# Patient Record
Sex: FEMALE | Race: WHITE | NOT HISPANIC OR LATINO | Employment: OTHER | URBAN - METROPOLITAN AREA
[De-identification: names, ages, dates, MRNs, and addresses within clinical notes are randomized per-mention and may not be internally consistent; named-entity substitution may affect disease eponyms.]

---

## 2019-01-14 ENCOUNTER — HOSPITAL ENCOUNTER (EMERGENCY)
Facility: HOSPITAL | Age: 64
Discharge: HOME | End: 2019-01-14
Attending: EMERGENCY MEDICINE

## 2019-01-14 VITALS
DIASTOLIC BLOOD PRESSURE: 72 MMHG | RESPIRATION RATE: 20 BRPM | SYSTOLIC BLOOD PRESSURE: 103 MMHG | BODY MASS INDEX: 25.4 KG/M2 | HEART RATE: 98 BPM | TEMPERATURE: 98 F | WEIGHT: 138 LBS | HEIGHT: 62 IN

## 2019-01-14 DIAGNOSIS — S52.532A COLLES' FRACTURE OF LEFT RADIUS, INITIAL ENCOUNTER FOR CLOSED FRACTURE: Primary | ICD-10-CM

## 2019-01-14 DIAGNOSIS — M25.532 LEFT WRIST PAIN: ICD-10-CM
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
## 2019-01-14 PROCEDURE — 25605 CLTX DST RDL FX/EPHYS SEP W/: CPT | Mod: LT

## 2019-01-14 PROCEDURE — 73100 X-RAY EXAM OF WRIST: CPT | Mod: TC,FY,LT

## 2019-01-14 PROCEDURE — 73110 X-RAY EXAM OF WRIST: CPT | Mod: 26,LT,, | Performed by: RADIOLOGY

## 2019-01-14 PROCEDURE — 99283 EMERGENCY DEPT VISIT LOW MDM: CPT

## 2019-01-14 PROCEDURE — 73110 X-RAY EXAM OF WRIST: CPT | Mod: TC,FY,LT

## 2019-01-14 PROCEDURE — 73100 X-RAY EXAM OF WRIST: CPT | Mod: 26,59,LT, | Performed by: RADIOLOGY

## 2019-01-14 PROCEDURE — 73100 XR WRIST 2 VIEW LEFT: ICD-10-PCS | Mod: 26,59,LT, | Performed by: RADIOLOGY

## 2019-01-14 PROCEDURE — 25000003 PHARM REV CODE 250: Performed by: NURSE PRACTITIONER

## 2019-01-14 PROCEDURE — 99285 EMERGENCY DEPT VISIT HI MDM: CPT | Mod: 25

## 2019-01-14 PROCEDURE — 25000003 PHARM REV CODE 250: Performed by: EMERGENCY MEDICINE

## 2019-01-14 PROCEDURE — 73110 XR WRIST COMPLETE 3 VIEWS LEFT: ICD-10-PCS | Mod: 26,LT,, | Performed by: RADIOLOGY

## 2019-01-14 RX ORDER — CLONAZEPAM 0.5 MG/1
0.5 TABLET ORAL 2 TIMES DAILY PRN
COMMUNITY
End: 2024-01-19 | Stop reason: SDUPTHER

## 2019-01-14 RX ORDER — HYDROCODONE BITARTRATE AND ACETAMINOPHEN 5; 325 MG/1; MG/1
1-2 TABLET ORAL EVERY 6 HOURS PRN
Qty: 40 TABLET | Refills: 0 | Status: ON HOLD | OUTPATIENT
Start: 2019-01-14 | End: 2020-09-15

## 2019-01-14 RX ORDER — FLUOXETINE HYDROCHLORIDE 40 MG/1
40 CAPSULE ORAL DAILY
COMMUNITY

## 2019-01-14 RX ORDER — LIDOCAINE HYDROCHLORIDE 10 MG/ML
1 INJECTION INFILTRATION; PERINEURAL
Status: COMPLETED | OUTPATIENT
Start: 2019-01-14 | End: 2019-01-14

## 2019-01-14 RX ORDER — HYDROCODONE BITARTRATE AND ACETAMINOPHEN 5; 325 MG/1; MG/1
1 TABLET ORAL
Status: COMPLETED | OUTPATIENT
Start: 2019-01-14 | End: 2019-01-14

## 2019-01-14 RX ORDER — MONTELUKAST SODIUM 10 MG/1
10 TABLET ORAL EVERY MORNING
COMMUNITY

## 2019-01-14 RX ADMIN — LIDOCAINE HYDROCHLORIDE 1 ML: 10 INJECTION, SOLUTION INFILTRATION; PERINEURAL at 07:01

## 2019-01-14 RX ADMIN — HYDROCODONE BITARTRATE AND ACETAMINOPHEN 1 TABLET: 5; 325 TABLET ORAL at 07:01

## 2019-01-15 DIAGNOSIS — M25.532 LEFT WRIST PAIN: Primary | ICD-10-CM

## 2019-01-15 NOTE — ED NOTES
Pt verbalized understanding of discharge and medication instructions. To discharge with steady gait. Breathing even and unlabored NAD. Escorted patient and daughter to discharge window.

## 2019-01-15 NOTE — DISCHARGE INSTRUCTIONS
Norco 5 mg  Take 1-2 every 6 hr needed for acute pain  Elevation  Sling  Contact Orthopedics in the morning for close follow-up for casting and re-evaluation
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
## 2019-01-16 ENCOUNTER — OFFICE VISIT (OUTPATIENT)
Dept: ORTHOPEDICS | Facility: CLINIC | Age: 64
End: 2019-01-16

## 2019-01-16 ENCOUNTER — HOSPITAL ENCOUNTER (OUTPATIENT)
Dept: RADIOLOGY | Facility: HOSPITAL | Age: 64
Discharge: HOME | End: 2019-01-16
Attending: ORTHOPAEDIC SURGERY

## 2019-01-16 VITALS — WEIGHT: 138 LBS | HEIGHT: 62 IN | BODY MASS INDEX: 25.4 KG/M2

## 2019-01-16 DIAGNOSIS — S52.502A TRAUMATIC CLOSED DISPLACED FRACTURE OF DISTAL END OF RADIUS, LEFT, INITIAL ENCOUNTER: ICD-10-CM

## 2019-01-16 DIAGNOSIS — M25.532 LEFT WRIST PAIN: ICD-10-CM

## 2019-01-16 DIAGNOSIS — S52.502A TRAUMATIC CLOSED DISPLACED FRACTURE OF DISTAL END OF RADIUS, LEFT, INITIAL ENCOUNTER: Primary | ICD-10-CM

## 2019-01-16 DIAGNOSIS — Z01.818 PRE-OP EXAM: Primary | ICD-10-CM

## 2019-01-16 DIAGNOSIS — Z01.818 PRE-OP EXAM: ICD-10-CM

## 2019-01-16 PROCEDURE — 73110 XR WRIST COMPLETE 3 VIEWS LEFT: ICD-10-PCS | Mod: 26,LT,, | Performed by: RADIOLOGY

## 2019-01-16 PROCEDURE — 73110 X-RAY EXAM OF WRIST: CPT | Mod: TC,PN,LT

## 2019-01-16 PROCEDURE — 99204 OFFICE O/P NEW MOD 45 MIN: CPT | Mod: 57,S$PBB,, | Performed by: ORTHOPAEDIC SURGERY

## 2019-01-16 PROCEDURE — 73110 X-RAY EXAM OF WRIST: CPT | Mod: 26,LT,, | Performed by: RADIOLOGY

## 2019-01-16 PROCEDURE — 99999 PR PBB SHADOW E&M-EST. PATIENT-LVL II: ICD-10-PCS | Mod: PBBFAC,,, | Performed by: ORTHOPAEDIC SURGERY

## 2019-01-16 PROCEDURE — 99999 PR PBB SHADOW E&M-EST. PATIENT-LVL II: CPT | Mod: PBBFAC,,, | Performed by: ORTHOPAEDIC SURGERY

## 2019-01-16 PROCEDURE — 99212 OFFICE O/P EST SF 10 MIN: CPT | Mod: PBBFAC,25,PN | Performed by: ORTHOPAEDIC SURGERY

## 2019-01-16 PROCEDURE — 99204 PR OFFICE/OUTPT VISIT, NEW, LEVL IV, 45-59 MIN: ICD-10-PCS | Mod: 57,S$PBB,, | Performed by: ORTHOPAEDIC SURGERY

## 2019-01-16 RX ORDER — SODIUM CHLORIDE 9 MG/ML
INJECTION, SOLUTION INTRAVENOUS CONTINUOUS
Status: CANCELLED | OUTPATIENT
Start: 2019-01-16

## 2019-01-16 RX ORDER — MUPIROCIN 20 MG/G
OINTMENT TOPICAL
Status: CANCELLED | OUTPATIENT
Start: 2019-01-16
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
## 2019-01-23 ENCOUNTER — TELEPHONE (OUTPATIENT)
Dept: ORTHOPEDICS | Facility: CLINIC | Age: 64
End: 2019-01-23

## 2019-01-23 NOTE — TELEPHONE ENCOUNTER
----- Message from Karthik Hinojosa sent at 1/23/2019  3:00 PM CST -----  Contact: self [phone]  Would like to have prescription for pain medication sent to pharmacy.  Please call back at [phone].  Md Angeles          Pt uses.  [facility]  [phone]

## 2019-01-23 NOTE — TELEPHONE ENCOUNTER
Pt called, LM to call office as Dr. Jeff does not prescibe pain medication until the day of surgery.

## 2019-01-25 RX ORDER — CYCLOBENZAPRINE HCL 10 MG
10 TABLET ORAL 3 TIMES DAILY PRN
Status: ON HOLD | COMMUNITY
End: 2023-06-05 | Stop reason: HOSPADM

## 2019-01-25 RX ORDER — AMLODIPINE AND BENAZEPRIL HYDROCHLORIDE 5; 10 MG/1; MG/1
1 CAPSULE ORAL EVERY MORNING
Status: ON HOLD | COMMUNITY
End: 2023-06-28 | Stop reason: HOSPADM

## 2019-01-26 ENCOUNTER — LAB VISIT (OUTPATIENT)
Dept: LAB | Facility: HOSPITAL | Age: 64
End: 2019-01-26
Attending: ORTHOPAEDIC SURGERY

## 2019-01-26 DIAGNOSIS — Z01.818 PRE-OP EXAM: ICD-10-CM

## 2019-01-26 DIAGNOSIS — S52.502A TRAUMATIC CLOSED DISPLACED FRACTURE OF DISTAL END OF RADIUS, LEFT, INITIAL ENCOUNTER: ICD-10-CM

## 2019-01-26 LAB
ANION GAP SERPL CALC-SCNC: 13 MMOL/L
BASOPHILS # BLD AUTO: 0.02 K/UL
BASOPHILS NFR BLD: 0.3 %
BUN SERPL-MCNC: 6 MG/DL
CALCIUM SERPL-MCNC: 8.9 MG/DL
CHLORIDE SERPL-SCNC: 92 MMOL/L
CO2 SERPL-SCNC: 28 MMOL/L
CREAT SERPL-MCNC: 0.5 MG/DL
DIFFERENTIAL METHOD: ABNORMAL
EOSINOPHIL # BLD AUTO: 0.1 K/UL
EOSINOPHIL NFR BLD: 1.7 %
ERYTHROCYTE [DISTWIDTH] IN BLOOD BY AUTOMATED COUNT: 13.4 %
EST. GFR  (AFRICAN AMERICAN): >60 ML/MIN/1.73 M^2
EST. GFR  (NON AFRICAN AMERICAN): >60 ML/MIN/1.73 M^2
GLUCOSE SERPL-MCNC: 107 MG/DL
HCT VFR BLD AUTO: 38.3 %
HGB BLD-MCNC: 13 G/DL
IMM GRANULOCYTES # BLD AUTO: 0.02 K/UL
IMM GRANULOCYTES NFR BLD AUTO: 0.3 %
INR PPP: 1
LYMPHOCYTES # BLD AUTO: 1.4 K/UL
LYMPHOCYTES NFR BLD: 17.8 %
MCH RBC QN AUTO: 32.2 PG
MCHC RBC AUTO-ENTMCNC: 33.9 G/DL
MCV RBC AUTO: 95 FL
MONOCYTES # BLD AUTO: 0.6 K/UL
MONOCYTES NFR BLD: 7.6 %
NEUTROPHILS # BLD AUTO: 5.6 K/UL
NEUTROPHILS NFR BLD: 72.3 %
NRBC BLD-RTO: 0 /100 WBC
PLATELET # BLD AUTO: 335 K/UL
PMV BLD AUTO: 9.5 FL
POTASSIUM SERPL-SCNC: 3.6 MMOL/L
PROTHROMBIN TIME: 11.9 SEC
RBC # BLD AUTO: 4.04 M/UL
SODIUM SERPL-SCNC: 133 MMOL/L
WBC # BLD AUTO: 7.66 K/UL

## 2019-01-26 PROCEDURE — 36415 COLL VENOUS BLD VENIPUNCTURE: CPT

## 2019-01-26 PROCEDURE — 85025 COMPLETE CBC W/AUTO DIFF WBC: CPT

## 2019-01-26 PROCEDURE — 85610 PROTHROMBIN TIME: CPT

## 2019-01-26 PROCEDURE — 80048 BASIC METABOLIC PNL TOTAL CA: CPT

## 2019-01-29 ENCOUNTER — ANESTHESIA (OUTPATIENT)
Dept: SURGERY | Facility: HOSPITAL | Age: 64
End: 2019-01-29

## 2019-01-29 ENCOUNTER — HOSPITAL ENCOUNTER (OUTPATIENT)
Facility: HOSPITAL | Age: 64
Discharge: HOME | End: 2019-01-29
Attending: ORTHOPAEDIC SURGERY | Admitting: ORTHOPAEDIC SURGERY

## 2019-01-29 ENCOUNTER — ANESTHESIA EVENT (OUTPATIENT)
Dept: SURGERY | Facility: HOSPITAL | Age: 64
End: 2019-01-29

## 2019-01-29 ENCOUNTER — TELEPHONE (OUTPATIENT)
Dept: ORTHOPEDICS | Facility: CLINIC | Age: 64
End: 2019-01-29

## 2019-01-29 VITALS
TEMPERATURE: 98 F | OXYGEN SATURATION: 91 % | HEIGHT: 62 IN | RESPIRATION RATE: 10 BRPM | WEIGHT: 130 LBS | DIASTOLIC BLOOD PRESSURE: 77 MMHG | BODY MASS INDEX: 23.92 KG/M2 | HEART RATE: 93 BPM | SYSTOLIC BLOOD PRESSURE: 129 MMHG

## 2019-01-29 DIAGNOSIS — S52.502A TRAUMATIC CLOSED DISPLACED FRACTURE OF DISTAL END OF RADIUS, LEFT, INITIAL ENCOUNTER: Primary | ICD-10-CM

## 2019-01-29 DIAGNOSIS — Z41.9 SURGERY, ELECTIVE: ICD-10-CM

## 2019-01-29 DIAGNOSIS — Z01.818 PRE-OP EXAM: ICD-10-CM

## 2019-01-29 PROCEDURE — 63600175 PHARM REV CODE 636 W HCPCS: Performed by: NURSE ANESTHETIST, CERTIFIED REGISTERED

## 2019-01-29 PROCEDURE — 25999: ICD-10-PCS | Mod: ,,, | Performed by: ORTHOPAEDIC SURGERY

## 2019-01-29 PROCEDURE — C1713 ANCHOR/SCREW BN/BN,TIS/BN: HCPCS | Performed by: ORTHOPAEDIC SURGERY

## 2019-01-29 PROCEDURE — 27800903 OPTIME MED/SURG SUP & DEVICES OTHER IMPLANTS: Performed by: ORTHOPAEDIC SURGERY

## 2019-01-29 PROCEDURE — S0028 INJECTION, FAMOTIDINE, 20 MG: HCPCS | Performed by: ANESTHESIOLOGY

## 2019-01-29 PROCEDURE — 93010 ELECTROCARDIOGRAM REPORT: CPT | Mod: ,,, | Performed by: INTERNAL MEDICINE

## 2019-01-29 PROCEDURE — D9220A PRA ANESTHESIA: Mod: ,,, | Performed by: ANESTHESIOLOGY

## 2019-01-29 PROCEDURE — 36000709 HC OR TIME LEV III EA ADD 15 MIN: Performed by: ORTHOPAEDIC SURGERY

## 2019-01-29 PROCEDURE — 25000003 PHARM REV CODE 250: Performed by: ORTHOPAEDIC SURGERY

## 2019-01-29 PROCEDURE — 63600175 PHARM REV CODE 636 W HCPCS: Performed by: ORTHOPAEDIC SURGERY

## 2019-01-29 PROCEDURE — 25000003 PHARM REV CODE 250: Performed by: ANESTHESIOLOGY

## 2019-01-29 PROCEDURE — 63600175 PHARM REV CODE 636 W HCPCS: Performed by: ANESTHESIOLOGY

## 2019-01-29 PROCEDURE — 37000009 HC ANESTHESIA EA ADD 15 MINS: Performed by: ORTHOPAEDIC SURGERY

## 2019-01-29 PROCEDURE — 25999 UNLISTED PX FOREARM/WRIST: CPT | Mod: ,,, | Performed by: ORTHOPAEDIC SURGERY

## 2019-01-29 PROCEDURE — 71000016 HC POSTOP RECOV ADDL HR: Performed by: ORTHOPAEDIC SURGERY

## 2019-01-29 PROCEDURE — 93005 ELECTROCARDIOGRAM TRACING: CPT

## 2019-01-29 PROCEDURE — 63600175 PHARM REV CODE 636 W HCPCS

## 2019-01-29 PROCEDURE — 94640 AIRWAY INHALATION TREATMENT: CPT

## 2019-01-29 PROCEDURE — 27201423 OPTIME MED/SURG SUP & DEVICES STERILE SUPPLY: Performed by: ORTHOPAEDIC SURGERY

## 2019-01-29 PROCEDURE — 37000008 HC ANESTHESIA 1ST 15 MINUTES: Performed by: ORTHOPAEDIC SURGERY

## 2019-01-29 PROCEDURE — 36000708 HC OR TIME LEV III 1ST 15 MIN: Performed by: ORTHOPAEDIC SURGERY

## 2019-01-29 PROCEDURE — D9220A PRA ANESTHESIA: ICD-10-PCS | Mod: ,,, | Performed by: ANESTHESIOLOGY

## 2019-01-29 PROCEDURE — 71000033 HC RECOVERY, INTIAL HOUR: Performed by: ORTHOPAEDIC SURGERY

## 2019-01-29 PROCEDURE — 25000003 PHARM REV CODE 250: Performed by: NURSE ANESTHETIST, CERTIFIED REGISTERED

## 2019-01-29 PROCEDURE — 71000015 HC POSTOP RECOV 1ST HR: Performed by: ORTHOPAEDIC SURGERY

## 2019-01-29 PROCEDURE — C1769 GUIDE WIRE: HCPCS | Performed by: ORTHOPAEDIC SURGERY

## 2019-01-29 PROCEDURE — 25000242 PHARM REV CODE 250 ALT 637 W/ HCPCS: Performed by: ANESTHESIOLOGY

## 2019-01-29 PROCEDURE — 93010 EKG 12-LEAD: ICD-10-PCS | Mod: ,,, | Performed by: INTERNAL MEDICINE

## 2019-01-29 RX ORDER — IPRATROPIUM BROMIDE AND ALBUTEROL SULFATE 2.5; .5 MG/3ML; MG/3ML
3 SOLUTION RESPIRATORY (INHALATION) ONCE
Status: DISCONTINUED | OUTPATIENT
Start: 2019-01-29 | End: 2019-01-29 | Stop reason: HOSPADM

## 2019-01-29 RX ORDER — CEFAZOLIN SODIUM 2 G/50ML
2 SOLUTION INTRAVENOUS
Status: COMPLETED | OUTPATIENT
Start: 2019-01-29 | End: 2019-01-29

## 2019-01-29 RX ORDER — ONDANSETRON 2 MG/ML
INJECTION INTRAMUSCULAR; INTRAVENOUS
Status: DISCONTINUED
Start: 2019-01-29 | End: 2019-01-29 | Stop reason: HOSPADM

## 2019-01-29 RX ORDER — MIDAZOLAM HYDROCHLORIDE 1 MG/ML
INJECTION, SOLUTION INTRAMUSCULAR; INTRAVENOUS
Status: DISCONTINUED | OUTPATIENT
Start: 2019-01-29 | End: 2019-01-29

## 2019-01-29 RX ORDER — IPRATROPIUM BROMIDE AND ALBUTEROL SULFATE 2.5; .5 MG/3ML; MG/3ML
SOLUTION RESPIRATORY (INHALATION)
Status: DISCONTINUED
Start: 2019-01-29 | End: 2019-01-29 | Stop reason: HOSPADM

## 2019-01-29 RX ORDER — SODIUM CHLORIDE, SODIUM LACTATE, POTASSIUM CHLORIDE, CALCIUM CHLORIDE 600; 310; 30; 20 MG/100ML; MG/100ML; MG/100ML; MG/100ML
INJECTION, SOLUTION INTRAVENOUS CONTINUOUS
Status: DISCONTINUED | OUTPATIENT
Start: 2019-01-29 | End: 2019-01-29 | Stop reason: HOSPADM

## 2019-01-29 RX ORDER — SODIUM CHLORIDE, SODIUM LACTATE, POTASSIUM CHLORIDE, CALCIUM CHLORIDE 600; 310; 30; 20 MG/100ML; MG/100ML; MG/100ML; MG/100ML
125 INJECTION, SOLUTION INTRAVENOUS CONTINUOUS
Status: DISCONTINUED | OUTPATIENT
Start: 2019-01-29 | End: 2019-01-29 | Stop reason: HOSPADM

## 2019-01-29 RX ORDER — MUPIROCIN 20 MG/G
OINTMENT TOPICAL
Status: DISCONTINUED | OUTPATIENT
Start: 2019-01-29 | End: 2019-01-29 | Stop reason: HOSPADM

## 2019-01-29 RX ORDER — NEOSTIGMINE METHYLSULFATE 1 MG/ML
INJECTION, SOLUTION INTRAVENOUS
Status: DISCONTINUED | OUTPATIENT
Start: 2019-01-29 | End: 2019-01-29

## 2019-01-29 RX ORDER — FAMOTIDINE 10 MG/ML
INJECTION INTRAVENOUS
Status: DISCONTINUED
Start: 2019-01-29 | End: 2019-01-29 | Stop reason: HOSPADM

## 2019-01-29 RX ORDER — PROPOFOL 10 MG/ML
VIAL (ML) INTRAVENOUS
Status: DISCONTINUED | OUTPATIENT
Start: 2019-01-29 | End: 2019-01-29

## 2019-01-29 RX ORDER — MORPHINE SULFATE 2 MG/ML
INJECTION, SOLUTION INTRAMUSCULAR; INTRAVENOUS
Status: COMPLETED
Start: 2019-01-29 | End: 2019-01-29

## 2019-01-29 RX ORDER — MORPHINE SULFATE 2 MG/ML
2 INJECTION, SOLUTION INTRAMUSCULAR; INTRAVENOUS EVERY 5 MIN PRN
Status: DISCONTINUED | OUTPATIENT
Start: 2019-01-29 | End: 2019-01-29 | Stop reason: HOSPADM

## 2019-01-29 RX ORDER — LIDOCAINE HYDROCHLORIDE 10 MG/ML
1 INJECTION, SOLUTION EPIDURAL; INFILTRATION; INTRACAUDAL; PERINEURAL ONCE
Status: DISCONTINUED | OUTPATIENT
Start: 2019-01-29 | End: 2019-01-29 | Stop reason: HOSPADM

## 2019-01-29 RX ORDER — SUCCINYLCHOLINE CHLORIDE 20 MG/ML
INJECTION INTRAMUSCULAR; INTRAVENOUS
Status: DISCONTINUED | OUTPATIENT
Start: 2019-01-29 | End: 2019-01-29

## 2019-01-29 RX ORDER — ONDANSETRON 2 MG/ML
4 INJECTION INTRAMUSCULAR; INTRAVENOUS DAILY PRN
Status: DISCONTINUED | OUTPATIENT
Start: 2019-01-29 | End: 2019-01-29 | Stop reason: HOSPADM

## 2019-01-29 RX ORDER — CISATRACURIUM BESYLATE 2 MG/ML
INJECTION, SOLUTION INTRAVENOUS
Status: DISCONTINUED | OUTPATIENT
Start: 2019-01-29 | End: 2019-01-29

## 2019-01-29 RX ORDER — IPRATROPIUM BROMIDE AND ALBUTEROL SULFATE 2.5; .5 MG/3ML; MG/3ML
3 SOLUTION RESPIRATORY (INHALATION) ONCE
Status: COMPLETED | OUTPATIENT
Start: 2019-01-29 | End: 2019-01-29

## 2019-01-29 RX ORDER — SODIUM CHLORIDE 9 MG/ML
INJECTION, SOLUTION INTRAVENOUS CONTINUOUS
Status: DISCONTINUED | OUTPATIENT
Start: 2019-01-29 | End: 2019-01-29 | Stop reason: HOSPADM

## 2019-01-29 RX ORDER — FAMOTIDINE 10 MG/ML
20 INJECTION INTRAVENOUS ONCE
Status: COMPLETED | OUTPATIENT
Start: 2019-01-29 | End: 2019-01-29

## 2019-01-29 RX ORDER — MEPERIDINE HYDROCHLORIDE 50 MG/ML
INJECTION INTRAMUSCULAR; INTRAVENOUS; SUBCUTANEOUS
Status: DISCONTINUED | OUTPATIENT
Start: 2019-01-29 | End: 2019-01-29

## 2019-01-29 RX ORDER — GLYCOPYRROLATE 0.2 MG/ML
INJECTION INTRAMUSCULAR; INTRAVENOUS
Status: DISCONTINUED | OUTPATIENT
Start: 2019-01-29 | End: 2019-01-29

## 2019-01-29 RX ADMIN — MORPHINE SULFATE 2 MG: 2 INJECTION, SOLUTION INTRAMUSCULAR; INTRAVENOUS at 10:01

## 2019-01-29 RX ADMIN — MEPERIDINE HYDROCHLORIDE 50 MG: 50 INJECTION INTRAMUSCULAR; INTRAVENOUS; SUBCUTANEOUS at 07:01

## 2019-01-29 RX ADMIN — IPRATROPIUM BROMIDE AND ALBUTEROL SULFATE 3 ML: .5; 3 SOLUTION RESPIRATORY (INHALATION) at 07:01

## 2019-01-29 RX ADMIN — MORPHINE SULFATE 2 MG: 2 INJECTION, SOLUTION INTRAMUSCULAR; INTRAVENOUS at 09:01

## 2019-01-29 RX ADMIN — MIDAZOLAM HYDROCHLORIDE 2 MG: 1 INJECTION, SOLUTION INTRAMUSCULAR; INTRAVENOUS at 07:01

## 2019-01-29 RX ADMIN — SODIUM CHLORIDE: 0.9 INJECTION, SOLUTION INTRAVENOUS at 08:01

## 2019-01-29 RX ADMIN — CEFAZOLIN SODIUM 2 G: 2 SOLUTION INTRAVENOUS at 07:01

## 2019-01-29 RX ADMIN — SUCCINYLCHOLINE CHLORIDE 100 MG: 20 INJECTION, SOLUTION INTRAMUSCULAR; INTRAVENOUS at 07:01

## 2019-01-29 RX ADMIN — PROPOFOL 200 MG: 10 INJECTION, EMULSION INTRAVENOUS at 07:01

## 2019-01-29 RX ADMIN — CISATRACURIUM BESYLATE 4 MG: 2 INJECTION INTRAVENOUS at 08:01

## 2019-01-29 RX ADMIN — NEOSTIGMINE METHYLSULFATE 4 MG: 1 INJECTION INTRAVENOUS at 09:01

## 2019-01-29 RX ADMIN — SODIUM CHLORIDE, POTASSIUM CHLORIDE, SODIUM LACTATE AND CALCIUM CHLORIDE: 600; 310; 30; 20 INJECTION, SOLUTION INTRAVENOUS at 07:01

## 2019-01-29 RX ADMIN — ONDANSETRON 4 MG: 2 INJECTION INTRAMUSCULAR; INTRAVENOUS at 09:01

## 2019-01-29 RX ADMIN — GLYCOPYRROLATE 0.8 MG: 0.2 INJECTION INTRAMUSCULAR; INTRAVENOUS at 09:01

## 2019-01-29 RX ADMIN — FAMOTIDINE 20 MG: 10 INJECTION INTRAVENOUS at 07:01

## 2019-01-29 RX ADMIN — CISATRACURIUM BESYLATE 6 MG: 2 INJECTION INTRAVENOUS at 07:01

## 2019-01-29 NOTE — ANESTHESIA POSTPROCEDURE EVALUATION
"Anesthesia Post Evaluation    Patient: Bhavana Lambert    Procedure(s) Performed: Procedure(s) (LRB):  ORIF, WRIST (Left)    Final Anesthesia Type: general  Patient location during evaluation: PACU  Patient participation: Yes- Able to Participate  Level of consciousness: awake and alert  Post-procedure vital signs: reviewed and stable  Pain management: adequate  Airway patency: patent  PONV status at discharge: No PONV  Anesthetic complications: no      Cardiovascular status: blood pressure returned to baseline  Respiratory status: unassisted  Hydration status: euvolemic  Follow-up not needed.        Visit Vitals  /77   Pulse 93   Temp 36.4 °C (97.6 °F) (Oral)   Resp 10   Ht 5' 2" (1.575 m)   Wt 59 kg (130 lb)   SpO2 (!) 91%   Breastfeeding? No   BMI 23.78 kg/m²       Pain/Joana Score: Pain Rating Prior to Med Admin: 7 (1/29/2019 10:49 AM)  Pain Rating Post Med Admin: 7 (1/29/2019 10:49 AM)  Joana Score: 9 (1/29/2019 10:49 AM)        "

## 2019-01-29 NOTE — PLAN OF CARE
Respiratory tx given, pt continued sat improved to 90 and above. Alert and holding conversation with daughter.

## 2019-01-29 NOTE — PLAN OF CARE
Taking sips of soda, Continued on 1L nc at this time. Encouraged pt to cough and deep breath. Position ice pack inside sling to wrist.
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
## 2019-01-29 NOTE — PLAN OF CARE
Elian GU notified that patient is requiring 2L O2 to maintain 92%sat. Elian GU stated to keep patient until she is wide awake and to educate patient and patient's daughter to have the patient wear home O2 today.

## 2019-01-29 NOTE — TELEPHONE ENCOUNTER
Called patient to schedule post op appointment with Dr. Jeff and to ask patient how she is feeling after surgery today. Spoke with patient's daughter. She states that patient is doing well. Post op appointment made.

## 2019-01-29 NOTE — PLAN OF CARE
Continued care of quietly resting patient s/p ORIF of left wrist.   She is currently on 3l NC has had 6MG of morphine iv for c/o pain. Wears O2 at home while sleeping, has been encouraged in post op teaching to continued use of home O2 throughout the day while at home and while taking pain medications.     At this time pt is assist to high sitting   position.

## 2019-01-29 NOTE — DISCHARGE SUMMARY
The patient was admitted through same-day surgery and was brought to the operating room where an open reduction and internal fixation of a left distal radius fracture was completed.  For a full account of surgery please see the operative report.  Postoperatively the patient was transferred to the recovery room and when alert awake and oriented was discharged home in stable condition with instructions to follow up in approximately 10-12 days.

## 2019-01-29 NOTE — PLAN OF CARE
Now on room air, encouraging pt to listen to tone on monitor and respond with deep breathing when tone drops. Order for due nebulizer respiratory tx per  Dr Plummer.

## 2019-01-29 NOTE — ANESTHESIA PREPROCEDURE EVALUATION
01/29/2019  Bhavana Lambert is a 63 y.o., female.    Anesthesia Evaluation    I have reviewed the Patient Summary Reports.    I have reviewed the Nursing Notes.   I have reviewed the Medications.     Review of Systems  Anesthesia Hx:  No problems with previous Anesthesia    Social:  Smoker    Cardiovascular:  Cardiovascular Normal     Pulmonary:   COPD    Renal/:  Renal/ Normal     Hepatic/GI:  Hepatic/GI Normal    Musculoskeletal:  Musculoskeletal Normal    Neurological:  Neurology Normal    Endocrine:  Endocrine Normal    Dermatological:  Skin Normal        Physical Exam  General:  Well nourished    Airway/Jaw/Neck:  Airway Findings: Mouth Opening: Normal Tongue: Normal  General Airway Assessment: Adult  Mallampati: III  TM Distance: 4 - 6 cm  Jaw/Neck Findings:  Neck ROM: Normal ROM       Chest/Lungs:  Chest/Lungs Findings: Clear to auscultation     Heart/Vascular:  Heart Findings: Rate: Normal  Rhythm: Regular Rhythm        Mental Status:  Mental Status Findings:  Cooperative, Alert and Oriented         Anesthesia Plan  Type of Anesthesia, risks & benefits discussed:  Anesthesia Type:  general  Patient's Preference:   Intra-op Monitoring Plan: standard ASA monitors  Intra-op Monitoring Plan Comments:   Post Op Pain Control Plan: IV/PO Opioids PRN  Post Op Pain Control Plan Comments:   Induction:   IV  Beta Blocker:  Patient is not currently on a Beta-Blocker (No further documentation required).       Informed Consent: Patient understands risks and agrees with Anesthesia plan.  Questions answered. Anesthesia consent signed with patient.  ASA Score: 3     Day of Surgery Review of History & Physical: I have interviewed and examined the patient. I have reviewed the patient's H&P dated:            Ready For Surgery From Anesthesia Perspective.

## 2019-01-29 NOTE — TRANSFER OF CARE
"Anesthesia Transfer of Care Note    Patient: Bhavana Lambert    Procedure(s) Performed: Procedure(s) (LRB):  ORIF, WRIST (Left)    Patient location: PACU    Anesthesia Type: general    Transport from OR: Transported from OR on room air with adequate spontaneous ventilation    Post pain: adequate analgesia    Post assessment: no apparent anesthetic complications and tolerated procedure well    Post vital signs: stable    Level of consciousness: awake, alert and oriented    Nausea/Vomiting: no nausea/vomiting    Complications: none    Transfer of care protocol was followed      Last vitals:   Visit Vitals  /87 (BP Location: Right arm, Patient Position: Lying)   Pulse 96   Temp 36.9 °C (98.4 °F) (Oral)   Resp 14   Ht 5' 2" (1.575 m)   Wt 59 kg (130 lb)   SpO2 (!) 94%   Breastfeeding? No   BMI 23.78 kg/m²     "

## 2019-01-29 NOTE — INTERVAL H&P NOTE
The patient has been examined and the H&P has been reviewed:    I concur with the findings and no changes have occurred since H&P was written.    Anesthesia/Surgery risks, benefits and alternative options discussed and understood by patient/family.          Active Hospital Problems    Diagnosis  POA    Traumatic closed displaced fracture of distal end of radius, left, initial encounter [S53.488A]  Yes      Resolved Hospital Problems   No resolved problems to display.

## 2019-01-30 ENCOUNTER — TELEPHONE (OUTPATIENT)
Dept: ORTHOPEDICS | Facility: CLINIC | Age: 64
End: 2019-01-30

## 2019-01-30 NOTE — TELEPHONE ENCOUNTER
----- Message from Nancy Almanzar sent at 1/30/2019  8:24 AM CST -----  Type: Needs Medical Advice    Who Called:  DaughterKim Paulino  Symptoms (please be specific): hand is swelling  How long has patient had these symptoms:  Since last night  Pharmacy name and phone #:  [facility]  Best Call Back Number: [phone]  Additional Information: Just had surgery yesterday, stating her hand swelling has gotten bigger since yesterday. Kept getting up throughout the night regarding, please advise

## 2019-01-30 NOTE — TELEPHONE ENCOUNTER
Returned call. Instructed patient's daughter to keep site elevated above the heart and iced for no longer than 15 minutes at a time. Instructed patient's daughter to looses ace wrap on hand if needed but do not remove it. Patient's daughter voiced understanding. Encouraged her to call office if she has any questions or concerns.

## 2019-02-08 ENCOUNTER — OFFICE VISIT (OUTPATIENT)
Dept: ORTHOPEDICS | Facility: CLINIC | Age: 64
End: 2019-02-08

## 2019-02-08 VITALS — RESPIRATION RATE: 18 BRPM | HEIGHT: 62 IN | BODY MASS INDEX: 23.93 KG/M2 | WEIGHT: 130.06 LBS

## 2019-02-08 DIAGNOSIS — Z51.89 AFTER CARE: Primary | ICD-10-CM

## 2019-02-08 PROCEDURE — 99212 OFFICE O/P EST SF 10 MIN: CPT | Mod: PBBFAC,PN | Performed by: ORTHOPAEDIC SURGERY

## 2019-02-08 PROCEDURE — 99999 PR PBB SHADOW E&M-EST. PATIENT-LVL II: CPT | Mod: PBBFAC,,, | Performed by: ORTHOPAEDIC SURGERY

## 2019-02-08 PROCEDURE — 99024 POSTOP FOLLOW-UP VISIT: CPT | Mod: ,,, | Performed by: ORTHOPAEDIC SURGERY

## 2019-02-08 PROCEDURE — 99999 PR PBB SHADOW E&M-EST. PATIENT-LVL II: ICD-10-PCS | Mod: PBBFAC,,, | Performed by: ORTHOPAEDIC SURGERY

## 2019-02-08 PROCEDURE — 99024 PR POST-OP FOLLOW-UP VISIT: ICD-10-PCS | Mod: ,,, | Performed by: ORTHOPAEDIC SURGERY

## 2019-02-08 NOTE — PROGRESS NOTES
Subjective:      Patient ID: Bhavana Lambert is a 63 y.o. female.    Chief Complaint: Post-op Evaluation, Pain, and Swelling of the Left Wrist    HPI: Ms. Lambert returns today for 1st postop visit on open reduction internal fixation of her left wrist on 01/29/2019.  She has been having some pain in her wrist. She has been in a splint since surgery.    ROS:  New diagnosis/surgery/prescriptions since last office visit on 01/16/2019:  Open reduction internal fixation left distal radius.      Objective:      Physical Exam:   General: AAOx3.  No acute distress  Vascular:  Pulses intact and equal bilaterally.  Capillary refill less than 3 seconds and equal bilaterally  Neurologic:  Pinprick and soft touch intact and equal bilaterally  Integment:  Incision with mild jaison-incisional erythema 1 small area of dehiscence at the proximal and approximately 0.3 cm long.  Extremity:  Wrist:  Pronation/supination allowable 45/45 degrees. Allowable dorsiflexion/volar flexion 45/45 degrees.  Tender with motion wrist. Tender with palpation wrist. No purulence noted  Radiography:  No new x-rays done today      Assessment:       Impression:  Open reduction internal fixation left distal radius.      Plan:       1.  Discussed physical examination with the patient. Since she has some erythema will treat her with prophylactic antibiotics and watch her closely.  Also will take her out of the splint and put her in a removable splint.  2.  Cleanse incision and re-dress with sterile dressing.  3.  Removal off-the-shelf commercially available cock-up splint left wrist, 1 was fitted to the patient.  4.  Norco 7.5/325, 1 p.o. q.4-6 hours p.r.n. pain, dispense 30, refill 0.  5.  Cleocin 300 mg, 1 p.o. t.i.d., dispense 30, refill 0.  6.  One-handed activities with the right hand only.  No use of the left hand for any activities.  7.  Home exercises to include finger motion exercises were discussed.  8.  [facility] portal use was discussed with the  patient and information was given.  The patient was encouraged to use the [facility] portal for all future encounters.  9.  Follow up in 1 week for wound check.

## 2019-02-15 ENCOUNTER — OFFICE VISIT (OUTPATIENT)
Dept: ORTHOPEDICS | Facility: CLINIC | Age: 64
End: 2019-02-15

## 2019-02-15 VITALS — WEIGHT: 130.06 LBS | BODY MASS INDEX: 23.93 KG/M2 | RESPIRATION RATE: 16 BRPM | HEIGHT: 62 IN

## 2019-02-15 DIAGNOSIS — Z51.89 AFTER CARE: Primary | ICD-10-CM

## 2019-02-15 PROCEDURE — 99999 PR PBB SHADOW E&M-EST. PATIENT-LVL II: ICD-10-PCS | Mod: PBBFAC,,, | Performed by: ORTHOPAEDIC SURGERY

## 2019-02-15 PROCEDURE — 99999 PR PBB SHADOW E&M-EST. PATIENT-LVL II: CPT | Mod: PBBFAC,,, | Performed by: ORTHOPAEDIC SURGERY

## 2019-02-15 PROCEDURE — 99024 PR POST-OP FOLLOW-UP VISIT: ICD-10-PCS | Mod: ,,, | Performed by: ORTHOPAEDIC SURGERY

## 2019-02-15 PROCEDURE — 99212 OFFICE O/P EST SF 10 MIN: CPT | Mod: PBBFAC,PN | Performed by: ORTHOPAEDIC SURGERY

## 2019-02-15 PROCEDURE — 99024 POSTOP FOLLOW-UP VISIT: CPT | Mod: ,,, | Performed by: ORTHOPAEDIC SURGERY

## 2019-02-15 NOTE — PROGRESS NOTES
Subjective:      Patient ID: Bhavana Lambert is a 63 y.o. female.    Chief Complaint: Post-op Evaluation of the Left Wrist    HPI: Ms. Lambert returned today for wound check of her left wrist. At her last visit  on 02/08/2019 she had some erythema and one small area of dehiscence of the volar incision of her left wrist. She was given antibiotics.  She stated that the pain and the swelling has resolved.  Her pain is well controlled today. She has been using her splint.    ROS:  No new diagnosis/surgery/prescriptions since last office visit on 02/08/2019.      Objective:      Physical Exam:   General: AAOx3.  No acute distress  Vascular:  Pulses intact and equal bilaterally.  Capillary refill less than 3 seconds and equal bilaterally  Neurologic:  Pinprick and soft touch intact and equal bilaterally  Integment:  Incision well approximated and healing well. No erythema.  Extremity:  Wrist:  Allowable pronation/supination 45/45 degrees left wrist.  Relatively nontender with motion. Allowable dorsiflexion/volar flexion 45/45 degrees. Nontender with palpation.  Relatively nontender with motion. No swelling.  Radiography:  No x-ray done today.      Assessment:       Impression:  ORIF left distal radius.      Plan:       1.  Discussed physical examination with the patient, and she understands she has resolved the erythema she had around her incision. She also understands her wrist looks good and she is doing well.  But she must wear her brace at all times.  2.  Continue with wrist splint at all times treat it like a cast.  3.  Any pain can be controlled with over-the-counter medications dosed per box instructions.  4.  Continue to elevate.  5.  One-handed activities with the right hand only.  6.  [facility] portal was discussed with the patient and information was given.  The patient was encouraged to use the [facility] portal for all future encounters.  7.  Follow up in approximately 1 month with an x-ray of the left wrist.

## 2019-04-09 DIAGNOSIS — M25.532 LEFT WRIST PAIN: Primary | ICD-10-CM

## 2020-09-13 ENCOUNTER — HOSPITAL ENCOUNTER (INPATIENT)
Facility: HOSPITAL | Age: 65
LOS: 1 days | Discharge: ANOTHER HOSPITAL | DRG: 641 | End: 2020-09-14
Attending: INTERNAL MEDICINE | Admitting: INTERNAL MEDICINE

## 2020-09-13 DIAGNOSIS — E87.1 HYPONATREMIA: ICD-10-CM

## 2020-09-13 PROBLEM — E87.6 HYPOKALEMIA: Status: ACTIVE | Noted: 2020-09-13

## 2020-09-13 PROBLEM — R19.7 DIARRHEA: Status: ACTIVE | Noted: 2020-09-13

## 2020-09-13 PROBLEM — E87.20 METABOLIC ACIDOSIS: Status: ACTIVE | Noted: 2020-09-13

## 2020-09-13 LAB
ALBUMIN SERPL BCP-MCNC: 4.1 G/DL (ref 3.5–5.2)
ALP SERPL-CCNC: 82 U/L (ref 55–135)
ALT SERPL W/O P-5'-P-CCNC: 32 U/L (ref 10–44)
AMYLASE SERPL-CCNC: 97 U/L (ref 20–110)
ANION GAP SERPL CALC-SCNC: 23 MMOL/L (ref 8–16)
AST SERPL-CCNC: 37 U/L (ref 10–40)
BASOPHILS # BLD AUTO: 0.01 K/UL (ref 0–0.2)
BASOPHILS NFR BLD: 0.1 % (ref 0–1.9)
BILIRUB SERPL-MCNC: 1.5 MG/DL (ref 0.1–1)
BILIRUB UR QL STRIP: ABNORMAL
BNP SERPL-MCNC: 91 PG/ML (ref 0–99)
BUN SERPL-MCNC: 7 MG/DL (ref 8–23)
CALCIUM SERPL-MCNC: 8.1 MG/DL (ref 8.7–10.5)
CHLORIDE SERPL-SCNC: 75 MMOL/L (ref 95–110)
CLARITY UR: CLEAR
CO2 SERPL-SCNC: 16 MMOL/L (ref 23–29)
COLOR UR: YELLOW
CREAT SERPL-MCNC: 0.4 MG/DL (ref 0.5–1.4)
CRP SERPL-MCNC: 3.86 MG/DL (ref 0–0.75)
DIFFERENTIAL METHOD: ABNORMAL
EOSINOPHIL # BLD AUTO: 0 K/UL (ref 0–0.5)
EOSINOPHIL NFR BLD: 0.3 % (ref 0–8)
ERYTHROCYTE [DISTWIDTH] IN BLOOD BY AUTOMATED COUNT: 11.4 % (ref 11.5–14.5)
EST. GFR  (AFRICAN AMERICAN): >60 ML/MIN/1.73 M^2
EST. GFR  (NON AFRICAN AMERICAN): >60 ML/MIN/1.73 M^2
GLUCOSE SERPL-MCNC: 96 MG/DL (ref 70–110)
GLUCOSE UR QL STRIP: NEGATIVE
HCT VFR BLD AUTO: 36.2 % (ref 37–48.5)
HGB BLD-MCNC: 13.6 G/DL (ref 12–16)
HGB UR QL STRIP: NEGATIVE
IMM GRANULOCYTES # BLD AUTO: 0.05 K/UL (ref 0–0.04)
IMM GRANULOCYTES NFR BLD AUTO: 0.4 % (ref 0–0.5)
KETONES UR QL STRIP: ABNORMAL
LEUKOCYTE ESTERASE UR QL STRIP: NEGATIVE
LIPASE SERPL-CCNC: 71 U/L (ref 4–60)
LYMPHOCYTES # BLD AUTO: 0.6 K/UL (ref 1–4.8)
LYMPHOCYTES NFR BLD: 5.2 % (ref 18–48)
MAGNESIUM SERPL-MCNC: 1.6 MG/DL (ref 1.6–2.6)
MCH RBC QN AUTO: 34.4 PG (ref 27–31)
MCHC RBC AUTO-ENTMCNC: 37.6 G/DL (ref 32–36)
MCV RBC AUTO: 92 FL (ref 82–98)
MONOCYTES # BLD AUTO: 0.8 K/UL (ref 0.3–1)
MONOCYTES NFR BLD: 6.8 % (ref 4–15)
NEUTROPHILS # BLD AUTO: 10 K/UL (ref 1.8–7.7)
NEUTROPHILS NFR BLD: 87.2 % (ref 38–73)
NITRITE UR QL STRIP: NEGATIVE
NRBC BLD-RTO: 0 /100 WBC
PH UR STRIP: 7 [PH] (ref 5–8)
PLATELET # BLD AUTO: 294 K/UL (ref 150–350)
PMV BLD AUTO: 10.6 FL (ref 9.2–12.9)
POTASSIUM SERPL-SCNC: 2.5 MMOL/L (ref 3.5–5.1)
PROT SERPL-MCNC: 7 G/DL (ref 6–8.4)
PROT UR QL STRIP: NEGATIVE
RBC # BLD AUTO: 3.95 M/UL (ref 4–5.4)
SARS-COV-2 RDRP RESP QL NAA+PROBE: NEGATIVE
SODIUM SERPL-SCNC: 114 MMOL/L (ref 136–145)
SP GR UR STRIP: 1.02 (ref 1–1.03)
TSH SERPL DL<=0.005 MIU/L-ACNC: 1.07 UIU/ML (ref 0.34–5.6)
URATE SERPL-MCNC: 7.2 MG/DL (ref 2.4–5.7)
URN SPEC COLLECT METH UR: ABNORMAL
UROBILINOGEN UR STRIP-ACNC: NEGATIVE EU/DL
WBC # BLD AUTO: 11.48 K/UL (ref 3.9–12.7)

## 2020-09-13 PROCEDURE — 71045 XR CHEST AP PORTABLE: ICD-10-PCS | Mod: 26,,, | Performed by: RADIOLOGY

## 2020-09-13 PROCEDURE — 80053 COMPREHEN METABOLIC PANEL: CPT

## 2020-09-13 PROCEDURE — 83880 ASSAY OF NATRIURETIC PEPTIDE: CPT

## 2020-09-13 PROCEDURE — 36415 COLL VENOUS BLD VENIPUNCTURE: CPT

## 2020-09-13 PROCEDURE — 82150 ASSAY OF AMYLASE: CPT

## 2020-09-13 PROCEDURE — 71045 X-RAY EXAM CHEST 1 VIEW: CPT | Mod: 26,,, | Performed by: RADIOLOGY

## 2020-09-13 PROCEDURE — 21400001 HC TELEMETRY ROOM

## 2020-09-13 PROCEDURE — 99285 EMERGENCY DEPT VISIT HI MDM: CPT | Mod: 25

## 2020-09-13 PROCEDURE — 70450 CT HEAD/BRAIN W/O DYE: CPT | Mod: TC

## 2020-09-13 PROCEDURE — 25000003 PHARM REV CODE 250: Performed by: INTERNAL MEDICINE

## 2020-09-13 PROCEDURE — 84550 ASSAY OF BLOOD/URIC ACID: CPT

## 2020-09-13 PROCEDURE — 96374 THER/PROPH/DIAG INJ IV PUSH: CPT

## 2020-09-13 PROCEDURE — 81003 URINALYSIS AUTO W/O SCOPE: CPT

## 2020-09-13 PROCEDURE — 85025 COMPLETE CBC W/AUTO DIFF WBC: CPT

## 2020-09-13 PROCEDURE — 70450 CT HEAD WITHOUT CONTRAST: ICD-10-PCS | Mod: 26,,, | Performed by: RADIOLOGY

## 2020-09-13 PROCEDURE — 71045 X-RAY EXAM CHEST 1 VIEW: CPT | Mod: TC,FY

## 2020-09-13 PROCEDURE — 86140 C-REACTIVE PROTEIN: CPT

## 2020-09-13 PROCEDURE — 83735 ASSAY OF MAGNESIUM: CPT

## 2020-09-13 PROCEDURE — 63600175 PHARM REV CODE 636 W HCPCS: Performed by: INTERNAL MEDICINE

## 2020-09-13 PROCEDURE — 84443 ASSAY THYROID STIM HORMONE: CPT

## 2020-09-13 PROCEDURE — U0002 COVID-19 LAB TEST NON-CDC: HCPCS

## 2020-09-13 PROCEDURE — 83690 ASSAY OF LIPASE: CPT

## 2020-09-13 PROCEDURE — 87040 BLOOD CULTURE FOR BACTERIA: CPT | Mod: 59

## 2020-09-13 PROCEDURE — 70450 CT HEAD/BRAIN W/O DYE: CPT | Mod: 26,,, | Performed by: RADIOLOGY

## 2020-09-13 RX ORDER — FAMOTIDINE 20 MG/1
20 TABLET, FILM COATED ORAL 2 TIMES DAILY
Status: DISCONTINUED | OUTPATIENT
Start: 2020-09-13 | End: 2020-09-14 | Stop reason: HOSPADM

## 2020-09-13 RX ORDER — ENOXAPARIN SODIUM 100 MG/ML
40 INJECTION SUBCUTANEOUS
Status: DISCONTINUED | OUTPATIENT
Start: 2020-09-13 | End: 2020-09-14 | Stop reason: HOSPADM

## 2020-09-13 RX ORDER — HYDROCODONE BITARTRATE AND ACETAMINOPHEN 5; 325 MG/1; MG/1
1 TABLET ORAL EVERY 6 HOURS PRN
Status: DISCONTINUED | OUTPATIENT
Start: 2020-09-13 | End: 2020-09-14 | Stop reason: HOSPADM

## 2020-09-13 RX ORDER — SODIUM,POTASSIUM PHOSPHATES 280-250MG
2 POWDER IN PACKET (EA) ORAL
Status: DISCONTINUED | OUTPATIENT
Start: 2020-09-13 | End: 2020-09-14 | Stop reason: HOSPADM

## 2020-09-13 RX ORDER — POTASSIUM CHLORIDE 7.45 MG/ML
20 INJECTION INTRAVENOUS
Status: DISPENSED | OUTPATIENT
Start: 2020-09-13 | End: 2020-09-13

## 2020-09-13 RX ORDER — POTASSIUM CHLORIDE 7.45 MG/ML
40 INJECTION INTRAVENOUS ONCE
Status: COMPLETED | OUTPATIENT
Start: 2020-09-13 | End: 2020-09-13

## 2020-09-13 RX ORDER — POTASSIUM CHLORIDE 20 MEQ/1
40 TABLET, EXTENDED RELEASE ORAL DAILY
Status: DISCONTINUED | OUTPATIENT
Start: 2020-09-13 | End: 2020-09-14 | Stop reason: HOSPADM

## 2020-09-13 RX ORDER — POTASSIUM CHLORIDE 1.5 G/1.58G
40 POWDER, FOR SOLUTION ORAL
Status: DISCONTINUED | OUTPATIENT
Start: 2020-09-13 | End: 2020-09-14 | Stop reason: CLARIF

## 2020-09-13 RX ORDER — ACETAMINOPHEN 325 MG/1
650 TABLET ORAL EVERY 6 HOURS PRN
Status: DISCONTINUED | OUTPATIENT
Start: 2020-09-13 | End: 2020-09-14 | Stop reason: HOSPADM

## 2020-09-13 RX ORDER — LANOLIN ALCOHOL/MO/W.PET/CERES
800 CREAM (GRAM) TOPICAL
Status: DISCONTINUED | OUTPATIENT
Start: 2020-09-13 | End: 2020-09-14 | Stop reason: HOSPADM

## 2020-09-13 RX ORDER — ONDANSETRON 2 MG/ML
4 INJECTION INTRAMUSCULAR; INTRAVENOUS EVERY 6 HOURS PRN
Status: DISCONTINUED | OUTPATIENT
Start: 2020-09-13 | End: 2020-09-14 | Stop reason: HOSPADM

## 2020-09-13 RX ORDER — POTASSIUM CHLORIDE 1.5 G/1.58G
40 POWDER, FOR SOLUTION ORAL
Status: DISCONTINUED | OUTPATIENT
Start: 2020-09-13 | End: 2020-09-14 | Stop reason: HOSPADM

## 2020-09-13 RX ORDER — POTASSIUM CHLORIDE 20 MEQ/1
20 TABLET, EXTENDED RELEASE ORAL
Status: COMPLETED | OUTPATIENT
Start: 2020-09-13 | End: 2020-09-13

## 2020-09-13 RX ORDER — MONTELUKAST SODIUM 10 MG/1
10 TABLET ORAL EVERY MORNING
Status: DISCONTINUED | OUTPATIENT
Start: 2020-09-14 | End: 2020-09-14 | Stop reason: HOSPADM

## 2020-09-13 RX ORDER — SODIUM CHLORIDE 0.9 % (FLUSH) 0.9 %
10 SYRINGE (ML) INJECTION
Status: DISCONTINUED | OUTPATIENT
Start: 2020-09-13 | End: 2020-09-14 | Stop reason: HOSPADM

## 2020-09-13 RX ORDER — AMLODIPINE BESYLATE 2.5 MG/1
5 TABLET ORAL DAILY
Status: DISCONTINUED | OUTPATIENT
Start: 2020-09-14 | End: 2020-09-14 | Stop reason: HOSPADM

## 2020-09-13 RX ORDER — BENAZEPRIL HYDROCHLORIDE 5 MG/1
10 TABLET ORAL DAILY
Status: DISCONTINUED | OUTPATIENT
Start: 2020-09-14 | End: 2020-09-14 | Stop reason: HOSPADM

## 2020-09-13 RX ORDER — SODIUM CHLORIDE 9 MG/ML
INJECTION, SOLUTION INTRAVENOUS CONTINUOUS
Status: DISCONTINUED | OUTPATIENT
Start: 2020-09-13 | End: 2020-09-14 | Stop reason: HOSPADM

## 2020-09-13 RX ADMIN — FAMOTIDINE 20 MG: 20 TABLET ORAL at 08:09

## 2020-09-13 RX ADMIN — Medication 800 MG: at 11:09

## 2020-09-13 RX ADMIN — ENOXAPARIN SODIUM 40 MG: 40 INJECTION SUBCUTANEOUS at 06:09

## 2020-09-13 RX ADMIN — SODIUM CHLORIDE: 0.9 INJECTION, SOLUTION INTRAVENOUS at 11:09

## 2020-09-13 RX ADMIN — SODIUM CHLORIDE: 0.9 INJECTION, SOLUTION INTRAVENOUS at 06:09

## 2020-09-13 RX ADMIN — POTASSIUM CHLORIDE 40 MEQ: 1500 TABLET, EXTENDED RELEASE ORAL at 06:09

## 2020-09-13 RX ADMIN — SODIUM CHLORIDE 1000 ML: 9 INJECTION, SOLUTION INTRAVENOUS at 02:09

## 2020-09-13 RX ADMIN — POTASSIUM CHLORIDE 20 MEQ: 1500 TABLET, EXTENDED RELEASE ORAL at 03:09

## 2020-09-13 RX ADMIN — POTASSIUM CHLORIDE 20 MEQ: 7.46 INJECTION, SOLUTION INTRAVENOUS at 03:09

## 2020-09-13 RX ADMIN — Medication 800 MG: at 06:09

## 2020-09-13 RX ADMIN — POTASSIUM CHLORIDE 40 MEQ: 7.46 INJECTION, SOLUTION INTRAVENOUS at 06:09

## 2020-09-13 NOTE — ASSESSMENT & PLAN NOTE
Likely viral in nature.  Check stool for culture, ova and parasite and WBCs.  Continue supportive care, IV fluid hydration and use

## 2020-09-13 NOTE — ASSESSMENT & PLAN NOTE
Likely hypovolemic hypo natremia.  Check BMP every 6 hr.  Continue IV fluid hydration with normal saline.  Hold SSRI and anxiolytics 6 for now.  Check TSH, uric acid, urine osmolality.  Encourage patient to improve p.o. intake.  Consult physical therapy evaluation and treatment in the morning.  Observe fall and seizure precautions.

## 2020-09-13 NOTE — ASSESSMENT & PLAN NOTE
Holding Klonopin for the time being, until patient's symptoms improve and serum sodium normalizes.

## 2020-09-13 NOTE — ASSESSMENT & PLAN NOTE
Hold serotonin receptor uptake inhibitors for now as they may be contributing towards hyponatremia.

## 2020-09-13 NOTE — ASSESSMENT & PLAN NOTE
Check lactic acid.  Continue IV fluid hydration with normal saline and monitor serum bicarbonate and anion gap closely.

## 2020-09-13 NOTE — ED PROVIDER NOTES
Encounter Date: 9/13/2020       History     Chief Complaint   Patient presents with    Fatigue     Patient is brought to the ED for weakness and fatigue.  She states that she has been in a house without air conditioning for approximately 5 days.  She states that she has not been moving around her house very much.  She is very slow to answer.  She lives with her dogs.  She answers appropriately but slowly.  She is not short of breath.  No fever chills sweats myalgias.  She is visited by her kids and relatives.        Review of patient's allergies indicates:  No Known Allergies  Past Medical History:   Diagnosis Date    Anxiety     COPD (chronic obstructive pulmonary disease)     Depression     Hypertension      Past Surgical History:   Procedure Laterality Date    COLON SURGERY      COLOSTOMY      COLOSTOMY CLOSURE      HYSTERECTOMY      LEG SURGERY Right     OPEN REDUCTION AND INTERNAL FIXATION (ORIF) OF INJURY OF WRIST Left 1/29/2019    Procedure: ORIF, WRIST;  Surgeon: Homero Jeff DO;  [facility]: [facility] OR;  Service: Orthopedics;  Laterality: Left;  Equipment: [employer] Geminus Wrist Plate Set  Vendor/Rep: [employer]  C-Arm: Entire  DME: None    REQUIRES ASSISTANT    WRIST SURGERY Right      Family History   Problem Relation Age of Onset    Cancer Mother     COPD Father     Cirrhosis Father     Arthritis Sister     No Known Problems Brother     Anxiety disorder Daughter     Depression Daughter     Anxiety disorder Daughter     Congenital heart disease Daughter     Valvular heart disease Daughter      Social History     Tobacco Use    Smoking status: Current Every Day Smoker     Packs/day: 1.00     Years: 20.00     Pack years: 20.00     Types: Cigarettes    Smokeless tobacco: Never Used   Substance Use Topics    Alcohol use: Yes     Frequency: Never     Comment: couple times a week-wine or bloody cari    Drug use: No     Review of Systems   Constitutional: Positive for  activity change, appetite change, fatigue and unexpected weight change. Negative for fever.   HENT: Negative for sore throat.    Respiratory: Negative for shortness of breath.    Cardiovascular: Negative for chest pain.   Gastrointestinal: Negative for nausea.   Genitourinary: Negative for dysuria.   Musculoskeletal: Negative for back pain.   Skin: Negative for rash.   Neurological: Positive for weakness and light-headedness.   Hematological: Does not bruise/bleed easily.   All other systems reviewed and are negative.      Physical Exam     Initial Vitals [09/13/20 1434]   BP Pulse Resp Temp SpO2   (!) 125/104 90 18 98.4 °F (36.9 °C) 97 %      MAP       --         Physical Exam    Nursing note and vitals reviewed.  Constitutional: Vital signs are normal. She appears well-developed and well-nourished. She is active and cooperative.   HENT:   Head: Normocephalic and atraumatic.   Right Ear: External ear normal.   Left Ear: External ear normal.   Nose: Nose normal.   Mouth/Throat: Oropharynx is clear and moist.   Eyes: Conjunctivae, EOM and lids are normal. Pupils are equal, round, and reactive to light. Lids are everted and swept, no foreign bodies found.   Neck: Trachea normal, normal range of motion and full passive range of motion without pain. Neck supple.   Cardiovascular: Normal rate, regular rhythm, S1 normal, S2 normal, normal heart sounds, intact distal pulses and normal pulses.  No extrasystoles are present.    Pulmonary/Chest: Breath sounds normal.   Abdominal: Soft. Normal appearance and bowel sounds are normal.   Musculoskeletal: Normal range of motion.   Neurological: She is alert and oriented to person, place, and time. She has normal reflexes. GCS score is 15. GCS eye subscore is 4. GCS verbal subscore is 5. GCS motor subscore is 6.   Skin: Skin is warm, dry and intact. Capillary refill takes less than 2 seconds.   Psychiatric: She has a normal mood and affect. Her speech is normal and behavior is  normal. Judgment and thought content normal. Cognition and memory are normal.   Answers are appropriate but slow         ED Course   Procedures  Labs Reviewed   CBC W/ AUTO DIFFERENTIAL - Abnormal; Notable for the following components:       Result Value    RBC 3.95 (*)     Hematocrit 36.2 (*)     Mean Corpuscular Hemoglobin 34.4 (*)     Mean Corpuscular Hemoglobin Conc 37.6 (*)     RDW 11.4 (*)     Gran # (ANC) 10.0 (*)     Immature Grans (Abs) 0.05 (*)     Lymph # 0.6 (*)     Gran% 87.2 (*)     Lymph% 5.2 (*)     All other components within normal limits   COMPREHENSIVE METABOLIC PANEL - Abnormal; Notable for the following components:    Sodium 114 (*)     Potassium 2.5 (*)     Chloride 75 (*)     CO2 16 (*)     BUN, Bld 7 (*)     Creatinine 0.4 (*)     Calcium 8.1 (*)     Total Bilirubin 1.5 (*)     Anion Gap 23 (*)     All other components within normal limits    Narrative:        critical result(s) called and verbal readback obtained from   Ross Reyes @ [facility] 09/13/2020 15:34   SARS-COV-2 RNA AMPLIFICATION, QUAL   URINALYSIS, REFLEX TO URINE CULTURE   B-TYPE NATRIURETIC PEPTIDE   AMYLASE   LIPASE   C-REACTIVE PROTEIN          Imaging Results          CT Head Without Contrast (In process)  Result time 09/13/20 15:29:31               X-Ray Chest AP Portable (In process)               X-Rays:   Independently Interpreted Readings:   Chest X-Ray: No infiltrates.  Normal heart size.  No acute abnormalities. Normal vessels.   Head CT: No hemorrhage.  No skull fracture.  No acute stroke. Calcifications are present. Marked cerebral atrophy especially in the temporal lobes, my interpretation not radiologist     Medical Decision Making:   Clinical Tests:   Lab Tests: Ordered and Reviewed  The following lab test(s) were unremarkable: CBC and CMP       <> Summary of Lab: Laboratory studies show severe hyponatremia with a sodium 114, CO2 use 16, potassium 2.5, BUN creatinine are normal.  Radiological Study:  Ordered and Reviewed  ED Management:  Chest x-ray is negative.  CT scan interpreted by me is negative except for severe atrophy especially in the temporal lobes.    Patient has been having significant diarrhea she states over the last 1-2 weeks.  She is not prone to diarrhea.  Her mentation is slow.  Sodium is 114 which would account for the mental slowing.  Potassium is very low also possibly accounted for by the diarrhea.  Her abdominal exam is negative.    Patient will be need ED admission to correct her metabolic and mineral abnormalities, and find etiology.  Cause of diarrhea also needs further evaluation.  Patient has not had any stool since arrival in the ED.                             Clinical Impression:       ICD-10-CM ICD-9-CM   1. Hyponatremia  E87.1 276.1

## 2020-09-13 NOTE — PROGRESS NOTES
Responded to Deterioration alert:  Patient just admitted from the ER. VSS, patient is improving. No new complaints. Receiving IV Normal saline infusion. Contineu tele-monitoring and check BMP q 6 hrs.

## 2020-09-13 NOTE — H&P
[facility] - ED Hospital Medicine  History & Physical    Patient Name: Bhavana Lambert  MRN: [id number]  Admission Date: 9/13/2020  Attending Physician: Michael Yang MD   Primary Care Provider: Pepito Jhaveri Iv, MD         Patient information was obtained from patient, patient's daughter and ER records.     Subjective:     Principal Problem:Hyponatremia    Chief Complaint:   Chief Complaint   Patient presents with    Fatigue        HPI: Patient is a 65-year-old  female with past medical history significant for anxiety, depression, chronic obstructive pulmonary disease and hypertension is being admitted to Hospital Medicine from [facility] Emergency Room under inpatient status with complaint of increasing lethargy and weakness for 4-5 days.  Part of the history obtained from patient's daughter at bedside.  Reportedly patient's home air conditioner became nonfunctional 5 days ago and patient was exposed to significant heat and humidity for couple of days causing increasing weakness and lethargy.  Patient reported low appetite and had only been eating crackers and drinking diet Coke.  Patient denied any sick contact or recent travel history.  For the past 2-3 days patient reports having diarrhea 1-2 bowel movements per day which are not loose or watery.  Patient denies any abdominal pain or urinary complaints.  Patient denies any headache, vision changes or any focal neuro deficits.  Patient has been noted to have serum sodium 114 and potassium 2.5 in the emergency room.  With infusion of normal saline and potassium repletion patient is feeling better.        Past Medical History:   Diagnosis Date    Anxiety     COPD (chronic obstructive pulmonary disease)     Depression     Hypertension        Past Surgical History:   Procedure Laterality Date    COLON SURGERY      COLOSTOMY      COLOSTOMY CLOSURE      HYSTERECTOMY      LEG SURGERY Right     OPEN  REDUCTION AND INTERNAL FIXATION (ORIF) OF INJURY OF WRIST Left 1/29/2019    Procedure: ORIF, WRIST;  Surgeon: Homero Jeff DO;  Location: [facility] OR;  Service: Orthopedics;  Laterality: Left;  Equipment: [employer] Geminus Wrist Plate Set  Vendor/Rep: [employer]  C-Arm: Entire  DME: None    REQUIRES ASSISTANT    WRIST SURGERY Right        Review of patient's allergies indicates:  No Known Allergies    No current facility-administered medications on file prior to encounter.      Current Outpatient Medications on File Prior to Encounter   Medication Sig    amlodipine-benazepril 5-10 mg (LOTREL) 5-10 mg per capsule Take 1 capsule by mouth every morning.    clonazePAM (KLONOPIN) 2 MG Tab Take 2 mg by mouth 2 (two) times daily.    cyclobenzaprine (FLEXERIL) 10 MG tablet Take 10 mg by mouth 3 (three) times daily as needed for Muscle spasms.    fluoxetine HCl (PROZAC ORAL) Take 40 mg by mouth once daily.     HYDROcodone-acetaminophen (NORCO) 5-325 mg per tablet Take 1-2 tablets by mouth every 6 (six) hours as needed for Pain.    montelukast sodium (SINGULAIR ORAL) Take 10 mg by mouth every morning.      Family History     Problem Relation (Age of Onset)    Anxiety disorder Daughter, Daughter    Arthritis Sister    COPD Father    Cancer Mother    Cirrhosis Father    Congenital heart disease Daughter    Depression Daughter    No Known Problems Brother    Valvular heart disease Daughter        Tobacco Use    Smoking status: Current Every Day Smoker     Packs/day: 1.00     Years: 20.00     Pack years: 20.00     Types: Cigarettes    Smokeless tobacco: Never Used   Substance and Sexual Activity    Alcohol use: Yes     Frequency: Never     Comment: couple times a week-wine or bloody cari    Drug use: No    Sexual activity: Not Currently     Review of Systems   Constitutional: Positive for appetite change and fatigue.   Neurological: Positive for weakness and light-headedness.   All other systems  reviewed and are negative.    Objective:     Vital Signs (Most Recent):  Temp: 98.4 °F (36.9 °C) (09/13/20 1434)  Pulse: 90 (09/13/20 1434)  Resp: 18 (09/13/20 1434)  BP: (!) 125/104 (09/13/20 1434)  SpO2: 97 % (09/13/20 1434) Vital Signs (24h Range):  Temp:  [98.4 °F (36.9 °C)] 98.4 °F (36.9 °C)  Pulse:  [90] 90  Resp:  [18] 18  SpO2:  [97 %] 97 %  BP: (125)/(104) 125/104     Weight: 68 kg (149 lb 14.3 oz)  Body mass index is 27.42 kg/m².    Physical Exam  Constitutional:       Appearance: She is well-developed.      Comments: Ill appearing elderly female   HENT:      Head: Normocephalic and atraumatic.      Comments: Dry mucous membranes  Eyes:      Conjunctiva/sclera: Conjunctivae normal.      Pupils: Pupils are equal, round, and reactive to light.   Neck:      Musculoskeletal: Neck supple.      Thyroid: No thyromegaly.      Vascular: No JVD.   Cardiovascular:      Rate and Rhythm: Normal rate and regular rhythm.      Heart sounds: No murmur. No friction rub. No gallop.    Pulmonary:      Effort: Pulmonary effort is normal.      Breath sounds: Normal breath sounds.   Abdominal:      General: Bowel sounds are normal. There is no distension.      Palpations: Abdomen is soft. There is no mass.      Tenderness: There is no abdominal tenderness.   Musculoskeletal: Normal range of motion.   Skin:     General: Skin is warm and dry.   Neurological:      Mental Status: She is oriented to person, place, and time.      Cranial Nerves: No cranial nerve deficit.   Psychiatric:         Behavior: Behavior normal.           CRANIAL NERVES     CN III, IV, VI   Pupils are equal, round, and reactive to light.       Significant Labs:   CBC:   Recent Labs   Lab 09/13/20  1454   WBC 11.48   HGB 13.6   HCT 36.2*        CMP:   Recent Labs   Lab 09/13/20  1454   *   K 2.5*   CL 75*   CO2 16*   GLU 96   BUN 7*   CREATININE 0.4*   CALCIUM 8.1*   PROT 7.0   ALBUMIN 4.1   BILITOT 1.5*   ALKPHOS 82   AST 37   ALT 32   ANIONGAP  23*   EGFRNONAA >60.0     Magnesium: No results for input(s): MG in the last 48 hours.  TSH: No results for input(s): TSH in the last 4320 hours.  Urine Culture: No results for input(s): LABURIN in the last 48 hours.  Urine Studies: No results for input(s): COLORU, APPEARANCEUA, PHUR, SPECGRAV, PROTEINUA, GLUCUA, KETONESU, BILIRUBINUA, OCCULTUA, NITRITE, UROBILINOGEN, LEUKOCYTESUR, RBCUA, WBCUA, BACTERIA, SQUAMEPITHEL, HYALINECASTS in the last 48 hours.    Invalid input(s): WRIGHTSUR    Significant Imaging:  CXR:  No acute cardiopulmonary abnormality appreciated radiographically.  No interval detrimental change in the radiographic appearance of the chest when compared to the prior study.    CT head without contrast:  No acute intracranial abnormality appreciated.    Assessment/Plan:     * Hyponatremia  Likely hypovolemic hypo natremia.  Check BMP every 6 hr.  Continue IV fluid hydration with normal saline.  Hold SSRI and anxiolytics 6 for now.  Check TSH, uric acid, urine osmolality.  Encourage patient to improve p.o. intake.  Consult physical therapy evaluation and treatment in the morning.      Diarrhea  Likely viral in nature.  Check stool for culture, ova and parasite and WBCs.  Continue supportive care, IV fluid hydration and use      Hypokalemia  Replete potassium chloride for in a follow BMP.  Continue tele monitoring.      Metabolic acidosis  Check lactic acid.  Continue IV fluid hydration with normal saline and monitor serum bicarbonate and anion gap closely.      Hypertension  Chronic problem. Will continue chronic medications and monitor for any changes, adjusting as needed.          Depression  Hold serotonin receptor uptake inhibitors for now as they may be contributing towards hyponatremia.      COPD (chronic obstructive pulmonary disease)  Patient's COPD is controlled currently. Continue scheduled inhalers.              Anxiety  Holding Klonopin for the time being, until patient's symptoms improve and  serum sodium normalizes.        DVT prophylaxis:  Use sequential compression stockings and Oc hose.  Jennifer Flowers MD  Department of [facility]   [facility] - ED

## 2020-09-13 NOTE — HPI
Patient is a 65-year-old  female with past medical history significant for anxiety, depression, chronic obstructive pulmonary disease and hypertension is being admitted to Hospital Medicine from [facility] Emergency Room under inpatient status with complaint of increasing lethargy and weakness for 4-5 days.  Part of the history obtained from patient's daughter at bedside.  Reportedly patient's home air conditioner became nonfunctional 5 days ago and patient was exposed to significant heat and humidity for couple of days causing increasing weakness and lethargy.  Patient reported low appetite and had only been eating crackers and drinking diet Coke.  Patient denied any sick contact or recent travel history.  For the past 2-3 days patient reports having diarrhea 1-2 bowel movements per day which are not loose or watery.  Patient denies any abdominal pain or urinary complaints.  Patient denies any headache, vision changes or any focal neuro deficits.  Patient has been noted to have serum sodium 114 and potassium 2.5 in the emergency room.  With infusion of normal saline and potassium repletion patient is feeling better.

## 2020-09-13 NOTE — ED TRIAGE NOTES
Patient presents to ER via EMS from home. She relayed to EMS that she has been without air conditioning x several days. She just got her air fixed,but still feeling weak.

## 2020-09-14 ENCOUNTER — HOSPITAL ENCOUNTER (INPATIENT)
Facility: HOSPITAL | Age: 65
LOS: 3 days | Discharge: HOME | DRG: 641 | End: 2020-09-17
Attending: HOSPITALIST | Admitting: INTERNAL MEDICINE

## 2020-09-14 VITALS
SYSTOLIC BLOOD PRESSURE: 142 MMHG | RESPIRATION RATE: 18 BRPM | WEIGHT: 137 LBS | BODY MASS INDEX: 25.21 KG/M2 | OXYGEN SATURATION: 98 % | HEART RATE: 79 BPM | HEIGHT: 62 IN | DIASTOLIC BLOOD PRESSURE: 73 MMHG | TEMPERATURE: 98 F

## 2020-09-14 DIAGNOSIS — R94.31 QT PROLONGATION: ICD-10-CM

## 2020-09-14 DIAGNOSIS — F41.9 ANXIETY: Primary | ICD-10-CM

## 2020-09-14 DIAGNOSIS — E87.1 HYPONATREMIA: ICD-10-CM

## 2020-09-14 DIAGNOSIS — E87.6 HYPOKALEMIA: ICD-10-CM

## 2020-09-14 DIAGNOSIS — R07.9 CHEST PAIN: ICD-10-CM

## 2020-09-14 DIAGNOSIS — Z75.8 DISCHARGE PLANNING ISSUES: ICD-10-CM

## 2020-09-14 DIAGNOSIS — F32.A DEPRESSION, UNSPECIFIED DEPRESSION TYPE: ICD-10-CM

## 2020-09-14 LAB
ANION GAP SERPL CALC-SCNC: 14 MMOL/L (ref 8–16)
ANION GAP SERPL CALC-SCNC: 16 MMOL/L (ref 8–16)
ANION GAP SERPL CALC-SCNC: 19 MMOL/L (ref 8–16)
BASOPHILS NFR BLD: 0 % (ref 0–1.9)
BUN SERPL-MCNC: <5 MG/DL (ref 8–23)
CALCIUM SERPL-MCNC: 8.1 MG/DL (ref 8.7–10.5)
CALCIUM SERPL-MCNC: 8.1 MG/DL (ref 8.7–10.5)
CALCIUM SERPL-MCNC: 8.3 MG/DL (ref 8.7–10.5)
CHLORIDE SERPL-SCNC: 82 MMOL/L (ref 95–110)
CHLORIDE SERPL-SCNC: 92 MMOL/L (ref 95–110)
CHLORIDE SERPL-SCNC: 93 MMOL/L (ref 95–110)
CO2 SERPL-SCNC: 17 MMOL/L (ref 23–29)
CREAT SERPL-MCNC: 0.4 MG/DL (ref 0.5–1.4)
CREAT SERPL-MCNC: 0.5 MG/DL (ref 0.5–1.4)
CREAT SERPL-MCNC: 0.5 MG/DL (ref 0.5–1.4)
DIFFERENTIAL METHOD: ABNORMAL
EOSINOPHIL NFR BLD: 0 % (ref 0–8)
ERYTHROCYTE [DISTWIDTH] IN BLOOD BY AUTOMATED COUNT: 11.4 % (ref 11.5–14.5)
EST. GFR  (AFRICAN AMERICAN): >60 ML/MIN/1.73 M^2
EST. GFR  (NON AFRICAN AMERICAN): >60 ML/MIN/1.73 M^2
GLUCOSE SERPL-MCNC: 78 MG/DL (ref 70–110)
GLUCOSE SERPL-MCNC: 83 MG/DL (ref 70–110)
GLUCOSE SERPL-MCNC: 94 MG/DL (ref 70–110)
HCT VFR BLD AUTO: 35.4 % (ref 37–48.5)
HGB BLD-MCNC: 13.4 G/DL (ref 12–16)
IMM GRANULOCYTES # BLD AUTO: ABNORMAL K/UL (ref 0–0.04)
IMM GRANULOCYTES NFR BLD AUTO: ABNORMAL % (ref 0–0.5)
LYMPHOCYTES NFR BLD: 12 % (ref 18–48)
MCH RBC QN AUTO: 34.7 PG (ref 27–31)
MCHC RBC AUTO-ENTMCNC: 37.9 G/DL (ref 32–36)
MCV RBC AUTO: 92 FL (ref 82–98)
MONOCYTES NFR BLD: 2 % (ref 4–15)
NEUTROPHILS NFR BLD: 81 % (ref 38–73)
NEUTS BAND NFR BLD MANUAL: 5 %
NRBC BLD-RTO: 0 /100 WBC
PLATELET # BLD AUTO: 250 K/UL (ref 150–350)
PLATELET BLD QL SMEAR: ABNORMAL
PMV BLD AUTO: 11.2 FL (ref 9.2–12.9)
POTASSIUM SERPL-SCNC: 3.3 MMOL/L (ref 3.5–5.1)
POTASSIUM SERPL-SCNC: 3.3 MMOL/L (ref 3.5–5.1)
POTASSIUM SERPL-SCNC: 4 MMOL/L (ref 3.5–5.1)
RBC # BLD AUTO: 3.86 M/UL (ref 4–5.4)
SODIUM SERPL-SCNC: 118 MMOL/L (ref 136–145)
SODIUM SERPL-SCNC: 124 MMOL/L (ref 136–145)
SODIUM SERPL-SCNC: 125 MMOL/L (ref 136–145)
WBC # BLD AUTO: 8.26 K/UL (ref 3.9–12.7)

## 2020-09-14 PROCEDURE — 99239 PR HOSPITAL DISCHARGE DAY,>30 MIN: ICD-10-PCS | Mod: ,,, | Performed by: FAMILY MEDICINE

## 2020-09-14 PROCEDURE — 25000003 PHARM REV CODE 250: Performed by: INTERNAL MEDICINE

## 2020-09-14 PROCEDURE — 99239 HOSP IP/OBS DSCHRG MGMT >30: CPT | Mod: ,,, | Performed by: FAMILY MEDICINE

## 2020-09-14 PROCEDURE — 80048 BASIC METABOLIC PNL TOTAL CA: CPT | Mod: 91

## 2020-09-14 PROCEDURE — 80048 BASIC METABOLIC PNL TOTAL CA: CPT

## 2020-09-14 PROCEDURE — 25000003 PHARM REV CODE 250: Performed by: HOSPITALIST

## 2020-09-14 PROCEDURE — 25000003 PHARM REV CODE 250: Performed by: FAMILY MEDICINE

## 2020-09-14 PROCEDURE — 36415 COLL VENOUS BLD VENIPUNCTURE: CPT

## 2020-09-14 PROCEDURE — 97161 PT EVAL LOW COMPLEX 20 MIN: CPT

## 2020-09-14 PROCEDURE — 85007 BL SMEAR W/DIFF WBC COUNT: CPT

## 2020-09-14 PROCEDURE — 11000001 HC ACUTE MED/SURG PRIVATE ROOM

## 2020-09-14 PROCEDURE — 85027 COMPLETE CBC AUTOMATED: CPT

## 2020-09-14 PROCEDURE — 25000003 PHARM REV CODE 250: Performed by: STUDENT IN AN ORGANIZED HEALTH CARE EDUCATION/TRAINING PROGRAM

## 2020-09-14 RX ORDER — POTASSIUM CHLORIDE 20 MEQ/1
20 TABLET, EXTENDED RELEASE ORAL
Status: COMPLETED | OUTPATIENT
Start: 2020-09-14 | End: 2020-09-14

## 2020-09-14 RX ORDER — POTASSIUM CHLORIDE 20 MEQ/15ML
40 SOLUTION ORAL
Status: DISCONTINUED | OUTPATIENT
Start: 2020-09-14 | End: 2020-09-14 | Stop reason: HOSPADM

## 2020-09-14 RX ORDER — IBUPROFEN 200 MG
24 TABLET ORAL
Status: DISCONTINUED | OUTPATIENT
Start: 2020-09-14 | End: 2020-09-17 | Stop reason: HOSPADM

## 2020-09-14 RX ORDER — POTASSIUM CHLORIDE 20 MEQ/1
40 TABLET, EXTENDED RELEASE ORAL DAILY
Status: DISCONTINUED | OUTPATIENT
Start: 2020-09-15 | End: 2020-09-16

## 2020-09-14 RX ORDER — MONTELUKAST SODIUM 10 MG/1
10 TABLET ORAL EVERY MORNING
Status: DISCONTINUED | OUTPATIENT
Start: 2020-09-15 | End: 2020-09-17 | Stop reason: HOSPADM

## 2020-09-14 RX ORDER — SODIUM CHLORIDE 9 MG/ML
INJECTION, SOLUTION INTRAVENOUS CONTINUOUS
Status: ACTIVE | OUTPATIENT
Start: 2020-09-14 | End: 2020-09-15

## 2020-09-14 RX ORDER — ENOXAPARIN SODIUM 100 MG/ML
40 INJECTION SUBCUTANEOUS EVERY 24 HOURS
Status: DISCONTINUED | OUTPATIENT
Start: 2020-09-15 | End: 2020-09-17 | Stop reason: HOSPADM

## 2020-09-14 RX ORDER — GLUCAGON 1 MG
1 KIT INJECTION
Status: DISCONTINUED | OUTPATIENT
Start: 2020-09-14 | End: 2020-09-17 | Stop reason: HOSPADM

## 2020-09-14 RX ORDER — IBUPROFEN 200 MG
16 TABLET ORAL
Status: DISCONTINUED | OUTPATIENT
Start: 2020-09-14 | End: 2020-09-17 | Stop reason: HOSPADM

## 2020-09-14 RX ORDER — CLONAZEPAM 0.5 MG/1
2 TABLET ORAL 2 TIMES DAILY PRN
Status: DISCONTINUED | OUTPATIENT
Start: 2020-09-14 | End: 2020-09-14 | Stop reason: HOSPADM

## 2020-09-14 RX ORDER — SODIUM CHLORIDE 0.9 % (FLUSH) 0.9 %
10 SYRINGE (ML) INJECTION
Status: DISCONTINUED | OUTPATIENT
Start: 2020-09-14 | End: 2020-09-17 | Stop reason: HOSPADM

## 2020-09-14 RX ORDER — BENAZEPRIL HYDROCHLORIDE 10 MG/1
10 TABLET ORAL DAILY
Status: DISCONTINUED | OUTPATIENT
Start: 2020-09-15 | End: 2020-09-17 | Stop reason: HOSPADM

## 2020-09-14 RX ORDER — FAMOTIDINE 20 MG/1
20 TABLET, FILM COATED ORAL 2 TIMES DAILY
Status: DISCONTINUED | OUTPATIENT
Start: 2020-09-14 | End: 2020-09-17 | Stop reason: HOSPADM

## 2020-09-14 RX ORDER — AMLODIPINE BESYLATE 5 MG/1
5 TABLET ORAL DAILY
Status: DISCONTINUED | OUTPATIENT
Start: 2020-09-15 | End: 2020-09-17 | Stop reason: HOSPADM

## 2020-09-14 RX ADMIN — FAMOTIDINE 20 MG: 20 TABLET ORAL at 08:09

## 2020-09-14 RX ADMIN — HYDROCODONE BITARTRATE AND ACETAMINOPHEN 1 TABLET: 5; 325 TABLET ORAL at 08:09

## 2020-09-14 RX ADMIN — SODIUM CHLORIDE: 0.9 INJECTION, SOLUTION INTRAVENOUS at 08:09

## 2020-09-14 RX ADMIN — POTASSIUM CHLORIDE 40 MEQ: 1500 TABLET, EXTENDED RELEASE ORAL at 08:09

## 2020-09-14 RX ADMIN — MONTELUKAST 10 MG: 10 TABLET, FILM COATED ORAL at 08:09

## 2020-09-14 RX ADMIN — BENAZEPRIL HYDROCHLORIDE 10 MG: 5 TABLET ORAL at 08:09

## 2020-09-14 RX ADMIN — HYDROCODONE BITARTRATE AND ACETAMINOPHEN 1 TABLET: 5; 325 TABLET ORAL at 03:09

## 2020-09-14 RX ADMIN — POTASSIUM CHLORIDE 20 MEQ: 1500 TABLET, EXTENDED RELEASE ORAL at 01:09

## 2020-09-14 RX ADMIN — AMLODIPINE BESYLATE 5 MG: 2.5 TABLET ORAL at 08:09

## 2020-09-14 RX ADMIN — CLONAZEPAM 2 MG: 0.5 TABLET ORAL at 03:09

## 2020-09-14 RX ADMIN — FAMOTIDINE 20 MG: 20 TABLET, FILM COATED ORAL at 08:09

## 2020-09-14 NOTE — PLAN OF CARE
"  Problem: Fall Injury Risk  Goal: Absence of Fall and Fall-Related Injury  Outcome: Ongoing, Progressing  Intervention: Identify and Manage Contributors to Fall Injury Risk  Flowsheets (Taken 9/14/2020 0058)  Self-Care Promotion: BADL personal objects within reach  Medication Review/Management: infusion initiated  Intervention: Promote Injury-Free Environment  Flowsheets (Taken 9/14/2020 0058)  Safety Promotion/Fall Prevention:   side rails raised x 2   bed alarm set   nonskid shoes/socks when out of bed   bed alarm refused  Environmental Safety Modification: assistive device/personal items within reach     Problem: Adult Inpatient Plan of Care  Goal: Plan of Care Review  Outcome: Ongoing, Progressing  Flowsheets (Taken 9/14/2020 0058)  Plan of Care Reviewed With: patient  Goal: Patient-Specific Goal (Individualization)  Outcome: Ongoing, Progressing  Flowsheets (Taken 9/14/2020 0058)  Individualized Care Needs: NONE  Anxieties, Fears or Concerns: " I CANT GET ANY ANXEITY MEDS"   Patient-Specific Goals (Include Timeframe): NONE  Goal: Absence of Hospital-Acquired Illness or Injury  Outcome: Ongoing, Progressing  Intervention: Identify and Manage Fall Risk  Flowsheets (Taken 9/14/2020 0058)  Safety Promotion/Fall Prevention:   side rails raised x 2   bed alarm set   nonskid shoes/socks when out of bed   bed alarm refused  Intervention: Prevent VTE (venous thromboembolism)  Flowsheets (Taken 9/14/2020 0058)  VTE Prevention/Management:   remove, assess skin and reapply sequential compression device   remove, assess skin and reapply compression stockings  Goal: Optimal Comfort and Wellbeing  Outcome: Ongoing, Progressing  Intervention: Provide Person-Centered Care  Flowsheets (Taken 9/14/2020 0058)  Trust Relationship/Rapport:   care explained   emotional support provided   choices provided   empathic listening provided   questions answered   questions encouraged  Goal: Readiness for Transition of Care  Outcome: " Ongoing, Progressing  Goal: Rounds/Family Conference  Outcome: Ongoing, Progressing  Flowsheets (Taken 9/14/2020 0058)  Participants: patient     Problem: Skin Injury Risk Increased  Goal: Skin Health and Integrity  Outcome: Ongoing, Progressing  Intervention: Optimize Skin Protection  Flowsheets (Taken 9/14/2020 0058)  Pressure Reduction Techniques:   frequent weight shift encouraged   heels elevated off bed  Head of Bed (HOB): HOB at 45 degrees  Intervention: Promote and Optimize Oral Intake  Flowsheets (Taken 9/14/2020 0058)  Oral Nutrition Promotion: social interaction promoted   PT RESTING QUIETLY NORMAL UNLABORED BREATHING NO C/O SOB NO CYANOSIS NOTED. NO C/O PAIN NO GRIMACE TO FACE NO MOANING NOTED. IV INTACT NO REDNESS OR TENDERNESS NOTED AT SITE.
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
## 2020-09-14 NOTE — NURSING
PT AMBULATED TO [facility] WITH ASSISTANCE. LARGE STOOL WATERY BROWN FOUL ODOR STOOL NOTED. URINE AND STOOL MIXED UNABLE TO OBTAIN STOOL AND URINE SPECIMEN.

## 2020-09-14 NOTE — NURSING
Report called to [facility] [phone] to room 48654. NADN. [facility] paperwork completed. Awaiting transport .
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
## 2020-09-14 NOTE — PLAN OF CARE
Deterioration alert noted. Patient with hyponatremia improving with normal saline. Improvement has not exceeded goal of 10 meq per 24 hours. Potassium still low and will replace PO. Cont frequent BMP.
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
## 2020-09-14 NOTE — PLAN OF CARE
09/14/20 1250   Discharge Assessment   Assessment Type Discharge Planning Assessment   Confirmed/corrected address and phone number on facesheet? Yes   Assessment information obtained from? Patient   Expected Length of Stay (days) 2   Prior to hospitilization cognitive status: Alert/Oriented   Prior to hospitalization functional status: Independent   Current cognitive status: Alert/Oriented   Current Functional Status: Independent   Lives With alone   Able to Return to Prior Arrangements yes   Is patient able to care for self after discharge? Yes   Who are your caregiver(s) and their phone number(s)? Layton Miranda  [phone]   Patient's perception of discharge disposition home or selfcare  (Patient will be discharged to home with her daughter Margarito)   Patient currently being followed by outpatient case management? No   Patient currently receives any other outside agency services? No   Equipment Currently Used at Home none   Do you have any problems affording any of your prescribed medications? No   Is the patient taking medications as prescribed? yes   Does the patient have transportation home? Yes   Transportation Anticipated family or friend will provide   Does the patient receive services at the Coumadin Clinic? No   Discharge Plan A Home with family   DME Needed Upon Discharge  none   Patient/Family in Agreement with Plan yes       Patient resting in bed with daughter at bedside.  Denies having any DME. Denies having the use of home health services.  Patient plans to be discharged home with daughter Margarito [phone].  No needs voiced at this time.

## 2020-09-15 LAB
ALBUMIN SERPL BCP-MCNC: 3.4 G/DL (ref 3.5–5.2)
ALP SERPL-CCNC: 90 U/L (ref 55–135)
ALT SERPL W/O P-5'-P-CCNC: 25 U/L (ref 10–44)
ANION GAP SERPL CALC-SCNC: 10 MMOL/L (ref 8–16)
ANION GAP SERPL CALC-SCNC: 10 MMOL/L (ref 8–16)
ANION GAP SERPL CALC-SCNC: 9 MMOL/L (ref 8–16)
ANION GAP SERPL CALC-SCNC: 9 MMOL/L (ref 8–16)
AST SERPL-CCNC: 30 U/L (ref 10–40)
BASOPHILS # BLD AUTO: 0.03 K/UL (ref 0–0.2)
BASOPHILS NFR BLD: 0.4 % (ref 0–1.9)
BILIRUB SERPL-MCNC: 0.4 MG/DL (ref 0.1–1)
BUN SERPL-MCNC: 2 MG/DL (ref 8–23)
BUN SERPL-MCNC: 3 MG/DL (ref 8–23)
CALCIUM SERPL-MCNC: 8.1 MG/DL (ref 8.7–10.5)
CALCIUM SERPL-MCNC: 8.3 MG/DL (ref 8.7–10.5)
CALCIUM SERPL-MCNC: 8.4 MG/DL (ref 8.7–10.5)
CALCIUM SERPL-MCNC: 8.5 MG/DL (ref 8.7–10.5)
CHLORIDE SERPL-SCNC: 93 MMOL/L (ref 95–110)
CHLORIDE SERPL-SCNC: 98 MMOL/L (ref 95–110)
CO2 SERPL-SCNC: 23 MMOL/L (ref 23–29)
CO2 SERPL-SCNC: 24 MMOL/L (ref 23–29)
CO2 SERPL-SCNC: 24 MMOL/L (ref 23–29)
CO2 SERPL-SCNC: 26 MMOL/L (ref 23–29)
CREAT SERPL-MCNC: 0.5 MG/DL (ref 0.5–1.4)
CREAT SERPL-MCNC: 0.6 MG/DL (ref 0.5–1.4)
DIFFERENTIAL METHOD: ABNORMAL
EOSINOPHIL # BLD AUTO: 0.1 K/UL (ref 0–0.5)
EOSINOPHIL NFR BLD: 0.7 % (ref 0–8)
ERYTHROCYTE [DISTWIDTH] IN BLOOD BY AUTOMATED COUNT: 12 % (ref 11.5–14.5)
EST. GFR  (AFRICAN AMERICAN): >60 ML/MIN/1.73 M^2
EST. GFR  (NON AFRICAN AMERICAN): >60 ML/MIN/1.73 M^2
GLUCOSE SERPL-MCNC: 107 MG/DL (ref 70–110)
GLUCOSE SERPL-MCNC: 111 MG/DL (ref 70–110)
GLUCOSE SERPL-MCNC: 88 MG/DL (ref 70–110)
GLUCOSE SERPL-MCNC: 90 MG/DL (ref 70–110)
HCT VFR BLD AUTO: 35.7 % (ref 37–48.5)
HGB BLD-MCNC: 12.6 G/DL (ref 12–16)
IMM GRANULOCYTES # BLD AUTO: 0.03 K/UL (ref 0–0.04)
IMM GRANULOCYTES NFR BLD AUTO: 0.4 % (ref 0–0.5)
LYMPHOCYTES # BLD AUTO: 1 K/UL (ref 1–4.8)
LYMPHOCYTES NFR BLD: 11.5 % (ref 18–48)
MAGNESIUM SERPL-MCNC: 1.6 MG/DL (ref 1.6–2.6)
MCH RBC QN AUTO: 33.6 PG (ref 27–31)
MCHC RBC AUTO-ENTMCNC: 35.3 G/DL (ref 32–36)
MCV RBC AUTO: 95 FL (ref 82–98)
MONOCYTES # BLD AUTO: 0.6 K/UL (ref 0.3–1)
MONOCYTES NFR BLD: 7 % (ref 4–15)
NEUTROPHILS # BLD AUTO: 6.6 K/UL (ref 1.8–7.7)
NEUTROPHILS NFR BLD: 80 % (ref 38–73)
NRBC BLD-RTO: 0 /100 WBC
OSMOLALITY SERPL: 267 MOSM/KG (ref 275–295)
OSMOLALITY UR: 421 MOSM/KG (ref 50–1200)
PHOSPHATE SERPL-MCNC: <1 MG/DL (ref 2.7–4.5)
PLATELET # BLD AUTO: 280 K/UL (ref 150–350)
PMV BLD AUTO: 10.8 FL (ref 9.2–12.9)
POTASSIUM SERPL-SCNC: 3 MMOL/L (ref 3.5–5.1)
POTASSIUM SERPL-SCNC: 3.2 MMOL/L (ref 3.5–5.1)
POTASSIUM SERPL-SCNC: 3.4 MMOL/L (ref 3.5–5.1)
POTASSIUM SERPL-SCNC: 3.4 MMOL/L (ref 3.5–5.1)
PROT SERPL-MCNC: 6.2 G/DL (ref 6–8.4)
RBC # BLD AUTO: 3.75 M/UL (ref 4–5.4)
SODIUM SERPL-SCNC: 127 MMOL/L (ref 136–145)
SODIUM SERPL-SCNC: 127 MMOL/L (ref 136–145)
SODIUM SERPL-SCNC: 128 MMOL/L (ref 136–145)
SODIUM SERPL-SCNC: 130 MMOL/L (ref 136–145)
SODIUM UR-SCNC: 150 MMOL/L (ref 20–250)
WBC # BLD AUTO: 8.24 K/UL (ref 3.9–12.7)

## 2020-09-15 PROCEDURE — 83935 ASSAY OF URINE OSMOLALITY: CPT

## 2020-09-15 PROCEDURE — 80048 BASIC METABOLIC PNL TOTAL CA: CPT | Mod: 91

## 2020-09-15 PROCEDURE — 97116 GAIT TRAINING THERAPY: CPT

## 2020-09-15 PROCEDURE — 25000003 PHARM REV CODE 250: Performed by: STUDENT IN AN ORGANIZED HEALTH CARE EDUCATION/TRAINING PROGRAM

## 2020-09-15 PROCEDURE — 97161 PT EVAL LOW COMPLEX 20 MIN: CPT

## 2020-09-15 PROCEDURE — 84100 ASSAY OF PHOSPHORUS: CPT

## 2020-09-15 PROCEDURE — 84300 ASSAY OF URINE SODIUM: CPT

## 2020-09-15 PROCEDURE — 63600175 PHARM REV CODE 636 W HCPCS: Performed by: STUDENT IN AN ORGANIZED HEALTH CARE EDUCATION/TRAINING PROGRAM

## 2020-09-15 PROCEDURE — 83930 ASSAY OF BLOOD OSMOLALITY: CPT

## 2020-09-15 PROCEDURE — 11000001 HC ACUTE MED/SURG PRIVATE ROOM

## 2020-09-15 PROCEDURE — 97535 SELF CARE MNGMENT TRAINING: CPT

## 2020-09-15 PROCEDURE — 83735 ASSAY OF MAGNESIUM: CPT

## 2020-09-15 PROCEDURE — 85025 COMPLETE CBC W/AUTO DIFF WBC: CPT

## 2020-09-15 PROCEDURE — 97165 OT EVAL LOW COMPLEX 30 MIN: CPT

## 2020-09-15 PROCEDURE — 36415 COLL VENOUS BLD VENIPUNCTURE: CPT

## 2020-09-15 PROCEDURE — 80048 BASIC METABOLIC PNL TOTAL CA: CPT

## 2020-09-15 PROCEDURE — 99223 1ST HOSP IP/OBS HIGH 75: CPT | Mod: ,,, | Performed by: INTERNAL MEDICINE

## 2020-09-15 PROCEDURE — 80053 COMPREHEN METABOLIC PANEL: CPT

## 2020-09-15 PROCEDURE — 99223 PR INITIAL HOSPITAL CARE,LEVL III: ICD-10-PCS | Mod: ,,, | Performed by: INTERNAL MEDICINE

## 2020-09-15 RX ORDER — CYANOCOBALAMIN 1000 UG/ML
1000 INJECTION, SOLUTION INTRAMUSCULAR; SUBCUTANEOUS
COMMUNITY
Start: 2020-07-06

## 2020-09-15 RX ORDER — BUTALBITAL, ACETAMINOPHEN AND CAFFEINE 50; 325; 40 MG/1; MG/1; MG/1
1 TABLET ORAL EVERY 6 HOURS PRN
Status: DISCONTINUED | OUTPATIENT
Start: 2020-09-15 | End: 2020-09-17 | Stop reason: HOSPADM

## 2020-09-15 RX ORDER — SODIUM,POTASSIUM PHOSPHATES 280-250MG
2 POWDER IN PACKET (EA) ORAL EVERY 4 HOURS
Status: DISCONTINUED | OUTPATIENT
Start: 2020-09-15 | End: 2020-09-15

## 2020-09-15 RX ORDER — SODIUM,POTASSIUM PHOSPHATES 280-250MG
1 POWDER IN PACKET (EA) ORAL
Status: DISCONTINUED | OUTPATIENT
Start: 2020-09-15 | End: 2020-09-15

## 2020-09-15 RX ORDER — NAPROXEN SODIUM 220 MG
220 TABLET ORAL DAILY PRN
Status: ON HOLD | COMMUNITY
End: 2023-11-02 | Stop reason: HOSPADM

## 2020-09-15 RX ORDER — BUSPIRONE HYDROCHLORIDE 15 MG/1
15 TABLET ORAL 2 TIMES DAILY
COMMUNITY
Start: 2019-10-28 | End: 2024-03-25

## 2020-09-15 RX ORDER — BUTALBITAL, ACETAMINOPHEN AND CAFFEINE 50; 325; 40 MG/1; MG/1; MG/1
1 TABLET ORAL EVERY 6 HOURS PRN
Status: ON HOLD | COMMUNITY
Start: 2020-07-07 | End: 2023-06-05 | Stop reason: HOSPADM

## 2020-09-15 RX ORDER — CLONAZEPAM 0.5 MG/1
0.5 TABLET ORAL 2 TIMES DAILY PRN
Status: DISCONTINUED | OUTPATIENT
Start: 2020-09-15 | End: 2020-09-17 | Stop reason: HOSPADM

## 2020-09-15 RX ORDER — MAGNESIUM SULFATE HEPTAHYDRATE 40 MG/ML
2 INJECTION, SOLUTION INTRAVENOUS ONCE
Status: COMPLETED | OUTPATIENT
Start: 2020-09-15 | End: 2020-09-15

## 2020-09-15 RX ADMIN — AMLODIPINE BESYLATE 5 MG: 5 TABLET ORAL at 08:09

## 2020-09-15 RX ADMIN — ENOXAPARIN SODIUM 40 MG: 40 INJECTION SUBCUTANEOUS at 05:09

## 2020-09-15 RX ADMIN — SODIUM PHOSPHATE, MONOBASIC, MONOHYDRATE AND SODIUM PHOSPHATE, DIBASIC, ANHYDROUS 30 MMOL: 276; 142 INJECTION, SOLUTION INTRAVENOUS at 12:09

## 2020-09-15 RX ADMIN — FAMOTIDINE 20 MG: 20 TABLET, FILM COATED ORAL at 08:09

## 2020-09-15 RX ADMIN — BENAZEPRIL HYDROCHLORIDE 10 MG: 10 TABLET ORAL at 08:09

## 2020-09-15 RX ADMIN — BUTALBITAL, ACETAMINOPHEN, AND CAFFEINE 1 TABLET: 50; 325; 40 TABLET ORAL at 08:09

## 2020-09-15 RX ADMIN — MAGNESIUM SULFATE IN WATER 2 G: 40 INJECTION, SOLUTION INTRAVENOUS at 01:09

## 2020-09-15 RX ADMIN — MONTELUKAST 10 MG: 10 TABLET, FILM COATED ORAL at 06:09

## 2020-09-15 RX ADMIN — POTASSIUM CHLORIDE 40 MEQ: 1500 TABLET, EXTENDED RELEASE ORAL at 08:09

## 2020-09-15 RX ADMIN — SODIUM CHLORIDE: 0.9 INJECTION, SOLUTION INTRAVENOUS at 05:09

## 2020-09-15 RX ADMIN — CLONAZEPAM 0.5 MG: 0.5 TABLET ORAL at 03:09
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
## 2020-09-15 NOTE — PHARMACY MED REC
"Admission Medication Reconciliation - Pharmacy Consult Note    The home medication history was taken by Theresa Padilla, Pharmacy Technician.  Based on information gathered and subsequent review by the clinical pharmacist, the items below may need attention.    You may go to "Admission" then "Reconcile Home Medications" tabs to review and/or act upon these items.    No issues noted with the medication reconciliation.    Please address this information as you see fit.  Feel free to contact us if you have any questions or require assistance.    Marcela Cash, PharmD, BCPS  [id number]     [facility] Medications   Medication Sig    amlodipine-benazepril 5-10 mg (LOTREL) 5-10 mg per capsule Take 1 capsule by mouth every morning.    busPIRone (BUSPAR) 15 MG tablet Take 15 mg by mouth 2 (two) times a day.    butalbital-acetaminophen-caffeine -40 mg (FIORICET, ESGIC) -40 mg per tablet Take 1 tablet by mouth every 6 (six) hours as needed for Headaches.    clonazePAM (KLONOPIN) 0.5 MG tablet Take 0.5 mg by mouth 2 (two) times daily as needed (anxiety).     cyanocobalamin 1,000 mcg/mL injection Inject 1,000 mcg into the muscle every 30 days.     cyclobenzaprine (FLEXERIL) 10 MG tablet Take 10 mg by mouth 3 (three) times daily as needed for Muscle spasms.    fluoxetine HCl (PROZAC ORAL) Take 40 mg by mouth once daily.     montelukast sodium (SINGULAIR ORAL) Take 10 mg by mouth every morning.     naproxen sodium (ANAPROX) 220 MG tablet Take 220 mg by mouth daily as needed (pain).     .    .          "
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
## 2020-09-15 NOTE — ASSESSMENT & PLAN NOTE
Reports being on Buspar at home but not on med list  On Klonopin 2mg po 1-2 times a day at home  Drinks 1-2 shots vodka per day / every other day; last being 2 days ago  PLAN  - If anxious, may need to add Klonopin back as PRN at least

## 2020-09-15 NOTE — ASSESSMENT & PLAN NOTE
Weakness but no seizures, numbness, tremors, or tingling.  Fell 2-3 days ago, tripped over coffee tablet, no LOC. CT H at [place] negative. No tenderness on head exam.  Diet has been mostly crackers for 3-4 days prior to presentation. Has not had appetite. Has had diarrhea 2-3 days. Denies N/V.  UA SpGrav 1.20, Na low, diarrhea, thirsty, dry MM --> appear hypovolemic  DDx: tea & toast, dehydration / overheating, medication related (prozac). Likely tea and toast (hypovolemic, hyponatremia)  PLAN  - Ca, UOsm, SOsm to help determine etiology --> f./u  - Restarted NS at 125/h x 24h  - BMP Q6h, CMP daily  - Hold Prozac and Buspar

## 2020-09-15 NOTE — PT/OT/SLP EVAL
"Occupational Therapy   Evaluation    Name: Bhavana Lambert  MRN: [id number]  Admitting Diagnosis:  <principal problem not specified>      Recommendations:     Discharge Recommendations: home health OT  Discharge Equipment Recommendations:  other (see comments)(tbd)  Barriers to discharge:       Assessment:     Bhavana Lambert is a 65 y.o. female with a medical diagnosis of <principal problem not specified>.  She presents with impaired ADL and mobility performance deficits. Pt pleasant during OT/PT eval with delayed processing noted however oriented. Pt reports she lives in a raised home with 10 steps to enter with alternate elevator access into home if needed. At baseline, pt was (I) with mobility and ambulation. She reports she primarily resides in the home and completed household mobility only. During eval, pt mobilized with SBA-CGA with HHA to [facility] for toileting care. Pt voiced appreciation and explained she was receptive to continued therapy while hospitalized.  Performance deficits affecting function: weakness, impaired functional mobilty, impaired endurance, impaired self care skills, gait instability, impaired balance, decreased safety awareness, impaired cognition, decreased coordination.  Pt would benefit from continued OT skilled services 4x/wk to improve daily living skills to optimize QOL. Pt is recommended to discharge to OT at this time.      Rehab Prognosis: Good; patient would benefit from acute skilled OT services to address these deficits and reach maximum level of function.       Plan:     Patient to be seen 4 x/week to address the above listed problems via therapeutic activities, therapeutic exercises, self-care/home management  · Plan of Care Expires: 10/15/20  · Plan of Care Reviewed with:      Subjective     Chief Complaint: None stated at time of eval  Patient/Family Comments/goals: " I mainly stay in the house and don't really move up and down the stairs because of my COPD"    Occupational " Profile:  Living Environment: Pt lives alone in a raised home (elevator entry at side of house if needed) with ~10 steps to enter home. Pt with shower stool for bathing.  Previous level of function: I with ADLs and mobility using no AD. Pt reports she 'moves slow' 2/2 COPD at baseline and primarily stays in the home.  Roles and Routines: Pt's daughter assists with community needs as pt does not drive. Pt enjoys her two dogs.   Equipment Used at Home:  walker, standard, other (see comments)(shower stool)  Assistance upon Discharge: Family    Pain/Comfort:  · Pain Rating 1: 0/10  · Pain Rating Post-Intervention 1: 0/10    Patients cultural, spiritual, [employer] conflicts given the current situation: no    Objective:     Communicated with: RN prior to session.  Patient found HOB elevated with blood pressure cuff, pulse ox (continuous), telemetry, peripheral IV upon OT entry to room.    General Precautions: Standard, fall   Orthopedic Precautions:N/A   Braces: N/A     Occupational Performance:    Bed Mobility:    · Patient completed Rolling/Turning to Right with stand by assistance  · Patient completed Scooting/Bridging with stand by assistance  · Patient completed Supine to Sit with stand by assistance    Functional Mobility/Transfers:  · Patient completed Sit <> Stand Transfer with stand by assistance  with  no assistive device   · Patient completed Toilet Transfer Step Transfer technique with contact guard assistance with  no AD  · Functional Mobility: Pt completed mobility from bed to bedside commode (Several steps) using SBA-CGA with no AD.     Activities of Daily Living:  · Grooming: setup (A) washing hands while UIC following toileting   · Lower Body Dressing: total assistance doffing disosable underwear 2/2 soiled   · Toileting: stand by assistance using BSC     Cognitive/Visual Perceptual:  Cognitive/Psychosocial Skills:     -       Oriented to: Person, Place, Time and Situation   -       Follows  Commands/attention:Follows multistep  commands  -       Communication: clear/fluent  -       Memory: No Deficits noted  -       Safety awareness/insight to disability: intact   -       Mood/Affect/Coping skills/emotional control: Pleasant    Physical Exam:  Balance:    -       Demo good sitting and standing balance   Upper Extremity Range of Motion:     -       Right Upper Extremity: WNL  -       Left Upper Extremity: WNL  Upper Extremity Strength:    -       Right Upper Extremity: WNL  -       Left Upper Extremity: WNL   Strength:    -       Right Upper Extremity: WNL  -       Left Upper Extremity: WNL    AMPAC 6 Click ADL:  AMPAC Total Score: 19    Treatment & Education:  Pt educated on role of occupational therapy, POC, and safety during ADLs and functional mobility. Pt and OT discussed importance of safe, continued mobility to optimize daily living skills. Pt verbalized understanding. White board updated during session. Pt given instruction to call for medical staff/nurse for assistance.     Education:    Patient left up in chair with all lines intact, call button in reach and RN Miguel notified    GOALS:   Multidisciplinary Problems     Occupational Therapy Goals        Problem: Occupational Therapy Goal    Goal Priority Disciplines Outcome Interventions   Occupational Therapy Goal     OT, PT/OT Ongoing, Progressing    Description: Goals to be met by: 9/25/2020     Patient will increase functional independence with ADLs by performing:    UE Dressing with Modified [place].  LE Dressing with Modified [place].  Grooming while standing with Supervision.  Toileting from toilet with Supervision for hygiene and clothing management.   Rolling to Bilateral with [place].   Supine to sit with [place].  Step transfer with Supervision  Toilet transfer to toilet with Supervision.  Increased functional strength to WNL for improvements in safe ADL/IADL completion for home management training.                      History:     Past Medical History:   Diagnosis Date    Anxiety     COPD (chronic obstructive pulmonary disease)     Depression     Hypertension     Requires continuous at home supplemental oxygen     PRN FOR COPD       Past Surgical History:   Procedure Laterality Date    COLON SURGERY      COLOSTOMY      COLOSTOMY CLOSURE      HYSTERECTOMY      LEG SURGERY Right     OPEN REDUCTION AND INTERNAL FIXATION (ORIF) OF INJURY OF WRIST Left 1/29/2019    Procedure: ORIF, WRIST;  Surgeon: Homero Jeff DO;  Location: [facility];  Service: Orthopedics;  Laterality: Left;  Equipment: [employer] Geminus Wrist Plate Set  Vendor/Rep: [employer]  C-Arm: Entire  DME: None    REQUIRES ASSISTANT    WRIST SURGERY Right        Time Tracking:     OT Date of Treatment: 09/15/20  OT Start Time: 0957  OT Stop Time: 1027  OT Total Time (min): 30 min    Billable Minutes:Evaluation 15 min  Self Care/Home Management 15 min    Mckayla Montes De Oca OT  9/15/2020

## 2020-09-15 NOTE — ASSESSMENT & PLAN NOTE
Ongoing  No fever, no abd tenderness, no blood per patient  Luther sounds present and normal.   DDx: viral, med related   PLAN  - Stool O+P and WBC ordered --> f/u  - Continue to monitor

## 2020-09-15 NOTE — ASSESSMENT & PLAN NOTE
Patient states she smoked 1ppd for 20-30 years  Not on inhaler at home, no home O2  No wheezing, crackles on exam  CXR without acute pathology, appears hyperexpanded  PLAN  - Singulair to continue inpatient  - If O2 needed, goal would be 88-92

## 2020-09-15 NOTE — H&P
[facility] - ICU [facility] Medicine  History & Physical    Patient Name: Bhavana Lambert  MRN: [id number]  Admission Date: 9/14/2020  Attending Physician: Tommy Benson MD   Primary Care Provider: Pepito Jhaveri Iv, MD         Patient information was obtained from patient, past medical records and ER records.     Subjective:     Principal Problem: hyponatremia    Chief Complaint: weakness       HPI: Patient is a 65-year-old  female with past medical history significant for anxiety, depression, chronic obstructive pulmonary disease and hypertension is being admitted to Hospital Medicine from [facility] ER with c/o increasing lethargy and weakness for 4-5 days. She is a poor historian (may have gotten ATivan IV on transfer for agitation). Reportedly patient's home air conditioner became nonfunctional 5 days ago and patient was exposed to significant heat and humidity for couple of days causing increasing weakness and lethargy. Patient reported low appetite and had only been eating a few crackers a day and drinking diet Coke. She also drink 1-2 vodka drinks per day. Patient denied any sick contact or recent travel history.  For the past 2-3 days patient reports having diarrhea 1-2 bowel movements per day which are not loose or watery, last being today prior to transfer.  Patient denies any abdominal pain or urinary complaints. Patient denies any headache, vision changes or any focal neuro deficits.  Patient has been noted to have serum sodium 114 and potassium 2.5 in the emergency room which are improving with normal saline and potassium repletion. Upon arrival to [facility], patient reports feeling better but is thirsty.    Past Medical History:   Diagnosis Date    Anxiety     COPD (chronic obstructive pulmonary disease)     Depression     Hypertension     Requires continuous at home supplemental oxygen     PRN FOR COPD       Past Surgical History:   Procedure  Laterality Date    COLON SURGERY      COLOSTOMY      COLOSTOMY CLOSURE      HYSTERECTOMY      LEG SURGERY Right     OPEN REDUCTION AND INTERNAL FIXATION (ORIF) OF INJURY OF WRIST Left 1/29/2019    Procedure: ORIF, WRIST;  Surgeon: Homero Jeff DO;  Location: [facility] OR;  Service: Orthopedics;  Laterality: Left;  Equipment: [employer] Geminus Wrist Plate Set  Vendor/Rep: [employer]  C-Arm: Entire  DME: None    REQUIRES ASSISTANT    WRIST SURGERY Right        Review of patient's allergies indicates:  No Known Allergies    Current Facility-Administered Medications on File Prior to Encounter   Medication    [COMPLETED] potassium chloride SA CR tablet 20 mEq    [DISCONTINUED] 0.9%  NaCl infusion    [DISCONTINUED] acetaminophen tablet 650 mg    [DISCONTINUED] amLODIPine tablet 5 mg    [DISCONTINUED] benazepriL tablet 10 mg    [DISCONTINUED] clonazePAM tablet 2 mg    [DISCONTINUED] enoxaparin injection 40 mg    [DISCONTINUED] famotidine tablet 20 mg    [DISCONTINUED] HYDROcodone-acetaminophen 5-325 mg per tablet 1 tablet    [DISCONTINUED] magnesium oxide tablet 800 mg    [DISCONTINUED] magnesium oxide tablet 800 mg    [DISCONTINUED] montelukast tablet 10 mg    [DISCONTINUED] ondansetron injection 4 mg    [DISCONTINUED] potassium chloride 10% oral solution 40 mEq    [DISCONTINUED] potassium chloride packet 40 mEq    [DISCONTINUED] potassium chloride packet 40 mEq    [DISCONTINUED] potassium chloride SA CR tablet 40 mEq    [DISCONTINUED] potassium, sodium phosphates 280-160-250 mg packet 2 packet    [DISCONTINUED] potassium, sodium phosphates 280-160-250 mg packet 2 packet    [DISCONTINUED] sodium chloride 0.9% flush 10 mL     Current Outpatient Medications on File Prior to Encounter   Medication Sig    amlodipine-benazepril 5-10 mg (LOTREL) 5-10 mg per capsule Take 1 capsule by mouth every morning.    clonazePAM (KLONOPIN) 2 MG Tab Take 2 mg by mouth 2 (two) times daily.     cyclobenzaprine (FLEXERIL) 10 MG tablet Take 10 mg by mouth 3 (three) times daily as needed for Muscle spasms.    fluoxetine HCl (PROZAC ORAL) Take 40 mg by mouth once daily.     HYDROcodone-acetaminophen (NORCO) 5-325 mg per tablet Take 1-2 tablets by mouth every 6 (six) hours as needed for Pain.    montelukast sodium (SINGULAIR ORAL) Take 10 mg by mouth every morning.      Family History     Problem Relation (Age of Onset)    Anxiety disorder Daughter, Daughter    Arthritis Sister    COPD Father    Cancer Mother    Cirrhosis Father    Congenital heart disease Daughter    Depression Daughter    No Known Problems Brother    Valvular heart disease Daughter        Tobacco Use    Smoking status: Current Every Day Smoker     Packs/day: 1.00     Years: 20.00     Pack years: 20.00     Types: Cigarettes    Smokeless tobacco: Never Used   Substance and Sexual Activity    Alcohol use: Yes     Frequency: Never     Comment: couple times a week-wine or bloody cari    Drug use: No    Sexual activity: Not Currently     Review of Systems   Constitutional: Negative for appetite change, chills, fever and unexpected weight change.   HENT: Negative for nosebleeds, sore throat and trouble swallowing.    Eyes: Negative for visual disturbance.   Respiratory: Negative for cough and shortness of breath.    Cardiovascular: Negative for chest pain, palpitations and leg swelling.   Gastrointestinal: Positive for diarrhea. Negative for abdominal distention, abdominal pain, anal bleeding, blood in stool, nausea and vomiting.   Genitourinary: Negative for dysuria and hematuria.   Musculoskeletal: Negative for arthralgias.   Skin: Negative for pallor, rash and wound.   Neurological: Positive for weakness and headaches. Negative for dizziness, tremors, seizures and numbness.   Psychiatric/Behavioral: Positive for agitation.     Objective:     Vital Signs (Most Recent):  Temp: 98 °F (36.7 °C) (09/14/20 1827)  Pulse: 83 (09/14/20  1827)  Resp: 20 (09/14/20 1827)  BP: 134/60 (09/14/20 1827) Vital Signs (24h Range):  Temp:  [96.5 °F (35.8 °C)-98.2 °F (36.8 °C)] 98 °F (36.7 °C)  Pulse:  [79-85] 83  Resp:  [16-20] 20  SpO2:  [95 %-98 %] 98 %  BP: (108-142)/(60-80) 134/60     Weight: 59.8 kg (131 lb 13.4 oz)(NEW BED)  Body mass index is 24.11 kg/m².    Physical Exam  Constitutional:       General: She is not in acute distress.     Appearance: Normal appearance. She is not ill-appearing or diaphoretic.   HENT:      Head: Normocephalic and atraumatic.      Nose: No congestion or rhinorrhea.      Mouth/Throat:      Mouth: Mucous membranes are dry.      Pharynx: No oropharyngeal exudate.   Eyes:      General: No scleral icterus.        Right eye: No discharge.         Left eye: No discharge.      Pupils: Pupils are equal, round, and reactive to light.   Neck:      Musculoskeletal: Neck supple. No muscular tenderness.   Cardiovascular:      Rate and Rhythm: Normal rate and regular rhythm.      Pulses: Normal pulses.      Heart sounds: Normal heart sounds. No murmur.   Pulmonary:      Effort: Pulmonary effort is normal. No respiratory distress.      Breath sounds: Normal breath sounds. No wheezing, rhonchi or rales.   Abdominal:      General: Abdomen is flat. There is no distension.      Palpations: Abdomen is soft.      Tenderness: There is no abdominal tenderness. There is no guarding.      Comments: Midline surgical scar   RLQ surgical scar   Musculoskeletal:         General: No swelling.      Comments: Surgical scars to anterior knees   Lymphadenopathy:      Cervical: No cervical adenopathy.   Skin:     Capillary Refill: Capillary refill takes less than 2 seconds.   Neurological:      General: No focal deficit present.      Mental Status: She is oriented to person, place, and time.      Cranial Nerves: No cranial nerve deficit.      Sensory: No sensory deficit.   Psychiatric:         Mood and Affect: Mood normal.      Comments: solmnolent from meds  given on transfers       Significant Labs:   CBC:   Recent Labs   Lab 09/13/20  1454 09/14/20  0644   WBC 11.48 8.26   HGB 13.6 13.4   HCT 36.2* 35.4*    250     CMP:   Recent Labs   Lab 09/13/20  1454 09/14/20  0007 09/14/20  0644 09/14/20  1150   * 118* 125* 124*   K 2.5* 3.3* 3.3* 4.0   CL 75* 82* 92* 93*   CO2 16* 17* 17* 17*   GLU 96 83 78 94   BUN 7* <5* <5* <5*   CREATININE 0.4* 0.5 0.4* 0.5   CALCIUM 8.1* 8.3* 8.1* 8.1*   PROT 7.0  --   --   --    ALBUMIN 4.1  --   --   --    BILITOT 1.5*  --   --   --    ALKPHOS 82  --   --   --    AST 37  --   --   --    ALT 32  --   --   --    ANIONGAP 23* 19* 16 14   EGFRNONAA >60.0 >60.0 >60.0 >60.0       Significant Imaging: I have reviewed all pertinent imaging results/findings within the past 24 hours.    Assessment/Plan:     Hyponatremia  Weakness but no seizures, numbness, tremors, or tingling.  Fell 2-3 days ago, tripped over coffee tablet, no LOC. CT H at [place] negative. No tenderness on head exam.  Diet has been mostly crackers for 3-4 days prior to presentation. Has not had appetite. Has had diarrhea 2-3 days. Denies N/V.  UA SpGrav 1.20, Na low, diarrhea, thirsty, dry MM --> appear hypovolemic  DDx: tea & toast, dehydration / overheating, medication related (prozac). Likely tea and toast (hypovolemic, hyponatremia)  PLAN  - Ca, UOsm, SOsm to help determine etiology --> f./u  - Restarted NS at 125/h x 24h  - BMP Q6h, CMP daily  - Hold Prozac and Buspar    Diarrhea  Ongoing  H/o bowel resection per pateint; abd scar present. She is unsure what she had done and why.   No fever, no abd tenderness, no blood per patient  Luther sounds present and normal.   DDx: viral, med related   PLAN  - Stool O+P and WBC ordered --> f/u  - Continue to monitor     Hypokalemia  Have been replacing orally (KCl 40meq)  K = 4    Hypertension  On amlodipine at home, continue    Depression  On Prozac at home  Hyponatremic on admit  PLAN  - Hold Prozac as it could worsen  hyponatremia    Anxiety  Reports being on Buspar at home but not on med list  On Klonopin 2mg po 1-2 times a day at home  Drinks 1-2 shots vodka per day / every other day; last being 2 days ago  PLAN  - If anxious, may need to add Klonopin back as PRN at least    COPD (chronic obstructive pulmonary disease)  Patient states she smoked 1ppd for 20-30 years  Not on inhaler at home, no home O2  No wheezing, crackles on exam  CXR without acute pathology, appears hyperexpanded  PLAN  - Singulair to continue inpatient  - If O2 needed, goal would be 88-92    Discharge planning issues  PT at [place] recommended home with daughter  Needs rolling walker        VTE Risk Mitigation (From admission, onward)         Ordered     enoxaparin injection 40 mg  Every 24 hours      09/14/20 1911     Place sequential compression device  Until discontinued      09/14/20 1907     IP VTE LOW RISK PATIENT  Once      09/14/20 1907                Claudine Ceron MD  Department of Hospital Medicine   [facility] - ICU 14 [facility]

## 2020-09-15 NOTE — PT/OT/SLP EVAL
Physical Therapy Evaluation    Patient Name:  Bhavana Lambert   MRN:  [id number]    Recommendations:     Discharge Recommendations:  home health PT   Discharge Equipment Recommendations: (TBD)   Barriers to discharge: None    Assessment:     Bhavana Lambert is a 65 y.o. female admitted with a medical diagnosis of <principal problem not specified>.  She presents with the following impairments/functional limitations:  weakness, impaired endurance, impaired functional mobilty, impaired self care skills, gait instability, impaired balance . Patient tolerated session well. She is not far from her functional baseline.  Patient will benefit from PT services to address the mentioned deficits in order to promote an improve functional mobility status. Upon d/c, she is an appropriate candidate for HHPT.    Rehab Prognosis: Good; patient would benefit from acute skilled PT services to address these deficits and reach maximum level of function.    Recent Surgery: * No surgery found *      Plan:     During this hospitalization, patient to be seen 4 x/week to address the identified rehab impairments via gait training, therapeutic activities, therapeutic exercises, neuromuscular re-education and progress toward the following goals:    · Plan of Care Expires:  10/15/20    History:     Past Medical History:   Diagnosis Date    Anxiety     COPD (chronic obstructive pulmonary disease)     Depression     Hypertension     Requires continuous at home supplemental oxygen     PRN FOR COPD       Past Surgical History:   Procedure Laterality Date    COLON SURGERY      COLOSTOMY      COLOSTOMY CLOSURE      HYSTERECTOMY      LEG SURGERY Right     OPEN REDUCTION AND INTERNAL FIXATION (ORIF) OF INJURY OF WRIST Left 1/29/2019    Procedure: ORIF, WRIST;  Surgeon: Homero Jeff DO;  Location: [facility] OR;  Service: Orthopedics;  Laterality: Left;  Equipment: [employer] Geminus Wrist Plate Set  Vendor/Rep: [employer]  C-Arm:  Entire  DME: None    REQUIRES ASSISTANT    WRIST SURGERY Right        Subjective     Chief Complaint: none   Patient/Family Comments/goals: to go home   Pain/Comfort:  · Pain Rating 1: (did not rate)  · Location 1: (L flank)  · Pain Addressed 1: Reposition, Distraction    Patients cultural, spiritual, Scientologist conflicts given the current situation: no    Living Environment:  Patient's living environment is as follows:  Home type Home    1 or 2 stories  [facility]    Number of MARY LOU/ rails 10STE (R HR) - also has elevator access    AD used?/Owned?  Std walker, shower stool, O2 CPAP   Bathroom set-up  Tub/shower   Working? no   Driving? no   Individuals living with patient:  Alone    Hobbies/Roles: Spending time with her dogs    Prior to admission, patients level of function was (I) for ADLs and mobility.  Equipment used at home: walker, standard, oxygen, CPAP(shower stool).  DME owned (not currently used): none.  Upon discharge, patient will have assistance from children.    Objective:     Communicated with RN prior to session.  Patient found HOB elevated with peripheral IV, telemetry, pulse ox (continuous)  upon PT entry to room. See below for detailed functional assessment. Patient agreeable to participate in initial evaluation.    General Precautions: Standard, fall   Orthopedic Precautions:N/A   Braces: N/A     Exams:  · Cognitive Exam:  Patient is oriented to Person, Place, Time and Situation  · Postural Exam:  Patient presented with the following abnormalities:    · -       Rounded shoulders  · -       Forward head  · Sensation:    LEFT LE: -       Intact  light/touch LE RIGHT LE:-       Intact  light/touch LE      ROM and Strength   Right Lower Extremity  Left Lower Extremity    Hip Flexion WFL WFL   Knee Extension  WFL WFL   Knee Flexion  WFL WFL   Ankle DF WFL WFL   Ankle PF  WFL WFL     Functional Mobility:  · Bed Mobility:     · Rolling Right: stand by assistance  · Scooting: stand by assistance  · Supine to  Sit: stand by assistance  · Transfers:     · Sit to Stand:  stand by assistance with no AD  · Toilet Transfer: stand by assistance with  no AD  using  Step Transfer  · Gait: 3ft to BSC + 5ft to bedside chair with SBA and no AD  ·  narrow ELIEL, mildly unsteady, no LOB  ·  decreased BLE foort clearance   · Balance: sitting EOB - (I); static standing - SBA; dynamic standing - SBA     Therapeutic Activities and Exercises:  -Patient educated on the role and goal of PT services during acute care LOS. Question and concerns acknowledged and answered as appropriate.   -Will continue to educated as needed.    -White board updated in patients room to reflect level of assistance needed with nursing. - transfer and ambulate RNx1    AM-PAC 6 CLICK MOBILITY  Total Score:19     Patient left up in chair with all lines intact, call button in reach and RN notified.  White board updated in patient room to reflect level of function with nursing.     GOALS:   Multidisciplinary Problems     Physical Therapy Goals        Problem: Physical Therapy Goal    Goal Priority Disciplines Outcome Goal Variances Interventions   Physical Therapy Goal     PT, PT/OT Ongoing, Progressing     Description: Goals to be met by: 20     Patient will increase functional independence with mobility by performin. Supine to sit with Modified [place]  2. Sit to supine with Modified [place]  3. Sit to stand transfer with Modified [place]   4. Gait  x 100 feet with Modified [place] using LRAD, if needed.   5. Ascend/descend 1 flight of stairs with right Handrails Modified [place].                      Time Tracking:     PT Received On: 09/15/20  PT Start Time: 957     PT Stop Time: 1027  PT Total Time (min): 30 min     Billable Minutes: Evaluation 15 and Gait Training 15      Bhavana Parker, MICHELLE  09/15/2020

## 2020-09-15 NOTE — PLAN OF CARE
Problem: Occupational Therapy Goal  Goal: Occupational Therapy Goal  Description: Goals to be met by: 9/25/2020     Patient will increase functional independence with ADLs by performing:    UE Dressing with Modified [place].  LE Dressing with Modified [place].  Grooming while standing with Supervision.  Toileting from toilet with Supervision for hygiene and clothing management.   Rolling to Bilateral with [place].   Supine to sit with [place].  Step transfer with Supervision  Toilet transfer to toilet with Supervision.  Increased functional strength to WNL for improvements in safe ADL/IADL completion for home management training.    Evaluated pt and established OT POC. Continue OT as tolerated.  Mckayla Montes De Oca OT  9/15/2020    Outcome: Ongoing, Progressing

## 2020-09-15 NOTE — PLAN OF CARE
Problem: Physical Therapy Goal  Goal: Physical Therapy Goal  Description: Goals to be met by: 20     Patient will increase functional independence with mobility by performin. Supine to sit with Modified [place]  2. Sit to supine with Modified [place]  3. Sit to stand transfer with Modified [place]   4. Gait  x 100 feet with Modified [place] using LRAD, if needed.   5. Ascend/descend 1 flight of stairs with right Handrails Modified [place].     Outcome: Ongoing, Progressing   Initial evaluation completed. Patient tolerated assessment well. Established POC and goals. Patient would continue to benefit from skilled PT services in order to improve functional mobility independence.     Bhavana Parker, PT, DPT  9/15/2020

## 2020-09-15 NOTE — SUBJECTIVE & OBJECTIVE
Past Medical History:   Diagnosis Date    Anxiety     COPD (chronic obstructive pulmonary disease)     Depression     Hypertension     Requires continuous at home supplemental oxygen     PRN FOR COPD       Past Surgical History:   Procedure Laterality Date    COLON SURGERY      COLOSTOMY      COLOSTOMY CLOSURE      HYSTERECTOMY      LEG SURGERY Right     OPEN REDUCTION AND INTERNAL FIXATION (ORIF) OF INJURY OF WRIST Left 1/29/2019    Procedure: ORIF, WRIST;  Surgeon: Homero Jeff DO;  Location: [facility] OR;  Service: Orthopedics;  Laterality: Left;  Equipment: [employer] Geminus Wrist Plate Set  Vendor/Rep: [employer]  C-Arm: Entire  DME: None    REQUIRES ASSISTANT    WRIST SURGERY Right        Review of patient's allergies indicates:  No Known Allergies    Current Facility-Administered Medications on File Prior to Encounter   Medication    [COMPLETED] potassium chloride SA CR tablet 20 mEq    [DISCONTINUED] 0.9%  NaCl infusion    [DISCONTINUED] acetaminophen tablet 650 mg    [DISCONTINUED] amLODIPine tablet 5 mg    [DISCONTINUED] benazepriL tablet 10 mg    [DISCONTINUED] clonazePAM tablet 2 mg    [DISCONTINUED] enoxaparin injection 40 mg    [DISCONTINUED] famotidine tablet 20 mg    [DISCONTINUED] HYDROcodone-acetaminophen 5-325 mg per tablet 1 tablet    [DISCONTINUED] magnesium oxide tablet 800 mg    [DISCONTINUED] magnesium oxide tablet 800 mg    [DISCONTINUED] montelukast tablet 10 mg    [DISCONTINUED] ondansetron injection 4 mg    [DISCONTINUED] potassium chloride 10% oral solution 40 mEq    [DISCONTINUED] potassium chloride packet 40 mEq    [DISCONTINUED] potassium chloride packet 40 mEq    [DISCONTINUED] potassium chloride SA CR tablet 40 mEq    [DISCONTINUED] potassium, sodium phosphates 280-160-250 mg packet 2 packet    [DISCONTINUED] potassium, sodium phosphates 280-160-250 mg packet 2 packet    [DISCONTINUED] sodium chloride 0.9% flush 10 mL     Current  Outpatient Medications on File Prior to Encounter   Medication Sig    amlodipine-benazepril 5-10 mg (LOTREL) 5-10 mg per capsule Take 1 capsule by mouth every morning.    clonazePAM (KLONOPIN) 2 MG Tab Take 2 mg by mouth 2 (two) times daily.    cyclobenzaprine (FLEXERIL) 10 MG tablet Take 10 mg by mouth 3 (three) times daily as needed for Muscle spasms.    fluoxetine HCl (PROZAC ORAL) Take 40 mg by mouth once daily.     HYDROcodone-acetaminophen (NORCO) 5-325 mg per tablet Take 1-2 tablets by mouth every 6 (six) hours as needed for Pain.    montelukast sodium (SINGULAIR ORAL) Take 10 mg by mouth every morning.      Family History     Problem Relation (Age of Onset)    Anxiety disorder Daughter, Daughter    Arthritis Sister    COPD Father    Cancer Mother    Cirrhosis Father    Congenital heart disease Daughter    Depression Daughter    No Known Problems Brother    Valvular heart disease Daughter        Tobacco Use    Smoking status: Current Every Day Smoker     Packs/day: 1.00     Years: 20.00     Pack years: 20.00     Types: Cigarettes    Smokeless tobacco: Never Used   Substance and Sexual Activity    Alcohol use: Yes     Frequency: Never     Comment: couple times a week-wine or bloody cari    Drug use: No    Sexual activity: Not Currently     Review of Systems   Constitutional: Negative for appetite change, chills, fever and unexpected weight change.   HENT: Negative for nosebleeds, sore throat and trouble swallowing.    Eyes: Negative for visual disturbance.   Respiratory: Negative for cough and shortness of breath.    Cardiovascular: Negative for chest pain, palpitations and leg swelling.   Gastrointestinal: Positive for diarrhea. Negative for abdominal distention, abdominal pain, anal bleeding, blood in stool, nausea and vomiting.   Genitourinary: Negative for dysuria and hematuria.   Musculoskeletal: Negative for arthralgias.   Skin: Negative for pallor, rash and wound.   Neurological: Positive  for weakness and headaches. Negative for dizziness, tremors, seizures and numbness.   Psychiatric/Behavioral: Positive for agitation.     Objective:     Vital Signs (Most Recent):  Temp: 98 °F (36.7 °C) (09/14/20 1827)  Pulse: 83 (09/14/20 1827)  Resp: 20 (09/14/20 1827)  BP: 134/60 (09/14/20 1827) Vital Signs (24h Range):  Temp:  [96.5 °F (35.8 °C)-98.2 °F (36.8 °C)] 98 °F (36.7 °C)  Pulse:  [79-85] 83  Resp:  [16-20] 20  SpO2:  [95 %-98 %] 98 %  BP: (108-142)/(60-80) 134/60     Weight: 59.8 kg (131 lb 13.4 oz)(NEW BED)  Body mass index is 24.11 kg/m².    Physical Exam  Constitutional:       General: She is not in acute distress.     Appearance: Normal appearance. She is not ill-appearing or diaphoretic.   HENT:      Head: Normocephalic and atraumatic.      Nose: No congestion or rhinorrhea.      Mouth/Throat:      Mouth: Mucous membranes are dry.      Pharynx: No oropharyngeal exudate.   Eyes:      General: No scleral icterus.        Right eye: No discharge.         Left eye: No discharge.      Pupils: Pupils are equal, round, and reactive to light.   Neck:      Musculoskeletal: Neck supple. No muscular tenderness.   Cardiovascular:      Rate and Rhythm: Normal rate and regular rhythm.      Pulses: Normal pulses.      Heart sounds: Normal heart sounds. No murmur.   Pulmonary:      Effort: Pulmonary effort is normal. No respiratory distress.      Breath sounds: Normal breath sounds. No wheezing, rhonchi or rales.   Abdominal:      General: Abdomen is flat. There is no distension.      Palpations: Abdomen is soft.      Tenderness: There is no abdominal tenderness. There is no guarding.      Comments: Midline surgical scar   RLQ surgical scar   Musculoskeletal:         General: No swelling.      Comments: Surgical scars to anterior knees   Lymphadenopathy:      Cervical: No cervical adenopathy.   Skin:     Capillary Refill: Capillary refill takes less than 2 seconds.   Neurological:      General: No focal deficit  present.      Mental Status: She is oriented to person, place, and time.      Cranial Nerves: No cranial nerve deficit.      Sensory: No sensory deficit.   Psychiatric:         Mood and Affect: Mood normal.      Comments: solmnolent from meds given on transfers       Significant Labs:   CBC:   Recent Labs   Lab 09/13/20  1454 09/14/20  0644   WBC 11.48 8.26   HGB 13.6 13.4   HCT 36.2* 35.4*    250     CMP:   Recent Labs   Lab 09/13/20  1454 09/14/20  0007 09/14/20  0644 09/14/20  1150   * 118* 125* 124*   K 2.5* 3.3* 3.3* 4.0   CL 75* 82* 92* 93*   CO2 16* 17* 17* 17*   GLU 96 83 78 94   BUN 7* <5* <5* <5*   CREATININE 0.4* 0.5 0.4* 0.5   CALCIUM 8.1* 8.3* 8.1* 8.1*   PROT 7.0  --   --   --    ALBUMIN 4.1  --   --   --    BILITOT 1.5*  --   --   --    ALKPHOS 82  --   --   --    AST 37  --   --   --    ALT 32  --   --   --    ANIONGAP 23* 19* 16 14   EGFRNONAA >60.0 >60.0 >60.0 >60.0       Significant Imaging: I have reviewed all pertinent imaging results/findings within the past 24 hours.

## 2020-09-15 NOTE — MEDICAL/APP STUDENT
"[facility]  Progress Note      Bhavana Lambert  YOB: 1955  Medical Record Number:  [id number]  Attending Physician:  Tommy Benson MD   Date of Admission: 9/14/2020       Hospital Day: 2  Current Principal Problem: Hypovolemic hyponatremia and hypokalemia      History     Chief Complaint: "fatigue"    HPI  The patient is a 65 year old woman presented with affective disorder, tobacco use, and COPD  abuse 4-5 days lethargy and weakness.    Additional active medical problems include ...    Chronology 5 days prior to admission the patient's air conditioning broke down.  At that time, she also notes she ...  with a past medical history of anxiety, depression, COPD, and HTN that presented with lethargy and weakness for 4-5 days and diarrhea for 2-3 days prior to presenting to the ED at OSH. The patient notes that she had been eating only crackers and drinking diet Coke. The patient also states that her air conditioner has been nonfunctioning for 5 days prior to presentation. The patient also endorses drinking 1-2 vodka drinks per day.    At the OSH ED, the patient was noted to have a sodium of 114 and potassium of 2.5. The patient also had a CXR demonstrating no acute processes and a head CT without contrast demonstrating no acute intracranial abnormalities.    Therapy? Transfer?    During her hospital course, the patient has been receiving normal saline IVF and oral potassium supplements. The patient was also started on montelukast for her COPD.    Overnight, the patient slept throughout the night. No acute events noted.    Today, the patient reports improved nausea/vomiting, stating she was able to tolerate eating this morning. The patient also reports she has been getting out of bed to use the bedside commode with normal bowel movements. The patient endorses intermittent headaches and complains of left flank pain. The patient denies dizziness, weakness, and abdominal " pain.    Medications  Scheduled Meds:   amLODIPine  5 mg Oral Daily    benazepriL  10 mg Oral Daily    enoxaparin  40 mg Subcutaneous Q24H    famotidine  20 mg Oral BID    magnesium sulfate IVPB  2 g Intravenous Once    montelukast  10 mg Oral QAM    potassium chloride  40 mEq Oral Daily    sodium phosphate IVPB  30 mmol Intravenous Once     Continuous Infusions:   sodium chloride 0.9% 125 mL/hr at 09/15/20 1036     PRN Meds:.dextrose 50%, dextrose 50%, glucagon (human recombinant), glucose, glucose, sodium chloride 0.9%      PSFH:  Please see a  Past Surgical History:   Procedure Laterality Date    COLON SURGERY      COLOSTOMY      COLOSTOMY CLOSURE      HYSTERECTOMY      LEG SURGERY Right     OPEN REDUCTION AND INTERNAL FIXATION (ORIF) OF INJURY OF WRIST Left 1/29/2019    Procedure: ORIF, WRIST;  Surgeon: Homero Jeff DO;  Location: [facility] OR;  Service: Orthopedics;  Laterality: Left;  Equipment: [employer] Geminus Wrist Plate Set  Vendor/Rep: [employer]  C-Arm: Entire  DME: None    REQUIRES ASSISTANT    WRIST SURGERY Right      Family History   Problem Relation Age of Onset    Cancer Mother     COPD Father     Cirrhosis Father     Arthritis Sister     No Known Problems Brother     Anxiety disorder Daughter     Depression Daughter     Anxiety disorder Daughter     Congenital heart disease Daughter     Valvular heart disease Daughter      Please see admission note and assessment and plan below.      ROS   Admits Denies   Constitutional  Chills, diaphoresis, malaise   Eyes  Visual changes   ENMT  Dysphagia, Epistaxis, nasal congestion, hearing loss   Cardiovascular  Chest pain, palpitations, edema   Respiratory  Cough, dyspnea, wheezing   Gastrointestinal Left flank pain Nausea, vomiting, constipation, diarrhea, anorexia.     Genitourinary  Dysuria, incontinence   Musculoskeletal  Myalgias, joint pain, joint swelling   Integumentary  Rash, inflammation, burning    Neuro-Psychiatric Headache Anxiety, insomnia.  Changes in speech, strength, sensation.     Endocrine     Hematologic  Abnormal bruising, bleeding   Immunologic  Inflammation, pain at IV sites.  Pruritis.         Physical Examination     General:  No acute distress. Lethargic, delayed speech.    Vital Signs  Vitals  Temp: 97.9 °F (36.6 °C)  Temp src: Oral  Pulse: 93  Heart Rate Source: Monitor  Resp: 20  SpO2: 95 %  Pulse Oximetry Type: Continuous  O2 Device (Oxygen Therapy): room air  BP: (!) 127/91  MAP (mmHg): 104  BP Location: Left arm  BP Method: Automatic  Patient Position: Lying          24 Hour VS Range    Temp:  [97.3 °F (36.3 °C)-98 °F (36.7 °C)]   Pulse:  [82-93]   Resp:  [18-20]   BP: ()/(55-91)   SpO2:  [92 %-95 %]     Intake/Output Summary (Last 24 hours) at 9/15/2020 1504  Last data filed at 9/15/2020 1153  Gross per 24 hour   Intake 1753.33 ml   Output 600 ml   Net 1153.33 ml       Head: NCAT  Eyes: conjunctivae and lids normal, no scleral icterus, EOMI.  ENMT:  no gingival bleeding, dry/cracked oral mucosa without pallor or cyanosis.   Neck:  JVP normal.  Trachea non-displaced.     Chest:  Normal respiratory effort.  Chest clear to auscultation.  Diffuse mild wheezes. No rales or rhonchi.  Heart:  Normal PMI, S1 S2 normal quality, splitting.  No murmurs rubs or gallops.  Peripheral pulses 2+.  Abdomen:  Non-distended, normal bowel sounds, non-tender.  No hepato-jugular reflux. Left flank tenderness. Midline surgical scar. RLQ surgical scar.  Extremities:  No edema. Normal capillary refill.    Skin:  Warm and dry.  No cyanosis or pallor.  No ulcers, stasis.  IV sites without tenderness or inflammation.    Neurological / Psychiatric:  Oriented to person, time, and place.  No facial asymmetry, drift.  Fluent without dysarthria.  Mood euthymic, affect normal.       Data       Recent Labs   Lab 09/13/20  1454 09/14/20  0644 09/15/20  0744   WBC 11.48 8.26 8.24   HGB 13.6 13.4 12.6   HCT 36.2*  35.4* 35.7*    250 280        No results for input(s): PROTIME, INR in the last 168 hours.     Recent Labs   Lab 09/14/20  0644 09/14/20  1150 09/15/20  0110 09/15/20  0744 09/15/20  1154   * 124* 130* 128* 127*   K 3.3* 4.0 3.4* 3.0* 3.4*   CL 92* 93* 98 93* 93*   CO2 17* 17* 23 26 24   BUN <5* <5* 3* 2* 3*   CREATININE 0.4* 0.5 0.6 0.5 0.6   ANIONGAP 16 14 9 9 10   CALCIUM 8.1* 8.1* 8.3* 8.1* 8.4*       No components found for: SODIUM   Sodium (mmol/L)   Date Value   09/16/2020 129 (L)   09/16/2020 130 (L)   09/15/2020 127 (L)   09/15/2020 127 (L)   09/15/2020 128 (L)         \  Recent Labs   Lab 09/13/20  1454 09/15/20  0744   PROT 7.0 6.2   ALBUMIN 4.1 3.4*   BILITOT 1.5* 0.4   ALKPHOS 82 90   AST 37 30   ALT 32 25        No results for input(s): TROPONINI in the last 168 hours.     BNP (pg/mL)   Date Value   09/13/2020 91       Recent Labs   Lab 09/13/20 2010 09/13/20  2215   LABBLOO No Growth to date  No Growth to date No Growth to date  No Growth to date        Assessment & Plan     Hypovolemic hyponatremia  Newly onset, improving  No weakness, seizures, numbness, tremors, or tingling  Fell 2-3 days ago, tripped over coffee tablet, no LOC. CT Head at [place] ED negative. No tenderness on head exam.  Tolerating normal diet. Diarrhea has since resolved. Denies N/V.  UA SpGrav 1.20, Urine Na 150, Urine Osm 421, Serum Osm 267, dry mucous membranes    Likely hypovolemic based on history (heat, diarrhea) and exam (dry mucous membranes ...).  UA SpGrav 1.20, Urine Na 150, Urine Osm 421, Serum Osm 267 consistent with renal losses, possible adrenal insufficieny.    Correcting at X / hour        DDx: tea & toast, dehydration / overheating, medication related (prozac).  Dx plan  - Measure AM cortisol per hypovolemia hyponatremia protocol  - BMP Q6h, CMP daily  Rx plan  - NS at 100/h x 24h  - Hold Prozac and Buspar     Diarrhea  Newly onset, improving  H/o bowel resection per pateint; abd scar present.  She is unsure what she had done and why.   No fever, no abd tenderness, no blood per patient  Bowel sounds present and normal.   DDx: viral, med related  Dx plan  - Continue to monitor      Hypokalemia  Newly onset, stable  Dx plan  - BMP Q6h, CMP daily  Rx plan   - Have been replacing orally (KCl 40meq)     Hypophosphatemia  Newly onset  Dx plan  - Monitor with daily phosphorus levels  Rx plan  - IV sodium phosphate 30 mmol in 5% dextrose over 4 hours    Hypertension  Ongoing, stable  Rx plan  - On amlodipine at home, continue     Depression  Ongoing, stable  On Prozac at home  Hyponatremic on admit  Rx Plan  - Hold Prozac as it could worsen hyponatremia     Anxiety  Ongoing, stable  Reports being on Buspar at home but not on med list  On Klonopin 2mg po 1-2 times a day at home  Drinks 1-2 shots vodka per day / every other day; last being 3 days ago  Rx plan  - Klonopin PRN for anxiety     COPD (chronic obstructive pulmonary disease)  Ongoing, stable  Patient states she smoked 1ppd for 20-30 years  Not on inhaler at home, no home O2  Diffuse wheezes on exam  CXR without acute pathology, appears hyper-expanded  Rx plan  - Singulair to continue inpatient  - If O2 needed, goal would be 88-92     Discharge planning issues  PT at [facility] recommended patient go home with daughter  Needs parth Guillermo MS3  [employer]

## 2020-09-15 NOTE — PLAN OF CARE
09/15/20 1629   Post-Acute Status   Post-Acute Authorization Other   Other Status No Post-Acute Service Needs   Discharge Delays None known at this time   Discharge Plan   Discharge Plan A Home;Home with family   Discharge Plan B Home;Home Health      to follow for discharge planning needs.  NOE StevensN RN  Case Management  [phone]

## 2020-09-16 PROBLEM — S22.49XA MULTIPLE RIB FRACTURES: Status: ACTIVE | Noted: 2020-09-16

## 2020-09-16 LAB
ALBUMIN SERPL BCP-MCNC: 3.3 G/DL (ref 3.5–5.2)
ALP SERPL-CCNC: 92 U/L (ref 55–135)
ALT SERPL W/O P-5'-P-CCNC: 23 U/L (ref 10–44)
ANION GAP SERPL CALC-SCNC: 11 MMOL/L (ref 8–16)
ANION GAP SERPL CALC-SCNC: 12 MMOL/L (ref 8–16)
ANION GAP SERPL CALC-SCNC: 9 MMOL/L (ref 8–16)
AST SERPL-CCNC: 27 U/L (ref 10–40)
BASOPHILS # BLD AUTO: 0.04 K/UL (ref 0–0.2)
BASOPHILS NFR BLD: 0.5 % (ref 0–1.9)
BILIRUB SERPL-MCNC: 0.3 MG/DL (ref 0.1–1)
BUN SERPL-MCNC: 2 MG/DL (ref 8–23)
CALCIUM SERPL-MCNC: 7.7 MG/DL (ref 8.7–10.5)
CALCIUM SERPL-MCNC: 8.2 MG/DL (ref 8.7–10.5)
CALCIUM SERPL-MCNC: 8.7 MG/DL (ref 8.7–10.5)
CHLORIDE SERPL-SCNC: 93 MMOL/L (ref 95–110)
CHLORIDE SERPL-SCNC: 95 MMOL/L (ref 95–110)
CHLORIDE SERPL-SCNC: 95 MMOL/L (ref 95–110)
CO2 SERPL-SCNC: 24 MMOL/L (ref 23–29)
CO2 SERPL-SCNC: 25 MMOL/L (ref 23–29)
CO2 SERPL-SCNC: 26 MMOL/L (ref 23–29)
CORTIS SERPL-MCNC: 22.3 UG/DL (ref 4.3–22.4)
CREAT SERPL-MCNC: 0.5 MG/DL (ref 0.5–1.4)
CREAT SERPL-MCNC: 0.5 MG/DL (ref 0.5–1.4)
CREAT SERPL-MCNC: 0.6 MG/DL (ref 0.5–1.4)
DIFFERENTIAL METHOD: ABNORMAL
EOSINOPHIL # BLD AUTO: 0.1 K/UL (ref 0–0.5)
EOSINOPHIL NFR BLD: 0.9 % (ref 0–8)
ERYTHROCYTE [DISTWIDTH] IN BLOOD BY AUTOMATED COUNT: 12.2 % (ref 11.5–14.5)
EST. GFR  (AFRICAN AMERICAN): >60 ML/MIN/1.73 M^2
EST. GFR  (NON AFRICAN AMERICAN): >60 ML/MIN/1.73 M^2
GLUCOSE SERPL-MCNC: 101 MG/DL (ref 70–110)
GLUCOSE SERPL-MCNC: 104 MG/DL (ref 70–110)
GLUCOSE SERPL-MCNC: 90 MG/DL (ref 70–110)
HCT VFR BLD AUTO: 35.6 % (ref 37–48.5)
HGB BLD-MCNC: 12.7 G/DL (ref 12–16)
IMM GRANULOCYTES # BLD AUTO: 0.04 K/UL (ref 0–0.04)
IMM GRANULOCYTES NFR BLD AUTO: 0.5 % (ref 0–0.5)
LYMPHOCYTES # BLD AUTO: 1 K/UL (ref 1–4.8)
LYMPHOCYTES NFR BLD: 13.5 % (ref 18–48)
MAGNESIUM SERPL-MCNC: 1.7 MG/DL (ref 1.6–2.6)
MCH RBC QN AUTO: 33.6 PG (ref 27–31)
MCHC RBC AUTO-ENTMCNC: 35.7 G/DL (ref 32–36)
MCV RBC AUTO: 94 FL (ref 82–98)
MONOCYTES # BLD AUTO: 0.7 K/UL (ref 0.3–1)
MONOCYTES NFR BLD: 8.9 % (ref 4–15)
NEUTROPHILS # BLD AUTO: 5.6 K/UL (ref 1.8–7.7)
NEUTROPHILS NFR BLD: 75.7 % (ref 38–73)
NRBC BLD-RTO: 0 /100 WBC
PHOSPHATE SERPL-MCNC: 2.2 MG/DL (ref 2.7–4.5)
PLATELET # BLD AUTO: 276 K/UL (ref 150–350)
PMV BLD AUTO: 11 FL (ref 9.2–12.9)
POTASSIUM SERPL-SCNC: 2.6 MMOL/L (ref 3.5–5.1)
POTASSIUM SERPL-SCNC: 3 MMOL/L (ref 3.5–5.1)
POTASSIUM SERPL-SCNC: 3.9 MMOL/L (ref 3.5–5.1)
PROT SERPL-MCNC: 6.2 G/DL (ref 6–8.4)
RBC # BLD AUTO: 3.78 M/UL (ref 4–5.4)
SODIUM SERPL-SCNC: 129 MMOL/L (ref 136–145)
SODIUM SERPL-SCNC: 130 MMOL/L (ref 136–145)
SODIUM SERPL-SCNC: 131 MMOL/L (ref 136–145)
WBC # BLD AUTO: 7.42 K/UL (ref 3.9–12.7)

## 2020-09-16 PROCEDURE — 97535 SELF CARE MNGMENT TRAINING: CPT

## 2020-09-16 PROCEDURE — 36415 COLL VENOUS BLD VENIPUNCTURE: CPT

## 2020-09-16 PROCEDURE — 94761 N-INVAS EAR/PLS OXIMETRY MLT: CPT

## 2020-09-16 PROCEDURE — 25000003 PHARM REV CODE 250: Performed by: STUDENT IN AN ORGANIZED HEALTH CARE EDUCATION/TRAINING PROGRAM

## 2020-09-16 PROCEDURE — 82533 TOTAL CORTISOL: CPT

## 2020-09-16 PROCEDURE — 93010 EKG 12-LEAD: ICD-10-PCS | Mod: ,,, | Performed by: INTERNAL MEDICINE

## 2020-09-16 PROCEDURE — 93010 ELECTROCARDIOGRAM REPORT: CPT | Mod: ,,, | Performed by: INTERNAL MEDICINE

## 2020-09-16 PROCEDURE — 80048 BASIC METABOLIC PNL TOTAL CA: CPT

## 2020-09-16 PROCEDURE — 99232 PR SUBSEQUENT HOSPITAL CARE,LEVL II: ICD-10-PCS | Mod: ,,, | Performed by: INTERNAL MEDICINE

## 2020-09-16 PROCEDURE — 63600175 PHARM REV CODE 636 W HCPCS: Performed by: STUDENT IN AN ORGANIZED HEALTH CARE EDUCATION/TRAINING PROGRAM

## 2020-09-16 PROCEDURE — 84100 ASSAY OF PHOSPHORUS: CPT

## 2020-09-16 PROCEDURE — 94799 UNLISTED PULMONARY SVC/PX: CPT

## 2020-09-16 PROCEDURE — 80048 BASIC METABOLIC PNL TOTAL CA: CPT | Mod: 91

## 2020-09-16 PROCEDURE — 11000001 HC ACUTE MED/SURG PRIVATE ROOM

## 2020-09-16 PROCEDURE — 85025 COMPLETE CBC W/AUTO DIFF WBC: CPT

## 2020-09-16 PROCEDURE — 80053 COMPREHEN METABOLIC PANEL: CPT

## 2020-09-16 PROCEDURE — 93005 ELECTROCARDIOGRAM TRACING: CPT

## 2020-09-16 PROCEDURE — 83735 ASSAY OF MAGNESIUM: CPT

## 2020-09-16 PROCEDURE — 99232 SBSQ HOSP IP/OBS MODERATE 35: CPT | Mod: ,,, | Performed by: INTERNAL MEDICINE

## 2020-09-16 RX ORDER — POTASSIUM CHLORIDE 20 MEQ/1
40 TABLET, EXTENDED RELEASE ORAL ONCE
Status: COMPLETED | OUTPATIENT
Start: 2020-09-16 | End: 2020-09-16

## 2020-09-16 RX ORDER — SODIUM CHLORIDE 9 MG/ML
INJECTION, SOLUTION INTRAVENOUS CONTINUOUS
Status: ACTIVE | OUTPATIENT
Start: 2020-09-16 | End: 2020-09-17

## 2020-09-16 RX ORDER — POTASSIUM CHLORIDE 20 MEQ/1
40 TABLET, EXTENDED RELEASE ORAL 3 TIMES DAILY
Status: DISCONTINUED | OUTPATIENT
Start: 2020-09-16 | End: 2020-09-16

## 2020-09-16 RX ORDER — POTASSIUM CHLORIDE 20 MEQ/1
40 TABLET, EXTENDED RELEASE ORAL DAILY
Status: CANCELLED | OUTPATIENT
Start: 2020-09-17 | End: 2020-09-18

## 2020-09-16 RX ORDER — POTASSIUM CHLORIDE 20 MEQ/1
40 TABLET, EXTENDED RELEASE ORAL DAILY
Status: CANCELLED | OUTPATIENT
Start: 2020-09-16 | End: 2020-09-17

## 2020-09-16 RX ORDER — MAGNESIUM SULFATE HEPTAHYDRATE 40 MG/ML
2 INJECTION, SOLUTION INTRAVENOUS ONCE
Status: COMPLETED | OUTPATIENT
Start: 2020-09-16 | End: 2020-09-16

## 2020-09-16 RX ORDER — POTASSIUM CHLORIDE 20 MEQ/1
40 TABLET, EXTENDED RELEASE ORAL 3 TIMES DAILY
Status: COMPLETED | OUTPATIENT
Start: 2020-09-16 | End: 2020-09-16

## 2020-09-16 RX ADMIN — SODIUM PHOSPHATE, MONOBASIC, MONOHYDRATE 30 MMOL: 276; 142 INJECTION, SOLUTION INTRAVENOUS at 10:09

## 2020-09-16 RX ADMIN — POTASSIUM CHLORIDE 40 MEQ: 1500 TABLET, EXTENDED RELEASE ORAL at 03:09

## 2020-09-16 RX ADMIN — POTASSIUM CHLORIDE 40 MEQ: 1500 TABLET, EXTENDED RELEASE ORAL at 07:09

## 2020-09-16 RX ADMIN — MONTELUKAST 10 MG: 10 TABLET, FILM COATED ORAL at 06:09

## 2020-09-16 RX ADMIN — BUTALBITAL, ACETAMINOPHEN, AND CAFFEINE 1 TABLET: 50; 325; 40 TABLET ORAL at 07:09

## 2020-09-16 RX ADMIN — POTASSIUM CHLORIDE 40 MEQ: 1500 TABLET, EXTENDED RELEASE ORAL at 06:09

## 2020-09-16 RX ADMIN — BENAZEPRIL HYDROCHLORIDE 10 MG: 10 TABLET ORAL at 09:09

## 2020-09-16 RX ADMIN — SODIUM CHLORIDE: 0.9 INJECTION, SOLUTION INTRAVENOUS at 10:09

## 2020-09-16 RX ADMIN — SODIUM CHLORIDE: 0.9 INJECTION, SOLUTION INTRAVENOUS at 07:09

## 2020-09-16 RX ADMIN — FAMOTIDINE 20 MG: 20 TABLET, FILM COATED ORAL at 07:09

## 2020-09-16 RX ADMIN — MAGNESIUM SULFATE IN WATER 2 G: 40 INJECTION, SOLUTION INTRAVENOUS at 09:09

## 2020-09-16 RX ADMIN — ENOXAPARIN SODIUM 40 MG: 40 INJECTION SUBCUTANEOUS at 05:09

## 2020-09-16 RX ADMIN — POTASSIUM CHLORIDE 40 MEQ: 1500 TABLET, EXTENDED RELEASE ORAL at 04:09

## 2020-09-16 RX ADMIN — FAMOTIDINE 20 MG: 20 TABLET, FILM COATED ORAL at 09:09

## 2020-09-16 RX ADMIN — CLONAZEPAM 0.5 MG: 0.5 TABLET ORAL at 03:09

## 2020-09-16 RX ADMIN — AMLODIPINE BESYLATE 5 MG: 5 TABLET ORAL at 09:09

## 2020-09-16 RX ADMIN — POTASSIUM CHLORIDE 40 MEQ: 1500 TABLET, EXTENDED RELEASE ORAL at 01:09

## 2020-09-16 RX ADMIN — POTASSIUM CHLORIDE 40 MEQ: 1500 TABLET, EXTENDED RELEASE ORAL at 09:09

## 2020-09-16 NOTE — PROGRESS NOTES
[facility] Medicine  Progress Note    Patient Name: Bhavana Lambert  MRN: [id number]  Patient Class: IP- Inpatient   Admission Date: 9/14/2020  Length of Stay: 2 days  Attending Physician: Tommy Benson MD  Primary Care Provider: Pepito Jhaveri Iv, MD        Subjective:     Principal Problem:Hyponatremia        HPI:  Patient is a 65-year-old  female with past medical history significant for anxiety, depression, chronic obstructive pulmonary disease and hypertension is being admitted to Hospital Medicine from [facility] ER with c/o increasing lethargy and weakness for 4-5 days. She is a poor historian (may have gotten ATivan IV on transfer for agitation). Reportedly patient's home air conditioner became nonfunctional 5 days ago and patient was exposed to significant heat and humidity for couple of days causing increasing weakness and lethargy. Patient reported low appetite and had only been eating a few crackers a day and drinking diet Coke. She also drink 1-2 vodka drinks per day. Patient denied any sick contact or recent travel history.  For the past 2-3 days patient reports having diarrhea 1-2 bowel movements per day which are not loose or watery, last being today prior to transfer.  Patient denies any abdominal pain or urinary complaints. Patient denies any headache, vision changes or any focal neuro deficits.  Patient has been noted to have serum sodium 114 and potassium 2.5 in the emergency room which are improving with normal saline and potassium repletion. Upon arrival to [facility] [place], patient reports feeling better but is thirsty.    Overview/Hospital Course:  Admitted to hospital medicine for close follow up and repletion of her serum Na.    Interval History: No acute events overnight. Her Fioricet helped with her headache yesterday and she slept well.    Review of Systems   Constitutional: Negative for appetite change, chills, fever and  unexpected weight change.   HENT: Negative for hearing loss, mouth sores, nosebleeds, sore throat and trouble swallowing.    Eyes: Negative for visual disturbance.   Respiratory: Negative for cough, chest tightness and shortness of breath.    Cardiovascular: Negative for chest pain, palpitations and leg swelling.   Gastrointestinal: Positive for diarrhea. Negative for abdominal distention, abdominal pain, anal bleeding, blood in stool, nausea and vomiting.   Endocrine: Negative for polydipsia, polyphagia and polyuria.   Genitourinary: Positive for urgency. Negative for dysuria and hematuria.   Musculoskeletal: Positive for gait problem. Negative for arthralgias and myalgias.   Skin: Negative for pallor, rash and wound.   Allergic/Immunologic: Negative for immunocompromised state.   Neurological: Positive for weakness. Negative for dizziness, tremors, seizures, light-headedness, numbness and headaches.   Psychiatric/Behavioral: Positive for agitation and confusion. Negative for hallucinations and sleep disturbance. The patient is not nervous/anxious and is not hyperactive.      Objective:     Vital Signs (Most Recent):  Temp: 98.5 °F (36.9 °C) (09/16/20 1530)  Pulse: 100 (09/16/20 1608)  Resp: (!) 21 (09/16/20 1530)  BP: 133/71 (09/16/20 1530)  SpO2: (!) 94 % (09/16/20 1608) Vital Signs (24h Range):  Temp:  [97.8 °F (36.6 °C)-98.5 °F (36.9 °C)] 98.5 °F (36.9 °C)  Pulse:  [] 100  Resp:  [17-25] 21  SpO2:  [90 %-94 %] 94 %  BP: (133-161)/(71-87) 133/71     Weight: 59.8 kg (131 lb 13.4 oz)(NEW BED)  Body mass index is 24.11 kg/m².    Intake/Output Summary (Last 24 hours) at 9/16/2020 1710  Last data filed at 9/15/2020 2100  Gross per 24 hour   Intake --   Output 900 ml   Net -900 ml      Physical Exam  Vitals signs and nursing note reviewed.   Constitutional:       General: She is not in acute distress.     Appearance: Normal appearance. She is not ill-appearing or diaphoretic.   HENT:      Head: Normocephalic and  atraumatic.      Nose: No congestion or rhinorrhea.      Mouth/Throat:      Mouth: Mucous membranes are dry.      Pharynx: No oropharyngeal exudate.   Eyes:      General: No scleral icterus.        Right eye: No discharge.         Left eye: No discharge.      Extraocular Movements: Extraocular movements intact.      Pupils: Pupils are equal, round, and reactive to light.   Neck:      Musculoskeletal: Neck supple. No muscular tenderness.   Cardiovascular:      Rate and Rhythm: Normal rate and regular rhythm.      Pulses: Normal pulses.      Heart sounds: Normal heart sounds. No murmur. No friction rub. No gallop.    Pulmonary:      Effort: Pulmonary effort is normal. No respiratory distress.      Breath sounds: Normal breath sounds. No wheezing, rhonchi or rales.   Abdominal:      General: Abdomen is flat. Bowel sounds are normal. There is no distension.      Palpations: Abdomen is soft. There is no mass.      Tenderness: There is no abdominal tenderness. There is no guarding.      Comments: Midline surgical scar   RLQ surgical scar   Musculoskeletal: Normal range of motion.         General: No swelling.      Comments: Surgical scars to anterior knees   Lymphadenopathy:      Cervical: No cervical adenopathy.   Skin:     General: Skin is warm.      Capillary Refill: Capillary refill takes less than 2 seconds.      Coloration: Skin is pale. Skin is not jaundiced.      Findings: No erythema or rash.   Neurological:      General: No focal deficit present.      Mental Status: She is oriented to person, place, and time.      Cranial Nerves: No cranial nerve deficit.      Sensory: No sensory deficit.      Comments: Slower to respond to questions than normal. Oriented, but seemed more confused than prior days.   Psychiatric:         Mood and Affect: Mood normal.         Thought Content: Thought content normal.         Significant Labs:   Blood Culture: No results for input(s): LABBLOO in the last 48 hours.  BMP:   Recent  Labs   Lab 09/16/20  0525      *   K 3.0*   CL 93*   CO2 25   BUN 2*   CREATININE 0.5   CALCIUM 8.2*   MG 1.7     CBC:   Recent Labs   Lab 09/15/20  0744 09/16/20  0525   WBC 8.24 7.42   HGB 12.6 12.7   HCT 35.7* 35.6*    276     CMP:   Recent Labs   Lab 09/15/20  0744  09/15/20  1442 09/16/20  0009 09/16/20  0525   *   < > 127* 130* 129*   K 3.0*   < > 3.2* 2.6* 3.0*   CL 93*   < > 93* 95 93*   CO2 26   < > 24 26 25   GLU 88   < > 111* 90 101   BUN 2*   < > 3* 2* 2*   CREATININE 0.5   < > 0.6 0.5 0.5   CALCIUM 8.1*   < > 8.5* 7.7* 8.2*   PROT 6.2  --   --   --  6.2   ALBUMIN 3.4*  --   --   --  3.3*   BILITOT 0.4  --   --   --  0.3   ALKPHOS 90  --   --   --  92   AST 30  --   --   --  27   ALT 25  --   --   --  23   ANIONGAP 9   < > 10 9 11   EGFRNONAA >60.0   < > >60.0 >60.0 >60.0    < > = values in this interval not displayed.     Urine Culture: No results for input(s): LABURIN in the last 48 hours.  Urine Studies: No results for input(s): COLORU, APPEARANCEUA, PHUR, SPECGRAV, PROTEINUA, GLUCUA, KETONESU, BILIRUBINUA, OCCULTUA, NITRITE, UROBILINOGEN, LEUKOCYTESUR, RBCUA, WBCUA, BACTERIA, SQUAMEPITHEL, HYALINECASTS in the last 48 hours.    Invalid input(s): SATINDERSUR  All pertinent labs within the past 24 hours have been reviewed.    Significant Imaging: I have reviewed all pertinent imaging results/findings within the past 24 hours.     · CXR 3 views: Acute or subacute right 6 rib fracture.  Fractures of ribs 7 through 10 on the right appear remote. On the left, fractures of ribs 4, 5, 6, and 7 appear remote.      Assessment/Plan:      * Hyponatremia  Weakness but no seizures, numbness, tremors, or tingling.  Fell 2-3 days ago, tripped over coffee tablet, no LOC. CT H at [place] negative. No tenderness on head exam.  Diet has been mostly crackers for 3-4 days prior to presentation. Has not had appetite. Has had diarrhea 2-3 days. Denies N/V.  UA SpGrav 1.20, Na low, diarrhea, thirsty,  dry MM --> appear hypovolemic  DDx: tea & toast, dehydration / overheating, medication related (prozac). Likely tea and toast (hypovolemic, hyponatremia)    PLAN  - Ca, UOsm, SOsm to help determine etiology --> f./u  - NS at 125/h x 24h  - BMP daily at 4pm  - CMP daily  - Hold Prozac and Buspar  - will consult Nephrology tomorrow given normal-high AM cortisol, high Urine Na, high Urine Osmolality            Electrocardiogram showing T wave abnormalities  T wave alternans with QTc prolongation at 521 ms likely 2/2 pt's hypokalemia    Plan:  -- close monitoring of symptoms  -- daily CMP  -- daily 4pm BMP  -- correct electrolytes with a goal of K>4, Phos >3, Mg >2.  -- telemetry    Multiple rib fractures  CXR 3 views on 9/15/20 revealed Acute or subacute right 6 rib fracture.  Fractures of ribs 7 through 10 on the right appear remote. On the left, fractures of ribs 4, 5, 6, and 7 appear remote. Pt reported fall ~1 week ago.    Plan:  -- incentive spirometry  -- pain control  -- PT/OT consulted    Discharge planning issues  PT at [place] recommended home with daughter  Needs rolling walker      Diarrhea  Ongoing  No fever, no abd tenderness, no blood per patient  [place] sounds present and normal.   DDx: viral, med related   PLAN  - Stool O+P and WBC ordered --> f/u  - Continue to monitor       Hypokalemia  K = 4 on admission.    Plan:  --continue to replete K  -- follow up afternoon BMP and daily CMP      Hypertension  On amlodipine at home, continue      Depression  On Prozac at home  Hyponatremic on admit  PLAN  - Hold Prozac as it could worsen hyponatremia    COPD (chronic obstructive pulmonary disease)  Patient states she smoked 1ppd for 20-30 years  Not on inhaler at home, no home O2  No wheezing, crackles on exam  CXR without acute pathology, appears hyperexpanded  PLAN  - Singulair to continue inpatient  - If O2 needed, goal would be 88-92        Anxiety  Reports being on Buspar at home but not on med list  On  Klonopin 2mg po 1-2 times a day at home  Drinks 1-2 shots vodka per day / every other day; last being 2 days ago    PLAN  - Klonopin PRN ordered    VTE Risk Mitigation (From admission, onward)         Ordered     enoxaparin injection 40 mg  Every 24 hours      09/14/20 1911     Place sequential compression device  Until discontinued      09/14/20 1907     IP VTE LOW RISK PATIENT  Once      09/14/20 1907                Discharge Planning   CHLOE: 9/17/2020     Code Status: Full Code   Is the patient medically ready for discharge?: No    Reason for patient still in hospital (select all that apply): Patient new problem, Laboratory test, Treatment, Imaging, Consult recommendations and PT / OT recommendations  Discharge Plan A: Home, Home with family   Discharge Delays: None known at this time              María Mcdowell MD  Department of Hospital Medicine   [facility]

## 2020-09-16 NOTE — ASSESSMENT & PLAN NOTE
CXR 3 views on 9/15/20 revealed Acute or subacute right 6 rib fracture.  Fractures of ribs 7 through 10 on the right appear remote. On the left, fractures of ribs 4, 5, 6, and 7 appear remote. Pt reported fall ~1 week ago.    Plan:  -- incentive spirometry  -- pain control  -- PT/OT consulted

## 2020-09-16 NOTE — SUBJECTIVE & OBJECTIVE
Interval History: No acute events overnight. Her Fioricet helped with her headache yesterday and she slept well.    Review of Systems   Constitutional: Negative for appetite change, chills, fever and unexpected weight change.   HENT: Negative for hearing loss, mouth sores, nosebleeds, sore throat and trouble swallowing.    Eyes: Negative for visual disturbance.   Respiratory: Negative for cough, chest tightness and shortness of breath.    Cardiovascular: Negative for chest pain, palpitations and leg swelling.   Gastrointestinal: Positive for diarrhea. Negative for abdominal distention, abdominal pain, anal bleeding, blood in stool, nausea and vomiting.   Endocrine: Negative for polydipsia, polyphagia and polyuria.   Genitourinary: Positive for urgency. Negative for dysuria and hematuria.   Musculoskeletal: Positive for gait problem. Negative for arthralgias and myalgias.   Skin: Negative for pallor, rash and wound.   Allergic/Immunologic: Negative for immunocompromised state.   Neurological: Positive for weakness. Negative for dizziness, tremors, seizures, light-headedness, numbness and headaches.   Psychiatric/Behavioral: Positive for agitation and confusion. Negative for hallucinations and sleep disturbance. The patient is not nervous/anxious and is not hyperactive.      Objective:     Vital Signs (Most Recent):  Temp: 98.5 °F (36.9 °C) (09/16/20 1530)  Pulse: 100 (09/16/20 1608)  Resp: (!) 21 (09/16/20 1530)  BP: 133/71 (09/16/20 1530)  SpO2: (!) 94 % (09/16/20 1608) Vital Signs (24h Range):  Temp:  [97.8 °F (36.6 °C)-98.5 °F (36.9 °C)] 98.5 °F (36.9 °C)  Pulse:  [] 100  Resp:  [17-25] 21  SpO2:  [90 %-94 %] 94 %  BP: (133-161)/(71-87) 133/71     Weight: 59.8 kg (131 lb 13.4 oz)(NEW BED)  Body mass index is 24.11 kg/m².    Intake/Output Summary (Last 24 hours) at 9/16/2020 1710  Last data filed at 9/15/2020 2100  Gross per 24 hour   Intake --   Output 900 ml   Net -900 ml      Physical Exam  Vitals signs  and nursing note reviewed.   Constitutional:       General: She is not in acute distress.     Appearance: Normal appearance. She is not ill-appearing or diaphoretic.   HENT:      Head: Normocephalic and atraumatic.      Nose: No congestion or rhinorrhea.      Mouth/Throat:      Mouth: Mucous membranes are dry.      Pharynx: No oropharyngeal exudate.   Eyes:      General: No scleral icterus.        Right eye: No discharge.         Left eye: No discharge.      Extraocular Movements: Extraocular movements intact.      Pupils: Pupils are equal, round, and reactive to light.   Neck:      Musculoskeletal: Neck supple. No muscular tenderness.   Cardiovascular:      Rate and Rhythm: Normal rate and regular rhythm.      Pulses: Normal pulses.      Heart sounds: Normal heart sounds. No murmur. No friction rub. No gallop.    Pulmonary:      Effort: Pulmonary effort is normal. No respiratory distress.      Breath sounds: Normal breath sounds. No wheezing, rhonchi or rales.   Abdominal:      General: Abdomen is flat. Bowel sounds are normal. There is no distension.      Palpations: Abdomen is soft. There is no mass.      Tenderness: There is no abdominal tenderness. There is no guarding.      Comments: Midline surgical scar   RLQ surgical scar   Musculoskeletal: Normal range of motion.         General: No swelling.      Comments: Surgical scars to anterior knees   Lymphadenopathy:      Cervical: No cervical adenopathy.   Skin:     General: Skin is warm.      Capillary Refill: Capillary refill takes less than 2 seconds.      Coloration: Skin is pale. Skin is not jaundiced.      Findings: No erythema or rash.   Neurological:      General: No focal deficit present.      Mental Status: She is oriented to person, place, and time.      Cranial Nerves: No cranial nerve deficit.      Sensory: No sensory deficit.      Comments: Slower to respond to questions than normal. Oriented, but seemed more confused than prior days.   Psychiatric:          Mood and Affect: Mood normal.         Thought Content: Thought content normal.         Significant Labs:   Blood Culture: No results for input(s): LABBLOO in the last 48 hours.  BMP:   Recent Labs   Lab 09/16/20  0525      *   K 3.0*   CL 93*   CO2 25   BUN 2*   CREATININE 0.5   CALCIUM 8.2*   MG 1.7     CBC:   Recent Labs   Lab 09/15/20  0744 09/16/20  0525   WBC 8.24 7.42   HGB 12.6 12.7   HCT 35.7* 35.6*    276     CMP:   Recent Labs   Lab 09/15/20  0744  09/15/20  1442 09/16/20  0009 09/16/20  0525   *   < > 127* 130* 129*   K 3.0*   < > 3.2* 2.6* 3.0*   CL 93*   < > 93* 95 93*   CO2 26   < > 24 26 25   GLU 88   < > 111* 90 101   BUN 2*   < > 3* 2* 2*   CREATININE 0.5   < > 0.6 0.5 0.5   CALCIUM 8.1*   < > 8.5* 7.7* 8.2*   PROT 6.2  --   --   --  6.2   ALBUMIN 3.4*  --   --   --  3.3*   BILITOT 0.4  --   --   --  0.3   ALKPHOS 90  --   --   --  92   AST 30  --   --   --  27   ALT 25  --   --   --  23   ANIONGAP 9   < > 10 9 11   EGFRNONAA >60.0   < > >60.0 >60.0 >60.0    < > = values in this interval not displayed.     Urine Culture: No results for input(s): LABURIN in the last 48 hours.  Urine Studies: No results for input(s): COLORU, APPEARANCEUA, PHUR, SPECGRAV, PROTEINUA, GLUCUA, KETONESU, BILIRUBINUA, OCCULTUA, NITRITE, UROBILINOGEN, LEUKOCYTESUR, RBCUA, WBCUA, BACTERIA, SQUAMEPITHEL, HYALINECASTS in the last 48 hours.    Invalid input(s): WRIGHTSUR  All pertinent labs within the past 24 hours have been reviewed.    Significant Imaging: I have reviewed all pertinent imaging results/findings within the past 24 hours.     · CXR 3 views: Acute or subacute right 6 rib fracture.  Fractures of ribs 7 through 10 on the right appear remote. On the left, fractures of ribs 4, 5, 6, and 7 appear remote.
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
## 2020-09-16 NOTE — PLAN OF CARE
Problem: Occupational Therapy Goal  Goal: Occupational Therapy Goal  Description: Goals to be met by: 9/25/2020     Patient will increase functional independence with ADLs by performing:    UE Dressing with Modified [place].  LE Dressing with Modified [place].  Grooming while standing with Supervision.  Toileting from toilet with Supervision for hygiene and clothing management.   Rolling to Bilateral with [place].   Supine to sit with [place].  Step transfer with Supervision  Toilet transfer to toilet with Supervision.  Increased functional strength to WNL for improvements in safe ADL/IADL completion for home management training.    Goals remain appropriate. Continue OT as tolerated.  Mckayla Montes De Oca OT  9/16/2020    Outcome: Ongoing, Progressing

## 2020-09-16 NOTE — ASSESSMENT & PLAN NOTE
Weakness but no seizures, numbness, tremors, or tingling.  Fell 2-3 days ago, tripped over coffee tablet, no LOC. CT H at [place] negative. No tenderness on head exam.  Diet has been mostly crackers for 3-4 days prior to presentation. Has not had appetite. Has had diarrhea 2-3 days. Denies N/V.  UA SpGrav 1.20, Na low, diarrhea, thirsty, dry MM --> appear hypovolemic  DDx: tea & toast, dehydration / overheating, medication related (prozac). Likely tea and toast (hypovolemic, hyponatremia)    PLAN  - Ca, UOsm, SOsm to help determine etiology --> f./u  - NS at 125/h x 24h  - BMP daily at 4pm  - CMP daily  - Hold Prozac and Buspar  - will consult Nephrology tomorrow given normal-high AM cortisol, high Urine Na, high Urine Osmolality

## 2020-09-16 NOTE — HOSPITAL COURSE
Admitted to hospital medicine for close follow up and repletion of her serum Na. Throughout the course of her stay, pt received IV NS infusion and her serum Na increased back to and was stable at patient's baseline ~131 and decision was made to discharge.    Review of Systems   Constitutional: Negative for appetite change, chills, fever and unexpected weight change.   HENT: Negative for hearing loss, mouth sores, nosebleeds, sore throat and trouble swallowing.    Eyes: Negative for visual disturbance.   Respiratory: Negative for cough, chest tightness and shortness of breath.    Cardiovascular: Negative for chest pain, palpitations and leg swelling.   Gastrointestinal: Positive for diarrhea. Negative for abdominal distention, abdominal pain, anal bleeding, blood in stool, nausea and vomiting.   Endocrine: Negative for polydipsia, polyphagia and polyuria.   Genitourinary: Positive for urgency. Negative for dysuria and hematuria.   Musculoskeletal: Positive for gait problem. Negative for arthralgias and myalgias.   Skin: Negative for pallor, rash and wound.   Allergic/Immunologic: Negative for immunocompromised state.   Neurological: Positive for weakness. Negative for dizziness, tremors, seizures, light-headedness, numbness and headaches.   Psychiatric/Behavioral: Positive for confusion (baseline). Negative for agitation, hallucinations and sleep disturbance. The patient is not nervous/anxious and is not hyperactive.      Objective:     Vital Signs (Most Recent):  Temp: 97.8 °F (36.6 °C) (09/17/20 1615)  Pulse: 92 (09/17/20 1615)  Resp: 18 (09/17/20 1615)  BP: (!) 180/86 (09/17/20 1615)  SpO2: 96 % (09/17/20 1615) Vital Signs (24h Range):  Temp:  [97.7 °F (36.5 °C)-98.7 °F (37.1 °C)] 97.8 °F (36.6 °C)  Pulse:  [] 92  Resp:  [15-22] 18  SpO2:  [92 %-97 %] 96 %  BP: (145-180)/() 180/86     Weight: 59.8 kg (131 lb 13.4 oz)(NEW BED)  Body mass index is 24.11 kg/m².    Intake/Output Summary (Last 24 hours) at  9/17/2020 2002  Last data filed at 9/17/2020 0800  Gross per 24 hour   Intake 1773.75 ml   Output 950 ml   Net 823.75 ml      Physical Exam  Vitals signs and nursing note reviewed.   Constitutional:       General: She is not in acute distress.     Appearance: Normal appearance. She is not ill-appearing or diaphoretic.   HENT:      Head: Normocephalic and atraumatic.      Nose: No congestion or rhinorrhea.      Mouth/Throat:      Mouth: Mucous membranes are dry.      Pharynx: No oropharyngeal exudate.   Eyes:      General: No scleral icterus.        Right eye: No discharge.         Left eye: No discharge.      Extraocular Movements: Extraocular movements intact.      Pupils: Pupils are equal, round, and reactive to light.   Neck:      Musculoskeletal: Neck supple. No muscular tenderness.   Cardiovascular:      Rate and Rhythm: Normal rate and regular rhythm.      Pulses: Normal pulses.      Heart sounds: Normal heart sounds. No murmur. No friction rub. No gallop.    Pulmonary:      Effort: Pulmonary effort is normal. No respiratory distress.      Breath sounds: Normal breath sounds. No wheezing, rhonchi or rales.   Abdominal:      General: Abdomen is flat. Bowel sounds are normal. There is no distension.      Palpations: Abdomen is soft. There is no mass.      Tenderness: There is no abdominal tenderness. There is no guarding.      Comments: Midline surgical scar   RLQ surgical scar   Musculoskeletal: Normal range of motion.         General: No swelling.      Comments: Surgical scars to anterior knees   Lymphadenopathy:      Cervical: No cervical adenopathy.   Skin:     General: Skin is warm.      Capillary Refill: Capillary refill takes less than 2 seconds.      Coloration: Skin is not jaundiced or pale.      Findings: No erythema or rash.   Neurological:      General: No focal deficit present.      Mental Status: She is alert and oriented to person, place, and time. Mental status is at baseline.      Cranial  Nerves: No cranial nerve deficit.      Sensory: No sensory deficit.      Motor: No weakness, tremor or atrophy.      Coordination: Coordination normal.      Deep Tendon Reflexes: Reflexes normal.      Comments: At baseline, per daughter.   Psychiatric:         Mood and Affect: Mood normal.         Thought Content: Thought content normal.

## 2020-09-16 NOTE — ASSESSMENT & PLAN NOTE
Reports being on Buspar at home but not on med list  On Klonopin 2mg po 1-2 times a day at home  Drinks 1-2 shots vodka per day / every other day; last being 2 days ago    PLAN  - Klonopin PRN ordered

## 2020-09-16 NOTE — PT/OT/SLP PROGRESS
Occupational Therapy   Treatment    Name: Bhavana Lambert  MRN: [id number]  Admitting Diagnosis:  Hyponatremia       Recommendations:     Discharge Recommendations: home health OT  Discharge Equipment Recommendations:  other (see comments)(tbd)  Barriers to discharge:       Assessment:     Bhavana Lambert is a 65 y.o. female with a medical diagnosis of Hyponatremia.  She presents with impaired ADL and mobility performance deficits. Pt session focused on bed mobility, sit<stand tf to BSC, toileting, and setup for grooming. OT left room to gather grooming items with pt found mobilizing to BSC with min-HHA from (A) of PCT and with writing OT. Pt required extensive time on commode further limiting additional mobility this date. RN Ximena and PCT Rajwinder notified of pt's loose stool and extensive BM with both verbalizing understanding to assist pt as needed (PCT stating she would visit room). Pt also completed grooming with setup (A) while on BSC. Pt voiced appreciation of therapy session today. Pt also was educated on importance of calling for (A) when wishing to return to bed and educated to not strain to which pt voiced understanding.   Pt would benefit from continued OT skilled services 4x/wk to improve daily living skills to optimize QOL. Pt is recommended to discharge to OT at this time.  Performance deficits affecting function are weakness, impaired self care skills, impaired balance, decreased coordination, impaired endurance, impaired functional mobilty, gait instability, decreased safety awareness, decreased lower extremity function, decreased upper extremity function, impaired cardiopulmonary response to activity.     Rehab Prognosis:  Good; patient would benefit from acute skilled OT services to address these deficits and reach maximum level of function.       Plan:     Patient to be seen 4 x/week to address the above listed problems via self-care/home management, therapeutic activities, therapeutic  exercises  · Plan of Care Expires: 10/15/20  · Plan of Care Reviewed with: patient    Subjective     Pain/Comfort:  · Pain Rating 1: 0/10  · Pain Rating Post-Intervention 1: 0/10    Objective:     Communicated with: RN  prior to session.  Patient found HOB elevated with telemetry, peripheral IV, blood pressure cuff, pulse ox (continuous) upon OT entry to room.    General Precautions: Standard, fall, airborne, droplet, contact   Orthopedic Precautions:N/A   Braces: N/A     Occupational Performance:     Bed Mobility:    · Patient completed Rolling/Turning to Left with  minimum assistance  · Patient completed Scooting/Bridging with minimum assistance  · Patient completed Supine to Sit with minimum assistance     Functional Mobility/Transfers:  · Patient completed Sit <> Stand Transfer with minimum assistance  with  hand-held assist   · Patient completed Bed <> Chair Transfer using Step Transfer technique with minimum assistance with hand-held assist  · Functional Mobility: Pt completed half of t/f with PCT and half with OT upon arrival with min (A)-HHA    Activities of Daily Living:  · Grooming: setup (A) of warm face towel while on BSC. Pt also given tooth brush items, lotion, comb, and lip balm setup for once finishing toileting   · Upper Body Dressing: minimum assistance donning gown around back while on BSC   · Toileting: stand by assistance for BM   · Feeding: setup (A) with diet coke and fresh ice.      [facility] 6 Click ADL: 19    Treatment & Education:  Pt educated on role of occupational therapy, POC, and safety during ADLs and functional mobility. Pt and OT discussed importance of safe, continued mobility to optimize daily living skills. Pt verbalized understanding.   Pt completed the following during session: bed mobility, sit<stand t/f, toileting using BSC, setup for grooming tasks.  White board updated during session. Pt given instruction to call for medical staff/nurse for assistance.       Patient left on  BSC with all lines intact, call button in reach and RN Ximena and PCT Rajwinder notifiedEducation:      GOALS:   Multidisciplinary Problems     Occupational Therapy Goals        Problem: Occupational Therapy Goal    Goal Priority Disciplines Outcome Interventions   Occupational Therapy Goal     OT, PT/OT Ongoing, Progressing    Description: Goals to be met by: 9/25/2020     Patient will increase functional independence with ADLs by performing:    UE Dressing with Modified [place].  LE Dressing with Modified [place].  Grooming while standing with Supervision.  Toileting from toilet with Supervision for hygiene and clothing management.   Rolling to Bilateral with [place].   Supine to sit with [place].  Step transfer with Supervision  Toilet transfer to toilet with Supervision.  Increased functional strength to WNL for improvements in safe ADL/IADL completion for home management training.                     Time Tracking:     OT Date of Treatment: 09/16/20  OT Start Time: 1408  OT Stop Time: 1432  OT Total Time (min): 24 min    Billable Minutes:Self Care/Home Management 24 min    Mckayla Montes De Oca OT  9/16/2020

## 2020-09-17 VITALS
DIASTOLIC BLOOD PRESSURE: 86 MMHG | HEART RATE: 92 BPM | WEIGHT: 131.81 LBS | SYSTOLIC BLOOD PRESSURE: 180 MMHG | RESPIRATION RATE: 18 BRPM | TEMPERATURE: 98 F | BODY MASS INDEX: 24.11 KG/M2 | OXYGEN SATURATION: 96 %

## 2020-09-17 PROBLEM — R94.31 ELECTROCARDIOGRAM SHOWING T WAVE ABNORMALITIES: Status: ACTIVE | Noted: 2020-09-17

## 2020-09-17 LAB
ALBUMIN SERPL BCP-MCNC: 3.3 G/DL (ref 3.5–5.2)
ALP SERPL-CCNC: 93 U/L (ref 55–135)
ALT SERPL W/O P-5'-P-CCNC: 24 U/L (ref 10–44)
ANION GAP SERPL CALC-SCNC: 11 MMOL/L (ref 8–16)
AST SERPL-CCNC: 23 U/L (ref 10–40)
BASOPHILS # BLD AUTO: 0.03 K/UL (ref 0–0.2)
BASOPHILS NFR BLD: 0.4 % (ref 0–1.9)
BILIRUB SERPL-MCNC: 0.3 MG/DL (ref 0.1–1)
BUN SERPL-MCNC: 3 MG/DL (ref 8–23)
CALCIUM SERPL-MCNC: 8.4 MG/DL (ref 8.7–10.5)
CHLORIDE SERPL-SCNC: 97 MMOL/L (ref 95–110)
CO2 SERPL-SCNC: 24 MMOL/L (ref 23–29)
CREAT SERPL-MCNC: 0.5 MG/DL (ref 0.5–1.4)
DIFFERENTIAL METHOD: ABNORMAL
EOSINOPHIL # BLD AUTO: 0.1 K/UL (ref 0–0.5)
EOSINOPHIL NFR BLD: 0.9 % (ref 0–8)
ERYTHROCYTE [DISTWIDTH] IN BLOOD BY AUTOMATED COUNT: 12.7 % (ref 11.5–14.5)
EST. GFR  (AFRICAN AMERICAN): >60 ML/MIN/1.73 M^2
EST. GFR  (NON AFRICAN AMERICAN): >60 ML/MIN/1.73 M^2
GLUCOSE SERPL-MCNC: 90 MG/DL (ref 70–110)
HCT VFR BLD AUTO: 35.7 % (ref 37–48.5)
HGB BLD-MCNC: 12.5 G/DL (ref 12–16)
IMM GRANULOCYTES # BLD AUTO: 0.04 K/UL (ref 0–0.04)
IMM GRANULOCYTES NFR BLD AUTO: 0.5 % (ref 0–0.5)
LYMPHOCYTES # BLD AUTO: 1.3 K/UL (ref 1–4.8)
LYMPHOCYTES NFR BLD: 15.9 % (ref 18–48)
MAGNESIUM SERPL-MCNC: 1.8 MG/DL (ref 1.6–2.6)
MCH RBC QN AUTO: 34.2 PG (ref 27–31)
MCHC RBC AUTO-ENTMCNC: 35 G/DL (ref 32–36)
MCV RBC AUTO: 98 FL (ref 82–98)
MONOCYTES # BLD AUTO: 0.8 K/UL (ref 0.3–1)
MONOCYTES NFR BLD: 10.3 % (ref 4–15)
NEUTROPHILS # BLD AUTO: 5.7 K/UL (ref 1.8–7.7)
NEUTROPHILS NFR BLD: 72 % (ref 38–73)
NRBC BLD-RTO: 0 /100 WBC
OSMOLALITY SERPL: 278 MOSM/KG (ref 275–295)
OSMOLALITY UR: 325 MOSM/KG (ref 50–1200)
PHOSPHATE SERPL-MCNC: 2.7 MG/DL (ref 2.7–4.5)
PLATELET # BLD AUTO: 289 K/UL (ref 150–350)
PMV BLD AUTO: 10.6 FL (ref 9.2–12.9)
POTASSIUM SERPL-SCNC: 3.8 MMOL/L (ref 3.5–5.1)
PROT SERPL-MCNC: 6.3 G/DL (ref 6–8.4)
RBC # BLD AUTO: 3.66 M/UL (ref 4–5.4)
SODIUM SERPL-SCNC: 132 MMOL/L (ref 136–145)
SODIUM UR-SCNC: 172 MMOL/L (ref 20–250)
WBC # BLD AUTO: 7.88 K/UL (ref 3.9–12.7)

## 2020-09-17 PROCEDURE — 25000003 PHARM REV CODE 250: Performed by: STUDENT IN AN ORGANIZED HEALTH CARE EDUCATION/TRAINING PROGRAM

## 2020-09-17 PROCEDURE — 83930 ASSAY OF BLOOD OSMOLALITY: CPT

## 2020-09-17 PROCEDURE — 99900035 HC TECH TIME PER 15 MIN (STAT)

## 2020-09-17 PROCEDURE — 84300 ASSAY OF URINE SODIUM: CPT

## 2020-09-17 PROCEDURE — 36415 COLL VENOUS BLD VENIPUNCTURE: CPT

## 2020-09-17 PROCEDURE — 99238 PR HOSPITAL DISCHARGE DAY,<30 MIN: ICD-10-PCS | Mod: ,,, | Performed by: HOSPITALIST

## 2020-09-17 PROCEDURE — 84100 ASSAY OF PHOSPHORUS: CPT

## 2020-09-17 PROCEDURE — 85025 COMPLETE CBC W/AUTO DIFF WBC: CPT

## 2020-09-17 PROCEDURE — 83735 ASSAY OF MAGNESIUM: CPT

## 2020-09-17 PROCEDURE — 83935 ASSAY OF URINE OSMOLALITY: CPT

## 2020-09-17 PROCEDURE — 80053 COMPREHEN METABOLIC PANEL: CPT

## 2020-09-17 PROCEDURE — 94761 N-INVAS EAR/PLS OXIMETRY MLT: CPT

## 2020-09-17 PROCEDURE — 99238 HOSP IP/OBS DSCHRG MGMT 30/<: CPT | Mod: ,,, | Performed by: HOSPITALIST

## 2020-09-17 RX ADMIN — FAMOTIDINE 20 MG: 20 TABLET, FILM COATED ORAL at 08:09

## 2020-09-17 RX ADMIN — AMLODIPINE BESYLATE 5 MG: 5 TABLET ORAL at 08:09

## 2020-09-17 RX ADMIN — CLONAZEPAM 0.5 MG: 0.5 TABLET ORAL at 04:09

## 2020-09-17 RX ADMIN — BENAZEPRIL HYDROCHLORIDE 10 MG: 10 TABLET ORAL at 08:09

## 2020-09-17 RX ADMIN — MONTELUKAST 10 MG: 10 TABLET, FILM COATED ORAL at 06:09

## 2020-09-17 RX ADMIN — SODIUM CHLORIDE: 0.9 INJECTION, SOLUTION INTRAVENOUS at 04:09

## 2020-09-17 NOTE — ASSESSMENT & PLAN NOTE
T wave alternans with QTc prolongation at 521 ms likely 2/2 pt's hypokalemia    Plan:  -- close monitoring of symptoms  -- daily CMP  -- daily 4pm BMP  -- correct electrolytes with a goal of K>4, Phos >3, Mg >2.  -- telemetry  -- pt to follow up with outpatient psych for med rec given high QTc. Holding psychotropic medications on discharge.

## 2020-09-17 NOTE — PLAN OF CARE
"CM spoke with pt's daughter Margarito Miranda [phone] regarding discharge.  She is en route to pick the pt up.  She has her rolling walker with her.  The daughter asked about Medicaid/Medicare application.    Patient will be staying with her daughter Margarito Miranda  [address] [place] [address]    CM contacted [facility] regarding Medicaid application.  Per [facility] rep:  "Ms. Lambert is a [place] resident. She doesnt meet criteria for [place] Medicaid. She should call the social security office to see if she meets criteria for Medicare, shes 65 yrs old".      CM to follow for discharge planning needs.  YELENA Stevens RN  Case Management  [phone]      "

## 2020-09-17 NOTE — PLAN OF CARE
09/17/20 1624   Post-Acute Status   Post-Acute Authorization Placement   Post-Acute Placement Status Set-up Complete   Pt does not have any insurance. Pt provided list of Home care in area to select. Pt informed that she will have to pay out of pocket. SW sent request for outpt therapy to tx team.     SW provide information to pt and daughter Margarito at .     Pt to contact  to see about Medicare enrollment.     No further needs.    Rimma Granado LMSW  Case Management Social Worker   [facility] [address]

## 2020-09-17 NOTE — MEDICAL/APP STUDENT
"[facility] Student Progress Note      Bhavana Lambert  YOB: 1955  Medical Record Number:  [id number]  Attending Physician:  Ru Grijalva MD   Date of Admission: 9/14/2020       Hospital Day: 4   Current Principal Problem: Hypovolemic hyponatremia, hypokalemia, and hypophosphatemia      History     Chief Complaint: "feels okay"    HPI  The patient is a 65 year old woman with a past medical history of affective disorder, tobacco use, and COPD presented with 4-5 days lethargy and weakness.    Additional active medical problems include daily alcohol use (1-2 vodka drinks per day) and HTN.    5 days prior to admission the patient's air conditioning broke down.  At that time, she notes that she felt over-heated and fatigues, stating that she then started eating only crackers and drinking diet Coke. The patient states that prior to this, she often ate soups or food that her son prepared for her (she was unable to give specific information). Additionally, the patient states she later began having 1-2 episodes of loose stools 2-3 days prior to admission that worsened to >5 episodes per day requiring the patient wear an adult diaper upon admission to [facility].     At the [facility] ED, the patient was noted to have a sodium of 114 and potassium of 2.5. The patient was given IVF, was started on oral potassium supplements, and worked with physical therapy. The patient also had a CXR demonstrating no acute processes nor signs of malignancy and a head CT without contrast demonstrating no acute intracranial abnormalities. The patient [place] [facility] [place]    During her [facility] hospital course, the patient has been receiving normal saline IVF with appropriate rate adjustments based on serum sodium levels and oral potassium supplements. On day 2 the patient complained of left-sided rib with bilateral rib XR demonstrating remote fractures of the right ribs " 6-10 and left ribs 4-7. On day 3 her potassium was noted to be 2.6 and her potassium supplement was increased to TID. Additionally on day 3, ECG demonstrated T wave alternans and QTc prolongation which will be monitored with telemetry. On day 4, potassium has improved to 3.8, sodium has improved to 132, and phosphate has improved to 2.7.    Overnight, the patient slept throughout the night with no acute events noted. Fioricet given for headache before bed with moderate relief of symptoms.    Today, the patient reports she feels better, stating she has been tolerating eating/drinking. The patient also reports she has been getting out of bed to use the bedside commode with one episode of loose stool yesterday. The patient complains of mild generalized headache, and she states that her left-sided rib pain has improved. The patient denies dizziness, weakness, seizures, numbness, tremors, tingling, and abdominal pain.    Medications  Scheduled Meds:   amLODIPine  5 mg Oral Daily    benazepriL  10 mg Oral Daily    enoxaparin  40 mg Subcutaneous Q24H    famotidine  20 mg Oral BID    montelukast  10 mg Oral QAM     Continuous Infusions:    PRN Meds:.butalbital-acetaminophen-caffeine -40 mg, clonazePAM, dextrose 50%, dextrose 50%, glucagon (human recombinant), glucose, glucose, sodium chloride 0.9%      PSFH:    Please see admission note and assessment and plan below.      ROS   Admits Denies   Constitutional  Chills, diaphoresis, malaise   Eyes  Visual changes   ENMT  Dysphagia, Epistaxis, nasal congestion, hearing loss   Cardiovascular  Chest pain, palpitations, edema   Respiratory  Cough, dyspnea, wheezing   Gastrointestinal  Nausea, vomiting, constipation, diarrhea, anorexia.     Genitourinary  Dysuria, incontinence   Musculoskeletal Left-sided rib pain Myalgias, joint pain, joint swelling   Integumentary  Rash, inflammation, burning   Neuro-Psychiatric Generalized headache Anxiety, insomnia.  Changes in  speech, strength, sensation.     Endocrine     Hematologic  Abnormal bruising, bleeding   Immunologic  Inflammation, pain at IV sites.  Pruritis.         Physical Examination     General:  No acute distress. Delayed speech.    Vital Signs  Vitals  Temp: 97.7 °F (36.5 °C)  Temp src: Oral  Pulse: 94  Heart Rate Source: Monitor  Resp: (!) 22  SpO2: 96 %  Pulse Oximetry Type: Continuous  O2 Device (Oxygen Therapy): room air  BP: (!) 169/109  MAP (mmHg): 127  BP Location: Right arm  BP Method: Automatic  Patient Position: Sitting          24 Hour VS Range    Temp:  [97.7 °F (36.5 °C)-98.7 °F (37.1 °C)]   Pulse:  []   Resp:  [15-22]   BP: (133-169)/()   SpO2:  [92 %-97 %]     Intake/Output Summary (Last 24 hours) at 9/17/2020 1217  Last data filed at 9/17/2020 0800  Gross per 24 hour   Intake 3149.17 ml   Output 1151 ml   Net 1998.17 ml       Head: NCAT  Eyes: conjunctivae and lids normal, no scleral icterus, EOMI.  ENMT:  no gingival bleeding, dry/cracked oral mucosa without pallor or cyanosis.   Neck:  JVP normal.  Trachea non-displaced.     Chest:  Normal respiratory effort.  Chest clear to auscultation; diminished breath sounds. No wheezes, rales, or rhonchi.  Heart:  Normal PMI, S1 S2 normal quality, splitting.  No murmurs rubs or gallops.  Peripheral pulses 2+.  Abdomen:  Non-distended, normal bowel sounds, non-tender.  No hepato-jugular reflux. Left flank tenderness. Midline surgical scar. RLQ surgical scar.  Extremities:  No edema. Normal capillary refill.    Skin:  Warm and dry.  No cyanosis or pallor.  No ulcers, stasis.  IV sites without tenderness or inflammation.    Neurological / Psychiatric:  Oriented to person, time, and place.  No facial asymmetry, drift.  Fluent without dysarthria.  Mood euthymic, affect normal.       Data       Recent Labs   Lab 09/13/20  1454 09/14/20  0644 09/15/20  0744 09/16/20  0525 09/17/20  0600   WBC 11.48 8.26 8.24 7.42 7.88   HGB 13.6 13.4 12.6 12.7 12.5   HCT  36.2* 35.4* 35.7* 35.6* 35.7*    250 280 276 289        No results for input(s): PROTIME, INR in the last 168 hours.     Recent Labs   Lab 09/15/20  1442 09/16/20  0009 09/16/20  0525 09/16/20  1604 09/17/20  0600   * 130* 129* 131* 132*   K 3.2* 2.6* 3.0* 3.9 3.8   CL 93* 95 93* 95 97   CO2 24 26 25 24 24   BUN 3* 2* 2* 2* 3*   CREATININE 0.6 0.5 0.5 0.6 0.5   ANIONGAP 10 9 11 12 11   CALCIUM 8.5* 7.7* 8.2* 8.7 8.4*       Sodium (mmol/L)   Date Value   09/17/2020 132 (L)   09/16/2020 131 (L)   09/16/2020 129 (L)   09/16/2020 130 (L)   09/15/2020 127 (L)     Potassium   Date Value Ref Range Status   09/17/2020 3.8 3.5 - 5.1 mmol/L Final   09/16/2020 3.9 3.5 - 5.1 mmol/L Final   09/16/2020 3.0 (L) 3.5 - 5.1 mmol/L Final   09/16/2020 2.6 (LL) 3.5 - 5.1 mmol/L Final     Comment:     *Critical value -  Results called to and read back by:ZOHRA ABURTO RN   09/15/2020 3.2 (L) 3.5 - 5.1 mmol/L Final     Phosphorus (mg/dL)   Date Value   09/17/2020 2.7   09/16/2020 2.2 (L)   09/15/2020 <1.0 (LL)       Recent Labs   Lab 09/13/20  1454 09/15/20  0744 09/16/20  0525 09/17/20  0600   PROT 7.0 6.2 6.2 6.3   ALBUMIN 4.1 3.4* 3.3* 3.3*   BILITOT 1.5* 0.4 0.3 0.3   ALKPHOS 82 90 92 93   AST 37 30 27 23   ALT 32 25 23 24        No results for input(s): TROPONINI in the last 168 hours.     BNP (pg/mL)   Date Value   09/13/2020 91       Recent Labs   Lab 09/13/20 2010 09/13/20  2215   LABBLOO No Growth to date  No Growth to date  No Growth to date No Growth to date  No Growth to date  No Growth to date      Imaging  CXR 3 views on 9/15/20 revealed acute or subacute right 6 rib fracture. Fractures of ribs 7 through 10 on the right appear remote; on the left, fractures of ribs 4, 5, 6, and 7 appear remote    Assessment & Plan     Hypovolemic hyponatremia  Acute onset, improving  9/15 UA SpGrav 1.20, Urine Na 150, Urine Osm 421, Serum Osm 267  9/16 8AM cortisol 22.30  9/17 Urine Na 172, Urine Osm 325, Serum Osm 278  DDx:  tea & toast, dehydration / overheating, medication related (prozac).  - Likely hypovolemic based on history (heat, multiple episodes of diarrhea) and exam (dry mucous membranes); consistent with renal losses  Dx plan  - BMP Q6h, CMP daily  - Plan to set up nephrology out-patient follow up  Rx plan  - NS at 100/h x 24h  - Correcting at 4-6 mEq/L over 24 hours  - Hold Prozac and Buspar  - Consult psych to evaluate psych medications at risk for causing SIADH     Hypokalemia  Acute onset, improving  Dx plan  - BMP Q6h, CMP daily  Rx plan   - Replacing orally (KCl 40meq), increased to TID  - Correcting at patient's weight (59.8 kg) X 3 mEq/24 hours    Hypophosphatemia  Acute onset, improving  Dx plan  - Monitor with daily phosphorus levels  Rx plan  - IV sodium phosphate 30 mmol in 5% dextrose over 4 hours  - Correcting at a rate of 1-3 mmol/hour    T wave alternans  QTc prolongation at 521 ms  DDx: hypokalemia  Rx plan  - Correct hypokalemia  - Consult psych to evaluate psych medications at risk for causing QT prolongation    Multiple rib fractures  Fell a few days ago  Rx plan:  - Encourage incentive spirometry  - Control pain  - PT/OT currently following in-patient  - PT/OT home health arranged    Diarrhea  Acute onset, improving  DDx: viral, med related  Dx plan  - Continue to monitor     Hypertension  Ongoing, stable  Rx plan  - On amlodipine-benazepril at home, continue     Affective disorder  Ongoing, stable  On Prozac at home  Reports being on Buspar at home but not on med list  On Klonopin 2mg po 1-2 times a day at home  Hyponatremic on admit  Dx Plan  - Psych review of psych medications to reduce risk of repeat hyponatremia  Rx Plan  - Hold Prozac as it could worsen hyponatremia  - Klonopin PRN for anxiety     COPD (chronic obstructive pulmonary disease)  Ongoing, stable  Patient states she smoked 1ppd for 20-30 years  Not on inhaler at home, no home O2  CXR without acute pathology, appears hyper-expanded  Rx  plan  - Singulair  - If O2 needed, goal would be 88-92     Discharge planning issues  PT at [facility] recommended patient go home with daughter  - Team spoke with patient's daughter and will  daughter this afternoon  PT/OT at [facility] recommend PT/OT home health services upon discharge   Needs parth Guillermo MS3  [employer]

## 2020-09-17 NOTE — PLAN OF CARE
Rested on and off throughout night. VSS. Medicated x1 for c/o headache with pain relief noted-see emar. Bed alarm on and pt aware of need to call to get up to [facility]; pt complying. Assistance x1 needed. Reports some shortness of breath on exertion. NS continues at 125ml/hr to R FA PIV site; no complications; dsg C/D/I. Tolerating po well. Safety maintained. Will continue to monitor.

## 2020-09-17 NOTE — ASSESSMENT & PLAN NOTE
Continuing home medications.    Pt to follow up outpatient for potential alcohol abuse contributing to HTN.

## 2020-09-17 NOTE — PLAN OF CARE
[facility] - ICU [facility] WT    HOME HEALTH ORDERS  FACE TO FACE ENCOUNTER    Patient Name: Bhavana Lambert  YOB: 1955    PCP: Pepito Jhaveri Iv, MD   PCP Address: [address]   [place] [address]  PCP Phone Number: [phone]  PCP Fax: [phone]    Encounter Date: 09/17/2020    Admit to Home Health    Diagnoses:  Active Hospital Problems    Diagnosis  POA    *Hyponatremia [E87.1]  Yes    Electrocardiogram showing T wave abnormalities [R94.31]  Unknown    Multiple rib fractures [S22.49XA]  Unknown    Discharge planning issues [Z02.9]  Not Applicable    Diarrhea [R19.7]  Yes    Hypokalemia [E87.6]  Yes    Anxiety [F41.9]  Yes    COPD (chronic obstructive pulmonary disease) [J44.9]  Yes    Depression [F32.9]  Yes    Hypertension [I10]  Yes      Resolved Hospital Problems   No resolved problems to display.       No future appointments.  Follow-up Information     Raul Coronel - Psych [facility] 4th Fl. Schedule an appointment as soon as possible for a visit in 2 days.    Specialty: Psychiatry  Contact information:  [address]  [facility] [address] [phone]  Additional information:  [address], [address]   Please park in [facility] and use [facility] elevator                   I have seen and examined this patient face to face today. My clinical findings that support the need for the home health skilled services and home bound status are the following:  Weakness/numbness causing balance and gait disturbance due to Weakness/Debility making it taxing to leave home.    Allergies:Review of patient's allergies indicates:  No Known Allergies    Diet: regular diet    Activities: activity as tolerated    Nursing:   SN to complete comprehensive assessment including routine vital signs. Instruct on disease process and s/s of complications to report to MD. Review/verify medication list sent home with the patient at time of discharge   and instruct patient/caregiver as needed. Frequency may be adjusted depending on start of care date.    Notify MD if SBP > 160 or < 90; DBP > 90 or < 50; HR > 120 or < 50; Temp > 101; Other:   Signs of alcohol withdrawal      CONSULTS:    Physical Therapy to evaluate and treat. Evaluate for home safety and equipment needs; Establish/upgrade home exercise program. Perform / instruct on therapeutic exercises, gait training, transfer training, and Range of Motion.  Occupational Therapy to evaluate and treat. Evaluate home environment for safety and equipment needs. Perform/Instruct on transfers, ADL training, ROM, and therapeutic exercises.    MISCELLANEOUS CARE:  N/A    WOUND CARE ORDERS  n/a      Medications: Review discharge medications with patient and family and provide education.      Current Discharge Medication List      CONTINUE these medications which have NOT CHANGED    Details   amlodipine-benazepril 5-10 mg (LOTREL) 5-10 mg per capsule Take 1 capsule by mouth every morning.      busPIRone (BUSPAR) 15 MG tablet Take 15 mg by mouth 2 (two) times a day.      butalbital-acetaminophen-caffeine -40 mg (FIORICET, ESGIC) -40 mg per tablet Take 1 tablet by mouth every 6 (six) hours as needed for Headaches.      clonazePAM (KLONOPIN) 0.5 MG tablet Take 0.5 mg by mouth 2 (two) times daily as needed (anxiety).       cyanocobalamin 1,000 mcg/mL injection Inject 1,000 mcg into the muscle every 30 days.       cyclobenzaprine (FLEXERIL) 10 MG tablet Take 10 mg by mouth 3 (three) times daily as needed for Muscle spasms.      fluoxetine HCl (PROZAC ORAL) Take 40 mg by mouth once daily.       montelukast sodium (SINGULAIR ORAL) Take 10 mg by mouth every morning.       naproxen sodium (ANAPROX) 220 MG tablet Take 220 mg by mouth daily as needed (pain).             I certify that this patient is confined to her home and needs physical therapy and occupational therapy.

## 2020-09-18 ENCOUNTER — PATIENT OUTREACH (OUTPATIENT)
Dept: ADMINISTRATIVE | Facility: CLINIC | Age: 65
End: 2020-09-18

## 2020-09-18 NOTE — ASSESSMENT & PLAN NOTE
On Prozac at home  Hyponatremic on admit    PLAN  - Hold Prozac as it could worsen hyponatremia  - pt to follow up outpatient psych

## 2020-09-18 NOTE — PLAN OF CARE
Patient discharged home with her daughter.  Daughter brought the rolling walker with her.  Patient provided a list of outpatient therapy per .  CM scheduled post hospital follow up appointments with [facility] Friday Oct 30, 2020 to discuss anxiety and depression medications.  The earliest appointment available is for Friday October 30 @10am.    Tejoracio Pardo - [facility] [address]     [facility] [address]  [phone]  Information provided to patient's nurse for discharge instructions.    Future Appointments   Date Time Provider Department Center   10/30/2020 10:00 AM Michelle Hardwick, DO [facility] CM to follow for discharge planning needs.  YELENA Stevens RN  Case Management  [id number]      09/18/20 0829   Final Note   Assessment Type Final Discharge Note   Anticipated Discharge Disposition Home   Hospital Follow Up  Appt(s) scheduled? Yes   Discharge plans and expectations educations in teach back method with documentation complete? Yes   Post-Acute Status   Post-Acute Authorization Other   Other Status No Post-Acute Service Needs   Discharge Delays None known at this time

## 2020-09-18 NOTE — DISCHARGE SUMMARY
[facility]   Hospital Medicine  Discharge Summary      Patient Name: Bhavana Lambert  MRN: [id number]  Admission Date: 9/14/2020  Hospital Length of Stay: 3 days  Discharge Date and Time:  09/17/2020 8:01 PM  Attending Physician: No att. providers found   Discharging Provider: María Mcdowell MD  Primary Care Provider: Pepito Jhaveri Iv, MD      HPI:   Patient is a 65-year-old  female with past medical history significant for anxiety, depression, chronic obstructive pulmonary disease and hypertension is being admitted to Hospital Medicine from [facility] ER with c/o increasing lethargy and weakness for 4-5 days. She is a poor historian (may have gotten ATivan IV on transfer for agitation). Reportedly patient's home air conditioner became nonfunctional 5 days ago and patient was exposed to significant heat and humidity for couple of days causing increasing weakness and lethargy. Patient reported low appetite and had only been eating a few crackers a day and drinking diet Coke. She also drink 1-2 vodka drinks per day. Patient denied any sick contact or recent travel history.  For the past 2-3 days patient reports having diarrhea 1-2 bowel movements per day which are not loose or watery, last being today prior to transfer.  Patient denies any abdominal pain or urinary complaints. Patient denies any headache, vision changes or any focal neuro deficits.  Patient has been noted to have serum sodium 114 and potassium 2.5 in the emergency room which are improving with normal saline and potassium repletion. Upon arrival to [facility] [place], patient reports feeling better but is thirsty.    * No surgery found *      Hospital Course:   Admitted to hospital medicine for close follow up and repletion of her serum Na. Throughout the course of her stay, pt received IV NS infusion and her serum Na increased back to and was stable at patient's baseline ~131 and decision was made to  discharge.    Review of Systems   Constitutional: Negative for appetite change, chills, fever and unexpected weight change.   HENT: Negative for hearing loss, mouth sores, nosebleeds, sore throat and trouble swallowing.    Eyes: Negative for visual disturbance.   Respiratory: Negative for cough, chest tightness and shortness of breath.    Cardiovascular: Negative for chest pain, palpitations and leg swelling.   Gastrointestinal: Positive for diarrhea. Negative for abdominal distention, abdominal pain, anal bleeding, blood in stool, nausea and vomiting.   Endocrine: Negative for polydipsia, polyphagia and polyuria.   Genitourinary: Positive for urgency. Negative for dysuria and hematuria.   Musculoskeletal: Positive for gait problem. Negative for arthralgias and myalgias.   Skin: Negative for pallor, rash and wound.   Allergic/Immunologic: Negative for immunocompromised state.   Neurological: Positive for weakness. Negative for dizziness, tremors, seizures, light-headedness, numbness and headaches.   Psychiatric/Behavioral: Positive for confusion (baseline). Negative for agitation, hallucinations and sleep disturbance. The patient is not nervous/anxious and is not hyperactive.      Objective:     Vital Signs (Most Recent):  Temp: 97.8 °F (36.6 °C) (09/17/20 1615)  Pulse: 92 (09/17/20 1615)  Resp: 18 (09/17/20 1615)  BP: (!) 180/86 (09/17/20 1615)  SpO2: 96 % (09/17/20 1615) Vital Signs (24h Range):  Temp:  [97.7 °F (36.5 °C)-98.7 °F (37.1 °C)] 97.8 °F (36.6 °C)  Pulse:  [] 92  Resp:  [15-22] 18  SpO2:  [92 %-97 %] 96 %  BP: (145-180)/() 180/86     Weight: 59.8 kg (131 lb 13.4 oz)(NEW BED)  Body mass index is 24.11 kg/m².    Intake/Output Summary (Last 24 hours) at 9/17/2020 2002  Last data filed at 9/17/2020 0800  Gross per 24 hour   Intake 1773.75 ml   Output 950 ml   Net 823.75 ml      Physical Exam  Vitals signs and nursing note reviewed.   Constitutional:       General: She is not in acute distress.      Appearance: Normal appearance. She is not ill-appearing or diaphoretic.   HENT:      Head: Normocephalic and atraumatic.      Nose: No congestion or rhinorrhea.      Mouth/Throat:      Mouth: Mucous membranes are dry.      Pharynx: No oropharyngeal exudate.   Eyes:      General: No scleral icterus.        Right eye: No discharge.         Left eye: No discharge.      Extraocular Movements: Extraocular movements intact.      Pupils: Pupils are equal, round, and reactive to light.   Neck:      Musculoskeletal: Neck supple. No muscular tenderness.   Cardiovascular:      Rate and Rhythm: Normal rate and regular rhythm.      Pulses: Normal pulses.      Heart sounds: Normal heart sounds. No murmur. No friction rub. No gallop.    Pulmonary:      Effort: Pulmonary effort is normal. No respiratory distress.      Breath sounds: Normal breath sounds. No wheezing, rhonchi or rales.   Abdominal:      General: Abdomen is flat. Bowel sounds are normal. There is no distension.      Palpations: Abdomen is soft. There is no mass.      Tenderness: There is no abdominal tenderness. There is no guarding.      Comments: Midline surgical scar   RLQ surgical scar   Musculoskeletal: Normal range of motion.         General: No swelling.      Comments: Surgical scars to anterior knees   Lymphadenopathy:      Cervical: No cervical adenopathy.   Skin:     General: Skin is warm.      Capillary Refill: Capillary refill takes less than 2 seconds.      Coloration: Skin is not jaundiced or pale.      Findings: No erythema or rash.   Neurological:      General: No focal deficit present.      Mental Status: She is alert and oriented to person, place, and time. Mental status is at baseline.      Cranial Nerves: No cranial nerve deficit.      Sensory: No sensory deficit.      Motor: No weakness, tremor or atrophy.      Coordination: Coordination normal.      Deep Tendon Reflexes: Reflexes normal.      Comments: At baseline, per daughter.   Psychiatric:          Mood and Affect: Mood normal.         Thought Content: Thought content normal.            Consults:     * Hyponatremia  Weakness but no seizures, numbness, tremors, or tingling.  Fell 2-3 days ago, tripped over coffee tablet, no LOC. CT H at [employer] negative. No tenderness on head exam.  Diet has been mostly crackers for 3-4 days prior to presentation. Has not had appetite. Has had diarrhea 2-3 days. Denies N/V.  UA SpGrav 1.20, Na low, diarrhea, thirsty, dry MM --> appear hypovolemic  DDx: tea & toast, dehydration / overheating, medication related (prozac). Likely tea and toast (hypovolemic, hyponatremia)    PLAN  - Increased U Na and U osmolality with decreased serum osmolality seen. Serum osmolality improved with IVFs.  - NS at 125/h x 24h  - BMP daily at 4pm  - CMP daily  - Hold Prozac and Buspar, continuing to hold on discharge. Pt to follow up with outpatient psych for med reconciliation and treatment of her depression, anxiety, and substance abuse.  - high-normal AM cortisol, high Urine Na, high Urine Osmolality, and correction of serum Na with IVFS indicates potential renal etiology.            Electrocardiogram showing T wave abnormalities  T wave alternans with QTc prolongation at 521 ms likely 2/2 pt's hypokalemia    Plan:  -- close monitoring of symptoms  -- daily CMP  -- daily 4pm BMP  -- correct electrolytes with a goal of K>4, Phos >3, Mg >2.  -- telemetry  -- pt to follow up with outpatient psych for med rec given high QTc. Holding psychotropic medications on discharge.    Multiple rib fractures  CXR 3 views on 9/15/20 revealed Acute or subacute right 6 rib fracture.  Fractures of ribs 7 through 10 on the right appear remote. On the left, fractures of ribs 4, 5, 6, and 7 appear remote. Pt reported fall ~1 week ago.    Plan:  -- incentive spirometry  -- pain control  -- PT/OT consulted    Discharge planning issues  Home with daughter.  Pt had a rolling walker, which her daughter  brought.      Hypokalemia  K = 4 on admission.    Plan:  -- resolved on discharge      Hypertension  Continuing home medications.    Pt to follow up outpatient for potential alcohol abuse contributing to HTN.      Depression  On Prozac at home  Hyponatremic on admit    PLAN  - Hold Prozac as it could worsen hyponatremia  - pt to follow up outpatient psych    COPD (chronic obstructive pulmonary disease)  Patient states she smoked 1ppd for 20-30 years  Not on inhaler at home, no home O2  No wheezing, crackles on exam  CXR without acute pathology, appears hyperexpanded    PLAN  - Singulair to continue inpatient  - If O2 needed, goal would be 88-92        Anxiety  Reports being on Buspar at home but not on med list  On Klonopin 2mg po 1-2 times a day at home  Drinks 1-2 shots vodka per day / every other day; last being 2 days ago    PLAN  - Klonopin PRN ordered    Final Active Diagnoses:    Diagnosis Date Noted POA    PRINCIPAL PROBLEM:  Hyponatremia [E87.1] 09/13/2020 Yes    Electrocardiogram showing T wave abnormalities [R94.31] 09/17/2020 Unknown    Multiple rib fractures [S22.49XA] 09/16/2020 Unknown    Discharge planning issues [Z02.9] 09/14/2020 Not Applicable    Diarrhea [R19.7] 09/13/2020 Yes    Hypokalemia [E87.6] 09/13/2020 Yes    Anxiety [F41.9]  Yes    COPD (chronic obstructive pulmonary disease) [J44.9]  Yes    Depression [F32.9]  Yes    Hypertension [I10]  Yes      Problems Resolved During this Admission:       Discharged Condition: fair    Disposition: Home or Self Care    Follow Up:  Follow-up Information     PROV [facility] [place] [facility] On 10/30/2020.    Specialty: Psychiatry  Why: to discuss anxiety and depression medications.  The earliest appointment available is for Friday October 30 @10am.  In [facility] [address] [phone]  Contact information:  [address]  [facility] [address] [phone]               Patient Instructions:      Ambulatory  referral/consult to Psychiatry   Standing Status: Future   Referral Priority: Routine Referral Type: Psychiatric   Referral Reason: Specialty Services Required   Requested Specialty: Psychiatry   Number of Visits Requested: 1     Ambulatory referral/consult to Physical/Occupational Therapy   Standing Status: Future   Referral Priority: Routine Referral Type: Physical Medicine   Referral Reason: Specialty Services Required   Number of Visits Requested: 1     Diet Adult Regular     Notify your health care provider if you experience any of the following:  persistent nausea and vomiting or diarrhea     Notify your health care provider if you experience any of the following:  severe persistent headache     Notify your health care provider if you experience any of the following:  increased confusion or weakness       Significant Diagnostic Studies: Labs:   BMP:   Recent Labs   Lab 09/16/20 0525 09/16/20  1604 09/17/20  0600    104 90   * 131* 132*   K 3.0* 3.9 3.8   CL 93* 95 97   CO2 25 24 24   BUN 2* 2* 3*   CREATININE 0.5 0.6 0.5   CALCIUM 8.2* 8.7 8.4*   MG 1.7  --  1.8   , CMP   Recent Labs   Lab 09/16/20 0525 09/16/20  1604 09/17/20  0600   * 131* 132*   K 3.0* 3.9 3.8   CL 93* 95 97   CO2 25 24 24    104 90   BUN 2* 2* 3*   CREATININE 0.5 0.6 0.5   CALCIUM 8.2* 8.7 8.4*   PROT 6.2  --  6.3   ALBUMIN 3.3*  --  3.3*   BILITOT 0.3  --  0.3   ALKPHOS 92  --  93   AST 27  --  23   ALT 23  --  24   ANIONGAP 11 12 11   ESTGFRAFRICA >60.0 >60.0 >60.0   EGFRNONAA >60.0 >60.0 >60.0   , CBC   Recent Labs   Lab 09/16/20  0525 09/17/20  0600   WBC 7.42 7.88   HGB 12.7 12.5   HCT 35.6* 35.7*    289    and All labs within the past 24 hours have been reviewed    Pending Diagnostic Studies:     Procedure Component Value Units Date/Time    EKG 12-lead [[id number]]     Order Status: Sent Lab Status: No result     Urinalysis [[id number]] Collected: 09/17/20 0740    Order Status: Sent Lab Status: In  process Updated: 09/17/20 4980    Specimen: Urine          Significant Imaging:  ? CXR 3 views: Acute or subacute right 6 rib fracture.  Fractures of ribs 7 through 10 on the right appear remote. On the left, fractures of ribs 4, 5, 6, and 7 appear remote.    Medications:  Reconciled Home Medications:      Medication List      CONTINUE taking these medications    amlodipine-benazepril 5-10 mg 5-10 mg per capsule  Commonly known as: LOTREL  Take 1 capsule by mouth every morning.     butalbital-acetaminophen-caffeine -40 mg -40 mg per tablet  Commonly known as: FIORICET, ESGIC  Take 1 tablet by mouth every 6 (six) hours as needed for Headaches.     clonazePAM 0.5 MG tablet  Commonly known as: KLONOPIN  Take 0.5 mg by mouth 2 (two) times daily as needed (anxiety).     cyanocobalamin 1,000 mcg/mL injection  Inject 1,000 mcg into the muscle every 30 days.     naproxen sodium 220 MG tablet  Commonly known as: ANAPROX  Take 220 mg by mouth daily as needed (pain).     SINGULAIR ORAL  Take 10 mg by mouth every morning.        ASK your doctor about these medications    busPIRone 15 MG tablet  Commonly known as: BUSPAR  Take 15 mg by mouth 2 (two) times a day.     cyclobenzaprine 10 MG tablet  Commonly known as: FLEXERIL  Take 10 mg by mouth 3 (three) times daily as needed for Muscle spasms.     PROZAC ORAL  Take 40 mg by mouth once daily.            Indwelling Lines/Drains at time of discharge:   Lines/Drains/Airways     None                 Time spent on the discharge of patient: 35 minutes  Patient was seen and examined on the date of discharge and determined to be suitable for discharge.         María Mcdowell MD  Department of Hospital Medicine  [facility] WT

## 2020-09-18 NOTE — ASSESSMENT & PLAN NOTE
Weakness but no seizures, numbness, tremors, or tingling.  Fell 2-3 days ago, tripped over coffee tablet, no LOC. CT H at [place] negative. No tenderness on head exam.  Diet has been mostly crackers for 3-4 days prior to presentation. Has not had appetite. Has had diarrhea 2-3 days. Denies N/V.  UA SpGrav 1.20, Na low, diarrhea, thirsty, dry MM --> appear hypovolemic  DDx: tea & toast, dehydration / overheating, medication related (prozac). Likely tea and toast (hypovolemic, hyponatremia)    PLAN  - Increased U Na and U osmolality with decreased serum osmolality seen. Serum osmolality improved with IVFs.  - NS at 125/h x 24h  - BMP daily at 4pm  - CMP daily  - Hold Prozac and Buspar, continuing to hold on discharge. Pt to follow up with outpatient psych for med reconciliation and treatment of her depression, anxiety, and substance abuse.  - high-normal AM cortisol, high Urine Na, high Urine Osmolality, and correction of serum Na with IVFS indicates potential renal etiology.

## 2020-09-18 NOTE — SUBJECTIVE & OBJECTIVE
Review of Systems   Constitutional: Negative for appetite change, chills, fever and unexpected weight change.   HENT: Negative for hearing loss, mouth sores, nosebleeds, sore throat and trouble swallowing.    Eyes: Negative for visual disturbance.   Respiratory: Negative for cough, chest tightness and shortness of breath.    Cardiovascular: Negative for chest pain, palpitations and leg swelling.   Gastrointestinal: Positive for diarrhea. Negative for abdominal distention, abdominal pain, anal bleeding, blood in stool, nausea and vomiting.   Endocrine: Negative for polydipsia, polyphagia and polyuria.   Genitourinary: Positive for urgency. Negative for dysuria and hematuria.   Musculoskeletal: Positive for gait problem. Negative for arthralgias and myalgias.   Skin: Negative for pallor, rash and wound.   Allergic/Immunologic: Negative for immunocompromised state.   Neurological: Positive for weakness. Negative for dizziness, tremors, seizures, light-headedness, numbness and headaches.   Psychiatric/Behavioral: Positive for confusion (baseline). Negative for agitation, hallucinations and sleep disturbance. The patient is not nervous/anxious and is not hyperactive.      Objective:     Vital Signs (Most Recent):  Temp: 97.8 °F (36.6 °C) (09/17/20 1615)  Pulse: 92 (09/17/20 1615)  Resp: 18 (09/17/20 1615)  BP: (!) 180/86 (09/17/20 1615)  SpO2: 96 % (09/17/20 1615) Vital Signs (24h Range):  Temp:  [97.7 °F (36.5 °C)-98.7 °F (37.1 °C)] 97.8 °F (36.6 °C)  Pulse:  [] 92  Resp:  [15-22] 18  SpO2:  [92 %-97 %] 96 %  BP: (145-180)/() 180/86     Weight: 59.8 kg (131 lb 13.4 oz)(NEW BED)  Body mass index is 24.11 kg/m².    Intake/Output Summary (Last 24 hours) at 9/17/2020 2002  Last data filed at 9/17/2020 0800  Gross per 24 hour   Intake 1773.75 ml   Output 950 ml   Net 823.75 ml      Physical Exam  Vitals signs and nursing note reviewed.   Constitutional:       General: She is not in acute distress.      Appearance: Normal appearance. She is not ill-appearing or diaphoretic.   HENT:      Head: Normocephalic and atraumatic.      Nose: No congestion or rhinorrhea.      Mouth/Throat:      Mouth: Mucous membranes are dry.      Pharynx: No oropharyngeal exudate.   Eyes:      General: No scleral icterus.        Right eye: No discharge.         Left eye: No discharge.      Extraocular Movements: Extraocular movements intact.      Pupils: Pupils are equal, round, and reactive to light.   Neck:      Musculoskeletal: Neck supple. No muscular tenderness.   Cardiovascular:      Rate and Rhythm: Normal rate and regular rhythm.      Pulses: Normal pulses.      Heart sounds: Normal heart sounds. No murmur. No friction rub. No gallop.    Pulmonary:      Effort: Pulmonary effort is normal. No respiratory distress.      Breath sounds: Normal breath sounds. No wheezing, rhonchi or rales.   Abdominal:      General: Abdomen is flat. Bowel sounds are normal. There is no distension.      Palpations: Abdomen is soft. There is no mass.      Tenderness: There is no abdominal tenderness. There is no guarding.      Comments: Midline surgical scar   RLQ surgical scar   Musculoskeletal: Normal range of motion.         General: No swelling.      Comments: Surgical scars to anterior knees   Lymphadenopathy:      Cervical: No cervical adenopathy.   Skin:     General: Skin is warm.      Capillary Refill: Capillary refill takes less than 2 seconds.      Coloration: Skin is not jaundiced or pale.      Findings: No erythema or rash.   Neurological:      General: No focal deficit present.      Mental Status: She is alert and oriented to person, place, and time. Mental status is at baseline.      Cranial Nerves: No cranial nerve deficit.      Sensory: No sensory deficit.      Motor: No weakness, tremor or atrophy.      Coordination: Coordination normal.      Deep Tendon Reflexes: Reflexes normal.      Comments: At baseline, per daughter.   Psychiatric:          Mood and Affect: Mood normal.         Thought Content: Thought content normal.         Significant Labs: {Results:70426}    Significant Imaging: {Imaging Review:83878}

## 2020-09-18 NOTE — PROGRESS NOTES
C3 nurse attempted to contact patient. No answer.  C3 nurse attempted to contact Bhavana CALLAHAN Fausto  for a TCC post hospital discharge follow up call. No answer at phone number listed and no voicemail available. The patient does not have a scheduled HOSFU appointment within 7-14 days post hospital discharge date 09/17/2020 Message sent to Physician staff to assist with HOSFU appointment scheduling.           The patient is a 35y Female complaining of dizziness.

## 2020-09-19 LAB
BACTERIA BLD CULT: NORMAL
BACTERIA BLD CULT: NORMAL

## 2022-07-23 ENCOUNTER — HOSPITAL ENCOUNTER (EMERGENCY)
Facility: HOSPITAL | Age: 67
Discharge: HOME | End: 2022-07-23
Attending: EMERGENCY MEDICINE
Payer: MEDICARE

## 2022-07-23 VITALS
HEART RATE: 86 BPM | BODY MASS INDEX: 25.76 KG/M2 | HEIGHT: 62 IN | DIASTOLIC BLOOD PRESSURE: 80 MMHG | SYSTOLIC BLOOD PRESSURE: 114 MMHG | OXYGEN SATURATION: 100 % | WEIGHT: 140 LBS | TEMPERATURE: 97 F | RESPIRATION RATE: 19 BRPM
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
## 2022-07-23 DIAGNOSIS — S22.42XA CLOSED FRACTURE OF MULTIPLE RIBS OF LEFT SIDE, INITIAL ENCOUNTER: ICD-10-CM

## 2022-07-23 DIAGNOSIS — S00.33XA CONTUSION OF NOSE, INITIAL ENCOUNTER: ICD-10-CM

## 2022-07-23 PROCEDURE — 63600175 PHARM REV CODE 636 W HCPCS: Performed by: EMERGENCY MEDICINE

## 2022-07-23 PROCEDURE — 25000242 PHARM REV CODE 250 ALT 637 W/ HCPCS: Performed by: EMERGENCY MEDICINE

## 2022-07-23 PROCEDURE — 71250 CT THORAX DX C-: CPT | Mod: TC

## 2022-07-23 PROCEDURE — 27000221 HC OXYGEN, UP TO 24 HOURS

## 2022-07-23 PROCEDURE — 99285 EMERGENCY DEPT VISIT HI MDM: CPT | Mod: 25

## 2022-07-23 PROCEDURE — 25000003 PHARM REV CODE 250: Performed by: EMERGENCY MEDICINE

## 2022-07-23 PROCEDURE — 94640 AIRWAY INHALATION TREATMENT: CPT

## 2022-07-23 PROCEDURE — 71250 CT CHEST WITHOUT CONTRAST: ICD-10-PCS | Mod: 26,,, | Performed by: RADIOLOGY

## 2022-07-23 PROCEDURE — 71250 CT THORAX DX C-: CPT | Mod: 26,,, | Performed by: RADIOLOGY

## 2022-07-23 PROCEDURE — 96372 THER/PROPH/DIAG INJ SC/IM: CPT | Performed by: EMERGENCY MEDICINE

## 2022-07-23 RX ORDER — HYDROCODONE BITARTRATE AND ACETAMINOPHEN 10; 325 MG/1; MG/1
1 TABLET ORAL EVERY 4 HOURS PRN
Qty: 20 TABLET | Refills: 0 | Status: SHIPPED | OUTPATIENT
Start: 2022-07-23

## 2022-07-23 RX ORDER — METHYLPREDNISOLONE SOD SUCC 125 MG
125 VIAL (EA) INJECTION
Status: COMPLETED | OUTPATIENT
Start: 2022-07-23 | End: 2022-07-23

## 2022-07-23 RX ORDER — IPRATROPIUM BROMIDE AND ALBUTEROL SULFATE 2.5; .5 MG/3ML; MG/3ML
3 SOLUTION RESPIRATORY (INHALATION)
Status: COMPLETED | OUTPATIENT
Start: 2022-07-23 | End: 2022-07-23

## 2022-07-23 RX ORDER — OXYMETAZOLINE HCL 0.05 %
3 SPRAY, NON-AEROSOL (ML) NASAL
Status: COMPLETED | OUTPATIENT
Start: 2022-07-23 | End: 2022-07-23

## 2022-07-23 RX ORDER — LIDOCAINE 560 MG/1
1 PATCH PERCUTANEOUS; TOPICAL; TRANSDERMAL 2 TIMES DAILY PRN
Qty: 10 PATCH | Refills: 0 | Status: SHIPPED | OUTPATIENT
Start: 2022-07-23

## 2022-07-23 RX ORDER — HYDROCODONE BITARTRATE AND ACETAMINOPHEN 10; 325 MG/1; MG/1
1 TABLET ORAL
Status: COMPLETED | OUTPATIENT
Start: 2022-07-23 | End: 2022-07-23

## 2022-07-23 RX ADMIN — HYDROCODONE BITARTRATE AND ACETAMINOPHEN 1 TABLET: 10; 325 TABLET ORAL at 11:07

## 2022-07-23 RX ADMIN — METHYLPREDNISOLONE SODIUM SUCCINATE 125 MG: 125 INJECTION, POWDER, FOR SOLUTION INTRAMUSCULAR; INTRAVENOUS at 12:07

## 2022-07-23 RX ADMIN — OXYMETAZOLINE HCL 3 SPRAY: 0.05 SPRAY NASAL at 12:07

## 2022-07-23 RX ADMIN — IPRATROPIUM BROMIDE AND ALBUTEROL SULFATE 3 ML: .5; 2.5 SOLUTION RESPIRATORY (INHALATION) at 12:07

## 2022-07-23 NOTE — ED PROVIDER NOTES
Encounter Date: 7/23/2022       History     Chief Complaint   Patient presents with    Fall     Patient reports she has had two falls in the last 2 days. Ground level fall two days ago. Fall from couch today and hit her face. Patient c/o of left sided rib pain. Patient c/o pain the face and nose. Family reports patient's nose did bleed pta. Bleeding is stopped now.     Rib Injury    Facial Pain     Pt with hx of COPD on home O2 here with left chest wall pain since falling last night and nosebleed/swelling/pain after rolling off the cough while sleeping this am. Bleeding has stopped. Chest wall pain is better but not gone. Worse with breathing and movement. No HA or LOC. Minimal pain in her nose. Pt did not want to come but her family made her.    The history is provided by the patient and a relative.     Review of patient's allergies indicates:  No Known Allergies  Past Medical History:   Diagnosis Date    COPD (chronic obstructive pulmonary disease)     Hypertension     TIA (transient ischemic attack)      Past Surgical History:   Procedure Laterality Date    ABDOMINAL SURGERY       History reviewed. No pertinent family history.  Social History     Tobacco Use    Smoking status: Current Every Day Smoker     Packs/day: 1.00     Types: Cigarettes    Smokeless tobacco: Never Used   Substance Use Topics    Alcohol use: Yes     Alcohol/week: 14.0 standard drinks     Types: 14 Shots of liquor per week    Drug use: Never     Review of Systems   Constitutional: Negative for chills and fever.   HENT: Positive for nosebleeds.    Respiratory: Positive for shortness of breath.    Cardiovascular: Positive for chest pain. Negative for palpitations and leg swelling.   Gastrointestinal: Negative for abdominal pain.   Musculoskeletal: Negative for arthralgias, back pain, joint swelling and neck pain.   Neurological: Negative for dizziness, syncope, weakness, numbness and headaches.   All other systems reviewed and are  negative.      Physical Exam     Initial Vitals [07/23/22 1103]   BP Pulse Resp Temp SpO2   114/80 96 20 97.1 °F (36.2 °C) (!) 88 %      MAP       --         Physical Exam    Nursing note and vitals reviewed.  Constitutional: She appears well-developed and well-nourished. No distress.   Speaks in full sentences, nontoxic   HENT:   Mouth/Throat: Oropharynx is clear and moist.   Mild swelling and bruising to nasal bridge without deformity. No bleeding or septal hematoma.    Eyes: Conjunctivae and EOM are normal.   Neck: Neck supple. No JVD present.   Normal range of motion.  Cardiovascular: Normal rate, regular rhythm, normal heart sounds and intact distal pulses.   Pulmonary/Chest: Breath sounds normal. She exhibits tenderness.   ttp left latera chest wall without bruising, crepitus or SCE   Abdominal: Abdomen is soft. Bowel sounds are normal. She exhibits no distension. There is no abdominal tenderness.   Musculoskeletal:         General: No tenderness or edema. Normal range of motion.      Cervical back: Normal range of motion and neck supple.      Comments: ROM hips wihtout pain     Neurological: She is alert and oriented to person, place, and time. She has normal strength. GCS score is 15. GCS eye subscore is 4. GCS verbal subscore is 5. GCS motor subscore is 6.   Skin: Skin is warm and dry. Capillary refill takes less than 2 seconds. No rash noted. No erythema. No pallor.         ED Course   Procedures  Labs Reviewed - No data to display       Imaging Results          CT Chest Without Contrast (Final result)  Result time 07/23/22 12:06:08    Final result by Yang Hartman MD (07/23/22 12:06:08)                 Impression:      1. Healing, nondisplaced subacute fractures involving the lateral left 3rd and 4th ribs with surrounding soft tissue swelling/hematoma.  No pneumothorax.  2. Additional chronic healed fracture deformities involving bilateral posterolateral ribs.  3. Centrilobular emphysema with scattered  bandlike densities in the lower lung suggestive of subsegmental atelectasis versus scarring.  4. Bilateral subcentimeter solid pulmonary nodules, measuring up to 0.7 cm in the right lower lobe.  5. Age indeterminate, mild superior endplate compression deformities of the T1, T3, and T11 vertebral bodies.  6. Mild ectasia of the ascending thoracic aorta, 4.0 cm.  Yellow Pulmonary Nodule 2017 Alert:    Yellow Actionable Finding (Radiology: Volume 284: Number 1-July 2017)    Fleischner Guidelines do not apply in patients younger than 35 years, immunocompromised patients or patients with cancer. Risk assignment based on ACCP with low risk being less than 5% and high risk combining intermediate and high risk categories.    UNEXPECTED FINDINGS:  Incidental Pulmonary Nodule Multiple Solid most suspicious 6-8 mm (4)    RECOMMENDATIONS:  If Low Risk CT Chest without contrast at 3-6 months, then consider CT at 18-24 months, If High Risk CT at 3-6 months, then at 18-24 months      Electronically signed by: Yang Hartman  Date:    07/23/2022  Time:    12:06             Narrative:    EXAMINATION:  CT CHEST WITHOUT CONTRAST    CLINICAL HISTORY:  Chest trauma, blunt;    TECHNIQUE:  Low dose axial images, sagittal and coronal reformations were obtained from the thoracic inlet to the lung bases. Contrast was not administered.    COMPARISON:  Chest radiograph 06/01/2017    FINDINGS:  Base of Neck: No significant abnormality.    Thoracic soft tissues: Soft tissue swelling adjacent to healing left 3rd and 4th rib fractures.    Aorta: Sided aortic arch with mild calcific atherosclerosis.  Mild ectasia of the ascending thoracic aorta, 4.0 cm.    Heart: Normal size. No effusion.  Moderate coronary artery calcification.    Pulmonary vasculature: Pulmonary arteries distribute normally.  There are four pulmonary veins.    María/Mediastinum: No pathologic garo enlargement.    Airways: Patent.    Lungs/Pleura: Centrilobular emphysematous change.   Scattered bandlike densities in the medial right upper lobe, inferior lingula and left lung base suggestive of subsegmental atelectasis versus scarring.  Subcentimeter calcified granuloma at the right lung apex.  Scattered subcentimeter noncalcified solid nodules.  The largest in the right lung in the right lower lobe measures 0.7 cm (series 3, image 65).  The largest in the left lung located in the left lower lobe measures 0.4 cm (series 3, image 50).    Esophagus: Normal.    Upper Abdomen: No abnormality of the partially imaged upper abdomen.    Bones: Healing, nondisplaced subacute fractures involving the lateral left 3rd and 4th ribs.  Chronic healed fracture deformities of the left posterolateral 5th, 6, 7th, 8th, 9th, and 11th ribs, and right posterolateral 8th, 9th, and 10th ribs.  Age-indeterminate mild superior endplate compression deformities of the T1, T3 and T11 vertebral bodies.  No retropulsion.  Multilevel degenerative change of the thoracic spine.                                 Medications   albuterol-ipratropium 2.5 mg-0.5 mg/3 mL nebulizer solution 3 mL (3 mLs Nebulization Given 7/23/22 1216)   HYDROcodone-acetaminophen  mg per tablet 1 tablet (1 tablet Oral Given 7/23/22 1129)   methylPREDNISolone sodium succinate injection 125 mg (125 mg Intramuscular Given 7/23/22 1211)   oxymetazoline 0.05 % nasal spray 3 spray (3 sprays Each Nostril Given 7/23/22 1238)     Medical Decision Making:   Clinical Tests:   Radiological Study: Ordered and Reviewed  ED Management:  Pt presented with chest wall pain and nosebleed after fall this am. She has COPD and is on home O2. Initial triage sat was off her O2. She remained 100% on her 2L. She has not external signs of trauma on her chest but she is ttp to left lateral upper rib area. CT showed fxs here. No PTX or lung contusion. She possibly had nasal fx but no imaging done as she had no deformity and no septal hematoma. She was given afrin for intermittent  scant bleeding. Norco for pain. Pt will f/u with her PCP this coming week. Rx norco and lidocaine patches for her rib fxs.              ED Course as of 07/23/22 1242   Sat Jul 23, 2022   1232 Discussed CT findings with pt. She will f/u with her PCP.  [DC]      ED Course User Index  [DC] Jacek Coburn Jr., MD             Clinical Impression:   Final diagnoses:  [W19.XXXA] Fall, initial encounter (Primary)  [S22.42XA] Closed fracture of multiple ribs of left side, initial encounter  [S00.33XA] Contusion of nose, initial encounter          ED Disposition Condition    Discharge Stable        ED Prescriptions     Medication Sig Dispense Start Date End Date Auth. Provider    HYDROcodone-acetaminophen (NORCO)  mg per tablet Take 1 tablet by mouth every 4 (four) hours as needed for Pain. 20 tablet 7/23/2022  Jacek Coburn Jr., MD    LIDOcaine 4 % PtMd Apply 1 patch topically 2 (two) times daily as needed (pain). 10 patch 7/23/2022  Jacek Coburn Jr., MD        Follow-up Information     Follow up With Specialties Details Why Contact Info    Pepito Jhaveri IV, MD Family Medicine Schedule an appointment as soon as possible for a visit in 2 days  [address]   [place]  [place] [address]  [phone]             Jacek Coburn Jr., MD  07/23/22 1240

## 2022-10-24 ENCOUNTER — HOSPITAL ENCOUNTER (EMERGENCY)
Facility: HOSPITAL | Age: 67
Discharge: HOME | End: 2022-10-25
Attending: FAMILY MEDICINE
Payer: MEDICARE

## 2022-10-24 DIAGNOSIS — J44.9 CHRONIC OBSTRUCTIVE PULMONARY DISEASE, UNSPECIFIED COPD TYPE: ICD-10-CM

## 2022-10-24 DIAGNOSIS — R52 PAIN: ICD-10-CM

## 2022-10-24 DIAGNOSIS — R07.81 RIB PAIN ON LEFT SIDE: Primary | ICD-10-CM

## 2022-10-24 PROCEDURE — 71100 XR RIBS 2 VIEW LEFT: ICD-10-PCS | Mod: 26,LT,, | Performed by: RADIOLOGY

## 2022-10-24 PROCEDURE — 99283 EMERGENCY DEPT VISIT LOW MDM: CPT | Mod: 25

## 2022-10-24 PROCEDURE — 71100 X-RAY EXAM RIBS UNI 2 VIEWS: CPT | Mod: 26,LT,, | Performed by: RADIOLOGY

## 2022-10-24 PROCEDURE — 71100 X-RAY EXAM RIBS UNI 2 VIEWS: CPT | Mod: TC,LT

## 2022-10-24 PROCEDURE — 25000242 PHARM REV CODE 250 ALT 637 W/ HCPCS: Performed by: FAMILY MEDICINE

## 2022-10-24 PROCEDURE — 94640 AIRWAY INHALATION TREATMENT: CPT

## 2022-10-24 PROCEDURE — 94761 N-INVAS EAR/PLS OXIMETRY MLT: CPT

## 2022-10-24 RX ORDER — IPRATROPIUM BROMIDE AND ALBUTEROL SULFATE 2.5; .5 MG/3ML; MG/3ML
3 SOLUTION RESPIRATORY (INHALATION)
Status: COMPLETED | OUTPATIENT
Start: 2022-10-24 | End: 2022-10-24

## 2022-10-24 RX ADMIN — IPRATROPIUM BROMIDE AND ALBUTEROL SULFATE 3 ML: 2.5; .5 SOLUTION RESPIRATORY (INHALATION) at 11:10

## 2022-10-25 VITALS
HEIGHT: 62 IN | RESPIRATION RATE: 20 BRPM | DIASTOLIC BLOOD PRESSURE: 89 MMHG | TEMPERATURE: 98 F | OXYGEN SATURATION: 93 % | SYSTOLIC BLOOD PRESSURE: 106 MMHG | HEART RATE: 92 BPM | WEIGHT: 139 LBS | BODY MASS INDEX: 25.58 KG/M2

## 2022-10-25 PROCEDURE — 25000003 PHARM REV CODE 250: Performed by: FAMILY MEDICINE

## 2022-10-25 RX ORDER — TRAMADOL HYDROCHLORIDE 50 MG/1
50 TABLET ORAL EVERY 8 HOURS PRN
Qty: 12 TABLET | Refills: 0 | Status: ON HOLD | OUTPATIENT
Start: 2022-10-25 | End: 2023-06-05 | Stop reason: HOSPADM

## 2022-10-25 RX ORDER — HYDROCODONE BITARTRATE AND ACETAMINOPHEN 5; 325 MG/1; MG/1
1 TABLET ORAL
Status: COMPLETED | OUTPATIENT
Start: 2022-10-25 | End: 2022-10-25

## 2022-10-25 RX ADMIN — HYDROCODONE BITARTRATE AND ACETAMINOPHEN 1 TABLET: 5; 325 TABLET ORAL at 12:10

## 2022-10-25 NOTE — ED NOTES
Pt reports she lost balance and fell backward and hit her l side on the refrigerator around 1600. Denies loc or new onset of sob or cp.   Pt c/o l side rib pain.   Aaox4. Resp e/u. nad

## 2022-10-25 NOTE — ED PROVIDER NOTES
Encounter Date: 10/24/2022       History     Chief Complaint   Patient presents with    Shortness of Breath     Pt c/o fall while squatting down to get something out cabinet. Pt states she fell back and hit L side on fridge. Pt c/o pain to L side, reports pain w/ inspiration.        67-year-old female presents to the ED complaining of pain to her left chest she states she fell over 6 hours ago she has had previous multiple left rib fractures from a distant prior fall in the past she has COPD has home oxygen and at at least 2 liters/minute continuously she denies any hemoptysis fever chills or vomiting    Review of patient's allergies indicates:  No Known Allergies  Past Medical History:   Diagnosis Date    Anxiety     COPD (chronic obstructive pulmonary disease)     Depression     Hypertension     Requires continuous at home supplemental oxygen     PRN FOR COPD     Past Surgical History:   Procedure Laterality Date    COLON SURGERY      COLOSTOMY      COLOSTOMY CLOSURE      HYSTERECTOMY      LEG SURGERY Right     OPEN REDUCTION AND INTERNAL FIXATION (ORIF) OF INJURY OF WRIST Left 1/29/2019    Procedure: ORIF, WRIST;  Surgeon: Homero Jeff DO;  Location: [facility] OR;  Service: Orthopedics;  Laterality: Left;  Equipment: [employer] Geminus Wrist Plate Set  Vendor/Rep: [employer]  C-Arm: Entire  DME: None    REQUIRES ASSISTANT    WRIST SURGERY Right      Family History   Problem Relation Age of Onset    Cancer Mother     COPD Father     Cirrhosis Father     Arthritis Sister     No Known Problems Brother     Anxiety disorder Daughter     Depression Daughter     Anxiety disorder Daughter     Congenital heart disease Daughter     Valvular heart disease Daughter      Social History     Tobacco Use    Smoking status: Every Day     Packs/day: 1.00     Years: 20.00     Pack years: 20.00     Types: Cigarettes    Smokeless tobacco: Never   Substance Use Topics    Alcohol use: Yes     Comment: couple times a  week-wine or bloody cari    Drug use: No     Review of Systems   Constitutional:  Negative for fever.   HENT:  Negative for sore throat.    Respiratory:  Negative for shortness of breath.    Cardiovascular:  Negative for chest pain.   Gastrointestinal:  Negative for nausea.   Genitourinary:  Negative for dysuria and flank pain.   Musculoskeletal:  Negative for back pain.   Skin:  Negative for rash.   Neurological:  Negative for weakness.   Hematological:  Does not bruise/bleed easily.   Psychiatric/Behavioral:  Negative for agitation, behavioral problems and confusion.      Physical Exam     Initial Vitals   BP Pulse Resp Temp SpO2   10/24/22 2320 10/24/22 2320 10/24/22 2320 10/25/22 0106 10/24/22 2320   (!) 157/117 98 (!) 22 98.1 °F (36.7 °C) (!) 91 %      MAP       --                Physical Exam    Nursing note and vitals reviewed.  Constitutional: She appears well-developed and well-nourished. She is not diaphoretic. No distress.   HENT:   Head: Normocephalic and atraumatic.   Right Ear: External ear normal.   Left Ear: External ear normal.   Eyes: Pupils are equal, round, and reactive to light. Right eye exhibits no discharge. Left eye exhibits no discharge.   Neck: No tracheal deviation present. No JVD present.   Cardiovascular:      Exam reveals no friction rub.       No murmur heard.  Pulmonary/Chest: No stridor. No respiratory distress. She has no wheezes. She has no rales. She exhibits tenderness.   Some tenderness to the left chest wall no crepitance   Abdominal: Bowel sounds are normal. She exhibits no distension.   Musculoskeletal:         General: Normal range of motion.     Neurological: She is alert.   Skin: Skin is warm.   Psychiatric: She has a normal mood and affect.       ED Course   Procedures  Labs Reviewed - No data to display       Imaging Results              X-Ray Ribs 2 View Left (In process)                   X-Rays:   Independently Interpreted Readings:   Other Readings:  X-rays show  multiple old healed rib fractures no apparent acute fractures of the ribs  Medications   albuterol-ipratropium 2.5 mg-0.5 mg/3 mL nebulizer solution 3 mL (3 mLs Nebulization Given 10/24/22 0654)   HYDROcodone-acetaminophen 5-325 mg per tablet 1 tablet (1 tablet Oral Given 10/25/22 5598)                              Clinical Impression:   Final diagnoses:  [R52] Pain  [R07.81] Rib pain on left side (Primary)  [J44.9] Chronic obstructive pulmonary disease, unspecified COPD type        ED Disposition Condition    Discharge Stable          ED Prescriptions       Medication Sig Dispense Start Date End Date Auth. Provider    traMADoL (ULTRAM) 50 mg tablet Take 1 tablet (50 mg total) by mouth every 8 (eight) hours as needed for Pain. 12 tablet 10/25/2022 -- Ru Bhatti MD          Follow-up Information    None          Ru Bhatti MD  10/25/22 5382

## 2023-06-04 ENCOUNTER — HOSPITAL ENCOUNTER (OUTPATIENT)
Facility: HOSPITAL | Age: 68
Discharge: HOME | End: 2023-06-05
Attending: EMERGENCY MEDICINE | Admitting: INTERNAL MEDICINE
Payer: MEDICARE

## 2023-06-04 DIAGNOSIS — R07.9 CHEST PAIN: ICD-10-CM

## 2023-06-04 DIAGNOSIS — B37.2 CANDIDAL SKIN INFECTION: ICD-10-CM
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
## 2023-06-04 DIAGNOSIS — R53.1 GENERALIZED WEAKNESS: ICD-10-CM

## 2023-06-04 DIAGNOSIS — E87.1 HYPONATREMIA: Primary | ICD-10-CM

## 2023-06-04 DIAGNOSIS — R29.6 RECURRENT FALLS: ICD-10-CM

## 2023-06-04 PROBLEM — E86.0 DEHYDRATION WITH HYPONATREMIA: Status: ACTIVE | Noted: 2020-09-13

## 2023-06-04 PROBLEM — D53.9 MACROCYTIC ANEMIA: Status: ACTIVE | Noted: 2023-06-04

## 2023-06-04 PROBLEM — J96.11 CHRONIC RESPIRATORY FAILURE WITH HYPOXIA: Status: ACTIVE | Noted: 2023-06-04

## 2023-06-04 PROBLEM — J96.21 ACUTE ON CHRONIC RESPIRATORY FAILURE WITH HYPOXIA: Status: ACTIVE | Noted: 2023-06-04

## 2023-06-04 LAB
ALBUMIN SERPL BCP-MCNC: 3 G/DL (ref 3.5–5.2)
ALLENS TEST: ABNORMAL
ALP SERPL-CCNC: 208 U/L (ref 55–135)
ALT SERPL W/O P-5'-P-CCNC: 15 U/L (ref 10–44)
AMMONIA PLAS-SCNC: 16 UMOL/L (ref 10–50)
AMPHET+METHAMPHET UR QL: NEGATIVE
ANION GAP SERPL CALC-SCNC: 19 MMOL/L (ref 8–16)
APAP SERPL-MCNC: <3 UG/ML (ref 10–20)
AST SERPL-CCNC: 36 U/L (ref 10–40)
BACTERIA #/AREA URNS HPF: ABNORMAL /HPF
BARBITURATES UR QL SCN>200 NG/ML: ABNORMAL
BASOPHILS # BLD AUTO: 0.02 K/UL (ref 0–0.2)
BASOPHILS NFR BLD: 0.2 % (ref 0–1.9)
BENZODIAZ UR QL SCN>200 NG/ML: NEGATIVE
BILIRUB SERPL-MCNC: 0.3 MG/DL (ref 0.1–1)
BILIRUB UR QL STRIP: ABNORMAL
BNP SERPL-MCNC: 20 PG/ML (ref 0–99)
BUN SERPL-MCNC: 19 MG/DL (ref 8–23)
BZE UR QL SCN: NEGATIVE
CALCIUM SERPL-MCNC: 9.3 MG/DL (ref 8.7–10.5)
CANNABINOIDS UR QL SCN: NEGATIVE
CHLORIDE SERPL-SCNC: 85 MMOL/L (ref 95–110)
CLARITY UR: CLEAR
CO2 SERPL-SCNC: 23 MMOL/L (ref 23–29)
COLOR UR: YELLOW
CREAT SERPL-MCNC: 1.3 MG/DL (ref 0.5–1.4)
CREAT UR-MCNC: 110.7 MG/DL (ref 15–325)
DELSYS: ABNORMAL
DIFFERENTIAL METHOD: ABNORMAL
EOSINOPHIL # BLD AUTO: 0.3 K/UL (ref 0–0.5)
EOSINOPHIL NFR BLD: 2.7 % (ref 0–8)
ERYTHROCYTE [DISTWIDTH] IN BLOOD BY AUTOMATED COUNT: 12.5 % (ref 11.5–14.5)
EST. GFR  (NO RACE VARIABLE): 45.1 ML/MIN/1.73 M^2
ETHANOL SERPL-MCNC: <10 MG/DL (ref 0–10)
FIO2: 32
FLOW: 3
GLUCOSE SERPL-MCNC: 87 MG/DL (ref 70–110)
GLUCOSE UR QL STRIP: NEGATIVE
HCO3 UR-SCNC: 25.3 MMOL/L (ref 24–28)
HCT VFR BLD AUTO: 31.8 % (ref 37–48.5)
HGB BLD-MCNC: 11.1 G/DL (ref 12–16)
HGB UR QL STRIP: NEGATIVE
HYALINE CASTS #/AREA URNS LPF: 15 /LPF
IMM GRANULOCYTES # BLD AUTO: 0.03 K/UL (ref 0–0.04)
IMM GRANULOCYTES NFR BLD AUTO: 0.3 % (ref 0–0.5)
INR PPP: 1 (ref 0.8–1.2)
IRON SERPL-MCNC: 64 UG/DL (ref 30–160)
KETONES UR QL STRIP: ABNORMAL
LACTATE SERPL-SCNC: 0.7 MMOL/L (ref 0.5–2.2)
LEUKOCYTE ESTERASE UR QL STRIP: NEGATIVE
LYMPHOCYTES # BLD AUTO: 0.7 K/UL (ref 1–4.8)
LYMPHOCYTES NFR BLD: 6.9 % (ref 18–48)
MAGNESIUM SERPL-MCNC: 2.1 MG/DL (ref 1.6–2.6)
MCH RBC QN AUTO: 34.4 PG (ref 27–31)
MCHC RBC AUTO-ENTMCNC: 34.9 G/DL (ref 32–36)
MCV RBC AUTO: 99 FL (ref 82–98)
METHADONE UR QL SCN>300 NG/ML: NEGATIVE
MICROSCOPIC COMMENT: ABNORMAL
MODE: ABNORMAL
MONOCYTES # BLD AUTO: 0.4 K/UL (ref 0.3–1)
MONOCYTES NFR BLD: 4.4 % (ref 4–15)
NEUTROPHILS # BLD AUTO: 8.1 K/UL (ref 1.8–7.7)
NEUTROPHILS NFR BLD: 85.5 % (ref 38–73)
NITRITE UR QL STRIP: NEGATIVE
NRBC BLD-RTO: 0 /100 WBC
OPIATES UR QL SCN: NEGATIVE
PCO2 BLDA: 45.6 MMHG (ref 35–45)
PCP UR QL SCN>25 NG/ML: NEGATIVE
PH SMN: 7.35 [PH] (ref 7.35–7.45)
PH UR STRIP: 6 [PH] (ref 5–8)
PLATELET # BLD AUTO: 181 K/UL (ref 150–450)
PMV BLD AUTO: 10.1 FL (ref 9.2–12.9)
PO2 BLDA: 67 MMHG (ref 80–100)
POC BE: 0 MMOL/L
POC SATURATED O2: 92 % (ref 95–100)
POTASSIUM SERPL-SCNC: 4.4 MMOL/L (ref 3.5–5.1)
PROT SERPL-MCNC: 7.1 G/DL (ref 6–8.4)
PROT UR QL STRIP: ABNORMAL
PROTHROMBIN TIME: 10.9 SEC (ref 9–12.5)
RBC # BLD AUTO: 3.23 M/UL (ref 4–5.4)
RBC #/AREA URNS HPF: 0 /HPF (ref 0–4)
SALICYLATES SERPL-MCNC: <5 MG/DL (ref 15–30)
SAMPLE: ABNORMAL
SATURATED IRON: 30 % (ref 20–50)
SITE: ABNORMAL
SODIUM SERPL-SCNC: 127 MMOL/L (ref 136–145)
SP GR UR STRIP: 1.01 (ref 1–1.03)
SP02: 95
TOTAL IRON BINDING CAPACITY: 213 UG/DL (ref 250–450)
TOXICOLOGY INFORMATION: ABNORMAL
TRANSFERRIN SERPL-MCNC: 144 MG/DL (ref 200–375)
TSH SERPL DL<=0.005 MIU/L-ACNC: 1.71 UIU/ML (ref 0.4–4)
URN SPEC COLLECT METH UR: ABNORMAL
UROBILINOGEN UR STRIP-ACNC: NEGATIVE EU/DL
WBC # BLD AUTO: 9.46 K/UL (ref 3.9–12.7)
WBC #/AREA URNS HPF: 5 /HPF (ref 0–5)

## 2023-06-04 PROCEDURE — 80179 DRUG ASSAY SALICYLATE: CPT | Performed by: EMERGENCY MEDICINE

## 2023-06-04 PROCEDURE — 70450 CT HEAD/BRAIN W/O DYE: CPT | Mod: 26,,, | Performed by: RADIOLOGY

## 2023-06-04 PROCEDURE — 73562 XR KNEE 3 VIEW RIGHT: ICD-10-PCS | Mod: 26,RT,, | Performed by: RADIOLOGY

## 2023-06-04 PROCEDURE — 81000 URINALYSIS NONAUTO W/SCOPE: CPT | Mod: 59 | Performed by: EMERGENCY MEDICINE

## 2023-06-04 PROCEDURE — 71045 XR CHEST AP PORTABLE: ICD-10-PCS | Mod: 26,,, | Performed by: RADIOLOGY

## 2023-06-04 PROCEDURE — 80143 DRUG ASSAY ACETAMINOPHEN: CPT | Performed by: EMERGENCY MEDICINE

## 2023-06-04 PROCEDURE — 80053 COMPREHEN METABOLIC PANEL: CPT | Performed by: EMERGENCY MEDICINE

## 2023-06-04 PROCEDURE — 99285 EMERGENCY DEPT VISIT HI MDM: CPT | Mod: 25

## 2023-06-04 PROCEDURE — 80307 DRUG TEST PRSMV CHEM ANLYZR: CPT | Performed by: EMERGENCY MEDICINE

## 2023-06-04 PROCEDURE — 71045 X-RAY EXAM CHEST 1 VIEW: CPT | Mod: 26,,, | Performed by: RADIOLOGY

## 2023-06-04 PROCEDURE — 36600 WITHDRAWAL OF ARTERIAL BLOOD: CPT

## 2023-06-04 PROCEDURE — 70450 CT HEAD WITHOUT CONTRAST: ICD-10-PCS | Mod: 26,,, | Performed by: RADIOLOGY

## 2023-06-04 PROCEDURE — 83921 ORGANIC ACID SINGLE QUANT: CPT | Performed by: INTERNAL MEDICINE

## 2023-06-04 PROCEDURE — 85610 PROTHROMBIN TIME: CPT | Performed by: EMERGENCY MEDICINE

## 2023-06-04 PROCEDURE — 25000003 PHARM REV CODE 250: Performed by: INTERNAL MEDICINE

## 2023-06-04 PROCEDURE — 71045 X-RAY EXAM CHEST 1 VIEW: CPT | Mod: TC

## 2023-06-04 PROCEDURE — 70450 CT HEAD/BRAIN W/O DYE: CPT | Mod: TC

## 2023-06-04 PROCEDURE — 83605 ASSAY OF LACTIC ACID: CPT | Performed by: EMERGENCY MEDICINE

## 2023-06-04 PROCEDURE — 83880 ASSAY OF NATRIURETIC PEPTIDE: CPT | Performed by: EMERGENCY MEDICINE

## 2023-06-04 PROCEDURE — 82746 ASSAY OF FOLIC ACID SERUM: CPT | Performed by: INTERNAL MEDICINE

## 2023-06-04 PROCEDURE — 99223 1ST HOSP IP/OBS HIGH 75: CPT | Mod: AI,95,, | Performed by: INTERNAL MEDICINE

## 2023-06-04 PROCEDURE — 84466 ASSAY OF TRANSFERRIN: CPT | Performed by: INTERNAL MEDICINE

## 2023-06-04 PROCEDURE — G0378 HOSPITAL OBSERVATION PER HR: HCPCS

## 2023-06-04 PROCEDURE — 82803 BLOOD GASES ANY COMBINATION: CPT

## 2023-06-04 PROCEDURE — 82607 VITAMIN B-12: CPT | Performed by: INTERNAL MEDICINE

## 2023-06-04 PROCEDURE — 83735 ASSAY OF MAGNESIUM: CPT | Performed by: EMERGENCY MEDICINE

## 2023-06-04 PROCEDURE — 82140 ASSAY OF AMMONIA: CPT | Performed by: EMERGENCY MEDICINE

## 2023-06-04 PROCEDURE — 27000221 HC OXYGEN, UP TO 24 HOURS

## 2023-06-04 PROCEDURE — 25000003 PHARM REV CODE 250: Performed by: EMERGENCY MEDICINE

## 2023-06-04 PROCEDURE — 85025 COMPLETE CBC W/AUTO DIFF WBC: CPT | Performed by: EMERGENCY MEDICINE

## 2023-06-04 PROCEDURE — 82077 ASSAY SPEC XCP UR&BREATH IA: CPT | Performed by: EMERGENCY MEDICINE

## 2023-06-04 PROCEDURE — 84443 ASSAY THYROID STIM HORMONE: CPT | Performed by: INTERNAL MEDICINE

## 2023-06-04 PROCEDURE — 99900035 HC TECH TIME PER 15 MIN (STAT)

## 2023-06-04 PROCEDURE — 73562 X-RAY EXAM OF KNEE 3: CPT | Mod: TC,RT

## 2023-06-04 PROCEDURE — 73562 X-RAY EXAM OF KNEE 3: CPT | Mod: 26,RT,, | Performed by: RADIOLOGY

## 2023-06-04 PROCEDURE — 99223 PR INITIAL HOSPITAL CARE,LEVL III: ICD-10-PCS | Mod: AI,95,, | Performed by: INTERNAL MEDICINE

## 2023-06-04 PROCEDURE — 82728 ASSAY OF FERRITIN: CPT | Performed by: INTERNAL MEDICINE

## 2023-06-04 RX ORDER — FLUTICASONE FUROATE AND VILANTEROL 100; 25 UG/1; UG/1
1 POWDER RESPIRATORY (INHALATION) DAILY
Status: DISCONTINUED | OUTPATIENT
Start: 2023-06-05 | End: 2023-06-05 | Stop reason: HOSPADM

## 2023-06-04 RX ORDER — HYDROCODONE BITARTRATE AND ACETAMINOPHEN 5; 325 MG/1; MG/1
1 TABLET ORAL EVERY 6 HOURS PRN
Status: DISCONTINUED | OUTPATIENT
Start: 2023-06-04 | End: 2023-06-05 | Stop reason: HOSPADM

## 2023-06-04 RX ORDER — MONTELUKAST SODIUM 10 MG/1
10 TABLET ORAL EVERY MORNING
Status: DISCONTINUED | OUTPATIENT
Start: 2023-06-05 | End: 2023-06-05 | Stop reason: HOSPADM

## 2023-06-04 RX ORDER — ONDANSETRON 2 MG/ML
4 INJECTION INTRAMUSCULAR; INTRAVENOUS EVERY 8 HOURS PRN
Status: DISCONTINUED | OUTPATIENT
Start: 2023-06-04 | End: 2023-06-05 | Stop reason: HOSPADM

## 2023-06-04 RX ORDER — HYDROMORPHONE HYDROCHLORIDE 1 MG/ML
1 INJECTION, SOLUTION INTRAMUSCULAR; INTRAVENOUS; SUBCUTANEOUS
Status: DISCONTINUED | OUTPATIENT
Start: 2023-06-04 | End: 2023-06-04

## 2023-06-04 RX ORDER — ALBUTEROL SULFATE 0.83 MG/ML
2.5 SOLUTION RESPIRATORY (INHALATION) EVERY 4 HOURS PRN
Status: DISCONTINUED | OUTPATIENT
Start: 2023-06-04 | End: 2023-06-05 | Stop reason: HOSPADM

## 2023-06-04 RX ORDER — CLONAZEPAM 0.5 MG/1
0.5 TABLET ORAL 2 TIMES DAILY PRN
Status: DISCONTINUED | OUTPATIENT
Start: 2023-06-04 | End: 2023-06-05 | Stop reason: HOSPADM

## 2023-06-04 RX ORDER — ACETAMINOPHEN 325 MG/1
650 TABLET ORAL EVERY 6 HOURS PRN
Status: DISCONTINUED | OUTPATIENT
Start: 2023-06-04 | End: 2023-06-05 | Stop reason: HOSPADM

## 2023-06-04 RX ORDER — SIMETHICONE 80 MG
1 TABLET,CHEWABLE ORAL 4 TIMES DAILY PRN
Status: DISCONTINUED | OUTPATIENT
Start: 2023-06-04 | End: 2023-06-05 | Stop reason: HOSPADM

## 2023-06-04 RX ORDER — IPRATROPIUM BROMIDE AND ALBUTEROL SULFATE 2.5; .5 MG/3ML; MG/3ML
3 SOLUTION RESPIRATORY (INHALATION) EVERY 4 HOURS PRN
Status: DISCONTINUED | OUTPATIENT
Start: 2023-06-04 | End: 2023-06-04

## 2023-06-04 RX ORDER — MAG HYDROX/ALUMINUM HYD/SIMETH 200-200-20
30 SUSPENSION, ORAL (FINAL DOSE FORM) ORAL 4 TIMES DAILY PRN
Status: DISCONTINUED | OUTPATIENT
Start: 2023-06-04 | End: 2023-06-05 | Stop reason: HOSPADM

## 2023-06-04 RX ORDER — TALC
6 POWDER (GRAM) TOPICAL NIGHTLY PRN
Status: DISCONTINUED | OUTPATIENT
Start: 2023-06-04 | End: 2023-06-05 | Stop reason: HOSPADM

## 2023-06-04 RX ORDER — DEXTROSE 40 %
15 GEL (GRAM) ORAL
Status: DISCONTINUED | OUTPATIENT
Start: 2023-06-04 | End: 2023-06-05 | Stop reason: HOSPADM

## 2023-06-04 RX ORDER — IPRATROPIUM BROMIDE 0.5 MG/2.5ML
0.5 SOLUTION RESPIRATORY (INHALATION) EVERY 4 HOURS PRN
Status: DISCONTINUED | OUTPATIENT
Start: 2023-06-04 | End: 2023-06-05 | Stop reason: HOSPADM

## 2023-06-04 RX ORDER — SODIUM CHLORIDE 9 MG/ML
1000 INJECTION, SOLUTION INTRAVENOUS
Status: COMPLETED | OUTPATIENT
Start: 2023-06-04 | End: 2023-06-04

## 2023-06-04 RX ORDER — DEXTROSE 40 %
30 GEL (GRAM) ORAL
Status: DISCONTINUED | OUTPATIENT
Start: 2023-06-04 | End: 2023-06-05 | Stop reason: HOSPADM

## 2023-06-04 RX ORDER — HEPARIN SODIUM 5000 [USP'U]/ML
5000 INJECTION, SOLUTION INTRAVENOUS; SUBCUTANEOUS EVERY 8 HOURS
Status: DISCONTINUED | OUTPATIENT
Start: 2023-06-05 | End: 2023-06-05 | Stop reason: HOSPADM

## 2023-06-04 RX ORDER — NALOXONE HCL 0.4 MG/ML
0.02 VIAL (ML) INJECTION
Status: DISCONTINUED | OUTPATIENT
Start: 2023-06-04 | End: 2023-06-05 | Stop reason: HOSPADM

## 2023-06-04 RX ORDER — POLYETHYLENE GLYCOL 3350 17 G/17G
17 POWDER, FOR SOLUTION ORAL DAILY
Status: DISCONTINUED | OUTPATIENT
Start: 2023-06-05 | End: 2023-06-05 | Stop reason: HOSPADM

## 2023-06-04 RX ORDER — AMOXICILLIN 250 MG
1 CAPSULE ORAL 2 TIMES DAILY PRN
Status: DISCONTINUED | OUTPATIENT
Start: 2023-06-04 | End: 2023-06-05 | Stop reason: HOSPADM

## 2023-06-04 RX ORDER — SODIUM CHLORIDE 9 MG/ML
INJECTION, SOLUTION INTRAVENOUS CONTINUOUS
Status: DISCONTINUED | OUTPATIENT
Start: 2023-06-04 | End: 2023-06-05 | Stop reason: HOSPADM

## 2023-06-04 RX ORDER — BUSPIRONE HYDROCHLORIDE 5 MG/1
15 TABLET ORAL 2 TIMES DAILY
Status: DISCONTINUED | OUTPATIENT
Start: 2023-06-04 | End: 2023-06-05 | Stop reason: HOSPADM

## 2023-06-04 RX ORDER — GLUCAGON 1 MG
1 KIT INJECTION
Status: DISCONTINUED | OUTPATIENT
Start: 2023-06-04 | End: 2023-06-05 | Stop reason: HOSPADM

## 2023-06-04 RX ORDER — PROCHLORPERAZINE EDISYLATE 5 MG/ML
5 INJECTION INTRAMUSCULAR; INTRAVENOUS EVERY 6 HOURS PRN
Status: DISCONTINUED | OUTPATIENT
Start: 2023-06-04 | End: 2023-06-05 | Stop reason: HOSPADM

## 2023-06-04 RX ORDER — ONDANSETRON 2 MG/ML
4 INJECTION INTRAMUSCULAR; INTRAVENOUS
Status: DISCONTINUED | OUTPATIENT
Start: 2023-06-04 | End: 2023-06-04

## 2023-06-04 RX ORDER — SODIUM CHLORIDE 0.9 % (FLUSH) 0.9 %
10 SYRINGE (ML) INJECTION EVERY 12 HOURS PRN
Status: DISCONTINUED | OUTPATIENT
Start: 2023-06-04 | End: 2023-06-05 | Stop reason: HOSPADM

## 2023-06-04 RX ADMIN — SODIUM CHLORIDE 1000 ML: 9 INJECTION, SOLUTION INTRAVENOUS at 06:06

## 2023-06-04 RX ADMIN — SODIUM CHLORIDE: 9 INJECTION, SOLUTION INTRAVENOUS at 10:06

## 2023-06-04 NOTE — PLAN OF CARE
Latest Reference Range & Units 06/04/23 18:14   Sample  ARTERIAL   POC PH 7.35 - 7.45  7.353   POC PCO2 35 - 45 mmHg 45.6 (H)   POC PO2 80 - 100 mmHg 67 (L)   POC HCO3 24 - 28 mmol/L 25.3   POC SATURATED O2 95 - 100 % 92 (L)   Allens Test  N/A   POC BE -2 to 2 mmol/L 0   FiO2  32   Flow  3   DelSys  Nasal Can   Site  RB   Mode  SPONT   (H): Data is abnormally high  (L): Data is abnormally low  Results reported to Dr. Blake.

## 2023-06-04 NOTE — ED PROVIDER NOTES
Encounter Date: 6/4/2023       History     Chief Complaint   Patient presents with    generalized weakness     67-year-old female with multiple medical problems including COPD on home O2 here with medics who were called to the patient's house by her family reporting weakness and pain secondary to a fall at least a week ago.  Patient reports she fell off a couch more than once and complains of bilateral anterior chest pain.  Per the medics the patient was lying on a couch soaked with urine.    The history is provided by the patient, medical records and the EMS personnel.   Review of patient's allergies indicates:  No Known Allergies  Past Medical History:   Diagnosis Date    Anxiety     COPD (chronic obstructive pulmonary disease)     Depression     Hypertension     Requires continuous at home supplemental oxygen     PRN FOR COPD     Past Surgical History:   Procedure Laterality Date    COLON SURGERY      COLOSTOMY      COLOSTOMY CLOSURE      HYSTERECTOMY      LEG SURGERY Right     OPEN REDUCTION AND INTERNAL FIXATION (ORIF) OF INJURY OF WRIST Left 1/29/2019    Procedure: ORIF, WRIST;  Surgeon: Homero Jeff DO;  Location: [facility] OR;  Service: Orthopedics;  Laterality: Left;  Equipment: [employer] Geminus Wrist Plate Set  Vendor/Rep: [employer]  C-Arm: Entire  DME: None    REQUIRES ASSISTANT    WRIST SURGERY Right      Family History   Problem Relation Age of Onset    Cancer Mother     COPD Father     Cirrhosis Father     Arthritis Sister     No Known Problems Brother     Anxiety disorder Daughter     Depression Daughter     Anxiety disorder Daughter     Congenital heart disease Daughter     Valvular heart disease Daughter      Social History     Tobacco Use    Smoking status: Every Day     Packs/day: 1.00     Years: 20.00     Pack years: 20.00     Types: Cigarettes    Smokeless tobacco: Never   Substance Use Topics    Alcohol use: Yes     Comment: couple times a week-wine or bloody cari    Drug use:  No     Review of Systems   Constitutional:  Positive for activity change, appetite change and fatigue.   HENT: Negative.     Eyes: Negative.    Respiratory: Negative.     Cardiovascular:  Positive for chest pain (Rib pain).        Bilateral anterior chest pain worse with movement or cough.   Gastrointestinal: Negative.    Genitourinary: Negative.    Musculoskeletal:  Positive for arthralgias (Right knee pain).   Skin:  Positive for rash (Under both breasts).        Rash beneath both breasts   Neurological:  Positive for weakness. Negative for dizziness, seizures, syncope and numbness.   Hematological: Negative.    Psychiatric/Behavioral: Negative.  Negative for confusion, hallucinations and suicidal ideas.    All other systems reviewed and are negative.    Physical Exam     Initial Vitals [06/04/23 1742]   BP Pulse Resp Temp SpO2   101/69 96 20 98.4 °F (36.9 °C) (!) 92 %      MAP       --         Physical Exam    Nursing note and vitals reviewed.  Constitutional: She appears well-developed.   Disheveled, chronically ill-appearing female.   HENT:   Head: Normocephalic and atraumatic.   Eyes: EOM are normal.   Neck: Neck supple.   Cardiovascular:  Normal rate.           Pulmonary/Chest: Breath sounds normal. No respiratory distress.   Abdominal: Abdomen is soft.   Musculoskeletal:         General: No edema.      Cervical back: Neck supple.     Neurological: She is alert and oriented to person, place, and time.   Skin: Skin is warm and dry. Capillary refill takes less than 2 seconds. Rash noted.   Intertriginous spaces beneath both breasts with wet macerated skin and exudate.   Psychiatric: She has a normal mood and affect.       ED Course   Procedures  Labs Reviewed   CBC W/ AUTO DIFFERENTIAL - Abnormal; Notable for the following components:       Result Value    RBC 3.23 (*)     Hemoglobin 11.1 (*)     Hematocrit 31.8 (*)     MCV 99 (*)     MCH 34.4 (*)     Gran # (ANC) 8.1 (*)     Lymph # 0.7 (*)     Gran % 85.5 (*)      Lymph % 6.9 (*)     All other components within normal limits   COMPREHENSIVE METABOLIC PANEL - Abnormal; Notable for the following components:    Sodium 127 (*)     Chloride 85 (*)     Albumin 3.0 (*)     Alkaline Phosphatase 208 (*)     Anion Gap 19 (*)     eGFR 45.1 (*)     All other components within normal limits   URINALYSIS, REFLEX TO URINE CULTURE - Abnormal; Notable for the following components:    Protein, UA 1+ (*)     Ketones, UA 3+ (*)     Bilirubin (UA) 3+ (*)     All other components within normal limits    Narrative:     Preferred Collection Type->Urine, Clean Catch  Specimen Source->Urine   DRUG SCREEN PANEL, URINE EMERGENCY - Abnormal; Notable for the following components:    Barbiturate Screen, Ur Presumptive Positive (*)     All other components within normal limits    Narrative:     Preferred Collection Type->Urine, Clean Catch  Specimen Source->Urine   SALICYLATE LEVEL - Abnormal; Notable for the following components:    Salicylate Lvl <5.0 (*)     All other components within normal limits   ACETAMINOPHEN LEVEL - Abnormal; Notable for the following components:    Acetaminophen (Tylenol), Serum <3.0 (*)     All other components within normal limits   URINALYSIS MICROSCOPIC - Abnormal; Notable for the following components:    Hyaline Casts, UA 15 (*)     All other components within normal limits    Narrative:     Preferred Collection Type->Urine, Clean Catch  Specimen Source->Urine   IRON AND TIBC - Abnormal; Notable for the following components:    Transferrin 144 (*)     TIBC 213 (*)     All other components within normal limits   ISTAT PROCEDURE - Abnormal; Notable for the following components:    POC PCO2 45.6 (*)     POC PO2 67 (*)     POC SATURATED O2 92 (*)     All other components within normal limits   B-TYPE NATRIURETIC PEPTIDE   ALCOHOL,MEDICAL (ETHANOL)   LACTIC ACID, PLASMA   MAGNESIUM   AMMONIA   PROTIME-INR   TSH   VITAMIN B12   FOLATE   FERRITIN   METHYLMALONIC ACID, SERUM           Imaging Results              X-Ray Knee 3 View Right (In process)                      CT Head Without Contrast (In process)                      X-Ray Chest AP Portable (In process)                   X-Rays:   Independently Interpreted Readings:   Other Readings:  All x-rays and CTs personally reviewed by me.  CT brain negative for any acute findings.  No intracranial hemorrhage, no mass, no mass effect, no skull fracture.    X-ray chest shows numerous old rib fractures, no obvious acute fracture, no pneumothorax, normal cardiac silhouette.  Clear lungs.    X-ray right knee shows intramedullary meir in the tibia, no obvious fracture.  Right appears to be slightly proud, but this may be unchanged from previous and normal for her.  Medications   0.9%  NaCl infusion ( Intravenous New Bag 6/4/23 2225)   busPIRone tablet 15 mg (15 mg Oral Not Given 6/4/23 2215)   clonazePAM tablet 0.5 mg (has no administration in time range)   montelukast tablet 10 mg (has no administration in time range)   sodium chloride 0.9% flush 10 mL (has no administration in time range)   naloxone 0.4 mg/mL injection 0.02 mg (has no administration in time range)   glucagon (human recombinant) injection 1 mg (has no administration in time range)   dextrose 10% bolus 125 mL 125 mL (has no administration in time range)   dextrose 10% bolus 250 mL 250 mL (has no administration in time range)   dextrose 40 % gel 15,000 mg (has no administration in time range)   dextrose 40 % gel 30,000 mg (has no administration in time range)   heparin (porcine) injection 5,000 Units (5,000 Units Subcutaneous Not Given 6/5/23 0600)   acetaminophen tablet 650 mg (has no administration in time range)   HYDROcodone-acetaminophen 5-325 mg per tablet 1 tablet (1 tablet Oral Given 6/5/23 0049)   polyethylene glycol packet 17 g (has no administration in time range)   senna-docusate 8.6-50 mg per tablet 1 tablet (has no administration in time range)   ondansetron  injection 4 mg (has no administration in time range)   prochlorperazine injection Soln 5 mg (has no administration in time range)   melatonin tablet 6 mg (has no administration in time range)   aluminum-magnesium hydroxide-simethicone 200-200-20 mg/5 mL suspension 30 mL (has no administration in time range)   simethicone chewable tablet 80 mg (has no administration in time range)   albuterol nebulizer solution 2.5 mg (has no administration in time range)     And   ipratropium 0.02 % nebulizer solution 0.5 mg (has no administration in time range)   fluticasone furoate-vilanteroL 100-25 mcg/dose diskus inhaler 1 puff (has no administration in time range)   0.9%  NaCl infusion (1,000 mLs Intravenous New Bag 6/4/23 9400)     Medical Decision Making:   Differential Diagnosis:   UTI, pneumonia, COPD exacerbation, traumatic head injury, rib fractures, sequelae of drugs and/or alcohol abuse  ED Management:  67-year-old female here with weakness and either some type of metabolic derangement or sequelae of drug and/or alcohol abuse.  We will CT head, send labs and admit.    Dr. Blake:  Patient care assumed from Dr. Sanchez at shift change.  CT of the brain normal, x-ray ribs and x-ray right knee show no obvious new fractures, official radiology read pending.  Labs show hyponatremia at 1:27 a.m. which may be the cause of her problems.  She has been hyponatremic in the past.  Remainder of her electrolytes normal.  BUN and creatinine normal.  White count normal, H&H normal.  BNP normal.  Alkaline phosphatase elevated at 208.  Patient denies any right upper quadrant abdominal pain           ED Course as of 06/05/23 0605   Sun Jun 04, 2023 1804 Discussed with Dr. Blake at sign-out.  Patient will need admission for altered mental status. [RJ]      ED Course User Index  [RJ] Lele Sanchez MD                 Clinical Impression:   Final diagnoses:  [W19.XXXA] Fall  [E87.1] Hyponatremia (Primary)  [R53.1] Generalized  weakness  [B37.2] Candidal skin infection        ED Disposition Condition    Observation Stable                Lele Sanchez MD  06/05/23 0605

## 2023-06-05 VITALS
DIASTOLIC BLOOD PRESSURE: 62 MMHG | OXYGEN SATURATION: 91 % | HEART RATE: 95 BPM | BODY MASS INDEX: 26.78 KG/M2 | HEIGHT: 62 IN | TEMPERATURE: 98 F | WEIGHT: 145.5 LBS | SYSTOLIC BLOOD PRESSURE: 94 MMHG | RESPIRATION RATE: 17 BRPM

## 2023-06-05 LAB
ALBUMIN SERPL BCP-MCNC: 2.7 G/DL (ref 3.5–5.2)
ALP SERPL-CCNC: 180 U/L (ref 55–135)
ALT SERPL W/O P-5'-P-CCNC: 12 U/L (ref 10–44)
ANION GAP SERPL CALC-SCNC: 16 MMOL/L (ref 8–16)
AST SERPL-CCNC: 30 U/L (ref 10–40)
BASOPHILS # BLD AUTO: 0.01 K/UL (ref 0–0.2)
BASOPHILS NFR BLD: 0.2 % (ref 0–1.9)
BILIRUB SERPL-MCNC: 0.2 MG/DL (ref 0.1–1)
BUN SERPL-MCNC: 12 MG/DL (ref 8–23)
CALCIUM SERPL-MCNC: 8.7 MG/DL (ref 8.7–10.5)
CHLORIDE SERPL-SCNC: 92 MMOL/L (ref 95–110)
CO2 SERPL-SCNC: 24 MMOL/L (ref 23–29)
CREAT SERPL-MCNC: 1 MG/DL (ref 0.5–1.4)
DIFFERENTIAL METHOD: ABNORMAL
EOSINOPHIL # BLD AUTO: 0.2 K/UL (ref 0–0.5)
EOSINOPHIL NFR BLD: 3.2 % (ref 0–8)
ERYTHROCYTE [DISTWIDTH] IN BLOOD BY AUTOMATED COUNT: 12.5 % (ref 11.5–14.5)
EST. GFR  (NO RACE VARIABLE): >60 ML/MIN/1.73 M^2
FERRITIN SERPL-MCNC: 711 NG/ML (ref 20–300)
FOLATE SERPL-MCNC: <2.2 NG/ML (ref 4–24)
GLUCOSE SERPL-MCNC: 80 MG/DL (ref 70–110)
HCT VFR BLD AUTO: 28.8 % (ref 37–48.5)
HGB BLD-MCNC: 9.8 G/DL (ref 12–16)
IMM GRANULOCYTES # BLD AUTO: 0.03 K/UL (ref 0–0.04)
IMM GRANULOCYTES NFR BLD AUTO: 0.5 % (ref 0–0.5)
LYMPHOCYTES # BLD AUTO: 0.7 K/UL (ref 1–4.8)
LYMPHOCYTES NFR BLD: 11.2 % (ref 18–48)
MAGNESIUM SERPL-MCNC: 1.9 MG/DL (ref 1.6–2.6)
MCH RBC QN AUTO: 33.7 PG (ref 27–31)
MCHC RBC AUTO-ENTMCNC: 34 G/DL (ref 32–36)
MCV RBC AUTO: 99 FL (ref 82–98)
MONOCYTES # BLD AUTO: 0.4 K/UL (ref 0.3–1)
MONOCYTES NFR BLD: 6.2 % (ref 4–15)
NEUTROPHILS # BLD AUTO: 5.1 K/UL (ref 1.8–7.7)
NEUTROPHILS NFR BLD: 78.7 % (ref 38–73)
NRBC BLD-RTO: 0 /100 WBC
PHOSPHATE SERPL-MCNC: 2.2 MG/DL (ref 2.7–4.5)
PLATELET # BLD AUTO: 168 K/UL (ref 150–450)
PMV BLD AUTO: 10.3 FL (ref 9.2–12.9)
POTASSIUM SERPL-SCNC: 3.9 MMOL/L (ref 3.5–5.1)
PROT SERPL-MCNC: 6.3 G/DL (ref 6–8.4)
RBC # BLD AUTO: 2.91 M/UL (ref 4–5.4)
SODIUM SERPL-SCNC: 132 MMOL/L (ref 136–145)
VIT B12 SERPL-MCNC: 591 PG/ML (ref 210–950)
WBC # BLD AUTO: 6.5 K/UL (ref 3.9–12.7)

## 2023-06-05 PROCEDURE — 25000003 PHARM REV CODE 250: Performed by: FAMILY MEDICINE

## 2023-06-05 PROCEDURE — 27000221 HC OXYGEN, UP TO 24 HOURS

## 2023-06-05 PROCEDURE — 94640 AIRWAY INHALATION TREATMENT: CPT

## 2023-06-05 PROCEDURE — 97530 THERAPEUTIC ACTIVITIES: CPT

## 2023-06-05 PROCEDURE — 96372 THER/PROPH/DIAG INJ SC/IM: CPT | Performed by: INTERNAL MEDICINE

## 2023-06-05 PROCEDURE — 63600175 PHARM REV CODE 636 W HCPCS: Performed by: INTERNAL MEDICINE

## 2023-06-05 PROCEDURE — 97116 GAIT TRAINING THERAPY: CPT

## 2023-06-05 PROCEDURE — G0378 HOSPITAL OBSERVATION PER HR: HCPCS

## 2023-06-05 PROCEDURE — 85025 COMPLETE CBC W/AUTO DIFF WBC: CPT | Performed by: INTERNAL MEDICINE

## 2023-06-05 PROCEDURE — 99900035 HC TECH TIME PER 15 MIN (STAT)

## 2023-06-05 PROCEDURE — 94640 AIRWAY INHALATION TREATMENT: CPT | Mod: XB

## 2023-06-05 PROCEDURE — 97162 PT EVAL MOD COMPLEX 30 MIN: CPT

## 2023-06-05 PROCEDURE — 84100 ASSAY OF PHOSPHORUS: CPT | Performed by: INTERNAL MEDICINE

## 2023-06-05 PROCEDURE — 83735 ASSAY OF MAGNESIUM: CPT | Performed by: INTERNAL MEDICINE

## 2023-06-05 PROCEDURE — 25000242 PHARM REV CODE 250 ALT 637 W/ HCPCS: Performed by: FAMILY MEDICINE

## 2023-06-05 PROCEDURE — 25000003 PHARM REV CODE 250: Performed by: INTERNAL MEDICINE

## 2023-06-05 PROCEDURE — 94761 N-INVAS EAR/PLS OXIMETRY MLT: CPT

## 2023-06-05 PROCEDURE — 80053 COMPREHEN METABOLIC PANEL: CPT | Performed by: INTERNAL MEDICINE

## 2023-06-05 PROCEDURE — 99239 PR HOSPITAL DISCHARGE DAY,>30 MIN: ICD-10-PCS | Mod: ,,, | Performed by: FAMILY MEDICINE

## 2023-06-05 PROCEDURE — 99239 HOSP IP/OBS DSCHRG MGMT >30: CPT | Mod: ,,, | Performed by: FAMILY MEDICINE

## 2023-06-05 PROCEDURE — 97535 SELF CARE MNGMENT TRAINING: CPT

## 2023-06-05 PROCEDURE — 97166 OT EVAL MOD COMPLEX 45 MIN: CPT

## 2023-06-05 PROCEDURE — 25000242 PHARM REV CODE 250 ALT 637 W/ HCPCS: Performed by: INTERNAL MEDICINE

## 2023-06-05 RX ORDER — MICONAZOLE NITRATE 2 %
POWDER (GRAM) TOPICAL 2 TIMES DAILY
Status: DISCONTINUED | OUTPATIENT
Start: 2023-06-05 | End: 2023-06-05 | Stop reason: HOSPADM

## 2023-06-05 RX ORDER — MICONAZOLE NITRATE 2 %
POWDER (GRAM) TOPICAL 2 TIMES DAILY
Qty: 85 G | Refills: 0 | Status: ON HOLD | OUTPATIENT
Start: 2023-06-05 | End: 2024-01-23 | Stop reason: ALTCHOICE

## 2023-06-05 RX ADMIN — FLUTICASONE FUROATE AND VILANTEROL TRIFENATATE 1 PUFF: 100; 25 POWDER RESPIRATORY (INHALATION) at 07:06

## 2023-06-05 RX ADMIN — MONTELUKAST 10 MG: 10 TABLET, FILM COATED ORAL at 07:06

## 2023-06-05 RX ADMIN — HYDROCODONE BITARTRATE AND ACETAMINOPHEN 1 TABLET: 5; 325 TABLET ORAL at 07:06

## 2023-06-05 RX ADMIN — MICONAZOLE NITRATE: 20 POWDER TOPICAL at 12:06

## 2023-06-05 RX ADMIN — BUSPIRONE HYDROCHLORIDE 15 MG: 5 TABLET ORAL at 09:06

## 2023-06-05 RX ADMIN — IPRATROPIUM BROMIDE 0.5 MG: 0.5 SOLUTION RESPIRATORY (INHALATION) at 10:06

## 2023-06-05 RX ADMIN — HYDROCODONE BITARTRATE AND ACETAMINOPHEN 1 TABLET: 5; 325 TABLET ORAL at 12:06

## 2023-06-05 RX ADMIN — ALBUTEROL SULFATE 2.5 MG: 2.5 SOLUTION RESPIRATORY (INHALATION) at 10:06

## 2023-06-05 RX ADMIN — HYDROCODONE BITARTRATE AND ACETAMINOPHEN 1 TABLET: 5; 325 TABLET ORAL at 01:06

## 2023-06-05 RX ADMIN — POLYETHYLENE GLYCOL 3350 17 G: 17 POWDER, FOR SOLUTION ORAL at 09:06

## 2023-06-05 RX ADMIN — HEPARIN SODIUM 5000 UNITS: 5000 INJECTION, SOLUTION INTRAVENOUS; SUBCUTANEOUS at 01:06

## 2023-06-05 NOTE — PLAN OF CARE
[facility] - Intensive Care  Discharge Final Note    Primary Care Provider: Pepito Jhaveri Iv, MD    Expected Discharge Date:     Verbal follow up appointment with Dot Russo NP provided to patient. [facility] able to take patient for home health. Daughter at bedside. She will run home &  her portable oxygen as she is on oxygen around the clock at home. No other needs at this time.      Final Discharge Note (most recent)       Final Note - 06/05/23 1546          Final Note    Assessment Type Final Discharge Note     Anticipated Dis[place] Disposition [place] [facility]     What phone number can be called within the next 1-3 days to see how you are doing after discharge? [phone]     Hospital Resources/Appts/Education Provided Appointments scheduled and added to AVS        Post-Acute Status    Post-Acute Authorization Home Health     Home Health Status Set-up Complete/Auth obtained     Discharge Delays None known at this time                     Important Message from Medicare             Contact Info       Pepito Jhaveri Iv, MD   Specialty: Family Medicine   Relationship: PCP - General    [address]   [place] [address]   Phone: [phone]       Next Steps: Go on 6/13/2023    Instructions: follow up with Dot Russo NP on Tuesday, June 13 at 9:00 am    [facility]    [address] [place] [address] [place] [address]   Phone: [phone]       Next Steps: Follow up    Instructions: will provide home health services

## 2023-06-05 NOTE — H&P
"[facility] Medicine  History & Physical    Patient Name: Bhavana Lambert  MRN: [id number]  Admission Date: 6/4/2023  Attending Physician: Nany Sherman MD   Primary Care Provider: Pepito Jhaveri Iv, MD         Patient information was obtained from patient, relative(s), past medical records and ER records.       Subjective:     Principal Problem:Dehydration with hyponatremia    Chief Complaint:   Chief Complaint   Patient presents with    generalized weakness        HPI:   Ms. Lambert is a 68yo lady with a past medical history of anxiety, COPD with chronic hypoxia on home O2 2.5-3L coninuous, depression and HTN.    She now comes to the ED with her daughter at the bedside.  She states she has been falling more often on and off over the past week.  The most recent time she, "just rolled off the sofa."  This has progressed to being essentially bed bound from generalized weakness and lethargy.  She has not been eating and drinking hardly at all for the past several days.  She has chronic MENA, SOB and headaches, all of which are unchanged from baseline.  She lives at home with a friend who assists in her care.  Her daughter finally became concerned due to her just lying around with poor po intake.  She denies fever, chills and dysuria, though notes a rash under her breasts.    In the ED her VS were /79   Pulse 94   Temp 98.4 °F (36.9 °C) (Oral)   Resp 15   Ht 5' 2" (1.575 m)   Wt 61.2 kg (135 lb)   SpO2 92 - 95% 3L NC   BMI 24.69 kg/m².  Labs showed Hg 11.1, MCV 99, Na 127, AG 19, Cr 1.3 (Cr 0.5 on 9/17/20, , Alb 3, UDS +Barbiturates, UA WBC 5, RBC 0, nitrite neg.  ABG 3L NC: pH 7.35, PCO2 45.6, PAO2 67, sats 92%.    CXR showed NML heart size, flat diaphragms from COPD, no infiltrates.  CT head showed moderate white matter disease and volume loss are identified. There is no acute infarct or edema. No hemorrhage. Cerebral ventricles: No ventriculomegaly. There are no acute " concerning abnormalities.  XRay right knee (per ED staff) showed no acute fractures.  No EKG done in the ED.    In the ED she was treated with:  Medications   0.9%  NaCl infusion (1,000 mLs Intravenous New Bag 6/4/23 3515)               Past Medical History:   Diagnosis Date    Anxiety     COPD (chronic obstructive pulmonary disease)     Depression     Hypertension     Requires continuous at home supplemental oxygen     PRN FOR COPD       Past Surgical History:   Procedure Laterality Date    COLON SURGERY      COLOSTOMY      COLOSTOMY CLOSURE      HYSTERECTOMY      LEG SURGERY Right     OPEN REDUCTION AND INTERNAL FIXATION (ORIF) OF INJURY OF WRIST Left 1/29/2019    Procedure: ORIF, WRIST;  Surgeon: Homero Jeff DO;  Location: [facility] OR;  Service: Orthopedics;  Laterality: Left;  Equipment: [employer] Geminus Wrist Plate Set  Vendor/Rep: [employer]  C-Arm: Entire  DME: None    REQUIRES ASSISTANT    WRIST SURGERY Right        Review of patient's allergies indicates:  No Known Allergies    No current facility-administered medications on file prior to encounter.     Current Outpatient Medications on File Prior to Encounter   Medication Sig    amlodipine-benazepril 5-10 mg (LOTREL) 5-10 mg per capsule Take 1 capsule by mouth every morning.    busPIRone (BUSPAR) 15 MG tablet Take 15 mg by mouth 2 (two) times a day.    butalbital-acetaminophen-caffeine -40 mg (FIORICET, ESGIC) -40 mg per tablet Take 1 tablet by mouth every 6 (six) hours as needed for Headaches.    clonazePAM (KLONOPIN) 0.5 MG tablet Take 0.5 mg by mouth 2 (two) times daily as needed (anxiety).     cyanocobalamin 1,000 mcg/mL injection Inject 1,000 mcg into the muscle every 30 days.     cyclobenzaprine (FLEXERIL) 10 MG tablet Take 10 mg by mouth 3 (three) times daily as needed for Muscle spasms.    fluoxetine HCl (PROZAC ORAL) Take 40 mg by mouth once daily.     montelukast sodium (SINGULAIR ORAL) Take 10 mg  by mouth every morning.     naproxen sodium (ANAPROX) 220 MG tablet Take 220 mg by mouth daily as needed (pain).    traMADoL (ULTRAM) 50 mg tablet Take 1 tablet (50 mg total) by mouth every 8 (eight) hours as needed for Pain.     Family History       Problem Relation (Age of Onset)    Anxiety disorder Daughter, Daughter    Arthritis Sister    COPD Father    Cancer Mother    Cirrhosis Father    Congenital heart disease Daughter    Depression Daughter    No Known Problems Brother    Valvular heart disease Daughter          Tobacco Use    Smoking status: Every Day     Packs/day: 1.00     Years: 20.00     Pack years: 20.00     Types: Cigarettes    Smokeless tobacco: Never   Substance and Sexual Activity    Alcohol use: Yes     Comment: couple times a week-wine or bloody cari    Drug use: No    Sexual activity: Not Currently     Review of Systems   Constitutional:  Positive for activity change, appetite change and fatigue. Negative for diaphoresis and fever.   HENT:  Negative for congestion.    Respiratory:  Positive for cough, shortness of breath and wheezing.    Cardiovascular:  Negative for chest pain and leg swelling.   Gastrointestinal:  Negative for nausea and vomiting.   Endocrine: Positive for cold intolerance.   Genitourinary:  Negative for dysuria.   Musculoskeletal:  Positive for gait problem.   Skin:  Negative for rash.   Neurological:  Positive for headaches.   Hematological:  Bruises/bleeds easily.   Psychiatric/Behavioral:  Positive for decreased concentration and sleep disturbance.    Objective:     Vital Signs (Most Recent):  Temp: 98.4 °F (36.9 °C) (06/04/23 1742)  Pulse: 96 (06/04/23 2300)  Resp: 11 (06/04/23 2300)  BP: 94/81 (06/04/23 2300)  SpO2: 96 % (06/04/23 2300) Vital Signs (24h Range):  Temp:  [98.4 °F (36.9 °C)] 98.4 °F (36.9 °C)  Pulse:  [92-99] 96  Resp:  [11-20] 11  SpO2:  [92 %-96 %] 96 %  BP: ()/(60-81) 94/81     Weight: 61.2 kg (135 lb)  Body mass index is 24.69 kg/m².      Physical Exam  Constitutional:       General: She is not in acute distress.     Appearance: She is overweight. She is ill-appearing. She is not toxic-appearing or diaphoretic.   HENT:      Head: Normocephalic and atraumatic.   Pulmonary:      Effort: No tachypnea or bradypnea.   Abdominal:      General: Abdomen is protuberant.   Genitourinary:     Comments: No gomez in place  Neurological:      Mental Status: She is oriented to person, place, and time. She is lethargic.      GCS: GCS eye subscore is 4. GCS verbal subscore is 5. GCS motor subscore is 6.   Psychiatric:         Attention and Perception: Attention and perception normal.         Mood and Affect: Mood is depressed.         Behavior: Behavior is cooperative.         Cognition and Memory: Cognition and memory normal.              Significant Labs: All pertinent labs within the past 24 hours have been reviewed.  Recent Results (from the past 24 hour(s))   ISTAT PROCEDURE    Collection Time: 06/04/23  6:14 PM   Result Value Ref Range    POC PH 7.353 7.35 - 7.45    POC PCO2 45.6 (H) 35 - 45 mmHg    POC PO2 67 (L) 80 - 100 mmHg    POC HCO3 25.3 24 - 28 mmol/L    POC BE 0 -2 to 2 mmol/L    POC SATURATED O2 92 (L) 95 - 100 %    Sample ARTERIAL     Site RB     Allens Test N/A     DelSys Nasal Can     Mode SPONT     Flow 3     FiO2 32     Sp02 95    CBC Auto Differential    Collection Time: 06/04/23  6:20 PM   Result Value Ref Range    WBC 9.46 3.90 - 12.70 K/uL    RBC 3.23 (L) 4.00 - 5.40 M/uL    Hemoglobin 11.1 (L) 12.0 - 16.0 g/dL    Hematocrit 31.8 (L) 37.0 - 48.5 %    MCV 99 (H) 82 - 98 fL    MCH 34.4 (H) 27.0 - 31.0 pg    MCHC 34.9 32.0 - 36.0 g/dL    RDW 12.5 11.5 - 14.5 %    Platelets 181 150 - 450 K/uL    MPV 10.1 9.2 - 12.9 fL    Immature Granulocytes 0.3 0.0 - 0.5 %    Gran # (ANC) 8.1 (H) 1.8 - 7.7 K/uL    Immature Grans (Abs) 0.03 0.00 - 0.04 K/uL    Lymph # 0.7 (L) 1.0 - 4.8 K/uL    Mono # 0.4 0.3 - 1.0 K/uL    Eos # 0.3 0.0 - 0.5 K/uL    Baso #  0.02 0.00 - 0.20 K/uL    nRBC 0 0 /100 WBC    Gran % 85.5 (H) 38.0 - 73.0 %    Lymph % 6.9 (L) 18.0 - 48.0 %    Mono % 4.4 4.0 - 15.0 %    Eosinophil % 2.7 0.0 - 8.0 %    Basophil % 0.2 0.0 - 1.9 %    Differential Method Automated    Comprehensive Metabolic Panel    Collection Time: 06/04/23  6:20 PM   Result Value Ref Range    Sodium 127 (L) 136 - 145 mmol/L    Potassium 4.4 3.5 - 5.1 mmol/L    Chloride 85 (L) 95 - 110 mmol/L    CO2 23 23 - 29 mmol/L    Glucose 87 70 - 110 mg/dL    BUN 19 8 - 23 mg/dL    Creatinine 1.3 0.5 - 1.4 mg/dL    Calcium 9.3 8.7 - 10.5 mg/dL    Total Protein 7.1 6.0 - 8.4 g/dL    Albumin 3.0 (L) 3.5 - 5.2 g/dL    Total Bilirubin 0.3 0.1 - 1.0 mg/dL    Alkaline Phosphatase 208 (H) 55 - 135 U/L    AST 36 10 - 40 U/L    ALT 15 10 - 44 U/L    Anion Gap 19 (H) 8 - 16 mmol/L    eGFR 45.1 (A) >60 mL/min/1.73 m^2   BNP    Collection Time: 06/04/23  6:20 PM   Result Value Ref Range    BNP 20 0 - 99 pg/mL   Ethanol    Collection Time: 06/04/23  6:20 PM   Result Value Ref Range    Alcohol, Serum <10 0 - 10 mg/dL   Lactic Acid, Plasma    Collection Time: 06/04/23  6:20 PM   Result Value Ref Range    Lactate (Lactic Acid) 0.7 0.5 - 2.2 mmol/L   Magnesium    Collection Time: 06/04/23  6:20 PM   Result Value Ref Range    Magnesium 2.1 1.6 - 2.6 mg/dL   Urinalysis, Reflex to Urine Culture Urine, Clean Catch    Collection Time: 06/04/23  6:20 PM    Specimen: Urine, Clean Catch   Result Value Ref Range    Specimen UA Urine, Unspecified     Color, UA Yellow Yellow, Straw, Kristina    Appearance, UA Clear Clear    pH, UA 6.0 5.0 - 8.0    Specific Gravity, UA 1.015 1.005 - 1.030    Protein, UA 1+ (A) Negative    Glucose, UA Negative Negative    Ketones, UA 3+ (A) Negative    Bilirubin (UA) 3+ (A) Negative    Occult Blood UA Negative Negative    Nitrite, UA Negative Negative    Urobilinogen, UA Negative Negative EU/dL    Leukocytes, UA Negative Negative   Drug screen panel, in-house    Collection Time: 06/04/23   6:20 PM   Result Value Ref Range    Benzodiazepines Negative Negative    Methadone metabolites Negative Negative    Cocaine (Metab.) Negative Negative    Opiate Scrn, Ur Negative Negative    Barbiturate Screen, Ur Presumptive Positive (A) Negative    Amphetamine Screen, Ur Negative Negative    THC Negative Negative    Phencyclidine Negative Negative    Creatinine, Urine 110.7 15.0 - 325.0 mg/dL    Toxicology Information SEE COMMENT    Ammonia    Collection Time: 06/04/23  6:20 PM   Result Value Ref Range    Ammonia 16 10 - 50 umol/L   Protime-INR    Collection Time: 06/04/23  6:20 PM   Result Value Ref Range    Prothrombin Time 10.9 9.0 - 12.5 sec    INR 1.0 0.8 - 1.2   Salicylate Level    Collection Time: 06/04/23  6:20 PM   Result Value Ref Range    Salicylate Lvl <5.0 (L) 15.0 - 30.0 mg/dL   Acetaminophen Level    Collection Time: 06/04/23  6:20 PM   Result Value Ref Range    Acetaminophen (Tylenol), Serum <3.0 (L) 10.0 - 20.0 ug/mL   Urinalysis Microscopic    Collection Time: 06/04/23  6:20 PM   Result Value Ref Range    RBC, UA 0 0 - 4 /hpf    WBC, UA 5 0 - 5 /hpf    Bacteria Occasional None-Occ /hpf    Hyaline Casts, UA 15 (A) 0-1/lpf /lpf    Microscopic Comment SEE COMMENT    TSH    Collection Time: 06/04/23 10:25 PM   Result Value Ref Range    TSH 1.706 0.400 - 4.000 uIU/mL   Iron and TIBC    Collection Time: 06/04/23 10:25 PM   Result Value Ref Range    Iron 64 30 - 160 ug/dL    Transferrin 144 (L) 200 - 375 mg/dL    TIBC 213 (L) 250 - 450 ug/dL    Saturated Iron 30 20 - 50 %         Significant Imaging: I have reviewed all pertinent imaging results/findings within the past 24 hours.    Assessment/Plan:     * Dehydration with hyponatremia  NS 1L IV in the ED  Poor po intake x 1.5 weeks with progressive falls   cc/hr for now  Check TSH    Holding the following potential culprits:    fluoxetine HCl (PROZAC ORAL), Take 40 mg by mouth once daily     traMADoL (ULTRAM) 50 mg tablet, Take 1 tablet (50 mg  total) by mouth every 8 (eight) hours as needed for Pain         Recurrent falls  This is likely multifactorial from general deconditioning and medications as below  Consult PT and OT  Seems non-specific, so don't suspect need for brain MRI for CVA      She has multiple medications that can also contribute to gait imbalance:     butalbital-acetaminophen-caffeine -40 mg (FIORICET, ESGIC) -40 mg per tablet, Take 1 tablet by mouth every 6 (six) hours as needed for Headaches     clonazePAM (KLONOPIN) 0.5 MG tablet, Take 0.5 mg by mouth 2 (two) times daily as needed (anxiety)     cyclobenzaprine (FLEXERIL) 10 MG tablet, Take 10 mg by mouth 3 (three) times daily as needed for Muscle spasms     traMADoL (ULTRAM) 50 mg tablet, Take 1 tablet (50 mg total) by mouth every 8 (eight) hours as needed for Pain         COPD (chronic obstructive pulmonary disease)    albuterol nebulizer solution 2.5 mg, 2.5 mg, Nebulization, Q4H PRN **AND** ipratropium 0.02 % nebulizer solution 0.5 mg, 0.5 mg, Nebulization, Q4H PRN    [START ON 6/5/2023] fluticasone furoate-vilanteroL 100-25 mcg/dose diskus inhaler 1 puff, 1 puff, Inhalation, Daily    [START ON 6/5/2023] montelukast tablet 10 mg, 10 mg, Oral, QAM    Chronic respiratory failure with hypoxia  She normally uses 2.5L O2 NC to 3L  She is requiring 3L NC currently, so near baseline      Primary hypertension  BP 94/81   Pulse 96      Currently running on the low side, so holding the following home BP meds:    amlodipine-benazepril 5-10 mg (LOTREL) 5-10 mg per capsule, Take 1 capsule by mouth every morning        Macrocytic anemia  Ordered B12, folate, Fe studies and MA        VTE Risk Mitigation (From admission, onward)         Ordered     heparin (porcine) injection 5,000 Units  Every 8 hours         06/04/23 2212     Place ALEA hose  Until discontinued         06/04/23 2212     IP VTE HIGH RISK PATIENT  Once         06/04/23 2212     Place sequential compression  device  Until discontinued         06/04/23 2212                     The attending portion of this evaluation, treatment, and documentation was performed per MATT Chu MD via Telemedicine AudioVisual using the secure [employer] software platform with 2 way audio/video. The provider was located off-site and the patient is located in the hospital. The aforementioned video software was utilized to document the relevant history and physical exam    On 06/04/2023, patient should be placed in hospital observation services under my care.        MATT Chu MD  Department of Hospital Medicine   [place] - Emergency Dept

## 2023-06-05 NOTE — PLAN OF CARE
[facility] Intensive Care  Initial Discharge Assessment       Primary Care Provider: Pepito Jhaveri Iv, MD    Admission Diagnosis: Hyponatremia [E87.1]  Fall [W19.XXXA]  Generalized weakness [R53.1]  Chest pain [R07.9]  Candidal skin infection [B37.2]    Admission Date: 6/4/2023  Expected Discharge Date:   Assessment completed with patient who was alert and oriented. Patient lives with a friend of her son, Kenton Rosales. She uses a rollator , shower chair and oxygen at home. Her oxygen company is [employer]. Her PCP is Dr. Pepito Jhaveri. She does not have any specialty docors, does not attend outside therapies and does not have home health or OPCM services. Patient's daughterAshley [phone] or her son in law will assist at discharge. Patient denies any additional needs at this time. Case management will continue to follow.  Transition of Care Barriers: None    Payor: [employer] MEDICARE / Plan: [employer] HMO PPO SPECIAL NEEDS / Product Type: Medicare Advantage /     Extended Emergency Contact Information  Primary Emergency Contact: ASHLEY HOANG  Mobile Phone: [phone]  Relation: Daughter  Preferred language: English   needed? No    Discharge Plan A: Home with family  Discharge Plan B: Home with family      [facility] [place], [address]  [place] [address]  Phone: [phone] Fax: [phone]      Initial Assessment (most recent)       Adult Discharge Assessment - 06/05/23 1237          Discharge Assessment    Assessment Type Discharge Planning Assessment     Confirmed/corrected address, phone number and insurance Yes     Confirmed Demographics Correct on Facesheet     Source of Information patient     Does patient/caregiver understand observation status Yes     Communicated CHLOE with patient/caregiver Date not available/Unable to determine     Reason For Admission hypomatremia     People in Home friend(s)   friend of adolph- Kenton Rosales    Do  you expect to return to your current living situation? Yes     Do you have help at home or someone to help you manage your care at home? Yes     Who are your caregiver(s) and their phone number(s)? Daughter, Margarito Miranda [phone]     Prior to hospitilization cognitive status: Unable to Assess     Current cognitive status: Alert/Oriented     Equipment Currently Used at Home rollator;oxygen   oxygen with [employer]    Readmission within 30 days? No     Patient currently being followed by outpatient case management? No     Do you currently have service(s) that help you manage your care at home? No     How do you get to doctors appointments? family or friend will provide     Are you on dialysis? No     Do you take coumadin? No     Discharge Plan A Home with family     Discharge Plan B Home with family     DME Needed Upon Discharge  none     Discharge Plan discussed with: Patient     Transition of Care Barriers None        Physical Activity    On average, how many days per week do you engage in moderate to strenuous exercise (like a brisk walk)? 0 days     On average, how many minutes do you engage in exercise at this level? 0 min        Financial Resource Strain    How hard is it for you to pay for the very basics like food, housing, medical care, and heating? Not hard at all        Housing Stability    In the last 12 months, was there a time when you were not able to pay the mortgage or rent on time? No     In the last 12 months, was there a time when you did not have a steady place to sleep or slept in a shelter (including now)? No        Transportation Needs    In the past 12 months, has lack of transportation kept you from medical appointments or from getting medications? No     In the past 12 months, has lack of transportation kept you from meetings, work, or from getting things needed for daily living? No        Food Insecurity    Within the past 12 months, you worried that your food would run out before you got  the money to buy more. Never true     Within the past 12 months, the food you bought just didn't last and you didn't have money to get more. Never true        Stress    Do you feel stress - tense, restless, nervous, or anxious, or unable to sleep at night because your mind is troubled all the time - these days? Rather much        Social Connections    In a typical week, how many times do you talk on the phone with family, friends, or neighbors? More than three times a week     How often do you get together with friends or relatives? Once a week     How often do you attend Jehovah's witness or Mormon services? Never     Do you belong to any clubs or organizations such as Jehovah's witness groups, unions, fraternal or athletic groups, or school groups? No     How often do you attend meetings of the clubs or organizations you belong to? Never     Are you , , , , never , or living with a partner?         Alcohol Use    Q1: How often do you have a drink containing alcohol? 4 or more times a week     Q2: How many drinks containing alcohol do you have on a typical day when you are drinking? 1 or 2     Q3: How often do you have six or more drinks on one occasion? Never        OTHER    Name(s) of People in Home [facility]

## 2023-06-05 NOTE — HPI
"Ms. Lambert is a 66yo lady with a past medical history of anxiety, COPD with chronic hypoxia on home O2 2.5-3L coninuous, depression and HTN.    She now comes to the ED with her daughter at the bedside.  She states she has been falling more often on and off over the past week.  The most recent time she, "just rolled off the sofa."  This has progressed to being essentially bed bound from generalized weakness and lethargy.  She has not been eating and drinking hardly at all for the past several days.  She has chronic MENA, SOB and headaches, all of which are unchanged from baseline.  She lives at home with a friend who assists in her care.  Her daughter finally became concerned due to her just lying around with poor po intake.  She denies fever, chills and dysuria, though notes a rash under her breasts.    In the ED her VS were /79   Pulse 94   Temp 98.4 °F (36.9 °C) (Oral)   Resp 15   Ht 5' 2" (1.575 m)   Wt 61.2 kg (135 lb)   SpO2 92 - 95% 3L NC   BMI 24.69 kg/m².  Labs showed Hg 11.1, MCV 99, Na 127, AG 19, Cr 1.3 (Cr 0.5 on 9/17/20, , Alb 3, UDS +Barbiturates, UA WBC 5, RBC 0, nitrite neg.  ABG 3L NC: pH 7.35, PCO2 45.6, PAO2 67, sats 92%.    CXR showed NML heart size, flat diaphragms from COPD, no infiltrates.  CT head showed moderate white matter disease and volume loss are identified. There is no acute infarct or edema. No hemorrhage. Cerebral ventricles: No ventriculomegaly. There are no acute concerning abnormalities.  XRay right knee (per ED staff) showed no acute fractures.  No EKG done in the ED.    In the ED she was treated with:  Medications   0.9%  NaCl infusion (1,000 mLs Intravenous New Bag 6/4/23 5439)           "

## 2023-06-05 NOTE — ASSESSMENT & PLAN NOTE
BP 94/81   Pulse 96      Currently running on the low side, so holding the following home BP meds:    amlodipine-benazepril 5-10 mg (LOTREL) 5-10 mg per capsule, Take 1 capsule by mouth every morning

## 2023-06-05 NOTE — ASSESSMENT & PLAN NOTE
This is likely multifactorial from general deconditioning and medications as below  Consult PT and OT  Seems non-specific, so don't suspect need for brain MRI for CVA      She has multiple medications that can also contribute to gait imbalance:     butalbital-acetaminophen-caffeine -40 mg (FIORICET, ESGIC) -40 mg per tablet, Take 1 tablet by mouth every 6 (six) hours as needed for Headaches     clonazePAM (KLONOPIN) 0.5 MG tablet, Take 0.5 mg by mouth 2 (two) times daily as needed (anxiety)     cyclobenzaprine (FLEXERIL) 10 MG tablet, Take 10 mg by mouth 3 (three) times daily as needed for Muscle spasms     traMADoL (ULTRAM) 50 mg tablet, Take 1 tablet (50 mg total) by mouth every 8 (eight) hours as needed for Pain

## 2023-06-05 NOTE — PLAN OF CARE
[facility] - Intensive Care  Discharge Reassessment    Primary Care Provider: Pepito Jhaveri Iv, MD    Expected Discharge Date:     Discharge options discussed with patient. She doesn't want to go to rehab but is open to having home health. Referral sent to [facility]. They are unable to take her & they sent the referral to [facility]. Preference form signed. No other needs at this time.     Reassessment (most recent)       Discharge Reassessment - 06/05/23 1150          Discharge Reassessment    Assessment Type Discharge Planning Reassessment     Did the patient's condition or plan change since previous assessment? No     Discharge Plan discussed with: Patient     Communicated CHLOE with patient/caregiver Yes     Discharge Plan A Home Health     DME Needed Upon Discharge  none     Transition of Care Barriers None     Why the patient remains in the hospital Requires continued medical care        Post-Acute Status    Post-Acute Authorization Home Health     Home Health Status Referrals Sent     Discharge Delays None known at this time

## 2023-06-05 NOTE — PLAN OF CARE
Problem: Adult Inpatient Plan of Care  Goal: Plan of Care Review  Outcome: Ongoing, Progressing     Problem: Adult Inpatient Plan of Care  Goal: Patient-Specific Goal (Individualized)  Outcome: Ongoing, Progressing     Problem: Adult Inpatient Plan of Care  Goal: Absence of Hospital-Acquired Illness or Injury  Outcome: Ongoing, Progressing     Problem: Adult Inpatient Plan of Care  Goal: Optimal Comfort and Wellbeing  Outcome: Ongoing, Progressing     Problem: Adult Inpatient Plan of Care  Goal: Readiness for Transition of Care  Outcome: Ongoing, Progressing     Problem: Fall Injury Risk  Goal: Absence of Fall and Fall-Related Injury  Outcome: Ongoing, Progressing     Problem: Gas Exchange Impaired  Goal: Optimal Gas Exchange  Outcome: Ongoing, Progressing     Problem: Electrolyte Imbalance  Goal: Electrolyte Balance  Outcome: Ongoing, Progressing     Problem: Pain Acute  Goal: Acceptable Pain Control and Functional Ability  Intervention: Develop Pain Management Plan  Flowsheets (Taken 6/5/2023 0026)  Pain Management Interventions:   care clustered   medication offered   diversional activity provided

## 2023-06-05 NOTE — NURSING
At approx 1800 Patient is discharged home via WC with family member. Patient assisted into vehicle via RN. IV site CDI with no redness, swelling, or drainage. Tele removed prior. AVS reviewed. Medication education given. No questions or concerns at this time. All patient needs met and anticipated.

## 2023-06-05 NOTE — HOSPITAL COURSE
Patient needs follow up with CXR and CT of Chest possibly if question of lung mass persists. It would be beneficial to discuss possibly polypharmacy with her PCP as well. She has tramadol, fioricet, xanax, and other medications listed that could increase risk of falls, especially in combination with her alcohol use. Miconazole and InterDry sheets given for fungal rash. Home health ordered.    Knee immobilzer - ER - follow up

## 2023-06-05 NOTE — PT/OT/SLP EVAL
"Occupational Therapy   Evaluation    Name: Bhavana Lambert  MRN: [id number]  Admitting Diagnosis: Dehydration with hyponatremia  Recent Surgery: * No surgery found *      Recommendations:     Discharge Recommendations:  Home with home health  Discharge Equipment Recommendations:   None  Barriers to discharge:   None    Assessment:   Ms. Lambert is a 68yo lady with a past medical history of anxiety, COPD with chronic hypoxia on home O2 2.5-3L coninuous, depression and HTN.     She now comes to the ED with her daughter at the bedside.  She states she has been falling more often on and off over the past week.  The most recent time she, "just rolled off the sofa."  This has progressed to being essentially bed bound from generalized weakness and lethargy.  She has not been eating and drinking hardly at all for the past several days.  She has chronic MENA, SOB and headaches, all of which are unchanged from baseline.  She lives at home with a friend who assists in her care.  Her daughter finally became concerned due to her just lying around with poor po intake.  She denies fever, chills and dysuria, though notes a rash under her breasts.       Rehab Prognosis: Good        Plan:     Patient to be seen   to address the above listed problems via    Plan of Care Expires:  When patient is discharged.  Plan of Care Reviewed with:  Patient     Subjective       Occupational Profile:  Living Environment: Patient lives with her friend in an apartment on the second floor.  Previous level of function: Independent   Equipment Used at Home:  Patient owns a rolling walker.  Assistance upon Discharge: Friend    Pain/Comfort:   No pain      Objective:     Communicated with: OT spoke with patient's nurse prior to entering patient's room for evaluation.    General Precautions: Standard,    Orthopedic Precautions:    Braces:    Respiratory Status: 3L  O2    Occupational Performance:    Bed Mobility:    Patient requires CGA with bed " mobility.    Functional Mobility/Transfers:  Patient requires CGA with performing functional transfer and when ambulating 15 feet with RW.    Activities of Daily Living:  Patient requires Thomas with ADLs.    Cognitive/Visual Perceptual:  Patient is alert and oriented x4.    Physical Exam:  Patient does not exhibit any functional deficits with BUE.      Treatment & Education:  Patient tolerated evaluation well on this date.  Patient requires CGA with bed mobility and functional transfers.  Patient does not exhibit any functional deficits with BUE.      GOALS:   Patient will require SBA with UE dressing.  Patient will require CGA with LE dressing.  Patient will require SBA with grooming.      History:     Past Medical History:   Diagnosis Date    Anxiety     COPD (chronic obstructive pulmonary disease)     Depression     Hypertension     Requires continuous at home supplemental oxygen     PRN FOR COPD         Past Surgical History:   Procedure Laterality Date    COLON SURGERY      COLOSTOMY      COLOSTOMY CLOSURE      HYSTERECTOMY      LEG SURGERY Right     OPEN REDUCTION AND INTERNAL FIXATION (ORIF) OF INJURY OF WRIST Left 1/29/2019    Procedure: ORIF, WRIST;  Surgeon: Homero Jeff DO;  Location: [facility] OR;  Service: Orthopedics;  Laterality: Left;  Equipment: [employer] Geminus Wrist Plate Set  Vendor/Rep: [employer]  C-Arm: Entire  DME: None    REQUIRES ASSISTANT    WRIST SURGERY Right        Time Tracking:     OT Date of Treatment:  6/5/2023  OT Start Time:  10:29  OT Stop Time:  10:52  OT Total Time (min):  23 minutes     Mir Nix OTR/L    6/5/2023

## 2023-06-05 NOTE — PT/OT/SLP EVAL
"Physical Therapy Evaluation and Treatment    Patient Name: Bhavana Lambert   MRN: [id number]  Recent Surgery: * No surgery found *      Recommendations:     Discharge Recommendations:  (home with home health vs skilled placement)   Discharge Equipment Recommendations: walker, rolling   Barriers to discharge: Decreased caregiver support    Assessment:   HPI:   Ms. Lambert is a 68yo lady with a past medical history of anxiety, COPD with chronic hypoxia on home O2 2.5-3L coninuous, depression and HTN.     She now comes to the ED with her daughter at the bedside.  She states she has been falling more often on and off over the past week.  The most recent time she, "just rolled off the sofa."  This has progressed to being essentially bed bound from generalized weakness and lethargy.  She has not been eating and drinking hardly at all for the past several days.  She has chronic MENA, SOB and headaches, all of which are unchanged from baseline.  She lives at home with a friend who assists in her care.  Her daughter finally became concerned due to her just lying around with poor po intake.  She denies fever, chills and dysuria, though notes a rash under her breasts.    Bhavana Lambert is a 67 y.o. female admitted with a medical diagnosis of Dehydration with hyponatremia. She presents with the following impairments/functional limitations: weakness, impaired endurance, impaired functional mobility, impaired self care skills, impaired balance, pain.     Rehab Prognosis: Good; patient would benefit from acute PT services to address these deficits and reach maximum level of function.    Plan:     During this hospitalization, patient to be seen  (3-5 x/wk) to address the above listed problems via gait training, therapeutic activities, therapeutic exercises    Plan of Care Expires:  (upon discharge from facility)    Subjective     Chief Complaint: dehydration with hyponatremia   Patient Comments/Goals: patient stating she had several " falls PTA including rolling off of couch where she sleeps 3 times and feels very weak but is agreeable to participate in PT evaluation  Pain/Comfort:   6/10 ant sternal region    Social History:  Living Environment: Patient lives with their friend  in a second floor apartment  Prior Level of Function: Prior to admission, patient was modified independent  Equipment Used at Home: walker, standard, shower chair  DME owned (not currently used): none  Assistance Upon Discharge: friend(s)    Objective:     Communicated with RN prior to session. Patient found HOB elevated with oxygen, bed alarm, PureWick, peripheral IV, telemetry upon PT entry to room.    General Precautions: Standard, fall   Orthopedic Precautions: N/A   Braces: N/A    Respiratory Status: Nasal cannula, flow 3.0 L/min    Exams:  Cognition: Patient is oriented to Person, Place, Time, Situation  RLE ROM: WFL  RLE Strength:  3+  LLE ROM: WFL  LLE Strength:  3+    Functional Mobility:  Gait belt applied - No secondary to presence of rash under breasts and skin folds   Bed Mobility  Supine to Sit: contact guard assistance for trunk management  Sit to Supine: minimum assistance for LE management  Transfers  Sit to Stand: contact guard assistance with rolling walker and with cues for hand placement  Gait  Patient took 4 side steps up toward HOB with rolling walker and contact guard assistance. Patient demonstrates decreased weight shift, decreased foot clearance, and decreased marizol.    Balance  Sitting: stand by assistance  Standing: contact guard assistance    Therapeutic Activities and Exercises:   Patient educated on role of acute care PT and PT POC, safety while in hospital including calling nurse for mobility, and call light usage  Importance of increasing OOB activity to facilitate recovery and decrease chance of complications resulting from immobility    AM-PAC 6 CLICK MOBILITY  Total Score:     Patient left HOB elevated with all lines intact, call  button in reach, and RN notified.    GOALS:   Pt will perform sup<>sit transfers w/ independence  Pt will have sufficient dynamic balance to sit EOB while performing ADLs/therex w/ independence  PT will be able to stand up from EOB w/ independence using LRAD  Pt will ambulate 100 feet w/ modified independence using LRAD  Pt will be independent w/ HEP therex on BLE w/ good form and ROM   Pt will require no cueing for dynamic balance or safety awareness when performing transfers and ambulation w/ PT        History:     Past Medical History:   Diagnosis Date    Anxiety     COPD (chronic obstructive pulmonary disease)     Depression     Hypertension     Requires continuous at home supplemental oxygen     PRN FOR COPD       Past Surgical History:   Procedure Laterality Date    COLON SURGERY      COLOSTOMY      COLOSTOMY CLOSURE      HYSTERECTOMY      LEG SURGERY Right     OPEN REDUCTION AND INTERNAL FIXATION (ORIF) OF INJURY OF WRIST Left 1/29/2019    Procedure: ORIF, WRIST;  Surgeon: Homero Jeff DO;  Location: [facility];  Service: Orthopedics;  Laterality: Left;  Equipment: [employer] Geminus Wrist Plate Set  Vendor/Rep: [employer]  C-Arm: Entire  DME: None    REQUIRES ASSISTANT    WRIST SURGERY Right        Time Tracking:     PT Received On: 06/05/23  PT Start Time: 0930  PT Stop Time: 0955  PT Total Time (min): 25 min     Billable Minutes: Evaluation 15 min and Gait Training 10 min    6/5/2023

## 2023-06-05 NOTE — PLAN OF CARE
Problem: Adult Inpatient Plan of Care  Goal: Plan of Care Review  Outcome: Ongoing, Progressing  Goal: Patient-Specific Goal (Individualized)  Outcome: Ongoing, Progressing  Goal: Absence of Hospital-Acquired Illness or Injury  Outcome: Ongoing, Progressing  Goal: Optimal Comfort and Wellbeing  Outcome: Ongoing, Progressing  Goal: Readiness for Transition of Care  Outcome: Ongoing, Progressing     Problem: Fall Injury Risk  Goal: Absence of Fall and Fall-Related Injury  Outcome: Ongoing, Progressing     Problem: Gas Exchange Impaired  Goal: Optimal Gas Exchange  Outcome: Ongoing, Progressing     Problem: Electrolyte Imbalance  Goal: Electrolyte Balance  Outcome: Ongoing, Progressing     Problem: Pain Acute  Goal: Acceptable Pain Control and Functional Ability  Outcome: Ongoing, Progressing     Problem: Skin Injury Risk Increased  Goal: Skin Health and Integrity  Outcome: Ongoing, Progressing

## 2023-06-05 NOTE — PLAN OF CARE
Problem: Adult Inpatient Plan of Care  Goal: Plan of Care Review  6/5/2023 1819 by Yudelka Villasenor RN  Outcome: Met  6/5/2023 1539 by Yudelka Villasenor RN  Outcome: Ongoing, Progressing  Goal: Patient-Specific Goal (Individualized)  6/5/2023 1819 by Yudelka Villasenor RN  Outcome: Met  6/5/2023 1539 by Yudelka Villasenor RN  Outcome: Ongoing, Progressing  Goal: Absence of Hospital-Acquired Illness or Injury  6/5/2023 1819 by Yudelka Villasenor RN  Outcome: Met  6/5/2023 1539 by Yudelka Villasenor RN  Outcome: Ongoing, Progressing  Goal: Optimal Comfort and Wellbeing  6/5/2023 1819 by Yudelka Villasenor RN  Outcome: Met  6/5/2023 1539 by Yudelka Villasenor RN  Outcome: Ongoing, Progressing  Goal: Readiness for Transition of Care  6/5/2023 1819 by Yudelka Villasenor RN  Outcome: Met  6/5/2023 1539 by Yudelka Villasenor RN  Outcome: Ongoing, Progressing     Problem: Fall Injury Risk  Goal: Absence of Fall and Fall-Related Injury  6/5/2023 1819 by Yudelka Villasenor RN  Outcome: Met  6/5/2023 1539 by Yudelka Villasenor RN  Outcome: Ongoing, Progressing     Problem: Gas Exchange Impaired  Goal: Optimal Gas Exchange  6/5/2023 1819 by Yudelka Villasenor RN  Outcome: Met  6/5/2023 1539 by Yudelka Villasenor RN  Outcome: Ongoing, Progressing     Problem: Electrolyte Imbalance  Goal: Electrolyte Balance  6/5/2023 1819 by Yudelka Villasenor RN  Outcome: Met  6/5/2023 1539 by Yudelka Villasenor RN  Outcome: Ongoing, Progressing     Problem: Pain Acute  Goal: Acceptable Pain Control and Functional Ability  6/5/2023 1819 by Yudelka Villasenor RN  Outcome: Met  6/5/2023 1539 by Yudelka Villasenor RN  Outcome: Ongoing, Progressing     Problem: Skin Injury Risk Increased  Goal: Skin Health and Integrity  6/5/2023 1819 by Yudelka Villasenor RN  Outcome: Met  6/5/2023 1539 by Yudelka Villasenor RN  Outcome: Ongoing, Progressing

## 2023-06-05 NOTE — PLAN OF CARE
Problem: Gas Exchange Impaired  Goal: Optimal Gas Exchange  Outcome: Ongoing, Progressing  Intervention: Optimize Oxygenation and Ventilation  Flowsheets (Taken 6/5/2023 8848)  Airway/Ventilation Management: airway patency maintained  Head of Bed (HOB) Positioning: HOB at 30 degrees   Patient in no apparent distress. Sat's  90 % on 3 lpm. Patient states that she wears 2.5 to 3 lpm at home. PRN treatments not needed. Breo daily given . 1016, PRN treatment requested for shortness of breath. Will continue to monitor.

## 2023-06-05 NOTE — DISCHARGE INSTRUCTIONS
Keep knee immobilizer on  until instructed otherwise, Dr. Jeff's office will be reaching out to schedule an appointment.

## 2023-06-05 NOTE — ASSESSMENT & PLAN NOTE
  albuterol nebulizer solution 2.5 mg, 2.5 mg, Nebulization, Q4H PRN **AND** ipratropium 0.02 % nebulizer solution 0.5 mg, 0.5 mg, Nebulization, Q4H PRN    [START ON 6/5/2023] fluticasone furoate-vilanteroL 100-25 mcg/dose diskus inhaler 1 puff, 1 puff, Inhalation, Daily    [START ON 6/5/2023] montelukast tablet 10 mg, 10 mg, Oral, QAM

## 2023-06-05 NOTE — SUBJECTIVE & OBJECTIVE
Past Medical History:   Diagnosis Date    Anxiety     COPD (chronic obstructive pulmonary disease)     Depression     Hypertension     Requires continuous at home supplemental oxygen     PRN FOR COPD       Past Surgical History:   Procedure Laterality Date    COLON SURGERY      COLOSTOMY      COLOSTOMY CLOSURE      HYSTERECTOMY      LEG SURGERY Right     OPEN REDUCTION AND INTERNAL FIXATION (ORIF) OF INJURY OF WRIST Left 1/29/2019    Procedure: ORIF, WRIST;  Surgeon: Homero Jeff DO;  Location: [facility] OR;  Service: Orthopedics;  Laterality: Left;  Equipment: [employer] Geminus Wrist Plate Set  Vendor/Rep: [employer]  C-Arm: Entire  DME: None    REQUIRES ASSISTANT    WRIST SURGERY Right        Review of patient's allergies indicates:  No Known Allergies    No current facility-administered medications on file prior to encounter.     Current Outpatient Medications on File Prior to Encounter   Medication Sig    amlodipine-benazepril 5-10 mg (LOTREL) 5-10 mg per capsule Take 1 capsule by mouth every morning.    busPIRone (BUSPAR) 15 MG tablet Take 15 mg by mouth 2 (two) times a day.    butalbital-acetaminophen-caffeine -40 mg (FIORICET, ESGIC) -40 mg per tablet Take 1 tablet by mouth every 6 (six) hours as needed for Headaches.    clonazePAM (KLONOPIN) 0.5 MG tablet Take 0.5 mg by mouth 2 (two) times daily as needed (anxiety).     cyanocobalamin 1,000 mcg/mL injection Inject 1,000 mcg into the muscle every 30 days.     cyclobenzaprine (FLEXERIL) 10 MG tablet Take 10 mg by mouth 3 (three) times daily as needed for Muscle spasms.    fluoxetine HCl (PROZAC ORAL) Take 40 mg by mouth once daily.     montelukast sodium (SINGULAIR ORAL) Take 10 mg by mouth every morning.     naproxen sodium (ANAPROX) 220 MG tablet Take 220 mg by mouth daily as needed (pain).    traMADoL (ULTRAM) 50 mg tablet Take 1 tablet (50 mg total) by mouth every 8 (eight) hours as needed for Pain.     Family History        Problem Relation (Age of Onset)    Anxiety disorder Daughter, Daughter    Arthritis Sister    COPD Father    Cancer Mother    Cirrhosis Father    Congenital heart disease Daughter    Depression Daughter    No Known Problems Brother    Valvular heart disease Daughter          Tobacco Use    Smoking status: Every Day     Packs/day: 1.00     Years: 20.00     Pack years: 20.00     Types: Cigarettes    Smokeless tobacco: Never   Substance and Sexual Activity    Alcohol use: Yes     Comment: couple times a week-wine or bloody cari    Drug use: No    Sexual activity: Not Currently     Review of Systems   Constitutional:  Positive for activity change, appetite change and fatigue. Negative for diaphoresis and fever.   HENT:  Negative for congestion.    Respiratory:  Positive for cough, shortness of breath and wheezing.    Cardiovascular:  Negative for chest pain and leg swelling.   Gastrointestinal:  Negative for nausea and vomiting.   Endocrine: Positive for cold intolerance.   Genitourinary:  Negative for dysuria.   Musculoskeletal:  Positive for gait problem.   Skin:  Negative for rash.   Neurological:  Positive for headaches.   Hematological:  Bruises/bleeds easily.   Psychiatric/Behavioral:  Positive for decreased concentration and sleep disturbance.    Objective:     Vital Signs (Most Recent):  Temp: 98.4 °F (36.9 °C) (06/04/23 1742)  Pulse: 96 (06/04/23 2300)  Resp: 11 (06/04/23 2300)  BP: 94/81 (06/04/23 2300)  SpO2: 96 % (06/04/23 2300) Vital Signs (24h Range):  Temp:  [98.4 °F (36.9 °C)] 98.4 °F (36.9 °C)  Pulse:  [92-99] 96  Resp:  [11-20] 11  SpO2:  [92 %-96 %] 96 %  BP: ()/(60-81) 94/81     Weight: 61.2 kg (135 lb)  Body mass index is 24.69 kg/m².     Physical Exam  Constitutional:       General: She is not in acute distress.     Appearance: She is overweight. She is ill-appearing. She is not toxic-appearing or diaphoretic.   HENT:      Head: Normocephalic and atraumatic.   Pulmonary:      Effort: No  tachypnea or bradypnea.   Abdominal:      General: Abdomen is protuberant.   Genitourinary:     Comments: No gomez in place  Neurological:      Mental Status: She is oriented to person, place, and time. She is lethargic.      GCS: GCS eye subscore is 4. GCS verbal subscore is 5. GCS motor subscore is 6.   Psychiatric:         Attention and Perception: Attention and perception normal.         Mood and Affect: Mood is depressed.         Behavior: Behavior is cooperative.         Cognition and Memory: Cognition and memory normal.              Significant Labs: All pertinent labs within the past 24 hours have been reviewed.  Recent Results (from the past 24 hour(s))   ISTAT PROCEDURE    Collection Time: 06/04/23  6:14 PM   Result Value Ref Range    POC PH 7.353 7.35 - 7.45    POC PCO2 45.6 (H) 35 - 45 mmHg    POC PO2 67 (L) 80 - 100 mmHg    POC HCO3 25.3 24 - 28 mmol/L    POC BE 0 -2 to 2 mmol/L    POC SATURATED O2 92 (L) 95 - 100 %    Sample ARTERIAL     Site RB     Allens Test N/A     DelSys Nasal Can     Mode SPONT     Flow 3     FiO2 32     Sp02 95    CBC Auto Differential    Collection Time: 06/04/23  6:20 PM   Result Value Ref Range    WBC 9.46 3.90 - 12.70 K/uL    RBC 3.23 (L) 4.00 - 5.40 M/uL    Hemoglobin 11.1 (L) 12.0 - 16.0 g/dL    Hematocrit 31.8 (L) 37.0 - 48.5 %    MCV 99 (H) 82 - 98 fL    MCH 34.4 (H) 27.0 - 31.0 pg    MCHC 34.9 32.0 - 36.0 g/dL    RDW 12.5 11.5 - 14.5 %    Platelets 181 150 - 450 K/uL    MPV 10.1 9.2 - 12.9 fL    Immature Granulocytes 0.3 0.0 - 0.5 %    Gran # (ANC) 8.1 (H) 1.8 - 7.7 K/uL    Immature Grans (Abs) 0.03 0.00 - 0.04 K/uL    Lymph # 0.7 (L) 1.0 - 4.8 K/uL    Mono # 0.4 0.3 - 1.0 K/uL    Eos # 0.3 0.0 - 0.5 K/uL    Baso # 0.02 0.00 - 0.20 K/uL    nRBC 0 0 /100 WBC    Gran % 85.5 (H) 38.0 - 73.0 %    Lymph % 6.9 (L) 18.0 - 48.0 %    Mono % 4.4 4.0 - 15.0 %    Eosinophil % 2.7 0.0 - 8.0 %    Basophil % 0.2 0.0 - 1.9 %    Differential Method Automated    Comprehensive Metabolic  Panel    Collection Time: 06/04/23  6:20 PM   Result Value Ref Range    Sodium 127 (L) 136 - 145 mmol/L    Potassium 4.4 3.5 - 5.1 mmol/L    Chloride 85 (L) 95 - 110 mmol/L    CO2 23 23 - 29 mmol/L    Glucose 87 70 - 110 mg/dL    BUN 19 8 - 23 mg/dL    Creatinine 1.3 0.5 - 1.4 mg/dL    Calcium 9.3 8.7 - 10.5 mg/dL    Total Protein 7.1 6.0 - 8.4 g/dL    Albumin 3.0 (L) 3.5 - 5.2 g/dL    Total Bilirubin 0.3 0.1 - 1.0 mg/dL    Alkaline Phosphatase 208 (H) 55 - 135 U/L    AST 36 10 - 40 U/L    ALT 15 10 - 44 U/L    Anion Gap 19 (H) 8 - 16 mmol/L    eGFR 45.1 (A) >60 mL/min/1.73 m^2   BNP    Collection Time: 06/04/23  6:20 PM   Result Value Ref Range    BNP 20 0 - 99 pg/mL   Ethanol    Collection Time: 06/04/23  6:20 PM   Result Value Ref Range    Alcohol, Serum <10 0 - 10 mg/dL   Lactic Acid, Plasma    Collection Time: 06/04/23  6:20 PM   Result Value Ref Range    Lactate (Lactic Acid) 0.7 0.5 - 2.2 mmol/L   Magnesium    Collection Time: 06/04/23  6:20 PM   Result Value Ref Range    Magnesium 2.1 1.6 - 2.6 mg/dL   Urinalysis, Reflex to Urine Culture Urine, Clean Catch    Collection Time: 06/04/23  6:20 PM    Specimen: Urine, Clean Catch   Result Value Ref Range    Specimen UA Urine, Unspecified     Color, UA Yellow Yellow, Straw, Kristina    Appearance, UA Clear Clear    pH, UA 6.0 5.0 - 8.0    Specific Gravity, UA 1.015 1.005 - 1.030    Protein, UA 1+ (A) Negative    Glucose, UA Negative Negative    Ketones, UA 3+ (A) Negative    Bilirubin (UA) 3+ (A) Negative    Occult Blood UA Negative Negative    Nitrite, UA Negative Negative    Urobilinogen, UA Negative Negative EU/dL    Leukocytes, UA Negative Negative   Drug screen panel, in-house    Collection Time: 06/04/23  6:20 PM   Result Value Ref Range    Benzodiazepines Negative Negative    Methadone metabolites Negative Negative    Cocaine (Metab.) Negative Negative    Opiate Scrn, Ur Negative Negative    Barbiturate Screen, Ur Presumptive Positive (A) Negative     Amphetamine Screen, Ur Negative Negative    THC Negative Negative    Phencyclidine Negative Negative    Creatinine, Urine 110.7 15.0 - 325.0 mg/dL    Toxicology Information SEE COMMENT    Ammonia    Collection Time: 06/04/23  6:20 PM   Result Value Ref Range    Ammonia 16 10 - 50 umol/L   Protime-INR    Collection Time: 06/04/23  6:20 PM   Result Value Ref Range    Prothrombin Time 10.9 9.0 - 12.5 sec    INR 1.0 0.8 - 1.2   Salicylate Level    Collection Time: 06/04/23  6:20 PM   Result Value Ref Range    Salicylate Lvl <5.0 (L) 15.0 - 30.0 mg/dL   Acetaminophen Level    Collection Time: 06/04/23  6:20 PM   Result Value Ref Range    Acetaminophen (Tylenol), Serum <3.0 (L) 10.0 - 20.0 ug/mL   Urinalysis Microscopic    Collection Time: 06/04/23  6:20 PM   Result Value Ref Range    RBC, UA 0 0 - 4 /hpf    WBC, UA 5 0 - 5 /hpf    Bacteria Occasional None-Occ /hpf    Hyaline Casts, UA 15 (A) 0-1/lpf /lpf    Microscopic Comment SEE COMMENT    TSH    Collection Time: 06/04/23 10:25 PM   Result Value Ref Range    TSH 1.706 0.400 - 4.000 uIU/mL   Iron and TIBC    Collection Time: 06/04/23 10:25 PM   Result Value Ref Range    Iron 64 30 - 160 ug/dL    Transferrin 144 (L) 200 - 375 mg/dL    TIBC 213 (L) 250 - 450 ug/dL    Saturated Iron 30 20 - 50 %         Significant Imaging: I have reviewed all pertinent imaging results/findings within the past 24 hours.

## 2023-06-05 NOTE — ASSESSMENT & PLAN NOTE
NS 1L IV in the ED  Poor po intake x 1.5 weeks with progressive falls   cc/hr for now  Check TSH    Holding the following potential culprits:    fluoxetine HCl (PROZAC ORAL), Take 40 mg by mouth once daily     traMADoL (ULTRAM) 50 mg tablet, Take 1 tablet (50 mg total) by mouth every 8 (eight) hours as needed for Pain

## 2023-06-05 NOTE — PLAN OF CARE
06/05/23 1246   SLATER Message   Medicare Outpatient and Observation Notification regarding financial responsibility Explained to patient/caregiver;Signed/date by patient/caregiver   Date SLATER was signed 06/05/23   Time SLATER was signed [id number]

## 2023-06-06 ENCOUNTER — TELEPHONE (OUTPATIENT)
Dept: ORTHOPEDICS | Facility: CLINIC | Age: 68
End: 2023-06-06
Payer: MEDICARE

## 2023-06-06 NOTE — TELEPHONE ENCOUNTER
I have attempted to contact the patient twice in regards to her hospital stay and a message from Dr. Sherman. The patient did not answer the phone. Her voice mail box was full and a message could not be left for her to call our office back.

## 2023-06-09 LAB — METHYLMALONATE SERPL-SCNC: <0.1 UMOL/L

## 2023-06-23 ENCOUNTER — OFFICE VISIT (OUTPATIENT)
Dept: ORTHOPEDICS | Facility: CLINIC | Age: 68
End: 2023-06-23
Payer: MEDICARE

## 2023-06-23 VITALS — HEIGHT: 62 IN | WEIGHT: 145.5 LBS | BODY MASS INDEX: 26.78 KG/M2

## 2023-06-23 DIAGNOSIS — S82.034A CLOSED NONDISPLACED TRANSVERSE FRACTURE OF RIGHT PATELLA, INITIAL ENCOUNTER: Primary | ICD-10-CM

## 2023-06-23 DIAGNOSIS — Z96.9 PRESENCE OF RETAINED HARDWARE: ICD-10-CM

## 2023-06-23 DIAGNOSIS — M25.561 ACUTE PAIN OF RIGHT KNEE: ICD-10-CM

## 2023-06-23 PROCEDURE — 3008F PR BODY MASS INDEX (BMI) DOCUMENTED: ICD-10-PCS | Mod: CPTII,S$GLB,, | Performed by: ORTHOPAEDIC SURGERY

## 2023-06-23 PROCEDURE — 3288F PR FALLS RISK ASSESSMENT DOCUMENTED: ICD-10-PCS | Mod: CPTII,S$GLB,, | Performed by: ORTHOPAEDIC SURGERY

## 2023-06-23 PROCEDURE — 99999 PR PBB SHADOW E&M-EST. PATIENT-LVL III: CPT | Mod: PBBFAC,,, | Performed by: ORTHOPAEDIC SURGERY

## 2023-06-23 PROCEDURE — 27520 PR CLOSED RX PATELLA FX: ICD-10-PCS | Mod: RT,S$GLB,, | Performed by: ORTHOPAEDIC SURGERY

## 2023-06-23 PROCEDURE — 3008F BODY MASS INDEX DOCD: CPT | Mod: CPTII,S$GLB,, | Performed by: ORTHOPAEDIC SURGERY

## 2023-06-23 PROCEDURE — 27520 TREAT KNEECAP FRACTURE: CPT | Mod: RT,S$GLB,, | Performed by: ORTHOPAEDIC SURGERY

## 2023-06-23 PROCEDURE — 3288F FALL RISK ASSESSMENT DOCD: CPT | Mod: CPTII,S$GLB,, | Performed by: ORTHOPAEDIC SURGERY

## 2023-06-23 PROCEDURE — 4010F PR ACE/ARB THEARPY RXD/TAKEN: ICD-10-PCS | Mod: CPTII,S$GLB,, | Performed by: ORTHOPAEDIC SURGERY

## 2023-06-23 PROCEDURE — 1125F PR PAIN SEVERITY QUANTIFIED, PAIN PRESENT: ICD-10-PCS | Mod: CPTII,S$GLB,, | Performed by: ORTHOPAEDIC SURGERY

## 2023-06-23 PROCEDURE — 1100F PTFALLS ASSESS-DOCD GE2>/YR: CPT | Mod: CPTII,S$GLB,, | Performed by: ORTHOPAEDIC SURGERY

## 2023-06-23 PROCEDURE — 1159F MED LIST DOCD IN RCRD: CPT | Mod: CPTII,S$GLB,, | Performed by: ORTHOPAEDIC SURGERY

## 2023-06-23 PROCEDURE — 99203 OFFICE O/P NEW LOW 30 MIN: CPT | Mod: 57,S$GLB,, | Performed by: ORTHOPAEDIC SURGERY

## 2023-06-23 PROCEDURE — 4010F ACE/ARB THERAPY RXD/TAKEN: CPT | Mod: CPTII,S$GLB,, | Performed by: ORTHOPAEDIC SURGERY

## 2023-06-23 PROCEDURE — 1125F AMNT PAIN NOTED PAIN PRSNT: CPT | Mod: CPTII,S$GLB,, | Performed by: ORTHOPAEDIC SURGERY

## 2023-06-23 PROCEDURE — 99999 PR PBB SHADOW E&M-EST. PATIENT-LVL III: ICD-10-PCS | Mod: PBBFAC,,, | Performed by: ORTHOPAEDIC SURGERY

## 2023-06-23 PROCEDURE — 99203 PR OFFICE/OUTPT VISIT, NEW, LEVL III, 30-44 MIN: ICD-10-PCS | Mod: 57,S$GLB,, | Performed by: ORTHOPAEDIC SURGERY

## 2023-06-23 PROCEDURE — 1100F PR PT FALLS ASSESS DOC 2+ FALLS/FALL W/INJURY/YR: ICD-10-PCS | Mod: CPTII,S$GLB,, | Performed by: ORTHOPAEDIC SURGERY

## 2023-06-23 PROCEDURE — 1159F PR MEDICATION LIST DOCUMENTED IN MEDICAL RECORD: ICD-10-PCS | Mod: CPTII,S$GLB,, | Performed by: ORTHOPAEDIC SURGERY

## 2023-06-23 NOTE — PROGRESS NOTES
Patient ID: Bhavana Lambert is a 67 y.o. female.     Chief Complaint: Pain and Injury of the right knee     Referring Provider: Pasquale Self  No address on file     HPI:  Ms. Lambert is a 67-year-old lady who presented today for evaluation 1 month of right knee pain which began on 05/24/2023 after she fell off her couch landing on her knee.  She waited a week before going to the emergency room and after being seen in the emergency room she was placed in a knee immobilizer after her x-rays showed a nondisplaced superior pole patella fracture.  She stated she could not wear the knee immobilizer as it kept falling off her leg she arrived today with a hinged knee brace on her right knee.  Standing and bending her knee increases her symptoms while nothing improves them.  She initially had swelling and giving way but denied locking.  She has taken NSAIDs with help.  She is doing home health physical therapy which has not helping.  When she wears a brace she gets some relief of her pain she has not had injections.  Currently she can walk a proximally 5-10 yd and climb 5-10 stairs.  She does not use an ambulatory assistive device.          Past Medical History:   Diagnosis Date    Anxiety      Depression       *  *  * Hypertension  COPD oxygen-dependent  GERD  Headaches              Past Surgical History:   Procedure Laterality Date    COLON SURGERY        COLOSTOMY        COLOSTOMY CLOSURE        HYSTERECTOMY        IM RAYNE RIGHT TIBIA Right       *  *  * ORIF RIGHT WRIST  COLONOSCOPY  TUBAL LIGATION  ORIF LEFT DISTAL RADIUS Right           Review of patient's allergies indicates:  No Known Allergies     Social History            Occupational History    Unemployed       Tobacco Use    Smoking status: Current Every Day Smoker   Substance and Sexual Activity    Alcohol use: No       Frequency: Never    Drug use: No    Sexual activity: Not Currently            Family History   Problem Relation Age of Onset    Cancer  Mother      COPD Father      Cirrhosis Father      Arthritis Sister      No Known Problems Brother      Anxiety disorder Daughter      Depression Daughter      Anxiety disorder Daughter      Congenital heart disease Daughter      Valvular heart disease Daughter           Previous Hospitalizations:  Childbirth.     ROS:   Constitution: Negative for chills.   HENT: Negative for congestion.    Eyes: Negative for blurred vision.   Cardiovascular: Negative for chest pain.   Respiratory: Negative for cough.    Endocrine: Negative for polydipsia.   Hematologic/Lymphatic: Does not bruise/bleed easily.   Skin: Negative for poor wound healing.   Musculoskeletal: Negative for gout.   Gastrointestinal: Negative for constipation and diarrhea.   Genitourinary: Negative for nocturia.   Neurological: Positive for headaches. Negative for numbness.   Psychiatric/Behavioral: Positive for depression.   Allergic/Immunologic: Negative for environmental allergies.          Objective:      Physical Exam:   General: AAOx3.  No acute distress  HEENT: Normocephalic, PEARLA EOMI, Fair Dentition  Neck: Supple, No JVD  Chest: Symetric, equal excursion on inspiration  Abdomen: Soft NTND  Vascular:  Pulses intact and equal bilaterally.  Capillary refill less than 3 seconds and equal bilaterally  Neurologic:  Pinprick and soft touch intact and equal bilaterally  Integment:  No ecchymosis  Extremity:  Knee:  Extension/flexion equal bilaterally 0/118 degrees.  No effusion either knee.  Positive patellar load/compression right knee.  Positive patella apprehension/relocation right knee.  Able to hold right leg in extension against gravity.  Good strength with resisted extension right knee.  Able to hold right knee in full extension against a load.  Varus/valgus stressing with good endpoint equal bilaterally.  Lachman's/drawer with good endpoint equal bilaterally.  No joint line tenderness either knee.  Eusebia negative both knees.  Angelito mildly  positive right knee.  Nontender at the anserine insertion both knees.  No swelling at the anserine insertion both knees.  Radiography:  Personally reviewed x-rays of the right knee completed on 06/04/2023 showed a nondisplaced transverse fracture of the superior patella pole..      Assessment:       Impression:       1.   2.   3. Nondisplaced superior pole patella fracture, right knee   Presence intramedullary meir, right tibia   Right knee pain          Plan:       1.  Discussed physical examination and radiographic findings with the patient. Bhavana understands that she has a nondisplaced fracture of the superior patella pole of her right knee.  Treatment alternatives and outcomes were discussed with the patient she understands she could continue to be treated conservatively with observation, activity modification, NSAIDs, bracing, physical therapy, injections, or she could consider surgical intervention such as ORIF.  Since she has good strength in her knee and is maintaining alignment of her fracture will continue to treat her conservatively with bracing.  2. ROM brace right knee, locked in extension, she understands she should wear it at all times and treat it like a cast and only remove it for hygiene.  When the braces off she should not flex her knee past 10-15 degrees.    3. Any pain can be treated with over-the-counter medications dosed per box instructions.  The patient inquired about narcotic pain medication explained to the patient that she is now over a month out from her injury and at this point she should not be requiring narcotics.    4. May walk with weight-bearing at tolerance to the right lower extremity.  5. Start doing home exercises such as straight leg raises.    6. Follow up in 1 month with an x-ray of the right knee.

## 2023-06-27 ENCOUNTER — HOSPITAL ENCOUNTER (OUTPATIENT)
Facility: HOSPITAL | Age: 68
Discharge: HOME HEALTH | End: 2023-06-28
Attending: EMERGENCY MEDICINE | Admitting: INTERNAL MEDICINE
Payer: MEDICARE

## 2023-06-27 DIAGNOSIS — E87.6 HYPOKALEMIA: ICD-10-CM

## 2023-06-27 DIAGNOSIS — E86.0 DEHYDRATION: ICD-10-CM

## 2023-06-27 DIAGNOSIS — E87.1 HYPONATREMIA: ICD-10-CM

## 2023-06-27 DIAGNOSIS — R07.9 CHEST PAIN: ICD-10-CM

## 2023-06-27 DIAGNOSIS — E83.51 HYPOCALCEMIA: ICD-10-CM

## 2023-06-27 DIAGNOSIS — I95.9 HYPOTENSION, UNSPECIFIED HYPOTENSION TYPE: Primary | ICD-10-CM

## 2023-06-27 DIAGNOSIS — R05.9 COUGH: ICD-10-CM

## 2023-06-27 DIAGNOSIS — R53.1 WEAKNESS: ICD-10-CM

## 2023-06-27 DIAGNOSIS — I95.9 HYPOTENSION: ICD-10-CM

## 2023-06-27 PROBLEM — E53.8 FOLATE DEFICIENCY: Status: ACTIVE | Noted: 2023-06-04

## 2023-06-27 PROBLEM — E86.1 HYPOTENSION DUE TO HYPOVOLEMIA: Status: ACTIVE | Noted: 2023-06-27

## 2023-06-27 PROBLEM — S60.00XA CONTUSION OF HAND INCLUDING FINGERS, LEFT, INITIAL ENCOUNTER: Status: ACTIVE | Noted: 2023-06-27

## 2023-06-27 PROBLEM — D63.8 ANEMIA OF CHRONIC DISEASE: Status: ACTIVE | Noted: 2023-06-27

## 2023-06-27 PROBLEM — S60.222A CONTUSION OF HAND INCLUDING FINGERS, LEFT, INITIAL ENCOUNTER: Status: ACTIVE | Noted: 2023-06-27

## 2023-06-27 PROBLEM — N30.00 ACUTE CYSTITIS WITHOUT HEMATURIA: Status: ACTIVE | Noted: 2023-06-27

## 2023-06-27 LAB
ALBUMIN SERPL BCP-MCNC: 2.8 G/DL (ref 3.5–5.2)
ALP SERPL-CCNC: 129 U/L (ref 55–135)
ALT SERPL W/O P-5'-P-CCNC: 9 U/L (ref 10–44)
AMPHET+METHAMPHET UR QL: NEGATIVE
ANION GAP SERPL CALC-SCNC: 17 MMOL/L (ref 8–16)
AST SERPL-CCNC: 27 U/L (ref 10–40)
BACTERIA #/AREA URNS HPF: ABNORMAL /HPF
BARBITURATES UR QL SCN>200 NG/ML: NEGATIVE
BASOPHILS # BLD AUTO: 0.02 K/UL (ref 0–0.2)
BASOPHILS NFR BLD: 0.4 % (ref 0–1.9)
BENZODIAZ UR QL SCN>200 NG/ML: NEGATIVE
BILIRUB SERPL-MCNC: <0.1 MG/DL (ref 0.1–1)
BILIRUB UR QL STRIP: ABNORMAL
BNP SERPL-MCNC: 53 PG/ML (ref 0–99)
BUN SERPL-MCNC: 9 MG/DL (ref 8–23)
BZE UR QL SCN: NEGATIVE
CALCIUM SERPL-MCNC: 8.4 MG/DL (ref 8.7–10.5)
CANNABINOIDS UR QL SCN: NEGATIVE
CHLORIDE SERPL-SCNC: 89 MMOL/L (ref 95–110)
CLARITY UR: CLEAR
CO2 SERPL-SCNC: 27 MMOL/L (ref 23–29)
COLOR UR: YELLOW
CREAT SERPL-MCNC: 1.4 MG/DL (ref 0.5–1.4)
CREAT UR-MCNC: 45.9 MG/DL (ref 15–325)
DIFFERENTIAL METHOD: ABNORMAL
EOSINOPHIL # BLD AUTO: 0.1 K/UL (ref 0–0.5)
EOSINOPHIL NFR BLD: 2.3 % (ref 0–8)
ERYTHROCYTE [DISTWIDTH] IN BLOOD BY AUTOMATED COUNT: 13.6 % (ref 11.5–14.5)
EST. GFR  (NO RACE VARIABLE): 41.2 ML/MIN/1.73 M^2
ETHANOL SERPL-MCNC: <10 MG/DL (ref 0–10)
GLUCOSE SERPL-MCNC: 74 MG/DL (ref 70–110)
GLUCOSE UR QL STRIP: NEGATIVE
HCT VFR BLD AUTO: 26.5 % (ref 37–48.5)
HGB BLD-MCNC: 9 G/DL (ref 12–16)
HGB UR QL STRIP: ABNORMAL
IMM GRANULOCYTES # BLD AUTO: 0.01 K/UL (ref 0–0.04)
IMM GRANULOCYTES NFR BLD AUTO: 0.2 % (ref 0–0.5)
KETONES UR QL STRIP: ABNORMAL
LACTATE SERPL-SCNC: 1 MMOL/L (ref 0.5–2.2)
LEUKOCYTE ESTERASE UR QL STRIP: ABNORMAL
LYMPHOCYTES # BLD AUTO: 0.8 K/UL (ref 1–4.8)
LYMPHOCYTES NFR BLD: 14.9 % (ref 18–48)
MAGNESIUM SERPL-MCNC: 1.7 MG/DL (ref 1.6–2.6)
MCH RBC QN AUTO: 33.3 PG (ref 27–31)
MCHC RBC AUTO-ENTMCNC: 34 G/DL (ref 32–36)
MCV RBC AUTO: 98 FL (ref 82–98)
METHADONE UR QL SCN>300 NG/ML: NEGATIVE
MICROSCOPIC COMMENT: ABNORMAL
MONOCYTES # BLD AUTO: 0.6 K/UL (ref 0.3–1)
MONOCYTES NFR BLD: 10.8 % (ref 4–15)
NEUTROPHILS # BLD AUTO: 4 K/UL (ref 1.8–7.7)
NEUTROPHILS NFR BLD: 71.4 % (ref 38–73)
NITRITE UR QL STRIP: NEGATIVE
NRBC BLD-RTO: 0 /100 WBC
OPIATES UR QL SCN: NEGATIVE
PCP UR QL SCN>25 NG/ML: NEGATIVE
PH UR STRIP: 7 [PH] (ref 5–8)
PLATELET # BLD AUTO: 210 K/UL (ref 150–450)
PMV BLD AUTO: 10.1 FL (ref 9.2–12.9)
POTASSIUM SERPL-SCNC: 3.1 MMOL/L (ref 3.5–5.1)
PROT SERPL-MCNC: 6.2 G/DL (ref 6–8.4)
PROT UR QL STRIP: NEGATIVE
RBC # BLD AUTO: 2.7 M/UL (ref 4–5.4)
RBC #/AREA URNS HPF: 3 /HPF (ref 0–4)
SODIUM SERPL-SCNC: 133 MMOL/L (ref 136–145)
SP GR UR STRIP: 1.01 (ref 1–1.03)
TOXICOLOGY INFORMATION: NORMAL
TROPONIN I SERPL DL<=0.01 NG/ML-MCNC: 0.03 NG/ML (ref 0–0.03)
TSH SERPL DL<=0.005 MIU/L-ACNC: 2.67 UIU/ML (ref 0.4–4)
URN SPEC COLLECT METH UR: ABNORMAL
UROBILINOGEN UR STRIP-ACNC: NEGATIVE EU/DL
WBC # BLD AUTO: 5.58 K/UL (ref 3.9–12.7)
WBC #/AREA URNS HPF: 10 /HPF (ref 0–5)

## 2023-06-27 PROCEDURE — 93010 EKG 12-LEAD: ICD-10-PCS | Mod: ,,, | Performed by: INTERNAL MEDICINE

## 2023-06-27 PROCEDURE — 99285 EMERGENCY DEPT VISIT HI MDM: CPT | Mod: 25

## 2023-06-27 PROCEDURE — G0378 HOSPITAL OBSERVATION PER HR: HCPCS

## 2023-06-27 PROCEDURE — 96361 HYDRATE IV INFUSION ADD-ON: CPT

## 2023-06-27 PROCEDURE — 27000221 HC OXYGEN, UP TO 24 HOURS

## 2023-06-27 PROCEDURE — 82077 ASSAY SPEC XCP UR&BREATH IA: CPT | Performed by: EMERGENCY MEDICINE

## 2023-06-27 PROCEDURE — 83880 ASSAY OF NATRIURETIC PEPTIDE: CPT | Performed by: EMERGENCY MEDICINE

## 2023-06-27 PROCEDURE — 96368 THER/DIAG CONCURRENT INF: CPT

## 2023-06-27 PROCEDURE — 86803 HEPATITIS C AB TEST: CPT | Performed by: EMERGENCY MEDICINE

## 2023-06-27 PROCEDURE — 25000003 PHARM REV CODE 250: Performed by: EMERGENCY MEDICINE

## 2023-06-27 PROCEDURE — 73130 X-RAY EXAM OF HAND: CPT | Mod: 26,LT,, | Performed by: RADIOLOGY

## 2023-06-27 PROCEDURE — 71045 X-RAY EXAM CHEST 1 VIEW: CPT | Mod: 26,,, | Performed by: RADIOLOGY

## 2023-06-27 PROCEDURE — 94640 AIRWAY INHALATION TREATMENT: CPT

## 2023-06-27 PROCEDURE — 87389 HIV-1 AG W/HIV-1&-2 AB AG IA: CPT | Performed by: EMERGENCY MEDICINE

## 2023-06-27 PROCEDURE — 83735 ASSAY OF MAGNESIUM: CPT | Performed by: EMERGENCY MEDICINE

## 2023-06-27 PROCEDURE — 73130 X-RAY EXAM OF HAND: CPT | Mod: TC,LT

## 2023-06-27 PROCEDURE — 71045 XR CHEST AP PORTABLE: ICD-10-PCS | Mod: 26,,, | Performed by: RADIOLOGY

## 2023-06-27 PROCEDURE — 85025 COMPLETE CBC W/AUTO DIFF WBC: CPT | Performed by: EMERGENCY MEDICINE

## 2023-06-27 PROCEDURE — 84443 ASSAY THYROID STIM HORMONE: CPT | Performed by: INTERNAL MEDICINE

## 2023-06-27 PROCEDURE — 80307 DRUG TEST PRSMV CHEM ANLYZR: CPT | Performed by: EMERGENCY MEDICINE

## 2023-06-27 PROCEDURE — 71045 X-RAY EXAM CHEST 1 VIEW: CPT | Mod: TC

## 2023-06-27 PROCEDURE — 81000 URINALYSIS NONAUTO W/SCOPE: CPT | Mod: 59 | Performed by: EMERGENCY MEDICINE

## 2023-06-27 PROCEDURE — 80053 COMPREHEN METABOLIC PANEL: CPT | Performed by: EMERGENCY MEDICINE

## 2023-06-27 PROCEDURE — 93010 ELECTROCARDIOGRAM REPORT: CPT | Mod: ,,, | Performed by: INTERNAL MEDICINE

## 2023-06-27 PROCEDURE — 96372 THER/PROPH/DIAG INJ SC/IM: CPT | Mod: 59 | Performed by: INTERNAL MEDICINE

## 2023-06-27 PROCEDURE — 93005 ELECTROCARDIOGRAM TRACING: CPT

## 2023-06-27 PROCEDURE — 84484 ASSAY OF TROPONIN QUANT: CPT | Performed by: INTERNAL MEDICINE

## 2023-06-27 PROCEDURE — 96365 THER/PROPH/DIAG IV INF INIT: CPT

## 2023-06-27 PROCEDURE — 63600175 PHARM REV CODE 636 W HCPCS: Performed by: INTERNAL MEDICINE

## 2023-06-27 PROCEDURE — 25000003 PHARM REV CODE 250: Performed by: INTERNAL MEDICINE

## 2023-06-27 PROCEDURE — 83605 ASSAY OF LACTIC ACID: CPT | Performed by: INTERNAL MEDICINE

## 2023-06-27 PROCEDURE — 73130 XR HAND COMPLETE 3 VIEW LEFT: ICD-10-PCS | Mod: 26,LT,, | Performed by: RADIOLOGY

## 2023-06-27 PROCEDURE — 99223 1ST HOSP IP/OBS HIGH 75: CPT | Mod: AI,95,, | Performed by: INTERNAL MEDICINE

## 2023-06-27 PROCEDURE — 25000242 PHARM REV CODE 250 ALT 637 W/ HCPCS: Performed by: EMERGENCY MEDICINE

## 2023-06-27 PROCEDURE — 99223 PR INITIAL HOSPITAL CARE,LEVL III: ICD-10-PCS | Mod: AI,95,, | Performed by: INTERNAL MEDICINE

## 2023-06-27 RX ORDER — PROCHLORPERAZINE EDISYLATE 5 MG/ML
5 INJECTION INTRAMUSCULAR; INTRAVENOUS EVERY 6 HOURS PRN
Status: DISCONTINUED | OUTPATIENT
Start: 2023-06-27 | End: 2023-06-28 | Stop reason: HOSPADM

## 2023-06-27 RX ORDER — HEPARIN SODIUM 5000 [USP'U]/ML
5000 INJECTION, SOLUTION INTRAVENOUS; SUBCUTANEOUS EVERY 8 HOURS
Status: DISCONTINUED | OUTPATIENT
Start: 2023-06-27 | End: 2023-06-28 | Stop reason: HOSPADM

## 2023-06-27 RX ORDER — IPRATROPIUM BROMIDE AND ALBUTEROL SULFATE 2.5; .5 MG/3ML; MG/3ML
3 SOLUTION RESPIRATORY (INHALATION)
Status: COMPLETED | OUTPATIENT
Start: 2023-06-27 | End: 2023-06-27

## 2023-06-27 RX ORDER — IBUPROFEN 200 MG
16 TABLET ORAL
Status: DISCONTINUED | OUTPATIENT
Start: 2023-06-27 | End: 2023-06-28 | Stop reason: HOSPADM

## 2023-06-27 RX ORDER — IPRATROPIUM BROMIDE AND ALBUTEROL SULFATE 2.5; .5 MG/3ML; MG/3ML
3 SOLUTION RESPIRATORY (INHALATION) EVERY 4 HOURS PRN
Status: DISCONTINUED | OUTPATIENT
Start: 2023-06-27 | End: 2023-06-28 | Stop reason: HOSPADM

## 2023-06-27 RX ORDER — BUSPIRONE HYDROCHLORIDE 5 MG/1
15 TABLET ORAL 2 TIMES DAILY
Status: DISCONTINUED | OUTPATIENT
Start: 2023-06-27 | End: 2023-06-28 | Stop reason: HOSPADM

## 2023-06-27 RX ORDER — ACETAMINOPHEN 325 MG/1
650 TABLET ORAL EVERY 4 HOURS PRN
Status: DISCONTINUED | OUTPATIENT
Start: 2023-06-27 | End: 2023-06-28 | Stop reason: HOSPADM

## 2023-06-27 RX ORDER — ONDANSETRON 2 MG/ML
4 INJECTION INTRAMUSCULAR; INTRAVENOUS EVERY 6 HOURS PRN
Status: DISCONTINUED | OUTPATIENT
Start: 2023-06-27 | End: 2023-06-28 | Stop reason: HOSPADM

## 2023-06-27 RX ORDER — IPRATROPIUM BROMIDE AND ALBUTEROL SULFATE 2.5; .5 MG/3ML; MG/3ML
3 SOLUTION RESPIRATORY (INHALATION) EVERY 8 HOURS
Status: DISCONTINUED | OUTPATIENT
Start: 2023-06-28 | End: 2023-06-28 | Stop reason: HOSPADM

## 2023-06-27 RX ORDER — MAG HYDROX/ALUMINUM HYD/SIMETH 200-200-20
30 SUSPENSION, ORAL (FINAL DOSE FORM) ORAL 4 TIMES DAILY PRN
Status: DISCONTINUED | OUTPATIENT
Start: 2023-06-27 | End: 2023-06-28 | Stop reason: HOSPADM

## 2023-06-27 RX ORDER — FOLIC ACID 1 MG/1
1 TABLET ORAL DAILY
Status: DISCONTINUED | OUTPATIENT
Start: 2023-06-28 | End: 2023-06-28 | Stop reason: HOSPADM

## 2023-06-27 RX ORDER — SIMETHICONE 80 MG
1 TABLET,CHEWABLE ORAL 4 TIMES DAILY PRN
Status: DISCONTINUED | OUTPATIENT
Start: 2023-06-27 | End: 2023-06-28 | Stop reason: HOSPADM

## 2023-06-27 RX ORDER — FLUOXETINE 10 MG/1
40 CAPSULE ORAL DAILY
Status: DISCONTINUED | OUTPATIENT
Start: 2023-06-28 | End: 2023-06-28 | Stop reason: HOSPADM

## 2023-06-27 RX ORDER — MONTELUKAST SODIUM 10 MG/1
10 TABLET ORAL EVERY MORNING
Status: DISCONTINUED | OUTPATIENT
Start: 2023-06-28 | End: 2023-06-28 | Stop reason: HOSPADM

## 2023-06-27 RX ORDER — HYDROCODONE BITARTRATE AND ACETAMINOPHEN 10; 325 MG/1; MG/1
1 TABLET ORAL
Status: COMPLETED | OUTPATIENT
Start: 2023-06-27 | End: 2023-06-27

## 2023-06-27 RX ORDER — GLUCAGON 1 MG
1 KIT INJECTION
Status: DISCONTINUED | OUTPATIENT
Start: 2023-06-27 | End: 2023-06-28 | Stop reason: HOSPADM

## 2023-06-27 RX ORDER — HYDROCODONE BITARTRATE AND ACETAMINOPHEN 5; 325 MG/1; MG/1
1 TABLET ORAL EVERY 6 HOURS PRN
Status: DISCONTINUED | OUTPATIENT
Start: 2023-06-27 | End: 2023-06-28 | Stop reason: HOSPADM

## 2023-06-27 RX ORDER — TALC
6 POWDER (GRAM) TOPICAL NIGHTLY PRN
Status: DISCONTINUED | OUTPATIENT
Start: 2023-06-27 | End: 2023-06-28 | Stop reason: HOSPADM

## 2023-06-27 RX ORDER — IBUPROFEN 200 MG
24 TABLET ORAL
Status: DISCONTINUED | OUTPATIENT
Start: 2023-06-27 | End: 2023-06-28 | Stop reason: HOSPADM

## 2023-06-27 RX ORDER — SODIUM CHLORIDE AND POTASSIUM CHLORIDE 150; 900 MG/100ML; MG/100ML
INJECTION, SOLUTION INTRAVENOUS CONTINUOUS
Status: DISCONTINUED | OUTPATIENT
Start: 2023-06-27 | End: 2023-06-28

## 2023-06-27 RX ORDER — MAGNESIUM SULFATE 1 G/100ML
1 INJECTION INTRAVENOUS ONCE
Status: COMPLETED | OUTPATIENT
Start: 2023-06-27 | End: 2023-06-27

## 2023-06-27 RX ORDER — NALOXONE HCL 0.4 MG/ML
0.02 VIAL (ML) INJECTION
Status: DISCONTINUED | OUTPATIENT
Start: 2023-06-27 | End: 2023-06-28 | Stop reason: HOSPADM

## 2023-06-27 RX ORDER — SODIUM CHLORIDE 0.9 % (FLUSH) 0.9 %
10 SYRINGE (ML) INJECTION EVERY 12 HOURS PRN
Status: DISCONTINUED | OUTPATIENT
Start: 2023-06-27 | End: 2023-06-28 | Stop reason: HOSPADM

## 2023-06-27 RX ORDER — POLYETHYLENE GLYCOL 3350 17 G/17G
17 POWDER, FOR SOLUTION ORAL DAILY
Status: DISCONTINUED | OUTPATIENT
Start: 2023-06-28 | End: 2023-06-28 | Stop reason: HOSPADM

## 2023-06-27 RX ORDER — AMOXICILLIN 250 MG
1 CAPSULE ORAL 2 TIMES DAILY PRN
Status: DISCONTINUED | OUTPATIENT
Start: 2023-06-27 | End: 2023-06-28 | Stop reason: HOSPADM

## 2023-06-27 RX ORDER — POTASSIUM CHLORIDE 20 MEQ/1
40 TABLET, EXTENDED RELEASE ORAL
Status: COMPLETED | OUTPATIENT
Start: 2023-06-27 | End: 2023-06-27

## 2023-06-27 RX ADMIN — MAGNESIUM SULFATE IN DEXTROSE 1 G: 10 INJECTION, SOLUTION INTRAVENOUS at 10:06

## 2023-06-27 RX ADMIN — POTASSIUM CHLORIDE 40 MEQ: 1500 TABLET, EXTENDED RELEASE ORAL at 05:06

## 2023-06-27 RX ADMIN — SODIUM CHLORIDE 1000 ML: 9 INJECTION, SOLUTION INTRAVENOUS at 02:06

## 2023-06-27 RX ADMIN — SODIUM CHLORIDE AND POTASSIUM CHLORIDE: .9; .15 SOLUTION INTRAVENOUS at 11:06

## 2023-06-27 RX ADMIN — IPRATROPIUM BROMIDE AND ALBUTEROL SULFATE 3 ML: 2.5; .5 SOLUTION RESPIRATORY (INHALATION) at 07:06

## 2023-06-27 RX ADMIN — HYDROCODONE BITARTRATE AND ACETAMINOPHEN 1 TABLET: 10; 325 TABLET ORAL at 07:06

## 2023-06-27 RX ADMIN — SODIUM CHLORIDE 1000 ML: 9 INJECTION, SOLUTION INTRAVENOUS at 05:06

## 2023-06-27 RX ADMIN — CEFTRIAXONE SODIUM 1 G: 1 INJECTION, POWDER, FOR SOLUTION INTRAMUSCULAR; INTRAVENOUS at 11:06

## 2023-06-27 RX ADMIN — HEPARIN SODIUM 5000 UNITS: 5000 INJECTION, SOLUTION INTRAVENOUS; SUBCUTANEOUS at 11:06

## 2023-06-27 RX ADMIN — BUSPIRONE HYDROCHLORIDE 15 MG: 5 TABLET ORAL at 11:06

## 2023-06-27 NOTE — ED PROVIDER NOTES
Encounter Date: 6/27/2023       History     Chief Complaint   Patient presents with    Hypotension     Pt reports low BP onset 3 days. Asymptomatic on arrival      67-year-old female, here from home via private vehicle for evaluation and treatment of low blood pressure.  Home health was at the patient's house today and noticed that her blood pressure was low.  Patient reports nonproductive cough and decreased appetite over the last week.  Also fatigued.  Denies fever or chills.  No chest pain or palpitations, no abdominal pain, no nausea or vomiting, no dysuria, no loose stools or constipation.  Patient does complain of some pain and swelling in the left hand after a fall at home 2 days ago.  Otherwise she voices no complaints.     Patient's daughter, who is here with her, states that patient fell a few weeks ago, and fractured her patella.  She has been basically bed-bound since then.  Although the daughters have been pushing her to drink liquids and take food, she has not been eating or drinking as much as she should be.  Also, the patient's air conditioner quit working a few days ago, and the patient's daughter thinks that she may be dehydrated.  She states that this happened to her last summer as well, and she had to be hospitalized at that time.  Patient denies any history of GI bleeding.  Denies abdominal pain.  No dark or bloody stool.  Daughter, who has been emptying her bedside commode, confirms that she is had no dark or bloody stools.      Review of patient's allergies indicates:  No Known Allergies  Past Medical History:   Diagnosis Date    Acquired hypothyroidism     Anxiety     Anxiety and depression     Cervical radicular pain     Closed left ankle fracture     COPD (chronic obstructive pulmonary disease)     Hypertension     Hypokalemia     Hyponatremia     Multiple rib fractures     Requires continuous at home supplemental oxygen     PRN FOR COPD    Tension type headache     Traumatic closed  displaced fracture of distal end of radius, left, sequela      Past Surgical History:   Procedure Laterality Date    COLON SURGERY      COLOSTOMY      COLOSTOMY CLOSURE      HYSTERECTOMY      LEG SURGERY Right     OPEN REDUCTION AND INTERNAL FIXATION (ORIF) OF INJURY OF WRIST Left 1/29/2019    Procedure: ORIF, WRIST;  Surgeon: Homero Jeff DO;  Location: [facility] OR;  Service: Orthopedics;  Laterality: Left;  Equipment: [employer] Geminus Wrist Plate Set  Vendor/Rep: [employer]  C-Arm: Entire  DME: None    REQUIRES ASSISTANT    WRIST SURGERY Right      Family History   Problem Relation Age of Onset    Cancer Mother     COPD Father     Cirrhosis Father     Arthritis Sister     No Known Problems Brother     Anxiety disorder Daughter     Depression Daughter     Anxiety disorder Daughter     Congenital heart disease Daughter     Valvular heart disease Daughter      Social History     Tobacco Use    Smoking status: Every Day     Packs/day: 1.00     Years: 20.00     Pack years: 20.00     Types: Cigarettes    Smokeless tobacco: Never   Substance Use Topics    Alcohol use: Yes     Comment: couple times a week-wine or bloody cari    Drug use: No     Review of Systems   Constitutional:  Positive for appetite change and fatigue.   HENT: Negative.     Eyes: Negative.    Respiratory:  Positive for cough. Negative for shortness of breath.    Cardiovascular: Negative.    Gastrointestinal: Negative.    Endocrine: Negative.    Genitourinary: Negative.    Musculoskeletal:  Positive for arthralgias (Left hand pain and swelling.  Also has a knee brace on the right lower extremity secondary to recent patella fracture.  Normal range of motion of the ankle and toes, normal cap refill, no calf swelling or tenderness.  Normal sensory function) and gait problem. Negative for neck pain and neck stiffness.   Skin: Negative.  Negative for pallor and rash.   Allergic/Immunologic: Negative.    Hematological: Negative.     Psychiatric/Behavioral: Negative.       Physical Exam     Initial Vitals   BP Pulse Resp Temp SpO2   06/27/23 1335 06/27/23 1335 06/27/23 1335 06/27/23 1900 06/27/23 1335   (!) 84/53 90 (!) 22 98.4 °F (36.9 °C) 95 %      MAP       --                Physical Exam    Nursing note and vitals reviewed.  Constitutional: She appears well-developed and well-nourished. No distress.   HENT:   Head: Normocephalic and atraumatic.   Nose: Nose normal.   Mouth/Throat: Oropharynx is clear and moist. No oropharyngeal exudate.   Eyes: Conjunctivae and EOM are normal. Pupils are equal, round, and reactive to light. No scleral icterus.   Neck: Neck supple. No JVD present.   Normal range of motion.  Cardiovascular:  Normal rate, regular rhythm, normal heart sounds and intact distal pulses.           No murmur heard.  Pulmonary/Chest: Breath sounds normal. No stridor. No respiratory distress. She has no wheezes. She has no rhonchi. She has no rales. She exhibits no tenderness.   Abdominal: Abdomen is soft. Bowel sounds are normal. She exhibits no distension. There is no abdominal tenderness.   Musculoskeletal:         General: Tenderness and edema present.      Cervical back: Normal range of motion and neck supple.      Comments: There is a brace on the right knee.  Recent patella fracture.  No calf swelling, no discoloration, normal sensory and motor function of both lower extremities other than the right knee.  No significant edema around the knee.  Left hand is somewhat swollen and mildly tender normal sensory and motor in the fingers,     Neurological: She is alert and oriented to person, place, and time. She has normal strength. No cranial nerve deficit or sensory deficit. GCS score is 15. GCS eye subscore is 4. GCS verbal subscore is 5. GCS motor subscore is 6.   Skin: Skin is warm and dry. Capillary refill takes less than 2 seconds. No rash noted. No erythema.   Psychiatric: She has a normal mood and affect. Her behavior is  normal.       ED Course   Procedures  Labs Reviewed   CBC W/ AUTO DIFFERENTIAL - Abnormal; Notable for the following components:       Result Value    RBC 2.70 (*)     Hemoglobin 9.0 (*)     Hematocrit 26.5 (*)     MCH 33.3 (*)     Lymph # 0.8 (*)     Lymph % 14.9 (*)     All other components within normal limits   COMPREHENSIVE METABOLIC PANEL - Abnormal; Notable for the following components:    Sodium 133 (*)     Potassium 3.1 (*)     Chloride 89 (*)     Calcium 8.4 (*)     Albumin 2.8 (*)     Total Bilirubin <0.1 (*)     ALT 9 (*)     eGFR 41.2 (*)     Anion Gap 17 (*)     All other components within normal limits   URINALYSIS, REFLEX TO URINE CULTURE - Abnormal; Notable for the following components:    Ketones, UA 1+ (*)     Bilirubin (UA) 1+ (*)     Occult Blood UA Trace (*)     Leukocytes, UA 2+ (*)     All other components within normal limits    Narrative:     Preferred Collection Type->Urine, Clean Catch  Specimen Source->Urine   URINALYSIS MICROSCOPIC - Abnormal; Notable for the following components:    WBC, UA 10 (*)     Bacteria Many (*)     All other components within normal limits    Narrative:     Preferred Collection Type->Urine, Clean Catch  Specimen Source->Urine   MAGNESIUM   DRUG SCREEN PANEL, URINE EMERGENCY    Narrative:     Preferred Collection Type->Urine, Clean Catch  Specimen Source->Urine   ALCOHOL,MEDICAL (ETHANOL)   B-TYPE NATRIURETIC PEPTIDE   LACTIC ACID, PLASMA   TROPONIN I   TSH     EKG Readings: (Independently Interpreted)   EKG personally reviewed by me shows normal sinus rhythm, diffuse ST and T-wave changes, no obvious STEMI, no arrhythmia.  91 beats per minute, AL interval 142, .    ECG Results              EKG 12-lead (Final result)  Result time 06/27/23 14:15:50      Final result by Interface, Lab In [facility] (06/27/23 14:15:50)                   Narrative:    Test Reason : R53.1,    Vent. Rate : 091 BPM     Atrial Rate : 091 BPM     P-R Int : 142 ms          QRS Dur :  080 ms      QT Int : 436 ms       P-R-T Axes : 058 -09 074 degrees     QTc Int : 536 ms    Normal sinus rhythm  ST and T wave abnormality, consider anterior ischemia  Abnormal ECG  When compared with ECG of 16-SEP-2020 01:46,  Borderline criteria for Anteroseptal infarct are no longer Present  Non-specific change in ST segment in Anterior leads  Inverted T waves have replaced nonspecific T wave abnormality in Anterior  leads  Confirmed by Bear Welch MD ([id number]) on 6/27/2023 2:15:39 PM    Referred By:  SELF           Confirmed By:Bear Welch MD                                  Imaging Results              X-Ray Hand 3 view Left (Final result)  Result time 06/27/23 14:49:49      Final result by Lele Mike MD (06/27/23 14:49:49)                   Impression:      1. Remote ORIF of the distal radius.  2. Ulnar positive variant with suspected posttraumatic deformity of the distal ulna.  3. Bone demineralization.      Electronically signed by: Lele Mike  Date:    06/27/2023  Time:    14:49               Narrative:    EXAMINATION:  XR HAND COMPLETE 3 VIEW LEFT    CLINICAL HISTORY:  Left hand injury;.    TECHNIQUE:  PA, lateral, and oblique views of the left hand were performed.    COMPARISON:  None    FINDINGS:  Prior ORIF of the distal radius.  There is a ulnar positive variant.  Suspected posttraumatic deformity of the distal ulna.  There is posttraumatic deformity of the 4th metacarpal neck.    Carpal bones are intact with mild degenerative osteoarthrosis.  Distal joint spaces are preserved.  There is bone demineralization.                                       X-Ray Chest AP Portable (Final result)  Result time 06/27/23 14:48:08      Final result by Lele Mike MD (06/27/23 14:48:08)                   Impression:      1. Mild chronic changes of interstitial fibrosis without focal consolidation  2. Cardiomegaly.  3. Multiple healed left rib fractures.  As deemed clinically necessary, further evaluation may  be obtained with dedicated PA and lateral views of the chest.      Electronically signed by: Lele Mike  Date:    06/27/2023  Time:    14:48               Narrative:    EXAMINATION:  XR CHEST AP PORTABLE    CLINICAL HISTORY:  Cough, unspecified    TECHNIQUE:  Portable view of the chest was performed.    COMPARISON:  06/04/2023.    FINDINGS:  Persistent mild chronic changes of interstitial fibrosis.  No focal consolidation.  No significant pleural effusion.  Heart size is enlarged.  Trachea midline.    Bony thorax intact.  Multiple healed left rib fractures.                                    X-Rays:   Independently Interpreted Readings:   Other Readings:  X-ray left hand, personally reviewed by me, shows no evidence of acute fracture.  Hardware from previous distal radius repair appears to be in place and intact.    Chest x-ray personally reviewed by me shows chronic changes from COPD and pulmonary fibrosis, old healed rib fractures, no acute findings.  Cardiac silhouette normal, no acute fractures noted.  No pneumothorax, no obvious edema.  Medications   sodium chloride 0.9% bolus 1,000 mL 1,000 mL (0 mLs Intravenous Stopped 6/27/23 1701)   potassium chloride SA CR tablet 40 mEq (40 mEq Oral Given 6/27/23 1716)   sodium chloride 0.9% bolus 1,000 mL 1,000 mL (0 mLs Intravenous Stopped 6/27/23 1815)   HYDROcodone-acetaminophen  mg per tablet 1 tablet (1 tablet Oral Given 6/27/23 1925)   albuterol-ipratropium 2.5 mg-0.5 mg/3 mL nebulizer solution 3 mL (3 mLs Nebulization Given 6/27/23 1933)   magnesium sulfate in dextrose IVPB (premix) 1 g (0 g Intravenous Stopped 6/27/23 2357)   sodium chloride 0.9% bolus 500 mL 500 mL (0 mLs Intravenous Stopped 6/28/23 0241)     Medical Decision Making:   Differential Diagnosis:   Dehydration, sepsis, heart failure, myocardial ischemia or infarction, urinary tract infection,  etc.  ED Management:  Labs show mild anemia with an H&H of 9 and 26 respectively.  This is  slightly lower than earlier in the month.  Questioned patient again about signs of GI bleeding but she has no abdominal pain, no dark or bloody stools.  Daughter confirms this.  She is mildly hyponatremic with a sodium of 133, and hypokalemic with a potassium of 3.1.  BUN and creatinine look good at 9 and 1.4.  Calcium slightly low at 8.4.  BNP normal at 53.  Still awaiting urinalysis results.  Patient's vital signs are stable except for her blood pressure, which is slowly improving with the IV fluids.  I believe patient needs admission for further evaluation and treatment.  Still awaiting results of urinalysis.  At shift change, the patient's care be transferred to Dr. Coburn who will call the hospitalist at 7:00 p.m. for admission.  Patient agrees with this plan of care.                        Clinical Impression:   Final diagnoses:  [R53.1] Weakness  [R05.9] Cough  [I95.9] Hypotension, unspecified hypotension type (Primary)  [E86.0] Dehydration  [E87.6] Hypokalemia  [E83.51] Hypocalcemia  [E87.1] Hyponatremia        ED Disposition Condition    Observation Stable                Shady Blake MD  07/06/23 2264

## 2023-06-27 NOTE — ED NOTES
Pt more awake and alert after IVF's at this time. Speech clear and appropriate. Fam @ bedside. NAD.

## 2023-06-27 NOTE — ED NOTES
Patient repositioned in bed for comfort. Patient still unable to provide a urine sample at this time. Nurse notified.

## 2023-06-28 VITALS
HEART RATE: 86 BPM | OXYGEN SATURATION: 95 % | SYSTOLIC BLOOD PRESSURE: 100 MMHG | HEIGHT: 62 IN | RESPIRATION RATE: 16 BRPM | TEMPERATURE: 96 F | BODY MASS INDEX: 22.45 KG/M2 | WEIGHT: 122 LBS | DIASTOLIC BLOOD PRESSURE: 58 MMHG

## 2023-06-28 LAB
ALBUMIN SERPL BCP-MCNC: 2.7 G/DL (ref 3.5–5.2)
ALP SERPL-CCNC: 124 U/L (ref 55–135)
ALT SERPL W/O P-5'-P-CCNC: 10 U/L (ref 10–44)
ANION GAP SERPL CALC-SCNC: 16 MMOL/L (ref 8–16)
AORTIC ROOT ANNULUS: 3.53 CM
AORTIC VALVE CUSP SEPERATION: 1.37 CM
ASCENDING AORTA: 2.63 CM
AST SERPL-CCNC: 24 U/L (ref 10–40)
AV INDEX (PROSTH): 0.8
AV MEAN GRADIENT: 4 MMHG
AV PEAK GRADIENT: 8 MMHG
AV REGURGITATION PRESSURE HALF TIME: 458.39 MS
AV VALVE AREA: 2.84 CM2
AV VELOCITY RATIO: 0.83
BASOPHILS # BLD AUTO: 0.02 K/UL (ref 0–0.2)
BASOPHILS NFR BLD: 0.6 % (ref 0–1.9)
BILIRUB SERPL-MCNC: 0.2 MG/DL (ref 0.1–1)
BSA FOR ECHO PROCEDURE: 1.56 M2
BUN SERPL-MCNC: 7 MG/DL (ref 8–23)
CALCIUM SERPL-MCNC: 7.8 MG/DL (ref 8.7–10.5)
CHLORIDE SERPL-SCNC: 98 MMOL/L (ref 95–110)
CO2 SERPL-SCNC: 21 MMOL/L (ref 23–29)
CREAT SERPL-MCNC: 1.1 MG/DL (ref 0.5–1.4)
CV ECHO LV RWT: 0.49 CM
DIFFERENTIAL METHOD: ABNORMAL
DOP CALC AO PEAK VEL: 1.38 M/S
DOP CALC AO VTI: 28.52 CM
DOP CALC LVOT AREA: 3.6 CM2
DOP CALC LVOT DIAMETER: 2.13 CM
DOP CALC LVOT PEAK VEL: 1.14 M/S
DOP CALC LVOT STROKE VOLUME: 81.06 CM3
DOP CALC MV VTI: 27.92 CM
DOP CALCLVOT PEAK VEL VTI: 22.76 CM
E WAVE DECELERATION TIME: 202.81 MSEC
E/A RATIO: 0.76
E/E' RATIO: 6.76 M/S
ECHO LV POSTERIOR WALL: 1.03 CM (ref 0.6–1.1)
EJECTION FRACTION: 68 %
EOSINOPHIL # BLD AUTO: 0.1 K/UL (ref 0–0.5)
EOSINOPHIL NFR BLD: 4 % (ref 0–8)
ERYTHROCYTE [DISTWIDTH] IN BLOOD BY AUTOMATED COUNT: 14 % (ref 11.5–14.5)
EST. GFR  (NO RACE VARIABLE): 55.1 ML/MIN/1.73 M^2
FRACTIONAL SHORTENING: 37 % (ref 28–44)
GLUCOSE SERPL-MCNC: 85 MG/DL (ref 70–110)
HCT VFR BLD AUTO: 23.4 % (ref 37–48.5)
HCV AB SERPL QL IA: NORMAL
HGB BLD-MCNC: 7.7 G/DL (ref 12–16)
HIV 1+2 AB+HIV1 P24 AG SERPL QL IA: NORMAL
IMM GRANULOCYTES # BLD AUTO: 0.01 K/UL (ref 0–0.04)
IMM GRANULOCYTES NFR BLD AUTO: 0.3 % (ref 0–0.5)
INTERVENTRICULAR SEPTUM: 0.98 CM (ref 0.6–1.1)
IVC DIAMETER: 1.69 CM
IVRT: 15.22 MSEC
LA MAJOR: 3.44 CM
LA MINOR: 2.73 CM
LEFT ATRIUM SIZE: 3.93 CM
LEFT ATRIUM VOLUME INDEX MOD: 5.4 ML/M2
LEFT ATRIUM VOLUME MOD: 8.4 CM3
LEFT INTERNAL DIMENSION IN SYSTOLE: 2.64 CM (ref 2.1–4)
LEFT VENTRICLE DIASTOLIC VOLUME INDEX: 51.34 ML/M2
LEFT VENTRICLE DIASTOLIC VOLUME: 79.57 ML
LEFT VENTRICLE MASS INDEX: 90 G/M2
LEFT VENTRICLE SYSTOLIC VOLUME INDEX: 16.6 ML/M2
LEFT VENTRICLE SYSTOLIC VOLUME: 25.66 ML
LEFT VENTRICULAR INTERNAL DIMENSION IN DIASTOLE: 4.22 CM (ref 3.5–6)
LEFT VENTRICULAR MASS: 139.26 G
LV LATERAL E/E' RATIO: 7.1 M/S
LV SEPTAL E/E' RATIO: 6.45 M/S
LVOT MG: 2.51 MMHG
LVOT MV: 0.73 CM/S
LYMPHOCYTES # BLD AUTO: 0.7 K/UL (ref 1–4.8)
LYMPHOCYTES NFR BLD: 20.9 % (ref 18–48)
MAGNESIUM SERPL-MCNC: 1.8 MG/DL (ref 1.6–2.6)
MCH RBC QN AUTO: 33.6 PG (ref 27–31)
MCHC RBC AUTO-ENTMCNC: 32.9 G/DL (ref 32–36)
MCV RBC AUTO: 102 FL (ref 82–98)
MONOCYTES # BLD AUTO: 0.3 K/UL (ref 0.3–1)
MONOCYTES NFR BLD: 9 % (ref 4–15)
MV A" WAVE DURATION": 7.99 MSEC
MV MEAN GRADIENT: 2 MMHG
MV PEAK A VEL: 0.93 M/S
MV PEAK E VEL: 0.71 M/S
MV PEAK GRADIENT: 4 MMHG
MV STENOSIS PRESSURE HALF TIME: 52.57 MS
MV VALVE AREA BY CONTINUITY EQUATION: 2.9 CM2
MV VALVE AREA P 1/2 METHOD: 4.18 CM2
NEUTROPHILS # BLD AUTO: 2.1 K/UL (ref 1.8–7.7)
NEUTROPHILS NFR BLD: 65.2 % (ref 38–73)
NRBC BLD-RTO: 0 /100 WBC
PHOSPHATE SERPL-MCNC: 3 MG/DL (ref 2.7–4.5)
PISA AR MAX VEL: 2.61 M/S
PISA MRMAX VEL: 0.02 M/S
PISA TR MAX VEL: 2.46 M/S
PLATELET # BLD AUTO: 161 K/UL (ref 150–450)
PMV BLD AUTO: 10.1 FL (ref 9.2–12.9)
POTASSIUM SERPL-SCNC: 3.3 MMOL/L (ref 3.5–5.1)
PROT SERPL-MCNC: 5.9 G/DL (ref 6–8.4)
PULM VEIN S/D RATIO: 1.5
PV MEAN GRADIENT: 2 MMHG
PV MV: 0.73 M/S
PV PEAK D VEL: 0.54 M/S
PV PEAK S VEL: 0.81 M/S
PV PEAK VELOCITY: 1.05 CM/S
RA MAJOR: 3.69 CM
RA PRESSURE: 3 MMHG
RA WIDTH: 2.42 CM
RBC # BLD AUTO: 2.29 M/UL (ref 4–5.4)
RIGHT VENTRICULAR END-DIASTOLIC DIMENSION: 3.05 CM
RIGHT VENTRICULAR LENGTH IN DIASTOLE (APICAL 4-CHAMBER VIEW): 47.4 CM
RV MID DIAMA: 1.41 CM
SODIUM SERPL-SCNC: 135 MMOL/L (ref 136–145)
STJ: 3 CM
TDI LATERAL: 0.1 M/S
TDI SEPTAL: 0.11 M/S
TDI: 0.11 M/S
TR MAX PG: 24 MMHG
TRICUSPID ANNULAR PLANE SYSTOLIC EXCURSION: 1.53 CM
TRICUSPID VALVE PEAK A WAVE VELOCITY: 0.75 M/S
TV PEAK E VEL: 0.6 M/S
TV REST PULMONARY ARTERY PRESSURE: 27 MMHG
WBC # BLD AUTO: 3.21 K/UL (ref 3.9–12.7)

## 2023-06-28 PROCEDURE — 36415 COLL VENOUS BLD VENIPUNCTURE: CPT | Performed by: EMERGENCY MEDICINE

## 2023-06-28 PROCEDURE — 63600175 PHARM REV CODE 636 W HCPCS: Performed by: INTERNAL MEDICINE

## 2023-06-28 PROCEDURE — 96366 THER/PROPH/DIAG IV INF ADDON: CPT

## 2023-06-28 PROCEDURE — 25000242 PHARM REV CODE 250 ALT 637 W/ HCPCS: Performed by: INTERNAL MEDICINE

## 2023-06-28 PROCEDURE — 84100 ASSAY OF PHOSPHORUS: CPT | Performed by: INTERNAL MEDICINE

## 2023-06-28 PROCEDURE — G0378 HOSPITAL OBSERVATION PER HR: HCPCS

## 2023-06-28 PROCEDURE — 85025 COMPLETE CBC W/AUTO DIFF WBC: CPT | Performed by: INTERNAL MEDICINE

## 2023-06-28 PROCEDURE — 25000003 PHARM REV CODE 250: Performed by: INTERNAL MEDICINE

## 2023-06-28 PROCEDURE — 99239 HOSP IP/OBS DSCHRG MGMT >30: CPT | Mod: ,,, | Performed by: STUDENT IN AN ORGANIZED HEALTH CARE EDUCATION/TRAINING PROGRAM

## 2023-06-28 PROCEDURE — 97162 PT EVAL MOD COMPLEX 30 MIN: CPT

## 2023-06-28 PROCEDURE — 97530 THERAPEUTIC ACTIVITIES: CPT

## 2023-06-28 PROCEDURE — 99239 PR HOSPITAL DISCHARGE DAY,>30 MIN: ICD-10-PCS | Mod: ,,, | Performed by: STUDENT IN AN ORGANIZED HEALTH CARE EDUCATION/TRAINING PROGRAM

## 2023-06-28 PROCEDURE — 27000221 HC OXYGEN, UP TO 24 HOURS

## 2023-06-28 PROCEDURE — 83735 ASSAY OF MAGNESIUM: CPT | Performed by: INTERNAL MEDICINE

## 2023-06-28 PROCEDURE — 80053 COMPREHEN METABOLIC PANEL: CPT | Performed by: INTERNAL MEDICINE

## 2023-06-28 PROCEDURE — 94761 N-INVAS EAR/PLS OXIMETRY MLT: CPT

## 2023-06-28 PROCEDURE — 94640 AIRWAY INHALATION TREATMENT: CPT

## 2023-06-28 PROCEDURE — 96372 THER/PROPH/DIAG INJ SC/IM: CPT | Performed by: INTERNAL MEDICINE

## 2023-06-28 RX ORDER — POTASSIUM CHLORIDE 20 MEQ/1
40 TABLET, EXTENDED RELEASE ORAL ONCE
Status: DISCONTINUED | OUTPATIENT
Start: 2023-06-28 | End: 2023-06-28 | Stop reason: HOSPADM

## 2023-06-28 RX ORDER — LEVOFLOXACIN 250 MG/1
500 TABLET ORAL DAILY
Qty: 6 TABLET | Refills: 0 | Status: SHIPPED | OUTPATIENT
Start: 2023-06-28 | End: 2023-07-01

## 2023-06-28 RX ADMIN — FLUOXETINE 40 MG: 10 CAPSULE ORAL at 09:06

## 2023-06-28 RX ADMIN — IPRATROPIUM BROMIDE AND ALBUTEROL SULFATE 3 ML: .5; 3 SOLUTION RESPIRATORY (INHALATION) at 07:06

## 2023-06-28 RX ADMIN — HYDROCODONE BITARTRATE AND ACETAMINOPHEN 1 TABLET: 5; 325 TABLET ORAL at 12:06

## 2023-06-28 RX ADMIN — SODIUM CHLORIDE AND POTASSIUM CHLORIDE: .9; .15 SOLUTION INTRAVENOUS at 09:06

## 2023-06-28 RX ADMIN — FOLIC ACID 1 MG: 1 TABLET ORAL at 09:06

## 2023-06-28 RX ADMIN — SODIUM CHLORIDE 500 ML: 9 INJECTION, SOLUTION INTRAVENOUS at 01:06

## 2023-06-28 RX ADMIN — HEPARIN SODIUM 5000 UNITS: 5000 INJECTION, SOLUTION INTRAVENOUS; SUBCUTANEOUS at 06:06

## 2023-06-28 RX ADMIN — BUSPIRONE HYDROCHLORIDE 15 MG: 5 TABLET ORAL at 09:06

## 2023-06-28 RX ADMIN — MONTELUKAST 10 MG: 10 TABLET, FILM COATED ORAL at 06:06

## 2023-06-28 RX ADMIN — HYDROCODONE BITARTRATE AND ACETAMINOPHEN 1 TABLET: 5; 325 TABLET ORAL at 05:06

## 2023-06-28 NOTE — PLAN OF CARE
06/28/23 1112   SLATER Message   Medicare Outpatient and Observation Notification regarding financial responsibility Explained to patient/caregiver;Signed/date by patient/caregiver   Date SLATER was signed 06/28/23   Time SLATER was signed [id number]

## 2023-06-28 NOTE — PLAN OF CARE
[facility] - Intensive Care  Discharge Final Note    Primary Care Provider: Pepito Jhaveri Iv, MD    Expected Discharge Date: 6/28/2023    Verbal follow up appointment with Dr Jhaveri provided to patient. [facility] will continue to see her for home health services. Orders sent to them. Demonstrated understanding by verbal feedback.  Her daughter will come pick her up. She states her other daughter & spouse may come live with her. No other needs at this time.     Final Discharge Note (most recent)       Final Note - 06/28/23 1358          Final Note    Assessment Type Final Discharge Note     Anticipated Discharge Disposition Home-Health Care [facility]     What phone number can be called within the next 1-3 days to see how you are doing after discharge? [phone]     Hospital Resources/Appts/Education Provided Appointments scheduled and added to AVS        Post-Acute Status    Post-Acute Authorization Home Health     Home Health Status Set-up Complete/Auth obtained     Discharge Delays None known at this time                     Important Message from Medicare             Contact Info       Pepito Jhaveri Iv, MD   Specialty: Family Medicine   Relationship: PCP - General    [address]   [place] [address]   Phone: [phone]       Next Steps: Go on 7/5/2023    Instructions: appointment Wednesday July 5th at 10:40am for hospital follow up    [facility] [address] [place] [address]   Phone: [phone]       Next Steps: Follow up    Instructions: will continue to provide home health services

## 2023-06-28 NOTE — ASSESSMENT & PLAN NOTE
Unsure what to make of her hand Xray  Not sure if the findings are old or new  Her hand is definitely swollen  Consult Ortho    Xray of the left hand showed 1. Remote ORIF of the distal radius.  2. Ulnar positive variant with suspected posttraumatic deformity of the distal ulna.  3. Bone demineralization.

## 2023-06-28 NOTE — DISCHARGE SUMMARY
"[facility] Medicine  Discharge Summary      Patient Name: Bhavana Lambert  MRN: [id number]  Patient Class: OP- Observation  Admission Date: 6/4/2023  Hospital Length of Stay: 0 days  Discharge Date and Time: 6/5/2023  6:17 PM  Attending Physician: Nany Sherman MD  Discharging Provider: Nany Sherman MD  Primary Care Provider: Pepito Jhaveri Iv, MD      HPI:   Ms. Lambert is a 68yo lady with a past medical history of anxiety, COPD with chronic hypoxia on home O2 2.5-3L coninuous, depression and HTN.    She now comes to the ED with her daughter at the bedside.  She states she has been falling more often on and off over the past week.  The most recent time she, "just rolled off the sofa."  This has progressed to being essentially bed bound from generalized weakness and lethargy.  She has not been eating and drinking hardly at all for the past several days.  She has chronic MENA, SOB and headaches, all of which are unchanged from baseline.  She lives at home with a friend who assists in her care.  Her daughter finally became concerned due to her just lying around with poor po intake.  She denies fever, chills and dysuria, though notes a rash under her breasts.    In the ED her VS were /79   Pulse 94   Temp 98.4 °F (36.9 °C) (Oral)   Resp 15   Ht 5' 2" (1.575 m)   Wt 61.2 kg (135 lb)   SpO2 92 - 95% 3L NC   BMI 24.69 kg/m².  Labs showed Hg 11.1, MCV 99, Na 127, AG 19, Cr 1.3 (Cr 0.5 on 9/17/20, , Alb 3, UDS +Barbiturates, UA WBC 5, RBC 0, nitrite neg.  ABG 3L NC: pH 7.35, PCO2 45.6, PAO2 67, sats 92%.    CXR showed NML heart size, flat diaphragms from COPD, no infiltrates.  CT head showed moderate white matter disease and volume loss are identified. There is no acute infarct or edema. No hemorrhage. Cerebral ventricles: No ventriculomegaly. There are no acute concerning abnormalities.  XRay right knee (per ED staff) showed no acute fractures.  No EKG " done in the ED.    In the ED she was treated with:  Medications   0.9%  NaCl infusion (1,000 mLs Intravenous New Bag 6/4/23 1216)               * No surgery found *        Goals of Care Treatment Preferences:  Code Status: Full Code      Consults:     Hospital Course:   Patient needs follow up with CXR and CT of Chest possibly if question of lung mass persists. It would be beneficial to discuss possibly polypharmacy with her PCP as well. She has tramadol, fioricet, xanax, and other medications listed that could increase risk of falls, especially in combination with her alcohol use. Miconazole and InterDry sheets given for fungal rash. Home health ordered.    Knee immobilzer - ER - follow up     * Dehydration with hyponatremia  NS 1L IV in the ED  Poor po intake x 1.5 weeks with progressive falls   cc/hr for now  Check TSH    Holding the following potential culprits:    fluoxetine HCl (PROZAC ORAL), Take 40 mg by mouth once daily     traMADoL (ULTRAM) 50 mg tablet, Take 1 tablet (50 mg total) by mouth every 8 (eight) hours as needed for Pain         Folate deficiency  Ordered B12, folate, Fe studies and SMMA      Recurrent falls  This is likely multifactorial from general deconditioning and medications as below  Consult PT and OT  Seems non-specific, so don't suspect need for brain MRI for CVA      She has multiple medications that can also contribute to gait imbalance:     butalbital-acetaminophen-caffeine -40 mg (FIORICET, ESGIC) -40 mg per tablet, Take 1 tablet by mouth every 6 (six) hours as needed for Headaches     clonazePAM (KLONOPIN) 0.5 MG tablet, Take 0.5 mg by mouth 2 (two) times daily as needed (anxiety)     cyclobenzaprine (FLEXERIL) 10 MG tablet, Take 10 mg by mouth 3 (three) times daily as needed for Muscle spasms     traMADoL (ULTRAM) 50 mg tablet, Take 1 tablet (50 mg total) by mouth every 8 (eight) hours as needed for Pain         Chronic respiratory failure with hypoxia  She  normally uses 2.5L O2 NC to 3L  She is requiring 3L NC currently, so near baseline      Primary hypertension  BP 94/81   Pulse 96      Currently running on the low side, so holding the following home BP meds:    amlodipine-benazepril 5-10 mg (LOTREL) 5-10 mg per capsule, Take 1 capsule by mouth every morning        COPD (chronic obstructive pulmonary disease)    albuterol nebulizer solution 2.5 mg, 2.5 mg, Nebulization, Q4H PRN **AND** ipratropium 0.02 % nebulizer solution 0.5 mg, 0.5 mg, Nebulization, Q4H PRN    [START ON 6/5/2023] fluticasone furoate-vilanteroL 100-25 mcg/dose diskus inhaler 1 puff, 1 puff, Inhalation, Daily    [START ON 6/5/2023] montelukast tablet 10 mg, 10 mg, Oral, QAM          Final Active Diagnoses:    Diagnosis Date Noted POA    PRINCIPAL PROBLEM:  Dehydration with hyponatremia [E86.0, E87.1] 09/13/2020 Yes    Chronic respiratory failure with hypoxia [J96.11] 06/04/2023 Yes    Recurrent falls [R29.6] 06/04/2023 Not Applicable    Folate deficiency [E53.8] 06/04/2023 Yes    Primary hypertension [I10]  Yes    COPD (chronic obstructive pulmonary disease) [J44.9]  Yes      Problems Resolved During this Admission:       Discharged Condition: good    Disposition: Home or Self Care    Follow Up:   Follow-up Information       Pepito Jhaveri Iv, MD. Go on 6/13/2023.    Specialty: Family Medicine  Why: follow up with Dot Russo NP on Tuesday, June 13 at 9:00 am  Contact information:  [address]   [place]  [place] [address]  [phone]               [facility] Follow up.    Why: will provide home health services  Contact information:  [address] [place] [id number] [phone]                         Patient Instructions:      X-Ray Chest PA And Lateral   Standing Status: Future Standing Exp. Date: 06/05/24   Order Comments: Please fax results to patient's PCP - Pepito Jhaveri fax [phone] phone [phone]     Order Specific Question Answer  Comments   Reason for Exam: f/u of abnormal CXR during hospitalization    May the Radiologist modify the order per protocol to meet the clinical needs of the patient? Yes    Release to patient Immediate      Ambulatory referral/consult to Home Health   Standing Status: Future   Referral Priority: Routine Referral Type: Home Health   Referral Reason: Specialty Services Required   Requested Specialty: Home Health Services   Number of Visits Requested: 1     Diet Adult Regular     Notify your health care provider if you experience any of the following:  persistent dizziness, light-headedness, or visual disturbances     Notify your health care provider if you experience any of the following:  increased confusion or weakness     Activity as tolerated       Significant Diagnostic Studies:   Results for orders placed or performed during the hospital encounter of 06/04/23   CBC Auto Differential   Result Value Ref Range    WBC 9.46 3.90 - 12.70 K/uL    RBC 3.23 (L) 4.00 - 5.40 M/uL    Hemoglobin 11.1 (L) 12.0 - 16.0 g/dL    Hematocrit 31.8 (L) 37.0 - 48.5 %    MCV 99 (H) 82 - 98 fL    MCH 34.4 (H) 27.0 - 31.0 pg    MCHC 34.9 32.0 - 36.0 g/dL    RDW 12.5 11.5 - 14.5 %    Platelets 181 150 - 450 K/uL    MPV 10.1 9.2 - 12.9 fL    Immature Granulocytes 0.3 0.0 - 0.5 %    Gran # (ANC) 8.1 (H) 1.8 - 7.7 K/uL    Immature Grans (Abs) 0.03 0.00 - 0.04 K/uL    Lymph # 0.7 (L) 1.0 - 4.8 K/uL    Mono # 0.4 0.3 - 1.0 K/uL    Eos # 0.3 0.0 - 0.5 K/uL    Baso # 0.02 0.00 - 0.20 K/uL    nRBC 0 0 /100 WBC    Gran % 85.5 (H) 38.0 - 73.0 %    Lymph % 6.9 (L) 18.0 - 48.0 %    Mono % 4.4 4.0 - 15.0 %    Eosinophil % 2.7 0.0 - 8.0 %    Basophil % 0.2 0.0 - 1.9 %    Differential Method Automated    Comprehensive Metabolic Panel   Result Value Ref Range    Sodium 127 (L) 136 - 145 mmol/L    Potassium 4.4 3.5 - 5.1 mmol/L    Chloride 85 (L) 95 - 110 mmol/L    CO2 23 23 - 29 mmol/L    Glucose 87 70 - 110 mg/dL    BUN 19 8 - 23 mg/dL    Creatinine  1.3 0.5 - 1.4 mg/dL    Calcium 9.3 8.7 - 10.5 mg/dL    Total Protein 7.1 6.0 - 8.4 g/dL    Albumin 3.0 (L) 3.5 - 5.2 g/dL    Total Bilirubin 0.3 0.1 - 1.0 mg/dL    Alkaline Phosphatase 208 (H) 55 - 135 U/L    AST 36 10 - 40 U/L    ALT 15 10 - 44 U/L    Anion Gap 19 (H) 8 - 16 mmol/L    eGFR 45.1 (A) >60 mL/min/1.73 m^2   BNP   Result Value Ref Range    BNP 20 0 - 99 pg/mL   Ethanol   Result Value Ref Range    Alcohol, Serum <10 0 - 10 mg/dL   Lactic Acid, Plasma   Result Value Ref Range    Lactate (Lactic Acid) 0.7 0.5 - 2.2 mmol/L   Magnesium   Result Value Ref Range    Magnesium 2.1 1.6 - 2.6 mg/dL   Urinalysis, Reflex to Urine Culture Urine, Clean Catch    Specimen: Urine, Clean Catch   Result Value Ref Range    Specimen UA Urine, Unspecified     Color, UA Yellow Yellow, Straw, Kristina    Appearance, UA Clear Clear    pH, UA 6.0 5.0 - 8.0    Specific Gravity, UA 1.015 1.005 - 1.030    Protein, UA 1+ (A) Negative    Glucose, UA Negative Negative    Ketones, UA 3+ (A) Negative    Bilirubin (UA) 3+ (A) Negative    Occult Blood UA Negative Negative    Nitrite, UA Negative Negative    Urobilinogen, UA Negative Negative EU/dL    Leukocytes, UA Negative Negative   Drug screen panel, in-house   Result Value Ref Range    Benzodiazepines Negative Negative    Methadone metabolites Negative Negative    Cocaine (Metab.) Negative Negative    Opiate Scrn, Ur Negative Negative    Barbiturate Screen, Ur Presumptive Positive (A) Negative    Amphetamine Screen, Ur Negative Negative    THC Negative Negative    Phencyclidine Negative Negative    Creatinine, Urine 110.7 15.0 - 325.0 mg/dL    Toxicology Information SEE COMMENT    Ammonia   Result Value Ref Range    Ammonia 16 10 - 50 umol/L   Protime-INR   Result Value Ref Range    Prothrombin Time 10.9 9.0 - 12.5 sec    INR 1.0 0.8 - 1.2   Salicylate Level   Result Value Ref Range    Salicylate Lvl <5.0 (L) 15.0 - 30.0 mg/dL   Acetaminophen Level   Result Value Ref Range     Acetaminophen (Tylenol), Serum <3.0 (L) 10.0 - 20.0 ug/mL   Urinalysis Microscopic   Result Value Ref Range    RBC, UA 0 0 - 4 /hpf    WBC, UA 5 0 - 5 /hpf    Bacteria Occasional None-Occ /hpf    Hyaline Casts, UA 15 (A) 0-1/lpf /lpf    Microscopic Comment SEE COMMENT    TSH   Result Value Ref Range    TSH 1.706 0.400 - 4.000 uIU/mL   Vitamin B12   Result Value Ref Range    Vitamin B-12 591 210 - 950 pg/mL   Folate   Result Value Ref Range    Folate <2.2 (L) 4.0 - 24.0 ng/mL   Iron and TIBC   Result Value Ref Range    Iron 64 30 - 160 ug/dL    Transferrin 144 (L) 200 - 375 mg/dL    TIBC 213 (L) 250 - 450 ug/dL    Saturated Iron 30 20 - 50 %   Ferritin   Result Value Ref Range    Ferritin 711 (H) 20.0 - 300.0 ng/mL   Methylmalonic acid, serum   Result Value Ref Range    Methlymalonic Acid <0.10 <0.40 umol/L   Comprehensive Metabolic Panel (CMP)   Result Value Ref Range    Sodium 132 (L) 136 - 145 mmol/L    Potassium 3.9 3.5 - 5.1 mmol/L    Chloride 92 (L) 95 - 110 mmol/L    CO2 24 23 - 29 mmol/L    Glucose 80 70 - 110 mg/dL    BUN 12 8 - 23 mg/dL    Creatinine 1.0 0.5 - 1.4 mg/dL    Calcium 8.7 8.7 - 10.5 mg/dL    Total Protein 6.3 6.0 - 8.4 g/dL    Albumin 2.7 (L) 3.5 - 5.2 g/dL    Total Bilirubin 0.2 0.1 - 1.0 mg/dL    Alkaline Phosphatase 180 (H) 55 - 135 U/L    AST 30 10 - 40 U/L    ALT 12 10 - 44 U/L    Anion Gap 16 8 - 16 mmol/L    eGFR >60.0 >60 mL/min/1.73 m^2   Magnesium   Result Value Ref Range    Magnesium 1.9 1.6 - 2.6 mg/dL   Phosphorus   Result Value Ref Range    Phosphorus 2.2 (L) 2.7 - 4.5 mg/dL   CBC with Automated Differential   Result Value Ref Range    WBC 6.50 3.90 - 12.70 K/uL    RBC 2.91 (L) 4.00 - 5.40 M/uL    Hemoglobin 9.8 (L) 12.0 - 16.0 g/dL    Hematocrit 28.8 (L) 37.0 - 48.5 %    MCV 99 (H) 82 - 98 fL    MCH 33.7 (H) 27.0 - 31.0 pg    MCHC 34.0 32.0 - 36.0 g/dL    RDW 12.5 11.5 - 14.5 %    Platelets 168 150 - 450 K/uL    MPV 10.3 9.2 - 12.9 fL    Immature Granulocytes 0.5 0.0 - 0.5 %     Gran # (ANC) 5.1 1.8 - 7.7 K/uL    Immature Grans (Abs) 0.03 0.00 - 0.04 K/uL    Lymph # 0.7 (L) 1.0 - 4.8 K/uL    Mono # 0.4 0.3 - 1.0 K/uL    Eos # 0.2 0.0 - 0.5 K/uL    Baso # 0.01 0.00 - 0.20 K/uL    nRBC 0 0 /100 WBC    Gran % 78.7 (H) 38.0 - 73.0 %    Lymph % 11.2 (L) 18.0 - 48.0 %    Mono % 6.2 4.0 - 15.0 %    Eosinophil % 3.2 0.0 - 8.0 %    Basophil % 0.2 0.0 - 1.9 %    Differential Method Automated    ISTAT PROCEDURE   Result Value Ref Range    POC PH 7.353 7.35 - 7.45    POC PCO2 45.6 (H) 35 - 45 mmHg    POC PO2 67 (L) 80 - 100 mmHg    POC HCO3 25.3 24 - 28 mmol/L    POC BE 0 -2 to 2 mmol/L    POC SATURATED O2 92 (L) 95 - 100 %    Sample ARTERIAL     Site RB     Allens Test N/A     DelSys Nasal Can     Mode SPONT     Flow 3     FiO2 32     Sp02 95      X-Ray Knee 3 View Right   Final Result   Abnormal      1. Nondisplaced transverse fracture involving the superior pole of the patella.   2. Moderate suprapatellar effusion.   This report was flagged in Epic as abnormal.         Electronically signed by: Lele Mike   Date:    06/05/2023   Time:    07:31      CT Head Without Contrast   Final Result      Cortical atrophy with periventricular deep white matter change consistent with chronic small vessel ischemic disease.      The preliminary and final reports are concordant.         Electronically signed by: Lele Mike   Date:    06/05/2023   Time:    07:07      X-Ray Chest AP Portable   Final Result   Abnormal      1. Subtle increased markings within the periphery of the right lung.  This is of uncertain etiology.  This may represent a developing pulmonary infiltrate.  Underlying lung mass not excludable.  Further evaluation with CT chest as deemed clinically necessary.   2. Multiple healed rib fractures.   This report was flagged in Epic as abnormal.         Electronically signed by: Lele Mike   Date:    06/05/2023   Time:    07:29      X-Ray Chest PA And Lateral    (Results Pending)          Pending Diagnostic Studies:       None           Medications:  Reconciled Home Medications:      Medication List        START taking these medications      miconazole NITRATE 2 % 2 % top powder  Commonly known as: MICOTIN  Apply topically 2 (two) times daily.            CONTINUE taking these medications      cyanocobalamin 1,000 mcg/mL injection  Inject 1,000 mcg into the muscle every 30 days.     naproxen sodium 220 MG tablet  Commonly known as: ANAPROX  Take 220 mg by mouth daily as needed (pain).     PROZAC ORAL  Take 40 mg by mouth once daily.            STOP taking these medications      butalbital-acetaminophen-caffeine -40 mg -40 mg per tablet  Commonly known as: FIORICET, ESGIC     cyclobenzaprine 10 MG tablet  Commonly known as: FLEXERIL     traMADoL 50 mg tablet  Commonly known as: ULTRAM            ASK your doctor about these medications      busPIRone 15 MG tablet  Commonly known as: BUSPAR  Take 15 mg by mouth 2 (two) times a day.     clonazePAM 0.5 MG tablet  Commonly known as: KlonoPIN  Take 0.5 mg by mouth 2 (two) times daily as needed (anxiety).     SINGULAIR ORAL  Take 10 mg by mouth every morning.              Indwelling Lines/Drains at time of discharge:   Lines/Drains/Airways       None                   Time spent on the discharge of patient: 50 minutes         Nany Sherman MD  Department of Hospital Medicine  [facility]

## 2023-06-28 NOTE — ASSESSMENT & PLAN NOTE
Started Folic acid 1mg po qday     Latest Reference Range & Units 06/04/23 22:25   Folate 4.0 - 24.0 ng/mL <2.2 (L)   Vitamin B-12 210 - 950 pg/mL 591   Methlymalonic Acid <0.40 umol/L <0.10

## 2023-06-28 NOTE — ASSESSMENT & PLAN NOTE
  [START ON 6/28/2023] albuterol-ipratropium 2.5 mg-0.5 mg/3 mL nebulizer solution 3 mL, 3 mL, Nebulization, Q8H     albuterol-ipratropium 2.5 mg-0.5 mg/3 mL nebulizer solution 3 mL, 3 mL, Nebulization, Q4H PRN     [START ON 6/28/2023] montelukast tablet 10 mg, 10 mg, Oral, QAM     She has no complaints or signs of acute exacerbation at this time

## 2023-06-28 NOTE — ASSESSMENT & PLAN NOTE
Latest Reference Range & Units 06/04/23 22:25   Iron 30 - 160 ug/dL 64   TIBC 250 - 450 ug/dL 213 (L)   Saturated Iron 20 - 50 % 30   Transferrin 200 - 375 mg/dL 144 (L)   Ferritin 20.0 - 300.0 ng/mL 711 (H)

## 2023-06-28 NOTE — SUBJECTIVE & OBJECTIVE
Past Medical History:   Diagnosis Date    Acquired hypothyroidism     Anxiety     Anxiety and depression     Cervical radicular pain     Closed left ankle fracture     COPD (chronic obstructive pulmonary disease)     Hypertension     Hypokalemia     Hyponatremia     Multiple rib fractures     Requires continuous at home supplemental oxygen     PRN FOR COPD    Tension type headache     Traumatic closed displaced fracture of distal end of radius, left, sequela        Past Surgical History:   Procedure Laterality Date    COLON SURGERY      COLOSTOMY      COLOSTOMY CLOSURE      HYSTERECTOMY      LEG SURGERY Right     OPEN REDUCTION AND INTERNAL FIXATION (ORIF) OF INJURY OF WRIST Left 1/29/2019    Procedure: ORIF, WRIST;  Surgeon: Homero Jeff DO;  Location: [facility] OR;  Service: Orthopedics;  Laterality: Left;  Equipment: [employer] Geminus Wrist Plate Set  Vendor/Rep: [employer]  C-Arm: Entire  DME: None    REQUIRES ASSISTANT    WRIST SURGERY Right        Review of patient's allergies indicates:  No Known Allergies    No current facility-administered medications on file prior to encounter.     Current Outpatient Medications on File Prior to Encounter   Medication Sig    amlodipine-benazepril 5-10 mg (LOTREL) 5-10 mg per capsule Take 1 capsule by mouth every morning.    busPIRone (BUSPAR) 15 MG tablet Take 15 mg by mouth 2 (two) times a day.    clonazePAM (KLONOPIN) 0.5 MG tablet Take 0.5 mg by mouth 2 (two) times daily as needed (anxiety).     cyanocobalamin 1,000 mcg/mL injection Inject 1,000 mcg into the muscle every 30 days.     fluoxetine HCl (PROZAC ORAL) Take 40 mg by mouth once daily.     miconazole NITRATE 2 % (MICOTIN) 2 % top powder Apply topically 2 (two) times daily.    montelukast sodium (SINGULAIR ORAL) Take 10 mg by mouth every morning.     naproxen sodium (ANAPROX) 220 MG tablet Take 220 mg by mouth daily as needed (pain).     Family History       Problem Relation (Age of Onset)     Anxiety disorder Daughter, Daughter    Arthritis Sister    COPD Father    Cancer Mother    Cirrhosis Father    Congenital heart disease Daughter    Depression Daughter    No Known Problems Brother    Valvular heart disease Daughter          Tobacco Use    Smoking status: Every Day     Packs/day: 1.00     Years: 20.00     Pack years: 20.00     Types: Cigarettes    Smokeless tobacco: Never   Substance and Sexual Activity    Alcohol use: Yes     Comment: couple times a week-wine or bloody cari    Drug use: No    Sexual activity: Not Currently     Review of Systems   Constitutional:  Positive for activity change, appetite change and fatigue. Negative for fever.   HENT:  Negative for congestion.    Eyes:  Negative for visual disturbance.   Respiratory:  Positive for cough and shortness of breath.    Cardiovascular:  Positive for chest pain. Negative for leg swelling.   Gastrointestinal:  Positive for nausea. Negative for diarrhea and vomiting.   Endocrine: Positive for cold intolerance.   Genitourinary:  Negative for dysuria.   Musculoskeletal:  Positive for arthralgias and joint swelling.   Skin:  Positive for color change.   Allergic/Immunologic: Negative for immunocompromised state.   Neurological:  Negative for dizziness, syncope and light-headedness.   Hematological:  Bruises/bleeds easily.   Psychiatric/Behavioral:  Positive for dysphoric mood and sleep disturbance.    Objective:     Vital Signs (Most Recent):  Temp: 98.4 °F (36.9 °C) (06/27/23 1900)  Pulse: 98 (06/27/23 1958)  Resp: 15 (06/27/23 1958)  BP: 92/66 (06/27/23 1958)  SpO2: (!) 92 % (06/27/23 1958) Vital Signs (24h Range):  Temp:  [98.4 °F (36.9 °C)] 98.4 °F (36.9 °C)  Pulse:  [80-98] 98  Resp:  [10-22] 15  SpO2:  [92 %-97 %] 92 %  BP: ()/() 92/66     Weight: 64.4 kg (142 lb)  Body mass index is 25.97 kg/m².     Physical Exam  Constitutional:       General: She is not in acute distress.     Appearance: She is overweight. She is  ill-appearing. She is not toxic-appearing or diaphoretic.   HENT:      Head: Normocephalic and atraumatic.      Comments: NC in place  Pulmonary:      Effort: No tachypnea or bradypnea.   Genitourinary:     Comments: No gomez in place  Musculoskeletal:      Comments: Right ankle with immobilizer in place.  Left hand is swollen and with some mild erythema.   Lymphadenopathy:      Comments: No pitting edema noted to legs.   Skin:     Comments: Joshua's purpura to arms   Neurological:      Mental Status: She is oriented to person, place, and time. She is lethargic.      GCS: GCS eye subscore is 4. GCS verbal subscore is 5. GCS motor subscore is 6.   Psychiatric:         Mood and Affect: Mood is depressed.         Behavior: Behavior is slowed and withdrawn.         Cognition and Memory: Cognition and memory normal.              Significant Labs: All pertinent labs within the past 24 hours have been reviewed.  Recent Results (from the past 24 hour(s))   CBC auto differential    Collection Time: 06/27/23  2:51 PM   Result Value Ref Range    WBC 5.58 3.90 - 12.70 K/uL    RBC 2.70 (L) 4.00 - 5.40 M/uL    Hemoglobin 9.0 (L) 12.0 - 16.0 g/dL    Hematocrit 26.5 (L) 37.0 - 48.5 %    MCV 98 82 - 98 fL    MCH 33.3 (H) 27.0 - 31.0 pg    MCHC 34.0 32.0 - 36.0 g/dL    RDW 13.6 11.5 - 14.5 %    Platelets 210 150 - 450 K/uL    MPV 10.1 9.2 - 12.9 fL    Immature Granulocytes 0.2 0.0 - 0.5 %    Gran # (ANC) 4.0 1.8 - 7.7 K/uL    Immature Grans (Abs) 0.01 0.00 - 0.04 K/uL    Lymph # 0.8 (L) 1.0 - 4.8 K/uL    Mono # 0.6 0.3 - 1.0 K/uL    Eos # 0.1 0.0 - 0.5 K/uL    Baso # 0.02 0.00 - 0.20 K/uL    nRBC 0 0 /100 WBC    Gran % 71.4 38.0 - 73.0 %    Lymph % 14.9 (L) 18.0 - 48.0 %    Mono % 10.8 4.0 - 15.0 %    Eosinophil % 2.3 0.0 - 8.0 %    Basophil % 0.4 0.0 - 1.9 %    Differential Method Automated    Comprehensive metabolic panel    Collection Time: 06/27/23  2:51 PM   Result Value Ref Range    Sodium 133 (L) 136 - 145 mmol/L    Potassium  3.1 (L) 3.5 - 5.1 mmol/L    Chloride 89 (L) 95 - 110 mmol/L    CO2 27 23 - 29 mmol/L    Glucose 74 70 - 110 mg/dL    BUN 9 8 - 23 mg/dL    Creatinine 1.4 0.5 - 1.4 mg/dL    Calcium 8.4 (L) 8.7 - 10.5 mg/dL    Total Protein 6.2 6.0 - 8.4 g/dL    Albumin 2.8 (L) 3.5 - 5.2 g/dL    Total Bilirubin <0.1 (A) 0.1 - 1.0 mg/dL    Alkaline Phosphatase 129 55 - 135 U/L    AST 27 10 - 40 U/L    ALT 9 (L) 10 - 44 U/L    eGFR 41.2 (A) >60 mL/min/1.73 m^2    Anion Gap 17 (H) 8 - 16 mmol/L   Magnesium    Collection Time: 06/27/23  2:51 PM   Result Value Ref Range    Magnesium 1.7 1.6 - 2.6 mg/dL   Ethanol    Collection Time: 06/27/23  2:51 PM   Result Value Ref Range    Alcohol, Serum <10 0 - 10 mg/dL   Brain natriuretic peptide    Collection Time: 06/27/23  2:51 PM   Result Value Ref Range    BNP 53 0 - 99 pg/mL   Urinalysis, Reflex to Urine Culture Urine, Clean Catch    Collection Time: 06/27/23  8:00 PM    Specimen: Urine   Result Value Ref Range    Specimen UA Urine, Clean Catch     Color, UA Yellow Yellow, Straw, Kristina    Appearance, UA Clear Clear    pH, UA 7.0 5.0 - 8.0    Specific Gravity, UA 1.010 1.005 - 1.030    Protein, UA Negative Negative    Glucose, UA Negative Negative    Ketones, UA 1+ (A) Negative    Bilirubin (UA) 1+ (A) Negative    Occult Blood UA Trace (A) Negative    Nitrite, UA Negative Negative    Urobilinogen, UA Negative Negative EU/dL    Leukocytes, UA 2+ (A) Negative   Drug screen panel, emergency    Collection Time: 06/27/23  8:00 PM   Result Value Ref Range    Benzodiazepines Negative Negative    Methadone metabolites Negative Negative    Cocaine (Metab.) Negative Negative    Opiate Scrn, Ur Negative Negative    Barbiturate Screen, Ur Negative Negative    Amphetamine Screen, Ur Negative Negative    THC Negative Negative    Phencyclidine Negative Negative    Creatinine, Urine 45.9 15.0 - 325.0 mg/dL    Toxicology Information SEE COMMENT    Urinalysis Microscopic    Collection Time: 06/27/23  8:00 PM    Result Value Ref Range    RBC, UA 3 0 - 4 /hpf    WBC, UA 10 (H) 0 - 5 /hpf    Bacteria Many (A) None-Occ /hpf    Microscopic Comment SEE COMMENT          Significant Imaging: I have reviewed all pertinent imaging results/findings within the past 24 hours.

## 2023-06-28 NOTE — ASSESSMENT & PLAN NOTE
Her BP is steadily improving  She has no symptoms related to this  Holding all home anti-HTN meds  Check stat lactic acid  BNP is low, but check formal TTE in am    BP 92/66   Pulse 98

## 2023-06-28 NOTE — NURSING
Attempted to use shampoo/conditioner cap on pt's hair.  Hair is matted severely.  Unable to make any changes in hair but did apply cap and extra conditioner. Combed out a very small section.

## 2023-06-28 NOTE — PT/OT/SLP EVAL
"Physical Therapy Evaluation and Treatment    Patient Name: Bhavana Lambert   MRN: [id number]  Recent Surgery: * No surgery found *      Recommendations:     Discharge Recommendations: nursing facility, skilled   Discharge Equipment Recommendations:  (to be determined at next level of care)   Barriers to discharge: Decreased caregiver support    Assessment:   HPI:   Ms. Lambert is a 68yo lady with a past medical history of COPD, depression, HTN, anxiety and chronic home O2 use PRN.  She has no TTE in [facility] or Care Every[facility].  She did have a remote LC, which at D/C summary they noted, "1. Chest pain, normal cors and normal LVEF."  She is well known to me from admitting her recently on 6/4/23 due to poor po intake, with falls and weakness due to hyponatremia to 127.  She had a right patellar fracture from a fall just prior to that admission as well.  Am Cortisol on that stay was high normal at 22, speaking against adrenal insufficiency.  She refused acute rehab at discharge, opting just for home health.     She now comes to the ED via EMS after home health noted low BP measurements while performing home visit.  She continues to have frequent falls at home.  Her daughter states OT came first and got a SBP of 48, then home health came and got a SBP of 36.  They activated 911 at this point.  The patient and her daughter at bedside state that she was completely at baseline throughout all of this.  She denies any dizziness or faint and states, "I wouldn't have even known my BP was low if they hadn't told me."     She denies fever, chills, worsening SOB, or syncope.  Her mobility has been terrible.  She fell the night before last and struck her left hand which began to swell up yesterday.  She has continued to take all of her BP medications, including this morning.  She notes continued nausea and very poor overall PO intake since discharge.  She has had no vomiting or diarrhea though.  She has some chronic costochondral chest " "pain after, "being bear hugged to get me in the car to go home" last admission.     67-year-old female, here from home via private vehicle for evaluation and treatment of low blood pressure.  Home health was at the patient's house today and noticed that her blood pressure was low.  Patient reports nonproductive cough and decreased appetite over the last week.  Also fatigued.  Denies fever or chills.  No chest pain or palpitations, no abdominal pain, no nausea or vomiting, no dysuria, no loose stools or constipation.  Patient does complain of some pain and swelling in the left hand after a fall at home 2 days ago.  Otherwise she voices no complaints.      Patient's daughter, who is here with her, states that patient fell a few weeks ago, and fractured her patella.  She has been basically bed-bound since then.  Although the daughters have been pushing her to drink liquids and take food, she has not been eating or drinking as much as she should be.  Also, the patient's air conditioner quit working a few days ago, and the patient's daughter thinks that she may be dehydrated.  She states that this happened to her last summer as well, and she had to be hospitalized at that time.  Patient denies any history of GI bleeding.  Denies abdominal pain.  No dark or bloody stool.  Daughter, who has been emptying her bedside commode, confirms that she is had no dark or bloody stools.    Bhavana Lambert is a 67 y.o. female admitted with a medical diagnosis of Hypotension due to hypovolemia. She presents with the following impairments/functional limitations: weakness, impaired endurance, impaired self care skills, impaired functional mobility, gait instability, impaired balance, decreased lower extremity function, decreased safety awareness, pain, orthopedic precautions.     Rehab Prognosis: Good; patient would benefit from acute PT services to address these deficits and reach maximum level of function.    Plan:     During this " "hospitalization, patient to be seen  (3-5 x/wk) to address the above listed problems via gait training, therapeutic activities, therapeutic exercises    Plan of Care Expires:  (upon discharge from facility)    Subjective     Chief Complaint: hypotension due to hypovolemia  Patient Comments/Goals: patient stating she does not want to go to rehab facility because she "has fur-babies at home" that need her   Pain/Comfort:  Pain Rating 1:  (10/10 right knee)  Pain Addressed 1: Reposition, Distraction, Cessation of Activity    Social History:  Living Environment: Patient  lives in her own home with her son's friend who is currently helps take care of her  in  raised home  with number of outside stair(s): 17 and elevator that does not work  Prior Level of Function: Prior to admission, patient  was receiving home health services that included PT/OT and was functioning at wheelchair level secondary to weakness/debility and multiple falls  Equipment Used at Home: oxygen, hospital bed, walker, rolling, bedside commode  DME owned (not currently used): none  Assistance Upon Discharge: a friend of her sons    Objective:     Communicated with RN prior to session. Patient found HOB elevated with blood pressure cuff, oxygen, PureWick, peripheral IV, telemetry upon PT entry to room.    General Precautions: Standard, fall   Orthopedic Precautions: RLE weight bearing as tolerated   Braces: Hinged knee brace (locked into extension)    Respiratory Status: Nasal cannula, flow 3.0 L/min    Exams:  Cognition: Patient is oriented to Person, Place, Time, Situation  RLE ROM:  limited secondary to hinged knee brace locked into extension  RLE Strength:  3/5 to 3+/5  LLE ROM: WFL  LLE Strength:  3+/5 to 4-/5    Functional Mobility:  Gait belt applied - Yes  Bed Mobility  Supine to Sit: minimum assistance for LE management and trunk management  Sit to Supine: activity did not occur as patient left sitting up in chair at completion of session "   Transfers  Sit to Stand: minimum assistance with hand-held assist and with cues for hand placement and safe technique  Bed to Chair: minimum assistance with hand-held assist and with cues for safe technique and upright posture using Stand Pivot  Gait  Patient unable to ambulate at present time secondary to weakness   Balance  Sitting: contact guard assistance  Standing: minimum assistance    Therapeutic Activities and Exercises:   Patient educated on role of acute care PT and PT POC, safety while in hospital including calling nurse for mobility, and call light usage  Patient educated about importance of OOB mobility and remaining up in chair most of the day.    AM-PAC 6 CLICK MOBILITY  Total Score:     Patient left up in chair with all lines intact, call button in reach, RN notified, and purewick in place and LLE elevated .    GOALS:   Pt will perform sup<>sit transfers w/ CGA  Pt will have sufficient dynamic balance to sit EOB while performing ADLs/therex w/ SBA  PT will be able to stand up from EOB w/ CGA using LRAD  Pt will ambulate 50 feet w/ CGA using LRAD  Pt will be independent w/ HEP therex on BLE w/ good form and ROM   Pt will require no cueing for dynamic balance or safety awareness when performing transfers and ambulation w/ PT      History:     Past Medical History:   Diagnosis Date    Acquired hypothyroidism     Anxiety     Anxiety and depression     Cervical radicular pain     Closed left ankle fracture     COPD (chronic obstructive pulmonary disease)     Hypertension     Hypokalemia     Hyponatremia     Multiple rib fractures     Requires continuous at home supplemental oxygen     PRN FOR COPD    Tension type headache     Traumatic closed displaced fracture of distal end of radius, left, sequela        Past Surgical History:   Procedure Laterality Date    COLON SURGERY      COLOSTOMY      COLOSTOMY CLOSURE      HYSTERECTOMY      LEG SURGERY Right     OPEN REDUCTION AND INTERNAL FIXATION (ORIF) OF  INJURY OF WRIST Left 1/29/2019    Procedure: ORIF, WRIST;  Surgeon: Homero Jeff DO;  Location: [facility] OR;  Service: Orthopedics;  Laterality: Left;  Equipment: [employer] Geminus Wrist Plate Set  Vendor/Rep: [employer]  C-Arm: Entire  DME: None    REQUIRES ASSISTANT    WRIST SURGERY Right        Time Tracking:     PT Received On: 06/28/23  PT Start Time: 0938  PT Stop Time: 1005  PT Total Time (min): 27 min     Billable Minutes: Evaluation 12 min and Therapeutic Activity 15 min    6/28/2023
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
## 2023-06-28 NOTE — NURSING
Received to room via W/C , assisted to bed per ER RN.  Brace noted to RLE.  L hand edema and bruising noted.  Wearing brief.  O2 at 3L per N/C.  Oriented to room and use of call light, bed controls.  Daughter at bedside.  Purewick applied per pt request.  Given ice water, diet coke and ice.

## 2023-06-28 NOTE — CONSULTS
Past Medical History:   Diagnosis Date    Acquired hypothyroidism     Anxiety     Anxiety and depression     Cervical radicular pain     Closed left ankle fracture     COPD (chronic obstructive pulmonary disease)     Hypertension     Hypokalemia     Hyponatremia     Multiple rib fractures     Requires continuous at home supplemental oxygen     PRN FOR COPD    Tension type headache     Traumatic closed displaced fracture of distal end of radius, left, sequela        Past Surgical History:   Procedure Laterality Date    COLON SURGERY      COLOSTOMY      COLOSTOMY CLOSURE      HYSTERECTOMY      LEG SURGERY Right     OPEN REDUCTION AND INTERNAL FIXATION (ORIF) OF INJURY OF WRIST Left 1/29/2019    Procedure: ORIF, WRIST;  Surgeon: Homero Jeff DO;  Location: [facility] OR;  Service: Orthopedics;  Laterality: Left;  Equipment: [employer] Geminus Wrist Plate Set  Vendor/Rep: [employer]  C-Arm: Entire  DME: None    REQUIRES ASSISTANT    WRIST SURGERY Right          Current Facility-Administered Medications:     0.9 % NaCl with KCl 20 mEq infusion, , Intravenous, Continuous, SUDHEER Chu MD, Last Rate: 100 mL/hr at 06/28/23 0545, Rate Verify at 06/28/23 0545    acetaminophen tablet 650 mg, 650 mg, Oral, Q4H PRN, SUDHEER Chu MD    albuterol-ipratropium 2.5 mg-0.5 mg/3 mL nebulizer solution 3 mL, 3 mL, Nebulization, Q8H, SUDHEER Chu MD, 3 mL at 06/28/23 0730    albuterol-ipratropium 2.5 mg-0.5 mg/3 mL nebulizer solution 3 mL, 3 mL, Nebulization, Q4H PRN, SUDHEER Chu MD    aluminum-magnesium hydroxide-simethicone 200-200-20 mg/5 mL suspension 30 mL, 30 mL, Oral, QID PRN, SUDHEER Chu MD    busPIRone tablet 15 mg, 15 mg, Oral, BID, SUDHEER Chu MD, 15 mg at 06/27/23 2315    cefTRIAXone (ROCEPHIN) 1 g in dextrose 5 % in water (D5W) 5 % 100 mL IVPB (MB+), 1 g, Intravenous, Q24H, SUDHEER Chu MD, Stopped at 06/27/23 2338    dextrose 10% bolus 125 mL 125 mL, 12.5 g, Intravenous, PRN, WAyden  Vamshi Chu MD    dextrose 10% bolus 250 mL 250 mL, 25 g, Intravenous, PRN, SUDHEER Chu MD    FLUoxetine capsule 40 mg, 40 mg, Oral, Daily, SUDHEER Chu MD    folic acid tablet 1 mg, 1 mg, Oral, Daily, SUDHEER Chu MD    glucagon (human recombinant) injection 1 mg, 1 mg, Intramuscular, PRN, SUDHEER Chu MD    glucose chewable tablet 16 g, 16 g, Oral, PRN, SUDHEER Chu MD    glucose chewable tablet 24 g, 24 g, Oral, PRN, SUDHEER Chu MD    heparin (porcine) injection 5,000 Units, 5,000 Units, Subcutaneous, Q8H, SUDHEER Chu MD, 5,000 Units at 06/28/23 0600    HYDROcodone-acetaminophen 5-325 mg per tablet 1 tablet, 1 tablet, Oral, Q6H PRN, SUDHEER Chu MD, 1 tablet at 06/28/23 0514    melatonin tablet 6 mg, 6 mg, Oral, Nightly PRN, SUDHEER Chu MD    montelukast tablet 10 mg, 10 mg, Oral, QAM, SUDHEER Chu MD, 10 mg at 06/28/23 0614    naloxone 0.4 mg/mL injection 0.02 mg, 0.02 mg, Intravenous, PRN, SUDHEER Chu MD    ondansetron injection 4 mg, 4 mg, Intravenous, Q6H PRN, SUDHEER Chu MD    polyethylene glycol packet 17 g, 17 g, Oral, Daily, SUDHEER Chu MD    prochlorperazine injection Soln 5 mg, 5 mg, Intravenous, Q6H PRN, SUDHEER Chu MD    senna-docusate 8.6-50 mg per tablet 1 tablet, 1 tablet, Oral, BID PRN, SUDHEER Chu MD    simethicone chewable tablet 80 mg, 1 tablet, Oral, QID PRN, SUDHEER Chu MD    sodium chloride 0.9% flush 10 mL, 10 mL, Intravenous, Q12H PRN, SUDHEER Chu MD    Allergies as of 06/27/2023    (No Known Allergies)       Family History   Problem Relation Age of Onset    Cancer Mother     COPD Father     Cirrhosis Father     Arthritis Sister     No Known Problems Brother     Anxiety disorder Daughter     Depression Daughter     Anxiety disorder Daughter     Congenital heart disease Daughter     Valvular heart disease Daughter        Social History     Socioeconomic History    Marital status:    Tobacco Use     Smoking status: Every Day     Packs/day: 1.00     Years: 20.00     Pack years: 20.00     Types: Cigarettes    Smokeless tobacco: Never   Substance and Sexual Activity    Alcohol use: Yes     Comment: couple times a week-wine or bloody cari    Drug use: No    Sexual activity: Not Currently     Social Determinants of Health     Financial Resource Strain: Low Risk     Difficulty of Paying Living Expenses: Not hard at all   Food Insecurity: No Food Insecurity    Worried About Running Out of Food in the Last Year: Never true    Ran Out of Food in the Last Year: Never true   Transportation Needs: No Transportation Needs    Lack of Transportation (Medical): No    Lack of Transportation (Non-Medical): No   Physical Activity: Inactive    Days of Exercise per Week: 0 days    Minutes of Exercise per Session: 0 min   Stress: Stress Concern Present    Feeling of Stress : Rather much   Social Connections: Socially Isolated    Frequency of Communication with Friends and Family: More than three times a week    Frequency of Social Gatherings with Friends and Family: Once a week    Attends Alevism Services: Never    Active Member of Clubs or Organizations: No    Attends Club or Organization Meetings: Never    Marital Status:    Housing Stability: Unknown    Unable to Pay for Housing in the Last Year: No    Unstable Housing in the Last Year: No         CC:  Left wrist and hand pain    HPI:  Patient is a 63-year-old female with a several year history of intermittent pain in her left hand and wrist.  She sustained a wrist fracture treated with open reduction internal fixation several years ago.  Due to some swelling and bruising and complaints of ongoing pain I was consulted by hospitalist services.  She was admitted recently with hypotension/hypovolemia.  Complaining of mild ongoing hand and wrist pain.      Review of Systems   Constitution: Negative for chills and fever.   HENT: Negative for headaches and blurry vision.    Cardiovascular: Negative for chest pain, irregular heartbeat, leg swelling and palpitations.   Respiratory: Negative for cough and shortness of breath.   Endocrine: Negative for polyuria.   Hematologic/Lymphatic: Negative for bleeding problem. Does not bruise/bleed easily.   Skin: Negative for poor wound healing and rash.   Musculoskeletal: Negative for joint pain. Negative for arthritis, joint swelling, muscle weakness and myalgias.   Gastrointestinal: Negative for abdominal pain, heartburn, melena, nausea and vomiting.   Genitourinary: Negative for bladder incontinence and dysuria.   Neurological: Negative for numbness.   Psychiatric/Behavioral: Negative for depression. The patient is not nervous/anxious.     PE:  Temp:  [96.1 °F (35.6 °C)-98.4 °F (36.9 °C)] 96.4 °F (35.8 °C)  Pulse:  [62-98] 95  Resp:  [10-22] 20  SpO2:  [92 %-97 %] 94 %  BP: ()/() 115/71    Constitutional:   Patient is alert  and oriented in no acute distress  HEENT:  normocephalic atraumatic; PERRL EOMI  Neck:  Supple without adenopathy  Cardiovascular:  Normal rate and rhythm  Pulmonary:  Normal respiratory effort normal chest wall expansion  Abdominal:  Nonprotuberant nondistended  Musculoskeletal:  Patient has mild swelling diffusely over the left hand subtle deformity of the left wrist otherwise has intact skin sensation and brisk capillary refill.  Skin, sensation, and brisk capillary refill of her digits.  Neurological:  No focal defect; cranial nerves 2-12 grossly intact  Psychiatric/behavioral:  Mood and behavior normal    Xrays:  Radiographs of the left wrist show retained hardware significantly ulnar positive with likely impaction syndrome no acute abnormalities noted.      Labs:    Recent Results (from the past 24 hour(s))   HIV 1/2 Ag/Ab (4th Gen)    Collection Time: 06/27/23  2:51 PM   Result Value Ref Range    HIV 1/2 Ag/Ab Non-reactive Non-reactive   Hepatitis C Antibody    Collection Time: 06/27/23  2:51 PM    Result Value Ref Range    Hepatitis C Ab Non-reactive Non-reactive   CBC auto differential    Collection Time: 06/27/23  2:51 PM   Result Value Ref Range    WBC 5.58 3.90 - 12.70 K/uL    RBC 2.70 (L) 4.00 - 5.40 M/uL    Hemoglobin 9.0 (L) 12.0 - 16.0 g/dL    Hematocrit 26.5 (L) 37.0 - 48.5 %    MCV 98 82 - 98 fL    MCH 33.3 (H) 27.0 - 31.0 pg    MCHC 34.0 32.0 - 36.0 g/dL    RDW 13.6 11.5 - 14.5 %    Platelets 210 150 - 450 K/uL    MPV 10.1 9.2 - 12.9 fL    Immature Granulocytes 0.2 0.0 - 0.5 %    Gran # (ANC) 4.0 1.8 - 7.7 K/uL    Immature Grans (Abs) 0.01 0.00 - 0.04 K/uL    Lymph # 0.8 (L) 1.0 - 4.8 K/uL    Mono # 0.6 0.3 - 1.0 K/uL    Eos # 0.1 0.0 - 0.5 K/uL    Baso # 0.02 0.00 - 0.20 K/uL    nRBC 0 0 /100 WBC    Gran % 71.4 38.0 - 73.0 %    Lymph % 14.9 (L) 18.0 - 48.0 %    Mono % 10.8 4.0 - 15.0 %    Eosinophil % 2.3 0.0 - 8.0 %    Basophil % 0.4 0.0 - 1.9 %    Differential Method Automated    Comprehensive metabolic panel    Collection Time: 06/27/23  2:51 PM   Result Value Ref Range    Sodium 133 (L) 136 - 145 mmol/L    Potassium 3.1 (L) 3.5 - 5.1 mmol/L    Chloride 89 (L) 95 - 110 mmol/L    CO2 27 23 - 29 mmol/L    Glucose 74 70 - 110 mg/dL    BUN 9 8 - 23 mg/dL    Creatinine 1.4 0.5 - 1.4 mg/dL    Calcium 8.4 (L) 8.7 - 10.5 mg/dL    Total Protein 6.2 6.0 - 8.4 g/dL    Albumin 2.8 (L) 3.5 - 5.2 g/dL    Total Bilirubin <0.1 (A) 0.1 - 1.0 mg/dL    Alkaline Phosphatase 129 55 - 135 U/L    AST 27 10 - 40 U/L    ALT 9 (L) 10 - 44 U/L    eGFR 41.2 (A) >60 mL/min/1.73 m^2    Anion Gap 17 (H) 8 - 16 mmol/L   Magnesium    Collection Time: 06/27/23  2:51 PM   Result Value Ref Range    Magnesium 1.7 1.6 - 2.6 mg/dL   Ethanol    Collection Time: 06/27/23  2:51 PM   Result Value Ref Range    Alcohol, Serum <10 0 - 10 mg/dL   Brain natriuretic peptide    Collection Time: 06/27/23  2:51 PM   Result Value Ref Range    BNP 53 0 - 99 pg/mL   Urinalysis, Reflex to Urine Culture Urine, Clean Catch    Collection Time:  06/27/23  8:00 PM    Specimen: Urine   Result Value Ref Range    Specimen UA Urine, Clean Catch     Color, UA Yellow Yellow, Straw, Kristina    Appearance, UA Clear Clear    pH, UA 7.0 5.0 - 8.0    Specific Gravity, UA 1.010 1.005 - 1.030    Protein, UA Negative Negative    Glucose, UA Negative Negative    Ketones, UA 1+ (A) Negative    Bilirubin (UA) 1+ (A) Negative    Occult Blood UA Trace (A) Negative    Nitrite, UA Negative Negative    Urobilinogen, UA Negative Negative EU/dL    Leukocytes, UA 2+ (A) Negative   Drug screen panel, emergency    Collection Time: 06/27/23  8:00 PM   Result Value Ref Range    Benzodiazepines Negative Negative    Methadone metabolites Negative Negative    Cocaine (Metab.) Negative Negative    Opiate Scrn, Ur Negative Negative    Barbiturate Screen, Ur Negative Negative    Amphetamine Screen, Ur Negative Negative    THC Negative Negative    Phencyclidine Negative Negative    Creatinine, Urine 45.9 15.0 - 325.0 mg/dL    Toxicology Information SEE COMMENT    Urinalysis Microscopic    Collection Time: 06/27/23  8:00 PM   Result Value Ref Range    RBC, UA 3 0 - 4 /hpf    WBC, UA 10 (H) 0 - 5 /hpf    Bacteria Many (A) None-Occ /hpf    Microscopic Comment SEE COMMENT    Lactic Acid, Plasma    Collection Time: 06/27/23  8:50 PM   Result Value Ref Range    Lactate (Lactic Acid) 1.0 0.5 - 2.2 mmol/L   Troponin I    Collection Time: 06/27/23  8:50 PM   Result Value Ref Range    Troponin I 0.025 0.000 - 0.026 ng/mL   TSH    Collection Time: 06/27/23  8:50 PM   Result Value Ref Range    TSH 2.668 0.400 - 4.000 uIU/mL   CBC with Automated Differential    Collection Time: 06/28/23  5:10 AM   Result Value Ref Range    WBC 3.21 (L) 3.90 - 12.70 K/uL    RBC 2.29 (L) 4.00 - 5.40 M/uL    Hemoglobin 7.7 (L) 12.0 - 16.0 g/dL    Hematocrit 23.4 (L) 37.0 - 48.5 %     (H) 82 - 98 fL    MCH 33.6 (H) 27.0 - 31.0 pg    MCHC 32.9 32.0 - 36.0 g/dL    RDW 14.0 11.5 - 14.5 %    Platelets 161 150 - 450 K/uL     MPV 10.1 9.2 - 12.9 fL    Immature Granulocytes 0.3 0.0 - 0.5 %    Gran # (ANC) 2.1 1.8 - 7.7 K/uL    Immature Grans (Abs) 0.01 0.00 - 0.04 K/uL    Lymph # 0.7 (L) 1.0 - 4.8 K/uL    Mono # 0.3 0.3 - 1.0 K/uL    Eos # 0.1 0.0 - 0.5 K/uL    Baso # 0.02 0.00 - 0.20 K/uL    nRBC 0 0 /100 WBC    Gran % 65.2 38.0 - 73.0 %    Lymph % 20.9 18.0 - 48.0 %    Mono % 9.0 4.0 - 15.0 %    Eosinophil % 4.0 0.0 - 8.0 %    Basophil % 0.6 0.0 - 1.9 %    Differential Method Automated    Comprehensive Metabolic Panel (CMP)    Collection Time: 06/28/23  6:57 AM   Result Value Ref Range    Sodium 135 (L) 136 - 145 mmol/L    Potassium 3.3 (L) 3.5 - 5.1 mmol/L    Chloride 98 95 - 110 mmol/L    CO2 21 (L) 23 - 29 mmol/L    Glucose 85 70 - 110 mg/dL    BUN 7 (L) 8 - 23 mg/dL    Creatinine 1.1 0.5 - 1.4 mg/dL    Calcium 7.8 (L) 8.7 - 10.5 mg/dL    Total Protein 5.9 (L) 6.0 - 8.4 g/dL    Albumin 2.7 (L) 3.5 - 5.2 g/dL    Total Bilirubin 0.2 0.1 - 1.0 mg/dL    Alkaline Phosphatase 124 55 - 135 U/L    AST 24 10 - 40 U/L    ALT 10 10 - 44 U/L    eGFR 55.1 (A) >60 mL/min/1.73 m^2    Anion Gap 16 8 - 16 mmol/L   Magnesium    Collection Time: 06/28/23  6:57 AM   Result Value Ref Range    Magnesium 1.8 1.6 - 2.6 mg/dL   Phosphorus    Collection Time: 06/28/23  6:57 AM   Result Value Ref Range    Phosphorus 3.0 2.7 - 4.5 mg/dL       A:  Pain left wrist status post fracture/ORIF      P:  I have discussed medical condition treatment options with her at length.  I have told her I see no cause for concern for any acute significant abnormalities.  We have discussed symptomatic treatment relative rest splinting if she desires if she continues to have persistent chronic wrist pain then I would suggest follow up with a hand specialist.  Otherwise follow up can be as needed.  She may advance activities as tolerated.

## 2023-06-28 NOTE — PLAN OF CARE
[place] - Intensive Care  Initial Discharge Assessment       Primary Care Provider: Pepito Jhaveri Iv, MD    Admission Diagnosis: Hypocalcemia [E83.51]  Cough [R05.9]  Dehydration [E86.0]  Hypokalemia [E87.6]  Hyponatremia [E87.1]  Weakness [R53.1]  Chest pain [R07.9]  Hypotension [I95.9]  Hypotension, unspecified hypotension type [I95.9]    Admission Date: 6/27/2023  Expected Discharge Date: 6/28/2023  Assessment completed with patient who was alert and oriented. Patient lives with her son's friend who helps take care of her. Patient has home health services with [facility] and is not willing to go to any inpatient or SNF. Patient stated that if her son's friend moves out she will work with her daughters, Ashley Miranda and Yanely Miranda as well as her son Дмитрий Knowles to find someone to stay with her and assist. Patient denies any additional needs at this time. Case management will continue to follow.  Transition of Care Barriers: None    Payor: [employer] MANAGED MEDICARE / Plan: [employer] HMO PPO SPECIAL NEEDS / Product Type: Medicare Advantage /     Extended Emergency Contact Information  Primary Emergency Contact: ASHLEY MIRANDA  Mobile Phone: [phone]  Relation: Daughter  Preferred language: English   needed? No    Discharge Plan A: Home Health  Discharge Plan B: Home Health      [facility] [address]  Phone: [phone] Fax: [phone]      Initial Assessment (most recent)       Adult Discharge Assessment - 06/28/23 1108          Discharge Assessment    Assessment Type Discharge Planning Assessment     Confirmed/corrected address, phone number and insurance Yes     Confirmed Demographics Correct on Facesheet     Source of Information patient     Does patient/caregiver understand observation status Yes     Communicated CHLOE with patient/caregiver Yes     Reason For Admission hypocalcemia     People in Home  friend(s)     Do you expect to return to your current living situation? Yes     Do you have help at home or someone to help you manage your care at home? Yes     Who are your caregiver(s) and their phone number(s)? son's friend     Prior to hospitilization cognitive status: Unable to Assess     Current cognitive status: Alert/Oriented     Equipment Currently Used at Home wheelchair;rollator     Readmission within 30 days? Yes     Patient currently being followed by outpatient case management? No     Do you currently have service(s) that help you manage your care at home? Yes     Name and Contact number of agency [facility]     Is the pt/caregiver preference to resume services with current agency Yes     Do you take prescription medications? Yes     Do you have prescription coverage? Yes     Do you have any problems affording any of your prescribed medications? No     How do you get to doctors appointments? family or friend will provide     Are you on dialysis? No     Do you take coumadin? No     Discharge Plan A Home Health     Discharge Plan B Home Health     DME Needed Upon Discharge  none     Discharge Plan discussed with: Patient     Transition of Care Barriers None        Physical Activity    On average, how many days per week do you engage in moderate to strenuous exercise (like a brisk walk)? Patient refused     On average, how many minutes do you engage in exercise at this level? Patient refused        Financial Resource Strain    How hard is it for you to pay for the very basics like food, housing, medical care, and heating? Patient refused        Housing Stability    In the last 12 months, was there a time when you were not able to pay the mortgage or rent on time? Patient refused     In the last 12 months, was there a time when you did not have a steady place to sleep or slept in a shelter (including now)? Patient refused        Transportation Needs    In the past 12 months, has lack of  transportation kept you from medical appointments or from getting medications? Patient refused     In the past 12 months, has lack of transportation kept you from meetings, work, or from getting things needed for daily living? Patient refused        Food Insecurity    Within the past 12 months, you worried that your food would run out before you got the money to buy more. Patient refused     Within the past 12 months, the food you bought just didn't last and you didn't have money to get more. Patient refused        Stress    Do you feel stress - tense, restless, nervous, or anxious, or unable to sleep at night because your mind is troubled all the time - these days? Patient refused        Social Connections    In a typical week, how many times do you talk on the phone with family, friends, or neighbors? Patient refused     How often do you get together with friends or relatives? Patient refused     How often do you attend Caodaism or Roman Catholic services? Patient refused     How often do you attend meetings of the clubs or organizations you belong to? Patient refused     Are you , , , , never , or living with a partner? Patient refused        Alcohol Use    Q1: How often do you have a drink containing alcohol? Patient refused     Q2: How many drinks containing alcohol do you have on a typical day when you are drinking? Patient refused     Q3: How often do you have six or more drinks on one occasion? Patient refused
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
## 2023-06-28 NOTE — ASSESSMENT & PLAN NOTE
Continuing with NS with 20 meq KCl at 100 cc/hr  She got KCl 40 meq po x 1 in the ED  Mg 1.7, so giving MgSO4 1g iv as well
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
## 2023-07-06 ENCOUNTER — TELEPHONE (OUTPATIENT)
Dept: PULMONOLOGY | Facility: CLINIC | Age: 68
End: 2023-07-06
Payer: MEDICARE

## 2023-08-30 ENCOUNTER — OFFICE VISIT (OUTPATIENT)
Dept: PULMONOLOGY | Facility: CLINIC | Age: 68
End: 2023-08-30
Payer: MEDICARE

## 2023-08-30 VITALS
SYSTOLIC BLOOD PRESSURE: 112 MMHG | OXYGEN SATURATION: 96 % | BODY MASS INDEX: 23.19 KG/M2 | DIASTOLIC BLOOD PRESSURE: 56 MMHG | WEIGHT: 126.75 LBS | HEART RATE: 112 BPM

## 2023-08-30 DIAGNOSIS — J44.9 CHRONIC OBSTRUCTIVE PULMONARY DISEASE, UNSPECIFIED COPD TYPE: ICD-10-CM

## 2023-08-30 DIAGNOSIS — Z87.891 PERSONAL HISTORY OF NICOTINE DEPENDENCE: ICD-10-CM

## 2023-08-30 DIAGNOSIS — J96.11 CHRONIC RESPIRATORY FAILURE WITH HYPOXIA: Primary | ICD-10-CM

## 2023-08-30 DIAGNOSIS — F17.200 TOBACCO DEPENDENCE: ICD-10-CM

## 2023-08-30 PROCEDURE — 99204 OFFICE O/P NEW MOD 45 MIN: CPT | Mod: S$GLB,,, | Performed by: INTERNAL MEDICINE

## 2023-08-30 PROCEDURE — 99999 PR PBB SHADOW E&M-EST. PATIENT-LVL III: ICD-10-PCS | Mod: PBBFAC,,, | Performed by: INTERNAL MEDICINE

## 2023-08-30 PROCEDURE — 1101F PR PT FALLS ASSESS DOC 0-1 FALLS W/OUT INJ PAST YR: ICD-10-PCS | Mod: CPTII,S$GLB,, | Performed by: INTERNAL MEDICINE

## 2023-08-30 PROCEDURE — 4010F PR ACE/ARB THEARPY RXD/TAKEN: ICD-10-PCS | Mod: CPTII,S$GLB,, | Performed by: INTERNAL MEDICINE

## 2023-08-30 PROCEDURE — 3074F SYST BP LT 130 MM HG: CPT | Mod: CPTII,S$GLB,, | Performed by: INTERNAL MEDICINE

## 2023-08-30 PROCEDURE — 3008F PR BODY MASS INDEX (BMI) DOCUMENTED: ICD-10-PCS | Mod: CPTII,S$GLB,, | Performed by: INTERNAL MEDICINE

## 2023-08-30 PROCEDURE — 3074F PR MOST RECENT SYSTOLIC BLOOD PRESSURE < 130 MM HG: ICD-10-PCS | Mod: CPTII,S$GLB,, | Performed by: INTERNAL MEDICINE

## 2023-08-30 PROCEDURE — 1125F AMNT PAIN NOTED PAIN PRSNT: CPT | Mod: CPTII,S$GLB,, | Performed by: INTERNAL MEDICINE

## 2023-08-30 PROCEDURE — 1125F PR PAIN SEVERITY QUANTIFIED, PAIN PRESENT: ICD-10-PCS | Mod: CPTII,S$GLB,, | Performed by: INTERNAL MEDICINE

## 2023-08-30 PROCEDURE — 1159F PR MEDICATION LIST DOCUMENTED IN MEDICAL RECORD: ICD-10-PCS | Mod: CPTII,S$GLB,, | Performed by: INTERNAL MEDICINE

## 2023-08-30 PROCEDURE — 3288F PR FALLS RISK ASSESSMENT DOCUMENTED: ICD-10-PCS | Mod: CPTII,S$GLB,, | Performed by: INTERNAL MEDICINE

## 2023-08-30 PROCEDURE — 99204 PR OFFICE/OUTPT VISIT, NEW, LEVL IV, 45-59 MIN: ICD-10-PCS | Mod: S$GLB,,, | Performed by: INTERNAL MEDICINE

## 2023-08-30 PROCEDURE — 99999 PR PBB SHADOW E&M-EST. PATIENT-LVL III: CPT | Mod: PBBFAC,,, | Performed by: INTERNAL MEDICINE

## 2023-08-30 PROCEDURE — 4010F ACE/ARB THERAPY RXD/TAKEN: CPT | Mod: CPTII,S$GLB,, | Performed by: INTERNAL MEDICINE

## 2023-08-30 PROCEDURE — 3008F BODY MASS INDEX DOCD: CPT | Mod: CPTII,S$GLB,, | Performed by: INTERNAL MEDICINE

## 2023-08-30 PROCEDURE — 3078F DIAST BP <80 MM HG: CPT | Mod: CPTII,S$GLB,, | Performed by: INTERNAL MEDICINE

## 2023-08-30 PROCEDURE — 1159F MED LIST DOCD IN RCRD: CPT | Mod: CPTII,S$GLB,, | Performed by: INTERNAL MEDICINE

## 2023-08-30 PROCEDURE — 1101F PT FALLS ASSESS-DOCD LE1/YR: CPT | Mod: CPTII,S$GLB,, | Performed by: INTERNAL MEDICINE

## 2023-08-30 PROCEDURE — 3078F PR MOST RECENT DIASTOLIC BLOOD PRESSURE < 80 MM HG: ICD-10-PCS | Mod: CPTII,S$GLB,, | Performed by: INTERNAL MEDICINE

## 2023-08-30 PROCEDURE — 3288F FALL RISK ASSESSMENT DOCD: CPT | Mod: CPTII,S$GLB,, | Performed by: INTERNAL MEDICINE

## 2023-08-30 RX ORDER — TIOTROPIUM BROMIDE AND OLODATEROL 3.124; 2.736 UG/1; UG/1
1 SPRAY, METERED RESPIRATORY (INHALATION) DAILY
Qty: 1 G | Refills: 5 | Status: ON HOLD | OUTPATIENT
Start: 2023-08-30 | End: 2024-01-29

## 2023-08-30 NOTE — PROGRESS NOTES
8/30/2023    Bhavana SINDY Fausto  New Patient Consult    Chief Complaint   Patient presents with    Shortness of Breath     All day    COPD       HPI:08/30/2023- Pt is a 69 yo female with hypothyroidism, COPD, O2 dependence, anxiety/depression, migraines, osteoporosis who presents for new evaluation to establish care for lungs.  She is here today w/ daughter Yanely who assists w/ history.   PCP Dr. Camejo has been taking care of her so far   For lungs she takes albuterol, spiriva  Symptoms: SOB walking to kitchen, gradually worsening past few mos. Denies cough/phlegm  Has sinus drainage sometimes. Back hurts w/ breathing sometimes  She has home O2 3L for past few years. She has portable tank today. Her POC overheated and stopped working.   She denies frequent exacerbations. It has been since last yr fall that she had bronchitis. She gets exacerbation maybe 1-2x/yr.  She tripped over the dog and broke 8 ribs in the past.   She is a current smoker, <1 ppd since age 21. Was able to quit in past for 5 yrs during a period of time she was hospitalized a lot for bowel blockages and bowel surgeries.  She restarted smoking 4/2016 when her spouse passed away.  FH grandfather had lung cancer        The chief complaint problem is New to me    PFSH:  Past Medical History:   Diagnosis Date    Acquired hypothyroidism     Anxiety     Anxiety and depression     Cervical radicular pain     Closed left ankle fracture     COPD (chronic obstructive pulmonary disease)     Hypertension     Hypokalemia     Hyponatremia     Multiple rib fractures     Requires continuous at home supplemental oxygen     PRN FOR COPD    Tension type headache     Traumatic closed displaced fracture of distal end of radius, left, sequela          Past Surgical History:   Procedure Laterality Date    COLON SURGERY      COLOSTOMY      COLOSTOMY CLOSURE      HYSTERECTOMY      LEG SURGERY Right     OPEN REDUCTION AND INTERNAL FIXATION (ORIF) OF INJURY OF WRIST Left  1/29/2019    Procedure: ORIF, WRIST;  Surgeon: Homero Jeff DO;  Location: [facility] OR;  Service: Orthopedics;  Laterality: Left;  Equipment: [employer] Geminus Wrist Plate Set  Vendor/Rep: [employer]  C-Arm: Entire  DME: None    REQUIRES ASSISTANT    WRIST SURGERY Right      Social History     Tobacco Use    Smoking status: Every Day     Current packs/day: 1.00     Average packs/day: 1 pack/day for 20.0 years (20.0 ttl pk-yrs)     Types: Cigarettes    Smokeless tobacco: Never   Substance Use Topics    Alcohol use: Yes     Comment: couple times a week-wine or bloody cari    Drug use: No     Family History   Problem Relation Age of Onset    Cancer Mother     COPD Father     Cirrhosis Father     Arthritis Sister     No Known Problems Brother     Anxiety disorder Daughter     Depression Daughter     Anxiety disorder Daughter     Congenital heart disease Daughter     Valvular heart disease Daughter      Review of patient's allergies indicates:  No Known Allergies    Performance Status:The patient's activity level is housebound activities.    In wheelchair today    Review of Systems:  a review of eleven systems covering constitutional, Eye, HEENT, Psych, Respiratory, Cardiac, GI, , Musculoskeletal, Endocrine, Dermatologic was negative except for pertinent findings as listed ABOVE and below:  Headaches  Nausea, diarrhea, emesis the other day     Exam:Comprehensive exam done. BP (!) 112/56   Pulse (!) 112   Wt 57.5 kg (126 lb 12.2 oz)   SpO2 96% Comment: ON 3L  BMI 23.19 kg/m²   Exam included Vitals as listed, and patient's appearance and affect and alertness and mood, oral exam for yeast and hygiene and pharynx lesions and Mallapatti (M) score, neck with inspection for jvd and masses and thyroid abnormalities and lymph nodes (supraclavicular and infraclavicular nodes and axillary also examined and noted if abn), chest exam included symmetry and effort and fremitus and percussion and auscultation,  cardiac exam included rhythm and gallops and murmur and rubs and jvd and edema, abdominal exam for mass and hepatosplenomegaly and tenderness and hernias and bowel sounds, Musculoskeletal exam with muscle tone and posture and mobility/gait and  strength, and skin for rashes and cyanosis and pallor and turgor, extremity for clubbing.  Findings were normal except for pertinent findings listed below:  M3, oropharynx clear  HR regular  Breath sounds diminished bilaterally  No edema/clubbing    Radiographs (ct chest and cxr) reviewed: view by direct vision and interpretation as below   CXR 6/27/23- interstitial changes, flattened diaphragm    Labs reviewed     Lab Results   Component Value Date    WBC 3.21 (L) 06/28/2023    HGB 7.7 (L) 06/28/2023    HCT 23.4 (L) 06/28/2023     (H) 06/28/2023     06/28/2023       CMP  Sodium   Date Value Ref Range Status   06/28/2023 135 (L) 136 - 145 mmol/L Final     Potassium   Date Value Ref Range Status   07/05/2023 3.0 (L) 3.5 - 5.1 mmol/L Final   06/28/2023 3.3 (L) 3.5 - 5.1 mmol/L Final     Chloride   Date Value Ref Range Status   06/28/2023 98 95 - 110 mmol/L Final     CO2   Date Value Ref Range Status   06/28/2023 21 (L) 23 - 29 mmol/L Final     Glucose   Date Value Ref Range Status   06/28/2023 85 70 - 110 mg/dL Final     BUN   Date Value Ref Range Status   06/28/2023 7 (L) 8 - 23 mg/dL Final     Creatinine   Date Value Ref Range Status   06/28/2023 1.1 0.5 - 1.4 mg/dL Final     Calcium   Date Value Ref Range Status   06/28/2023 7.8 (L) 8.7 - 10.5 mg/dL Final     Total Protein   Date Value Ref Range Status   06/28/2023 5.9 (L) 6.0 - 8.4 g/dL Final     Albumin   Date Value Ref Range Status   06/28/2023 2.7 (L) 3.5 - 5.2 g/dL Final     Total Bilirubin   Date Value Ref Range Status   06/28/2023 0.2 0.1 - 1.0 mg/dL Final     Comment:     For infants and newborns, interpretation of results should be based  on gestational age, weight and in agreement with  clinical  observations.    Premature Infant recommended reference ranges:  Up to 24 hours.............<8.0 mg/dL  Up to 48 hours............<12.0 mg/dL  3-5 days..................<15.0 mg/dL  6-29 days.................<15.0 mg/dL       Alkaline Phosphatase   Date Value Ref Range Status   06/28/2023 124 55 - 135 U/L Final     AST   Date Value Ref Range Status   06/28/2023 24 10 - 40 U/L Final     ALT   Date Value Ref Range Status   06/28/2023 10 10 - 44 U/L Final     Anion Gap   Date Value Ref Range Status   06/28/2023 16 8 - 16 mmol/L Final     eGFR   Date Value Ref Range Status   06/28/2023 55.1 (A) >60 mL/min/1.73 m^2 Final         PFT will be done and results to be reviewed      Plan:  Clinical impression is apparently straight forward and impression with management as below.    Problem List Items Addressed This Visit          Pulmonary    COPD (chronic obstructive pulmonary disease)    Relevant Medications    tiotropium-olodateroL (STIOLTO RESPIMAT) 2.5-2.5 mcg/actuation Mist    Other Relevant Orders    Complete PFT with bronchodilator    Six Minute Walk Test to qualify for Home Oxygen    Chronic respiratory failure with hypoxia - Primary     Other Visit Diagnoses       Tobacco dependence        Relevant Orders    CT Chest Lung Screening Low Dose    Personal history of nicotine dependence        Relevant Orders    CT Chest Lung Screening Low Dose            Follow up in about 6 months (around 2/29/2024).    Discussed with patient above for education the following:      Patient Instructions   Stop spiriva and start stiolto inhaler- 2 puffs once a day  Continue albuterol as needed  Quit smoking recommended  Continue oxygen - walk test to see if you can have a portable concentrator reordered  Lung cancer screening CT ordered

## 2023-08-30 NOTE — PATIENT INSTRUCTIONS
Stop spiriva and start stiolto inhaler- 2 puffs once a day  Continue albuterol as needed  Quit smoking recommended  Continue oxygen - walk test to see if you can have a portable concentrator reordered  Lung cancer screening CT ordered

## 2023-10-02 PROBLEM — J96.11 CHRONIC RESPIRATORY FAILURE WITH HYPOXIA: Status: RESOLVED | Noted: 2023-06-04 | Resolved: 2023-10-02

## 2023-10-25 ENCOUNTER — HOSPITAL ENCOUNTER (EMERGENCY)
Facility: HOSPITAL | Age: 68
Discharge: ANOTHER HOSPITAL | End: 2023-10-26
Attending: EMERGENCY MEDICINE
Payer: MEDICARE

## 2023-10-25 DIAGNOSIS — E87.6 HYPOKALEMIA: ICD-10-CM

## 2023-10-25 DIAGNOSIS — R41.82 AMS (ALTERED MENTAL STATUS): ICD-10-CM

## 2023-10-25 DIAGNOSIS — R91.8 RIGHT UPPER LOBE PULMONARY INFILTRATE: ICD-10-CM

## 2023-10-25 DIAGNOSIS — E83.42 HYPOMAGNESEMIA: ICD-10-CM

## 2023-10-25 DIAGNOSIS — I21.4 NSTEMI (NON-ST ELEVATED MYOCARDIAL INFARCTION): ICD-10-CM

## 2023-10-25 DIAGNOSIS — N17.9 ACUTE KIDNEY INJURY: Primary | ICD-10-CM

## 2023-10-25 DIAGNOSIS — J44.9 CHRONIC OBSTRUCTIVE PULMONARY DISEASE, UNSPECIFIED COPD TYPE: ICD-10-CM

## 2023-10-25 DIAGNOSIS — N39.0 URINARY TRACT INFECTION WITH HEMATURIA, SITE UNSPECIFIED: ICD-10-CM

## 2023-10-25 DIAGNOSIS — R50.9 FEVER: ICD-10-CM

## 2023-10-25 DIAGNOSIS — E87.1 HYPONATREMIA: ICD-10-CM

## 2023-10-25 DIAGNOSIS — R31.9 URINARY TRACT INFECTION WITH HEMATURIA, SITE UNSPECIFIED: ICD-10-CM

## 2023-10-25 LAB
ALBUMIN SERPL BCP-MCNC: 2.4 G/DL (ref 3.5–5.2)
ALP SERPL-CCNC: 196 U/L (ref 55–135)
ALT SERPL W/O P-5'-P-CCNC: 17 U/L (ref 10–44)
ANION GAP SERPL CALC-SCNC: 21 MMOL/L (ref 8–16)
ANION GAP SERPL CALC-SCNC: 24 MMOL/L (ref 8–16)
APTT PPP: 32.4 SEC (ref 21–32)
AST SERPL-CCNC: 41 U/L (ref 10–40)
BACTERIA #/AREA URNS HPF: ABNORMAL /HPF
BASOPHILS NFR BLD: 0 % (ref 0–1.9)
BILIRUB SERPL-MCNC: 0.5 MG/DL (ref 0.1–1)
BILIRUB UR QL STRIP: NEGATIVE
BNP SERPL-MCNC: 2260 PG/ML (ref 0–99)
BUN SERPL-MCNC: 69 MG/DL (ref 8–23)
BUN SERPL-MCNC: 71 MG/DL (ref 8–23)
CALCIUM SERPL-MCNC: 8 MG/DL (ref 8.7–10.5)
CALCIUM SERPL-MCNC: 8.4 MG/DL (ref 8.7–10.5)
CHLORIDE SERPL-SCNC: 84 MMOL/L (ref 95–110)
CHLORIDE SERPL-SCNC: 85 MMOL/L (ref 95–110)
CLARITY UR: ABNORMAL
CO2 SERPL-SCNC: 20 MMOL/L (ref 23–29)
CO2 SERPL-SCNC: 24 MMOL/L (ref 23–29)
COLOR UR: YELLOW
CREAT SERPL-MCNC: 5.1 MG/DL (ref 0.5–1.4)
CREAT SERPL-MCNC: 5.2 MG/DL (ref 0.5–1.4)
DIFFERENTIAL METHOD: ABNORMAL
EOSINOPHIL NFR BLD: 2 % (ref 0–8)
ERYTHROCYTE [DISTWIDTH] IN BLOOD BY AUTOMATED COUNT: 13 % (ref 11.5–14.5)
EST. GFR  (NO RACE VARIABLE): 8.5 ML/MIN/1.73 M^2
EST. GFR  (NO RACE VARIABLE): 8.7 ML/MIN/1.73 M^2
GLUCOSE SERPL-MCNC: 57 MG/DL (ref 70–110)
GLUCOSE SERPL-MCNC: 75 MG/DL (ref 70–110)
GLUCOSE UR QL STRIP: NEGATIVE
GROUP A STREP, MOLECULAR: NEGATIVE
HCT VFR BLD AUTO: 34.1 % (ref 37–48.5)
HGB BLD-MCNC: 12 G/DL (ref 12–16)
HGB UR QL STRIP: ABNORMAL
HYALINE CASTS #/AREA URNS LPF: 0 /LPF
IMM GRANULOCYTES # BLD AUTO: ABNORMAL K/UL
IMM GRANULOCYTES NFR BLD AUTO: ABNORMAL %
INFLUENZA A, MOLECULAR: NEGATIVE
INFLUENZA B, MOLECULAR: NEGATIVE
INR PPP: 1.1 (ref 0.8–1.2)
KETONES UR QL STRIP: NEGATIVE
LEUKOCYTE ESTERASE UR QL STRIP: ABNORMAL
LYMPHOCYTES NFR BLD: 11 % (ref 18–48)
MAGNESIUM SERPL-MCNC: 1.5 MG/DL (ref 1.6–2.6)
MCH RBC QN AUTO: 33.4 PG (ref 27–31)
MCHC RBC AUTO-ENTMCNC: 35.2 G/DL (ref 32–36)
MCV RBC AUTO: 95 FL (ref 82–98)
MICROSCOPIC COMMENT: ABNORMAL
MONOCYTES NFR BLD: 9 % (ref 4–15)
NEUTROPHILS NFR BLD: 78 % (ref 38–73)
NITRITE UR QL STRIP: NEGATIVE
NRBC BLD-RTO: 0 /100 WBC
PH UR STRIP: 6 [PH] (ref 5–8)
PLATELET # BLD AUTO: 168 K/UL (ref 150–450)
PMV BLD AUTO: 10.9 FL (ref 9.2–12.9)
POTASSIUM SERPL-SCNC: 2.8 MMOL/L (ref 3.5–5.1)
POTASSIUM SERPL-SCNC: 3 MMOL/L (ref 3.5–5.1)
PROT SERPL-MCNC: 6.6 G/DL (ref 6–8.4)
PROT UR QL STRIP: ABNORMAL
PROTHROMBIN TIME: 11.2 SEC (ref 9–12.5)
RBC # BLD AUTO: 3.59 M/UL (ref 4–5.4)
RBC #/AREA URNS HPF: 25 /HPF (ref 0–4)
SARS-COV-2 RDRP RESP QL NAA+PROBE: NEGATIVE
SODIUM SERPL-SCNC: 129 MMOL/L (ref 136–145)
SODIUM SERPL-SCNC: 129 MMOL/L (ref 136–145)
SP GR UR STRIP: 1.01 (ref 1–1.03)
SPECIMEN SOURCE: NORMAL
SQUAMOUS #/AREA URNS HPF: ABNORMAL /HPF
TROPONIN I SERPL DL<=0.01 NG/ML-MCNC: 1.52 NG/ML (ref 0–0.03)
TROPONIN I SERPL DL<=0.01 NG/ML-MCNC: 1.69 NG/ML (ref 0–0.03)
URN SPEC COLLECT METH UR: ABNORMAL
UROBILINOGEN UR STRIP-ACNC: NEGATIVE EU/DL
WBC # BLD AUTO: 11.52 K/UL (ref 3.9–12.7)
WBC #/AREA URNS HPF: 75 /HPF (ref 0–5)

## 2023-10-25 PROCEDURE — 25000003 PHARM REV CODE 250: Performed by: EMERGENCY MEDICINE

## 2023-10-25 PROCEDURE — 81000 URINALYSIS NONAUTO W/SCOPE: CPT | Performed by: EMERGENCY MEDICINE

## 2023-10-25 PROCEDURE — 36415 COLL VENOUS BLD VENIPUNCTURE: CPT | Performed by: EMERGENCY MEDICINE

## 2023-10-25 PROCEDURE — 85007 BL SMEAR W/DIFF WBC COUNT: CPT | Performed by: EMERGENCY MEDICINE

## 2023-10-25 PROCEDURE — U0002 COVID-19 LAB TEST NON-CDC: HCPCS | Performed by: PHYSICIAN ASSISTANT

## 2023-10-25 PROCEDURE — 87651 STREP A DNA AMP PROBE: CPT | Performed by: PHYSICIAN ASSISTANT

## 2023-10-25 PROCEDURE — 74176 CT ABD & PELVIS W/O CONTRAST: CPT | Mod: 26,,, | Performed by: RADIOLOGY

## 2023-10-25 PROCEDURE — 63600175 PHARM REV CODE 636 W HCPCS: Performed by: EMERGENCY MEDICINE

## 2023-10-25 PROCEDURE — 84484 ASSAY OF TROPONIN QUANT: CPT | Performed by: EMERGENCY MEDICINE

## 2023-10-25 PROCEDURE — 84484 ASSAY OF TROPONIN QUANT: CPT | Mod: 91 | Performed by: EMERGENCY MEDICINE

## 2023-10-25 PROCEDURE — 74176 CT ABD & PELVIS W/O CONTRAST: CPT | Mod: TC

## 2023-10-25 PROCEDURE — 85027 COMPLETE CBC AUTOMATED: CPT | Performed by: EMERGENCY MEDICINE

## 2023-10-25 PROCEDURE — 80053 COMPREHEN METABOLIC PANEL: CPT | Performed by: EMERGENCY MEDICINE

## 2023-10-25 PROCEDURE — 87088 URINE BACTERIA CULTURE: CPT | Performed by: EMERGENCY MEDICINE

## 2023-10-25 PROCEDURE — 83880 ASSAY OF NATRIURETIC PEPTIDE: CPT | Performed by: EMERGENCY MEDICINE

## 2023-10-25 PROCEDURE — 96367 TX/PROPH/DG ADDL SEQ IV INF: CPT

## 2023-10-25 PROCEDURE — 27000221 HC OXYGEN, UP TO 24 HOURS

## 2023-10-25 PROCEDURE — 71045 X-RAY EXAM CHEST 1 VIEW: CPT | Mod: TC

## 2023-10-25 PROCEDURE — 87086 URINE CULTURE/COLONY COUNT: CPT | Performed by: EMERGENCY MEDICINE

## 2023-10-25 PROCEDURE — 93005 ELECTROCARDIOGRAM TRACING: CPT

## 2023-10-25 PROCEDURE — 93010 ELECTROCARDIOGRAM REPORT: CPT | Mod: ,,, | Performed by: INTERNAL MEDICINE

## 2023-10-25 PROCEDURE — 94640 AIRWAY INHALATION TREATMENT: CPT

## 2023-10-25 PROCEDURE — 87502 INFLUENZA DNA AMP PROBE: CPT | Performed by: PHYSICIAN ASSISTANT

## 2023-10-25 PROCEDURE — 85610 PROTHROMBIN TIME: CPT | Performed by: EMERGENCY MEDICINE

## 2023-10-25 PROCEDURE — 71045 XR CHEST AP PORTABLE: ICD-10-PCS | Mod: 26,,, | Performed by: RADIOLOGY

## 2023-10-25 PROCEDURE — 96375 TX/PRO/DX INJ NEW DRUG ADDON: CPT

## 2023-10-25 PROCEDURE — 83735 ASSAY OF MAGNESIUM: CPT | Performed by: EMERGENCY MEDICINE

## 2023-10-25 PROCEDURE — 74176 CT ABDOMEN PELVIS WITHOUT CONTRAST: ICD-10-PCS | Mod: 26,,, | Performed by: RADIOLOGY

## 2023-10-25 PROCEDURE — 85730 THROMBOPLASTIN TIME PARTIAL: CPT | Performed by: EMERGENCY MEDICINE

## 2023-10-25 PROCEDURE — 87077 CULTURE AEROBIC IDENTIFY: CPT | Performed by: EMERGENCY MEDICINE

## 2023-10-25 PROCEDURE — 96366 THER/PROPH/DIAG IV INF ADDON: CPT

## 2023-10-25 PROCEDURE — 25000242 PHARM REV CODE 250 ALT 637 W/ HCPCS: Performed by: EMERGENCY MEDICINE

## 2023-10-25 PROCEDURE — 96368 THER/DIAG CONCURRENT INF: CPT

## 2023-10-25 PROCEDURE — 87186 SC STD MICRODIL/AGAR DIL: CPT | Performed by: EMERGENCY MEDICINE

## 2023-10-25 PROCEDURE — 99285 EMERGENCY DEPT VISIT HI MDM: CPT | Mod: 25

## 2023-10-25 PROCEDURE — 93010 EKG 12-LEAD: ICD-10-PCS | Mod: ,,, | Performed by: INTERNAL MEDICINE

## 2023-10-25 PROCEDURE — 96365 THER/PROPH/DIAG IV INF INIT: CPT

## 2023-10-25 PROCEDURE — 80048 BASIC METABOLIC PNL TOTAL CA: CPT | Mod: XB | Performed by: EMERGENCY MEDICINE

## 2023-10-25 PROCEDURE — 71045 X-RAY EXAM CHEST 1 VIEW: CPT | Mod: 26,,, | Performed by: RADIOLOGY

## 2023-10-25 RX ORDER — HEPARIN SODIUM 10000 [USP'U]/100ML
12 INJECTION, SOLUTION INTRAVENOUS
Status: COMPLETED | OUTPATIENT
Start: 2023-10-25 | End: 2023-10-25

## 2023-10-25 RX ORDER — MAGNESIUM SULFATE HEPTAHYDRATE 40 MG/ML
2 INJECTION, SOLUTION INTRAVENOUS
Status: COMPLETED | OUTPATIENT
Start: 2023-10-25 | End: 2023-10-25

## 2023-10-25 RX ORDER — POTASSIUM CHLORIDE 20 MEQ/1
40 TABLET, EXTENDED RELEASE ORAL
Status: COMPLETED | OUTPATIENT
Start: 2023-10-25 | End: 2023-10-25

## 2023-10-25 RX ORDER — POTASSIUM CHLORIDE 7.45 MG/ML
10 INJECTION INTRAVENOUS
Status: COMPLETED | OUTPATIENT
Start: 2023-10-25 | End: 2023-10-25

## 2023-10-25 RX ORDER — ACETAMINOPHEN 325 MG/1
650 TABLET ORAL
Status: COMPLETED | OUTPATIENT
Start: 2023-10-25 | End: 2023-10-25

## 2023-10-25 RX ORDER — METHYLPREDNISOLONE SOD SUCC 125 MG
60 VIAL (EA) INJECTION
Status: COMPLETED | OUTPATIENT
Start: 2023-10-25 | End: 2023-10-25

## 2023-10-25 RX ORDER — IPRATROPIUM BROMIDE AND ALBUTEROL SULFATE 2.5; .5 MG/3ML; MG/3ML
3 SOLUTION RESPIRATORY (INHALATION)
Status: COMPLETED | OUTPATIENT
Start: 2023-10-25 | End: 2023-10-25

## 2023-10-25 RX ORDER — ASPIRIN 325 MG
325 TABLET ORAL
Status: COMPLETED | OUTPATIENT
Start: 2023-10-25 | End: 2023-10-25

## 2023-10-25 RX ADMIN — IPRATROPIUM BROMIDE AND ALBUTEROL SULFATE 3 ML: .5; 3 SOLUTION RESPIRATORY (INHALATION) at 04:10

## 2023-10-25 RX ADMIN — POTASSIUM CHLORIDE 10 MEQ: 7.46 INJECTION, SOLUTION INTRAVENOUS at 07:10

## 2023-10-25 RX ADMIN — ACETAMINOPHEN 650 MG: 325 TABLET ORAL at 04:10

## 2023-10-25 RX ADMIN — ASPIRIN 325 MG ORAL TABLET 325 MG: 325 PILL ORAL at 08:10

## 2023-10-25 RX ADMIN — HEPARIN SODIUM 12 UNITS/KG/HR: 10000 INJECTION, SOLUTION INTRAVENOUS at 08:10

## 2023-10-25 RX ADMIN — CEFTRIAXONE 1 G: 1 INJECTION, POWDER, FOR SOLUTION INTRAMUSCULAR; INTRAVENOUS at 07:10

## 2023-10-25 RX ADMIN — POTASSIUM CHLORIDE 40 MEQ: 1500 TABLET, EXTENDED RELEASE ORAL at 07:10

## 2023-10-25 RX ADMIN — MAGNESIUM SULFATE HEPTAHYDRATE 2 G: 40 INJECTION, SOLUTION INTRAVENOUS at 08:10

## 2023-10-25 RX ADMIN — METHYLPREDNISOLONE SODIUM SUCCINATE 60 MG: 125 INJECTION, POWDER, FOR SOLUTION INTRAMUSCULAR; INTRAVENOUS at 04:10

## 2023-10-25 RX ADMIN — SODIUM CHLORIDE 1000 ML: 9 INJECTION, SOLUTION INTRAVENOUS at 07:10

## 2023-10-25 NOTE — ED PROVIDER NOTES
Encounter Date: 10/25/2023       History     Chief Complaint   Patient presents with    Fever    Shortness of Breath    Sore Throat     X 3 to 4 days    Weakness     X 3 to 4 days     68-year-old female, here from home via EMS for evaluation and treatment of subjective fever, worsening shortness of breath, sore throat, generalized weakness, and left-sided rib pain.  Patient has a history of COPD.  She also has a history of a fall about 4 months ago during which she broke some ribs on the left side.  She states that the pain she feels now is similar to the pain she is been feeling for the past few months, only slightly worse.  She states she is out of Tylenol and Motrin at home.  She uses O2 constantly at home.  No sick contacts.  Denies any nausea vomiting.  Denies any dysuria or hematuria.  No change in urinary habits, no loose stools or constipation.  Temperature here 99°.        Review of patient's allergies indicates:  No Known Allergies  Past Medical History:   Diagnosis Date    Acquired hypothyroidism     Anxiety     Anxiety and depression     Cervical radicular pain     Closed left ankle fracture     COPD (chronic obstructive pulmonary disease)     Hypertension     Hypokalemia     Hyponatremia     Multiple rib fractures     Requires continuous at home supplemental oxygen     PRN FOR COPD    Tension type headache     Traumatic closed displaced fracture of distal end of radius, left, sequela      Past Surgical History:   Procedure Laterality Date    COLON SURGERY      COLOSTOMY      COLOSTOMY CLOSURE      HYSTERECTOMY      LEG SURGERY Right     OPEN REDUCTION AND INTERNAL FIXATION (ORIF) OF INJURY OF WRIST Left 1/29/2019    Procedure: ORIF, WRIST;  Surgeon: Homero Jeff DO;  Location: [facility];  Service: Orthopedics;  Laterality: Left;  Equipment: [employer] Geminus Wrist Plate Set  Vendor/Rep: [employer]  C-Arm: Entire  DME: None    REQUIRES ASSISTANT    WRIST SURGERY Right      Family  History   Problem Relation Age of Onset    Cancer Mother     COPD Father     Cirrhosis Father     Arthritis Sister     No Known Problems Brother     Anxiety disorder Daughter     Depression Daughter     Anxiety disorder Daughter     Congenital heart disease Daughter     Valvular heart disease Daughter      Social History     Tobacco Use    Smoking status: Every Day     Current packs/day: 1.00     Average packs/day: 1 pack/day for 20.0 years (20.0 ttl pk-yrs)     Types: Cigarettes    Smokeless tobacco: Never   Substance Use Topics    Alcohol use: Yes     Comment: couple times a week-wine or bloody cari    Drug use: No     Review of Systems   Constitutional: Negative.  Negative for chills, fatigue and fever.   HENT:  Positive for sore throat. Negative for congestion and rhinorrhea.    Eyes: Negative.    Respiratory:  Positive for cough and shortness of breath.    Cardiovascular:  Negative for chest pain and palpitations.   Gastrointestinal:  Negative for abdominal distention, constipation, diarrhea, nausea and vomiting.   Genitourinary:  Negative for difficulty urinating, dysuria, enuresis, flank pain, frequency and hematuria.   Musculoskeletal:  Positive for arthralgias (Left lateral rib pain, chronic since rib fracture several months ago). Negative for back pain, myalgias, neck pain and neck stiffness.   Skin: Negative.    Neurological: Negative.    Hematological: Negative.    Psychiatric/Behavioral: Negative.         Physical Exam     Initial Vitals [10/25/23 1513]   BP Pulse Resp Temp SpO2   98/69 96 20 99 °F (37.2 °C) (!) 94 %      MAP       --         Physical Exam    Nursing note and vitals reviewed.  Constitutional: She appears well-developed and well-nourished. She is not diaphoretic. No distress.   HENT:   Head: Normocephalic and atraumatic.   Nose: Nose normal.   Mouth/Throat: Oropharynx is clear and moist. No oropharyngeal exudate.   Eyes: Conjunctivae and EOM are normal. Pupils are equal, round, and  reactive to light. No scleral icterus.   Neck: Neck supple. No JVD present.   Normal range of motion.  Cardiovascular:  Normal rate, regular rhythm, normal heart sounds and intact distal pulses.           No murmur heard.  Pulmonary/Chest: No stridor. No respiratory distress. She has wheezes.   Abdominal: Abdomen is soft. Bowel sounds are normal. She exhibits no distension. There is no abdominal tenderness.   Musculoskeletal:         General: No tenderness or edema. Normal range of motion.      Cervical back: Normal range of motion and neck supple.     Neurological: She is alert and oriented to person, place, and time. She has normal strength. No cranial nerve deficit or sensory deficit. GCS score is 15. GCS eye subscore is 4. GCS verbal subscore is 5. GCS motor subscore is 6.   Skin: Skin is warm and dry. Capillary refill takes less than 2 seconds. No rash noted. No erythema.   Psychiatric: She has a normal mood and affect. Her behavior is normal.         ED Course   Procedures  Labs Reviewed   CBC W/ AUTO DIFFERENTIAL - Abnormal; Notable for the following components:       Result Value    RBC 3.59 (*)     Hematocrit 34.1 (*)     MCH 33.4 (*)     Gran % 78.0 (*)     Lymph % 11.0 (*)     All other components within normal limits   COMPREHENSIVE METABOLIC PANEL - Abnormal; Notable for the following components:    Sodium 129 (*)     Potassium 2.8 (*)     Chloride 84 (*)     Glucose 57 (*)     BUN 69 (*)     Creatinine 5.1 (*)     Calcium 8.0 (*)     Albumin 2.4 (*)     Alkaline Phosphatase 196 (*)     AST 41 (*)     eGFR 8.7 (*)     Anion Gap 21 (*)     All other components within normal limits    Narrative:     Recoll. [id number] by [facility] [place] at 10/25/2023 17:13, reason: Specimen   hemolyzed   URINALYSIS, REFLEX TO URINE CULTURE - Abnormal; Notable for the following components:    Appearance, UA Hazy (*)     Protein, UA 3+ (*)     Occult Blood UA 3+ (*)     Leukocytes, UA 3+ (*)     All other components within normal  limits    Narrative:     Preferred Collection Type->Urine, Clean Catch  Specimen Source->Urine   B-TYPE NATRIURETIC PEPTIDE - Abnormal; Notable for the following components:    BNP 2,260 (*)     All other components within normal limits   TROPONIN I - Abnormal; Notable for the following components:    Troponin I 1.691 (*)     All other components within normal limits    Narrative:     Recoll. [id number] by [facility] [place] at 10/25/2023 17:13, reason: Specimen   hemolyzed   MAGNESIUM - Abnormal; Notable for the following components:    Magnesium 1.5 (*)     All other components within normal limits    Narrative:     Recoll. [id number] by [facility] [place] at 10/25/2023 17:13, reason: Specimen   hemolyzed   URINALYSIS MICROSCOPIC - Abnormal; Notable for the following components:    RBC, UA 25 (*)     WBC, UA 75 (*)     Bacteria Many (*)     All other components within normal limits    Narrative:     Preferred Collection Type->Urine, Clean Catch  Specimen Source->Urine   BASIC METABOLIC PANEL - Abnormal; Notable for the following components:    Sodium 129 (*)     Potassium 3.0 (*)     Chloride 85 (*)     CO2 20 (*)     BUN 71 (*)     Creatinine 5.2 (*)     Calcium 8.4 (*)     Anion Gap 24 (*)     eGFR 8.5 (*)     All other components within normal limits   TROPONIN I - Abnormal; Notable for the following components:    Troponin I 1.516 (*)     All other components within normal limits   APTT - Abnormal; Notable for the following components:    aPTT 32.4 (*)     All other components within normal limits   APTT - Abnormal; Notable for the following components:    aPTT 38.1 (*)     All other components within normal limits    Narrative:     Draw baseline aPTT prior to starting the heparin bolus or  infusion  (if patient is on warfarin prior to heparin therapy)  Recoll. [id number] by [facility] at 10/26/2023 02:19, reason: Specimen   hemolyzed   CBC W/ AUTO DIFFERENTIAL - Abnormal; Notable for the following components:    RBC 3.27 (*)     Hemoglobin  10.9 (*)     Hematocrit 30.4 (*)     MCH 33.3 (*)     Gran % 94.0 (*)     Lymph % 3.0 (*)     Mono % 0.0 (*)     All other components within normal limits    Narrative:     Draw baseline aPTT prior to starting the heparin bolus or  infusion  (if patient is on warfarin prior to heparin therapy)   CBC W/ AUTO DIFFERENTIAL - Abnormal; Notable for the following components:    RBC 3.21 (*)     Hemoglobin 10.6 (*)     Hematocrit 30.1 (*)     MCH 33.0 (*)     All other components within normal limits    Narrative:     PTT daily if no changes in infusion rate   TROPONIN I - Abnormal; Notable for the following components:    Troponin I 1.080 (*)     All other components within normal limits    Narrative:     Draw baseline aPTT prior to starting the heparin bolus or  infusion  (if patient is on warfarin prior to heparin therapy)   PHOSPHORUS - Abnormal; Notable for the following components:    Phosphorus 7.0 (*)     All other components within normal limits    Narrative:     Draw baseline aPTT prior to starting the heparin bolus or  infusion  (if patient is on warfarin prior to heparin therapy)  Recoll. [id number] by [facility] at 10/26/2023 02:35, reason: Specimen   hemolyzed   CBC W/ AUTO DIFFERENTIAL - Abnormal; Notable for the following components:    RBC 3.21 (*)     Hemoglobin 10.6 (*)     Hematocrit 30.1 (*)     MCH 33.0 (*)     All other components within normal limits    Narrative:     PTT daily if no changes in infusion rate   APTT - Abnormal; Notable for the following components:    aPTT 38.1 (*)     All other components within normal limits    Narrative:     Draw baseline aPTT prior to starting the heparin bolus or  infusion  (if patient is on warfarin prior to heparin therapy)  Recoll. [id number] by [facility] at 10/26/2023 02:19, reason: Specimen   hemolyzed   INFLUENZA A & B BY MOLECULAR   GROUP A STREP, MOLECULAR   CULTURE, URINE   SARS-COV-2 RNA AMPLIFICATION, QUAL    Narrative:     Is the patient symptomatic?->Yes    MAGNESIUM   B-TYPE NATRIURETIC PEPTIDE   TROPONIN I   APTT   PROTIME-INR   PROTIME-INR   PROTIME-INR    Narrative:     Draw baseline aPTT prior to starting the heparin bolus or  infusion  (if patient is on warfarin prior to heparin therapy)  Recoll. [id number] by 3 at 10/26/2023 02:19, reason: Specimen   hemolyzed   MAGNESIUM    Narrative:     Draw baseline aPTT prior to starting the heparin bolus or  infusion  (if patient is on warfarin prior to heparin therapy)  Recoll. [id number] by 3 at 10/26/2023 02:35, reason: Specimen   hemolyzed   CREATININE, URINE, RANDOM    Narrative:     Specimen Source->Urine   APTT   TROPONIN I   COMPREHENSIVE METABOLIC PANEL   SODIUM, URINE, RANDOM   OSMOLALITY, URINE RANDOM   POTASSIUM, URINE, RANDOM   UREA NITROGEN, URINE, RANDOM     EKG Readings: (Independently Interpreted)   Rhythm: Normal Sinus Rhythm. Heart Rate: 92. Ectopy: No Ectopy. Conduction: iRBBB. T Waves: Normal. Axis: Normal. Clinical Impression: Normal Sinus Rhythm Other Impression: with anterolateral ST dep and iRBBB       Imaging Results              CT Abdomen Pelvis  Without Contrast (Final result)  Result time 10/25/23 20:35:55      Final result by Jhon Crockett MD (10/25/23 20:35:55)                   Impression:      Moderate left sided perinephric fat stranding may be infectious or inflammatory. No hydronephrosis. No detectable renal or ureteral stones.    Multiple left-sided rib fracture deformities appear subacute to chronic. Mild to moderate height loss involving the T11 and L2 vertebral bodies compatible with age indeterminate compression fractures.      Electronically signed by: Jhon Crockett  Date:    10/25/2023  Time:    20:35               Narrative:    EXAMINATION:  CT ABDOMEN PELVIS WITHOUT CONTRAST    CLINICAL HISTORY:  UTI, recurrent/complicated (Female);    TECHNIQUE:  Low dose axial images, sagittal and coronal reformations were obtained from the lung bases to the pubic  symphysis, Oral contrast was not administered.    COMPARISON:  None    FINDINGS:  Heart: Normal in size. No pericardial effusion.    Lung Bases: Well aerated, without consolidation or pleural fluid.    Liver: Normal in size and attenuation, with no focal hepatic lesions.    Gallbladder: Suspect gallbladder sludge and/or stones.    Bile Ducts: No evidence of dilated ducts.    Pancreas: No mass or peripancreatic fat stranding.    Spleen: Unremarkable.    Adrenals: Unremarkable.    Kidneys/ Ureters: Moderate left sided perinephric fat stranding may be infectious or inflammatory.  No hydronephrosis.  No detectable renal or ureteral stones.  Punctate calcific density in the upper aspect of the right kidney may be vascular.    Bladder: No evidence of wall thickening.    Reproductive organs: Hysterectomy    GI Tract/Mesentery: No evidence of bowel obstruction or inflammation.    Peritoneal Space: No ascites. No free air.    Retroperitoneum: No significant adenopathy.    Abdominal wall: Unremarkable.    Vasculature: No significant atherosclerosis or aneurysm.    Bones: Multiple left-sided rib fracture deformities appear subacute to chronic.  Mild to moderate height loss involving the T11 and L2 vertebral bodies compatible with age indeterminate compression fractures.                                        X-Ray Chest AP Portable (Final result)  Result time 10/25/23 16:53:10      Final result by Lele Mike MD (10/25/23 16:53:10)                   Impression:      1. Subtle increased markings within the right upper lobe may represent a developing pulmonary infiltrate.  Correlate clinically with possible fever and/or elevated white count.  2. Interval development of a poorly defined pulmonary nodule of the right lower lobe.  Further evaluation with CT chest as deemed clinically necessary.  3. Cardiomegaly.  This report was flagged in Epic as abnormal.      Electronically signed by: Lele  Orange  Date:    10/25/2023  Time:    16:53               Narrative:    EXAMINATION:  XR CHEST AP PORTABLE    CLINICAL HISTORY:  Fever, unspecified    TECHNIQUE:  Portable view of the chest was performed.    COMPARISON:  06/27/2023.    FINDINGS:  There are chronic changes of interstitial fibrosis.  Subtle increased markings within the lateral right upper lobe suspicious for developing pulmonary infiltrate.  Linear discoid atelectasis of the left lower lobe.  Interval development of a poorly defined 13 mm nodule right lower lobe.  Heart size is enlarged.  Trachea midline.    Bony thorax intact with demineralization.  Multiple healed rib fractures.                                    X-Rays:   Independently Interpreted Readings:   Other Readings:  X-ray personally reviewed by me shows what may be an early right upper lobe infiltrate.  Otherwise chronic changes.  Normal cardiac silhouette, no acute rib fractures.    Medications   heparin 25,000 units in dextrose 5% 250 mL (100 units/mL) infusion LOW INTENSITY nomogram - [facility] (has no administration in time range)   heparin 25,000 units in dextrose 5% (100 units/ml) IV bolus from bag - ADDITIONAL PRN BOLUS - 60 units/kg (max bolus 4000 units) (has no administration in time range)   heparin 25,000 units in dextrose 5% (100 units/ml) IV bolus from bag - ADDITIONAL PRN BOLUS - 30 units/kg (max bolus 4000 units) (has no administration in time range)   aspirin chewable tablet 81 mg (has no administration in time range)   cefTRIAXone (ROCEPHIN) 1 g in dextrose 5 % in water (D5W) 100 mL IVPB (MB+) (has no administration in time range)   acetaminophen tablet 650 mg (has no administration in time range)   ondansetron injection 4 mg (has no administration in time range)   prochlorperazine injection Soln 5 mg (has no administration in time range)   predniSONE tablet 40 mg (has no administration in time range)   0.9%  NaCl infusion (has no administration in time range)    albuterol-ipratropium 2.5 mg-0.5 mg/3 mL nebulizer solution 3 mL (has no administration in time range)   busPIRone split tablet 5 mg (has no administration in time range)   clonazePAM tablet 0.5 mg (has no administration in time range)   montelukast tablet 10 mg (has no administration in time range)   doxycycline tablet 100 mg (100 mg Oral Given 10/26/23 0331)   acetaminophen tablet 650 mg (650 mg Oral Given 10/25/23 1656)   albuterol-ipratropium 2.5 mg-0.5 mg/3 mL nebulizer solution 3 mL (3 mLs Nebulization Given 10/25/23 1659)   methylPREDNISolone sodium succinate injection 60 mg (60 mg Intravenous Given 10/25/23 1658)   sodium chloride 0.9% bolus 1,000 mL 1,000 mL (0 mLs Intravenous Stopped 10/25/23 2038)   potassium chloride SA CR tablet 40 mEq (40 mEq Oral Given 10/25/23 1909)   potassium chloride 10 mEq in 100 mL IVPB (0 mEq Intravenous Stopped 10/25/23 2130)   cefTRIAXone (ROCEPHIN) 1 g in dextrose 5 % in water (D5W) 100 mL IVPB (MB+) (0 g Intravenous Stopped 10/25/23 1940)   magnesium sulfate 2g in water 50mL IVPB (premix) (0 g Intravenous Stopped 10/25/23 2222)   heparin 25,000 units in dextrose 5% 250 mL (100 units/mL) infusion (heparin infusion - NO NOMOGRAM) (14 Units/kg/hr × 54.5 kg (Adjusted) Intravenous Rate/Dose Change 10/26/23 0323)   aspirin tablet 325 mg (325 mg Oral Given 10/25/23 2023)     Medical Decision Making  Patient given Solu-Medrol and albuterol with Atrovent breathing treatments, reports this did help her breathing somewhat.  Lab work shows hypokalemia.  Patient states this is not new but can not give me any information about how long she has been low, or what her numbers usually are.  Potassium today is 2.8.  Her sodium is also low at 129.  BUN is 69, creatinine is 5.1.  Patient tells me she has never had any problems with her kidneys before.  Calcium somewhat low at 8.0, alkaline phosphatase slightly elevated 96.  Patient has been started on potassium supplements, and saline  boluses.  She will need nephrology services which are not available at this facility.  This necessitates transfer.  Which has been requested.  Patient understands that she will need to be transferred to another hospital and she agrees with this plan.    Seen initially by Dr Blake and signed out to me for transfer for LILLIE. She had some left chest wall pain so troponin and EKG added. EKG with mild septolateral depressions. Initial troponin 1.6. heparin gtt started. Accepted at [facility] for cardiology and nephrology consults initially but bed not available. [facility] [place] with [facility] so Dr Chu to transfer there. He took over care for pt while awaiting transport.     Amount and/or Complexity of Data Reviewed  Labs: ordered.  Radiology: ordered.    Risk  OTC drugs.  Prescription drug management.                               Clinical Impression:   Final diagnoses:  [R50.9] Fever  [N17.9] Acute kidney injury (Primary)  [R91.8] Right upper lobe pulmonary infiltrate  [J44.9] Chronic obstructive pulmonary disease, unspecified COPD type  [R41.82] AMS (altered mental status)  [I21.4] NSTEMI (non-ST elevated myocardial infarction)  [N39.0, R31.9] Urinary tract infection with hematuria, site unspecified  [E83.42] Hypomagnesemia  [E87.1] Hyponatremia  [E87.6] Hypokalemia        ED Disposition Condition    Transfer to Another Facility Stable                Jacek Coburn Jr., MD  10/25/23 1932       Jacek Coburn Jr., MD  10/25/23 1936       Jacek Coburn Jr., MD  10/25/23 1939       Jacek Coburn Jr., MD  10/26/23 0985

## 2023-10-25 NOTE — CARE UPDATE
10/25/23 1650   Patient Assessment/Suction   Level of Consciousness (AVPU) alert   Respiratory Effort Short of breath;Unlabored   Expansion/Accessory Muscles/Retractions expansion symmetric;no retractions;no use of accessory muscles   All Lung Fields Breath Sounds diminished   Rhythm/Pattern, Respiratory shortness of breath;depth regular;pattern regular;unlabored   PRE-TX-O2   Device (Oxygen Therapy) nasal cannula   $ Is the patient on Low Flow Oxygen? Yes   Flow (L/min) 3   Oxygen Concentration (%) 32   SpO2 (!) 94 %   Pulse 95   Resp 20   Aerosol Therapy   $ Aerosol Therapy Charges Aerosol Treatment  (3 ml Duoneb given)   Respiratory Treatment Status (SVN) given   Treatment Route (SVN) mouthpiece;oxygen   Patient Position (SVN) HOB elevated   Post Treatment Assessment (SVN) breath sounds improved;increased aeration   Signs of Intolerance (SVN) none   Breath Sounds Post-Respiratory Treatment   Throughout All Fields Post-Treatment All Fields   Throughout All Fields Post-Treatment aeration increased   Post-treatment Heart Rate (beats/min) 94   Post-treatment Resp Rate (breaths/min) 20   Ready to Wean/Extubation Screen   FIO2<=50 (chart decimal) 0.32

## 2023-10-26 ENCOUNTER — HOSPITAL ENCOUNTER (INPATIENT)
Facility: HOSPITAL | Age: 68
LOS: 7 days | Discharge: HOME HEALTH | DRG: 682 | End: 2023-11-02
Attending: INTERNAL MEDICINE | Admitting: INTERNAL MEDICINE
Payer: MEDICARE

## 2023-10-26 VITALS
WEIGHT: 135 LBS | HEIGHT: 62 IN | HEART RATE: 89 BPM | TEMPERATURE: 98 F | OXYGEN SATURATION: 94 % | DIASTOLIC BLOOD PRESSURE: 87 MMHG | RESPIRATION RATE: 13 BRPM | BODY MASS INDEX: 24.84 KG/M2 | SYSTOLIC BLOOD PRESSURE: 150 MMHG

## 2023-10-26 DIAGNOSIS — N17.9 AKI (ACUTE KIDNEY INJURY): ICD-10-CM

## 2023-10-26 DIAGNOSIS — I21.4 NSTEMI (NON-ST ELEVATED MYOCARDIAL INFARCTION): ICD-10-CM

## 2023-10-26 DIAGNOSIS — R07.9 CHEST PAIN: ICD-10-CM

## 2023-10-26 PROBLEM — J43.9 EMPHYSEMA/COPD: Status: ACTIVE | Noted: 2023-10-26

## 2023-10-26 PROBLEM — N12 PYELONEPHRITIS: Status: ACTIVE | Noted: 2023-10-26

## 2023-10-26 PROBLEM — Z78.9 ALCOHOL USE: Status: ACTIVE | Noted: 2023-10-26

## 2023-10-26 PROBLEM — E03.9 HYPOTHYROID: Status: ACTIVE | Noted: 2023-10-26

## 2023-10-26 PROBLEM — E87.29 INCREASED ANION GAP METABOLIC ACIDOSIS: Status: ACTIVE | Noted: 2020-09-13

## 2023-10-26 PROBLEM — F10.90 ALCOHOL USE: Status: ACTIVE | Noted: 2023-10-26

## 2023-10-26 LAB
ALBUMIN SERPL BCP-MCNC: 2.2 G/DL (ref 3.5–5.2)
ALP SERPL-CCNC: 187 U/L (ref 55–135)
ALT SERPL W/O P-5'-P-CCNC: 16 U/L (ref 10–44)
ANION GAP SERPL CALC-SCNC: 22 MMOL/L (ref 8–16)
APTT PPP: 27.3 SEC (ref 21–32)
APTT PPP: 29.9 SEC (ref 21–32)
APTT PPP: 35.4 SEC (ref 21–32)
APTT PPP: 38.1 SEC (ref 21–32)
APTT PPP: 38.1 SEC (ref 21–32)
AST SERPL-CCNC: 37 U/L (ref 10–40)
BACTERIA #/AREA URNS AUTO: ABNORMAL /HPF
BASOPHILS # BLD AUTO: 0.01 K/UL (ref 0–0.2)
BASOPHILS # BLD AUTO: ABNORMAL K/UL (ref 0–0.2)
BASOPHILS NFR BLD: 0 % (ref 0–1.9)
BASOPHILS NFR BLD: 0.1 % (ref 0–1.9)
BILIRUB SERPL-MCNC: 0.4 MG/DL (ref 0.1–1)
BILIRUB UR QL STRIP: NEGATIVE
BUN SERPL-MCNC: 72 MG/DL (ref 8–23)
CALCIUM SERPL-MCNC: 7.7 MG/DL (ref 8.7–10.5)
CHLORIDE SERPL-SCNC: 87 MMOL/L (ref 95–110)
CHLORIDE UR-SCNC: 21 MMOL/L (ref 25–200)
CLARITY UR REFRACT.AUTO: ABNORMAL
CO2 SERPL-SCNC: 20 MMOL/L (ref 23–29)
COLOR UR AUTO: ABNORMAL
CREAT SERPL-MCNC: 5.2 MG/DL (ref 0.5–1.4)
CREAT UR-MCNC: 34 MG/DL (ref 15–325)
CREAT UR-MCNC: 63.7 MG/DL (ref 15–325)
DIFFERENTIAL METHOD: ABNORMAL
EOSINOPHIL # BLD AUTO: 0 K/UL (ref 0–0.5)
EOSINOPHIL # BLD AUTO: ABNORMAL K/UL (ref 0–0.5)
EOSINOPHIL NFR BLD: 0 % (ref 0–8)
ERYTHROCYTE [DISTWIDTH] IN BLOOD BY AUTOMATED COUNT: 13.2 % (ref 11.5–14.5)
ERYTHROCYTE [DISTWIDTH] IN BLOOD BY AUTOMATED COUNT: 13.2 % (ref 11.5–14.5)
ERYTHROCYTE [DISTWIDTH] IN BLOOD BY AUTOMATED COUNT: 13.3 % (ref 11.5–14.5)
ERYTHROCYTE [DISTWIDTH] IN BLOOD BY AUTOMATED COUNT: 13.4 % (ref 11.5–14.5)
EST. GFR  (NO RACE VARIABLE): 8.5 ML/MIN/1.73 M^2
GLUCOSE SERPL-MCNC: 159 MG/DL (ref 70–110)
GLUCOSE UR QL STRIP: NEGATIVE
HCT VFR BLD AUTO: 30.1 % (ref 37–48.5)
HCT VFR BLD AUTO: 30.1 % (ref 37–48.5)
HCT VFR BLD AUTO: 30.4 % (ref 37–48.5)
HCT VFR BLD AUTO: 32.5 % (ref 37–48.5)
HGB BLD-MCNC: 10.6 G/DL (ref 12–16)
HGB BLD-MCNC: 10.6 G/DL (ref 12–16)
HGB BLD-MCNC: 10.9 G/DL (ref 12–16)
HGB BLD-MCNC: 11.4 G/DL (ref 12–16)
HGB UR QL STRIP: ABNORMAL
HYALINE CASTS UR QL AUTO: 0 /LPF
IMM GRANULOCYTES # BLD AUTO: 0.03 K/UL (ref 0–0.04)
IMM GRANULOCYTES # BLD AUTO: ABNORMAL K/UL (ref 0–0.04)
IMM GRANULOCYTES NFR BLD AUTO: 0.4 % (ref 0–0.5)
IMM GRANULOCYTES NFR BLD AUTO: ABNORMAL % (ref 0–0.5)
INR PPP: 1 (ref 0.8–1.2)
INR PPP: 1 (ref 0.8–1.2)
KETONES UR QL STRIP: NEGATIVE
LACTATE SERPL-SCNC: 0.7 MMOL/L (ref 0.5–2.2)
LEUKOCYTE ESTERASE UR QL STRIP: ABNORMAL
LYMPHOCYTES # BLD AUTO: 0.3 K/UL (ref 1–4.8)
LYMPHOCYTES # BLD AUTO: ABNORMAL K/UL (ref 1–4.8)
LYMPHOCYTES NFR BLD: 3 % (ref 18–48)
LYMPHOCYTES NFR BLD: 4.7 % (ref 18–48)
LYMPHOCYTES NFR BLD: 6 % (ref 18–48)
LYMPHOCYTES NFR BLD: 6 % (ref 18–48)
MAGNESIUM SERPL-MCNC: 2.4 MG/DL (ref 1.6–2.6)
MCH RBC QN AUTO: 33 PG (ref 27–31)
MCH RBC QN AUTO: 33 PG (ref 27–31)
MCH RBC QN AUTO: 33.3 PG (ref 27–31)
MCH RBC QN AUTO: 33.4 PG (ref 27–31)
MCHC RBC AUTO-ENTMCNC: 35.1 G/DL (ref 32–36)
MCHC RBC AUTO-ENTMCNC: 35.2 G/DL (ref 32–36)
MCHC RBC AUTO-ENTMCNC: 35.2 G/DL (ref 32–36)
MCHC RBC AUTO-ENTMCNC: 35.9 G/DL (ref 32–36)
MCV RBC AUTO: 93 FL (ref 82–98)
MCV RBC AUTO: 94 FL (ref 82–98)
MCV RBC AUTO: 94 FL (ref 82–98)
MCV RBC AUTO: 95 FL (ref 82–98)
MICROSCOPIC COMMENT: ABNORMAL
MONOCYTES # BLD AUTO: 0.2 K/UL (ref 0.3–1)
MONOCYTES # BLD AUTO: ABNORMAL K/UL (ref 0.3–1)
MONOCYTES NFR BLD: 0 % (ref 4–15)
MONOCYTES NFR BLD: 2.2 % (ref 4–15)
NEUTROPHILS # BLD AUTO: 6.6 K/UL (ref 1.8–7.7)
NEUTROPHILS NFR BLD: 91 % (ref 38–73)
NEUTROPHILS NFR BLD: 91 % (ref 38–73)
NEUTROPHILS NFR BLD: 92.6 % (ref 38–73)
NEUTROPHILS NFR BLD: 94 % (ref 38–73)
NEUTS BAND NFR BLD MANUAL: 3 %
NITRITE UR QL STRIP: NEGATIVE
NRBC BLD-RTO: 0 /100 WBC
OSMOLALITY UR: 279 MOSM/KG (ref 50–1200)
PH UR STRIP: 6 [PH] (ref 5–8)
PHOSPHATE SERPL-MCNC: 7 MG/DL (ref 2.7–4.5)
PLATELET # BLD AUTO: 164 K/UL (ref 150–450)
PLATELET # BLD AUTO: 168 K/UL (ref 150–450)
PLATELET # BLD AUTO: 168 K/UL (ref 150–450)
PLATELET # BLD AUTO: 187 K/UL (ref 150–450)
PMV BLD AUTO: 10.9 FL (ref 9.2–12.9)
PMV BLD AUTO: 10.9 FL (ref 9.2–12.9)
PMV BLD AUTO: 11.6 FL (ref 9.2–12.9)
PMV BLD AUTO: 12.2 FL (ref 9.2–12.9)
POTASSIUM SERPL-SCNC: 3.3 MMOL/L (ref 3.5–5.1)
POTASSIUM UR-SCNC: 25 MMOL/L (ref 15–95)
PROT SERPL-MCNC: 6.3 G/DL (ref 6–8.4)
PROT UR QL STRIP: ABNORMAL
PROT UR-MCNC: 91 MG/DL (ref 0–15)
PROT/CREAT UR: 2.68 MG/G{CREAT} (ref 0–0.2)
PROTHROMBIN TIME: 10.7 SEC (ref 9–12.5)
PROTHROMBIN TIME: 10.7 SEC (ref 9–12.5)
RBC # BLD AUTO: 3.21 M/UL (ref 4–5.4)
RBC # BLD AUTO: 3.21 M/UL (ref 4–5.4)
RBC # BLD AUTO: 3.27 M/UL (ref 4–5.4)
RBC # BLD AUTO: 3.41 M/UL (ref 4–5.4)
RBC #/AREA URNS AUTO: 43 /HPF (ref 0–4)
SODIUM SERPL-SCNC: 129 MMOL/L (ref 136–145)
SODIUM UR-SCNC: 32 MMOL/L (ref 20–250)
SP GR UR STRIP: 1.01 (ref 1–1.03)
TROPONIN I SERPL DL<=0.01 NG/ML-MCNC: 0.88 NG/ML (ref 0–0.03)
TROPONIN I SERPL DL<=0.01 NG/ML-MCNC: 1.08 NG/ML (ref 0–0.03)
URN SPEC COLLECT METH UR: ABNORMAL
UUN UR-MCNC: 344 MG/DL (ref 140–1050)
WBC # BLD AUTO: 6.4 K/UL (ref 3.9–12.7)
WBC # BLD AUTO: 6.4 K/UL (ref 3.9–12.7)
WBC # BLD AUTO: 6.58 K/UL (ref 3.9–12.7)
WBC # BLD AUTO: 7.17 K/UL (ref 3.9–12.7)
WBC #/AREA URNS AUTO: >100 /HPF (ref 0–5)
WBC CLUMPS UR QL AUTO: ABNORMAL

## 2023-10-26 PROCEDURE — 99223 1ST HOSP IP/OBS HIGH 75: CPT | Mod: ,,, | Performed by: INTERNAL MEDICINE

## 2023-10-26 PROCEDURE — 87040 BLOOD CULTURE FOR BACTERIA: CPT

## 2023-10-26 PROCEDURE — 84133 ASSAY OF URINE POTASSIUM: CPT | Performed by: INTERNAL MEDICINE

## 2023-10-26 PROCEDURE — 80053 COMPREHEN METABOLIC PANEL: CPT | Performed by: INTERNAL MEDICINE

## 2023-10-26 PROCEDURE — 83605 ASSAY OF LACTIC ACID: CPT

## 2023-10-26 PROCEDURE — 81001 URINALYSIS AUTO W/SCOPE: CPT

## 2023-10-26 PROCEDURE — 85027 COMPLETE CBC AUTOMATED: CPT | Mod: 91 | Performed by: INTERNAL MEDICINE

## 2023-10-26 PROCEDURE — 36415 COLL VENOUS BLD VENIPUNCTURE: CPT

## 2023-10-26 PROCEDURE — 99223 PR INITIAL HOSPITAL CARE,LEVL III: ICD-10-PCS | Mod: ,,, | Performed by: INTERNAL MEDICINE

## 2023-10-26 PROCEDURE — 84100 ASSAY OF PHOSPHORUS: CPT | Performed by: INTERNAL MEDICINE

## 2023-10-26 PROCEDURE — 25000003 PHARM REV CODE 250

## 2023-10-26 PROCEDURE — 25000242 PHARM REV CODE 250 ALT 637 W/ HCPCS

## 2023-10-26 PROCEDURE — 85610 PROTHROMBIN TIME: CPT | Performed by: INTERNAL MEDICINE

## 2023-10-26 PROCEDURE — 82436 ASSAY OF URINE CHLORIDE: CPT

## 2023-10-26 PROCEDURE — 85730 THROMBOPLASTIN TIME PARTIAL: CPT | Mod: 91 | Performed by: INTERNAL MEDICINE

## 2023-10-26 PROCEDURE — 84540 ASSAY OF URINE/UREA-N: CPT | Performed by: INTERNAL MEDICINE

## 2023-10-26 PROCEDURE — 87086 URINE CULTURE/COLONY COUNT: CPT

## 2023-10-26 PROCEDURE — 82570 ASSAY OF URINE CREATININE: CPT | Performed by: INTERNAL MEDICINE

## 2023-10-26 PROCEDURE — 85610 PROTHROMBIN TIME: CPT | Mod: 91

## 2023-10-26 PROCEDURE — 85730 THROMBOPLASTIN TIME PARTIAL: CPT | Mod: 91

## 2023-10-26 PROCEDURE — 11000001 HC ACUTE MED/SURG PRIVATE ROOM

## 2023-10-26 PROCEDURE — 82570 ASSAY OF URINE CREATININE: CPT | Mod: 91

## 2023-10-26 PROCEDURE — 83735 ASSAY OF MAGNESIUM: CPT | Performed by: INTERNAL MEDICINE

## 2023-10-26 PROCEDURE — 25000003 PHARM REV CODE 250: Performed by: INTERNAL MEDICINE

## 2023-10-26 PROCEDURE — 84484 ASSAY OF TROPONIN QUANT: CPT | Performed by: INTERNAL MEDICINE

## 2023-10-26 PROCEDURE — 63600175 PHARM REV CODE 636 W HCPCS

## 2023-10-26 PROCEDURE — 85025 COMPLETE CBC W/AUTO DIFF WBC: CPT

## 2023-10-26 PROCEDURE — 85730 THROMBOPLASTIN TIME PARTIAL: CPT | Performed by: INTERNAL MEDICINE

## 2023-10-26 PROCEDURE — 84300 ASSAY OF URINE SODIUM: CPT | Performed by: INTERNAL MEDICINE

## 2023-10-26 PROCEDURE — S4991 NICOTINE PATCH NONLEGEND: HCPCS

## 2023-10-26 PROCEDURE — 85007 BL SMEAR W/DIFF WBC COUNT: CPT | Mod: 91 | Performed by: INTERNAL MEDICINE

## 2023-10-26 PROCEDURE — 51702 INSERT TEMP BLADDER CATH: CPT

## 2023-10-26 PROCEDURE — 84484 ASSAY OF TROPONIN QUANT: CPT | Mod: 91

## 2023-10-26 PROCEDURE — 36415 COLL VENOUS BLD VENIPUNCTURE: CPT | Performed by: INTERNAL MEDICINE

## 2023-10-26 PROCEDURE — 83935 ASSAY OF URINE OSMOLALITY: CPT | Performed by: INTERNAL MEDICINE

## 2023-10-26 RX ORDER — IBUPROFEN 200 MG
24 TABLET ORAL
Status: DISCONTINUED | OUTPATIENT
Start: 2023-10-26 | End: 2023-11-02 | Stop reason: HOSPADM

## 2023-10-26 RX ORDER — ALPRAZOLAM 0.25 MG/1
0.25 TABLET ORAL 2 TIMES DAILY PRN
Status: DISCONTINUED | OUTPATIENT
Start: 2023-10-26 | End: 2023-10-26

## 2023-10-26 RX ORDER — SODIUM CHLORIDE 9 MG/ML
INJECTION, SOLUTION INTRAVENOUS CONTINUOUS
Status: ACTIVE | OUTPATIENT
Start: 2023-10-26 | End: 2023-10-27

## 2023-10-26 RX ORDER — NICOTINE 7MG/24HR
1 PATCH, TRANSDERMAL 24 HOURS TRANSDERMAL DAILY
Status: DISCONTINUED | OUTPATIENT
Start: 2023-10-26 | End: 2023-11-02 | Stop reason: HOSPADM

## 2023-10-26 RX ORDER — CLOPIDOGREL 300 MG/1
600 TABLET, FILM COATED ORAL ONCE
Status: COMPLETED | OUTPATIENT
Start: 2023-10-26 | End: 2023-10-26

## 2023-10-26 RX ORDER — MONTELUKAST SODIUM 10 MG/1
10 TABLET ORAL EVERY MORNING
Status: DISCONTINUED | OUTPATIENT
Start: 2023-10-26 | End: 2023-11-02 | Stop reason: HOSPADM

## 2023-10-26 RX ORDER — SODIUM CHLORIDE 0.9 % (FLUSH) 0.9 %
10 SYRINGE (ML) INJECTION EVERY 12 HOURS PRN
Status: DISCONTINUED | OUTPATIENT
Start: 2023-10-26 | End: 2023-11-02 | Stop reason: HOSPADM

## 2023-10-26 RX ORDER — LORAZEPAM 0.5 MG/1
0.5 TABLET ORAL EVERY 12 HOURS PRN
Status: DISCONTINUED | OUTPATIENT
Start: 2023-10-26 | End: 2023-11-02 | Stop reason: HOSPADM

## 2023-10-26 RX ORDER — CLONAZEPAM 0.5 MG/1
0.5 TABLET ORAL 2 TIMES DAILY PRN
Status: DISCONTINUED | OUTPATIENT
Start: 2023-10-26 | End: 2023-10-26

## 2023-10-26 RX ORDER — SODIUM CHLORIDE 9 MG/ML
INJECTION, SOLUTION INTRAVENOUS CONTINUOUS
Status: DISCONTINUED | OUTPATIENT
Start: 2023-10-26 | End: 2023-10-26 | Stop reason: HOSPADM

## 2023-10-26 RX ORDER — THIAMINE HCL 100 MG
100 TABLET ORAL DAILY
Status: DISCONTINUED | OUTPATIENT
Start: 2023-10-26 | End: 2023-11-02 | Stop reason: HOSPADM

## 2023-10-26 RX ORDER — ACETAMINOPHEN 325 MG/1
650 TABLET ORAL EVERY 6 HOURS PRN
Status: DISCONTINUED | OUTPATIENT
Start: 2023-10-26 | End: 2023-10-26 | Stop reason: HOSPADM

## 2023-10-26 RX ORDER — ATORVASTATIN CALCIUM 40 MG/1
40 TABLET, FILM COATED ORAL DAILY
Status: DISCONTINUED | OUTPATIENT
Start: 2023-10-27 | End: 2023-11-02 | Stop reason: HOSPADM

## 2023-10-26 RX ORDER — NALOXONE HCL 0.4 MG/ML
0.02 VIAL (ML) INJECTION
Status: DISCONTINUED | OUTPATIENT
Start: 2023-10-26 | End: 2023-11-02 | Stop reason: HOSPADM

## 2023-10-26 RX ORDER — HEPARIN SODIUM,PORCINE/D5W 25000/250
0-40 INTRAVENOUS SOLUTION INTRAVENOUS CONTINUOUS
Status: DISCONTINUED | OUTPATIENT
Start: 2023-10-26 | End: 2023-10-27

## 2023-10-26 RX ORDER — NAPROXEN SODIUM 220 MG/1
81 TABLET, FILM COATED ORAL ONCE
Status: COMPLETED | OUTPATIENT
Start: 2023-10-26 | End: 2023-10-26

## 2023-10-26 RX ORDER — DOXYCYCLINE HYCLATE 100 MG
100 TABLET ORAL EVERY 12 HOURS
Status: DISCONTINUED | OUTPATIENT
Start: 2023-10-26 | End: 2023-10-26 | Stop reason: HOSPADM

## 2023-10-26 RX ORDER — FLUOXETINE HYDROCHLORIDE 20 MG/1
40 CAPSULE ORAL DAILY
Status: DISCONTINUED | OUTPATIENT
Start: 2023-10-26 | End: 2023-10-26

## 2023-10-26 RX ORDER — LORAZEPAM 1 MG/1
2 TABLET ORAL EVERY 4 HOURS PRN
Status: DISCONTINUED | OUTPATIENT
Start: 2023-10-26 | End: 2023-11-02 | Stop reason: HOSPADM

## 2023-10-26 RX ORDER — GLUCAGON 1 MG
1 KIT INJECTION
Status: DISCONTINUED | OUTPATIENT
Start: 2023-10-26 | End: 2023-11-02 | Stop reason: HOSPADM

## 2023-10-26 RX ORDER — PREDNISONE 10 MG/1
40 TABLET ORAL DAILY
Status: DISCONTINUED | OUTPATIENT
Start: 2023-10-26 | End: 2023-10-26 | Stop reason: HOSPADM

## 2023-10-26 RX ORDER — BUTALBITAL, ACETAMINOPHEN AND CAFFEINE 50; 325; 40 MG/1; MG/1; MG/1
1 TABLET ORAL EVERY 12 HOURS PRN
Status: DISCONTINUED | OUTPATIENT
Start: 2023-10-26 | End: 2023-11-02 | Stop reason: HOSPADM

## 2023-10-26 RX ORDER — SODIUM CHLORIDE AND POTASSIUM CHLORIDE 150; 900 MG/100ML; MG/100ML
INJECTION, SOLUTION INTRAVENOUS CONTINUOUS
Status: DISCONTINUED | OUTPATIENT
Start: 2023-10-26 | End: 2023-10-26

## 2023-10-26 RX ORDER — NAPROXEN SODIUM 220 MG/1
81 TABLET, FILM COATED ORAL DAILY
Status: DISCONTINUED | OUTPATIENT
Start: 2023-10-26 | End: 2023-10-26 | Stop reason: HOSPADM

## 2023-10-26 RX ORDER — BUTALBITAL, ACETAMINOPHEN AND CAFFEINE 50; 325; 40 MG/1; MG/1; MG/1
1 TABLET ORAL EVERY 6 HOURS PRN
Status: DISCONTINUED | OUTPATIENT
Start: 2023-10-26 | End: 2023-10-26

## 2023-10-26 RX ORDER — ASPIRIN 325 MG
325 TABLET ORAL ONCE
Status: DISCONTINUED | OUTPATIENT
Start: 2023-10-26 | End: 2023-10-26

## 2023-10-26 RX ORDER — ONDANSETRON 2 MG/ML
4 INJECTION INTRAMUSCULAR; INTRAVENOUS EVERY 6 HOURS PRN
Status: DISCONTINUED | OUTPATIENT
Start: 2023-10-26 | End: 2023-10-26 | Stop reason: HOSPADM

## 2023-10-26 RX ORDER — CLONAZEPAM 0.5 MG/1
0.5 TABLET ORAL 2 TIMES DAILY PRN
Status: DISCONTINUED | OUTPATIENT
Start: 2023-10-26 | End: 2023-10-26 | Stop reason: HOSPADM

## 2023-10-26 RX ORDER — IBUPROFEN 200 MG
16 TABLET ORAL
Status: DISCONTINUED | OUTPATIENT
Start: 2023-10-26 | End: 2023-11-02 | Stop reason: HOSPADM

## 2023-10-26 RX ORDER — MONTELUKAST SODIUM 10 MG/1
10 TABLET ORAL EVERY MORNING
Status: DISCONTINUED | OUTPATIENT
Start: 2023-10-26 | End: 2023-10-26 | Stop reason: HOSPADM

## 2023-10-26 RX ORDER — PROCHLORPERAZINE EDISYLATE 5 MG/ML
5 INJECTION INTRAMUSCULAR; INTRAVENOUS EVERY 6 HOURS PRN
Status: DISCONTINUED | OUTPATIENT
Start: 2023-10-26 | End: 2023-10-26 | Stop reason: HOSPADM

## 2023-10-26 RX ORDER — HEPARIN SODIUM,PORCINE/D5W 25000/250
0-40 INTRAVENOUS SOLUTION INTRAVENOUS CONTINUOUS
Status: DISCONTINUED | OUTPATIENT
Start: 2023-10-26 | End: 2023-10-26 | Stop reason: HOSPADM

## 2023-10-26 RX ORDER — IPRATROPIUM BROMIDE AND ALBUTEROL SULFATE 2.5; .5 MG/3ML; MG/3ML
3 SOLUTION RESPIRATORY (INHALATION) EVERY 4 HOURS PRN
Status: DISCONTINUED | OUTPATIENT
Start: 2023-10-26 | End: 2023-10-26 | Stop reason: HOSPADM

## 2023-10-26 RX ADMIN — THIAMINE HCL TAB 100 MG 100 MG: 100 TAB at 01:10

## 2023-10-26 RX ADMIN — BUTALBITAL, ACETAMINOPHEN, AND CAFFEINE 1 TABLET: 50; 325; 40 TABLET ORAL at 01:10

## 2023-10-26 RX ADMIN — CLOPIDOGREL BISULFATE 600 MG: 300 TABLET, FILM COATED ORAL at 03:10

## 2023-10-26 RX ADMIN — CEFEPIME 1 G: 1 INJECTION, POWDER, FOR SOLUTION INTRAMUSCULAR; INTRAVENOUS at 06:10

## 2023-10-26 RX ADMIN — CEFEPIME 500 MG: 1 INJECTION, POWDER, FOR SOLUTION INTRAMUSCULAR; INTRAVENOUS at 02:10

## 2023-10-26 RX ADMIN — HEPARIN SODIUM 12 UNITS/KG/HR: 10000 INJECTION, SOLUTION INTRAVENOUS at 03:10

## 2023-10-26 RX ADMIN — LORAZEPAM 0.5 MG: 0.5 TABLET ORAL at 07:10

## 2023-10-26 RX ADMIN — TIOTROPIUM BROMIDE AND OLODATEROL 1 PUFF: 3.124; 2.736 SPRAY, METERED RESPIRATORY (INHALATION) at 06:10

## 2023-10-26 RX ADMIN — NICOTINE 1 PATCH: 7 PATCH, EXTENDED RELEASE TRANSDERMAL at 01:10

## 2023-10-26 RX ADMIN — MONTELUKAST 10 MG: 10 TABLET, FILM COATED ORAL at 01:10

## 2023-10-26 RX ADMIN — FLUOXETINE 40 MG: 20 CAPSULE ORAL at 01:10

## 2023-10-26 RX ADMIN — SODIUM CHLORIDE: 9 INJECTION, SOLUTION INTRAVENOUS at 03:10

## 2023-10-26 RX ADMIN — POTASSIUM BICARBONATE 20 MEQ: 391 TABLET, EFFERVESCENT ORAL at 01:10

## 2023-10-26 RX ADMIN — ASPIRIN 81 MG CHEWABLE TABLET 81 MG: 81 TABLET CHEWABLE at 04:10

## 2023-10-26 RX ADMIN — DOXYCYCLINE HYCLATE 100 MG: 100 TABLET, COATED ORAL at 03:10

## 2023-10-26 RX ADMIN — THERA TABS 1 TABLET: TAB at 01:10

## 2023-10-26 NOTE — ASSESSMENT & PLAN NOTE
Findings upon admission suggestive of UTI (pyelonephritis) which may have exacerbated LILLIE.     - continue cefepime   - pending urine cx

## 2023-10-26 NOTE — CONSULTS
[facility]  Cardiology  Consult Note    Patient Name: Bhavana Lambert  MRN: [id number]  Admission Date: 10/26/2023  Hospital Length of Stay: 0 days  Code Status: Full Code   Attending Provider: Vargas Guevara MD   Consulting Provider: Hugo Hanna MD  Primary Care Physician: Pepito Jhaveri IV, MD  Principal Problem:LILLIE (acute kidney injury)    Inpatient consult to Cardiology  Consult performed by: Hugo Hanna MD  Consult ordered by: Ricky Sherman MD      Subjective:     HPI: Bhavana Lambert is a 68 y.o. female with history of hypothyroidism, COPD, HTN, hypokalemia, hyponatremia, rib fractures who presented to an OSH with fever, SOB, and left sided rib pain. LILLIE was discovered and pt was transferred to [facility] for a nephro consult. Chest wall pain was also noted. Troponins were 1.5. BNP was 2,260. EKG showed septolateral depressions. CXR showed an enlarged heart suggestive for congestive heart failure. Echo pending.      Current Facility-Administered Medications on File Prior to Encounter   Medication Dose Route Frequency Provider Last Rate Last Admin    [COMPLETED] acetaminophen tablet 650 mg  650 mg Oral ED 1 Time Shady Blake MD   650 mg at 10/25/23 1656    [COMPLETED] albuterol-ipratropium 2.5 mg-0.5 mg/3 mL nebulizer solution 3 mL  3 mL Nebulization ED 1 Time Shady Blake MD   3 mL at 10/25/23 1659    [COMPLETED] aspirin tablet 325 mg  325 mg Oral ED 1 Time Jacek Coburn Jr., MD   325 mg at 10/25/23 2023    [COMPLETED] cefTRIAXone (ROCEPHIN) 1 g in dextrose 5 % in water (D5W) 100 mL IVPB (MB+)  1 g Intravenous ED 1 Time Shady Blake MD   Stopped at 10/25/23 1940    [COMPLETED] heparin 25,000 units in dextrose 5% 250 mL (100 units/mL) infusion (heparin infusion - NO NOMOGRAM)  12 Units/kg/hr (Adjusted) Intravenous ED 1 Time Jacek Coburn Jr., MD 7.6 mL/hr at 10/26/23 0323 14 Units/kg/hr at 10/26/23 0323    [COMPLETED] magnesium sulfate 2g in water 50mL IVPB (premix)   2 g Intravenous ED 1 Time Jacek Coburn Jr., MD   Stopped at 10/25/23 2222    [COMPLETED] methylPREDNISolone sodium succinate injection 60 mg  60 mg Intravenous ED 1 Time Shady Blake MD   60 mg at 10/25/23 1658    [COMPLETED] potassium chloride 10 mEq in 100 mL IVPB  10 mEq Intravenous ED 1 Time Shady Blake MD   Stopped at 10/25/23 2130    [COMPLETED] potassium chloride SA CR tablet 40 mEq  40 mEq Oral ED 1 Time Shady Blake MD   40 mEq at 10/25/23 1909    [COMPLETED] sodium chloride 0.9% bolus 1,000 mL 1,000 mL  1,000 mL Intravenous ED 1 Time Shady Blake MD   Stopped at 10/25/23 2038    [DISCONTINUED] 0.9 % NaCl with KCl 20 mEq infusion   Intravenous Continuous SUDHEER Chu MD        [DISCONTINUED] 0.9%  NaCl infusion   Intravenous Continuous SUDHEER Chu MD        [DISCONTINUED] acetaminophen tablet 650 mg  650 mg Oral Q6H PRN SUDHEER Chu MD        [DISCONTINUED] albuterol-ipratropium 2.5 mg-0.5 mg/3 mL nebulizer solution 3 mL  3 mL Nebulization Q4H PRN SUDHEER Chu MD        [DISCONTINUED] aspirin chewable tablet 81 mg  81 mg Oral Daily SUDHEER Chu MD        [DISCONTINUED] azithromycin (ZITHROMAX) 500 mg in dextrose 5 % (D5W) 250 mL IVPB (Vial-Mate)  500 mg Intravenous Q24H SUDHEER Chu MD        [DISCONTINUED] busPIRone split tablet 5 mg  5 mg Oral BID SUDHEER Chu MD        [DISCONTINUED] cefTRIAXone (ROCEPHIN) 1 g in dextrose 5 % in water (D5W) 100 mL IVPB (MB+)  1 g Intravenous Q24H SUDHEER Chu MD        [DISCONTINUED] clonazePAM tablet 0.5 mg  0.5 mg Oral BID PRN SUDHEER Chu MD        [DISCONTINUED] doxycycline tablet 100 mg  100 mg Oral Q12H SUDHEER Chu MD   100 mg at 10/26/23 0331    [DISCONTINUED] heparin 25,000 units in dextrose 5% (100 units/ml) IV bolus from bag - ADDITIONAL PRN BOLUS - 30 units/kg (max bolus 4000 units)  30 Units/kg (Adjusted) Intravenous PRN SUDHEER Chu MD        [DISCONTINUED] heparin 25,000 units in  dextrose 5% (100 units/ml) IV bolus from bag - ADDITIONAL PRN BOLUS - 60 units/kg (max bolus 4000 units)  60 Units/kg (Adjusted) Intravenous PRN SUDHEER Chu MD        [DISCONTINUED] heparin 25,000 units in dextrose 5% (100 units/ml) IV bolus from bag INITIAL BOLUS (max bolus 4000 units)  60 Units/kg (Adjusted) Intravenous Once SUDHEER Chu MD        [DISCONTINUED] heparin 25,000 units in dextrose 5% 250 mL (100 units/mL) infusion LOW INTENSITY nomogram - [facility]  0-40 Units/kg/hr (Adjusted) Intravenous Continuous SUDHEER Chu MD        [DISCONTINUED] montelukast tablet 10 mg  10 mg Oral QAM SUDHEER Chu MD        [DISCONTINUED] ondansetron injection 4 mg  4 mg Intravenous Q6H PRN SUDHEER Chu MD        [DISCONTINUED] predniSONE tablet 40 mg  40 mg Oral Daily SUDHEER Chu MD        [DISCONTINUED] prochlorperazine injection Soln 5 mg  5 mg Intravenous Q6H PRN SUDHEER Chu MD         Current Outpatient Medications on File Prior to Encounter   Medication Sig Dispense Refill    busPIRone (BUSPAR) 15 MG tablet Take 15 mg by mouth 2 (two) times a day.      clonazePAM (KLONOPIN) 0.5 MG tablet Take 0.5 mg by mouth 2 (two) times daily as needed (anxiety).       cyanocobalamin 1,000 mcg/mL injection Inject 1,000 mcg into the muscle every 30 days.       fluoxetine HCl (PROZAC ORAL) Take 40 mg by mouth once daily.       miconazole NITRATE 2 % (MICOTIN) 2 % top powder Apply topically 2 (two) times daily. 85 g 0    montelukast sodium (SINGULAIR ORAL) Take 10 mg by mouth every morning.       naproxen sodium (ANAPROX) 220 MG tablet Take 220 mg by mouth daily as needed (pain).      tiotropium-olodateroL (STIOLTO RESPIMAT) 2.5-2.5 mcg/actuation Mist Inhale 1 puff into the lungs once daily. Controller 1 g 5       Review of Systems   Constitutional:  Negative for chills and fever.   Respiratory:  Positive for shortness of breath.         On oxygen   Cardiovascular:  Positive for chest pain. Negative for  palpitations and leg swelling.   Gastrointestinal:  Negative for abdominal pain, heartburn, nausea and vomiting.   Genitourinary:  Negative for dysuria, frequency and urgency.   Neurological:  Negative for dizziness and headaches.        Objective:     Vitals:    10/26/23 1133   BP: (!) 151/75   Pulse: 85   Resp: 18   Temp: 97.2 °F (36.2 °C)         Constitutional:  not in acute distress and well developed  HENT: Head: Normal, normocephalic, atraumatic.  Eyes: conjunctiva clear and sclera nonicteric  Cardiovascular: regular rate and rhythm and no murmur  Respiratory: normal chest expansion & respiratory effort   and no accessory muscle use  GI: soft, non-tender, without masses or organomegaly  Musculoskeletal:Chest wall tender to palpation along sternum  Skin: normal color  Neurological: alert, oriented x3  Psychiatric: mood and affect are within normal limits, pt is a good historian; no memory problems were noted      Significant Labs:  Recent Labs   Lab 10/26/23  0158 10/26/23  0417 10/26/23  1319   HGB 10.9* 10.6*  10.6* 11.4*       Lab Results   Component Value Date    WBC 7.17 10/26/2023    HGB 11.4 (L) 10/26/2023    HCT 32.5 (L) 10/26/2023    MCV 95 10/26/2023     10/26/2023       Lab Results   Component Value Date     (L) 10/26/2023    K 3.3 (L) 10/26/2023    CL 87 (L) 10/26/2023    CO2 20 (L) 10/26/2023    BUN 72 (H) 10/26/2023    CREATININE 5.2 (H) 10/26/2023    CALCIUM 7.7 (L) 10/26/2023    ANIONGAP 22 (H) 10/26/2023    ESTGFRAFRICA >60.0 09/17/2020    EGFRNONAA >60.0 09/17/2020           Assessment/Plan:     Bhavana Lambert is a 68 y.o. female with history of hypothyroidism, COPD, HTN, hypokalemia, hyponatremia, rib fractures transferred from an OSH with an LILLIE and chest pain.  Patient describes chest pain as nonradiating sternal pain that is tender to palpation.  Trops intinially elevated at 1.69 but have since decreased to .876. BNP was 2260. CXR shows an enlarged heart. EKG shows ST and  T-wave abnormalities.  Echo is currently pending.    Problem List:  Acute coronary syndrome    Suspicious for small MI is low but can not be ruled out due to increases in troponin and BNP and chest pain.  Catheterization is not possible at this time given the patient has a severe LILLIE.  We will start patient on ACS pathway for precautions    Recommendations:    -initiate patient on ACS protocol    Thank you for involving us in the care of Bhavana Lambert. Please call with any additional questions, concerns or changes in the patient's clinical status. We will continue to follow.         Hugo Hanna MD PGY-1  Cardiology  [facility]

## 2023-10-26 NOTE — ASSESSMENT & PLAN NOTE
Chronic condition, 2/2 heavy tobacco use. Pt currently smoking ~1ppd, but used to smoke ~2ppd. On 3L NC at home 24h/day, has required up to 5L NC intermittently during the past few days. Uses tiotropium-oladaterol and takes montelukast daily. Albuterol inhaler prn    Plan  - continue home medications

## 2023-10-26 NOTE — HPI
Ms. Bhavana Lambert is a 68 y.o. patient with past medical history of COPD on 24/7 O2 requirements and inhalers at home here. Transferred from [facility] for Nephrology evaluation. Patient had several days of fever, generalized weakness, and associated left flank pain and n/v. Refers dysuria and diarrhea. About 4 months ago, she had a fall where she broke some ribs on the left side. Patient states pain and general feeling is worse now compared to some months ago. Patient has been taking increased pain medications (naproxen). She states that the pain she feels now is similar to the pain she is been feeling for the past few months, only slightly worse. Upon admission, UA 75 WBC, Ucr 63.7. Baseline sCr 1.0.     Nephrology consulted for pyelonephritis and LILLIE.

## 2023-10-26 NOTE — NURSING
New Pt arrived to unit with Heparin drip at 12 units. Last APTT was greater than 6 hours ago so Heparin was stopped and STAT APTT ordered.

## 2023-10-26 NOTE — PROVIDER TRANSFER
"   Outside Transfer Acceptance Note / Regional Referral Center    Referring facility: [facility]  Referring provider: DIANELYS QUINTEROS  Accepting facility: [facility]  Accepting provider: ZAKIA GERONIMO  Admitting provider: ZAKIA GERONIMO  Reason for transfer: Higher Level of Care   Transfer diagnosis: Acute renal failure/acute kidney injury/ right upper lobe infiltrate/hypokalemia/hyponatremia  Transfer specialty requested: [facility] Medicine  Transfer specialty notified: No  Transfer level: NUMBER 1-5: 2  Bed type requested: TELE  Isolation status: No active isolations   Admission class or status: IP- Inpatient      Narrative     Ms. Lambert is a 69yo lady with a past medical history of hypothyroidism, anxiety, depression, COPD, HTN, hypokalemia, hyponatremia, rib fractures, PRN O2 at home, and headaches.    Chief Complaint   Patient presents with    Fever    Shortness of Breath    Sore Throat       X 3 to 4 days    Weakness       X 3 to 4 days      68-year-old female, here from home via EMS for evaluation and treatment of subjective fever, worsening shortness of breath, sore throat, generalized weakness, and left-sided rib pain.  Patient has a history of COPD.  She also has a history of a fall about 4 months ago during which she broke some ribs on the left side.  She states that the pain she feels now is similar to the pain she is been feeling for the past few months, only slightly worse.  She states she is out of Tylenol and Motrin at home.  She uses O2 constantly at home.  No sick contacts.  Denies any nausea vomiting.  Denies any dysuria or hematuria.  No change in urinary habits, no loose stools or constipation.  Temperature here 99°.    In the ED her current VS are /65   Pulse 83   Temp 98.6 °F (37 °C)   Resp 13   Ht 5' 2" (1.575 m)   Wt 61.2 kg (135 lb)   SpO2 97%   BMI 24.69 kg/m².    VS since arrival were:  Temp:  [98.6 °F (37 °C)-99 °F (37.2 °C)] 98.6 °F (37 " °C)  Pulse:  [83-96] 83  Resp:  [0-20] 13  SpO2:  [81 %-98 %] 97 %  BP: ()/(62-83) 117/65     Labs showed:  WBC 7, HG 11, [id number]  INR 1, PTT 38  , K 3, BUN 71, CR 5.2, HCO3 20, AG 24  PHOS 7, MG 2.4  TROP 1.516 -> 1.080, BNP 2260  COVID/FLU NEGATIVE  UA LE 3+, RBC 25, WBC 75, BACT MANY   Latest Reference Range & Units 06/27/23 14:51 06/28/23 06:57 10/25/23 17:42 10/25/23 20:15   Creatinine 0.5 - 1.4 mg/dL 1.4 1.1 5.1 (H) 5.2 (H)      CXR SHOWED:  1. Subtle increased markings within the right upper lobe may represent a developing pulmonary infiltrate.   2. Interval development of a poorly defined pulmonary nodule of the right lower lobe.     3. Cardiomegaly.    CT ABD/PELVIS WITHOUT CONTRAST:  1) Moderate left sided perinephric fat stranding may be infectious or inflammatory. No hydronephrosis.   2) No detectable renal or ureteral stones.  3) Multiple left-sided rib fracture deformities appear subacute to chronic.   4) Mild to moderate height loss involving the T11 and L2 vertebral bodies compatible with age indeterminate compression fractures.    IN THE ED SHE WAS TREATED WITH:  Medications   heparin 25,000 units in dextrose 5% 250 mL (100 units/mL) infusion LOW INTENSITY nomogram - [facility] (has no administration in time range)   heparin 25,000 units in dextrose 5% (100 units/ml) IV bolus from bag - ADDITIONAL PRN BOLUS - 60 units/kg (max bolus 4000 units) (has no administration in time range)   heparin 25,000 units in dextrose 5% (100 units/ml) IV bolus from bag - ADDITIONAL PRN BOLUS - 30 units/kg (max bolus 4000 units) (has no administration in time range)   aspirin chewable tablet 81 mg (has no administration in time range)   cefTRIAXone (ROCEPHIN) 1 g in dextrose 5 % in water (D5W) 100 mL IVPB (MB+) (has no administration in time range)   acetaminophen tablet 650 mg (has no administration in time range)   ondansetron injection 4 mg (has no administration in time range)   prochlorperazine injection Soln  5 mg (has no administration in time range)   predniSONE tablet 40 mg (has no administration in time range)   0.9%  NaCl infusion (has no administration in time range)   albuterol-ipratropium 2.5 mg-0.5 mg/3 mL nebulizer solution 3 mL (has no administration in time range)   busPIRone split tablet 5 mg (has no administration in time range)   clonazePAM tablet 0.5 mg (has no administration in time range)   montelukast tablet 10 mg (has no administration in time range)   doxycycline tablet 100 mg (has no administration in time range)   acetaminophen tablet 650 mg (650 mg Oral Given 10/25/23 1656)   albuterol-ipratropium 2.5 mg-0.5 mg/3 mL nebulizer solution 3 mL (3 mLs Nebulization Given 10/25/23 1659)   methylPREDNISolone sodium succinate injection 60 mg (60 mg Intravenous Given 10/25/23 1658)   sodium chloride 0.9% bolus 1,000 mL 1,000 mL (0 mLs Intravenous Stopped 10/25/23 2038)   potassium chloride SA CR tablet 40 mEq (40 mEq Oral Given 10/25/23 1909)   potassium chloride 10 mEq in 100 mL IVPB (0 mEq Intravenous Stopped 10/25/23 2130)   cefTRIAXone (ROCEPHIN) 1 g in dextrose 5 % in water (D5W) 100 mL IVPB (MB+) (0 g Intravenous Stopped 10/25/23 1940)   magnesium sulfate 2g in water 50mL IVPB (premix) (0 g Intravenous Stopped 10/25/23 2222)   heparin 25,000 units in dextrose 5% 250 mL (100 units/mL) infusion (heparin infusion - NO NOMOGRAM) (14 Units/kg/hr × 54.5 kg (Adjusted) Intravenous Rate/Dose Change 10/26/23 0323)   aspirin tablet 325 mg (325 mg Oral Given 10/25/23 2023)     Ms. Lambert has possible Pyelonephritis, LILLIE, NSTEMI (may all be type II demand), possible RUL SWETHA  [facility] has no Nephrology      Objective        Recent Labs: All pertinent labs within the past 24 hours have been reviewed.  Recent Results (from the past 24 hour(s))   COVID-19 Rapid Screening    Collection Time: 10/25/23  3:18 PM   Result Value Ref Range    SARS-CoV-2 RNA, Amplification, Qual Negative Negative   Influenza A & B by Molecular     Collection Time: 10/25/23  3:18 PM    Specimen: Nasopharyngeal Swab   Result Value Ref Range    Influenza A, Molecular Negative Negative    Influenza B, Molecular Negative Negative    Flu A & B Source Nasal Swab    Group A Strep, Molecular    Collection Time: 10/25/23  3:18 PM    Specimen: Throat   Result Value Ref Range    Group A Strep, Molecular Negative Negative   CBC auto differential    Collection Time: 10/25/23  4:36 PM   Result Value Ref Range    WBC 11.52 3.90 - 12.70 K/uL    RBC 3.59 (L) 4.00 - 5.40 M/uL    Hemoglobin 12.0 12.0 - 16.0 g/dL    Hematocrit 34.1 (L) 37.0 - 48.5 %    MCV 95 82 - 98 fL    MCH 33.4 (H) 27.0 - 31.0 pg    MCHC 35.2 32.0 - 36.0 g/dL    RDW 13.0 11.5 - 14.5 %    Platelets 168 150 - 450 K/uL    MPV 10.9 9.2 - 12.9 fL    Immature Granulocytes Test Not Performed %    Immature Grans (Abs) Test Not Performed K/uL    nRBC 0 0 /100 WBC    Gran % 78.0 (H) 38.0 - 73.0 %    Lymph % 11.0 (L) 18.0 - 48.0 %    Mono % 9.0 4.0 - 15.0 %    Eosinophil % 2.0 0.0 - 8.0 %    Basophil % 0.0 0.0 - 1.9 %    Differential Method Manual    Comprehensive metabolic panel    Collection Time: 10/25/23  5:42 PM   Result Value Ref Range    Sodium 129 (L) 136 - 145 mmol/L    Potassium 2.8 (L) 3.5 - 5.1 mmol/L    Chloride 84 (L) 95 - 110 mmol/L    CO2 24 23 - 29 mmol/L    Glucose 57 (L) 70 - 110 mg/dL    BUN 69 (H) 8 - 23 mg/dL    Creatinine 5.1 (H) 0.5 - 1.4 mg/dL    Calcium 8.0 (L) 8.7 - 10.5 mg/dL    Total Protein 6.6 6.0 - 8.4 g/dL    Albumin 2.4 (L) 3.5 - 5.2 g/dL    Total Bilirubin 0.5 0.1 - 1.0 mg/dL    Alkaline Phosphatase 196 (H) 55 - 135 U/L    AST 41 (H) 10 - 40 U/L    ALT 17 10 - 44 U/L    eGFR 8.7 (A) >60 mL/min/1.73 m^2    Anion Gap 21 (H) 8 - 16 mmol/L   Troponin I    Collection Time: 10/25/23  5:42 PM   Result Value Ref Range    Troponin I 1.691 (H) 0.000 - 0.026 ng/mL   Magnesium    Collection Time: 10/25/23  5:42 PM   Result Value Ref Range    Magnesium 1.5 (L) 1.6 - 2.6 mg/dL   Urinalysis,  Reflex to Urine Culture Urine, Clean Catch    Collection Time: 10/25/23  6:43 PM    Specimen: Urine   Result Value Ref Range    Specimen UA Urine, Unspecified     Color, UA Yellow Yellow, Straw, Kristina    Appearance, UA Hazy (A) Clear    pH, UA 6.0 5.0 - 8.0    Specific Gravity, UA 1.010 1.005 - 1.030    Protein, UA 3+ (A) Negative    Glucose, UA Negative Negative    Ketones, UA Negative Negative    Bilirubin (UA) Negative Negative    Occult Blood UA 3+ (A) Negative    Nitrite, UA Negative Negative    Urobilinogen, UA Negative Negative EU/dL    Leukocytes, UA 3+ (A) Negative   Urinalysis Microscopic    Collection Time: 10/25/23  6:43 PM   Result Value Ref Range    RBC, UA 25 (H) 0 - 4 /hpf    WBC, UA 75 (H) 0 - 5 /hpf    Bacteria Many (A) None-Occ /hpf    Squam Epithel, UA occasional /hpf    Hyaline Casts, UA 0 0-1/lpf /lpf    Microscopic Comment SEE COMMENT    BNP    Collection Time: 10/25/23  7:00 PM   Result Value Ref Range    BNP 2,260 (H) 0 - 99 pg/mL   Basic Metabolic Panel (BMP)    Collection Time: 10/25/23  8:15 PM   Result Value Ref Range    Sodium 129 (L) 136 - 145 mmol/L    Potassium 3.0 (L) 3.5 - 5.1 mmol/L    Chloride 85 (L) 95 - 110 mmol/L    CO2 20 (L) 23 - 29 mmol/L    Glucose 75 70 - 110 mg/dL    BUN 71 (H) 8 - 23 mg/dL    Creatinine 5.2 (H) 0.5 - 1.4 mg/dL    Calcium 8.4 (L) 8.7 - 10.5 mg/dL    Anion Gap 24 (H) 8 - 16 mmol/L    eGFR 8.5 (A) >60 mL/min/1.73 m^2   APTT    Collection Time: 10/25/23  8:25 PM   Result Value Ref Range    aPTT 32.4 (H) 21.0 - 32.0 sec   Protime-INR    Collection Time: 10/25/23  8:25 PM   Result Value Ref Range    Prothrombin Time 11.2 9.0 - 12.5 sec    INR 1.1 0.8 - 1.2   Troponin I    Collection Time: 10/25/23  8:33 PM   Result Value Ref Range    Troponin I 1.516 (H) 0.000 - 0.026 ng/mL   CBC auto differential    Collection Time: 10/26/23  1:58 AM   Result Value Ref Range    WBC 6.58 3.90 - 12.70 K/uL    RBC 3.27 (L) 4.00 - 5.40 M/uL    Hemoglobin 10.9 (L) 12.0 - 16.0  g/dL    Hematocrit 30.4 (L) 37.0 - 48.5 %    MCV 93 82 - 98 fL    MCH 33.3 (H) 27.0 - 31.0 pg    MCHC 35.9 32.0 - 36.0 g/dL    RDW 13.4 11.5 - 14.5 %    Platelets 164 150 - 450 K/uL    MPV 12.2 9.2 - 12.9 fL    Immature Granulocytes CANCELED 0.0 - 0.5 %    Immature Grans (Abs) CANCELED 0.00 - 0.04 K/uL    Lymph # CANCELED 1.0 - 4.8 K/uL    Mono # CANCELED 0.3 - 1.0 K/uL    Eos # CANCELED 0.0 - 0.5 K/uL    Baso # CANCELED 0.00 - 0.20 K/uL    nRBC 0 0 /100 WBC    Gran % 94.0 (H) 38.0 - 73.0 %    Lymph % 3.0 (L) 18.0 - 48.0 %    Mono % 0.0 (L) 4.0 - 15.0 %    Eosinophil % 0.0 0.0 - 8.0 %    Basophil % 0.0 0.0 - 1.9 %    Bands 3.0 %    Differential Method Manual    Troponin I    Collection Time: 10/26/23  1:58 AM   Result Value Ref Range    Troponin I 1.080 (H) 0.000 - 0.026 ng/mL   Creatinine, urine, random    Collection Time: 10/26/23  1:59 AM   Result Value Ref Range    Creatinine, Urine 63.7 15.0 - 325.0 mg/dL   APTT    Collection Time: 10/26/23  2:24 AM   Result Value Ref Range    aPTT 38.1 (H) 21.0 - 32.0 sec   Protime-INR    Collection Time: 10/26/23  2:24 AM   Result Value Ref Range    Prothrombin Time 10.7 9.0 - 12.5 sec    INR 1.0 0.8 - 1.2   APTT    Collection Time: 10/26/23  2:24 AM   Result Value Ref Range    aPTT 38.1 (H) 21.0 - 32.0 sec   Magnesium    Collection Time: 10/26/23  2:48 AM   Result Value Ref Range    Magnesium 2.4 1.6 - 2.6 mg/dL   Phosphorus    Collection Time: 10/26/23  2:48 AM   Result Value Ref Range    Phosphorus 7.0 (H) 2.7 - 4.5 mg/dL            IV access:        Peripheral IV - Single Lumen 10/25/23 1633 22 G Left;Posterior Hand (Active)   Site Assessment Clean;Dry;Intact 10/25/23 1634   Line Status Blood return noted;Flushed;Saline locked 10/25/23 1634            Peripheral IV - Single Lumen 10/25/23 2015 20 G Anterior;Right Forearm (Active)   Site Assessment Clean;Dry;Intact;No redness;No swelling;No warmth;No drainage 10/25/23 2016   Extremity Assessment Distal to IV No abnormal  discoloration;No redness;No swelling;No warmth 10/25/23 2016   Line Status Blood return noted;Flushed;Saline locked 10/25/23 2016   Dressing Status Clean;Dry;Intact 10/25/23 2016   Dressing Intervention Integrity maintained 10/25/23 2016            Peripheral IV - Single Lumen 10/25/23 2027 20 G Right Antecubital (Active)   Site Assessment Clean;Dry;Intact;No redness;No swelling;No warmth;No drainage 10/25/23 2027   Extremity Assessment Distal to IV No abnormal discoloration;No redness;No swelling;No warmth 10/25/23 2027   Line Status Blood return noted;Flushed;Saline locked 10/25/23 2027   Dressing Status Clean;Dry;Intact 10/25/23 2027   Dressing Intervention Integrity maintained 10/25/23 2027     Infusions: IV HEPARIN  Allergies: Review of patient's allergies indicates:  No Known Allergies   NPO: No    Anticoagulation:   Anticoagulants       None             Instructions      [facility]-  Admit to Hospital Medicine  Upon patient arrival to floor, please send SecureChat to [facility] or call extension [phone] (if no answer, do NOT leave a callback number after the beep, rather please send a SecureChat to [facility]), for Hospital Medicine admit team assignment and for additional admit orders for the patient.  Do not page the attending physician associated with the patient on arrival (this physician may not be on duty at the time of arrival).  Rather, always send a SecureChat to [facility] or call [phone] to reach the triage physician for orders and team assignment.

## 2023-10-26 NOTE — ASSESSMENT & PLAN NOTE
"CT showing "moderate left sided perinephric fat stranding may be infectious or inflammatory." UA with 75 WBC, many bacteria, 3+ leukocytes. No leukocytosis, afebrile on admission.      Plan:   - Cefepime  - UCx pending  - BCx pending      "

## 2023-10-26 NOTE — SUBJECTIVE & OBJECTIVE
Past Medical History:   Diagnosis Date    Acquired hypothyroidism     Anxiety     Anxiety and depression     Cervical radicular pain     Closed left ankle fracture     COPD (chronic obstructive pulmonary disease)     Hypertension     Hypokalemia     Hyponatremia     Multiple rib fractures     Requires continuous at home supplemental oxygen     PRN FOR COPD    Tension type headache     Traumatic closed displaced fracture of distal end of radius, left, sequela        Past Surgical History:   Procedure Laterality Date    COLON SURGERY      COLOSTOMY      COLOSTOMY CLOSURE      HYSTERECTOMY      LEG SURGERY Right     OPEN REDUCTION AND INTERNAL FIXATION (ORIF) OF INJURY OF WRIST Left 1/29/2019    Procedure: ORIF, WRIST;  Surgeon: Homero Jeff DO;  Location: [facility] OR;  Service: Orthopedics;  Laterality: Left;  Equipment: [employer] Geminus Wrist Plate Set  Vendor/Rep: [employer]  C-Arm: Entire  DME: None    REQUIRES ASSISTANT    WRIST SURGERY Right        Review of patient's allergies indicates:  No Known Allergies  Current Facility-Administered Medications   Medication Frequency    0.9%  NaCl infusion Continuous    aspirin chewable tablet 81 mg Once    butalbital-acetaminophen-caffeine -40 mg per tablet 1 tablet Q12H PRN    ceFEPIme (MAXIPIME) 500 mg in dextrose 5 % (D5W) 100 mL IVPB Q24H    dextrose 10% bolus 125 mL 125 mL PRN    dextrose 10% bolus 250 mL 250 mL PRN    glucagon (human recombinant) injection 1 mg PRN    glucose chewable tablet 16 g PRN    glucose chewable tablet 24 g PRN    heparin 25,000 units in dextrose 5% (100 units/ml) IV bolus from bag - ADDITIONAL PRN BOLUS - 30 units/kg (max bolus 4000 units) PRN    heparin 25,000 units in dextrose 5% (100 units/ml) IV bolus from bag - ADDITIONAL PRN BOLUS - 60 units/kg (max bolus 4000 units) PRN    heparin 25,000 units in dextrose 5% 250 mL (100 units/mL) infusion LOW INTENSITY nomogram - [facility] Continuous    LORazepam tablet 0.5 mg Q12H  PRN    LORazepam tablet 2 mg Q4H PRN    montelukast tablet 10 mg QAM    multivitamin tablet Daily    naloxone 0.4 mg/mL injection 0.02 mg PRN    nicotine 7 mg/24 hr 1 patch Daily    sodium chloride 0.9% flush 10 mL Q12H PRN    thiamine tablet 100 mg Daily    tiotropium-olodateroL 2.5-2.5 mcg/actuation Mist 1 puff Daily     Family History       Problem Relation (Age of Onset)    Anxiety disorder Daughter, Daughter    Arthritis Sister    COPD Father    Cancer Mother    Cirrhosis Father    Congenital heart disease Daughter    Depression Daughter    No Known Problems Brother    Valvular heart disease Daughter          Tobacco Use    Smoking status: Every Day     Current packs/day: 1.00     Average packs/day: 1 pack/day for 20.0 years (20.0 ttl pk-yrs)     Types: Cigarettes    Smokeless tobacco: Never   Substance and Sexual Activity    Alcohol use: Yes     Comment: couple times a week-wine or bloody cari    Drug use: No    Sexual activity: Not Currently     Review of Systems   Constitutional:  Positive for activity change, chills, fatigue and fever. Negative for appetite change.   HENT:  Positive for congestion and sore throat.    Eyes:  Negative for pain and visual disturbance.   Respiratory:  Positive for cough, chest tightness and shortness of breath.    Cardiovascular:  Positive for chest pain. Negative for palpitations and leg swelling.   Gastrointestinal:  Negative for abdominal distention, abdominal pain, constipation, diarrhea, nausea and vomiting.   Genitourinary:  Positive for decreased urine volume and difficulty urinating. Negative for dysuria and hematuria.   Musculoskeletal:  Negative for arthralgias and back pain.   Skin:  Negative for pallor and rash.   Neurological:  Positive for weakness. Negative for dizziness, syncope, light-headedness and headaches.   Psychiatric/Behavioral:  Negative for agitation and confusion.      Objective:     Vital Signs (Most Recent):  Temp: 97.2 °F (36.2 °C) (10/26/23  1133)  Pulse: 85 (10/26/23 1133)  Resp: 18 (10/26/23 1133)  BP: (!) 151/75 (10/26/23 1133)  SpO2: (!) 93 % (10/26/23 1133) Vital Signs (24h Range):  Temp:  [97.2 °F (36.2 °C)-98.6 °F (37 °C)] 97.2 °F (36.2 °C)  Pulse:  [80-95] 85  Resp:  [0-20] 18  SpO2:  [81 %-98 %] 93 %  BP: ()/(62-91) 151/75     Weight: 61.2 kg (135 lb) (10/26/23 1120)  Body mass index is 24.69 kg/m².  Body surface area is 1.64 meters squared.    No intake/output data recorded.     Physical Exam  Vitals reviewed.   Constitutional:       General: She is not in acute distress.     Appearance: Normal appearance.   HENT:      Head: Normocephalic and atraumatic.      Mouth/Throat:      Mouth: Mucous membranes are dry.      Pharynx: Oropharynx is clear.   Eyes:      Extraocular Movements: Extraocular movements intact.   Cardiovascular:      Rate and Rhythm: Normal rate and regular rhythm.   Pulmonary:      Effort: Pulmonary effort is normal. No respiratory distress.      Breath sounds: Normal breath sounds.      Comments: Decreased breath sounds all lung fields. Mild crackles bilateral bases.  Abdominal:      General: There is no distension.      Palpations: Abdomen is soft.      Tenderness: There is no abdominal tenderness.   Musculoskeletal:         General: No swelling or tenderness. Normal range of motion.      Cervical back: Normal range of motion and neck supple.   Skin:     General: Skin is warm and dry.   Neurological:      General: No focal deficit present.      Mental Status: She is alert and oriented to person, place, and time. Mental status is at baseline.   Psychiatric:         Mood and Affect: Mood normal.         Behavior: Behavior normal.          Significant Labs:  All labs within the past 24 hours have been reviewed.    Significant Imaging:  Labs: Reviewed

## 2023-10-26 NOTE — HPI
"68F with PMHx fo COPD (on 3L NC at home) presented to OSH c/o fever, SOB, sore throat, generalized weakness, and semi-chronic left-sided rib pain (hx of fall 4 months ago, broke L sided ribs, continued pain). Pt describes having intermittent centralized substernal CP that worsens with exertion. SOB also worse with exertion. Pt reports that for the last few days she has been increasingly weak and has had decreased PO intake. Describes minimal UOP over the last two days. Denies N/V/D/C, lightheadedness, BLE swelling, abd pain, dysuria, hematuria. Denies any recent sick contacts. Pt is chronic user of Aleve, but has been taking more since fall/rib fractures.     Workup at OSH ED, vitals stable, satting well on home O2. Labs significant WBC 7, hgb 11, plt 164, Na 129, K 3, BUN/Cr 71, 5.2, bicarb 20 and anion gap of 24, troponin 1.5 -> 1.08, BNP 2260. EKG with inverted T waves V1-V4, similar to prior, Qtc 625. UA grossly infectious. CTAP with L sided hydronephrosis and perinephric stranding.     On admission to [facility] pt c/o mild CP and SOB improved from yesterday. Also feeling very anxious and c/o HA. She takes daily klonopin, buspirone, and Fioricet for these problems. Additionally, pt endorses drinking between a half-pint and pint of tequila daily, denies feeling tremulous or agitated currently, not tachycardic or hypertensive, states her last drink was "a few days ago". Cardiology consulted will start on ASA, plavix, heparin gtt. Nephrology consulted, will f/u recs.  "

## 2023-10-26 NOTE — ED NOTES
External urine collection changed. Pt  believed she had urinated.  She had not.  Sheets changes   pt repositioned.  Family at bedside.    Family and pt updated on transfer status.  Rails up  call bell in reach.  No other needs expressed at this time

## 2023-10-26 NOTE — PROGRESS NOTES
Pharmacist Renal Dose Adjustment Note    Bhavana Lambert is a 68 y.o. female being treated with the medication cefepime    Patient Data:    Vital Signs (Most Recent):    Vital Signs (72h Range):  Temp:  [98.4 °F (36.9 °C)-99 °F (37.2 °C)]   Pulse:  [80-96]   Resp:  [0-20]   BP: ()/(62-91)   SpO2:  [81 %-98 %]      Recent Labs   Lab 10/25/23  1742 10/25/23  2015 10/26/23  0417   CREATININE 5.1* 5.2* 5.2*     Serum creatinine: 5.2 mg/dL (H) 10/26/23 0417  Estimated creatinine clearance: 8.9 mL/min (A)    Cefepime adjusted from 2g IV every 12 hours to 2g IV every 24 hours for treatment of patient's suspected urinary tract infection/pyelonephritis     Pharmacist's Name: Lorne Atkins  Pharmacist's Extension: [phone]

## 2023-10-26 NOTE — ASSESSMENT & PLAN NOTE
Chronic and stable. States anxiety has been worse since in the hospital. Will hold home Klonopin, Buspirone iso prolonged QTc and LILLIE    Plan:  - Xanax prn for anxiety

## 2023-10-26 NOTE — ASSESSMENT & PLAN NOTE
Creatinine 5.2 on admit, baseline around 1.1.  - Minimal UOP over the last few days. Decreased PO intake. Increased NSAID use recently 2/2 fall and rib injury. Hypotensive on arrival to OSH ED with sBP of 89. Known dx of pyelonephritis, without hydronephrosis. POCUS with IVC showing near complete collapse on sniff test, intravascularly depleted. Based on this it is likely mixed etiology of pre-renal and intrinsic.      Plan:     - Check urine lytes    - urine sodium 32   - UPCR pending  - No evidence of hydronephrosis on CTAP  - POCUS with near complete collapse of IVC on sniff test.  - Strict I&Os and daily weights   - Gentle IVF   - currently running at 100ml/hr   - will continue to assess volume status  - Avoid nephrotoxic agents such as NSAIDs, gadolinium and IV radiocontrast  - Renally dose meds to current GFR  - Maintain MAP > 65

## 2023-10-26 NOTE — ASSESSMENT & PLAN NOTE
Patient transferred from [place] for persistent shortness of breath (on 24/7 home oxygen for COPD) and weakness. Poor oral fluid and solute intake for several days and increased PO intake of medications including naproxen (aleve). Associated episodes on n/v. Baseline sCr ~1.0.     DDx: LILLIE 2/2 poor oral intake vs. Exacerbated LILLIE in the setting of UTI     Plan/Recommendations  - Continue IVF LR at 100cc/h  - Urine studies:    - urine Na, Urine Cl   - urine protein/creatinin ratio  - renally dose medications  - avoid nephrotoxic agents (NSAIDs, gadolinium contrast)   - MAP > 65   - strict I/Os  - Nephrology will continue to follow as inpatient

## 2023-10-26 NOTE — ASSESSMENT & PLAN NOTE
3 day hx of substernal CP exacerbated by exertion and accompanied by worsening MENA. EKG with inverted T waves, seen in previous. Troponin 1.691 ->1.516 ->1.080 -> 0.876. BNP elevated to 2260. FREDA score: 5. D/w cardiology, most likely demand ischemia iso sepsis/hotn, compounded by current inability to renally excrete these compounds, however given her story and inability to undergo LHC at the moment 2/2 LILLIE, ACS protocol initiated. Plavix and ASA loaded, restarted on heparin gtt.        Plan:  - ACS protocol with aspirin, plavix, and heparin gtt  - TTE pending  - Trend trop to peak, now down trending on transfer to C  - Consult cardiology    - ACS protocol   - no LHC right now 2/2 LILLIE  - Atorvastatin 40

## 2023-10-26 NOTE — H&P
"[facility] Medicine  History & Physical    Patient Name: Bhavana Lambert  MRN: [id number]  Patient Class: IP- Inpatient  Admission Date: 10/26/2023  Attending Physician: Vargas Guevara MD   Primary Care Provider: Pepito Jhaveri IV, MD         Patient information was obtained from patient, relative(s) and past medical records.     Subjective:     Principal Problem:LILLIE (acute kidney injury)    Chief Complaint: No chief complaint on file.       HPI: 68F with PMHx fo COPD (on 3L NC at home) presented to OSH c/o fever, SOB, sore throat, generalized weakness, and semi-chronic left-sided rib pain (hx of fall 4 months ago, broke L sided ribs, continued pain). Pt describes having intermittent centralized substernal CP that worsens with exertion. SOB also worse with exertion. Pt reports that for the last few days she has been increasingly weak and has had decreased PO intake. Describes minimal UOP over the last two days. Denies N/V/D/C, lightheadedness, BLE swelling, abd pain, dysuria, hematuria. Denies any recent sick contacts. Pt is chronic user of Aleve, but has been taking more since fall/rib fractures.     Workup at OSH ED, vitals stable, satting well on home O2. Labs significant WBC 7, hgb 11, plt 164, Na 129, K 3, BUN/Cr 71, 5.2, bicarb 20 and anion gap of 24, troponin 1.5 -> 1.08, BNP 2260. EKG with inverted T waves V1-V4, similar to prior, Qtc 625. UA grossly infectious. CTAP with L sided hydronephrosis and perinephric stranding.     On admission to C pt c/o mild CP and SOB improved from yesterday. Also feeling very anxious and c/o HA. She takes daily klonopin, buspirone, and Fioricet for these problems. Additionally, pt endorses drinking between a half-pint and pint of tequila daily, denies feeling tremulous or agitated currently, not tachycardic or hypertensive, states her last drink was "a few days ago". Cardiology consulted will start on ASA, plavix, heparin gtt. Nephrology consulted, " will f/u recs.      Past Medical History:   Diagnosis Date    Acquired hypothyroidism     Anxiety     Anxiety and depression     Cervical radicular pain     Closed left ankle fracture     COPD (chronic obstructive pulmonary disease)     Hypertension     Hypokalemia     Hyponatremia     Multiple rib fractures     Requires continuous at home supplemental oxygen     PRN FOR COPD    Tension type headache     Traumatic closed displaced fracture of distal end of radius, left, sequela        Past Surgical History:   Procedure Laterality Date    COLON SURGERY      COLOSTOMY      COLOSTOMY CLOSURE      HYSTERECTOMY      LEG SURGERY Right     OPEN REDUCTION AND INTERNAL FIXATION (ORIF) OF INJURY OF WRIST Left 1/29/2019    Procedure: ORIF, WRIST;  Surgeon: Homero Jeff DO;  Location: [facility] OR;  Service: Orthopedics;  Laterality: Left;  Equipment: [employer] Geminus Wrist Plate Set  Vendor/Rep: [employer]  C-Arm: Entire  DME: None    REQUIRES ASSISTANT    WRIST SURGERY Right        Review of patient's allergies indicates:  No Known Allergies    Current Facility-Administered Medications on File Prior to Encounter   Medication    [COMPLETED] acetaminophen tablet 650 mg    [COMPLETED] albuterol-ipratropium 2.5 mg-0.5 mg/3 mL nebulizer solution 3 mL    [COMPLETED] aspirin tablet 325 mg    [COMPLETED] cefTRIAXone (ROCEPHIN) 1 g in dextrose 5 % in water (D5W) 100 mL IVPB (MB+)    [COMPLETED] heparin 25,000 units in dextrose 5% 250 mL (100 units/mL) infusion (heparin infusion - NO NOMOGRAM)    [COMPLETED] magnesium sulfate 2g in water 50mL IVPB (premix)    [COMPLETED] methylPREDNISolone sodium succinate injection 60 mg    [COMPLETED] potassium chloride 10 mEq in 100 mL IVPB    [COMPLETED] potassium chloride SA CR tablet 40 mEq    [COMPLETED] sodium chloride 0.9% bolus 1,000 mL 1,000 mL    [DISCONTINUED] 0.9 % NaCl with KCl 20 mEq infusion    [DISCONTINUED] 0.9%  NaCl infusion     [DISCONTINUED] acetaminophen tablet 650 mg    [DISCONTINUED] albuterol-ipratropium 2.5 mg-0.5 mg/3 mL nebulizer solution 3 mL    [DISCONTINUED] aspirin chewable tablet 81 mg    [DISCONTINUED] azithromycin (ZITHROMAX) 500 mg in dextrose 5 % (D5W) 250 mL IVPB (Vial-Mate)    [DISCONTINUED] busPIRone split tablet 5 mg    [DISCONTINUED] cefTRIAXone (ROCEPHIN) 1 g in dextrose 5 % in water (D5W) 100 mL IVPB (MB+)    [DISCONTINUED] clonazePAM tablet 0.5 mg    [DISCONTINUED] doxycycline tablet 100 mg    [DISCONTINUED] heparin 25,000 units in dextrose 5% (100 units/ml) IV bolus from bag - ADDITIONAL PRN BOLUS - 30 units/kg (max bolus 4000 units)    [DISCONTINUED] heparin 25,000 units in dextrose 5% (100 units/ml) IV bolus from bag - ADDITIONAL PRN BOLUS - 60 units/kg (max bolus 4000 units)    [DISCONTINUED] heparin 25,000 units in dextrose 5% (100 units/ml) IV bolus from bag INITIAL BOLUS (max bolus 4000 units)    [DISCONTINUED] heparin 25,000 units in dextrose 5% 250 mL (100 units/mL) infusion LOW INTENSITY nomogram - [facility]    [DISCONTINUED] montelukast tablet 10 mg    [DISCONTINUED] ondansetron injection 4 mg    [DISCONTINUED] predniSONE tablet 40 mg    [DISCONTINUED] prochlorperazine injection Soln 5 mg     Current Outpatient Medications on File Prior to Encounter   Medication Sig    busPIRone (BUSPAR) 15 MG tablet Take 15 mg by mouth 2 (two) times a day.    clonazePAM (KLONOPIN) 0.5 MG tablet Take 0.5 mg by mouth 2 (two) times daily as needed (anxiety).     cyanocobalamin 1,000 mcg/mL injection Inject 1,000 mcg into the muscle every 30 days.     fluoxetine HCl (PROZAC ORAL) Take 40 mg by mouth once daily.     miconazole NITRATE 2 % (MICOTIN) 2 % top powder Apply topically 2 (two) times daily.    montelukast sodium (SINGULAIR ORAL) Take 10 mg by mouth every morning.     naproxen sodium (ANAPROX) 220 MG tablet Take 220 mg by mouth daily as needed (pain).    tiotropium-olodateroL (STIOLTO RESPIMAT)  2.5-2.5 mcg/actuation Mist Inhale 1 puff into the lungs once daily. Controller     Family History       Problem Relation (Age of Onset)    Anxiety disorder Daughter, Daughter    Arthritis Sister    COPD Father    Cancer Mother    Cirrhosis Father    Congenital heart disease Daughter    Depression Daughter    No Known Problems Brother    Valvular heart disease Daughter          Tobacco Use    Smoking status: Every Day     Current packs/day: 1.00     Average packs/day: 1 pack/day for 20.0 years (20.0 ttl pk-yrs)     Types: Cigarettes    Smokeless tobacco: Never   Substance and Sexual Activity    Alcohol use: Yes     Comment: couple times a week-wine or bloody cari    Drug use: No    Sexual activity: Not Currently     Review of Systems   Constitutional:  Positive for activity change, chills, fatigue and fever. Negative for appetite change.   HENT:  Positive for congestion and sore throat.    Eyes:  Negative for pain and visual disturbance.   Respiratory:  Positive for cough, chest tightness and shortness of breath.    Cardiovascular:  Positive for chest pain. Negative for palpitations and leg swelling.   Gastrointestinal:  Negative for abdominal distention, abdominal pain, constipation, diarrhea, nausea and vomiting.   Genitourinary:  Positive for decreased urine volume and difficulty urinating. Negative for dysuria and hematuria.   Musculoskeletal:  Negative for arthralgias and back pain.   Skin:  Negative for pallor and rash.   Neurological:  Positive for weakness and headaches. Negative for dizziness, syncope and light-headedness.   Psychiatric/Behavioral:  Negative for agitation and confusion.      Objective:     Vital Signs (Most Recent):  Temp: 97.2 °F (36.2 °C) (10/26/23 1133)  Pulse: 85 (10/26/23 1133)  Resp: 18 (10/26/23 1133)  BP: (!) 151/75 (10/26/23 1133)  SpO2: (!) 93 % (10/26/23 1133) Vital Signs (24h Range):  Temp:  [97.2 °F (36.2 °C)-98.6 °F (37 °C)] 97.2 °F (36.2 °C)  Pulse:  [80-95] 85  Resp:   "[0-20] 18  SpO2:  [81 %-98 %] 93 %  BP: ()/(62-91) 151/75     Weight: 61.2 kg (135 lb)  Body mass index is 24.69 kg/m².     Physical Exam  Vitals reviewed.   Constitutional:       General: She is not in acute distress.     Appearance: Normal appearance.   HENT:      Head: Normocephalic and atraumatic.      Mouth/Throat:      Mouth: Mucous membranes are dry.      Pharynx: Oropharynx is clear.   Eyes:      Extraocular Movements: Extraocular movements intact.   Cardiovascular:      Rate and Rhythm: Normal rate and regular rhythm.   Pulmonary:      Effort: Pulmonary effort is normal. No respiratory distress.      Breath sounds: Normal breath sounds.      Comments: Decreased breath sounds all lung fields. Mild crackles bilateral bases.  Abdominal:      General: There is no distension.      Palpations: Abdomen is soft.      Tenderness: There is no abdominal tenderness.   Musculoskeletal:         General: No swelling or tenderness. Normal range of motion.      Cervical back: Normal range of motion and neck supple.   Skin:     General: Skin is warm and dry.   Neurological:      General: No focal deficit present.      Mental Status: She is alert and oriented to person, place, and time. Mental status is at baseline.      Motor: Weakness present.   Psychiatric:         Mood and Affect: Mood normal.         Behavior: Behavior normal.                Significant Labs: All pertinent labs within the past 24 hours have been reviewed.  Blood Culture: No results for input(s): "LABBLOO" in the last 48 hours.  CBC:   Recent Labs   Lab 10/26/23  0158 10/26/23  0417 10/26/23  1319   WBC 6.58 6.40  6.40 7.17   HGB 10.9* 10.6*  10.6* 11.4*   HCT 30.4* 30.1*  30.1* 32.5*    168  168 187     CMP:   Recent Labs   Lab 10/25/23  1742 10/25/23  2015 10/26/23  0417   * 129* 129*   K 2.8* 3.0* 3.3*   CL 84* 85* 87*   CO2 24 20* 20*   GLU 57* 75 159*   BUN 69* 71* 72*   CREATININE 5.1* 5.2* 5.2*   CALCIUM 8.0* 8.4* 7.7*   PROT " "6.6  --  6.3   ALBUMIN 2.4*  --  2.2*   BILITOT 0.5  --  0.4   ALKPHOS 196*  --  187*   AST 41*  --  37   ALT 17  --  16   ANIONGAP 21* 24* 22*     Cardiac Markers:   Recent Labs   Lab 10/25/23  1900   BNP 2,260*     Lactic Acid:   Recent Labs   Lab 10/26/23  1139   LACTATE 0.7     Urine Culture: No results for input(s): "LABURIN" in the last 48 hours.  Urine Studies:   Recent Labs   Lab 10/25/23  1843   COLORU Yellow   APPEARANCEUA Hazy*   PHUR 6.0   SPECGRAV 1.010   PROTEINUA 3+*   GLUCUA Negative   KETONESU Negative   BILIRUBINUA Negative   OCCULTUA 3+*   NITRITE Negative   UROBILINOGEN Negative   LEUKOCYTESUR 3+*   RBCUA 25*   WBCUA 75*   BACTERIA Many*   SQUAMEPITHEL occasional   HYALINECASTS 0       Significant Imaging: I have reviewed all pertinent imaging results/findings within the past 24 hours.    Assessment/Plan:     * LILLIE (acute kidney injury)  Creatinine 5.2 on admit, baseline around 1.1.  - Minimal UOP over the last few days. Decreased PO intake. Increased NSAID use recently 2/2 fall and rib injury. Hypotensive on arrival to OSH ED with sBP of 89. Known dx of pyelonephritis, without hydronephrosis. POCUS with IVC showing near complete collapse on sniff test, intravascularly depleted. Based on this it is likely mixed etiology of pre-renal and intrinsic.      Plan:     - Check urine lytes    - urine sodium 32   - UPCR pending  - No evidence of hydronephrosis on CTAP  - POCUS with near complete collapse of IVC on sniff test.  - Strict I&Os and daily weights   - Gentle IVF   - currently running at 100ml/hr   - will continue to assess volume status  - Avoid nephrotoxic agents such as NSAIDs, gadolinium and IV radiocontrast  - Renally dose meds to current GFR  - Maintain MAP > 65          Emphysema/COPD  Chronic condition, 2/2 heavy tobacco use. Pt currently smoking ~1ppd, but used to smoke ~2ppd. On 3L NC at home 24h/day, has required up to 5L NC intermittently during the past few days. Uses " "tiotropium-oladaterol and takes montelukast daily. Albuterol inhaler prn    Plan  - continue home medications       Alcohol use  Pt endorses drinking a half-pint to a pint of tequila every day. Last drink was a few days ago. No evidence of acute withdrawal on admission. Will CTM and adjust medications as needed    Plan  - Ativan for CIWA >8      Hypothyroid  Most recent TSH 2.6. Will not repeat during acute illness    Plan:  - continue home synthroid    Pyelonephritis  CT showing "moderate left sided perinephric fat stranding may be infectious or inflammatory." UA with 75 WBC, many bacteria, 3+ leukocytes. No leukocytosis, afebrile on admission.      Plan:   - Cefepime  - UCx pending  - BCx pending        NSTEMI (non-ST elevated myocardial infarction)  3 day hx of substernal CP exacerbated by exertion and accompanied by worsening MENA. EKG with inverted T waves, seen in previous. Troponin 1.691 ->1.516 ->1.080 -> 0.876. BNP elevated to 2260. FREDA score: 5. D/w cardiology, most likely demand ischemia iso sepsis/hotn, compounded by current inability to renally excrete these compounds, however given her story and inability to undergo LHC at the moment 2/2 LILLIE, ACS protocol initiated. Plavix and ASA loaded, restarted on heparin gtt.        Plan:  - ACS protocol with aspirin, plavix, and heparin gtt  - TTE pending  - Trend trop to peak, now down trending on transfer to [facility] [place]  - Consult cardiology    - ACS protocol   - no LHC right now 2/2 LILLIE  - Atorvastatin 40          Increased anion gap metabolic acidosis  Bicarb 20, AG 22 on admission. Etiology unclear. Most likely cause is acute increase in uremia, 72 on admission. Lactic acid wnl on admission to [facility] [place], negative for urine ketones..     Plan  - Consult nephrology  - IV fluid resuscitation.   - CTM    Anxiety  Chronic and stable. States anxiety has been worse since in the hospital. Will hold home Klonopin, Buspirone iso prolonged QTc and LILLIE    Plan:  - Xanax prn for " anxiety          VTE Risk Mitigation (From admission, onward)         Ordered     heparin 25,000 units in dextrose 5% (100 units/ml) IV bolus from bag - ADDITIONAL PRN BOLUS - 60 units/kg (max bolus 4000 units)  As needed (PRN)        Question:  Heparin Infusion Adjustment (DO NOT MODIFY ANSWER)  Answer:  \\[email or web address]\epic\Images\Pharmacy\HeparinInfusions\heparin LOW INTENSITY nomogram for [facility] [id number].pdf    10/26/23 1314     heparin 25,000 units in dextrose 5% (100 units/ml) IV bolus from bag - ADDITIONAL PRN BOLUS - 30 units/kg (max bolus 4000 units)  As needed (PRN)        Question:  Heparin Infusion Adjustment (DO NOT MODIFY ANSWER)  Answer:  \\"[facility] [email or web address]\epic\Images\Pharmacy\HeparinInfusions\heparin LOW INTENSITY nomogram for [facility] [id number].pdf    10/26/23 1314     heparin 25,000 units in dextrose 5% 250 mL (100 units/mL) infusion LOW INTENSITY nomogram - [facility]  Continuous        Question:  Begin at (units/kg/hr)  Answer:  12    10/26/23 1314     IP VTE LOW RISK PATIENT  Once         10/26/23 1123     Place sequential compression device  Until discontinued         10/26/23 1123                               Ricky Sherman MD  Department of Hospital Medicine  [facility] S[facility]

## 2023-10-26 NOTE — PLAN OF CARE
Inpatient consult to Nephrology  Consult performed by: Jose Davis MD  Consult ordered by: Ricky Sherman MD  Reason for consult: pyelonephritis , LILLIE

## 2023-10-26 NOTE — NURSING
Nurses Note -- 4 Eyes      10/26/2023   10:24 AM      Skin assessed during: Admit      [x] No Altered Skin Integrity Present    []Prevention Measures Documented      [] Yes- Altered Skin Integrity Present or Discovered   [] LDA Added if Not in Epic (Describe Wound)   [] New Altered Skin Integrity was Present on Admit and Documented in LDA   [] Wound Image Taken    Wound Care Consulted? No    Attending Nurse:  Mann Stoll RN/Staff Member:  Yelena Dennis RN

## 2023-10-26 NOTE — SUBJECTIVE & OBJECTIVE
Past Medical History:   Diagnosis Date    Acquired hypothyroidism     Anxiety     Anxiety and depression     Cervical radicular pain     Closed left ankle fracture     COPD (chronic obstructive pulmonary disease)     Hypertension     Hypokalemia     Hyponatremia     Multiple rib fractures     Requires continuous at home supplemental oxygen     PRN FOR COPD    Tension type headache     Traumatic closed displaced fracture of distal end of radius, left, sequela        Past Surgical History:   Procedure Laterality Date    COLON SURGERY      COLOSTOMY      COLOSTOMY CLOSURE      HYSTERECTOMY      LEG SURGERY Right     OPEN REDUCTION AND INTERNAL FIXATION (ORIF) OF INJURY OF WRIST Left 1/29/2019    Procedure: ORIF, WRIST;  Surgeon: Homero Jeff DO;  Location: [facility] OR;  Service: Orthopedics;  Laterality: Left;  Equipment: [employer] Geminus Wrist Plate Set  Vendor/Rep: [employer]  C-Arm: Entire  DME: None    REQUIRES ASSISTANT    WRIST SURGERY Right        Review of patient's allergies indicates:  No Known Allergies    Current Facility-Administered Medications on File Prior to Encounter   Medication    [COMPLETED] acetaminophen tablet 650 mg    [COMPLETED] albuterol-ipratropium 2.5 mg-0.5 mg/3 mL nebulizer solution 3 mL    [COMPLETED] aspirin tablet 325 mg    [COMPLETED] cefTRIAXone (ROCEPHIN) 1 g in dextrose 5 % in water (D5W) 100 mL IVPB (MB+)    [COMPLETED] heparin 25,000 units in dextrose 5% 250 mL (100 units/mL) infusion (heparin infusion - NO NOMOGRAM)    [COMPLETED] magnesium sulfate 2g in water 50mL IVPB (premix)    [COMPLETED] methylPREDNISolone sodium succinate injection 60 mg    [COMPLETED] potassium chloride 10 mEq in 100 mL IVPB    [COMPLETED] potassium chloride SA CR tablet 40 mEq    [COMPLETED] sodium chloride 0.9% bolus 1,000 mL 1,000 mL    [DISCONTINUED] 0.9 % NaCl with KCl 20 mEq infusion    [DISCONTINUED] 0.9%  NaCl infusion    [DISCONTINUED] acetaminophen tablet 650 mg     [DISCONTINUED] albuterol-ipratropium 2.5 mg-0.5 mg/3 mL nebulizer solution 3 mL    [DISCONTINUED] aspirin chewable tablet 81 mg    [DISCONTINUED] azithromycin (ZITHROMAX) 500 mg in dextrose 5 % (D5W) 250 mL IVPB (Vial-Mate)    [DISCONTINUED] busPIRone split tablet 5 mg    [DISCONTINUED] cefTRIAXone (ROCEPHIN) 1 g in dextrose 5 % in water (D5W) 100 mL IVPB (MB+)    [DISCONTINUED] clonazePAM tablet 0.5 mg    [DISCONTINUED] doxycycline tablet 100 mg    [DISCONTINUED] heparin 25,000 units in dextrose 5% (100 units/ml) IV bolus from bag - ADDITIONAL PRN BOLUS - 30 units/kg (max bolus 4000 units)    [DISCONTINUED] heparin 25,000 units in dextrose 5% (100 units/ml) IV bolus from bag - ADDITIONAL PRN BOLUS - 60 units/kg (max bolus 4000 units)    [DISCONTINUED] heparin 25,000 units in dextrose 5% (100 units/ml) IV bolus from bag INITIAL BOLUS (max bolus 4000 units)    [DISCONTINUED] heparin 25,000 units in dextrose 5% 250 mL (100 units/mL) infusion LOW INTENSITY nomogram - [facility]    [DISCONTINUED] montelukast tablet 10 mg    [DISCONTINUED] ondansetron injection 4 mg    [DISCONTINUED] predniSONE tablet 40 mg    [DISCONTINUED] prochlorperazine injection Soln 5 mg     Current Outpatient Medications on File Prior to Encounter   Medication Sig    busPIRone (BUSPAR) 15 MG tablet Take 15 mg by mouth 2 (two) times a day.    clonazePAM (KLONOPIN) 0.5 MG tablet Take 0.5 mg by mouth 2 (two) times daily as needed (anxiety).     cyanocobalamin 1,000 mcg/mL injection Inject 1,000 mcg into the muscle every 30 days.     fluoxetine HCl (PROZAC ORAL) Take 40 mg by mouth once daily.     miconazole NITRATE 2 % (MICOTIN) 2 % top powder Apply topically 2 (two) times daily.    montelukast sodium (SINGULAIR ORAL) Take 10 mg by mouth every morning.     naproxen sodium (ANAPROX) 220 MG tablet Take 220 mg by mouth daily as needed (pain).    tiotropium-olodateroL (STIOLTO RESPIMAT) 2.5-2.5 mcg/actuation Mist Inhale 1 puff into the lungs once daily.  Controller     Family History       Problem Relation (Age of Onset)    Anxiety disorder Daughter, Daughter    Arthritis Sister    COPD Father    Cancer Mother    Cirrhosis Father    Congenital heart disease Daughter    Depression Daughter    No Known Problems Brother    Valvular heart disease Daughter          Tobacco Use    Smoking status: Every Day     Current packs/day: 1.00     Average packs/day: 1 pack/day for 20.0 years (20.0 ttl pk-yrs)     Types: Cigarettes    Smokeless tobacco: Never   Substance and Sexual Activity    Alcohol use: Yes     Comment: couple times a week-wine or bloody cari    Drug use: No    Sexual activity: Not Currently     Review of Systems   Constitutional:  Positive for activity change, chills, fatigue and fever. Negative for appetite change.   HENT:  Positive for congestion and sore throat.    Eyes:  Negative for pain and visual disturbance.   Respiratory:  Positive for cough, chest tightness and shortness of breath.    Cardiovascular:  Positive for chest pain. Negative for palpitations and leg swelling.   Gastrointestinal:  Negative for abdominal distention, abdominal pain, constipation, diarrhea, nausea and vomiting.   Genitourinary:  Positive for decreased urine volume and difficulty urinating. Negative for dysuria and hematuria.   Musculoskeletal:  Negative for arthralgias and back pain.   Skin:  Negative for pallor and rash.   Neurological:  Positive for weakness and headaches. Negative for dizziness, syncope and light-headedness.   Psychiatric/Behavioral:  Negative for agitation and confusion.      Objective:     Vital Signs (Most Recent):  Temp: 97.2 °F (36.2 °C) (10/26/23 1133)  Pulse: 85 (10/26/23 1133)  Resp: 18 (10/26/23 1133)  BP: (!) 151/75 (10/26/23 1133)  SpO2: (!) 93 % (10/26/23 1133) Vital Signs (24h Range):  Temp:  [97.2 °F (36.2 °C)-98.6 °F (37 °C)] 97.2 °F (36.2 °C)  Pulse:  [80-95] 85  Resp:  [0-20] 18  SpO2:  [81 %-98 %] 93 %  BP: ()/(62-91) 151/75  "    Weight: 61.2 kg (135 lb)  Body mass index is 24.69 kg/m².     Physical Exam  Vitals reviewed.   Constitutional:       General: She is not in acute distress.     Appearance: Normal appearance.   HENT:      Head: Normocephalic and atraumatic.      Mouth/Throat:      Mouth: Mucous membranes are dry.      Pharynx: Oropharynx is clear.   Eyes:      Extraocular Movements: Extraocular movements intact.   Cardiovascular:      Rate and Rhythm: Normal rate and regular rhythm.   Pulmonary:      Effort: Pulmonary effort is normal. No respiratory distress.      Breath sounds: Normal breath sounds.      Comments: Decreased breath sounds all lung fields. Mild crackles bilateral bases.  Abdominal:      General: There is no distension.      Palpations: Abdomen is soft.      Tenderness: There is no abdominal tenderness.   Musculoskeletal:         General: No swelling or tenderness. Normal range of motion.      Cervical back: Normal range of motion and neck supple.   Skin:     General: Skin is warm and dry.   Neurological:      General: No focal deficit present.      Mental Status: She is alert and oriented to person, place, and time. Mental status is at baseline.      Motor: Weakness present.   Psychiatric:         Mood and Affect: Mood normal.         Behavior: Behavior normal.                Significant Labs: All pertinent labs within the past 24 hours have been reviewed.  Blood Culture: No results for input(s): "LABBLOO" in the last 48 hours.  CBC:   Recent Labs   Lab 10/26/23  0158 10/26/23  0417 10/26/23  1319   WBC 6.58 6.40  6.40 7.17   HGB 10.9* 10.6*  10.6* 11.4*   HCT 30.4* 30.1*  30.1* 32.5*    168  168 187     CMP:   Recent Labs   Lab 10/25/23  1742 10/25/23  2015 10/26/23  0417   * 129* 129*   K 2.8* 3.0* 3.3*   CL 84* 85* 87*   CO2 24 20* 20*   GLU 57* 75 159*   BUN 69* 71* 72*   CREATININE 5.1* 5.2* 5.2*   CALCIUM 8.0* 8.4* 7.7*   PROT 6.6  --  6.3   ALBUMIN 2.4*  --  2.2*   BILITOT 0.5  --  0.4 " "  ALKPHOS 196*  --  187*   AST 41*  --  37   ALT 17  --  16   ANIONGAP 21* 24* 22*     Cardiac Markers:   Recent Labs   Lab 10/25/23  1900   BNP 2,260*     Lactic Acid:   Recent Labs   Lab 10/26/23  1139   LACTATE 0.7     Urine Culture: No results for input(s): "LABURIN" in the last 48 hours.  Urine Studies:   Recent Labs   Lab 10/25/23  1843   COLORU Yellow   APPEARANCEUA Hazy*   PHUR 6.0   SPECGRAV 1.010   PROTEINUA 3+*   GLUCUA Negative   KETONESU Negative   BILIRUBINUA Negative   OCCULTUA 3+*   NITRITE Negative   UROBILINOGEN Negative   LEUKOCYTESUR 3+*   RBCUA 25*   WBCUA 75*   BACTERIA Many*   SQUAMEPITHEL occasional   HYALINECASTS 0       Significant Imaging: I have reviewed all pertinent imaging results/findings within the past 24 hours.  "

## 2023-10-26 NOTE — ASSESSMENT & PLAN NOTE
Bicarb 20, AG 22 on admission. Etiology unclear. Most likely cause is acute increase in uremia, 72 on admission. Lactic acid wnl on admission to [facility], negative for urine ketones..     Plan  - Consult nephrology  - IV fluid resuscitation.   - CTM

## 2023-10-26 NOTE — ED NOTES
"Called [facility] to get ETA on priority one transfer. Spoke with Kathy, states "The truck is in route now".   "

## 2023-10-26 NOTE — ASSESSMENT & PLAN NOTE
Pt endorses drinking a half-pint to a pint of tequila every day. Last drink was a few days ago. No evidence of acute withdrawal on admission. Will CTM and adjust medications as needed    Plan  - Ativan for CIWA >8

## 2023-10-26 NOTE — CONSULTS
[facility] Surg  Nephrology  Consult Note    Patient Name: Bhavana Lambert  MRN: [id number]  Admission Date: 10/26/2023  Hospital Length of Stay: 0 days  Attending Provider: Vargas Guevara MD   Primary Care Physician: Pepito Jhaveri IV, MD  Principal Problem:LILLIE (acute kidney injury)    Consults  Subjective:     HPI: Ms. Bhavana Lambert is a 68 y.o. patient with past medical history of COPD on 24/7 O2 requirements and inhalers at home here. Transferred from [facility] for Nephrology evaluation. Patient had several days of fever, generalized weakness, and associated left flank pain and n/v. Refers dysuria and diarrhea. About 4 months ago, she had a fall where she broke some ribs on the left side. Patient states pain and general feeling is worse now compared to some months ago. Patient has been taking increased pain medications (naproxen). She states that the pain she feels now is similar to the pain she is been feeling for the past few months, only slightly worse. Upon admission, UA 75 WBC, Ucr 63.7. Baseline sCr 1.0.     Nephrology consulted for pyelonephritis and LILLIE.       Past Medical History:   Diagnosis Date    Acquired hypothyroidism     Anxiety     Anxiety and depression     Cervical radicular pain     Closed left ankle fracture     COPD (chronic obstructive pulmonary disease)     Hypertension     Hypokalemia     Hyponatremia     Multiple rib fractures     Requires continuous at home supplemental oxygen     PRN FOR COPD    Tension type headache     Traumatic closed displaced fracture of distal end of radius, left, sequela        Past Surgical History:   Procedure Laterality Date    COLON SURGERY      COLOSTOMY      COLOSTOMY CLOSURE      HYSTERECTOMY      LEG SURGERY Right     OPEN REDUCTION AND INTERNAL FIXATION (ORIF) OF INJURY OF WRIST Left 1/29/2019    Procedure: ORIF, WRIST;  Surgeon: Homero Jeff DO;  Location: [facility] OR;  Service: Orthopedics;  Laterality: Left;   Equipment: [employer] Geminus Wrist Plate Set  Vendor/Rep: [employer]  C-Arm: Entire  DME: None    REQUIRES ASSISTANT    WRIST SURGERY Right        Review of patient's allergies indicates:  No Known Allergies  Current Facility-Administered Medications   Medication Frequency    0.9%  NaCl infusion Continuous    aspirin chewable tablet 81 mg Once    butalbital-acetaminophen-caffeine -40 mg per tablet 1 tablet Q12H PRN    ceFEPIme (MAXIPIME) 500 mg in dextrose 5 % (D5W) 100 mL IVPB Q24H    dextrose 10% bolus 125 mL 125 mL PRN    dextrose 10% bolus 250 mL 250 mL PRN    glucagon (human recombinant) injection 1 mg PRN    glucose chewable tablet 16 g PRN    glucose chewable tablet 24 g PRN    heparin 25,000 units in dextrose 5% (100 units/ml) IV bolus from bag - ADDITIONAL PRN BOLUS - 30 units/kg (max bolus 4000 units) PRN    heparin 25,000 units in dextrose 5% (100 units/ml) IV bolus from bag - ADDITIONAL PRN BOLUS - 60 units/kg (max bolus 4000 units) PRN    heparin 25,000 units in dextrose 5% 250 mL (100 units/mL) infusion LOW INTENSITY nomogram - [facility] Continuous    LORazepam tablet 0.5 mg Q12H PRN    LORazepam tablet 2 mg Q4H PRN    montelukast tablet 10 mg QAM    multivitamin tablet Daily    naloxone 0.4 mg/mL injection 0.02 mg PRN    nicotine 7 mg/24 hr 1 patch Daily    sodium chloride 0.9% flush 10 mL Q12H PRN    thiamine tablet 100 mg Daily    tiotropium-olodateroL 2.5-2.5 mcg/actuation Mist 1 puff Daily     Family History       Problem Relation (Age of Onset)    Anxiety disorder Daughter, Daughter    Arthritis Sister    COPD Father    Cancer Mother    Cirrhosis Father    Congenital heart disease Daughter    Depression Daughter    No Known Problems Brother    Valvular heart disease Daughter          Tobacco Use    Smoking status: Every Day     Current packs/day: 1.00     Average packs/day: 1 pack/day for 20.0 years (20.0 ttl pk-yrs)     Types: Cigarettes    Smokeless  tobacco: Never   Substance and Sexual Activity    Alcohol use: Yes     Comment: couple times a week-wine or bloody cari    Drug use: No    Sexual activity: Not Currently     Review of Systems   Constitutional:  Positive for activity change, chills, fatigue and fever. Negative for appetite change.   HENT:  Positive for congestion and sore throat.    Eyes:  Negative for pain and visual disturbance.   Respiratory:  Positive for cough, chest tightness and shortness of breath.    Cardiovascular:  Positive for chest pain. Negative for palpitations and leg swelling.   Gastrointestinal:  Negative for abdominal distention, abdominal pain, constipation, diarrhea, nausea and vomiting.   Genitourinary:  Positive for decreased urine volume and difficulty urinating. Negative for dysuria and hematuria.   Musculoskeletal:  Negative for arthralgias and back pain.   Skin:  Negative for pallor and rash.   Neurological:  Positive for weakness. Negative for dizziness, syncope, light-headedness and headaches.   Psychiatric/Behavioral:  Negative for agitation and confusion.      Objective:     Vital Signs (Most Recent):  Temp: 97.2 °F (36.2 °C) (10/26/23 1133)  Pulse: 85 (10/26/23 1133)  Resp: 18 (10/26/23 1133)  BP: (!) 151/75 (10/26/23 1133)  SpO2: (!) 93 % (10/26/23 1133) Vital Signs (24h Range):  Temp:  [97.2 °F (36.2 °C)-98.6 °F (37 °C)] 97.2 °F (36.2 °C)  Pulse:  [80-95] 85  Resp:  [0-20] 18  SpO2:  [81 %-98 %] 93 %  BP: ()/(62-91) 151/75     Weight: 61.2 kg (135 lb) (10/26/23 1120)  Body mass index is 24.69 kg/m².  Body surface area is 1.64 meters squared.    No intake/output data recorded.     Physical Exam  Vitals reviewed.   Constitutional:       General: She is not in acute distress.     Appearance: Normal appearance.   HENT:      Head: Normocephalic and atraumatic.      Mouth/Throat:      Mouth: Mucous membranes are dry.      Pharynx: Oropharynx is clear.   Eyes:      Extraocular Movements: Extraocular movements  intact.   Cardiovascular:      Rate and Rhythm: Normal rate and regular rhythm.   Pulmonary:      Effort: Pulmonary effort is normal. No respiratory distress.      Breath sounds: Normal breath sounds.      Comments: Decreased breath sounds all lung fields. Mild crackles bilateral bases.  Abdominal:      General: There is no distension.      Palpations: Abdomen is soft.      Tenderness: There is no abdominal tenderness.   Musculoskeletal:         General: No swelling or tenderness. Normal range of motion.      Cervical back: Normal range of motion and neck supple.   Skin:     General: Skin is warm and dry.   Neurological:      General: No focal deficit present.      Mental Status: She is alert and oriented to person, place, and time. Mental status is at baseline.   Psychiatric:         Mood and Affect: Mood normal.         Behavior: Behavior normal.          Significant Labs:  All labs within the past 24 hours have been reviewed.    Significant Imaging:  Labs: Reviewed       Assessment/Plan:     Renal/  * LILLIE (acute kidney injury)  Patient transferred from [place] for persistent shortness of breath (on 24/7 home oxygen for COPD) and weakness. Poor oral fluid and solute intake for several days and increased PO intake of medications including naproxen (aleve). Associated episodes on n/v. Baseline sCr ~1.0.     DDx: LILLIE 2/2 poor oral intake vs. Exacerbated LILLIE in the setting of UTI     Plan/Recommendations  - Continue IVF LR at 100cc/h  - Urine studies:    - urine Na, Urine Cl   - urine protein/creatinin ratio  - renally dose medications  - avoid nephrotoxic agents (NSAIDs, gadolinium contrast)   - MAP > 65   - strict I/Os  - Nephrology will continue to follow as inpatient      ID  Pyelonephritis  Findings upon admission suggestive of UTI (pyelonephritis) which may have exacerbated LILLIE.     - continue cefepime   - pending urine cx         Thank you for your consult. I will follow-up with patient. Please contact us if  you have any additional questions.    Jose Rust MD  Nephrology  [facility] S[facility]

## 2023-10-27 PROBLEM — E87.1 HYPONATREMIA: Status: ACTIVE | Noted: 2023-10-27

## 2023-10-27 LAB
ALBUMIN SERPL BCP-MCNC: 2.2 G/DL (ref 3.5–5.2)
ALP SERPL-CCNC: 169 U/L (ref 55–135)
ALT SERPL W/O P-5'-P-CCNC: 14 U/L (ref 10–44)
ANION GAP SERPL CALC-SCNC: 17 MMOL/L (ref 8–16)
ANION GAP SERPL CALC-SCNC: 17 MMOL/L (ref 8–16)
ANION GAP SERPL CALC-SCNC: 19 MMOL/L (ref 8–16)
APTT PPP: 34.3 SEC (ref 21–32)
APTT PPP: 38.2 SEC (ref 21–32)
ASCENDING AORTA: 3.34 CM
AST SERPL-CCNC: 26 U/L (ref 10–40)
AV INDEX (PROSTH): 0.85
AV MEAN GRADIENT: 3 MMHG
AV PEAK GRADIENT: 5 MMHG
AV VALVE AREA BY VELOCITY RATIO: 3.43 CM²
AV VALVE AREA: 3.52 CM²
AV VELOCITY RATIO: 0.83
BACTERIA UR CULT: NORMAL
BASOPHILS # BLD AUTO: 0.01 K/UL (ref 0–0.2)
BASOPHILS # BLD AUTO: 0.01 K/UL (ref 0–0.2)
BASOPHILS NFR BLD: 0.1 % (ref 0–1.9)
BASOPHILS NFR BLD: 0.1 % (ref 0–1.9)
BILIRUB SERPL-MCNC: 0.3 MG/DL (ref 0.1–1)
BSA FOR ECHO PROCEDURE: 1.64 M2
BUN SERPL-MCNC: 85 MG/DL (ref 8–23)
BUN SERPL-MCNC: 90 MG/DL (ref 8–23)
BUN SERPL-MCNC: 90 MG/DL (ref 8–23)
CALCIUM SERPL-MCNC: 7.6 MG/DL (ref 8.7–10.5)
CALCIUM SERPL-MCNC: 7.8 MG/DL (ref 8.7–10.5)
CALCIUM SERPL-MCNC: 7.9 MG/DL (ref 8.7–10.5)
CHLORIDE SERPL-SCNC: 86 MMOL/L (ref 95–110)
CHLORIDE SERPL-SCNC: 87 MMOL/L (ref 95–110)
CHLORIDE SERPL-SCNC: 89 MMOL/L (ref 95–110)
CK SERPL-CCNC: 104 U/L (ref 20–180)
CO2 SERPL-SCNC: 19 MMOL/L (ref 23–29)
CO2 SERPL-SCNC: 21 MMOL/L (ref 23–29)
CO2 SERPL-SCNC: 23 MMOL/L (ref 23–29)
CREAT SERPL-MCNC: 4.6 MG/DL (ref 0.5–1.4)
CREAT SERPL-MCNC: 4.6 MG/DL (ref 0.5–1.4)
CREAT SERPL-MCNC: 5 MG/DL (ref 0.5–1.4)
CV ECHO LV RWT: 0.36 CM
DIFFERENTIAL METHOD: ABNORMAL
DIFFERENTIAL METHOD: ABNORMAL
DOP CALC AO PEAK VEL: 1.15 M/S
DOP CALC AO VTI: 22.63 CM
DOP CALC LVOT AREA: 4.2 CM2
DOP CALC LVOT DIAMETER: 2.3 CM
DOP CALC LVOT PEAK VEL: 0.95 M/S
DOP CALC LVOT STROKE VOLUME: 79.56 CM3
DOP CALCLVOT PEAK VEL VTI: 19.16 CM
E WAVE DECELERATION TIME: 131.92 MSEC
E/A RATIO: 0.92
E/E' RATIO: 9.88 M/S
ECHO LV POSTERIOR WALL: 0.91 CM (ref 0.6–1.1)
EOSINOPHIL # BLD AUTO: 0 K/UL (ref 0–0.5)
EOSINOPHIL # BLD AUTO: 0 K/UL (ref 0–0.5)
EOSINOPHIL NFR BLD: 0 % (ref 0–8)
EOSINOPHIL NFR BLD: 0 % (ref 0–8)
ERYTHROCYTE [DISTWIDTH] IN BLOOD BY AUTOMATED COUNT: 13.2 % (ref 11.5–14.5)
ERYTHROCYTE [DISTWIDTH] IN BLOOD BY AUTOMATED COUNT: 13.2 % (ref 11.5–14.5)
EST. GFR  (NO RACE VARIABLE): 8.9 ML/MIN/1.73 M^2
EST. GFR  (NO RACE VARIABLE): 9.8 ML/MIN/1.73 M^2
EST. GFR  (NO RACE VARIABLE): 9.8 ML/MIN/1.73 M^2
ETHANOL SERPL-MCNC: <10 MG/DL
FRACTIONAL SHORTENING: 20 % (ref 28–44)
GLUCOSE SERPL-MCNC: 101 MG/DL (ref 70–110)
GLUCOSE SERPL-MCNC: 70 MG/DL (ref 70–110)
GLUCOSE SERPL-MCNC: 79 MG/DL (ref 70–110)
HCT VFR BLD AUTO: 30 % (ref 37–48.5)
HCT VFR BLD AUTO: 30 % (ref 37–48.5)
HGB BLD-MCNC: 10.6 G/DL (ref 12–16)
HGB BLD-MCNC: 10.6 G/DL (ref 12–16)
IMM GRANULOCYTES # BLD AUTO: 0.06 K/UL (ref 0–0.04)
IMM GRANULOCYTES # BLD AUTO: 0.06 K/UL (ref 0–0.04)
IMM GRANULOCYTES NFR BLD AUTO: 0.7 % (ref 0–0.5)
IMM GRANULOCYTES NFR BLD AUTO: 0.7 % (ref 0–0.5)
INTERVENTRICULAR SEPTUM: 1.02 CM (ref 0.6–1.1)
LA MAJOR: 5.27 CM
LA MINOR: 5.67 CM
LA WIDTH: 3.21 CM
LEFT ATRIUM SIZE: 4.19 CM
LEFT ATRIUM VOLUME INDEX MOD: 25.8 ML/M2
LEFT ATRIUM VOLUME INDEX: 38.6 ML/M2
LEFT ATRIUM VOLUME MOD: 41.86 CM3
LEFT ATRIUM VOLUME: 62.45 CM3
LEFT INTERNAL DIMENSION IN SYSTOLE: 4.07 CM (ref 2.1–4)
LEFT VENTRICLE DIASTOLIC VOLUME INDEX: 75.57 ML/M2
LEFT VENTRICLE DIASTOLIC VOLUME: 122.42 ML
LEFT VENTRICLE MASS INDEX: 110 G/M2
LEFT VENTRICLE SYSTOLIC VOLUME INDEX: 45.1 ML/M2
LEFT VENTRICLE SYSTOLIC VOLUME: 73 ML
LEFT VENTRICULAR INTERNAL DIMENSION IN DIASTOLE: 5.08 CM (ref 3.5–6)
LEFT VENTRICULAR MASS: 178.13 G
LV LATERAL E/E' RATIO: 8.78 M/S
LV SEPTAL E/E' RATIO: 11.29 M/S
LYMPHOCYTES # BLD AUTO: 0.6 K/UL (ref 1–4.8)
LYMPHOCYTES # BLD AUTO: 0.6 K/UL (ref 1–4.8)
LYMPHOCYTES NFR BLD: 7 % (ref 18–48)
LYMPHOCYTES NFR BLD: 7 % (ref 18–48)
MAGNESIUM SERPL-MCNC: 2.2 MG/DL (ref 1.6–2.6)
MCH RBC QN AUTO: 32.8 PG (ref 27–31)
MCH RBC QN AUTO: 32.8 PG (ref 27–31)
MCHC RBC AUTO-ENTMCNC: 35.3 G/DL (ref 32–36)
MCHC RBC AUTO-ENTMCNC: 35.3 G/DL (ref 32–36)
MCV RBC AUTO: 93 FL (ref 82–98)
MCV RBC AUTO: 93 FL (ref 82–98)
MONOCYTES # BLD AUTO: 0.4 K/UL (ref 0.3–1)
MONOCYTES # BLD AUTO: 0.4 K/UL (ref 0.3–1)
MONOCYTES NFR BLD: 5 % (ref 4–15)
MONOCYTES NFR BLD: 5 % (ref 4–15)
MV PEAK A VEL: 0.86 M/S
MV PEAK E VEL: 0.79 M/S
MV STENOSIS PRESSURE HALF TIME: 38.26 MS
MV VALVE AREA P 1/2 METHOD: 5.75 CM2
NEUTROPHILS # BLD AUTO: 7.4 K/UL (ref 1.8–7.7)
NEUTROPHILS # BLD AUTO: 7.4 K/UL (ref 1.8–7.7)
NEUTROPHILS NFR BLD: 87.2 % (ref 38–73)
NEUTROPHILS NFR BLD: 87.2 % (ref 38–73)
NRBC BLD-RTO: 0 /100 WBC
NRBC BLD-RTO: 0 /100 WBC
OSMOLALITY SERPL: 296 MOSM/KG (ref 275–295)
PHOSPHATE SERPL-MCNC: 6.2 MG/DL (ref 2.7–4.5)
PISA TR MAX VEL: 2.87 M/S
PLATELET # BLD AUTO: 204 K/UL (ref 150–450)
PLATELET # BLD AUTO: 204 K/UL (ref 150–450)
PMV BLD AUTO: 11.4 FL (ref 9.2–12.9)
PMV BLD AUTO: 11.4 FL (ref 9.2–12.9)
POTASSIUM SERPL-SCNC: 3.1 MMOL/L (ref 3.5–5.1)
POTASSIUM SERPL-SCNC: 3.3 MMOL/L (ref 3.5–5.1)
POTASSIUM SERPL-SCNC: 3.7 MMOL/L (ref 3.5–5.1)
PROT SERPL-MCNC: 6 G/DL (ref 6–8.4)
RA MAJOR: 4.78 CM
RA PRESSURE ESTIMATED: 15 MMHG
RA WIDTH: 4.09 CM
RBC # BLD AUTO: 3.23 M/UL (ref 4–5.4)
RBC # BLD AUTO: 3.23 M/UL (ref 4–5.4)
RIGHT VENTRICULAR END-DIASTOLIC DIMENSION: 4.72 CM
RV TB RVSP: 18 MMHG
SINUS: 3.41 CM
SODIUM SERPL-SCNC: 124 MMOL/L (ref 136–145)
SODIUM SERPL-SCNC: 127 MMOL/L (ref 136–145)
SODIUM SERPL-SCNC: 127 MMOL/L (ref 136–145)
STJ: 2.74 CM
TDI LATERAL: 0.09 M/S
TDI SEPTAL: 0.07 M/S
TDI: 0.08 M/S
TR MAX PG: 33 MMHG
TRICUSPID ANNULAR PLANE SYSTOLIC EXCURSION: 1.6 CM
TV REST PULMONARY ARTERY PRESSURE: 48 MMHG
WBC # BLD AUTO: 8.52 K/UL (ref 3.9–12.7)
WBC # BLD AUTO: 8.52 K/UL (ref 3.9–12.7)
Z-SCORE OF LEFT VENTRICULAR DIMENSION IN END DIASTOLE: 1.03
Z-SCORE OF LEFT VENTRICULAR DIMENSION IN END SYSTOLE: 2.84

## 2023-10-27 PROCEDURE — 36415 COLL VENOUS BLD VENIPUNCTURE: CPT | Performed by: INTERNAL MEDICINE

## 2023-10-27 PROCEDURE — 99233 SBSQ HOSP IP/OBS HIGH 50: CPT | Mod: ,,, | Performed by: INTERNAL MEDICINE

## 2023-10-27 PROCEDURE — 87086 URINE CULTURE/COLONY COUNT: CPT

## 2023-10-27 PROCEDURE — 63600175 PHARM REV CODE 636 W HCPCS: Performed by: INTERNAL MEDICINE

## 2023-10-27 PROCEDURE — 99232 PR SUBSEQUENT HOSPITAL CARE,LEVL II: ICD-10-PCS | Mod: GC,,, | Performed by: INTERNAL MEDICINE

## 2023-10-27 PROCEDURE — 25000003 PHARM REV CODE 250: Performed by: INTERNAL MEDICINE

## 2023-10-27 PROCEDURE — 80048 BASIC METABOLIC PNL TOTAL CA: CPT

## 2023-10-27 PROCEDURE — 36415 COLL VENOUS BLD VENIPUNCTURE: CPT

## 2023-10-27 PROCEDURE — 85025 COMPLETE CBC W/AUTO DIFF WBC: CPT

## 2023-10-27 PROCEDURE — 93005 ELECTROCARDIOGRAM TRACING: CPT

## 2023-10-27 PROCEDURE — 82077 ASSAY SPEC XCP UR&BREATH IA: CPT

## 2023-10-27 PROCEDURE — 84100 ASSAY OF PHOSPHORUS: CPT

## 2023-10-27 PROCEDURE — 94640 AIRWAY INHALATION TREATMENT: CPT

## 2023-10-27 PROCEDURE — 80053 COMPREHEN METABOLIC PANEL: CPT

## 2023-10-27 PROCEDURE — 80320 DRUG SCREEN QUANTALCOHOLS: CPT

## 2023-10-27 PROCEDURE — 25000003 PHARM REV CODE 250

## 2023-10-27 PROCEDURE — 80320 DRUG SCREEN QUANTALCOHOLS: CPT | Mod: 91

## 2023-10-27 PROCEDURE — 63600175 PHARM REV CODE 636 W HCPCS

## 2023-10-27 PROCEDURE — 82550 ASSAY OF CK (CPK): CPT

## 2023-10-27 PROCEDURE — 99232 SBSQ HOSP IP/OBS MODERATE 35: CPT | Mod: GC,,, | Performed by: INTERNAL MEDICINE

## 2023-10-27 PROCEDURE — 93010 ELECTROCARDIOGRAM REPORT: CPT | Mod: ,,, | Performed by: INTERNAL MEDICINE

## 2023-10-27 PROCEDURE — 93010 EKG 12-LEAD: ICD-10-PCS | Mod: ,,, | Performed by: INTERNAL MEDICINE

## 2023-10-27 PROCEDURE — S4991 NICOTINE PATCH NONLEGEND: HCPCS

## 2023-10-27 PROCEDURE — 83735 ASSAY OF MAGNESIUM: CPT

## 2023-10-27 PROCEDURE — 85730 THROMBOPLASTIN TIME PARTIAL: CPT | Performed by: INTERNAL MEDICINE

## 2023-10-27 PROCEDURE — 99233 PR SUBSEQUENT HOSPITAL CARE,LEVL III: ICD-10-PCS | Mod: ,,, | Performed by: INTERNAL MEDICINE

## 2023-10-27 PROCEDURE — 11000001 HC ACUTE MED/SURG PRIVATE ROOM

## 2023-10-27 PROCEDURE — 83930 ASSAY OF BLOOD OSMOLALITY: CPT

## 2023-10-27 RX ORDER — SODIUM BICARBONATE 325 MG/1
650 TABLET ORAL 2 TIMES DAILY
Status: DISCONTINUED | OUTPATIENT
Start: 2023-10-27 | End: 2023-10-29

## 2023-10-27 RX ORDER — HEPARIN SODIUM 5000 [USP'U]/ML
5000 INJECTION, SOLUTION INTRAVENOUS; SUBCUTANEOUS EVERY 8 HOURS
Status: DISCONTINUED | OUTPATIENT
Start: 2023-10-27 | End: 2023-11-02 | Stop reason: HOSPADM

## 2023-10-27 RX ORDER — NAPROXEN SODIUM 220 MG/1
81 TABLET, FILM COATED ORAL DAILY
Status: DISCONTINUED | OUTPATIENT
Start: 2023-10-27 | End: 2023-11-02 | Stop reason: HOSPADM

## 2023-10-27 RX ORDER — SODIUM CHLORIDE 9 MG/ML
INJECTION, SOLUTION INTRAVENOUS CONTINUOUS
Status: DISCONTINUED | OUTPATIENT
Start: 2023-10-27 | End: 2023-10-28

## 2023-10-27 RX ORDER — POTASSIUM CHLORIDE 7.45 MG/ML
10 INJECTION INTRAVENOUS
Status: DISCONTINUED | OUTPATIENT
Start: 2023-10-27 | End: 2023-10-27

## 2023-10-27 RX ORDER — SODIUM CHLORIDE 9 MG/ML
INJECTION, SOLUTION INTRAVENOUS CONTINUOUS
Status: DISCONTINUED | OUTPATIENT
Start: 2023-10-27 | End: 2023-10-27

## 2023-10-27 RX ORDER — CLOPIDOGREL BISULFATE 75 MG/1
75 TABLET ORAL DAILY
Status: DISCONTINUED | OUTPATIENT
Start: 2023-10-27 | End: 2023-11-02 | Stop reason: HOSPADM

## 2023-10-27 RX ORDER — SODIUM CHLORIDE, SODIUM LACTATE, POTASSIUM CHLORIDE, CALCIUM CHLORIDE 600; 310; 30; 20 MG/100ML; MG/100ML; MG/100ML; MG/100ML
INJECTION, SOLUTION INTRAVENOUS CONTINUOUS
Status: DISCONTINUED | OUTPATIENT
Start: 2023-10-27 | End: 2023-10-27

## 2023-10-27 RX ORDER — SODIUM CHLORIDE 1 G/1
1000 TABLET ORAL 2 TIMES DAILY
Status: CANCELLED | OUTPATIENT
Start: 2023-10-27

## 2023-10-27 RX ORDER — POTASSIUM CHLORIDE 20 MEQ/1
40 TABLET, EXTENDED RELEASE ORAL ONCE
Status: COMPLETED | OUTPATIENT
Start: 2023-10-27 | End: 2023-10-27

## 2023-10-27 RX ADMIN — HEPARIN SODIUM 5000 UNITS: 5000 INJECTION INTRAVENOUS; SUBCUTANEOUS at 02:10

## 2023-10-27 RX ADMIN — SODIUM CHLORIDE: 9 INJECTION, SOLUTION INTRAVENOUS at 09:10

## 2023-10-27 RX ADMIN — SODIUM BICARBONATE 650 MG: 325 TABLET ORAL at 09:10

## 2023-10-27 RX ADMIN — POTASSIUM CHLORIDE 40 MEQ: 1500 TABLET, EXTENDED RELEASE ORAL at 05:10

## 2023-10-27 RX ADMIN — HEPARIN SODIUM 14 UNITS/KG/HR: 10000 INJECTION, SOLUTION INTRAVENOUS at 12:10

## 2023-10-27 RX ADMIN — CEFTRIAXONE 2 G: 2 INJECTION, POWDER, FOR SOLUTION INTRAMUSCULAR; INTRAVENOUS at 02:10

## 2023-10-27 RX ADMIN — LORAZEPAM 0.5 MG: 0.5 TABLET ORAL at 02:10

## 2023-10-27 RX ADMIN — HEPARIN SODIUM 5000 UNITS: 5000 INJECTION INTRAVENOUS; SUBCUTANEOUS at 09:10

## 2023-10-27 RX ADMIN — MONTELUKAST 10 MG: 10 TABLET, FILM COATED ORAL at 06:10

## 2023-10-27 RX ADMIN — HEPARIN SODIUM 16 UNITS/KG/HR: 10000 INJECTION, SOLUTION INTRAVENOUS at 06:10

## 2023-10-27 RX ADMIN — CLOPIDOGREL BISULFATE 75 MG: 75 TABLET ORAL at 10:10

## 2023-10-27 RX ADMIN — ASPIRIN 81 MG CHEWABLE TABLET 81 MG: 81 TABLET CHEWABLE at 10:10

## 2023-10-27 RX ADMIN — THERA TABS 1 TABLET: TAB at 09:10

## 2023-10-27 RX ADMIN — NICOTINE 1 PATCH: 7 PATCH, EXTENDED RELEASE TRANSDERMAL at 09:10

## 2023-10-27 RX ADMIN — THIAMINE HCL TAB 100 MG 100 MG: 100 TAB at 09:10

## 2023-10-27 RX ADMIN — BUTALBITAL, ACETAMINOPHEN, AND CAFFEINE 1 TABLET: 50; 325; 40 TABLET ORAL at 02:10

## 2023-10-27 RX ADMIN — POTASSIUM BICARBONATE 40 MEQ: 391 TABLET, EFFERVESCENT ORAL at 09:10

## 2023-10-27 RX ADMIN — CEFEPIME 2 G: 2 INJECTION, POWDER, FOR SOLUTION INTRAVENOUS at 09:10

## 2023-10-27 RX ADMIN — SODIUM CHLORIDE, POTASSIUM CHLORIDE, SODIUM LACTATE AND CALCIUM CHLORIDE: 600; 310; 30; 20 INJECTION, SOLUTION INTRAVENOUS at 11:10

## 2023-10-27 RX ADMIN — ATORVASTATIN CALCIUM 40 MG: 40 TABLET, FILM COATED ORAL at 09:10

## 2023-10-27 RX ADMIN — SODIUM CHLORIDE: 9 INJECTION, SOLUTION INTRAVENOUS at 02:10

## 2023-10-27 NOTE — ASSESSMENT & PLAN NOTE
Findings upon admission suggestive of UTI (pyelonephritis) which may have exacerbated LILLIE.     - Recommend mixing cefepime into NS instead of D5W due to persistent hyponatremia  - pending urine cx

## 2023-10-27 NOTE — ASSESSMENT & PLAN NOTE
Patient has hyponatremia which is uncontrolled,We will aim to correct the sodium by 4-6mEq in 24 hours. We will monitor sodium Every 12 hours. The hyponatremia is most likely due to Dehydration/hypovolemia, as it has slowly improved with IV fluids today. We will obtain the following studies: Urine sodium, urine osmolality, serum osmolality. We will treat the hyponatremia with IV fluids as follows: NS. The patient's sodium results have been reviewed and are listed below.  Recent Labs   Lab 10/27/23  1401   *       Urine Osm elevated, Serum Osm elevated. Serum Osm gap slightly elevated also, some initial concern for ingestion of atypical alcohol. Pt denies consumption of unknown substance. Labs pending. Most likely is increased gap in severe oliguric LILLIE that resulted in poor clearance/buildup of osmotic substances in serum.

## 2023-10-27 NOTE — ASSESSMENT & PLAN NOTE
Patient transferred from [place] for persistent shortness of breath (on 24/7 home oxygen for COPD) and weakness. Poor oral fluid and solute intake for several days and increased PO intake of medications including naproxen (aleve). Associated episodes on n/v. Baseline sCr ~1.0.     LILLIE 2/2 poor oral intake vs. Exacerbated LILLIE in the setting of UTI. Persistent hyponatremia. Increased urine prot/creat ratio.     Plan/Recommendations  - Continue IVF LR at 100cc/h  - Recommend mixing cefepime into NS instead of D5W due to persistent hyponatremia  - Daily urine studies:    - urine Na, Urine Cl   - urine protein/creatinin ratio  - Urine Osm, Serum osm   - renally dose medications  - avoid nephrotoxic agents (NSAIDs, gadolinium contrast)   - MAP > 65   - strict I/Os  - Nephrology will continue to follow as inpatient

## 2023-10-27 NOTE — SUBJECTIVE & OBJECTIVE
Interval History: MARK PATTON.     Received ~500ml of IV fluids overnight, with improvement in UOP. Had 400ml UOP overnight via gomez. Minimal change in Cr, and increase in BUN to 90. No evidence of encephalopathy.     SOB stable, satting well on home O2.    No further CP.     D/w nephrology, who recommended continuing fluid resuscitation with NS. Additionally, now hyponatremic to 124. Will continue fluids with NS and eliminate any D5W. May be hypovolemic hyponatremia, will CTM as volume status improves.     Pt also found with serum osm gap, denied drinking any homemade alcohol or ingesting any unknown substances, will f/u labs.     D/w cardiology who recommended ACS protocol, pt loaded on ASA, plavix and put on heparin gtt.     TTE with eccentric hypertrophy and global hypokinesis, nl systolic function and wall motion abnormality noted. Will transition to DAPT.     Ucx positive for E coli, transitioned from cefepime to ceftriaxone.    Review of Systems   Constitutional:  Positive for activity change, chills, fatigue and fever. Negative for appetite change.   HENT:  Positive for congestion and sore throat.    Eyes:  Negative for pain and visual disturbance.   Respiratory:  Positive for cough, chest tightness and shortness of breath.    Cardiovascular:  Positive for chest pain. Negative for palpitations and leg swelling.   Gastrointestinal:  Negative for abdominal distention, abdominal pain, constipation, diarrhea, nausea and vomiting.   Genitourinary:  Positive for decreased urine volume and difficulty urinating. Negative for dysuria and hematuria.   Musculoskeletal:  Negative for arthralgias and back pain.   Skin:  Negative for pallor and rash.   Neurological:  Positive for weakness and headaches. Negative for dizziness, syncope and light-headedness.   Psychiatric/Behavioral:  Negative for agitation and confusion.      Objective:     Vital Signs (Most Recent):  Temp: 96.9 °F (36.1 °C) (10/27/23 1157)  Pulse: 76  (10/27/23 1157)  Resp: 18 (10/27/23 1157)  BP: (!) 152/79 (10/27/23 1157)  SpO2: 98 % (10/27/23 1157) Vital Signs (24h Range):  Temp:  [96.9 °F (36.1 °C)-97.6 °F (36.4 °C)] 96.9 °F (36.1 °C)  Pulse:  [76-87] 76  Resp:  [18-20] 18  SpO2:  [90 %-98 %] 98 %  BP: (101-152)/(56-79) 152/79     Weight: 61.2 kg (135 lb)  Body mass index is 24.69 kg/m².    Intake/Output Summary (Last 24 hours) at 10/27/2023 1526  Last data filed at 10/27/2023 0631  Gross per 24 hour   Intake 370 ml   Output 400 ml   Net -30 ml         Physical Exam  Vitals reviewed.   Constitutional:       General: She is not in acute distress.     Appearance: Normal appearance.   HENT:      Head: Normocephalic and atraumatic.      Mouth/Throat:      Mouth: Mucous membranes are dry.      Pharynx: Oropharynx is clear.   Eyes:      Extraocular Movements: Extraocular movements intact.   Cardiovascular:      Rate and Rhythm: Normal rate and regular rhythm.   Pulmonary:      Effort: Pulmonary effort is normal. No respiratory distress.      Breath sounds: Normal breath sounds.      Comments: Decreased breath sounds all lung fields. Mild crackles bilateral bases.  Abdominal:      General: There is no distension.      Palpations: Abdomen is soft.      Tenderness: There is no abdominal tenderness.   Musculoskeletal:         General: No swelling or tenderness. Normal range of motion.      Cervical back: Normal range of motion and neck supple.   Skin:     General: Skin is warm and dry.   Neurological:      General: No focal deficit present.      Mental Status: She is alert and oriented to person, place, and time. Mental status is at baseline.      Motor: Weakness present.   Psychiatric:         Mood and Affect: Mood normal.         Behavior: Behavior normal.             Significant Labs: All pertinent labs within the past 24 hours have been reviewed.  CBC:   Recent Labs   Lab 10/26/23  0417 10/26/23  1319 10/27/23  0528   WBC 6.40  6.40 7.17 8.52  8.52   HGB 10.6*   10.6* 11.4* 10.6*  10.6*   HCT 30.1*  30.1* 32.5* 30.0*  30.0*     168 187 204  204     CMP:   Recent Labs   Lab 10/25/23  1742 10/25/23  2015 10/26/23  0417 10/27/23  0528 10/27/23  1401   *   < > 129* 124* 127*   K 2.8*   < > 3.3* 3.3* 3.1*   CL 84*   < > 87* 86* 87*   CO2 24   < > 20* 19* 23   GLU 57*   < > 159* 101 79   BUN 69*   < > 72* 90* 90*   CREATININE 5.1*   < > 5.2* 5.0* 4.6*   CALCIUM 8.0*   < > 7.7* 7.8* 7.9*   PROT 6.6  --  6.3 6.0  --    ALBUMIN 2.4*  --  2.2* 2.2*  --    BILITOT 0.5  --  0.4 0.3  --    ALKPHOS 196*  --  187* 169*  --    AST 41*  --  37 26  --    ALT 17  --  16 14  --    ANIONGAP 21*   < > 22* 19* 17*    < > = values in this interval not displayed.       Significant Imaging: I have reviewed all pertinent imaging results/findings within the past 24 hours.

## 2023-10-27 NOTE — PROGRESS NOTES
[facility]  Cardiology  Consult Note    Patient Name: Bhavana Lambert  MRN: [id number]  Admission Date: 10/26/2023  Hospital Length of Stay: 1 days  Code Status: Full Code   Attending Provider: Vargas Guevara MD   Consulting Provider: Hugo Hanna MD  Primary Care Physician: Pepito Jhaveri IV, MD  Principal Problem:LILLIE (acute kidney injury)    Consults  Subjective:     HPI: Bhavana Lambert is a 68 y.o. female with history of hypothyroidism, COPD, HTN, hypokalemia, hyponatremia, rib fractures who presented to an OSH with fever, SOB, and left sided rib pain. LILLIE was discovered and [place] [facility] [place]. Chest wall pain was also noted. Troponins were 1.5. BNP was 2,260. EKG showed anterolateral depressions. CXR showed an enlarged heart suggestive for congestive heart failure. Echo pending.            No current facility-administered medications on file prior to encounter.     Current Outpatient Medications on File Prior to Encounter   Medication Sig Dispense Refill    busPIRone (BUSPAR) 15 MG tablet Take 15 mg by mouth 2 (two) times a day.      clonazePAM (KLONOPIN) 0.5 MG tablet Take 0.5 mg by mouth 2 (two) times daily as needed (anxiety).       cyanocobalamin 1,000 mcg/mL injection Inject 1,000 mcg into the muscle every 30 days.       fluoxetine HCl (PROZAC ORAL) Take 40 mg by mouth once daily.       miconazole NITRATE 2 % (MICOTIN) 2 % top powder Apply topically 2 (two) times daily. 85 g 0    montelukast sodium (SINGULAIR ORAL) Take 10 mg by mouth every morning.       naproxen sodium (ANAPROX) 220 MG tablet Take 220 mg by mouth daily as needed (pain).      tiotropium-olodateroL (STIOLTO RESPIMAT) 2.5-2.5 mcg/actuation Mist Inhale 1 puff into the lungs once daily. Controller 1 g 5       Review of patient's allergies indicates:  No Known Allergies    Review of Systems   Constitutional:  Negative for chills and fever.   Respiratory:  Positive for shortness of  breath.    Cardiovascular:  Negative for chest pain and palpitations.   Gastrointestinal:  Negative for abdominal pain, heartburn, nausea and vomiting.   Genitourinary:  Negative for dysuria and urgency.   Musculoskeletal:  Negative for myalgias.   Neurological:  Negative for dizziness and headaches.        Objective:     Vitals:    10/27/23 1157   BP: (!) 152/79   Pulse: 76   Resp: 18   Temp: 96.9 °F (36.1 °C)         Constitutional:  not in acute distress and ill appearing, but non-toxic  HENT: Head: Normal, normocephalic, atraumatic.  Eyes: conjunctiva clear and sclera nonicteric  Cardiovascular: regular rate and rhythm and no murmur  Respiratory: normal chest expansion & respiratory effort   and no accessory muscle use  GI: soft, non-tender, without masses or organomegaly  Musculoskeletal: no muscular tenderness noted  Skin: normal color  Neurological: alert, oriented x3  Psychiatric: mood and affect are within normal limits, pt is a good historian; no memory problems were noted      Significant Labs:  Recent Labs   Lab 10/26/23  0417 10/26/23  1319 10/27/23  0528   HGB 10.6*  10.6* 11.4* 10.6*  10.6*       Lab Results   Component Value Date    WBC 8.52 10/27/2023    WBC 8.52 10/27/2023    HGB 10.6 (L) 10/27/2023    HGB 10.6 (L) 10/27/2023    HCT 30.0 (L) 10/27/2023    HCT 30.0 (L) 10/27/2023    MCV 93 10/27/2023    MCV 93 10/27/2023     10/27/2023     10/27/2023       Lab Results   Component Value Date     (L) 10/27/2023    K 3.3 (L) 10/27/2023    CL 86 (L) 10/27/2023    CO2 19 (L) 10/27/2023    BUN 90 (H) 10/27/2023    CREATININE 5.0 (H) 10/27/2023    CALCIUM 7.8 (L) 10/27/2023    ANIONGAP 19 (H) 10/27/2023    ESTGFRAFRICA >60.0 09/17/2020    EGFRNONAA >60.0 09/17/2020       Lab Results   Component Value Date    ALT 14 10/27/2023    AST 26 10/27/2023    ALKPHOS 169 (H) 10/27/2023    BILITOT 0.3 10/27/2023       Assessment/Plan:     Bhavana Lambert is a 68 y.o. female with history of  hypothyroidism, COPD, HTN, hypokalemia, hyponatremia, rib fractures transferred from an OSH with an LILLIE and chest pain.  Patient describes chest pain as nonradiating sternal pain that is tender to palpation.  Trops intinially elevated at 1.69 but have since decreased to .876. BNP was 2260. CXR shows an enlarged heart. EKG shows ST and T-wave abnormalities.  Echo from 10/26/2023 shows normal diastolic and systolic function.    Problem List:  Atypical Chest pain    Pt's chest pain is not typical for ischemia.  EKG shows no signs of ischemia.  Echo see below - I don't know what global hypokinesis means with normal systolic function, but there is clearly no wall motion abnormality Patient will remain on ACS protocol out of precaution.  D/W daughter    Echo 10/26:     Summary         Left Ventricle: The left ventricle is normal in size. Normal wall thickness. There is eccentric hypertrophy. Global hypokinesis present. There is normal systolic function. There is normal diastolic function.    Left Atrium: Left atrium is mildly dilated.    Right Ventricle: Moderate right ventricular enlargement. Wall thickness is normal. Right ventricle wall motion  is normal. Systolic function is moderately reduced.    Mitral Valve: There is mild regurgitation.    Tricuspid Valve: There is moderate regurgitation.    Pulmonary Artery: The estimated pulmonary artery systolic pressure is 48 mmHg.    IVC/SVC: Elevated venous pressure at 15 mmHg.  Recommendations:    -continue heparin for 48 hours  -dual anti-platelet therapy with aspirin and clopidogrel.  -Continue statin  -Recommend starting metoprolol succinate 50mg.  -if patient's kidney function improves please reconsult for possible catheterization.  -Will Sign Off    ** of note, patient's electrolytes are out of balance with a sodium of 124 and potassium of 3.3    Thank you for involving us in the care of Bhavana Lambert. Please call with any additional questions, concerns or changes in  the patient's clinical status. We will sign off.      Hugo Hanna MD PGY-1  Cardiology  [facility]     I have edited this note and agree with plans.    Jacek Crawley MD  Staff

## 2023-10-27 NOTE — PROGRESS NOTES
"[facility] Medicine  Progress Note    Patient Name: Bhavana Lambert  MRN: [id number]  Patient Class: IP- Inpatient   Admission Date: 10/26/2023  Length of Stay: 1 days  Attending Physician: Vargas Guevara MD  Primary Care Provider: Pepito Jhaveri IV, MD        Subjective:     Principal Problem:LILLIE (acute kidney injury)        HPI:  68F with PMHx fo COPD (on 3L NC at home) presented to OSH c/o fever, SOB, sore throat, generalized weakness, and semi-chronic left-sided rib pain (hx of fall 4 months ago, broke L sided ribs, continued pain). Pt describes having intermittent centralized substernal CP that worsens with exertion. SOB also worse with exertion. Pt reports that for the last few days she has been increasingly weak and has had decreased PO intake. Describes minimal UOP over the last two days. Denies N/V/D/C, lightheadedness, BLE swelling, abd pain, dysuria, hematuria. Denies any recent sick contacts. Pt is chronic user of Aleve, but has been taking more since fall/rib fractures.     Workup at OSH ED, vitals stable, satting well on home O2. Labs significant WBC 7, hgb 11, plt 164, Na 129, K 3, BUN/Cr 71, 5.2, bicarb 20 and anion gap of 24, troponin 1.5 -> 1.08, BNP 2260. EKG with inverted T waves V1-V4, similar to prior, Qtc 625. UA grossly infectious. CTAP with L sided hydronephrosis and perinephric stranding.     On admission to [facility] [place] pt c/o mild CP and SOB improved from yesterday. Also feeling very anxious and c/o HA. She takes daily klonopin, buspirone, and Fioricet for these problems. Additionally, pt endorses drinking between a half-pint and pint of tequila daily, denies feeling tremulous or agitated currently, not tachycardic or hypertensive, states her last drink was "a few days ago". Cardiology consulted will start on ASA, plavix, heparin gtt. Nephrology consulted, will f/u recs.      Overview/Hospital Course:  68F with PMHx fo COPD (on 3L NC at home) presented to OSH c/o fever, " SOB, sore throat, generalized weakness, and semi-chronic left-sided rib pain (hx of fall 4 months ago, broke L sided ribs, continued pain). Admitted to OSH and transferred to C for severe oliguric LILLIE, UTI, and NSTEMI. Pt Cr 5.2 on admission, BNP 2200, and Trop 1.5 initially and down-trended to 0.9. D/w nephrology, who recommended fluid resuscitation with NS, which was given on day 1 of admission with small decrease in Cr to 5.0, however BUN climbed to 90. Additionally, now hyponatremic to 124. Will continue fluids with NS and eliminate any D5W. Pt also found with serum osm gap, denied drinking any homemade alcohol or ingesting any unknown substances, will f/u labs. D/w cardiology who recommended ACS protocol, pt loaded on ASA, plavix and put on heparin gtt. TTE with eccentric hypertrophy and global hypokinesis, nl systolic function and wall motion abnormality noted. Will transition to DAPT. Ucx positive for E coli, transitioned from cefepime to ceftriaxone.      Interval History: [facility].     Received ~500ml of IV fluids overnight, with improvement in UOP. Had 400ml UOP overnight via gomez. Minimal change in Cr, and increase in BUN to 90. No evidence of encephalopathy.     SOB stable, satting well on home O2.    No further CP.     D/w nephrology, who recommended continuing fluid resuscitation with NS. Additionally, now hyponatremic to 124. Will continue fluids with NS and eliminate any D5W. May be hypovolemic hyponatremia, will CTM as volume status improves.     Pt also found with serum osm gap, denied drinking any homemade alcohol or ingesting any unknown substances, will f/u labs.     D/w cardiology who recommended ACS protocol, pt loaded on ASA, plavix and put on heparin gtt.     TTE with eccentric hypertrophy and global hypokinesis, nl systolic function and wall motion abnormality noted. Will transition to DAPT.     Ucx positive for E coli, transitioned from cefepime to ceftriaxone.    Review of Systems    Constitutional:  Positive for activity change, chills, fatigue and fever. Negative for appetite change.   HENT:  Positive for congestion and sore throat.    Eyes:  Negative for pain and visual disturbance.   Respiratory:  Positive for cough, chest tightness and shortness of breath.    Cardiovascular:  Positive for chest pain. Negative for palpitations and leg swelling.   Gastrointestinal:  Negative for abdominal distention, abdominal pain, constipation, diarrhea, nausea and vomiting.   Genitourinary:  Positive for decreased urine volume and difficulty urinating. Negative for dysuria and hematuria.   Musculoskeletal:  Negative for arthralgias and back pain.   Skin:  Negative for pallor and rash.   Neurological:  Positive for weakness and headaches. Negative for dizziness, syncope and light-headedness.   Psychiatric/Behavioral:  Negative for agitation and confusion.      Objective:     Vital Signs (Most Recent):  Temp: 96.9 °F (36.1 °C) (10/27/23 1157)  Pulse: 76 (10/27/23 1157)  Resp: 18 (10/27/23 1157)  BP: (!) 152/79 (10/27/23 1157)  SpO2: 98 % (10/27/23 1157) Vital Signs (24h Range):  Temp:  [96.9 °F (36.1 °C)-97.6 °F (36.4 °C)] 96.9 °F (36.1 °C)  Pulse:  [76-87] 76  Resp:  [18-20] 18  SpO2:  [90 %-98 %] 98 %  BP: (101-152)/(56-79) 152/79     Weight: 61.2 kg (135 lb)  Body mass index is 24.69 kg/m².    Intake/Output Summary (Last 24 hours) at 10/27/2023 1526  Last data filed at 10/27/2023 0631  Gross per 24 hour   Intake 370 ml   Output 400 ml   Net -30 ml         Physical Exam  Vitals reviewed.   Constitutional:       General: She is not in acute distress.     Appearance: Normal appearance.   HENT:      Head: Normocephalic and atraumatic.      Mouth/Throat:      Mouth: Mucous membranes are dry.      Pharynx: Oropharynx is clear.   Eyes:      Extraocular Movements: Extraocular movements intact.   Cardiovascular:      Rate and Rhythm: Normal rate and regular rhythm.   Pulmonary:      Effort: Pulmonary effort is  normal. No respiratory distress.      Breath sounds: Normal breath sounds.      Comments: Decreased breath sounds all lung fields. Mild crackles bilateral bases.  Abdominal:      General: There is no distension.      Palpations: Abdomen is soft.      Tenderness: There is no abdominal tenderness.   Musculoskeletal:         General: No swelling or tenderness. Normal range of motion.      Cervical back: Normal range of motion and neck supple.   Skin:     General: Skin is warm and dry.   Neurological:      General: No focal deficit present.      Mental Status: She is alert and oriented to person, place, and time. Mental status is at baseline.      Motor: Weakness present.   Psychiatric:         Mood and Affect: Mood normal.         Behavior: Behavior normal.             Significant Labs: All pertinent labs within the past 24 hours have been reviewed.  CBC:   Recent Labs   Lab 10/26/23  0417 10/26/23  1319 10/27/23  0528   WBC 6.40  6.40 7.17 8.52  8.52   HGB 10.6*  10.6* 11.4* 10.6*  10.6*   HCT 30.1*  30.1* 32.5* 30.0*  30.0*     168 187 204  204     CMP:   Recent Labs   Lab 10/25/23  1742 10/25/23  2015 10/26/23  0417 10/27/23  0528 10/27/23  1401   *   < > 129* 124* 127*   K 2.8*   < > 3.3* 3.3* 3.1*   CL 84*   < > 87* 86* 87*   CO2 24   < > 20* 19* 23   GLU 57*   < > 159* 101 79   BUN 69*   < > 72* 90* 90*   CREATININE 5.1*   < > 5.2* 5.0* 4.6*   CALCIUM 8.0*   < > 7.7* 7.8* 7.9*   PROT 6.6  --  6.3 6.0  --    ALBUMIN 2.4*  --  2.2* 2.2*  --    BILITOT 0.5  --  0.4 0.3  --    ALKPHOS 196*  --  187* 169*  --    AST 41*  --  37 26  --    ALT 17  --  16 14  --    ANIONGAP 21*   < > 22* 19* 17*    < > = values in this interval not displayed.       Significant Imaging: I have reviewed all pertinent imaging results/findings within the past 24 hours.      Assessment/Plan:      * LILLIE (acute kidney injury)  Creatinine 5.2 on admit, baseline around 1.1.  - Minimal UOP over the last few days. Decreased PO  intake. Increased NSAID use recently 2/2 fall and rib injury. Hypotensive on arrival to OSH ED with sBP of 89. Known dx of pyelonephritis, without hydronephrosis. POCUS with IVC showing near complete collapse on sniff test, intravascularly depleted. Based on this it is likely mixed etiology of pre-renal and intrinsic.    10/27: Receiving continuous fluids at 100cc/hr, Cr improving, now at 4.6. UOP improving. Electrolytes/hyponatremia improving. Will CTM      Plan:     - Check urine lytes    - urine sodium 32   - UPCR pending  - No evidence of hydronephrosis on CTAP  - POCUS with near complete collapse of IVC on sniff test.  - Strict I&Os and daily weights   - Gentle IVF   - currently running at 100ml/hr   - will continue to assess volume status  - Avoid nephrotoxic agents such as NSAIDs, gadolinium and IV radiocontrast  - Renally dose meds to current GFR  - Maintain MAP > 65          Hyponatremia  Patient has hyponatremia which is uncontrolled,We will aim to correct the sodium by 4-6mEq in 24 hours. We will monitor sodium Every 12 hours. The hyponatremia is most likely due to Dehydration/hypovolemia, as it has slowly improved with IV fluids today. We will obtain the following studies: Urine sodium, urine osmolality, serum osmolality. We will treat the hyponatremia with IV fluids as follows: NS. The patient's sodium results have been reviewed and are listed below.  Recent Labs   Lab 10/27/23  1401   *       Urine Osm elevated, Serum Osm elevated. Serum Osm gap slightly elevated also, some initial concern for ingestion of atypical alcohol. Pt denies consumption of unknown substance. Labs pending. Most likely is increased gap in severe oliguric LILLIE that resulted in poor clearance/buildup of osmotic substances in serum.    Emphysema/COPD  Chronic condition, 2/2 heavy tobacco use. Pt currently smoking ~1ppd, but used to smoke ~2ppd. On 3L NC at home 24h/day, has required up to 5L NC intermittently during the past  "few days. Uses tiotropium-oladaterol and takes montelukast daily. Albuterol inhaler prn    Plan  - continue home medications       Alcohol use  Pt endorses drinking a half-pint to a pint of tequila every day. Last drink was a few days ago. No evidence of acute withdrawal on admission. Will CTM and adjust medications as needed    Plan  - Ativan for CIWA >8      Hypothyroid  Most recent TSH 2.6. Will not repeat during acute illness    Plan:  - continue home synthroid    Pyelonephritis  CT showing "moderate left sided perinephric fat stranding may be infectious or inflammatory." UA with 75 WBC, many bacteria, 3+ leukocytes. No leukocytosis, afebrile on admission.      Plan:   - Cefepime  - UCx pending  - BCx pending        NSTEMI (non-ST elevated myocardial infarction)  3 day hx of substernal CP exacerbated by exertion and accompanied by worsening MENA. EKG with inverted T waves, seen in previous. Troponin 1.691 ->1.516 ->1.080 -> 0.876. BNP elevated to 2260. FREDA score: 5. D/w cardiology, most likely demand ischemia iso sepsis/hotn, compounded by current inability to renally excrete these compounds, however given her story and inability to undergo LHC at the moment 2/2 LILLIE, ACS protocol initiated. Plavix and ASA loaded, restarted on heparin gtt.    10/27: d/c heparin gtt. continuing on DAPT    Plan:  - ACS protocol with aspirin, plavix, and heparin gtt  - TTE pending  - Trend trop to peak, now down trending on [place] to [facility] [place]  - Consult cardiology    - ACS protocol   - no LHC right now 2/2 LILLIE  - Atorvastatin 40          Increased anion gap metabolic acidosis  Bicarb 20, AG 22 on admission. Etiology unclear. Most likely cause is acute increase in uremia, 72 on admission. Lactic acid wnl on admission to [facility] [place], negative for urine ketones..     Gap improving with fluid resuscitation.     Plan  - Consult nephrology  - IV fluid resuscitation.   - CTM    Anxiety  Chronic and stable. States anxiety has been worse since in the " hospital. Will hold home Klonopin, Buspirone iso prolonged QTc and LILLIE    Plan:  - Xanax prn for anxiety          VTE Risk Mitigation (From admission, onward)         Ordered     heparin (porcine) injection 5,000 Units  Every 8 hours         10/27/23 1351     IP VTE LOW RISK PATIENT  Once         10/26/23 1123     Place sequential compression device  Until discontinued         10/26/23 1123                Discharge Planning   CHLOE: 10/30/2023     Code Status: Full Code   Is the patient medically ready for discharge?: No    Reason for patient still in hospital (select all that apply): Patient trending condition, Laboratory test, Treatment and Consult recommendations  Discharge Plan A: Home Health                  Ricky Sherman MD  Department of Hospital Medicine   [facility]

## 2023-10-27 NOTE — ASSESSMENT & PLAN NOTE
Bicarb 20, AG 22 on admission. Etiology unclear. Most likely cause is acute increase in uremia, 72 on admission. Lactic acid wnl on admission to [facility] [place], negative for urine ketones..     Gap improving with fluid resuscitation.     Plan  - Consult nephrology  - IV fluid resuscitation.   - CTM

## 2023-10-27 NOTE — ASSESSMENT & PLAN NOTE
Creatinine 5.2 on admit, baseline around 1.1.  - Minimal UOP over the last few days. Decreased PO intake. Increased NSAID use recently 2/2 fall and rib injury. Hypotensive on arrival to OSH ED with sBP of 89. Known dx of pyelonephritis, without hydronephrosis. POCUS with IVC showing near complete collapse on sniff test, intravascularly depleted. Based on this it is likely mixed etiology of pre-renal and intrinsic.    10/27: Receiving continuous fluids at 100cc/hr, Cr improving, now at 4.6. UOP improving. Electrolytes/hyponatremia improving. Will CTM      Plan:     - Check urine lytes    - urine sodium 32   - UPCR pending  - No evidence of hydronephrosis on CTAP  - POCUS with near complete collapse of IVC on sniff test.  - Strict I&Os and daily weights   - Gentle IVF   - currently running at 100ml/hr   - will continue to assess volume status  - Avoid nephrotoxic agents such as NSAIDs, gadolinium and IV radiocontrast  - Renally dose meds to current GFR  - Maintain MAP > 65

## 2023-10-27 NOTE — PROGRESS NOTES
[facility] Surg  Nephrology  Progress Note    Patient Name: Bhavana Lambert  MRN: [id number]  Admission Date: 10/26/2023  Hospital Length of Stay: 1 days  Attending Provider: Vargas Guevara MD   Primary Care Physician: Pepito Jhaveri IV, MD  Principal Problem:LILLIE (acute kidney injury)    Subjective:     HPI: Ms. Bhavana Lambert is a 68 y.o. patient with past medical history of COPD on 24/7 O2 requirements and inhalers at home here. Transferred from [facility] for Nephrology evaluation. Patient had several days of fever, generalized weakness, and associated left flank pain and n/v. Refers dysuria and diarrhea. About 4 months ago, she had a fall where she broke some ribs on the left side. Patient states pain and general feeling is worse now compared to some months ago. Patient has been taking increased pain medications (naproxen). She states that the pain she feels now is similar to the pain she is been feeling for the past few months, only slightly worse. Upon admission, UA 75 WBC, Ucr 63.7. Baseline sCr 1.0.     Nephrology consulted for pyelonephritis and LILLIE.       Interval History: stable hospital course. No overnight events. Downtrending sCr. Increase urine prot/creat ratio despite urine osm within normal limits. Will trend daily urine labs and serum osm. Recommend adding cefepime on NS, instead of adding it to D5W due to persistent hyponatremia.     Review of patient's allergies indicates:  No Known Allergies  Current Facility-Administered Medications   Medication Frequency    aspirin chewable tablet 81 mg Daily    atorvastatin tablet 40 mg Daily    butalbital-acetaminophen-caffeine -40 mg per tablet 1 tablet Q12H PRN    [START ON 10/28/2023] ceFEPIme (MAXIPIME) 2 g in sodium chloride 0.9 % 100 mL IVPB (MB+) Q24H    clopidogreL tablet 75 mg Daily    dextrose 10% bolus 125 mL 125 mL PRN    dextrose 10% bolus 250 mL 250 mL PRN    glucagon (human recombinant) injection 1 mg PRN     glucose chewable tablet 16 g PRN    glucose chewable tablet 24 g PRN    heparin 25,000 units in dextrose 5% (100 units/ml) IV bolus from bag - ADDITIONAL PRN BOLUS - 30 units/kg (max bolus 4000 units) PRN    heparin 25,000 units in dextrose 5% (100 units/ml) IV bolus from bag - ADDITIONAL PRN BOLUS - 60 units/kg (max bolus 4000 units) PRN    heparin 25,000 units in dextrose 5% 250 mL (100 units/mL) infusion LOW INTENSITY nomogram - [facility] Continuous    lactated ringers infusion Continuous    LORazepam tablet 0.5 mg Q12H PRN    LORazepam tablet 2 mg Q4H PRN    montelukast tablet 10 mg QAM    multivitamin tablet Daily    naloxone 0.4 mg/mL injection 0.02 mg PRN    nicotine 7 mg/24 hr 1 patch Daily    sodium bicarbonate tablet 650 mg BID    sodium chloride 0.9% flush 10 mL Q12H PRN    thiamine tablet 100 mg Daily    tiotropium-olodateroL 2.5-2.5 mcg/actuation Mist 1 puff Daily       Objective:     Vital Signs (Most Recent):  Temp: 96.9 °F (36.1 °C) (10/27/23 1157)  Pulse: 76 (10/27/23 1157)  Resp: 18 (10/27/23 1157)  BP: (!) 152/79 (10/27/23 1157)  SpO2: 98 % (10/27/23 1157) Vital Signs (24h Range):  Temp:  [96.9 °F (36.1 °C)-97.6 °F (36.4 °C)] 96.9 °F (36.1 °C)  Pulse:  [76-87] 76  Resp:  [18-20] 18  SpO2:  [90 %-98 %] 98 %  BP: (101-152)/(56-79) 152/79     Weight: 61.2 kg (135 lb) (10/27/23 0749)  Body mass index is 24.69 kg/m².  Body surface area is 1.64 meters squared.    I/O last 3 completed shifts:  In: 370 [P.O.:300; I.V.:70]  Out: 400 [Urine:400]     Physical Exam  Vitals reviewed.   Constitutional:       General: She is not in acute distress.     Appearance: Normal appearance.   HENT:      Head: Normocephalic and atraumatic.      Mouth/Throat:      Mouth: Mucous membranes are dry.      Pharynx: Oropharynx is clear.   Eyes:      Extraocular Movements: Extraocular movements intact.   Cardiovascular:      Rate and Rhythm: Normal rate and regular rhythm.   Pulmonary:      Effort: Pulmonary effort is  normal. No respiratory distress.      Breath sounds: Normal breath sounds.      Comments: Decreased breath sounds all lung fields. Mild crackles bilateral bases.  Abdominal:      General: There is no distension.      Palpations: Abdomen is soft.      Tenderness: There is no abdominal tenderness.   Musculoskeletal:         General: No swelling or tenderness. Normal range of motion.      Cervical back: Normal range of motion and neck supple.   Skin:     General: Skin is warm and dry.   Neurological:      General: No focal deficit present.      Mental Status: She is alert and oriented to person, place, and time. Mental status is at baseline.   Psychiatric:         Mood and Affect: Mood normal.         Behavior: Behavior normal.          Significant Labs:  All labs within the past 24 hours have been reviewed.     Significant Imaging:  Labs: Reviewed      Assessment/Plan:     Renal/  * LILLIE (acute kidney injury)  Patient transferred from [place] for persistent shortness of breath (on 24/7 home oxygen for COPD) and weakness. Poor oral fluid and solute intake for several days and increased PO intake of medications including naproxen (aleve). Associated episodes on n/v. Baseline sCr ~1.0.     LILLIE 2/2 poor oral intake vs. Exacerbated LILLIE in the setting of UTI. Persistent hyponatremia. Increased urine prot/creat ratio.     Plan/Recommendations  - Continue IVF LR at 100cc/h  - Recommend mixing cefepime into NS instead of D5W due to persistent hyponatremia  - Daily urine studies:    - urine Na, Urine Cl   - urine protein/creatinin ratio  - Urine Osm, Serum osm   - renally dose medications  - avoid nephrotoxic agents (NSAIDs, gadolinium contrast)   - MAP > 65   - strict I/Os  - Nephrology will continue to follow as inpatient      ID  Pyelonephritis  Findings upon admission suggestive of UTI (pyelonephritis) which may have exacerbated LILLIE.     - Recommend mixing cefepime into NS instead of D5W due to persistent hyponatremia  -  pending urine cx         Thank you for your consult. I will follow-up with patient. Please contact us if you have any additional questions.    Jose Rust MD  Nephrology  [facility]

## 2023-10-27 NOTE — HOSPITAL COURSE
68F with PMHx fo COPD (on 3L NC at home) presented to OSH c/o fever, SOB, sore throat, generalized weakness, and semi-chronic left-sided rib pain (hx of fall 4 months ago, broke L sided ribs, continued pain). Admitted to OSH and transferred to [facility] for severe oliguric LILLIE, UTI, and NSTEMI. Pt Cr 5.2 on admission, BNP 2200, and Trop 1.5 initially and down-trended to 0.9. D/w nephrology, who recommended fluid resuscitation with NS, which was given on day 1 of admission with small decrease in Cr to 5.0, however BUN climbed to 90. Additionally, now hyponatremic to 124. Will continue fluids with NS and eliminate any D5W. Pt also found with serum osm gap, denied drinking any homemade alcohol or ingesting any unknown substances, will f/u labs. D/w cardiology who recommended ACS protocol, pt loaded on ASA, plavix and put on heparin gtt. TTE with eccentric hypertrophy and global hypokinesis, nl systolic function and wall motion abnormality noted. Will transition to DAPT, will need LHC when kidneys recover. Ucx positive for E coli, transitioned from cefepime to ceftriaxone and completed 7 day course. Kidneys continue to improve with gentle rehydration. Patient medically stable for discharge with close follow up with nephrology and cardiology for outpatient LHC.  service to obtain repeat BMP on Mondays to follow up with electrolytes.

## 2023-10-27 NOTE — SUBJECTIVE & OBJECTIVE
Interval History: stable hospital course. No overnight events. Downtrending sCr. Increase urine prot/creat ratio despite urine osm within normal limits. Will trend daily urine labs and serum osm. Recommend adding cefepime on NS, instead of adding it to D5W due to persistent hyponatremia.     Review of patient's allergies indicates:  No Known Allergies  Current Facility-Administered Medications   Medication Frequency    aspirin chewable tablet 81 mg Daily    atorvastatin tablet 40 mg Daily    butalbital-acetaminophen-caffeine -40 mg per tablet 1 tablet Q12H PRN    [START ON 10/28/2023] ceFEPIme (MAXIPIME) 2 g in sodium chloride 0.9 % 100 mL IVPB (MB+) Q24H    clopidogreL tablet 75 mg Daily    dextrose 10% bolus 125 mL 125 mL PRN    dextrose 10% bolus 250 mL 250 mL PRN    glucagon (human recombinant) injection 1 mg PRN    glucose chewable tablet 16 g PRN    glucose chewable tablet 24 g PRN    heparin 25,000 units in dextrose 5% (100 units/ml) IV bolus from bag - ADDITIONAL PRN BOLUS - 30 units/kg (max bolus 4000 units) PRN    heparin 25,000 units in dextrose 5% (100 units/ml) IV bolus from bag - ADDITIONAL PRN BOLUS - 60 units/kg (max bolus 4000 units) PRN    heparin 25,000 units in dextrose 5% 250 mL (100 units/mL) infusion LOW INTENSITY nomogram - [facility] Continuous    lactated ringers infusion Continuous    LORazepam tablet 0.5 mg Q12H PRN    LORazepam tablet 2 mg Q4H PRN    montelukast tablet 10 mg QAM    multivitamin tablet Daily    naloxone 0.4 mg/mL injection 0.02 mg PRN    nicotine 7 mg/24 hr 1 patch Daily    sodium bicarbonate tablet 650 mg BID    sodium chloride 0.9% flush 10 mL Q12H PRN    thiamine tablet 100 mg Daily    tiotropium-olodateroL 2.5-2.5 mcg/actuation Mist 1 puff Daily       Objective:     Vital Signs (Most Recent):  Temp: 96.9 °F (36.1 °C) (10/27/23 1157)  Pulse: 76 (10/27/23 1157)  Resp: 18 (10/27/23 1157)  BP: (!) 152/79 (10/27/23 1157)  SpO2: 98 % (10/27/23 1157) Vital Signs (24h  Range):  Temp:  [96.9 °F (36.1 °C)-97.6 °F (36.4 °C)] 96.9 °F (36.1 °C)  Pulse:  [76-87] 76  Resp:  [18-20] 18  SpO2:  [90 %-98 %] 98 %  BP: (101-152)/(56-79) 152/79     Weight: 61.2 kg (135 lb) (10/27/23 0749)  Body mass index is 24.69 kg/m².  Body surface area is 1.64 meters squared.    I/O last 3 completed shifts:  In: 370 [P.O.:300; I.V.:70]  Out: 400 [Urine:400]     Physical Exam  Vitals reviewed.   Constitutional:       General: She is not in acute distress.     Appearance: Normal appearance.   HENT:      Head: Normocephalic and atraumatic.      Mouth/Throat:      Mouth: Mucous membranes are dry.      Pharynx: Oropharynx is clear.   Eyes:      Extraocular Movements: Extraocular movements intact.   Cardiovascular:      Rate and Rhythm: Normal rate and regular rhythm.   Pulmonary:      Effort: Pulmonary effort is normal. No respiratory distress.      Breath sounds: Normal breath sounds.      Comments: Decreased breath sounds all lung fields. Mild crackles bilateral bases.  Abdominal:      General: There is no distension.      Palpations: Abdomen is soft.      Tenderness: There is no abdominal tenderness.   Musculoskeletal:         General: No swelling or tenderness. Normal range of motion.      Cervical back: Normal range of motion and neck supple.   Skin:     General: Skin is warm and dry.   Neurological:      General: No focal deficit present.      Mental Status: She is alert and oriented to person, place, and time. Mental status is at baseline.   Psychiatric:         Mood and Affect: Mood normal.         Behavior: Behavior normal.          Significant Labs:  All labs within the past 24 hours have been reviewed.     Significant Imaging:  Labs: Reviewed

## 2023-10-27 NOTE — PLAN OF CARE
[facility] Med Surg  Initial Discharge Assessment       Primary Care Provider: Pepito Jhaveri IV, MD    Admission Diagnosis: LILLIE (acute kidney injury) [N17.9]    Admission Date: 10/26/2023  Expected Discharge Date: 10/29/2023    Transition of Care Barriers: (P) None    Payor: HU[employer] MANAGED MEDICARE / Plan: HU[employer] SNP HMO PPO SPECIAL NEEDS / Product Type: Medicare Advantage /     Extended Emergency Contact Information  Primary Emergency Contact: ASHLEY HOANG  Mobile Phone: [phone]  Relation: Daughter  Preferred language: English   needed? No    Discharge Plan A: (P) Home Health  Discharge Plan B: (P) Home with family      [facility] [place] [facility] [address] [facility] [place] [address] [address] [facility] [place] [address]  [place] [address]  Phone: [phone] Fax: [phone]      CM met with patient and Ashley Horne (daughter) to discuss discharge planning. Patient lives with adult daughter and grandchildren in a single story home and no stairs to enter. Prior to hospital admission, patient required assistance for all ADLs and required 24/7 supervision. Patient is on home O2 and uses a wheelchair to get around. Will continue to follow for post acute needs. Discharge Plan A and Plan B have been determined by review of patient's clinical status, future medical and therapeutic needs, and coverage/benefits for post-acute care in coordination with multidisciplinary team members.    Initial Assessment (most recent)       Adult Discharge Assessment - 10/27/23 1501          Discharge Assessment    Assessment Type Discharge Planning Assessment (P)      Confirmed/corrected address, phone number and insurance Yes (P)      Confirmed Demographics Correct on Facesheet (P)      Source of Information patient;family (P)      Communicated CHLOE with patient/caregiver Date not available/Unable to determine (P)      Reason For Admission LILLIE (P)      People in Home child(jie), adult;grandchild(jie) (P)      Do you expect to return to  your current living situation? Yes (P)      Do you have help at home or someone to help you manage your care at home? Yes (P)      Who are your caregiver(s) and their phone number(s)? Margarito Horne (daughter) [phone] (P)      Prior to hospitilization cognitive status: Alert/Oriented (P)      Current cognitive status: Alert/Oriented (P)      Walking or Climbing Stairs ambulation difficulty, assistance 1 person (P)      Dressing/Bathing bathing difficulty, assistance 1 person (P)      Home Layout Able to live on 1st floor (P)      Equipment Currently Used at Home wheelchair (P)      Readmission within 30 days? No (P)      Patient currently being followed by outpatient case management? No (P)      Do you currently have service(s) that help you manage your care at home? No (P)      Do you take prescription medications? Yes (P)      Do you have prescription coverage? Yes (P)      Coverage [employer] (P)      Do you have any problems affording any of your prescribed medications? No (P)      Is the patient taking medications as prescribed? yes (P)      Who is going to help you get home at discharge? Margarito Horne (daughter) [phone] (P)      How do you get to doctors appointments? family or friend will provide (P)      Are you on dialysis? No (P)      Do you take coumadin? No (P)      DME Needed Upon Discharge  -- (P)    TBD    Discharge Plan discussed with: Patient;Adult children (P)      Transition of Care Barriers None (P)      Discharge Plan A Home Health (P)      Discharge Plan B Home with family (P)         Physical Activity    On average, how many days per week do you engage in moderate to strenuous exercise (like a brisk walk)? 0 days (P)      On average, how many minutes do you engage in exercise at this level? 0 min (P)         Financial Resource Strain    How hard is it for you to pay for the very basics like food, housing, medical care, and heating? Not hard at all (P)         Housing Stability    In the  last 12 months, was there a time when you were not able to pay the mortgage or rent on time? No (P)      In the last 12 months, how many places have you lived? 1 (P)      In the last 12 months, was there a time when you did not have a steady place to sleep or slept in a shelter (including now)? No (P)         Transportation Needs    In the past 12 months, has lack of transportation kept you from medical appointments or from getting medications? No (P)      In the past 12 months, has lack of transportation kept you from meetings, work, or from getting things needed for daily living? No (P)         Food Insecurity    Within the past 12 months, you worried that your food would run out before you got the money to buy more. Never true (P)      Within the past 12 months, the food you bought just didn't last and you didn't have money to get more. Never true (P)         Stress    Do you feel stress - tense, restless, nervous, or anxious, or unable to sleep at night because your mind is troubled all the time - these days? Not at all (P)         Social Connections    In a typical week, how many times do you talk on the phone with family, friends, or neighbors? More than three times a week (P)      How often do you get together with friends or relatives? More than three times a week (P)      How often do you attend Gnosticism or Baptist services? Never (P)      Do you belong to any clubs or organizations such as Gnosticism groups, unions, fraternal or athletic groups, or school groups? No (P)      How often do you attend meetings of the clubs or organizations you belong to? Never (P)      Are you , , , , never , or living with a partner?  (P)         Alcohol Use    Q1: How often do you have a drink containing alcohol? 4 or more times a week (P)      Q2: How many drinks containing alcohol do you have on a typical day when you are drinking? 1 or 2 (P)      Q3: How often do you have six or  more drinks on one occasion? Never (P)         OTHER    Name(s) of People in Home Margarito Horne (daughter) [phone] (P)                         PARESH Saenz  Case Management  [phone]

## 2023-10-27 NOTE — PLAN OF CARE
Problem: Adult Inpatient Plan of Care  Goal: Plan of Care Review  Outcome: Ongoing, Progressing  Goal: Patient-Specific Goal (Individualized)  Outcome: Ongoing, Progressing  Goal: Absence of Hospital-Acquired Illness or Injury  Outcome: Ongoing, Progressing  Goal: Optimal Comfort and Wellbeing  Outcome: Ongoing, Progressing  Goal: Readiness for Transition of Care  Outcome: Ongoing, Progressing     Problem: Renal Function Impairment (Acute Kidney Injury/Impairment)  Goal: Effective Renal Function  Outcome: Ongoing, Progressing     Problem: Skin Injury Risk Increased  Goal: Skin Health and Integrity  Outcome: Ongoing, Progressing   Alert orient times 4. Azul was place without any difficulty. With 100 Ml. Hughes white concentrated urine noted. Was anxious at beginning of shift. Ativan was given. Patient slept most of this shift. She would reposition herself through out the night, No falls, Bed in low position. Did not want to put non skid socks on but stated she will put on when she get out of bed. Daughter present .

## 2023-10-28 LAB
ALBUMIN SERPL BCP-MCNC: 2 G/DL (ref 3.5–5.2)
ALP SERPL-CCNC: 160 U/L (ref 55–135)
ALT SERPL W/O P-5'-P-CCNC: 15 U/L (ref 10–44)
ANION GAP SERPL CALC-SCNC: 16 MMOL/L (ref 8–16)
APTT PPP: 29.3 SEC (ref 21–32)
AST SERPL-CCNC: 28 U/L (ref 10–40)
BACTERIA UR CULT: ABNORMAL
BACTERIA UR CULT: NO GROWTH
BASOPHILS # BLD AUTO: 0.01 K/UL (ref 0–0.2)
BASOPHILS NFR BLD: 0.1 % (ref 0–1.9)
BILIRUB SERPL-MCNC: 0.2 MG/DL (ref 0.1–1)
BUN SERPL-MCNC: 82 MG/DL (ref 8–23)
CALCIUM SERPL-MCNC: 7.6 MG/DL (ref 8.7–10.5)
CHLORIDE SERPL-SCNC: 95 MMOL/L (ref 95–110)
CHLORIDE UR-SCNC: 46 MMOL/L (ref 25–200)
CO2 SERPL-SCNC: 21 MMOL/L (ref 23–29)
CREAT SERPL-MCNC: 4.2 MG/DL (ref 0.5–1.4)
CREAT UR-MCNC: 17 MG/DL (ref 15–325)
DIFFERENTIAL METHOD: ABNORMAL
EOSINOPHIL # BLD AUTO: 0 K/UL (ref 0–0.5)
EOSINOPHIL NFR BLD: 0.4 % (ref 0–8)
ERYTHROCYTE [DISTWIDTH] IN BLOOD BY AUTOMATED COUNT: 13.6 % (ref 11.5–14.5)
EST. GFR  (NO RACE VARIABLE): 11 ML/MIN/1.73 M^2
GLUCOSE SERPL-MCNC: 71 MG/DL (ref 70–110)
HCT VFR BLD AUTO: 31.1 % (ref 37–48.5)
HGB BLD-MCNC: 10.8 G/DL (ref 12–16)
IMM GRANULOCYTES # BLD AUTO: 0.06 K/UL (ref 0–0.04)
IMM GRANULOCYTES NFR BLD AUTO: 0.7 % (ref 0–0.5)
LYMPHOCYTES # BLD AUTO: 0.8 K/UL (ref 1–4.8)
LYMPHOCYTES NFR BLD: 9.7 % (ref 18–48)
MAGNESIUM SERPL-MCNC: 2 MG/DL (ref 1.6–2.6)
MCH RBC QN AUTO: 32.8 PG (ref 27–31)
MCHC RBC AUTO-ENTMCNC: 34.7 G/DL (ref 32–36)
MCV RBC AUTO: 95 FL (ref 82–98)
MONOCYTES # BLD AUTO: 0.4 K/UL (ref 0.3–1)
MONOCYTES NFR BLD: 5 % (ref 4–15)
NEUTROPHILS # BLD AUTO: 6.9 K/UL (ref 1.8–7.7)
NEUTROPHILS NFR BLD: 84.1 % (ref 38–73)
NRBC BLD-RTO: 0 /100 WBC
OSMOLALITY SERPL: 310 MOSM/KG (ref 275–295)
OSMOLALITY UR: 196 MOSM/KG (ref 50–1200)
PHOSPHATE SERPL-MCNC: 5.4 MG/DL (ref 2.7–4.5)
PLATELET # BLD AUTO: 237 K/UL (ref 150–450)
PMV BLD AUTO: 11.2 FL (ref 9.2–12.9)
POTASSIUM SERPL-SCNC: 3.3 MMOL/L (ref 3.5–5.1)
PROT SERPL-MCNC: 5.6 G/DL (ref 6–8.4)
PROT UR-MCNC: 36 MG/DL (ref 0–15)
PROT/CREAT UR: 2.12 MG/G{CREAT} (ref 0–0.2)
RBC # BLD AUTO: 3.29 M/UL (ref 4–5.4)
SODIUM SERPL-SCNC: 132 MMOL/L (ref 136–145)
SODIUM UR-SCNC: 50 MMOL/L (ref 20–250)
WBC # BLD AUTO: 8.24 K/UL (ref 3.9–12.7)

## 2023-10-28 PROCEDURE — 11000001 HC ACUTE MED/SURG PRIVATE ROOM

## 2023-10-28 PROCEDURE — 84300 ASSAY OF URINE SODIUM: CPT

## 2023-10-28 PROCEDURE — 83735 ASSAY OF MAGNESIUM: CPT

## 2023-10-28 PROCEDURE — 63600175 PHARM REV CODE 636 W HCPCS: Performed by: INTERNAL MEDICINE

## 2023-10-28 PROCEDURE — 25000003 PHARM REV CODE 250

## 2023-10-28 PROCEDURE — 85025 COMPLETE CBC W/AUTO DIFF WBC: CPT

## 2023-10-28 PROCEDURE — 25000003 PHARM REV CODE 250: Performed by: INTERNAL MEDICINE

## 2023-10-28 PROCEDURE — 99900035 HC TECH TIME PER 15 MIN (STAT)

## 2023-10-28 PROCEDURE — 99232 SBSQ HOSP IP/OBS MODERATE 35: CPT | Mod: ,,, | Performed by: INTERNAL MEDICINE

## 2023-10-28 PROCEDURE — S4991 NICOTINE PATCH NONLEGEND: HCPCS

## 2023-10-28 PROCEDURE — 83935 ASSAY OF URINE OSMOLALITY: CPT

## 2023-10-28 PROCEDURE — 83930 ASSAY OF BLOOD OSMOLALITY: CPT

## 2023-10-28 PROCEDURE — 93010 EKG 12-LEAD: ICD-10-PCS | Mod: ,,, | Performed by: INTERNAL MEDICINE

## 2023-10-28 PROCEDURE — 85730 THROMBOPLASTIN TIME PARTIAL: CPT | Performed by: INTERNAL MEDICINE

## 2023-10-28 PROCEDURE — 94761 N-INVAS EAR/PLS OXIMETRY MLT: CPT

## 2023-10-28 PROCEDURE — 84156 ASSAY OF PROTEIN URINE: CPT

## 2023-10-28 PROCEDURE — 84100 ASSAY OF PHOSPHORUS: CPT

## 2023-10-28 PROCEDURE — 82436 ASSAY OF URINE CHLORIDE: CPT

## 2023-10-28 PROCEDURE — 27000221 HC OXYGEN, UP TO 24 HOURS

## 2023-10-28 PROCEDURE — 63600175 PHARM REV CODE 636 W HCPCS

## 2023-10-28 PROCEDURE — 80053 COMPREHEN METABOLIC PANEL: CPT

## 2023-10-28 PROCEDURE — 99232 PR SUBSEQUENT HOSPITAL CARE,LEVL II: ICD-10-PCS | Mod: ,,, | Performed by: INTERNAL MEDICINE

## 2023-10-28 PROCEDURE — 36415 COLL VENOUS BLD VENIPUNCTURE: CPT | Performed by: INTERNAL MEDICINE

## 2023-10-28 PROCEDURE — 93005 ELECTROCARDIOGRAM TRACING: CPT

## 2023-10-28 PROCEDURE — 93010 ELECTROCARDIOGRAM REPORT: CPT | Mod: ,,, | Performed by: INTERNAL MEDICINE

## 2023-10-28 RX ORDER — POTASSIUM CHLORIDE 20 MEQ/1
40 TABLET, EXTENDED RELEASE ORAL
Status: DISCONTINUED | OUTPATIENT
Start: 2023-10-28 | End: 2023-10-28

## 2023-10-28 RX ADMIN — SODIUM BICARBONATE 650 MG: 325 TABLET ORAL at 08:10

## 2023-10-28 RX ADMIN — HEPARIN SODIUM 5000 UNITS: 5000 INJECTION INTRAVENOUS; SUBCUTANEOUS at 10:10

## 2023-10-28 RX ADMIN — HEPARIN SODIUM 5000 UNITS: 5000 INJECTION INTRAVENOUS; SUBCUTANEOUS at 03:10

## 2023-10-28 RX ADMIN — ATORVASTATIN CALCIUM 40 MG: 40 TABLET, FILM COATED ORAL at 08:10

## 2023-10-28 RX ADMIN — NICOTINE 1 PATCH: 7 PATCH, EXTENDED RELEASE TRANSDERMAL at 08:10

## 2023-10-28 RX ADMIN — LORAZEPAM 0.5 MG: 0.5 TABLET ORAL at 09:10

## 2023-10-28 RX ADMIN — CEFTRIAXONE 2 G: 2 INJECTION, POWDER, FOR SOLUTION INTRAMUSCULAR; INTRAVENOUS at 08:10

## 2023-10-28 RX ADMIN — MONTELUKAST 10 MG: 10 TABLET, FILM COATED ORAL at 06:10

## 2023-10-28 RX ADMIN — ASPIRIN 81 MG CHEWABLE TABLET 81 MG: 81 TABLET CHEWABLE at 09:10

## 2023-10-28 RX ADMIN — POTASSIUM CHLORIDE 40 MEQ: 1500 TABLET, EXTENDED RELEASE ORAL at 06:10

## 2023-10-28 RX ADMIN — SODIUM CHLORIDE: 9 INJECTION, SOLUTION INTRAVENOUS at 09:10

## 2023-10-28 RX ADMIN — BUTALBITAL, ACETAMINOPHEN, AND CAFFEINE 1 TABLET: 50; 325; 40 TABLET ORAL at 08:10

## 2023-10-28 RX ADMIN — CLOPIDOGREL BISULFATE 75 MG: 75 TABLET ORAL at 08:10

## 2023-10-28 RX ADMIN — BUTALBITAL, ACETAMINOPHEN, AND CAFFEINE 1 TABLET: 50; 325; 40 TABLET ORAL at 09:10

## 2023-10-28 RX ADMIN — THIAMINE HCL TAB 100 MG 100 MG: 100 TAB at 08:10

## 2023-10-28 RX ADMIN — THERA TABS 1 TABLET: TAB at 08:10

## 2023-10-28 RX ADMIN — LORAZEPAM 0.5 MG: 0.5 TABLET ORAL at 08:10

## 2023-10-28 RX ADMIN — HEPARIN SODIUM 5000 UNITS: 5000 INJECTION INTRAVENOUS; SUBCUTANEOUS at 06:10

## 2023-10-28 RX ADMIN — POTASSIUM BICARBONATE 20 MEQ: 391 TABLET, EFFERVESCENT ORAL at 08:10

## 2023-10-28 NOTE — PROGRESS NOTES
[facility]  Nephrology  Progress Note    Patient Name: Bhavana Lambert  MRN: [id number]  Admission Date: 10/26/2023  Hospital Length of Stay: 2 days  Attending Provider: Vargas Guevara MD   Primary Care Physician: Pepito Jhaveri IV, MD  Principal Problem:LILLIE (acute kidney injury)    Subjective:     HPI: Ms. Bhavana Lambert is a 68 y.o. patient with past medical history of COPD on 24/7 O2 requirements and inhalers at home here. Transferred from [facility] for Nephrology evaluation. Patient had several days of fever, generalized weakness, and associated left flank pain and n/v. Refers dysuria and diarrhea. About 4 months ago, she had a fall where she broke some ribs on the left side. Patient states pain and general feeling is worse now compared to some months ago. Patient has been taking increased pain medications (naproxen). She states that the pain she feels now is similar to the pain she is been feeling for the past few months, only slightly worse. Upon admission, UA 75 WBC, Ucr 63.7. Baseline sCr 1.0.     Nephrology consulted for pyelonephritis and LILLIE.       Interval History: weaning off O2 to 2L NC, satting 98%. UOP improving on 1cc/kg/h. Hyponatremia resolving (132s) compared to admission. LILLIE stable. Increased osmolar gap.     Review of patient's allergies indicates:  No Known Allergies  Current Facility-Administered Medications   Medication Frequency    0.9%  NaCl infusion Continuous    aspirin chewable tablet 81 mg Daily    atorvastatin tablet 40 mg Daily    butalbital-acetaminophen-caffeine -40 mg per tablet 1 tablet Q12H PRN    cefTRIAXone (ROCEPHIN) 2 g in sodium chloride 0.9 % 100 mL IVPB (MB+) Daily    clopidogreL tablet 75 mg Daily    dextrose 10% bolus 125 mL 125 mL PRN    dextrose 10% bolus 250 mL 250 mL PRN    glucagon (human recombinant) injection 1 mg PRN    glucose chewable tablet 16 g PRN    glucose chewable tablet 24 g PRN    heparin (porcine) injection 5,000 Units Q8H     LORazepam tablet 0.5 mg Q12H PRN    LORazepam tablet 2 mg Q4H PRN    montelukast tablet 10 mg QAM    multivitamin tablet Daily    naloxone 0.4 mg/mL injection 0.02 mg PRN    nicotine 7 mg/24 hr 1 patch Daily    potassium bicarbonate disintegrating tablet 20 mEq Once    sodium bicarbonate tablet 650 mg BID    sodium chloride 0.9% flush 10 mL Q12H PRN    thiamine tablet 100 mg Daily    tiotropium-olodateroL 2.5-2.5 mcg/actuation Mist 1 puff Daily       Objective:     Vital Signs (Most Recent):  Temp: 98.2 °F (36.8 °C) (10/28/23 0823)  Pulse: 93 (10/28/23 0823)  Resp: 18 (10/28/23 0823)  BP: 97/60 (10/28/23 0823)  SpO2: (!) 93 % (10/28/23 0823) Vital Signs (24h Range):  Temp:  [96.9 °F (36.1 °C)-98.3 °F (36.8 °C)] 98.2 °F (36.8 °C)  Pulse:  [76-93] 93  Resp:  [16-18] 18  SpO2:  [93 %-98 %] 93 %  BP: ()/(57-79) 97/60     Weight: 61.2 kg (135 lb) (10/27/23 0749)  Body mass index is 24.69 kg/m².  Body surface area is 1.64 meters squared.    I/O last 3 completed shifts:  In: 1980 [P.O.:660; I.V.:1320]  Out: 1800 [Urine:1800]     Physical Exam  Vitals reviewed.   Constitutional:       General: She is not in acute distress.     Appearance: Normal appearance.   HENT:      Head: Normocephalic and atraumatic.      Mouth/Throat:      Mouth: Mucous membranes are dry.      Pharynx: Oropharynx is clear.   Eyes:      Extraocular Movements: Extraocular movements intact.   Cardiovascular:      Rate and Rhythm: Normal rate and regular rhythm.   Pulmonary:      Effort: Pulmonary effort is normal. No respiratory distress.      Breath sounds: Normal breath sounds.   Abdominal:      General: There is no distension.      Palpations: Abdomen is soft.      Tenderness: There is no abdominal tenderness.   Musculoskeletal:         General: No swelling or tenderness. Normal range of motion.      Cervical back: Normal range of motion and neck supple.   Skin:     General: Skin is warm and dry.   Neurological:      General: No focal deficit  present.      Mental Status: She is alert and oriented to person, place, and time. Mental status is at baseline.   Psychiatric:         Mood and Affect: Mood normal.         Behavior: Behavior normal.          Significant Labs:  All labs within the past 24 hours have been reviewed.     Significant Imaging:  Labs: Reviewed       Assessment/Plan:     Renal/  * LILLIE (acute kidney injury)  Patient transferred from [place] for persistent shortness of breath (on 24/7 home oxygen for COPD) and weakness. Poor oral fluid and solute intake for several days and increased PO intake of medications including naproxen (aleve). Associated episodes on n/v. Baseline sCr ~1.0.     LILLIE 2/2 poor oral intake vs. Exacerbated LILLIE in the setting of UTI. Improving UOP on 1cc/kg/h, normalizing electrolytes. Na improving compared to admission. Increased osmolar gap.     Plan/Recommendations  - Continue IVF NS at 100cc/h  - Recommend 20meq PO potassium  - Continue ceftriaxone on NS instead of D5W due to hyponatremia   - Recommend to switch fluids to 1/2 NS if Na continues to increase   - Continue on NS if Na < 135  - Daily urine studies:    - urine Na, Urine Cl   - urine protein/creatinin ratio  - Urine Osm, Serum osm   - Recommend RFP Q12H  - renally dose medications  - avoid nephrotoxic agents (NSAIDs, gadolinium contrast)   - MAP > 65   - strict I/Os  - Monitor K+  - Nephrology will continue to follow as inpatient    ID  Pyelonephritis  Findings upon admission suggestive of UTI (pyelonephritis) which may have exacerbated LILLIE. Presumptive E coli on Ucx.     - Continue ceftriaxone on NS to stabilize hyponatremia        Thank you for your consult. I will follow-up with patient. Please contact us if you have any additional questions.    Jose Rust MD  Nephrology  [facility] - Med Surg

## 2023-10-28 NOTE — SUBJECTIVE & OBJECTIVE
Interval History: weaning off O2 to 2L NC, satting 98%. UOP improving on 1cc/kg/h. Hyponatremia resolving (132s) compared to admission. LILLIE stable. Increased osmolar gap.     Review of patient's allergies indicates:  No Known Allergies  Current Facility-Administered Medications   Medication Frequency    0.9%  NaCl infusion Continuous    aspirin chewable tablet 81 mg Daily    atorvastatin tablet 40 mg Daily    butalbital-acetaminophen-caffeine -40 mg per tablet 1 tablet Q12H PRN    cefTRIAXone (ROCEPHIN) 2 g in sodium chloride 0.9 % 100 mL IVPB (MB+) Daily    clopidogreL tablet 75 mg Daily    dextrose 10% bolus 125 mL 125 mL PRN    dextrose 10% bolus 250 mL 250 mL PRN    glucagon (human recombinant) injection 1 mg PRN    glucose chewable tablet 16 g PRN    glucose chewable tablet 24 g PRN    heparin (porcine) injection 5,000 Units Q8H    LORazepam tablet 0.5 mg Q12H PRN    LORazepam tablet 2 mg Q4H PRN    montelukast tablet 10 mg QAM    multivitamin tablet Daily    naloxone 0.4 mg/mL injection 0.02 mg PRN    nicotine 7 mg/24 hr 1 patch Daily    potassium bicarbonate disintegrating tablet 20 mEq Once    sodium bicarbonate tablet 650 mg BID    sodium chloride 0.9% flush 10 mL Q12H PRN    thiamine tablet 100 mg Daily    tiotropium-olodateroL 2.5-2.5 mcg/actuation Mist 1 puff Daily       Objective:     Vital Signs (Most Recent):  Temp: 98.2 °F (36.8 °C) (10/28/23 0823)  Pulse: 93 (10/28/23 0823)  Resp: 18 (10/28/23 0823)  BP: 97/60 (10/28/23 0823)  SpO2: (!) 93 % (10/28/23 0823) Vital Signs (24h Range):  Temp:  [96.9 °F (36.1 °C)-98.3 °F (36.8 °C)] 98.2 °F (36.8 °C)  Pulse:  [76-93] 93  Resp:  [16-18] 18  SpO2:  [93 %-98 %] 93 %  BP: ()/(57-79) 97/60     Weight: 61.2 kg (135 lb) (10/27/23 0749)  Body mass index is 24.69 kg/m².  Body surface area is 1.64 meters squared.    I/O last 3 completed shifts:  In: 1980 [P.O.:660; I.V.:1320]  Out: 1800 [Urine:1800]     Physical Exam  Vitals reviewed.   Constitutional:        General: She is not in acute distress.     Appearance: Normal appearance.   HENT:      Head: Normocephalic and atraumatic.      Mouth/Throat:      Mouth: Mucous membranes are dry.      Pharynx: Oropharynx is clear.   Eyes:      Extraocular Movements: Extraocular movements intact.   Cardiovascular:      Rate and Rhythm: Normal rate and regular rhythm.   Pulmonary:      Effort: Pulmonary effort is normal. No respiratory distress.      Breath sounds: Normal breath sounds.   Abdominal:      General: There is no distension.      Palpations: Abdomen is soft.      Tenderness: There is no abdominal tenderness.   Musculoskeletal:         General: No swelling or tenderness. Normal range of motion.      Cervical back: Normal range of motion and neck supple.   Skin:     General: Skin is warm and dry.   Neurological:      General: No focal deficit present.      Mental Status: She is alert and oriented to person, place, and time. Mental status is at baseline.   Psychiatric:         Mood and Affect: Mood normal.         Behavior: Behavior normal.          Significant Labs:  All labs within the past 24 hours have been reviewed.     Significant Imaging:  Labs: Reviewed

## 2023-10-28 NOTE — ASSESSMENT & PLAN NOTE
Creatinine 5.2 on admit, baseline around 1.1.  - Minimal UOP over the last few days. Decreased PO intake. Increased NSAID use recently 2/2 fall and rib injury. Hypotensive on arrival to OSH ED with sBP of 89. Known dx of pyelonephritis, without hydronephrosis. POCUS with IVC showing near complete collapse on sniff test, intravascularly depleted. Based on this it is likely mixed etiology of pre-renal and intrinsic.    10/27: Receiving continuous fluids at 100cc/hr, Cr improving, now at 4.6. UOP improving. Electrolytes/hyponatremia improving. Will CTM      Plan:   - No evidence of hydronephrosis on CTAP  - POCUS with near complete collapse of IVC on sniff test.  - Strict I&Os and daily weights   - Gentle IVF   - currently running at 100ml/hr   - will continue to assess volume status   - Continue to monitor for hyperchloremic metabolic acidosis and transition IVF to LR  - Avoid nephrotoxic agents such as NSAIDs, gadolinium and IV radiocontrast  - Renally dose meds to current GFR  - Maintain MAP > 65

## 2023-10-28 NOTE — ASSESSMENT & PLAN NOTE
3 day hx of substernal CP exacerbated by exertion and accompanied by worsening MENA. EKG with inverted T waves, seen in previous. Troponin 1.691 ->1.516 ->1.080 -> 0.876. BNP elevated to 2260. FREDA score: 5. D/w cardiology, most likely demand ischemia iso sepsis/hotn, compounded by current inability to renally excrete these compounds, however given her story and inability to undergo LHC at the moment 2/2 LILLIE, ACS protocol initiated. Plavix and ASA loaded, restarted on heparin gtt.    10/27: d/c heparin gtt. continuing on DAPT  Trop peak 1.691    Plan:  - Consult cardiology    - Completed 48 hour ACS protocol   - no LHC right now 2/2 LILLIE   - Continue ASA 81mg/Plavix 75mg  - Atorvastatin 40mg

## 2023-10-28 NOTE — ASSESSMENT & PLAN NOTE
Patient transferred from [place] for persistent shortness of breath (on 24/7 home oxygen for COPD) and weakness. Poor oral fluid and solute intake for several days and increased PO intake of medications including naproxen (aleve). Associated episodes on n/v. Baseline sCr ~1.0.     LILLIE 2/2 poor oral intake vs. Exacerbated LILLIE in the setting of UTI. Improving UOP on 1cc/kg/h, normalizing electrolytes. Na improving compared to admission. Increased osmolar gap.     Plan/Recommendations  - Continue IVF LR at 100cc/h  - Recommend 20meq PO potassium  - Continue ceftriaxone on NS instead of D5W due to hyponatremia   - Recommend to switch fluids to 1/2 NS if Na continues to increase   - Continue on NS if Na < 135  - Daily urine studies:    - urine Na, Urine Cl   - urine protein/creatinin ratio  - Urine Osm, Serum osm   - Recommend RFP Q12H  - renally dose medications  - avoid nephrotoxic agents (NSAIDs, gadolinium contrast)   - MAP > 65   - strict I/Os  - Monitor K+  - Nephrology will continue to follow as inpatient

## 2023-10-28 NOTE — ASSESSMENT & PLAN NOTE
Bicarb 20, AG 22 on admission. Etiology unclear. Most likely cause is acute increase in uremia, 72 on admission. Lactic acid wnl [place] [facility] for urine ketones..     Gap improving with fluid resuscitation.     Plan  - Consult nephrology  - IV fluid resuscitation.   - CTM

## 2023-10-28 NOTE — SUBJECTIVE & OBJECTIVE
Interval History: ABDI, AFVSS.     Kidney function continues to improve with gentle rehydration, AGMA resolved and hyponatremia improved. Will monitor CMP closely for hyperchloremic metabolic acidosis and transition to LR at that time. UO 1.4L.     Sating well on home 3L. Continue to monitor for signs of volume overload.    Review of Systems   Constitutional:  Negative for activity change, appetite change, chills, fatigue and fever.   HENT:  Negative for congestion and sore throat.    Eyes:  Negative for pain and visual disturbance.   Respiratory:  Negative for cough, chest tightness and shortness of breath.    Cardiovascular:  Negative for chest pain, palpitations and leg swelling.   Gastrointestinal:  Negative for abdominal distention, abdominal pain, constipation, diarrhea, nausea and vomiting.   Genitourinary:  Negative for decreased urine volume, difficulty urinating, dysuria and hematuria.   Musculoskeletal:  Negative for arthralgias and back pain.   Skin:  Negative for pallor and rash.   Neurological:  Positive for weakness. Negative for dizziness, syncope, light-headedness and headaches.   Psychiatric/Behavioral:  Negative for agitation and confusion.      Objective:     Vital Signs (Most Recent):  Temp: 98.2 °F (36.8 °C) (10/28/23 0823)  Pulse: 93 (10/28/23 0823)  Resp: 18 (10/28/23 0823)  BP: 97/60 (10/28/23 0823)  SpO2: (!) 93 % (10/28/23 0823) Vital Signs (24h Range):  Temp:  [96.9 °F (36.1 °C)-98.3 °F (36.8 °C)] 98.2 °F (36.8 °C)  Pulse:  [76-93] 93  Resp:  [16-18] 18  SpO2:  [93 %-98 %] 93 %  BP: ()/(57-79) 97/60     Weight: 61.2 kg (135 lb)  Body mass index is 24.69 kg/m².    Intake/Output Summary (Last 24 hours) at 10/28/2023 0985  Last data filed at 10/28/2023 0641  Gross per 24 hour   Intake 1610 ml   Output 1400 ml   Net 210 ml           Physical Exam  Vitals reviewed.   Constitutional:       General: She is not in acute distress.     Appearance: Normal appearance.   HENT:      Head:  Normocephalic and atraumatic.      Mouth/Throat:      Mouth: Mucous membranes are moist.      Pharynx: Oropharynx is clear.   Eyes:      Pupils: Pupils are equal, round, and reactive to light.   Cardiovascular:      Rate and Rhythm: Normal rate and regular rhythm.   Pulmonary:      Effort: Pulmonary effort is normal. No respiratory distress.      Breath sounds: Normal breath sounds.      Comments: Decreased breath sounds all lung fields  Abdominal:      General: There is no distension.      Palpations: Abdomen is soft.      Tenderness: There is no abdominal tenderness.   Musculoskeletal:         General: No swelling or tenderness. Normal range of motion.   Skin:     General: Skin is warm and dry.   Neurological:      General: No focal deficit present.      Mental Status: She is alert and oriented to person, place, and time. Mental status is at baseline.      Motor: Weakness present.       Significant Labs: All pertinent labs within the past 24 hours have been reviewed.    Significant Imaging: I have reviewed all pertinent imaging results/findings within the past 24 hours.

## 2023-10-28 NOTE — PROGRESS NOTES
"[facility]  Progress Note    Patient Name: Bhavana Lambert  MRN: [id number]  Patient Class: IP- Inpatient   Admission Date: 10/26/2023  Length of Stay: 2 days  Attending Physician: Vargas Guevara MD  Primary Care Provider: Pepito Jhaveri IV, MD        Subjective:     Principal Problem:LILLIE (acute kidney injury)        HPI:  68F with PMHx fo COPD (on 3L NC at home) presented to OSH c/o fever, SOB, sore throat, generalized weakness, and semi-chronic left-sided rib pain (hx of fall 4 months ago, broke L sided ribs, continued pain). Pt describes having intermittent centralized substernal CP that worsens with exertion. SOB also worse with exertion. Pt reports that for the last few days she has been increasingly weak and has had decreased PO intake. Describes minimal UOP over the last two days. Denies N/V/D/C, lightheadedness, BLE swelling, abd pain, dysuria, hematuria. Denies any recent sick contacts. Pt is chronic user of Aleve, but has been taking more since fall/rib fractures.     Workup at OSH ED, vitals stable, satting well on home O2. Labs significant WBC 7, hgb 11, plt 164, Na 129, K 3, BUN/Cr 71, 5.2, bicarb 20 and anion gap of 24, troponin 1.5 -> 1.08, BNP 2260. EKG with inverted T waves V1-V4, similar to prior, Qtc 625. UA grossly infectious. CTAP with L sided hydronephrosis and perinephric stranding.     On admission to [facility] pt c/o mild CP and SOB improved from yesterday. Also feeling very anxious and c/o HA. She takes daily klonopin, buspirone, and Fioricet for these problems. Additionally, pt endorses drinking between a half-pint and pint of tequila daily, denies feeling tremulous or agitated currently, not tachycardic or hypertensive, states her last drink was "a few days ago". Cardiology consulted will start on ASA, plavix, heparin gtt. Nephrology consulted, will f/u recs.      Overview/Hospital Course:  68F with PMHx fo COPD (on 3L NC at home) presented to OSH c/o fever, " SOB, sore throat, generalized weakness, and semi-chronic left-sided rib pain (hx of fall 4 months ago, broke L sided ribs, continued pain). Admitted to OSH and transferred to [facility] for severe oliguric LILLIE, UTI, and NSTEMI. Pt Cr 5.2 on admission, BNP 2200, and Trop 1.5 initially and down-trended to 0.9. D/w nephrology, who recommended fluid resuscitation with NS, which was given on day 1 of admission with small decrease in Cr to 5.0, however BUN climbed to 90. Additionally, now hyponatremic to 124. Will continue fluids with NS and eliminate any D5W. Pt also found with serum osm gap, denied drinking any homemade alcohol or ingesting any unknown substances, will f/u labs. D/w cardiology who recommended ACS protocol, pt loaded on ASA, plavix and put on heparin gtt. TTE with eccentric hypertrophy and global hypokinesis, nl systolic function and wall motion abnormality noted. Will transition to DAPT, will need LHC when kidneys recover. Ucx positive for E coli, transitioned from cefepime to ceftriaxone. Kidneys continue to improve with gentle rehydration.       Interval History: MARK PATTON.     Kidney function continues to improve with gentle rehydration, AGMA resolved and hyponatremia improved. Will monitor CMP closely for hyperchloremic metabolic acidosis and transition to LR at that time. UO 1.4L.     Sating well on home 3L. Continue to monitor for signs of volume overload.    Review of Systems   Constitutional:  Negative for activity change, appetite change, chills, fatigue and fever.   HENT:  Negative for congestion and sore throat.    Eyes:  Negative for pain and visual disturbance.   Respiratory:  Negative for cough, chest tightness and shortness of breath.    Cardiovascular:  Negative for chest pain, palpitations and leg swelling.   Gastrointestinal:  Negative for abdominal distention, abdominal pain, constipation, diarrhea, nausea and vomiting.   Genitourinary:  Negative for decreased urine volume, difficulty  urinating, dysuria and hematuria.   Musculoskeletal:  Negative for arthralgias and back pain.   Skin:  Negative for pallor and rash.   Neurological:  Positive for weakness. Negative for dizziness, syncope, light-headedness and headaches.   Psychiatric/Behavioral:  Negative for agitation and confusion.      Objective:     Vital Signs (Most Recent):  Temp: 98.2 °F (36.8 °C) (10/28/23 0823)  Pulse: 93 (10/28/23 0823)  Resp: 18 (10/28/23 0823)  BP: 97/60 (10/28/23 0823)  SpO2: (!) 93 % (10/28/23 0823) Vital Signs (24h Range):  Temp:  [96.9 °F (36.1 °C)-98.3 °F (36.8 °C)] 98.2 °F (36.8 °C)  Pulse:  [76-93] 93  Resp:  [16-18] 18  SpO2:  [93 %-98 %] 93 %  BP: ()/(57-79) 97/60     Weight: 61.2 kg (135 lb)  Body mass index is 24.69 kg/m².    Intake/Output Summary (Last 24 hours) at 10/28/2023 0928  Last data filed at 10/28/2023 0641  Gross per 24 hour   Intake 1610 ml   Output 1400 ml   Net 210 ml           Physical Exam  Vitals reviewed.   Constitutional:       General: She is not in acute distress.     Appearance: Normal appearance.   HENT:      Head: Normocephalic and atraumatic.      Mouth/Throat:      Mouth: Mucous membranes are moist.      Pharynx: Oropharynx is clear.   Eyes:      Pupils: Pupils are equal, round, and reactive to light.   Cardiovascular:      Rate and Rhythm: Normal rate and regular rhythm.   Pulmonary:      Effort: Pulmonary effort is normal. No respiratory distress.      Breath sounds: Normal breath sounds.      Comments: Decreased breath sounds all lung fields  Abdominal:      General: There is no distension.      Palpations: Abdomen is soft.      Tenderness: There is no abdominal tenderness.   Musculoskeletal:         General: No swelling or tenderness. Normal range of motion.   Skin:     General: Skin is warm and dry.   Neurological:      General: No focal deficit present.      Mental Status: She is alert and oriented to person, place, and time. Mental status is at baseline.      Motor:  Weakness present.       Significant Labs: All pertinent labs within the past 24 hours have been reviewed.    Significant Imaging: I have reviewed all pertinent imaging results/findings within the past 24 hours.      Assessment/Plan:      * LILLIE (acute kidney injury)  Creatinine 5.2 on admit, baseline around 1.1.  - Minimal UOP over the last few days. Decreased PO intake. Increased NSAID use recently 2/2 fall and rib injury. Hypotensive on arrival to OSH ED with sBP of 89. Known dx of pyelonephritis, without hydronephrosis. POCUS with IVC showing near complete collapse on sniff test, intravascularly depleted. Based on this it is likely mixed etiology of pre-renal and intrinsic.    10/27: Receiving continuous fluids at 100cc/hr, Cr improving, now at 4.6. UOP improving. Electrolytes/hyponatremia improving. Will CTM      Plan:   - No evidence of hydronephrosis on CTAP  - POCUS with near complete collapse of IVC on sniff test.  - Strict I&Os and daily weights   - Gentle IVF   - currently running at 100ml/hr   - will continue to assess volume status   - Continue to monitor for hyperchloremic metabolic acidosis and transition IVF to LR  - Avoid nephrotoxic agents such as NSAIDs, gadolinium and IV radiocontrast  - Renally dose meds to current GFR  - Maintain MAP > 65    Hyponatremia  Patient has hyponatremia which is uncontrolled,We will aim to correct the sodium by 4-6mEq in 24 hours. We will monitor sodium Every 12 hours. The hyponatremia is most likely due to Dehydration/hypovolemia, as it has slowly improved with IV fluids today. We will obtain the following studies: Urine sodium, urine osmolality, serum osmolality. We will treat the hyponatremia with IV fluids as follows: NS. The patient's sodium results have been reviewed and are listed below.  Recent Labs   Lab 10/28/23  0306   *       Urine Osm elevated, Serum Osm elevated. Serum Osm gap slightly elevated also, some initial concern for ingestion of atypical  "alcohol. Pt denies consumption of unknown substance. Labs pending. Most likely is increased gap in severe oliguric LILLIE that resulted in poor clearance/buildup of osmotic substances in serum.    Emphysema/COPD  Chronic condition, 2/2 heavy tobacco use. Pt currently smoking ~1ppd, but used to smoke ~2ppd. On 3L NC at home 24h/day, has required up to 5L NC intermittently during the past few days. Uses tiotropium-oladaterol and takes montelukast daily. Albuterol inhaler prn    Plan  - continue home medications       Alcohol use  Pt endorses drinking a half-pint to a pint of tequila every day. Last drink was a few days ago. No evidence of acute withdrawal on admission. Will CTM and adjust medications as needed    Plan  - Ativan for CIWA >8      Hypothyroid  Most recent TSH 2.6. Will not repeat during acute illness    Plan:  - continue home synthroid    Pyelonephritis  CT showing "moderate left sided perinephric fat stranding may be infectious or inflammatory." UA with 75 WBC, many bacteria, 3+ leukocytes. No leukocytosis, afebrile on admission.      Plan:   - Ucx with E.Coli, on Ceftriaxone     NSTEMI (non-ST elevated myocardial infarction)  3 day hx of substernal CP exacerbated by exertion and accompanied by worsening MENA. EKG with inverted T waves, seen in previous. Troponin 1.691 ->1.516 ->1.080 -> 0.876. BNP elevated to 2260. FREDA score: 5. D/w cardiology, most likely demand ischemia iso sepsis/hotn, compounded by current inability to renally excrete these compounds, however given her story and inability to undergo LHC at the moment 2/2 LILLIE, ACS protocol initiated. Plavix and ASA loaded, restarted on heparin gtt.    10/27: d/c heparin gtt. continuing on DAPT  Trop peak 1.691    Plan:  - Consult cardiology    - Completed 48 hour ACS protocol   - no LHC right now 2/2 LILLIE   - Continue ASA 81mg/Plavix 75mg  - Atorvastatin 40mg    Increased anion gap metabolic acidosis  Bicarb 20, AG 22 on admission. Etiology unclear. " Most likely cause is acute increase in uremia, 72 on admission. Lactic acid wnl on admission to [facility], negative for urine ketones..     Gap improving with fluid resuscitation.     Plan  - Consult nephrology  - IV fluid resuscitation.   - CTM    Anxiety  Chronic and stable. States anxiety has been worse since in the hospital. Will hold home Klonopin, Buspirone iso prolonged QTc and LILLIE    Plan:  - Xanax prn for anxiety          VTE Risk Mitigation (From admission, onward)         Ordered     heparin (porcine) injection 5,000 Units  Every 8 hours         10/27/23 1351     IP VTE LOW RISK PATIENT  Once         10/26/23 1123     Place sequential compression device  Until discontinued         10/26/23 1123                Discharge Planning   CHLOE: 10/30/2023     Code Status: Full Code   Is the patient medically ready for discharge?: No    Reason for patient still in hospital (select all that apply): Treatment  Discharge Plan A: Home Health                  Chris Montero MD  Department of Hospital Medicine   [facility]

## 2023-10-28 NOTE — ASSESSMENT & PLAN NOTE
Patient has hyponatremia which is uncontrolled,We will aim to correct the sodium by 4-6mEq in 24 hours. We will monitor sodium Every 12 hours. The hyponatremia is most likely due to Dehydration/hypovolemia, as it has slowly improved with IV fluids today. We will obtain the following studies: Urine sodium, urine osmolality, serum osmolality. We will treat the hyponatremia with IV fluids as follows: NS. The patient's sodium results have been reviewed and are listed below.  Recent Labs   Lab 10/28/23  0306   *       Urine Osm elevated, Serum Osm elevated. Serum Osm gap slightly elevated also, some initial concern for ingestion of atypical alcohol. Pt denies consumption of unknown substance. Labs pending. Most likely is increased gap in severe oliguric LILLIE that resulted in poor clearance/buildup of osmotic substances in serum.

## 2023-10-28 NOTE — ASSESSMENT & PLAN NOTE
"CT showing "moderate left sided perinephric fat stranding may be infectious or inflammatory." UA with 75 WBC, many bacteria, 3+ leukocytes. No leukocytosis, afebrile on admission.      Plan:   - Ucx with E.Coli, on Ceftriaxone   "

## 2023-10-28 NOTE — PLAN OF CARE
Problem: Adult Inpatient Plan of Care  Goal: Plan of Care Review  Outcome: Ongoing, Progressing  Goal: Patient-Specific Goal (Individualized)  Outcome: Ongoing, Progressing  Goal: Absence of Hospital-Acquired Illness or Injury  Outcome: Ongoing, Progressing     Problem: Fluid and Electrolyte Imbalance (Acute Kidney Injury/Impairment)  Goal: Fluid and Electrolyte Balance  Outcome: Ongoing, Progressing     Problem: Oral Intake Inadequate (Acute Kidney Injury/Impairment)  Goal: Optimal Nutrition Intake  Outcome: Ongoing, Progressing     Problem: Skin Injury Risk Increased  Goal: Skin Health and Integrity  Outcome: Ongoing, Progressing   Alert and orient times 5. O2 Sat was wean from 4 L to 2. O2 SATS remain mid t0 high 90's after change.Slept most of this shift. Encourage to reposition when awake. Pt. Frequently reposition self through out the night. Azul patent and put out adequate output tamara cloudy ib color.No fall this shift. Side rails up times 2. Bed in low position.

## 2023-10-28 NOTE — ASSESSMENT & PLAN NOTE
Findings upon admission suggestive of UTI (pyelonephritis) which may have exacerbated LILLIE. Presumptive E coli on Ucx.     - Continue ceftriaxone on NS to stabilize hyponatremia

## 2023-10-29 LAB
ALBUMIN SERPL BCP-MCNC: 2 G/DL (ref 3.5–5.2)
ALBUMIN SERPL BCP-MCNC: 2 G/DL (ref 3.5–5.2)
ALP SERPL-CCNC: 171 U/L (ref 55–135)
ALT SERPL W/O P-5'-P-CCNC: 18 U/L (ref 10–44)
ANION GAP SERPL CALC-SCNC: 16 MMOL/L (ref 8–16)
ANION GAP SERPL CALC-SCNC: 17 MMOL/L (ref 8–16)
APTT PPP: 31.9 SEC (ref 21–32)
AST SERPL-CCNC: 32 U/L (ref 10–40)
BASOPHILS # BLD AUTO: 0.01 K/UL (ref 0–0.2)
BASOPHILS NFR BLD: 0.1 % (ref 0–1.9)
BILIRUB SERPL-MCNC: 0.3 MG/DL (ref 0.1–1)
BUN SERPL-MCNC: 79 MG/DL (ref 8–23)
BUN SERPL-MCNC: 82 MG/DL (ref 8–23)
CALCIUM SERPL-MCNC: 8.1 MG/DL (ref 8.7–10.5)
CALCIUM SERPL-MCNC: 8.4 MG/DL (ref 8.7–10.5)
CHLORIDE SERPL-SCNC: 97 MMOL/L (ref 95–110)
CHLORIDE SERPL-SCNC: 97 MMOL/L (ref 95–110)
CO2 SERPL-SCNC: 20 MMOL/L (ref 23–29)
CO2 SERPL-SCNC: 23 MMOL/L (ref 23–29)
CREAT SERPL-MCNC: 3.7 MG/DL (ref 0.5–1.4)
CREAT SERPL-MCNC: 4 MG/DL (ref 0.5–1.4)
DIFFERENTIAL METHOD: ABNORMAL
EOSINOPHIL # BLD AUTO: 0.1 K/UL (ref 0–0.5)
EOSINOPHIL NFR BLD: 0.6 % (ref 0–8)
ERYTHROCYTE [DISTWIDTH] IN BLOOD BY AUTOMATED COUNT: 13.8 % (ref 11.5–14.5)
EST. GFR  (NO RACE VARIABLE): 11.6 ML/MIN/1.73 M^2
EST. GFR  (NO RACE VARIABLE): 12.8 ML/MIN/1.73 M^2
ETHYLENE GLYCOL SERPLBLD-MCNC: NOT DETECTED MG/DL
GLUCOSE SERPL-MCNC: 49 MG/DL (ref 70–110)
GLUCOSE SERPL-MCNC: 89 MG/DL (ref 70–110)
HCT VFR BLD AUTO: 32.5 % (ref 37–48.5)
HGB BLD-MCNC: 10.7 G/DL (ref 12–16)
IMM GRANULOCYTES # BLD AUTO: 0.08 K/UL (ref 0–0.04)
IMM GRANULOCYTES NFR BLD AUTO: 0.9 % (ref 0–0.5)
LYMPHOCYTES # BLD AUTO: 0.7 K/UL (ref 1–4.8)
LYMPHOCYTES NFR BLD: 7.5 % (ref 18–48)
MAGNESIUM SERPL-MCNC: 1.8 MG/DL (ref 1.6–2.6)
MCH RBC QN AUTO: 32.4 PG (ref 27–31)
MCHC RBC AUTO-ENTMCNC: 32.9 G/DL (ref 32–36)
MCV RBC AUTO: 99 FL (ref 82–98)
METHANOL SERPL-MCNC: <5 MG/DL
MONOCYTES # BLD AUTO: 0.4 K/UL (ref 0.3–1)
MONOCYTES NFR BLD: 4.5 % (ref 4–15)
NEUTROPHILS # BLD AUTO: 8.1 K/UL (ref 1.8–7.7)
NEUTROPHILS NFR BLD: 86.4 % (ref 38–73)
NRBC BLD-RTO: 0 /100 WBC
PHOSPHATE SERPL-MCNC: 5.6 MG/DL (ref 2.7–4.5)
PHOSPHATE SERPL-MCNC: 5.6 MG/DL (ref 2.7–4.5)
PLATELET # BLD AUTO: 236 K/UL (ref 150–450)
PMV BLD AUTO: 11.4 FL (ref 9.2–12.9)
POCT GLUCOSE: 168 MG/DL (ref 70–110)
POTASSIUM SERPL-SCNC: 3.5 MMOL/L (ref 3.5–5.1)
POTASSIUM SERPL-SCNC: 4.2 MMOL/L (ref 3.5–5.1)
PROT SERPL-MCNC: 5.9 G/DL (ref 6–8.4)
RBC # BLD AUTO: 3.3 M/UL (ref 4–5.4)
SODIUM SERPL-SCNC: 133 MMOL/L (ref 136–145)
SODIUM SERPL-SCNC: 137 MMOL/L (ref 136–145)
WBC # BLD AUTO: 9.41 K/UL (ref 3.9–12.7)

## 2023-10-29 PROCEDURE — 84100 ASSAY OF PHOSPHORUS: CPT

## 2023-10-29 PROCEDURE — 27000221 HC OXYGEN, UP TO 24 HOURS

## 2023-10-29 PROCEDURE — 99232 PR SUBSEQUENT HOSPITAL CARE,LEVL II: ICD-10-PCS | Mod: ,,, | Performed by: INTERNAL MEDICINE

## 2023-10-29 PROCEDURE — 93010 EKG 12-LEAD: ICD-10-PCS | Mod: ,,, | Performed by: INTERNAL MEDICINE

## 2023-10-29 PROCEDURE — 63600175 PHARM REV CODE 636 W HCPCS

## 2023-10-29 PROCEDURE — 85730 THROMBOPLASTIN TIME PARTIAL: CPT | Performed by: INTERNAL MEDICINE

## 2023-10-29 PROCEDURE — 93010 ELECTROCARDIOGRAM REPORT: CPT | Mod: ,,, | Performed by: INTERNAL MEDICINE

## 2023-10-29 PROCEDURE — 85025 COMPLETE CBC W/AUTO DIFF WBC: CPT

## 2023-10-29 PROCEDURE — 93005 ELECTROCARDIOGRAM TRACING: CPT

## 2023-10-29 PROCEDURE — 25000003 PHARM REV CODE 250

## 2023-10-29 PROCEDURE — 63600175 PHARM REV CODE 636 W HCPCS: Performed by: INTERNAL MEDICINE

## 2023-10-29 PROCEDURE — 94761 N-INVAS EAR/PLS OXIMETRY MLT: CPT

## 2023-10-29 PROCEDURE — 80053 COMPREHEN METABOLIC PANEL: CPT

## 2023-10-29 PROCEDURE — 36415 COLL VENOUS BLD VENIPUNCTURE: CPT

## 2023-10-29 PROCEDURE — 36415 COLL VENOUS BLD VENIPUNCTURE: CPT | Performed by: INTERNAL MEDICINE

## 2023-10-29 PROCEDURE — 83735 ASSAY OF MAGNESIUM: CPT

## 2023-10-29 PROCEDURE — 25000003 PHARM REV CODE 250: Performed by: INTERNAL MEDICINE

## 2023-10-29 PROCEDURE — S4991 NICOTINE PATCH NONLEGEND: HCPCS

## 2023-10-29 PROCEDURE — 80069 RENAL FUNCTION PANEL: CPT | Mod: 91

## 2023-10-29 PROCEDURE — 99232 SBSQ HOSP IP/OBS MODERATE 35: CPT | Mod: ,,, | Performed by: INTERNAL MEDICINE

## 2023-10-29 PROCEDURE — 25000242 PHARM REV CODE 250 ALT 637 W/ HCPCS

## 2023-10-29 PROCEDURE — 11000001 HC ACUTE MED/SURG PRIVATE ROOM

## 2023-10-29 RX ORDER — SODIUM CHLORIDE 450 MG/100ML
INJECTION, SOLUTION INTRAVENOUS CONTINUOUS
Status: DISCONTINUED | OUTPATIENT
Start: 2023-10-29 | End: 2023-10-30

## 2023-10-29 RX ORDER — SODIUM CHLORIDE 9 MG/ML
INJECTION, SOLUTION INTRAVENOUS CONTINUOUS
Status: DISCONTINUED | OUTPATIENT
Start: 2023-10-29 | End: 2023-10-29

## 2023-10-29 RX ORDER — SODIUM BICARBONATE 325 MG/1
650 TABLET ORAL 3 TIMES DAILY
Status: DISCONTINUED | OUTPATIENT
Start: 2023-10-29 | End: 2023-10-29

## 2023-10-29 RX ORDER — METOPROLOL TARTRATE 25 MG/1
12.5 TABLET ORAL 2 TIMES DAILY
Status: DISCONTINUED | OUTPATIENT
Start: 2023-10-29 | End: 2023-11-01

## 2023-10-29 RX ADMIN — HEPARIN SODIUM 5000 UNITS: 5000 INJECTION INTRAVENOUS; SUBCUTANEOUS at 03:10

## 2023-10-29 RX ADMIN — HEPARIN SODIUM 5000 UNITS: 5000 INJECTION INTRAVENOUS; SUBCUTANEOUS at 06:10

## 2023-10-29 RX ADMIN — NICOTINE 1 PATCH: 7 PATCH, EXTENDED RELEASE TRANSDERMAL at 09:10

## 2023-10-29 RX ADMIN — METOPROLOL TARTRATE 12.5 MG: 25 TABLET, FILM COATED ORAL at 08:10

## 2023-10-29 RX ADMIN — BUTALBITAL, ACETAMINOPHEN, AND CAFFEINE 1 TABLET: 50; 325; 40 TABLET ORAL at 08:10

## 2023-10-29 RX ADMIN — TIOTROPIUM BROMIDE INHALATION SPRAY 2 PUFF: 3.12 SPRAY, METERED RESPIRATORY (INHALATION) at 03:10

## 2023-10-29 RX ADMIN — CEFTRIAXONE 2 G: 2 INJECTION, POWDER, FOR SOLUTION INTRAMUSCULAR; INTRAVENOUS at 09:10

## 2023-10-29 RX ADMIN — POTASSIUM BICARBONATE 30 MEQ: 391 TABLET, EFFERVESCENT ORAL at 08:10

## 2023-10-29 RX ADMIN — LORAZEPAM 0.5 MG: 0.5 TABLET ORAL at 09:10

## 2023-10-29 RX ADMIN — SODIUM CHLORIDE: 4.5 INJECTION, SOLUTION INTRAVENOUS at 07:10

## 2023-10-29 RX ADMIN — HEPARIN SODIUM 5000 UNITS: 5000 INJECTION INTRAVENOUS; SUBCUTANEOUS at 09:10

## 2023-10-29 RX ADMIN — ASPIRIN 81 MG CHEWABLE TABLET 81 MG: 81 TABLET CHEWABLE at 09:10

## 2023-10-29 RX ADMIN — MONTELUKAST 10 MG: 10 TABLET, FILM COATED ORAL at 06:10

## 2023-10-29 RX ADMIN — ATORVASTATIN CALCIUM 40 MG: 40 TABLET, FILM COATED ORAL at 09:10

## 2023-10-29 RX ADMIN — SODIUM CHLORIDE: 9 INJECTION, SOLUTION INTRAVENOUS at 11:10

## 2023-10-29 RX ADMIN — THIAMINE HCL TAB 100 MG 100 MG: 100 TAB at 09:10

## 2023-10-29 RX ADMIN — DEXTROSE MONOHYDRATE 250 ML: 100 INJECTION, SOLUTION INTRAVENOUS at 07:10

## 2023-10-29 RX ADMIN — LORAZEPAM 0.5 MG: 0.5 TABLET ORAL at 08:10

## 2023-10-29 RX ADMIN — CLOPIDOGREL BISULFATE 75 MG: 75 TABLET ORAL at 09:10

## 2023-10-29 RX ADMIN — THERA TABS 1 TABLET: TAB at 09:10

## 2023-10-29 RX ADMIN — BUTALBITAL, ACETAMINOPHEN, AND CAFFEINE 1 TABLET: 50; 325; 40 TABLET ORAL at 09:10

## 2023-10-29 RX ADMIN — SODIUM BICARBONATE 650 MG: 325 TABLET ORAL at 03:10

## 2023-10-29 RX ADMIN — SODIUM BICARBONATE 650 MG: 325 TABLET ORAL at 09:10

## 2023-10-29 NOTE — ASSESSMENT & PLAN NOTE
3 day hx of substernal CP exacerbated by exertion and accompanied by worsening MENA. EKG with inverted T waves, seen in previous. Troponin 1.691 ->1.516 ->1.080 -> 0.876. BNP elevated to 2260. FREDA score: 5. D/w cardiology, most likely demand ischemia iso sepsis/hotn, compounded by current inability to renally excrete these compounds, however given her story and inability to undergo LHC at the moment 2/2 LILLIE, ACS protocol initiated. Plavix and ASA loaded, restarted on heparin gtt.    10/27: d/c heparin gtt. continuing on DAPT  Trop peak 1.691    Plan:  - Consult cardiology    - Completed 48 hour ACS protocol   - no LHC right now 2/2 LILLIE   - Continue ASA 81mg/Plavix 75mg  - Atorvastatin 40mg  - start lopressor 12.5mg bid

## 2023-10-29 NOTE — PLAN OF CARE
Problem: Adult Inpatient Plan of Care  Goal: Plan of Care Review  Outcome: Ongoing, Progressing  Goal: Patient-Specific Goal (Individualized)  Outcome: Ongoing, Progressing  Goal: Absence of Hospital-Acquired Illness or Injury  Outcome: Ongoing, Progressing  Goal: Optimal Comfort and Wellbeing  Outcome: Ongoing, Progressing  Goal: Readiness for Transition of Care  Outcome: Ongoing, Progressing     Problem: Fluid and Electrolyte Imbalance (Acute Kidney Injury/Impairment)  Goal: Fluid and Electrolyte Balance  Outcome: Ongoing, Progressing     Problem: Oral Intake Inadequate (Acute Kidney Injury/Impairment)  Goal: Optimal Nutrition Intake  Outcome: Ongoing, Progressing     Problem: Renal Function Impairment (Acute Kidney Injury/Impairment)  Goal: Effective Renal Function  Outcome: Ongoing, Progressing     Problem: Infection  Goal: Absence of Infection Signs and Symptoms  Outcome: Ongoing, Progressing     Problem: Skin Injury Risk Increased  Goal: Skin Health and Integrity  Outcome: Ongoing, Progressing     Problem: Fall Injury Risk  Goal: Absence of Fall and Fall-Related Injury  Outcome: Ongoing, Progressing     Problem: Breathing Pattern Ineffective  Goal: Effective Breathing Pattern  Outcome: Ongoing, Progressing     No significant changes on our shift. Patient just complained of burning sensation when urinating as per her. Titrated oxygen inhalation from 3 lpm to 2 LPM.

## 2023-10-29 NOTE — ASSESSMENT & PLAN NOTE
Bicarb 20, AG 22 on admission. Etiology unclear. Most likely cause is acute increase in uremia, 72 on admission. Lactic acid wnl on [place] [facility], [place] for urine ketones..     Gap improving with fluid resuscitation.     Plan  - Consult nephrology  - IV fluid resuscitation.   - CTM

## 2023-10-29 NOTE — ASSESSMENT & PLAN NOTE
Improving    Patient has hyponatremia which is uncontrolled,We will aim to correct the sodium by 4-6mEq in 24 hours. We will monitor sodium Every 12 hours. The hyponatremia is most likely due to Dehydration/hypovolemia, as it has slowly improved with IV fluids today. We will obtain the following studies: Urine sodium, urine osmolality, serum osmolality. We will treat the hyponatremia with IV fluids as follows: NS. The patient's sodium results have been reviewed and are listed below.  Recent Labs   Lab 10/29/23  0515   *       Urine Osm elevated, Serum Osm elevated. Serum Osm gap slightly elevated also, some initial concern for ingestion of atypical alcohol. Pt denies consumption of unknown substance. Labs pending. Most likely is increased gap in severe oliguric LILLIE that resulted in poor clearance/buildup of osmotic substances in serum.

## 2023-10-29 NOTE — PLAN OF CARE
Problem: Adult Inpatient Plan of Care  Goal: Plan of Care Review  Outcome: Ongoing, Progressing  Goal: Patient-Specific Goal (Individualized)  Outcome: Ongoing, Progressing  Goal: Absence of Hospital-Acquired Illness or Injury  Outcome: Ongoing, Progressing  Goal: Optimal Comfort and Wellbeing  Outcome: Ongoing, Progressing  Goal: Readiness for Transition of Care  Outcome: Ongoing, Progressing     Problem: Fluid and Electrolyte Imbalance (Acute Kidney Injury/Impairment)  Goal: Fluid and Electrolyte Balance  Outcome: Ongoing, Progressing     Problem: Oral Intake Inadequate (Acute Kidney Injury/Impairment)  Goal: Optimal Nutrition Intake  Outcome: Ongoing, Progressing     Problem: Renal Function Impairment (Acute Kidney Injury/Impairment)  Goal: Effective Renal Function  Outcome: Ongoing, Progressing     Problem: Infection  Goal: Absence of Infection Signs and Symptoms  Outcome: Ongoing, Progressing     Problem: Skin Injury Risk Increased  Goal: Skin Health and Integrity  Outcome: Ongoing, Progressing     Problem: Fall Injury Risk  Goal: Absence of Fall and Fall-Related Injury  Outcome: Ongoing, Progressing     Problem: Breathing Pattern Ineffective  Goal: Effective Breathing Pattern  Outcome: Ongoing, Progressing

## 2023-10-29 NOTE — ASSESSMENT & PLAN NOTE
Creatinine 5.2 on admit, baseline around 1.1.  - Minimal UOP over the last few days. Decreased PO intake. Increased NSAID use recently 2/2 fall and rib injury. Hypotensive on arrival to OSH ED with sBP of 89. Known dx of pyelonephritis, without hydronephrosis. POCUS with IVC showing near complete collapse on sniff test, intravascularly depleted. Based on this it is likely mixed etiology of pre-renal and intrinsic.    10/27: Receiving continuous fluids at 100cc/hr, Cr improving, now at 4.6. UOP improving. Electrolytes/hyponatremia improving. Will CTM    10/29: holding fluids per nephrology recommendations this morning as Cr, Na, UOP are improving. Will recheck RFP this evening and adjust fluids based on Cr and Na. Restart 1/2NS at 1cc/kg/hr if Cr is stable or increasing and Na is dropping.      Plan:   - No evidence of hydronephrosis on CTAP  - POCUS with near complete collapse of IVC on sniff test.  - Strict I&Os and daily weights   - Gentle IVF   - currently running at 100ml/hr   - will continue to assess volume status   - Continue to monitor for hyperchloremic metabolic acidosis and transition IVF to LR  - Avoid nephrotoxic agents such as NSAIDs, gadolinium and IV radiocontrast  - Renally dose meds to current GFR  - Maintain MAP > 65

## 2023-10-29 NOTE — PROGRESS NOTES
[facility]  Nephrology  Progress Note    Patient Name: Bhavana Lambert  MRN: [id number]  Admission Date: 10/26/2023  Hospital Length of Stay: 3 days  Attending Provider: Vargas Guevara MD   Primary Care Physician: Pepito Jhaveri IV, MD  Principal Problem:LILLIE (acute kidney injury)    Subjective:     HPI: Ms. Bhavana Lambert is a 68 y.o. patient with past medical history of COPD on 24/7 O2 requirements and inhalers at home here. Transferred from [facility] for Nephrology evaluation. Patient had several days of fever, generalized weakness, and associated left flank pain and n/v. Refers dysuria and diarrhea. About 4 months ago, she had a fall where she broke some ribs on the left side. Patient states pain and general feeling is worse now compared to some months ago. Patient has been taking increased pain medications (naproxen). She states that the pain she feels now is similar to the pain she is been feeling for the past few months, only slightly worse. Upon admission, UA 75 WBC, Ucr 63.7. Baseline sCr 1.0.     Nephrology consulted for pyelonephritis and LILLIE.       Interval History: patient with 2.7 L of urine in last 24 hours. Denies SOB, no chest pain cr sown to 4    Review of patient's allergies indicates:  No Known Allergies  Current Facility-Administered Medications   Medication Frequency    0.9%  NaCl infusion Continuous    aspirin chewable tablet 81 mg Daily    atorvastatin tablet 40 mg Daily    butalbital-acetaminophen-caffeine -40 mg per tablet 1 tablet Q12H PRN    cefTRIAXone (ROCEPHIN) 2 g in sodium chloride 0.9 % 100 mL IVPB (MB+) Daily    clopidogreL tablet 75 mg Daily    dextrose 10% bolus 125 mL 125 mL PRN    dextrose 10% bolus 250 mL 250 mL PRN    glucagon (human recombinant) injection 1 mg PRN    glucose chewable tablet 16 g PRN    glucose chewable tablet 24 g PRN    heparin (porcine) injection 5,000 Units Q8H    LORazepam tablet 0.5 mg Q12H PRN    LORazepam tablet 2 mg Q4H PRN     montelukast tablet 10 mg QAM    multivitamin tablet Daily    naloxone 0.4 mg/mL injection 0.02 mg PRN    nicotine 7 mg/24 hr 1 patch Daily    sodium bicarbonate tablet 650 mg TID    sodium chloride 0.9% flush 10 mL Q12H PRN    thiamine tablet 100 mg Daily    tiotropium bromide 2.5 mcg/actuation inhaler 2 puff Daily       Objective:     Vital Signs (Most Recent):  Temp: 96.9 °F (36.1 °C) (10/29/23 1200)  Pulse: 85 (10/29/23 1200)  Resp: 18 (10/29/23 1200)  BP: 115/63 (10/29/23 1200)  SpO2: 97 % (10/29/23 1200) Vital Signs (24h Range):  Temp:  [96.9 °F (36.1 °C)-97.9 °F (36.6 °C)] 96.9 °F (36.1 °C)  Pulse:  [82-92] 85  Resp:  [18] 18  SpO2:  [92 %-97 %] 97 %  BP: (114-134)/(62-68) 115/63     Weight: 61.2 kg (135 lb) (10/27/23 0749)  Body mass index is 24.69 kg/m².  Body surface area is 1.64 meters squared.    I/O last 3 completed shifts:  In: 2150 [P.O.:900; I.V.:1250]  Out: 4150 [Urine:4150]     Physical Exam  Vitals reviewed.   HENT:      Head: Normocephalic and atraumatic.      Mouth/Throat:      Mouth: Mucous membranes are moist.   Eyes:      Conjunctiva/sclera: Conjunctivae normal.      Pupils: Pupils are equal, round, and reactive to light.   Cardiovascular:      Rate and Rhythm: Normal rate.      Heart sounds: Normal heart sounds.   Pulmonary:      Effort: Pulmonary effort is normal.      Comments: Decreased air entry at bases   On chronic oxygen   Abdominal:      General: Bowel sounds are normal.      Palpations: Abdomen is soft.      Tenderness: There is no abdominal tenderness. There is no right CVA tenderness, left CVA tenderness or guarding.   Musculoskeletal:         General: No swelling.      Right lower leg: No edema.      Left lower leg: No edema.   Skin:     Coloration: Skin is not jaundiced.      Findings: No rash.   Neurological:      General: No focal deficit present.      Mental Status: She is alert.          Significant Labs:  CBC:   Recent Labs   Lab 10/29/23  0515   WBC 9.41   RBC 3.30*    HGB 10.7*   HCT 32.5*      MCV 99*   MCH 32.4*   MCHC 32.9     CMP:   Recent Labs   Lab 10/29/23  0515   GLU 49*   CALCIUM 8.1*   ALBUMIN 2.0*   PROT 5.9*   *   K 4.2   CO2 20*   CL 97   BUN 82*   CREATININE 4.0*   ALKPHOS 171*   ALT 18   AST 32   BILITOT 0.3        Significant Imaging:  Reviewed     Assessment/Plan:     Renal/  * LILLIE (acute kidney injury)  Patient transferred from [place] for persistent shortness of breath (on 24/7 home oxygen for COPD) and weakness. Poor oral fluid and solute intake for several days and increased PO intake of medications including naproxen (aleve). Associated episodes on n/v. Baseline sCr ~1.0.     Impression and Recs   LILLIE 2/2 poor oral intake and NSAID use  Likely all leading to ischemic ATN and now in postobstructive ATN Phase   recs:  Echo showed CVP of 15 s/p IVF concern for volume overload   Scr improved to 4 today and had 2.7 L of urine OPT  in last 24 hours   Would hold off on IVF for now recheck RFP this afternoon and if Na increases to above 135 or if cr is same or worse then would start half NS at 60 cc/hr if Na remains below 135 by tomorrow morning and cr continues to improve then can hold off on IVF.   Avoid nephrotoxins   Patient advised not to take NSAIDS when discharged from hospital               Rodriguez Matthews MD  Nephrology  [facility]

## 2023-10-29 NOTE — SUBJECTIVE & OBJECTIVE
Interval History: patient with 2.7 L of urine in last 24 hours. Denies SOB, no chest pain cr sown to 4    Review of patient's allergies indicates:  No Known Allergies  Current Facility-Administered Medications   Medication Frequency    0.9%  NaCl infusion Continuous    aspirin chewable tablet 81 mg Daily    atorvastatin tablet 40 mg Daily    butalbital-acetaminophen-caffeine -40 mg per tablet 1 tablet Q12H PRN    cefTRIAXone (ROCEPHIN) 2 g in sodium chloride 0.9 % 100 mL IVPB (MB+) Daily    clopidogreL tablet 75 mg Daily    dextrose 10% bolus 125 mL 125 mL PRN    dextrose 10% bolus 250 mL 250 mL PRN    glucagon (human recombinant) injection 1 mg PRN    glucose chewable tablet 16 g PRN    glucose chewable tablet 24 g PRN    heparin (porcine) injection 5,000 Units Q8H    LORazepam tablet 0.5 mg Q12H PRN    LORazepam tablet 2 mg Q4H PRN    montelukast tablet 10 mg QAM    multivitamin tablet Daily    naloxone 0.4 mg/mL injection 0.02 mg PRN    nicotine 7 mg/24 hr 1 patch Daily    sodium bicarbonate tablet 650 mg TID    sodium chloride 0.9% flush 10 mL Q12H PRN    thiamine tablet 100 mg Daily    tiotropium bromide 2.5 mcg/actuation inhaler 2 puff Daily       Objective:     Vital Signs (Most Recent):  Temp: 96.9 °F (36.1 °C) (10/29/23 1200)  Pulse: 85 (10/29/23 1200)  Resp: 18 (10/29/23 1200)  BP: 115/63 (10/29/23 1200)  SpO2: 97 % (10/29/23 1200) Vital Signs (24h Range):  Temp:  [96.9 °F (36.1 °C)-97.9 °F (36.6 °C)] 96.9 °F (36.1 °C)  Pulse:  [82-92] 85  Resp:  [18] 18  SpO2:  [92 %-97 %] 97 %  BP: (114-134)/(62-68) 115/63     Weight: 61.2 kg (135 lb) (10/27/23 0749)  Body mass index is 24.69 kg/m².  Body surface area is 1.64 meters squared.    I/O last 3 completed shifts:  In: 2150 [P.O.:900; I.V.:1250]  Out: 4150 [Urine:4150]     Physical Exam  Vitals reviewed.   HENT:      Head: Normocephalic and atraumatic.      Mouth/Throat:      Mouth: Mucous membranes are moist.   Eyes:      Conjunctiva/sclera: Conjunctivae  normal.      Pupils: Pupils are equal, round, and reactive to light.   Cardiovascular:      Rate and Rhythm: Normal rate.      Heart sounds: Normal heart sounds.   Pulmonary:      Effort: Pulmonary effort is normal.      Comments: Decreased air entry at bases   On chronic oxygen   Abdominal:      General: Bowel sounds are normal.      Palpations: Abdomen is soft.      Tenderness: There is no abdominal tenderness. There is no right CVA tenderness, left CVA tenderness or guarding.   Musculoskeletal:         General: No swelling.      Right lower leg: No edema.      Left lower leg: No edema.   Skin:     Coloration: Skin is not jaundiced.      Findings: No rash.   Neurological:      General: No focal deficit present.      Mental Status: She is alert.          Significant Labs:  CBC:   Recent Labs   Lab 10/29/23  0515   WBC 9.41   RBC 3.30*   HGB 10.7*   HCT 32.5*      MCV 99*   MCH 32.4*   MCHC 32.9     CMP:   Recent Labs   Lab 10/29/23  0515   GLU 49*   CALCIUM 8.1*   ALBUMIN 2.0*   PROT 5.9*   *   K 4.2   CO2 20*   CL 97   BUN 82*   CREATININE 4.0*   ALKPHOS 171*   ALT 18   AST 32   BILITOT 0.3        Significant Imaging:  Reviewed

## 2023-10-29 NOTE — ASSESSMENT & PLAN NOTE
Patient transferred from [place] for persistent shortness of breath (on 24/7 home oxygen for COPD) and weakness. Poor oral fluid and solute intake for several days and increased PO intake of medications including naproxen (aleve). Associated episodes on n/v. Baseline sCr ~1.0.     Impression and Recs   LILLIE 2/2 poor oral intake and NSAID use  Likely all leading to ischemic ATN and now in postobstructive ATN Phase   recs:  Echo showed CVP of 15 s/p IVF concern for volume overload   Scr improved to 4 today and had 2.7 L of urine OPT  in last 24 hours   Would hold off on IVF for now recheck RFP this afternoon and if Na increases to above 135 or if cr is same or worse then would start half NS at 60 cc/hr if Na remains below 135 by tomorrow morning and cr continues to improve then can hold off on IVF.   Avoid nephrotoxins   Patient advised not to take NSAIDS when discharged from hospital   If no urgency for IV contrast studies then would recommend avoiding contrast for now

## 2023-10-29 NOTE — PROGRESS NOTES
"[facility] Medicine  Progress Note    Patient Name: Bhavana Lambert  MRN: [id number]  Patient Class: IP- Inpatient   Admission Date: 10/26/2023  Length of Stay: 3 days  Attending Physician: Vargas Guevara MD  Primary Care Provider: Pepito Jhaveri IV, MD        Subjective:     Principal Problem:LILLIE (acute kidney injury)        HPI:  68F with PMHx fo COPD (on 3L NC at home) presented to OSH c/o fever, SOB, sore throat, generalized weakness, and semi-chronic left-sided rib pain (hx of fall 4 months ago, broke L sided ribs, continued pain). Pt describes having intermittent centralized substernal CP that worsens with exertion. SOB also worse with exertion. Pt reports that for the last few days she has been increasingly weak and has had decreased PO intake. Describes minimal UOP over the last two days. Denies N/V/D/C, lightheadedness, BLE swelling, abd pain, dysuria, hematuria. Denies any recent sick contacts. Pt is chronic user of Aleve, but has been taking more since fall/rib fractures.     Workup at OSH ED, vitals stable, satting well on home O2. Labs significant WBC 7, hgb 11, plt 164, Na 129, K 3, BUN/Cr 71, 5.2, bicarb 20 and anion gap of 24, troponin 1.5 -> 1.08, BNP 2260. EKG with inverted T waves V1-V4, similar to prior, Qtc 625. UA grossly infectious. CTAP with L sided hydronephrosis and perinephric stranding.     On admission to [facility] pt c/o mild CP and SOB improved from yesterday. Also feeling very anxious and c/o HA. She takes daily klonopin, buspirone, and Fioricet for these problems. Additionally, pt endorses drinking between a half-pint and pint of tequila daily, denies feeling tremulous or agitated currently, not tachycardic or hypertensive, states her last drink was "a few days ago". Cardiology consulted will start on ASA, plavix, heparin gtt. Nephrology consulted, will f/u recs.      Overview/Hospital Course:  68F with PMHx fo COPD (on 3L NC at home) presented to OSH c/o fever, " SOB, sore throat, generalized weakness, and semi-chronic left-sided rib pain (hx of fall 4 months ago, broke L sided ribs, continued pain). Admitted to OSH and transferred to [facility] for severe oliguric LILLIE, UTI, and NSTEMI. Pt Cr 5.2 on admission, BNP 2200, and Trop 1.5 initially and down-trended to 0.9. D/w nephrology, who recommended fluid resuscitation with NS, which was given on day 1 of admission with small decrease in Cr to 5.0, however BUN climbed to 90. Additionally, now hyponatremic to 124. Will continue fluids with NS and eliminate any D5W. Pt also found with serum osm gap, denied drinking any homemade alcohol or ingesting any unknown substances, will f/u labs. D/w cardiology who recommended ACS protocol, pt loaded on ASA, plavix and put on heparin gtt. TTE with eccentric hypertrophy and global hypokinesis, nl systolic function and wall motion abnormality noted. Will transition to DAPT, will need LHC when kidneys recover. Ucx positive for E coli, transitioned from cefepime to ceftriaxone. Kidneys continue to improve with gentle rehydration.       Interval History: NAEON, AFVSS    Cr slowly improving, UOP 1.8L in past 24h, likely polyuric phase of LILLIE. Will ctm and adjust fluids based on Cr and Na.    Hyponatremia improving with rehydration.    SOB yesterday more likely 2/2 COPD than volume status, as pt has not been using her daily maintenance inhaler (opted to use home medication as it was not on formulary), because the medication was not in the device. Will change to Spiriva, as it is available in the hospital.     Will continue ceftriaxone for pyelonephritis.    Pt had episode of hypoglycemia this morning noted on CMP. Asymptomatic. Corrected with D10 bolus (may cause transient worsening of hyponatremia). Likely 2/2 poor appetite, chronically low PO intake of substantive foods and daily longstanding etoh use resulting in decreased glycogen stores.     Review of Systems   Constitutional:  Negative for  activity change, appetite change, chills, fatigue and fever.   HENT:  Negative for congestion and sore throat.    Eyes:  Negative for pain and visual disturbance.   Respiratory:  Negative for cough, chest tightness and shortness of breath.    Cardiovascular:  Negative for chest pain, palpitations and leg swelling.   Gastrointestinal:  Negative for abdominal distention, abdominal pain, constipation, diarrhea, nausea and vomiting.   Genitourinary:  Negative for decreased urine volume, difficulty urinating, dysuria and hematuria.   Musculoskeletal:  Negative for arthralgias and back pain.   Skin:  Negative for pallor and rash.   Neurological:  Positive for weakness. Negative for dizziness, syncope, light-headedness and headaches.   Psychiatric/Behavioral:  Negative for agitation and confusion.      Objective:     Vital Signs (Most Recent):  Temp: 96.9 °F (36.1 °C) (10/29/23 1200)  Pulse: 85 (10/29/23 1200)  Resp: 18 (10/29/23 1200)  BP: 115/63 (10/29/23 1200)  SpO2: 97 % (10/29/23 1200) Vital Signs (24h Range):  Temp:  [96.9 °F (36.1 °C)-97.9 °F (36.6 °C)] 96.9 °F (36.1 °C)  Pulse:  [82-92] 85  Resp:  [18] 18  SpO2:  [92 %-97 %] 97 %  BP: (114-134)/(62-68) 115/63     Weight: 61.2 kg (135 lb)  Body mass index is 24.69 kg/m².    Intake/Output Summary (Last 24 hours) at 10/29/2023 1340  Last data filed at 10/29/2023 0513  Gross per 24 hour   Intake 180 ml   Output 2750 ml   Net -2570 ml         Physical Exam  Vitals reviewed.   Constitutional:       General: She is not in acute distress.     Appearance: Normal appearance.      Comments: 2L NC   HENT:      Head: Normocephalic and atraumatic.      Mouth/Throat:      Mouth: Mucous membranes are moist.      Pharynx: Oropharynx is clear.   Eyes:      Pupils: Pupils are equal, round, and reactive to light.   Cardiovascular:      Rate and Rhythm: Normal rate and regular rhythm.   Pulmonary:      Effort: Pulmonary effort is normal. No respiratory distress.      Breath sounds:  Normal breath sounds.      Comments: Decreased breath sounds all lung fields  Abdominal:      General: There is no distension.      Palpations: Abdomen is soft.      Tenderness: There is no abdominal tenderness.   Musculoskeletal:         General: No swelling or tenderness. Normal range of motion.   Skin:     General: Skin is warm and dry.   Neurological:      General: No focal deficit present.      Mental Status: She is alert and oriented to person, place, and time. Mental status is at baseline.      Motor: Weakness present.             Significant Labs: All pertinent labs within the past 24 hours have been reviewed.  CBC:   Recent Labs   Lab 10/28/23  0306 10/29/23  0515   WBC 8.24 9.41   HGB 10.8* 10.7*   HCT 31.1* 32.5*    236     CMP:   Recent Labs   Lab 10/27/23  1629 10/28/23  0306 10/29/23  0515   * 132* 133*   K 3.7 3.3* 4.2   CL 89* 95 97   CO2 21* 21* 20*   GLU 70 71 49*   BUN 85* 82* 82*   CREATININE 4.6* 4.2* 4.0*   CALCIUM 7.6* 7.6* 8.1*   PROT  --  5.6* 5.9*   ALBUMIN  --  2.0* 2.0*   BILITOT  --  0.2 0.3   ALKPHOS  --  160* 171*   AST  --  28 32   ALT  --  15 18   ANIONGAP 17* 16 16       Significant Imaging: I have reviewed all pertinent imaging results/findings within the past 24 hours.      Assessment/Plan:      * LILLIE (acute kidney injury)  Creatinine 5.2 on admit, baseline around 1.1.  - Minimal UOP over the last few days. Decreased PO intake. Increased NSAID use recently 2/2 fall and rib injury. Hypotensive on arrival to OSH ED with sBP of 89. Known dx of pyelonephritis, without hydronephrosis. POCUS with IVC showing near complete collapse on sniff test, intravascularly depleted. Based on this it is likely mixed etiology of pre-renal and intrinsic.    10/27: Receiving continuous fluids at 100cc/hr, Cr improving, now at 4.6. UOP improving. Electrolytes/hyponatremia improving. Will CTM    10/29: holding fluids per nephrology recommendations this morning as Cr, Na, UOP are improving.  Will recheck RFP this evening and adjust fluids based on Cr and Na. Restart 1/2NS at 1cc/kg/hr if Cr is stable or increasing and Na is dropping.      Plan:   - No evidence of hydronephrosis on CTAP  - POCUS with near complete collapse of IVC on sniff test.  - Strict I&Os and daily weights   - Gentle IVF   - currently running at 100ml/hr   - will continue to assess volume status   - Continue to monitor for hyperchloremic metabolic acidosis and transition IVF to LR  - Avoid nephrotoxic agents such as NSAIDs, gadolinium and IV radiocontrast  - Renally dose meds to current GFR  - Maintain MAP > 65    Hyponatremia  Improving    Patient has hyponatremia which is uncontrolled,We will aim to correct the sodium by 4-6mEq in 24 hours. We will monitor sodium Every 12 hours. The hyponatremia is most likely due to Dehydration/hypovolemia, as it has slowly improved with IV fluids today. We will obtain the following studies: Urine sodium, urine osmolality, serum osmolality. We will treat the hyponatremia with IV fluids as follows: NS. The patient's sodium results have been reviewed and are listed below.  Recent Labs   Lab 10/29/23  0515   *       Urine Osm elevated, Serum Osm elevated. Serum Osm gap slightly elevated also, some initial concern for ingestion of atypical alcohol. Pt denies consumption of unknown substance. Labs pending. Most likely is increased gap in severe oliguric LILLIE that resulted in poor clearance/buildup of osmotic substances in serum.    Emphysema/COPD  Chronic condition, 2/2 heavy tobacco use. Pt currently smoking ~1ppd, but used to smoke ~2ppd. On 3L NC at home 24h/day, has required up to 5L NC intermittently during the past few days. Uses tiotropium-oladaterol and takes montelukast daily. Albuterol inhaler prn    SOB yesterday 10/28 more likely 2/2 COPD than volume status, as pt has not been using her daily maintenance inhaler (opted to use home medication as it was not on formulary), because the  "medication was not in the device. Will change to Spiriva, as it is available in the hospital.     Plan  - Spiriva qd  - albuterol prn      Alcohol use  Pt endorses drinking a half-pint to a pint of tequila every day. Last drink was a few days ago. No evidence of acute withdrawal on admission. Will CTM and adjust medications as needed    Plan  - Ativan for CIWA >8      Hypothyroid  Most recent TSH 2.6. Will not repeat during acute illness    Plan:  - continue home synthroid    Pyelonephritis  CT showing "moderate left sided perinephric fat stranding may be infectious or inflammatory." UA with 75 WBC, many bacteria, 3+ leukocytes. No leukocytosis, afebrile on admission.      Plan:   - Ucx with E.Coli, on Ceftriaxone     NSTEMI (non-ST elevated myocardial infarction)  3 day hx of substernal CP exacerbated by exertion and accompanied by worsening MENA. EKG with inverted T waves, seen in previous. Troponin 1.691 ->1.516 ->1.080 -> 0.876. BNP elevated to 2260. FREDA score: 5. D/w cardiology, most likely demand ischemia iso sepsis/hotn, compounded by current inability to renally excrete these compounds, however given her story and inability to undergo LHC at the moment 2/2 LILLIE, ACS protocol initiated. Plavix and ASA loaded, restarted on heparin gtt.    10/27: d/c heparin gtt. continuing on DAPT  Trop peak 1.691    Plan:  - Consult cardiology    - Completed 48 hour ACS protocol   - no LHC right now 2/2 LILLIE   - Continue ASA 81mg/Plavix 75mg  - Atorvastatin 40mg  - start lopressor 12.5mg bid    Increased anion gap metabolic acidosis  Bicarb 20, AG 22 on admission. Etiology unclear. Most likely cause is acute increase in uremia, 72 on admission. Lactic acid wnl on [place] [facility] for urine ketones..     Gap improving with fluid resuscitation.     Plan  - Consult nephrology  - IV fluid resuscitation.   - CTM    Anxiety  Chronic and stable. States anxiety has been worse since in the hospital. Will hold home Klonopin, " Buspirone iso prolonged QTc and LILLIE    Plan:  - Xanax prn for anxiety          VTE Risk Mitigation (From admission, onward)         Ordered     heparin (porcine) injection 5,000 Units  Every 8 hours         10/27/23 1351     IP VTE LOW RISK PATIENT  Once         10/26/23 1123     Place sequential compression device  Until discontinued         10/26/23 1123                Discharge Planning   CHLOE: 10/30/2023     Code Status: Full Code   Is the patient medically ready for discharge?: No    Reason for patient still in hospital (select all that apply): Patient trending condition, Treatment and Consult recommendations  Discharge Plan A: Home Health                  Ricky Sherman MD  Department of Hospital Medicine   [facility]

## 2023-10-29 NOTE — ASSESSMENT & PLAN NOTE
Chronic condition, 2/2 heavy tobacco use. Pt currently smoking ~1ppd, but used to smoke ~2ppd. On 3L NC at home 24h/day, has required up to 5L NC intermittently during the past few days. Uses tiotropium-oladaterol and takes montelukast daily. Albuterol inhaler prn    SOB yesterday 10/28 more likely 2/2 COPD than volume status, as pt has not been using her daily maintenance inhaler (opted to use home medication as it was not on formulary), because the medication was not in the device. Will change to Spiriva, as it is available in the hospital.     Plan  - Spiriva qd  - albuterol prn

## 2023-10-29 NOTE — SUBJECTIVE & OBJECTIVE
Interval History: NAEON, AFVSS    Cr slowly improving, UOP 1.8L in past 24h, likely polyuric phase of LILLIE. Will ctm and adjust fluids based on Cr and Na.    Hyponatremia improving with rehydration.    SOB yesterday more likely 2/2 COPD than volume status, as pt has not been using her daily maintenance inhaler (opted to use home medication as it was not on formulary), because the medication was not in the device. Will change to Spiriva, as it is available in the hospital.     Will continue ceftriaxone for pyelonephritis.    Pt had episode of hypoglycemia this morning noted on CMP. Asymptomatic. Corrected with D10 bolus (may cause transient worsening of hyponatremia). Likely 2/2 poor appetite, chronically low PO intake of substantive foods and daily longstanding etoh use resulting in decreased glycogen stores.     Review of Systems   Constitutional:  Negative for activity change, appetite change, chills, fatigue and fever.   HENT:  Negative for congestion and sore throat.    Eyes:  Negative for pain and visual disturbance.   Respiratory:  Negative for cough, chest tightness and shortness of breath.    Cardiovascular:  Negative for chest pain, palpitations and leg swelling.   Gastrointestinal:  Negative for abdominal distention, abdominal pain, constipation, diarrhea, nausea and vomiting.   Genitourinary:  Negative for decreased urine volume, difficulty urinating, dysuria and hematuria.   Musculoskeletal:  Negative for arthralgias and back pain.   Skin:  Negative for pallor and rash.   Neurological:  Positive for weakness. Negative for dizziness, syncope, light-headedness and headaches.   Psychiatric/Behavioral:  Negative for agitation and confusion.      Objective:     Vital Signs (Most Recent):  Temp: 96.9 °F (36.1 °C) (10/29/23 1200)  Pulse: 85 (10/29/23 1200)  Resp: 18 (10/29/23 1200)  BP: 115/63 (10/29/23 1200)  SpO2: 97 % (10/29/23 1200) Vital Signs (24h Range):  Temp:  [96.9 °F (36.1 °C)-97.9 °F (36.6 °C)] 96.9  °F (36.1 °C)  Pulse:  [82-92] 85  Resp:  [18] 18  SpO2:  [92 %-97 %] 97 %  BP: (114-134)/(62-68) 115/63     Weight: 61.2 kg (135 lb)  Body mass index is 24.69 kg/m².    Intake/Output Summary (Last 24 hours) at 10/29/2023 1340  Last data filed at 10/29/2023 0513  Gross per 24 hour   Intake 180 ml   Output 2750 ml   Net -2570 ml         Physical Exam  Vitals reviewed.   Constitutional:       General: She is not in acute distress.     Appearance: Normal appearance.      Comments: 2L NC   HENT:      Head: Normocephalic and atraumatic.      Mouth/Throat:      Mouth: Mucous membranes are moist.      Pharynx: Oropharynx is clear.   Eyes:      Pupils: Pupils are equal, round, and reactive to light.   Cardiovascular:      Rate and Rhythm: Normal rate and regular rhythm.   Pulmonary:      Effort: Pulmonary effort is normal. No respiratory distress.      Breath sounds: Normal breath sounds.      Comments: Decreased breath sounds all lung fields  Abdominal:      General: There is no distension.      Palpations: Abdomen is soft.      Tenderness: There is no abdominal tenderness.   Musculoskeletal:         General: No swelling or tenderness. Normal range of motion.   Skin:     General: Skin is warm and dry.   Neurological:      General: No focal deficit present.      Mental Status: She is alert and oriented to person, place, and time. Mental status is at baseline.      Motor: Weakness present.             Significant Labs: All pertinent labs within the past 24 hours have been reviewed.  CBC:   Recent Labs   Lab 10/28/23  0306 10/29/23  0515   WBC 8.24 9.41   HGB 10.8* 10.7*   HCT 31.1* 32.5*    236     CMP:   Recent Labs   Lab 10/27/23  1629 10/28/23  0306 10/29/23  0515   * 132* 133*   K 3.7 3.3* 4.2   CL 89* 95 97   CO2 21* 21* 20*   GLU 70 71 49*   BUN 85* 82* 82*   CREATININE 4.6* 4.2* 4.0*   CALCIUM 7.6* 7.6* 8.1*   PROT  --  5.6* 5.9*   ALBUMIN  --  2.0* 2.0*   BILITOT  --  0.2 0.3   ALKPHOS  --  160* 171*    AST  --  28 32   ALT  --  15 18   ANIONGAP 17* 16 16       Significant Imaging: I have reviewed all pertinent imaging results/findings within the past 24 hours.

## 2023-10-30 PROBLEM — N17.0 ATN (ACUTE TUBULAR NECROSIS): Status: ACTIVE | Noted: 2023-10-30

## 2023-10-30 PROBLEM — R80.9 PROTEINURIA: Status: ACTIVE | Noted: 2023-10-30

## 2023-10-30 LAB
ALBUMIN SERPL BCP-MCNC: 2 G/DL (ref 3.5–5.2)
ALBUMIN SERPL BCP-MCNC: 2 G/DL (ref 3.5–5.2)
ALBUMIN SERPL BCP-MCNC: 2.2 G/DL (ref 3.5–5.2)
ALP SERPL-CCNC: 171 U/L (ref 55–135)
ALT SERPL W/O P-5'-P-CCNC: 17 U/L (ref 10–44)
ANION GAP SERPL CALC-SCNC: 14 MMOL/L (ref 8–16)
ANION GAP SERPL CALC-SCNC: 15 MMOL/L (ref 8–16)
ANION GAP SERPL CALC-SCNC: 17 MMOL/L (ref 8–16)
APTT PPP: 32.5 SEC (ref 21–32)
AST SERPL-CCNC: 25 U/L (ref 10–40)
BASOPHILS # BLD AUTO: 0.02 K/UL (ref 0–0.2)
BASOPHILS NFR BLD: 0.2 % (ref 0–1.9)
BILIRUB SERPL-MCNC: 0.3 MG/DL (ref 0.1–1)
BUN SERPL-MCNC: 62 MG/DL (ref 8–23)
BUN SERPL-MCNC: 69 MG/DL (ref 8–23)
BUN SERPL-MCNC: 69 MG/DL (ref 8–23)
C3 SERPL-MCNC: 159 MG/DL (ref 50–180)
C4 SERPL-MCNC: 35 MG/DL (ref 11–44)
CALCIUM SERPL-MCNC: 8.4 MG/DL (ref 8.7–10.5)
CALCIUM SERPL-MCNC: 8.5 MG/DL (ref 8.7–10.5)
CALCIUM SERPL-MCNC: 8.9 MG/DL (ref 8.7–10.5)
CHLORIDE SERPL-SCNC: 95 MMOL/L (ref 95–110)
CHLORIDE SERPL-SCNC: 97 MMOL/L (ref 95–110)
CHLORIDE SERPL-SCNC: 97 MMOL/L (ref 95–110)
CO2 SERPL-SCNC: 22 MMOL/L (ref 23–29)
CO2 SERPL-SCNC: 22 MMOL/L (ref 23–29)
CO2 SERPL-SCNC: 23 MMOL/L (ref 23–29)
CREAT SERPL-MCNC: 2.9 MG/DL (ref 0.5–1.4)
CREAT SERPL-MCNC: 3.3 MG/DL (ref 0.5–1.4)
CREAT SERPL-MCNC: 3.5 MG/DL (ref 0.5–1.4)
DIFFERENTIAL METHOD: ABNORMAL
EOSINOPHIL # BLD AUTO: 0.1 K/UL (ref 0–0.5)
EOSINOPHIL NFR BLD: 1 % (ref 0–8)
ERYTHROCYTE [DISTWIDTH] IN BLOOD BY AUTOMATED COUNT: 14 % (ref 11.5–14.5)
ERYTHROCYTE [SEDIMENTATION RATE] IN BLOOD BY PHOTOMETRIC METHOD: 102 MM/HR (ref 0–36)
EST. GFR  (NO RACE VARIABLE): 13.6 ML/MIN/1.73 M^2
EST. GFR  (NO RACE VARIABLE): 14.6 ML/MIN/1.73 M^2
EST. GFR  (NO RACE VARIABLE): 17.1 ML/MIN/1.73 M^2
GLUCOSE SERPL-MCNC: 75 MG/DL (ref 70–110)
GLUCOSE SERPL-MCNC: 92 MG/DL (ref 70–110)
GLUCOSE SERPL-MCNC: 98 MG/DL (ref 70–110)
HCT VFR BLD AUTO: 33.5 % (ref 37–48.5)
HGB BLD-MCNC: 11.2 G/DL (ref 12–16)
IMM GRANULOCYTES # BLD AUTO: 0.12 K/UL (ref 0–0.04)
IMM GRANULOCYTES NFR BLD AUTO: 1.1 % (ref 0–0.5)
LYMPHOCYTES # BLD AUTO: 0.9 K/UL (ref 1–4.8)
LYMPHOCYTES NFR BLD: 7.7 % (ref 18–48)
MAGNESIUM SERPL-MCNC: 1.5 MG/DL (ref 1.6–2.6)
MCH RBC QN AUTO: 32.7 PG (ref 27–31)
MCHC RBC AUTO-ENTMCNC: 33.4 G/DL (ref 32–36)
MCV RBC AUTO: 98 FL (ref 82–98)
MONOCYTES # BLD AUTO: 0.6 K/UL (ref 0.3–1)
MONOCYTES NFR BLD: 5.2 % (ref 4–15)
NEUTROPHILS # BLD AUTO: 9.5 K/UL (ref 1.8–7.7)
NEUTROPHILS NFR BLD: 84.8 % (ref 38–73)
NRBC BLD-RTO: 0 /100 WBC
PHOSPHATE SERPL-MCNC: 4.8 MG/DL (ref 2.7–4.5)
PHOSPHATE SERPL-MCNC: 5 MG/DL (ref 2.7–4.5)
PHOSPHATE SERPL-MCNC: 5.3 MG/DL (ref 2.7–4.5)
PLATELET # BLD AUTO: 257 K/UL (ref 150–450)
PMV BLD AUTO: 11.2 FL (ref 9.2–12.9)
POTASSIUM SERPL-SCNC: 3.3 MMOL/L (ref 3.5–5.1)
POTASSIUM SERPL-SCNC: 3.6 MMOL/L (ref 3.5–5.1)
POTASSIUM SERPL-SCNC: 3.6 MMOL/L (ref 3.5–5.1)
PROT SERPL-MCNC: 5.9 G/DL (ref 6–8.4)
RBC # BLD AUTO: 3.43 M/UL (ref 4–5.4)
SODIUM SERPL-SCNC: 133 MMOL/L (ref 136–145)
SODIUM SERPL-SCNC: 134 MMOL/L (ref 136–145)
SODIUM SERPL-SCNC: 135 MMOL/L (ref 136–145)
WBC # BLD AUTO: 11.24 K/UL (ref 3.9–12.7)

## 2023-10-30 PROCEDURE — 27000221 HC OXYGEN, UP TO 24 HOURS

## 2023-10-30 PROCEDURE — 94761 N-INVAS EAR/PLS OXIMETRY MLT: CPT

## 2023-10-30 PROCEDURE — 99233 PR SUBSEQUENT HOSPITAL CARE,LEVL III: ICD-10-PCS | Mod: ,,, | Performed by: INTERNAL MEDICINE

## 2023-10-30 PROCEDURE — 93010 ELECTROCARDIOGRAM REPORT: CPT | Mod: ,,, | Performed by: INTERNAL MEDICINE

## 2023-10-30 PROCEDURE — 80069 RENAL FUNCTION PANEL: CPT

## 2023-10-30 PROCEDURE — 83735 ASSAY OF MAGNESIUM: CPT

## 2023-10-30 PROCEDURE — 85025 COMPLETE CBC W/AUTO DIFF WBC: CPT

## 2023-10-30 PROCEDURE — 86160 COMPLEMENT ANTIGEN: CPT | Mod: 59

## 2023-10-30 PROCEDURE — 83521 IG LIGHT CHAINS FREE EACH: CPT | Mod: 59

## 2023-10-30 PROCEDURE — 63600175 PHARM REV CODE 636 W HCPCS: Performed by: INTERNAL MEDICINE

## 2023-10-30 PROCEDURE — 25000003 PHARM REV CODE 250

## 2023-10-30 PROCEDURE — 84100 ASSAY OF PHOSPHORUS: CPT

## 2023-10-30 PROCEDURE — 97116 GAIT TRAINING THERAPY: CPT

## 2023-10-30 PROCEDURE — 99233 SBSQ HOSP IP/OBS HIGH 50: CPT | Mod: ,,, | Performed by: INTERNAL MEDICINE

## 2023-10-30 PROCEDURE — 94640 AIRWAY INHALATION TREATMENT: CPT

## 2023-10-30 PROCEDURE — 11000001 HC ACUTE MED/SURG PRIVATE ROOM

## 2023-10-30 PROCEDURE — 93005 ELECTROCARDIOGRAM TRACING: CPT

## 2023-10-30 PROCEDURE — 86036 ANCA SCREEN EACH ANTIBODY: CPT | Mod: 59

## 2023-10-30 PROCEDURE — 86038 ANTINUCLEAR ANTIBODIES: CPT

## 2023-10-30 PROCEDURE — 93010 EKG 12-LEAD: ICD-10-PCS | Mod: ,,, | Performed by: INTERNAL MEDICINE

## 2023-10-30 PROCEDURE — 25000003 PHARM REV CODE 250: Performed by: INTERNAL MEDICINE

## 2023-10-30 PROCEDURE — 85730 THROMBOPLASTIN TIME PARTIAL: CPT | Performed by: INTERNAL MEDICINE

## 2023-10-30 PROCEDURE — 36415 COLL VENOUS BLD VENIPUNCTURE: CPT | Performed by: INTERNAL MEDICINE

## 2023-10-30 PROCEDURE — 97161 PT EVAL LOW COMPLEX 20 MIN: CPT

## 2023-10-30 PROCEDURE — 97530 THERAPEUTIC ACTIVITIES: CPT

## 2023-10-30 PROCEDURE — 86160 COMPLEMENT ANTIGEN: CPT

## 2023-10-30 PROCEDURE — 63600175 PHARM REV CODE 636 W HCPCS

## 2023-10-30 PROCEDURE — 99900035 HC TECH TIME PER 15 MIN (STAT)

## 2023-10-30 PROCEDURE — 25000242 PHARM REV CODE 250 ALT 637 W/ HCPCS

## 2023-10-30 PROCEDURE — 36415 COLL VENOUS BLD VENIPUNCTURE: CPT

## 2023-10-30 PROCEDURE — 80053 COMPREHEN METABOLIC PANEL: CPT

## 2023-10-30 PROCEDURE — 85652 RBC SED RATE AUTOMATED: CPT

## 2023-10-30 PROCEDURE — S4991 NICOTINE PATCH NONLEGEND: HCPCS

## 2023-10-30 RX ORDER — SODIUM CHLORIDE, SODIUM LACTATE, POTASSIUM CHLORIDE, CALCIUM CHLORIDE 600; 310; 30; 20 MG/100ML; MG/100ML; MG/100ML; MG/100ML
INJECTION, SOLUTION INTRAVENOUS CONTINUOUS
Status: ACTIVE | OUTPATIENT
Start: 2023-10-30 | End: 2023-10-31

## 2023-10-30 RX ORDER — FLUTICASONE FUROATE AND VILANTEROL 100; 25 UG/1; UG/1
1 POWDER RESPIRATORY (INHALATION) DAILY
Status: DISCONTINUED | OUTPATIENT
Start: 2023-10-30 | End: 2023-11-02 | Stop reason: HOSPADM

## 2023-10-30 RX ORDER — POLYETHYLENE GLYCOL 3350 17 G/17G
17 POWDER, FOR SOLUTION ORAL DAILY PRN
Status: DISCONTINUED | OUTPATIENT
Start: 2023-10-30 | End: 2023-11-02 | Stop reason: HOSPADM

## 2023-10-30 RX ORDER — MAGNESIUM SULFATE HEPTAHYDRATE 40 MG/ML
2 INJECTION, SOLUTION INTRAVENOUS ONCE
Status: COMPLETED | OUTPATIENT
Start: 2023-10-30 | End: 2023-10-30

## 2023-10-30 RX ADMIN — THERA TABS 1 TABLET: TAB at 09:10

## 2023-10-30 RX ADMIN — METOPROLOL TARTRATE 12.5 MG: 25 TABLET, FILM COATED ORAL at 09:10

## 2023-10-30 RX ADMIN — FLUTICASONE FUROATE AND VILANTEROL TRIFENATATE 1 PUFF: 100; 25 POWDER RESPIRATORY (INHALATION) at 12:10

## 2023-10-30 RX ADMIN — CEFTRIAXONE 2 G: 2 INJECTION, POWDER, FOR SOLUTION INTRAMUSCULAR; INTRAVENOUS at 09:10

## 2023-10-30 RX ADMIN — HEPARIN SODIUM 5000 UNITS: 5000 INJECTION INTRAVENOUS; SUBCUTANEOUS at 09:10

## 2023-10-30 RX ADMIN — POTASSIUM BICARBONATE 40 MEQ: 391 TABLET, EFFERVESCENT ORAL at 12:10

## 2023-10-30 RX ADMIN — LORAZEPAM 0.5 MG: 0.5 TABLET ORAL at 08:10

## 2023-10-30 RX ADMIN — TIOTROPIUM BROMIDE INHALATION SPRAY 2 PUFF: 3.12 SPRAY, METERED RESPIRATORY (INHALATION) at 08:10

## 2023-10-30 RX ADMIN — HEPARIN SODIUM 5000 UNITS: 5000 INJECTION INTRAVENOUS; SUBCUTANEOUS at 05:10

## 2023-10-30 RX ADMIN — BUTALBITAL, ACETAMINOPHEN, AND CAFFEINE 1 TABLET: 50; 325; 40 TABLET ORAL at 08:10

## 2023-10-30 RX ADMIN — NICOTINE 1 PATCH: 7 PATCH, EXTENDED RELEASE TRANSDERMAL at 09:10

## 2023-10-30 RX ADMIN — ATORVASTATIN CALCIUM 40 MG: 40 TABLET, FILM COATED ORAL at 09:10

## 2023-10-30 RX ADMIN — SODIUM CHLORIDE, POTASSIUM CHLORIDE, SODIUM LACTATE AND CALCIUM CHLORIDE: 600; 310; 30; 20 INJECTION, SOLUTION INTRAVENOUS at 02:10

## 2023-10-30 RX ADMIN — CLOPIDOGREL BISULFATE 75 MG: 75 TABLET ORAL at 09:10

## 2023-10-30 RX ADMIN — THIAMINE HCL TAB 100 MG 100 MG: 100 TAB at 09:10

## 2023-10-30 RX ADMIN — POTASSIUM BICARBONATE 40 MEQ: 391 TABLET, EFFERVESCENT ORAL at 09:10

## 2023-10-30 RX ADMIN — HEPARIN SODIUM 5000 UNITS: 5000 INJECTION INTRAVENOUS; SUBCUTANEOUS at 02:10

## 2023-10-30 RX ADMIN — POTASSIUM BICARBONATE 40 MEQ: 391 TABLET, EFFERVESCENT ORAL at 05:10

## 2023-10-30 RX ADMIN — MAGNESIUM SULFATE HEPTAHYDRATE 2 G: 40 INJECTION, SOLUTION INTRAVENOUS at 12:10

## 2023-10-30 RX ADMIN — LORAZEPAM 0.5 MG: 0.5 TABLET ORAL at 09:10

## 2023-10-30 RX ADMIN — METOPROLOL TARTRATE 12.5 MG: 25 TABLET, FILM COATED ORAL at 08:10

## 2023-10-30 RX ADMIN — MONTELUKAST 10 MG: 10 TABLET, FILM COATED ORAL at 06:10

## 2023-10-30 RX ADMIN — ASPIRIN 81 MG CHEWABLE TABLET 81 MG: 81 TABLET CHEWABLE at 09:10

## 2023-10-30 RX ADMIN — BUTALBITAL, ACETAMINOPHEN, AND CAFFEINE 1 TABLET: 50; 325; 40 TABLET ORAL at 09:10

## 2023-10-30 NOTE — SUBJECTIVE & OBJECTIVE
Interval History: ABDI    Review of patient's allergies indicates:  No Known Allergies  Current Facility-Administered Medications   Medication Frequency    0.45% NaCl infusion Continuous    aspirin chewable tablet 81 mg Daily    atorvastatin tablet 40 mg Daily    butalbital-acetaminophen-caffeine -40 mg per tablet 1 tablet Q12H PRN    cefTRIAXone (ROCEPHIN) 2 g in sodium chloride 0.9 % 100 mL IVPB (MB+) Daily    clopidogreL tablet 75 mg Daily    dextrose 10% bolus 125 mL 125 mL PRN    dextrose 10% bolus 250 mL 250 mL PRN    fluticasone furoate-vilanteroL 100-25 mcg/dose diskus inhaler 1 puff Daily    glucagon (human recombinant) injection 1 mg PRN    glucose chewable tablet 16 g PRN    glucose chewable tablet 24 g PRN    heparin (porcine) injection 5,000 Units Q8H    LORazepam tablet 0.5 mg Q12H PRN    LORazepam tablet 2 mg Q4H PRN    magnesium sulfate 2g in water 50mL IVPB (premix) Once    metoprolol tartrate (LOPRESSOR) split tablet 12.5 mg BID    montelukast tablet 10 mg QAM    multivitamin tablet Daily    naloxone 0.4 mg/mL injection 0.02 mg PRN    nicotine 7 mg/24 hr 1 patch Daily    polyethylene glycol packet 17 g Daily PRN    potassium bicarbonate disintegrating tablet 40 mEq Q2H    sodium chloride 0.9% flush 10 mL Q12H PRN    thiamine tablet 100 mg Daily    tiotropium bromide 2.5 mcg/actuation inhaler 2 puff Daily       Objective:     Vital Signs (Most Recent):  Temp: 98.2 °F (36.8 °C) (10/30/23 1051)  Pulse: 79 (10/30/23 1051)  Resp: 18 (10/30/23 1051)  BP: 108/71 (10/30/23 1051)  SpO2: (!) 93 % (10/30/23 1051) Vital Signs (24h Range):  Temp:  [96.9 °F (36.1 °C)-98.7 °F (37.1 °C)] 98.2 °F (36.8 °C)  Pulse:  [76-95] 79  Resp:  [16-22] 18  SpO2:  [90 %-97 %] 93 %  BP: (108-139)/(63-75) 108/71     Weight: 61.2 kg (135 lb) (10/27/23 0749)  Body mass index is 24.69 kg/m².  Body surface area is 1.64 meters squared.    I/O last 3 completed shifts:  In: 540 [P.O.:540]  Out: 5750 [Urine:5750]     Physical  Exam  Vitals and nursing note reviewed.   Constitutional:       General: She is not in acute distress.     Appearance: Normal appearance. She is not ill-appearing.   HENT:      Head: Normocephalic and atraumatic.      Right Ear: External ear normal.      Left Ear: External ear normal.      Nose: Nose normal.      Mouth/Throat:      Mouth: Mucous membranes are moist.      Pharynx: Oropharynx is clear.   Eyes:      General:         Right eye: No discharge.         Left eye: No discharge.      Conjunctiva/sclera: Conjunctivae normal.      Pupils: Pupils are equal, round, and reactive to light.   Cardiovascular:      Rate and Rhythm: Normal rate and regular rhythm.      Pulses: Normal pulses.      Heart sounds: Normal heart sounds. No murmur heard.  Pulmonary:      Effort: Pulmonary effort is normal. No respiratory distress.      Breath sounds: Normal breath sounds.      Comments: Decreased air entry at bases   On chronic oxygen   Abdominal:      General: Bowel sounds are normal. There is no distension.      Palpations: Abdomen is soft. There is no mass.      Tenderness: There is no abdominal tenderness. There is no right CVA tenderness, left CVA tenderness, guarding or rebound.      Hernia: No hernia is present.   Musculoskeletal:         General: No swelling. Normal range of motion.      Cervical back: Normal range of motion.      Right lower leg: No edema.      Left lower leg: No edema.   Skin:     General: Skin is warm and dry.      Capillary Refill: Capillary refill takes less than 2 seconds.      Coloration: Skin is not jaundiced.      Findings: No rash.   Neurological:      General: No focal deficit present.      Mental Status: She is alert and oriented to person, place, and time.   Psychiatric:         Mood and Affect: Mood normal.         Behavior: Behavior normal.          Significant Labs:  All labs within the past 24 hours have been reviewed.     Significant Imaging:  Labs: Reviewed

## 2023-10-30 NOTE — PROGRESS NOTES
[facility]  Nephrology  Progress Note    Patient Name: Bhavana Lambert  MRN: [id number]  Admission Date: 10/26/2023  Hospital Length of Stay: 4 days  Attending Provider: Vargas Guevara MD   Primary Care Physician: Pepito Jhaveri IV, MD  Principal Problem:LILLIE (acute kidney injury)    Subjective:     HPI: Ms. Bhavana Lambert is a 68 y.o. patient with past medical history of COPD on 24/7 O2 requirements and inhalers at home here. Transferred from [facility] for Nephrology evaluation. Patient had several days of fever, generalized weakness, and associated left flank pain and n/v. Refers dysuria and diarrhea. About 4 months ago, she had a fall where she broke some ribs on the left side. Patient states pain and general feeling is worse now compared to some months ago. Patient has been taking increased pain medications (naproxen). She states that the pain she feels now is similar to the pain she is been feeling for the past few months, only slightly worse. Upon admission, UA 75 WBC, Ucr 63.7. Baseline sCr 1.0.     Nephrology consulted for pyelonephritis and LILLIE.       Interval History: NAEON    Review of patient's allergies indicates:  No Known Allergies  Current Facility-Administered Medications   Medication Frequency    0.45% NaCl infusion Continuous    aspirin chewable tablet 81 mg Daily    atorvastatin tablet 40 mg Daily    butalbital-acetaminophen-caffeine -40 mg per tablet 1 tablet Q12H PRN    cefTRIAXone (ROCEPHIN) 2 g in sodium chloride 0.9 % 100 mL IVPB (MB+) Daily    clopidogreL tablet 75 mg Daily    dextrose 10% bolus 125 mL 125 mL PRN    dextrose 10% bolus 250 mL 250 mL PRN    fluticasone furoate-vilanteroL 100-25 mcg/dose diskus inhaler 1 puff Daily    glucagon (human recombinant) injection 1 mg PRN    glucose chewable tablet 16 g PRN    glucose chewable tablet 24 g PRN    heparin (porcine) injection 5,000 Units Q8H    LORazepam tablet 0.5 mg Q12H PRN    LORazepam tablet  2 mg Q4H PRN    magnesium sulfate 2g in water 50mL IVPB (premix) Once    metoprolol tartrate (LOPRESSOR) split tablet 12.5 mg BID    montelukast tablet 10 mg QAM    multivitamin tablet Daily    naloxone 0.4 mg/mL injection 0.02 mg PRN    nicotine 7 mg/24 hr 1 patch Daily    polyethylene glycol packet 17 g Daily PRN    potassium bicarbonate disintegrating tablet 40 mEq Q2H    sodium chloride 0.9% flush 10 mL Q12H PRN    thiamine tablet 100 mg Daily    tiotropium bromide 2.5 mcg/actuation inhaler 2 puff Daily       Objective:     Vital Signs (Most Recent):  Temp: 98.2 °F (36.8 °C) (10/30/23 1051)  Pulse: 79 (10/30/23 1051)  Resp: 18 (10/30/23 1051)  BP: 108/71 (10/30/23 1051)  SpO2: (!) 93 % (10/30/23 1051) Vital Signs (24h Range):  Temp:  [96.9 °F (36.1 °C)-98.7 °F (37.1 °C)] 98.2 °F (36.8 °C)  Pulse:  [76-95] 79  Resp:  [16-22] 18  SpO2:  [90 %-97 %] 93 %  BP: (108-139)/(63-75) 108/71     Weight: 61.2 kg (135 lb) (10/27/23 0749)  Body mass index is 24.69 kg/m².  Body surface area is 1.64 meters squared.    I/O last 3 completed shifts:  In: 540 [P.O.:540]  Out: 5750 [Urine:5750]     Physical Exam  Vitals and nursing note reviewed.   Constitutional:       General: She is not in acute distress.     Appearance: Normal appearance. She is not ill-appearing.   HENT:      Head: Normocephalic and atraumatic.      Right Ear: External ear normal.      Left Ear: External ear normal.      Nose: Nose normal.      Mouth/Throat:      Mouth: Mucous membranes are moist.      Pharynx: Oropharynx is clear.   Eyes:      General:         Right eye: No discharge.         Left eye: No discharge.      Conjunctiva/sclera: Conjunctivae normal.      Pupils: Pupils are equal, round, and reactive to light.   Cardiovascular:      Rate and Rhythm: Normal rate and regular rhythm.      Pulses: Normal pulses.      Heart sounds: Normal heart sounds. No murmur heard.  Pulmonary:      Effort: Pulmonary effort is normal. No respiratory  distress.      Breath sounds: Normal breath sounds.      Comments: Decreased air entry at bases   On chronic oxygen   Abdominal:      General: Bowel sounds are normal. There is no distension.      Palpations: Abdomen is soft. There is no mass.      Tenderness: There is no abdominal tenderness. There is no right CVA tenderness, left CVA tenderness, guarding or rebound.      Hernia: No hernia is present.   Musculoskeletal:         General: No swelling. Normal range of motion.      Cervical back: Normal range of motion.      Right lower leg: No edema.      Left lower leg: No edema.   Skin:     General: Skin is warm and dry.      Capillary Refill: Capillary refill takes less than 2 seconds.      Coloration: Skin is not jaundiced.      Findings: No rash.   Neurological:      General: No focal deficit present.      Mental Status: She is alert and oriented to person, place, and time.   Psychiatric:         Mood and Affect: Mood normal.         Behavior: Behavior normal.          Significant Labs:  All labs within the past 24 hours have been reviewed.     Significant Imaging:  Labs: Reviewed    Assessment/Plan:     Renal/  * LILLIE (acute kidney injury)  Patient transferred from [place] for persistent shortness of breath (on 24/7 home oxygen for COPD) and weakness. Poor oral fluid and solute intake for several days and increased PO intake of medications including naproxen (aleve). Associated episodes on n/v. Baseline sCr ~1.0. Cr downtrending. Negative for toxic alcohols     Impression   LILLIE 2/2 poor oral intake and NSAID use  Likely all leading to ischemic ATN and now in postobstructive ATN diuresis phase    Recs   - switch to LR 60 cc/hr    - Kidney US to r/o hydronephrosis    -Avoid nephrotoxins   - Patient advised not to take NSAIDS when discharged from hospital   - If no urgency for IV contrast studies then would recommend avoiding contrast for now     Proteinuria  Pt with persistent proteinuria of 2, 3+ and 2+ protein  on U/A    Recommendations:   - Complement levels - C3 + C4   - free light chains  - ESR  - ANCA  - TRACEY  - renal US           Thank you for your consult. I will follow-up with patient. Please contact us if you have any additional questions.    Erica Navarro,   Nephrology  [facility] - Med Surg

## 2023-10-30 NOTE — ASSESSMENT & PLAN NOTE
Patient transferred from [place] for persistent shortness of breath (on 24/7 home oxygen for COPD) and weakness. Poor oral fluid and solute intake for several days and increased PO intake of medications including naproxen (aleve). Associated episodes on n/v. Baseline sCr ~1.0. Cr downtrending. Negative for toxic alcohols     Impression   LILLIE 2/2 poor oral intake and NSAID use  Likely all leading to ischemic ATN and now in postobstructive ATN diuresis phase    Recs   - switch to LR 60 cc/hr    - Kidney US to r/o hydronephrosis    -Avoid nephrotoxins   - Patient advised not to take NSAIDS when discharged from hospital   - If no urgency for IV contrast studies then would recommend avoiding contrast for now

## 2023-10-30 NOTE — PLAN OF CARE
Problem: Physical Therapy  Goal: Physical Therapy Goal  Description: Goals to be met by:11/15/2023    Patient will increase functional independence with mobility by performin. Supine to sit with Supervision  2. Sit to stand transfer with Supervision using RW  3. Gait  x 25 feet with Supervision using Rolling Walker.   4. Wheelchair propulsion with patient's preferred method x 25ft with Supervision    Outcome: Ongoing, Progressing     Eval completed. Goals appropriate.

## 2023-10-30 NOTE — PLAN OF CARE
[facility]  Discharge Reassessment    Primary Care Provider: Pepito Jhaveri IV, MD    Expected Discharge Date: 10/31/2023    SW met with pt and daughter to review discharge recommendation of Home Health and is agreeable to plan.  Pt does not have a preference for HH facility.      Patient/family provided list of facilities in-network with patient's payor plan. Providers that are owned, operated, or affiliated with [facility] are included on the list.     Notified that referral sent to below listed facilities from in-network list based on proximity to home/family support:   [facility]     Patient/family instructed to identify preference.    Preferred Facility: (if more than 1, listed in order of descending preference)  No Preference-Referral sent to [facility].    If an additional preferred facility not listed above is identified, additional referral to be sent. If above facilities unable to accept, will send additional referrals to in-network providers.     Discharge Plan A and Plan B have been determined by review of patient's clinical status, future medical and therapeutic needs, and coverage/benefits for post-acute care in coordination with multidisciplinary team members.    Reassessment (most recent)       Discharge Reassessment - 10/30/23 1433          Discharge Reassessment    Assessment Type Discharge Planning Reassessment     Did the patient's condition or plan change since previous assessment? No     Discharge Plan discussed with: Patient     Communicated CHLOE with patient/caregiver Yes     Discharge Plan A Home;Home with family;Home Health;Other   24/7 supervision    Discharge Plan B Home;Home with family     DME Needed Upon Discharge  none     Transition of Care Barriers None        Post-Acute Status    Post-Acute Authorization Home Health     Home Health Status Referrals Sent     Coverage [employer]     Discharge Delays None known at this time                    Halye Mercado LMSW  PRN-  [facility]  Ext. [phone]

## 2023-10-30 NOTE — ASSESSMENT & PLAN NOTE
"CT showing "moderate left sided perinephric fat stranding may be infectious or inflammatory." UA with 75 WBC, many bacteria, 3+ leukocytes. No leukocytosis, afebrile on admission.      Plan:   - Ucx with E.Coli, on Ceftriaxone. Plan for 7 day course.  "

## 2023-10-30 NOTE — ASSESSMENT & PLAN NOTE
3 day hx of substernal CP exacerbated by exertion and accompanied by worsening MENA. EKG with inverted T waves, seen in previous. Troponin 1.691 ->1.516 ->1.080 -> 0.876. BNP elevated to 2260. FREDA score: 5. D/w cardiology, most likely demand ischemia iso sepsis/hotn, compounded by current inability to renally excrete these compounds, however given her story and inability to undergo LHC at the moment 2/2 LILLIE, ACS protocol initiated. Plavix and ASA loaded, restarted on heparin gtt.    10/27: d/c heparin gtt. continuing on DAPT  Trop peak 1.691    Plan:  - Consult cardiology    - Completed 48 hour ACS protocol   - no LHC right now 2/2 LILLIE   - Continue ASA 81mg/Plavix 75mg  - Atorvastatin 40mg  - start lopressor 12.5mg bid    Results for orders placed during the hospital encounter of 10/26/23    Echo    Interpretation Summary    Left Ventricle: The left ventricle is normal in size. Normal wall thickness. There is eccentric hypertrophy. Global hypokinesis present. There is normal systolic function. There is normal diastolic function.    Left Atrium: Left atrium is mildly dilated.    Right Ventricle: Moderate right ventricular enlargement. Wall thickness is normal. Right ventricle wall motion  is normal. Systolic function is moderately reduced.    Mitral Valve: There is mild regurgitation.    Tricuspid Valve: There is moderate regurgitation.    Pulmonary Artery: The estimated pulmonary artery systolic pressure is 48 mmHg.    IVC/SVC: Elevated venous pressure at 15 mmHg.

## 2023-10-30 NOTE — ASSESSMENT & PLAN NOTE
Chronic condition, 2/2 heavy tobacco use. Pt currently smoking ~1ppd, but used to smoke ~2ppd. On 3L NC at home 24h/day, has required up to 5L NC intermittently during the past few days. Uses tiotropium-oladaterol and takes montelukast daily. Albuterol inhaler prn    SOB yesterday 10/28 more likely 2/2 COPD than volume status, as pt has not been using her daily maintenance inhaler (opted to use home medication as it was not on formulary), because the medication was not in the device. Will change to Spiriva, as it is available in the hospital.     Plan  - Spiriva qd, breo qd  - albuterol prn

## 2023-10-30 NOTE — ASSESSMENT & PLAN NOTE
Creatinine 5.2 on admit, baseline around 1.1.  - Minimal UOP over the last few days. Decreased PO intake. Increased NSAID use recently 2/2 fall and rib injury. Hypotensive on arrival to OSH ED with sBP of 89. Known dx of pyelonephritis, without hydronephrosis. POCUS with IVC showing near complete collapse on sniff test, intravascularly depleted. Based on this it is likely mixed etiology of pre-renal and intrinsic.    10/27: Receiving continuous fluids at 100cc/hr, Cr improving, now at 4.6. UOP improving. Electrolytes/hyponatremia improving. Will CTM    10/30: improving, cr. 3.3. now in polyuric phase of LILLIE/ATN. Will d/w fluid status with nephrology, recommend continuing fluids, LR at 60cc/hr. Don't want pt to become intravascularly depleted, however pt also came in with NSTEMI.      Plan:   - No evidence of hydronephrosis on CTAP  - POCUS with near complete collapse of IVC on sniff test on day 1 of admission.  - Strict I&Os and daily weights   - Gentle IVF   - currently running at 100ml/hr   - will continue to assess volume status   - Continue to monitor for hyperchloremic metabolic acidosis and transition IVF to LR  - Avoid nephrotoxic agents such as NSAIDs, gadolinium and IV radiocontrast  - Renally dose meds to current GFR  - Maintain MAP > 65

## 2023-10-30 NOTE — SUBJECTIVE & OBJECTIVE
Interval History: NAEON, AFVSS.     Cr improving, down to 3.3 today. UOP nearly 4L in past 24h, polyuric phase of LILLIE. Has been on 1/2NS at 60cc/hr, will change to LR at the same rate. No evidence of volume overload on exam.    Hyponatremia slowly improving with volume resuscitation.     Review of Systems   Constitutional:  Negative for activity change, appetite change, chills, fatigue and fever.   HENT:  Negative for congestion and sore throat.    Eyes:  Negative for pain and visual disturbance.   Respiratory:  Negative for cough, chest tightness and shortness of breath.    Cardiovascular:  Negative for chest pain, palpitations and leg swelling.   Gastrointestinal:  Negative for abdominal distention, abdominal pain, constipation, diarrhea, nausea and vomiting.   Genitourinary:  Negative for decreased urine volume, difficulty urinating, dysuria and hematuria.   Musculoskeletal:  Negative for arthralgias and back pain.   Skin:  Negative for pallor and rash.   Neurological:  Positive for weakness. Negative for dizziness, syncope, light-headedness and headaches.   Psychiatric/Behavioral:  Negative for agitation and confusion.      Objective:     Vital Signs (Most Recent):  Temp: 98.7 °F (37.1 °C) (10/30/23 0730)  Pulse: 88 (10/30/23 0837)  Resp: 16 (10/30/23 0837)  BP: 115/75 (10/30/23 0730)  SpO2: (!) 92 % (10/30/23 0837) Vital Signs (24h Range):  Temp:  [96.9 °F (36.1 °C)-98.7 °F (37.1 °C)] 98.7 °F (37.1 °C)  Pulse:  [76-95] 88  Resp:  [16-22] 16  SpO2:  [90 %-97 %] 92 %  BP: (115-139)/(63-75) 115/75     Weight: 61.2 kg (135 lb)  Body mass index is 24.69 kg/m².    Intake/Output Summary (Last 24 hours) at 10/30/2023 0915  Last data filed at 10/30/2023 0612  Gross per 24 hour   Intake 360 ml   Output 3950 ml   Net -3590 ml         Physical Exam  Vitals reviewed.   Constitutional:       General: She is not in acute distress.     Appearance: Normal appearance.      Comments: 3L NC   HENT:      Head: Normocephalic and  atraumatic.      Mouth/Throat:      Mouth: Mucous membranes are moist.      Pharynx: Oropharynx is clear.   Eyes:      Pupils: Pupils are equal, round, and reactive to light.   Cardiovascular:      Rate and Rhythm: Normal rate and regular rhythm.   Pulmonary:      Effort: Pulmonary effort is normal. No respiratory distress.      Breath sounds: Normal breath sounds.      Comments: Decreased breath sounds all lung fields  Abdominal:      General: There is no distension.      Palpations: Abdomen is soft.      Tenderness: There is no abdominal tenderness.   Musculoskeletal:         General: No swelling or tenderness. Normal range of motion.   Skin:     General: Skin is warm and dry.   Neurological:      General: No focal deficit present.      Mental Status: She is alert and oriented to person, place, and time. Mental status is at baseline.      Motor: Weakness present.             Significant Labs: All pertinent labs within the past 24 hours have been reviewed.  CBC:   Recent Labs   Lab 10/29/23  0515 10/30/23  0525   WBC 9.41 11.24   HGB 10.7* 11.2*   HCT 32.5* 33.5*    257     CMP:   Recent Labs   Lab 10/29/23  0515 10/29/23  1646 10/29/23  2255 10/30/23  0525   * 137 133* 134*   K 4.2 3.5 3.6 3.3*   CL 97 97 97 97   CO2 20* 23 22* 22*   GLU 49* 89 98 92   BUN 82* 79* 69* 69*   CREATININE 4.0* 3.7* 3.5* 3.3*   CALCIUM 8.1* 8.4* 8.4* 8.5*   PROT 5.9*  --   --  5.9*   ALBUMIN 2.0* 2.0* 2.0* 2.0*   BILITOT 0.3  --   --  0.3   ALKPHOS 171*  --   --  171*   AST 32  --   --  25   ALT 18  --   --  17   ANIONGAP 16 17* 14 15       Significant Imaging: I have reviewed all pertinent imaging results/findings within the past 24 hours.

## 2023-10-30 NOTE — PT/OT/SLP EVAL
Physical Therapy Evaluation    Patient Name:  Bhavana Lambert   MRN:  [id number]  Admit Date: 10/26/2023  Admitting Diagnosis:  LILLIE (acute kidney injury)  Length of Stay: 4 days  Recent Surgery: * No surgery found *      Recommendations:     Discharge Recommendations:  Low Intensity Therapy   Discharge Equipment Recommendations: none       Assessment:     Bhavana Lambert is a 68 y.o. female admitted with a medical diagnosis of LILLIE (acute kidney injury). Pt found alert and cooperative. Pt primarily limited by SOB from COPD and general deconditioning. Pt was able to perform bed mobility, sit to stand transfer, and ambulate a short distance in the room with a RW and one person assistance. Will continue to work on gait stability for short distances.     Problem List: weakness, impaired endurance, impaired functional mobility, gait instability, impaired balance, impaired cardiopulmonary response to activity  Rehab Prognosis: Good; patient would benefit from acute skilled PT services to address these deficits and reach maximum level of function.      Plan:     During this hospitalization, patient to be seen 3 x/week to address the identified rehab impairments via gait training, therapeutic activities, therapeutic exercises, neuromuscular re-education and progress towards the established goals.    Plan of Care Expires:  11/29/23    Subjective   Communicated with RN prior to session.  Patient found HOB elevated upon PT entry to room, agreeable to evaluation. Bhavana Lambert's alone during session.    Chief Complaint: No chief complaint on file.    Patient/Family Comments/goals: to get better  Pain/Comfort:  Pain Rating 1: 0/10  Pain Rating Post-Intervention 1: 0/10    Living Environment:  Pt lives with daughter, son in law, and 3 grandchildren in a [facility] with no MARY LOU. Patient uses DME as follows: wheelchair. DME owned (not currently used): none.    Patient reports they will have assistance from family upon  "discharge.    Objective:   Patient found with: peripheral IV, gomez catheter, oxygen     General Precautions: Standard, Cardiac fall   Orthopedic Precautions:N/A   Braces: N/A   Oxygen Device: Nasal Cannula 3L  Vitals: /71 (Patient Position: Lying)   Pulse 79   Temp 98.2 °F (36.8 °C) (Oral)   Resp 18   Ht 5' 2" (1.575 m)   Wt 61.2 kg (135 lb)   SpO2 (!) 93%   BMI 24.69 kg/m²     Exams:  Cognition:   Alert and Cooperative  Ox4  Command following: Follows multistep  commands  Fluency: clear/fluent    RLE ROM: WFL  RLE Strength: WFL  LLE ROM: WFL  LLE Strength: WFL      Outcome Measures:  AM-PAC 6 CLICK MOBILITY  Turning over in bed (including adjusting bedclothes, sheets and blankets)?: 3  Sitting down on and standing up from a chair with arms (e.g., wheelchair, bedside commode, etc.): 3  Moving from lying on back to sitting on the side of the bed?: 3  Moving to and from a bed to a chair (including a wheelchair)?: 3  Need to walk in hospital room?: 3  Climbing 3-5 steps with a railing?: 2  Basic Mobility Total Score: 17     Functional Mobility:  Additional staff present: N/A  Bed Mobility:  Supine to Sit: stand by assistance; HOB elevated  Scooting anteriorly to EOB to have both feet planted on floor: stand by assistance  Sit to Supine: stand by assistance; HOB elevated    Sitting Balance at Edge of Bed:  Assistance Level Required: Stand-by Assistance  Time: 5 minutes  Comments:   Worked on activity tolerance sitting EOB and worked on tolerance to positional change     Transfers:   Sit <> Stand Transfer: contact guard assistance with rolling walker from EOB      Gait:   Patient ambulated: 20ft   Patient required: fluctuating between CGA-min A  Patient used: rolling walker  Gait Pattern observed: reciprocal gait  Gait Deviation(s): occasional unsteady gait, decreased step length, narrow base of support, and decreased marizol  Impairments due to: decreased endurance  Comments:   Pt demo'd increased energy " expenditure and increase SOB with ambulation. Pt self initiated pursed lip breathing during trial. Pt with missteps and LOBs that required min A to correct  External stability provided by writing therapist  Verbal cuing for pacing and purposeful steps       Therapeutic Activities, Exercises, & Education:   Educated pt on PT role/POC  Educated pt on importance of OOB activity and daily ambulation   Pt verbalized understanding         Patient left HOB elevated with all lines intact, call button in reach, and RN notified.    GOALS:   Multidisciplinary Problems       Physical Therapy Goals          Problem: Physical Therapy    Goal Priority Disciplines Outcome Goal Variances Interventions   Physical Therapy Goal     PT, PT/OT Ongoing, Progressing     Description: Goals to be met by:11/15/2023    Patient will increase functional independence with mobility by performin. Supine to sit with Supervision  2. Sit to stand transfer with Supervision using RW  3. Gait  x 25 feet with Supervision using Rolling Walker.   4. Wheelchair propulsion with patient's preferred method x 25ft with Supervision                         History:     Past Medical History:   Diagnosis Date    Acquired hypothyroidism     Anxiety     Anxiety and depression     Cervical radicular pain     Closed left ankle fracture     COPD (chronic obstructive pulmonary disease)     Hypertension     Hypokalemia     Hyponatremia     Multiple rib fractures     Requires continuous at home supplemental oxygen     PRN FOR COPD    Tension type headache     Traumatic closed displaced fracture of distal end of radius, left, sequela        Past Surgical History:   Procedure Laterality Date    COLON SURGERY      COLOSTOMY      COLOSTOMY CLOSURE      HYSTERECTOMY      LEG SURGERY Right     OPEN REDUCTION AND INTERNAL FIXATION (ORIF) OF INJURY OF WRIST Left 2019    Procedure: ORIF, WRIST;  Surgeon: Homero Jeff DO;  Location: [facility] OR;  Service: Orthopedics;   Laterality: Left;  Equipment: [employer] Geminus Wrist Plate Set  Vendor/Rep: [employer]  C-Arm: Entire  DME: None    REQUIRES ASSISTANT    WRIST SURGERY Right        Time Tracking:     PT Received On: 10/30/23  PT Start Time: 1103     PT Stop Time: 1131  PT Total Time (min): 28 min     Billable Minutes: Evaluation 5, Gait Training 15, and Therapeutic Activity 8

## 2023-10-30 NOTE — PROGRESS NOTES
"[facility] Medicine  Progress Note    Patient Name: Bhavana Lambert  MRN: [id number]  Patient Class: IP- Inpatient   Admission Date: 10/26/2023  Length of Stay: 4 days  Attending Physician: Vargas Guevara MD  Primary Care Provider: Pepito Jhaveri IV, MD        Subjective:     Principal Problem:LILLIE (acute kidney injury)        HPI:  68F with PMHx fo COPD (on 3L NC at home) presented to OSH c/o fever, SOB, sore throat, generalized weakness, and semi-chronic left-sided rib pain (hx of fall 4 months ago, broke L sided ribs, continued pain). Pt describes having intermittent centralized substernal CP that worsens with exertion. SOB also worse with exertion. Pt reports that for the last few days she has been increasingly weak and has had decreased PO intake. Describes minimal UOP over the last two days. Denies N/V/D/C, lightheadedness, BLE swelling, abd pain, dysuria, hematuria. Denies any recent sick contacts. Pt is chronic user of Aleve, but has been taking more since fall/rib fractures.     Workup at OSH ED, vitals stable, satting well on home O2. Labs significant WBC 7, hgb 11, plt 164, Na 129, K 3, BUN/Cr 71, 5.2, bicarb 20 and anion gap of 24, troponin 1.5 -> 1.08, BNP 2260. EKG with inverted T waves V1-V4, similar to prior, Qtc 625. UA grossly infectious. CTAP with L sided hydronephrosis and perinephric stranding.     On admission to [facility] pt c/o mild CP and SOB improved from yesterday. Also feeling very anxious and c/o HA. She takes daily klonopin, buspirone, and Fioricet for these problems. Additionally, pt endorses drinking between a half-pint and pint of tequila daily, denies feeling tremulous or agitated currently, not tachycardic or hypertensive, states her last drink was "a few days ago". Cardiology consulted will start on ASA, plavix, heparin gtt. Nephrology consulted, will f/u recs.      Overview/Hospital Course:  68F with PMHx fo COPD (on 3L NC at home) presented to OSH c/o fever, " SOB, sore throat, generalized weakness, and semi-chronic left-sided rib pain (hx of fall 4 months ago, broke L sided ribs, continued pain). Admitted to OSH and transferred to [facility] for severe oliguric LILLIE, UTI, and NSTEMI. Pt Cr 5.2 on admission, BNP 2200, and Trop 1.5 initially and down-trended to 0.9. D/w nephrology, who recommended fluid resuscitation with NS, which was given on day 1 of admission with small decrease in Cr to 5.0, however BUN climbed to 90. Additionally, now hyponatremic to 124. Will continue fluids with NS and eliminate any D5W. Pt also found with serum osm gap, denied drinking any homemade alcohol or ingesting any unknown substances, will f/u labs. D/w cardiology who recommended ACS protocol, pt loaded on ASA, plavix and put on heparin gtt. TTE with eccentric hypertrophy and global hypokinesis, nl systolic function and wall motion abnormality noted. Will transition to DAPT, will need LHC when kidneys recover. Ucx positive for E coli, transitioned from cefepime to ceftriaxone. Kidneys continue to improve with gentle rehydration, now in polyuric phase of LILLIE.       Interval History: NAEON, AFVSS.     Cr improving, down to 3.3 today. UOP nearly 4L in past 24h, polyuric phase of LILLIE. Has been on 1/2NS at 60cc/hr, will change to LR at the same rate. No evidence of volume overload on exam.    Hyponatremia slowly improving with volume resuscitation.     Review of Systems   Constitutional:  Negative for activity change, appetite change, chills, fatigue and fever.   HENT:  Negative for congestion and sore throat.    Eyes:  Negative for pain and visual disturbance.   Respiratory:  Negative for cough, chest tightness and shortness of breath.    Cardiovascular:  Negative for chest pain, palpitations and leg swelling.   Gastrointestinal:  Negative for abdominal distention, abdominal pain, constipation, diarrhea, nausea and vomiting.   Genitourinary:  Negative for decreased urine volume, difficulty urinating,  dysuria and hematuria.   Musculoskeletal:  Negative for arthralgias and back pain.   Skin:  Negative for pallor and rash.   Neurological:  Positive for weakness. Negative for dizziness, syncope, light-headedness and headaches.   Psychiatric/Behavioral:  Negative for agitation and confusion.      Objective:     Vital Signs (Most Recent):  Temp: 98.7 °F (37.1 °C) (10/30/23 0730)  Pulse: 88 (10/30/23 0837)  Resp: 16 (10/30/23 0837)  BP: 115/75 (10/30/23 0730)  SpO2: (!) 92 % (10/30/23 0837) Vital Signs (24h Range):  Temp:  [96.9 °F (36.1 °C)-98.7 °F (37.1 °C)] 98.7 °F (37.1 °C)  Pulse:  [76-95] 88  Resp:  [16-22] 16  SpO2:  [90 %-97 %] 92 %  BP: (115-139)/(63-75) 115/75     Weight: 61.2 kg (135 lb)  Body mass index is 24.69 kg/m².    Intake/Output Summary (Last 24 hours) at 10/30/2023 0915  Last data filed at 10/30/2023 0612  Gross per 24 hour   Intake 360 ml   Output 3950 ml   Net -3590 ml         Physical Exam  Vitals reviewed.   Constitutional:       General: She is not in acute distress.     Appearance: Normal appearance.      Comments: 3L NC   HENT:      Head: Normocephalic and atraumatic.      Mouth/Throat:      Mouth: Mucous membranes are moist.      Pharynx: Oropharynx is clear.   Eyes:      Pupils: Pupils are equal, round, and reactive to light.   Cardiovascular:      Rate and Rhythm: Normal rate and regular rhythm.   Pulmonary:      Effort: Pulmonary effort is normal. No respiratory distress.      Breath sounds: Normal breath sounds.      Comments: Decreased breath sounds all lung fields  Abdominal:      General: There is no distension.      Palpations: Abdomen is soft.      Tenderness: There is no abdominal tenderness.   Musculoskeletal:         General: No swelling or tenderness. Normal range of motion.   Skin:     General: Skin is warm and dry.   Neurological:      General: No focal deficit present.      Mental Status: She is alert and oriented to person, place, and time. Mental status is at baseline.       Motor: Weakness present.             Significant Labs: All pertinent labs within the past 24 hours have been reviewed.  CBC:   Recent Labs   Lab 10/29/23  0515 10/30/23  0525   WBC 9.41 11.24   HGB 10.7* 11.2*   HCT 32.5* 33.5*    257     CMP:   Recent Labs   Lab 10/29/23  0515 10/29/23  1646 10/29/23  2255 10/30/23  0525   * 137 133* 134*   K 4.2 3.5 3.6 3.3*   CL 97 97 97 97   CO2 20* 23 22* 22*   GLU 49* 89 98 92   BUN 82* 79* 69* 69*   CREATININE 4.0* 3.7* 3.5* 3.3*   CALCIUM 8.1* 8.4* 8.4* 8.5*   PROT 5.9*  --   --  5.9*   ALBUMIN 2.0* 2.0* 2.0* 2.0*   BILITOT 0.3  --   --  0.3   ALKPHOS 171*  --   --  171*   AST 32  --   --  25   ALT 18  --   --  17   ANIONGAP 16 17* 14 15       Significant Imaging: I have reviewed all pertinent imaging results/findings within the past 24 hours.      Assessment/Plan:      * LILLIE (acute kidney injury)  Creatinine 5.2 on admit, baseline around 1.1.  - Minimal UOP over the last few days. Decreased PO intake. Increased NSAID use recently 2/2 fall and rib injury. Hypotensive on arrival to OSH ED with sBP of 89. Known dx of pyelonephritis, without hydronephrosis. POCUS with IVC showing near complete collapse on sniff test, intravascularly depleted. Based on this it is likely mixed etiology of pre-renal and intrinsic.    10/27: Receiving continuous fluids at 100cc/hr, Cr improving, now at 4.6. UOP improving. Electrolytes/hyponatremia improving. Will CTM    10/30: improving, cr. 3.3. now in polyuric phase of LILLIE/ATN. Will d/w fluid status with nephrology, recommend continuing fluids, LR at 60cc/hr. Don't want pt to become intravascularly depleted, however pt also came in with NSTEMI.      Plan:   - No evidence of hydronephrosis on CTAP  - POCUS with near complete collapse of IVC on sniff test on day 1 of admission.  - Strict I&Os and daily weights   - Gentle IVF   - currently running at 100ml/hr   - will continue to assess volume status   - Continue to monitor for  "hyperchloremic metabolic acidosis and transition IVF to LR  - Avoid nephrotoxic agents such as NSAIDs, gadolinium and IV radiocontrast  - Renally dose meds to current GFR  - Maintain MAP > 65    ATN (acute tubular necrosis)  See "LILLIE"    Hyponatremia  Improving    Patient has hyponatremia which is uncontrolled,We will aim to correct the sodium by 4-6mEq in 24 hours. We will monitor sodium Every 12 hours. The hyponatremia is most likely due to Dehydration/hypovolemia, as it has slowly improved with IV fluids today. We will obtain the following studies: Urine sodium, urine osmolality, serum osmolality. We will treat the hyponatremia with IV fluids as follows: NS. The patient's sodium results have been reviewed and are listed below.  Recent Labs   Lab 10/30/23  0525   *       Urine Osm elevated, Serum Osm elevated. Serum Osm gap slightly elevated also, some initial concern for ingestion of atypical alcohol. Pt denies consumption of unknown substance. Labs pending. Most likely is increased gap in severe oliguric LILLIE that resulted in poor clearance/buildup of osmotic substances in serum.    Emphysema/COPD  Chronic condition, 2/2 heavy tobacco use. Pt currently smoking ~1ppd, but used to smoke ~2ppd. On 3L NC at home 24h/day, has required up to 5L NC intermittently during the past few days. Uses tiotropium-oladaterol and takes montelukast daily. Albuterol inhaler prn    SOB yesterday 10/28 more likely 2/2 COPD than volume status, as pt has not been using her daily maintenance inhaler (opted to use home medication as it was not on formulary), because the medication was not in the device. Will change to Spiriva, as it is available in the hospital.     Plan  - Spiriva qd, breo qd  - albuterol prn      Alcohol use  Pt endorses drinking a half-pint to a pint of tequila every day. Last drink was a few days ago. No evidence of acute withdrawal on admission. Will CTM and adjust medications as needed    Plan  - Ativan for " "CIWA >8      Hypothyroid  Most recent TSH 2.6. Will not repeat during acute illness    Plan:  - continue home synthroid    Pyelonephritis  CT showing "moderate left sided perinephric fat stranding may be infectious or inflammatory." UA with 75 WBC, many bacteria, 3+ leukocytes. No leukocytosis, afebrile on admission.      Plan:   - Ucx with E.Coli, on Ceftriaxone. Plan for 7 day course.    NSTEMI (non-ST elevated myocardial infarction)  3 day hx of substernal CP exacerbated by exertion and accompanied by worsening MENA. EKG with inverted T waves, seen in previous. Troponin 1.691 ->1.516 ->1.080 -> 0.876. BNP elevated to 2260. FREDA score: 5. D/w cardiology, most likely demand ischemia iso sepsis/hotn, compounded by current inability to renally excrete these compounds, however given her story and inability to undergo LHC at the moment 2/2 LILLIE, ACS protocol initiated. Plavix and ASA loaded, restarted on heparin gtt.    10/27: d/c heparin gtt. continuing on DAPT  Trop peak 1.691    Plan:  - Consult cardiology    - Completed 48 hour ACS protocol   - no LHC right now 2/2 LILLIE   - Continue ASA 81mg/Plavix 75mg  - Atorvastatin 40mg  - start lopressor 12.5mg bid    Results for orders placed during the hospital encounter of 10/26/23    Echo    Interpretation Summary    Left Ventricle: The left ventricle is normal in size. Normal wall thickness. There is eccentric hypertrophy. Global hypokinesis present. There is normal systolic function. There is normal diastolic function.    Left Atrium: Left atrium is mildly dilated.    Right Ventricle: Moderate right ventricular enlargement. Wall thickness is normal. Right ventricle wall motion  is normal. Systolic function is moderately reduced.    Mitral Valve: There is mild regurgitation.    Tricuspid Valve: There is moderate regurgitation.    Pulmonary Artery: The estimated pulmonary artery systolic pressure is 48 mmHg.    IVC/SVC: Elevated venous pressure at 15 " mmHg.      Increased anion gap metabolic acidosis  Bicarb 20, AG 22 on admission. Etiology unclear. Most likely cause is acute increase in uremia, 72 on admission. Lactic acid wnl on admission to [facility], negative for urine ketones..     Gap improving with fluid resuscitation. Acidosis improving.    Plan  - Consult nephrology  - IV fluid resuscitation.   - CTM    Anxiety  Chronic and stable. States anxiety has been worse since in the hospital. Will hold home Klonopin, Buspirone iso prolonged QTc and LILLIE    Plan:  - Xanax prn for anxiety          VTE Risk Mitigation (From admission, onward)         Ordered     heparin (porcine) injection 5,000 Units  Every 8 hours         10/27/23 1351     IP VTE LOW RISK PATIENT  Once         10/26/23 1123     Place sequential compression device  Until discontinued         10/26/23 1123                Discharge Planning   CHLOE: 10/31/2023     Code Status: Full Code   Is the patient medically ready for discharge?: No    Reason for patient still in hospital (select all that apply): Patient trending condition and Consult recommendations  Discharge Plan A: Home Health                  Ricky Sherman MD  Department of Hospital Medicine   [facility]

## 2023-10-30 NOTE — ASSESSMENT & PLAN NOTE
Improving    Patient has hyponatremia which is uncontrolled,We will aim to correct the sodium by 4-6mEq in 24 hours. We will monitor sodium Every 12 hours. The hyponatremia is most likely due to Dehydration/hypovolemia, as it has slowly improved with IV fluids today. We will obtain the following studies: Urine sodium, urine osmolality, serum osmolality. We will treat the hyponatremia with IV fluids as follows: NS. The patient's sodium results have been reviewed and are listed below.  Recent Labs   Lab 10/30/23  0525   *       Urine Osm elevated, Serum Osm elevated. Serum Osm gap slightly elevated also, some initial concern for ingestion of atypical alcohol. Pt denies consumption of unknown substance. Labs pending. Most likely is increased gap in severe oliguric LILLIE that resulted in poor clearance/buildup of osmotic substances in serum.

## 2023-10-30 NOTE — ASSESSMENT & PLAN NOTE
Bicarb 20, AG 22 on admission. Etiology unclear. Most likely cause is acute increase in uremia, 72 on admission. Lactic acid wnl on admission to [facility], negative for urine ketones..     Gap improving with fluid resuscitation. Acidosis improving.    Plan  - Consult nephrology  - IV fluid resuscitation.   - CTM

## 2023-10-30 NOTE — PLAN OF CARE
Problem: Adult Inpatient Plan of Care  Goal: Plan of Care Review  Outcome: Ongoing, Progressing  Goal: Patient-Specific Goal (Individualized)  Outcome: Ongoing, Progressing  Goal: Absence of Hospital-Acquired Illness or Injury  Outcome: Ongoing, Progressing  Goal: Optimal Comfort and Wellbeing  Outcome: Ongoing, Progressing  Goal: Readiness for Transition of Care  Outcome: Ongoing, Progressing     Problem: Fluid and Electrolyte Imbalance (Acute Kidney Injury/Impairment)  Goal: Fluid and Electrolyte Balance  Outcome: Ongoing, Progressing     Problem: Renal Function Impairment (Acute Kidney Injury/Impairment)  Goal: Effective Renal Function  Outcome: Ongoing, Progressing     Problem: Infection  Goal: Absence of Infection Signs and Symptoms  Outcome: Ongoing, Progressing     Problem: Skin Injury Risk Increased  Goal: Skin Health and Integrity  Outcome: Ongoing, Progressing     Problem: Fall Injury Risk  Goal: Absence of Fall and Fall-Related Injury  Outcome: Ongoing, Progressing     Problem: Breathing Pattern Ineffective  Goal: Effective Breathing Pattern  Outcome: Ongoing, Progressing     Problem: Oral Intake Inadequate (Acute Kidney Injury/Impairment)  Goal: Optimal Nutrition Intake  Outcome: Ongoing, Not Progressing     No significant changes on our shift.

## 2023-10-30 NOTE — ASSESSMENT & PLAN NOTE
Pt with persistent proteinuria of 2, 3+ and 2+ protein on U/A    Recommendations:   - Complement levels - C3 + C4   - free light chains  - ESR  - ANCA  - TRACEY  - renal US

## 2023-10-31 LAB
ALBUMIN SERPL BCP-MCNC: 2 G/DL (ref 3.5–5.2)
ALBUMIN SERPL BCP-MCNC: 2.1 G/DL (ref 3.5–5.2)
ALP SERPL-CCNC: 167 U/L (ref 55–135)
ALT SERPL W/O P-5'-P-CCNC: 18 U/L (ref 10–44)
ANA SER QL IF: NORMAL
ANION GAP SERPL CALC-SCNC: 15 MMOL/L (ref 8–16)
ANION GAP SERPL CALC-SCNC: 15 MMOL/L (ref 8–16)
APTT PPP: 31.7 SEC (ref 21–32)
AST SERPL-CCNC: 33 U/L (ref 10–40)
BACTERIA BLD CULT: NORMAL
BACTERIA BLD CULT: NORMAL
BASOPHILS # BLD AUTO: 0.02 K/UL (ref 0–0.2)
BASOPHILS NFR BLD: 0.2 % (ref 0–1.9)
BILIRUB SERPL-MCNC: 0.3 MG/DL (ref 0.1–1)
BUN SERPL-MCNC: 42 MG/DL (ref 8–23)
BUN SERPL-MCNC: 50 MG/DL (ref 8–23)
CALCIUM SERPL-MCNC: 8.5 MG/DL (ref 8.7–10.5)
CALCIUM SERPL-MCNC: 8.8 MG/DL (ref 8.7–10.5)
CHLORIDE SERPL-SCNC: 95 MMOL/L (ref 95–110)
CHLORIDE SERPL-SCNC: 98 MMOL/L (ref 95–110)
CO2 SERPL-SCNC: 22 MMOL/L (ref 23–29)
CO2 SERPL-SCNC: 26 MMOL/L (ref 23–29)
CREAT SERPL-MCNC: 2.1 MG/DL (ref 0.5–1.4)
CREAT SERPL-MCNC: 2.4 MG/DL (ref 0.5–1.4)
DIFFERENTIAL METHOD: ABNORMAL
EOSINOPHIL # BLD AUTO: 0.2 K/UL (ref 0–0.5)
EOSINOPHIL NFR BLD: 1.3 % (ref 0–8)
ERYTHROCYTE [DISTWIDTH] IN BLOOD BY AUTOMATED COUNT: 13.6 % (ref 11.5–14.5)
EST. GFR  (NO RACE VARIABLE): 21.5 ML/MIN/1.73 M^2
EST. GFR  (NO RACE VARIABLE): 25.2 ML/MIN/1.73 M^2
GLUCOSE SERPL-MCNC: 69 MG/DL (ref 70–110)
GLUCOSE SERPL-MCNC: 88 MG/DL (ref 70–110)
HCT VFR BLD AUTO: 32.7 % (ref 37–48.5)
HGB BLD-MCNC: 10.8 G/DL (ref 12–16)
IMM GRANULOCYTES # BLD AUTO: 0.12 K/UL (ref 0–0.04)
IMM GRANULOCYTES NFR BLD AUTO: 1 % (ref 0–0.5)
KAPPA LC SER QL IA: 7.65 MG/DL (ref 0.33–1.94)
KAPPA LC/LAMBDA SER IA: 1.34 (ref 0.26–1.65)
LAMBDA LC SER QL IA: 5.72 MG/DL (ref 0.57–2.63)
LYMPHOCYTES # BLD AUTO: 1.1 K/UL (ref 1–4.8)
LYMPHOCYTES NFR BLD: 8.7 % (ref 18–48)
MAGNESIUM SERPL-MCNC: 1.9 MG/DL (ref 1.6–2.6)
MCH RBC QN AUTO: 32.2 PG (ref 27–31)
MCHC RBC AUTO-ENTMCNC: 33 G/DL (ref 32–36)
MCV RBC AUTO: 98 FL (ref 82–98)
MONOCYTES # BLD AUTO: 0.7 K/UL (ref 0.3–1)
MONOCYTES NFR BLD: 5.9 % (ref 4–15)
NEUTROPHILS # BLD AUTO: 10.2 K/UL (ref 1.8–7.7)
NEUTROPHILS NFR BLD: 82.9 % (ref 38–73)
NRBC BLD-RTO: 0 /100 WBC
PHOSPHATE SERPL-MCNC: 3.7 MG/DL (ref 2.7–4.5)
PHOSPHATE SERPL-MCNC: 4.5 MG/DL (ref 2.7–4.5)
PLATELET # BLD AUTO: 312 K/UL (ref 150–450)
PMV BLD AUTO: 10.9 FL (ref 9.2–12.9)
POCT GLUCOSE: 108 MG/DL (ref 70–110)
POTASSIUM SERPL-SCNC: 3.2 MMOL/L (ref 3.5–5.1)
POTASSIUM SERPL-SCNC: 3.5 MMOL/L (ref 3.5–5.1)
PROT SERPL-MCNC: 6 G/DL (ref 6–8.4)
RBC # BLD AUTO: 3.35 M/UL (ref 4–5.4)
SODIUM SERPL-SCNC: 135 MMOL/L (ref 136–145)
SODIUM SERPL-SCNC: 136 MMOL/L (ref 136–145)
WBC # BLD AUTO: 12.25 K/UL (ref 3.9–12.7)

## 2023-10-31 PROCEDURE — 94761 N-INVAS EAR/PLS OXIMETRY MLT: CPT

## 2023-10-31 PROCEDURE — 63600175 PHARM REV CODE 636 W HCPCS: Performed by: INTERNAL MEDICINE

## 2023-10-31 PROCEDURE — 94640 AIRWAY INHALATION TREATMENT: CPT

## 2023-10-31 PROCEDURE — 97165 OT EVAL LOW COMPLEX 30 MIN: CPT

## 2023-10-31 PROCEDURE — 25000003 PHARM REV CODE 250: Performed by: INTERNAL MEDICINE

## 2023-10-31 PROCEDURE — 36415 COLL VENOUS BLD VENIPUNCTURE: CPT

## 2023-10-31 PROCEDURE — 27000221 HC OXYGEN, UP TO 24 HOURS

## 2023-10-31 PROCEDURE — 99233 PR SUBSEQUENT HOSPITAL CARE,LEVL III: ICD-10-PCS | Mod: ,,, | Performed by: INTERNAL MEDICINE

## 2023-10-31 PROCEDURE — 97535 SELF CARE MNGMENT TRAINING: CPT

## 2023-10-31 PROCEDURE — S4991 NICOTINE PATCH NONLEGEND: HCPCS

## 2023-10-31 PROCEDURE — 25000003 PHARM REV CODE 250

## 2023-10-31 PROCEDURE — 80053 COMPREHEN METABOLIC PANEL: CPT

## 2023-10-31 PROCEDURE — 80069 RENAL FUNCTION PANEL: CPT

## 2023-10-31 PROCEDURE — 93010 ELECTROCARDIOGRAM REPORT: CPT | Mod: ,,, | Performed by: INTERNAL MEDICINE

## 2023-10-31 PROCEDURE — 85730 THROMBOPLASTIN TIME PARTIAL: CPT | Performed by: INTERNAL MEDICINE

## 2023-10-31 PROCEDURE — 97116 GAIT TRAINING THERAPY: CPT | Mod: CQ

## 2023-10-31 PROCEDURE — 85025 COMPLETE CBC W/AUTO DIFF WBC: CPT

## 2023-10-31 PROCEDURE — 93010 EKG 12-LEAD: ICD-10-PCS | Mod: ,,, | Performed by: INTERNAL MEDICINE

## 2023-10-31 PROCEDURE — 99233 SBSQ HOSP IP/OBS HIGH 50: CPT | Mod: ,,, | Performed by: INTERNAL MEDICINE

## 2023-10-31 PROCEDURE — 63600175 PHARM REV CODE 636 W HCPCS

## 2023-10-31 PROCEDURE — 93005 ELECTROCARDIOGRAM TRACING: CPT

## 2023-10-31 PROCEDURE — 83735 ASSAY OF MAGNESIUM: CPT

## 2023-10-31 PROCEDURE — 99900035 HC TECH TIME PER 15 MIN (STAT)

## 2023-10-31 PROCEDURE — 11000001 HC ACUTE MED/SURG PRIVATE ROOM

## 2023-10-31 PROCEDURE — 36415 COLL VENOUS BLD VENIPUNCTURE: CPT | Performed by: INTERNAL MEDICINE

## 2023-10-31 PROCEDURE — 84100 ASSAY OF PHOSPHORUS: CPT

## 2023-10-31 RX ORDER — MAGNESIUM SULFATE HEPTAHYDRATE 40 MG/ML
2 INJECTION, SOLUTION INTRAVENOUS ONCE
Status: COMPLETED | OUTPATIENT
Start: 2023-10-31 | End: 2023-10-31

## 2023-10-31 RX ORDER — CYCLOBENZAPRINE HCL 5 MG
5 TABLET ORAL 2 TIMES DAILY PRN
Status: DISCONTINUED | OUTPATIENT
Start: 2023-10-31 | End: 2023-11-02 | Stop reason: HOSPADM

## 2023-10-31 RX ORDER — ZINC OXIDE 20 G/100G
OINTMENT TOPICAL
Status: DISCONTINUED | OUTPATIENT
Start: 2023-10-31 | End: 2023-11-02 | Stop reason: HOSPADM

## 2023-10-31 RX ADMIN — ASPIRIN 81 MG CHEWABLE TABLET 81 MG: 81 TABLET CHEWABLE at 10:10

## 2023-10-31 RX ADMIN — HEPARIN SODIUM 5000 UNITS: 5000 INJECTION INTRAVENOUS; SUBCUTANEOUS at 08:10

## 2023-10-31 RX ADMIN — Medication 16 G: at 06:10

## 2023-10-31 RX ADMIN — HEPARIN SODIUM 5000 UNITS: 5000 INJECTION INTRAVENOUS; SUBCUTANEOUS at 06:10

## 2023-10-31 RX ADMIN — METOPROLOL TARTRATE 12.5 MG: 25 TABLET, FILM COATED ORAL at 10:10

## 2023-10-31 RX ADMIN — METOPROLOL TARTRATE 12.5 MG: 25 TABLET, FILM COATED ORAL at 08:10

## 2023-10-31 RX ADMIN — HEPARIN SODIUM 5000 UNITS: 5000 INJECTION INTRAVENOUS; SUBCUTANEOUS at 05:10

## 2023-10-31 RX ADMIN — THIAMINE HCL TAB 100 MG 100 MG: 100 TAB at 10:10

## 2023-10-31 RX ADMIN — BUTALBITAL, ACETAMINOPHEN, AND CAFFEINE 1 TABLET: 50; 325; 40 TABLET ORAL at 11:10

## 2023-10-31 RX ADMIN — CLOPIDOGREL BISULFATE 75 MG: 75 TABLET ORAL at 10:10

## 2023-10-31 RX ADMIN — TIOTROPIUM BROMIDE INHALATION SPRAY 2 PUFF: 3.12 SPRAY, METERED RESPIRATORY (INHALATION) at 09:10

## 2023-10-31 RX ADMIN — MAGNESIUM SULFATE HEPTAHYDRATE 2 G: 40 INJECTION, SOLUTION INTRAVENOUS at 06:10

## 2023-10-31 RX ADMIN — POTASSIUM BICARBONATE 40 MEQ: 391 TABLET, EFFERVESCENT ORAL at 10:10

## 2023-10-31 RX ADMIN — NICOTINE 1 PATCH: 7 PATCH, EXTENDED RELEASE TRANSDERMAL at 10:10

## 2023-10-31 RX ADMIN — CYCLOBENZAPRINE HYDROCHLORIDE 5 MG: 5 TABLET, FILM COATED ORAL at 08:10

## 2023-10-31 RX ADMIN — LORAZEPAM 0.5 MG: 0.5 TABLET ORAL at 11:10

## 2023-10-31 RX ADMIN — CEFTRIAXONE 2 G: 2 INJECTION, POWDER, FOR SOLUTION INTRAMUSCULAR; INTRAVENOUS at 10:10

## 2023-10-31 RX ADMIN — MONTELUKAST 10 MG: 10 TABLET, FILM COATED ORAL at 07:10

## 2023-10-31 RX ADMIN — SODIUM CHLORIDE, POTASSIUM CHLORIDE, SODIUM LACTATE AND CALCIUM CHLORIDE: 600; 310; 30; 20 INJECTION, SOLUTION INTRAVENOUS at 05:10

## 2023-10-31 RX ADMIN — FLUTICASONE FUROATE AND VILANTEROL TRIFENATATE 1 PUFF: 100; 25 POWDER RESPIRATORY (INHALATION) at 08:10

## 2023-10-31 RX ADMIN — THERA TABS 1 TABLET: TAB at 10:10

## 2023-10-31 RX ADMIN — ATORVASTATIN CALCIUM 40 MG: 40 TABLET, FILM COATED ORAL at 10:10

## 2023-10-31 NOTE — ASSESSMENT & PLAN NOTE
Pt with persistent proteinuria of 2, 3+ and 2+ protein on U/A.     Concern for membranous nephropathy vs chronic interstitial nephritis 2/2 to NSAID use    Recommendations:   - Complement levels - C3 + C4; negative   - free light chains  - ESR; mildly elevated   - ANCA  - TRACEY  - renal US

## 2023-10-31 NOTE — ASSESSMENT & PLAN NOTE
Patient transferred from [place] for persistent shortness of breath (on 24/7 home oxygen for COPD) and weakness. Poor oral fluid and solute intake for several days and increased PO intake of medications including naproxen (aleve). Associated episodes on n/v. Baseline sCr ~1.0. Cr downtrending. Negative for toxic alcohols     Impression   LILLIE 2/2 poor oral intake and NSAID use likely all leading to ischemic ATN and now in postobstructive ATN diuresis phase    Recs   - continue LR 60 cc/hr; plan to decrease rate once UOP rate decreases     -Avoid nephrotoxins   - Patient advised not to take NSAIDS when discharged from hospital   - If no urgency for IV contrast studies then would recommend avoiding contrast for now

## 2023-10-31 NOTE — PROGRESS NOTES
"[facility] Medicine  Progress Note    Patient Name: Bhavana Lambert  MRN: [id number]  Patient Class: IP- Inpatient   Admission Date: 10/26/2023  Length of Stay: 5 days  Attending Physician: Vargas Guevara MD  Primary Care Provider: Pepito Jhaveri IV, MD        Subjective:     Principal Problem:LILLIE (acute kidney injury)        HPI:  68F with PMHx fo COPD (on 3L NC at home) presented to OSH c/o fever, SOB, sore throat, generalized weakness, and semi-chronic left-sided rib pain (hx of fall 4 months ago, broke L sided ribs, continued pain). Pt describes having intermittent centralized substernal CP that worsens with exertion. SOB also worse with exertion. Pt reports that for the last few days she has been increasingly weak and has had decreased PO intake. Describes minimal UOP over the last two days. Denies N/V/D/C, lightheadedness, BLE swelling, abd pain, dysuria, hematuria. Denies any recent sick contacts. Pt is chronic user of Aleve, but has been taking more since fall/rib fractures.     Workup at OSH ED, vitals stable, satting well on home O2. Labs significant WBC 7, hgb 11, plt 164, Na 129, K 3, BUN/Cr 71, 5.2, bicarb 20 and anion gap of 24, troponin 1.5 -> 1.08, BNP 2260. EKG with inverted T waves V1-V4, similar to prior, Qtc 625. UA grossly infectious. CTAP with L sided hydronephrosis and perinephric stranding.     On admission to [facility] pt c/o mild CP and SOB improved from yesterday. Also feeling very anxious and c/o HA. She takes daily klonopin, buspirone, and Fioricet for these problems. Additionally, pt endorses drinking between a half-pint and pint of tequila daily, denies feeling tremulous or agitated currently, not tachycardic or hypertensive, states her last drink was "a few days ago". Cardiology consulted will start on ASA, plavix, heparin gtt. Nephrology consulted, will f/u recs.      Overview/Hospital Course:  68F with PMHx fo COPD (on 3L NC at home) presented to OSH c/o fever, " SOB, sore throat, generalized weakness, and semi-chronic left-sided rib pain (hx of fall 4 months ago, broke L sided ribs, continued pain). Admitted to OSH and transferred to [facility] for severe oliguric LILLIE, UTI, and NSTEMI. Pt Cr 5.2 on admission, BNP 2200, and Trop 1.5 initially and down-trended to 0.9. D/w nephrology, who recommended fluid resuscitation with NS, which was given on day 1 of admission with small decrease in Cr to 5.0, however BUN climbed to 90. Additionally, now hyponatremic to 124. Will continue fluids with NS and eliminate any D5W. Pt also found with serum osm gap, denied drinking any homemade alcohol or ingesting any unknown substances, will f/u labs. D/w cardiology who recommended ACS protocol, pt loaded on ASA, plavix and put on heparin gtt. TTE with eccentric hypertrophy and global hypokinesis, nl systolic function and wall motion abnormality noted. Will transition to DAPT, will need LHC when kidneys recover. Ucx positive for E coli, transitioned from cefepime to ceftriaxone. Kidneys continue to improve with gentle rehydration, now in polyuric phase of LILLIE.       Interval History: NAEON, AFVSS    Cr improving, 2.9 -> 2.4. UOP 3.8L, on continuous fluids at 60cc/hr to avoid volume depletion during polyuric phase. Will ctm volume status, no increased SOB or BLE swelling/crackles on exam.    Likely approaching discharge soon, will discuss plans with pt and family. PT recommending SNF placement, will d/w family.    Review of Systems   Constitutional:  Negative for activity change, appetite change, chills, fatigue and fever.   HENT:  Negative for congestion and sore throat.    Eyes:  Negative for pain and visual disturbance.   Respiratory:  Negative for cough, chest tightness and shortness of breath.    Cardiovascular:  Negative for chest pain, palpitations and leg swelling.   Gastrointestinal:  Negative for abdominal distention, abdominal pain, constipation, diarrhea, nausea and vomiting.    Genitourinary:  Negative for decreased urine volume, difficulty urinating, dysuria and hematuria.   Musculoskeletal:  Negative for arthralgias and back pain.   Skin:  Negative for pallor and rash.   Neurological:  Positive for weakness. Negative for dizziness, syncope, light-headedness and headaches.   Psychiatric/Behavioral:  Negative for agitation and confusion.      Objective:     Vital Signs (Most Recent):  Temp: 97.9 °F (36.6 °C) (10/31/23 0747)  Pulse: 86 (10/31/23 0858)  Resp: 16 (10/31/23 0858)  BP: 138/63 (10/31/23 1013)  SpO2: 97 % (10/31/23 0858) Vital Signs (24h Range):  Temp:  [97.3 °F (36.3 °C)-98.2 °F (36.8 °C)] 97.9 °F (36.6 °C)  Pulse:  [69-86] 86  Resp:  [16-18] 16  SpO2:  [90 %-97 %] 97 %  BP: (108-143)/(60-71) 138/63     Weight: 61.2 kg (135 lb)  Body mass index is 24.69 kg/m².    Intake/Output Summary (Last 24 hours) at 10/31/2023 1021  Last data filed at 10/31/2023 0807  Gross per 24 hour   Intake --   Output 4350 ml   Net -4350 ml         Physical Exam  Vitals reviewed.   Constitutional:       General: She is not in acute distress.     Appearance: Normal appearance.      Comments: 3L NC   HENT:      Head: Normocephalic and atraumatic.      Mouth/Throat:      Mouth: Mucous membranes are moist.      Pharynx: Oropharynx is clear.   Eyes:      Pupils: Pupils are equal, round, and reactive to light.   Cardiovascular:      Rate and Rhythm: Normal rate and regular rhythm.   Pulmonary:      Effort: Pulmonary effort is normal. No respiratory distress.      Breath sounds: Normal breath sounds.      Comments: Decreased breath sounds all lung fields  Abdominal:      General: There is no distension.      Palpations: Abdomen is soft.      Tenderness: There is no abdominal tenderness.   Musculoskeletal:         General: No swelling or tenderness. Normal range of motion.   Skin:     General: Skin is warm and dry.   Neurological:      General: No focal deficit present.      Mental Status: She is alert and  oriented to person, place, and time. Mental status is at baseline.      Motor: Weakness present.             Significant Labs: All pertinent labs within the past 24 hours have been reviewed.  CBC:   Recent Labs   Lab 10/30/23  0525 10/31/23  0512   WBC 11.24 12.25   HGB 11.2* 10.8*   HCT 33.5* 32.7*    312     CMP:   Recent Labs   Lab 10/30/23  0525 10/30/23  1558 10/31/23  0512   * 135* 135*   K 3.3* 3.6 3.2*   CL 97 95 98   CO2 22* 23 22*   GLU 92 75 69*   BUN 69* 62* 50*   CREATININE 3.3* 2.9* 2.4*   CALCIUM 8.5* 8.9 8.8   PROT 5.9*  --  6.0   ALBUMIN 2.0* 2.2* 2.0*   BILITOT 0.3  --  0.3   ALKPHOS 171*  --  167*   AST 25  --  33   ALT 17  --  18   ANIONGAP 15 17* 15       Significant Imaging: I have reviewed all pertinent imaging results/findings within the past 24 hours.      Assessment/Plan:      * LILLIE (acute kidney injury)  Creatinine 5.2 on admit, baseline around 1.1.  - Minimal UOP over the last few days. Decreased PO intake. Increased NSAID use recently 2/2 fall and rib injury. Hypotensive on arrival to OSH ED with sBP of 89. Known dx of pyelonephritis, without hydronephrosis. POCUS with IVC showing near complete collapse on sniff test, intravascularly depleted. Based on this it is likely mixed etiology of pre-renal and intrinsic.    10/27: Receiving continuous fluids at 100cc/hr, Cr improving, now at 4.6. UOP improving. Electrolytes/hyponatremia improving. Will CTM    10/31: improving, cr. 2.4. now in polyuric phase of LILLIE/ATN. Will d/w fluid status with nephrology, recommend continuing fluids, LR at 60cc/hr. Don't want pt to become intravascularly depleted, however pt also came in with NSTEMI.    Plan:   - No evidence of hydronephrosis on CTAP  - POCUS with near complete collapse of IVC on sniff test on day 1 of admission.  - Strict I&Os and daily weights   - Gentle IVF   - currently running at 100ml/hr   - will continue to assess volume status   - Continue to monitor for hyperchloremic  "metabolic acidosis and transition IVF to LR  - Avoid nephrotoxic agents such as NSAIDs, gadolinium and IV radiocontrast  - Renally dose meds to current GFR  - Maintain MAP > 65    ATN (acute tubular necrosis)  See "LILLIE"    Hyponatremia  Improving    Patient has hyponatremia which is uncontrolled,We will aim to correct the sodium by 4-6mEq in 24 hours. We will monitor sodium Every 12 hours. The hyponatremia is most likely due to Dehydration/hypovolemia, as it has slowly improved with IV fluids today. We will obtain the following studies: Urine sodium, urine osmolality, serum osmolality. We will treat the hyponatremia with IV fluids as follows: NS. The patient's sodium results have been reviewed and are listed below.  Recent Labs   Lab 10/30/23  0525   *       Urine Osm elevated, Serum Osm elevated. Serum Osm gap slightly elevated also, some initial concern for ingestion of atypical alcohol. Pt denies consumption of unknown substance. Labs pending. Most likely is increased gap in severe oliguric LILLIE that resulted in poor clearance/buildup of osmotic substances in serum.    Emphysema/COPD  Chronic condition, 2/2 heavy tobacco use. Pt currently smoking ~1ppd, but used to smoke ~2ppd. On 3L NC at home 24h/day, has required up to 5L NC intermittently during the past few days. Uses tiotropium-oladaterol and takes montelukast daily. Albuterol inhaler prn    SOB yesterday 10/28 more likely 2/2 COPD than volume status, as pt has not been using her daily maintenance inhaler (opted to use home medication as it was not on formulary), because the medication was not in the device. Will change to Spiriva, as it is available in the hospital.     Plan  - Spiriva qd, breo qd  - albuterol prn      Alcohol use  Pt endorses drinking a half-pint to a pint of tequila every day. Last drink was a few days ago. No evidence of acute withdrawal on admission. Will CTM and adjust medications as needed    Plan  - Ativan for CIWA " ">8      Hypothyroid  Most recent TSH 2.6. Will not repeat during acute illness    Plan:  - continue home synthroid    Pyelonephritis  CT showing "moderate left sided perinephric fat stranding may be infectious or inflammatory." UA with 75 WBC, many bacteria, 3+ leukocytes. No leukocytosis, afebrile on admission.      Plan:   - Ucx with E.Coli, on Ceftriaxone. Plan for 7 day course.    NSTEMI (non-ST elevated myocardial infarction)  3 day hx of substernal CP exacerbated by exertion and accompanied by worsening MENA. EKG with inverted T waves, seen in previous. Troponin 1.691 ->1.516 ->1.080 -> 0.876. BNP elevated to 2260. FREDA score: 5. D/w cardiology, most likely demand ischemia iso sepsis/hotn, compounded by current inability to renally excrete these compounds, however given her story and inability to undergo LHC at the moment 2/2 LILLIE, ACS protocol initiated. Plavix and ASA loaded, restarted on heparin gtt.    10/27: d/c heparin gtt. continuing on DAPT  Trop peak 1.691    Plan:  - Consult cardiology    - Completed 48 hour ACS protocol   - no LHC right now 2/2 LILLIE   - Continue ASA 81mg/Plavix 75mg  - Atorvastatin 40mg  - start lopressor 12.5mg bid    Results for orders placed during the hospital encounter of 10/26/23    Echo    Interpretation Summary    Left Ventricle: The left ventricle is normal in size. Normal wall thickness. There is eccentric hypertrophy. Global hypokinesis present. There is normal systolic function. There is normal diastolic function.    Left Atrium: Left atrium is mildly dilated.    Right Ventricle: Moderate right ventricular enlargement. Wall thickness is normal. Right ventricle wall motion  is normal. Systolic function is moderately reduced.    Mitral Valve: There is mild regurgitation.    Tricuspid Valve: There is moderate regurgitation.    Pulmonary Artery: The estimated pulmonary artery systolic pressure is 48 mmHg.    IVC/SVC: Elevated venous pressure at 15 mmHg.      Increased " anion gap metabolic acidosis  Bicarb 20, AG 22 on admission. Etiology unclear. Most likely cause is acute increase in uremia, 72 on admission. Lactic acid wnl on admission to [facility], negative for urine ketones..     Gap improving with fluid resuscitation. Acidosis improving.    Plan  - Consult nephrology  - IV fluid resuscitation.   - CTM    Anxiety  Chronic and stable. States anxiety has been worse since in the hospital. Will hold home Klonopin, Buspirone iso prolonged QTc and LILLIE    Plan:  - Xanax prn for anxiety          VTE Risk Mitigation (From admission, onward)         Ordered     heparin (porcine) injection 5,000 Units  Every 8 hours         10/27/23 1351     IP VTE LOW RISK PATIENT  Once         10/26/23 1123     Place sequential compression device  Until discontinued         10/26/23 1123                Discharge Planning   CHLOE: 11/1/2023     Code Status: Full Code   Is the patient medically ready for discharge?: No    Reason for patient still in hospital (select all that apply): Patient trending condition, Consult recommendations and Pending disposition  Discharge Plan A: Home, Home with family, Home Health, Other (24/7 supervision)   Discharge Delays: None known at this time              Ricky Sherman MD  Department of Hospital Medicine   [facility]

## 2023-10-31 NOTE — PT/OT/SLP EVAL
Occupational Therapy   Evaluation    Name: Bhavana Lambert  MRN: [id number]  Admitting Diagnosis: LILLIE (acute kidney injury)  Recent Surgery: * No surgery found *      Recommendations:     Discharge Recommendations: Low Intensity Therapy  Discharge Equipment Recommendations:  none  Barriers to discharge:  None    Assessment:     Bhavana Lambert is a 68 y.o. female with a medical diagnosis of LILLIE (acute kidney injury).  She presents with the following performance deficits affecting function: weakness, impaired endurance, pain, decreased safety awareness, impaired self care skills, impaired cardiopulmonary response to activity. Pt required frequent rest breaks throughout today's evaluation and remains limited in ADLs, functional mobility, and functional transfers. Pt is currently not performing tasks at [facility]. However, pt would continue to benefit from skilled OT services to maximize functional independence with ADLs and functional mobility, reduce caregiver burden, and facilitate safe discharge in the least restrictive environment.       Rehab Prognosis: Good; patient would benefit from acute skilled OT services to address these deficits and reach maximum level of function.       Plan:     Patient to be seen 3 x/week to address the above listed problems via self-care/home management, therapeutic activities, therapeutic exercises  Plan of Care Expires: 11/30/23  Plan of Care Reviewed with: patient    Subjective     Chief Complaint: pain and discomfort all over  Patient/Family Comments/goals: get better    Occupational Profile:  Living Environment: pt lives with daughter and son in law with her 3 grandchildren.Pt lives in a [facility] with approximately 10 MARY LOU. Her bathroom setup consists of a T/S combo and 1 grab bar  Previous level of function: I w/ ADLs & functional mobility  Roles and Routines: pt enjoys watching tv  Equipment Used at Home: wheelchair, shower chair, walker, rolling  Assistance upon Discharge:  Family    Pain/Comfort:  Pain Rating 1: 8/10  Location - Orientation 1: generalized  Pain Addressed 1: Distraction, Reposition  Pain Rating Post-Intervention 1: 8/10    Patients cultural, spiritual, Catholic conflicts given the current situation: no    Objective:     Communicated with: RN prior to session.  Patient found HOB elevated with peripheral IV, gomez catheter, oxygen upon OT entry to room.    General Precautions: Standard, fall  Orthopedic Precautions: N/A  Braces: N/A  Respiratory Status: Nasal cannula, flow 3 L/min    Occupational Performance:    Bed Mobility:    Patient completed Supine to Sit with stand by assistance    Functional Mobility/Transfers:  Patient completed Sit <> Stand Transfer with stand by assistance  with  rolling walker   Patient completed Toilet Transfer Step Transfer technique with stand by assistance with  rolling walker  Functional Mobility: pt ambulated from EOB->bathroom toilet->sink->EOB using RW w/CGA  Pt required increased time 2/2 rest breaks to conserve energy    Activities of Daily Living:  Grooming: stand by assistance to complete hand hygiene while standing in front of sink   Toileting: supervision   Pt had a BM, completed bottom hygiene and was able to manage clothing with supervision-SBA    Cognitive/Visual Perceptual:  Cognitive/Psychosocial Skills:  -       Oriented to: Person, Place, Time, and Situation     Physical Exam:  Upper Extremity Range of Motion:     -       Right Upper Extremity: WFL  -       Left Upper Extremity: WFL  Upper Extremity Strength:    -       Right Upper Extremity: WFL  -       Left Upper Extremity: WFL   Strength:    -       Right Upper Extremity: WFL  -       Left Upper Extremity: WFL  Fine Motor Coordination:    -       Intact  Left hand thumb/finger opposition skills and Right hand thumb/finger opposition skills    AMPAC 6 Click ADL:  AMPAC Total Score: 21    Treatment & Education:  -Education on energy conservation and task  modification to maximize safety and (I) during ADLs and mobility  -Education on importance of OOB activity to improve overall activity tolerance and promote recovery  -Pt educated to call for assistance and to transfer with hospital staff only  -Provided education regarding role of OT, POC, & discharge recommendations with pt verbalizing understanding.  Pt had no further questions & when asked whether there were any concerns pt reported none.     Patient left sitting edge of bed with all lines intact, call button in reach, and RN present    GOALS:   Multidisciplinary Problems       Occupational Therapy Goals          Problem: Occupational Therapy    Goal Priority Disciplines Outcome Interventions   Occupational Therapy Goal     OT, PT/OT Ongoing, Progressing    Description: Goals to be met by: 11/14/23     Patient will increase functional independence with ADLs by performing:    UE Dressing with Modified [place].  LE Dressing with Modified [place].  Grooming while standing with Modified [place].  Toileting from toilet with Modified [place] for hygiene and clothing management.                          History:     Past Medical History:   Diagnosis Date    Acquired hypothyroidism     Anxiety     Anxiety and depression     Cervical radicular pain     Closed left ankle fracture     COPD (chronic obstructive pulmonary disease)     Hypertension     Hypokalemia     Hyponatremia     Multiple rib fractures     Requires continuous at home supplemental oxygen     PRN FOR COPD    Tension type headache     Traumatic closed displaced fracture of distal end of radius, left, sequela          Past Surgical History:   Procedure Laterality Date    COLON SURGERY      COLOSTOMY      COLOSTOMY CLOSURE      HYSTERECTOMY      LEG SURGERY Right     OPEN REDUCTION AND INTERNAL FIXATION (ORIF) OF INJURY OF WRIST Left 1/29/2019    Procedure: ORIF, WRIST;  Surgeon: Homero Jeff DO;  Location: [facility] OR;  Service:  Orthopedics;  Laterality: Left;  Equipment: [employer] Geminus Wrist Plate Set  Vendor/Rep: [employer]  C-Arm: Entire  DME: None    REQUIRES ASSISTANT    WRIST SURGERY Right        Time Tracking:     OT Date of Treatment: 10/31/23  OT Start Time: 0938  OT Stop Time: 1015  OT Total Time (min): 37 min    Billable Minutes:Evaluation 10  Self Care/Home Management 27    10/31/2023

## 2023-10-31 NOTE — ASSESSMENT & PLAN NOTE
Creatinine 5.2 on admit, baseline around 1.1.  - Minimal UOP over the last few days. Decreased PO intake. Increased NSAID use recently 2/2 fall and rib injury. Hypotensive on arrival to OSH ED with sBP of 89. Known dx of pyelonephritis, without hydronephrosis. POCUS with IVC showing near complete collapse on sniff test, intravascularly depleted. Based on this it is likely mixed etiology of pre-renal and intrinsic.    10/27: Receiving continuous fluids at 100cc/hr, Cr improving, now at 4.6. UOP improving. Electrolytes/hyponatremia improving. Will CTM    10/31: improving, cr. 2.4. now in polyuric phase of LILLIE/ATN. Will d/w fluid status with nephrology, recommend continuing fluids, LR at 60cc/hr. Don't want pt to become intravascularly depleted, however pt also came in with NSTEMI.    Plan:   - No evidence of hydronephrosis on CTAP  - POCUS with near complete collapse of IVC on sniff test on day 1 of admission.  - Strict I&Os and daily weights   - Gentle IVF   - currently running at 100ml/hr   - will continue to assess volume status   - Continue to monitor for hyperchloremic metabolic acidosis and transition IVF to LR  - Avoid nephrotoxic agents such as NSAIDs, gadolinium and IV radiocontrast  - Renally dose meds to current GFR  - Maintain MAP > 65

## 2023-10-31 NOTE — PROGRESS NOTES
[facility]  Nephrology  Progress Note    Patient Name: Bhavana Lambert  MRN: [id number]  Admission Date: 10/26/2023  Hospital Length of Stay: 5 days  Attending Provider: Vargas Guevara MD   Primary Care Physician: Pepito Jhaveri IV, MD  Principal Problem:LILLIE (acute kidney injury)    Subjective:     HPI: Ms. Bhavana Lambert is a 68 y.o. patient with past medical history of COPD on 24/7 O2 requirements and inhalers at home here. Transferred from [facility] for Nephrology evaluation. Patient had several days of fever, generalized weakness, and associated left flank pain and n/v. Refers dysuria and diarrhea. About 4 months ago, she had a fall where she broke some ribs on the left side. Patient states pain and general feeling is worse now compared to some months ago. Patient has been taking increased pain medications (naproxen). She states that the pain she feels now is similar to the pain she is been feeling for the past few months, only slightly worse. Upon admission, UA 75 WBC, Ucr 63.7. Baseline sCr 1.0.     Nephrology consulted for pyelonephritis and LILLIE.       Interval History: NAEON. Drinking well. Good UOP     Review of patient's allergies indicates:  No Known Allergies  Current Facility-Administered Medications   Medication Frequency    aspirin chewable tablet 81 mg Daily    atorvastatin tablet 40 mg Daily    butalbital-acetaminophen-caffeine -40 mg per tablet 1 tablet Q12H PRN    cefTRIAXone (ROCEPHIN) 2 g in sodium chloride 0.9 % 100 mL IVPB (MB+) Daily    clopidogreL tablet 75 mg Daily    dextrose 10% bolus 125 mL 125 mL PRN    dextrose 10% bolus 250 mL 250 mL PRN    fluticasone furoate-vilanteroL 100-25 mcg/dose diskus inhaler 1 puff Daily    glucagon (human recombinant) injection 1 mg PRN    glucose chewable tablet 16 g PRN    glucose chewable tablet 24 g PRN    heparin (porcine) injection 5,000 Units Q8H    lactated ringers infusion Continuous    LORazepam tablet 0.5  mg Q12H PRN    LORazepam tablet 2 mg Q4H PRN    metoprolol tartrate (LOPRESSOR) split tablet 12.5 mg BID    montelukast tablet 10 mg QAM    multivitamin tablet Daily    naloxone 0.4 mg/mL injection 0.02 mg PRN    nicotine 7 mg/24 hr 1 patch Daily    polyethylene glycol packet 17 g Daily PRN    potassium bicarbonate disintegrating tablet 40 mEq Q2H    sodium chloride 0.9% flush 10 mL Q12H PRN    thiamine tablet 100 mg Daily    tiotropium bromide 2.5 mcg/actuation inhaler 2 puff Daily       Objective:     Vital Signs (Most Recent):  Temp: 97.9 °F (36.6 °C) (10/31/23 1122)  Pulse: 78 (10/31/23 1122)  Resp: 18 (10/31/23 1122)  BP: 130/62 (10/31/23 1122)  SpO2: (!) 92 % (10/31/23 1122) Vital Signs (24h Range):  Temp:  [97.3 °F (36.3 °C)-98 °F (36.7 °C)] 97.9 °F (36.6 °C)  Pulse:  [69-86] 78  Resp:  [16-18] 18  SpO2:  [90 %-97 %] 92 %  BP: (125-143)/(60-71) 130/62     Weight: 61.2 kg (135 lb) (10/27/23 0749)  Body mass index is 24.69 kg/m².  Body surface area is 1.64 meters squared.    I/O last 3 completed shifts:  In: -   Out: 5550 [Urine:5550]     Physical Exam  Vitals and nursing note reviewed.   Constitutional:       General: She is not in acute distress.     Appearance: Normal appearance. She is not ill-appearing.   HENT:      Head: Normocephalic and atraumatic.      Right Ear: External ear normal.      Left Ear: External ear normal.      Nose: Nose normal.      Mouth/Throat:      Mouth: Mucous membranes are moist.      Pharynx: Oropharynx is clear.   Eyes:      General:         Right eye: No discharge.         Left eye: No discharge.      Conjunctiva/sclera: Conjunctivae normal.      Pupils: Pupils are equal, round, and reactive to light.   Cardiovascular:      Rate and Rhythm: Normal rate and regular rhythm.      Pulses: Normal pulses.      Heart sounds: Normal heart sounds. No murmur heard.  Pulmonary:      Effort: Pulmonary effort is normal. No respiratory distress.      Breath sounds: Normal breath  sounds.      Comments: Decreased air entry at bases   On chronic oxygen   Abdominal:      General: Bowel sounds are normal. There is no distension.      Palpations: Abdomen is soft. There is no mass.      Tenderness: There is no abdominal tenderness. There is no right CVA tenderness, left CVA tenderness, guarding or rebound.      Hernia: No hernia is present.   Musculoskeletal:         General: No swelling. Normal range of motion.      Cervical back: Normal range of motion.      Right lower leg: No edema.      Left lower leg: No edema.   Skin:     General: Skin is warm and dry.      Capillary Refill: Capillary refill takes less than 2 seconds.      Coloration: Skin is not jaundiced.      Findings: No rash.   Neurological:      General: No focal deficit present.      Mental Status: She is alert and oriented to person, place, and time.   Psychiatric:         Mood and Affect: Mood normal.         Behavior: Behavior normal.          Significant Labs:  All labs within the past 24 hours have been reviewed.     Significant Imaging:  Labs: Reviewed    Assessment/Plan:     Renal/  * LILLIE (acute kidney injury)  Patient transferred from [place] for persistent shortness of breath (on 24/7 home oxygen for COPD) and weakness. Poor oral fluid and solute intake for several days and increased PO intake of medications including naproxen (aleve). Associated episodes on n/v. Baseline sCr ~1.0. Cr downtrending. Negative for toxic alcohols     Impression   LILLIE 2/2 poor oral intake and NSAID use likely all leading to ischemic ATN and now in postobstructive ATN diuresis phase    Recs   - continue LR 60 cc/hr; plan to decrease rate once UOP rate decreases     -Avoid nephrotoxins   - Patient advised not to take NSAIDS when discharged from hospital   - If no urgency for IV contrast studies then would recommend avoiding contrast for now     Proteinuria  Pt with persistent proteinuria of 2, 3+ and 2+ protein on U/A.     Concern for membranous  nephropathy vs chronic interstitial nephritis 2/2 to NSAID use    Recommendations:   - Complement levels - C3 + C4; negative   - free light chains  - ESR; mildly elevated   - ANCA  - TRACEY  - renal US           Thank you for your consult. I will follow-up with patient. Please contact us if you have any additional questions.    Erica Navarro,   Nephrology  [facility] - Med Surg

## 2023-10-31 NOTE — SUBJECTIVE & OBJECTIVE
Interval History: ABDI, AFVSS    Cr improving, 2.9 -> 2.4. UOP 3.8L, on continuous fluids at 60cc/hr to avoid volume depletion during polyuric phase. Will ctm volume status, no increased SOB or BLE swelling/crackles on exam.    Likely approaching discharge soon, will discuss plans with pt and family. PT recommending SNF placement, will d/w family.    Review of Systems   Constitutional:  Negative for activity change, appetite change, chills, fatigue and fever.   HENT:  Negative for congestion and sore throat.    Eyes:  Negative for pain and visual disturbance.   Respiratory:  Negative for cough, chest tightness and shortness of breath.    Cardiovascular:  Negative for chest pain, palpitations and leg swelling.   Gastrointestinal:  Negative for abdominal distention, abdominal pain, constipation, diarrhea, nausea and vomiting.   Genitourinary:  Negative for decreased urine volume, difficulty urinating, dysuria and hematuria.   Musculoskeletal:  Negative for arthralgias and back pain.   Skin:  Negative for pallor and rash.   Neurological:  Positive for weakness. Negative for dizziness, syncope, light-headedness and headaches.   Psychiatric/Behavioral:  Negative for agitation and confusion.      Objective:     Vital Signs (Most Recent):  Temp: 97.9 °F (36.6 °C) (10/31/23 0747)  Pulse: 86 (10/31/23 0858)  Resp: 16 (10/31/23 0858)  BP: 138/63 (10/31/23 1013)  SpO2: 97 % (10/31/23 0858) Vital Signs (24h Range):  Temp:  [97.3 °F (36.3 °C)-98.2 °F (36.8 °C)] 97.9 °F (36.6 °C)  Pulse:  [69-86] 86  Resp:  [16-18] 16  SpO2:  [90 %-97 %] 97 %  BP: (108-143)/(60-71) 138/63     Weight: 61.2 kg (135 lb)  Body mass index is 24.69 kg/m².    Intake/Output Summary (Last 24 hours) at 10/31/2023 1021  Last data filed at 10/31/2023 0807  Gross per 24 hour   Intake --   Output 4350 ml   Net -4350 ml         Physical Exam  Vitals reviewed.   Constitutional:       General: She is not in acute distress.     Appearance: Normal appearance.       Comments: 3L NC   HENT:      Head: Normocephalic and atraumatic.      Mouth/Throat:      Mouth: Mucous membranes are moist.      Pharynx: Oropharynx is clear.   Eyes:      Pupils: Pupils are equal, round, and reactive to light.   Cardiovascular:      Rate and Rhythm: Normal rate and regular rhythm.   Pulmonary:      Effort: Pulmonary effort is normal. No respiratory distress.      Breath sounds: Normal breath sounds.      Comments: Decreased breath sounds all lung fields  Abdominal:      General: There is no distension.      Palpations: Abdomen is soft.      Tenderness: There is no abdominal tenderness.   Musculoskeletal:         General: No swelling or tenderness. Normal range of motion.   Skin:     General: Skin is warm and dry.   Neurological:      General: No focal deficit present.      Mental Status: She is alert and oriented to person, place, and time. Mental status is at baseline.      Motor: Weakness present.             Significant Labs: All pertinent labs within the past 24 hours have been reviewed.  CBC:   Recent Labs   Lab 10/30/23  0525 10/31/23  0512   WBC 11.24 12.25   HGB 11.2* 10.8*   HCT 33.5* 32.7*    312     CMP:   Recent Labs   Lab 10/30/23  0525 10/30/23  1558 10/31/23  0512   * 135* 135*   K 3.3* 3.6 3.2*   CL 97 95 98   CO2 22* 23 22*   GLU 92 75 69*   BUN 69* 62* 50*   CREATININE 3.3* 2.9* 2.4*   CALCIUM 8.5* 8.9 8.8   PROT 5.9*  --  6.0   ALBUMIN 2.0* 2.2* 2.0*   BILITOT 0.3  --  0.3   ALKPHOS 171*  --  167*   AST 25  --  33   ALT 17  --  18   ANIONGAP 15 17* 15       Significant Imaging: I have reviewed all pertinent imaging results/findings within the past 24 hours.

## 2023-10-31 NOTE — PT/OT/SLP PROGRESS
Physical Therapy Treatment    Patient Name:  Bhavana Lambert   MRN:  [id number]    Recommendations:     Discharge Recommendations: Low Intensity Therapy  Discharge Equipment Recommendations: none  Barriers to discharge: None    Assessment:     Bhavana Lambert is a 68 y.o. female admitted with a medical diagnosis of LILLIE (acute kidney injury).  She presents with the following impairments/functional limitations: weakness, impaired endurance, impaired self care skills, impaired functional mobility, impaired balance, decreased coordination, decreased safety awareness, impaired cardiopulmonary response to activity. Pt with improved mobility as demonstrated by increased gait distance.     Rehab Prognosis: Good; patient would benefit from acute skilled PT services to address these deficits and reach maximum level of function.    Recent Surgery: * No surgery found *      Plan:     During this hospitalization, patient to be seen 3 x/week to address the identified rehab impairments via gait training, therapeutic activities, therapeutic exercises, neuromuscular re-education and progress toward the following goals:    Plan of Care Expires:  11/29/23    Subjective     Chief Complaint: tired  Patient/Family Comments/goals: none verbalized  Pain/Comfort:  Pain Rating 1: 0/10      Objective:     Communicated with RN prior to session.  Patient found HOB elevated with gomez catheter, oxygen, peripheral IV upon PT entry to room.     General Precautions: Standard, fall  Orthopedic Precautions: N/A  Braces: N/A  Respiratory Status: Nasal cannula, flow 3 L/min     Functional Mobility:  Bed Mobility:     Supine to Sit: stand by assistance  Sit to Supine: stand by assistance  Transfers:     Sit to Stand:  stand by assistance with rolling walker  Gait: 88ft with RW CGA for safety and balance. Pt with flexed posture outside walker ELIEL requiring max vc's to correct posture and AD usage. Decreased marizol, decreased step length, increased  kyphosis, flexed posture.       AM-PAC 6 CLICK MOBILITY  Turning over in bed (including adjusting bedclothes, sheets and blankets)?: 3  Sitting down on and standing up from a chair with arms (e.g., wheelchair, bedside commode, etc.): 3  Moving from lying on back to sitting on the side of the bed?: 3  Moving to and from a bed to a chair (including a wheelchair)?: 3  Need to walk in hospital room?: 3  Climbing 3-5 steps with a railing?: 2  Basic Mobility Total Score: 17       Treatment & Education:  X3 attempts made for treatment. Pt agreeable on third attempt for ambulation. Education provided on AD usage, safety, posture, calling for assistance and increasing endurance.     Bedside table in front of patient and area set up for function, convenience, and safety. RN aware of patient's mobility needs and status. Questions/concerns addressed within PTA scope of practice; patient  with no further questions. Time was provided for active listening, discussion of health disposition, and discussion of safe discharge.    Patient left HOB elevated with all lines intact and call button in reach..    GOALS:   Multidisciplinary Problems       Physical Therapy Goals          Problem: Physical Therapy    Goal Priority Disciplines Outcome Goal Variances Interventions   Physical Therapy Goal     PT, PT/OT Ongoing, Progressing     Description: Goals to be met by:11/15/2023    Patient will increase functional independence with mobility by performin. Supine to sit with Supervision  2. Sit to stand transfer with Supervision using RW  3. Gait  x 25 feet with Supervision using Rolling Walker.   4. Wheelchair propulsion with patient's preferred method x 25ft with Supervision                         Time Tracking:     PT Received On: 10/31/23  PT Start Time: 1339     PT Stop Time: 1413  PT Total Time (min): 34 min     Billable Minutes: Gait Training 30    Treatment Type: Treatment  PT/PTA: PTA     Number of PTA visits since last PT  visit: 1     10/31/2023

## 2023-10-31 NOTE — PLAN OF CARE
Problem: Occupational Therapy  Goal: Occupational Therapy Goal  Description: Goals to be met by: 11/14/23     Patient will increase functional independence with ADLs by performing:    UE Dressing with Modified Amelia.  LE Dressing with Modified Amelia.  Grooming while standing with Modified Amelia.  Toileting from toilet with Modified Amelia for hygiene and clothing management.     Outcome: Ongoing, Progressing     OT eval completed & goals established.

## 2023-10-31 NOTE — PLAN OF CARE
Problem: Adult Inpatient Plan of Care  Goal: Plan of Care Review  Outcome: Ongoing, Progressing  Goal: Patient-Specific Goal (Individualized)  Outcome: Ongoing, Progressing  Goal: Absence of Hospital-Acquired Illness or Injury  Outcome: Ongoing, Progressing  Goal: Optimal Comfort and Wellbeing  Outcome: Ongoing, Progressing     Problem: Fluid and Electrolyte Imbalance (Acute Kidney Injury/Impairment)  Goal: Fluid and Electrolyte Balance  Outcome: Ongoing, Progressing     Problem: Renal Function Impairment (Acute Kidney Injury/Impairment)  Goal: Effective Renal Function  Outcome: Ongoing, Progressing     Problem: Infection  Goal: Absence of Infection Signs and Symptoms  Outcome: Ongoing, Progressing     Problem: Skin Injury Risk Increased  Goal: Skin Health and Integrity  Outcome: Ongoing, Progressing     Problem: Fall Injury Risk  Goal: Absence of Fall and Fall-Related Injury  Outcome: Ongoing, Progressing     Problem: Breathing Pattern Ineffective  Goal: Effective Breathing Pattern  Outcome: Ongoing, Progressing       Patient's blood glucose result from CMP was 69 mg/dl. Glucose chewable tablets were given and blood glucose rechecked and went  up to 108 mg/dl. Patient remain free from fall, not in respiratory distress. Still on oxygen inhalation at 3 LPM via nasal cannula.

## 2023-11-01 LAB
ALBUMIN SERPL BCP-MCNC: 1.9 G/DL (ref 3.5–5.2)
ALP SERPL-CCNC: 149 U/L (ref 55–135)
ALT SERPL W/O P-5'-P-CCNC: 25 U/L (ref 10–44)
ANION GAP SERPL CALC-SCNC: 16 MMOL/L (ref 8–16)
APTT PPP: 32.9 SEC (ref 21–32)
AST SERPL-CCNC: 48 U/L (ref 10–40)
BASOPHILS # BLD AUTO: 0.02 K/UL (ref 0–0.2)
BASOPHILS NFR BLD: 0.2 % (ref 0–1.9)
BILIRUB SERPL-MCNC: 0.2 MG/DL (ref 0.1–1)
BUN SERPL-MCNC: 36 MG/DL (ref 8–23)
CALCIUM SERPL-MCNC: 8.3 MG/DL (ref 8.7–10.5)
CHLORIDE SERPL-SCNC: 96 MMOL/L (ref 95–110)
CO2 SERPL-SCNC: 24 MMOL/L (ref 23–29)
CREAT SERPL-MCNC: 1.7 MG/DL (ref 0.5–1.4)
DIFFERENTIAL METHOD: ABNORMAL
EOSINOPHIL # BLD AUTO: 0.1 K/UL (ref 0–0.5)
EOSINOPHIL NFR BLD: 1.3 % (ref 0–8)
ERYTHROCYTE [DISTWIDTH] IN BLOOD BY AUTOMATED COUNT: 13.7 % (ref 11.5–14.5)
EST. GFR  (NO RACE VARIABLE): 32.5 ML/MIN/1.73 M^2
GLUCOSE SERPL-MCNC: 78 MG/DL (ref 70–110)
HCT VFR BLD AUTO: 30.1 % (ref 37–48.5)
HGB BLD-MCNC: 10.2 G/DL (ref 12–16)
IMM GRANULOCYTES # BLD AUTO: 0.07 K/UL (ref 0–0.04)
IMM GRANULOCYTES NFR BLD AUTO: 0.8 % (ref 0–0.5)
LYMPHOCYTES # BLD AUTO: 1 K/UL (ref 1–4.8)
LYMPHOCYTES NFR BLD: 11.8 % (ref 18–48)
MAGNESIUM SERPL-MCNC: 1.9 MG/DL (ref 1.6–2.6)
MCH RBC QN AUTO: 33 PG (ref 27–31)
MCHC RBC AUTO-ENTMCNC: 33.9 G/DL (ref 32–36)
MCV RBC AUTO: 97 FL (ref 82–98)
MONOCYTES # BLD AUTO: 0.6 K/UL (ref 0.3–1)
MONOCYTES NFR BLD: 6.4 % (ref 4–15)
NEUTROPHILS # BLD AUTO: 6.9 K/UL (ref 1.8–7.7)
NEUTROPHILS NFR BLD: 79.5 % (ref 38–73)
NRBC BLD-RTO: 0 /100 WBC
PHOSPHATE SERPL-MCNC: 4.4 MG/DL (ref 2.7–4.5)
PLATELET # BLD AUTO: 334 K/UL (ref 150–450)
PMV BLD AUTO: 11.1 FL (ref 9.2–12.9)
POTASSIUM SERPL-SCNC: 3.1 MMOL/L (ref 3.5–5.1)
PROT SERPL-MCNC: 5.7 G/DL (ref 6–8.4)
RBC # BLD AUTO: 3.09 M/UL (ref 4–5.4)
SODIUM SERPL-SCNC: 136 MMOL/L (ref 136–145)
WBC # BLD AUTO: 8.65 K/UL (ref 3.9–12.7)

## 2023-11-01 PROCEDURE — 25000003 PHARM REV CODE 250

## 2023-11-01 PROCEDURE — 97530 THERAPEUTIC ACTIVITIES: CPT

## 2023-11-01 PROCEDURE — 25000003 PHARM REV CODE 250: Performed by: INTERNAL MEDICINE

## 2023-11-01 PROCEDURE — 85025 COMPLETE CBC W/AUTO DIFF WBC: CPT

## 2023-11-01 PROCEDURE — 84100 ASSAY OF PHOSPHORUS: CPT

## 2023-11-01 PROCEDURE — 83735 ASSAY OF MAGNESIUM: CPT

## 2023-11-01 PROCEDURE — 99233 PR SUBSEQUENT HOSPITAL CARE,LEVL III: ICD-10-PCS | Mod: ,,, | Performed by: INTERNAL MEDICINE

## 2023-11-01 PROCEDURE — 11000001 HC ACUTE MED/SURG PRIVATE ROOM

## 2023-11-01 PROCEDURE — 85730 THROMBOPLASTIN TIME PARTIAL: CPT | Performed by: INTERNAL MEDICINE

## 2023-11-01 PROCEDURE — 93005 ELECTROCARDIOGRAM TRACING: CPT

## 2023-11-01 PROCEDURE — S4991 NICOTINE PATCH NONLEGEND: HCPCS

## 2023-11-01 PROCEDURE — 93010 ELECTROCARDIOGRAM REPORT: CPT | Mod: ,,, | Performed by: INTERNAL MEDICINE

## 2023-11-01 PROCEDURE — 93010 EKG 12-LEAD: ICD-10-PCS | Mod: ,,, | Performed by: INTERNAL MEDICINE

## 2023-11-01 PROCEDURE — 97116 GAIT TRAINING THERAPY: CPT

## 2023-11-01 PROCEDURE — 63600175 PHARM REV CODE 636 W HCPCS

## 2023-11-01 PROCEDURE — 99233 SBSQ HOSP IP/OBS HIGH 50: CPT | Mod: ,,, | Performed by: INTERNAL MEDICINE

## 2023-11-01 PROCEDURE — 97535 SELF CARE MNGMENT TRAINING: CPT

## 2023-11-01 PROCEDURE — 36415 COLL VENOUS BLD VENIPUNCTURE: CPT | Performed by: INTERNAL MEDICINE

## 2023-11-01 PROCEDURE — 80053 COMPREHEN METABOLIC PANEL: CPT

## 2023-11-01 RX ORDER — SODIUM CHLORIDE, SODIUM LACTATE, POTASSIUM CHLORIDE, CALCIUM CHLORIDE 600; 310; 30; 20 MG/100ML; MG/100ML; MG/100ML; MG/100ML
INJECTION, SOLUTION INTRAVENOUS CONTINUOUS
Status: ACTIVE | OUTPATIENT
Start: 2023-11-01 | End: 2023-11-02

## 2023-11-01 RX ORDER — CARVEDILOL 6.25 MG/1
6.25 TABLET ORAL 2 TIMES DAILY
Status: DISCONTINUED | OUTPATIENT
Start: 2023-11-01 | End: 2023-11-02

## 2023-11-01 RX ORDER — SODIUM CHLORIDE, SODIUM LACTATE, POTASSIUM CHLORIDE, CALCIUM CHLORIDE 600; 310; 30; 20 MG/100ML; MG/100ML; MG/100ML; MG/100ML
INJECTION, SOLUTION INTRAVENOUS CONTINUOUS
Status: DISCONTINUED | OUTPATIENT
Start: 2023-11-01 | End: 2023-11-01

## 2023-11-01 RX ORDER — BUTALBITAL, ACETAMINOPHEN AND CAFFEINE 50; 325; 40 MG/1; MG/1; MG/1
1 TABLET ORAL EVERY 12 HOURS PRN
Status: CANCELLED | OUTPATIENT
Start: 2023-11-01

## 2023-11-01 RX ORDER — POTASSIUM CHLORIDE 20 MEQ/1
20 TABLET, EXTENDED RELEASE ORAL 2 TIMES DAILY
Status: DISCONTINUED | OUTPATIENT
Start: 2023-11-02 | End: 2023-11-02 | Stop reason: HOSPADM

## 2023-11-01 RX ADMIN — MONTELUKAST 10 MG: 10 TABLET, FILM COATED ORAL at 06:11

## 2023-11-01 RX ADMIN — FLUTICASONE FUROATE AND VILANTEROL TRIFENATATE 1 PUFF: 100; 25 POWDER RESPIRATORY (INHALATION) at 09:11

## 2023-11-01 RX ADMIN — LORAZEPAM 2 MG: 1 TABLET ORAL at 09:11

## 2023-11-01 RX ADMIN — THIAMINE HCL TAB 100 MG 100 MG: 100 TAB at 09:11

## 2023-11-01 RX ADMIN — NICOTINE 1 PATCH: 7 PATCH, EXTENDED RELEASE TRANSDERMAL at 09:11

## 2023-11-01 RX ADMIN — BUTALBITAL, ACETAMINOPHEN, AND CAFFEINE 1 TABLET: 50; 325; 40 TABLET ORAL at 09:11

## 2023-11-01 RX ADMIN — HEPARIN SODIUM 5000 UNITS: 5000 INJECTION INTRAVENOUS; SUBCUTANEOUS at 10:11

## 2023-11-01 RX ADMIN — POTASSIUM BICARBONATE 50 MEQ: 978 TABLET, EFFERVESCENT ORAL at 09:11

## 2023-11-01 RX ADMIN — CEFTRIAXONE 2 G: 2 INJECTION, POWDER, FOR SOLUTION INTRAMUSCULAR; INTRAVENOUS at 09:11

## 2023-11-01 RX ADMIN — ATORVASTATIN CALCIUM 40 MG: 40 TABLET, FILM COATED ORAL at 09:11

## 2023-11-01 RX ADMIN — THERA TABS 1 TABLET: TAB at 09:11

## 2023-11-01 RX ADMIN — SODIUM CHLORIDE, POTASSIUM CHLORIDE, SODIUM LACTATE AND CALCIUM CHLORIDE: 600; 310; 30; 20 INJECTION, SOLUTION INTRAVENOUS at 10:11

## 2023-11-01 RX ADMIN — METOPROLOL TARTRATE 12.5 MG: 25 TABLET, FILM COATED ORAL at 09:11

## 2023-11-01 RX ADMIN — SODIUM CHLORIDE, POTASSIUM CHLORIDE, SODIUM LACTATE AND CALCIUM CHLORIDE: 600; 310; 30; 20 INJECTION, SOLUTION INTRAVENOUS at 09:11

## 2023-11-01 RX ADMIN — ASPIRIN 81 MG CHEWABLE TABLET 81 MG: 81 TABLET CHEWABLE at 09:11

## 2023-11-01 RX ADMIN — CLOPIDOGREL BISULFATE 75 MG: 75 TABLET ORAL at 09:11

## 2023-11-01 RX ADMIN — CARVEDILOL 6.25 MG: 6.25 TABLET, FILM COATED ORAL at 08:11

## 2023-11-01 RX ADMIN — CARVEDILOL 6.25 MG: 6.25 TABLET, FILM COATED ORAL at 12:11

## 2023-11-01 RX ADMIN — HEPARIN SODIUM 5000 UNITS: 5000 INJECTION INTRAVENOUS; SUBCUTANEOUS at 03:11

## 2023-11-01 RX ADMIN — HEPARIN SODIUM 5000 UNITS: 5000 INJECTION INTRAVENOUS; SUBCUTANEOUS at 06:11

## 2023-11-01 RX ADMIN — TIOTROPIUM BROMIDE INHALATION SPRAY 2 PUFF: 3.12 SPRAY, METERED RESPIRATORY (INHALATION) at 09:11

## 2023-11-01 NOTE — ASSESSMENT & PLAN NOTE
3 day hx of substernal CP exacerbated by exertion and accompanied by worsening MENA. EKG with inverted T waves, seen in previous. Troponin 1.691 ->1.516 ->1.080 -> 0.876. BNP elevated to 2260. FREDA score: 5. D/w cardiology, most likely demand ischemia iso sepsis/hotn, compounded by current inability to renally excrete these compounds, however given her story and inability to undergo LHC at the moment 2/2 LILLIE, ACS protocol initiated. Plavix and ASA loaded, restarted on heparin gtt.    10/27: d/c heparin gtt. continuing on DAPT  Trop peak 1.691    Plan:  - Consult cardiology    - Completed 48 hour ACS protocol   - no LHC right now 2/2 LILLIE   - Continue ASA 81mg/Plavix 75mg  - Atorvastatin 40mg  - transition from metoprolol to coreg for better antihypertensive control    Results for orders placed during the hospital encounter of 10/26/23    Echo    Interpretation Summary    Left Ventricle: The left ventricle is normal in size. Normal wall thickness. There is eccentric hypertrophy. Global hypokinesis present. There is normal systolic function. There is normal diastolic function.    Left Atrium: Left atrium is mildly dilated.    Right Ventricle: Moderate right ventricular enlargement. Wall thickness is normal. Right ventricle wall motion  is normal. Systolic function is moderately reduced.    Mitral Valve: There is mild regurgitation.    Tricuspid Valve: There is moderate regurgitation.    Pulmonary Artery: The estimated pulmonary artery systolic pressure is 48 mmHg.    IVC/SVC: Elevated venous pressure at 15 mmHg.

## 2023-11-01 NOTE — PROGRESS NOTES
"[facility] Medicine  Progress Note    Patient Name: Bhavana Lambert  MRN: [id number]  Patient Class: IP- Inpatient   Admission Date: 10/26/2023  Length of Stay: 6 days  Attending Physician: Vargas Guevara MD  Primary Care Provider: Pepito Jhaveri IV, MD        Subjective:     Principal Problem:LILLIE (acute kidney injury)        HPI:  68F with PMHx fo COPD (on 3L NC at home) presented to OSH c/o fever, SOB, sore throat, generalized weakness, and semi-chronic left-sided rib pain (hx of fall 4 months ago, broke L sided ribs, continued pain). Pt describes having intermittent centralized substernal CP that worsens with exertion. SOB also worse with exertion. Pt reports that for the last few days she has been increasingly weak and has had decreased PO intake. Describes minimal UOP over the last two days. Denies N/V/D/C, lightheadedness, BLE swelling, abd pain, dysuria, hematuria. Denies any recent sick contacts. Pt is chronic user of Aleve, but has been taking more since fall/rib fractures.     Workup at OSH ED, vitals stable, satting well on home O2. Labs significant WBC 7, hgb 11, plt 164, Na 129, K 3, BUN/Cr 71, 5.2, bicarb 20 and anion gap of 24, troponin 1.5 -> 1.08, BNP 2260. EKG with inverted T waves V1-V4, similar to prior, Qtc 625. UA grossly infectious. CTAP with L sided hydronephrosis and perinephric stranding.     On admission to [facility] pt c/o mild CP and SOB improved from yesterday. Also feeling very anxious and c/o HA. She takes daily klonopin, buspirone, and Fioricet for these problems. Additionally, pt endorses drinking between a half-pint and pint of tequila daily, denies feeling tremulous or agitated currently, not tachycardic or hypertensive, states her last drink was "a few days ago". Cardiology consulted will start on ASA, plavix, heparin gtt. Nephrology consulted, will f/u recs.      Overview/Hospital Course:  68F with PMHx fo COPD (on 3L NC at home) presented to OSH c/o fever, " SOB, sore throat, generalized weakness, and semi-chronic left-sided rib pain (hx of fall 4 months ago, broke L sided ribs, continued pain). Admitted to OSH and transferred to [facility] for severe oliguric LILLIE, UTI, and NSTEMI. Pt Cr 5.2 on admission, BNP 2200, and Trop 1.5 initially and down-trended to 0.9. D/w nephrology, who recommended fluid resuscitation with NS, which was given on day 1 of admission with small decrease in Cr to 5.0, however BUN climbed to 90. Additionally, now hyponatremic to 124. Will continue fluids with NS and eliminate any D5W. Pt also found with serum osm gap, denied drinking any homemade alcohol or ingesting any unknown substances, will f/u labs. D/w cardiology who recommended ACS protocol, pt loaded on ASA, plavix and put on heparin gtt. TTE with eccentric hypertrophy and global hypokinesis, nl systolic function and wall motion abnormality noted. Will transition to DAPT, will need LHC when kidneys recover. Ucx positive for E coli, transitioned from cefepime to ceftriaxone and completed 7 day course. Kidneys continue to improve with gentle rehydration, now in polyuric phase of LILLIE.       Interval History: NAEON, afebrile.     Slightly hypertensive, will adjust medication, change from lopressor to coreg.    UOP slightly decreased from previous day, still 4L UOP. Will ctm and replace electrolytes prn. Progressing.    Review of Systems   Constitutional:  Negative for activity change, appetite change, chills, fatigue and fever.   HENT:  Negative for congestion and sore throat.    Eyes:  Negative for pain and visual disturbance.   Respiratory:  Negative for cough, chest tightness and shortness of breath.    Cardiovascular:  Negative for chest pain, palpitations and leg swelling.   Gastrointestinal:  Negative for abdominal distention, abdominal pain, constipation, diarrhea, nausea and vomiting.   Genitourinary:  Negative for decreased urine volume, difficulty urinating, dysuria and hematuria.    Musculoskeletal:  Negative for arthralgias and back pain.   Skin:  Negative for pallor and rash.   Neurological:  Positive for weakness. Negative for dizziness, syncope, light-headedness and headaches.   Psychiatric/Behavioral:  Negative for agitation and confusion.      Objective:     Vital Signs (Most Recent):  Temp: 98.5 °F (36.9 °C) (11/01/23 0758)  Pulse: 81 (11/01/23 0910)  Resp: 17 (11/01/23 0910)  BP: (!) 172/76 (11/01/23 0758)  SpO2: (!) 94 % (11/01/23 0758) Vital Signs (24h Range):  Temp:  [97.8 °F (36.6 °C)-99 °F (37.2 °C)] 98.5 °F (36.9 °C)  Pulse:  [71-81] 81  Resp:  [17-20] 17  SpO2:  [92 %-95 %] 94 %  BP: (145-172)/(67-76) 172/76     Weight: 61.2 kg (135 lb)  Body mass index is 24.69 kg/m².    Intake/Output Summary (Last 24 hours) at 11/1/2023 1420  Last data filed at 11/1/2023 0539  Gross per 24 hour   Intake 250 ml   Output 3525 ml   Net -3275 ml         Physical Exam  Vitals reviewed.   Constitutional:       General: She is not in acute distress.     Appearance: Normal appearance.      Comments: 3L NC   HENT:      Head: Normocephalic and atraumatic.      Mouth/Throat:      Mouth: Mucous membranes are moist.      Pharynx: Oropharynx is clear.   Eyes:      Pupils: Pupils are equal, round, and reactive to light.   Cardiovascular:      Rate and Rhythm: Normal rate and regular rhythm.   Pulmonary:      Effort: Pulmonary effort is normal. No respiratory distress.      Breath sounds: Normal breath sounds.   Abdominal:      General: There is no distension.      Palpations: Abdomen is soft.      Tenderness: There is no abdominal tenderness.   Musculoskeletal:         General: No swelling or tenderness. Normal range of motion.   Skin:     General: Skin is warm and dry.   Neurological:      General: No focal deficit present.      Mental Status: She is alert and oriented to person, place, and time. Mental status is at baseline.      Motor: Weakness present.             Significant Labs: All pertinent labs  within the past 24 hours have been reviewed.  CBC:   Recent Labs   Lab 10/31/23  0512 11/01/23  0509   WBC 12.25 8.65   HGB 10.8* 10.2*   HCT 32.7* 30.1*    334     CMP:   Recent Labs   Lab 10/31/23  0512 10/31/23  1933 11/01/23  0509   * 136 136   K 3.2* 3.5 3.1*   CL 98 95 96   CO2 22* 26 24   GLU 69* 88 78   BUN 50* 42* 36*   CREATININE 2.4* 2.1* 1.7*   CALCIUM 8.8 8.5* 8.3*   PROT 6.0  --  5.7*   ALBUMIN 2.0* 2.1* 1.9*   BILITOT 0.3  --  0.2   ALKPHOS 167*  --  149*   AST 33  --  48*   ALT 18  --  25   ANIONGAP 15 15 16       Significant Imaging: I have reviewed all pertinent imaging results/findings within the past 24 hours.      Assessment/Plan:      * LILLIE (acute kidney injury)  Creatinine 5.2 on admit, baseline around 1.1.  - Minimal UOP over the last few days. Decreased PO intake. Increased NSAID use recently 2/2 fall and rib injury. Hypotensive on arrival to OSH ED with sBP of 89. Known dx of pyelonephritis, without hydronephrosis. POCUS with IVC showing near complete collapse on sniff test, intravascularly depleted. Based on this it is likely mixed etiology of pre-renal and intrinsic.    10/27: Receiving continuous fluids at 100cc/hr, Cr improving, now at 4.6. UOP improving. Electrolytes/hyponatremia improving. Will CTM    11/1: improving, cr. 1.7. now in polyuric phase of LILLIE/ATN. Will d/w fluid status with nephrology, recommend continuing fluids, LR at 60cc/hr. Don't want pt to become intravascularly depleted, however pt also came in with NSTEMI.    Plan:   - No evidence of hydronephrosis on CTAP  - POCUS with near complete collapse of IVC on sniff test on day 1 of admission.  - Strict I&Os and daily weights   - Gentle IVF   - currently running at 100ml/hr   - will continue to assess volume status   - Continue to monitor for hyperchloremic metabolic acidosis and transition IVF to LR  - Avoid nephrotoxic agents such as NSAIDs, gadolinium and IV radiocontrast  - Renally dose meds to current  "GFR  - Maintain MAP > 65    ATN (acute tubular necrosis)  See "LILLIE"    Hyponatremia  Improving    Patient has hyponatremia which is uncontrolled,We will aim to correct the sodium by 4-6mEq in 24 hours. We will monitor sodium Every 12 hours. The hyponatremia is most likely due to Dehydration/hypovolemia, as it has slowly improved with IV fluids today. We will obtain the following studies: Urine sodium, urine osmolality, serum osmolality. We will treat the hyponatremia with IV fluids as follows: NS. The patient's sodium results have been reviewed and are listed below.  Recent Labs   Lab 10/30/23  0525   *       Urine Osm elevated, Serum Osm elevated. Serum Osm gap slightly elevated also, some initial concern for ingestion of atypical alcohol. Pt denies consumption of unknown substance. Labs pending. Most likely is increased gap in severe oliguric LILLIE that resulted in poor clearance/buildup of osmotic substances in serum.    Emphysema/COPD  Chronic condition, 2/2 heavy tobacco use. Pt currently smoking ~1ppd, but used to smoke ~2ppd. On 3L NC at home 24h/day, has required up to 5L NC intermittently during the past few days. Uses tiotropium-oladaterol and takes montelukast daily. Albuterol inhaler prn    SOB yesterday 10/28 more likely 2/2 COPD than volume status, as pt has not been using her daily maintenance inhaler (opted to use home medication as it was not on formulary), because the medication was not in the device. Will change to Spiriva, as it is available in the hospital.     Plan  - Spiriva qd, breo qd  - albuterol prn      Alcohol use  Pt endorses drinking a half-pint to a pint of tequila every day. Last drink was a few days ago. No evidence of acute withdrawal on admission. Will CTM and adjust medications as needed    Plan  - Ativan for CIWA >8      Hypothyroid  Most recent TSH 2.6. Will not repeat during acute illness    Plan:  - continue home synthroid    Pyelonephritis  CT showing "moderate left " "sided perinephric fat stranding may be infectious or inflammatory." UA with 75 WBC, many bacteria, 3+ leukocytes. No leukocytosis, afebrile on admission.      Plan:   - Ucx with E.Coli, on Ceftriaxone. Plan for 7 day course.    NSTEMI (non-ST elevated myocardial infarction)  3 day hx of substernal CP exacerbated by exertion and accompanied by worsening MENA. EKG with inverted T waves, seen in previous. Troponin 1.691 ->1.516 ->1.080 -> 0.876. BNP elevated to 2260. FREDA score: 5. D/w cardiology, most likely demand ischemia iso sepsis/hotn, compounded by current inability to renally excrete these compounds, however given her story and inability to undergo LHC at the moment 2/2 LILLIE, ACS protocol initiated. Plavix and ASA loaded, restarted on heparin gtt.    10/27: d/c heparin gtt. continuing on DAPT  Trop peak 1.691    Plan:  - Consult cardiology    - Completed 48 hour ACS protocol   - no LHC right now 2/2 LILLIE   - Continue ASA 81mg/Plavix 75mg  - Atorvastatin 40mg  - transition from metoprolol to coreg for better antihypertensive control    Results for orders placed during the hospital encounter of 10/26/23    Echo    Interpretation Summary    Left Ventricle: The left ventricle is normal in size. Normal wall thickness. There is eccentric hypertrophy. Global hypokinesis present. There is normal systolic function. There is normal diastolic function.    Left Atrium: Left atrium is mildly dilated.    Right Ventricle: Moderate right ventricular enlargement. Wall thickness is normal. Right ventricle wall motion  is normal. Systolic function is moderately reduced.    Mitral Valve: There is mild regurgitation.    Tricuspid Valve: There is moderate regurgitation.    Pulmonary Artery: The estimated pulmonary artery systolic pressure is 48 mmHg.    IVC/SVC: Elevated venous pressure at 15 mmHg.      Increased anion gap metabolic acidosis  Bicarb 20, AG 22 on admission. Etiology unclear. Most likely cause is acute increase in " uremia, 72 on admission. Lactic acid wnl on admission to [facility], negative for urine ketones..     Gap improving with fluid resuscitation. Acidosis improving.    Plan  - Consult nephrology  - IV fluid resuscitation.   - CTM    Anxiety  Chronic and stable. States anxiety has been worse since in the hospital. Will hold home Klonopin, Buspirone iso prolonged QTc and LILLIE    Plan:  - Xanax prn for anxiety          VTE Risk Mitigation (From admission, onward)         Ordered     heparin (porcine) injection 5,000 Units  Every 8 hours         10/27/23 1351     IP VTE LOW RISK PATIENT  Once         10/26/23 1123     Place sequential compression device  Until discontinued         10/26/23 1123                Discharge Planning   CHLOE: 11/2/2023     Code Status: Full Code   Is the patient medically ready for discharge?: No    Reason for patient still in hospital (select all that apply): Patient trending condition and Consult recommendations  Discharge Plan A: Home, Home with family, Home Health, Other (24/7 supervision)   Discharge Delays: None known at this time              Ricky Sherman MD  Department of Hospital Medicine   [facility]

## 2023-11-01 NOTE — SUBJECTIVE & OBJECTIVE
Interval History: ABDI    Review of patient's allergies indicates:  No Known Allergies  Current Facility-Administered Medications   Medication Frequency    aspirin chewable tablet 81 mg Daily    atorvastatin tablet 40 mg Daily    butalbital-acetaminophen-caffeine -40 mg per tablet 1 tablet Q12H PRN    clopidogreL tablet 75 mg Daily    cyclobenzaprine tablet 5 mg BID PRN    dextrose 10% bolus 125 mL 125 mL PRN    dextrose 10% bolus 250 mL 250 mL PRN    fluticasone furoate-vilanteroL 100-25 mcg/dose diskus inhaler 1 puff Daily    glucagon (human recombinant) injection 1 mg PRN    glucose chewable tablet 16 g PRN    glucose chewable tablet 24 g PRN    heparin (porcine) injection 5,000 Units Q8H    lactated ringers infusion Continuous    LORazepam tablet 0.5 mg Q12H PRN    LORazepam tablet 2 mg Q4H PRN    metoprolol tartrate (LOPRESSOR) split tablet 12.5 mg BID    montelukast tablet 10 mg QAM    multivitamin tablet Daily    naloxone 0.4 mg/mL injection 0.02 mg PRN    nicotine 7 mg/24 hr 1 patch Daily    polyethylene glycol packet 17 g Daily PRN    sodium chloride 0.9% flush 10 mL Q12H PRN    thiamine tablet 100 mg Daily    tiotropium bromide 2.5 mcg/actuation inhaler 2 puff Daily    zinc oxide 20 % ointment PRN       Objective:     Vital Signs (Most Recent):  Temp: 98.5 °F (36.9 °C) (11/01/23 0758)  Pulse: 81 (11/01/23 0910)  Resp: 17 (11/01/23 0910)  BP: (!) 172/76 (11/01/23 0758)  SpO2: (!) 94 % (11/01/23 0758) Vital Signs (24h Range):  Temp:  [97.8 °F (36.6 °C)-99 °F (37.2 °C)] 98.5 °F (36.9 °C)  Pulse:  [71-81] 81  Resp:  [17-20] 17  SpO2:  [92 %-95 %] 94 %  BP: (130-172)/(62-76) 172/76     Weight: 61.2 kg (135 lb) (10/27/23 0749)  Body mass index is 24.69 kg/m².  Body surface area is 1.64 meters squared.    I/O last 3 completed shifts:  In: 250 [P.O.:250]  Out: 5825 [Urine:5825]     Physical Exam  Vitals and nursing note reviewed.   Constitutional:       General: She is not in acute distress.     Appearance:  Normal appearance. She is not ill-appearing.   HENT:      Head: Normocephalic and atraumatic.      Right Ear: External ear normal.      Left Ear: External ear normal.      Nose: Nose normal.      Mouth/Throat:      Mouth: Mucous membranes are moist.      Pharynx: Oropharynx is clear.   Eyes:      General:         Right eye: No discharge.         Left eye: No discharge.      Conjunctiva/sclera: Conjunctivae normal.      Pupils: Pupils are equal, round, and reactive to light.   Cardiovascular:      Rate and Rhythm: Normal rate and regular rhythm.      Pulses: Normal pulses.      Heart sounds: Normal heart sounds. No murmur heard.  Pulmonary:      Effort: Pulmonary effort is normal. No respiratory distress.      Breath sounds: Normal breath sounds.      Comments: Decreased air entry at bases   On chronic oxygen   Abdominal:      General: Bowel sounds are normal. There is no distension.      Palpations: Abdomen is soft. There is no mass.      Tenderness: There is no abdominal tenderness. There is no right CVA tenderness, left CVA tenderness, guarding or rebound.      Hernia: No hernia is present.   Musculoskeletal:         General: No swelling. Normal range of motion.      Cervical back: Normal range of motion.      Right lower leg: No edema.      Left lower leg: No edema.   Skin:     General: Skin is warm and dry.      Capillary Refill: Capillary refill takes less than 2 seconds.      Coloration: Skin is not jaundiced.      Findings: No rash.   Neurological:      General: No focal deficit present.      Mental Status: She is alert and oriented to person, place, and time.   Psychiatric:         Mood and Affect: Mood normal.         Behavior: Behavior normal.          Significant Labs:  All labs within the past 24 hours have been reviewed.     Significant Imaging:  Labs: Reviewed

## 2023-11-01 NOTE — ASSESSMENT & PLAN NOTE
Patient transferred from [place] for persistent shortness of breath (on 24/7 home oxygen for COPD) and weakness. Poor oral fluid and solute intake for several days and increased PO intake of medications including naproxen (aleve). Associated episodes on n/v. Baseline sCr ~1.0. Cr downtrending. Negative for toxic alcohols. Has had good UOP while on IVF and has had downtrending Cr on IVF. Azul removed for UOP monitoring    Impression   LILLIE 2/2 poor oral intake and NSAID use likely all leading to ischemic ATN and now in ATN diuresis phase    Recs   - If planning for discharge: can stop IVF and encourage PO intake. Advise pt to avoid diet coke and replace with water.   - If remaining inpatient for reasons other than LILLIE: can continue with LR 60 cc/hr and stop IVF when ready for discharge   -Avoid nephrotoxins   - Patient advised not to take NSAIDS when discharged from hospital   - If no urgency for IV contrast studies then would recommend avoiding contrast for now

## 2023-11-01 NOTE — ASSESSMENT & PLAN NOTE
Creatinine 5.2 on admit, baseline around 1.1.  - Minimal UOP over the last few days. Decreased PO intake. Increased NSAID use recently 2/2 fall and rib injury. Hypotensive on arrival to OSH ED with sBP of 89. Known dx of pyelonephritis, without hydronephrosis. POCUS with IVC showing near complete collapse on sniff test, intravascularly depleted. Based on this it is likely mixed etiology of pre-renal and intrinsic.    10/27: Receiving continuous fluids at 100cc/hr, Cr improving, now at 4.6. UOP improving. Electrolytes/hyponatremia improving. Will CTM    11/1: improving, cr. 1.7. now in polyuric phase of LILLIE/ATN. Will d/w fluid status with nephrology, recommend continuing fluids, LR at 60cc/hr. Don't want pt to become intravascularly depleted, however pt also came in with NSTEMI.    Plan:   - No evidence of hydronephrosis on CTAP  - POCUS with near complete collapse of IVC on sniff test on day 1 of admission.  - Strict I&Os and daily weights   - Gentle IVF   - currently running at 100ml/hr   - will continue to assess volume status   - Continue to monitor for hyperchloremic metabolic acidosis and transition IVF to LR  - Avoid nephrotoxic agents such as NSAIDs, gadolinium and IV radiocontrast  - Renally dose meds to current GFR  - Maintain MAP > 65

## 2023-11-01 NOTE — PT/OT/SLP PROGRESS
Occupational Therapy  Treatment    Name: Bhavana Lambert  MRN: [id number]  Admitting Diagnosis:  LILLIE (acute kidney injury)       Recommendations:     Discharge Recommendations: Low Intensity Therapy  Discharge Equipment Recommendations:  none  Barriers to discharge:  None    Assessment:     Bhavana Lambert is a 68 y.o. female with a medical diagnosis of LILLIE (acute kidney injury).  She presents with performance deficits affecting function are weakness, impaired endurance, impaired self care skills, impaired functional mobility, gait instability, impaired balance, impaired cardiopulmonary response to activity. Pt agreeable to therapy. Pt performed mobility and self care tasks with most assistance for balance during standing and ambulation. Pt progressing well overall. Pt participates well and is motivated to regain functional independence in mobility and self care.      Rehab Prognosis:  Good; patient would benefit from acute skilled OT services to address these deficits and reach maximum level of function.       Plan:     Patient to be seen 3 x/week to address the above listed problems via self-care/home management, therapeutic activities, therapeutic exercises  Plan of Care Expires: 11/30/23  Plan of Care Reviewed with: patient    Subjective     Chief Complaint: Sleepy  Patient/Family Comments/goals: Get well and go home  Pain/Comfort:  Pain Rating 1: 0/10  Pain Rating Post-Intervention 1: 0/10    Objective:     Communicated with: RN prior to session.  Patient found HOB elevated with PureWick, peripheral IV, oxygen upon OT entry to room.    General Precautions: Standard, fall    Orthopedic Precautions:N/A  Braces: N/A  Respiratory Status: Nasal cannula, flow 3 L/min     Occupational Performance:     Bed Mobility:    Patient completed Supine to Sit with supervision  Patient completed Sit to Supine with supervision     Functional Mobility/Transfers:  Patient completed Sit <> Stand Transfer with contact guard  assistance  with  hand-held assist   Patient completed Toilet Transfer Step Transfer technique with contact guard assistance with  hand-held assist  Functional Mobility: CGA/Min A with IV pole, bed to bathroom and back, approx 15 feet x2.    Activities of Daily Living:  Grooming: stand by assistance standing at sink, oral care, washing face and hands  Upper Body Dressing: supervision gown  Toileting: supervision at toilet, bowel movement; able to manage clothes and wipe self      AMPAC 6 Click ADL: 21    Treatment & Education:  Pt edu re OT role, POC and safety.    Patient left HOB elevated with all lines intact and call button in reach    GOALS:   Multidisciplinary Problems       Occupational Therapy Goals          Problem: Occupational Therapy    Goal Priority Disciplines Outcome Interventions   Occupational Therapy Goal     OT, PT/OT Ongoing, Progressing    Description: Goals to be met by: 11/14/23     Patient will increase functional independence with ADLs by performing:    UE Dressing with Modified [place].  LE Dressing with Modified [place].  Grooming while standing with Modified [place].  Toileting from toilet with Modified [place] for hygiene and clothing management.                          Time Tracking:     OT Date of Treatment: 11/01/23  OT Start Time: 1149  OT Stop Time: 1230  OT Total Time (min): 41 min    Billable Minutes:Self Care/Home Management 30 minutes  Therapeutic Activity 11 minutes    OT/ANDREW: OT       LATOYA Jin  11/1/2023  Pager: [phone]

## 2023-11-01 NOTE — PT/OT/SLP PROGRESS
"Physical Therapy Treatment    Patient Name:  Bhavana Lambert   MRN:  [id number]    Recommendations:     Discharge Recommendations: Low Intensity Therapy  Discharge Equipment Recommendations: none  Barriers to discharge: None    Assessment:     Bhavana Lambert is a 68 y.o. female admitted with a medical diagnosis of LILLIE (acute kidney injury).  She presents with the following impairments/functional limitations: weakness, impaired endurance, impaired self care skills, impaired functional mobility, impaired balance, decreased coordination, decreased safety awareness, impaired cardiopulmonary response to activity . Patient was tired throughout the day, attempted several times prior to patient being willing to participate in therapy. Requested additional time prior to initiating movement.     Rehab Prognosis: Fair; patient would benefit from acute skilled PT services to address these deficits and reach maximum level of function.    Recent Surgery: * No surgery found *      Plan:     During this hospitalization, patient to be seen 3 x/week to address the identified rehab impairments via gait training, therapeutic activities, therapeutic exercises, neuromuscular re-education and progress toward the following goals:    Plan of Care Expires:  11/29/23    Subjective     Chief Complaint: "I am just really tired"  Patient/Family Comments/goals: I don't want to do much  Pain/Comfort:  Pain Rating 1: 0/10      Objective:     Communicated with RN prior to session.  Patient found supine with PureWick, peripheral IV, oxygen upon PT entry to room.     General Precautions: Standard, fall  Orthopedic Precautions: N/A  Braces: N/A  Respiratory Status: Room air     Functional Mobility:  Bed Mobility:     Rolling Left:  independence  Rolling Right: independence  Supine to Sit: independence  Sit to Supine: independence  Transfers:     Sit to Stand:  supervision with rolling walker  Gait: 175' FWW long step length observed " bilaterally      AM-PAC 6 CLICK MOBILITY  Turning over in bed (including adjusting bedclothes, sheets and blankets)?: 4  Sitting down on and standing up from a chair with arms (e.g., wheelchair, bedside commode, etc.): 4  Moving from lying on back to sitting on the side of the bed?: 4  Moving to and from a bed to a chair (including a wheelchair)?: 4  Need to walk in hospital room?: 3  Climbing 3-5 steps with a railing?: 2  Basic Mobility Total Score: 21       Treatment & Education:  Supine to sit with maximum encouragement and provided education concerning the importance of movement.   Several loss of straight forward ambulation towards the left observed running into multiple objects despite the provision of verbal cues.   Standing rest breaks taken throughout the session to regain breathing with movement.   Progressing well towards goals. Seems to become closer to baseline.     Patient left HOB elevated with all lines intact, call button in reach, and MD present..    GOALS:   Multidisciplinary Problems       Physical Therapy Goals          Problem: Physical Therapy    Goal Priority Disciplines Outcome Goal Variances Interventions   Physical Therapy Goal     PT, PT/OT Ongoing, Progressing     Description: Goals to be met by:11/15/2023    Patient will increase functional independence with mobility by performin. Supine to sit with Supervision  2. Sit to stand transfer with Supervision using RW  3. Gait  x 25 feet with Supervision using Rolling Walker.   4. Wheelchair propulsion with patient's preferred method x 25ft with Supervision                         Time Tracking:     PT Received On: 23  PT Start Time: 1430     PT Stop Time: 1453  PT Total Time (min): 23 min     Billable Minutes: Gait Training 15 min and Therapeutic Activity 8 min    Treatment Type: Treatment  PT/PTA: PT     Number of PTA visits since last PT visit: 0     2023

## 2023-11-01 NOTE — PROGRESS NOTES
[facility]  Nephrology  Progress Note    Patient Name: Bhavana Lambert  MRN: [id number]  Admission Date: 10/26/2023  Hospital Length of Stay: 6 days  Attending Provider: Vargas Guevara MD   Primary Care Physician: Pepito Jhaveri IV, MD  Principal Problem:LILLIE (acute kidney injury)    Subjective:     HPI: Ms. Bhavana Lambert is a 68 y.o. patient with past medical history of COPD on 24/7 O2 requirements and inhalers at home here. Transferred from [facility] for Nephrology evaluation. Patient had several days of fever, generalized weakness, and associated left flank pain and n/v. Refers dysuria and diarrhea. About 4 months ago, she had a fall where she broke some ribs on the left side. Patient states pain and general feeling is worse now compared to some months ago. Patient has been taking increased pain medications (naproxen). She states that the pain she feels now is similar to the pain she is been feeling for the past few months, only slightly worse. Upon admission, UA 75 WBC, Ucr 63.7. Baseline sCr 1.0.     Nephrology consulted for pyelonephritis and LILLIE.       Interval History: NAEON    Review of patient's allergies indicates:  No Known Allergies  Current Facility-Administered Medications   Medication Frequency    aspirin chewable tablet 81 mg Daily    atorvastatin tablet 40 mg Daily    butalbital-acetaminophen-caffeine -40 mg per tablet 1 tablet Q12H PRN    clopidogreL tablet 75 mg Daily    cyclobenzaprine tablet 5 mg BID PRN    dextrose 10% bolus 125 mL 125 mL PRN    dextrose 10% bolus 250 mL 250 mL PRN    fluticasone furoate-vilanteroL 100-25 mcg/dose diskus inhaler 1 puff Daily    glucagon (human recombinant) injection 1 mg PRN    glucose chewable tablet 16 g PRN    glucose chewable tablet 24 g PRN    heparin (porcine) injection 5,000 Units Q8H    lactated ringers infusion Continuous    LORazepam tablet 0.5 mg Q12H PRN    LORazepam tablet 2 mg Q4H PRN    metoprolol tartrate (LOPRESSOR)  split tablet 12.5 mg BID    montelukast tablet 10 mg QAM    multivitamin tablet Daily    naloxone 0.4 mg/mL injection 0.02 mg PRN    nicotine 7 mg/24 hr 1 patch Daily    polyethylene glycol packet 17 g Daily PRN    sodium chloride 0.9% flush 10 mL Q12H PRN    thiamine tablet 100 mg Daily    tiotropium bromide 2.5 mcg/actuation inhaler 2 puff Daily    zinc oxide 20 % ointment PRN       Objective:     Vital Signs (Most Recent):  Temp: 98.5 °F (36.9 °C) (11/01/23 0758)  Pulse: 81 (11/01/23 0910)  Resp: 17 (11/01/23 0910)  BP: (!) 172/76 (11/01/23 0758)  SpO2: (!) 94 % (11/01/23 0758) Vital Signs (24h Range):  Temp:  [97.8 °F (36.6 °C)-99 °F (37.2 °C)] 98.5 °F (36.9 °C)  Pulse:  [71-81] 81  Resp:  [17-20] 17  SpO2:  [92 %-95 %] 94 %  BP: (130-172)/(62-76) 172/76     Weight: 61.2 kg (135 lb) (10/27/23 0749)  Body mass index is 24.69 kg/m².  Body surface area is 1.64 meters squared.    I/O last 3 completed shifts:  In: 250 [P.O.:250]  Out: 5825 [Urine:5825]     Physical Exam  Vitals and nursing note reviewed.   Constitutional:       General: She is not in acute distress.     Appearance: Normal appearance. She is not ill-appearing.   HENT:      Head: Normocephalic and atraumatic.      Right Ear: External ear normal.      Left Ear: External ear normal.      Nose: Nose normal.      Mouth/Throat:      Mouth: Mucous membranes are moist.      Pharynx: Oropharynx is clear.   Eyes:      General:         Right eye: No discharge.         Left eye: No discharge.      Conjunctiva/sclera: Conjunctivae normal.      Pupils: Pupils are equal, round, and reactive to light.   Cardiovascular:      Rate and Rhythm: Normal rate and regular rhythm.      Pulses: Normal pulses.      Heart sounds: Normal heart sounds. No murmur heard.  Pulmonary:      Effort: Pulmonary effort is normal. No respiratory distress.      Breath sounds: Normal breath sounds.      Comments: Decreased air entry at bases   On chronic oxygen   Abdominal:      General:  Bowel sounds are normal. There is no distension.      Palpations: Abdomen is soft. There is no mass.      Tenderness: There is no abdominal tenderness. There is no right CVA tenderness, left CVA tenderness, guarding or rebound.      Hernia: No hernia is present.   Musculoskeletal:         General: No swelling. Normal range of motion.      Cervical back: Normal range of motion.      Right lower leg: No edema.      Left lower leg: No edema.   Skin:     General: Skin is warm and dry.      Capillary Refill: Capillary refill takes less than 2 seconds.      Coloration: Skin is not jaundiced.      Findings: No rash.   Neurological:      General: No focal deficit present.      Mental Status: She is alert and oriented to person, place, and time.   Psychiatric:         Mood and Affect: Mood normal.         Behavior: Behavior normal.          Significant Labs:  All labs within the past 24 hours have been reviewed.     Significant Imaging:  Labs: Reviewed    Assessment/Plan:     Renal/  * LILLIE (acute kidney injury)  Patient transferred from [place] for persistent shortness of breath (on 24/7 home oxygen for COPD) and weakness. Poor oral fluid and solute intake for several days and increased PO intake of medications including naproxen (aleve). Associated episodes on n/v. Baseline sCr ~1.0. Cr downtrending. Negative for toxic alcohols. Has had good UOP while on IVF and has had downtrending Cr on IVF. Azul removed for UOP monitoring    Impression   LILLIE 2/2 poor oral intake and NSAID use likely all leading to ischemic ATN and now in ATN diuresis phase    Recs   - If planning for discharge: can stop IVF and encourage PO intake. Advise pt to avoid diet coke and replace with water.   - If remaining inpatient for reasons other than LILLIE: can continue with LR 60 cc/hr and stop IVF when ready for discharge   -Avoid nephrotoxins   - Patient advised not to take NSAIDS when discharged from hospital   - If no urgency for IV contrast studies  then would recommend avoiding contrast for now     Proteinuria  Pt with persistent proteinuria of 2, 3+ and 2+ protein on U/A.     Concern for membranous nephropathy vs chronic interstitial nephritis 2/2 to NSAID use    Recommendations:   - Complement levels - C3 + C4; negative   - free light chains; unremarkable   - ESR; mildly elevated   - ANCA; pending  - TRACEY; unremarkable  - renal US; no hydronephrosis   - order PLA2R   - Once Cr has returned and remains at baseline consider ACEi/ARB to address proteinuria.       ID  Pyelonephritis  Findings upon admission suggestive of UTI (pyelonephritis) which may have exacerbated LILLIE. Presumptive E coli on Ucx.     - finish ceftriaxone course        Thank you for your consult. I will follow-up with patient. Please contact us if you have any additional questions.    Erica Navarro,   Nephrology  [facility] - Med Surg   yes

## 2023-11-01 NOTE — SUBJECTIVE & OBJECTIVE
Interval History: NAEON, afebrile.     Slightly hypertensive, will adjust medication, change from lopressor to coreg.    UOP slightly decreased from previous day, still 4L UOP. Will ctm and replace electrolytes prn. Progressing.    Review of Systems   Constitutional:  Negative for activity change, appetite change, chills, fatigue and fever.   HENT:  Negative for congestion and sore throat.    Eyes:  Negative for pain and visual disturbance.   Respiratory:  Negative for cough, chest tightness and shortness of breath.    Cardiovascular:  Negative for chest pain, palpitations and leg swelling.   Gastrointestinal:  Negative for abdominal distention, abdominal pain, constipation, diarrhea, nausea and vomiting.   Genitourinary:  Negative for decreased urine volume, difficulty urinating, dysuria and hematuria.   Musculoskeletal:  Negative for arthralgias and back pain.   Skin:  Negative for pallor and rash.   Neurological:  Positive for weakness. Negative for dizziness, syncope, light-headedness and headaches.   Psychiatric/Behavioral:  Negative for agitation and confusion.      Objective:     Vital Signs (Most Recent):  Temp: 98.5 °F (36.9 °C) (11/01/23 0758)  Pulse: 81 (11/01/23 0910)  Resp: 17 (11/01/23 0910)  BP: (!) 172/76 (11/01/23 0758)  SpO2: (!) 94 % (11/01/23 0758) Vital Signs (24h Range):  Temp:  [97.8 °F (36.6 °C)-99 °F (37.2 °C)] 98.5 °F (36.9 °C)  Pulse:  [71-81] 81  Resp:  [17-20] 17  SpO2:  [92 %-95 %] 94 %  BP: (145-172)/(67-76) 172/76     Weight: 61.2 kg (135 lb)  Body mass index is 24.69 kg/m².    Intake/Output Summary (Last 24 hours) at 11/1/2023 1420  Last data filed at 11/1/2023 0539  Gross per 24 hour   Intake 250 ml   Output 3525 ml   Net -3275 ml         Physical Exam  Vitals reviewed.   Constitutional:       General: She is not in acute distress.     Appearance: Normal appearance.      Comments: 3L NC   HENT:      Head: Normocephalic and atraumatic.      Mouth/Throat:      Mouth: Mucous membranes  are moist.      Pharynx: Oropharynx is clear.   Eyes:      Pupils: Pupils are equal, round, and reactive to light.   Cardiovascular:      Rate and Rhythm: Normal rate and regular rhythm.   Pulmonary:      Effort: Pulmonary effort is normal. No respiratory distress.      Breath sounds: Normal breath sounds.   Abdominal:      General: There is no distension.      Palpations: Abdomen is soft.      Tenderness: There is no abdominal tenderness.   Musculoskeletal:         General: No swelling or tenderness. Normal range of motion.   Skin:     General: Skin is warm and dry.   Neurological:      General: No focal deficit present.      Mental Status: She is alert and oriented to person, place, and time. Mental status is at baseline.      Motor: Weakness present.             Significant Labs: All pertinent labs within the past 24 hours have been reviewed.  CBC:   Recent Labs   Lab 10/31/23  0512 11/01/23  0509   WBC 12.25 8.65   HGB 10.8* 10.2*   HCT 32.7* 30.1*    334     CMP:   Recent Labs   Lab 10/31/23  0512 10/31/23  1933 11/01/23  0509   * 136 136   K 3.2* 3.5 3.1*   CL 98 95 96   CO2 22* 26 24   GLU 69* 88 78   BUN 50* 42* 36*   CREATININE 2.4* 2.1* 1.7*   CALCIUM 8.8 8.5* 8.3*   PROT 6.0  --  5.7*   ALBUMIN 2.0* 2.1* 1.9*   BILITOT 0.3  --  0.2   ALKPHOS 167*  --  149*   AST 33  --  48*   ALT 18  --  25   ANIONGAP 15 15 16       Significant Imaging: I have reviewed all pertinent imaging results/findings within the past 24 hours.

## 2023-11-01 NOTE — ASSESSMENT & PLAN NOTE
Pt with persistent proteinuria of 2, 3+ and 2+ protein on U/A.     Concern for membranous nephropathy vs chronic interstitial nephritis 2/2 to NSAID use    Recommendations:   - Complement levels - C3 + C4; negative   - free light chains; unremarkable   - ESR; mildly elevated   - ANCA; pending  - TRACEY; unremarkable  - renal US; no hydronephrosis   - order PLA2R   - Once Cr has returned and remains at baseline consider ACEi/ARB to address proteinuria.

## 2023-11-01 NOTE — PLAN OF CARE
Problem: Occupational Therapy  Goal: Occupational Therapy Goal  Description: Goals to be met by: 11/14/23     Patient will increase functional independence with ADLs by performing:    UE Dressing with Modified [place].  LE Dressing with Modified [place].  Grooming while standing with Modified [place].  Toileting from toilet with Modified [place] for hygiene and clothing management.     Outcome: Ongoing, Progressing     Goals remain appropriate. Cont POC.

## 2023-11-01 NOTE — ASSESSMENT & PLAN NOTE
Findings upon admission suggestive of UTI (pyelonephritis) which may have exacerbated LILLIE. Presumptive E coli on Ucx.     - finish ceftriaxone course

## 2023-11-02 ENCOUNTER — TELEPHONE (OUTPATIENT)
Dept: NEPHROLOGY | Facility: CLINIC | Age: 68
End: 2023-11-02
Payer: MEDICARE

## 2023-11-02 VITALS
OXYGEN SATURATION: 97 % | SYSTOLIC BLOOD PRESSURE: 134 MMHG | TEMPERATURE: 98 F | HEART RATE: 69 BPM | HEIGHT: 62 IN | BODY MASS INDEX: 24.84 KG/M2 | DIASTOLIC BLOOD PRESSURE: 64 MMHG | WEIGHT: 135 LBS | RESPIRATION RATE: 16 BRPM

## 2023-11-02 DIAGNOSIS — R07.9 CHEST PAIN, UNSPECIFIED TYPE: Primary | ICD-10-CM

## 2023-11-02 LAB
ALBUMIN SERPL BCP-MCNC: 2 G/DL (ref 3.5–5.2)
ALP SERPL-CCNC: 142 U/L (ref 55–135)
ALT SERPL W/O P-5'-P-CCNC: 29 U/L (ref 10–44)
ANION GAP SERPL CALC-SCNC: 18 MMOL/L (ref 8–16)
APTT PPP: 29.7 SEC (ref 21–32)
AST SERPL-CCNC: 58 U/L (ref 10–40)
BASOPHILS # BLD AUTO: 0.02 K/UL (ref 0–0.2)
BASOPHILS NFR BLD: 0.2 % (ref 0–1.9)
BILIRUB SERPL-MCNC: 0.2 MG/DL (ref 0.1–1)
BUN SERPL-MCNC: 24 MG/DL (ref 8–23)
CALCIUM SERPL-MCNC: 8.5 MG/DL (ref 8.7–10.5)
CHLORIDE SERPL-SCNC: 96 MMOL/L (ref 95–110)
CO2 SERPL-SCNC: 24 MMOL/L (ref 23–29)
CREAT SERPL-MCNC: 1.3 MG/DL (ref 0.5–1.4)
DIFFERENTIAL METHOD: ABNORMAL
EOSINOPHIL # BLD AUTO: 0.1 K/UL (ref 0–0.5)
EOSINOPHIL NFR BLD: 0.9 % (ref 0–8)
ERYTHROCYTE [DISTWIDTH] IN BLOOD BY AUTOMATED COUNT: 13.9 % (ref 11.5–14.5)
EST. GFR  (NO RACE VARIABLE): 44.8 ML/MIN/1.73 M^2
GLUCOSE SERPL-MCNC: 75 MG/DL (ref 70–110)
HCT VFR BLD AUTO: 30 % (ref 37–48.5)
HGB BLD-MCNC: 9.7 G/DL (ref 12–16)
IMM GRANULOCYTES # BLD AUTO: 0.06 K/UL (ref 0–0.04)
IMM GRANULOCYTES NFR BLD AUTO: 0.6 % (ref 0–0.5)
LYMPHOCYTES # BLD AUTO: 0.9 K/UL (ref 1–4.8)
LYMPHOCYTES NFR BLD: 10.1 % (ref 18–48)
MAGNESIUM SERPL-MCNC: 1.4 MG/DL (ref 1.6–2.6)
MCH RBC QN AUTO: 32.9 PG (ref 27–31)
MCHC RBC AUTO-ENTMCNC: 32.3 G/DL (ref 32–36)
MCV RBC AUTO: 102 FL (ref 82–98)
MONOCYTES # BLD AUTO: 0.6 K/UL (ref 0.3–1)
MONOCYTES NFR BLD: 6.2 % (ref 4–15)
NEUTROPHILS # BLD AUTO: 7.6 K/UL (ref 1.8–7.7)
NEUTROPHILS NFR BLD: 82 % (ref 38–73)
NRBC BLD-RTO: 0 /100 WBC
PHOSPHATE SERPL-MCNC: 4.4 MG/DL (ref 2.7–4.5)
PLATELET # BLD AUTO: 351 K/UL (ref 150–450)
PMV BLD AUTO: 11.1 FL (ref 9.2–12.9)
POTASSIUM SERPL-SCNC: 3.2 MMOL/L (ref 3.5–5.1)
PROT SERPL-MCNC: 5.9 G/DL (ref 6–8.4)
RBC # BLD AUTO: 2.95 M/UL (ref 4–5.4)
SODIUM SERPL-SCNC: 138 MMOL/L (ref 136–145)
WBC # BLD AUTO: 9.25 K/UL (ref 3.9–12.7)

## 2023-11-02 PROCEDURE — 83735 ASSAY OF MAGNESIUM: CPT

## 2023-11-02 PROCEDURE — 86255 FLUORESCENT ANTIBODY SCREEN: CPT | Mod: 59

## 2023-11-02 PROCEDURE — 63600175 PHARM REV CODE 636 W HCPCS

## 2023-11-02 PROCEDURE — 25000003 PHARM REV CODE 250: Performed by: INTERNAL MEDICINE

## 2023-11-02 PROCEDURE — 84244 ASSAY OF RENIN: CPT

## 2023-11-02 PROCEDURE — 99233 PR SUBSEQUENT HOSPITAL CARE,LEVL III: ICD-10-PCS | Mod: ,,, | Performed by: INTERNAL MEDICINE

## 2023-11-02 PROCEDURE — 25000003 PHARM REV CODE 250

## 2023-11-02 PROCEDURE — 99233 SBSQ HOSP IP/OBS HIGH 50: CPT | Mod: ,,, | Performed by: INTERNAL MEDICINE

## 2023-11-02 PROCEDURE — 84244 ASSAY OF RENIN: CPT | Mod: 91

## 2023-11-02 PROCEDURE — S4991 NICOTINE PATCH NONLEGEND: HCPCS

## 2023-11-02 PROCEDURE — 36415 COLL VENOUS BLD VENIPUNCTURE: CPT | Performed by: INTERNAL MEDICINE

## 2023-11-02 PROCEDURE — 86255 FLUORESCENT ANTIBODY SCREEN: CPT

## 2023-11-02 PROCEDURE — 82088 ASSAY OF ALDOSTERONE: CPT

## 2023-11-02 PROCEDURE — 83520 IMMUNOASSAY QUANT NOS NONAB: CPT

## 2023-11-02 PROCEDURE — 84100 ASSAY OF PHOSPHORUS: CPT

## 2023-11-02 PROCEDURE — 36415 COLL VENOUS BLD VENIPUNCTURE: CPT

## 2023-11-02 PROCEDURE — 85025 COMPLETE CBC W/AUTO DIFF WBC: CPT

## 2023-11-02 PROCEDURE — 82088 ASSAY OF ALDOSTERONE: CPT | Mod: 91

## 2023-11-02 PROCEDURE — 80053 COMPREHEN METABOLIC PANEL: CPT

## 2023-11-02 PROCEDURE — 85730 THROMBOPLASTIN TIME PARTIAL: CPT | Performed by: INTERNAL MEDICINE

## 2023-11-02 RX ORDER — CARVEDILOL 25 MG/1
12.5 TABLET ORAL 2 TIMES DAILY
Qty: 90 TABLET | Refills: 3 | Status: SHIPPED | OUTPATIENT
Start: 2023-11-02 | End: 2024-11-01

## 2023-11-02 RX ORDER — ASPIRIN 81 MG/1
81 TABLET ORAL DAILY
Qty: 360 TABLET | Refills: 0 | Status: SHIPPED | OUTPATIENT
Start: 2023-11-03 | End: 2024-11-02

## 2023-11-02 RX ORDER — LOSARTAN POTASSIUM 25 MG/1
25 TABLET ORAL DAILY
Qty: 90 TABLET | Refills: 3 | Status: ON HOLD | OUTPATIENT
Start: 2023-11-02 | End: 2024-01-23

## 2023-11-02 RX ORDER — CLOPIDOGREL BISULFATE 75 MG/1
75 TABLET ORAL DAILY
Qty: 90 TABLET | Refills: 3 | Status: SHIPPED | OUTPATIENT
Start: 2023-11-03 | End: 2024-11-02

## 2023-11-02 RX ORDER — ATORVASTATIN CALCIUM 40 MG/1
40 TABLET, FILM COATED ORAL DAILY
Qty: 90 TABLET | Refills: 3 | Status: SHIPPED | OUTPATIENT
Start: 2023-11-03 | End: 2023-12-21

## 2023-11-02 RX ORDER — CARVEDILOL 12.5 MG/1
12.5 TABLET ORAL 2 TIMES DAILY
Status: DISCONTINUED | OUTPATIENT
Start: 2023-11-02 | End: 2023-11-02

## 2023-11-02 RX ORDER — MAGNESIUM SULFATE HEPTAHYDRATE 40 MG/ML
2 INJECTION, SOLUTION INTRAVENOUS
Status: COMPLETED | OUTPATIENT
Start: 2023-11-02 | End: 2023-11-02

## 2023-11-02 RX ORDER — POTASSIUM CHLORIDE 20 MEQ/1
40 TABLET, EXTENDED RELEASE ORAL
Status: COMPLETED | OUTPATIENT
Start: 2023-11-02 | End: 2023-11-02

## 2023-11-02 RX ORDER — CARVEDILOL 25 MG/1
25 TABLET ORAL 2 TIMES DAILY
Qty: 180 TABLET | Refills: 3 | Status: SHIPPED | OUTPATIENT
Start: 2023-11-02 | End: 2023-11-02 | Stop reason: SDUPTHER

## 2023-11-02 RX ORDER — CARVEDILOL 25 MG/1
25 TABLET ORAL 2 TIMES DAILY
Status: DISCONTINUED | OUTPATIENT
Start: 2023-11-02 | End: 2023-11-02 | Stop reason: HOSPADM

## 2023-11-02 RX ADMIN — FLUTICASONE FUROATE AND VILANTEROL TRIFENATATE 1 PUFF: 100; 25 POWDER RESPIRATORY (INHALATION) at 08:11

## 2023-11-02 RX ADMIN — CARVEDILOL 25 MG: 25 TABLET, FILM COATED ORAL at 08:11

## 2023-11-02 RX ADMIN — HEPARIN SODIUM 5000 UNITS: 5000 INJECTION INTRAVENOUS; SUBCUTANEOUS at 05:11

## 2023-11-02 RX ADMIN — THERA TABS 1 TABLET: TAB at 08:11

## 2023-11-02 RX ADMIN — ATORVASTATIN CALCIUM 40 MG: 40 TABLET, FILM COATED ORAL at 08:11

## 2023-11-02 RX ADMIN — CYCLOBENZAPRINE HYDROCHLORIDE 5 MG: 5 TABLET, FILM COATED ORAL at 08:11

## 2023-11-02 RX ADMIN — CLOPIDOGREL BISULFATE 75 MG: 75 TABLET ORAL at 08:11

## 2023-11-02 RX ADMIN — THIAMINE HCL TAB 100 MG 100 MG: 100 TAB at 08:11

## 2023-11-02 RX ADMIN — TIOTROPIUM BROMIDE INHALATION SPRAY 2 PUFF: 3.12 SPRAY, METERED RESPIRATORY (INHALATION) at 08:11

## 2023-11-02 RX ADMIN — ASPIRIN 81 MG CHEWABLE TABLET 81 MG: 81 TABLET CHEWABLE at 08:11

## 2023-11-02 RX ADMIN — NICOTINE 1 PATCH: 7 PATCH, EXTENDED RELEASE TRANSDERMAL at 08:11

## 2023-11-02 RX ADMIN — BUTALBITAL, ACETAMINOPHEN, AND CAFFEINE 1 TABLET: 50; 325; 40 TABLET ORAL at 12:11

## 2023-11-02 RX ADMIN — POTASSIUM CHLORIDE 40 MEQ: 1500 TABLET, EXTENDED RELEASE ORAL at 11:11

## 2023-11-02 RX ADMIN — MAGNESIUM SULFATE HEPTAHYDRATE 2 G: 40 INJECTION, SOLUTION INTRAVENOUS at 09:11

## 2023-11-02 RX ADMIN — POTASSIUM CHLORIDE 40 MEQ: 1500 TABLET, EXTENDED RELEASE ORAL at 09:11

## 2023-11-02 RX ADMIN — HEPARIN SODIUM 5000 UNITS: 5000 INJECTION INTRAVENOUS; SUBCUTANEOUS at 03:11

## 2023-11-02 RX ADMIN — MAGNESIUM SULFATE HEPTAHYDRATE 2 G: 40 INJECTION, SOLUTION INTRAVENOUS at 11:11

## 2023-11-02 RX ADMIN — MONTELUKAST 10 MG: 10 TABLET, FILM COATED ORAL at 08:11

## 2023-11-02 RX ADMIN — POTASSIUM CHLORIDE 20 MEQ: 1500 TABLET, EXTENDED RELEASE ORAL at 08:11

## 2023-11-02 NOTE — PROGRESS NOTES
[facility]  Nephrology  Progress Note    Patient Name: Bhavana Lambert  MRN: [id number]  Admission Date: 10/26/2023  Hospital Length of Stay: 7 days  Attending Provider: Andrew Moss MD   Primary Care Physician: Pepito Jhaveri IV, MD  Principal Problem:LILLIE (acute kidney injury)    Subjective:     HPI: Ms. Bhavana Lambert is a 68 y.o. patient with past medical history of COPD on 24/7 O2 requirements and inhalers at home here. Transferred from [facility] for Nephrology evaluation. Patient had several days of fever, generalized weakness, and associated left flank pain and n/v. Refers dysuria and diarrhea. About 4 months ago, she had a fall where she broke some ribs on the left side. Patient states pain and general feeling is worse now compared to some months ago. Patient has been taking increased pain medications (naproxen). She states that the pain she feels now is similar to the pain she is been feeling for the past few months, only slightly worse. Upon admission, UA 75 WBC, Ucr 63.7. Baseline sCr 1.0.     Nephrology consulted for pyelonephritis and LILLIE.       Interval History: NAEON    Review of patient's allergies indicates:  No Known Allergies  Current Facility-Administered Medications   Medication Frequency    aspirin chewable tablet 81 mg Daily    atorvastatin tablet 40 mg Daily    butalbital-acetaminophen-caffeine -40 mg per tablet 1 tablet Q12H PRN    carvediloL tablet 25 mg BID    clopidogreL tablet 75 mg Daily    cyclobenzaprine tablet 5 mg BID PRN    dextrose 10% bolus 125 mL 125 mL PRN    dextrose 10% bolus 250 mL 250 mL PRN    fluticasone furoate-vilanteroL 100-25 mcg/dose diskus inhaler 1 puff Daily    glucagon (human recombinant) injection 1 mg PRN    glucose chewable tablet 16 g PRN    glucose chewable tablet 24 g PRN    heparin (porcine) injection 5,000 Units Q8H    LORazepam tablet 0.5 mg Q12H PRN    LORazepam tablet 2 mg Q4H PRN    montelukast tablet 10 mg  QAM    multivitamin tablet Daily    naloxone 0.4 mg/mL injection 0.02 mg PRN    nicotine 7 mg/24 hr 1 patch Daily    polyethylene glycol packet 17 g Daily PRN    potassium chloride SA CR tablet 20 mEq BID    sodium chloride 0.9% flush 10 mL Q12H PRN    thiamine tablet 100 mg Daily    tiotropium bromide 2.5 mcg/actuation inhaler 2 puff Daily    zinc oxide 20 % ointment PRN       Objective:     Vital Signs (Most Recent):  Temp: 98.3 °F (36.8 °C) (11/02/23 1217)  Pulse: 70 (11/02/23 1217)  Resp: 16 (11/02/23 1217)  BP: 127/70 (11/02/23 1217)  SpO2: 96 % (11/02/23 1217) Vital Signs (24h Range):  Temp:  [97.2 °F (36.2 °C)-98.3 °F (36.8 °C)] 98.3 °F (36.8 °C)  Pulse:  [68-79] 70  Resp:  [16-20] 16  SpO2:  [93 %-96 %] 96 %  BP: (127-173)/(70-73) 127/70     Weight: 61.2 kg (135 lb) (10/27/23 0749)  Body mass index is 24.69 kg/m².  Body surface area is 1.64 meters squared.    I/O last 3 completed shifts:  In: 250 [P.O.:250]  Out: 3025 [Urine:3025]     Physical Exam  Vitals and nursing note reviewed.   Constitutional:       General: She is not in acute distress.     Appearance: Normal appearance. She is not ill-appearing.   HENT:      Head: Normocephalic and atraumatic.      Right Ear: External ear normal.      Left Ear: External ear normal.      Nose: Nose normal.      Mouth/Throat:      Mouth: Mucous membranes are moist.      Pharynx: Oropharynx is clear.   Eyes:      General:         Right eye: No discharge.         Left eye: No discharge.      Conjunctiva/sclera: Conjunctivae normal.      Pupils: Pupils are equal, round, and reactive to light.   Cardiovascular:      Rate and Rhythm: Normal rate and regular rhythm.      Pulses: Normal pulses.      Heart sounds: Normal heart sounds. No murmur heard.  Pulmonary:      Effort: Pulmonary effort is normal. No respiratory distress.      Breath sounds: Normal breath sounds.      Comments: Decreased air entry at bases   On chronic oxygen   Abdominal:      General: Bowel  sounds are normal. There is no distension.      Palpations: Abdomen is soft. There is no mass.      Tenderness: There is no abdominal tenderness. There is no right CVA tenderness, left CVA tenderness, guarding or rebound.      Hernia: No hernia is present.   Musculoskeletal:         General: No swelling. Normal range of motion.      Cervical back: Normal range of motion.      Right lower leg: No edema.      Left lower leg: No edema.   Skin:     General: Skin is warm and dry.      Capillary Refill: Capillary refill takes less than 2 seconds.      Coloration: Skin is not jaundiced.      Findings: No rash.   Neurological:      General: No focal deficit present.      Mental Status: She is alert and oriented to person, place, and time.   Psychiatric:         Mood and Affect: Mood normal.         Behavior: Behavior normal.          Significant Labs:  All labs within the past 24 hours have been reviewed.     Significant Imaging:  Labs: Reviewed    Assessment/Plan:     Renal/  * LILLIE (acute kidney injury)  Patient transferred from [place] for persistent shortness of breath (on 24/7 home oxygen for COPD) and weakness. Poor oral fluid and solute intake for several days and increased PO intake of medications including naproxen (aleve). Associated episodes on n/v. Baseline sCr ~1.0. Cr downtrending. Negative for toxic alcohols. Has had good UOP while on IVF and has had downtrending Cr on IVF. Azul removed for UOP monitoring. Cr continued to improve even with after IVF stopped and continued PO intake.     Impression   LILLIE 2/2 poor oral intake and NSAID use likely all leading to ischemic ATN and now in ATN diuresis phase    Recs   - encourage increased PO intake    -Avoid nephrotoxins   - Patient advised not to take NSAIDS when discharged from hospital   - If no urgency for IV contrast studies then would recommend avoiding contrast for now     Proteinuria  Pt with persistent proteinuria of 2, 3+ and 2+ protein on U/A.      Concern for membranous nephropathy vs chronic interstitial nephritis 2/2 to NSAID use    Recommendations:   - Complement levels - C3 + C4; negative   - free light chains; unremarkable   - ESR; mildly elevated   - ANCA; pending  - TRACEY; unremarkable  - renal US; no hydronephrosis   - PLA2R; pending   - Once Cr has returned and remains at baseline consider outpatient ACEi/ARB to address proteinuria.       Hypokalemia  Persistent hypokalemia this admission even with IVF repletion    Ddx: possible hypoaldosteronism     - discharge on 20 mEq potassium   - aldosterone, renin, and renin:aldosterone ratio levels pending  - needs outpatient follow up to address persistent hypokalemia        Thank you for your consult. I will sign off. Please contact us if you have any additional questions.    Erica Navarro,   Nephrology  [facility] - Med Surg

## 2023-11-02 NOTE — TELEPHONE ENCOUNTER
Patient will see a nephrologist closer to home. I called daughter and gave her the information to call .Addressed

## 2023-11-02 NOTE — ASSESSMENT & PLAN NOTE
Persistent hypokalemia this admission even with IVF repletion    Ddx: possible hypoaldosteronism     - discharge on 20 mEq potassium   - aldosterone, renin, and renin:aldosterone ratio levels pending  - needs outpatient follow up to address persistent hypokalemia

## 2023-11-02 NOTE — DISCHARGE SUMMARY
"[facility] Medicine  Discharge Summary      Patient Name: Bhavana Lambert  MRN: [id number]  AMALIA: [id number]  Patient Class: IP- Inpatient  Admission Date: 10/26/2023  Hospital Length of Stay: 7 days  Discharge Date and Time:  11/02/2023 1:21 PM  Attending Physician: Andrew Moss MD   Discharging Provider: Chris Montero MD  Primary Care Provider: Pepito Jhaveri IV, MD  Hospital Medicine Team: [facility] 3 Chris Montero MD  Primary Care Team: [facility] 3    HPI:   68F with PMHx fo COPD (on 3L NC at home) presented to OSH c/o fever, SOB, sore throat, generalized weakness, and semi-chronic left-sided rib pain (hx of fall 4 months ago, broke L sided ribs, continued pain). Pt describes having intermittent centralized substernal CP that worsens with exertion. SOB also worse with exertion. Pt reports that for the last few days she has been increasingly weak and has had decreased PO intake. Describes minimal UOP over the last two days. Denies N/V/D/C, lightheadedness, BLE swelling, abd pain, dysuria, hematuria. Denies any recent sick contacts. Pt is chronic user of Aleve, but has been taking more since fall/rib fractures.     Workup at OSH ED, vitals stable, satting well on home O2. Labs significant WBC 7, hgb 11, plt 164, Na 129, K 3, BUN/Cr 71, 5.2, bicarb 20 and anion gap of 24, troponin 1.5 -> 1.08, BNP 2260. EKG with inverted T waves V1-V4, similar to prior, Qtc 625. UA grossly infectious. CTAP with L sided hydronephrosis and perinephric stranding.     On admission to C pt c/o mild CP and SOB improved from yesterday. Also feeling very anxious and c/o HA. She takes daily klonopin, buspirone, and Fioricet for these problems. Additionally, pt endorses drinking between a half-pint and pint of tequila daily, denies feeling tremulous or agitated currently, not tachycardic or hypertensive, states her last drink was "a few days ago". Cardiology consulted will start on ASA, plavix, heparin " gtt. Nephrology consulted, will f/u recs.      * No surgery found *      Hospital Course:   68F with PMHx of COPD (on 3L NC at home) presented to OSH c/o fever, SOB, sore throat, generalized weakness, and chronic left-sided rib pain (hx of fall 4 months ago, broke L sided ribs, continued pain). Admitted to OSH and transferred to [facility] for severe oliguric LILLIE, UTI, and NSTEMI. Pt Cr 5.2 on admission, BNP 2200, and Trop 1.5 initially and down-trended to 0.9. D/w nephrology, who recommended fluid resuscitation with NS, which was given on day 1 of admission with small decrease in Cr to 5.0, however BUN climbed to 90. Additionally, developed hyponatremia to 124. Will continue fluids with NS and eliminate any D5W. Pt also found with serum osm gap, denied drinking any homemade alcohol or ingesting any unknown substances, will f/u labs. D/w cardiology who recommended ACS protocol, pt loaded on ASA, plavix and put on heparin gtt. TTE with eccentric hypertrophy and global hypokinesis, nl systolic function and wall motion abnormality noted. Transitioned to DAPT. Will need LHC when kidneys recover. Ucx positive for E coli, transitioned from cefepime to ceftriaxone and completed 7 day course. Renal function continues to improve with gentle rehydration. Patient medically stable for discharge with close follow up with nephrology and cardiology for outpatient LHC.       Goals of Care Treatment Preferences:  Code Status: Full Code      Consults:   Consults (From admission, onward)          Status Ordering Provider     Inpatient consult to Cardiology  Once        Provider:  (Not yet assigned)    CARMEN Barraza     Inpatient consult to Nephrology  Once        Provider:  (Not yet assigned)    CARMEN Barraza            No new Assessment & Plan notes have been filed under this hospital service since the last note was generated.  Service: Hospital Medicine    Final Active Diagnoses:    Diagnosis Date Noted POA     PRINCIPAL PROBLEM:  LILLIE (acute kidney injury) [N17.9] 10/26/2023 Unknown     Chronic    ATN (acute tubular necrosis) [N17.0] 10/30/2023 Yes    Proteinuria [R80.9] 10/30/2023 Unknown    Hyponatremia [E87.1] 10/27/2023 Yes    NSTEMI (non-ST elevated myocardial infarction) [I21.4] 10/26/2023 Unknown    Pyelonephritis [N12] 10/26/2023 Unknown    Hypothyroid [E03.9] 10/26/2023 Unknown    Alcohol use [Z78.9] 10/26/2023 Unknown    Emphysema/COPD [J43.9] 10/26/2023 Unknown    Increased anion gap metabolic acidosis [E87.29] 09/13/2020 Unknown    Primary hypertension [I10]  Yes    Anxiety [F41.9]  Yes      Problems Resolved During this Admission:       Discharged Condition: good    Disposition: Home-Health Care Svc    Follow Up:    Patient Instructions:      Ambulatory referral/consult to Cardiology   Standing Status: Future   Referral Priority: Routine Referral Type: Consultation   Referral Reason: Specialty Services Required   Requested Specialty: Cardiology   Number of Visits Requested: 1     Ambulatory referral/consult to Nephrology   Standing Status: Future   Referral Priority: Routine Referral Type: Consultation   Referral Reason: Specialty Services Required   Requested Specialty: Nephrology   Number of Visits Requested: 1       Significant Diagnostic Studies: Labs: All labs within the past 24 hours have been reviewed    Pending Diagnostic Studies:       Procedure Component Value Units Date/Time    Aldosterone [[id number]] Collected: 11/02/23 1210    Order Status: Sent Lab Status: In process Updated: 11/02/23 1214    Specimen: Blood     Aldosterone/Renin Activity Ratio [[id number]] Collected: 11/02/23 1210    Order Status: Sent Lab Status: In process Updated: 11/02/23 1214    Specimen: Blood     Anti-neutrophilic cytoplasmic antibody [[id number]] Collected: 10/30/23 1558    Order Status: Sent Lab Status: In process Updated: 10/30/23 1609    Specimen: Blood     Phospholipase A2 Receptor AB, Serum [[id number]] Collected:  11/02/23 0851    Order Status: Sent Lab Status: In process Updated: 11/02/23 0908    Specimen: Blood     Renin [[id number]] Collected: 11/02/23 1210    Order Status: Sent Lab Status: In process Updated: 11/02/23 1219    Specimen: Blood            Medications:  Reconciled Home Medications:      Medication List        START taking these medications      aspirin 81 MG EC tablet  Commonly known as: ECOTRIN  Take 1 tablet (81 mg total) by mouth once daily.  Start taking on: November 3, 2023     atorvastatin 40 MG tablet  Commonly known as: LIPITOR  Take 1 tablet (40 mg total) by mouth once daily.  Start taking on: November 3, 2023     carvediloL 25 MG tablet  Commonly known as: COREG  Take 1 tablet (25 mg total) by mouth 2 (two) times daily.     clopidogreL 75 mg tablet  Commonly known as: PLAVIX  Take 1 tablet (75 mg total) by mouth once daily.  Start taking on: November 3, 2023            CONTINUE taking these medications      busPIRone 15 MG tablet  Commonly known as: BUSPAR  Take 15 mg by mouth 2 (two) times a day.     clonazePAM 0.5 MG tablet  Commonly known as: KlonoPIN  Take 0.5 mg by mouth 2 (two) times daily as needed (anxiety).     cyanocobalamin 1,000 mcg/mL injection  Inject 1,000 mcg into the muscle every 30 days.     miconazole NITRATE 2 % 2 % top powder  Commonly known as: MICOTIN  Apply topically 2 (two) times daily.     PROZAC ORAL  Take 40 mg by mouth once daily.     SINGULAIR ORAL  Take 10 mg by mouth every morning.     STIOLTO RESPIMAT 2.5-2.5 mcg/actuation Mist  Generic drug: tiotropium-olodateroL  Inhale 1 puff into the lungs once daily. Controller            STOP taking these medications      naproxen sodium 220 MG tablet  Commonly known as: ANAPROX              Indwelling Lines/Drains at time of discharge:   Lines/Drains/Airways       None                   Time spent on the discharge of patient: 35 minutes         Chris Montero MD  Department of Hospital Medicine  [facility]

## 2023-11-02 NOTE — PLAN OF CARE
APPOINTMENT:    Necessary Specialty Appointments: Cardiology    Specialty Appointment scheduled with Salina Wade MD, on Wednesday Nov 8, 2023 9:00 AM     APPOINTMENT:    Necessary Specialty Appointments: Nephrology    Specialty Appointment scheduled with Neftaly Akhtar MD, on Monday Jan 29, 2024 10:00 AM    Provider requires schedule review by Office Nurse - Office to call patient with date and time.

## 2023-11-02 NOTE — PLAN OF CARE
Problem: Adult Inpatient Plan of Care  Goal: Plan of Care Review  Outcome: Ongoing, Progressing  Goal: Patient-Specific Goal (Individualized)  Outcome: Ongoing, Progressing  Goal: Absence of Hospital-Acquired Illness or Injury  Outcome: Ongoing, Progressing  Goal: Optimal Comfort and Wellbeing  Outcome: Ongoing, Progressing     Problem: Adult Inpatient Plan of Care  Goal: Plan of Care Review  Outcome: Ongoing, Progressing     Problem: Adult Inpatient Plan of Care  Goal: Absence of Hospital-Acquired Illness or Injury  Outcome: Ongoing, Progressing

## 2023-11-02 NOTE — SUBJECTIVE & OBJECTIVE
Interval History: ABDI    Review of patient's allergies indicates:  No Known Allergies  Current Facility-Administered Medications   Medication Frequency    aspirin chewable tablet 81 mg Daily    atorvastatin tablet 40 mg Daily    butalbital-acetaminophen-caffeine -40 mg per tablet 1 tablet Q12H PRN    carvediloL tablet 25 mg BID    clopidogreL tablet 75 mg Daily    cyclobenzaprine tablet 5 mg BID PRN    dextrose 10% bolus 125 mL 125 mL PRN    dextrose 10% bolus 250 mL 250 mL PRN    fluticasone furoate-vilanteroL 100-25 mcg/dose diskus inhaler 1 puff Daily    glucagon (human recombinant) injection 1 mg PRN    glucose chewable tablet 16 g PRN    glucose chewable tablet 24 g PRN    heparin (porcine) injection 5,000 Units Q8H    LORazepam tablet 0.5 mg Q12H PRN    LORazepam tablet 2 mg Q4H PRN    montelukast tablet 10 mg QAM    multivitamin tablet Daily    naloxone 0.4 mg/mL injection 0.02 mg PRN    nicotine 7 mg/24 hr 1 patch Daily    polyethylene glycol packet 17 g Daily PRN    potassium chloride SA CR tablet 20 mEq BID    sodium chloride 0.9% flush 10 mL Q12H PRN    thiamine tablet 100 mg Daily    tiotropium bromide 2.5 mcg/actuation inhaler 2 puff Daily    zinc oxide 20 % ointment PRN       Objective:     Vital Signs (Most Recent):  Temp: 98.3 °F (36.8 °C) (11/02/23 1217)  Pulse: 70 (11/02/23 1217)  Resp: 16 (11/02/23 1217)  BP: 127/70 (11/02/23 1217)  SpO2: 96 % (11/02/23 1217) Vital Signs (24h Range):  Temp:  [97.2 °F (36.2 °C)-98.3 °F (36.8 °C)] 98.3 °F (36.8 °C)  Pulse:  [68-79] 70  Resp:  [16-20] 16  SpO2:  [93 %-96 %] 96 %  BP: (127-173)/(70-73) 127/70     Weight: 61.2 kg (135 lb) (10/27/23 0749)  Body mass index is 24.69 kg/m².  Body surface area is 1.64 meters squared.    I/O last 3 completed shifts:  In: 250 [P.O.:250]  Out: 3025 [Urine:3025]     Physical Exam  Vitals and nursing note reviewed.   Constitutional:       General: She is not in acute distress.     Appearance: Normal appearance. She is  not ill-appearing.   HENT:      Head: Normocephalic and atraumatic.      Right Ear: External ear normal.      Left Ear: External ear normal.      Nose: Nose normal.      Mouth/Throat:      Mouth: Mucous membranes are moist.      Pharynx: Oropharynx is clear.   Eyes:      General:         Right eye: No discharge.         Left eye: No discharge.      Conjunctiva/sclera: Conjunctivae normal.      Pupils: Pupils are equal, round, and reactive to light.   Cardiovascular:      Rate and Rhythm: Normal rate and regular rhythm.      Pulses: Normal pulses.      Heart sounds: Normal heart sounds. No murmur heard.  Pulmonary:      Effort: Pulmonary effort is normal. No respiratory distress.      Breath sounds: Normal breath sounds.      Comments: Decreased air entry at bases   On chronic oxygen   Abdominal:      General: Bowel sounds are normal. There is no distension.      Palpations: Abdomen is soft. There is no mass.      Tenderness: There is no abdominal tenderness. There is no right CVA tenderness, left CVA tenderness, guarding or rebound.      Hernia: No hernia is present.   Musculoskeletal:         General: No swelling. Normal range of motion.      Cervical back: Normal range of motion.      Right lower leg: No edema.      Left lower leg: No edema.   Skin:     General: Skin is warm and dry.      Capillary Refill: Capillary refill takes less than 2 seconds.      Coloration: Skin is not jaundiced.      Findings: No rash.   Neurological:      General: No focal deficit present.      Mental Status: She is alert and oriented to person, place, and time.   Psychiatric:         Mood and Affect: Mood normal.         Behavior: Behavior normal.          Significant Labs:  All labs within the past 24 hours have been reviewed.     Significant Imaging:  Labs: Reviewed

## 2023-11-02 NOTE — ASSESSMENT & PLAN NOTE
Patient transferred from [facility] for persistent shortness of breath (on 24/7 home oxygen for COPD) and weakness. Poor oral fluid and solute intake for several days and increased PO intake of medications including naproxen (aleve). Associated episodes on n/v. Baseline sCr ~1.0. Cr downtrending. Negative for toxic alcohols. Has had good UOP while on IVF and has had downtrending Cr on IVF. Azul removed for UOP monitoring. Cr continued to improve even with after IVF stopped and continued PO intake.     Impression   LILLIE 2/2 poor oral intake and NSAID use likely all leading to ischemic ATN and now in ATN diuresis phase    Recs   - encourage increased PO intake    -Avoid nephrotoxins   - Patient advised not to take NSAIDS when discharged from hospital   - If no urgency for IV contrast studies then would recommend avoiding contrast for now

## 2023-11-02 NOTE — ASSESSMENT & PLAN NOTE
Pt with persistent proteinuria of 2, 3+ and 2+ protein on U/A.     Concern for membranous nephropathy vs chronic interstitial nephritis 2/2 to NSAID use    Recommendations:   - Complement levels - C3 + C4; negative   - free light chains; unremarkable   - ESR; mildly elevated   - ANCA; pending  - TRACEY; unremarkable  - renal US; no hydronephrosis   - PLA2R; pending   - Once Cr has returned and remains at baseline consider outpatient ACEi/ARB to address proteinuria.

## 2023-11-02 NOTE — TELEPHONE ENCOUNTER
----- Message from Joseline Burkett sent at 11/2/2023 10:24 AM CDT -----  Claude w/ Case Management calling regarding Appointment Access. PT is scheduled for 01/27/24 but is asking to have the PT seen sooner than for a hospital f/u per the discharged doctor's request, call back [phone]

## 2023-11-02 NOTE — TREATMENT PLAN
[facility]      HOME HEALTH ORDERS  FACE TO FACE ENCOUNTER    Patient Name: Bhavana Lambert  YOB: 1955    PCP: Pepito Jhaveri IV, MD   PCP Address: [address]   [place] / [place] [address]  PCP Phone Number: [phone]  PCP Fax: [phone]    Encounter Date: 10/26/23    Admit to Home Health    Diagnoses:  Active Hospital Problems    Diagnosis  POA    *LILLIE (acute kidney injury) [N17.9]  Unknown     Chronic    ATN (acute tubular necrosis) [N17.0]  Yes    Proteinuria [R80.9]  Unknown    Hyponatremia [E87.1]  Yes    NSTEMI (non-ST elevated myocardial infarction) [I21.4]  Unknown    Pyelonephritis [N12]  Unknown    Hypothyroid [E03.9]  Unknown    Alcohol use [Z78.9]  Unknown    Emphysema/COPD [J43.9]  Unknown    Increased anion gap metabolic acidosis [E87.29]  Unknown    Primary hypertension [I10]  Yes    Anxiety [F41.9]  Yes      Resolved Hospital Problems   No resolved problems to display.       Follow Up Appointments:  Future Appointments   Date Time Provider Department Center   3/4/2024 10:40 AM Tammy Anand MD [facility] [place] [facility]       Allergies:Review of patient's allergies indicates:  No Known Allergies    Medications: Review discharge medications with patient and family and provide education.    Current Facility-Administered Medications   Medication Dose Route Frequency Provider Last Rate Last Admin    aspirin chewable tablet 81 mg  81 mg Oral Daily Jeffery Vicente MD   81 mg at 11/02/23 0820    atorvastatin tablet 40 mg  40 mg Oral Daily Ricky Sherman MD   40 mg at 11/02/23 0821    butalbital-acetaminophen-caffeine -40 mg per tablet 1 tablet  1 tablet Oral Q12H PRN Ricky Sherman MD   1 tablet at 11/01/23 0914    carvediloL tablet 25 mg  25 mg Oral BID Chris Montero MD   25 mg at 11/02/23 0821    clopidogreL tablet 75 mg  75 mg Oral Daily Jeffery Vicente MD   75 mg at 11/02/23 0821    cyclobenzaprine tablet 5 mg  5 mg Oral BID PRN Ricky Sherman MD   5 mg  at 11/02/23 0830    dextrose 10% bolus 125 mL 125 mL  12.5 g Intravenous PRN Chris Montero MD        dextrose 10% bolus 250 mL 250 mL  25 g Intravenous PRN Chris Montero MD   Stopped at 10/29/23 0753    fluticasone furoate-vilanteroL 100-25 mcg/dose diskus inhaler 1 puff  1 puff Inhalation Daily Ricky Sherman MD   1 puff at 11/02/23 0822    glucagon (human recombinant) injection 1 mg  1 mg Intramuscular PRN Chris Montero MD        glucose chewable tablet 16 g  16 g Oral PRN Chris Montero MD   16 g at 10/31/23 0632    glucose chewable tablet 24 g  24 g Oral PRN Chris Montero MD        heparin (porcine) injection 5,000 Units  5,000 Units Subcutaneous Q8H Ricky Sherman MD   5,000 Units at 11/02/23 0517    LORazepam tablet 0.5 mg  0.5 mg Oral Q12H PRN Ricky Sherman MD   0.5 mg at 10/31/23 1105    LORazepam tablet 2 mg  2 mg Oral Q4H PRN Ricky Sherman MD   2 mg at 11/01/23 0903    magnesium sulfate 2g in water 50mL IVPB (premix)  2 g Intravenous Q2H Chris Montero MD 25 mL/hr at 11/02/23 0905 2 g at 11/02/23 0905    montelukast tablet 10 mg  10 mg Oral QAM Ricky Sherman MD   10 mg at 11/02/23 0821    multivitamin tablet  1 tablet Oral Daily Ricky Sherman MD   1 tablet at 11/02/23 0821    naloxone 0.4 mg/mL injection 0.02 mg  0.02 mg Intravenous PRN Chris Montero MD        nicotine 7 mg/24 hr 1 patch  1 patch Transdermal Daily Ricky Sherman MD   1 patch at 11/02/23 0821    polyethylene glycol packet 17 g  17 g Oral Daily PRN Ricky Sherman MD        potassium chloride SA CR tablet 20 mEq  20 mEq Oral BID Ricky Sherman MD   20 mEq at 11/02/23 0820    potassium chloride SA CR tablet 40 mEq  40 mEq Oral Q1H Chris Montero MD   40 mEq at 11/02/23 0901    sodium chloride 0.9% flush 10 mL  10 mL Intravenous Q12H PRN Chris Montero MD        thiamine tablet 100 mg  100 mg Oral Daily Ricky Sherman MD   100 mg at 11/02/23 0821    tiotropium bromide 2.5 mcg/actuation inhaler 2 puff  2 puff  Inhalation Daily Ricky Sherman MD   2 puff at 11/02/23 0822    zinc oxide 20 % ointment   Topical (Top) PRN Chris Montero MD         Current Discharge Medication List        START taking these medications    Details   aspirin 81 MG Chew Take 1 tablet (81 mg total) by mouth once daily.  Qty: 360 tablet, Refills: 0      atorvastatin (LIPITOR) 40 MG tablet Take 1 tablet (40 mg total) by mouth once daily.  Qty: 90 tablet, Refills: 3      carvediloL (COREG) 25 MG tablet Take 1 tablet (25 mg total) by mouth 2 (two) times daily.  Qty: 180 tablet, Refills: 3    Comments: .      clopidogreL (PLAVIX) 75 mg tablet Take 1 tablet (75 mg total) by mouth once daily.  Qty: 90 tablet, Refills: 3           CONTINUE these medications which have NOT CHANGED    Details   busPIRone (BUSPAR) 15 MG tablet Take 15 mg by mouth 2 (two) times a day.      clonazePAM (KLONOPIN) 0.5 MG tablet Take 0.5 mg by mouth 2 (two) times daily as needed (anxiety).       cyanocobalamin 1,000 mcg/mL injection Inject 1,000 mcg into the muscle every 30 days.       fluoxetine HCl (PROZAC ORAL) Take 40 mg by mouth once daily.       miconazole NITRATE 2 % (MICOTIN) 2 % top powder Apply topically 2 (two) times daily.  Qty: 85 g, Refills: 0      montelukast sodium (SINGULAIR ORAL) Take 10 mg by mouth every morning.       tiotropium-olodateroL (STIOLTO RESPIMAT) 2.5-2.5 mcg/actuation Mist Inhale 1 puff into the lungs once daily. Controller  Qty: 1 g, Refills: 5    Associated Diagnoses: Chronic obstructive pulmonary disease, unspecified COPD type           STOP taking these medications       naproxen sodium (ANAPROX) 220 MG tablet Comments:   Reason for Stopping:                 I have seen and examined this patient within the last 30 days. My clinical findings that support the need for the home health skilled services and home bound status are the following:no   Weakness/numbness causing balance and gait disturbance due to COPD Exacerbation, Weakness/Debility,  and Dehydration making it taxing to leave home.     Diet:   cardiac diet    Labs:  SN to perform labs:  CMP Mondays for Potassium check    Referrals/ Consults  Physical Therapy to evaluate and treat. Evaluate for home safety and equipment needs; Establish/upgrade home exercise program. Perform / instruct on therapeutic exercises, gait training, transfer training, and Range of Motion.  Occupational Therapy to evaluate and treat. Evaluate home environment for safety and equipment needs. Perform/Instruct on transfers, ADL training, ROM, and therapeutic exercises.  Aide to provide assistance with personal care, ADLs, and vital signs.    Activities:   activity as tolerated    Nursing:   Agency to admit patient within 24 hours of hospital discharge unless specified on physician order or at patient request    SN to complete comprehensive assessment including routine vital signs. Instruct on disease process and s/s of complications to report to MD. Review/verify medication list sent home with the patient at time of discharge  and instruct patient/caregiver as needed. Frequency may be adjusted depending on start of care date.     Skilled nurse to perform up to 3 visits PRN for symptoms related to diagnosis    Notify MD if SBP > 160 or < 90; DBP > 90 or < 50; HR > 120 or < 50; Temp > 101; O2 < 88%;     Ok to schedule additional visits based on staff availability and patient request on consecutive days within the home health episode.    When multiple disciplines ordered:    Start of Care occurs on Sunday - Wednesday schedule remaining discipline evaluations as ordered on separate consecutive days following the start of care.    Thursday SOC -schedule subsequent evaluations Friday and Monday the following week.     Friday - Saturday SOC - schedule subsequent discipline evaluations on consecutive days starting Monday of the following week.    For all post-discharge communication and subsequent orders please contact patient's  primary care physician.   Miscellaneous   Home Oxygen:  Oxygen at 3 L/min nasal canula to be used:  Continuously., Assess oxygen saturation via pulse oximeter as needed for increase in SOB., and Consult respiratory therapy for instruction on home oxygen use    Home Health Aide:  Nursing Three times weekly, Physical Therapy Three times weekly, Occupational Therapy Three times weekly, Respiratory Therapy Three times weekly, and Home Health Aide Three times weekly    Wound Care Orders  no    I certify that this patient is confined to her home and needs intermittent skilled nursing care, physical therapy, and occupational therapy.

## 2023-11-02 NOTE — PLAN OF CARE
SW spoke to Bekah san/ [facility] [phone]. Pt accepted-will see patient on tomorrow.  Updated clinicals sent.     SW spoke to patient regarding d/c plan; advised pt she was accepted by [facility] and will see patient on tomorrow.     Discharge Plan A and Plan B have been determined by review of patient's clinical status, future medical and therapeutic needs, and coverage/benefits for post-acute care in coordination with multidisciplinary team members.     11/02/23 1427   Post-Acute Status   Post-Acute Authorization Home Health   Home Health Status Set-up Complete/Auth obtained  ([facility])   Coverage [employer]   Discharge Delays None known at this time   Discharge Plan   Discharge Plan A Home;Home with family;Home Health   Discharge Plan B Home     Haley Mercado LMSW  PRN-  [facility]  Ext. [phone]

## 2023-11-03 ENCOUNTER — PATIENT OUTREACH (OUTPATIENT)
Dept: ADMINISTRATIVE | Facility: CLINIC | Age: 68
End: 2023-11-03
Payer: MEDICARE

## 2023-11-03 ENCOUNTER — PATIENT MESSAGE (OUTPATIENT)
Dept: ADMINISTRATIVE | Facility: CLINIC | Age: 68
End: 2023-11-03
Payer: MEDICARE

## 2023-11-03 LAB
ANCA AB TITR SER IF: NORMAL TITER
P-ANCA TITR SER IF: NORMAL TITER

## 2023-11-03 PROCEDURE — G0180 MD CERTIFICATION HHA PATIENT: HCPCS | Mod: ,,, | Performed by: INTERNAL MEDICINE

## 2023-11-03 NOTE — PROGRESS NOTES
C3 nurse attempted to contact Bhavana Lambert for a TCC post hospital discharge follow up call. The patient is unable to conduct the call @ this time. The patient requested a callback.    The patient does not have a scheduled HOSFU appointment within 5-7 days post hospital discharge date 11/02/2023.    Non-[facility] PCP.

## 2023-11-05 LAB
ALDOST SERPL-MCNC: <3 NG/DL
ALDOST/RENIN PLAS-RTO: <15 RATIO
RENIN PLAS-CCNC: 0.2 NG/ML/HR

## 2023-11-06 ENCOUNTER — LAB VISIT (OUTPATIENT)
Dept: LAB | Facility: HOSPITAL | Age: 68
End: 2023-11-06
Attending: FAMILY MEDICINE
Payer: MEDICARE

## 2023-11-06 DIAGNOSIS — N17.9 ACUTE KIDNEY FAILURE, UNSPECIFIED: Primary | ICD-10-CM

## 2023-11-06 LAB
ALBUMIN SERPL BCP-MCNC: 2.5 G/DL (ref 3.5–5.2)
ALDOST SERPL-MCNC: <3 NG/DL
ALP SERPL-CCNC: 139 U/L (ref 55–135)
ALT SERPL W/O P-5'-P-CCNC: 21 U/L (ref 10–44)
ANION GAP SERPL CALC-SCNC: 16 MMOL/L (ref 8–16)
AST SERPL-CCNC: 26 U/L (ref 10–40)
BILIRUB SERPL-MCNC: 0.3 MG/DL (ref 0.1–1)
BUN SERPL-MCNC: 11 MG/DL (ref 8–23)
CALCIUM SERPL-MCNC: 8.6 MG/DL (ref 8.7–10.5)
CHLORIDE SERPL-SCNC: 96 MMOL/L (ref 95–110)
CO2 SERPL-SCNC: 28 MMOL/L (ref 23–29)
CREAT SERPL-MCNC: 0.9 MG/DL (ref 0.5–1.4)
EST. GFR  (NO RACE VARIABLE): >60 ML/MIN/1.73 M^2
GLUCOSE SERPL-MCNC: 81 MG/DL (ref 70–110)
PHOSPHOLIPASE A2 RECEPTOR, ELISA: <2 RU/ML
POTASSIUM SERPL-SCNC: 3.3 MMOL/L (ref 3.5–5.1)
PROT SERPL-MCNC: 7.3 G/DL (ref 6–8.4)
SODIUM SERPL-SCNC: 140 MMOL/L (ref 136–145)

## 2023-11-06 PROCEDURE — 36415 COLL VENOUS BLD VENIPUNCTURE: CPT | Performed by: FAMILY MEDICINE

## 2023-11-06 PROCEDURE — 80053 COMPREHEN METABOLIC PANEL: CPT | Performed by: FAMILY MEDICINE

## 2023-11-06 NOTE — PROGRESS NOTES
C3 nurse spoke with Bhavana Lambert (Daughter) for a TCC post hospital discharge follow up call. The patient reports does not have a scheduled HOSFU appointment. C3 nurse was unable to schedule HOSFU appointment for [facility]. Patient advised to contact their PCP to schedule a HOSPFU within 5-7 days.      The patient is not within an NP serviceable area.

## 2023-11-06 NOTE — PROGRESS NOTES
C3 nurse attempted to contact Bhavana Lambert (Daughter) for a TCC post hospital discharge follow up call. The patient is unable to conduct the call @ this time. The patient's daughter requested a callback.    The patient does not have a scheduled HOSFU appointment within 5-7 days post hospital discharge date 11/02/2023.    Non-[facility] PCP.

## 2023-11-06 NOTE — PROGRESS NOTES
Subjective:   Patient ID:  Bhavana Lambert is a 68 y.o. female who presents for evaluation of Hospital Follow Up and Tachycardia    PROBLEM LIST:  HFpEF (EF 45%)  HTN  HLD  Prior TIA  COPD  Chronic hypoxic resp failure 3 L O2  Active tobacco use      HPI:  She was admitted to the hospital last month with pyelonephritis and an LILLIE.  She had been reporting chest pressure which was worse with exertion, although she does not have any memory of this now.  During the hospitalization she was noted an elevated troponin of 1.5 and mild global hypokinesis of her left ventricle on echocardiogram which was new.  Her EF in June was normal.  Her BNP was elevated at 2200.  She did not undergo angiography due to her acute renal failure.  Her creatinine is now back at baseline.  Since discharge she reports having anxiety which has been a chronic problem for her.  She is not been having any exertional angina.  Her breathing is at her baseline.  She is actively smoking.  She has a few drinks per week. She is requesting Fiorcet for migraines.      Her ECG done today and Personally reviewed by me shows NSR with anterior T wave inversions    Echo 10/23:  Summary         Left Ventricle: The left ventricle is normal in size. Normal wall thickness. There is eccentric hypertrophy. Global hypokinesis present. There is normal systolic function. There is normal diastolic function.    Left Atrium: Left atrium is mildly dilated.    Right Ventricle: Moderate right ventricular enlargement. Wall thickness is normal. Right ventricle wall motion  is normal. Systolic function is moderately reduced.    Mitral Valve: There is mild regurgitation.    Tricuspid Valve: There is moderate regurgitation.    Pulmonary Artery: The estimated pulmonary artery systolic pressure is 48 mmHg.    IVC/SVC: Elevated venous pressure at 15 mmHg.    Past Medical History:   Diagnosis Date    Acquired hypothyroidism     Anxiety     Anxiety and depression     Cervical  radicular pain     Closed left ankle fracture     COPD (chronic obstructive pulmonary disease)     H/O: stroke 11/08/2023    Hypertension     Hypokalemia     Hyponatremia     Multiple rib fractures     Requires continuous at home supplemental oxygen     PRN FOR COPD    Stroke     Tension type headache     Traumatic closed displaced fracture of distal end of radius, left, sequela        Past Surgical History:   Procedure Laterality Date    COLON SURGERY      COLOSTOMY      COLOSTOMY CLOSURE      HYSTERECTOMY      LEG SURGERY Right     OPEN REDUCTION AND INTERNAL FIXATION (ORIF) OF INJURY OF WRIST Left 1/29/2019    Procedure: ORIF, WRIST;  Surgeon: Homero Jeff DO;  Location: [facility];  Service: Orthopedics;  Laterality: Left;  Equipment: [employer] Geminus Wrist Plate Set  Vendor/Rep: [employer]  C-Arm: Entire  DME: None    REQUIRES ASSISTANT    WRIST SURGERY Right        Social History     Socioeconomic History    Marital status:    Tobacco Use    Smoking status: Every Day     Current packs/day: 1.00     Average packs/day: 1 pack/day for 20.0 years (20.0 ttl pk-yrs)     Types: Cigarettes    Smokeless tobacco: Never   Substance and Sexual Activity    Alcohol use: Yes     Comment: couple times a week-wine or bloody cari    Drug use: No    Sexual activity: Not Currently     Social Determinants of Health     Financial Resource Strain: Low Risk  (10/27/2023)    Overall Financial Resource Strain (CARDIA)     Difficulty of Paying Living Expenses: Not hard at all   Food Insecurity: No Food Insecurity (10/27/2023)    Hunger Vital Sign     Worried About Running Out of Food in the Last Year: Never true     Ran Out of Food in the Last Year: Never true   Transportation Needs: No Transportation Needs (10/27/2023)    PRAPARE - Transportation     Lack of Transportation (Medical): No     Lack of Transportation (Non-Medical): No   Physical Activity: Inactive (10/27/2023)    Exercise Vital Sign     Days of  Exercise per Week: 0 days     Minutes of Exercise per Session: 0 min   Stress: No Stress Concern Present (10/27/2023)    [employer] [place] [employer] - Occupational Stress Questionnaire     Feeling of Stress : Not at all   Social Connections: Socially Isolated (10/27/2023)    Social Connection and Isolation Panel [NHANES]     Frequency of Communication with Friends and Family: More than three times a week     Frequency of Social Gatherings with Friends and Family: More than three times a week     Attends Orthodoxy Services: Never     Active Member of Clubs or Organizations: No     Attends Club or Organization Meetings: Never     Marital Status:    Housing Stability: Low Risk  (10/27/2023)    Housing Stability Vital Sign     Unable to Pay for Housing in the Last Year: No     Number of Places Lived in the Last Year: 1     Unstable Housing in the Last Year: No       Family History   Problem Relation Age of Onset    Cancer Mother     COPD Father     Cirrhosis Father     Arthritis Sister     No Known Problems Brother     Anxiety disorder Daughter     Depression Daughter     Anxiety disorder Daughter     Congenital heart disease Daughter     Valvular heart disease Daughter        Patient's Medications   New Prescriptions    SPIRONOLACTONE (ALDACTONE) 25 MG TABLET    Take 0.5 tablets (12.5 mg total) by mouth once daily.   Previous Medications    ASPIRIN (ECOTRIN) 81 MG EC TABLET    Take 1 tablet (81 mg total) by mouth once daily.    ATORVASTATIN (LIPITOR) 40 MG TABLET    Take 1 tablet (40 mg total) by mouth once daily.    BUSPIRONE (BUSPAR) 15 MG TABLET    Take 15 mg by mouth 2 (two) times a day.    CARVEDILOL (COREG) 25 MG TABLET    Take 0.5 tablets (12.5 mg total) by mouth 2 (two) times daily.    CLONAZEPAM (KLONOPIN) 0.5 MG TABLET    Take 0.5 mg by mouth 2 (two) times daily as needed (anxiety).     CLOPIDOGREL (PLAVIX) 75 MG TABLET    Take 1 tablet (75 mg total) by mouth once daily.    CYANOCOBALAMIN  "1,000 MCG/ML INJECTION    Inject 1,000 mcg into the muscle every 30 days.     FLUOXETINE HCL (PROZAC ORAL)    Take 40 mg by mouth once daily.     LOSARTAN (COZAAR) 25 MG TABLET    Take 1 tablet (25 mg total) by mouth once daily.    MICONAZOLE NITRATE 2 % (MICOTIN) 2 % TOP POWDER    Apply topically 2 (two) times daily.    MONTELUKAST SODIUM (SINGULAIR ORAL)    Take 10 mg by mouth every morning.     TIOTROPIUM-OLODATEROL (STIOLTO RESPIMAT) 2.5-2.5 MCG/ACTUATION MIST    Inhale 1 puff into the lungs once daily. Controller   Modified Medications    No medications on file   Discontinued Medications    No medications on file       Review of Systems   Constitutional: Negative for malaise/fatigue and weight gain.   HENT:  Negative for hearing loss.    Eyes:  Negative for visual disturbance.   Cardiovascular:  Negative for chest pain, claudication, dyspnea on exertion, leg swelling, near-syncope, orthopnea, palpitations, paroxysmal nocturnal dyspnea and syncope.   Respiratory:  Negative for cough, shortness of breath, sleep disturbances due to breathing, snoring and wheezing.    Endocrine: Negative for cold intolerance, heat intolerance, polydipsia, polyphagia and polyuria.   Hematologic/Lymphatic: Negative for bleeding problem. Does not bruise/bleed easily.   Skin:  Negative for rash and suspicious lesions.   Musculoskeletal:  Negative for arthritis, falls, joint pain, muscle weakness and myalgias.   Gastrointestinal:  Negative for abdominal pain, change in bowel habit, constipation, diarrhea, heartburn, hematochezia, melena and nausea.   Genitourinary:  Negative for hematuria and nocturia.   Neurological:  Negative for excessive daytime sleepiness, dizziness, headaches, light-headedness, loss of balance and weakness.   Psychiatric/Behavioral:  Negative for depression. The patient is nervous/anxious.    Allergic/Immunologic: Negative for environmental allergies.       BP (!) 145/71   Pulse 100   Ht 5' 2" (1.575 m)   Wt " 61.2 kg (134 lb 14.7 oz)   SpO2 97%   BMI 24.68 kg/m²     Objective:   Physical Exam  Constitutional:       Appearance: She is well-developed.      Comments: Examined in her wheelchair     HENT:      Head: Normocephalic and atraumatic.   Eyes:      General: No scleral icterus.     Conjunctiva/sclera: Conjunctivae normal.      Pupils: Pupils are equal, round, and reactive to light.   Neck:      Thyroid: No thyromegaly.      Vascular: No hepatojugular reflux or JVD.      Trachea: No tracheal deviation.   Cardiovascular:      Rate and Rhythm: Normal rate and regular rhythm.      Chest Wall: PMI is not displaced.      Pulses: Intact distal pulses.           Carotid pulses are 2+ on the right side and 2+ on the left side.       Radial pulses are 2+ on the right side and 2+ on the left side.        Dorsalis pedis pulses are 1+ on the right side and 1+ on the left side.        Posterior tibial pulses are 1+ on the right side and 1+ on the left side.      Heart sounds: Normal heart sounds.   Pulmonary:      Effort: Pulmonary effort is normal.      Breath sounds: Normal breath sounds. Decreased air movement present.   Abdominal:      General: Bowel sounds are normal. There is no distension.      Palpations: Abdomen is soft. There is no mass.      Tenderness: There is no abdominal tenderness.   Musculoskeletal:         General: No tenderness.      Cervical back: Normal range of motion and neck supple.   Lymphadenopathy:      Cervical: No cervical adenopathy.   Skin:     General: Skin is warm and dry.      Findings: No erythema or rash.      Nails: There is no clubbing.   Neurological:      Mental Status: She is alert and oriented to person, place, and time.   Psychiatric:         Speech: Speech normal.         Behavior: Behavior normal.         Lab Results   Component Value Date     11/06/2023    K 3.3 (L) 11/06/2023    CL 96 11/06/2023    CO2 28 11/06/2023    BUN 11 11/06/2023    CREATININE 0.9 11/06/2023    GLU 81  11/06/2023    MG 1.4 (L) 11/02/2023    AST 26 11/06/2023    ALT 21 11/06/2023    ALBUMIN 2.5 (L) 11/06/2023    PROT 7.3 11/06/2023    BILITOT 0.3 11/06/2023    WBC 9.25 11/02/2023    HGB 9.7 (L) 11/02/2023    HCT 30.0 (L) 11/02/2023     (H) 11/02/2023     11/02/2023    INR 1.0 10/26/2023    TSH 2.668 06/27/2023    BNP 2,260 (H) 10/25/2023       Assessment:     1. Acute diastolic heart failure:  This was in the setting of E coli sepsis and acute renal failure.  She appears well compensated and euvolemic today.  I will check a follow-up BNP, and if elevated consider starting an SGLT2i inhibitor at her follow-up visit in 6 weeks.   2. Primary hypertension :  Her blood pressure is.  I have asked her to increase her carvedilol to 25 mg twice and added spironolactone 12.5 mg daily given her ongoing hypokalemia.  I will check a BMP in 1 week time.  I have enrolled her in the digital hypertension program. Limit sodium intake to < 1500mg daily and follow the DASH diet plan.   3. NSTEMI (non-ST elevated myocardial infarction) :  She had a troponin elevation to 1.5 with mild global hypokinesis on her echocardiogram during her recent hospitalization for E coli sepsis and acute renal failure.  She remains on dual anti-platelet therapy with aspirin and Plavix as well as high-intensity statin therapy.  I have ordered a PET stress for further evaluation.  She is currently asymptomatic.   4. Hyperlipidemia, unspecified hyperlipidemia type :  She was recently started on high-intensity statin therapy with atorvastatin 40 mg daily.  Her goal LDL is less than 70.  Follow-up labs are ordered for 6 weeks time.   5. Tobacco use :  She is actively smoking and not interested in quitting at this time.   6.  Decreased pedal pulses:  I have ordered exercise ABIs.   7. H/O: stroke :  Continue aspirin and high-intensity statin therapy.   8. Chronic respiratory failure with hypoxia :  She is on chronic 3 L of supplemental oxygen.        Plan:     Bhavana was seen today for hospital follow up and tachycardia.    Diagnoses and all orders for this visit:    Pulmonary emphysema, unspecified emphysema type    Primary hypertension  -     Hypertension Digital Medicine ([facility]) Enrollment Order  -     Hypertension Digital Medicine ([facility]): Assign Onboarding Questionnaires  -     Basic Metabolic Panel; Future    NSTEMI (non-ST elevated myocardial infarction)  -     Ambulatory referral/consult to Cardiology  -     Lipid Panel; Future  -     Hepatic Function Panel; Future  -     Cardiac PET Scan Stress; Future  -     EKG 12-lead  -     Ankle Brachial Indices (JANAK); Future    Hyperlipidemia, unspecified hyperlipidemia type    Tobacco use  -     Ankle Brachial Indices (JANAK); Future    Mixed hyperlipidemia    H/O: stroke    Chronic respiratory failure with hypoxia    Other orders  -     spironolactone (ALDACTONE) 25 MG tablet; Take 0.5 tablets (12.5 mg total) by mouth once daily.        Thank you for allowing me to participate in this patient's care. Please do not hesitate to contact me with any questions or concerns.

## 2023-11-07 LAB
PLA2R IGG SERPL QL IF: NEGATIVE
THSD7A IGG SERPL QL IF: NEGATIVE

## 2023-11-08 ENCOUNTER — HOSPITAL ENCOUNTER (OUTPATIENT)
Dept: CARDIOLOGY | Facility: CLINIC | Age: 68
Discharge: HOME | End: 2023-11-08
Payer: MEDICARE

## 2023-11-08 ENCOUNTER — OFFICE VISIT (OUTPATIENT)
Dept: CARDIOLOGY | Facility: CLINIC | Age: 68
End: 2023-11-08
Payer: MEDICARE

## 2023-11-08 VITALS
SYSTOLIC BLOOD PRESSURE: 145 MMHG | BODY MASS INDEX: 24.83 KG/M2 | WEIGHT: 134.94 LBS | DIASTOLIC BLOOD PRESSURE: 71 MMHG | OXYGEN SATURATION: 97 % | HEIGHT: 62 IN | HEART RATE: 100 BPM

## 2023-11-08 DIAGNOSIS — J96.11 CHRONIC RESPIRATORY FAILURE WITH HYPOXIA: ICD-10-CM

## 2023-11-08 DIAGNOSIS — I21.4 NSTEMI (NON-ST ELEVATED MYOCARDIAL INFARCTION): ICD-10-CM

## 2023-11-08 DIAGNOSIS — Z86.73 H/O: STROKE: ICD-10-CM

## 2023-11-08 DIAGNOSIS — I10 PRIMARY HYPERTENSION: ICD-10-CM

## 2023-11-08 DIAGNOSIS — Z72.0 TOBACCO USE: ICD-10-CM

## 2023-11-08 DIAGNOSIS — J43.9 PULMONARY EMPHYSEMA, UNSPECIFIED EMPHYSEMA TYPE: Primary | ICD-10-CM

## 2023-11-08 DIAGNOSIS — I50.31 ACUTE DIASTOLIC HEART FAILURE: ICD-10-CM

## 2023-11-08 DIAGNOSIS — E78.2 MIXED HYPERLIPIDEMIA: ICD-10-CM

## 2023-11-08 DIAGNOSIS — R07.9 CHEST PAIN, UNSPECIFIED TYPE: ICD-10-CM

## 2023-11-08 DIAGNOSIS — E78.5 HYPERLIPIDEMIA, UNSPECIFIED HYPERLIPIDEMIA TYPE: ICD-10-CM

## 2023-11-08 LAB — RENIN PLAS-CCNC: <0.6 NG/ML/H

## 2023-11-08 PROCEDURE — 93000 ELECTROCARDIOGRAM COMPLETE: CPT | Mod: S$GLB,,, | Performed by: INTERNAL MEDICINE

## 2023-11-08 PROCEDURE — 1160F PR REVIEW ALL MEDS BY PRESCRIBER/CLIN PHARMACIST DOCUMENTED: ICD-10-PCS | Mod: CPTII,S$GLB,, | Performed by: INTERNAL MEDICINE

## 2023-11-08 PROCEDURE — 99999 PR PBB SHADOW E&M-EST. PATIENT-LVL V: ICD-10-PCS | Mod: PBBFAC,,, | Performed by: INTERNAL MEDICINE

## 2023-11-08 PROCEDURE — 4010F PR ACE/ARB THEARPY RXD/TAKEN: ICD-10-PCS | Mod: CPTII,S$GLB,, | Performed by: INTERNAL MEDICINE

## 2023-11-08 PROCEDURE — 3077F SYST BP >= 140 MM HG: CPT | Mod: CPTII,S$GLB,, | Performed by: INTERNAL MEDICINE

## 2023-11-08 PROCEDURE — 1111F DSCHRG MED/CURRENT MED MERGE: CPT | Mod: CPTII,S$GLB,, | Performed by: INTERNAL MEDICINE

## 2023-11-08 PROCEDURE — 3008F BODY MASS INDEX DOCD: CPT | Mod: CPTII,S$GLB,, | Performed by: INTERNAL MEDICINE

## 2023-11-08 PROCEDURE — 1111F PR DISCHARGE MEDS RECONCILED W/ CURRENT OUTPATIENT MED LIST: ICD-10-PCS | Mod: CPTII,S$GLB,, | Performed by: INTERNAL MEDICINE

## 2023-11-08 PROCEDURE — 99214 OFFICE O/P EST MOD 30 MIN: CPT | Mod: S$GLB,,, | Performed by: INTERNAL MEDICINE

## 2023-11-08 PROCEDURE — 3078F PR MOST RECENT DIASTOLIC BLOOD PRESSURE < 80 MM HG: ICD-10-PCS | Mod: CPTII,S$GLB,, | Performed by: INTERNAL MEDICINE

## 2023-11-08 PROCEDURE — 93000 EKG 12-LEAD: ICD-10-PCS | Mod: S$GLB,,, | Performed by: INTERNAL MEDICINE

## 2023-11-08 PROCEDURE — 1100F PTFALLS ASSESS-DOCD GE2>/YR: CPT | Mod: CPTII,S$GLB,, | Performed by: INTERNAL MEDICINE

## 2023-11-08 PROCEDURE — 1159F MED LIST DOCD IN RCRD: CPT | Mod: CPTII,S$GLB,, | Performed by: INTERNAL MEDICINE

## 2023-11-08 PROCEDURE — 1159F PR MEDICATION LIST DOCUMENTED IN MEDICAL RECORD: ICD-10-PCS | Mod: CPTII,S$GLB,, | Performed by: INTERNAL MEDICINE

## 2023-11-08 PROCEDURE — 99214 PR OFFICE/OUTPT VISIT, EST, LEVL IV, 30-39 MIN: ICD-10-PCS | Mod: S$GLB,,, | Performed by: INTERNAL MEDICINE

## 2023-11-08 PROCEDURE — 3077F PR MOST RECENT SYSTOLIC BLOOD PRESSURE >= 140 MM HG: ICD-10-PCS | Mod: CPTII,S$GLB,, | Performed by: INTERNAL MEDICINE

## 2023-11-08 PROCEDURE — 3008F PR BODY MASS INDEX (BMI) DOCUMENTED: ICD-10-PCS | Mod: CPTII,S$GLB,, | Performed by: INTERNAL MEDICINE

## 2023-11-08 PROCEDURE — 3288F FALL RISK ASSESSMENT DOCD: CPT | Mod: CPTII,S$GLB,, | Performed by: INTERNAL MEDICINE

## 2023-11-08 PROCEDURE — 3288F PR FALLS RISK ASSESSMENT DOCUMENTED: ICD-10-PCS | Mod: CPTII,S$GLB,, | Performed by: INTERNAL MEDICINE

## 2023-11-08 PROCEDURE — 99999 PR PBB SHADOW E&M-EST. PATIENT-LVL V: CPT | Mod: PBBFAC,,, | Performed by: INTERNAL MEDICINE

## 2023-11-08 PROCEDURE — 1160F RVW MEDS BY RX/DR IN RCRD: CPT | Mod: CPTII,S$GLB,, | Performed by: INTERNAL MEDICINE

## 2023-11-08 PROCEDURE — 1125F AMNT PAIN NOTED PAIN PRSNT: CPT | Mod: CPTII,S$GLB,, | Performed by: INTERNAL MEDICINE

## 2023-11-08 PROCEDURE — 1100F PR PT FALLS ASSESS DOC 2+ FALLS/FALL W/INJURY/YR: ICD-10-PCS | Mod: CPTII,S$GLB,, | Performed by: INTERNAL MEDICINE

## 2023-11-08 PROCEDURE — 1125F PR PAIN SEVERITY QUANTIFIED, PAIN PRESENT: ICD-10-PCS | Mod: CPTII,S$GLB,, | Performed by: INTERNAL MEDICINE

## 2023-11-08 PROCEDURE — 4010F ACE/ARB THERAPY RXD/TAKEN: CPT | Mod: CPTII,S$GLB,, | Performed by: INTERNAL MEDICINE

## 2023-11-08 PROCEDURE — 3078F DIAST BP <80 MM HG: CPT | Mod: CPTII,S$GLB,, | Performed by: INTERNAL MEDICINE

## 2023-11-08 RX ORDER — SPIRONOLACTONE 25 MG/1
12.5 TABLET ORAL DAILY
Qty: 15 TABLET | Refills: 11 | Status: SHIPPED | OUTPATIENT
Start: 2023-11-08 | End: 2024-11-07

## 2023-11-08 NOTE — PATIENT INSTRUCTIONS
Increase carvedilol to 25 mg twice daily. Goal BP < 130/80.    Start spironolactone 12.5 mg daily.    Non-fasting labs in one week.    Fasting labs in 6 weeks

## 2023-11-09 ENCOUNTER — TELEPHONE (OUTPATIENT)
Dept: CARDIOLOGY | Facility: CLINIC | Age: 68
End: 2023-11-09
Payer: MEDICARE

## 2023-11-09 NOTE — TELEPHONE ENCOUNTER
Spoke with pt's Home Health Nurse Nany ( ph#[phone] ). Orders were given p/Dr Wade to draw BMP in 1 week and do Lipids and LFT's in 6 weeks. Pt knows to fast for the 6 week lab.

## 2023-11-16 ENCOUNTER — LAB VISIT (OUTPATIENT)
Dept: LAB | Facility: HOSPITAL | Age: 68
End: 2023-11-16
Attending: INTERNAL MEDICINE
Payer: MEDICARE

## 2023-11-16 DIAGNOSIS — J43.9 EMPHYSEMATOUS BLEB: Primary | ICD-10-CM

## 2023-11-16 LAB
ALBUMIN SERPL BCP-MCNC: 2.7 G/DL (ref 3.5–5.2)
ALP SERPL-CCNC: 124 U/L (ref 55–135)
ALT SERPL W/O P-5'-P-CCNC: 8 U/L (ref 10–44)
ANION GAP SERPL CALC-SCNC: 11 MMOL/L (ref 8–16)
AST SERPL-CCNC: 17 U/L (ref 10–40)
BILIRUB SERPL-MCNC: 0.2 MG/DL (ref 0.1–1)
BUN SERPL-MCNC: 12 MG/DL (ref 8–23)
CALCIUM SERPL-MCNC: 8.9 MG/DL (ref 8.7–10.5)
CHLORIDE SERPL-SCNC: 104 MMOL/L (ref 95–110)
CO2 SERPL-SCNC: 28 MMOL/L (ref 23–29)
CREAT SERPL-MCNC: 0.9 MG/DL (ref 0.5–1.4)
EST. GFR  (NO RACE VARIABLE): >60 ML/MIN/1.73 M^2
GLUCOSE SERPL-MCNC: 85 MG/DL (ref 70–110)
POTASSIUM SERPL-SCNC: 4 MMOL/L (ref 3.5–5.1)
PROT SERPL-MCNC: 7 G/DL (ref 6–8.4)
SODIUM SERPL-SCNC: 143 MMOL/L (ref 136–145)

## 2023-11-16 PROCEDURE — 36415 COLL VENOUS BLD VENIPUNCTURE: CPT | Performed by: INTERNAL MEDICINE

## 2023-11-16 PROCEDURE — 80053 COMPREHEN METABOLIC PANEL: CPT | Performed by: INTERNAL MEDICINE

## 2023-12-09 ENCOUNTER — DOCUMENT SCAN (OUTPATIENT)
Dept: HOME HEALTH SERVICES | Facility: HOSPITAL | Age: 68
End: 2023-12-09
Payer: MEDICARE

## 2023-12-11 NOTE — ASSESSMENT & PLAN NOTE
3 day hx of substernal CP exacerbated by exertion and accompanied by worsening MENA. EKG with inverted T waves, seen in previous. Troponin 1.691 ->1.516 ->1.080 -> 0.876. BNP elevated to 2260. FREDA score: 5. D/w cardiology, most likely demand ischemia iso sepsis/hotn, compounded by current inability to renally excrete these compounds, however given her story and inability to undergo LHC at the moment 2/2 LILLIE, ACS protocol initiated. Plavix and ASA loaded, restarted on heparin gtt.    10/27: d/c heparin gtt. continuing on DAPT    Plan:  - ACS protocol with aspirin, plavix, and heparin gtt  - TTE pending  - Trend trop to peak, now down trending on transfer to [facility]  - Consult cardiology    - ACS protocol   - no LHC right now 2/2 LILLIE  - Atorvastatin 40         The ECG revealed atrial fibrillation. The ECG rate was 86 bpm.

## 2023-12-15 ENCOUNTER — TELEPHONE (OUTPATIENT)
Dept: CARDIOLOGY | Facility: HOSPITAL | Age: 68
End: 2023-12-15
Payer: MEDICARE

## 2023-12-19 ENCOUNTER — HOSPITAL ENCOUNTER (OUTPATIENT)
Dept: CARDIOLOGY | Facility: HOSPITAL | Age: 68
Discharge: HOME | End: 2023-12-19
Attending: INTERNAL MEDICINE
Payer: MEDICARE

## 2023-12-19 VITALS
WEIGHT: 134 LBS | HEIGHT: 62 IN | HEART RATE: 75 BPM | DIASTOLIC BLOOD PRESSURE: 61 MMHG | RESPIRATION RATE: 16 BRPM | OXYGEN SATURATION: 99 % | BODY MASS INDEX: 24.66 KG/M2 | SYSTOLIC BLOOD PRESSURE: 123 MMHG

## 2023-12-19 DIAGNOSIS — Z72.0 TOBACCO USE: ICD-10-CM

## 2023-12-19 DIAGNOSIS — I21.4 NSTEMI (NON-ST ELEVATED MYOCARDIAL INFARCTION): ICD-10-CM

## 2023-12-19 LAB
CFR FLOW - ANTERIOR: 1.58
CFR FLOW - INFERIOR: 1.68
CFR FLOW - LATERAL: 1.47
CFR FLOW - MAX: 2.02
CFR FLOW - MIN: 1.32
CFR FLOW - SEPTAL: 1.62
CFR FLOW - WHOLE HEART: 1.59
CV STRESS BASE HR: 72 BPM
DIASTOLIC BLOOD PRESSURE: 81 MMHG
EJECTION FRACTION- HIGH: 59 %
END DIASTOLIC INDEX-HIGH: 155 ML/M2
END DIASTOLIC INDEX-LOW: 91 ML/M2
END SYSTOLIC INDEX-HIGH: 78 ML/M2
END SYSTOLIC INDEX-LOW: 40 ML/M2
IMMEDIATE ARM BP: 124 MMHG
IMMEDIATE LEFT ABI: 0.46
IMMEDIATE LEFT TIBIAL: 57 MMHG
IMMEDIATE RIGHT ABI: 0.57
IMMEDIATE RIGHT TIBIAL: 71 MMHG
LEFT ABI: 0.85
LEFT ARM BP: 125 MMHG
LEFT DORSALIS PEDIS: 94 MMHG
LEFT POSTERIOR TIBIAL: 106 MMHG
NUC REST DIASTOLIC VOLUME INDEX: 62
NUC REST EJECTION FRACTION: 57
NUC REST SYSTOLIC VOLUME INDEX: 27
NUC STRESS DIASTOLIC VOLUME INDEX: 70
NUC STRESS EJECTION FRACTION: 63 %
NUC STRESS SYSTOLIC VOLUME INDEX: 26
OHS CV CPX 1 MINUTE RECOVERY HEART RATE: 83 BPM
OHS CV CPX 85 PERCENT MAX PREDICTED HEART RATE MALE: 129
OHS CV CPX MAX PREDICTED HEART RATE: 152
OHS CV CPX PATIENT IS FEMALE: 1
OHS CV CPX PATIENT IS MALE: 0
OHS CV CPX PEAK DIASTOLIC BLOOD PRESSURE: 58 MMHG
OHS CV CPX PEAK HEAR RATE: 75 BPM
OHS CV CPX PEAK RATE PRESSURE PRODUCT: 8925
OHS CV CPX PEAK SYSTOLIC BLOOD PRESSURE: 119 MMHG
OHS CV CPX PERCENT MAX PREDICTED HEART RATE ACHIEVED: 51
OHS CV CPX RATE PRESSURE PRODUCT PRESENTING: 9648
REST FLOW - ANTERIOR: 0.8 CC/MIN/G
REST FLOW - INFERIOR: 0.59 CC/MIN/G
REST FLOW - LATERAL: 0.93 CC/MIN/G
REST FLOW - MAX: 1.26 CC/MIN/G
REST FLOW - MIN: 0.41 CC/MIN/G
REST FLOW - SEPTAL: 0.58 CC/MIN/G
REST FLOW - WHOLE HEART: 0.73 CC/MIN/G
RETIRED EF AND QEF - SEE NOTES: 47 %
RIGHT ABI: 0.84
RIGHT ARM BP: 122 MMHG
RIGHT DORSALIS PEDIS: 81 MMHG
RIGHT POSTERIOR TIBIAL: 105 MMHG
STRESS FLOW - ANTERIOR: 1.26 CC/MIN/G
STRESS FLOW - INFERIOR: 0.99 CC/MIN/G
STRESS FLOW - LATERAL: 1.36 CC/MIN/G
STRESS FLOW - MAX: 1.89 CC/MIN/G
STRESS FLOW - MIN: 0.62 CC/MIN/G
STRESS FLOW - SEPTAL: 0.94 CC/MIN/G
STRESS FLOW - WHOLE HEART: 1.14 CC/MIN/G
SYSTOLIC BLOOD PRESSURE: 134 MMHG
TOE RAISES: 30

## 2023-12-19 PROCEDURE — 63600175 PHARM REV CODE 636 W HCPCS: Performed by: INTERNAL MEDICINE

## 2023-12-19 PROCEDURE — 78431 MYOCRD IMG PET RST&STRS CT: CPT | Mod: 26,,, | Performed by: INTERNAL MEDICINE

## 2023-12-19 PROCEDURE — 93018 CARDIAC PET SCAN STRESS (CUPID ONLY): ICD-10-PCS | Mod: ,,, | Performed by: INTERNAL MEDICINE

## 2023-12-19 PROCEDURE — 93924 LWR XTR VASC STDY BILAT: CPT

## 2023-12-19 PROCEDURE — 78434 AQMBF PET REST & RX STRESS: CPT | Mod: 26,,, | Performed by: INTERNAL MEDICINE

## 2023-12-19 PROCEDURE — 78434 CARDIAC PET SCAN STRESS (CUPID ONLY): ICD-10-PCS | Mod: 26,,, | Performed by: INTERNAL MEDICINE

## 2023-12-19 PROCEDURE — 78431 MYOCRD IMG PET RST&STRS CT: CPT

## 2023-12-19 PROCEDURE — 93018 CV STRESS TEST I&R ONLY: CPT | Mod: ,,, | Performed by: INTERNAL MEDICINE

## 2023-12-19 PROCEDURE — 93924 ANKLE BRACHIAL INDICES (ABI): ICD-10-PCS | Mod: 26,,, | Performed by: INTERNAL MEDICINE

## 2023-12-19 PROCEDURE — 93016 CARDIAC PET SCAN STRESS (CUPID ONLY): ICD-10-PCS | Mod: ,,, | Performed by: INTERNAL MEDICINE

## 2023-12-19 PROCEDURE — 78431 CARDIAC PET SCAN STRESS (CUPID ONLY): ICD-10-PCS | Mod: 26,,, | Performed by: INTERNAL MEDICINE

## 2023-12-19 PROCEDURE — 93924 LWR XTR VASC STDY BILAT: CPT | Mod: 26,,, | Performed by: INTERNAL MEDICINE

## 2023-12-19 PROCEDURE — 93016 CV STRESS TEST SUPVJ ONLY: CPT | Mod: ,,, | Performed by: INTERNAL MEDICINE

## 2023-12-19 PROCEDURE — 78434 AQMBF PET REST & RX STRESS: CPT

## 2023-12-19 RX ORDER — AMINOPHYLLINE 25 MG/ML
75 INJECTION, SOLUTION INTRAVENOUS
Status: COMPLETED | OUTPATIENT
Start: 2023-12-19 | End: 2023-12-19

## 2023-12-19 RX ORDER — REGADENOSON 0.08 MG/ML
0.4 INJECTION, SOLUTION INTRAVENOUS
Status: COMPLETED | OUTPATIENT
Start: 2023-12-19 | End: 2023-12-19

## 2023-12-19 RX ADMIN — REGADENOSON 0.4 MG: 0.08 INJECTION, SOLUTION INTRAVENOUS at 01:12

## 2023-12-19 RX ADMIN — AMINOPHYLLINE 75 MG: 25 INJECTION, SOLUTION INTRAVENOUS at 01:12

## 2023-12-20 ENCOUNTER — TELEPHONE (OUTPATIENT)
Dept: CARDIOLOGY | Facility: CLINIC | Age: 68
End: 2023-12-20
Payer: MEDICARE

## 2023-12-20 NOTE — TELEPHONE ENCOUNTER
Called home health, did not get an answer back, patients documents were electronically scanned by  on 12/09/2023.    Thank you

## 2023-12-21 ENCOUNTER — TELEPHONE (OUTPATIENT)
Dept: CARDIOLOGY | Facility: CLINIC | Age: 68
End: 2023-12-21
Payer: MEDICARE

## 2023-12-21 ENCOUNTER — LAB VISIT (OUTPATIENT)
Dept: LAB | Facility: HOSPITAL | Age: 68
End: 2023-12-21
Attending: INTERNAL MEDICINE
Payer: MEDICARE

## 2023-12-21 DIAGNOSIS — I21.4 ACUTE MYOCARDIAL INFARCTION, SUBENDOCARDIAL INFARCTION, INITIAL EPISODE OF CARE: Primary | ICD-10-CM

## 2023-12-21 DIAGNOSIS — E78.2 MIXED HYPERLIPIDEMIA: Primary | ICD-10-CM

## 2023-12-21 DIAGNOSIS — E87.1 HYPOSMOLALITY SYNDROME: ICD-10-CM

## 2023-12-21 DIAGNOSIS — I10 ESSENTIAL HYPERTENSION, MALIGNANT: ICD-10-CM

## 2023-12-21 DIAGNOSIS — E87.29 HYPERCHLOREMIC ACIDOSIS: ICD-10-CM

## 2023-12-21 LAB
ALBUMIN SERPL BCP-MCNC: 3.4 G/DL (ref 3.5–5.2)
ALBUMIN SERPL BCP-MCNC: 3.4 G/DL (ref 3.5–5.2)
ALP SERPL-CCNC: 117 U/L (ref 55–135)
ALP SERPL-CCNC: 117 U/L (ref 55–135)
ALT SERPL W/O P-5'-P-CCNC: 8 U/L (ref 10–44)
ALT SERPL W/O P-5'-P-CCNC: 8 U/L (ref 10–44)
ANION GAP SERPL CALC-SCNC: 10 MMOL/L (ref 8–16)
AST SERPL-CCNC: 19 U/L (ref 10–40)
AST SERPL-CCNC: 19 U/L (ref 10–40)
BILIRUB DIRECT SERPL-MCNC: 0.1 MG/DL (ref 0.1–0.3)
BILIRUB SERPL-MCNC: 0.3 MG/DL (ref 0.1–1)
BILIRUB SERPL-MCNC: 0.3 MG/DL (ref 0.1–1)
BUN SERPL-MCNC: 8 MG/DL (ref 8–23)
CALCIUM SERPL-MCNC: 8.9 MG/DL (ref 8.7–10.5)
CHLORIDE SERPL-SCNC: 98 MMOL/L (ref 95–110)
CHOLEST SERPL-MCNC: 221 MG/DL (ref 120–199)
CHOLEST/HDLC SERPL: 2.4 {RATIO} (ref 2–5)
CO2 SERPL-SCNC: 31 MMOL/L (ref 23–29)
CREAT SERPL-MCNC: 0.8 MG/DL (ref 0.5–1.4)
EST. GFR  (NO RACE VARIABLE): >60 ML/MIN/1.73 M^2
GLUCOSE SERPL-MCNC: 92 MG/DL (ref 70–110)
HDLC SERPL-MCNC: 94 MG/DL (ref 40–75)
HDLC SERPL: 42.5 % (ref 20–50)
LDLC SERPL CALC-MCNC: 93 MG/DL (ref 63–159)
NONHDLC SERPL-MCNC: 127 MG/DL
POTASSIUM SERPL-SCNC: 5 MMOL/L (ref 3.5–5.1)
PROT SERPL-MCNC: 7.8 G/DL (ref 6–8.4)
PROT SERPL-MCNC: 7.8 G/DL (ref 6–8.4)
SODIUM SERPL-SCNC: 139 MMOL/L (ref 136–145)
TRIGL SERPL-MCNC: 170 MG/DL (ref 30–150)

## 2023-12-21 PROCEDURE — 36415 COLL VENOUS BLD VENIPUNCTURE: CPT | Performed by: INTERNAL MEDICINE

## 2023-12-21 PROCEDURE — 80061 LIPID PANEL: CPT | Performed by: INTERNAL MEDICINE

## 2023-12-21 PROCEDURE — 80053 COMPREHEN METABOLIC PANEL: CPT | Performed by: INTERNAL MEDICINE

## 2023-12-21 RX ORDER — ATORVASTATIN CALCIUM 80 MG/1
80 TABLET, FILM COATED ORAL DAILY
Qty: 90 TABLET | Refills: 3 | Status: SHIPPED | OUTPATIENT
Start: 2023-12-21 | End: 2024-12-20

## 2023-12-21 NOTE — TELEPHONE ENCOUNTER
Called pt to confirm she was fasting during her blood work today. Pt did not answer left a voicemail and we will wait for a returned call.

## 2024-01-19 ENCOUNTER — HOSPITAL ENCOUNTER (OUTPATIENT)
Facility: HOSPITAL | Age: 69
Discharge: ANOTHER HOSPITAL | DRG: 871 | End: 2024-01-22
Attending: EMERGENCY MEDICINE | Admitting: STUDENT IN AN ORGANIZED HEALTH CARE EDUCATION/TRAINING PROGRAM
Payer: MEDICARE

## 2024-01-19 DIAGNOSIS — N12 PYELONEPHRITIS: ICD-10-CM

## 2024-01-19 DIAGNOSIS — N10 ACUTE PYELONEPHRITIS: ICD-10-CM

## 2024-01-19 DIAGNOSIS — I95.9 HYPOTENSION: ICD-10-CM

## 2024-01-19 DIAGNOSIS — E83.42 HYPOMAGNESEMIA: ICD-10-CM

## 2024-01-19 DIAGNOSIS — E87.6 HYPOKALEMIA: ICD-10-CM

## 2024-01-19 DIAGNOSIS — R06.02 SOB (SHORTNESS OF BREATH): ICD-10-CM

## 2024-01-19 DIAGNOSIS — I50.32 CHRONIC DIASTOLIC (CONGESTIVE) HEART FAILURE: Primary | ICD-10-CM

## 2024-01-19 LAB
ALBUMIN SERPL BCP-MCNC: 2.9 G/DL (ref 3.5–5.2)
ALP SERPL-CCNC: 142 U/L (ref 55–135)
ALT SERPL W/O P-5'-P-CCNC: 190 U/L (ref 10–44)
AMPHET+METHAMPHET UR QL: NEGATIVE
ANION GAP SERPL CALC-SCNC: 15 MMOL/L (ref 8–16)
AST SERPL-CCNC: 769 U/L (ref 10–40)
BACTERIA #/AREA URNS HPF: ABNORMAL /HPF
BARBITURATES UR QL SCN>200 NG/ML: NEGATIVE
BASOPHILS # BLD AUTO: 0.03 K/UL (ref 0–0.2)
BASOPHILS NFR BLD: 0.3 % (ref 0–1.9)
BENZODIAZ UR QL SCN>200 NG/ML: NEGATIVE
BILIRUB SERPL-MCNC: 0.7 MG/DL (ref 0.1–1)
BILIRUB UR QL STRIP: ABNORMAL
BNP SERPL-MCNC: 66 PG/ML (ref 0–99)
BUN SERPL-MCNC: 17 MG/DL (ref 8–23)
BZE UR QL SCN: NEGATIVE
CALCIUM SERPL-MCNC: 8.7 MG/DL (ref 8.7–10.5)
CANNABINOIDS UR QL SCN: NEGATIVE
CHLORIDE SERPL-SCNC: 90 MMOL/L (ref 95–110)
CLARITY UR: ABNORMAL
CO2 SERPL-SCNC: 28 MMOL/L (ref 23–29)
COLOR UR: YELLOW
CREAT SERPL-MCNC: 1 MG/DL (ref 0.5–1.4)
CREAT UR-MCNC: 122.3 MG/DL (ref 15–325)
DIFFERENTIAL METHOD BLD: ABNORMAL
EOSINOPHIL # BLD AUTO: 0 K/UL (ref 0–0.5)
EOSINOPHIL NFR BLD: 0 % (ref 0–8)
ERYTHROCYTE [DISTWIDTH] IN BLOOD BY AUTOMATED COUNT: 12.8 % (ref 11.5–14.5)
EST. GFR  (NO RACE VARIABLE): >60 ML/MIN/1.73 M^2
ETHANOL SERPL-MCNC: <10 MG/DL (ref 0–10)
GLUCOSE SERPL-MCNC: 128 MG/DL (ref 70–110)
GLUCOSE UR QL STRIP: NEGATIVE
HCT VFR BLD AUTO: 31.3 % (ref 37–48.5)
HGB BLD-MCNC: 10.4 G/DL (ref 12–16)
HGB UR QL STRIP: ABNORMAL
HYALINE CASTS #/AREA URNS LPF: 0 /LPF
IMM GRANULOCYTES # BLD AUTO: 0.04 K/UL (ref 0–0.04)
IMM GRANULOCYTES NFR BLD AUTO: 0.4 % (ref 0–0.5)
KETONES UR QL STRIP: NEGATIVE
LACTATE SERPL-SCNC: 0.5 MMOL/L (ref 0.5–2.2)
LEUKOCYTE ESTERASE UR QL STRIP: ABNORMAL
LIPASE SERPL-CCNC: 29 U/L (ref 4–60)
LYMPHOCYTES # BLD AUTO: 1 K/UL (ref 1–4.8)
LYMPHOCYTES NFR BLD: 9.6 % (ref 18–48)
MAGNESIUM SERPL-MCNC: 1.4 MG/DL (ref 1.6–2.6)
MCH RBC QN AUTO: 32.4 PG (ref 27–31)
MCHC RBC AUTO-ENTMCNC: 33.2 G/DL (ref 32–36)
MCV RBC AUTO: 98 FL (ref 82–98)
METHADONE UR QL SCN>300 NG/ML: NEGATIVE
MICROSCOPIC COMMENT: ABNORMAL
MONOCYTES # BLD AUTO: 0.4 K/UL (ref 0.3–1)
MONOCYTES NFR BLD: 4.3 % (ref 4–15)
NEUTROPHILS # BLD AUTO: 8.4 K/UL (ref 1.8–7.7)
NEUTROPHILS NFR BLD: 85.4 % (ref 38–73)
NITRITE UR QL STRIP: NEGATIVE
NRBC BLD-RTO: 0 /100 WBC
OPIATES UR QL SCN: NEGATIVE
PCP UR QL SCN>25 NG/ML: NEGATIVE
PH UR STRIP: 6 [PH] (ref 5–8)
PLATELET # BLD AUTO: 230 K/UL (ref 150–450)
PMV BLD AUTO: 10.5 FL (ref 9.2–12.9)
POTASSIUM SERPL-SCNC: 2.8 MMOL/L (ref 3.5–5.1)
PROT SERPL-MCNC: 7.2 G/DL (ref 6–8.4)
PROT UR QL STRIP: ABNORMAL
RBC # BLD AUTO: 3.21 M/UL (ref 4–5.4)
RBC #/AREA URNS HPF: 20 /HPF (ref 0–4)
SODIUM SERPL-SCNC: 133 MMOL/L (ref 136–145)
SP GR UR STRIP: 1.01 (ref 1–1.03)
SQUAMOUS #/AREA URNS HPF: 5 /HPF
TOXICOLOGY INFORMATION: NORMAL
TROPONIN I SERPL DL<=0.01 NG/ML-MCNC: <0.006 NG/ML (ref 0–0.03)
URN SPEC COLLECT METH UR: ABNORMAL
UROBILINOGEN UR STRIP-ACNC: 1 EU/DL
WBC # BLD AUTO: 9.89 K/UL (ref 3.9–12.7)
WBC #/AREA URNS HPF: >100 /HPF (ref 0–5)

## 2024-01-19 PROCEDURE — 25000242 PHARM REV CODE 250 ALT 637 W/ HCPCS: Performed by: EMERGENCY MEDICINE

## 2024-01-19 PROCEDURE — 96367 TX/PROPH/DG ADDL SEQ IV INF: CPT

## 2024-01-19 PROCEDURE — 84484 ASSAY OF TROPONIN QUANT: CPT | Performed by: EMERGENCY MEDICINE

## 2024-01-19 PROCEDURE — 96361 HYDRATE IV INFUSION ADD-ON: CPT

## 2024-01-19 PROCEDURE — 74177 CT ABD & PELVIS W/CONTRAST: CPT | Mod: 26,,, | Performed by: RADIOLOGY

## 2024-01-19 PROCEDURE — 87088 URINE BACTERIA CULTURE: CPT | Performed by: EMERGENCY MEDICINE

## 2024-01-19 PROCEDURE — 27000221 HC OXYGEN, UP TO 24 HOURS

## 2024-01-19 PROCEDURE — 25000242 PHARM REV CODE 250 ALT 637 W/ HCPCS: Performed by: HOSPITALIST

## 2024-01-19 PROCEDURE — 83880 ASSAY OF NATRIURETIC PEPTIDE: CPT | Performed by: EMERGENCY MEDICINE

## 2024-01-19 PROCEDURE — 87086 URINE CULTURE/COLONY COUNT: CPT | Performed by: EMERGENCY MEDICINE

## 2024-01-19 PROCEDURE — 99285 EMERGENCY DEPT VISIT HI MDM: CPT | Mod: 25

## 2024-01-19 PROCEDURE — 93005 ELECTROCARDIOGRAM TRACING: CPT

## 2024-01-19 PROCEDURE — G0378 HOSPITAL OBSERVATION PER HR: HCPCS

## 2024-01-19 PROCEDURE — 63600175 PHARM REV CODE 636 W HCPCS: Performed by: EMERGENCY MEDICINE

## 2024-01-19 PROCEDURE — 93010 ELECTROCARDIOGRAM REPORT: CPT | Mod: ,,, | Performed by: INTERNAL MEDICINE

## 2024-01-19 PROCEDURE — 83735 ASSAY OF MAGNESIUM: CPT | Performed by: EMERGENCY MEDICINE

## 2024-01-19 PROCEDURE — 63600175 PHARM REV CODE 636 W HCPCS: Performed by: HOSPITALIST

## 2024-01-19 PROCEDURE — 25000003 PHARM REV CODE 250: Performed by: HOSPITALIST

## 2024-01-19 PROCEDURE — 96372 THER/PROPH/DIAG INJ SC/IM: CPT | Mod: 59 | Performed by: HOSPITALIST

## 2024-01-19 PROCEDURE — 71045 X-RAY EXAM CHEST 1 VIEW: CPT | Mod: 26,,, | Performed by: RADIOLOGY

## 2024-01-19 PROCEDURE — 87186 SC STD MICRODIL/AGAR DIL: CPT | Mod: 59 | Performed by: EMERGENCY MEDICINE

## 2024-01-19 PROCEDURE — 96375 TX/PRO/DX INJ NEW DRUG ADDON: CPT

## 2024-01-19 PROCEDURE — 25000003 PHARM REV CODE 250

## 2024-01-19 PROCEDURE — 94640 AIRWAY INHALATION TREATMENT: CPT | Mod: XB

## 2024-01-19 PROCEDURE — 87077 CULTURE AEROBIC IDENTIFY: CPT | Mod: 59 | Performed by: EMERGENCY MEDICINE

## 2024-01-19 PROCEDURE — 81000 URINALYSIS NONAUTO W/SCOPE: CPT | Performed by: EMERGENCY MEDICINE

## 2024-01-19 PROCEDURE — 87040 BLOOD CULTURE FOR BACTERIA: CPT | Performed by: EMERGENCY MEDICINE

## 2024-01-19 PROCEDURE — 74177 CT ABD & PELVIS W/CONTRAST: CPT | Mod: TC

## 2024-01-19 PROCEDURE — 94640 AIRWAY INHALATION TREATMENT: CPT

## 2024-01-19 PROCEDURE — 80307 DRUG TEST PRSMV CHEM ANLYZR: CPT | Performed by: EMERGENCY MEDICINE

## 2024-01-19 PROCEDURE — 96365 THER/PROPH/DIAG IV INF INIT: CPT | Mod: 59

## 2024-01-19 PROCEDURE — 25000003 PHARM REV CODE 250: Performed by: EMERGENCY MEDICINE

## 2024-01-19 PROCEDURE — 82077 ASSAY SPEC XCP UR&BREATH IA: CPT | Performed by: EMERGENCY MEDICINE

## 2024-01-19 PROCEDURE — 87154 CUL TYP ID BLD PTHGN 6+ TRGT: CPT | Performed by: EMERGENCY MEDICINE

## 2024-01-19 PROCEDURE — 80053 COMPREHEN METABOLIC PANEL: CPT | Performed by: EMERGENCY MEDICINE

## 2024-01-19 PROCEDURE — 83605 ASSAY OF LACTIC ACID: CPT | Performed by: EMERGENCY MEDICINE

## 2024-01-19 PROCEDURE — 85025 COMPLETE CBC W/AUTO DIFF WBC: CPT | Performed by: EMERGENCY MEDICINE

## 2024-01-19 PROCEDURE — 71045 X-RAY EXAM CHEST 1 VIEW: CPT | Mod: TC

## 2024-01-19 PROCEDURE — 25500020 PHARM REV CODE 255: Performed by: EMERGENCY MEDICINE

## 2024-01-19 PROCEDURE — 83690 ASSAY OF LIPASE: CPT | Performed by: EMERGENCY MEDICINE

## 2024-01-19 RX ORDER — BUTALBITAL, ACETAMINOPHEN AND CAFFEINE 50; 325; 40 MG/1; MG/1; MG/1
1 TABLET ORAL EVERY 8 HOURS PRN
Status: ON HOLD | COMMUNITY
Start: 2024-01-17 | End: 2024-04-21

## 2024-01-19 RX ORDER — ONDANSETRON 4 MG/1
4 TABLET, FILM COATED ORAL EVERY 6 HOURS PRN
COMMUNITY

## 2024-01-19 RX ORDER — CLONAZEPAM 0.5 MG/1
0.5 TABLET ORAL 2 TIMES DAILY PRN
Status: ON HOLD | COMMUNITY
End: 2024-01-29 | Stop reason: HOSPADM

## 2024-01-19 RX ORDER — MAGNESIUM SULFATE HEPTAHYDRATE 40 MG/ML
2 INJECTION, SOLUTION INTRAVENOUS
Status: COMPLETED | OUTPATIENT
Start: 2024-01-19 | End: 2024-01-19

## 2024-01-19 RX ORDER — LEVOTHYROXINE SODIUM 25 UG/1
50 TABLET ORAL
Status: DISCONTINUED | OUTPATIENT
Start: 2024-01-20 | End: 2024-01-22 | Stop reason: HOSPADM

## 2024-01-19 RX ORDER — CYCLOBENZAPRINE HCL 5 MG
10 TABLET ORAL 3 TIMES DAILY PRN
Status: DISCONTINUED | OUTPATIENT
Start: 2024-01-19 | End: 2024-01-22 | Stop reason: HOSPADM

## 2024-01-19 RX ORDER — POTASSIUM CHLORIDE 750 MG/1
10 TABLET, EXTENDED RELEASE ORAL ONCE
Status: ON HOLD | COMMUNITY
End: 2024-01-23

## 2024-01-19 RX ORDER — BUSPIRONE HYDROCHLORIDE 5 MG/1
15 TABLET ORAL 2 TIMES DAILY
Status: DISCONTINUED | OUTPATIENT
Start: 2024-01-19 | End: 2024-01-22 | Stop reason: HOSPADM

## 2024-01-19 RX ORDER — CLOPIDOGREL BISULFATE 75 MG/1
75 TABLET ORAL DAILY
Status: DISCONTINUED | OUTPATIENT
Start: 2024-01-20 | End: 2024-01-22

## 2024-01-19 RX ORDER — ACETAMINOPHEN 325 MG/1
650 TABLET ORAL EVERY 4 HOURS PRN
Status: DISCONTINUED | OUTPATIENT
Start: 2024-01-19 | End: 2024-01-22

## 2024-01-19 RX ORDER — ALBUTEROL SULFATE 90 UG/1
2 AEROSOL, METERED RESPIRATORY (INHALATION) EVERY 6 HOURS PRN
COMMUNITY
Start: 2024-01-19

## 2024-01-19 RX ORDER — MONTELUKAST SODIUM 10 MG/1
10 TABLET ORAL EVERY MORNING
Status: DISCONTINUED | OUTPATIENT
Start: 2024-01-20 | End: 2024-01-22

## 2024-01-19 RX ORDER — IPRATROPIUM BROMIDE AND ALBUTEROL SULFATE 2.5; .5 MG/3ML; MG/3ML
3 SOLUTION RESPIRATORY (INHALATION) EVERY 4 HOURS PRN
Status: DISCONTINUED | OUTPATIENT
Start: 2024-01-19 | End: 2024-01-22 | Stop reason: HOSPADM

## 2024-01-19 RX ORDER — ROSUVASTATIN CALCIUM 10 MG/1
10 TABLET, COATED ORAL DAILY
Status: ON HOLD | COMMUNITY
End: 2024-01-23

## 2024-01-19 RX ORDER — MORPHINE SULFATE 2 MG/ML
4 INJECTION, SOLUTION INTRAMUSCULAR; INTRAVENOUS EVERY 4 HOURS PRN
Status: DISCONTINUED | OUTPATIENT
Start: 2024-01-19 | End: 2024-01-22 | Stop reason: HOSPADM

## 2024-01-19 RX ORDER — LEVOTHYROXINE SODIUM 50 UG/1
50 TABLET ORAL
COMMUNITY

## 2024-01-19 RX ORDER — POTASSIUM CHLORIDE 20 MEQ/1
40 TABLET, EXTENDED RELEASE ORAL
Status: COMPLETED | OUTPATIENT
Start: 2024-01-19 | End: 2024-01-19

## 2024-01-19 RX ORDER — POTASSIUM CHLORIDE 7.45 MG/ML
10 INJECTION INTRAVENOUS
Status: DISPENSED | OUTPATIENT
Start: 2024-01-19 | End: 2024-01-20

## 2024-01-19 RX ORDER — BUTALBITAL, ACETAMINOPHEN AND CAFFEINE 50; 325; 40 MG/1; MG/1; MG/1
1 TABLET ORAL EVERY 8 HOURS PRN
Status: DISCONTINUED | OUTPATIENT
Start: 2024-01-19 | End: 2024-01-22

## 2024-01-19 RX ORDER — ATORVASTATIN CALCIUM 40 MG/1
80 TABLET, FILM COATED ORAL DAILY
Status: DISCONTINUED | OUTPATIENT
Start: 2024-01-20 | End: 2024-01-19

## 2024-01-19 RX ORDER — LIDOCAINE 50 MG/G
1 PATCH TOPICAL
Status: DISCONTINUED | OUTPATIENT
Start: 2024-01-19 | End: 2024-01-22 | Stop reason: HOSPADM

## 2024-01-19 RX ORDER — CYCLOBENZAPRINE HCL 10 MG
10 TABLET ORAL 3 TIMES DAILY PRN
COMMUNITY

## 2024-01-19 RX ORDER — ENOXAPARIN SODIUM 100 MG/ML
40 INJECTION SUBCUTANEOUS EVERY 24 HOURS
Status: DISCONTINUED | OUTPATIENT
Start: 2024-01-19 | End: 2024-01-22

## 2024-01-19 RX ORDER — FLUOXETINE 10 MG/1
40 CAPSULE ORAL DAILY
Status: DISCONTINUED | OUTPATIENT
Start: 2024-01-20 | End: 2024-01-22 | Stop reason: HOSPADM

## 2024-01-19 RX ORDER — BUSPIRONE HYDROCHLORIDE 5 MG/1
TABLET ORAL
Status: COMPLETED
Start: 2024-01-19 | End: 2024-01-19

## 2024-01-19 RX ORDER — CLONAZEPAM 0.5 MG/1
0.5 TABLET ORAL 2 TIMES DAILY PRN
Status: DISCONTINUED | OUTPATIENT
Start: 2024-01-19 | End: 2024-01-22 | Stop reason: HOSPADM

## 2024-01-19 RX ORDER — SODIUM CHLORIDE 9 MG/ML
INJECTION, SOLUTION INTRAVENOUS CONTINUOUS
Status: DISCONTINUED | OUTPATIENT
Start: 2024-01-19 | End: 2024-01-22 | Stop reason: HOSPADM

## 2024-01-19 RX ORDER — SODIUM CHLORIDE 0.9 % (FLUSH) 0.9 %
10 SYRINGE (ML) INJECTION
Status: DISCONTINUED | OUTPATIENT
Start: 2024-01-19 | End: 2024-01-22 | Stop reason: HOSPADM

## 2024-01-19 RX ORDER — IPRATROPIUM BROMIDE AND ALBUTEROL SULFATE 2.5; .5 MG/3ML; MG/3ML
3 SOLUTION RESPIRATORY (INHALATION)
Status: COMPLETED | OUTPATIENT
Start: 2024-01-19 | End: 2024-01-19

## 2024-01-19 RX ORDER — ASPIRIN 81 MG/1
81 TABLET ORAL DAILY
Status: DISCONTINUED | OUTPATIENT
Start: 2024-01-20 | End: 2024-01-22

## 2024-01-19 RX ADMIN — SODIUM CHLORIDE, POTASSIUM CHLORIDE, SODIUM LACTATE AND CALCIUM CHLORIDE 1000 ML: 600; 310; 30; 20 INJECTION, SOLUTION INTRAVENOUS at 08:01

## 2024-01-19 RX ADMIN — CEFTRIAXONE SODIUM 2 G: 2 INJECTION, POWDER, FOR SOLUTION INTRAMUSCULAR; INTRAVENOUS at 07:01

## 2024-01-19 RX ADMIN — POTASSIUM CHLORIDE 10 MEQ: 7.46 INJECTION, SOLUTION INTRAVENOUS at 11:01

## 2024-01-19 RX ADMIN — SODIUM CHLORIDE 1000 ML: 9 INJECTION, SOLUTION INTRAVENOUS at 05:01

## 2024-01-19 RX ADMIN — IPRATROPIUM BROMIDE AND ALBUTEROL SULFATE 3 ML: .5; 3 SOLUTION RESPIRATORY (INHALATION) at 10:01

## 2024-01-19 RX ADMIN — BUSPIRONE HYDROCHLORIDE 15 MG: 5 TABLET ORAL at 10:01

## 2024-01-19 RX ADMIN — POTASSIUM CHLORIDE 40 MEQ: 1500 TABLET, EXTENDED RELEASE ORAL at 11:01

## 2024-01-19 RX ADMIN — SODIUM CHLORIDE: 9 INJECTION, SOLUTION INTRAVENOUS at 10:01

## 2024-01-19 RX ADMIN — SODIUM CHLORIDE 1000 ML: 0.9 INJECTION, SOLUTION INTRAVENOUS at 11:01

## 2024-01-19 RX ADMIN — IPRATROPIUM BROMIDE AND ALBUTEROL SULFATE 3 ML: .5; 3 SOLUTION RESPIRATORY (INHALATION) at 06:01

## 2024-01-19 RX ADMIN — ENOXAPARIN SODIUM 40 MG: 40 INJECTION SUBCUTANEOUS at 10:01

## 2024-01-19 RX ADMIN — MAGNESIUM SULFATE IN WATER 2 G: 40 INJECTION, SOLUTION INTRAVENOUS at 08:01

## 2024-01-19 RX ADMIN — MORPHINE SULFATE 4 MG: 2 INJECTION, SOLUTION INTRAMUSCULAR; INTRAVENOUS at 10:01

## 2024-01-19 RX ADMIN — IOHEXOL 75 ML: 350 INJECTION, SOLUTION INTRAVENOUS at 07:01

## 2024-01-19 RX ADMIN — POTASSIUM CHLORIDE 40 MEQ: 1500 TABLET, EXTENDED RELEASE ORAL at 08:01

## 2024-01-19 NOTE — ED NOTES
"Initial assessment complete. Pt presents with reports of hypotension from her home health nurse today. She states her family has been telling her she looks more pale than usual. Pt"s only acute compliant is of lower abd pain and dysuria onset today. Additionally reports syncopal episode on Wednesday. States she stood up too fast and became syncopal.   "

## 2024-01-20 LAB
ACINETOBACTER CALCOACETICUS/BAUMANNII COMPLEX: NOT DETECTED
ALBUMIN SERPL BCP-MCNC: 2.1 G/DL (ref 3.5–5.2)
ALLENS TEST: ABNORMAL
ALLENS TEST: ABNORMAL
ALP SERPL-CCNC: 101 U/L (ref 55–135)
ALT SERPL W/O P-5'-P-CCNC: 147 U/L (ref 10–44)
ANION GAP SERPL CALC-SCNC: 13 MMOL/L (ref 8–16)
AST SERPL-CCNC: 536 U/L (ref 10–40)
BACTEROIDES FRAGILIS: NOT DETECTED
BASOPHILS # BLD AUTO: 0.02 K/UL (ref 0–0.2)
BASOPHILS # BLD AUTO: 0.05 K/UL (ref 0–0.2)
BASOPHILS NFR BLD: 0.2 % (ref 0–1.9)
BASOPHILS NFR BLD: 0.4 % (ref 0–1.9)
BILIRUB SERPL-MCNC: 0.5 MG/DL (ref 0.1–1)
BUN SERPL-MCNC: 14 MG/DL (ref 8–23)
CALCIUM SERPL-MCNC: 7.1 MG/DL (ref 8.7–10.5)
CANDIDA ALBICANS: NOT DETECTED
CANDIDA AURIS: NOT DETECTED
CANDIDA GLABRATA: NOT DETECTED
CANDIDA KRUSEI: NOT DETECTED
CANDIDA PARAPSILOSIS: NOT DETECTED
CANDIDA TROPICALIS: NOT DETECTED
CHLORIDE SERPL-SCNC: 105 MMOL/L (ref 95–110)
CO2 SERPL-SCNC: 21 MMOL/L (ref 23–29)
CREAT SERPL-MCNC: 0.9 MG/DL (ref 0.5–1.4)
CRYPTOCOCCUS NEOFORMANS/GATTII: NOT DETECTED
CTX-M GENE (ESBL PRODUCER): NOT DETECTED
D DIMER PPP IA.FEU-MCNC: 3.37 MG/L FEU
DELSYS: ABNORMAL
DELSYS: ABNORMAL
DIFFERENTIAL METHOD BLD: ABNORMAL
DIFFERENTIAL METHOD BLD: ABNORMAL
ENTEROBACTER CLOACAE COMPLEX: NOT DETECTED
ENTEROBACTERALES: ABNORMAL
ENTEROCOCCUS FAECALIS: NOT DETECTED
ENTEROCOCCUS FAECIUM: NOT DETECTED
EOSINOPHIL # BLD AUTO: 0 K/UL (ref 0–0.5)
EOSINOPHIL # BLD AUTO: 0.1 K/UL (ref 0–0.5)
EOSINOPHIL NFR BLD: 0.1 % (ref 0–8)
EOSINOPHIL NFR BLD: 0.4 % (ref 0–8)
EP: 6
ERYTHROCYTE [DISTWIDTH] IN BLOOD BY AUTOMATED COUNT: 13 % (ref 11.5–14.5)
ERYTHROCYTE [DISTWIDTH] IN BLOOD BY AUTOMATED COUNT: 13.2 % (ref 11.5–14.5)
ERYTHROCYTE [SEDIMENTATION RATE] IN BLOOD BY WESTERGREN METHOD: 18 MM/H
ESCHERICHIA COLI: DETECTED
EST. GFR  (NO RACE VARIABLE): >60 ML/MIN/1.73 M^2
FIO2: 35
GLUCOSE SERPL-MCNC: 85 MG/DL (ref 70–110)
HAEMOPHILUS INFLUENZAE: NOT DETECTED
HCO3 UR-SCNC: 23.6 MMOL/L (ref 24–28)
HCO3 UR-SCNC: 23.7 MMOL/L (ref 24–28)
HCT VFR BLD AUTO: 25.7 % (ref 37–48.5)
HCT VFR BLD AUTO: 29.5 % (ref 37–48.5)
HGB BLD-MCNC: 8.3 G/DL (ref 12–16)
HGB BLD-MCNC: 9.5 G/DL (ref 12–16)
IMM GRANULOCYTES # BLD AUTO: 0.07 K/UL (ref 0–0.04)
IMM GRANULOCYTES # BLD AUTO: 0.08 K/UL (ref 0–0.04)
IMM GRANULOCYTES NFR BLD AUTO: 0.5 % (ref 0–0.5)
IMM GRANULOCYTES NFR BLD AUTO: 1 % (ref 0–0.5)
IMP GENE (CARBAPENEM RESISTANT): NOT DETECTED
INFLUENZA A, MOLECULAR: NEGATIVE
INFLUENZA B, MOLECULAR: NEGATIVE
IP: 12
KLEBSIELLA AEROGENES: NOT DETECTED
KLEBSIELLA OXYTOCA: NOT DETECTED
KLEBSIELLA PNEUMONIAE GROUP: NOT DETECTED
KPC RESISTANCE GENE (CARBAPENEM): NOT DETECTED
LACTATE SERPL-SCNC: 0.7 MMOL/L (ref 0.5–2.2)
LISTERIA MONOCYTOGENES: NOT DETECTED
LYMPHOCYTES # BLD AUTO: 0.5 K/UL (ref 1–4.8)
LYMPHOCYTES # BLD AUTO: 0.7 K/UL (ref 1–4.8)
LYMPHOCYTES NFR BLD: 5.2 % (ref 18–48)
LYMPHOCYTES NFR BLD: 6.4 % (ref 18–48)
MAGNESIUM SERPL-MCNC: 1.9 MG/DL (ref 1.6–2.6)
MCH RBC QN AUTO: 32.2 PG (ref 27–31)
MCH RBC QN AUTO: 32.6 PG (ref 27–31)
MCHC RBC AUTO-ENTMCNC: 32.2 G/DL (ref 32–36)
MCHC RBC AUTO-ENTMCNC: 32.3 G/DL (ref 32–36)
MCR-1: NOT DETECTED
MCV RBC AUTO: 100 FL (ref 82–98)
MCV RBC AUTO: 101 FL (ref 82–98)
MEC A/C AND MREJ (MRSA): ABNORMAL
MEC A/C: ABNORMAL
MODE: ABNORMAL
MONOCYTES # BLD AUTO: 0.4 K/UL (ref 0.3–1)
MONOCYTES # BLD AUTO: 0.5 K/UL (ref 0.3–1)
MONOCYTES NFR BLD: 3.6 % (ref 4–15)
MONOCYTES NFR BLD: 4.5 % (ref 4–15)
NDM GENE (CARBAPENEM RESISTANT): NOT DETECTED
NEISSERIA MENINGITIDIS: NOT DETECTED
NEUTROPHILS # BLD AUTO: 12.8 K/UL (ref 1.8–7.7)
NEUTROPHILS # BLD AUTO: 7.4 K/UL (ref 1.8–7.7)
NEUTROPHILS NFR BLD: 87.8 % (ref 38–73)
NEUTROPHILS NFR BLD: 89.9 % (ref 38–73)
NRBC BLD-RTO: 0 /100 WBC
NRBC BLD-RTO: 0 /100 WBC
OXA-48-LIKE (CARBAPENEM RESISTANT): NOT DETECTED
PCO2 BLDA: 55.7 MMHG (ref 35–45)
PCO2 BLDA: 56.4 MMHG (ref 35–45)
PH SMN: 7.23 [PH] (ref 7.35–7.45)
PH SMN: 7.24 [PH] (ref 7.35–7.45)
PHOSPHATE SERPL-MCNC: 1 MG/DL (ref 2.7–4.5)
PLATELET # BLD AUTO: 174 K/UL (ref 150–450)
PLATELET # BLD AUTO: 195 K/UL (ref 150–450)
PMV BLD AUTO: 10.6 FL (ref 9.2–12.9)
PMV BLD AUTO: 10.6 FL (ref 9.2–12.9)
PO2 BLDA: 76 MMHG (ref 80–100)
PO2 BLDA: 85 MMHG (ref 80–100)
POC BE: -4 MMOL/L
POC BE: -4 MMOL/L
POC SATURATED O2: 92 % (ref 95–100)
POC SATURATED O2: 94 % (ref 95–100)
POCT GLUCOSE: 87 MG/DL (ref 70–110)
POTASSIUM SERPL-SCNC: 2.8 MMOL/L (ref 3.5–5.1)
PROT SERPL-MCNC: 5.1 G/DL (ref 6–8.4)
PROTEUS SPECIES: NOT DETECTED
PSEUDOMONAS AERUGINOSA: NOT DETECTED
RBC # BLD AUTO: 2.58 M/UL (ref 4–5.4)
RBC # BLD AUTO: 2.91 M/UL (ref 4–5.4)
SALMONELLA SP: NOT DETECTED
SAMPLE: ABNORMAL
SAMPLE: ABNORMAL
SARS-COV-2 RDRP RESP QL NAA+PROBE: NEGATIVE
SERRATIA MARCESCENS: NOT DETECTED
SITE: ABNORMAL
SITE: ABNORMAL
SODIUM SERPL-SCNC: 139 MMOL/L (ref 136–145)
SPECIMEN SOURCE: NORMAL
SPONT RATE: 21
STAPHYLOCOCCUS AUREUS: NOT DETECTED
STAPHYLOCOCCUS EPIDERMIDIS: NOT DETECTED
STAPHYLOCOCCUS LUGDUNESIS: NOT DETECTED
STAPHYLOCOCCUS SPECIES: NOT DETECTED
STENOTROPHOMONAS MALTOPHILIA: NOT DETECTED
STREPTOCOCCUS AGALACTIAE: NOT DETECTED
STREPTOCOCCUS PNEUMONIAE: NOT DETECTED
STREPTOCOCCUS PYOGENES: NOT DETECTED
STREPTOCOCCUS SPECIES: NOT DETECTED
TROPONIN I SERPL DL<=0.01 NG/ML-MCNC: 0.03 NG/ML (ref 0–0.03)
VAN A/B (VRE GENE): ABNORMAL
VIM GENE (CARBAPENEM RESISTANT): NOT DETECTED
WBC # BLD AUTO: 14.2 K/UL (ref 3.9–12.7)
WBC # BLD AUTO: 8.38 K/UL (ref 3.9–12.7)

## 2024-01-20 PROCEDURE — G0378 HOSPITAL OBSERVATION PER HR: HCPCS

## 2024-01-20 PROCEDURE — 96361 HYDRATE IV INFUSION ADD-ON: CPT | Mod: 59

## 2024-01-20 PROCEDURE — U0002 COVID-19 LAB TEST NON-CDC: HCPCS | Performed by: INTERNAL MEDICINE

## 2024-01-20 PROCEDURE — 87502 INFLUENZA DNA AMP PROBE: CPT | Performed by: INTERNAL MEDICINE

## 2024-01-20 PROCEDURE — 5A09357 ASSISTANCE WITH RESPIRATORY VENTILATION, LESS THAN 24 CONSECUTIVE HOURS, CONTINUOUS POSITIVE AIRWAY PRESSURE: ICD-10-PCS | Performed by: INTERNAL MEDICINE

## 2024-01-20 PROCEDURE — 82962 GLUCOSE BLOOD TEST: CPT

## 2024-01-20 PROCEDURE — 25000003 PHARM REV CODE 250

## 2024-01-20 PROCEDURE — 85379 FIBRIN DEGRADATION QUANT: CPT | Performed by: INTERNAL MEDICINE

## 2024-01-20 PROCEDURE — 25000003 PHARM REV CODE 250: Performed by: HOSPITALIST

## 2024-01-20 PROCEDURE — 63600175 PHARM REV CODE 636 W HCPCS: Performed by: HOSPITALIST

## 2024-01-20 PROCEDURE — 27000221 HC OXYGEN, UP TO 24 HOURS

## 2024-01-20 PROCEDURE — 82803 BLOOD GASES ANY COMBINATION: CPT

## 2024-01-20 PROCEDURE — 96372 THER/PROPH/DIAG INJ SC/IM: CPT | Performed by: HOSPITALIST

## 2024-01-20 PROCEDURE — 85025 COMPLETE CBC W/AUTO DIFF WBC: CPT | Mod: 91 | Performed by: INTERNAL MEDICINE

## 2024-01-20 PROCEDURE — 96365 THER/PROPH/DIAG IV INF INIT: CPT | Mod: 59

## 2024-01-20 PROCEDURE — 85025 COMPLETE CBC W/AUTO DIFF WBC: CPT | Performed by: HOSPITALIST

## 2024-01-20 PROCEDURE — 99223 1ST HOSP IP/OBS HIGH 75: CPT | Mod: AI,95,, | Performed by: HOSPITALIST

## 2024-01-20 PROCEDURE — 94660 CPAP INITIATION&MGMT: CPT

## 2024-01-20 PROCEDURE — 83605 ASSAY OF LACTIC ACID: CPT | Performed by: HOSPITALIST

## 2024-01-20 PROCEDURE — 96367 TX/PROPH/DG ADDL SEQ IV INF: CPT | Mod: 59

## 2024-01-20 PROCEDURE — 83735 ASSAY OF MAGNESIUM: CPT | Performed by: HOSPITALIST

## 2024-01-20 PROCEDURE — 94640 AIRWAY INHALATION TREATMENT: CPT | Mod: XB

## 2024-01-20 PROCEDURE — 96366 THER/PROPH/DIAG IV INF ADDON: CPT

## 2024-01-20 PROCEDURE — 25500020 PHARM REV CODE 255: Performed by: INTERNAL MEDICINE

## 2024-01-20 PROCEDURE — 36600 WITHDRAWAL OF ARTERIAL BLOOD: CPT

## 2024-01-20 PROCEDURE — 25000003 PHARM REV CODE 250: Performed by: INTERNAL MEDICINE

## 2024-01-20 PROCEDURE — 84100 ASSAY OF PHOSPHORUS: CPT | Performed by: HOSPITALIST

## 2024-01-20 PROCEDURE — 80053 COMPREHEN METABOLIC PANEL: CPT | Performed by: HOSPITALIST

## 2024-01-20 PROCEDURE — 27000190 HC CPAP FULL FACE MASK W/VALVE

## 2024-01-20 PROCEDURE — 27100171 HC OXYGEN HIGH FLOW UP TO 24 HOURS

## 2024-01-20 PROCEDURE — 96375 TX/PRO/DX INJ NEW DRUG ADDON: CPT

## 2024-01-20 PROCEDURE — 86644 CMV ANTIBODY: CPT | Performed by: INTERNAL MEDICINE

## 2024-01-20 PROCEDURE — 99900035 HC TECH TIME PER 15 MIN (STAT)

## 2024-01-20 PROCEDURE — 63600175 PHARM REV CODE 636 W HCPCS: Performed by: INTERNAL MEDICINE

## 2024-01-20 PROCEDURE — 51702 INSERT TEMP BLADDER CATH: CPT

## 2024-01-20 PROCEDURE — 96368 THER/DIAG CONCURRENT INF: CPT

## 2024-01-20 PROCEDURE — 84484 ASSAY OF TROPONIN QUANT: CPT | Performed by: HOSPITALIST

## 2024-01-20 PROCEDURE — 25000242 PHARM REV CODE 250 ALT 637 W/ HCPCS: Performed by: HOSPITALIST

## 2024-01-20 RX ORDER — HYDROCODONE BITARTRATE AND ACETAMINOPHEN 10; 325 MG/1; MG/1
1 TABLET ORAL EVERY 6 HOURS PRN
Status: DISCONTINUED | OUTPATIENT
Start: 2024-01-20 | End: 2024-01-22

## 2024-01-20 RX ORDER — SODIUM CHLORIDE 9 MG/ML
500 INJECTION, SOLUTION INTRAVENOUS
Status: COMPLETED | OUTPATIENT
Start: 2024-01-20 | End: 2024-01-20

## 2024-01-20 RX ORDER — NOREPINEPHRINE BITARTRATE/D5W 4MG/250ML
0-3 PLASTIC BAG, INJECTION (ML) INTRAVENOUS CONTINUOUS
Status: DISCONTINUED | OUTPATIENT
Start: 2024-01-20 | End: 2024-01-22 | Stop reason: HOSPADM

## 2024-01-20 RX ORDER — MAGNESIUM SULFATE HEPTAHYDRATE 40 MG/ML
2 INJECTION, SOLUTION INTRAVENOUS
Status: COMPLETED | OUTPATIENT
Start: 2024-01-20 | End: 2024-01-20

## 2024-01-20 RX ORDER — NOREPINEPHRINE BITARTRATE/D5W 4MG/250ML
PLASTIC BAG, INJECTION (ML) INTRAVENOUS
Status: COMPLETED
Start: 2024-01-20 | End: 2024-01-20

## 2024-01-20 RX ORDER — CALCIUM GLUCONATE 98 MG/ML
1 INJECTION, SOLUTION INTRAVENOUS ONCE
Status: COMPLETED | OUTPATIENT
Start: 2024-01-20 | End: 2024-01-20

## 2024-01-20 RX ORDER — POTASSIUM CHLORIDE 20 MEQ/1
40 TABLET, EXTENDED RELEASE ORAL
Status: ACTIVE | OUTPATIENT
Start: 2024-01-20 | End: 2024-01-20

## 2024-01-20 RX ADMIN — MAGNESIUM SULFATE IN WATER 2 G: 40 INJECTION, SOLUTION INTRAVENOUS at 06:01

## 2024-01-20 RX ADMIN — PIPERACILLIN SODIUM AND TAZOBACTAM SODIUM 3.38 G: 3; .375 INJECTION, POWDER, FOR SOLUTION INTRAVENOUS at 12:01

## 2024-01-20 RX ADMIN — NOREPINEPHRINE BITARTRATE 0.16 MCG/KG/MIN: 4 INJECTION, SOLUTION INTRAVENOUS at 09:01

## 2024-01-20 RX ADMIN — ENOXAPARIN SODIUM 40 MG: 40 INJECTION SUBCUTANEOUS at 04:01

## 2024-01-20 RX ADMIN — SODIUM CHLORIDE: 9 INJECTION, SOLUTION INTRAVENOUS at 07:01

## 2024-01-20 RX ADMIN — SODIUM CHLORIDE 1000 ML: 9 INJECTION, SOLUTION INTRAVENOUS at 02:01

## 2024-01-20 RX ADMIN — NOREPINEPHRINE BITARTRATE 0.02 MCG/KG/MIN: 4 INJECTION, SOLUTION INTRAVENOUS at 02:01

## 2024-01-20 RX ADMIN — LEVOTHYROXINE SODIUM 50 MCG: 25 TABLET ORAL at 06:01

## 2024-01-20 RX ADMIN — ACETAMINOPHEN 650 MG: 325 TABLET ORAL at 05:01

## 2024-01-20 RX ADMIN — HYDROCODONE BITARTRATE AND ACETAMINOPHEN 1 TABLET: 10; 325 TABLET ORAL at 04:01

## 2024-01-20 RX ADMIN — BUTALBITAL, ACETAMINOPHEN, AND CAFFEINE 1 TABLET: 50; 325; 40 TABLET ORAL at 07:01

## 2024-01-20 RX ADMIN — ACETAMINOPHEN 650 MG: 325 TABLET ORAL at 02:01

## 2024-01-20 RX ADMIN — SODIUM CHLORIDE 500 ML: 9 INJECTION, SOLUTION INTRAVENOUS at 01:01

## 2024-01-20 RX ADMIN — PIPERACILLIN SODIUM AND TAZOBACTAM SODIUM 3.38 G: 3; .375 INJECTION, POWDER, FOR SOLUTION INTRAVENOUS at 07:01

## 2024-01-20 RX ADMIN — IPRATROPIUM BROMIDE AND ALBUTEROL SULFATE 3 ML: .5; 3 SOLUTION RESPIRATORY (INHALATION) at 02:01

## 2024-01-20 RX ADMIN — ACETAMINOPHEN 650 MG: 325 TABLET ORAL at 12:01

## 2024-01-20 RX ADMIN — CALCIUM GLUCONATE 1 G: 98 INJECTION, SOLUTION INTRAVENOUS at 06:01

## 2024-01-20 RX ADMIN — NOREPINEPHRINE BITARTRATE 0.14 MCG/KG/MIN: 4 INJECTION, SOLUTION INTRAVENOUS at 12:01

## 2024-01-20 RX ADMIN — IPRATROPIUM BROMIDE AND ALBUTEROL SULFATE 3 ML: .5; 3 SOLUTION RESPIRATORY (INHALATION) at 05:01

## 2024-01-20 RX ADMIN — PIPERACILLIN SODIUM AND TAZOBACTAM SODIUM 3.38 G: 3; .375 INJECTION, POWDER, FOR SOLUTION INTRAVENOUS at 03:01

## 2024-01-20 RX ADMIN — IOHEXOL 75 ML: 350 INJECTION, SOLUTION INTRAVENOUS at 01:01

## 2024-01-20 RX ADMIN — POTASSIUM PHOSPHATE, MONOBASIC AND POTASSIUM PHOSPHATE, DIBASIC 30 MMOL: 224; 236 INJECTION, SOLUTION, CONCENTRATE INTRAVENOUS at 08:01

## 2024-01-20 RX ADMIN — VANCOMYCIN HYDROCHLORIDE 1000 MG: 1 INJECTION, POWDER, LYOPHILIZED, FOR SOLUTION INTRAVENOUS at 03:01

## 2024-01-20 NOTE — ED NOTES
Pt placed in the hospital bed. Pt resting comfortably in bed. Call light within reach. IV Infusion going on. Spo2 maintained at 3.5l O2 via nasal cannula. No any needs voiced at this time.

## 2024-01-20 NOTE — H&P
[facility] Medicine  History & Physical    Patient Name: Bhavana Lambert  MRN: [id number]  Admission Date: 1/19/2024  Attending Physician: Daniel Rojo MD   Primary Care Provider: Pepito Jhaveri IV, MD         Patient information was obtained from patient and ER records.       Subjective:     Principal Problem:Pyelonephritis    Chief Complaint:   Chief Complaint   Patient presents with    Hypotension     Pt presents after home health nurse reports hypotension and states the patient looks more pale than usual. Pt only complaint is lower abdominal pain and dysuria onset today.         HPI: The patient is a 67 y/o female with PMH of HTN, COPD, and anxiety/depression, who presents with hypotension and lower back pain. Patient reported that her home health nurse noted hypotension and that she was more pale appearing. She also reported lower abdominal pain with dysuria. Patient denies any fevers but had some chills and weakness. Work up in the ER suggestive of pyelo.     Past Medical History:   Diagnosis Date    Acquired hypothyroidism     Anxiety     Anxiety and depression     Cervical radicular pain     Closed left ankle fracture     COPD (chronic obstructive pulmonary disease)     H/O: stroke 11/08/2023    Hypertension     Hypokalemia     Hyponatremia     Multiple rib fractures     Requires continuous at home supplemental oxygen     PRN FOR COPD    Stroke     Tension type headache     Traumatic closed displaced fracture of distal end of radius, left, sequela        Past Surgical History:   Procedure Laterality Date    COLON SURGERY      COLOSTOMY      COLOSTOMY CLOSURE      HYSTERECTOMY      LEG SURGERY Right     OPEN REDUCTION AND INTERNAL FIXATION (ORIF) OF INJURY OF WRIST Left 1/29/2019    Procedure: ORIF, WRIST;  Surgeon: Homero Jeff DO;  Location: [facility] OR;  Service: Orthopedics;  Laterality: Left;  Equipment: [employer] Geminus Wrist Plate Set  Vendor/Rep: [employer]  C-Arm: Entire  DME: None    REQUIRES ASSISTANT    WRIST SURGERY Right        Review of patient's allergies indicates:  No Known Allergies    No current facility-administered medications on file prior to encounter.     Current Outpatient Medications on File Prior to Encounter   Medication Sig    albuterol (PROVENTIL/VENTOLIN HFA) 90 mcg/actuation inhaler Inhale 2 puffs into the lungs every 6 (six) hours as needed for Wheezing or Shortness of Breath.    aspirin (ECOTRIN) 81 MG EC tablet Take 1 tablet (81 mg total) by mouth once daily.    atorvastatin (LIPITOR) 80 MG tablet Take 1 tablet (80 mg total) by mouth once daily.    busPIRone (BUSPAR) 15 MG tablet Take 15 mg by mouth 2 (two) times a day.    butalbital-acetaminophen-caffeine -40 mg (FIORICET, ESGIC) -40 mg per tablet Take 1 tablet by mouth every 8 (eight) hours as needed for Headaches.    carvediloL (COREG) 25 MG tablet Take 0.5 tablets (12.5 mg total) by mouth 2 (two) times daily.    clonazePAM (KLONOPIN) 0.5 MG tablet Take 0.5 mg by mouth 2 (two) times daily as needed for Anxiety.    clopidogreL (PLAVIX) 75 mg tablet Take 1 tablet (75 mg total) by mouth once daily.    cyclobenzaprine (FLEXERIL) 10 MG tablet Take 10 mg by mouth 3 (three) times daily as needed for Muscle spasms.    fluoxetine HCl (PROZAC ORAL) Take 40 mg by mouth once daily.     levothyroxine (SYNTHROID) 50 MCG tablet Take 50 mcg by mouth before breakfast.    losartan (COZAAR) 25 MG tablet Take 1 tablet (25 mg total) by mouth once daily.    montelukast sodium (SINGULAIR ORAL) Take 10 mg by mouth every morning.     ondansetron (ZOFRAN) 4 MG tablet Take 4 mg by mouth every 6 (six) hours as needed for Nausea.    potassium chloride SA (K-DUR,KLOR-CON M) 10 MEQ tablet Take 10 mEq by mouth once.    rosuvastatin (CRESTOR) 10 MG tablet Take 10 mg by mouth once daily.    spironolactone (ALDACTONE) 25 MG tablet Take 0.5 tablets (12.5 mg total) by mouth once daily.     tiotropium-olodateroL (STIOLTO RESPIMAT) 2.5-2.5 mcg/actuation Mist Inhale 1 puff into the lungs once daily. Controller    cyanocobalamin 1,000 mcg/mL injection Inject 1,000 mcg into the muscle every 30 days.     miconazole NITRATE 2 % (MICOTIN) 2 % top powder Apply topically 2 (two) times daily.     Family History       Problem Relation (Age of Onset)    Anxiety disorder Daughter, Daughter    Arthritis Sister    COPD Father    Cancer Mother    Cirrhosis Father    Congenital heart disease Daughter    Depression Daughter    No Known Problems Brother    Valvular heart disease Daughter          Tobacco Use    Smoking status: Every Day     Current packs/day: 1.00     Average packs/day: 1 pack/day for 20.0 years (20.0 ttl pk-yrs)     Types: Cigarettes    Smokeless tobacco: Never   Substance and Sexual Activity    Alcohol use: Yes     Comment: couple times a week-wine or bloody cari    Drug use: No    Sexual activity: Not Currently     Review of Systems   Constitutional:  Positive for activity change, chills and fatigue. Negative for fever.   Respiratory:  Negative for shortness of breath.    Cardiovascular:  Negative for chest pain.   Gastrointestinal:  Positive for abdominal pain. Negative for diarrhea, nausea and vomiting.   Musculoskeletal:  Positive for back pain.     Objective:     Vital Signs (Most Recent):  Temp: 98.5 °F (36.9 °C) (01/19/24 1656)  Pulse: 110 (01/19/24 2324)  Resp: (!) 31 (01/19/24 2324)  BP: 105/81 (01/19/24 2324)  SpO2: (!) 92 % (01/19/24 2324) Vital Signs (24h Range):  Temp:  [98.5 °F (36.9 °C)] 98.5 °F (36.9 °C)  Pulse:  [] 110  Resp:  [16-31] 31  SpO2:  [89 %-100 %] 92 %  BP: ()/(51-81) 105/81     Weight: 51.7 kg (114 lb)  Body mass index is 20.85 kg/m².     Physical Exam  Constitutional:       General: She is not in acute distress.     Comments: Appears very fatigued    Cardiovascular:      Rate and Rhythm: Tachycardia present.   Pulmonary:      Effort: Pulmonary effort is  normal. No respiratory distress.   Neurological:      Mental Status: She is alert and oriented to person, place, and time.                Significant Labs:  CBC, CMP reviewed     Significant Imaging:  CT reviewed   Assessment/Plan:     * Pyelonephritis  Case discussed with ER physician  CT showed:     Left kidney appears mildly edematous. Mild associated infectious/inflammatory left perinephric fat stranding.  Please correlate for acute pyelonephritis.     UA findings noted  Continue ceftriaxone, IVF, antiemetics  Pain control with tylenol; patient's BP sensitive to morphine         Emphysema/COPD  Nebs and O2 PRN   Continue montelukast      Hypothyroid  Continue levothyroxine       Depression  Patient has  unknown  depression which is unknown and is currently controlled. Will Continue anti-depressant medications. We will not consult psychiatry at this time. Patient does not display psychosis at this time. Continue to monitor closely and adjust plan of care as needed.        Anxiety  Continue buspirone         VTE Risk Mitigation (From admission, onward)           Ordered     enoxaparin injection 40 mg  Daily         01/19/24 2218     IP VTE HIGH RISK PATIENT  Once         01/19/24 2218     Place sequential compression device  Until discontinued         01/19/24 2218                         The attending portion of this evaluation, treatment, and documentation was performed per Dania Urias MD via Telemedicine AudioVisual using the secure [employer] software platform with 2 way audio/video. The provider was located off-site and the patient is located in the hospital. The aforementioned video software was utilized to document the relevant history and physical exam    On 01/20/2024, patient should be placed in hospital observation services under my care.        Dania Urias MD  Department of Hospital Medicine   [facility] Emergency Dept

## 2024-01-20 NOTE — PROGRESS NOTES
[facility] Emergency Dept  [facility] Medicine  Progress Note    Patient Name: Bhavana Lambert  MRN: [id number]  Patient Class: OP- Observation   Admission Date: 1/19/2024  Length of Stay: 0 days  Attending Physician: Daniel Rojo MD  Primary Care Provider: Pepito Jhaveri IV, MD        Subjective:     Principal Problem:Pyelonephritis        HPI:  The patient is a 69 y/o female with PMH of HTN, COPD, and anxiety/depression, who presents with hypotension and lower back pain. Patient reported that her home health nurse noted hypotension and that she was more pale appearing. She also reported lower abdominal pain with dysuria. Patient denies any fevers but had some chills and weakness. Work up in the ER suggestive of pyelo.     Overview/Hospital Course:  No notes on file    No new subjective & objective note has been filed under this hospital service since the last note was generated.      Assessment/Plan:      * Pyelonephritis  Case discussed with ER physician  CT showed:     Left kidney appears mildly edematous. Mild associated infectious/inflammatory left perinephric fat stranding.  Please correlate for acute pyelonephritis.     UA findings noted  Continue ceftriaxone, IVF, antiemetics  Pain control with tylenol; patient's BP sensitive to morphine     01/20/2024:  Evidence of last noted  Urinalysis that shows is significant signs of 1+ leukocytes greater than 100 white blood cells in bacteria.  ABG was concern 7.30 pCO2 56 go to a 5 O2 sat was 94%.        Emphysema/COPD  Nebs and O2 PRN   Continue montelukast      Hypothyroid  Continue levothyroxine       Chronic respiratory failure with hypoxia  Patient with Hypercapnic and Hypoxic Respiratory failure which is Acute on chronic.  she is on home oxygen at 2 LPM. Supplemental oxygen was provided and noted- Oxygen Concentration (%):  [35] 35    .   Signs/symptoms of respiratory failure include- tachypnea, increased work of breathing, and respiratory distress.  Contributing diagnoses includes - COPD Labs and images were reviewed. Patient Has recent ABG, which has been reviewed. Will treat underlying causes and adjust management of respiratory failure as follows- AV show pH was 7 point 3 pacer to 6 PO2 of 85 to sats 94% which shows respiratory acidosis with hypercapnia.  Patient started having respiratory difficulty after arriving to the emergency department.  Blood gas was done in showed hypercapnia patient was then placed on BiPAP 16/6.  This patient is still on BiPAP.  The families that she was having a flu-like symptom proximally 2 weeks ago.  She also was very fatigued over last few days.  She states that when she today she was very pale.  We will check over reasons for hypoxia and obtain CTA of the chest .  We will also check flu and COVID as well as CMV.    Depression  Patient has  unknown  depression which is unknown and is currently controlled. Will Continue anti-depressant medications. We will not consult psychiatry at this time. Patient does not display psychosis at this time. Continue to monitor closely and adjust plan of care as needed.        Anxiety  Continue buspirone         VTE Risk Mitigation (From admission, onward)           Ordered     enoxaparin injection 40 mg  Daily         01/19/24 2218     IP VTE HIGH RISK PATIENT  Once         01/19/24 2218     Place sequential compression device  Until discontinued         01/19/24 2218                    Discharge Planning   CHLOE:      Code Status: Full Code   Is the patient medically ready for discharge?:     Reason for patient still in hospital (select all that apply): Treatment                     Daniel Rojo MD  Department of Hospital Medicine   [facility] Emergency Dept

## 2024-01-20 NOTE — PROGRESS NOTES
"Pharmacokinetic Initial Assessment: IV Vancomycin    Assessment/Plan:    Initiate intravenous vancomycin with loading dose of 1000 mg once followed by a maintenance dose of vancomycin 750mg IV every 24 hours  Desired empiric serum trough concentration is 15 to 20 mcg/mL  Draw vancomycin trough level 60 min prior to third dose on 1/22 at approximately 02:00  Pharmacy will continue to follow and monitor vancomycin.      Please contact pharmacy with any questions regarding this assessment.     Thank you for the consult,   Philip Butcher       Patient brief summary:  Bhavana Lambert is a 68 y.o. female initiated on antimicrobial therapy with IV Vancomycin for treatment of suspected urinary tract infection    Drug Allergies:   Review of patient's allergies indicates:  No Known Allergies    Actual Body Weight:   51.7kg    Renal Function:   Estimated Creatinine Clearance: 42.6 mL/min (based on SCr of 1 mg/dL).,     Dialysis Method (if applicable):  N/A    CBC (last 72 hours):  Recent Labs   Lab Result Units 01/19/24 1738 01/20/24  0248   WBC K/uL 9.89 8.38   Hemoglobin g/dL 10.4* 8.3*   Hematocrit % 31.3* 25.7*   Platelets K/uL 230 174   Gran % % 85.4* 87.8*   Lymph % % 9.6* 6.4*   Mono % % 4.3 4.5   Eosinophil % % 0.0 0.1   Basophil % % 0.3 0.2   Differential Method  Automated Automated       Metabolic Panel (last 72 hours):  Recent Labs   Lab Result Units 01/19/24 1738 01/19/24  1750   Sodium mmol/L 133*  --    Potassium mmol/L 2.8*  --    Chloride mmol/L 90*  --    CO2 mmol/L 28  --    Glucose mg/dL 128*  --    Glucose, UA   --  Negative   BUN mg/dL 17  --    Creatinine mg/dL 1.0  --    Creatinine, Urine mg/dL  --  122.3   Albumin g/dL 2.9*  --    Total Bilirubin mg/dL 0.7  --    Alkaline Phosphatase U/L 142*  --    AST U/L 769*  --    ALT U/L 190*  --    Magnesium mg/dL 1.4*  --        Drug levels (last 3 results):  No results for input(s): "VANCOMYCINRA", "VANCORANDOM", "VANCOMYCINPE", "VANCOPEAK", " ""VANCOMYCINTR", "VANCOTROUGH" in the last 72 hours.    Microbiologic Results:  Microbiology Results (last 7 days)       Procedure Component Value Units Date/Time    Blood culture #1 **CANNOT BE ORDERED STAT** [[id number]] Collected: 01/19/24 1832    Order Status: Sent Specimen: Blood from Peripheral, Antecubital, Left Updated: 01/19/24 2307    Blood culture #2 **CANNOT BE ORDERED STAT** [[id number]] Collected: 01/19/24 1925    Order Status: Sent Specimen: Blood from Peripheral, Forearm, Left Updated: 01/19/24 1926    Urine culture [[id number]] Collected: 01/19/24 1750    Order Status: No result Specimen: Urine Updated: 01/19/24 1812            "

## 2024-01-20 NOTE — ASSESSMENT & PLAN NOTE
Patient with Hypercapnic and Hypoxic Respiratory failure which is Acute on chronic.  she is on home oxygen at 2 LPM. Supplemental oxygen was provided and noted- Oxygen Concentration (%):  [35] 35    .   Signs/symptoms of respiratory failure include- tachypnea, increased work of breathing, and respiratory distress. Contributing diagnoses includes - COPD Labs and images were reviewed. Patient Has recent ABG, which has been reviewed. Will treat underlying causes and adjust management of respiratory failure as follows- AV show pH was 7 point 3 pacer to 6 PO2 of 85 to sats 94% which shows respiratory acidosis with hypercapnia.  Patient started having respiratory difficulty after arriving to the emergency department.  Blood gas was done in showed hypercapnia patient was then placed on BiPAP 16/6.  This patient is still on BiPAP.  The families that she was having a flu-like symptom proximally 2 weeks ago.  She also was very fatigued over last few days.  She states that when she today she was very pale.  We will check over reasons for hypoxia and obtain CTA of the chest .  We will also check flu and COVID as well as CMV.

## 2024-01-20 NOTE — PROGRESS NOTES
Communicated to Dr Rojo that pt had 75 ml of contrast yesterday @ ~1915.    Pts can only have 125 ml in a 24hr period.  stated he would make sure pt is hydrated due to the 2nd CT scan w contrast. HILARIO

## 2024-01-20 NOTE — ASSESSMENT & PLAN NOTE
Patient has  unknown  depression which is unknown and is currently controlled. Will Continue anti-depressant medications. We will not consult psychiatry at this time. Patient does not display psychosis at this time. Continue to monitor closely and adjust plan of care as needed.

## 2024-01-20 NOTE — ASSESSMENT & PLAN NOTE
Case discussed with ER physician  CT showed:     Left kidney appears mildly edematous. Mild associated infectious/inflammatory left perinephric fat stranding.  Please correlate for acute pyelonephritis.     UA findings noted  Continue ceftriaxone, IVF, antiemetics  Pain control with tylenol; patient's BP sensitive to morphine     01/20/2024:  Evidence of last noted  Urinalysis that shows is significant signs of 1+ leukocytes greater than 100 white blood cells in bacteria.  ABG was concern 7.30 pCO2 56 go to a 5 O2 sat was 94%.

## 2024-01-20 NOTE — ASSESSMENT & PLAN NOTE
Case discussed with ER physician  CT showed:     Left kidney appears mildly edematous. Mild associated infectious/inflammatory left perinephric fat stranding.  Please correlate for acute pyelonephritis.     UA findings noted  Continue ceftriaxone, IVF, antiemetics  Pain control with tylenol; patient's BP sensitive to morphine

## 2024-01-20 NOTE — SUBJECTIVE & OBJECTIVE
Past Medical History:   Diagnosis Date    Acquired hypothyroidism     Anxiety     Anxiety and depression     Cervical radicular pain     Closed left ankle fracture     COPD (chronic obstructive pulmonary disease)     H/O: stroke 11/08/2023    Hypertension     Hypokalemia     Hyponatremia     Multiple rib fractures     Requires continuous at home supplemental oxygen     PRN FOR COPD    Stroke     Tension type headache     Traumatic closed displaced fracture of distal end of radius, left, sequela        Past Surgical History:   Procedure Laterality Date    COLON SURGERY      COLOSTOMY      COLOSTOMY CLOSURE      HYSTERECTOMY      LEG SURGERY Right     OPEN REDUCTION AND INTERNAL FIXATION (ORIF) OF INJURY OF WRIST Left 1/29/2019    Procedure: ORIF, WRIST;  Surgeon: Homero Jeff DO;  Location: [facility] OR;  Service: Orthopedics;  Laterality: Left;  Equipment: [employer] Geminus Wrist Plate Set  Vendor/Rep: [employer]  C-Arm: Entire  DME: None    REQUIRES ASSISTANT    WRIST SURGERY Right        Review of patient's allergies indicates:  No Known Allergies    No current facility-administered medications on file prior to encounter.     Current Outpatient Medications on File Prior to Encounter   Medication Sig    albuterol (PROVENTIL/VENTOLIN HFA) 90 mcg/actuation inhaler Inhale 2 puffs into the lungs every 6 (six) hours as needed for Wheezing or Shortness of Breath.    aspirin (ECOTRIN) 81 MG EC tablet Take 1 tablet (81 mg total) by mouth once daily.    atorvastatin (LIPITOR) 80 MG tablet Take 1 tablet (80 mg total) by mouth once daily.    busPIRone (BUSPAR) 15 MG tablet Take 15 mg by mouth 2 (two) times a day.    butalbital-acetaminophen-caffeine -40 mg (FIORICET, ESGIC) -40 mg per tablet Take 1 tablet by mouth every 8 (eight) hours as needed for Headaches.    carvediloL (COREG) 25 MG tablet Take 0.5 tablets (12.5 mg total) by mouth 2 (two) times daily.    clonazePAM (KLONOPIN) 0.5 MG tablet Take  0.5 mg by mouth 2 (two) times daily as needed for Anxiety.    clopidogreL (PLAVIX) 75 mg tablet Take 1 tablet (75 mg total) by mouth once daily.    cyclobenzaprine (FLEXERIL) 10 MG tablet Take 10 mg by mouth 3 (three) times daily as needed for Muscle spasms.    fluoxetine HCl (PROZAC ORAL) Take 40 mg by mouth once daily.     levothyroxine (SYNTHROID) 50 MCG tablet Take 50 mcg by mouth before breakfast.    losartan (COZAAR) 25 MG tablet Take 1 tablet (25 mg total) by mouth once daily.    montelukast sodium (SINGULAIR ORAL) Take 10 mg by mouth every morning.     ondansetron (ZOFRAN) 4 MG tablet Take 4 mg by mouth every 6 (six) hours as needed for Nausea.    potassium chloride SA (K-DUR,KLOR-CON M) 10 MEQ tablet Take 10 mEq by mouth once.    rosuvastatin (CRESTOR) 10 MG tablet Take 10 mg by mouth once daily.    spironolactone (ALDACTONE) 25 MG tablet Take 0.5 tablets (12.5 mg total) by mouth once daily.    tiotropium-olodateroL (STIOLTO RESPIMAT) 2.5-2.5 mcg/actuation Mist Inhale 1 puff into the lungs once daily. Controller    cyanocobalamin 1,000 mcg/mL injection Inject 1,000 mcg into the muscle every 30 days.     miconazole NITRATE 2 % (MICOTIN) 2 % top powder Apply topically 2 (two) times daily.     Family History       Problem Relation (Age of Onset)    Anxiety disorder Daughter, Daughter    Arthritis Sister    COPD Father    Cancer Mother    Cirrhosis Father    Congenital heart disease Daughter    Depression Daughter    No Known Problems Brother    Valvular heart disease Daughter          Tobacco Use    Smoking status: Every Day     Current packs/day: 1.00     Average packs/day: 1 pack/day for 20.0 years (20.0 ttl pk-yrs)     Types: Cigarettes    Smokeless tobacco: Never   Substance and Sexual Activity    Alcohol use: Yes     Comment: couple times a week-wine or bloody cari    Drug use: No    Sexual activity: Not Currently     Review of Systems   Constitutional:  Positive for activity change, chills and fatigue.  Negative for fever.   Respiratory:  Negative for shortness of breath.    Cardiovascular:  Negative for chest pain.   Gastrointestinal:  Positive for abdominal pain. Negative for diarrhea, nausea and vomiting.   Musculoskeletal:  Positive for back pain.     Objective:     Vital Signs (Most Recent):  Temp: 98.5 °F (36.9 °C) (01/19/24 1656)  Pulse: 110 (01/19/24 2324)  Resp: (!) 31 (01/19/24 2324)  BP: 105/81 (01/19/24 2324)  SpO2: (!) 92 % (01/19/24 2324) Vital Signs (24h Range):  Temp:  [98.5 °F (36.9 °C)] 98.5 °F (36.9 °C)  Pulse:  [] 110  Resp:  [16-31] 31  SpO2:  [89 %-100 %] 92 %  BP: ()/(51-81) 105/81     Weight: 51.7 kg (114 lb)  Body mass index is 20.85 kg/m².     Physical Exam  Constitutional:       General: She is not in acute distress.     Comments: Appears very fatigued    Cardiovascular:      Rate and Rhythm: Tachycardia present.   Pulmonary:      Effort: Pulmonary effort is normal. No respiratory distress.   Neurological:      Mental Status: She is alert and oriented to person, place, and time.                Significant Labs:  CBC, CMP reviewed     Significant Imaging:  CT reviewed

## 2024-01-21 PROBLEM — A41.9 SEVERE SEPSIS: Status: ACTIVE | Noted: 2024-01-21

## 2024-01-21 PROBLEM — R65.20 SEVERE SEPSIS: Status: ACTIVE | Noted: 2024-01-21

## 2024-01-21 LAB
ALBUMIN SERPL BCP-MCNC: 2 G/DL (ref 3.5–5.2)
ALBUMIN SERPL BCP-MCNC: 2.1 G/DL (ref 3.5–5.2)
ALLENS TEST: ABNORMAL
ALP SERPL-CCNC: 112 U/L (ref 55–135)
ALP SERPL-CCNC: 119 U/L (ref 55–135)
ALT SERPL W/O P-5'-P-CCNC: 361 U/L (ref 10–44)
ALT SERPL W/O P-5'-P-CCNC: 495 U/L (ref 10–44)
ANION GAP SERPL CALC-SCNC: 12 MMOL/L (ref 8–16)
ANION GAP SERPL CALC-SCNC: 12 MMOL/L (ref 8–16)
AST SERPL-CCNC: 1188 U/L (ref 10–40)
AST SERPL-CCNC: 1639 U/L (ref 10–40)
BASOPHILS # BLD AUTO: 0.02 K/UL (ref 0–0.2)
BASOPHILS NFR BLD: 0.2 % (ref 0–1.9)
BILIRUB SERPL-MCNC: 0.6 MG/DL (ref 0.1–1)
BILIRUB SERPL-MCNC: 0.7 MG/DL (ref 0.1–1)
BNP SERPL-MCNC: 436 PG/ML (ref 0–99)
BUN SERPL-MCNC: 8 MG/DL (ref 8–23)
BUN SERPL-MCNC: 9 MG/DL (ref 8–23)
CALCIUM SERPL-MCNC: 7.1 MG/DL (ref 8.7–10.5)
CALCIUM SERPL-MCNC: 7.2 MG/DL (ref 8.7–10.5)
CHLORIDE SERPL-SCNC: 103 MMOL/L (ref 95–110)
CHLORIDE SERPL-SCNC: 104 MMOL/L (ref 95–110)
CO2 SERPL-SCNC: 20 MMOL/L (ref 23–29)
CO2 SERPL-SCNC: 20 MMOL/L (ref 23–29)
CREAT SERPL-MCNC: 1 MG/DL (ref 0.5–1.4)
CREAT SERPL-MCNC: 1 MG/DL (ref 0.5–1.4)
DELSYS: ABNORMAL
DIFFERENTIAL METHOD BLD: ABNORMAL
EOSINOPHIL # BLD AUTO: 0.1 K/UL (ref 0–0.5)
EOSINOPHIL NFR BLD: 0.5 % (ref 0–8)
ERYTHROCYTE [DISTWIDTH] IN BLOOD BY AUTOMATED COUNT: 13.2 % (ref 11.5–14.5)
EST. GFR  (NO RACE VARIABLE): >60 ML/MIN/1.73 M^2
EST. GFR  (NO RACE VARIABLE): >60 ML/MIN/1.73 M^2
GLUCOSE SERPL-MCNC: 141 MG/DL (ref 70–110)
GLUCOSE SERPL-MCNC: 89 MG/DL (ref 70–110)
HCO3 UR-SCNC: 23.3 MMOL/L (ref 24–28)
HCT VFR BLD AUTO: 29 % (ref 37–48.5)
HGB BLD-MCNC: 9.3 G/DL (ref 12–16)
IMM GRANULOCYTES # BLD AUTO: 0.08 K/UL (ref 0–0.04)
IMM GRANULOCYTES NFR BLD AUTO: 0.7 % (ref 0–0.5)
INR PPP: 1.6 (ref 0.8–1.2)
LACTATE SERPL-SCNC: 1.4 MMOL/L (ref 0.5–2.2)
LYMPHOCYTES # BLD AUTO: 0.7 K/UL (ref 1–4.8)
LYMPHOCYTES NFR BLD: 6.3 % (ref 18–48)
MAGNESIUM SERPL-MCNC: 1.8 MG/DL (ref 1.6–2.6)
MAGNESIUM SERPL-MCNC: 2 MG/DL (ref 1.6–2.6)
MCH RBC QN AUTO: 32.5 PG (ref 27–31)
MCHC RBC AUTO-ENTMCNC: 32.1 G/DL (ref 32–36)
MCV RBC AUTO: 101 FL (ref 82–98)
MONOCYTES # BLD AUTO: 0.6 K/UL (ref 0.3–1)
MONOCYTES NFR BLD: 5.1 % (ref 4–15)
NEUTROPHILS # BLD AUTO: 10.1 K/UL (ref 1.8–7.7)
NEUTROPHILS NFR BLD: 87.2 % (ref 38–73)
NRBC BLD-RTO: 0 /100 WBC
PCO2 BLDA: 53 MMHG (ref 35–45)
PH SMN: 7.25 [PH] (ref 7.35–7.45)
PHOSPHATE SERPL-MCNC: 4 MG/DL (ref 2.7–4.5)
PLATELET # BLD AUTO: 181 K/UL (ref 150–450)
PMV BLD AUTO: 10.3 FL (ref 9.2–12.9)
PO2 BLDA: 77 MMHG (ref 80–100)
POC BE: -4 MMOL/L
POC SATURATED O2: 93 % (ref 95–100)
POTASSIUM SERPL-SCNC: 3.4 MMOL/L (ref 3.5–5.1)
POTASSIUM SERPL-SCNC: 3.6 MMOL/L (ref 3.5–5.1)
PROT SERPL-MCNC: 5.3 G/DL (ref 6–8.4)
PROT SERPL-MCNC: 5.5 G/DL (ref 6–8.4)
PROTHROMBIN TIME: 16.7 SEC (ref 9–12.5)
RBC # BLD AUTO: 2.86 M/UL (ref 4–5.4)
SAMPLE: ABNORMAL
SITE: ABNORMAL
SODIUM SERPL-SCNC: 135 MMOL/L (ref 136–145)
SODIUM SERPL-SCNC: 136 MMOL/L (ref 136–145)
TROPONIN I SERPL DL<=0.01 NG/ML-MCNC: 0.02 NG/ML (ref 0–0.03)
WBC # BLD AUTO: 11.58 K/UL (ref 3.9–12.7)

## 2024-01-21 PROCEDURE — C1751 CATH, INF, PER/CENT/MIDLINE: HCPCS

## 2024-01-21 PROCEDURE — 25000003 PHARM REV CODE 250: Performed by: HOSPITALIST

## 2024-01-21 PROCEDURE — 84100 ASSAY OF PHOSPHORUS: CPT | Performed by: INTERNAL MEDICINE

## 2024-01-21 PROCEDURE — 94640 AIRWAY INHALATION TREATMENT: CPT

## 2024-01-21 PROCEDURE — 83605 ASSAY OF LACTIC ACID: CPT | Performed by: INTERNAL MEDICINE

## 2024-01-21 PROCEDURE — 96376 TX/PRO/DX INJ SAME DRUG ADON: CPT

## 2024-01-21 PROCEDURE — 27000221 HC OXYGEN, UP TO 24 HOURS

## 2024-01-21 PROCEDURE — 85025 COMPLETE CBC W/AUTO DIFF WBC: CPT | Performed by: INTERNAL MEDICINE

## 2024-01-21 PROCEDURE — 82803 BLOOD GASES ANY COMBINATION: CPT

## 2024-01-21 PROCEDURE — 96361 HYDRATE IV INFUSION ADD-ON: CPT

## 2024-01-21 PROCEDURE — G0378 HOSPITAL OBSERVATION PER HR: HCPCS

## 2024-01-21 PROCEDURE — 99900035 HC TECH TIME PER 15 MIN (STAT)

## 2024-01-21 PROCEDURE — 25000242 PHARM REV CODE 250 ALT 637 W/ HCPCS: Performed by: HOSPITALIST

## 2024-01-21 PROCEDURE — 80053 COMPREHEN METABOLIC PANEL: CPT | Performed by: INTERNAL MEDICINE

## 2024-01-21 PROCEDURE — 25000003 PHARM REV CODE 250: Performed by: INTERNAL MEDICINE

## 2024-01-21 PROCEDURE — 83735 ASSAY OF MAGNESIUM: CPT | Mod: 91 | Performed by: INTERNAL MEDICINE

## 2024-01-21 PROCEDURE — 80053 COMPREHEN METABOLIC PANEL: CPT | Mod: 91 | Performed by: INTERNAL MEDICINE

## 2024-01-21 PROCEDURE — 84484 ASSAY OF TROPONIN QUANT: CPT | Performed by: INTERNAL MEDICINE

## 2024-01-21 PROCEDURE — 63600175 PHARM REV CODE 636 W HCPCS: Performed by: INTERNAL MEDICINE

## 2024-01-21 PROCEDURE — 96366 THER/PROPH/DIAG IV INF ADDON: CPT

## 2024-01-21 PROCEDURE — 83880 ASSAY OF NATRIURETIC PEPTIDE: CPT | Performed by: INTERNAL MEDICINE

## 2024-01-21 PROCEDURE — 02HV33Z INSERTION OF INFUSION DEVICE INTO SUPERIOR VENA CAVA, PERCUTANEOUS APPROACH: ICD-10-PCS | Performed by: INTERNAL MEDICINE

## 2024-01-21 PROCEDURE — 36569 INSJ PICC 5 YR+ W/O IMAGING: CPT

## 2024-01-21 PROCEDURE — 83735 ASSAY OF MAGNESIUM: CPT | Performed by: INTERNAL MEDICINE

## 2024-01-21 PROCEDURE — 96372 THER/PROPH/DIAG INJ SC/IM: CPT | Performed by: HOSPITALIST

## 2024-01-21 PROCEDURE — 85610 PROTHROMBIN TIME: CPT | Performed by: INTERNAL MEDICINE

## 2024-01-21 PROCEDURE — 63600175 PHARM REV CODE 636 W HCPCS: Performed by: HOSPITALIST

## 2024-01-21 PROCEDURE — 36600 WITHDRAWAL OF ARTERIAL BLOOD: CPT

## 2024-01-21 RX ORDER — POTASSIUM CHLORIDE 7.45 MG/ML
10 INJECTION INTRAVENOUS
Status: COMPLETED | OUTPATIENT
Start: 2024-01-21 | End: 2024-01-21

## 2024-01-21 RX ORDER — MAGNESIUM SULFATE 1 G/100ML
1 INJECTION INTRAVENOUS ONCE
Status: COMPLETED | OUTPATIENT
Start: 2024-01-21 | End: 2024-01-21

## 2024-01-21 RX ADMIN — BUSPIRONE HYDROCHLORIDE 15 MG: 5 TABLET ORAL at 09:01

## 2024-01-21 RX ADMIN — SODIUM CHLORIDE: 9 INJECTION, SOLUTION INTRAVENOUS at 08:01

## 2024-01-21 RX ADMIN — POTASSIUM CHLORIDE 10 MEQ: 7.46 INJECTION, SOLUTION INTRAVENOUS at 07:01

## 2024-01-21 RX ADMIN — PIPERACILLIN SODIUM AND TAZOBACTAM SODIUM 3.38 G: 3; .375 INJECTION, POWDER, FOR SOLUTION INTRAVENOUS at 04:01

## 2024-01-21 RX ADMIN — CLOPIDOGREL BISULFATE 75 MG: 75 TABLET ORAL at 09:01

## 2024-01-21 RX ADMIN — ASPIRIN 81 MG: 81 TABLET, COATED ORAL at 09:01

## 2024-01-21 RX ADMIN — PIPERACILLIN SODIUM AND TAZOBACTAM SODIUM 3.38 G: 3; .375 INJECTION, POWDER, FOR SOLUTION INTRAVENOUS at 12:01

## 2024-01-21 RX ADMIN — BUTALBITAL, ACETAMINOPHEN, AND CAFFEINE 1 TABLET: 50; 325; 40 TABLET ORAL at 03:01

## 2024-01-21 RX ADMIN — NOREPINEPHRINE BITARTRATE 0.2 MCG/KG/MIN: 4 INJECTION, SOLUTION INTRAVENOUS at 07:01

## 2024-01-21 RX ADMIN — LIDOCAINE 1 PATCH: 50 PATCH TOPICAL at 11:01

## 2024-01-21 RX ADMIN — POTASSIUM CHLORIDE 10 MEQ: 7.46 INJECTION, SOLUTION INTRAVENOUS at 09:01

## 2024-01-21 RX ADMIN — VANCOMYCIN HYDROCHLORIDE 750 MG: 750 INJECTION, POWDER, LYOPHILIZED, FOR SOLUTION INTRAVENOUS at 03:01

## 2024-01-21 RX ADMIN — LEVOTHYROXINE SODIUM 50 MCG: 25 TABLET ORAL at 05:01

## 2024-01-21 RX ADMIN — BUSPIRONE HYDROCHLORIDE 15 MG: 5 TABLET ORAL at 08:01

## 2024-01-21 RX ADMIN — NOREPINEPHRINE BITARTRATE 0.2 MCG/KG/MIN: 4 INJECTION, SOLUTION INTRAVENOUS at 01:01

## 2024-01-21 RX ADMIN — MAGNESIUM SULFATE IN DEXTROSE 1 G: 10 INJECTION, SOLUTION INTRAVENOUS at 05:01

## 2024-01-21 RX ADMIN — POTASSIUM CHLORIDE 10 MEQ: 7.46 INJECTION, SOLUTION INTRAVENOUS at 11:01

## 2024-01-21 RX ADMIN — BUSPIRONE HYDROCHLORIDE 15 MG: 5 TABLET ORAL at 12:01

## 2024-01-21 RX ADMIN — FLUOXETINE 40 MG: 10 CAPSULE ORAL at 09:01

## 2024-01-21 RX ADMIN — SODIUM CHLORIDE: 9 INJECTION, SOLUTION INTRAVENOUS at 02:01

## 2024-01-21 RX ADMIN — IPRATROPIUM BROMIDE AND ALBUTEROL SULFATE 3 ML: .5; 3 SOLUTION RESPIRATORY (INHALATION) at 04:01

## 2024-01-21 RX ADMIN — PIPERACILLIN SODIUM AND TAZOBACTAM SODIUM 3.38 G: 3; .375 INJECTION, POWDER, FOR SOLUTION INTRAVENOUS at 07:01

## 2024-01-21 RX ADMIN — SODIUM CHLORIDE: 9 INJECTION, SOLUTION INTRAVENOUS at 05:01

## 2024-01-21 RX ADMIN — POTASSIUM CHLORIDE 10 MEQ: 7.46 INJECTION, SOLUTION INTRAVENOUS at 05:01

## 2024-01-21 RX ADMIN — ENOXAPARIN SODIUM 40 MG: 40 INJECTION SUBCUTANEOUS at 05:01

## 2024-01-21 RX ADMIN — HYDROCODONE BITARTRATE AND ACETAMINOPHEN 1 TABLET: 10; 325 TABLET ORAL at 05:01

## 2024-01-21 RX ADMIN — HYDROCODONE BITARTRATE AND ACETAMINOPHEN 1 TABLET: 10; 325 TABLET ORAL at 09:01

## 2024-01-21 RX ADMIN — MONTELUKAST 10 MG: 10 TABLET, FILM COATED ORAL at 09:01

## 2024-01-21 RX ADMIN — NOREPINEPHRINE BITARTRATE 0.16 MCG/KG/MIN: 4 INJECTION, SOLUTION INTRAVENOUS at 06:01

## 2024-01-21 NOTE — SUBJECTIVE & OBJECTIVE
Interval History:     Review of Systems   Constitutional:  Positive for fatigue. Negative for activity change, appetite change and fever.   HENT:  Negative for congestion, ear discharge, mouth sores, nosebleeds, rhinorrhea, sinus pressure, sinus pain and tinnitus.    Eyes: Negative.  Negative for pain, redness and itching.   Respiratory:  Positive for shortness of breath and wheezing. Negative for apnea, cough, choking, chest tightness and stridor.    Cardiovascular:  Negative for chest pain, palpitations and leg swelling.   Gastrointestinal:  Negative for abdominal distention, abdominal pain, anal bleeding, blood in stool, constipation and diarrhea.   Endocrine: Negative.    Genitourinary:  Negative for difficulty urinating, flank pain, frequency and urgency.   Musculoskeletal:  Positive for myalgias. Negative for arthralgias, back pain and gait problem.   Skin:  Negative for color change and pallor.   Allergic/Immunologic: Negative.    Neurological:  Negative for dizziness, facial asymmetry, weakness, light-headedness and headaches.   Hematological:  Negative for adenopathy. Does not bruise/bleed easily.   All other systems reviewed and are negative.    Objective:     Vital Signs (Most Recent):  Temp: 99 °F (37.2 °C) (01/21/24 1200)  Pulse: 86 (01/21/24 1200)  Resp: 11 (01/21/24 1200)  BP: 113/82 (01/21/24 1200)  SpO2: 100 % (01/21/24 1200) Vital Signs (24h Range):  Temp:  [99 °F (37.2 °C)-102 °F (38.9 °C)] 99 °F (37.2 °C)  Pulse:  [] 86  Resp:  [8-29] 11  SpO2:  [93 %-100 %] 100 %  BP: ()/(49-93) 113/82     Weight: 59.5 kg (131 lb 2.8 oz)  Body mass index is 23.99 kg/m².    Intake/Output Summary (Last 24 hours) at 1/21/2024 1230  Last data filed at 1/21/2024 1210  Gross per 24 hour   Intake 4178.92 ml   Output 3250 ml   Net 928.92 ml         Physical Exam  Nursing note reviewed.   Constitutional:       Appearance: She is well-developed.   HENT:      Head: Normocephalic and atraumatic.   Eyes:       Pupils: Pupils are equal, round, and reactive to light.   Neck:      Thyroid: No thyromegaly.      Trachea: No tracheal deviation.   Cardiovascular:      Rate and Rhythm: Normal rate and regular rhythm.      Heart sounds: Normal heart sounds.   Pulmonary:      Effort: Respiratory distress present.      Breath sounds: Wheezing, rhonchi and rales present.   Abdominal:      General: Bowel sounds are normal. There is no distension.      Palpations: Abdomen is soft.      Tenderness: There is no guarding or rebound.   Musculoskeletal:         General: Normal range of motion.      Cervical back: Normal range of motion and neck supple.   Lymphadenopathy:      Cervical: No cervical adenopathy.   Skin:     General: Skin is warm and dry.      Capillary Refill: Capillary refill takes less than 2 seconds.   Neurological:      Mental Status: She is alert and oriented to person, place, and time.      Motor: Weakness present.   Psychiatric:         Behavior: Behavior normal.         Thought Content: Thought content normal.         Judgment: Judgment normal.             Significant Labs: All pertinent labs within the past 24 hours have been reviewed.  Recent Lab Results  (Last 5 results in the past 24 hours)        01/21/24  1123   01/21/24  0535   01/21/24  0458   01/21/24  0430   01/21/24  0329        Albumin 2.0         2.1                119       Allens Test     N/A                    361       Anion Gap 12         12       AST 1,639         1,188       Baso #         0.02       Basophil %         0.2       BILIRUBIN TOTAL 0.6  Comment: For infants and newborns, interpretation of results should be based  on gestational age, weight and in agreement with clinical  observations.    Premature Infant recommended reference ranges:  Up to 24 hours.............<8.0 mg/dL  Up to 48 hours............<12.0 mg/dL  3-5 days..................<15.0 mg/dL  6-29 days.................<15.0 mg/dL           0.7  Comment: For infants  and newborns, interpretation of results should be based  on gestational age, weight and in agreement with clinical  observations.    Premature Infant recommended reference ranges:  Up to 24 hours.............<8.0 mg/dL  Up to 48 hours............<12.0 mg/dL  3-5 days..................<15.0 mg/dL  6-29 days.................<15.0 mg/dL         Site     LB           BUN 8         9       Calcium 7.1         7.2       Chloride 103         104       CO2 20         20       Creatinine 1.0         1.0       DelSys     Nasal Can           Differential Method         Automated       eGFR >60.0         >60.0       Eos #         0.1       Eosinophil %         0.5       Glucose 141         89       Gran # (ANC)         10.1       Gran %         87.2       Hematocrit         29.0       Hemoglobin         9.3       Immature Grans (Abs)         0.08  Comment: Mild elevation in immature granulocytes is non specific and   can be seen in a variety of conditions including stress response,   acute inflammation, trauma and pregnancy. Correlation with other   laboratory and clinical findings is essential.         Immature Granulocytes         0.7       INR   1.6  Comment: Coumadin Therapy:  2.0 - 3.0 for INR for all indicators except mechanical heart valves  and antiphospholipid syndromes which should use 2.5 - 3.5.               Lactate, Carl       1.4  Comment: Falsely low lactic acid results can be found in samples   containing >=13.0 mg/dL total bilirubin and/or >=3.5 mg/dL   direct bilirubin.           Lymph #         0.7       Lymph %         6.3       Magnesium  2.0         1.8       MCH         32.5       MCHC         32.1       MCV         101       Mono #         0.6       Mono %         5.1       MPV         10.3       nRBC         0       Phosphorus Level         4.0       Platelet Count         181       POC BE     -4           POC HCO3     23.3           POC PCO2     53.0           POC PH     7.250           POC PO2     77            POC SATURATED O2     93           Potassium 3.6         3.4       PROTEIN TOTAL 5.3         5.5       Protime   16.7             RBC         2.86       RDW         13.2       Sample     ARTERIAL           Sodium 135         136       Troponin I         0.020  Comment: The reference interval for Troponin I represents the 99th percentile   cutoff   for our facility and is consistent with 3rd generation assay   performance.         WBC         11.58                              Significant Imaging: I have reviewed all pertinent imaging results/findings within the past 24 hours.  U/S: I have reviewed all pertinent results/findings within the past 24 hours and my personal findings are:

## 2024-01-21 NOTE — ED NOTES
Oral temperature obtained x 2 with a reading of 99.2, Core bladder temperature continues at 102. Will re-assess shortly. Patient feels warm to touch but not overly hot and denies any chills.

## 2024-01-21 NOTE — HOSPITAL COURSE
71 year old male with PMx of HTN, depression, chronic back pain, transferred from [facility], where he was admitted on 11/2 after an unwitnessed fall (down for >1 day) and found to have acute hypoxic respiratory failure secondary to COVID-19 pneumonia and altered mental status. Pt started having increased work of breathing and o2 requirement inc. He therefore was stepped up to MICU for further management.     NEURO  ENCEPHALOPATHY   - could be 2/2 COVID vs AHRF   - CXR: Right opacity, stable. Left opacity, increased. Stable heart size and bony structures.  - CT head negative: negative for clot or bleed  - currently on HFNC 50/50  - Pt on diazepam 10 mg and Narco 7.5-325mg at home and urine   - No leukocytosis, no bands, no fever.  Superimposed bacterial pna less likely given no fever, no wbc.  - UA negative on adm, urine positive for benzo    To do:  - c/w HFNC and alreta, if patient desaturates BIPAP and escalate, wean O2 as tolerated  -     INCOMPLETE Patient is a 68-year-old female that presented for lower back pain.  After being seen in the ER and diagnosed with pyelonephritis the patient developed shortness of breath and had to be placed on BiPAP.  BiPAP has been weaned off and patient now is on a nasal cannula.  Blood cultures are positive for Gram-negative meir thought to be E coli due to her urinary tract infection  CTA of the chest for the shortness of breath that she developed is negative for pulmonary emboli but positive for 1.5 cm nodule/consolidation  Patient remains on pressors of Levophed.  Requesting a PICC line for IV access  Low blood pressure thought to be related to sepsis       71 year old male with PMx of HTN, depression, chronic back pain, transferred from [facility], where he was admitted on 11/2 after an unwitnessed fall (down for >1 day) and found to have acute hypoxic respiratory failure secondary to COVID-19 pneumonia and altered mental status. Pt started having increased work of breathing and o2 requirement inc. He therefore was stepped up to MICU for further management.     NEURO  ENCEPHALOPATHY   - could be 2/2 COVID vs AHRF   - CXR: Right opacity, stable. Left opacity, increased. Stable heart size and bony structures.  - CT head negative: negative for clot or bleed  - currently on HFNC 50/50  - Pt on diazepam 10 mg and Narco 7.5-325mg at home and urine   - No leukocytosis, no bandemia, afebrile thus Superimposed bacterial PNA less likely   - UA negative on adm, urine positive for benzo    To do:  - c/w HFNC and alreta, if patient desaturates BIPAP and escalate, wean O2 as tolerated      PULM  #Acute Hypoxic Respiratory failure 2/2 COVID-19  - continue with HFNC, down-titrate FiO2 as tolerated  - check inflammatory markers with AM labs  - continue Dexamethasone 10 mg   - continue Remdesivir   - Lovenox for VTE prophylaxis  - patient may prone as tolerated  - supportive care     #COVID-19 Pneumonia  - CXR with bilateral hazy opacities c/w viral COVID PNA  - no indication for antibiotics at this time, procalcitonin negative   - if patient develops leukocytosis, fevers consider antibiotic management     CARDIO  #Sinus tachycardia  - likely due to increased metabolic demands i/s/o infection  - continuous telemetry monitoring  - echo with EF 55%, mild tricuspid regurg   - will send BNP with AM labs     #Type II MI  - likely i/so demand ischemia 2/2 tachycardia  -     >>>INCOMPLETE<<<     71 year old male with PMx of HTN, depression, chronic back pain, transferred from [facility], where he was admitted on 11/2 after an unwitnessed fall (down for >1 day) and found to have acute hypoxic respiratory failure secondary to COVID-19, altered mental status and rhabdomyolysis. Due to increased work of breathing and oxygen requirements patient was upgraded to MICU for closer monitoring.     NEURO  #COVID encephalopathy   - meets SIRS criteria 2/4    - CT head negative for acute findings   - Pt on diazepam 10 mg and Narco 7.5-325mg at home   - no bandemia, afebrile thus superimposed bacterial PNA less likely   - UA negative on adm, urine positive for benzo  - escalated to trial of BiPAP      PULM  #Acute Hypoxic Respiratory failure 2/2 COVID-19  - continue with HFNC & BiPAP as neededl, down-titrate FiO2 as tolerated  - check inflammatory markers with AM labs  - continue Dexamethasone 10 mg   - continue Remdesivir   - not a candidate for Tocilizumab   - Lovenox for VTE prophylaxis  - patient may prone as tolerated  - supportive care     #COVID-19 Pneumonia  - CXR with bilateral hazy opacities c/w viral COVID PNA  - no indication for antibiotics at this time, procalcitonin negative   - if patient develops fevers, elevated procal: consider antibiotic management   - leukocytosis steroid induced     CARDIO  #Sinus tachycardia  - likely due to increased metabolic demands i/s/o COVID  infection  - continuous telemetry monitoring  - echo with EF 55%, mild tricuspid regurg   - f/u TSH    #Type II MI  - likely i/so demand ischemia 2/2 tachycardia  - troponin down-trended  - EKG without ST/T wave changes    #HTN  - continue amlodipine with parameters  - will hold home Losartan-HCTZ for now    GI  #transaminitis  - due rhabdomyolysis   - monitor LFT's with daily labs  - avoid hepatotoxic agents      #urinary retention  - BPH vs. benzodiazapine induced   - straight cath for 1,000 mL urine  - continue with bladder scan q6h and intermittent straight cath     RENAL   #rhabdomyolysis   - resolving  - CK 10.961 on admission, downtrended to 500 after IVF  - monitor SCr, transaminases     ID  as per plan under pulm    ENDO  #Prediabetes  - A1C 5.7%  - ISS as per protocol given steroid induced hyperglycemia  - stress lifestyle modifications     MSK  #chronic back pain  - would avoid adding home valium and opioids at this time due to risk of CNS depression      F: None  E: Replete for K > 4, Mg > 2  N: DASH/TLC  DVT: Lovenox    71 year old male with PMx of HTN, depression, chronic back pain, transferred from [facility], where he was admitted on 11/2 after an unwitnessed fall (down for >1 day) and found to have acute hypoxic respiratory failure secondary to COVID-19, altered mental status and rhabdomyolysis. Due to increased work of breathing and oxygen requirements patient was upgraded to MICU for closer monitoring.     NEURO  #COVID encephalopathy   - meets SIRS criteria 2/4    - CT head negative for acute findings   - Pt on diazepam 10 mg and Narco 7.5-325mg at home   - no bandemia, afebrile thus superimposed bacterial PNA less likely   - UA negative on adm, urine positive for benzo  - escalated to trial of BiPAP    To do:  - cw Bipap with the goal of weaning when possible  - if resp status further dec: consider mechanically ventilating      PULM  #Acute Hypoxic Respiratory failure 2/2 COVID-19  - continue with HFNC & BiPAP as neededl, down-titrate FiO2 as tolerated  - not a candidate for Tocilizumab     To do:   - continue with HFNC & BiPAP as needed, down-titrate FiO2 as tolerated  - check inflammatory markers with AM labs  - cw Dexamethasone 10 mg   - cw Remdesivir    - cw Lovenox for VTE prophylaxis  - patient may prone as tolerated  - supportive care     #COVID-19 Pneumonia  - CXR with bilateral hazy opacities c/w viral COVID PNA  - no indication for antibiotics at this time, procalcitonin negative but pt spiked a fever T: 101    To do:  - f/u blood culture, UA and CXR (am)    CARDIO  #Sinus tachycardia  - likely due to increased metabolic demands i/s/o COVID  infection  - echo with EF 55%, mild tricuspid regurg     To do:  - f/u LE USG to rule out DVT  - continuous telemetry monitoring  - f/u TSH    #Type II MI  - likely i/so demand ischemia 2/2 tachycardia  - troponin down-trended  - EKG without ST/T wave changes    #HTN  - continue amlodipine with parameters  - will hold home Losartan-HCTZ for now    GI  #transaminitis  - due rhabdomyolysis     To do:  - monitor LFT's with daily labs  - avoid hepatotoxic agents      #urinary retention  - BPH vs. benzodiazapine induced     To do:  - straight cath for 1,000 mL urine  - cw bladder scan q6h and intermittent straight cath     RENAL   #rhabdomyolysis   - resolving  - CK 10.961 on admission, downtrended to 500 after IVF  - no lasix    To do:  - goal is net negative   - monitor SCr, transaminases     ID  as per plan under pulm    ENDO  #Prediabetes  - A1C 5.7%    To do:  - ISS as per protocol given steroid induced hyperglycemia  - stress lifestyle modifications     MSK  #chronic back pain  - would avoid adding home valium and opioids at this time due to risk of CNS depression      F: None  E: Replete for K > 4, Mg > 2  N: DASH/TLC  DVT: Lovenox

## 2024-01-21 NOTE — ED NOTES
Patient awakened to given nightly dose of Buspar 15mg. Medication late due to not flagging as due in epic. Patient tolerated well. Denies any c/o. Remains vitally stable.

## 2024-01-21 NOTE — EICU
"eICU Brief Note    Issue: 68 y old woman boarding in ER for over a day moved to ICU [facility]. Diagnosed with septic shock with pyelonephritis. Initially had low BP, back pain .     Resting on video, coughing  BP (!) 102/58   Pulse 95   Temp (!) 100.4 °F (38 °C)   Resp (!) 21   Ht 5' 2" (1.575 m)   Wt 59.5 kg (131 lb 2.8 oz)   SpO2 98%   Breastfeeding No   BMI 23.99 kg/m²   Intake/Output - Last 3 Shifts         01/19 0700  01/20 0659 01/20 0700  01/21 0659    I.V. (mL/kg) 112.8 (2.2) 1411.8 (23.7)    IV Piggyback 4703.1     Total Intake(mL/kg) 4815.9 (93.2) 1411.8 (23.7)    Urine (mL/kg/hr) 750 2760 (1.9)    Total Output 750 2760    Net +4065.9 -1348.2                    Plan :  Septic shock- on levo- pyelonephritis - culture with E coli  in blood  On antibiotics- adjust as sensitivity returns; likely stop vanco  Low K, borderline Mg- recheck  Recheck LA  AST, ALT high ? Transaminitis    D/w RN      0500 AST, ALT high- get USG abdomen, check INR  Replete and follow K, Mg  D/w RN   "

## 2024-01-21 NOTE — ED NOTES
Patient sleeping at this time. No acute distress. Respirations even and non-labored. Vitally stable.

## 2024-01-21 NOTE — PROGRESS NOTES
[facility]  Progress Note    Patient Name: Bhavana Lambert  MRN: [id number]  Patient Class: OP- Observation   Admission Date: 1/19/2024  Length of Stay: 0 days  Attending Physician: Dainel Rojo MD  Primary Care Provider: Pepito Jhaveri IV, MD        Subjective:     Principal Problem:Pyelonephritis        HPI:  The patient is a 67 y/o female with PMH of HTN, COPD, and anxiety/depression, who presents with hypotension and lower back pain. Patient reported that her home health nurse noted hypotension and that she was more pale appearing. She also reported lower abdominal pain with dysuria. Patient denies any fevers but had some chills and weakness. Work up in the ER suggestive of pyelo.     Overview/Hospital Course:  Patient is a 68-year-old female that presented for lower back pain.  After being seen in the ER and diagnosed with pyelonephritis the patient developed shortness of breath and had to be placed on BiPAP.  BiPAP has been weaned off and patient now is on a nasal cannula.  Blood cultures are positive for Gram-negative meir thought to be E coli due to her urinary tract infection  CTA of the chest for the shortness of breath that she developed is negative for pulmonary emboli but positive for 1.5 cm nodule/consolidation  Patient remains on pressors of Levophed.  Requesting a PICC line for IV access  Low blood pressure thought to be related to sepsis        Interval History:     Review of Systems   Constitutional:  Positive for fatigue. Negative for activity change, appetite change and fever.   HENT:  Negative for congestion, ear discharge, mouth sores, nosebleeds, rhinorrhea, sinus pressure, sinus pain and tinnitus.    Eyes: Negative.  Negative for pain, redness and itching.   Respiratory:  Positive for shortness of breath and wheezing. Negative for apnea, cough, choking, chest tightness and stridor.    Cardiovascular:  Negative for chest pain, palpitations and leg  swelling.   Gastrointestinal:  Negative for abdominal distention, abdominal pain, anal bleeding, blood in stool, constipation and diarrhea.   Endocrine: Negative.    Genitourinary:  Negative for difficulty urinating, flank pain, frequency and urgency.   Musculoskeletal:  Positive for myalgias. Negative for arthralgias, back pain and gait problem.   Skin:  Negative for color change and pallor.   Allergic/Immunologic: Negative.    Neurological:  Negative for dizziness, facial asymmetry, weakness, light-headedness and headaches.   Hematological:  Negative for adenopathy. Does not bruise/bleed easily.   All other systems reviewed and are negative.    Objective:     Vital Signs (Most Recent):  Temp: 99 °F (37.2 °C) (01/21/24 1200)  Pulse: 86 (01/21/24 1200)  Resp: 11 (01/21/24 1200)  BP: 113/82 (01/21/24 1200)  SpO2: 100 % (01/21/24 1200) Vital Signs (24h Range):  Temp:  [99 °F (37.2 °C)-102 °F (38.9 °C)] 99 °F (37.2 °C)  Pulse:  [] 86  Resp:  [8-29] 11  SpO2:  [93 %-100 %] 100 %  BP: ()/(49-93) 113/82     Weight: 59.5 kg (131 lb 2.8 oz)  Body mass index is 23.99 kg/m².    Intake/Output Summary (Last 24 hours) at 1/21/2024 1230  Last data filed at 1/21/2024 1210  Gross per 24 hour   Intake 4178.92 ml   Output 3250 ml   Net 928.92 ml         Physical Exam  Nursing note reviewed.   Constitutional:       Appearance: She is well-developed.   HENT:      Head: Normocephalic and atraumatic.   Eyes:      Pupils: Pupils are equal, round, and reactive to light.   Neck:      Thyroid: No thyromegaly.      Trachea: No tracheal deviation.   Cardiovascular:      Rate and Rhythm: Normal rate and regular rhythm.      Heart sounds: Normal heart sounds.   Pulmonary:      Effort: Respiratory distress present.      Breath sounds: Wheezing, rhonchi and rales present.   Abdominal:      General: Bowel sounds are normal. There is no distension.      Palpations: Abdomen is soft.      Tenderness: There is no guarding or rebound.    Musculoskeletal:         General: Normal range of motion.      Cervical back: Normal range of motion and neck supple.   Lymphadenopathy:      Cervical: No cervical adenopathy.   Skin:     General: Skin is warm and dry.      Capillary Refill: Capillary refill takes less than 2 seconds.   Neurological:      Mental Status: She is alert and oriented to person, place, and time.      Motor: Weakness present.   Psychiatric:         Behavior: Behavior normal.         Thought Content: Thought content normal.         Judgment: Judgment normal.             Significant Labs: All pertinent labs within the past 24 hours have been reviewed.  Recent Lab Results  (Last 5 results in the past 24 hours)        01/21/24  1123   01/21/24  0535   01/21/24  0458   01/21/24  0430   01/21/24  0329        Albumin 2.0         2.1                119       Allens Test     N/A                    361       Anion Gap 12         12       AST 1,639         1,188       Baso #         0.02       Basophil %         0.2       BILIRUBIN TOTAL 0.6  Comment: For infants and newborns, interpretation of results should be based  on gestational age, weight and in agreement with clinical  observations.    Premature Infant recommended reference ranges:  Up to 24 hours.............<8.0 mg/dL  Up to 48 hours............<12.0 mg/dL  3-5 days..................<15.0 mg/dL  6-29 days.................<15.0 mg/dL           0.7  Comment: For infants and newborns, interpretation of results should be based  on gestational age, weight and in agreement with clinical  observations.    Premature Infant recommended reference ranges:  Up to 24 hours.............<8.0 mg/dL  Up to 48 hours............<12.0 mg/dL  3-5 days..................<15.0 mg/dL  6-29 days.................<15.0 mg/dL         Site     LB           BUN 8         9       Calcium 7.1         7.2       Chloride 103         104       CO2 20         20       Creatinine 1.0         1.0       Dels      Nasal Can           Differential Method         Automated       eGFR >60.0         >60.0       Eos #         0.1       Eosinophil %         0.5       Glucose 141         89       Gran # (ANC)         10.1       Gran %         87.2       Hematocrit         29.0       Hemoglobin         9.3       Immature Grans (Abs)         0.08  Comment: Mild elevation in immature granulocytes is non specific and   can be seen in a variety of conditions including stress response,   acute inflammation, trauma and pregnancy. Correlation with other   laboratory and clinical findings is essential.         Immature Granulocytes         0.7       INR   1.6  Comment: Coumadin Therapy:  2.0 - 3.0 for INR for all indicators except mechanical heart valves  and antiphospholipid syndromes which should use 2.5 - 3.5.               Lactate, Carl       1.4  Comment: Falsely low lactic acid results can be found in samples   containing >=13.0 mg/dL total bilirubin and/or >=3.5 mg/dL   direct bilirubin.           Lymph #         0.7       Lymph %         6.3       Magnesium  2.0         1.8       MCH         32.5       MCHC         32.1       MCV         101       Mono #         0.6       Mono %         5.1       MPV         10.3       nRBC         0       Phosphorus Level         4.0       Platelet Count         181       POC BE     -4           POC HCO3     23.3           POC PCO2     53.0           POC PH     7.250           POC PO2     77           POC SATURATED O2     93           Potassium 3.6         3.4       PROTEIN TOTAL 5.3         5.5       Protime   16.7             RBC         2.86       RDW         13.2       Sample     ARTERIAL           Sodium 135         136       Troponin I         0.020  Comment: The reference interval for Troponin I represents the 99th percentile   cutoff   for our facility and is consistent with 3rd generation assay   performance.         WBC         11.58                              Significant Imaging: I have  reviewed all pertinent imaging results/findings within the past 24 hours.  U/S: I have reviewed all pertinent results/findings within the past 24 hours and my personal findings are:       Assessment/Plan:      * Pyelonephritis  Case discussed with ER physician  CT showed:     Left kidney appears mildly edematous. Mild associated infectious/inflammatory left perinephric fat stranding.  Please correlate for acute pyelonephritis.     UA findings noted  Continue ceftriaxone, IVF, antiemetics  Pain control with tylenol; patient's BP sensitive to morphine     01/20/2024:  Evidence of last noted  Urinalysis that shows is significant signs of 1+ leukocytes greater than 100 white blood cells in bacteria.  ABG was concern 7.30 pCO2 56 go to a 5 O2 sat was 94%.    01/21/2024  Continue current antibiotics  Patient clinically improving  Await culture and sensitivity of urine        Emphysema/COPD  Nebs and O2 PRN   Continue montelukast      Hypothyroid  Continue levothyroxine       Chronic respiratory failure with hypoxia  Patient with Hypercapnic and Hypoxic Respiratory failure which is Acute on chronic.  she is on home oxygen at 2 LPM. Supplemental oxygen was provided and noted- Oxygen Concentration (%):  [35] 35    .   Signs/symptoms of respiratory failure include- tachypnea, increased work of breathing, and respiratory distress. Contributing diagnoses includes - COPD Labs and images were reviewed. Patient Has recent ABG, which has been reviewed. Will treat underlying causes and adjust management of respiratory failure as follows- AV show pH was 7 point 3 pacer to 6 PO2 of 85 to sats 94% which shows respiratory acidosis with hypercapnia.  Patient started having respiratory difficulty after arriving to the emergency department.  Blood gas was done in showed hypercapnia patient was then placed on BiPAP 16/6.  This patient is still on BiPAP.  The families that she was having a flu-like symptom proximally 2 weeks ago.  She also  was very fatigued over last few days.  She states that when she today she was very pale.  We will check over reasons for hypoxia and obtain CTA of the chest .  We will also check flu and COVID as well as CMV.    Depression  Patient has  unknown  depression which is unknown and is currently controlled. Will Continue anti-depressant medications. We will not consult psychiatry at this time. Patient does not display psychosis at this time. Continue to monitor closely and adjust plan of care as needed.        Anxiety  Continue buspirone         VTE Risk Mitigation (From admission, onward)           Ordered     enoxaparin injection 40 mg  Daily         01/19/24 2218     IP VTE HIGH RISK PATIENT  Once         01/19/24 2218     Place sequential compression device  Until discontinued         01/19/24 2218                    Discharge Planning   CHLOE:      Code Status: Full Code   Is the patient medically ready for discharge?:     Reason for patient still in hospital (select all that apply): Treatment                     Daniel Rojo MD  Department of [facility]   [place] - Intensive Care

## 2024-01-21 NOTE — ED NOTES
Patient having a coughing spell and able to cough up dark green/brown sputum. Water provided per request. Remains vitally stable. No distress noted.

## 2024-01-21 NOTE — NURSING
Report received.  Assessment done.  Awake and oriented.  Respirations even and unlabored.  C/O pain to lower back.  Repositioned for comfort.  Levophed infusing per order.  Azul cath to gravity is patent and draining tamara urine.  Plan of care discussed.  Verbalized understanding.  Daughter is at bedside.  Call light within reach.  See flowsheet for further assessment.

## 2024-01-21 NOTE — ASSESSMENT & PLAN NOTE
Case discussed with ER physician  CT showed:     Left kidney appears mildly edematous. Mild associated infectious/inflammatory left perinephric fat stranding.  Please correlate for acute pyelonephritis.     UA findings noted  Continue ceftriaxone, IVF, antiemetics  Pain control with tylenol; patient's BP sensitive to morphine     01/20/2024:  Evidence of last noted  Urinalysis that shows is significant signs of 1+ leukocytes greater than 100 white blood cells in bacteria.  ABG was concern 7.30 pCO2 56 go to a 5 O2 sat was 94%.    01/21/2024  Continue current antibiotics  Patient clinically improving  Await culture and sensitivity of urine

## 2024-01-21 NOTE — PLAN OF CARE
Problem: Adult Inpatient Plan of Care  Goal: Plan of Care Review  Outcome: Ongoing, Progressing  Goal: Patient-Specific Goal (Individualized)  Outcome: Ongoing, Progressing  Goal: Absence of Hospital-Acquired Illness or Injury  Outcome: Ongoing, Progressing  Goal: Optimal Comfort and Wellbeing  Outcome: Ongoing, Progressing  Intervention: Monitor Pain and Promote Comfort  Flowsheets (Taken 1/21/2024 0505)  Pain Management Interventions: medication offered  Goal: Readiness for Transition of Care  Outcome: Ongoing, Progressing     Problem: Infection  Goal: Absence of Infection Signs and Symptoms  Outcome: Ongoing, Progressing     Problem: Skin Injury Risk Increased  Goal: Skin Health and Integrity  Outcome: Ongoing, Progressing  Intervention: Optimize Skin Protection  Flowsheets (Taken 1/21/2024 0505)  Pressure Reduction Techniques: frequent weight shift encouraged  Head of Bed (HOB) Positioning: HOB at 30 degrees     Problem: Renal Function Impairment (Acute Kidney Injury/Impairment)  Goal: Effective Renal Function  Outcome: Ongoing, Progressing

## 2024-01-21 NOTE — NURSING
Dr. Rojo at bedside.  Spoke with patient and daughter.  Update given.  PICC line placement discussed and agreed upon.

## 2024-01-21 NOTE — ED NOTES
Patient states she is having a headache at this time and some mild low back pain. Offered her options for pain medication and chose to have fioricet. Verbalized to patient I would re-evaluate pain assessment shortly.

## 2024-01-22 ENCOUNTER — HOSPITAL ENCOUNTER (INPATIENT)
Facility: HOSPITAL | Age: 69
LOS: 8 days | Discharge: NURSING, REHAB OR LONG-TERM CARE | DRG: 871 | End: 2024-01-30
Attending: INTERNAL MEDICINE | Admitting: INTERNAL MEDICINE
Payer: MEDICARE

## 2024-01-22 VITALS
HEIGHT: 62 IN | RESPIRATION RATE: 12 BRPM | OXYGEN SATURATION: 97 % | TEMPERATURE: 98 F | SYSTOLIC BLOOD PRESSURE: 89 MMHG | WEIGHT: 131.19 LBS | DIASTOLIC BLOOD PRESSURE: 56 MMHG | HEART RATE: 92 BPM | BODY MASS INDEX: 24.14 KG/M2

## 2024-01-22 DIAGNOSIS — R57.9 SHOCK: ICD-10-CM

## 2024-01-22 DIAGNOSIS — R07.9 CHEST PAIN: ICD-10-CM

## 2024-01-22 DIAGNOSIS — J96.11 CHRONIC RESPIRATORY FAILURE WITH HYPOXIA: ICD-10-CM

## 2024-01-22 DIAGNOSIS — A41.9 SEPSIS: ICD-10-CM

## 2024-01-22 DIAGNOSIS — F10.90 ALCOHOL USE DISORDER: Primary | ICD-10-CM

## 2024-01-22 PROBLEM — I50.32 CHRONIC DIASTOLIC (CONGESTIVE) HEART FAILURE: Status: ACTIVE | Noted: 2023-11-08

## 2024-01-22 PROBLEM — R74.01 TRANSAMINITIS: Status: ACTIVE | Noted: 2024-01-22

## 2024-01-22 PROBLEM — R65.21 SEPTIC SHOCK: Status: ACTIVE | Noted: 2024-01-21

## 2024-01-22 PROBLEM — B96.20 E COLI BACTEREMIA: Status: ACTIVE | Noted: 2024-01-22

## 2024-01-22 PROBLEM — D69.6 THROMBOCYTOPENIA: Status: ACTIVE | Noted: 2024-01-22

## 2024-01-22 PROBLEM — R78.81 E COLI BACTEREMIA: Status: ACTIVE | Noted: 2024-01-22

## 2024-01-22 LAB
ABO + RH BLD: NORMAL
ALBUMIN SERPL BCP-MCNC: 1.8 G/DL (ref 3.5–5.2)
ALP SERPL-CCNC: 137 U/L (ref 55–135)
ALT SERPL W/O P-5'-P-CCNC: 1501 U/L (ref 10–44)
ANION GAP SERPL CALC-SCNC: 10 MMOL/L (ref 8–16)
APAP SERPL-MCNC: 3.8 UG/ML (ref 10–20)
AST SERPL-CCNC: 5709 U/L (ref 10–40)
BACTERIA BLD CULT: ABNORMAL
BACTERIA UR CULT: ABNORMAL
BASOPHILS # BLD AUTO: 0.02 K/UL (ref 0–0.2)
BASOPHILS NFR BLD: 0 % (ref 0–1.9)
BASOPHILS NFR BLD: 0.2 % (ref 0–1.9)
BILIRUB SERPL-MCNC: 0.7 MG/DL (ref 0.1–1)
BLD GP AB SCN CELLS X3 SERPL QL: NORMAL
BUN SERPL-MCNC: 7 MG/DL (ref 8–23)
CALCIUM SERPL-MCNC: 7.1 MG/DL (ref 8.7–10.5)
CHLORIDE SERPL-SCNC: 102 MMOL/L (ref 95–110)
CK SERPL-CCNC: 254 U/L (ref 20–180)
CO2 SERPL-SCNC: 20 MMOL/L (ref 23–29)
CREAT SERPL-MCNC: 1 MG/DL (ref 0.5–1.4)
D DIMER PPP IA.FEU-MCNC: 6.77 MG/L FEU
DIFFERENTIAL METHOD BLD: ABNORMAL
DIFFERENTIAL METHOD BLD: ABNORMAL
EOSINOPHIL # BLD AUTO: 0.1 K/UL (ref 0–0.5)
EOSINOPHIL NFR BLD: 0.9 % (ref 0–8)
EOSINOPHIL NFR BLD: 1 % (ref 0–8)
ERYTHROCYTE [DISTWIDTH] IN BLOOD BY AUTOMATED COUNT: 13.2 % (ref 11.5–14.5)
ERYTHROCYTE [DISTWIDTH] IN BLOOD BY AUTOMATED COUNT: 13.3 % (ref 11.5–14.5)
EST. GFR  (NO RACE VARIABLE): >60 ML/MIN/1.73 M^2
FIBRINOGEN PPP-MCNC: 434 MG/DL (ref 182–400)
GLUCOSE SERPL-MCNC: 210 MG/DL (ref 70–110)
HAV IGM SERPL QL IA: NORMAL
HBV CORE IGM SERPL QL IA: NORMAL
HBV SURFACE AG SERPL QL IA: NORMAL
HCT VFR BLD AUTO: 23.8 % (ref 37–48.5)
HCT VFR BLD AUTO: 25 % (ref 37–48.5)
HCV AB SERPL QL IA: NORMAL
HGB BLD-MCNC: 7.6 G/DL (ref 12–16)
HGB BLD-MCNC: 8 G/DL (ref 12–16)
HYPOCHROMIA BLD QL SMEAR: ABNORMAL
IMM GRANULOCYTES # BLD AUTO: 0.08 K/UL (ref 0–0.04)
IMM GRANULOCYTES # BLD AUTO: ABNORMAL K/UL (ref 0–0.04)
IMM GRANULOCYTES NFR BLD AUTO: 0.8 % (ref 0–0.5)
IMM GRANULOCYTES NFR BLD AUTO: ABNORMAL % (ref 0–0.5)
INR PPP: 1.6 (ref 0.8–1.2)
LACTATE SERPL-SCNC: 1 MMOL/L (ref 0.5–2.2)
LYMPHOCYTES # BLD AUTO: 1 K/UL (ref 1–4.8)
LYMPHOCYTES NFR BLD: 6 % (ref 18–48)
LYMPHOCYTES NFR BLD: 9 % (ref 18–48)
MAGNESIUM SERPL-MCNC: 1.7 MG/DL (ref 1.6–2.6)
MCH RBC QN AUTO: 32 PG (ref 27–31)
MCH RBC QN AUTO: 32.5 PG (ref 27–31)
MCHC RBC AUTO-ENTMCNC: 31.9 G/DL (ref 32–36)
MCHC RBC AUTO-ENTMCNC: 32 G/DL (ref 32–36)
MCV RBC AUTO: 100 FL (ref 82–98)
MCV RBC AUTO: 102 FL (ref 82–98)
MONOCYTES # BLD AUTO: 0.7 K/UL (ref 0.3–1)
MONOCYTES NFR BLD: 2 % (ref 4–15)
MONOCYTES NFR BLD: 6.7 % (ref 4–15)
NEUTROPHILS # BLD AUTO: 8.7 K/UL (ref 1.8–7.7)
NEUTROPHILS NFR BLD: 76 % (ref 38–73)
NEUTROPHILS NFR BLD: 82.4 % (ref 38–73)
NEUTS BAND NFR BLD MANUAL: 15 %
NRBC BLD-RTO: 0 /100 WBC
NRBC BLD-RTO: 0 /100 WBC
PHOSPHATE SERPL-MCNC: 3.8 MG/DL (ref 2.7–4.5)
PLATELET # BLD AUTO: 128 K/UL (ref 150–450)
PLATELET # BLD AUTO: 135 K/UL (ref 150–450)
PLATELET BLD QL SMEAR: ABNORMAL
PMV BLD AUTO: 10.6 FL (ref 9.2–12.9)
PMV BLD AUTO: 10.8 FL (ref 9.2–12.9)
POIKILOCYTOSIS BLD QL SMEAR: SLIGHT
POTASSIUM SERPL-SCNC: 3.1 MMOL/L (ref 3.5–5.1)
PROT SERPL-MCNC: 4.8 G/DL (ref 6–8.4)
PROTHROMBIN TIME: 16.2 SEC (ref 9–12.5)
RBC # BLD AUTO: 2.34 M/UL (ref 4–5.4)
RBC # BLD AUTO: 2.5 M/UL (ref 4–5.4)
SODIUM SERPL-SCNC: 132 MMOL/L (ref 136–145)
SPECIMEN OUTDATE: NORMAL
VANCOMYCIN TROUGH SERPL-MCNC: 8.5 UG/ML (ref 10–22)
WBC # BLD AUTO: 10.51 K/UL (ref 3.9–12.7)
WBC # BLD AUTO: 11.73 K/UL (ref 3.9–12.7)
WBC TOXIC VACUOLES BLD QL SMEAR: PRESENT

## 2024-01-22 PROCEDURE — G0378 HOSPITAL OBSERVATION PER HR: HCPCS

## 2024-01-22 PROCEDURE — 25000003 PHARM REV CODE 250: Performed by: STUDENT IN AN ORGANIZED HEALTH CARE EDUCATION/TRAINING PROGRAM

## 2024-01-22 PROCEDURE — 82550 ASSAY OF CK (CPK): CPT | Performed by: STUDENT IN AN ORGANIZED HEALTH CARE EDUCATION/TRAINING PROGRAM

## 2024-01-22 PROCEDURE — 25000003 PHARM REV CODE 250: Performed by: INTERNAL MEDICINE

## 2024-01-22 PROCEDURE — 84100 ASSAY OF PHOSPHORUS: CPT | Performed by: INTERNAL MEDICINE

## 2024-01-22 PROCEDURE — 85384 FIBRINOGEN ACTIVITY: CPT | Performed by: STUDENT IN AN ORGANIZED HEALTH CARE EDUCATION/TRAINING PROGRAM

## 2024-01-22 PROCEDURE — 80143 DRUG ASSAY ACETAMINOPHEN: CPT | Performed by: STUDENT IN AN ORGANIZED HEALTH CARE EDUCATION/TRAINING PROGRAM

## 2024-01-22 PROCEDURE — 85027 COMPLETE CBC AUTOMATED: CPT | Mod: 91 | Performed by: STUDENT IN AN ORGANIZED HEALTH CARE EDUCATION/TRAINING PROGRAM

## 2024-01-22 PROCEDURE — 99233 SBSQ HOSP IP/OBS HIGH 50: CPT | Mod: ,,, | Performed by: STUDENT IN AN ORGANIZED HEALTH CARE EDUCATION/TRAINING PROGRAM

## 2024-01-22 PROCEDURE — 96365 THER/PROPH/DIAG IV INF INIT: CPT | Mod: 59

## 2024-01-22 PROCEDURE — 85007 BL SMEAR W/DIFF WBC COUNT: CPT | Performed by: STUDENT IN AN ORGANIZED HEALTH CARE EDUCATION/TRAINING PROGRAM

## 2024-01-22 PROCEDURE — 85025 COMPLETE CBC W/AUTO DIFF WBC: CPT | Performed by: INTERNAL MEDICINE

## 2024-01-22 PROCEDURE — 80074 ACUTE HEPATITIS PANEL: CPT | Performed by: STUDENT IN AN ORGANIZED HEALTH CARE EDUCATION/TRAINING PROGRAM

## 2024-01-22 PROCEDURE — 25000003 PHARM REV CODE 250: Performed by: HOSPITALIST

## 2024-01-22 PROCEDURE — 85610 PROTHROMBIN TIME: CPT | Performed by: STUDENT IN AN ORGANIZED HEALTH CARE EDUCATION/TRAINING PROGRAM

## 2024-01-22 PROCEDURE — 25500020 PHARM REV CODE 255: Performed by: STUDENT IN AN ORGANIZED HEALTH CARE EDUCATION/TRAINING PROGRAM

## 2024-01-22 PROCEDURE — 85007 BL SMEAR W/DIFF WBC COUNT: CPT | Mod: 91 | Performed by: INTERNAL MEDICINE

## 2024-01-22 PROCEDURE — 36569 INSJ PICC 5 YR+ W/O IMAGING: CPT

## 2024-01-22 PROCEDURE — 36415 COLL VENOUS BLD VENIPUNCTURE: CPT | Mod: XB | Performed by: INTERNAL MEDICINE

## 2024-01-22 PROCEDURE — 85379 FIBRIN DEGRADATION QUANT: CPT | Performed by: STUDENT IN AN ORGANIZED HEALTH CARE EDUCATION/TRAINING PROGRAM

## 2024-01-22 PROCEDURE — 83735 ASSAY OF MAGNESIUM: CPT | Performed by: INTERNAL MEDICINE

## 2024-01-22 PROCEDURE — C1751 CATH, INF, PER/CENT/MIDLINE: HCPCS

## 2024-01-22 PROCEDURE — 94640 AIRWAY INHALATION TREATMENT: CPT | Mod: XB

## 2024-01-22 PROCEDURE — 80202 ASSAY OF VANCOMYCIN: CPT | Performed by: INTERNAL MEDICINE

## 2024-01-22 PROCEDURE — 80074 ACUTE HEPATITIS PANEL: CPT | Mod: 91

## 2024-01-22 PROCEDURE — 36415 COLL VENOUS BLD VENIPUNCTURE: CPT | Performed by: STUDENT IN AN ORGANIZED HEALTH CARE EDUCATION/TRAINING PROGRAM

## 2024-01-22 PROCEDURE — 96366 THER/PROPH/DIAG IV INF ADDON: CPT | Mod: 59

## 2024-01-22 PROCEDURE — 63600175 PHARM REV CODE 636 W HCPCS: Performed by: HOSPITALIST

## 2024-01-22 PROCEDURE — 85027 COMPLETE CBC AUTOMATED: CPT | Mod: 91 | Performed by: INTERNAL MEDICINE

## 2024-01-22 PROCEDURE — 84100 ASSAY OF PHOSPHORUS: CPT | Mod: 91 | Performed by: INTERNAL MEDICINE

## 2024-01-22 PROCEDURE — 63600175 PHARM REV CODE 636 W HCPCS: Performed by: INTERNAL MEDICINE

## 2024-01-22 PROCEDURE — 27000221 HC OXYGEN, UP TO 24 HOURS

## 2024-01-22 PROCEDURE — 86901 BLOOD TYPING SEROLOGIC RH(D): CPT | Performed by: STUDENT IN AN ORGANIZED HEALTH CARE EDUCATION/TRAINING PROGRAM

## 2024-01-22 PROCEDURE — 63600175 PHARM REV CODE 636 W HCPCS: Performed by: STUDENT IN AN ORGANIZED HEALTH CARE EDUCATION/TRAINING PROGRAM

## 2024-01-22 PROCEDURE — 20000000 HC ICU ROOM

## 2024-01-22 PROCEDURE — 80053 COMPREHEN METABOLIC PANEL: CPT | Performed by: INTERNAL MEDICINE

## 2024-01-22 PROCEDURE — 25000242 PHARM REV CODE 250 ALT 637 W/ HCPCS: Performed by: HOSPITALIST

## 2024-01-22 PROCEDURE — 83605 ASSAY OF LACTIC ACID: CPT | Performed by: STUDENT IN AN ORGANIZED HEALTH CARE EDUCATION/TRAINING PROGRAM

## 2024-01-22 PROCEDURE — 21400001 HC TELEMETRY ROOM

## 2024-01-22 PROCEDURE — 80053 COMPREHEN METABOLIC PANEL: CPT | Mod: 91 | Performed by: INTERNAL MEDICINE

## 2024-01-22 PROCEDURE — 25000003 PHARM REV CODE 250

## 2024-01-22 PROCEDURE — 83735 ASSAY OF MAGNESIUM: CPT | Mod: 91 | Performed by: INTERNAL MEDICINE

## 2024-01-22 PROCEDURE — 51702 INSERT TEMP BLADDER CATH: CPT

## 2024-01-22 PROCEDURE — 96361 HYDRATE IV INFUSION ADD-ON: CPT

## 2024-01-22 RX ORDER — SODIUM CHLORIDE 0.9 % (FLUSH) 0.9 %
10 SYRINGE (ML) INJECTION
Status: CANCELLED | OUTPATIENT
Start: 2024-01-22

## 2024-01-22 RX ORDER — NALOXONE HCL 0.4 MG/ML
0.02 VIAL (ML) INJECTION
Status: DISCONTINUED | OUTPATIENT
Start: 2024-01-22 | End: 2024-01-30 | Stop reason: HOSPADM

## 2024-01-22 RX ORDER — PROCHLORPERAZINE EDISYLATE 5 MG/ML
5 INJECTION INTRAMUSCULAR; INTRAVENOUS EVERY 6 HOURS PRN
Status: CANCELLED | OUTPATIENT
Start: 2024-01-22

## 2024-01-22 RX ORDER — ACETAMINOPHEN 325 MG/1
650 TABLET ORAL EVERY 4 HOURS PRN
Status: DISCONTINUED | OUTPATIENT
Start: 2024-01-22 | End: 2024-01-23

## 2024-01-22 RX ORDER — HEPARIN SODIUM 5000 [USP'U]/ML
5000 INJECTION, SOLUTION INTRAVENOUS; SUBCUTANEOUS EVERY 8 HOURS
Status: CANCELLED | OUTPATIENT
Start: 2024-01-22

## 2024-01-22 RX ORDER — IBUPROFEN 200 MG
16 TABLET ORAL
Status: DISCONTINUED | OUTPATIENT
Start: 2024-01-22 | End: 2024-01-30 | Stop reason: HOSPADM

## 2024-01-22 RX ORDER — NOREPINEPHRINE BITARTRATE/D5W 4MG/250ML
0-.2 PLASTIC BAG, INJECTION (ML) INTRAVENOUS CONTINUOUS
Status: DISCONTINUED | OUTPATIENT
Start: 2024-01-22 | End: 2024-01-22

## 2024-01-22 RX ORDER — SODIUM CHLORIDE 0.9 % (FLUSH) 0.9 %
10 SYRINGE (ML) INJECTION EVERY 12 HOURS PRN
Status: DISCONTINUED | OUTPATIENT
Start: 2024-01-22 | End: 2024-01-30 | Stop reason: HOSPADM

## 2024-01-22 RX ORDER — IBUPROFEN 200 MG
24 TABLET ORAL
Status: DISCONTINUED | OUTPATIENT
Start: 2024-01-22 | End: 2024-01-30 | Stop reason: HOSPADM

## 2024-01-22 RX ORDER — NOREPINEPHRINE BITARTRATE/D5W 4MG/250ML
0-3 PLASTIC BAG, INJECTION (ML) INTRAVENOUS CONTINUOUS
Status: DISCONTINUED | OUTPATIENT
Start: 2024-01-22 | End: 2024-01-25

## 2024-01-22 RX ORDER — ONDANSETRON HYDROCHLORIDE 2 MG/ML
4 INJECTION, SOLUTION INTRAVENOUS EVERY 8 HOURS PRN
Status: CANCELLED | OUTPATIENT
Start: 2024-01-22

## 2024-01-22 RX ORDER — VANCOMYCIN HYDROCHLORIDE 500 MG/10ML
INJECTION, POWDER, LYOPHILIZED, FOR SOLUTION INTRAVENOUS
Status: DISPENSED
Start: 2024-01-22 | End: 2024-01-22

## 2024-01-22 RX ORDER — CLOPIDOGREL BISULFATE 75 MG/1
75 TABLET ORAL DAILY
Status: DISCONTINUED | OUTPATIENT
Start: 2024-01-23 | End: 2024-01-30 | Stop reason: HOSPADM

## 2024-01-22 RX ORDER — POTASSIUM CHLORIDE 7.45 MG/ML
10 INJECTION INTRAVENOUS
Status: COMPLETED | OUTPATIENT
Start: 2024-01-22 | End: 2024-01-22

## 2024-01-22 RX ORDER — GLUCAGON 1 MG
1 KIT INJECTION
Status: DISCONTINUED | OUTPATIENT
Start: 2024-01-22 | End: 2024-01-30 | Stop reason: HOSPADM

## 2024-01-22 RX ORDER — VANCOMYCIN HYDROCHLORIDE 1 G/20ML
INJECTION, POWDER, LYOPHILIZED, FOR SOLUTION INTRAVENOUS
Status: DISPENSED
Start: 2024-01-22 | End: 2024-01-22

## 2024-01-22 RX ORDER — LEVOTHYROXINE SODIUM 50 UG/1
50 TABLET ORAL
Status: DISCONTINUED | OUTPATIENT
Start: 2024-01-23 | End: 2024-01-30 | Stop reason: HOSPADM

## 2024-01-22 RX ORDER — ENOXAPARIN SODIUM 100 MG/ML
40 INJECTION SUBCUTANEOUS EVERY 24 HOURS
Status: DISCONTINUED | OUTPATIENT
Start: 2024-01-22 | End: 2024-01-30 | Stop reason: HOSPADM

## 2024-01-22 RX ADMIN — LEVOTHYROXINE SODIUM 50 MCG: 25 TABLET ORAL at 06:01

## 2024-01-22 RX ADMIN — IPRATROPIUM BROMIDE AND ALBUTEROL SULFATE 3 ML: .5; 3 SOLUTION RESPIRATORY (INHALATION) at 11:01

## 2024-01-22 RX ADMIN — ERTAPENEM SODIUM 1 G: 1 INJECTION, POWDER, LYOPHILIZED, FOR SOLUTION INTRAMUSCULAR; INTRAVENOUS at 10:01

## 2024-01-22 RX ADMIN — BUSPIRONE HYDROCHLORIDE 15 MG: 5 TABLET ORAL at 10:01

## 2024-01-22 RX ADMIN — NOREPINEPHRINE BITARTRATE 0.08 MCG/KG/MIN: 4 INJECTION, SOLUTION INTRAVENOUS at 11:01

## 2024-01-22 RX ADMIN — NOREPINEPHRINE BITARTRATE 0.18 MCG/KG/MIN: 4 INJECTION, SOLUTION INTRAVENOUS at 01:01

## 2024-01-22 RX ADMIN — POTASSIUM CHLORIDE 10 MEQ: 10 INJECTION, SOLUTION INTRAVENOUS at 09:01

## 2024-01-22 RX ADMIN — VANCOMYCIN HYDROCHLORIDE 1250 MG: 1 INJECTION, POWDER, LYOPHILIZED, FOR SOLUTION INTRAVENOUS at 02:01

## 2024-01-22 RX ADMIN — IOHEXOL 100 ML: 350 INJECTION, SOLUTION INTRAVENOUS at 10:01

## 2024-01-22 RX ADMIN — FLUOXETINE 40 MG: 10 CAPSULE ORAL at 10:01

## 2024-01-22 RX ADMIN — POTASSIUM CHLORIDE 10 MEQ: 10 INJECTION, SOLUTION INTRAVENOUS at 11:01

## 2024-01-22 RX ADMIN — SODIUM CHLORIDE: 9 INJECTION, SOLUTION INTRAVENOUS at 04:01

## 2024-01-22 RX ADMIN — NOREPINEPHRINE BITARTRATE 0.2 MCG/KG/MIN: 4 INJECTION, SOLUTION INTRAVENOUS at 08:01

## 2024-01-22 RX ADMIN — PIPERACILLIN SODIUM AND TAZOBACTAM SODIUM 3.38 G: 3; .375 INJECTION, POWDER, FOR SOLUTION INTRAVENOUS at 04:01

## 2024-01-22 NOTE — PROVIDER TRANSFER
(Physician in Lead of Transfers)  Outside Transfer Acceptance Note / Regional Referral Center    Upon patient arrival, please contact Critical Care Medicine on call.    Referring facility: [facility]  Referring provider: MCKENZIE LIZ  Accepting facility: [facility]  Accepting provider: LYLE LONDONO  Admitting provider: JOSE CANALES  Reason for transfer:  Higher level of care  Transfer diagnosis: sepsis, pyelonephritis, elevated LFTs  Transfer specialty requested: Critical Care Medicine  Transfer specialty notified: Yes  Transfer level: NUMBER 1-5: 2  Bed type requested: ICU  Isolation status: No active isolations   Admission class or status: IP- Inpatient    Narrative     68-year-old female with a history of hypertension, hypothyroidism, depression, and COPD admitted to [facility] on January 19 with low blood pressure and low back pain.  She also noted lower abdominal pain and dysuria.  She had no fever but did have chills and weakness.  Initial evaluation was concerning for pyelonephritis.  During her stay she had increased work of breathing and was noted to have hypercapnia.  She was placed on BiPAP.  Blood and urine cultures grew E coli.  PICC line was placed during her stay. Hemoglobin has decreased from 10.4 on January 19 down to 8.0 on January 22. Creatinine has remained fairly stable, but LFTs have increased significantly.  AST increased from 769 on January 19 to 5709 on January 22.  ALT increased from 190 up to 1501.  Current medications include ertapenem and vancomycin. She is on Levophed 0.2 mcg/kg/min, and they have been unable to wean.  She is on 2-4 L supplemental oxygen.  Currently she is awake but confused.  Oxygenation is stable off NIV.  With concern for potential shock liver along with sepsis/pyelonephritis, they are requesting transfer for higher level of care.    January 22: INR 1.6, D-dimer 6.77, white blood cells 11.73, hemoglobin 8, hematocrit 25,  platelets 135, , acetaminophen 3.8, lactic acid 1, magnesium 1.7, phosphorus 3.8, sodium 132, potassium 3.1, chloride 102, CO2 20, BUN 7, creatinine 1, glucose 210, calcium 7.1, total bilirubin 0.7, AST 5709, ALT 1501  -abdominal ultrasound showed mild ascites with poor evaluation of the pancreas and abdominal aorta.  -CT abdomen and pelvis had small bilateral pleural effusions with bibasilar dependent atelectasis.  Prominent progressing left pyelonephritis without evidence of abscess formation.  Scattered ascites.  Azul catheter in place.    January 21: pH 7.25, pCO2 53, pO2 77    January 20:  Influenza negative, COVID negative  -January 20: CTA had no evidence of pulmonary embolus.  Noncalcified pulmonary nodules (largest measuring 1.5 cm in the right upper lobe of the lung).  This may represent a region of consolidation.    January 19: Blood cultures with E coli, urine culture with E coli  -CT abdomen and pelvis had left kidney appearing mildly edematous.  Mild associated infectious/inflammatory left perinephric fat stranding.  No evidence of bowel obstruction or inflammation.    Objective     Vitals: Temp: 98.4 °F (36.9 °C) (01/21/24 2330)  Pulse: 100 (01/22/24 1145)  Resp: 15 (01/22/24 1145)  BP: 113/64 (01/22/24 1145)  SpO2: (!) 93 % (01/22/24 1145)  Recent Labs: CBC:   Recent Labs   Lab 01/21/24  0329 01/22/24  0434 01/22/24  0850   WBC 11.58 10.51 11.73   HGB 9.3* 7.6* 8.0*   HCT 29.0* 23.8* 25.0*    128* 135*     CMP:   Recent Labs   Lab 01/21/24  0329 01/21/24  1123 01/22/24  0434    135* 132*   K 3.4* 3.6 3.1*    103 102   CO2 20* 20* 20*   GLU 89 141* 210*   BUN 9 8 7*   CREATININE 1.0 1.0 1.0   CALCIUM 7.2* 7.1* 7.1*   PROT 5.5* 5.3* 4.8*   ALBUMIN 2.1* 2.0* 1.8*   BILITOT 0.7 0.6 0.7   ALKPHOS 119 112 137*   AST 1,188* 1,639* 5,709*   * 495* 1,501*   ANIONGAP 12 12 10         Instructions    Admit to Critical Care Medicine    CIRO Georges MD  [facility]  Staff  Cell: [phone]

## 2024-01-22 NOTE — ASSESSMENT & PLAN NOTE
Patient was found to have thrombocytopenia, the likely etiology is secondary to sepsis/infection, will monitor the platelets Daily. Will transfuse if platelet count is <50k (if undergoing surgical procedure or have active bleeding). Hold DVT prophylaxis if platelets are <50k. The patient's platelet results have been reviewed and are listed below.  Recent Labs   Lab 01/22/24  0850   *

## 2024-01-22 NOTE — PLAN OF CARE
Problem: Gas Exchange Impaired  Goal: Optimal Gas Exchange  Outcome: Ongoing, Progressing  Intervention: Optimize Oxygenation and Ventilation  Flowsheets (Taken 1/22/2024 1119)  Airway/Ventilation Management: airway patency maintained  Head of Bed (HOB) Positioning: HOB elevated   Patient in no apparent distress. Sat's  99 % on 4 lpm, decreased to 2 lpm. PRN treatments given. BIPAP PRN and not needed at this time . Will continue to monitor.

## 2024-01-22 NOTE — ASSESSMENT & PLAN NOTE
This patient does have evidence of infective focus  My overall impression is sepsis.  Source: Urinary Tract  Antibiotics given-   Antibiotics (72h ago, onward)      Start     Stop Route Frequency Ordered    01/22/24 0915  ertapenem (INVANZ) 1 g in sodium chloride 0.9 % 100 mL IVPB (MB+)         -- IV Every 24 hours (non-standard times) 01/22/24 0800    01/22/24 0234  vancomycin (VANCOCIN) 500 mg injection        Note to Pharmacy: Created by cabinet override    01/22/24 1444   01/22/24 0234    01/22/24 0234  vancomycin (VANCOCIN) 1,000 mg injection        Note to Pharmacy: Created by cabinet override    01/22/24 1444   01/22/24 0234          Latest lactate reviewed-  Recent Labs   Lab 01/22/24  0850   LACTATE 1.0     Organ dysfunction indicated by Acute liver injury and Encephalopathy  On levophed for pressure support  Broadened to ertapenem due to continued pressor requirement and worsening liver function  Stopping vanc since patient with Ecoli in urine and blood  Repeated CT Abdomen 1/22- Results reviewed

## 2024-01-22 NOTE — SUBJECTIVE & OBJECTIVE
Interval History: Continues to have significant pressor requirement. Repeated CT scan and broadened abx. Repeat CT with worsening Pyelo. LFT's markedly elevated. Mental status decreased per nursing from prior days. Protecting airway and oriented. Requesting transfer to facility with GI due to significant transaminitis.    Review of Systems   All other systems reviewed and are negative.    Objective:     Vital Signs (Most Recent):  Temp: 98.4 °F (36.9 °C) (01/21/24 2330)  Pulse: 93 (01/22/24 1500)  Resp: 11 (01/22/24 1500)  BP: 95/63 (01/22/24 1500)  SpO2: 96 % (01/22/24 1500) Vital Signs (24h Range):  Temp:  [98.4 °F (36.9 °C)-99.3 °F (37.4 °C)] 98.4 °F (36.9 °C)  Pulse:  [] 93  Resp:  [10-29] 11  SpO2:  [89 %-100 %] 96 %  BP: ()/(56-87) 95/63     Weight: 59.5 kg (131 lb 2.8 oz)  Body mass index is 23.99 kg/m².    Intake/Output Summary (Last 24 hours) at 1/22/2024 1606  Last data filed at 1/22/2024 1144  Gross per 24 hour   Intake 3134.5 ml   Output 2700 ml   Net 434.5 ml         Physical Exam      Vitals reviewed  General: NAD, Well developed, Well Nourished  Head: NC/AT  Eyes: EOMI, DOLLY  Cardiovascular: Pulses intact distally, Regular Rate   Pulmonary: Normal Respiratory Rate, No respiratory distress  Gi: Soft, Non-tender  Extremities: Warm, No edema present  Skin: Warm, dry  Neuro: Somnolent but arouses easily, Oriented x3, No focal Deficit  Psych: Appropriate mood and affect      Significant Labs: All pertinent labs within the past 24 hours have been reviewed.    Significant Imaging: I have reviewed all pertinent imaging results/findings within the past 24 hours.

## 2024-01-22 NOTE — ASSESSMENT & PLAN NOTE
Patient with Hypercapnic and Hypoxic Respiratory failure which is Acute on chronic.  she is on home oxygen at 2 LPM. Supplemental oxygen was provided and noted- Oxygen Concentration (%):  [28-36] 28    .   Signs/symptoms of respiratory failure include- tachypnea, increased work of breathing, and respiratory distress. Contributing diagnoses includes - COPD Labs and images were reviewed. Patient Has recent ABG, which has been reviewed. Will treat underlying causes and adjust management of respiratory failure as follows- AV show pH was 7 point 3 pacer to 6 PO2 of 85 to sats 94% which shows respiratory acidosis with hypercapnia.  Patient started having respiratory difficulty after arriving to the emergency department.  Blood gas was done in showed hypercapnia patient was then placed on BiPAP 16/6.  This patient is still on BiPAP.  The families that she was having a flu-like symptom proximally 2 weeks ago.  She also was very fatigued over last few days.  She states that when she today she was very pale.  We will check over reasons for hypoxia and obtain CTA of the chest .  We will also check flu and COVID as well as CMV.

## 2024-01-22 NOTE — ASSESSMENT & PLAN NOTE
Worsening leukocytosis- Consistent with shock liver  CK mildly elevated- continue IVF  F/U hep panel  DC all medications that have tylenol  Tylenol level wnl  Requesting transfer to facility with GI

## 2024-01-22 NOTE — PROGRESS NOTES
[facility] Medicine  Progress Note    Patient Name: Bhavana Lambert  MRN: [id number]  Patient Class: IP- Inpatient   Admission Date: 1/19/2024  Length of Stay: 0 days  Attending Physician: Reynaldo Nick MD  Primary Care Provider: Pepito Jhaveri IV, MD        Subjective:     Principal Problem:Septic shock        HPI:  The patient is a 69 y/o female with PMH of HTN, COPD, and anxiety/depression, who presents with hypotension and lower back pain. Patient reported that her home health nurse noted hypotension and that she was more pale appearing. She also reported lower abdominal pain with dysuria. Patient denies any fevers but had some chills and weakness. Work up in the ER suggestive of pyelo.     Overview/Hospital Course:  Patient is a 68-year-old female that presented for lower back pain.  After being seen in the ER and diagnosed with pyelonephritis the patient developed shortness of breath and had to be placed on BiPAP.  BiPAP has been weaned off and patient now is on a nasal cannula.  Blood cultures are positive for Gram-negative meir thought to be E coli due to her urinary tract infection  CTA of the chest for the shortness of breath that she developed is negative for pulmonary emboli but positive for 1.5 cm nodule/consolidation  Patient remains on pressors of Levophed.  Requesting a PICC line for IV access  Low blood pressure thought to be related to sepsis        Interval History: Continues to have significant pressor requirement. Repeated CT scan and broadened abx. Repeat CT with worsening Pyelo. LFT's markedly elevated. Mental status decreased per nursing from prior days. Protecting airway and oriented. Requesting transfer to facility with GI due to significant transaminitis.    Review of Systems   All other systems reviewed and are negative.    Objective:     Vital Signs (Most Recent):  Temp: 98.4 °F (36.9 °C) (01/21/24 2330)  Pulse: 93 (01/22/24 1500)  Resp: 11 (01/22/24  1500)  BP: 95/63 (01/22/24 1500)  SpO2: 96 % (01/22/24 1500) Vital Signs (24h Range):  Temp:  [98.4 °F (36.9 °C)-99.3 °F (37.4 °C)] 98.4 °F (36.9 °C)  Pulse:  [] 93  Resp:  [10-29] 11  SpO2:  [89 %-100 %] 96 %  BP: ()/(56-87) 95/63     Weight: 59.5 kg (131 lb 2.8 oz)  Body mass index is 23.99 kg/m².    Intake/Output Summary (Last 24 hours) at 1/22/2024 1606  Last data filed at 1/22/2024 1144  Gross per 24 hour   Intake 3134.5 ml   Output 2700 ml   Net 434.5 ml         Physical Exam      Vitals reviewed  General: NAD, Well developed, Well Nourished  Head: NC/AT  Eyes: EOMI, DOLLY  Cardiovascular: Pulses intact distally, Regular Rate   Pulmonary: Normal Respiratory Rate, No respiratory distress  Gi: Soft, Non-tender  Extremities: Warm, No edema present  Skin: Warm, dry  Neuro: Somnolent but arouses easily, Oriented x3, No focal Deficit  Psych: Appropriate mood and affect      Significant Labs: All pertinent labs within the past 24 hours have been reviewed.    Significant Imaging: I have reviewed all pertinent imaging results/findings within the past 24 hours.    Assessment/Plan:      * Septic shock  This patient does have evidence of infective focus  My overall impression is sepsis.  Source: Urinary Tract  Antibiotics given-   Antibiotics (72h ago, onward)      Start     Stop Route Frequency Ordered    01/22/24 0915  ertapenem (INVANZ) 1 g in sodium chloride 0.9 % 100 mL IVPB (MB+)         -- IV Every 24 hours (non-standard times) 01/22/24 0800    01/22/24 0234  vancomycin (VANCOCIN) 500 mg injection        Note to Pharmacy: Created by cabinet override    01/22/24 1444   01/22/24 0234    01/22/24 0234  vancomycin (VANCOCIN) 1,000 mg injection        Note to Pharmacy: Created by cabinet override    01/22/24 1444   01/22/24 0234          Latest lactate reviewed-  Recent Labs   Lab 01/22/24  0850   LACTATE 1.0     Organ dysfunction indicated by Acute liver injury and Encephalopathy  On levophed for pressure  support  Broadened to ertapenem due to continued pressor requirement and worsening liver function  Stopping vanc since patient with Ecoli in urine and blood  Repeated CT Abdomen 1/22- Results reviewed      Thrombocytopenia  Patient was found to have thrombocytopenia, the likely etiology is secondary to sepsis/infection, will monitor the platelets Daily. Will transfuse if platelet count is <50k (if undergoing surgical procedure or have active bleeding). Hold DVT prophylaxis if platelets are <50k. The patient's platelet results have been reviewed and are listed below.  Recent Labs   Lab 01/22/24  0850   *         Transaminitis  Worsening leukocytosis- Consistent with shock liver  CK mildly elevated- continue IVF  F/U hep panel  DC all medications that have tylenol  Tylenol level wnl  Requesting transfer to facility with GI       H/O: stroke  Restart ASA/Plavix tomorrow if hb stable and patient with no signs of bleeding      Emphysema/COPD  Nebs and O2 PRN         Hypothyroid  Continue levothyroxine       Pyelonephritis  See Septic shock          Anemia of chronic disease  Patient's anemia is currently controlled. Has not received any PRBCs to date.   Current CBC reviewed-   Lab Results   Component Value Date    HGB 8.0 (L) 01/22/2024    HCT 25.0 (L) 01/22/2024     Monitor serial CBC and transfuse if patient becomes hemodynamically unstable, symptomatic or H/H drops below 7/21.    Hold lovenox, ASA, Plavix today with drop in Hb.  Type and screen sent 1/22/24    Chronic respiratory failure with hypoxia  Patient with Hypercapnic and Hypoxic Respiratory failure which is Acute on chronic.  she is on home oxygen at 2 LPM. Supplemental oxygen was provided and noted- Oxygen Concentration (%):  [28-36] 28    .   Signs/symptoms of respiratory failure include- tachypnea, increased work of breathing, and respiratory distress. Contributing diagnoses includes - COPD Labs and images were reviewed. Patient Has recent ABG,  which has been reviewed. Will treat underlying causes and adjust management of respiratory failure as follows- AV show pH was 7 point 3 pacer to 6 PO2 of 85 to sats 94% which shows respiratory acidosis with hypercapnia.  Patient started having respiratory difficulty after arriving to the emergency department.  Blood gas was done in showed hypercapnia patient was then placed on BiPAP 16/6.  This patient is still on BiPAP.  The families that she was having a flu-like symptom proximally 2 weeks ago.  She also was very fatigued over last few days.  She states that when she today she was very pale.  We will check over reasons for hypoxia and obtain CTA of the chest .  We will also check flu and COVID as well as CMV.    Primary hypertension  Hold home anti-hypertensives in setting of septic shock    Depression  Patient has  unknown  depression which is unknown and is currently controlled. Will Continue anti-depressant medications. We will not consult psychiatry at this time. Patient does not display psychosis at this time. Continue to monitor closely and adjust plan of care as needed.        Anxiety  Continue buspirone         VTE Risk Mitigation (From admission, onward)           Ordered     IP VTE HIGH RISK PATIENT  Once         01/19/24 2218     Place sequential compression device  Until discontinued         01/19/24 2218                    Discharge Planning   CHLOE: 1/25/2024     Code Status: Full Code   Is the patient medically ready for discharge?:     Reason for patient still in hospital (select all that apply): Treatment                     Reynaldo Nick MD  Department of Hospital Medicine   [facility] - Intensive Care

## 2024-01-22 NOTE — PROGRESS NOTES
Pharmacokinetic Assessment Follow Up: IV Vancomycin    Vancomycin serum concentration assessment(s):    The trough level was drawn correctly and can be used to guide therapy at this time. The measurement is below the desired definitive target range of 15 to 20 mcg/mL.    Vancomycin Regimen Plan:    Change regimen to Vancomycin 1250 mg IV every 24 hours with next serum trough concentration measured at 02:00 prior to 3rd dose on 1/24    Drug levels (last 3 results):  Recent Labs   Lab Result Units 01/22/24  0122   Vancomycin-Trough ug/mL 8.5*       Pharmacy will continue to follow and monitor vancomycin.    Please contact pharmacy for questions regarding this assessment.    Thank you for the consult,   Philip Butcher       Patient brief summary:  Bhavana Lambert is a 68 y.o. female initiated on antimicrobial therapy with IV Vancomycin for treatment of urinary tract infection      Drug Allergies:   Review of patient's allergies indicates:  No Known Allergies    Actual Body Weight:   59.5kg    Renal Function:   Estimated Creatinine Clearance: 42.6 mL/min (based on SCr of 1 mg/dL).,     Dialysis Method (if applicable):  N/A    CBC (last 72 hours):  Recent Labs   Lab Result Units 01/19/24  1738 01/20/24  0248 01/20/24  1654 01/21/24  0329   WBC K/uL 9.89 8.38 14.20* 11.58   Hemoglobin g/dL 10.4* 8.3* 9.5* 9.3*   Hematocrit % 31.3* 25.7* 29.5* 29.0*   Platelets K/uL 230 174 195 181   Gran % % 85.4* 87.8* 89.9* 87.2*   Lymph % % 9.6* 6.4* 5.2* 6.3*   Mono % % 4.3 4.5 3.6* 5.1   Eosinophil % % 0.0 0.1 0.4 0.5   Basophil % % 0.3 0.2 0.4 0.2   Differential Method  Automated Automated Automated Automated       Metabolic Panel (last 72 hours):  Recent Labs   Lab Result Units 01/19/24  1738 01/19/24  1750 01/20/24  0248 01/21/24  0329 01/21/24  1123   Sodium mmol/L 133*  --  139 136 135*   Potassium mmol/L 2.8*  --  2.8* 3.4* 3.6   Chloride mmol/L 90*  --  105 104 103   CO2 mmol/L 28  --  21* 20* 20*   Glucose mg/dL 128*  --   85 89 141*   Glucose, UA   --  Negative  --   --   --    BUN mg/dL 17  --  14 9 8   Creatinine mg/dL 1.0  --  0.9 1.0 1.0   Creatinine, Urine mg/dL  --  122.3  --   --   --    Albumin g/dL 2.9*  --  2.1* 2.1* 2.0*   Total Bilirubin mg/dL 0.7  --  0.5 0.7 0.6   Alkaline Phosphatase U/L 142*  --  101 119 112   AST U/L 769*  --  536* 1,188* 1,639*   ALT U/L 190*  --  147* 361* 495*   Magnesium mg/dL 1.4*  --  1.9 1.8 2.0   Phosphorus mg/dL  --   --  1.0* 4.0  --        Vancomycin Administrations:  vancomycin given in the last 96 hours                     vancomycin 750 mg in dextrose 5 % (D5W) 250 mL IVPB (Vial-Mate) (mg) 750 mg New Bag 01/21/24 0317    vancomycin (VANCOCIN) 1,000 mg in dextrose 5 % (D5W) 250 mL IVPB (Vial-Mate) (mg) 1,000 mg New Bag 01/20/24 0311                    Microbiologic Results:  Microbiology Results (last 7 days)       Procedure Component Value Units Date/Time    Blood culture #1 **CANNOT BE ORDERED STAT** [[id number]]  (Abnormal) Collected: 01/19/24 1832    Order Status: Completed Specimen: Blood from Peripheral, Antecubital, Left Updated: 01/21/24 1159     Blood Culture, Routine Gram stain aer bottle: Gram negative rods      Gram stain aly bottle: Gram negative rods      Results called to and read back by: Yaw Hutson RN  01/20/2024  11:21      ESCHERICHIA COLI  Susceptibility pending      Blood culture #2 **CANNOT BE ORDERED STAT** [[id number]]  (Abnormal) Collected: 01/19/24 1925    Order Status: Completed Specimen: Blood from Peripheral, Forearm, Left Updated: 01/21/24 0653     Blood Culture, Routine Gram stain aly bottle: Gram negative rods      Positive results previously called 01/20/2024      Gram stain aer bottle: Gram negative rods      Positive results previously called 01/20/2024      GRAM NEGATIVE RAYNE  Identification pending      Urine culture [[id number]]  (Abnormal) Collected: 01/19/24 0986    Order Status: Completed Specimen: Urine Updated: 01/20/24 7760     Urine  Culture, Routine PRESUMPTIVE E COLI  >100,000 cfu/ml  Identification and susceptibility pending      Narrative:      Preferred Collection Type->Urine, Clean Catch  Specimen Source->Urine    Rapid Organism ID by PCR (from Blood culture) [[id number]]  (Abnormal) Collected: 01/19/24 1832    Order Status: Completed Updated: 01/20/24 1235     Enterococcus faecalis Not Detected     Enterococcus faecium Not Detected     Listeria monocytogenes Not Detected     Staphylococcus spp. Not Detected     Staphylococcus aureus Not Detected     Staphylococcus epidermidis Not Detected     Staphylococcus lugdunensis Not Detected     Streptococcus species Not Detected     Streptococcus agalactiae Not Detected     Streptococcus pneumoniae Not Detected     Streptococcus pyogenes Not Detected     Acinetobacter calcoaceticus/baumannii complex Not Detected     Bacteroides fragilis Not Detected     Enterobacterales See species for ID     Enterobacter cloacae complex Not Detected     Escherichia coli Detected     Klebsiella aerogenes Not Detected     Klebsiella oxytoca Not Detected     Klebsiella pneumoniae group Not Detected     Proteus Not Detected     Salmonella sp Not Detected     Serratia marcescens Not Detected     Haemophilus influenzae Not Detected     Neisseria meningtidis Not Detected     Pseudomonas aeruginosa Not Detected     Stenotrophomonas maltophilia Not Detected     Candida albicans Not Detected     Candida auris Not Detected     Candida glabrata Not Detected     Candida krusei Not Detected     Candida parapsilosis Not Detected     Candida tropicalis Not Detected     Cryptococcus neoformans/gattii Not Detected     CTX-M (ESBL ) Not Detected     IMP (Carbapenem resistant) Not Detected     KPC resistance gene (Carbapenem resistant) Not Detected     mcr-1  Not Detected     mec A/C  Test Not Applicable     mec A/C and MREJ (MRSA) gene Test Not Applicable     NDM (Carbapenem resistant) Not Detected     OXA-48-like  (Carbapenem resistant) Not Detected     van A/B (VRE gene) Test Not Applicable     VIM (Carbapenem resistant) Not Detected    Influenza A & B by Molecular [[id number]] Collected: 01/20/24 0938    Order Status: Completed Specimen: Nasopharyngeal Swab Updated: 01/20/24 1040     Influenza A, Molecular Negative     Influenza B, Molecular Negative     Flu A & B Source Nasal Swab

## 2024-01-22 NOTE — PLAN OF CARE
Problem: Adult Inpatient Plan of Care  Goal: Plan of Care Review  Outcome: Ongoing, Progressing  Goal: Patient-Specific Goal (Individualized)  Outcome: Ongoing, Progressing  Goal: Absence of Hospital-Acquired Illness or Injury  Outcome: Ongoing, Progressing  Goal: Optimal Comfort and Wellbeing  Outcome: Ongoing, Progressing  Intervention: Monitor Pain and Promote Comfort  Flowsheets (Taken 1/22/2024 0045)  Pain Management Interventions: medication offered  Goal: Readiness for Transition of Care  Outcome: Ongoing, Progressing     Problem: Infection  Goal: Absence of Infection Signs and Symptoms  Outcome: Ongoing, Progressing     Problem: Skin Injury Risk Increased  Goal: Skin Health and Integrity  Outcome: Ongoing, Progressing     Problem: Fluid and Electrolyte Imbalance (Acute Kidney Injury/Impairment)  Goal: Fluid and Electrolyte Balance  Outcome: Ongoing, Progressing     Problem: Nutrition Impaired (Sepsis/Septic Shock)  Goal: Optimal Nutrition Intake  Outcome: Ongoing, Progressing

## 2024-01-22 NOTE — ASSESSMENT & PLAN NOTE
Patient's anemia is currently controlled. Has not received any PRBCs to date.   Current CBC reviewed-   Lab Results   Component Value Date    HGB 8.0 (L) 01/22/2024    HCT 25.0 (L) 01/22/2024     Monitor serial CBC and transfuse if patient becomes hemodynamically unstable, symptomatic or H/H drops below 7/21.    Hold lovenox, ASA, Plavix today with drop in Hb.  Type and screen sent 1/22/24

## 2024-01-22 NOTE — PLAN OF CARE
Problem: Adult Inpatient Plan of Care  Goal: Plan of Care Review  Outcome: Ongoing, Progressing  Goal: Patient-Specific Goal (Individualized)  Outcome: Ongoing, Progressing  Goal: Absence of Hospital-Acquired Illness or Injury  Outcome: Ongoing, Progressing  Goal: Optimal Comfort and Wellbeing  Outcome: Ongoing, Progressing  Goal: Readiness for Transition of Care  Outcome: Ongoing, Progressing     Problem: Infection  Goal: Absence of Infection Signs and Symptoms  Outcome: Ongoing, Progressing     Problem: Skin Injury Risk Increased  Goal: Skin Health and Integrity  Outcome: Ongoing, Progressing     Problem: Fluid and Electrolyte Imbalance (Acute Kidney Injury/Impairment)  Goal: Fluid and Electrolyte Balance  Outcome: Ongoing, Progressing     Problem: Oral Intake Inadequate (Acute Kidney Injury/Impairment)  Goal: Optimal Nutrition Intake  Outcome: Ongoing, Progressing     Problem: Renal Function Impairment (Acute Kidney Injury/Impairment)  Goal: Effective Renal Function  Outcome: Ongoing, Progressing     Problem: Adjustment to Illness (Sepsis/Septic Shock)  Goal: Optimal Coping  Outcome: Ongoing, Progressing     Problem: Bleeding (Sepsis/Septic Shock)  Goal: Absence of Bleeding  Outcome: Ongoing, Progressing     Problem: Glycemic Control Impaired (Sepsis/Septic Shock)  Goal: Blood Glucose Level Within Desired Range  Outcome: Ongoing, Progressing     Problem: Infection Progression (Sepsis/Septic Shock)  Goal: Absence of Infection Signs and Symptoms  Outcome: Ongoing, Progressing     Problem: Nutrition Impaired (Sepsis/Septic Shock)  Goal: Optimal Nutrition Intake  Outcome: Ongoing, Progressing     Problem: Gas Exchange Impaired  Goal: Optimal Gas Exchange  Outcome: Ongoing, Progressing

## 2024-01-22 NOTE — PLAN OF CARE
[facility] Intensive Care  Initial Discharge Assessment       Assessment completed via phone with Ashley Miranda, daughter ([phone]), and all information on FaceSheet confirmed, including demographics, PCP, pharmacy and insurance.  Pt has not addressed advanced directives, and her NOK are her two daughters listed on FS.  She lives in her own home, and adult daughter, daughter's  and grandchildren live with her.  Her PCP is Dr. Jhaveri, and last appointment was about a week ago.   Her preferred pharmacy is [facility].   She has [facility] and DME listed below.  Daughter unsure of what agency Home [facility] is provided by.  Her daughters provide all transportation when needed.  Plan for discharge is to return home with HH.    Primary Care Provider: Pepito Jhaveri IV, MD    Admission Diagnosis: Hypokalemia [E87.6]  Hypomagnesemia [E83.42]  SOB (shortness of breath) [R06.02]  Acute pyelonephritis [N10]  Hypotension [I95.9]    Admission Date: 1/19/2024  Expected Discharge Date: 1/25/2024    Transition of Care Barriers: None    Payor: [employer] MANAGED MEDICARE / Plan: [employer] H[employer] PPO SPECIAL NEEDS / Product Type: Medicare Advantage /     Extended Emergency Contact Information  Primary Emergency Contact: ASHLEY MIRANDA  Mobile Phone: [phone]  Relation: Daughter  Preferred language: English   needed? No  Secondary Emergency Contact: Mellisa Miranda  Mobile Phone: [phone]  Relation: Daughter  Preferred language: English    Discharge Plan A: Home Health  Discharge Plan B: Home Health      [facility] [place] [address]  Phone: [phone] Fax: [phone]      Initial Assessment (most recent)       Adult Discharge Assessment - 01/22/24 1720          Discharge Assessment    Assessment Type Discharge Planning Assessment     Confirmed/corrected address, phone number and insurance Yes     Confirmed Demographics Correct on  "Facesheet     Source of Information family;health record     If unable to respond/provide information was family/caregiver contacted? Yes     Contact Name/Number Margarito Miranda daughter ([phone])     When was your last doctors appointment? --   Daughter reports "about a week ago."    Communicated CHLOE with patient/caregiver Date not available/Unable to determine     People in Home child(jie), adult;grandchild(jie)     Do you expect to return to your current living situation? Yes     Do you have help at home or someone to help you manage your care at home? Yes     Who are your caregiver(s) and their phone number(s)? Margarito Miranda daughter ([phone])     Prior to hospitilization cognitive status: Alert/Oriented     Current cognitive status: Unable to Assess     Walking or Climbing Stairs Difficulty yes     Walking or Climbing Stairs ambulation difficulty, requires equipment     Dressing/Bathing Difficulty yes     Dressing/Bathing bathing difficulty, assistance 1 person;bathing difficulty, requires equipment;dressing difficulty, requires equipment;dressing difficulty, assistance 1 person     Equipment Currently Used at Home walker, rolling;wheelchair;bedside commode;shower chair;oxygen     Readmission within 30 days? No     Patient currently being followed by outpatient case management? No     Do you currently have service(s) that help you manage your care at home? Yes     Name and Contact number of agency [facility]     Is the pt/caregiver preference to resume services with current agency Yes     Do you take prescription medications? Yes     Do you have prescription coverage? Yes     Do you have any problems affording any of your prescribed medications? No     Is the patient taking medications as prescribed? yes     Who is going to help you get home at discharge? Margarito Miranda, daughter ([phone])     How do you get to doctors appointments? family or friend will provide     Are you on dialysis? No "     Do you take coumadin? No     Discharge Plan A Home Health     Discharge Plan B Home Health     DME Needed Upon Discharge  none     Discharge Plan discussed with: Adult children     Transition of Care Barriers None

## 2024-01-23 LAB
ALBUMIN SERPL BCP-MCNC: 1.7 G/DL (ref 3.5–5.2)
ALBUMIN SERPL BCP-MCNC: 1.8 G/DL (ref 3.5–5.2)
ALBUMIN SERPL BCP-MCNC: 1.8 G/DL (ref 3.5–5.2)
ALP SERPL-CCNC: 149 U/L (ref 55–135)
ALP SERPL-CCNC: 149 U/L (ref 55–135)
ALP SERPL-CCNC: 150 U/L (ref 55–135)
ALT SERPL W/O P-5'-P-CCNC: 1096 U/L (ref 10–44)
ALT SERPL W/O P-5'-P-CCNC: 1096 U/L (ref 10–44)
ALT SERPL W/O P-5'-P-CCNC: 946 U/L (ref 10–44)
ANA SER QL IF: NORMAL
ANION GAP SERPL CALC-SCNC: 5 MMOL/L (ref 8–16)
ANION GAP SERPL CALC-SCNC: 6 MMOL/L (ref 8–16)
ANION GAP SERPL CALC-SCNC: 6 MMOL/L (ref 8–16)
ANISOCYTOSIS BLD QL SMEAR: SLIGHT
APAP SERPL-MCNC: <3 UG/ML (ref 10–20)
ASCENDING AORTA: 3.1 CM
AST SERPL-CCNC: 1768 U/L (ref 10–40)
AST SERPL-CCNC: 2487 U/L (ref 10–40)
AST SERPL-CCNC: 2487 U/L (ref 10–40)
AV INDEX (PROSTH): 1.08
AV MEAN GRADIENT: 4 MMHG
AV PEAK GRADIENT: 6 MMHG
AV VALVE AREA BY VELOCITY RATIO: 3.05 CM²
AV VALVE AREA: 3.48 CM²
AV VELOCITY RATIO: 0.94
BACTERIA #/AREA URNS AUTO: ABNORMAL /HPF
BASOPHILS # BLD AUTO: 0.02 K/UL (ref 0–0.2)
BASOPHILS # BLD AUTO: ABNORMAL K/UL (ref 0–0.2)
BASOPHILS NFR BLD: 0 % (ref 0–1.9)
BASOPHILS NFR BLD: 0.2 % (ref 0–1.9)
BILIRUB DIRECT SERPL-MCNC: 0.6 MG/DL (ref 0.1–0.3)
BILIRUB SERPL-MCNC: 0.7 MG/DL (ref 0.1–1)
BILIRUB SERPL-MCNC: 0.9 MG/DL (ref 0.1–1)
BILIRUB SERPL-MCNC: 0.9 MG/DL (ref 0.1–1)
BILIRUB UR QL STRIP: NEGATIVE
BSA FOR ECHO PROCEDURE: 1.61 M2
BUN SERPL-MCNC: 5 MG/DL (ref 8–23)
BURR CELLS BLD QL SMEAR: ABNORMAL
CALCIUM SERPL-MCNC: 7.5 MG/DL (ref 8.7–10.5)
CALCIUM SERPL-MCNC: 7.6 MG/DL (ref 8.7–10.5)
CALCIUM SERPL-MCNC: 7.8 MG/DL (ref 8.7–10.5)
CERULOPLASMIN SERPL-MCNC: 29 MG/DL (ref 15–45)
CHLORIDE SERPL-SCNC: 103 MMOL/L (ref 95–110)
CHLORIDE SERPL-SCNC: 107 MMOL/L (ref 95–110)
CHLORIDE SERPL-SCNC: 108 MMOL/L (ref 95–110)
CLARITY UR REFRACT.AUTO: CLEAR
CMV DNA SPEC QL NAA+PROBE: NORMAL
CMV IGG SERPL QL IA: REACTIVE
CO2 SERPL-SCNC: 23 MMOL/L (ref 23–29)
CO2 SERPL-SCNC: 24 MMOL/L (ref 23–29)
CO2 SERPL-SCNC: 25 MMOL/L (ref 23–29)
COLOR UR AUTO: YELLOW
CREAT SERPL-MCNC: 0.9 MG/DL (ref 0.5–1.4)
CREAT SERPL-MCNC: 0.9 MG/DL (ref 0.5–1.4)
CREAT SERPL-MCNC: 1 MG/DL (ref 0.5–1.4)
CV ECHO LV RWT: 0.25 CM
CYTOMEGALOVIRUS PCR, QUANT: NOT DETECTED IU/ML
DIFFERENTIAL METHOD BLD: ABNORMAL
DIFFERENTIAL METHOD BLD: ABNORMAL
DOP CALC AO PEAK VEL: 1.2 M/S
DOP CALC AO VTI: 18.61 CM
DOP CALC LVOT AREA: 3.2 CM2
DOP CALC LVOT DIAMETER: 2.03 CM
DOP CALC LVOT PEAK VEL: 1.13 M/S
DOP CALC LVOT STROKE VOLUME: 64.8 CM3
DOP CALCLVOT PEAK VEL VTI: 20.03 CM
E WAVE DECELERATION TIME: 126.28 MSEC
E/A RATIO: 0.8
E/E' RATIO: 7 M/S
ECHO LV POSTERIOR WALL: 0.66 CM (ref 0.6–1.1)
EOSINOPHIL # BLD AUTO: 0.2 K/UL (ref 0–0.5)
EOSINOPHIL # BLD AUTO: ABNORMAL K/UL (ref 0–0.5)
EOSINOPHIL NFR BLD: 2 % (ref 0–8)
EOSINOPHIL NFR BLD: 2.1 % (ref 0–8)
ERYTHROCYTE [DISTWIDTH] IN BLOOD BY AUTOMATED COUNT: 13.1 % (ref 11.5–14.5)
ERYTHROCYTE [DISTWIDTH] IN BLOOD BY AUTOMATED COUNT: 13.2 % (ref 11.5–14.5)
EST. GFR  (NO RACE VARIABLE): >60 ML/MIN/1.73 M^2
FRACTIONAL SHORTENING: 45 % (ref 28–44)
GIANT PLATELETS BLD QL SMEAR: PRESENT
GLUCOSE SERPL-MCNC: 109 MG/DL (ref 70–110)
GLUCOSE SERPL-MCNC: 123 MG/DL (ref 70–110)
GLUCOSE SERPL-MCNC: 188 MG/DL (ref 70–110)
GLUCOSE UR QL STRIP: NEGATIVE
HAV IGM SERPL QL IA: NORMAL
HBV CORE IGM SERPL QL IA: NORMAL
HBV SURFACE AG SERPL QL IA: NORMAL
HCT VFR BLD AUTO: 24.3 % (ref 37–48.5)
HCT VFR BLD AUTO: 25.4 % (ref 37–48.5)
HCV AB SERPL QL IA: NORMAL
HGB BLD-MCNC: 7.7 G/DL (ref 12–16)
HGB BLD-MCNC: 8 G/DL (ref 12–16)
HGB UR QL STRIP: ABNORMAL
HYALINE CASTS UR QL AUTO: 3 /LPF
HYPOCHROMIA BLD QL SMEAR: ABNORMAL
IMM GRANULOCYTES # BLD AUTO: 0.05 K/UL (ref 0–0.04)
IMM GRANULOCYTES # BLD AUTO: ABNORMAL K/UL (ref 0–0.04)
IMM GRANULOCYTES NFR BLD AUTO: 0.6 % (ref 0–0.5)
IMM GRANULOCYTES NFR BLD AUTO: ABNORMAL % (ref 0–0.5)
INR PPP: 1.5 (ref 0.8–1.2)
INTERVENTRICULAR SEPTUM: 0.65 CM (ref 0.6–1.1)
KETONES UR QL STRIP: NEGATIVE
LA MAJOR: 4.96 CM
LA MINOR: 4.58 CM
LA WIDTH: 2.95 CM
LEFT ATRIUM SIZE: 3.19 CM
LEFT ATRIUM VOLUME INDEX MOD: 21.8 ML/M2
LEFT ATRIUM VOLUME INDEX: 24 ML/M2
LEFT ATRIUM VOLUME MOD: 34.59 CM3
LEFT ATRIUM VOLUME: 38.09 CM3
LEFT INTERNAL DIMENSION IN SYSTOLE: 2.9 CM (ref 2.1–4)
LEFT VENTRICLE DIASTOLIC VOLUME INDEX: 85.89 ML/M2
LEFT VENTRICLE DIASTOLIC VOLUME: 136.56 ML
LEFT VENTRICLE MASS INDEX: 74 G/M2
LEFT VENTRICLE SYSTOLIC VOLUME INDEX: 20.3 ML/M2
LEFT VENTRICLE SYSTOLIC VOLUME: 32.35 ML
LEFT VENTRICULAR INTERNAL DIMENSION IN DIASTOLE: 5.32 CM (ref 3.5–6)
LEFT VENTRICULAR MASS: 117.8 G
LEUKOCYTE ESTERASE UR QL STRIP: ABNORMAL
LV LATERAL E/E' RATIO: 5.92 M/S
LV SEPTAL E/E' RATIO: 8.56 M/S
LYMPHOCYTES # BLD AUTO: 0.7 K/UL (ref 1–4.8)
LYMPHOCYTES # BLD AUTO: ABNORMAL K/UL (ref 1–4.8)
LYMPHOCYTES NFR BLD: 2 % (ref 18–48)
LYMPHOCYTES NFR BLD: 8.8 % (ref 18–48)
MAGNESIUM SERPL-MCNC: 1.5 MG/DL (ref 1.6–2.6)
MAGNESIUM SERPL-MCNC: 1.6 MG/DL (ref 1.6–2.6)
MAGNESIUM SERPL-MCNC: 2.2 MG/DL (ref 1.6–2.6)
MCH RBC QN AUTO: 31.7 PG (ref 27–31)
MCH RBC QN AUTO: 32.3 PG (ref 27–31)
MCHC RBC AUTO-ENTMCNC: 31.5 G/DL (ref 32–36)
MCHC RBC AUTO-ENTMCNC: 31.7 G/DL (ref 32–36)
MCV RBC AUTO: 100 FL (ref 82–98)
MCV RBC AUTO: 102 FL (ref 82–98)
MICROSCOPIC COMMENT: ABNORMAL
MONOCYTES # BLD AUTO: 0.8 K/UL (ref 0.3–1)
MONOCYTES # BLD AUTO: ABNORMAL K/UL (ref 0.3–1)
MONOCYTES NFR BLD: 10.3 % (ref 4–15)
MONOCYTES NFR BLD: 6 % (ref 4–15)
MV PEAK A VEL: 0.96 M/S
MV PEAK E VEL: 0.77 M/S
MV STENOSIS PRESSURE HALF TIME: 36.62 MS
MV VALVE AREA P 1/2 METHOD: 6.01 CM2
NEUTROPHILS # BLD AUTO: 6.3 K/UL (ref 1.8–7.7)
NEUTROPHILS NFR BLD: 78 % (ref 38–73)
NEUTROPHILS NFR BLD: 88 % (ref 38–73)
NEUTS BAND NFR BLD MANUAL: 2 %
NITRITE UR QL STRIP: NEGATIVE
NRBC BLD-RTO: 0 /100 WBC
NRBC BLD-RTO: 0 /100 WBC
OVALOCYTES BLD QL SMEAR: ABNORMAL
PH UR STRIP: 7 [PH] (ref 5–8)
PHOSPHATE SERPL-MCNC: 4.1 MG/DL (ref 2.7–4.5)
PHOSPHATE SERPL-MCNC: 4.3 MG/DL (ref 2.7–4.5)
PISA TR MAX VEL: 3.07 M/S
PLATELET # BLD AUTO: 111 K/UL (ref 150–450)
PLATELET # BLD AUTO: 112 K/UL (ref 150–450)
PLATELET BLD QL SMEAR: ABNORMAL
PMV BLD AUTO: 10.9 FL (ref 9.2–12.9)
PMV BLD AUTO: 11.1 FL (ref 9.2–12.9)
POCT GLUCOSE: 158 MG/DL (ref 70–110)
POIKILOCYTOSIS BLD QL SMEAR: SLIGHT
POLYCHROMASIA BLD QL SMEAR: ABNORMAL
POTASSIUM SERPL-SCNC: 2.8 MMOL/L (ref 3.5–5.1)
POTASSIUM SERPL-SCNC: 3.3 MMOL/L (ref 3.5–5.1)
POTASSIUM SERPL-SCNC: 3.8 MMOL/L (ref 3.5–5.1)
PROT SERPL-MCNC: 4.9 G/DL (ref 6–8.4)
PROT UR QL STRIP: ABNORMAL
PROTHROMBIN TIME: 15.8 SEC (ref 9–12.5)
RA MAJOR: 4.36 CM
RA PRESSURE ESTIMATED: 8 MMHG
RA WIDTH: 3.02 CM
RBC # BLD AUTO: 2.43 M/UL (ref 4–5.4)
RBC # BLD AUTO: 2.48 M/UL (ref 4–5.4)
RBC #/AREA URNS AUTO: 1 /HPF (ref 0–4)
RIGHT VENTRICULAR END-DIASTOLIC DIMENSION: 4.5 CM
RV TB RVSP: 11 MMHG
SINUS: 3.55 CM
SODIUM SERPL-SCNC: 133 MMOL/L (ref 136–145)
SODIUM SERPL-SCNC: 136 MMOL/L (ref 136–145)
SODIUM SERPL-SCNC: 138 MMOL/L (ref 136–145)
SP GR UR STRIP: 1.01 (ref 1–1.03)
SPHEROCYTES BLD QL SMEAR: ABNORMAL
STJ: 3 CM
TARGETS BLD QL SMEAR: ABNORMAL
TDI LATERAL: 0.13 M/S
TDI SEPTAL: 0.09 M/S
TDI: 0.11 M/S
TR MAX PG: 38 MMHG
TRICUSPID ANNULAR PLANE SYSTOLIC EXCURSION: 2.73 CM
TSH SERPL DL<=0.005 MIU/L-ACNC: 1.66 UIU/ML (ref 0.4–4)
TV REST PULMONARY ARTERY PRESSURE: 46 MMHG
URN SPEC COLLECT METH UR: ABNORMAL
WBC # BLD AUTO: 8.03 K/UL (ref 3.9–12.7)
WBC # BLD AUTO: 8.56 K/UL (ref 3.9–12.7)
WBC #/AREA URNS AUTO: 3 /HPF (ref 0–5)
Z-SCORE OF LEFT VENTRICULAR DIMENSION IN END DIASTOLE: 1.59
Z-SCORE OF LEFT VENTRICULAR DIMENSION IN END SYSTOLE: 0.23

## 2024-01-23 PROCEDURE — 25000003 PHARM REV CODE 250: Performed by: STUDENT IN AN ORGANIZED HEALTH CARE EDUCATION/TRAINING PROGRAM

## 2024-01-23 PROCEDURE — 25000242 PHARM REV CODE 250 ALT 637 W/ HCPCS

## 2024-01-23 PROCEDURE — 94761 N-INVAS EAR/PLS OXIMETRY MLT: CPT

## 2024-01-23 PROCEDURE — S4991 NICOTINE PATCH NONLEGEND: HCPCS

## 2024-01-23 PROCEDURE — 86038 ANTINUCLEAR ANTIBODIES: CPT

## 2024-01-23 PROCEDURE — 94640 AIRWAY INHALATION TREATMENT: CPT

## 2024-01-23 PROCEDURE — 83735 ASSAY OF MAGNESIUM: CPT

## 2024-01-23 PROCEDURE — 25000003 PHARM REV CODE 250

## 2024-01-23 PROCEDURE — 63600175 PHARM REV CODE 636 W HCPCS

## 2024-01-23 PROCEDURE — 86015 ACTIN ANTIBODY EACH: CPT

## 2024-01-23 PROCEDURE — 82390 ASSAY OF CERULOPLASMIN: CPT

## 2024-01-23 PROCEDURE — 63600175 PHARM REV CODE 636 W HCPCS: Performed by: INTERNAL MEDICINE

## 2024-01-23 PROCEDURE — 87040 BLOOD CULTURE FOR BACTERIA: CPT | Mod: 59

## 2024-01-23 PROCEDURE — 63600175 PHARM REV CODE 636 W HCPCS: Performed by: STUDENT IN AN ORGANIZED HEALTH CARE EDUCATION/TRAINING PROGRAM

## 2024-01-23 PROCEDURE — 99291 CRITICAL CARE FIRST HOUR: CPT | Mod: ,,,

## 2024-01-23 PROCEDURE — 80053 COMPREHEN METABOLIC PANEL: CPT

## 2024-01-23 PROCEDURE — 80143 DRUG ASSAY ACETAMINOPHEN: CPT

## 2024-01-23 PROCEDURE — 80048 BASIC METABOLIC PNL TOTAL CA: CPT | Mod: XB

## 2024-01-23 PROCEDURE — 85610 PROTHROMBIN TIME: CPT

## 2024-01-23 PROCEDURE — 81001 URINALYSIS AUTO W/SCOPE: CPT

## 2024-01-23 PROCEDURE — 84100 ASSAY OF PHOSPHORUS: CPT

## 2024-01-23 PROCEDURE — 85025 COMPLETE CBC W/AUTO DIFF WBC: CPT

## 2024-01-23 PROCEDURE — 99900035 HC TECH TIME PER 15 MIN (STAT)

## 2024-01-23 PROCEDURE — 27000221 HC OXYGEN, UP TO 24 HOURS

## 2024-01-23 PROCEDURE — 20000000 HC ICU ROOM

## 2024-01-23 PROCEDURE — 84443 ASSAY THYROID STIM HORMONE: CPT | Performed by: STUDENT IN AN ORGANIZED HEALTH CARE EDUCATION/TRAINING PROGRAM

## 2024-01-23 PROCEDURE — 83735 ASSAY OF MAGNESIUM: CPT | Mod: 91

## 2024-01-23 PROCEDURE — 25000003 PHARM REV CODE 250: Performed by: INTERNAL MEDICINE

## 2024-01-23 PROCEDURE — 99223 1ST HOSP IP/OBS HIGH 75: CPT | Mod: ,,, | Performed by: INTERNAL MEDICINE

## 2024-01-23 RX ORDER — AMLODIPINE AND BENAZEPRIL HYDROCHLORIDE 5; 10 MG/1; MG/1
1 CAPSULE ORAL DAILY
COMMUNITY
Start: 2024-01-09 | End: 2024-02-19

## 2024-01-23 RX ORDER — FAMOTIDINE 20 MG/1
20 TABLET, FILM COATED ORAL DAILY
Status: DISCONTINUED | OUTPATIENT
Start: 2024-01-23 | End: 2024-01-25

## 2024-01-23 RX ORDER — IBUPROFEN 200 MG
1 TABLET ORAL DAILY
Status: DISCONTINUED | OUTPATIENT
Start: 2024-01-23 | End: 2024-01-30 | Stop reason: HOSPADM

## 2024-01-23 RX ORDER — OXYCODONE HYDROCHLORIDE 10 MG/1
10 TABLET ORAL EVERY 6 HOURS PRN
Status: DISCONTINUED | OUTPATIENT
Start: 2024-01-23 | End: 2024-01-25

## 2024-01-23 RX ORDER — OXYCODONE HYDROCHLORIDE 5 MG/1
5 TABLET ORAL EVERY 6 HOURS PRN
Status: DISCONTINUED | OUTPATIENT
Start: 2024-01-23 | End: 2024-01-25

## 2024-01-23 RX ORDER — POTASSIUM CHLORIDE 750 MG/1
10 TABLET, EXTENDED RELEASE ORAL 2 TIMES DAILY
Status: ON HOLD | COMMUNITY
Start: 2024-01-09 | End: 2024-01-29 | Stop reason: HOSPADM

## 2024-01-23 RX ORDER — MAGNESIUM SULFATE HEPTAHYDRATE 40 MG/ML
2 INJECTION, SOLUTION INTRAVENOUS ONCE
Status: COMPLETED | OUTPATIENT
Start: 2024-01-23 | End: 2024-01-23

## 2024-01-23 RX ORDER — IPRATROPIUM BROMIDE AND ALBUTEROL SULFATE 2.5; .5 MG/3ML; MG/3ML
3 SOLUTION RESPIRATORY (INHALATION)
Status: DISCONTINUED | OUTPATIENT
Start: 2024-01-23 | End: 2024-01-30 | Stop reason: HOSPADM

## 2024-01-23 RX ORDER — POTASSIUM CHLORIDE 29.8 MG/ML
40 INJECTION INTRAVENOUS ONCE
Status: COMPLETED | OUTPATIENT
Start: 2024-01-23 | End: 2024-01-23

## 2024-01-23 RX ADMIN — ENOXAPARIN SODIUM 40 MG: 40 INJECTION SUBCUTANEOUS at 05:01

## 2024-01-23 RX ADMIN — POTASSIUM CHLORIDE 40 MEQ: 29.8 INJECTION, SOLUTION INTRAVENOUS at 08:01

## 2024-01-23 RX ADMIN — VANCOMYCIN HYDROCHLORIDE 1000 MG: 1 INJECTION, POWDER, LYOPHILIZED, FOR SOLUTION INTRAVENOUS at 04:01

## 2024-01-23 RX ADMIN — MAGNESIUM SULFATE 2 G: 2 INJECTION INTRAVENOUS at 08:01

## 2024-01-23 RX ADMIN — CEFTRIAXONE 2 G: 2 INJECTION, POWDER, FOR SOLUTION INTRAMUSCULAR; INTRAVENOUS at 01:01

## 2024-01-23 RX ADMIN — Medication 1 PATCH: at 11:01

## 2024-01-23 RX ADMIN — ENOXAPARIN SODIUM 40 MG: 40 INJECTION SUBCUTANEOUS at 01:01

## 2024-01-23 RX ADMIN — CLOPIDOGREL BISULFATE 75 MG: 75 TABLET ORAL at 09:01

## 2024-01-23 RX ADMIN — FAMOTIDINE 20 MG: 20 TABLET, FILM COATED ORAL at 09:01

## 2024-01-23 RX ADMIN — NOREPINEPHRINE BITARTRATE 0.08 MCG/KG/MIN: 4 INJECTION, SOLUTION INTRAVENOUS at 08:01

## 2024-01-23 RX ADMIN — OXYCODONE HYDROCHLORIDE 5 MG: 5 TABLET ORAL at 11:01

## 2024-01-23 RX ADMIN — IPRATROPIUM BROMIDE AND ALBUTEROL SULFATE 3 ML: .5; 3 SOLUTION RESPIRATORY (INHALATION) at 09:01

## 2024-01-23 NOTE — CONSULTS
[facility]  Hepatology  Consult Note    Patient Name: Bhavana Lambret  MRN: [id number]  Admission Date: 1/22/2024  Hospital Length of Stay: 1 days  Code Status: Full Code   Attending Provider:  Dr. Sanchez  Consulting Provider: Austin Maciel MD  Primary Care Physician: Pepito Jhaveri IV, MD  Principal Problem:Septic shock    Inpatient consult to Hepatology  Consult performed by: Austin Maciel MD  Consult ordered by: Rowan Nava DO        Subjective:     HPI: Bhavana Lambert is a 68 y.o. female with history of HTN, COPD and chronic respiratory failure on 2LNC, HFpEF, HLD, hypothyroidism and depression who presented to [facility] on 1/19 with symptoms of lower back pain and dysuria, and found to be hypotensive. She was admitted for sepsis of urinary tract origin. Workup showed positive blood cultures and urine cultures, both growing E coli. During the admission she went into septic shock, required levophed, and also briefly required BiPAP for increased work of breathing and hypercapnia. Now transferred to [facility] for higher level of care.     Starting 1/19, her LFTs began to climb rapidly reaching a peak of AST 5709/ALT 1501.  This coincided with spells of hypotension.  When the patient was seen today, she was normotensive.  She denies any prior history of liver disease or heavy alcohol use.  Viral hepatitis panel negative.  Transaminases now trending down steadily.    Review of Systems   Unable to perform ROS: Acuity of condition     Objective:     Vitals:    01/23/24 1200   BP: 92/68   Pulse: 89   Resp: 16   Temp:        Physical Exam  Vitals and nursing note reviewed.   Constitutional:       General: She is awake. She is not in acute distress.     Appearance: Normal appearance. She is normal weight. She is not ill-appearing, toxic-appearing or diaphoretic.   HENT:      Head: Normocephalic and atraumatic.      Nose: Nose normal.      Mouth/Throat:       Mouth: Mucous membranes are moist.   Eyes:      General: No scleral icterus.     Extraocular Movements: Extraocular movements intact.      Pupils: Pupils are equal, round, and reactive to light.   Cardiovascular:      Rate and Rhythm: Normal rate and regular rhythm.      Pulses: Normal pulses.   Pulmonary:      Effort: Pulmonary effort is normal. No respiratory distress.      Breath sounds: No wheezing.   Abdominal:      General: Bowel sounds are normal. There is no distension.      Palpations: Abdomen is soft.      Tenderness: There is no abdominal tenderness.   Musculoskeletal:      Cervical back: Normal range of motion.      Right lower leg: No edema.      Left lower leg: No edema.   Skin:     General: Skin is warm and dry.      Capillary Refill: Capillary refill takes less than 2 seconds.      Findings: No erythema or rash.   Neurological:      General: No focal deficit present.      Mental Status: She is alert.   Psychiatric:         Mood and Affect: Mood normal.         Behavior: Behavior normal. Behavior is cooperative.         Thought Content: Thought content normal.        Significant Labs:  Recent Labs   Lab 01/22/24  0850 01/22/24  2318 01/23/24  0515   HGB 8.0* 8.0* 7.7*       Lab Results   Component Value Date    WBC 8.03 01/23/2024    HGB 7.7 (L) 01/23/2024    HCT 24.3 (L) 01/23/2024     (H) 01/23/2024     (L) 01/23/2024       Lab Results   Component Value Date     01/23/2024    K 3.8 01/23/2024     01/23/2024    CO2 24 01/23/2024    BUN 5 (L) 01/23/2024    CREATININE 0.9 01/23/2024    CALCIUM 7.8 (L) 01/23/2024    ANIONGAP 6 (L) 01/23/2024    ESTGFRAFRICA >60.0 09/17/2020    EGFRNONAA >60.0 09/17/2020       Lab Results   Component Value Date     (H) 01/23/2024    AST 1,768 (H) 01/23/2024    ALKPHOS 150 (H) 01/23/2024    BILITOT 0.7 01/23/2024       Lab Results   Component Value Date    INR 1.5 (H) 01/23/2024    INR 1.6 (H) 01/22/2024    INR 1.6 (H) 01/21/2024        Significant Imaging:  Reviewed pertinent radiology findings.       Assessment/Plan:     Bhavana Lambert is a 68 y.o. female with history of HTN, COPD and chronic respiratory failure on 2LNC, HFpEF, HLD, hypothyroidism and depression who is transferred to [facility] for management of septic shock.    Starting 1/19, her LFTs began to climb rapidly reaching a peak of AST 5709/ALT 1501.  This coincided with spells of hypotension.  When the patient was seen today, she was normotensive.  She denies any prior history of liver disease or heavy alcohol use.  Viral hepatitis panel negative.  Transaminases now trending down steadily.    Problem List:  Shock liver      Recommendations:  - continue to maintain adequate perfusion and avoid hypotension, if possible.  - avoid hepatotoxic agents  - daily CMP    Thank you for involving us in the care of Bhavana Lambert. Please call with any additional questions, concerns or changes in the patient's clinical status. We will continue to follow peripherally.     Austin Maciel MD  Gastroenterology Fellow PGY V  [facility]

## 2024-01-23 NOTE — ASSESSMENT & PLAN NOTE
- Unclear etiology for rapid increase in liver enzymes in a hepatocellular injury pattern  - Differentials include shock liver vs drug-induced liver injury for this degree of transaminitis  - Hepatitis panel pending  - Avoid hepatotoxic medications  - Avoid tylenol  - Hepatology consulted  - CMP/coags daily

## 2024-01-23 NOTE — PROGRESS NOTES
Pharmacokinetic Assessment: IV Vancomycin    Therapy with vancomycin discontinued by provider. Pharmacy will sign off, please re-consult as needed.    Marcela Al, PharmD, BCCCP  [id number]

## 2024-01-23 NOTE — PLAN OF CARE
[facility] - Cardiac Medical ICU  Initial Discharge Assessment       Primary Care Provider: Pepito Jhaveri IV, MD    Admission Diagnosis: Sepsis [A41.9]    Admission Date: 1/22/2024  Expected Discharge Date: 1/26/2024    Transition of Care Barriers: None    Payor: HU[employer] MANAGED MEDICARE / Plan: [employer] SNP HMO PPO SPECIAL NEEDS / Product Type: Medicare Advantage /     Extended Emergency Contact Information  Primary Emergency Contact: NATALYA,ASHLEY  Mobile Phone: [phone]  Relation: Daughter  Preferred language: English   needed? No  Secondary Emergency Contact: Mellisa Miranda  Mobile Phone: [phone]  Relation: Daughter  Preferred language: English    Discharge Plan A: Home with family  Discharge Plan B: Home Health      [facility] - [place], [place] - [address] [place]  [place] [address]  Phone: [phone] Fax: [phone]      Initial Assessment (most recent)       Adult Discharge Assessment - 01/23/24 1507          Discharge Assessment    Assessment Type Discharge Planning Assessment     Confirmed/corrected address, phone number and insurance Yes     Confirmed Demographics Correct on Facesheet     Source of Information family     If unable to respond/provide information was family/caregiver contacted? Yes     Contact Name/Number Mellisa Miranda dtr./cp# [phone]     When was your last doctors appointment? 12/13/23     Communicated CHLOE with patient/caregiver Date not available/Unable to determine     Reason For Admission Sepsis     People in Home child(jie), adult;other relative(s)     Facility Arrived From: [facility]     Do you expect to return to your current living situation? Yes     Do you have help at home or someone to help you manage your care at home? Yes     Who are your caregiver(s) and their phone number(s)? Mellisa Miranda dtr./cp# [phone]     Prior to hospitilization cognitive status: Alert/Oriented     Current cognitive  status: Unable to Assess     Walking or Climbing Stairs Difficulty yes     Walking or Climbing Stairs ambulation difficulty, requires equipment     Mobility Management Rolling walker, wheelchair, and portable oxygen     Dressing/Bathing Difficulty yes     Dressing/Bathing bathing difficulty, requires equipment     Dressing/Bathing Management Shower Chair and BSC     Home Accessibility wheelchair accessible     Equipment Currently Used at Home walker, rolling;wheelchair;commode;oxygen     Readmission within 30 days? No     Patient currently being followed by outpatient case management? No     Do you currently have service(s) that help you manage your care at home? Yes     Name and Contact number of agency [facility]     Is the pt/caregiver preference to resume services with current agency Yes     Do you take prescription medications? Yes     Do you have prescription coverage? Yes     Coverage [employer] - [facility] HMO PPO Special Needs.     Do you have any problems affording any of your prescribed medications? No     Is the patient taking medications as prescribed? yes     Who is going to help you get home at discharge? A family member     How do you get to doctors appointments? family or friend will provide     Are you on dialysis? No     Do you take coumadin? No     Discharge Plan A Home with family     Discharge Plan B Home Health     DME Needed Upon Discharge  other (see comments)   TBD    Discharge Plan discussed with: Adult children     Transition of Care Barriers None        Physical Activity    On average, how many days per week do you engage in moderate to strenuous exercise (like a brisk walk)? 0 days     On average, how many minutes do you engage in exercise at this level? 0 min        Financial Resource Strain    How hard is it for you to pay for the very basics like food, housing, medical care, and heating? Not hard at all        Housing Stability    In the last 12 months, was there a  time when you were not able to pay the mortgage or rent on time? No     In the last 12 months, how many places have you lived? 1     In the last 12 months, was there a time when you did not have a steady place to sleep or slept in a shelter (including now)? No        Transportation Needs    In the past 12 months, has lack of transportation kept you from medical appointments or from getting medications? No     In the past 12 months, has lack of transportation kept you from meetings, work, or from getting things needed for daily living? No        Food Insecurity    Within the past 12 months, you worried that your food would run out before you got the money to buy more. Never true     Within the past 12 months, the food you bought just didn't last and you didn't have money to get more. Never true        Stress    Do you feel stress - tense, restless, nervous, or anxious, or unable to sleep at night because your mind is troubled all the time - these days? Only a little        Social Connections    In a typical week, how many times do you talk on the phone with family, friends, or neighbors? More than three times a week     How often do you get together with friends or relatives? Once a week     How often do you attend Rastafari or Holiness services? Never     Do you belong to any clubs or organizations such as Rastafari groups, unions, fraternal or athletic groups, or school groups? No     How often do you attend meetings of the clubs or organizations you belong to? Never     Are you , , , , never , or living with a partner?         Alcohol Use    Q1: How often do you have a drink containing alcohol? 4 or more times a week     Q2: How many drinks containing alcohol do you have on a typical day when you are drinking? 3 or 4     Q3: How often do you have six or more drinks on one occasion? --   Unknown       OTHER    Name(s) of People in Home Mellisa Horne dtr, Ryan, son in  , Yanna, niece 18 y/o and Ramón, 8 y/o nephew                      Discharge Plan A and Plan B have been determined by review of patient's clinical status, future medical and therapeutic needs, and coverage/benefits for post-acute care in coordination with multidisciplinary team members.    Taz Angeles, SARIAH  [facility]  [phone]

## 2024-01-23 NOTE — NURSING
[facility] at bedside. Pts Phone and phone  with Pt. Pt awake and alert, vital signs stable. NAD noted. Levo infusing to left PICC line, infusing at 0.08 mcg/kg with MAP greater than 65. O2 at 2.5 L/M via nasal canula. Azul cathter intact and patent, draining to gravity. Mellisa, Pts daughter notified.

## 2024-01-23 NOTE — ASSESSMENT & PLAN NOTE
Results for orders placed during the hospital encounter of 10/26/23  Echo  Interpretation Summary    Left Ventricle: The left ventricle is normal in size. Normal wall thickness. There is eccentric hypertrophy. Global hypokinesis present. There is normal systolic function. There is normal diastolic function.    Left Atrium: Left atrium is mildly dilated.    Right Ventricle: Moderate right ventricular enlargement. Wall thickness is normal. Right ventricle wall motion  is normal. Systolic function is moderately reduced.    Mitral Valve: There is mild regurgitation.    Tricuspid Valve: There is moderate regurgitation.    Pulmonary Artery: The estimated pulmonary artery systolic pressure is 48 mmHg.    IVC/SVC: Elevated venous pressure at 15 mmHg.  Recent Labs   Lab 01/21/24  1350   *     --Repeat echo  --Strict I/O  --Cardiac monitoring

## 2024-01-23 NOTE — SUBJECTIVE & OBJECTIVE
Past Medical History:   Diagnosis Date    Acquired hypothyroidism     Anxiety     Anxiety and depression     Cervical radicular pain     Closed left ankle fracture     COPD (chronic obstructive pulmonary disease)     H/O: stroke 11/08/2023    Hypertension     Hypokalemia     Hyponatremia     Multiple rib fractures     Requires continuous at home supplemental oxygen     PRN FOR COPD    Stroke     Tension type headache     Traumatic closed displaced fracture of distal end of radius, left, sequela        Past Surgical History:   Procedure Laterality Date    COLON SURGERY      COLOSTOMY      COLOSTOMY CLOSURE      HYSTERECTOMY      LEG SURGERY Right     OPEN REDUCTION AND INTERNAL FIXATION (ORIF) OF INJURY OF WRIST Left 1/29/2019    Procedure: ORIF, WRIST;  Surgeon: Homero Jeff DO;  Location: [facility] OR;  Service: Orthopedics;  Laterality: Left;  Equipment: [employer] Geminus Wrist Plate Set  Vendor/Rep: [employer]  C-Arm: Entire  DME: None    REQUIRES ASSISTANT    WRIST SURGERY Right        Review of patient's allergies indicates:  No Known Allergies    Family History       Problem Relation (Age of Onset)    Anxiety disorder Daughter, Daughter    Arthritis Sister    COPD Father    Cancer Mother    Cirrhosis Father    Congenital heart disease Daughter    Depression Daughter    No Known Problems Brother    Valvular heart disease Daughter          Tobacco Use    Smoking status: Every Day     Current packs/day: 1.00     Average packs/day: 1 pack/day for 20.0 years (20.0 ttl pk-yrs)     Types: Cigarettes    Smokeless tobacco: Never   Substance and Sexual Activity    Alcohol use: Yes     Comment: couple times a week-wine or bloody cari    Drug use: No    Sexual activity: Not Currently      Review of Systems   Unable to perform ROS: Acuity of condition     Objective:     Vital Signs (Most Recent):  Temp: 97.7 °F (36.5 °C) (01/22/24 2205)  Pulse: 90 (01/22/24 2205)  Resp: 16 (01/22/24 2205)  BP: (!) 103/57  (01/22/24 2205)  SpO2: 96 % (01/22/24 2205) Vital Signs (24h Range):  Temp:  [97.7 °F (36.5 °C)-98.4 °F (36.9 °C)] 97.7 °F (36.5 °C)  Pulse:  [] 90  Resp:  [10-23] 16  SpO2:  [89 %-100 %] 96 %  BP: ()/(56-87) 103/57   Weight: 59.2 kg (130 lb 8.2 oz)  Body mass index is 23.87 kg/m².    No intake or output data in the 24 hours ending 01/22/24 2235       Physical Exam  Vitals and nursing note reviewed.   Constitutional:       General: She is awake. She is not in acute distress.     Appearance: Normal appearance. She is normal weight. She is not ill-appearing, toxic-appearing or diaphoretic.   HENT:      Head: Normocephalic and atraumatic.      Nose: Nose normal.      Mouth/Throat:      Mouth: Mucous membranes are moist.   Eyes:      General: No scleral icterus.     Extraocular Movements: Extraocular movements intact.      Pupils: Pupils are equal, round, and reactive to light.   Cardiovascular:      Rate and Rhythm: Normal rate and regular rhythm.      Pulses: Normal pulses.   Pulmonary:      Effort: Pulmonary effort is normal. No respiratory distress.      Breath sounds: No wheezing.   Abdominal:      General: Bowel sounds are normal. There is no distension.      Palpations: Abdomen is soft.      Tenderness: There is no abdominal tenderness.   Musculoskeletal:      Cervical back: Normal range of motion.      Right lower leg: No edema.      Left lower leg: No edema.   Skin:     General: Skin is warm and dry.      Capillary Refill: Capillary refill takes less than 2 seconds.      Findings: No erythema or rash.   Neurological:      General: No focal deficit present.      Mental Status: She is alert.   Psychiatric:         Mood and Affect: Mood normal.         Behavior: Behavior normal. Behavior is cooperative.         Thought Content: Thought content normal.            Vents:     Lines/Drains/Airways       Peripherally Inserted Central Catheter Line  Duration             PICC Double Lumen 01/21/24 1700 left  basilic 1 day              Drain  Duration                  Urethral Catheter 01/20/24 0343 Non-latex 16 Fr. 2 days                  Significant Labs:    CBC/Anemia Profile:  Recent Labs   Lab 01/21/24  0329 01/22/24  0434 01/22/24  0850   WBC 11.58 10.51 11.73   HGB 9.3* 7.6* 8.0*   HCT 29.0* 23.8* 25.0*    128* 135*   * 102* 100*   RDW 13.2 13.2 13.3        Chemistries:  Recent Labs   Lab 01/21/24  0329 01/21/24  1123 01/22/24  0434    135* 132*   K 3.4* 3.6 3.1*    103 102   CO2 20* 20* 20*   BUN 9 8 7*   CREATININE 1.0 1.0 1.0   CALCIUM 7.2* 7.1* 7.1*   ALBUMIN 2.1* 2.0* 1.8*   PROT 5.5* 5.3* 4.8*   BILITOT 0.7 0.6 0.7   ALKPHOS 119 112 137*   * 495* 1,501*   AST 1,188* 1,639* 5,709*   MG 1.8 2.0 1.7   PHOS 4.0  --  3.8       All pertinent labs within the past 24 hours have been reviewed.    Significant Imaging: I have reviewed all pertinent imaging results/findings within the past 24 hours.

## 2024-01-23 NOTE — PROGRESS NOTES
Pharmacokinetic Initial Assessment: IV Vancomycin    Assessment/Plan:    Ms. Lambert received vancomycin previously at the OSH.  - Her last dose was on 1/22 @0300. She received 1250 mg (~21 mg/kg).   - Her renal function appears stable and at baseline.   - Will schedule a maintenance dose of vancomycin 1000 mg IV every 24 hours.  - Desired empiric serum trough concentration is 10 to 20 mcg/mL.  - Draw vancomycin trough level 60 min prior to third dose on 1/25 at approximately 0200.  - Please draw random level sooner than scheduled trough if renal function changes significantly.    Pharmacy will continue to follow and monitor vancomycin.      Please contact pharmacy at extension [phone] with any questions regarding this assessment.     Thank you for the consult,   Yudelka Kerr       Patient brief summary:  Bhavana Lambert is a 68 y.o. female initiated on antimicrobial therapy with IV Vancomycin for treatment of suspected sepsis    Actual Body Weight:   59.2 kg    Renal Function:   Estimated Creatinine Clearance: 42.6 mL/min (based on SCr of 1 mg/dL).     Dialysis Method (if applicable):  N/A

## 2024-01-23 NOTE — H&P
[facility] - Cardiac Medical [facility]  Critical Care Medicine  History & Physical    Patient Name: Bhavana Lambert  MRN: [id number]  Admission Date: 1/22/2024  Hospital Length of Stay: 1 days  Code Status: Prior  Attending Physician: Aneesh Ojeda MD   Primary Care Provider: Pepito Jhaveri IV, MD   Principal Problem: Septic shock    Subjective:     HPI:  Yahaira Lambert is a 68 year old woman with HTN, COPD and chronic respiratory failure on 2LNC, HFpEF, HLD, hypothyroidism and depression who presented to [facility] on 1/19 with symptoms of lower back pain and dysuria, and found to be hypotensive. She was admitted for sepsis of urinary tract origin. Workup showed positive blood cultures and urine cultures, both growing E coli. During the admission she went into septic shock, required levophed, and also briefly required BiPAP for increased work of breathing and hypercapnia. She also had climbing LFTs to the thousands. CT scan on 1/22 showed progressing left pyelonephritis, without abscess. Due to medical complexity, patient was transferred to [facility] MICU for higher level of care and hepatology eval in setting of acute liver failure.      Hospital/ICU Course:  No notes on file     Past Medical History:   Diagnosis Date    Acquired hypothyroidism     Anxiety     Anxiety and depression     Cervical radicular pain     Closed left ankle fracture     COPD (chronic obstructive pulmonary disease)     H/O: stroke 11/08/2023    Hypertension     Hypokalemia     Hyponatremia     Multiple rib fractures     Requires continuous at home supplemental oxygen     PRN FOR COPD    Stroke     Tension type headache     Traumatic closed displaced fracture of distal end of radius, left, sequela        Past Surgical History:   Procedure Laterality Date    COLON SURGERY      COLOSTOMY      COLOSTOMY CLOSURE      HYSTERECTOMY      LEG SURGERY Right     OPEN REDUCTION AND INTERNAL FIXATION (ORIF) OF INJURY OF WRIST Left 1/29/2019    Procedure:  ORIF, WRIST;  Surgeon: Homero Jeff DO;  Location: [facility] OR;  Service: Orthopedics;  Laterality: Left;  Equipment: [employer] Geminus Wrist Plate Set  Vendor/Rep: [employer]  C-Arm: Entire  DME: None    REQUIRES ASSISTANT    WRIST SURGERY Right        Review of patient's allergies indicates:  No Known Allergies    Family History       Problem Relation (Age of Onset)    Anxiety disorder Daughter, Daughter    Arthritis Sister    COPD Father    Cancer Mother    Cirrhosis Father    Congenital heart disease Daughter    Depression Daughter    No Known Problems Brother    Valvular heart disease Daughter          Tobacco Use    Smoking status: Every Day     Current packs/day: 1.00     Average packs/day: 1 pack/day for 20.0 years (20.0 ttl pk-yrs)     Types: Cigarettes    Smokeless tobacco: Never   Substance and Sexual Activity    Alcohol use: Yes     Comment: couple times a week-wine or bloody cari    Drug use: No    Sexual activity: Not Currently      Review of Systems   Unable to perform ROS: Acuity of condition     Objective:     Vital Signs (Most Recent):  Temp: 97.7 °F (36.5 °C) (01/22/24 2205)  Pulse: 90 (01/22/24 2205)  Resp: 16 (01/22/24 2205)  BP: (!) 103/57 (01/22/24 2205)  SpO2: 96 % (01/22/24 2205) Vital Signs (24h Range):  Temp:  [97.7 °F (36.5 °C)-98.4 °F (36.9 °C)] 97.7 °F (36.5 °C)  Pulse:  [] 90  Resp:  [10-23] 16  SpO2:  [89 %-100 %] 96 %  BP: ()/(56-87) 103/57   Weight: 59.2 kg (130 lb 8.2 oz)  Body mass index is 23.87 kg/m².    No intake or output data in the 24 hours ending 01/22/24 2235       Physical Exam  Vitals and nursing note reviewed.   Constitutional:       General: She is awake. She is not in acute distress.     Appearance: Normal appearance. She is normal weight. She is not ill-appearing, toxic-appearing or diaphoretic.   HENT:      Head: Normocephalic and atraumatic.      Nose: Nose normal.      Mouth/Throat:      Mouth: Mucous membranes are moist.   Eyes:       General: No scleral icterus.     Extraocular Movements: Extraocular movements intact.      Pupils: Pupils are equal, round, and reactive to light.   Cardiovascular:      Rate and Rhythm: Normal rate and regular rhythm.      Pulses: Normal pulses.   Pulmonary:      Effort: Pulmonary effort is normal. No respiratory distress.      Breath sounds: No wheezing.   Abdominal:      General: Bowel sounds are normal. There is no distension.      Palpations: Abdomen is soft.      Tenderness: There is no abdominal tenderness.   Musculoskeletal:      Cervical back: Normal range of motion.      Right lower leg: No edema.      Left lower leg: No edema.   Skin:     General: Skin is warm and dry.      Capillary Refill: Capillary refill takes less than 2 seconds.      Findings: No erythema or rash.   Neurological:      General: No focal deficit present.      Mental Status: She is alert.   Psychiatric:         Mood and Affect: Mood normal.         Behavior: Behavior normal. Behavior is cooperative.         Thought Content: Thought content normal.            Vents:     Lines/Drains/Airways       Peripherally Inserted Central Catheter Line  Duration             PICC Double Lumen 01/21/24 1700 left basilic 1 day              Drain  Duration                  Urethral Catheter 01/20/24 0343 Non-latex 16 Fr. 2 days                  Significant Labs:    CBC/Anemia Profile:  Recent Labs   Lab 01/21/24  0329 01/22/24  0434 01/22/24  0850   WBC 11.58 10.51 11.73   HGB 9.3* 7.6* 8.0*   HCT 29.0* 23.8* 25.0*    128* 135*   * 102* 100*   RDW 13.2 13.2 13.3        Chemistries:  Recent Labs   Lab 01/21/24  0329 01/21/24  1123 01/22/24  0434    135* 132*   K 3.4* 3.6 3.1*    103 102   CO2 20* 20* 20*   BUN 9 8 7*   CREATININE 1.0 1.0 1.0   CALCIUM 7.2* 7.1* 7.1*   ALBUMIN 2.1* 2.0* 1.8*   PROT 5.5* 5.3* 4.8*   BILITOT 0.7 0.6 0.7   ALKPHOS 119 112 137*   * 495* 1,501*   AST 1,188* 1,639* 5,709*   MG 1.8 2.0 1.7   PHOS  4.0  --  3.8       All pertinent labs within the past 24 hours have been reviewed.    Significant Imaging: I have reviewed all pertinent imaging results/findings within the past 24 hours.  Assessment/Plan:     Pulmonary  Emphysema/COPD  - Briefly on BIPAP at OSH then weaned off now on 2L NC  - ABG PRN  - Duonebs as needed  - Wean FiO2 for SpO2 goal 88-92%    Cardiac/Vascular  Chronic diastolic (congestive) heart failure  Results for orders placed during the hospital encounter of 10/26/23  Echo  Interpretation Summary    Left Ventricle: The left ventricle is normal in size. Normal wall thickness. There is eccentric hypertrophy. Global hypokinesis present. There is normal systolic function. There is normal diastolic function.    Left Atrium: Left atrium is mildly dilated.    Right Ventricle: Moderate right ventricular enlargement. Wall thickness is normal. Right ventricle wall motion  is normal. Systolic function is moderately reduced.    Mitral Valve: There is mild regurgitation.    Tricuspid Valve: There is moderate regurgitation.    Pulmonary Artery: The estimated pulmonary artery systolic pressure is 48 mmHg.    IVC/SVC: Elevated venous pressure at 15 mmHg.  Recent Labs   Lab 01/21/24  1350   *     --Repeat echo  --Strict I/O  --Cardiac monitoring    Mixed hyperlipidemia  Elevated LFTs, hold statin.      Renal/  LILLIE (acute kidney injury)  LILLIE at OSH, Scr now appears at baseline.      - Daily BMP   - Trend I/O    ID  * Septic shock  68F admitted to OSH for sepsis 2/2 urinary tract infection, c/b shock on levophed, LILLIE, and liver injury. Blood and urine cultures at OSH with E coli. Patient of note had a PICC line that was replaced at the OSH once blood cultures returned positive, however, blood cultures were not repeated to ensure clearance.     - Pt had been on vanc and ertapenem, will deescalate to rocephin based on cx data  - Tailor antibiotics to culture results  - Repeat blood cultures on admission  -  Replace Azul  - Titrate pressors for MAP goal >65    E coli bacteremia  See septic shock.     Oncology  Anemia of chronic disease  --CBC daily  --Transfuse for Hgb <7  --Maintain active type and screen    GI  Transaminitis  - Unclear etiology for rapid increase in liver enzymes in a hepatocellular injury pattern  - Differentials include shock liver vs drug-induced liver injury for this degree of transaminitis  - Hepatitis panel pending  - Avoid hepatotoxic medications  - Avoid tylenol  - Hepatology consulted  - CMP/coags daily        Critical Care Daily Checklist:    A: Awake: RASS Goal/Actual Goal:    Actual:     B: Spontaneous Breathing Trial Performed?     C: SAT & SBT Coordinated?  na                      D: Delirium: CAM-ICU     E: Early Mobility Performed? No   F: Feeding Goal:    Status:     Current Diet Order   No orders of the defined types were placed in this encounter.      AS: Analgesia/Sedation PRN   T: Thromboembolic Prophylaxis eliquis   H: HOB > 300 Yes   U: Stress Ulcer Prophylaxis (if needed)    G: Glucose Control Bg goal 140-180   B: Bowel Function     I: Indwelling Catheter (Lines & Azul) Necessity PICC   D: De-escalation of Antimicrobials/Pharmacotherapies Continue CTX    Plan for the day/ETD Admit to MICU    Code Status:  Family/Goals of Care: Prior       Critical Care Time: 40 minutes  Critical secondary to Patient has a condition that poses threat to life and bodily function: Septic Shock, Acute Liver Injury    Plan discussed with PCCM fellow Dr. Grande.      Critical care was time spent personally by me on the following activities: development of treatment plan with patient or surrogate and bedside caregivers, discussions with consultants, evaluation of patient's response to treatment, examination of patient, ordering and performing treatments and interventions, ordering and review of laboratory studies, ordering and review of radiographic studies, pulse oximetry, re-evaluation of patient's  condition. This critical care time did not overlap with that of any other provider or involve time for any procedures.     Caty Hernandez NP  Critical Care Medicine  [facility] - Cardiac Medical ICU

## 2024-01-23 NOTE — PLAN OF CARE
[facility] - Intensive Care  Discharge Final Note    Primary Care Provider: Pepito Jhaveri IV, MD    Expected Discharge Date: 1/22/2024    Per notes transferred to [facility] for upgrade in care.    Final Discharge Note (most recent)       Final Note - 01/23/24 0707          Final Note    Assessment Type Final Discharge Note     Anticipated Discharge Disposition Short Term Hospital     What phone number can be called within the next 1-3 days to see how you are doing after discharge? [phone]                     Important Message from Medicare

## 2024-01-23 NOTE — ASSESSMENT & PLAN NOTE
- Briefly on BIPAP at OSH then weaned off now on 2L NC  - ABG PRN  - Duonebs as needed  - Wean FiO2 for SpO2 goal 88-92%

## 2024-01-23 NOTE — ASSESSMENT & PLAN NOTE
68F admitted to OSH for sepsis 2/2 urinary tract infection, c/b shock on levophed, LILLIE, and liver injury. Blood and urine cultures at OSH with E coli. Patient of note had a PICC line that was replaced at the OSH once blood cultures returned positive, however, blood cultures were not repeated to ensure clearance.     - Pt had been on vanc and ertapenem, will deescalate to rocephin based on cx data  - Tailor antibiotics to culture results  - Repeat blood cultures on admission  - Replace Azul  - Titrate pressors for MAP goal >65

## 2024-01-23 NOTE — HPI
Yahaira Lambert is a 68 year old woman with HTN, COPD and chronic respiratory failure on 2LNC, HFpEF, HLD, hypothyroidism and depression who presented to [facility] on 1/19 with symptoms of lower back pain and dysuria, and found to be hypotensive. She was admitted for sepsis of urinary tract origin. Workup showed positive blood cultures and urine cultures, both growing E coli. During the admission she went into septic shock, required levophed, and also briefly required BiPAP for increased work of breathing and hypercapnia. She also had climbing LFTs to the thousands. CT scan on 1/22 showed progressing left pyelonephritis, without abscess. Due to medical complexity, patient was transferred to [facility] MICU for higher level of care and hepatology eval in setting of acute liver failure.

## 2024-01-23 NOTE — NURSING
[facility] called for update on ETA. Informed unit# [id number]  was pulling into parking lot as we spoke. Pt informed .

## 2024-01-24 LAB
ALBUMIN SERPL BCP-MCNC: 1.9 G/DL (ref 3.5–5.2)
ALP SERPL-CCNC: 206 U/L (ref 55–135)
ALT SERPL W/O P-5'-P-CCNC: 654 U/L (ref 10–44)
AMMONIA PLAS-SCNC: 48 UMOL/L (ref 10–50)
ANION GAP SERPL CALC-SCNC: 11 MMOL/L (ref 8–16)
AST SERPL-CCNC: 572 U/L (ref 10–40)
BASOPHILS # BLD AUTO: 0.02 K/UL (ref 0–0.2)
BASOPHILS NFR BLD: 0.3 % (ref 0–1.9)
BILIRUB SERPL-MCNC: 0.6 MG/DL (ref 0.1–1)
BUN SERPL-MCNC: 5 MG/DL (ref 8–23)
CALCIUM SERPL-MCNC: 8.1 MG/DL (ref 8.7–10.5)
CHLORIDE SERPL-SCNC: 103 MMOL/L (ref 95–110)
CO2 SERPL-SCNC: 25 MMOL/L (ref 23–29)
CREAT SERPL-MCNC: 1 MG/DL (ref 0.5–1.4)
DIFFERENTIAL METHOD BLD: ABNORMAL
EOSINOPHIL # BLD AUTO: 0.2 K/UL (ref 0–0.5)
EOSINOPHIL NFR BLD: 2.9 % (ref 0–8)
ERYTHROCYTE [DISTWIDTH] IN BLOOD BY AUTOMATED COUNT: 13.7 % (ref 11.5–14.5)
EST. GFR  (NO RACE VARIABLE): >60 ML/MIN/1.73 M^2
GLUCOSE SERPL-MCNC: 98 MG/DL (ref 70–110)
HCT VFR BLD AUTO: 25.1 % (ref 37–48.5)
HGB BLD-MCNC: 8 G/DL (ref 12–16)
IMM GRANULOCYTES # BLD AUTO: 0.03 K/UL (ref 0–0.04)
IMM GRANULOCYTES NFR BLD AUTO: 0.4 % (ref 0–0.5)
INR PPP: 1.3 (ref 0.8–1.2)
LYMPHOCYTES # BLD AUTO: 1 K/UL (ref 1–4.8)
LYMPHOCYTES NFR BLD: 13.6 % (ref 18–48)
MAGNESIUM SERPL-MCNC: 2.1 MG/DL (ref 1.6–2.6)
MCH RBC QN AUTO: 32.4 PG (ref 27–31)
MCHC RBC AUTO-ENTMCNC: 31.9 G/DL (ref 32–36)
MCV RBC AUTO: 102 FL (ref 82–98)
MONOCYTES # BLD AUTO: 1 K/UL (ref 0.3–1)
MONOCYTES NFR BLD: 14.3 % (ref 4–15)
NEUTROPHILS # BLD AUTO: 4.8 K/UL (ref 1.8–7.7)
NEUTROPHILS NFR BLD: 68.5 % (ref 38–73)
NRBC BLD-RTO: 0 /100 WBC
PHOSPHATE SERPL-MCNC: 4.1 MG/DL (ref 2.7–4.5)
PLATELET # BLD AUTO: 142 K/UL (ref 150–450)
PMV BLD AUTO: 10.6 FL (ref 9.2–12.9)
POCT GLUCOSE: 79 MG/DL (ref 70–110)
POTASSIUM SERPL-SCNC: 3.3 MMOL/L (ref 3.5–5.1)
PROT SERPL-MCNC: 5.2 G/DL (ref 6–8.4)
PROTHROMBIN TIME: 13.8 SEC (ref 9–12.5)
RBC # BLD AUTO: 2.47 M/UL (ref 4–5.4)
SODIUM SERPL-SCNC: 139 MMOL/L (ref 136–145)
WBC # BLD AUTO: 6.97 K/UL (ref 3.9–12.7)

## 2024-01-24 PROCEDURE — 25000003 PHARM REV CODE 250

## 2024-01-24 PROCEDURE — S4991 NICOTINE PATCH NONLEGEND: HCPCS

## 2024-01-24 PROCEDURE — 85025 COMPLETE CBC W/AUTO DIFF WBC: CPT

## 2024-01-24 PROCEDURE — 82140 ASSAY OF AMMONIA: CPT

## 2024-01-24 PROCEDURE — 25000003 PHARM REV CODE 250: Performed by: STUDENT IN AN ORGANIZED HEALTH CARE EDUCATION/TRAINING PROGRAM

## 2024-01-24 PROCEDURE — 97535 SELF CARE MNGMENT TRAINING: CPT

## 2024-01-24 PROCEDURE — 84100 ASSAY OF PHOSPHORUS: CPT

## 2024-01-24 PROCEDURE — 94640 AIRWAY INHALATION TREATMENT: CPT

## 2024-01-24 PROCEDURE — 27000221 HC OXYGEN, UP TO 24 HOURS

## 2024-01-24 PROCEDURE — 63600175 PHARM REV CODE 636 W HCPCS

## 2024-01-24 PROCEDURE — 63600175 PHARM REV CODE 636 W HCPCS: Performed by: STUDENT IN AN ORGANIZED HEALTH CARE EDUCATION/TRAINING PROGRAM

## 2024-01-24 PROCEDURE — 80053 COMPREHEN METABOLIC PANEL: CPT

## 2024-01-24 PROCEDURE — 80321 ALCOHOLS BIOMARKERS 1OR 2: CPT | Performed by: STUDENT IN AN ORGANIZED HEALTH CARE EDUCATION/TRAINING PROGRAM

## 2024-01-24 PROCEDURE — 83735 ASSAY OF MAGNESIUM: CPT

## 2024-01-24 PROCEDURE — 99291 CRITICAL CARE FIRST HOUR: CPT | Mod: ,,,

## 2024-01-24 PROCEDURE — 20000000 HC ICU ROOM

## 2024-01-24 PROCEDURE — 99900035 HC TECH TIME PER 15 MIN (STAT)

## 2024-01-24 PROCEDURE — 94761 N-INVAS EAR/PLS OXIMETRY MLT: CPT

## 2024-01-24 PROCEDURE — 97530 THERAPEUTIC ACTIVITIES: CPT

## 2024-01-24 PROCEDURE — 85610 PROTHROMBIN TIME: CPT

## 2024-01-24 PROCEDURE — 97162 PT EVAL MOD COMPLEX 30 MIN: CPT

## 2024-01-24 PROCEDURE — 25000242 PHARM REV CODE 250 ALT 637 W/ HCPCS

## 2024-01-24 PROCEDURE — 97165 OT EVAL LOW COMPLEX 30 MIN: CPT

## 2024-01-24 RX ORDER — POTASSIUM CHLORIDE 14.9 MG/ML
20 INJECTION INTRAVENOUS ONCE
Status: COMPLETED | OUTPATIENT
Start: 2024-01-24 | End: 2024-01-24

## 2024-01-24 RX ORDER — LORAZEPAM 2 MG/ML
2 INJECTION INTRAMUSCULAR
Status: DISCONTINUED | OUTPATIENT
Start: 2024-01-24 | End: 2024-01-24

## 2024-01-24 RX ORDER — THIAMINE HCL 100 MG
100 TABLET ORAL DAILY
Status: DISCONTINUED | OUTPATIENT
Start: 2024-01-24 | End: 2024-01-30 | Stop reason: HOSPADM

## 2024-01-24 RX ORDER — FOLIC ACID 1 MG/1
1 TABLET ORAL DAILY
Status: DISCONTINUED | OUTPATIENT
Start: 2024-01-24 | End: 2024-01-30 | Stop reason: HOSPADM

## 2024-01-24 RX ORDER — LORAZEPAM 2 MG/ML
0.5 INJECTION INTRAMUSCULAR
Status: DISCONTINUED | OUTPATIENT
Start: 2024-01-24 | End: 2024-01-25

## 2024-01-24 RX ADMIN — OXYCODONE HYDROCHLORIDE 5 MG: 5 TABLET ORAL at 04:01

## 2024-01-24 RX ADMIN — FAMOTIDINE 20 MG: 20 TABLET, FILM COATED ORAL at 08:01

## 2024-01-24 RX ADMIN — Medication 100 MG: at 11:01

## 2024-01-24 RX ADMIN — POTASSIUM CHLORIDE 20 MEQ: 200 INJECTION, SOLUTION INTRAVENOUS at 09:01

## 2024-01-24 RX ADMIN — THERA TABS 1 TABLET: TAB at 11:01

## 2024-01-24 RX ADMIN — ENOXAPARIN SODIUM 40 MG: 40 INJECTION SUBCUTANEOUS at 04:01

## 2024-01-24 RX ADMIN — DEXTROSE MONOHYDRATE 125 ML: 100 INJECTION, SOLUTION INTRAVENOUS at 11:01

## 2024-01-24 RX ADMIN — LORAZEPAM 2 MG: 2 INJECTION INTRAMUSCULAR; INTRAVENOUS at 11:01

## 2024-01-24 RX ADMIN — NOREPINEPHRINE BITARTRATE 0.04 MCG/KG/MIN: 4 INJECTION, SOLUTION INTRAVENOUS at 08:01

## 2024-01-24 RX ADMIN — CLOPIDOGREL BISULFATE 75 MG: 75 TABLET ORAL at 08:01

## 2024-01-24 RX ADMIN — FOLIC ACID 1 MG: 1 TABLET ORAL at 11:01

## 2024-01-24 RX ADMIN — IPRATROPIUM BROMIDE AND ALBUTEROL SULFATE 3 ML: .5; 3 SOLUTION RESPIRATORY (INHALATION) at 02:01

## 2024-01-24 RX ADMIN — Medication 1 PATCH: at 08:01

## 2024-01-24 RX ADMIN — LEVOTHYROXINE SODIUM 50 MCG: 50 TABLET ORAL at 07:01

## 2024-01-24 RX ADMIN — CEFTRIAXONE 2 G: 2 INJECTION, POWDER, FOR SOLUTION INTRAMUSCULAR; INTRAVENOUS at 02:01

## 2024-01-24 RX ADMIN — IPRATROPIUM BROMIDE AND ALBUTEROL SULFATE 3 ML: .5; 3 SOLUTION RESPIRATORY (INHALATION) at 08:01

## 2024-01-24 NOTE — ASSESSMENT & PLAN NOTE
Echo 1/23/24    Left Ventricle: The left ventricle is normal in size. Normal wall thickness. Septal motion is consistent with post-operative status. There is normal systolic function with a visually estimated ejection fraction of 55 - 60%. There is normal diastolic function.    Right Ventricle: Mild right ventricular enlargement. Systolic function is mildly reduced.    Tricuspid Valve: There is mild regurgitation.    Pulmonary Artery: The estimated pulmonary artery systolic pressure is 46 mmHg.    IVC/SVC: Intermediate venous pressure at 8 mmHg.    Pericardium: There is a small effusion.    --Strict I/O  --Cardiac monitoring

## 2024-01-24 NOTE — PLAN OF CARE
MICU DAILY GOALS     Family/Goals of care/Code Status   Code Status: Full Code    24H Vital Sign Range  Temp:  [97.5 °F (36.4 °C)-98 °F (36.7 °C)]   Pulse:  [80-95]   Resp:  [9-25]   BP: ()/(50-67)   SpO2:  [89 %-100 %]      Shift Events (include procedures and significant events)   Weaned levo throughout shift, pt currently off with MAPs >65. Pt received one dose of ativan for CIWA score greater than 8. Q8h CIWA ordered with q2h prn ativan.     AWAKE RASS: Goal - RASS Goal: 0-->alert and calm  Actual - RASS ([place] Agitation-Sedation Scale): alert and calm    Restraint necessity: Not necessary   BREATHE SBT: Not intubated    Coordinate A & B, analgesics/sedatives Pain: managed   SAT: Not intubated   Delirium CAM-ICU: Overall CAM-ICU: Negative   Early(intubated/ Progressive (non-intubated) Mobility MOVE Screen (INTUBATED ONLY): Not intubated    Activity: Activity Management: Ambulated in room - L4   Feeding/Nutrition Diet order: Diet/Nutrition Received: 2 gram sodium,     Thrombus DVT prophylaxis: VTE Required Core Measure: Pharmacological prophylaxis initiated/maintained   HOB Elevation Head of Bed (HOB) Positioning: HOB at 30-45 degrees   Ulcer Prophylaxis GI: yes   Glucose control managed Glycemic Management: blood glucose monitored   Skin Skin assessed during: Daily Assessment    Sacrum intact/not altered? Yes  Heels intact/not altered? Yes  Surgical wound? No    Check one (no altered skin or altered skin) and sub boxes:  [x] No Altered Skin Integrity Present    [x]Prevention Measures Documented    [] Altered Skin Integrity Present or Discovered   [] LDA present in EPIC              [] LDA added in EPIC   [] Wound Image Taken (required on admit,                   transfer/discharge and every Tuesday)    Wound Care Consulted? No    Attending Nurse:     Second RN/Staff Member:    Bowel Function no issues    Indwelling Catheter Necessity      Urethral Catheter 01/23/24 0200-Reason for Continuing Urinary  Catheterization: Urinary retention    PICC Double Lumen 01/21/24 1700 left basilic-Line Necessity Review: Medication caustic to vasculature     De-escalation Antibiotics Yes       VS and assessment per flow sheet, patient progressing towards goals as tolerated, plan of care reviewed with  patient and daughter , all concerns addressed, will continue to monitor.

## 2024-01-24 NOTE — TREATMENT PLAN
Hepatology Treatment Plan    Bhavana Lambert is a 68 y.o. female admitted to hospital 1/22/2024 (Hospital Day: 3) due to Septic shock.     Interval History  AST down from 5700s>500s  ALT down from 1500s>600s    The patient was in good spirits this morning.  Not in acute distress. Admits to drinking 2 standard drinks daily (tequila with mixers).      Objective  Temp:  [97.5 °F (36.4 °C)-98 °F (36.7 °C)] 97.9 °F (36.6 °C) (01/24 0700)  Pulse:  [80-95] 81 (01/24 1500)  BP: ()/(50-67) 93/58 (01/24 1500)  Resp:  [9-25] 9 (01/24 1500)  SpO2:  [89 %-100 %] 100 % (01/24 1500)    Physical Exam  Vitals and nursing note reviewed.   Constitutional:       General: She is awake. She is not in acute distress.     Appearance: Normal appearance. She is normal weight. She is not ill-appearing, toxic-appearing or diaphoretic.   HENT:      Head: Normocephalic and atraumatic.      Nose: Nose normal.      Mouth/Throat:      Mouth: Mucous membranes are moist.   Eyes:      General: No scleral icterus.     Extraocular Movements: Extraocular movements intact.      Pupils: Pupils are equal, round, and reactive to light.   Cardiovascular:      Rate and Rhythm: Normal rate and regular rhythm.      Pulses: Normal pulses.   Pulmonary:      Effort: Pulmonary effort is normal. No respiratory distress.      Breath sounds: No wheezing.   Abdominal:      General: Bowel sounds are normal. There is no distension.      Palpations: Abdomen is soft.      Tenderness: There is no abdominal tenderness.   Musculoskeletal:      Cervical back: Normal range of motion.      Right lower leg: No edema.      Left lower leg: No edema.   Skin:     General: Skin is warm and dry.      Capillary Refill: Capillary refill takes less than 2 seconds.      Findings: No erythema or rash.   Neurological:      General: No focal deficit present.      Mental Status: She is alert.   Psychiatric:         Mood and Affect: Mood normal.         Behavior: Behavior normal. Behavior  is cooperative.         Thought Content: Thought content normal.     Laboratory    Lab Results   Component Value Date    WBC 6.97 01/24/2024    HGB 8.0 (L) 01/24/2024    HCT 25.1 (L) 01/24/2024     (H) 01/24/2024     (L) 01/24/2024       Lab Results   Component Value Date     01/24/2024    K 3.3 (L) 01/24/2024     01/24/2024    CO2 25 01/24/2024    BUN 5 (L) 01/24/2024    CREATININE 1.0 01/24/2024    CALCIUM 8.1 (L) 01/24/2024       Lab Results   Component Value Date    ALBUMIN 1.9 (L) 01/24/2024     (H) 01/24/2024     (H) 01/24/2024    ALKPHOS 206 (H) 01/24/2024    BILITOT 0.6 01/24/2024       Lab Results   Component Value Date    INR 1.3 (H) 01/24/2024    INR 1.5 (H) 01/23/2024    INR 1.6 (H) 01/22/2024       MELD 3.0: 13 at 1/24/2024  4:23 AM  MELD-Na: 9 at 1/24/2024  4:23 AM  Calculated from:  Serum Creatinine: 1.0 mg/dL at 1/24/2024  4:23 AM  Serum Sodium: 139 mmol/L (Using max of 137 mmol/L) at 1/24/2024  4:23 AM  Total Bilirubin: 0.6 mg/dL (Using min of 1 mg/dL) at 1/24/2024  4:23 AM  Serum Albumin: 1.9 g/dL at 1/24/2024  4:23 AM  INR(ratio): 1.3 at 1/24/2024  4:23 AM  Age at listing (hypothetical): 68 years  Sex: Female at 1/24/2024  4:23 AM      Assessment  Bhavana Lambert is a 68 y.o. female with history of HTN, COPD and chronic respiratory failure on 2LNC, HFpEF, HLD, hypothyroidism and depression who is transferred to [facility] for management of septic shock.     Starting 1/19, her LFTs began to climb rapidly reaching a peak of AST 5709/ALT 1501.  This coincided with spells of hypotension.  When the patient was seen today, she was normotensive.  Admits to drinking 2 standard drinks daily.  Viral hepatitis panel negative.  Transaminases now trending down steadily.  Plan  - counseled the patient on strict cessation of alcohol and ordered PEth level  - maintain adequate perfusion and avoid hypotension, if possible  - please obtain daily CMP, INR  - Plan  of care was discussed with primary team    Thank you for involving us in the care of Bhavana CALLAHAN Fausto. Please call with any additional concerns or questions.  We will sign off.    Austin Maciel MD, PGY-V  Gastroenterology Fellow  [facility]

## 2024-01-24 NOTE — PLAN OF CARE
Problem: Occupational Therapy  Goal: Occupational Therapy Goal  Description: Goals to be met by: 1/31/24    Patient will increase functional independence with ADLs by performing:    LE Dressing with Minimal Assistance.  Grooming while standing at sink with Contact Guard Assistance.  Toileting from toilet with Minimal Assistance for hygiene and clothing management.   Supine to sit with Minimal Assistance.  Toilet transfer to toilet with Contact Guard Assistance.    Outcome: Ongoing, Progressing

## 2024-01-24 NOTE — PT/OT/SLP EVAL
Physical Therapy co- Evaluation and treatment with OT    Patient Name:  Bhavana Lambert   MRN:  [id number]    Recommendations:     Discharge Recommendations: Moderate Intensity Therapy   Discharge Equipment Recommendations: none   Barriers to discharge: Decreased caregiver support    Assessment:     Bhavana Lambert is a 68 y.o. female admitted with a medical diagnosis of Septic shock.  She presents with the following impairments/functional limitations: weakness, impaired balance, decreased safety awareness, impaired endurance, impaired functional mobility, gait instability pt tolerated treatment well and will benefit from skilled PT 3x/wk to progress physically.  Pt is significantly below previous functional level, increased risk of falls and increased burden of care currently. Patient currently demonstrates a need for moderate intensity therapy on a daily basis post acute secondary to a decline in functional status due to disease. Pt was transferred from [facility] with hypotension.     Rehab Prognosis: Good; patient would benefit from acute skilled PT services to address these deficits and reach maximum level of function.    Recent Surgery: * No surgery found *      Plan:     During this hospitalization, patient to be seen 3 x/week to address the identified rehab impairments via gait training, therapeutic activities and progress toward the following goals:    Plan of Care Expires:  02/23/24    Subjective     Chief Complaint: pt c/o back pain during treatment.   Patient/Family Comments/goals: to get better and go home.   Pain/Comfort:  Pain Rating 1: 8/10 (back- chronic)  Pain Addressed 1: Reposition, Distraction  Pain Rating Post-Intervention 1: 8/10 (back- chronic)    Patients cultural, spiritual, Caodaism conflicts given the current situation: no    Living Environment:  Pt lives with her daughter, son in law and grandchildren in raised 1 story with elevator access.   Prior to admission, patients level of  function was modified independent with RW.  Equipment used at home: walker, rolling, wheelchair, bedside commode, shower chair, oxygen.  DME owned (not currently used): none.  Upon discharge, patient will have assistance from family. Pt should have assistance 24/7.    Objective:     Communicated with nurse  prior to session.  Patient found supine with telemetry, pulse ox (continuous), blood pressure cuff, oxygen, PICC line  upon PT entry to room.    General Precautions: Standard, fall  Orthopedic Precautions:    Braces:    Respiratory Status: Nasal cannula, flow 3 L/min    Exams:  Cognitive Exam:  Patient is oriented to Person and Place  RLE ROM: WNL  RLE Strength: 3/5 for major muscle groups  LLE ROM: WNL  LLE Strength: 3/5 for major muscle groups    Functional Mobility:  Bed Mobility:  pt needed verbal cues for hand placement and sequencing for functional mobility.    Rolling Right: maximal assistance  Supine to sit: maximal assistance    Transfers:     Sit to Stand:  contact guard assistance with hand-held assist  Bed to Chair: minimal assistance with  hand-held assist  using  Stand Pivot    Balance: pt sat on EOB with CGA.    Due to pt complex medical condition, the skill of 2 licensed therapists is needed to maximize treatment session and progression towards goals  Pt white board updated with current therapists name and level of mobility assistance needed.         AM-PAC 6 CLICK MOBILITY  Total Score:12       Treatment & Education:  Pt and daughter received verbal instructions in role of PT and POC. Both verbally expressed understanding of such.     Patient left up in chair with all lines intact, call button in reach, RN  notified, and daughter present.    GOALS:   Multidisciplinary Problems       Physical Therapy Goals          Problem: Physical Therapy    Goal Priority Disciplines Outcome Goal Variances Interventions   Physical Therapy Goal     PT, PT/OT Ongoing, Progressing     Description: Goals to be met  by: 24     Patient will increase functional independence with mobility by performin. Supine to sit with Contact Guard Assistance  2. Sit to stand transfer with Stand-by Assistance with AD if needed.   3. Gait  x 150 feet with Contact Guard Assistance using Rolling Walker.                          History:     Past Medical History:   Diagnosis Date    Acquired hypothyroidism     Anxiety     Anxiety and depression     Cervical radicular pain     Closed left ankle fracture     COPD (chronic obstructive pulmonary disease)     H/O: stroke 2023    Hypertension     Hypokalemia     Hyponatremia     Multiple rib fractures     Requires continuous at home supplemental oxygen     PRN FOR COPD    Stroke     Tension type headache     Traumatic closed displaced fracture of distal end of radius, left, sequela        Past Surgical History:   Procedure Laterality Date    COLON SURGERY      COLOSTOMY      COLOSTOMY CLOSURE      HYSTERECTOMY      LEG SURGERY Right     OPEN REDUCTION AND INTERNAL FIXATION (ORIF) OF INJURY OF WRIST Left 2019    Procedure: ORIF, WRIST;  Surgeon: Homero Jeff DO;  Location: [facility];  Service: Orthopedics;  Laterality: Left;  Equipment: [employer] Geminus Wrist Plate Set  Vendor/Rep: [employer]  C-Arm: Entire  DME: None    REQUIRES ASSISTANT    WRIST SURGERY Right        Time Tracking:     PT Received On: 24  PT Start Time: 940     PT Stop Time: 1002  PT Total Time (min): 22 min     Billable Minutes: Evaluation 8 min  and Therapeutic Activity 14 min       2024

## 2024-01-24 NOTE — PT/OT/SLP EVAL
Occupational Therapy   Evaluation    Name: Bhavana Lambert  MRN: [id number]  Admitting Diagnosis: Septic shock  Recent Surgery: * No surgery found *      Recommendations:     Discharge Recommendations: Moderate Intensity Therapy  Discharge Equipment Recommendations:  none  Barriers to discharge:  None    Assessment:     Bhavana Lambert is a 68 y.o. female with a medical diagnosis of Septic shock. Pt. currently demonstrates decreased (I) with ADLs, functional mobility & t/fs decreased overall strength, ROM, endurance and balance. Pt would benefit from skilled OT services to address these deficits and to facilitate improving (I) with daily tasks. Performance deficits affecting function: weakness, impaired endurance, impaired cognition, decreased coordination, impaired self care skills, decreased lower extremity function, impaired functional mobility, gait instability, impaired balance, decreased safety awareness, pain.      Rehab Prognosis: Good; patient would benefit from acute skilled OT services to address these deficits and reach maximum level of function.       Plan:     Patient to be seen 3 x/week to address the above listed problems via self-care/home management, therapeutic activities, therapeutic exercises, neuromuscular re-education  Plan of Care Expires: 02/23/24  Plan of Care Reviewed with: patient, daughter    Subjective     Occupational Profile:  Living Environment: pt lives with daughter and son in law with her 3 grandchildren. Pt lives in a [facility] with approximately 10 MARY LOU. Her bathroom setup consists of a T/S combo and 1 grab bar  Previous level of function: I w/ ADLs & used a RW PRN.  Roles and Routines: pt enjoys watching tv  Assistance upon Discharge: Family  Equipment Used at Home: walker, rolling, wheelchair, shower chair, bedside commode, oxygen    Pain/Comfort:  Pain Rating 1: 0/10    Patients cultural, spiritual, Latter day conflicts given the current situation:      Objective:     Communicated  with: rn prior to session.  Patient found supine with oxygen, peripheral IV, gomez catheter, telemetry upon OT entry to room.    General Precautions: Standard, fall  Orthopedic Precautions:    Braces:    Respiratory Status: Nasal cannula, flow 3 L/min    Occupational Performance:    Bed Mobility:    Patient completed Scooting/Bridging with maximal assistance  Patient completed Supine to Sit with maximal assistance    Functional Mobility/Transfers:  Patient completed Sit <> Stand Transfer with contact guard assistance  with  hand-held assist   Patient completed Bed <> Chair Transfer using Stand Pivot technique with minimum assistance with hand-held assist    Activities of Daily Living:  Feeding:  supervision   Grooming: stand by assistance sitting EOB.  Lower Body Dressing: total assistance     Cognitive/Visual Perceptual:  Cognitive/Psychosocial Skills:     -       Oriented to: Person and Place   -       Safety awareness/insight to disability: impaired     Physical Exam:  BUE AROM/MMT: WNL  Sat EOB with CGA x 10 minutes.    AMPAC 6 Click ADL:  AMPAC Total Score: 15    Treatment & Education:  UE ROM/MMT  Bed mobility training / assessment  Functional mobility assessment  Sit/standing balance assessment  Discussed OT POC / Post-acute plan    Patient left up in chair with all lines intact and call button in reach    GOALS:   Multidisciplinary Problems       Occupational Therapy Goals          Problem: Occupational Therapy    Goal Priority Disciplines Outcome Interventions   Occupational Therapy Goal     OT, PT/OT Ongoing, Progressing    Description: Goals to be met by: 1/31/24    Patient will increase functional independence with ADLs by performing:    LE Dressing with Minimal Assistance.  Grooming while standing at sink with Contact Guard Assistance.  Toileting from toilet with Minimal Assistance for hygiene and clothing management.   Supine to sit with Minimal Assistance.  Toilet transfer to toilet with Contact Guard  Assistance.                         History:     Past Medical History:   Diagnosis Date    Acquired hypothyroidism     Anxiety     Anxiety and depression     Cervical radicular pain     Closed left ankle fracture     COPD (chronic obstructive pulmonary disease)     H/O: stroke 11/08/2023    Hypertension     Hypokalemia     Hyponatremia     Multiple rib fractures     Requires continuous at home supplemental oxygen     PRN FOR COPD    Stroke     Tension type headache     Traumatic closed displaced fracture of distal end of radius, left, sequela          Past Surgical History:   Procedure Laterality Date    COLON SURGERY      COLOSTOMY      COLOSTOMY CLOSURE      HYSTERECTOMY      LEG SURGERY Right     OPEN REDUCTION AND INTERNAL FIXATION (ORIF) OF INJURY OF WRIST Left 1/29/2019    Procedure: ORIF, WRIST;  Surgeon: Homero Jeff DO;  Location: [facility];  Service: Orthopedics;  Laterality: Left;  Equipment: [employer] Geminus Wrist Plate Set  Vendor/Rep: [employer]  C-Arm: Entire  DME: None    REQUIRES ASSISTANT    WRIST SURGERY Right        Time Tracking:     OT Date of Treatment: 01/24/24  OT Start Time: 0940  OT Stop Time: 1004  OT Total Time (min): 24 min    Billable Minutes:Evaluation 10  Therapeutic Activity 14    1/24/2024

## 2024-01-24 NOTE — HOSPITAL COURSE
Admitted to MICU on 1/23 from OSH transferred to [facility] for hepatology eval and higher level of care.  Patient with e.coli bacteremia, abx deescalated. Admitted on vasopressors, weaned off 1/24. CIWA and PRN Ativan ordered for symptoms of ETOH withdrawal.

## 2024-01-24 NOTE — PLAN OF CARE
Problem: Physical Therapy  Goal: Physical Therapy Goal  Description: Goals to be met by: 24     Patient will increase functional independence with mobility by performin. Supine to sit with Contact Guard Assistance  2. Sit to stand transfer with Stand-by Assistance with AD if needed.   3. Gait  x 150 feet with Contact Guard Assistance using Rolling Walker.     Outcome: Ongoing, Progressing   Evaluation completed and goals appropriate. 2024

## 2024-01-24 NOTE — ASSESSMENT & PLAN NOTE
68F admitted to OSH for sepsis 2/2 urinary tract infection, c/b shock on levophed, LILLIE, and liver injury. Blood and urine cultures at OSH with E coli. Patient of note had a PICC line that was replaced at the OSH once blood cultures returned positive, however, blood cultures were not repeated to ensure clearance.     - Pt had been on vanc and ertapenem, will deescalate to rocephin based on cx data  - Tailor antibiotics to culture results  - Repeat blood cultures on admission  - Titrate pressors for MAP goal >65

## 2024-01-24 NOTE — ASSESSMENT & PLAN NOTE
- Unclear etiology for rapid increase in liver enzymes in a hepatocellular injury pattern  - Differentials include shock liver vs drug-induced liver injury for this degree of transaminitis  - Avoid hepatotoxic medications  - Avoid tylenol  - Hepatology consulted, recommending supportive care  - CMP/coags daily

## 2024-01-24 NOTE — SUBJECTIVE & OBJECTIVE
Interval History/Significant Events: No acute events overnight.  Remains on low dose vasopressor support, on 0.04 levo.      Review of Systems   Unable to perform ROS: Mental status change     Objective:     Vital Signs (Most Recent):  Temp: 97.9 °F (36.6 °C) (01/23/24 1100)  Pulse: 88 (01/24/24 0415)  Resp: 14 (01/24/24 0415)  BP: 105/60 (01/24/24 0415)  SpO2: 97 % (01/24/24 0415) Vital Signs (24h Range):  Temp:  [97.4 °F (36.3 °C)-97.9 °F (36.6 °C)] 97.9 °F (36.6 °C)  Pulse:  [84-93] 88  Resp:  [12-27] 14  SpO2:  [93 %-100 %] 97 %  BP: ()/(53-68) 105/60   Weight: 59 kg (130 lb)  Body mass index is 23.78 kg/m².      Intake/Output Summary (Last 24 hours) at 1/24/2024 0448  Last data filed at 1/23/2024 1800  Gross per 24 hour   Intake 613.41 ml   Output 1000 ml   Net -386.59 ml          Physical Exam  Vitals and nursing note reviewed.   Constitutional:       General: She is not in acute distress.     Appearance: She is ill-appearing.   HENT:      Mouth/Throat:      Pharynx: Oropharynx is clear.   Eyes:      Pupils: Pupils are equal, round, and reactive to light.   Cardiovascular:      Rate and Rhythm: Normal rate.      Pulses: Normal pulses.   Pulmonary:      Effort: Pulmonary effort is normal. No respiratory distress.      Breath sounds: Normal breath sounds. No wheezing or rales.   Abdominal:      General: Abdomen is flat.      Palpations: Abdomen is soft.      Tenderness: There is no guarding.   Musculoskeletal:      Right lower leg: No edema.      Left lower leg: No edema.   Skin:     General: Skin is warm.      Capillary Refill: Capillary refill takes less than 2 seconds.   Neurological:      Mental Status: She is easily aroused.      GCS: GCS eye subscore is 4. GCS verbal subscore is 4. GCS motor subscore is 6.   Psychiatric:         Behavior: Behavior is cooperative.            Vents:     Lines/Drains/Airways       Peripherally Inserted Central Catheter Line  Duration             PICC Double Lumen  01/21/24 1700 left basilic 2 days              Drain  Duration                  Urethral Catheter 01/23/24 0200 1 day              Peripheral Intravenous Line  Duration                  Peripheral IV - Single Lumen 01/23/24 0359 20 G Anterior;Right Forearm 1 day                  Significant Labs:    CBC/Anemia Profile:  Recent Labs   Lab 01/22/24 2318 01/23/24  0515 01/24/24  0423   WBC 8.56 8.03 6.97   HGB 8.0* 7.7* 8.0*   HCT 25.4* 24.3* 25.1*   * 111* 142*   * 100* 102*   RDW 13.2 13.1 13.7        Chemistries:  Recent Labs   Lab 01/22/24 2318 01/23/24  0515 01/23/24  1144    133* 138   K 3.3* 2.8* 3.8    103 108   CO2 23 25 24   BUN 5* 5* 5*   CREATININE 0.9 1.0 0.9   CALCIUM 7.6* 7.5* 7.8*   ALBUMIN 1.8*  1.8* 1.7*  --    PROT 4.9*  4.9* 4.9*  --    BILITOT 0.9  0.9 0.7  --    ALKPHOS 149*  149* 150*  --    ALT 1,096*  1,096* 946*  --    AST 2,487*  2,487* 1,768*  --    MG 1.6 1.5* 2.2   PHOS 4.3 4.1  --        All pertinent labs within the past 24 hours have been reviewed.    Significant Imaging:  I have reviewed all pertinent imaging results/findings within the past 24 hours.

## 2024-01-24 NOTE — PROGRESS NOTES
[facility] - Cardiac Medical ICU  Critical Care Medicine  Progress Note    Patient Name: Bhavana Lambert  MRN: [id number]  Admission Date: 1/22/2024  Hospital Length of Stay: 2 days  Code Status: Full Code  Attending Provider: Aram Park*  Primary Care Provider: Pepito Jhaveri IV, MD   Principal Problem: Septic shock    Subjective:     HPI:  Yahaira Lambert is a 68 year old woman with HTN, COPD and chronic respiratory failure on 2LNC, HFpEF, HLD, hypothyroidism and depression who presented to [facility] on 1/19 with symptoms of lower back pain and dysuria, and found to be hypotensive. She was admitted for sepsis of urinary tract origin. Workup showed positive blood cultures and urine cultures, both growing E coli. During the admission she went into septic shock, required levophed, and also briefly required BiPAP for increased work of breathing and hypercapnia. She also had climbing LFTs to the thousands. CT scan on 1/22 showed progressing left pyelonephritis, without abscess. Due to medical complexity, [place] [facility] [place] higher level of care and hepatology eval in setting of acute liver failure.      Hospital/ICU Course:  Admitted to MICU on 1/23 from OSH [place] [facility] [place] and higher level of care.  Patient with e.coli bacteremia, abx deescalated.  Arrived on vasopressors, requirements decreasing.  LFTs downtrending.      Interval History/Significant Events: No acute events overnight.  Remains on low dose vasopressor support, on 0.04 levo.      Review of Systems   Unable to perform ROS: Mental status change     Objective:     Vital Signs (Most Recent):  Temp: 97.9 °F (36.6 °C) (01/23/24 1100)  Pulse: 88 (01/24/24 0415)  Resp: 14 (01/24/24 0415)  BP: 105/60 (01/24/24 0415)  SpO2: 97 % (01/24/24 0415) Vital Signs (24h Range):  Temp:  [97.4 °F (36.3 °C)-97.9 °F (36.6 °C)] 97.9 °F (36.6 °C)  Pulse:  [84-93] 88  Resp:  [12-27] 14  SpO2:  [93 %-100 %] 97 %  BP:  ()/(53-68) 105/60   Weight: 59 kg (130 lb)  Body mass index is 23.78 kg/m².      Intake/Output Summary (Last 24 hours) at 1/24/2024 0448  Last data filed at 1/23/2024 1800  Gross per 24 hour   Intake 613.41 ml   Output 1000 ml   Net -386.59 ml          Physical Exam  Vitals and nursing note reviewed.   Constitutional:       General: She is not in acute distress.     Appearance: She is ill-appearing.   HENT:      Mouth/Throat:      Pharynx: Oropharynx is clear.   Eyes:      Pupils: Pupils are equal, round, and reactive to light.   Cardiovascular:      Rate and Rhythm: Normal rate.      Pulses: Normal pulses.   Pulmonary:      Effort: Pulmonary effort is normal. No respiratory distress.      Breath sounds: Normal breath sounds. No wheezing or rales.   Abdominal:      General: Abdomen is flat.      Palpations: Abdomen is soft.      Tenderness: There is no guarding.   Musculoskeletal:      Right lower leg: No edema.      Left lower leg: No edema.   Skin:     General: Skin is warm.      Capillary Refill: Capillary refill takes less than 2 seconds.   Neurological:      Mental Status: She is easily aroused.      GCS: GCS eye subscore is 4. GCS verbal subscore is 4. GCS motor subscore is 6.   Psychiatric:         Behavior: Behavior is cooperative.            Vents:     Lines/Drains/Airways       Peripherally Inserted Central Catheter Line  Duration             PICC Double Lumen 01/21/24 1700 left basilic 2 days              Drain  Duration                  Urethral Catheter 01/23/24 0200 1 day              Peripheral Intravenous Line  Duration                  Peripheral IV - Single Lumen 01/23/24 0359 20 G Anterior;Right Forearm 1 day                  Significant Labs:    CBC/Anemia Profile:  Recent Labs   Lab 01/22/24  2318 01/23/24  0515 01/24/24  0423   WBC 8.56 8.03 6.97   HGB 8.0* 7.7* 8.0*   HCT 25.4* 24.3* 25.1*   * 111* 142*   * 100* 102*   RDW 13.2 13.1 13.7        Chemistries:  Recent Labs    Lab 01/22/24  2318 01/23/24  0515 01/23/24  1144    133* 138   K 3.3* 2.8* 3.8    103 108   CO2 23 25 24   BUN 5* 5* 5*   CREATININE 0.9 1.0 0.9   CALCIUM 7.6* 7.5* 7.8*   ALBUMIN 1.8*  1.8* 1.7*  --    PROT 4.9*  4.9* 4.9*  --    BILITOT 0.9  0.9 0.7  --    ALKPHOS 149*  149* 150*  --    ALT 1,096*  1,096* 946*  --    AST 2,487*  2,487* 1,768*  --    MG 1.6 1.5* 2.2   PHOS 4.3 4.1  --        All pertinent labs within the past 24 hours have been reviewed.    Significant Imaging:  I have reviewed all pertinent imaging results/findings within the past 24 hours.    ABG  Recent Labs   Lab 01/21/24  0458   PH 7.250*   PO2 77*   PCO2 53.0*   HCO3 23.3*   BE -4*     Assessment/Plan:     Pulmonary  Emphysema/COPD  - Briefly on BIPAP at OSH then weaned off now on 2L NC  - ABG PRN  - Duonebs as needed  - Wean FiO2 for SpO2 goal 88-92%    Cardiac/Vascular  Chronic diastolic (congestive) heart failure  Echo 1/23/24    Left Ventricle: The left ventricle is normal in size. Normal wall thickness. Septal motion is consistent with post-operative status. There is normal systolic function with a visually estimated ejection fraction of 55 - 60%. There is normal diastolic function.    Right Ventricle: Mild right ventricular enlargement. Systolic function is mildly reduced.    Tricuspid Valve: There is mild regurgitation.    Pulmonary Artery: The estimated pulmonary artery systolic pressure is 46 mmHg.    IVC/SVC: Intermediate venous pressure at 8 mmHg.    Pericardium: There is a small effusion.    --Strict I/O  --Cardiac monitoring    Mixed hyperlipidemia  Elevated LFTs, hold statin.      Renal/  LILLIE (acute kidney injury)  LILLIE at OSH, Scr now appears at baseline.      - Daily BMP   - Trend I/O    ID  * Septic shock  68F admitted to OSH for sepsis 2/2 urinary tract infection, c/b shock on levophed, LILLIE, and liver injury. Blood and urine cultures at OSH with E coli. Patient of note had a PICC line that was replaced  at the OSH once blood cultures returned positive, however, blood cultures were not repeated to ensure clearance.     - Pt had been on vanc and ertapenem, will deescalate to rocephin based on cx data  - Tailor antibiotics to culture results  - Repeat blood cultures on admission  - Titrate pressors for MAP goal >65    E coli bacteremia  See septic shock.     Oncology  Anemia of chronic disease  --CBC daily  --Transfuse for Hgb <7  --Maintain active type and screen    GI  Transaminitis  - Unclear etiology for rapid increase in liver enzymes in a hepatocellular injury pattern  - Differentials include shock liver vs drug-induced liver injury for this degree of transaminitis  - Avoid hepatotoxic medications  - Avoid tylenol  - Hepatology consulted, recommending supportive care  - CMP/coags daily       Critical Care Daily Checklist:    A: Awake: RASS Goal/Actual Goal: RASS Goal: 0-->alert and calm  Actual:     B: Spontaneous Breathing Trial Performed?     C: SAT & SBT Coordinated?  na                      D: Delirium: CAM-ICU Overall CAM-ICU: Negative   E: Early Mobility Performed? Yes   F: Feeding Goal:    Status:     Current Diet Order   Procedures    Diet Adult Regular (IDDSI Level 7) Cardiac (Low Na/Chol)     Order Specific Question:   Additional Diet Options:     Answer:   Cardiac (Low Na/Chol)      AS: Analgesia/Sedation PRN   T: Thromboembolic Prophylaxis lovenox   H: HOB > 300 Yes   U: Stress Ulcer Prophylaxis (if needed) Pepcid   G: Glucose Control Bg goal 140-180   B: Bowel Function     I: Indwelling Catheter (Lines & Azul) Necessity PIV   D: De-escalation of Antimicrobials/Pharmacotherapies CTX    Plan for the day/ETD Wean pressors, PT/OT    Code Status:  Family/Goals of Care: Full Code       Critical Care Time: 40 minutes  Critical secondary to Patient has a condition that poses threat to life and bodily function: Septic Shock    Plan to be discussed with Dr. Park.        Critical care was time spent  personally by me on the following activities: development of treatment plan with patient or surrogate and bedside caregivers, discussions with consultants, evaluation of patient's response to treatment, examination of patient, ordering and performing treatments and interventions, ordering and review of laboratory studies, ordering and review of radiographic studies, pulse oximetry, re-evaluation of patient's condition. This critical care time did not overlap with that of any other provider or involve time for any procedures.     Caty Hernandez NP  Critical Care Medicine  [facility] - Cardiac Medical ICU

## 2024-01-25 PROBLEM — F10.10 ETOH ABUSE: Status: ACTIVE | Noted: 2024-01-25

## 2024-01-25 LAB
ALBUMIN SERPL BCP-MCNC: 1.9 G/DL (ref 3.5–5.2)
ALP SERPL-CCNC: 199 U/L (ref 55–135)
ALT SERPL W/O P-5'-P-CCNC: 439 U/L (ref 10–44)
ANION GAP SERPL CALC-SCNC: 9 MMOL/L (ref 8–16)
AST SERPL-CCNC: 201 U/L (ref 10–40)
BASOPHILS # BLD AUTO: 0.01 K/UL (ref 0–0.2)
BASOPHILS NFR BLD: 0.2 % (ref 0–1.9)
BILIRUB SERPL-MCNC: 0.5 MG/DL (ref 0.1–1)
BUN SERPL-MCNC: 5 MG/DL (ref 8–23)
CALCIUM SERPL-MCNC: 7.9 MG/DL (ref 8.7–10.5)
CHLORIDE SERPL-SCNC: 102 MMOL/L (ref 95–110)
CO2 SERPL-SCNC: 29 MMOL/L (ref 23–29)
CREAT SERPL-MCNC: 0.9 MG/DL (ref 0.5–1.4)
DIFFERENTIAL METHOD BLD: ABNORMAL
EOSINOPHIL # BLD AUTO: 0.2 K/UL (ref 0–0.5)
EOSINOPHIL NFR BLD: 3 % (ref 0–8)
ERYTHROCYTE [DISTWIDTH] IN BLOOD BY AUTOMATED COUNT: 13.4 % (ref 11.5–14.5)
EST. GFR  (NO RACE VARIABLE): >60 ML/MIN/1.73 M^2
GLUCOSE SERPL-MCNC: 74 MG/DL (ref 70–110)
HCT VFR BLD AUTO: 24.3 % (ref 37–48.5)
HGB BLD-MCNC: 7.8 G/DL (ref 12–16)
IMM GRANULOCYTES # BLD AUTO: 0.02 K/UL (ref 0–0.04)
IMM GRANULOCYTES NFR BLD AUTO: 0.4 % (ref 0–0.5)
INR PPP: 1.3 (ref 0.8–1.2)
LYMPHOCYTES # BLD AUTO: 0.9 K/UL (ref 1–4.8)
LYMPHOCYTES NFR BLD: 18.7 % (ref 18–48)
MAGNESIUM SERPL-MCNC: 1.8 MG/DL (ref 1.6–2.6)
MCH RBC QN AUTO: 32.1 PG (ref 27–31)
MCHC RBC AUTO-ENTMCNC: 32.1 G/DL (ref 32–36)
MCV RBC AUTO: 100 FL (ref 82–98)
MONOCYTES # BLD AUTO: 0.7 K/UL (ref 0.3–1)
MONOCYTES NFR BLD: 14.8 % (ref 4–15)
NEUTROPHILS # BLD AUTO: 3.1 K/UL (ref 1.8–7.7)
NEUTROPHILS NFR BLD: 62.9 % (ref 38–73)
NRBC BLD-RTO: 0 /100 WBC
PHOSPHATE SERPL-MCNC: 3.8 MG/DL (ref 2.7–4.5)
PLATELET # BLD AUTO: 133 K/UL (ref 150–450)
PMV BLD AUTO: 10.4 FL (ref 9.2–12.9)
POCT GLUCOSE: 62 MG/DL (ref 70–110)
POCT GLUCOSE: 68 MG/DL (ref 70–110)
POCT GLUCOSE: 83 MG/DL (ref 70–110)
POCT GLUCOSE: 96 MG/DL (ref 70–110)
POTASSIUM SERPL-SCNC: 3 MMOL/L (ref 3.5–5.1)
PROT SERPL-MCNC: 5.1 G/DL (ref 6–8.4)
PROTHROMBIN TIME: 13.6 SEC (ref 9–12.5)
RBC # BLD AUTO: 2.43 M/UL (ref 4–5.4)
SODIUM SERPL-SCNC: 140 MMOL/L (ref 136–145)
WBC # BLD AUTO: 4.93 K/UL (ref 3.9–12.7)

## 2024-01-25 PROCEDURE — 83735 ASSAY OF MAGNESIUM: CPT

## 2024-01-25 PROCEDURE — 63600175 PHARM REV CODE 636 W HCPCS: Performed by: STUDENT IN AN ORGANIZED HEALTH CARE EDUCATION/TRAINING PROGRAM

## 2024-01-25 PROCEDURE — 20600001 HC STEP DOWN PRIVATE ROOM

## 2024-01-25 PROCEDURE — 25000003 PHARM REV CODE 250

## 2024-01-25 PROCEDURE — 94761 N-INVAS EAR/PLS OXIMETRY MLT: CPT

## 2024-01-25 PROCEDURE — 85025 COMPLETE CBC W/AUTO DIFF WBC: CPT

## 2024-01-25 PROCEDURE — 63600175 PHARM REV CODE 636 W HCPCS

## 2024-01-25 PROCEDURE — 99291 CRITICAL CARE FIRST HOUR: CPT | Mod: ,,,

## 2024-01-25 PROCEDURE — 85610 PROTHROMBIN TIME: CPT

## 2024-01-25 PROCEDURE — 99900035 HC TECH TIME PER 15 MIN (STAT)

## 2024-01-25 PROCEDURE — 25000242 PHARM REV CODE 250 ALT 637 W/ HCPCS

## 2024-01-25 PROCEDURE — 84100 ASSAY OF PHOSPHORUS: CPT

## 2024-01-25 PROCEDURE — S4991 NICOTINE PATCH NONLEGEND: HCPCS

## 2024-01-25 PROCEDURE — 25000003 PHARM REV CODE 250: Performed by: STUDENT IN AN ORGANIZED HEALTH CARE EDUCATION/TRAINING PROGRAM

## 2024-01-25 PROCEDURE — 27000221 HC OXYGEN, UP TO 24 HOURS

## 2024-01-25 PROCEDURE — 80053 COMPREHEN METABOLIC PANEL: CPT

## 2024-01-25 PROCEDURE — 94640 AIRWAY INHALATION TREATMENT: CPT

## 2024-01-25 RX ORDER — MONTELUKAST SODIUM 10 MG/1
10 TABLET ORAL DAILY
Status: DISCONTINUED | OUTPATIENT
Start: 2024-01-25 | End: 2024-01-30 | Stop reason: HOSPADM

## 2024-01-25 RX ORDER — LORAZEPAM 2 MG/ML
0.5 INJECTION INTRAMUSCULAR 2 TIMES DAILY PRN
Status: CANCELLED | OUTPATIENT
Start: 2024-01-25

## 2024-01-25 RX ORDER — NAPROXEN SODIUM 220 MG/1
81 TABLET, FILM COATED ORAL DAILY
Status: DISCONTINUED | OUTPATIENT
Start: 2024-01-25 | End: 2024-01-30 | Stop reason: HOSPADM

## 2024-01-25 RX ORDER — LORAZEPAM 2 MG/ML
0.5 INJECTION INTRAMUSCULAR EVERY 4 HOURS PRN
Status: DISCONTINUED | OUTPATIENT
Start: 2024-01-25 | End: 2024-01-25

## 2024-01-25 RX ORDER — MAGNESIUM SULFATE HEPTAHYDRATE 40 MG/ML
2 INJECTION, SOLUTION INTRAVENOUS ONCE
Status: COMPLETED | OUTPATIENT
Start: 2024-01-25 | End: 2024-01-25

## 2024-01-25 RX ORDER — LORAZEPAM 2 MG/ML
1 INJECTION INTRAMUSCULAR EVERY 4 HOURS PRN
Status: DISCONTINUED | OUTPATIENT
Start: 2024-01-25 | End: 2024-01-28

## 2024-01-25 RX ORDER — POTASSIUM CHLORIDE 7.45 MG/ML
10 INJECTION INTRAVENOUS
Status: DISCONTINUED | OUTPATIENT
Start: 2024-01-25 | End: 2024-01-25

## 2024-01-25 RX ORDER — OXYCODONE HYDROCHLORIDE 5 MG/1
5 TABLET ORAL EVERY 6 HOURS PRN
Status: DISCONTINUED | OUTPATIENT
Start: 2024-01-25 | End: 2024-01-30 | Stop reason: HOSPADM

## 2024-01-25 RX ORDER — BUSPIRONE HYDROCHLORIDE 5 MG/1
15 TABLET ORAL 2 TIMES DAILY
Status: DISCONTINUED | OUTPATIENT
Start: 2024-01-25 | End: 2024-01-30 | Stop reason: HOSPADM

## 2024-01-25 RX ORDER — LORAZEPAM 2 MG/ML
1 INJECTION INTRAMUSCULAR ONCE
Status: COMPLETED | OUTPATIENT
Start: 2024-01-25 | End: 2024-01-25

## 2024-01-25 RX ORDER — FLUOXETINE HYDROCHLORIDE 20 MG/1
40 CAPSULE ORAL DAILY
Status: DISCONTINUED | OUTPATIENT
Start: 2024-01-25 | End: 2024-01-30 | Stop reason: HOSPADM

## 2024-01-25 RX ORDER — POTASSIUM CHLORIDE 29.8 MG/ML
40 INJECTION INTRAVENOUS ONCE
Status: COMPLETED | OUTPATIENT
Start: 2024-01-25 | End: 2024-01-25

## 2024-01-25 RX ADMIN — THERA TABS 1 TABLET: TAB at 08:01

## 2024-01-25 RX ADMIN — CLOPIDOGREL BISULFATE 75 MG: 75 TABLET ORAL at 08:01

## 2024-01-25 RX ADMIN — CEFTRIAXONE 2 G: 2 INJECTION, POWDER, FOR SOLUTION INTRAMUSCULAR; INTRAVENOUS at 12:01

## 2024-01-25 RX ADMIN — SPIRONOLACTONE 12.5 MG: 25 TABLET ORAL at 04:01

## 2024-01-25 RX ADMIN — Medication 100 MG: at 08:01

## 2024-01-25 RX ADMIN — FLUOXETINE HYDROCHLORIDE 40 MG: 20 CAPSULE ORAL at 04:01

## 2024-01-25 RX ADMIN — LORAZEPAM 1 MG: 2 INJECTION INTRAMUSCULAR; INTRAVENOUS at 08:01

## 2024-01-25 RX ADMIN — IPRATROPIUM BROMIDE AND ALBUTEROL SULFATE 3 ML: .5; 3 SOLUTION RESPIRATORY (INHALATION) at 07:01

## 2024-01-25 RX ADMIN — FAMOTIDINE 20 MG: 20 TABLET, FILM COATED ORAL at 08:01

## 2024-01-25 RX ADMIN — MAGNESIUM SULFATE HEPTAHYDRATE 2 G: 40 INJECTION, SOLUTION INTRAVENOUS at 05:01

## 2024-01-25 RX ADMIN — LORAZEPAM 1 MG: 2 INJECTION INTRAMUSCULAR at 07:01

## 2024-01-25 RX ADMIN — ENOXAPARIN SODIUM 40 MG: 40 INJECTION SUBCUTANEOUS at 04:01

## 2024-01-25 RX ADMIN — Medication 1 PATCH: at 08:01

## 2024-01-25 RX ADMIN — FOLIC ACID 1 MG: 1 TABLET ORAL at 08:01

## 2024-01-25 RX ADMIN — LORAZEPAM 0.5 MG: 2 INJECTION INTRAMUSCULAR; INTRAVENOUS at 05:01

## 2024-01-25 RX ADMIN — IPRATROPIUM BROMIDE AND ALBUTEROL SULFATE 3 ML: .5; 3 SOLUTION RESPIRATORY (INHALATION) at 01:01

## 2024-01-25 RX ADMIN — MONTELUKAST 10 MG: 10 TABLET, FILM COATED ORAL at 04:01

## 2024-01-25 RX ADMIN — DEXTROSE MONOHYDRATE 125 ML: 100 INJECTION, SOLUTION INTRAVENOUS at 10:01

## 2024-01-25 RX ADMIN — POTASSIUM CHLORIDE 40 MEQ: 29.8 INJECTION, SOLUTION INTRAVENOUS at 05:01

## 2024-01-25 RX ADMIN — ASPIRIN 81 MG CHEWABLE TABLET 81 MG: 81 TABLET CHEWABLE at 04:01

## 2024-01-25 NOTE — SUBJECTIVE & OBJECTIVE
Interval History/Significant Events: No acute events overnight. Vasopressors off. Ativan dose decreased as patient lethargic after receiving 2 mg dose.     Review of Systems   Respiratory:  Negative for shortness of breath.    Cardiovascular:  Negative for chest pain.   Gastrointestinal:  Negative for abdominal distention and abdominal pain.   Neurological:  Negative for dizziness and headaches.     Objective:     Vital Signs (Most Recent):  Temp: 97.8 °F (36.6 °C) (01/25/24 0700)  Pulse: 87 (01/25/24 1500)  Resp: (!) 22 (01/25/24 1500)  BP: (!) 110/55 (01/25/24 1500)  SpO2: 100 % (01/25/24 1500) Vital Signs (24h Range):  Temp:  [97 °F (36.1 °C)-98 °F (36.7 °C)] 97.8 °F (36.6 °C)  Pulse:  [] 87  Resp:  [8-30] 22  SpO2:  [88 %-100 %] 100 %  BP: ()/(50-73) 110/55   Weight: 59 kg (130 lb)  Body mass index is 23.78 kg/m².      Intake/Output Summary (Last 24 hours) at 1/25/2024 1629  Last data filed at 1/25/2024 1500  Gross per 24 hour   Intake 355.01 ml   Output 1775 ml   Net -1419.99 ml          Physical Exam  Vitals and nursing note reviewed.   Constitutional:       Appearance: She is well-developed.   HENT:      Head: Normocephalic.      Mouth/Throat:      Mouth: Mucous membranes are dry.   Eyes:      Extraocular Movements: Extraocular movements intact.      Pupils: Pupils are equal, round, and reactive to light.   Cardiovascular:      Rate and Rhythm: Normal rate and regular rhythm.      Pulses: Normal pulses.   Pulmonary:      Effort: Pulmonary effort is normal.      Breath sounds: Normal breath sounds.   Abdominal:      General: Bowel sounds are normal. There is no distension.      Palpations: Abdomen is soft.      Tenderness: There is no abdominal tenderness.   Musculoskeletal:         General: No swelling.   Skin:     General: Skin is warm and dry.   Neurological:      Mental Status: She is alert. She is disoriented.      GCS: GCS eye subscore is 4. GCS verbal subscore is 5. GCS motor subscore is 6.       Motor: Weakness present.   Psychiatric:         Behavior: Behavior is cooperative.            Vents:  Oxygen Concentration (%): 30 (01/24/24 2006)  Lines/Drains/Airways       Peripherally Inserted Central Catheter Line  Duration             PICC Double Lumen 01/21/24 1700 left basilic 3 days              Drain  Duration                  Urethral Catheter 01/23/24 0200 2 days              Peripheral Intravenous Line  Duration                  Peripheral IV - Single Lumen 01/23/24 0359 20 G Anterior;Right Forearm 2 days                  Significant Labs:    CBC/Anemia Profile:  Recent Labs   Lab 01/24/24  0423 01/25/24  0341   WBC 6.97 4.93   HGB 8.0* 7.8*   HCT 25.1* 24.3*   * 133*   * 100*   RDW 13.7 13.4        Chemistries:  Recent Labs   Lab 01/24/24 0423 01/25/24  0341    140   K 3.3* 3.0*    102   CO2 25 29   BUN 5* 5*   CREATININE 1.0 0.9   CALCIUM 8.1* 7.9*   ALBUMIN 1.9* 1.9*   PROT 5.2* 5.1*   BILITOT 0.6 0.5   ALKPHOS 206* 199*   * 439*   * 201*   MG 2.1 1.8   PHOS 4.1 3.8       All pertinent labs within the past 24 hours have been reviewed.    Significant Imaging:  I have reviewed all pertinent imaging results/findings within the past 24 hours.

## 2024-01-25 NOTE — ASSESSMENT & PLAN NOTE
Patient endorses 2 large beverages of tequila with no mixers. Daughter states patient drinks tequila without mixers at home daily.    - CIWA assessment  - MV and supplements  - PRN Ativan for CIWA > 8

## 2024-01-25 NOTE — RESIDENT HANDOFF
ICU Transfer of Care Note  Critical Care Medicine    Admit Date: 1/22/2024  LOS: 3    CC: Septic shock    Code Status: Full Code     Transfer to Hospital Medicine discussed.    HPI and Hospital Course:     HPI:  Yahaira Lambert is a 68 year old woman with HTN, COPD and chronic respiratory failure on 2LNC, HFpEF, HLD, hypothyroidism and depression who presented to [facility] on 1/19 with symptoms of lower back pain and dysuria, and found to be hypotensive. She was admitted for sepsis of urinary tract origin. Workup showed positive blood cultures and urine cultures, both growing E coli. During the admission she went into septic shock, required levophed, and also briefly required BiPAP for increased work of breathing and hypercapnia. She also had climbing LFTs to the thousands. CT scan on 1/22 showed progressing left pyelonephritis, without abscess. Due to medical complexity, patient was transferred to [facility] for higher level of care and hepatology eval in setting of acute liver failure.      Hospital/ICU Course:  Admitted to MICU on 1/23 from OSH transferred to [facility] for hepatology eval and higher level of care.  Patient with e.coli bacteremia, abx deescalated. Admitted on vasopressors, weaned off 1/24. CIWA and PRN Ativan ordered for symptoms of ETOH withdrawal.       To Follow Up:     - Complete Abx      Discharge Plan:     - Home or SNF. May require PT/OT after discharge.     Call with questions.

## 2024-01-25 NOTE — PLAN OF CARE
MICU DAILY GOALS     Family/Goals of care/Code Status   Code Status: Full Code    24H Vital Sign Range  Temp:  [97 °F (36.1 °C)-98 °F (36.7 °C)]   Pulse:  []   Resp:  [11-30]   BP: ()/(52-73)   SpO2:  [88 %-100 %]      Shift Events (include procedures and significant events)   No acute events throughout shift, Pt with SD orders in place, awaiting bed assignment.     AWAKE RASS: Goal - RASS Goal: 0-->alert and calm  Actual - RASS ([place] Agitation-Sedation Scale): alert and calm    Restraint necessity: Not necessary   BREATHE SBT: Not intubated    Coordinate A & B, analgesics/sedatives Pain: managed   SAT: Not intubated   Delirium CAM-ICU: Overall CAM-ICU: Negative   Early(intubated/ Progressive (non-intubated) Mobility MOVE Screen (INTUBATED ONLY): Not intubated    Activity: Activity Management: Rolling - L1   Feeding/Nutrition Diet order: Diet/Nutrition Received: 2 gram sodium,     Thrombus DVT prophylaxis: VTE Required Core Measure: Pharmacological prophylaxis initiated/maintained   HOB Elevation Head of Bed (HOB) Positioning: HOB at 30 degrees   Ulcer Prophylaxis GI: yes   Glucose control managed Glycemic Management: blood glucose monitored   Skin Skin assessed during: Daily Assessment    Sacrum intact/not altered? Yes  Heels intact/not altered? Yes  Surgical wound? No    Check one (no altered skin or altered skin) and sub boxes:  [x] No Altered Skin Integrity Present    [x]Prevention Measures Documented    [] Altered Skin Integrity Present or Discovered   [] LDA present in EPIC              [] LDA added in EPIC   [] Wound Image Taken (required on admit,                   transfer/discharge and every Tuesday)    Wound Care Consulted? No    Attending Nurse:     Second RN/Staff Member:    Bowel Function diarrhea    Indwelling Catheter Necessity      Urethral Catheter 01/23/24 0200-Reason for Continuing Urinary Catheterization: Urinary retention    PICC Double Lumen 01/21/24 1700 left basilic-Line  Necessity Review: Medication caustic to vasculature     De-escalation Antibiotics Yes       VS and assessment per flow sheet, patient progressing towards goals as tolerated, plan of care reviewed with  patient and daughter , all concerns addressed, will continue to monitor.

## 2024-01-25 NOTE — HOSPITAL COURSE
68 F with Pmhx COPD (on 2L @ home), HFpEF, and ETOH abuse that presented to [facility] as transfer from OSH for hepatology evaluation. Patient noted to have E coli bacteremia and arrived on vasopressors. LFT's downtrending, pressors weaned off. Continues on CTX for e. coli UTI and E coli bacteremia. She was seen by hepatology for severe elevation of LFTs  (now improving). Likely 2/2 shock liver and ETOH abuse. She is on CIWA protocol with PRN ativan for withdrawals.     Patient's AMS resolved 1/28. PT/OT recommending SNF. Patient tolerating diet, no symptoms. Stable for discharge to SNF. Continues on CTX and will transition to finish 14 day course of augmentin (through 2/6/24).

## 2024-01-25 NOTE — HPI
"HPI per Caty Hernandez NP:     "Yahaira Lambert is a 68 year old woman with HTN, COPD and chronic respiratory failure on 2LNC, HFpEF, HLD, hypothyroidism and depression who presented to [facility] on 1/19 with symptoms of lower back pain and dysuria, and found to be hypotensive. She was admitted for sepsis of urinary tract origin. Workup showed positive blood cultures and urine cultures, both growing E coli. During the admission she went into septic shock, required levophed, and also briefly required BiPAP for increased work of breathing and hypercapnia. She also had climbing LFTs to the thousands. CT scan on 1/22 showed progressing left pyelonephritis, without abscess. Due to medical complexity, patient was transferred to [facility] [place]U for higher level of care and hepatology eval in setting of acute liver failure."  "

## 2024-01-25 NOTE — ASSESSMENT & PLAN NOTE
Left Pyelonephritis  E coli Bacteremia    68F admitted to OSH for sepsis 2/2 urinary tract infection, c/b shock on levophed, LILLIE, and liver injury. Blood and urine cultures at OSH with E coli. Patient of note had a PICC line that was replaced at the OSH once blood cultures returned positive, however, blood cultures were not repeated to ensure clearance.     - CT-AP with findings consistent with left pyelonephritis   - Initially on vanc and ertapenem  - Blood culture growing E Coli susceptible to CTX  - Abx deescalated to Ceftriaxone   - Pressors weaned off and patient stepped down to  1/25

## 2024-01-25 NOTE — ASSESSMENT & PLAN NOTE
68F admitted to OSH for sepsis 2/2 urinary tract infection, c/b shock on levophed, LILLIE, and liver injury. Blood and urine cultures at OSH with E coli. Patient of note had a PICC line that was replaced at the OSH once blood cultures returned positive, however, blood cultures were not repeated to ensure clearance.     - Pt had been on vanc and ertapenem, will deescalate to rocephin based on cx data  - Repeat blood cultures on admission with NGTD  - Hemodynamically stable off pressors

## 2024-01-25 NOTE — ASSESSMENT & PLAN NOTE
Echo 1/23/24    Left Ventricle: The left ventricle is normal in size. Normal wall thickness. Septal motion is consistent with post-operative status. There is normal systolic function with a visually estimated ejection fraction of 55 - 60%. There is normal diastolic function.    Right Ventricle: Mild right ventricular enlargement. Systolic function is mildly reduced.    Tricuspid Valve: There is mild regurgitation.    Pulmonary Artery: The estimated pulmonary artery systolic pressure is 46 mmHg.    IVC/SVC: Intermediate venous pressure at 8 mmHg.    Pericardium: There is a small effusion.    --Strict I/O  --Cardiac monitoring

## 2024-01-25 NOTE — ASSESSMENT & PLAN NOTE
- Currently on home 2L NC, not in acute exacerbation  - Continue home inhaler regimen  - PRN duonebs provided  - Wean FiO2 for SpO2 goal 88-92%

## 2024-01-25 NOTE — ASSESSMENT & PLAN NOTE
- Briefly on BIPAP at OSH then weaned off now on 2L NC; requires 2-3L of home O2  - ABG PRN  - Duonebs as needed  - Wean FiO2 for SpO2 goal 88-92%

## 2024-01-25 NOTE — ASSESSMENT & PLAN NOTE
- Improving  - Unclear etiology for rapid increase in liver enzymes in a hepatocellular injury pattern  - Differentials include shock liver vs drug-induced liver injury for this degree of transaminitis  - Avoid hepatotoxic medications  - Avoid tylenol  - Hepatology consulted, recommending supportive care  - CMP/coags daily

## 2024-01-25 NOTE — ASSESSMENT & PLAN NOTE
- Etiology likely shock liver in setting of septic shock and concurrent ETOH use  - LFT's initially elevated to 5000's/1000's  - Hepatology consulted - supportive care  - LFT's downtrending  - Daily CMP's/INR's  - Avoid hepatotoxic medications  - Avoid tylenol

## 2024-01-25 NOTE — ASSESSMENT & PLAN NOTE
Patient's anemia is currently controlled. Has not received any PRBCs to date.   Current CBC reviewed-   Lab Results   Component Value Date    HGB 7.8 (L) 01/25/2024    HCT 24.3 (L) 01/25/2024     Monitor serial CBC and transfuse if patient becomes hemodynamically unstable, symptomatic or H/H drops below 7/21.

## 2024-01-25 NOTE — ASSESSMENT & PLAN NOTE
Patient is identified as having Diastolic (HFpEF) heart failure that is Chronic. CHF is currently controlled. Latest ECHO performed and demonstrates- Results for orders placed during the hospital encounter of 01/22/24    Echo    Interpretation Summary    Left Ventricle: The left ventricle is normal in size. Normal wall thickness. Septal motion is consistent with post-operative status. There is normal systolic function with a visually estimated ejection fraction of 55 - 60%. There is normal diastolic function.    Right Ventricle: Mild right ventricular enlargement. Systolic function is mildly reduced.    Tricuspid Valve: There is mild regurgitation.    Pulmonary Artery: The estimated pulmonary artery systolic pressure is 46 mmHg.    IVC/SVC: Intermediate venous pressure at 8 mmHg.    Pericardium: There is a small effusion.  . Continue Beta Blocker and Aldactone and monitor clinical status closely. Monitor on telemetry. Patient is off CHF pathway.  Monitor strict Is&Os and daily weights.  Place on fluid restriction of 2 L. Cardiology has not been consulted. Continue to stress to patient importance of self efficacy and  on diet for CHF. Last BNP reviewed- and noted below   Recent Labs   Lab 01/21/24  1350   *

## 2024-01-25 NOTE — PROGRESS NOTES
[facility] - Cardiac Medical ICU  Critical Care Medicine  Progress Note    Patient Name: Bhavana Lambert  MRN: [id number]  Admission Date: 1/22/2024  Hospital Length of Stay: 3 days  Code Status: Full Code  Attending Provider: Aram Park*  Primary Care Provider: Pepito Jhaveri IV, MD   Principal Problem: Septic shock    Subjective:     HPI:  Yahaira Lambert is a 68 year old woman with HTN, COPD and chronic respiratory failure on 2LNC, HFpEF, HLD, hypothyroidism and depression who presented to [facility] on 1/19 with symptoms of lower back pain and dysuria, and found to be hypotensive. She was admitted for sepsis of urinary tract origin. Workup showed positive blood cultures and urine cultures, both growing E coli. During the admission she went into septic shock, required levophed, and also briefly required BiPAP for increased work of breathing and hypercapnia. She also had climbing LFTs to the thousands. CT scan on 1/22 showed progressing left pyelonephritis, without abscess. Due to medical complexity, patient was transferred to [facility] MICU for higher level of care and hepatology eval in setting of acute liver failure.      Hospital/ICU Course:  Admitted to MICU on 1/23 from OSH transferred to [facility] for hepatology eval and higher level of care.  Patient with e.coli bacteremia, abx deescalated. Admitted on vasopressors, weaned off 1/24. CIWA and PRN Ativan ordered for symptoms of ETOH withdrawal.     Interval History/Significant Events: No acute events overnight. Vasopressors off. Ativan dose decreased as patient lethargic after receiving 2 mg dose.     Review of Systems   Respiratory:  Negative for shortness of breath.    Cardiovascular:  Negative for chest pain.   Gastrointestinal:  Negative for abdominal distention and abdominal pain.   Neurological:  Negative for dizziness and headaches.     Objective:     Vital Signs (Most Recent):  Temp: 97.8 °F (36.6 °C) (01/25/24 0700)  Pulse: 87 (01/25/24  1500)  Resp: (!) 22 (01/25/24 1500)  BP: (!) 110/55 (01/25/24 1500)  SpO2: 100 % (01/25/24 1500) Vital Signs (24h Range):  Temp:  [97 °F (36.1 °C)-98 °F (36.7 °C)] 97.8 °F (36.6 °C)  Pulse:  [] 87  Resp:  [8-30] 22  SpO2:  [88 %-100 %] 100 %  BP: ()/(50-73) 110/55   Weight: 59 kg (130 lb)  Body mass index is 23.78 kg/m².      Intake/Output Summary (Last 24 hours) at 1/25/2024 1629  Last data filed at 1/25/2024 1500  Gross per 24 hour   Intake 355.01 ml   Output 1775 ml   Net -1419.99 ml          Physical Exam  Vitals and nursing note reviewed.   Constitutional:       Appearance: She is well-developed.   HENT:      Head: Normocephalic.      Mouth/Throat:      Mouth: Mucous membranes are dry.   Eyes:      Extraocular Movements: Extraocular movements intact.      Pupils: Pupils are equal, round, and reactive to light.   Cardiovascular:      Rate and Rhythm: Normal rate and regular rhythm.      Pulses: Normal pulses.   Pulmonary:      Effort: Pulmonary effort is normal.      Breath sounds: Normal breath sounds.   Abdominal:      General: Bowel sounds are normal. There is no distension.      Palpations: Abdomen is soft.      Tenderness: There is no abdominal tenderness.   Musculoskeletal:         General: No swelling.   Skin:     General: Skin is warm and dry.   Neurological:      Mental Status: She is alert. She is disoriented.      GCS: GCS eye subscore is 4. GCS verbal subscore is 5. GCS motor subscore is 6.      Motor: Weakness present.   Psychiatric:         Behavior: Behavior is cooperative.            Vents:  Oxygen Concentration (%): 30 (01/24/24 2006)  Lines/Drains/Airways       Peripherally Inserted Central Catheter Line  Duration             PICC Double Lumen 01/21/24 1700 left basilic 3 days              Drain  Duration                  Urethral Catheter 01/23/24 0200 2 days              Peripheral Intravenous Line  Duration                  Peripheral IV - Single Lumen 01/23/24 0359 20 G  Anterior;Right Forearm 2 days                  Significant Labs:    CBC/Anemia Profile:  Recent Labs   Lab 01/24/24  0423 01/25/24  0341   WBC 6.97 4.93   HGB 8.0* 7.8*   HCT 25.1* 24.3*   * 133*   * 100*   RDW 13.7 13.4        Chemistries:  Recent Labs   Lab 01/24/24  0423 01/25/24  0341    140   K 3.3* 3.0*    102   CO2 25 29   BUN 5* 5*   CREATININE 1.0 0.9   CALCIUM 8.1* 7.9*   ALBUMIN 1.9* 1.9*   PROT 5.2* 5.1*   BILITOT 0.6 0.5   ALKPHOS 206* 199*   * 439*   * 201*   MG 2.1 1.8   PHOS 4.1 3.8       All pertinent labs within the past 24 hours have been reviewed.    Significant Imaging:  I have reviewed all pertinent imaging results/findings within the past 24 hours.    ABG  Recent Labs   Lab 01/21/24  0458   PH 7.250*   PO2 77*   PCO2 53.0*   HCO3 23.3*   BE -4*     Assessment/Plan:     Psychiatric  ETOH abuse  Patient endorses 2 large beverages of tequila with no mixers. Daughter states patient drinks tequila without mixers at home daily.    - CIWA assessment  - MV and supplements  - PRN Ativan for CIWA > 8    Pulmonary  Emphysema/COPD  - Briefly on BIPAP at OSH then weaned off now on 2L NC; requires 2-3L of home O2  - ABG PRN  - Duonebs as needed  - Wean FiO2 for SpO2 goal 88-92%    Cardiac/Vascular  Chronic diastolic (congestive) heart failure  Echo 1/23/24    Left Ventricle: The left ventricle is normal in size. Normal wall thickness. Septal motion is consistent with post-operative status. There is normal systolic function with a visually estimated ejection fraction of 55 - 60%. There is normal diastolic function.    Right Ventricle: Mild right ventricular enlargement. Systolic function is mildly reduced.    Tricuspid Valve: There is mild regurgitation.    Pulmonary Artery: The estimated pulmonary artery systolic pressure is 46 mmHg.    IVC/SVC: Intermediate venous pressure at 8 mmHg.    Pericardium: There is a small effusion.    --Strict I/O  --Cardiac  monitoring    Mixed hyperlipidemia  Elevated LFTs, hold statin.      Renal/  LILLIE (acute kidney injury)  LILLIE at OSH, Scr now appears at baseline.      - Daily BMP   - Trend I/O    ID  * Septic shock  68F admitted to OSH for sepsis 2/2 urinary tract infection, c/b shock on levophed, LILLIE, and liver injury. Blood and urine cultures at OSH with E coli. Patient of note had a PICC line that was replaced at the OSH once blood cultures returned positive, however, blood cultures were not repeated to ensure clearance.     - Pt had been on vanc and ertapenem, will deescalate to rocephin based on cx data  - Repeat blood cultures on admission with NGTD  - Hemodynamically stable off pressors    E coli bacteremia  See septic shock.     Oncology  Anemia of chronic disease  --CBC daily  --Transfuse for Hgb <7  --Maintain active type and screen    GI  Transaminitis  - Improving  - Unclear etiology for rapid increase in liver enzymes in a hepatocellular injury pattern  - Differentials include shock liver vs drug-induced liver injury for this degree of transaminitis  - Avoid hepatotoxic medications  - Avoid tylenol  - Hepatology consulted, recommending supportive care  - CMP/coags daily       Critical Care Daily Checklist:    A: Awake: RASS Goal/Actual Goal: RASS Goal: 0-->alert and calm  Actual:  0   B: Spontaneous Breathing Trial Performed?     C: SAT & SBT Coordinated?                        D: Delirium: CAM-ICU Overall CAM-ICU: Negative   E: Early Mobility Performed? Yes   F: Feeding Goal:    Status:     Current Diet Order   Procedures    Diet Adult Regular (IDDSI Level 7) Cardiac (Low Na/Chol)     Order Specific Question:   Additional Diet Options:     Answer:   Cardiac (Low Na/Chol)      AS: Analgesia/Sedation    T: Thromboembolic Prophylaxis Enoxaparin   H: HOB > 300 Yes   U: Stress Ulcer Prophylaxis (if needed)    G: Glucose Control    B: Bowel Function Stool Occurrence: 1   I: Indwelling Catheter (Lines & Azul) Necessity  jason TORRES: De-escalation of Antimicrobials/Pharmacotherapies     Plan for the day/ETD Medically ready for stepdown from ICU    Code Status:  Family/Goals of Care: Full Code  Daughter updated on POC     Critical Care Time: 45 minutes  Critical secondary to Patient has a condition that poses threat to life and bodily function: Septic Shock      Critical care was time spent personally by me on the following activities: development of treatment plan with patient or surrogate and bedside caregivers, discussions with consultants, evaluation of patient's response to treatment, examination of patient, ordering and performing treatments and interventions, ordering and review of laboratory studies, ordering and review of radiographic studies, pulse oximetry, re-evaluation of patient's condition. This critical care time did not overlap with that of any other provider or involve time for any procedures.     Cristiane Cordero, CHAD  Critical Care Medicine  [facility] - Cardiac Medical ICU

## 2024-01-26 PROBLEM — N17.9 AKI (ACUTE KIDNEY INJURY): Chronic | Status: RESOLVED | Noted: 2023-10-26 | Resolved: 2024-01-26

## 2024-01-26 LAB
ALBUMIN SERPL BCP-MCNC: 2 G/DL (ref 3.5–5.2)
ALP SERPL-CCNC: 190 U/L (ref 55–135)
ALT SERPL W/O P-5'-P-CCNC: 312 U/L (ref 10–44)
ANION GAP SERPL CALC-SCNC: 10 MMOL/L (ref 8–16)
ANION GAP SERPL CALC-SCNC: 9 MMOL/L (ref 8–16)
AST SERPL-CCNC: 99 U/L (ref 10–40)
BASOPHILS # BLD AUTO: 0.01 K/UL (ref 0–0.2)
BASOPHILS NFR BLD: 0.2 % (ref 0–1.9)
BILIRUB SERPL-MCNC: 0.4 MG/DL (ref 0.1–1)
BUN SERPL-MCNC: 3 MG/DL (ref 8–23)
BUN SERPL-MCNC: 4 MG/DL (ref 8–23)
CALCIUM SERPL-MCNC: 8 MG/DL (ref 8.7–10.5)
CALCIUM SERPL-MCNC: 8.6 MG/DL (ref 8.7–10.5)
CHLORIDE SERPL-SCNC: 100 MMOL/L (ref 95–110)
CHLORIDE SERPL-SCNC: 101 MMOL/L (ref 95–110)
CLINICAL BIOCHEMIST REVIEW: NORMAL
CO2 SERPL-SCNC: 28 MMOL/L (ref 23–29)
CO2 SERPL-SCNC: 30 MMOL/L (ref 23–29)
CREAT SERPL-MCNC: 0.8 MG/DL (ref 0.5–1.4)
CREAT SERPL-MCNC: 0.8 MG/DL (ref 0.5–1.4)
DIFFERENTIAL METHOD BLD: ABNORMAL
EOSINOPHIL # BLD AUTO: 0.1 K/UL (ref 0–0.5)
EOSINOPHIL NFR BLD: 2.4 % (ref 0–8)
ERYTHROCYTE [DISTWIDTH] IN BLOOD BY AUTOMATED COUNT: 12.9 % (ref 11.5–14.5)
EST. GFR  (NO RACE VARIABLE): >60 ML/MIN/1.73 M^2
EST. GFR  (NO RACE VARIABLE): >60 ML/MIN/1.73 M^2
GLUCOSE SERPL-MCNC: 74 MG/DL (ref 70–110)
GLUCOSE SERPL-MCNC: 77 MG/DL (ref 70–110)
HCT VFR BLD AUTO: 21.6 % (ref 37–48.5)
HGB BLD-MCNC: 7.2 G/DL (ref 12–16)
IMM GRANULOCYTES # BLD AUTO: 0.03 K/UL (ref 0–0.04)
IMM GRANULOCYTES NFR BLD AUTO: 0.6 % (ref 0–0.5)
INR PPP: 1.2 (ref 0.8–1.2)
LYMPHOCYTES # BLD AUTO: 1.1 K/UL (ref 1–4.8)
LYMPHOCYTES NFR BLD: 23.3 % (ref 18–48)
MAGNESIUM SERPL-MCNC: 2 MG/DL (ref 1.6–2.6)
MCH RBC QN AUTO: 31.9 PG (ref 27–31)
MCHC RBC AUTO-ENTMCNC: 33.3 G/DL (ref 32–36)
MCV RBC AUTO: 96 FL (ref 82–98)
MONOCYTES # BLD AUTO: 0.7 K/UL (ref 0.3–1)
MONOCYTES NFR BLD: 14.3 % (ref 4–15)
NEUTROPHILS # BLD AUTO: 2.8 K/UL (ref 1.8–7.7)
NEUTROPHILS NFR BLD: 59.2 % (ref 38–73)
NRBC BLD-RTO: 0 /100 WBC
PHOSPHATE SERPL-MCNC: 3.7 MG/DL (ref 2.7–4.5)
PLATELET # BLD AUTO: 155 K/UL (ref 150–450)
PLPETH BLD-MCNC: 41 NG/ML
PMV BLD AUTO: 10.9 FL (ref 9.2–12.9)
POCT GLUCOSE: 67 MG/DL (ref 70–110)
POCT GLUCOSE: 71 MG/DL (ref 70–110)
POCT GLUCOSE: 75 MG/DL (ref 70–110)
POCT GLUCOSE: 83 MG/DL (ref 70–110)
POPETH BLD-MCNC: 128 NG/ML
POTASSIUM SERPL-SCNC: 2.7 MMOL/L (ref 3.5–5.1)
POTASSIUM SERPL-SCNC: 3.2 MMOL/L (ref 3.5–5.1)
PROT SERPL-MCNC: 5.2 G/DL (ref 6–8.4)
PROTHROMBIN TIME: 13 SEC (ref 9–12.5)
RBC # BLD AUTO: 2.26 M/UL (ref 4–5.4)
SODIUM SERPL-SCNC: 138 MMOL/L (ref 136–145)
SODIUM SERPL-SCNC: 140 MMOL/L (ref 136–145)
WBC # BLD AUTO: 4.68 K/UL (ref 3.9–12.7)

## 2024-01-26 PROCEDURE — 94640 AIRWAY INHALATION TREATMENT: CPT

## 2024-01-26 PROCEDURE — 80053 COMPREHEN METABOLIC PANEL: CPT

## 2024-01-26 PROCEDURE — 94761 N-INVAS EAR/PLS OXIMETRY MLT: CPT

## 2024-01-26 PROCEDURE — 25000003 PHARM REV CODE 250

## 2024-01-26 PROCEDURE — 84100 ASSAY OF PHOSPHORUS: CPT

## 2024-01-26 PROCEDURE — 27000221 HC OXYGEN, UP TO 24 HOURS

## 2024-01-26 PROCEDURE — 80048 BASIC METABOLIC PNL TOTAL CA: CPT | Mod: XB | Performed by: STUDENT IN AN ORGANIZED HEALTH CARE EDUCATION/TRAINING PROGRAM

## 2024-01-26 PROCEDURE — 83735 ASSAY OF MAGNESIUM: CPT

## 2024-01-26 PROCEDURE — 63600175 PHARM REV CODE 636 W HCPCS

## 2024-01-26 PROCEDURE — 85610 PROTHROMBIN TIME: CPT

## 2024-01-26 PROCEDURE — 20600001 HC STEP DOWN PRIVATE ROOM

## 2024-01-26 PROCEDURE — 63600175 PHARM REV CODE 636 W HCPCS: Performed by: STUDENT IN AN ORGANIZED HEALTH CARE EDUCATION/TRAINING PROGRAM

## 2024-01-26 PROCEDURE — 25000003 PHARM REV CODE 250: Performed by: STUDENT IN AN ORGANIZED HEALTH CARE EDUCATION/TRAINING PROGRAM

## 2024-01-26 PROCEDURE — 36415 COLL VENOUS BLD VENIPUNCTURE: CPT | Performed by: STUDENT IN AN ORGANIZED HEALTH CARE EDUCATION/TRAINING PROGRAM

## 2024-01-26 PROCEDURE — 85025 COMPLETE CBC W/AUTO DIFF WBC: CPT

## 2024-01-26 PROCEDURE — 25000242 PHARM REV CODE 250 ALT 637 W/ HCPCS

## 2024-01-26 PROCEDURE — C1751 CATH, INF, PER/CENT/MIDLINE: HCPCS

## 2024-01-26 PROCEDURE — S4991 NICOTINE PATCH NONLEGEND: HCPCS

## 2024-01-26 PROCEDURE — 97530 THERAPEUTIC ACTIVITIES: CPT

## 2024-01-26 PROCEDURE — 99900035 HC TECH TIME PER 15 MIN (STAT)

## 2024-01-26 RX ORDER — POTASSIUM CHLORIDE 7.45 MG/ML
10 INJECTION INTRAVENOUS
Status: COMPLETED | OUTPATIENT
Start: 2024-01-26 | End: 2024-01-26

## 2024-01-26 RX ADMIN — BUSPIRONE HYDROCHLORIDE 15 MG: 5 TABLET ORAL at 08:01

## 2024-01-26 RX ADMIN — POTASSIUM CHLORIDE 10 MEQ: 7.46 INJECTION, SOLUTION INTRAVENOUS at 09:01

## 2024-01-26 RX ADMIN — CEFTRIAXONE 2 G: 2 INJECTION, POWDER, FOR SOLUTION INTRAMUSCULAR; INTRAVENOUS at 11:01

## 2024-01-26 RX ADMIN — LORAZEPAM 1 MG: 2 INJECTION INTRAMUSCULAR; INTRAVENOUS at 06:01

## 2024-01-26 RX ADMIN — POTASSIUM CHLORIDE 10 MEQ: 7.46 INJECTION, SOLUTION INTRAVENOUS at 08:01

## 2024-01-26 RX ADMIN — LEVOTHYROXINE SODIUM 50 MCG: 50 TABLET ORAL at 06:01

## 2024-01-26 RX ADMIN — ASPIRIN 81 MG CHEWABLE TABLET 81 MG: 81 TABLET CHEWABLE at 08:01

## 2024-01-26 RX ADMIN — Medication 1 PATCH: at 08:01

## 2024-01-26 RX ADMIN — IPRATROPIUM BROMIDE AND ALBUTEROL SULFATE 3 ML: .5; 3 SOLUTION RESPIRATORY (INHALATION) at 09:01

## 2024-01-26 RX ADMIN — CEFTRIAXONE 2 G: 2 INJECTION, POWDER, FOR SOLUTION INTRAMUSCULAR; INTRAVENOUS at 12:01

## 2024-01-26 RX ADMIN — POTASSIUM CHLORIDE 10 MEQ: 7.46 INJECTION, SOLUTION INTRAVENOUS at 10:01

## 2024-01-26 RX ADMIN — CLOPIDOGREL BISULFATE 75 MG: 75 TABLET ORAL at 08:01

## 2024-01-26 RX ADMIN — MONTELUKAST 10 MG: 10 TABLET, FILM COATED ORAL at 08:01

## 2024-01-26 RX ADMIN — ENOXAPARIN SODIUM 40 MG: 40 INJECTION SUBCUTANEOUS at 05:01

## 2024-01-26 RX ADMIN — IPRATROPIUM BROMIDE AND ALBUTEROL SULFATE 3 ML: .5; 3 SOLUTION RESPIRATORY (INHALATION) at 01:01

## 2024-01-26 RX ADMIN — FLUOXETINE HYDROCHLORIDE 40 MG: 20 CAPSULE ORAL at 08:01

## 2024-01-26 RX ADMIN — THERA TABS 1 TABLET: TAB at 08:01

## 2024-01-26 RX ADMIN — FOLIC ACID 1 MG: 1 TABLET ORAL at 08:01

## 2024-01-26 RX ADMIN — SPIRONOLACTONE 12.5 MG: 25 TABLET ORAL at 08:01

## 2024-01-26 RX ADMIN — Medication 100 MG: at 08:01

## 2024-01-26 NOTE — NURSING
Pt arrived to room in bed with personal belongings. Azul catheter in place. Pt sleeping with NC in place.

## 2024-01-26 NOTE — NURSING
Nurses Note -- 4 Eyes      1/26/2024   7:10 AM      Skin assessed during: Transfer      [x] No Altered Skin Integrity Present    []Prevention Measures Documented      [] Yes- Altered Skin Integrity Present or Discovered   [] LDA Added if Not in Epic (Describe Wound)   [] New Altered Skin Integrity was Present on Admit and Documented in LDA   [] Wound Image Taken    Wound Care Consulted? No    Attending Nurse:  Tarun Stoll RN/Staff Member:   Sally

## 2024-01-26 NOTE — PT/OT/SLP PROGRESS
Occupational Therapy      Patient Name:  Bhavana Lambert   MRN:  [id number]    Patient not seen today secondary to soiled and needing to be cleaned. Will follow-up.    1/26/2024

## 2024-01-26 NOTE — PROGRESS NOTES
"[facility] Tele[facility] Medicine  Progress Note    Patient Name: Bhavana Lambert  MRN: [id number]  Patient Class: IP- Inpatient   Admission Date: 1/22/2024  Length of Stay: 4 days  Attending Physician: Bill Rodriguez MD  Primary Care Provider: Pepito Jhaveri IV, MD        Subjective:     Principal Problem:Septic shock        HPI:  HPI per Caty Hernandez, NP:     "Yahaira Lambert is a 68 year old woman with HTN, COPD and chronic respiratory failure on 2LNC, HFpEF, HLD, hypothyroidism and depression who presented to [facility] on 1/19 with symptoms of lower back pain and dysuria, and found to be hypotensive. She was admitted for sepsis of urinary tract origin. Workup showed positive blood cultures and urine cultures, both growing E coli. During the admission she went into septic shock, required levophed, and also briefly required BiPAP for increased work of breathing and hypercapnia. She also had climbing LFTs to the thousands. CT scan on 1/22 showed progressing left pyelonephritis, without abscess. Due to medical complexity, patient was transferred to [facility] MICU for higher level of care and hepatology eval in setting of acute liver failure."    Overview/Hospital Course:  68 F with Pmhx COPD (on 2L @ home), HFpEF, and ETOH abuse that presented to [facility] MICU as transfer from OSH for hepatology evaluation. Patient noted to have E coli bacteremia and arrived on vasopressors. LFT's downtrending, pressors weaned off. Continues on CTX for e. coli UTI and bacteremia. She was seen by hepatology for severe elevation of LFTs  (now improving). Likely 2/2 shock liver and ETOH abuse. She is on CIWA protocol with PRN ativan for withdrawals.     Interval History: Pt seen and examined this morning on jonh PATTON. Transferred  to [facility] overnight. Patient Aox3 this morning (person, place, year). Mittens removed. Patient conversant but at times appeared confused. No sign of alcohol withdrawal. Care plan " reviewed. Otherwise, doing well and with no further complaints at this time.        Objective:     Vital Signs (Most Recent):  Temp: 97.2 °F (36.2 °C) (01/26/24 1143)  Pulse: 86 (01/26/24 1143)  Resp: 18 (01/26/24 1143)  BP: 128/71 (01/26/24 1143)  SpO2: (!) 93 % (01/26/24 1143) Vital Signs (24h Range):  Temp:  [97.2 °F (36.2 °C)-99.2 °F (37.3 °C)] 97.2 °F (36.2 °C)  Pulse:  [] 86  Resp:  [14-25] 18  SpO2:  [88 %-100 %] 93 %  BP: (106-134)/(55-88) 128/71     Weight: 59 kg (130 lb)  Body mass index is 23.78 kg/m².    Intake/Output Summary (Last 24 hours) at 1/26/2024 1156  Last data filed at 1/26/2024 0707  Gross per 24 hour   Intake 125.55 ml   Output 2000 ml   Net -1874.45 ml         Physical Exam  Gen: in NAD, appears stated age  Neuro: AAOx4, CN2-12 grossly intact BL; motor, sensory, and strength grossly intact BL  HEENT: NTNC, EOMI, PERRLA, MMM; no thyromegaly or lymphadenopathy; no JVD appreciated  CVS: RRR, no m/r/g; S1/S2 auscultated with no S3 or S4; capillary refill < 2 sec  Resp: lungs CTAB, no w/r/r; no belabored breathing or accessory muscle use appreciated   Abd: BS+ in all 4 quadrants; NTND, soft to palpation; no organomegaly appreciated   Extrem: pulses full, equal, and regular over all 4 extremities; no UE or LE edema BL          Significant Labs: All pertinent labs within the past 24 hours have been reviewed.    Significant Imaging: I have reviewed all pertinent imaging results/findings within the past 24 hours.    Assessment/Plan:      * Septic shock  Left Pyelonephritis  E coli Bacteremia    68F admitted to OSH for sepsis 2/2 urinary tract infection, c/b shock on levophed, LILLIE, and liver injury. Blood and urine cultures at OSH with E coli. Patient of note had a PICC line that was replaced at the OSH once blood cultures returned positive, however, blood cultures were not repeated to ensure clearance.     - CT-AP with findings consistent with left pyelonephritis   - Initially on vanc and  ertapenem  - Blood culture growing E Coli susceptible to CTX  - Abx deescalated to Ceftriaxone   - Pressors weaned off and patient stepped down to  1/25    Transaminitis  - Etiology likely shock liver in setting of septic shock and concurrent ETOH use  - LFT's initially elevated to 5000's/1000's  - Hepatology consulted - supportive care  - LFT's downtrending  - Daily CMP's/INR's  - Avoid hepatotoxic medications  - Avoid tylenol    Chronic diastolic (congestive) heart failure  Patient is identified as having Diastolic (HFpEF) heart failure that is Chronic. CHF is currently controlled. Latest ECHO performed and demonstrates- Results for orders placed during the hospital encounter of 01/22/24    Echo    Interpretation Summary    Left Ventricle: The left ventricle is normal in size. Normal wall thickness. Septal motion is consistent with post-operative status. There is normal systolic function with a visually estimated ejection fraction of 55 - 60%. There is normal diastolic function.    Right Ventricle: Mild right ventricular enlargement. Systolic function is mildly reduced.    Tricuspid Valve: There is mild regurgitation.    Pulmonary Artery: The estimated pulmonary artery systolic pressure is 46 mmHg.    IVC/SVC: Intermediate venous pressure at 8 mmHg.    Pericardium: There is a small effusion.  . Continue Beta Blocker and Aldactone and monitor clinical status closely. Monitor on telemetry. Patient is off CHF pathway.  Monitor strict Is&Os and daily weights.  Place on fluid restriction of 2 L. Cardiology has not been consulted. Continue to stress to patient importance of self efficacy and  on diet for CHF. Last BNP reviewed- and noted below   Recent Labs   Lab 01/21/24  1350   *       Emphysema/COPD  - Currently on home 2L NC, not in acute exacerbation  - Continue home inhaler regimen  - PRN duonebs provided  - Wean FiO2 for SpO2 goal 88-92%    ETOH abuse  - History of ETOH abuse  - Unclear when last  drink was, has been admitted since 1/23  - CIWA protocol in effect  - PRN ativan provided for CIWA>8  - Seizure precautions in place  - Thiamine and folate supplementation provided  - Will continue to monitor on tele        E coli bacteremia  See septic shock      Mixed hyperlipidemia  Holding statin in setting of elevated LFT's    Anemia of chronic disease  Patient's anemia is currently controlled. Has not received any PRBCs to date.   Current CBC reviewed-   Lab Results   Component Value Date    HGB 7.8 (L) 01/25/2024    HCT 24.3 (L) 01/25/2024     Monitor serial CBC and transfuse if patient becomes hemodynamically unstable, symptomatic or H/H drops below 7/21.      VTE Risk Mitigation (From admission, onward)           Ordered     enoxaparin injection 40 mg  Daily         01/22/24 2205     IP VTE HIGH RISK PATIENT  Once         01/22/24 2205     Place sequential compression device  Until discontinued         01/22/24 2205                    Discharge Planning   CHLOE: 1/30/2024     Code Status: Full Code   Is the patient medically ready for discharge?: No    Reason for patient still in hospital (select all that apply): Treatment  Discharge Plan A: Home with family                  Bill Rodriguez MD  Department of Hospital Medicine   [facility] - Telemetry Stepdown

## 2024-01-26 NOTE — ASSESSMENT & PLAN NOTE
- History of ETOH abuse  - Unclear when last drink was, has been admitted since 1/23  - CIWA protocol in effect  - PRN ativan provided for CIWA>8  - Seizure precautions in place  - Thiamine and folate supplementation provided  - Will continue to monitor on tele

## 2024-01-26 NOTE — PLAN OF CARE
Problem: Adult Inpatient Plan of Care  Goal: Plan of Care Review  Outcome: Ongoing, Progressing  Goal: Patient-Specific Goal (Individualized)  Outcome: Ongoing, Progressing  Goal: Absence of Hospital-Acquired Illness or Injury  Outcome: Ongoing, Progressing  Goal: Optimal Comfort and Wellbeing  Outcome: Ongoing, Progressing  Goal: Readiness for Transition of Care  Outcome: Ongoing, Progressing   Pt rested well. VS WNL. No ss of pain or distress. Call light within reach. Bed in low position. Telesitter in room. Will continue POC.

## 2024-01-26 NOTE — PT/OT/SLP PROGRESS
"Physical Therapy Treatment    Patient Name:  Bhavana Lambert   MRN:  [id number]    Recommendations:     Discharge Recommendations: Moderate Intensity Therapy  Discharge Equipment Recommendations: none  Barriers to discharge: Pt currently requiring increased level of assistance with mobility      Assessment:     Bhavana Lambert is a 68 y.o. female admitted with a medical diagnosis of Septic shock.  She presents with the following impairments/functional limitations: weakness, impaired endurance, impaired functional mobility, gait instability, impaired balance, decreased coordination, impaired cognition, decreased lower extremity function, impaired cardiopulmonary response to activity.    Pleasantly confused, tangential speech noted. Pt required re-orientation and redirection throughout. Pt found soiled; tolerated rolling bilaterally for pericare. Pt continues to benefit from skilled PT services while in house in order to address the aforementioned deficits.      Rehab Prognosis: Good; patient would benefit from acute skilled PT services to address these deficits and reach maximum level of function.    Recent Surgery: * No surgery found *      Plan:     During this hospitalization, patient to be seen 3 x/week to address the identified rehab impairments via therapeutic activities, gait training, therapeutic exercises, neuromuscular re-education and progress toward the following goals:    Plan of Care Expires:  02/23/24    Subjective     "Is the temperature at 7?"    Pain/Comfort:  Pain Rating 1: 0/10      Objective:     Communicated with RN prior to session.  Patient found HOB elevated with telemetry, pulse ox (continuous), blood pressure cuff, oxygen, PICC line upon PT entry to room.     General Precautions: Standard, fall  Orthopedic Precautions: N/A  Braces: N/A  Respiratory Status: Nasal cannula, flow 2 L/min     Functional Mobility:  Bed Mobility:     Rolling Left:  minimum assistance  Rolling Right: minimum " assistance      AM-PAC 6 CLICK MOBILITY  Turning over in bed (including adjusting bedclothes, sheets and blankets)?: 3  Sitting down on and standing up from a chair with arms (e.g., wheelchair, bedside commode, etc.): 3  Moving from lying on back to sitting on the side of the bed?: 2  Moving to and from a bed to a chair (including a wheelchair)?: 3  Need to walk in hospital room?: 1  Climbing 3-5 steps with a railing?: 1  Basic Mobility Total Score: 13       Treatment & Education:  Found soiled, jaison-care totA    Educated pt on PT role/POC  Educated pt on importance of OOB activity and daily ambulation   Pt educated on proper body mechanics, safety techniques, and energy conservation with PT facilitation and cueing throughout session   Pt verbalized understanding      Patient left HOB elevated with all lines intact, call button in reach, bed alarm on, and RN notified..    GOALS:   Multidisciplinary Problems       Physical Therapy Goals          Problem: Physical Therapy    Goal Priority Disciplines Outcome Goal Variances Interventions   Physical Therapy Goal     PT, PT/OT Ongoing, Progressing     Description: Goals to be met by: 24     Patient will increase functional independence with mobility by performin. Supine to sit with Contact Guard Assistance  2. Sit to stand transfer with Stand-by Assistance with AD if needed.   3. Gait  x 150 feet with Contact Guard Assistance using Rolling Walker.                          Time Tracking:     PT Received On: 24  PT Start Time: 1515     PT Stop Time: 1540  PT Total Time (min): 25 min     Billable Minutes: Therapeutic Activity 25    Treatment Type: Treatment  PT/PTA: PT     Number of PTA visits since last PT visit: 0     2024

## 2024-01-26 NOTE — PROGRESS NOTES
Pt transferred to room 8079 by this RN and [facility].  All of patient's belongings taken with patient including a cell phone, phone , sports bra, and chapstick.  Pt's chart and respiratory treatment equipment brought with patient as well. Pt hooked to oxygen and new primary RN Tarun at bedside prior to this RN leaving room.  All questions and concerns addressed.

## 2024-01-26 NOTE — SUBJECTIVE & OBJECTIVE
Interval History: Pt seen and examined this morning on jonh PATTON. Transferred  to stepdown overnight. Patient Aox3 this morning (person, place, year). Mittens removed. Patient conversant but at times appeared confused. No sign of alcohol withdrawal. Care plan reviewed. Otherwise, doing well and with no further complaints at this time.        Objective:     Vital Signs (Most Recent):  Temp: 97.2 °F (36.2 °C) (01/26/24 1143)  Pulse: 86 (01/26/24 1143)  Resp: 18 (01/26/24 1143)  BP: 128/71 (01/26/24 1143)  SpO2: (!) 93 % (01/26/24 1143) Vital Signs (24h Range):  Temp:  [97.2 °F (36.2 °C)-99.2 °F (37.3 °C)] 97.2 °F (36.2 °C)  Pulse:  [] 86  Resp:  [14-25] 18  SpO2:  [88 %-100 %] 93 %  BP: (106-134)/(55-88) 128/71     Weight: 59 kg (130 lb)  Body mass index is 23.78 kg/m².    Intake/Output Summary (Last 24 hours) at 1/26/2024 1156  Last data filed at 1/26/2024 0707  Gross per 24 hour   Intake 125.55 ml   Output 2000 ml   Net -1874.45 ml         Physical Exam  Gen: in NAD, appears stated age  Neuro: AAOx4, CN2-12 grossly intact BL; motor, sensory, and strength grossly intact BL  HEENT: NTNC, EOMI, PERRLA, MMM; no thyromegaly or lymphadenopathy; no JVD appreciated  CVS: RRR, no m/r/g; S1/S2 auscultated with no S3 or S4; capillary refill < 2 sec  Resp: lungs CTAB, no w/r/r; no belabored breathing or accessory muscle use appreciated   Abd: BS+ in all 4 quadrants; NTND, soft to palpation; no organomegaly appreciated   Extrem: pulses full, equal, and regular over all 4 extremities; no UE or LE edema BL          Significant Labs: All pertinent labs within the past 24 hours have been reviewed.    Significant Imaging: I have reviewed all pertinent imaging results/findings within the past 24 hours.

## 2024-01-27 LAB
ALBUMIN SERPL BCP-MCNC: 2.2 G/DL (ref 3.5–5.2)
ALP SERPL-CCNC: 193 U/L (ref 55–135)
ALT SERPL W/O P-5'-P-CCNC: 243 U/L (ref 10–44)
ANION GAP SERPL CALC-SCNC: 10 MMOL/L (ref 8–16)
AST SERPL-CCNC: 83 U/L (ref 10–40)
BASOPHILS # BLD AUTO: 0.03 K/UL (ref 0–0.2)
BASOPHILS NFR BLD: 0.5 % (ref 0–1.9)
BILIRUB SERPL-MCNC: 0.4 MG/DL (ref 0.1–1)
BUN SERPL-MCNC: 3 MG/DL (ref 8–23)
CALCIUM SERPL-MCNC: 7.9 MG/DL (ref 8.7–10.5)
CHLORIDE SERPL-SCNC: 98 MMOL/L (ref 95–110)
CO2 SERPL-SCNC: 32 MMOL/L (ref 23–29)
CREAT SERPL-MCNC: 0.8 MG/DL (ref 0.5–1.4)
DIFFERENTIAL METHOD BLD: ABNORMAL
EOSINOPHIL # BLD AUTO: 0.1 K/UL (ref 0–0.5)
EOSINOPHIL NFR BLD: 2.3 % (ref 0–8)
ERYTHROCYTE [DISTWIDTH] IN BLOOD BY AUTOMATED COUNT: 13.6 % (ref 11.5–14.5)
EST. GFR  (NO RACE VARIABLE): >60 ML/MIN/1.73 M^2
GLUCOSE SERPL-MCNC: 74 MG/DL (ref 70–110)
HCT VFR BLD AUTO: 26.3 % (ref 37–48.5)
HGB BLD-MCNC: 8.5 G/DL (ref 12–16)
IMM GRANULOCYTES # BLD AUTO: 0.02 K/UL (ref 0–0.04)
IMM GRANULOCYTES NFR BLD AUTO: 0.3 % (ref 0–0.5)
INR PPP: 1.2 (ref 0.8–1.2)
LYMPHOCYTES # BLD AUTO: 1.4 K/UL (ref 1–4.8)
LYMPHOCYTES NFR BLD: 22.7 % (ref 18–48)
MAGNESIUM SERPL-MCNC: 1.9 MG/DL (ref 1.6–2.6)
MCH RBC QN AUTO: 31.3 PG (ref 27–31)
MCHC RBC AUTO-ENTMCNC: 32.3 G/DL (ref 32–36)
MCV RBC AUTO: 97 FL (ref 82–98)
MONOCYTES # BLD AUTO: 0.8 K/UL (ref 0.3–1)
MONOCYTES NFR BLD: 13 % (ref 4–15)
NEUTROPHILS # BLD AUTO: 3.7 K/UL (ref 1.8–7.7)
NEUTROPHILS NFR BLD: 61.2 % (ref 38–73)
NRBC BLD-RTO: 0 /100 WBC
PHOSPHATE SERPL-MCNC: 4.2 MG/DL (ref 2.7–4.5)
PLATELET # BLD AUTO: 215 K/UL (ref 150–450)
PMV BLD AUTO: 10.6 FL (ref 9.2–12.9)
POCT GLUCOSE: 133 MG/DL (ref 70–110)
POCT GLUCOSE: 80 MG/DL (ref 70–110)
POCT GLUCOSE: 81 MG/DL (ref 70–110)
POCT GLUCOSE: 85 MG/DL (ref 70–110)
POCT GLUCOSE: 87 MG/DL (ref 70–110)
POTASSIUM SERPL-SCNC: 2.9 MMOL/L (ref 3.5–5.1)
PROT SERPL-MCNC: 5.6 G/DL (ref 6–8.4)
PROTHROMBIN TIME: 12.7 SEC (ref 9–12.5)
RBC # BLD AUTO: 2.72 M/UL (ref 4–5.4)
SODIUM SERPL-SCNC: 140 MMOL/L (ref 136–145)
WBC # BLD AUTO: 6 K/UL (ref 3.9–12.7)

## 2024-01-27 PROCEDURE — 94761 N-INVAS EAR/PLS OXIMETRY MLT: CPT

## 2024-01-27 PROCEDURE — 36415 COLL VENOUS BLD VENIPUNCTURE: CPT

## 2024-01-27 PROCEDURE — 84100 ASSAY OF PHOSPHORUS: CPT

## 2024-01-27 PROCEDURE — 63600175 PHARM REV CODE 636 W HCPCS

## 2024-01-27 PROCEDURE — 99900035 HC TECH TIME PER 15 MIN (STAT)

## 2024-01-27 PROCEDURE — 25000003 PHARM REV CODE 250

## 2024-01-27 PROCEDURE — 94640 AIRWAY INHALATION TREATMENT: CPT

## 2024-01-27 PROCEDURE — 85610 PROTHROMBIN TIME: CPT

## 2024-01-27 PROCEDURE — 80053 COMPREHEN METABOLIC PANEL: CPT

## 2024-01-27 PROCEDURE — 85025 COMPLETE CBC W/AUTO DIFF WBC: CPT

## 2024-01-27 PROCEDURE — 27000221 HC OXYGEN, UP TO 24 HOURS

## 2024-01-27 PROCEDURE — 83735 ASSAY OF MAGNESIUM: CPT

## 2024-01-27 PROCEDURE — 20600001 HC STEP DOWN PRIVATE ROOM

## 2024-01-27 PROCEDURE — 25000242 PHARM REV CODE 250 ALT 637 W/ HCPCS

## 2024-01-27 PROCEDURE — S4991 NICOTINE PATCH NONLEGEND: HCPCS

## 2024-01-27 RX ADMIN — FLUOXETINE HYDROCHLORIDE 40 MG: 20 CAPSULE ORAL at 08:01

## 2024-01-27 RX ADMIN — FOLIC ACID 1 MG: 1 TABLET ORAL at 08:01

## 2024-01-27 RX ADMIN — IPRATROPIUM BROMIDE AND ALBUTEROL SULFATE 3 ML: .5; 3 SOLUTION RESPIRATORY (INHALATION) at 09:01

## 2024-01-27 RX ADMIN — BUSPIRONE HYDROCHLORIDE 15 MG: 5 TABLET ORAL at 08:01

## 2024-01-27 RX ADMIN — SPIRONOLACTONE 12.5 MG: 25 TABLET ORAL at 08:01

## 2024-01-27 RX ADMIN — ASPIRIN 81 MG CHEWABLE TABLET 81 MG: 81 TABLET CHEWABLE at 08:01

## 2024-01-27 RX ADMIN — Medication 100 MG: at 08:01

## 2024-01-27 RX ADMIN — LEVOTHYROXINE SODIUM 50 MCG: 50 TABLET ORAL at 05:01

## 2024-01-27 RX ADMIN — ENOXAPARIN SODIUM 40 MG: 40 INJECTION SUBCUTANEOUS at 05:01

## 2024-01-27 RX ADMIN — CLOPIDOGREL BISULFATE 75 MG: 75 TABLET ORAL at 08:01

## 2024-01-27 RX ADMIN — THERA TABS 1 TABLET: TAB at 08:01

## 2024-01-27 RX ADMIN — IPRATROPIUM BROMIDE AND ALBUTEROL SULFATE 3 ML: .5; 3 SOLUTION RESPIRATORY (INHALATION) at 08:01

## 2024-01-27 RX ADMIN — Medication 1 PATCH: at 08:01

## 2024-01-27 RX ADMIN — MONTELUKAST 10 MG: 10 TABLET, FILM COATED ORAL at 08:01

## 2024-01-27 RX ADMIN — IPRATROPIUM BROMIDE AND ALBUTEROL SULFATE 3 ML: .5; 3 SOLUTION RESPIRATORY (INHALATION) at 02:01

## 2024-01-27 NOTE — PLAN OF CARE
Problem: Adult Inpatient Plan of Care  Goal: Plan of Care Review  Outcome: Ongoing, Progressing  Goal: Patient-Specific Goal (Individualized)  Outcome: Ongoing, Progressing  Goal: Absence of Hospital-Acquired Illness or Injury  Outcome: Ongoing, Progressing  Goal: Optimal Comfort and Wellbeing  Outcome: Ongoing, Progressing  Goal: Readiness for Transition of Care  Outcome: Ongoing, Progressing     Problem: Adjustment to Illness (Sepsis/Septic Shock)  Goal: Optimal Coping  Outcome: Ongoing, Progressing     Problem: Bleeding (Sepsis/Septic Shock)  Goal: Absence of Bleeding  Outcome: Ongoing, Progressing     Problem: Glycemic Control Impaired (Sepsis/Septic Shock)  Goal: Blood Glucose Level Within Desired Range  Outcome: Ongoing, Progressing     Problem: Nutrition Impaired (Sepsis/Septic Shock)  Goal: Optimal Nutrition Intake  Outcome: Ongoing, Progressing     Problem: Fluid and Electrolyte Imbalance (Acute Kidney Injury/Impairment)  Goal: Fluid and Electrolyte Balance  Outcome: Ongoing, Progressing     Problem: Oral Intake Inadequate (Acute Kidney Injury/Impairment)  Goal: Optimal Nutrition Intake  Outcome: Ongoing, Progressing     Problem: Renal Function Impairment (Acute Kidney Injury/Impairment)  Goal: Effective Renal Function  Outcome: Ongoing, Progressing     Problem: Skin Injury Risk Increased  Goal: Skin Health and Integrity  Outcome: Ongoing, Progressing  Intervention: Optimize Skin Protection  Flowsheets (Taken 1/26/2024 1813)  Pressure Reduction Techniques:   frequent weight shift encouraged   weight shift assistance provided  Pressure Reduction Devices: positioning supports utilized  Skin Protection:   adhesive use limited   incontinence pads utilized   protective footwear used   tubing/devices free from skin contact  Head of Bed (HOB) Positioning: HOB at 30 degrees     Problem: Fall Injury Risk  Goal: Absence of Fall and Fall-Related Injury  Outcome: Ongoing, Progressing  Intervention: Promote  Injury-Free Environment  Flowsheets (Taken 1/26/2024 1813)  Safety Promotion/Fall Prevention:   assistive device/personal item within reach   bed alarm set   Fall Risk reviewed with patient/family   Fall Risk signage in place   nonskid shoes/socks when out of bed   side rails raised x 3   /camera at bedside   instructed to call staff for mobility     POC reviewed. Address questions and concerns. AAOX3.  VVS. CIWAq4<8. Appetite poor. No s/s of pain or distress. Frequent safety checks. Bed in lowest position. WCTM

## 2024-01-27 NOTE — ASSESSMENT & PLAN NOTE
Left Pyelonephritis  E coli Bacteremia    68F admitted to OSH for sepsis 2/2 urinary tract infection, c/b shock on levophed, LILLIE, and liver injury. Blood and urine cultures at OSH with E coli. Patient of note had a PICC line that was replaced at the OSH once blood cultures returned positive, however, blood cultures were not repeated to ensure clearance.    - CT-AP with findings consistent with left pyelonephritis   - Initially on vanc and ertapenem  - Blood culture growing E Coli susceptible to CTX  - Abx deescalated to Ceftriaxone   - Pressors weaned off and patient stepped down to  1/25  - No dysuria or flank pain this AM

## 2024-01-27 NOTE — SUBJECTIVE & OBJECTIVE
Interval History: Pt seen and examined this morning on jonh PATTON. Patient tired this morning. Aox3. LFT's continue to improve. No complaints this morning.    Objective:     Vital Signs (Most Recent):  Temp: 97.6 °F (36.4 °C) (01/27/24 0726)  Pulse: 92 (01/27/24 0840)  Resp: 18 (01/27/24 0840)  BP: (!) 150/69 (01/27/24 0726)  SpO2: 95 % (01/27/24 0840) Vital Signs (24h Range):  Temp:  [97.2 °F (36.2 °C)-98.7 °F (37.1 °C)] 97.6 °F (36.4 °C)  Pulse:  [82-92] 92  Resp:  [16-18] 18  SpO2:  [92 %-98 %] 95 %  BP: (128-150)/(68-83) 150/69     Weight: 59 kg (130 lb)  Body mass index is 23.78 kg/m².    Intake/Output Summary (Last 24 hours) at 1/27/2024 1103  Last data filed at 1/27/2024 0505  Gross per 24 hour   Intake 360 ml   Output 2475 ml   Net -2115 ml         Physical Exam  Gen: in NAD, appears stated age  Neuro: AAOx4, CN2-12 grossly intact BL; motor, sensory, and strength grossly intact BL  HEENT: NTNC, EOMI, PERRLA, MMM; no thyromegaly or lymphadenopathy; no JVD appreciated  CVS: RRR, no m/r/g; S1/S2 auscultated with no S3 or S4; capillary refill < 2 sec  Resp: lungs CTAB, no w/r/r; no belabored breathing or accessory muscle use appreciated   Abd: BS+ in all 4 quadrants; NTND, soft to palpation; no organomegaly appreciated   Extrem: pulses full, equal, and regular over all 4 extremities; no UE or LE edema BL          Significant Labs: All pertinent labs within the past 24 hours have been reviewed.    Significant Imaging: I have reviewed all pertinent imaging results/findings within the past 24 hours.

## 2024-01-27 NOTE — PLAN OF CARE
Pt in bed, no s/s of acute distress, no c/o voiced, safety measures in place, call light in reach  Problem: Adult Inpatient Plan of Care  Goal: Plan of Care Review  Outcome: Ongoing, Progressing  Goal: Absence of Hospital-Acquired Illness or Injury  Outcome: Ongoing, Progressing  Goal: Optimal Comfort and Wellbeing  Outcome: Ongoing, Progressing  Goal: Readiness for Transition of Care  Outcome: Ongoing, Progressing     Problem: Adjustment to Illness (Sepsis/Septic Shock)  Goal: Optimal Coping  Outcome: Ongoing, Progressing     Problem: Bleeding (Sepsis/Septic Shock)  Goal: Absence of Bleeding  Outcome: Ongoing, Progressing     Problem: Glycemic Control Impaired (Sepsis/Septic Shock)  Goal: Blood Glucose Level Within Desired Range  Outcome: Ongoing, Progressing     Problem: Infection Progression (Sepsis/Septic Shock)  Goal: Absence of Infection Signs and Symptoms  Outcome: Ongoing, Progressing     Problem: Nutrition Impaired (Sepsis/Septic Shock)  Goal: Optimal Nutrition Intake  Outcome: Ongoing, Progressing     Problem: Fluid and Electrolyte Imbalance (Acute Kidney Injury/Impairment)  Goal: Fluid and Electrolyte Balance  Outcome: Ongoing, Progressing     Problem: Oral Intake Inadequate (Acute Kidney Injury/Impairment)  Goal: Optimal Nutrition Intake  Outcome: Ongoing, Progressing     Problem: Renal Function Impairment (Acute Kidney Injury/Impairment)  Goal: Effective Renal Function  Outcome: Ongoing, Progressing     Problem: Infection  Goal: Absence of Infection Signs and Symptoms  Outcome: Ongoing, Progressing     Problem: Skin Injury Risk Increased  Goal: Skin Health and Integrity  Outcome: Ongoing, Progressing     Problem: Fall Injury Risk  Goal: Absence of Fall and Fall-Related Injury  Outcome: Ongoing, Progressing     Problem: Restraint, Nonbehavioral (Nonviolent)  Goal: Absence of Harm or Injury  Outcome: Ongoing, Progressing

## 2024-01-27 NOTE — ASSESSMENT & PLAN NOTE
Patient's anemia is currently controlled. Has not received any PRBCs to date.   Current CBC reviewed-   Lab Results   Component Value Date    HGB 8.5 (L) 01/27/2024    HCT 26.3 (L) 01/27/2024     Monitor serial CBC and transfuse if patient becomes hemodynamically unstable, symptomatic or H/H drops below 7/21.

## 2024-01-27 NOTE — PROGRESS NOTES
"[facility] - Telemetry [facility]  Progress Note    Patient Name: Bhavana Lambert  MRN: [id number]  Patient Class: IP- Inpatient   Admission Date: 1/22/2024  Length of Stay: 5 days  Attending Physician: Bill Rodriguez MD  Primary Care Provider: Pepito Jhaveri IV, MD        Subjective:     Principal Problem:Septic shock        HPI:  HPI per Caty Hernandez, NP:     "Yahaira Lambert is a 68 year old woman with HTN, COPD and chronic respiratory failure on 2LNC, HFpEF, HLD, hypothyroidism and depression who presented to [facility] on 1/19 with symptoms of lower back pain and dysuria, and found to be hypotensive. She was admitted for sepsis of urinary tract origin. Workup showed positive blood cultures and urine cultures, both growing E coli. During the admission she went into septic shock, required levophed, and also briefly required BiPAP for increased work of breathing and hypercapnia. She also had climbing LFTs to the thousands. CT scan on 1/22 showed progressing left pyelonephritis, without abscess. Due to medical complexity, patient was transferred to [facility] for higher level of care and hepatology eval in setting of acute liver failure."    Overview/Hospital Course:  68 F with Pmhx COPD (on 2L @ home), HFpEF, and ETOH abuse that presented to [facility] as transfer from OSH for hepatology evaluation. Patient noted to have E coli bacteremia and arrived on vasopressors. LFT's downtrending, pressors weaned off. Continues on CTX for e. coli UTI and E coli bacteremia. She was seen by hepatology for severe elevation of LFTs  (now improving). Likely 2/2 shock liver and ETOH abuse. She is on CIWA protocol with PRN ativan for withdrawals.     Interval History: Pt seen and examined this morning on rounds. ABDI. Patient tired this morning. Aox3. LFT's continue to improve. No complaints this morning.    Objective:     Vital Signs (Most Recent):  Temp: 97.6 °F (36.4 °C) (01/27/24 0726)  Pulse: 92 " (01/27/24 0840)  Resp: 18 (01/27/24 0840)  BP: (!) 150/69 (01/27/24 0726)  SpO2: 95 % (01/27/24 0840) Vital Signs (24h Range):  Temp:  [97.2 °F (36.2 °C)-98.7 °F (37.1 °C)] 97.6 °F (36.4 °C)  Pulse:  [82-92] 92  Resp:  [16-18] 18  SpO2:  [92 %-98 %] 95 %  BP: (128-150)/(68-83) 150/69     Weight: 59 kg (130 lb)  Body mass index is 23.78 kg/m².    Intake/Output Summary (Last 24 hours) at 1/27/2024 1103  Last data filed at 1/27/2024 0505  Gross per 24 hour   Intake 360 ml   Output 2475 ml   Net -2115 ml         Physical Exam  Gen: in NAD, appears stated age  Neuro: AAOx4, CN2-12 grossly intact BL; motor, sensory, and strength grossly intact BL  HEENT: NTNC, EOMI, PERRLA, MMM; no thyromegaly or lymphadenopathy; no JVD appreciated  CVS: RRR, no m/r/g; S1/S2 auscultated with no S3 or S4; capillary refill < 2 sec  Resp: lungs CTAB, no w/r/r; no belabored breathing or accessory muscle use appreciated   Abd: BS+ in all 4 quadrants; NTND, soft to palpation; no organomegaly appreciated   Extrem: pulses full, equal, and regular over all 4 extremities; no UE or LE edema BL          Significant Labs: All pertinent labs within the past 24 hours have been reviewed.    Significant Imaging: I have reviewed all pertinent imaging results/findings within the past 24 hours.    Assessment/Plan:      * Septic shock  Left Pyelonephritis  E coli Bacteremia    68F admitted to OSH for sepsis 2/2 urinary tract infection, c/b shock on levophed, LILLIE, and liver injury. Blood and urine cultures at OSH with E coli. Patient of note had a PICC line that was replaced at the OSH once blood cultures returned positive, however, blood cultures were not repeated to ensure clearance.    - CT-AP with findings consistent with left pyelonephritis   - Initially on vanc and ertapenem  - Blood culture growing E Coli susceptible to CTX  - Abx deescalated to Ceftriaxone   - Pressors weaned off and patient stepped down to  1/25  - No dysuria or flank pain this  AM    Transaminitis  - Etiology likely shock liver in setting of septic shock and concurrent ETOH use  - LFT's initially elevated to 5000's/1000's  - Hepatology consulted - supportive care  - LFT's downtrending  - Daily CMP's/INR's  - Avoid hepatotoxic medications  - Avoid tylenol    Chronic diastolic (congestive) heart failure  Patient is identified as having Diastolic (HFpEF) heart failure that is Chronic. CHF is currently controlled. Latest ECHO performed and demonstrates- Results for orders placed during the hospital encounter of 01/22/24    Echo    Interpretation Summary    Left Ventricle: The left ventricle is normal in size. Normal wall thickness. Septal motion is consistent with post-operative status. There is normal systolic function with a visually estimated ejection fraction of 55 - 60%. There is normal diastolic function.    Right Ventricle: Mild right ventricular enlargement. Systolic function is mildly reduced.    Tricuspid Valve: There is mild regurgitation.    Pulmonary Artery: The estimated pulmonary artery systolic pressure is 46 mmHg.    IVC/SVC: Intermediate venous pressure at 8 mmHg.    Pericardium: There is a small effusion.  . Continue Beta Blocker and Aldactone and monitor clinical status closely. Monitor on telemetry. Patient is off CHF pathway.  Monitor strict Is&Os and daily weights.  Place on fluid restriction of 2 L. Cardiology has not been consulted. Continue to stress to patient importance of self efficacy and  on diet for CHF. Last BNP reviewed- and noted below   Recent Labs   Lab 01/21/24  1350   *         Emphysema/COPD  - Currently on home 2L NC, not in acute exacerbation  - Continue home inhaler regimen  - PRN duonebs provided  - Wean FiO2 for SpO2 goal 88-92%    ETOH abuse  - History of ETOH abuse  - Unclear when last drink was, has been admitted since 1/23  - CIWA protocol in effect  - PRN ativan provided for CIWA>8  - Seizure precautions in place  - Thiamine and  folate supplementation provided  - Will continue to monitor on tele        E coli bacteremia  See septic shock      Mixed hyperlipidemia  Holding statin in setting of elevated LFT's    Anemia of chronic disease  Patient's anemia is currently controlled. Has not received any PRBCs to date.   Current CBC reviewed-   Lab Results   Component Value Date    HGB 8.5 (L) 01/27/2024    HCT 26.3 (L) 01/27/2024     Monitor serial CBC and transfuse if patient becomes hemodynamically unstable, symptomatic or H/H drops below 7/21.      VTE Risk Mitigation (From admission, onward)           Ordered     enoxaparin injection 40 mg  Daily         01/22/24 2205     IP VTE HIGH RISK PATIENT  Once         01/22/24 2205     Place sequential compression device  Until discontinued         01/22/24 2205                    Discharge Planning   CHLOE: 1/30/2024     Code Status: Full Code   Is the patient medically ready for discharge?: No    Reason for patient still in hospital (select all that apply): Treatment  Discharge Plan A: Home with family                  Bill Rodriguez MD  Department of Hospital Medicine   [facility] - Telemetry Stepdown

## 2024-01-27 NOTE — ASSESSMENT & PLAN NOTE
Patient is identified as having Diastolic (HFpEF) heart failure that is Chronic. CHF is currently controlled. Latest ECHO performed and demonstrates- Results for orders placed during the hospital encounter of 01/22/24    Echo    Interpretation Summary    Left Ventricle: The left ventricle is normal in size. Normal wall thickness. Septal motion is consistent with post-operative status. There is normal systolic function with a visually estimated ejection fraction of 55 - 60%. There is normal diastolic function.    Right Ventricle: Mild right ventricular enlargement. Systolic function is mildly reduced.    Tricuspid Valve: There is mild regurgitation.    Pulmonary Artery: The estimated pulmonary artery systolic pressure is 46 mmHg.    IVC/SVC: Intermediate venous pressure at 8 mmHg.    Pericardium: There is a small effusion.  . Continue Beta Blocker and Aldactone and monitor clinical status closely. Monitor on telemetry. Patient is off CHF pathway.  Monitor strict Is&Os and daily weights.  Place on fluid restriction of 2 L. Cardiology has not been consulted. Continue to stress to patient importance of self efficacy and  on diet for CHF. Last BNP reviewed- and noted below   Recent Labs   Lab 01/21/24  1350   *

## 2024-01-28 PROBLEM — R53.81 DEBILITY: Status: ACTIVE | Noted: 2024-01-28

## 2024-01-28 LAB
ALBUMIN SERPL BCP-MCNC: 2.2 G/DL (ref 3.5–5.2)
ALP SERPL-CCNC: 176 U/L (ref 55–135)
ALT SERPL W/O P-5'-P-CCNC: 169 U/L (ref 10–44)
ANION GAP SERPL CALC-SCNC: 9 MMOL/L (ref 8–16)
AST SERPL-CCNC: 56 U/L (ref 10–40)
BACTERIA BLD CULT: NORMAL
BACTERIA BLD CULT: NORMAL
BASOPHILS # BLD AUTO: 0.02 K/UL (ref 0–0.2)
BASOPHILS NFR BLD: 0.3 % (ref 0–1.9)
BILIRUB SERPL-MCNC: 0.3 MG/DL (ref 0.1–1)
BUN SERPL-MCNC: <3 MG/DL (ref 8–23)
CALCIUM SERPL-MCNC: 7.9 MG/DL (ref 8.7–10.5)
CHLORIDE SERPL-SCNC: 97 MMOL/L (ref 95–110)
CO2 SERPL-SCNC: 34 MMOL/L (ref 23–29)
CREAT SERPL-MCNC: 0.8 MG/DL (ref 0.5–1.4)
DIFFERENTIAL METHOD BLD: ABNORMAL
EOSINOPHIL # BLD AUTO: 0.1 K/UL (ref 0–0.5)
EOSINOPHIL NFR BLD: 1.7 % (ref 0–8)
ERYTHROCYTE [DISTWIDTH] IN BLOOD BY AUTOMATED COUNT: 13.8 % (ref 11.5–14.5)
EST. GFR  (NO RACE VARIABLE): >60 ML/MIN/1.73 M^2
GLUCOSE SERPL-MCNC: 78 MG/DL (ref 70–110)
HCT VFR BLD AUTO: 26.3 % (ref 37–48.5)
HGB BLD-MCNC: 8.6 G/DL (ref 12–16)
IMM GRANULOCYTES # BLD AUTO: 0.02 K/UL (ref 0–0.04)
IMM GRANULOCYTES NFR BLD AUTO: 0.3 % (ref 0–0.5)
INR PPP: 1.2 (ref 0.8–1.2)
LYMPHOCYTES # BLD AUTO: 1.4 K/UL (ref 1–4.8)
LYMPHOCYTES NFR BLD: 21.3 % (ref 18–48)
MAGNESIUM SERPL-MCNC: 1.9 MG/DL (ref 1.6–2.6)
MCH RBC QN AUTO: 32.6 PG (ref 27–31)
MCHC RBC AUTO-ENTMCNC: 32.7 G/DL (ref 32–36)
MCV RBC AUTO: 100 FL (ref 82–98)
MONOCYTES # BLD AUTO: 0.8 K/UL (ref 0.3–1)
MONOCYTES NFR BLD: 11.6 % (ref 4–15)
NEUTROPHILS # BLD AUTO: 4.3 K/UL (ref 1.8–7.7)
NEUTROPHILS NFR BLD: 64.8 % (ref 38–73)
NRBC BLD-RTO: 0 /100 WBC
PHOSPHATE SERPL-MCNC: 4.2 MG/DL (ref 2.7–4.5)
PLATELET # BLD AUTO: 245 K/UL (ref 150–450)
PMV BLD AUTO: 11.2 FL (ref 9.2–12.9)
POCT GLUCOSE: 124 MG/DL (ref 70–110)
POCT GLUCOSE: 79 MG/DL (ref 70–110)
POCT GLUCOSE: 87 MG/DL (ref 70–110)
POCT GLUCOSE: 92 MG/DL (ref 70–110)
POTASSIUM SERPL-SCNC: 2.7 MMOL/L (ref 3.5–5.1)
PROT SERPL-MCNC: 5.7 G/DL (ref 6–8.4)
PROTHROMBIN TIME: 12.9 SEC (ref 9–12.5)
RBC # BLD AUTO: 2.64 M/UL (ref 4–5.4)
SODIUM SERPL-SCNC: 140 MMOL/L (ref 136–145)
WBC # BLD AUTO: 6.56 K/UL (ref 3.9–12.7)

## 2024-01-28 PROCEDURE — 25000003 PHARM REV CODE 250

## 2024-01-28 PROCEDURE — 25000003 PHARM REV CODE 250: Performed by: INTERNAL MEDICINE

## 2024-01-28 PROCEDURE — 99900035 HC TECH TIME PER 15 MIN (STAT)

## 2024-01-28 PROCEDURE — 63600175 PHARM REV CODE 636 W HCPCS: Performed by: INTERNAL MEDICINE

## 2024-01-28 PROCEDURE — 94640 AIRWAY INHALATION TREATMENT: CPT

## 2024-01-28 PROCEDURE — 63600175 PHARM REV CODE 636 W HCPCS: Performed by: STUDENT IN AN ORGANIZED HEALTH CARE EDUCATION/TRAINING PROGRAM

## 2024-01-28 PROCEDURE — 63600175 PHARM REV CODE 636 W HCPCS

## 2024-01-28 PROCEDURE — 27000221 HC OXYGEN, UP TO 24 HOURS

## 2024-01-28 PROCEDURE — 25000003 PHARM REV CODE 250: Performed by: STUDENT IN AN ORGANIZED HEALTH CARE EDUCATION/TRAINING PROGRAM

## 2024-01-28 PROCEDURE — 84100 ASSAY OF PHOSPHORUS: CPT

## 2024-01-28 PROCEDURE — 99900031 HC PATIENT EDUCATION (STAT)

## 2024-01-28 PROCEDURE — S4991 NICOTINE PATCH NONLEGEND: HCPCS

## 2024-01-28 PROCEDURE — 94761 N-INVAS EAR/PLS OXIMETRY MLT: CPT

## 2024-01-28 PROCEDURE — 80053 COMPREHEN METABOLIC PANEL: CPT

## 2024-01-28 PROCEDURE — 20600001 HC STEP DOWN PRIVATE ROOM

## 2024-01-28 PROCEDURE — 83735 ASSAY OF MAGNESIUM: CPT

## 2024-01-28 PROCEDURE — 85610 PROTHROMBIN TIME: CPT

## 2024-01-28 PROCEDURE — 85025 COMPLETE CBC W/AUTO DIFF WBC: CPT

## 2024-01-28 PROCEDURE — 36415 COLL VENOUS BLD VENIPUNCTURE: CPT

## 2024-01-28 PROCEDURE — 25000242 PHARM REV CODE 250 ALT 637 W/ HCPCS

## 2024-01-28 RX ORDER — POTASSIUM CHLORIDE 7.45 MG/ML
10 INJECTION INTRAVENOUS
Status: COMPLETED | OUTPATIENT
Start: 2024-01-28 | End: 2024-01-28

## 2024-01-28 RX ORDER — POTASSIUM CHLORIDE 20 MEQ/1
40 TABLET, EXTENDED RELEASE ORAL ONCE
Status: COMPLETED | OUTPATIENT
Start: 2024-01-28 | End: 2024-01-28

## 2024-01-28 RX ADMIN — POTASSIUM CHLORIDE 10 MEQ: 7.46 INJECTION, SOLUTION INTRAVENOUS at 12:01

## 2024-01-28 RX ADMIN — POTASSIUM CHLORIDE 40 MEQ: 1500 TABLET, EXTENDED RELEASE ORAL at 10:01

## 2024-01-28 RX ADMIN — MONTELUKAST 10 MG: 10 TABLET, FILM COATED ORAL at 08:01

## 2024-01-28 RX ADMIN — SPIRONOLACTONE 12.5 MG: 25 TABLET ORAL at 08:01

## 2024-01-28 RX ADMIN — BUSPIRONE HYDROCHLORIDE 15 MG: 5 TABLET ORAL at 08:01

## 2024-01-28 RX ADMIN — IPRATROPIUM BROMIDE AND ALBUTEROL SULFATE 3 ML: .5; 3 SOLUTION RESPIRATORY (INHALATION) at 07:01

## 2024-01-28 RX ADMIN — OXYCODONE 5 MG: 5 TABLET ORAL at 08:01

## 2024-01-28 RX ADMIN — IPRATROPIUM BROMIDE AND ALBUTEROL SULFATE 3 ML: .5; 3 SOLUTION RESPIRATORY (INHALATION) at 01:01

## 2024-01-28 RX ADMIN — FOLIC ACID 1 MG: 1 TABLET ORAL at 08:01

## 2024-01-28 RX ADMIN — THERA TABS 1 TABLET: TAB at 08:01

## 2024-01-28 RX ADMIN — IPRATROPIUM BROMIDE AND ALBUTEROL SULFATE 3 ML: .5; 3 SOLUTION RESPIRATORY (INHALATION) at 08:01

## 2024-01-28 RX ADMIN — ASPIRIN 81 MG CHEWABLE TABLET 81 MG: 81 TABLET CHEWABLE at 08:01

## 2024-01-28 RX ADMIN — FLUOXETINE HYDROCHLORIDE 40 MG: 20 CAPSULE ORAL at 08:01

## 2024-01-28 RX ADMIN — Medication 1 PATCH: at 08:01

## 2024-01-28 RX ADMIN — CLOPIDOGREL BISULFATE 75 MG: 75 TABLET ORAL at 08:01

## 2024-01-28 RX ADMIN — POTASSIUM CHLORIDE 10 MEQ: 7.46 INJECTION, SOLUTION INTRAVENOUS at 10:01

## 2024-01-28 RX ADMIN — POTASSIUM CHLORIDE 10 MEQ: 7.46 INJECTION, SOLUTION INTRAVENOUS at 11:01

## 2024-01-28 RX ADMIN — Medication 100 MG: at 08:01

## 2024-01-28 RX ADMIN — CEFTRIAXONE 2 G: 2 INJECTION, POWDER, FOR SOLUTION INTRAMUSCULAR; INTRAVENOUS at 12:01

## 2024-01-28 RX ADMIN — LEVOTHYROXINE SODIUM 50 MCG: 50 TABLET ORAL at 05:01

## 2024-01-28 RX ADMIN — ENOXAPARIN SODIUM 40 MG: 40 INJECTION SUBCUTANEOUS at 04:01

## 2024-01-28 NOTE — PLAN OF CARE
Pt in bed, no s/s of acute distress, no c/o voiced, safety measures in place, call light in reach  Problem: Adult Inpatient Plan of Care  Goal: Plan of Care Review  Outcome: Ongoing, Progressing  Goal: Absence of Hospital-Acquired Illness or Injury  Outcome: Ongoing, Progressing  Goal: Optimal Comfort and Wellbeing  Outcome: Ongoing, Progressing  Goal: Readiness for Transition of Care  Outcome: Ongoing, Progressing     Problem: Adjustment to Illness (Sepsis/Septic Shock)  Goal: Optimal Coping  Outcome: Ongoing, Progressing     Problem: Bleeding (Sepsis/Septic Shock)  Goal: Absence of Bleeding  Outcome: Ongoing, Progressing     Problem: Glycemic Control Impaired (Sepsis/Septic Shock)  Goal: Blood Glucose Level Within Desired Range  Outcome: Ongoing, Progressing     Problem: Infection Progression (Sepsis/Septic Shock)  Goal: Absence of Infection Signs and Symptoms  Outcome: Ongoing, Progressing     Problem: Nutrition Impaired (Sepsis/Septic Shock)  Goal: Optimal Nutrition Intake  Outcome: Ongoing, Progressing     Problem: Fluid and Electrolyte Imbalance (Acute Kidney Injury/Impairment)  Goal: Fluid and Electrolyte Balance  Outcome: Ongoing, Progressing     Problem: Oral Intake Inadequate (Acute Kidney Injury/Impairment)  Goal: Optimal Nutrition Intake  Outcome: Ongoing, Progressing     Problem: Renal Function Impairment (Acute Kidney Injury/Impairment)  Goal: Effective Renal Function  Outcome: Ongoing, Progressing     Problem: Infection  Goal: Absence of Infection Signs and Symptoms  Outcome: Ongoing, Progressing     Problem: Skin Injury Risk Increased  Goal: Skin Health and Integrity  Outcome: Ongoing, Progressing     Problem: Fall Injury Risk  Goal: Absence of Fall and Fall-Related Injury  Outcome: Ongoing, Progressing

## 2024-01-28 NOTE — ASSESSMENT & PLAN NOTE
Patient's anemia is currently controlled. Has not received any PRBCs to date.   Current CBC reviewed-   Lab Results   Component Value Date    HGB 8.6 (L) 01/28/2024    HCT 26.3 (L) 01/28/2024     Monitor serial CBC and transfuse if patient becomes hemodynamically unstable, symptomatic or H/H drops below 7/21.

## 2024-01-28 NOTE — PROGRESS NOTES
"[facility] - Telemetry [facility] Medicine  Progress Note    Patient Name: Bhavana Lambert  MRN: [id number]  Patient Class: IP- Inpatient   Admission Date: 1/22/2024  Length of Stay: 6 days  Attending Physician: Bill Rodriguez MD  Primary Care Provider: Pepito Jhaveri IV, MD        Subjective:     Principal Problem:Septic shock        HPI:  HPI per Caty Hernandez, NP:     "Yahaira Lambert is a 68 year old woman with HTN, COPD and chronic respiratory failure on 2LNC, HFpEF, HLD, hypothyroidism and depression who presented to [facility] on 1/19 with symptoms of lower back pain and dysuria, and found to be hypotensive. She was admitted for sepsis of urinary tract origin. Workup showed positive blood cultures and urine cultures, both growing E coli. During the admission she went into septic shock, required levophed, and also briefly required BiPAP for increased work of breathing and hypercapnia. She also had climbing LFTs to the thousands. CT scan on 1/22 showed progressing left pyelonephritis, without abscess. Due to medical complexity, patient was transferred to [facility] [place] [place] MICU for higher level of care and hepatology eval in setting of acute liver failure."    Overview/Hospital Course:  68 F with Pmhx COPD (on 2L @ home), HFpEF, and ETOH abuse that presented to [facility] [place] [place] MICU as transfer from OSH for hepatology evaluation. Patient noted to have E coli bacteremia and arrived on vasopressors. LFT's downtrending, pressors weaned off. Continues on CTX for e. coli UTI and E coli bacteremia. She was seen by hepatology for severe elevation of LFTs  (now improving). Likely 2/2 shock liver and ETOH abuse. She is on CIWA protocol with PRN ativan for withdrawals.     Patient's AMS resolved 1/28.     Interval History: Pt seen and examined this morning on rounds. ABDI. Patient Aox3, no longer confused and AMS resolved. No complaints this morning. Potassium 2.7; repleting with PO and IV. No evidence of ETOH withdrawal, " likely out of window.     Objective:     Vital Signs (Most Recent):  Temp: 99.3 °F (37.4 °C) (01/28/24 0746)  Pulse: 89 (01/28/24 0746)  Resp: 18 (01/28/24 0805)  BP: 130/70 (01/28/24 0746)  SpO2: 99 % (01/28/24 0746) Vital Signs (24h Range):  Temp:  [98.1 °F (36.7 °C)-99.3 °F (37.4 °C)] 99.3 °F (37.4 °C)  Pulse:  [] 89  Resp:  [16-19] 18  SpO2:  [91 %-99 %] 99 %  BP: (124-145)/(70-84) 130/70     Weight: 59 kg (130 lb)  Body mass index is 23.78 kg/m².    Intake/Output Summary (Last 24 hours) at 1/28/2024 1043  Last data filed at 1/28/2024 0921  Gross per 24 hour   Intake 490 ml   Output 1000 ml   Net -510 ml         Physical Exam  Gen: in NAD, appears stated age  Neuro: AAOx4, CN2-12 grossly intact BL; motor, sensory, and strength grossly intact BL  HEENT: NTNC, EOMI, PERRLA, MMM; no thyromegaly or lymphadenopathy; no JVD appreciated  CVS: RRR, no m/r/g; S1/S2 auscultated with no S3 or S4; capillary refill < 2 sec  Resp: lungs CTAB, no w/r/r; no belabored breathing or accessory muscle use appreciated   Abd: BS+ in all 4 quadrants; NTND, soft to palpation; no organomegaly appreciated   Extrem: pulses full, equal, and regular over all 4 extremities; no UE or LE edema BL          Significant Labs: All pertinent labs within the past 24 hours have been reviewed.    Significant Imaging: I have reviewed all pertinent imaging results/findings within the past 24 hours.    Assessment/Plan:      * Septic shock  Left Pyelonephritis  E coli Bacteremia    68F admitted to OSH for sepsis 2/2 urinary tract infection, c/b shock on levophed, LILLIE, and liver injury. Blood and urine cultures at OSH with E coli. Patient of note had a PICC line that was replaced at the OSH once blood cultures returned positive, however, blood cultures were not repeated to ensure clearance.    - CT-AP with findings consistent with left pyelonephritis   - Initially on vanc and ertapenem  - Blood culture growing E Coli susceptible to CTX  - Abx  deescalated to Ceftriaxone   - Repeat blood cultures 1/23 NGTD  - Pressors weaned off and patient stepped down to  1/25  - Will require 14 day course of antibiotic treatment. Can switch to Augmentin at time of discharge.  - AMS resolved  - Shock resolved    Transaminitis  - Etiology likely shock liver in setting of septic shock and concurrent ETOH use  - LFT's initially elevated to 5000's/1000's  - Hepatology consulted - supportive care  - LFT's downtrending  - Daily CMP's/INR's  - Avoid hepatotoxic medications  - Avoid tylenol    Chronic diastolic (congestive) heart failure  Patient is identified as having Diastolic (HFpEF) heart failure that is Chronic. CHF is currently controlled. Latest ECHO performed and demonstrates- Results for orders placed during the hospital encounter of 01/22/24    Echo    Interpretation Summary    Left Ventricle: The left ventricle is normal in size. Normal wall thickness. Septal motion is consistent with post-operative status. There is normal systolic function with a visually estimated ejection fraction of 55 - 60%. There is normal diastolic function.    Right Ventricle: Mild right ventricular enlargement. Systolic function is mildly reduced.    Tricuspid Valve: There is mild regurgitation.    Pulmonary Artery: The estimated pulmonary artery systolic pressure is 46 mmHg.    IVC/SVC: Intermediate venous pressure at 8 mmHg.    Pericardium: There is a small effusion.  . Continue Beta Blocker and Aldactone and monitor clinical status closely. Monitor on telemetry. Patient is off CHF pathway.  Monitor strict Is&Os and daily weights.  Place on fluid restriction of 2 L. Cardiology has not been consulted. Continue to stress to patient importance of self efficacy and  on diet for CHF. Last BNP reviewed- and noted below   Recent Labs   Lab 01/21/24  1350   *         Emphysema/COPD  - Currently on home 2L NC, not in acute exacerbation  - Continue home inhaler regimen  - PRN rasonebs  provided  - Wean FiO2 for SpO2 goal 88-92%    Debility  - Multiple falls at home  - PT/OT    ETOH abuse  - History of ETOH abuse  - Unclear when last drink was, has been admitted since 1/23  - CIWA protocol in effect  - PRN ativan provided for CIWA>8  - Seizure precautions in place  - Thiamine and folate supplementation provided  - Will continue to monitor on tele    - Will DC CIWA as out of withdrawal window        E coli bacteremia  See septic shock      Mixed hyperlipidemia  Holding statin in setting of elevated LFT's    Anemia of chronic disease  Patient's anemia is currently controlled. Has not received any PRBCs to date.   Current CBC reviewed-   Lab Results   Component Value Date    HGB 8.6 (L) 01/28/2024    HCT 26.3 (L) 01/28/2024     Monitor serial CBC and transfuse if patient becomes hemodynamically unstable, symptomatic or H/H drops below 7/21.      VTE Risk Mitigation (From admission, onward)           Ordered     enoxaparin injection 40 mg  Daily         01/22/24 2205     IP VTE HIGH RISK PATIENT  Once         01/22/24 2205     Place sequential compression device  Until discontinued         01/22/24 2205                    Discharge Planning   CHLOE: 1/30/2024     Code Status: Full Code   Is the patient medically ready for discharge?: No    Reason for patient still in hospital (select all that apply): Treatment and Pending disposition  Discharge Plan A: Home with family                  Bill Rodriguez MD  Department of Hospital Medicine   [facility] - Telemetry Stepdown

## 2024-01-28 NOTE — SUBJECTIVE & OBJECTIVE
Interval History: Pt seen and examined this morning on jonh PATTON. Patient Aox3, no longer confused and AMS resolved. No complaints this morning. Potassium 2.7; repleting with PO and IV. No evidence of ETOH withdrawal, likely out of window.     Objective:     Vital Signs (Most Recent):  Temp: 99.3 °F (37.4 °C) (01/28/24 0746)  Pulse: 89 (01/28/24 0746)  Resp: 18 (01/28/24 0805)  BP: 130/70 (01/28/24 0746)  SpO2: 99 % (01/28/24 0746) Vital Signs (24h Range):  Temp:  [98.1 °F (36.7 °C)-99.3 °F (37.4 °C)] 99.3 °F (37.4 °C)  Pulse:  [] 89  Resp:  [16-19] 18  SpO2:  [91 %-99 %] 99 %  BP: (124-145)/(70-84) 130/70     Weight: 59 kg (130 lb)  Body mass index is 23.78 kg/m².    Intake/Output Summary (Last 24 hours) at 1/28/2024 1043  Last data filed at 1/28/2024 0921  Gross per 24 hour   Intake 490 ml   Output 1000 ml   Net -510 ml         Physical Exam  Gen: in NAD, appears stated age  Neuro: AAOx4, CN2-12 grossly intact BL; motor, sensory, and strength grossly intact BL  HEENT: NTNC, EOMI, PERRLA, MMM; no thyromegaly or lymphadenopathy; no JVD appreciated  CVS: RRR, no m/r/g; S1/S2 auscultated with no S3 or S4; capillary refill < 2 sec  Resp: lungs CTAB, no w/r/r; no belabored breathing or accessory muscle use appreciated   Abd: BS+ in all 4 quadrants; NTND, soft to palpation; no organomegaly appreciated   Extrem: pulses full, equal, and regular over all 4 extremities; no UE or LE edema BL          Significant Labs: All pertinent labs within the past 24 hours have been reviewed.    Significant Imaging: I have reviewed all pertinent imaging results/findings within the past 24 hours.

## 2024-01-28 NOTE — PLAN OF CARE
Problem: Adult Inpatient Plan of Care  Goal: Plan of Care Review  Outcome: Ongoing, Progressing  Goal: Patient-Specific Goal (Individualized)  Outcome: Ongoing, Progressing  Goal: Absence of Hospital-Acquired Illness or Injury  Outcome: Ongoing, Progressing  Goal: Optimal Comfort and Wellbeing  Outcome: Ongoing, Progressing  Goal: Readiness for Transition of Care  Outcome: Ongoing, Progressing     Problem: Adjustment to Illness (Sepsis/Septic Shock)  Goal: Optimal Coping  Outcome: Ongoing, Progressing     Problem: Bleeding (Sepsis/Septic Shock)  Goal: Absence of Bleeding  Outcome: Ongoing, Progressing     Problem: Glycemic Control Impaired (Sepsis/Septic Shock)  Goal: Blood Glucose Level Within Desired Range  Outcome: Ongoing, Progressing     Problem: Nutrition Impaired (Sepsis/Septic Shock)  Goal: Optimal Nutrition Intake  Outcome: Ongoing, Progressing     Problem: Fluid and Electrolyte Imbalance (Acute Kidney Injury/Impairment)  Goal: Fluid and Electrolyte Balance  Outcome: Ongoing, Progressing     Problem: Oral Intake Inadequate (Acute Kidney Injury/Impairment)  Goal: Optimal Nutrition Intake  Outcome: Ongoing, Progressing     Problem: Renal Function Impairment (Acute Kidney Injury/Impairment)  Goal: Effective Renal Function  Outcome: Ongoing, Progressing     Problem: Skin Injury Risk Increased  Goal: Skin Health and Integrity  Outcome: Ongoing, Progressing     POC reviewed. AAOX2-3. VVS. CIWA Q8<20. Neuro wnl's. Up to BSC w/asst. Azul patent to  bag. Poor appetite. Frequent safety checks performed. Bed in lowest position. Telesitter. Call light in reach. Bed alarm. [facility]

## 2024-01-28 NOTE — ASSESSMENT & PLAN NOTE
Left Pyelonephritis  E coli Bacteremia    68F admitted to OSH for sepsis 2/2 urinary tract infection, c/b shock on levophed, LILLIE, and liver injury. Blood and urine cultures at OSH with E coli. Patient of note had a PICC line that was replaced at the OSH once blood cultures returned positive, however, blood cultures were not repeated to ensure clearance.    - CT-AP with findings consistent with left pyelonephritis   - Initially on vanc and ertapenem  - Blood culture growing E Coli susceptible to CTX  - Abx deescalated to Ceftriaxone   - Repeat blood cultures 1/23 NGTD  - Pressors weaned off and patient stepped down to  1/25  - Will require 14 day course of antibiotic treatment. Can switch to Augmentin at time of discharge.  - AMS resolved  - Shock resolved

## 2024-01-28 NOTE — ASSESSMENT & PLAN NOTE
- History of ETOH abuse  - Unclear when last drink was, has been admitted since 1/23  - CIWA protocol in effect  - PRN ativan provided for CIWA>8  - Seizure precautions in place  - Thiamine and folate supplementation provided  - Will continue to monitor on tele    - Will DC CIWA as out of withdrawal window

## 2024-01-29 DIAGNOSIS — J44.9 CHRONIC OBSTRUCTIVE PULMONARY DISEASE, UNSPECIFIED COPD TYPE: ICD-10-CM

## 2024-01-29 PROBLEM — N17.0 ATN (ACUTE TUBULAR NECROSIS): Status: RESOLVED | Noted: 2023-10-30 | Resolved: 2024-01-29

## 2024-01-29 PROBLEM — I21.4 NSTEMI (NON-ST ELEVATED MYOCARDIAL INFARCTION): Status: RESOLVED | Noted: 2023-10-26 | Resolved: 2024-01-29

## 2024-01-29 LAB
ALBUMIN SERPL BCP-MCNC: 2.2 G/DL (ref 3.5–5.2)
ALP SERPL-CCNC: 155 U/L (ref 55–135)
ALT SERPL W/O P-5'-P-CCNC: 127 U/L (ref 10–44)
ANION GAP SERPL CALC-SCNC: 6 MMOL/L (ref 8–16)
AST SERPL-CCNC: 36 U/L (ref 10–40)
BASOPHILS # BLD AUTO: 0.04 K/UL (ref 0–0.2)
BASOPHILS NFR BLD: 0.6 % (ref 0–1.9)
BILIRUB SERPL-MCNC: 0.3 MG/DL (ref 0.1–1)
BUN SERPL-MCNC: <3 MG/DL (ref 8–23)
CALCIUM SERPL-MCNC: 8.2 MG/DL (ref 8.7–10.5)
CHLORIDE SERPL-SCNC: 100 MMOL/L (ref 95–110)
CO2 SERPL-SCNC: 32 MMOL/L (ref 23–29)
CREAT SERPL-MCNC: 0.8 MG/DL (ref 0.5–1.4)
DIFFERENTIAL METHOD BLD: ABNORMAL
EOSINOPHIL # BLD AUTO: 0.1 K/UL (ref 0–0.5)
EOSINOPHIL NFR BLD: 2.1 % (ref 0–8)
ERYTHROCYTE [DISTWIDTH] IN BLOOD BY AUTOMATED COUNT: 14.2 % (ref 11.5–14.5)
EST. GFR  (NO RACE VARIABLE): >60 ML/MIN/1.73 M^2
GLUCOSE SERPL-MCNC: 77 MG/DL (ref 70–110)
HCT VFR BLD AUTO: 27.8 % (ref 37–48.5)
HGB BLD-MCNC: 8.8 G/DL (ref 12–16)
IMM GRANULOCYTES # BLD AUTO: 0.02 K/UL (ref 0–0.04)
IMM GRANULOCYTES NFR BLD AUTO: 0.3 % (ref 0–0.5)
INR PPP: 1.1 (ref 0.8–1.2)
LYMPHOCYTES # BLD AUTO: 1.6 K/UL (ref 1–4.8)
LYMPHOCYTES NFR BLD: 24.1 % (ref 18–48)
MAGNESIUM SERPL-MCNC: 1.8 MG/DL (ref 1.6–2.6)
MCH RBC QN AUTO: 31.8 PG (ref 27–31)
MCHC RBC AUTO-ENTMCNC: 31.7 G/DL (ref 32–36)
MCV RBC AUTO: 100 FL (ref 82–98)
MONOCYTES # BLD AUTO: 0.7 K/UL (ref 0.3–1)
MONOCYTES NFR BLD: 10.6 % (ref 4–15)
NEUTROPHILS # BLD AUTO: 4.1 K/UL (ref 1.8–7.7)
NEUTROPHILS NFR BLD: 62.3 % (ref 38–73)
NRBC BLD-RTO: 0 /100 WBC
PHOSPHATE SERPL-MCNC: 3.5 MG/DL (ref 2.7–4.5)
PLATELET # BLD AUTO: 227 K/UL (ref 150–450)
PMV BLD AUTO: 10.9 FL (ref 9.2–12.9)
POCT GLUCOSE: 84 MG/DL (ref 70–110)
POCT GLUCOSE: 91 MG/DL (ref 70–110)
POCT GLUCOSE: 93 MG/DL (ref 70–110)
POTASSIUM SERPL-SCNC: 3.5 MMOL/L (ref 3.5–5.1)
PROT SERPL-MCNC: 5.8 G/DL (ref 6–8.4)
PROTHROMBIN TIME: 11.8 SEC (ref 9–12.5)
RBC # BLD AUTO: 2.77 M/UL (ref 4–5.4)
SMOOTH MUSCLE AB TITR SER IF: ABNORMAL {TITER}
SODIUM SERPL-SCNC: 138 MMOL/L (ref 136–145)
WBC # BLD AUTO: 6.59 K/UL (ref 3.9–12.7)

## 2024-01-29 PROCEDURE — 25000003 PHARM REV CODE 250: Performed by: STUDENT IN AN ORGANIZED HEALTH CARE EDUCATION/TRAINING PROGRAM

## 2024-01-29 PROCEDURE — 27000221 HC OXYGEN, UP TO 24 HOURS

## 2024-01-29 PROCEDURE — 94640 AIRWAY INHALATION TREATMENT: CPT

## 2024-01-29 PROCEDURE — 83735 ASSAY OF MAGNESIUM: CPT

## 2024-01-29 PROCEDURE — 63600175 PHARM REV CODE 636 W HCPCS: Performed by: STUDENT IN AN ORGANIZED HEALTH CARE EDUCATION/TRAINING PROGRAM

## 2024-01-29 PROCEDURE — 84100 ASSAY OF PHOSPHORUS: CPT

## 2024-01-29 PROCEDURE — 94761 N-INVAS EAR/PLS OXIMETRY MLT: CPT

## 2024-01-29 PROCEDURE — S4991 NICOTINE PATCH NONLEGEND: HCPCS

## 2024-01-29 PROCEDURE — 20600001 HC STEP DOWN PRIVATE ROOM

## 2024-01-29 PROCEDURE — 25000003 PHARM REV CODE 250

## 2024-01-29 PROCEDURE — 36415 COLL VENOUS BLD VENIPUNCTURE: CPT

## 2024-01-29 PROCEDURE — 97110 THERAPEUTIC EXERCISES: CPT

## 2024-01-29 PROCEDURE — 97530 THERAPEUTIC ACTIVITIES: CPT

## 2024-01-29 PROCEDURE — 85025 COMPLETE CBC W/AUTO DIFF WBC: CPT

## 2024-01-29 PROCEDURE — 99900035 HC TECH TIME PER 15 MIN (STAT)

## 2024-01-29 PROCEDURE — 80053 COMPREHEN METABOLIC PANEL: CPT

## 2024-01-29 PROCEDURE — 25000242 PHARM REV CODE 250 ALT 637 W/ HCPCS

## 2024-01-29 PROCEDURE — 63600175 PHARM REV CODE 636 W HCPCS

## 2024-01-29 PROCEDURE — 85610 PROTHROMBIN TIME: CPT

## 2024-01-29 PROCEDURE — 97116 GAIT TRAINING THERAPY: CPT

## 2024-01-29 RX ORDER — LANOLIN ALCOHOL/MO/W.PET/CERES
100 CREAM (GRAM) TOPICAL DAILY
Status: ON HOLD
Start: 2024-01-29 | End: 2024-04-21 | Stop reason: HOSPADM

## 2024-01-29 RX ORDER — AMOXICILLIN AND CLAVULANATE POTASSIUM 875; 125 MG/1; MG/1
1 TABLET, FILM COATED ORAL 2 TIMES DAILY
Qty: 16 TABLET | Refills: 0
Start: 2024-01-29 | End: 2024-01-30

## 2024-01-29 RX ORDER — TIOTROPIUM BROMIDE AND OLODATEROL 3.124; 2.736 UG/1; UG/1
SPRAY, METERED RESPIRATORY (INHALATION)
Qty: 4 G | Refills: 5 | Status: SHIPPED | OUTPATIENT
Start: 2024-01-29

## 2024-01-29 RX ORDER — FOLIC ACID 1 MG/1
1 TABLET ORAL DAILY
Refills: 0
Start: 2024-01-29 | End: 2025-01-28

## 2024-01-29 RX ADMIN — ASPIRIN 81 MG CHEWABLE TABLET 81 MG: 81 TABLET CHEWABLE at 09:01

## 2024-01-29 RX ADMIN — CLOPIDOGREL BISULFATE 75 MG: 75 TABLET ORAL at 09:01

## 2024-01-29 RX ADMIN — MONTELUKAST 10 MG: 10 TABLET, FILM COATED ORAL at 09:01

## 2024-01-29 RX ADMIN — SPIRONOLACTONE 12.5 MG: 25 TABLET ORAL at 09:01

## 2024-01-29 RX ADMIN — ENOXAPARIN SODIUM 40 MG: 40 INJECTION SUBCUTANEOUS at 05:01

## 2024-01-29 RX ADMIN — BUSPIRONE HYDROCHLORIDE 15 MG: 5 TABLET ORAL at 09:01

## 2024-01-29 RX ADMIN — THERA TABS 1 TABLET: TAB at 09:01

## 2024-01-29 RX ADMIN — FOLIC ACID 1 MG: 1 TABLET ORAL at 09:01

## 2024-01-29 RX ADMIN — IPRATROPIUM BROMIDE AND ALBUTEROL SULFATE 3 ML: .5; 3 SOLUTION RESPIRATORY (INHALATION) at 02:01

## 2024-01-29 RX ADMIN — Medication 1 PATCH: at 09:01

## 2024-01-29 RX ADMIN — LEVOTHYROXINE SODIUM 50 MCG: 50 TABLET ORAL at 06:01

## 2024-01-29 RX ADMIN — Medication 100 MG: at 09:01

## 2024-01-29 RX ADMIN — CEFTRIAXONE 2 G: 2 INJECTION, POWDER, FOR SOLUTION INTRAMUSCULAR; INTRAVENOUS at 11:01

## 2024-01-29 RX ADMIN — CEFTRIAXONE 2 G: 2 INJECTION, POWDER, FOR SOLUTION INTRAMUSCULAR; INTRAVENOUS at 12:01

## 2024-01-29 RX ADMIN — IPRATROPIUM BROMIDE AND ALBUTEROL SULFATE 3 ML: .5; 3 SOLUTION RESPIRATORY (INHALATION) at 08:01

## 2024-01-29 RX ADMIN — FLUOXETINE HYDROCHLORIDE 40 MG: 20 CAPSULE ORAL at 09:01

## 2024-01-29 NOTE — SUBJECTIVE & OBJECTIVE
Interval History: Pt seen and examined this morning on rounds. ABDI. Doing well.  No complaints. Wants to DC to SNF.    Objective:     Vital Signs (Most Recent):  Temp: 97 °F (36.1 °C) (01/29/24 1129)  Pulse: 94 (01/29/24 1129)  Resp: 18 (01/29/24 1129)  BP: 137/64 (01/29/24 1129)  SpO2: 100 % (01/29/24 1129) Vital Signs (24h Range):  Temp:  [96.7 °F (35.9 °C)-99.2 °F (37.3 °C)] 97 °F (36.1 °C)  Pulse:  [84-99] 94  Resp:  [16-18] 18  SpO2:  [91 %-100 %] 100 %  BP: (126-161)/(63-80) 137/64     Weight: 59 kg (130 lb)  Body mass index is 23.78 kg/m².    Intake/Output Summary (Last 24 hours) at 1/29/2024 1137  Last data filed at 1/28/2024 1836  Gross per 24 hour   Intake 500 ml   Output --   Net 500 ml         Physical Exam  Gen: in NAD, appears stated age  Neuro: AAOx4, CN2-12 grossly intact BL; motor, sensory, and strength grossly intact BL  HEENT: NTNC, EOMI, PERRLA, MMM; no thyromegaly or lymphadenopathy; no JVD appreciated  CVS: RRR, no m/r/g; S1/S2 auscultated with no S3 or S4; capillary refill < 2 sec  Resp: lungs CTAB, no w/r/r; no belabored breathing or accessory muscle use appreciated   Abd: BS+ in all 4 quadrants; NTND, soft to palpation; no organomegaly appreciated   Extrem: pulses full, equal, and regular over all 4 extremities; no UE or LE edema BL          Significant Labs: All pertinent labs within the past 24 hours have been reviewed.    Significant Imaging: I have reviewed all pertinent imaging results/findings within the past 24 hours.

## 2024-01-29 NOTE — PLAN OF CARE
Problem: Adult Inpatient Plan of Care  Goal: Plan of Care Review  Outcome: Ongoing, Progressing  Goal: Patient-Specific Goal (Individualized)  Outcome: Ongoing, Progressing  Goal: Absence of Hospital-Acquired Illness or Injury  Outcome: Ongoing, Progressing  Goal: Optimal Comfort and Wellbeing  Outcome: Ongoing, Progressing  Goal: Readiness for Transition of Care  Outcome: Ongoing, Progressing     Problem: Adjustment to Illness (Sepsis/Septic Shock)  Goal: Optimal Coping  Outcome: Ongoing, Progressing  Intervention: Optimize Psychosocial Adjustment to Illness  Flowsheets (Taken 1/28/2024 0954)  Supportive Measures:   active listening utilized   self-care encouraged   verbalization of feelings encouraged     Problem: Glycemic Control Impaired (Sepsis/Septic Shock)  Goal: Blood Glucose Level Within Desired Range  Outcome: Ongoing, Progressing  Intervention: Optimize Glycemic Control  Flowsheets (Taken 1/28/2024 6876)  Glycemic Management: blood glucose monitored     Problem: Infection Progression (Sepsis/Septic Shock)  Goal: Absence of Infection Signs and Symptoms  Outcome: Ongoing, Progressing     Problem: Nutrition Impaired (Sepsis/Septic Shock)  Goal: Optimal Nutrition Intake  Outcome: Ongoing, Progressing     Problem: Fluid and Electrolyte Imbalance (Acute Kidney Injury/Impairment)  Goal: Fluid and Electrolyte Balance  Outcome: Ongoing, Progressing     Problem: Oral Intake Inadequate (Acute Kidney Injury/Impairment)  Goal: Optimal Nutrition Intake  Outcome: Ongoing, Progressing     Problem: Renal Function Impairment (Acute Kidney Injury/Impairment)  Goal: Effective Renal Function  Outcome: Ongoing, Progressing     Problem: Skin Injury Risk Increased  Goal: Skin Health and Integrity  Outcome: Ongoing, Progressing     Problem: Fall Injury Risk  Goal: Absence of Fall and Fall-Related Injury  Outcome: Ongoing, Progressing  Intervention: Promote Injury-Free Environment  Flowsheets (Taken 1/28/2024 0268)  Safety  Promotion/Fall Prevention:   assistive device/personal item within reach   bed alarm set   Fall Risk reviewed with patient/family   nonskid shoes/socks when out of bed   side rails raised x 2   /camera at bedside   instructed to call staff for mobility     POC reviewed. Pt oriented x3. Vvs. No acute changes. Voiding without problems after gomez removed. Up to BSC with asst. Frequent safety checks performed. Bed in lowest position.  WCTM

## 2024-01-29 NOTE — PT/OT/SLP PROGRESS
Physical Therapy Treatment    Patient Name:  Bhavana Lambert   MRN:  [id number]  Admitting Diagnosis:  Septic shock   Recent Surgery: * No surgery found *    Admit Date: 1/22/2024  Length of Stay: 7 days    Recommendations:     Discharge Recommendations:    Moderate Intensity Therapy   Discharge Equipment Recommendations: none   Barriers to discharge: Decreased caregiver support and increased need for assistance    Appropriate transfer level with nursing staff: Safe to transfer to bedside chair/bedside commode: stand pivot with 1 person with RW.    Plan:     During this hospitalization, patient to be seen 3 x/week to address the identified rehab impairments via gait training, therapeutic activities, therapeutic exercises, neuromuscular re-education and progress towards the established goals.  Plan of Care Expires:  02/23/24  Plan of Care Reviewed with: patient    Assessment:     Bhavana Lambert is a 68 y.o. female admitted with a medical diagnosis of Septic shock. Pt agreeable to therapy session. Pt increased distance gait trained this session but demo'd impaired balance during gait trials with no AD but demo'd improved stability with use of RW. Pt fatigues quickly with mobility and requires frequent rest breaks to recover. Patient limited by weakness, deconditioning and decreased endurance at this time. Patient would benefit from skilled therapy services to maximize safety and independence, increase activity tolerance, decrease fall risk, decrease caregiver burden, improve QOL, improve patient's functional mobility, and decrease risk of contractures and pressure sores. Patient continues to demonstrate the need for moderate intensity therapy on a daily basis post acute exhibited by decreased independence with functional mobility    Problem List: weakness, impaired endurance, impaired self care skills, impaired functional mobility, gait instability, impaired balance, impaired cardiopulmonary response to activity,  "decreased lower extremity function.  Rehab Prognosis: Good; patient would benefit from acute skilled PT services to address these deficits and reach maximum level of function.      Goals:   Multidisciplinary Problems       Physical Therapy Goals          Problem: Physical Therapy    Goal Priority Disciplines Outcome Goal Variances Interventions   Physical Therapy Goal     PT, PT/OT Ongoing, Progressing     Description: Goals to be met by: 24     Patient will increase functional independence with mobility by performin. Supine to sit with Contact Guard Assistance  2. Sit to stand transfer with Stand-by Assistance with AD if needed.   3. Gait  x 150 feet with Contact Guard Assistance using Rolling Walker.                          Subjective     R notified prior to session. No one present upon PT entrance into room. Patient agreeable to PT treatment session.    Chief Complaint: SOB; weakness  Patient/Family Comments/goals: return to PLOF  Pain/Comfort:  Pain Rating 1: 610  Location - Side 1: Bilateral  Location - Orientation 1: generalized  Location 1: back (muscle spams- chronic)  Pain Addressed 1: Reposition, Distraction  Pain Rating Post-Intervention 1: 6/10      Objective:     Patient found up in chair with: telemetry, oxygen   Cognition:   Alert and Cooperative  Patient is oriented to Person, Place, Time, Situation  General Precautions: Standard, Cardiac fall   Orthopedic Precautions:N/A   Braces: N/A   Body mass index is 23.78 kg/m².  Oxygen Device: Nasal Cannula 3L  Vitals: /64 (Patient Position: Lying)   Pulse 94   Temp 97 °F (36.1 °C) (Oral)   Resp 18   Ht 5' 2" (1.575 m)   Wt 59 kg (130 lb)   SpO2 100%   BMI 23.78 kg/m²     Outcome Measures:  AM-PAC 6 CLICK MOBILITY  Turning over in bed (including adjusting bedclothes, sheets and blankets)?: 3  Sitting down on and standing up from a chair with arms (e.g., wheelchair, bedside commode, etc.): 3  Moving from lying on back to sitting on " the side of the bed?: 2  Moving to and from a bed to a chair (including a wheelchair)?: 3  Need to walk in hospital room?: 3  Climbing 3-5 steps with a railing?: 2  Basic Mobility Total Score: 16     Functional Mobility:    Bed Mobility:   Pt found/returned to bedside chair    Transfers:   Sit <> Stand Transfer: stand by assistance with no assistive device x1 trial from EOB x1 trial from chair; SBA with RW x1 trial from chair     Balance:  Standing:  Static: stand by assistance  Dynamic: contact guard assistance with RW, minimum assistance with no AD      Gait:  Patient ambulated: 12' x2 trials with no AD CGA-min A seated break between; 26' with RW CGA   Gait Deviation(s): occasional unsteady gait, decreased step length, narrow base of support, flexed posture, and decreased marizol  all lines remained intact throughout ambulation trial  Gait belt utilized  Comments: During trial with no AD pt regressed from CGA to min A with increasing instability with fatigue. Pt with increased SOB after each trial- pt with oxygen saturation 90% after first two gait trials and 97% after last gait trial. VC for RW management specifically when pivoting to chair and transition from stand<>sit.     Therapeutic Exercises:   Patient performed 1 set(s) of 10 repetitions of  the following seated exercises: ankle pumps, long arc quads, marches, and hip abduction for bilateral LE. Patient required skilled PT for instruction of exercises and appropriate cues to perform exercises safely and appropriately.    Education:  Time provided for education, counseling and discussion of health disposition in regards to patient's current status  All questions answered within PT scope of practice and to patient's satisfaction  PT role in POC to address current functional deficits  Pt educated on proper body mechanics, safety techniques, and energy conservation with PT facilitation and cueing throughout session  Call nursing/pct to transfer to chair/use  bathroom. Pt stated understanding.    Patient left up in chair with all lines intact, call button in reach, and RN notified.    Time Tracking:     PT Received On: 01/29/24  PT Start Time: 1044     PT Stop Time: 1108  PT Total Time (min): 24 min     Billable Minutes:   Gait Training 12 minutes and Therapeutic Exercise 12 minutes    Treatment Type: Treatment  PT/PTA: PT       1/29/2024

## 2024-01-29 NOTE — PLAN OF CARE
Pt engaged well in therapy this date.     Problem: Occupational Therapy  Goal: Occupational Therapy Goal  Description: Goals to be met by: 1/31/24    Patient will increase functional independence with ADLs by performing:    LE Dressing with Minimal Assistance.  Grooming while standing at sink with Contact Guard Assistance.  Toileting from toilet with Minimal Assistance for hygiene and clothing management.   Supine to sit with Minimal Assistance.  Toilet transfer to toilet with Contact Guard Assistance.    Outcome: Ongoing, Progressing

## 2024-01-29 NOTE — ASSESSMENT & PLAN NOTE
"Patient is identified as having Diastolic (HFpEF) heart failure that is Chronic. CHF is currently controlled. Latest ECHO performed and demonstrates- Results for orders placed during the hospital encounter of 01/22/24    Echo    Interpretation Summary    Left Ventricle: The left ventricle is normal in size. Normal wall thickness. Septal motion is consistent with post-operative status. There is normal systolic function with a visually estimated ejection fraction of 55 - 60%. There is normal diastolic function.    Right Ventricle: Mild right ventricular enlargement. Systolic function is mildly reduced.    Tricuspid Valve: There is mild regurgitation.    Pulmonary Artery: The estimated pulmonary artery systolic pressure is 46 mmHg.    IVC/SVC: Intermediate venous pressure at 8 mmHg.    Pericardium: There is a small effusion.  . Continue Beta Blocker and Aldactone and monitor clinical status closely. Monitor on telemetry. Patient is off CHF pathway.  Monitor strict Is&Os and daily weights.  Place on fluid restriction of 2 L. Cardiology has not been consulted. Continue to stress to patient importance of self efficacy and  on diet for CHF. Last BNP reviewed- and noted below   No results for input(s): "BNP", "BNPTRIAGEBLO" in the last 168 hours.    "

## 2024-01-29 NOTE — PT/OT/SLP PROGRESS
"Occupational Therapy   Treatment    Name: Bhavana Lambert  MRN: [id number]  Admitting Diagnosis:  Septic shock       Recommendations:     Discharge Recommendations: Moderate Intensity Therapy  Discharge Equipment Recommendations:  none  Barriers to discharge:  None    Assessment:     Bhavana Lambert is a 68 y.o. female with a medical diagnosis of Septic shock.  She presents with performance deficits affecting function are weakness, impaired endurance, impaired self care skills, impaired functional mobility, gait instability, impaired cardiopulmonary response to activity, decreased lower extremity function.  Pt engaged well in therapy this date, though preferred to keep session short this date. Pt encountered sitting EOB and adjusting underwear. Pt agreeable to short therapy session of transferring to chair for breakfast with SBA, and politely declined further therapy, prioritizing breakfast.  RN and PT notified for continuation of care.     Rehab Prognosis:  Good; patient would benefit from acute skilled OT services to address these deficits and reach maximum level of function.       Plan:     Patient to be seen 3 x/week to address the above listed problems via self-care/home management, therapeutic activities, therapeutic exercises, neuromuscular re-education  Plan of Care Expires: 02/23/24  Plan of Care Reviewed with: patient    Subjective     Chief Complaint: "I just have to change my briefs first"  Patient/Family Comments/goals:   Get better, return to PLOF  Pain/Comfort:  Pain Rating 1: 0/10  Pain Rating Post-Intervention 1: 0/10    Objective:     Communicated with: HALEIGH Chavez prior to session.  Patient found sitting edge of bed with telemetry, oxygen upon OT entry to room.    General Precautions: Standard, fall    Orthopedic Precautions:N/A  Braces: N/A  Respiratory Status: Nasal cannula, flow 3 L/min     Occupational Performance:     Bed Mobility:    Sitting EOB with good balance    Functional " Mobility/Transfers:  Patient completed Sit <> Stand Transfer with stand by assistance  with  hand-held assist   Patient completed Bed <> Chair Transfer using Step Transfer technique with stand by assistance with hand-held assist    Activities of Daily Living:  Feeding:  set up assistance placing food on tray table in front of pt  Upper Body Dressing: moderate assistance donning second gown as robe  Lower Body Dressing: stand by assistance donning underwear while sitting EOB      AMPAC 6 Click ADL: 15    Treatment & Education:  Pt educated on role of OT, POC, and goals for therapy.    POC was dicussed with patient/caregiver, who was included in its development and is in agreement with the identified goals and treatment plan.   Patient and family aware of patient's deficits and therapy progression.   Time provided for therapeutic counseling and discussion of health disposition.   Educated on importance of EOB/OOB mobility, maintaining routine, sitting up in chair, and maximizing independence with ADLs during admission   Pt completed ADLs and functional mobility for treatment session as noted above   Pt/caregiver verbalized understanding and expressed no further concerns/questions.  Updated communication board with level of assist required       Patient left up in chair with all lines intact, call button in reach, and RN and PT notified    GOALS:   Multidisciplinary Problems       Occupational Therapy Goals          Problem: Occupational Therapy    Goal Priority Disciplines Outcome Interventions   Occupational Therapy Goal     OT, PT/OT Ongoing, Progressing    Description: Goals to be met by: 1/31/24    Patient will increase functional independence with ADLs by performing:    LE Dressing with Minimal Assistance.  Grooming while standing at sink with Contact Guard Assistance.  Toileting from toilet with Minimal Assistance for hygiene and clothing management.   Supine to sit with Minimal Assistance.  Toilet transfer to  toilet with Contact Guard Assistance.                         Time Tracking:     OT Date of Treatment: 01/29/24  OT Start Time: 0855  OT Stop Time: 0909  OT Total Time (min): 14 min    Billable Minutes:Therapeutic Activity 14    OT/ANDREW: OT          1/29/2024

## 2024-01-29 NOTE — PROGRESS NOTES
"[facility] - Telemetry [facility] Medicine  Progress Note    Patient Name: Bhavana Lambert  MRN: [id number]  Patient Class: IP- Inpatient   Admission Date: 1/22/2024  Length of Stay: 7 days  Attending Physician: Bill Rodriguez MD  Primary Care Provider: Pepito Jhaveri IV, MD        Subjective:     Principal Problem:Septic shock        HPI:  HPI per Caty Hernandez, NP:     "Yahaira Lambert is a 68 year old woman with HTN, COPD and chronic respiratory failure on 2LNC, HFpEF, HLD, hypothyroidism and depression who presented to [facility] on 1/19 with symptoms of lower back pain and dysuria, and found to be hypotensive. She was admitted for sepsis of urinary tract origin. Workup showed positive blood cultures and urine cultures, both growing E coli. During the admission she went into septic shock, required levophed, and also briefly required BiPAP for increased work of breathing and hypercapnia. She also had climbing LFTs to the thousands. CT scan on 1/22 showed progressing left pyelonephritis, without abscess. Due to medical complexity, patient was transferred to [facility] for higher level of care and hepatology eval in setting of acute liver failure."    Overview/Hospital Course:  68 F with Pmhx COPD (on 2L @ home), HFpEF, and ETOH abuse that presented to [facility] as transfer from OSH for hepatology evaluation. Patient noted to have E coli bacteremia and arrived on vasopressors. LFT's downtrending, pressors weaned off. Continues on CTX for e. coli UTI and E coli bacteremia. She was seen by hepatology for severe elevation of LFTs  (now improving). Likely 2/2 shock liver and ETOH abuse. She is on CIWA protocol with PRN ativan for withdrawals.     Patient's AMS resolved 1/28. PT/OT recommending SNF. Patient stable for discharge to SNF. Continues on CTX and will transition to finish 14 day course of augmentin (through 2/6/24).    Interval History: Pt seen and examined this morning on jonh PATTON. " Doing well.  No complaints. Wants to DC to SNF.    Objective:     Vital Signs (Most Recent):  Temp: 97 °F (36.1 °C) (01/29/24 1129)  Pulse: 94 (01/29/24 1129)  Resp: 18 (01/29/24 1129)  BP: 137/64 (01/29/24 1129)  SpO2: 100 % (01/29/24 1129) Vital Signs (24h Range):  Temp:  [96.7 °F (35.9 °C)-99.2 °F (37.3 °C)] 97 °F (36.1 °C)  Pulse:  [84-99] 94  Resp:  [16-18] 18  SpO2:  [91 %-100 %] 100 %  BP: (126-161)/(63-80) 137/64     Weight: 59 kg (130 lb)  Body mass index is 23.78 kg/m².    Intake/Output Summary (Last 24 hours) at 1/29/2024 1137  Last data filed at 1/28/2024 1836  Gross per 24 hour   Intake 500 ml   Output --   Net 500 ml         Physical Exam  Gen: in NAD, appears stated age  Neuro: AAOx4, CN2-12 grossly intact BL; motor, sensory, and strength grossly intact BL  HEENT: NTNC, EOMI, PERRLA, MMM; no thyromegaly or lymphadenopathy; no JVD appreciated  CVS: RRR, no m/r/g; S1/S2 auscultated with no S3 or S4; capillary refill < 2 sec  Resp: lungs CTAB, no w/r/r; no belabored breathing or accessory muscle use appreciated   Abd: BS+ in all 4 quadrants; NTND, soft to palpation; no organomegaly appreciated   Extrem: pulses full, equal, and regular over all 4 extremities; no UE or LE edema BL          Significant Labs: All pertinent labs within the past 24 hours have been reviewed.    Significant Imaging: I have reviewed all pertinent imaging results/findings within the past 24 hours.    Assessment/Plan:      * Septic shock  Left Pyelonephritis  E coli Bacteremia    68F admitted to OSH for sepsis 2/2 urinary tract infection, c/b shock on levophed, LILLIE, and liver injury. Blood and urine cultures at OSH with E coli. Patient of note had a PICC line that was replaced at the OSH once blood cultures returned positive, however, blood cultures were not repeated to ensure clearance.    - CT-AP with findings consistent with left pyelonephritis   - Initially on vanc and ertapenem  - Blood culture growing E Coli susceptible to  "CTX  - Abx deescalated to Ceftriaxone   - Repeat blood cultures 1/23 NGTD  - Pressors weaned off and patient stepped down to HM 1/25  - Will require 14 day course of antibiotic treatment. Can switch to Augmentin at time of discharge.  - AMS resolved  - Shock resolved    Transaminitis  - Etiology likely shock liver in setting of septic shock and concurrent ETOH use  - LFT's initially elevated to 5000's/1000's  - Hepatology consulted - supportive care  - LFT's downtrending  - Daily CMP's/INR's  - Avoid hepatotoxic medications  - Avoid tylenol    Chronic diastolic (congestive) heart failure  Patient is identified as having Diastolic (HFpEF) heart failure that is Chronic. CHF is currently controlled. Latest ECHO performed and demonstrates- Results for orders placed during the hospital encounter of 01/22/24    Echo    Interpretation Summary    Left Ventricle: The left ventricle is normal in size. Normal wall thickness. Septal motion is consistent with post-operative status. There is normal systolic function with a visually estimated ejection fraction of 55 - 60%. There is normal diastolic function.    Right Ventricle: Mild right ventricular enlargement. Systolic function is mildly reduced.    Tricuspid Valve: There is mild regurgitation.    Pulmonary Artery: The estimated pulmonary artery systolic pressure is 46 mmHg.    IVC/SVC: Intermediate venous pressure at 8 mmHg.    Pericardium: There is a small effusion.  . Continue Beta Blocker and Aldactone and monitor clinical status closely. Monitor on telemetry. Patient is off CHF pathway.  Monitor strict Is&Os and daily weights.  Place on fluid restriction of 2 L. Cardiology has not been consulted. Continue to stress to patient importance of self efficacy and  on diet for CHF. Last BNP reviewed- and noted below   No results for input(s): "BNP", "BNPTRIAGEBLO" in the last 168 hours.      Emphysema/COPD  - Currently on home 2L NC, not in acute exacerbation  - Continue " home inhaler regimen  - PRN duonebs provided  - Wean FiO2 for SpO2 goal 88-92%    Debility  - Multiple falls at home  - PT/OT    ETOH abuse  - History of ETOH abuse  - Unclear when last drink was, has been admitted since 1/23  - CIWA protocol in effect  - PRN ativan provided for CIWA>8  - Seizure precautions in place  - Thiamine and folate supplementation provided  - Will continue to monitor on tele    - Will DC CIWA as out of withdrawal window        E coli bacteremia  See septic shock      Mixed hyperlipidemia  Holding statin in setting of elevated LFT's    Anemia of chronic disease  Patient's anemia is currently controlled. Has not received any PRBCs to date.   Current CBC reviewed-   Lab Results   Component Value Date    HGB 8.8 (L) 01/29/2024    HCT 27.8 (L) 01/29/2024     Monitor serial CBC and transfuse if patient becomes hemodynamically unstable, symptomatic or H/H drops below 7/21.      VTE Risk Mitigation (From admission, onward)           Ordered     enoxaparin injection 40 mg  Daily         01/22/24 2205     IP VTE HIGH RISK PATIENT  Once         01/22/24 2205     Place sequential compression device  Until discontinued         01/22/24 2205                    Discharge Planning   CHLOE: 1/29/2024     Code Status: Full Code   Is the patient medically ready for discharge?: No    Reason for patient still in hospital (select all that apply): Pending disposition  Discharge Plan A: Home with family                  Bill Rodriguez MD  Department of Hospital Medicine   [facility] - Telemetry Stepdown

## 2024-01-29 NOTE — ASSESSMENT & PLAN NOTE
Patient's anemia is currently controlled. Has not received any PRBCs to date.   Current CBC reviewed-   Lab Results   Component Value Date    HGB 8.8 (L) 01/29/2024    HCT 27.8 (L) 01/29/2024     Monitor serial CBC and transfuse if patient becomes hemodynamically unstable, symptomatic or H/H drops below 7/21.

## 2024-01-29 NOTE — PLAN OF CARE
NURSING HOME ORDERS    01/30/2024  [facility]  [facility] - TELEMETRY STEPDOWN  [address]  [facility] [address]  Dept: [phone]  Loc: [phone]     Admit to Nursing Home:      Diagnoses:  Active Hospital Problems    Diagnosis  POA    *Septic shock [A41.9, R65.21]  Yes     Priority: 1 - High             Transaminitis [R74.01]  Yes     Priority: 2     Chronic diastolic (congestive) heart failure [I50.32]  Yes     Priority: 4     Emphysema/COPD [J43.9]  Yes     Priority: 5     Debility [R53.81]  Yes    ETOH abuse [F10.10]  Yes    E coli bacteremia [R78.81, B96.20]  Yes    Mixed hyperlipidemia [E78.2]  Yes    Anemia of chronic disease [D63.8]  Yes      Resolved Hospital Problems    Diagnosis Date Resolved POA    LILLIE (acute kidney injury) [N17.9] 01/26/2024 Yes     Priority: 3      Chronic       Patient is homebound due to:  Septic shock    Allergies:Review of patient's allergies indicates:  No Known Allergies    Vitals:  Routine    Diet: regular diet    Activities:   Activity as tolerated    Goals of Care Treatment Preferences:  Code Status: Full Code      Labs:  Routine per facility    Nursing Precautions:  Fall    Consults:   PT to evaluate and treat- 5 times a week and OT to evaluate and treat- 5 times a week     Miscellaneous Care: N/a                   Diabetes Care:  N/a      Medications: Discontinue all previous medication orders, if any. See new list below.     Medication List        START taking these medications      amoxicillin-clavulanate 875-125mg 875-125 mg per tablet  Commonly known as: AUGMENTIN  Take 1 tablet by mouth 2 (two) times daily. for 8 days     folic acid 1 MG tablet  Commonly known as: FOLVITE  Take 1 tablet (1 mg total) by mouth once daily.     multivitamin Tab  Take 1 tablet by mouth once daily.     thiamine 100 MG tablet  Take 1 tablet (100 mg total) by mouth once daily.            CONTINUE taking these medications      albuterol 90 mcg/actuation  inhaler  Commonly known as: PROVENTIL/VENTOLIN HFA  Inhale 2 puffs into the lungs every 6 (six) hours as needed for Wheezing or Shortness of Breath.     amlodipine-benazepril 5-10 mg 5-10 mg per capsule  Commonly known as: LOTREL  Take 1 capsule by mouth once daily.     aspirin 81 MG EC tablet  Commonly known as: ECOTRIN  Take 1 tablet (81 mg total) by mouth once daily.     atorvastatin 80 MG tablet  Commonly known as: LIPITOR  Take 1 tablet (80 mg total) by mouth once daily.     busPIRone 15 MG tablet  Commonly known as: BUSPAR  Take 15 mg by mouth 2 (two) times a day.        carvediloL 25 MG tablet  Commonly known as: COREG  Take 0.5 tablets (12.5 mg total) by mouth 2 (two) times daily.     clopidogreL 75 mg tablet  Commonly known as: PLAVIX  Take 1 tablet (75 mg total) by mouth once daily.     cyanocobalamin 1,000 mcg/mL injection  Inject 1,000 mcg into the muscle every 30 days.     cyclobenzaprine 10 MG tablet  Commonly known as: FLEXERIL  Take 10 mg by mouth 3 (three) times daily as needed for Muscle spasms.     FLUoxetine 40 MG capsule  Take 40 mg by mouth once daily.     levothyroxine 50 MCG tablet  Commonly known as: SYNTHROID  Take 50 mcg by mouth before breakfast.     montelukast 10 mg tablet  Commonly known as: SINGULAIR  Take 10 mg by mouth every morning.     ondansetron 4 MG tablet  Commonly known as: ZOFRAN  Take 4 mg by mouth every 6 (six) hours as needed for Nausea.     spironolactone 25 MG tablet  Commonly known as: ALDACTONE  Take 0.5 tablets (12.5 mg total) by mouth once daily.            STOP taking these medications      clonazePAM 0.5 MG tablet  Commonly known as: KlonoPIN     potassium chloride 10 MEQ Tbsr  Commonly known as: KLOR-CON            ASK your doctor about these medications      STIOLTO RESPIMAT 2.5-2.5 mcg/actuation Mist  Generic drug: tiotropium-olodateroL  INHALE ONE PUFF INTO THE LUNGS ONCE DAILY. controller  Ask about: Which instructions should I use?                 Immunizations Administered as of 1/30/2024       No immunizations on file.            _________________________________  Bill Rodriguez MD  01/30/2024

## 2024-01-29 NOTE — PLAN OF CARE
Problem: Adult Inpatient Plan of Care  Goal: Plan of Care Review  Outcome: Ongoing, Progressing  Goal: Patient-Specific Goal (Individualized)  Outcome: Ongoing, Progressing  Goal: Absence of Hospital-Acquired Illness or Injury  Outcome: Ongoing, Progressing  Goal: Optimal Comfort and Wellbeing  Outcome: Ongoing, Progressing  Goal: Readiness for Transition of Care  Outcome: Ongoing, Progressing     Problem: Restraint, Nonbehavioral (Nonviolent)  Goal: Absence of Harm or Injury  Outcome: Met   Pt rested well. VS WNL. No ss of pain or distress. Call light within reach. Bed in low position. Bed alarm on. Will continue POC.

## 2024-01-30 VITALS
TEMPERATURE: 99 F | RESPIRATION RATE: 18 BRPM | OXYGEN SATURATION: 98 % | SYSTOLIC BLOOD PRESSURE: 124 MMHG | DIASTOLIC BLOOD PRESSURE: 73 MMHG | HEIGHT: 62 IN | BODY MASS INDEX: 23.92 KG/M2 | WEIGHT: 130 LBS | HEART RATE: 100 BPM

## 2024-01-30 LAB
ALBUMIN SERPL BCP-MCNC: 2.5 G/DL (ref 3.5–5.2)
ALP SERPL-CCNC: 144 U/L (ref 55–135)
ALT SERPL W/O P-5'-P-CCNC: 104 U/L (ref 10–44)
ANION GAP SERPL CALC-SCNC: 9 MMOL/L (ref 8–16)
AST SERPL-CCNC: 27 U/L (ref 10–40)
BASOPHILS # BLD AUTO: 0.04 K/UL (ref 0–0.2)
BASOPHILS NFR BLD: 0.6 % (ref 0–1.9)
BILIRUB SERPL-MCNC: 0.3 MG/DL (ref 0.1–1)
BUN SERPL-MCNC: 4 MG/DL (ref 8–23)
CALCIUM SERPL-MCNC: 8.3 MG/DL (ref 8.7–10.5)
CHLORIDE SERPL-SCNC: 99 MMOL/L (ref 95–110)
CO2 SERPL-SCNC: 29 MMOL/L (ref 23–29)
CREAT SERPL-MCNC: 0.9 MG/DL (ref 0.5–1.4)
DIFFERENTIAL METHOD BLD: ABNORMAL
EOSINOPHIL # BLD AUTO: 0.1 K/UL (ref 0–0.5)
EOSINOPHIL NFR BLD: 1.6 % (ref 0–8)
ERYTHROCYTE [DISTWIDTH] IN BLOOD BY AUTOMATED COUNT: 14.1 % (ref 11.5–14.5)
EST. GFR  (NO RACE VARIABLE): >60 ML/MIN/1.73 M^2
GLUCOSE SERPL-MCNC: 74 MG/DL (ref 70–110)
HCT VFR BLD AUTO: 29 % (ref 37–48.5)
HGB BLD-MCNC: 9.2 G/DL (ref 12–16)
IMM GRANULOCYTES # BLD AUTO: 0.02 K/UL (ref 0–0.04)
IMM GRANULOCYTES NFR BLD AUTO: 0.3 % (ref 0–0.5)
INR PPP: 1 (ref 0.8–1.2)
LYMPHOCYTES # BLD AUTO: 1.5 K/UL (ref 1–4.8)
LYMPHOCYTES NFR BLD: 24.1 % (ref 18–48)
MAGNESIUM SERPL-MCNC: 1.9 MG/DL (ref 1.6–2.6)
MCH RBC QN AUTO: 31.7 PG (ref 27–31)
MCHC RBC AUTO-ENTMCNC: 31.7 G/DL (ref 32–36)
MCV RBC AUTO: 100 FL (ref 82–98)
MONOCYTES # BLD AUTO: 0.6 K/UL (ref 0.3–1)
MONOCYTES NFR BLD: 9.2 % (ref 4–15)
NEUTROPHILS # BLD AUTO: 4 K/UL (ref 1.8–7.7)
NEUTROPHILS NFR BLD: 64.2 % (ref 38–73)
NRBC BLD-RTO: 0 /100 WBC
PHOSPHATE SERPL-MCNC: 3.6 MG/DL (ref 2.7–4.5)
PLATELET # BLD AUTO: 246 K/UL (ref 150–450)
PMV BLD AUTO: 11.1 FL (ref 9.2–12.9)
POCT GLUCOSE: 100 MG/DL (ref 70–110)
POCT GLUCOSE: 73 MG/DL (ref 70–110)
POCT GLUCOSE: 86 MG/DL (ref 70–110)
POTASSIUM SERPL-SCNC: 3.2 MMOL/L (ref 3.5–5.1)
PROT SERPL-MCNC: 6.3 G/DL (ref 6–8.4)
PROTHROMBIN TIME: 11.2 SEC (ref 9–12.5)
RBC # BLD AUTO: 2.9 M/UL (ref 4–5.4)
SODIUM SERPL-SCNC: 137 MMOL/L (ref 136–145)
WBC # BLD AUTO: 6.19 K/UL (ref 3.9–12.7)

## 2024-01-30 PROCEDURE — 86580 TB INTRADERMAL TEST: CPT | Performed by: INTERNAL MEDICINE

## 2024-01-30 PROCEDURE — 25000003 PHARM REV CODE 250: Performed by: INTERNAL MEDICINE

## 2024-01-30 PROCEDURE — 30200315 PPD INTRADERMAL TEST REV CODE 302: Performed by: INTERNAL MEDICINE

## 2024-01-30 PROCEDURE — 83735 ASSAY OF MAGNESIUM: CPT

## 2024-01-30 PROCEDURE — 27000221 HC OXYGEN, UP TO 24 HOURS

## 2024-01-30 PROCEDURE — 85610 PROTHROMBIN TIME: CPT

## 2024-01-30 PROCEDURE — S4991 NICOTINE PATCH NONLEGEND: HCPCS

## 2024-01-30 PROCEDURE — 25000003 PHARM REV CODE 250

## 2024-01-30 PROCEDURE — 84100 ASSAY OF PHOSPHORUS: CPT

## 2024-01-30 PROCEDURE — 36415 COLL VENOUS BLD VENIPUNCTURE: CPT

## 2024-01-30 PROCEDURE — 25000242 PHARM REV CODE 250 ALT 637 W/ HCPCS

## 2024-01-30 PROCEDURE — 94761 N-INVAS EAR/PLS OXIMETRY MLT: CPT

## 2024-01-30 PROCEDURE — 85025 COMPLETE CBC W/AUTO DIFF WBC: CPT

## 2024-01-30 PROCEDURE — 80053 COMPREHEN METABOLIC PANEL: CPT

## 2024-01-30 PROCEDURE — 94640 AIRWAY INHALATION TREATMENT: CPT

## 2024-01-30 PROCEDURE — 99900035 HC TECH TIME PER 15 MIN (STAT)

## 2024-01-30 RX ORDER — OXYCODONE HYDROCHLORIDE 5 MG/1
5 TABLET ORAL ONCE
Status: COMPLETED | OUTPATIENT
Start: 2024-01-30 | End: 2024-01-30

## 2024-01-30 RX ORDER — ALPRAZOLAM 0.5 MG/1
0.5 TABLET ORAL ONCE
Status: COMPLETED | OUTPATIENT
Start: 2024-01-30 | End: 2024-01-30

## 2024-01-30 RX ORDER — AMOXICILLIN AND CLAVULANATE POTASSIUM 875; 125 MG/1; MG/1
1 TABLET, FILM COATED ORAL 2 TIMES DAILY
Qty: 16 TABLET | Refills: 0
Start: 2024-01-30 | End: 2024-02-07

## 2024-01-30 RX ADMIN — IPRATROPIUM BROMIDE AND ALBUTEROL SULFATE 3 ML: .5; 3 SOLUTION RESPIRATORY (INHALATION) at 01:01

## 2024-01-30 RX ADMIN — OXYCODONE 5 MG: 5 TABLET ORAL at 11:01

## 2024-01-30 RX ADMIN — OXYCODONE 5 MG: 5 TABLET ORAL at 04:01

## 2024-01-30 RX ADMIN — SPIRONOLACTONE 12.5 MG: 25 TABLET ORAL at 09:01

## 2024-01-30 RX ADMIN — MONTELUKAST 10 MG: 10 TABLET, FILM COATED ORAL at 09:01

## 2024-01-30 RX ADMIN — THERA TABS 1 TABLET: TAB at 09:01

## 2024-01-30 RX ADMIN — CLOPIDOGREL BISULFATE 75 MG: 75 TABLET ORAL at 09:01

## 2024-01-30 RX ADMIN — FLUOXETINE HYDROCHLORIDE 40 MG: 20 CAPSULE ORAL at 09:01

## 2024-01-30 RX ADMIN — BUSPIRONE HYDROCHLORIDE 15 MG: 5 TABLET ORAL at 09:01

## 2024-01-30 RX ADMIN — IPRATROPIUM BROMIDE AND ALBUTEROL SULFATE 3 ML: .5; 3 SOLUTION RESPIRATORY (INHALATION) at 08:01

## 2024-01-30 RX ADMIN — Medication 1 PATCH: at 09:01

## 2024-01-30 RX ADMIN — TUBERCULIN PURIFIED PROTEIN DERIVATIVE 5 UNITS: 5 INJECTION, SOLUTION INTRADERMAL at 12:01

## 2024-01-30 RX ADMIN — ALPRAZOLAM 0.5 MG: 0.5 TABLET ORAL at 04:01

## 2024-01-30 RX ADMIN — LEVOTHYROXINE SODIUM 50 MCG: 50 TABLET ORAL at 05:01

## 2024-01-30 RX ADMIN — Medication 100 MG: at 09:01

## 2024-01-30 RX ADMIN — FOLIC ACID 1 MG: 1 TABLET ORAL at 09:01

## 2024-01-30 RX ADMIN — ASPIRIN 81 MG CHEWABLE TABLET 81 MG: 81 TABLET CHEWABLE at 09:01

## 2024-01-30 NOTE — PLAN OF CARE
Problem: Adult Inpatient Plan of Care  Goal: Plan of Care Review  Outcome: Ongoing, Progressing  Goal: Patient-Specific Goal (Individualized)  Outcome: Ongoing, Progressing  Goal: Absence of Hospital-Acquired Illness or Injury  Outcome: Ongoing, Progressing  Goal: Optimal Comfort and Wellbeing  Outcome: Ongoing, Progressing  Goal: Readiness for Transition of Care  Outcome: Ongoing, Progressing     Problem: Adjustment to Illness (Sepsis/Septic Shock)  Goal: Optimal Coping  Outcome: Ongoing, Progressing     Problem: Infection Progression (Sepsis/Septic Shock)  Goal: Absence of Infection Signs and Symptoms  Outcome: Ongoing, Progressing     Problem: Nutrition Impaired (Sepsis/Septic Shock)  Goal: Optimal Nutrition Intake  Outcome: Ongoing, Progressing     Problem: Fluid and Electrolyte Imbalance (Acute Kidney Injury/Impairment)  Goal: Fluid and Electrolyte Balance  Outcome: Ongoing, Progressing     Problem: Oral Intake Inadequate (Acute Kidney Injury/Impairment)  Goal: Optimal Nutrition Intake  Outcome: Ongoing, Progressing     Problem: Renal Function Impairment (Acute Kidney Injury/Impairment)  Goal: Effective Renal Function  Outcome: Ongoing, Progressing     Problem: Infection  Goal: Absence of Infection Signs and Symptoms  Outcome: Ongoing, Progressing     Problem: Skin Injury Risk Increased  Goal: Skin Health and Integrity  Outcome: Ongoing, Progressing     Problem: Fall Injury Risk  Goal: Absence of Fall and Fall-Related Injury  Outcome: Ongoing, Progressing

## 2024-01-30 NOTE — DISCHARGE SUMMARY
"[facility] - Telemetry [facility] Medicine  Discharge Summary      Patient Name: Bhavana Lambert  MRN: [id number]  AMALIA: [id number]  Patient Class: IP- Inpatient  Admission Date: 1/22/2024  Hospital Length of Stay: 8 days  Discharge Date and Time:  01/30/2024 2:53 PM  Attending Physician: Bill Rodriguez MD   Discharging Provider: Bill Rodriguez MD  Primary Care Provider: Pepito Jhaveri IV, MD  Hospital Medicine Team: [facility] MED R Bill Rodriguez MD  Primary Care Team: [facility]OS[facility] MED R    HPI:   HPI per Caty Hernandez, NP:     "Yahaira Lambert is a 68 year old woman with HTN, COPD and chronic respiratory failure on 2LNC, HFpEF, HLD, hypothyroidism and depression who presented to [facility] on 1/19 with symptoms of lower back pain and dysuria, and found to be hypotensive. She was admitted for sepsis of urinary tract origin. Workup showed positive blood cultures and urine cultures, both growing E coli. During the admission she went into septic shock, required levophed, and also briefly required BiPAP for increased work of breathing and hypercapnia. She also had climbing LFTs to the thousands. CT scan on 1/22 showed progressing left pyelonephritis, without abscess. Due to medical complexity, patient was transferred to [facility] MICU for higher level of care and hepatology eval in setting of acute liver failure."    * No surgery found *      Hospital Course:   68 F with Pmhx COPD (on 2L @ home), HFpEF, and ETOH abuse that presented to [facility]U as transfer from OSH for hepatology evaluation. Patient noted to have E coli bacteremia and arrived on vasopressors. LFT's downtrending, pressors weaned off. Continues on CTX for e. coli UTI and E coli bacteremia. She was seen by hepatology for severe elevation of LFTs  (now improving). Likely 2/2 shock liver and ETOH abuse. She is on CIWA protocol with PRN ativan for withdrawals.     Patient's AMS resolved 1/28. PT/OT recommending SNF. Patient tolerating diet, " no symptoms. Stable for discharge to SNF. Continues on CTX and will transition to finish 14 day course of augmentin (through 2/6/24).     Goals of Care Treatment Preferences:  Code Status: Full Code      Consults:   Consults (From admission, onward)          Status Ordering Provider     Inpatient consult to Hepatology  Once        Provider:  (Not yet assigned)    Completed RONEL CORNEJO            No new Assessment & Plan notes have been filed under this hospital service since the last note was generated.  Service: Hospital Medicine    Final Active Diagnoses:    Diagnosis Date Noted POA    PRINCIPAL PROBLEM:  Septic shock [A41.9, R65.21] 01/21/2024 Yes    Transaminitis [R74.01] 01/22/2024 Yes    Chronic diastolic (congestive) heart failure [I50.32] 11/08/2023 Yes    Emphysema/COPD [J43.9] 10/26/2023 Yes    Debility [R53.81] 01/28/2024 Yes    ETOH abuse [F10.10] 01/25/2024 Yes    E coli bacteremia [R78.81, B96.20] 01/22/2024 Yes    Mixed hyperlipidemia [E78.2] 11/08/2023 Yes    Anemia of chronic disease [D63.8] 06/27/2023 Yes      Problems Resolved During this Admission:    Diagnosis Date Noted Date Resolved POA    LILLIE (acute kidney injury) [N17.9] 10/26/2023 01/26/2024 Yes     Chronic       Discharged Condition: good    Disposition: Skilled Nursing Facility    Follow Up:    Patient Instructions:      Reason for not Ordering Smoking Cessation Referral     Order Specific Question Answer Comments   Reason for not ordering: Patient refused      Reason for not Prescribing Nicotine Replacement     Order Specific Question Answer Comments   Reason for not Prescribing: Not medically appropriate at this time        Significant Diagnostic Studies: N/A    Pending Diagnostic Studies:       Procedure Component Value Units Date/Time    EKG 12-lead [[id number]]     Order Status: Sent Lab Status: No result            Medications:  Reconciled Home Medications:      Medication List        START taking these medications       amoxicillin-clavulanate 875-125mg 875-125 mg per tablet  Commonly known as: AUGMENTIN  Take 1 tablet by mouth 2 (two) times daily. for 8 days     folic acid 1 MG tablet  Commonly known as: FOLVITE  Take 1 tablet (1 mg total) by mouth once daily.     multivitamin Tab  Take 1 tablet by mouth once daily.     thiamine 100 MG tablet  Take 1 tablet (100 mg total) by mouth once daily.            CHANGE how you take these medications      STIOLTO RESPIMAT 2.5-2.5 mcg/actuation Mist  Generic drug: tiotropium-olodateroL  INHALE ONE PUFF INTO THE LUNGS ONCE DAILY. controller  What changed: See the new instructions.            CONTINUE taking these medications      albuterol 90 mcg/actuation inhaler  Commonly known as: PROVENTIL/VENTOLIN HFA  Inhale 2 puffs into the lungs every 6 (six) hours as needed for Wheezing or Shortness of Breath.     amlodipine-benazepril 5-10 mg 5-10 mg per capsule  Commonly known as: LOTREL  Take 1 capsule by mouth once daily.     aspirin 81 MG EC tablet  Commonly known as: ECOTRIN  Take 1 tablet (81 mg total) by mouth once daily.     atorvastatin 80 MG tablet  Commonly known as: LIPITOR  Take 1 tablet (80 mg total) by mouth once daily.     busPIRone 15 MG tablet  Commonly known as: BUSPAR  Take 15 mg by mouth 2 (two) times a day.     butalbital-acetaminophen-caffeine -40 mg -40 mg per tablet  Commonly known as: FIORICET, ESGIC  Take 1 tablet by mouth every 8 (eight) hours as needed for Headaches.     carvediloL 25 MG tablet  Commonly known as: COREG  Take 0.5 tablets (12.5 mg total) by mouth 2 (two) times daily.     clopidogreL 75 mg tablet  Commonly known as: PLAVIX  Take 1 tablet (75 mg total) by mouth once daily.     cyanocobalamin 1,000 mcg/mL injection  Inject 1,000 mcg into the muscle every 30 days.     cyclobenzaprine 10 MG tablet  Commonly known as: FLEXERIL  Take 10 mg by mouth 3 (three) times daily as needed for Muscle spasms.     FLUoxetine 40 MG capsule  Take 40 mg by  mouth once daily.     levothyroxine 50 MCG tablet  Commonly known as: SYNTHROID  Take 50 mcg by mouth before breakfast.     montelukast 10 mg tablet  Commonly known as: SINGULAIR  Take 10 mg by mouth every morning.     ondansetron 4 MG tablet  Commonly known as: ZOFRAN  Take 4 mg by mouth every 6 (six) hours as needed for Nausea.     spironolactone 25 MG tablet  Commonly known as: ALDACTONE  Take 0.5 tablets (12.5 mg total) by mouth once daily.            STOP taking these medications      clonazePAM 0.5 MG tablet  Commonly known as: KlonoPIN     potassium chloride 10 MEQ Tbsr  Commonly known as: KLOR-CON              Indwelling Lines/Drains at time of discharge:   Lines/Drains/Airways       Peripherally Inserted Central Catheter Line  Duration             PICC Double Lumen 01/21/24 1700 left basilic 8 days                    Time spent on the discharge of patient: 35 minutes         Bill Rodriguez MD  Department of Hospital Medicine  [facility] - Telemetry Stepdown

## 2024-01-30 NOTE — PLAN OF CARE
Received call from Eileen at [facility] ([phone]) who reports they have accepted patient and spoken with patient and daughter who are agreeable to their facility. Eileen reports she has submitted for insurance auth and will contact patient's daughter to schedule an appt for admission paperwork. Eileen reports she will also send  MS forms for SNF placement.    11:49 AM  Sent SNF orders, completed MS paperwork, and chest xray to [facility] in careport. Will need a PPD placed today, MD and RN aware.    2:52 PM   01/30/24 1452   Post-Acute Status   Post-Acute Authorization Placement   Post-Acute Placement Status Set-up Complete/Auth obtained   Per Rudy in admissions at [facility], nurse can call report to Sofi in 10 minutes at [phone] and SW can schedule transportation. Updated MD and RN.    JUAN arranged wheelchair transport via [facility]. Requested  time is ASAP. Requested  time does not guarantee arrival time.      Veena Iqbal, LARRY  [facility]  Ext. [phone]

## 2024-01-30 NOTE — PLAN OF CARE
[facility] - Telemetry Stepdown  Discharge Reassessment    Primary Care Provider: Pepito Jhaveri IV, MD    Expected Discharge Date: 1/30/2024    Reassessment (most recent)       Discharge Reassessment - 01/29/24 1630          Discharge Reassessment    Assessment Type Discharge Planning Reassessment     Did the patient's condition or plan change since previous assessment? Yes     Discharge Plan discussed with: Patient;Adult children   Mellisa per patient's request    Communicated CHLOE with patient/caregiver Yes     Discharge Plan A Skilled Nursing Facility     Discharge Plan B Home with family;Home Health     DME Needed Upon Discharge  none     Transition of Care Barriers None     Why the patient remains in the hospital Requires continued medical care        Post-Acute Status    Post-Acute Authorization Placement     Post-Acute Placement Status Referrals Sent     Discharge Delays Post-Acute Set-up                 Discharge Plan A and Plan B have been determined by review of patient's clinical status, future medical and therapeutic needs, and coverage/benefits for post-acute care in coordination with multidisciplinary team members.    Met with patient to review discharge recommendation of SNF and is agreeable to plan. Patient requested for  to contact hr daughter, Mellisa, to update on the SNF recommendations and states her daughter can help her with preferences, although she did verbalized understanding that she is ready for DC and must DC to the first accepting facility.     Patient/family provided list of facilities in-network with patient's payor plan. Providers that are owned, operated, or affiliated with [facility] are included on the list.     Notified that referral sent to below listed facilities from in-network list based on proximity to home/family support:   Referrals were sent to all SNFs on the list provided.    Patient/family instructed to identify preference.    Preferred Facility:  Patient and  daughter are reviewing the list.    If an additional preferred facility not listed above is identified, additional referral to be sent. If above facilities unable to accept, will send additional referrals to in-network providers.     CHW completed LOCET. SW faxed PASRR to the [employer] to obtain form 142 needed for SNF placement. Awaiting issuance of form 142.    Veena Iqbal, LCSW [facility]  Ext. [phone]

## 2024-01-30 NOTE — PLAN OF CARE
[facility] - Telemetry Stepdown  Discharge Final Note    Primary Care Provider: Pepito Jhaveri IV, MD    Expected Discharge Date: 1/30/2024    Final Discharge Note (most recent)       Final Note - 01/30/24 1454          Final Note    Assessment Type Final Discharge Note     Anticipated Discharge Disposition Skilled Nursing Facility        Post-Acute Status    Post-Acute Authorization Placement     Post-Acute Placement Status Set-up Complete/Auth obtained   [facility]    Discharge Delays None known at this time                   Future Appointments   Date Time Provider Department Center   2/19/2024  1:20 PM Carl Royal MD [facility]   3/4/2024 10:40 AM Tammy Anand MD [facility] [place] [facility]     Veena Iqbal LCSW [facility]  Ext. [phone]

## 2024-01-31 NOTE — NURSING
Pt transfer to [facility] via [facility] x 2 on a stretcher, no distress noted, all belongings and paperwork given, family and facility notified

## 2024-02-03 NOTE — DISCHARGE SUMMARY
[facility] Medicine  Discharge Summary      Patient Name: Bhavana Lambert  MRN: [id number]  AMALIA: [id number]  Patient Class: IP- Inpatient  Admission Date: 1/19/2024  Hospital Length of Stay: 1 days  Discharge Date and Time:  02/03/2024 4:45 PM  Attending Physician: No att. providers found   Discharging Provider: Daniel Rojo MD  Primary Care Provider: Pepito Jhaveri IV, MD    Primary Care Team: Networked reference to record PCT     HPI:   The patient is a 67 y/o female with PMH of HTN, COPD, and anxiety/depression, who presents with hypotension and lower back pain. Patient reported that her home health nurse noted hypotension and that she was more pale appearing. She also reported lower abdominal pain with dysuria. Patient denies any fevers but had some chills and weakness. Work up in the ER suggestive of pyelo.     * No surgery found *      Hospital Course:   Patient is a 68-year-old female that presented for lower back pain.  After being seen in the ER and diagnosed with pyelonephritis the patient developed shortness of breath and had to be placed on BiPAP.  BiPAP has been weaned off and patient now is on a nasal cannula.  Blood cultures are positive for Gram-negative meir thought to be E coli due to her urinary tract infection  CTA of the chest for the shortness of breath that she developed is negative for pulmonary emboli but positive for 1.5 cm nodule/consolidation  Patient remains on pressors of Levophed.  Requesting a PICC line for IV access  Low blood pressure thought to be related to sepsis         Goals of Care Treatment Preferences:  Code Status: Full Code      Consults:     No new Assessment & Plan notes have been filed under this hospital service since the last note was generated.  Service: Hospital Medicine    Final Active Diagnoses:    Diagnosis Date Noted POA    PRINCIPAL PROBLEM:  Septic shock [A41.9, R65.21] 01/21/2024 Yes    Transaminitis [R74.01] 01/22/2024 Yes     Thrombocytopenia [D69.6] 01/22/2024 No    H/O: stroke [Z86.73] 11/08/2023 Not Applicable    Pyelonephritis [N12] 10/26/2023 Yes    Hypothyroid [E03.9] 10/26/2023 Yes    Emphysema/COPD [J43.9] 10/26/2023 Yes    Anemia of chronic disease [D63.8] 06/27/2023 Yes    Chronic respiratory failure with hypoxia [J96.11] 06/04/2023 Yes    Anxiety [F41.9]  Yes    Depression [F32.A]  Yes    Primary hypertension [I10]  Yes      Problems Resolved During this Admission:       Discharged Condition: stable    Disposition: Short Term Hospital    Follow Up:    Patient Instructions:   No discharge procedures on file.    Significant Diagnostic Studies: Labs: All labs within the past 24 hours have been reviewed    Pending Diagnostic Studies:       None           Medications:  Reconciled Home Medications:      Medication List        ASK your doctor about these medications      albuterol 90 mcg/actuation inhaler  Commonly known as: PROVENTIL/VENTOLIN HFA  Inhale 2 puffs into the lungs every 6 (six) hours as needed for Wheezing or Shortness of Breath.     aspirin 81 MG EC tablet  Commonly known as: ECOTRIN  Take 1 tablet (81 mg total) by mouth once daily.     atorvastatin 80 MG tablet  Commonly known as: LIPITOR  Take 1 tablet (80 mg total) by mouth once daily.     busPIRone 15 MG tablet  Commonly known as: BUSPAR  Take 15 mg by mouth 2 (two) times a day.     butalbital-acetaminophen-caffeine -40 mg -40 mg per tablet  Commonly known as: FIORICET, ESGIC  Take 1 tablet by mouth every 8 (eight) hours as needed for Headaches.     carvediloL 25 MG tablet  Commonly known as: COREG  Take 0.5 tablets (12.5 mg total) by mouth 2 (two) times daily.     clopidogreL 75 mg tablet  Commonly known as: PLAVIX  Take 1 tablet (75 mg total) by mouth once daily.     cyanocobalamin 1,000 mcg/mL injection  Inject 1,000 mcg into the muscle every 30 days.     cyclobenzaprine 10 MG tablet  Commonly known as: FLEXERIL  Take 10 mg by mouth 3 (three) times  daily as needed for Muscle spasms.     FLUoxetine 40 MG capsule  Take 40 mg by mouth once daily.     levothyroxine 50 MCG tablet  Commonly known as: SYNTHROID  Take 50 mcg by mouth before breakfast.     montelukast 10 mg tablet  Commonly known as: SINGULAIR  Take 10 mg by mouth every morning.     ondansetron 4 MG tablet  Commonly known as: ZOFRAN  Take 4 mg by mouth every 6 (six) hours as needed for Nausea.     spironolactone 25 MG tablet  Commonly known as: ALDACTONE  Take 0.5 tablets (12.5 mg total) by mouth once daily.              Indwelling Lines/Drains at time of discharge:   Lines/Drains/Airways       None                   Time spent on the discharge of patient: 35 minutes         Daniel Rojo MD  Department of Hospital Medicine  [place] - Intensive Care

## 2024-02-19 ENCOUNTER — OFFICE VISIT (OUTPATIENT)
Dept: NEPHROLOGY | Facility: CLINIC | Age: 69
End: 2024-02-19
Payer: MEDICARE

## 2024-02-19 VITALS — DIASTOLIC BLOOD PRESSURE: 84 MMHG | SYSTOLIC BLOOD PRESSURE: 128 MMHG

## 2024-02-19 DIAGNOSIS — N17.9 AKI (ACUTE KIDNEY INJURY): ICD-10-CM

## 2024-02-19 DIAGNOSIS — D64.9 NORMOCYTIC ANEMIA: ICD-10-CM

## 2024-02-19 DIAGNOSIS — N30.00 ACUTE CYSTITIS WITHOUT HEMATURIA: ICD-10-CM

## 2024-02-19 DIAGNOSIS — E87.6 HYPOKALEMIA: ICD-10-CM

## 2024-02-19 DIAGNOSIS — E78.2 MIXED HYPERLIPIDEMIA: ICD-10-CM

## 2024-02-19 DIAGNOSIS — N17.9 ACUTE KIDNEY INJURY: Primary | ICD-10-CM

## 2024-02-19 DIAGNOSIS — I10 PRIMARY HYPERTENSION: ICD-10-CM

## 2024-02-19 DIAGNOSIS — J43.9 PULMONARY EMPHYSEMA, UNSPECIFIED EMPHYSEMA TYPE: ICD-10-CM

## 2024-02-19 DIAGNOSIS — Z72.0 TOBACCO USE: ICD-10-CM

## 2024-02-19 PROCEDURE — 99214 OFFICE O/P EST MOD 30 MIN: CPT | Mod: S$GLB,,, | Performed by: STUDENT IN AN ORGANIZED HEALTH CARE EDUCATION/TRAINING PROGRAM

## 2024-02-19 PROCEDURE — 1159F MED LIST DOCD IN RCRD: CPT | Mod: CPTII,S$GLB,, | Performed by: STUDENT IN AN ORGANIZED HEALTH CARE EDUCATION/TRAINING PROGRAM

## 2024-02-19 PROCEDURE — 3066F NEPHROPATHY DOC TX: CPT | Mod: CPTII,S$GLB,, | Performed by: STUDENT IN AN ORGANIZED HEALTH CARE EDUCATION/TRAINING PROGRAM

## 2024-02-19 PROCEDURE — 4010F ACE/ARB THERAPY RXD/TAKEN: CPT | Mod: CPTII,S$GLB,, | Performed by: STUDENT IN AN ORGANIZED HEALTH CARE EDUCATION/TRAINING PROGRAM

## 2024-02-19 PROCEDURE — 1101F PT FALLS ASSESS-DOCD LE1/YR: CPT | Mod: CPTII,S$GLB,, | Performed by: STUDENT IN AN ORGANIZED HEALTH CARE EDUCATION/TRAINING PROGRAM

## 2024-02-19 PROCEDURE — 3079F DIAST BP 80-89 MM HG: CPT | Mod: CPTII,S$GLB,, | Performed by: STUDENT IN AN ORGANIZED HEALTH CARE EDUCATION/TRAINING PROGRAM

## 2024-02-19 PROCEDURE — 1111F DSCHRG MED/CURRENT MED MERGE: CPT | Mod: CPTII,S$GLB,, | Performed by: STUDENT IN AN ORGANIZED HEALTH CARE EDUCATION/TRAINING PROGRAM

## 2024-02-19 PROCEDURE — 99999 PR PBB SHADOW E&M-EST. PATIENT-LVL III: CPT | Mod: PBBFAC,,, | Performed by: STUDENT IN AN ORGANIZED HEALTH CARE EDUCATION/TRAINING PROGRAM

## 2024-02-19 PROCEDURE — 3288F FALL RISK ASSESSMENT DOCD: CPT | Mod: CPTII,S$GLB,, | Performed by: STUDENT IN AN ORGANIZED HEALTH CARE EDUCATION/TRAINING PROGRAM

## 2024-02-19 PROCEDURE — 3074F SYST BP LT 130 MM HG: CPT | Mod: CPTII,S$GLB,, | Performed by: STUDENT IN AN ORGANIZED HEALTH CARE EDUCATION/TRAINING PROGRAM

## 2024-02-19 RX ORDER — CLONAZEPAM 0.5 MG/1
.25-.5 TABLET ORAL
Status: ON HOLD | COMMUNITY
Start: 2024-02-16 | End: 2024-04-21 | Stop reason: HOSPADM

## 2024-02-19 NOTE — PROGRESS NOTES
Subjective:       Patient ID: Bhavana Lambert is a 68 y.o. White female who presents for new patient evaluation for chronic renal failure.    Bhavana Lambert is referred by Pepito Jhaveri IV, MD to be evaluated for chronic renal failure. She has a pertinent medical history hypertension, COPD on long term oxygen therapy, hypothyroidism, chronic smoking history. Recently was admitted last month for evaluation of encephalopathy d/t UTI/cystitis/pyelonephritis, sepsis d/t E coli UTI and bacteremia, and NSTEMI. She did have an LILLIE from septic shock which was believed ATN per chart review and was slowly improving at time of hospital discharge. Noted persistent hypokalemia throughout stay and inpatient nephrology service was planning additional workup at discharge. She was discharged to SNF facility after hospital stay. She recently returned home 5 days ago. Feeling much better today.     In terms of her renal history, she denies hospitalizations for LILLIE requiring RRT. Denies history of nephrolithiasis or hematuria episodes. She does admit to multiple doses of Aleve/naproxen per day for several years, and has not taken since she left the hospital in 10/2023. No pertinent rheumatologic or AI disease history. Denies any pertinent family history of known kidney disease, or ESRD requiring dialysis.  Recurrent UTI history: reports about once per year. Most recently believed her last UTI was related to a recent diarrhea episode.   Smoking history: 40pack years. Still active.     She has no uremic or urinary symptoms and is in her usual state of health.        Review of Systems   Constitutional:  Negative for chills, diaphoresis, fatigue and fever.   HENT:  Negative for congestion, hearing loss, rhinorrhea, sinus pressure and sore throat.    Eyes:  Negative for photophobia, pain and discharge.   Respiratory:  Negative for cough, shortness of breath (none from baseline. On chronic O2) and wheezing.    Cardiovascular:   Negative for chest pain, palpitations and leg swelling.   Gastrointestinal:  Negative for abdominal distention, abdominal pain, diarrhea, nausea and vomiting.   Endocrine: Negative for cold intolerance, polydipsia and polyuria.   Genitourinary:  Negative for dysuria, flank pain and frequency.   Musculoskeletal:  Negative for arthralgias, back pain and joint swelling.   Skin:  Negative for pallor and rash.   Allergic/Immunologic: Negative for immunocompromised state.   Neurological:  Negative for dizziness, weakness, light-headedness and headaches.   Psychiatric/Behavioral:  Negative for agitation, behavioral problems, confusion and hallucinations.        The past medical, family and social histories were reviewed for this encounter.     Past Medical History:   Diagnosis Date    Acquired hypothyroidism     Anxiety     Anxiety and depression     Cervical radicular pain     Closed left ankle fracture     COPD (chronic obstructive pulmonary disease)     H/O: stroke 11/08/2023    Hypertension     Hypokalemia     Hyponatremia     Multiple rib fractures     Requires continuous at home supplemental oxygen     PRN FOR COPD    Stroke     Tension type headache     Traumatic closed displaced fracture of distal end of radius, left, sequela      Past Surgical History:   Procedure Laterality Date    COLON SURGERY      COLOSTOMY      COLOSTOMY CLOSURE      HYSTERECTOMY      LEG SURGERY Right     OPEN REDUCTION AND INTERNAL FIXATION (ORIF) OF INJURY OF WRIST Left 1/29/2019    Procedure: ORIF, WRIST;  Surgeon: Homero Jeff DO;  Location: [facility] OR;  Service: Orthopedics;  Laterality: Left;  Equipment: [employer] Geminus Wrist Plate Set  Vendor/Rep: [employer]  C-Arm: Entire  DME: None    REQUIRES ASSISTANT    WRIST SURGERY Right      Social History     Socioeconomic History    Marital status:    Tobacco Use    Smoking status: Every Day     Current packs/day: 1.00     Average packs/day: 1 pack/day for 20.0  years (20.0 ttl pk-yrs)     Types: Cigarettes    Smokeless tobacco: Never   Substance and Sexual Activity    Alcohol use: Yes     Comment: couple times a week-wine or bloody cari    Drug use: No    Sexual activity: Not Currently     Social Determinants of Health     Financial Resource Strain: Low Risk  (1/23/2024)    Overall Financial Resource Strain (CARDIA)     Difficulty of Paying Living Expenses: Not hard at all   Food Insecurity: No Food Insecurity (1/23/2024)    Hunger Vital Sign     Worried About Running Out of Food in the Last Year: Never true     Ran Out of Food in the Last Year: Never true   Transportation Needs: No Transportation Needs (1/23/2024)    PRAPARE - Transportation     Lack of Transportation (Medical): No     Lack of Transportation (Non-Medical): No   Physical Activity: Inactive (1/23/2024)    Exercise Vital Sign     Days of Exercise per Week: 0 days     Minutes of Exercise per Session: 0 min   Stress: No Stress Concern Present (1/23/2024)    [employer] - Occupational Stress Questionnaire     Feeling of Stress : Only a little   Social Connections: Socially Isolated (1/23/2024)    Social Connection and Isolation Panel [NHANES]     Frequency of Communication with Friends and Family: More than three times a week     Frequency of Social Gatherings with Friends and Family: Once a week     Attends Mosque Services: Never     Active Member of Clubs or Organizations: No     Attends Club or Organization Meetings: Never     Marital Status:    Housing Stability: Low Risk  (1/23/2024)    Housing Stability Vital Sign     Unable to Pay for Housing in the Last Year: No     Number of Places Lived in the Last Year: 1     Unstable Housing in the Last Year: No     Current Outpatient Medications   Medication Sig    albuterol (PROVENTIL/VENTOLIN HFA) 90 mcg/actuation inhaler Inhale 2 puffs into the lungs every 6 (six) hours as needed for Wheezing or Shortness of Breath.     aspirin (ECOTRIN) 81 MG EC tablet Take 1 tablet (81 mg total) by mouth once daily.    atorvastatin (LIPITOR) 80 MG tablet Take 1 tablet (80 mg total) by mouth once daily.    busPIRone (BUSPAR) 15 MG tablet Take 15 mg by mouth 2 (two) times a day.    butalbital-acetaminophen-caffeine -40 mg (FIORICET, ESGIC) -40 mg per tablet Take 1 tablet by mouth every 8 (eight) hours as needed for Headaches.    carvediloL (COREG) 25 MG tablet Take 0.5 tablets (12.5 mg total) by mouth 2 (two) times daily.    clonazePAM (KLONOPIN) 0.5 MG tablet Take 0.25-0.5 mg by mouth.    clopidogreL (PLAVIX) 75 mg tablet Take 1 tablet (75 mg total) by mouth once daily.    cyanocobalamin 1,000 mcg/mL injection Inject 1,000 mcg into the muscle every 30 days.     cyclobenzaprine (FLEXERIL) 10 MG tablet Take 10 mg by mouth 3 (three) times daily as needed for Muscle spasms.    FLUoxetine 40 MG capsule Take 40 mg by mouth once daily.    folic acid (FOLVITE) 1 MG tablet Take 1 tablet (1 mg total) by mouth once daily.    levothyroxine (SYNTHROID) 50 MCG tablet Take 50 mcg by mouth before breakfast.    montelukast (SINGULAIR) 10 mg tablet Take 10 mg by mouth every morning.    multivitamin Tab Take 1 tablet by mouth once daily.    ondansetron (ZOFRAN) 4 MG tablet Take 4 mg by mouth every 6 (six) hours as needed for Nausea.    spironolactone (ALDACTONE) 25 MG tablet Take 0.5 tablets (12.5 mg total) by mouth once daily.    STIOLTO RESPIMAT 2.5-2.5 mcg/actuation Mist INHALE ONE PUFF INTO THE LUNGS ONCE DAILY. controller    thiamine 100 MG tablet Take 1 tablet (100 mg total) by mouth once daily.     No current facility-administered medications for this visit.     /84 (BP Location: Right arm, Patient Position: Sitting, BP Method: Medium (Manual))     Objective:      Physical Exam  Vitals reviewed.   Constitutional:       General: She is not in acute distress.     Appearance: Normal appearance. She is underweight.      Interventions: Nasal  cannula in place.   HENT:      Head: Normocephalic and atraumatic.      Right Ear: External ear normal.      Left Ear: External ear normal.      Nose: Nose normal. No congestion.      Mouth/Throat:      Mouth: Mucous membranes are moist.      Pharynx: Oropharynx is clear. No oropharyngeal exudate or posterior oropharyngeal erythema.   Eyes:      General: No scleral icterus.     Extraocular Movements: Extraocular movements intact.   Cardiovascular:      Rate and Rhythm: Normal rate and regular rhythm.      Pulses: Normal pulses.      Heart sounds: Normal heart sounds.      No friction rub.   Pulmonary:      Effort: Pulmonary effort is normal. No respiratory distress.      Breath sounds: Examination of the right-upper field reveals decreased breath sounds. Examination of the left-upper field reveals decreased breath sounds. Examination of the right-middle field reveals decreased breath sounds. Examination of the left-middle field reveals decreased breath sounds. Decreased breath sounds present.   Abdominal:      General: Abdomen is flat.      Palpations: Abdomen is soft.   Musculoskeletal:         General: No swelling.      Cervical back: Normal range of motion. No tenderness.      Right lower leg: No edema.      Left lower leg: No edema.   Neurological:      General: No focal deficit present.      Mental Status: She is oriented to person, place, and time.      Motor: No weakness.   Psychiatric:         Mood and Affect: Mood normal.         Behavior: Behavior normal.         Assessment:     Lab Results   Component Value Date    CREATININE 0.9 01/30/2024    BUN 4 (L) 01/30/2024     01/30/2024    K 3.2 (L) 01/30/2024    CL 99 01/30/2024    CO2 29 01/30/2024     Lab Results   Component Value Date    CALCIUM 8.3 (L) 01/30/2024    PHOS 3.6 01/30/2024     Lab Results   Component Value Date    HCT 29.0 (L) 01/30/2024     Prot/Creat Ratio, Urine   Date Value Ref Range Status   10/28/2023 2.12 (H) 0.00 - 0.20 Final  "  10/26/2023 2.68 (H) 0.00 - 0.20 Final       No results found for: "MICALBCREAT"        1. Acute kidney injury    2. LILLIE (acute kidney injury)    3. Acute cystitis without hematuria    4. Primary hypertension    5. Hypokalemia    6. Pulmonary emphysema, unspecified emphysema type    7. Mixed hyperlipidemia    8. Tobacco use    9. Normocytic anemia        Plan:     LILLIE vs LILLEI on CKD  -hospital course reviewed; believed to be recovering ATN at time of discharge  - kidneys reviewed (1/2024) 11.5cm R K, 12.6cm L kidney  -no repeat labs since hospital stay; will repeat today w/ urine assessment prior to therapy directed management    Hypertension  - on MRA monotherapy currently. BP at goal    Hypokalemia  - yovany/renin ratio okay in 11/2023, though I do not know the circumstances around this draw  - f/u K level today    COPD - continue O2 therapy    HLD - continue atorvastatin    Tobacco use - needs to quit smoking; counseled    Return to clinic in 4 weeks.  Labs for next visit include Cbc, ferritin, tibc, cmp, mg, phos, pth, vit d, uric acid, ua, uacr, upcr.  Baseline creatinine is TBD d/t LILLIE.    Carl Royal MD  [facility] [place]    "

## 2024-02-21 ENCOUNTER — LAB VISIT (OUTPATIENT)
Dept: LAB | Facility: HOSPITAL | Age: 69
End: 2024-02-21
Attending: STUDENT IN AN ORGANIZED HEALTH CARE EDUCATION/TRAINING PROGRAM
Payer: MEDICARE

## 2024-02-21 DIAGNOSIS — D64.9 NORMOCYTIC ANEMIA: ICD-10-CM

## 2024-02-21 DIAGNOSIS — N17.9 ACUTE KIDNEY INJURY: ICD-10-CM

## 2024-02-21 LAB
ALBUMIN SERPL BCP-MCNC: 3.4 G/DL (ref 3.5–5.2)
ALBUMIN/CREAT UR: 21.1 UG/MG (ref 0–30)
ALP SERPL-CCNC: 132 U/L (ref 55–135)
ALT SERPL W/O P-5'-P-CCNC: 30 U/L (ref 10–44)
ANION GAP SERPL CALC-SCNC: 11 MMOL/L (ref 8–16)
AST SERPL-CCNC: 33 U/L (ref 10–40)
BACTERIA #/AREA URNS HPF: ABNORMAL /HPF
BASOPHILS # BLD AUTO: 0.05 K/UL (ref 0–0.2)
BASOPHILS NFR BLD: 0.8 % (ref 0–1.9)
BILIRUB SERPL-MCNC: 0.3 MG/DL (ref 0.1–1)
BILIRUB UR QL STRIP: NEGATIVE
BUN SERPL-MCNC: 9 MG/DL (ref 8–23)
CALCIUM SERPL-MCNC: 9.2 MG/DL (ref 8.7–10.5)
CHLORIDE SERPL-SCNC: 103 MMOL/L (ref 95–110)
CLARITY UR: ABNORMAL
CO2 SERPL-SCNC: 24 MMOL/L (ref 23–29)
COLOR UR: YELLOW
CREAT SERPL-MCNC: 0.9 MG/DL (ref 0.5–1.4)
CREAT UR-MCNC: 88.7 MG/DL (ref 15–325)
CREAT UR-MCNC: 90 MG/DL (ref 15–325)
DIFFERENTIAL METHOD BLD: ABNORMAL
EOSINOPHIL # BLD AUTO: 0.4 K/UL (ref 0–0.5)
EOSINOPHIL NFR BLD: 5.4 % (ref 0–8)
ERYTHROCYTE [DISTWIDTH] IN BLOOD BY AUTOMATED COUNT: 12.7 % (ref 11.5–14.5)
EST. GFR  (NO RACE VARIABLE): >60 ML/MIN/1.73 M^2
FERRITIN SERPL-MCNC: 434 NG/ML (ref 20–300)
GLUCOSE SERPL-MCNC: 90 MG/DL (ref 70–110)
GLUCOSE UR QL STRIP: NEGATIVE
HCT VFR BLD AUTO: 34 % (ref 37–48.5)
HGB BLD-MCNC: 10.9 G/DL (ref 12–16)
HGB UR QL STRIP: ABNORMAL
IMM GRANULOCYTES # BLD AUTO: 0.01 K/UL (ref 0–0.04)
IMM GRANULOCYTES NFR BLD AUTO: 0.2 % (ref 0–0.5)
IRON SERPL-MCNC: 79 UG/DL (ref 30–160)
KETONES UR QL STRIP: NEGATIVE
LEUKOCYTE ESTERASE UR QL STRIP: ABNORMAL
LYMPHOCYTES # BLD AUTO: 2.8 K/UL (ref 1–4.8)
LYMPHOCYTES NFR BLD: 42.3 % (ref 18–48)
MAGNESIUM SERPL-MCNC: 1.6 MG/DL (ref 1.6–2.6)
MCH RBC QN AUTO: 31 PG (ref 27–31)
MCHC RBC AUTO-ENTMCNC: 32.1 G/DL (ref 32–36)
MCV RBC AUTO: 97 FL (ref 82–98)
MICROALBUMIN UR DL<=1MG/L-MCNC: 19 UG/ML
MICROSCOPIC COMMENT: ABNORMAL
MONOCYTES # BLD AUTO: 0.6 K/UL (ref 0.3–1)
MONOCYTES NFR BLD: 8.9 % (ref 4–15)
NEUTROPHILS # BLD AUTO: 2.8 K/UL (ref 1.8–7.7)
NEUTROPHILS NFR BLD: 42.4 % (ref 38–73)
NITRITE UR QL STRIP: NEGATIVE
NRBC BLD-RTO: 0 /100 WBC
PH UR STRIP: 6 [PH] (ref 5–8)
PHOSPHATE SERPL-MCNC: 4.5 MG/DL (ref 2.7–4.5)
PLATELET # BLD AUTO: 218 K/UL (ref 150–450)
PMV BLD AUTO: 12.2 FL (ref 9.2–12.9)
POTASSIUM SERPL-SCNC: 3.8 MMOL/L (ref 3.5–5.1)
PROT SERPL-MCNC: 7.6 G/DL (ref 6–8.4)
PROT UR QL STRIP: NEGATIVE
PROT UR-MCNC: 17 MG/DL (ref 0–15)
PROT/CREAT UR: 0.19 MG/G{CREAT} (ref 0–0.2)
PTH-INTACT SERPL-MCNC: 40.8 PG/ML (ref 9–77)
RBC # BLD AUTO: 3.52 M/UL (ref 4–5.4)
RBC #/AREA URNS HPF: 3 /HPF (ref 0–4)
SATURATED IRON: 26 % (ref 20–50)
SODIUM SERPL-SCNC: 138 MMOL/L (ref 136–145)
SP GR UR STRIP: 1.01 (ref 1–1.03)
SQUAMOUS #/AREA URNS HPF: 5 /HPF
TOTAL IRON BINDING CAPACITY: 309 UG/DL (ref 250–450)
TRANSFERRIN SERPL-MCNC: 209 MG/DL (ref 200–375)
URATE SERPL-MCNC: 6.6 MG/DL (ref 2.4–5.7)
URN SPEC COLLECT METH UR: ABNORMAL
UROBILINOGEN UR STRIP-ACNC: NEGATIVE EU/DL
WBC # BLD AUTO: 6.66 K/UL (ref 3.9–12.7)
WBC #/AREA URNS HPF: 13 /HPF (ref 0–5)

## 2024-02-21 PROCEDURE — 36415 COLL VENOUS BLD VENIPUNCTURE: CPT | Performed by: STUDENT IN AN ORGANIZED HEALTH CARE EDUCATION/TRAINING PROGRAM

## 2024-02-21 PROCEDURE — 82570 ASSAY OF URINE CREATININE: CPT | Performed by: STUDENT IN AN ORGANIZED HEALTH CARE EDUCATION/TRAINING PROGRAM

## 2024-02-21 PROCEDURE — 83540 ASSAY OF IRON: CPT | Performed by: STUDENT IN AN ORGANIZED HEALTH CARE EDUCATION/TRAINING PROGRAM

## 2024-02-21 PROCEDURE — 83735 ASSAY OF MAGNESIUM: CPT | Performed by: STUDENT IN AN ORGANIZED HEALTH CARE EDUCATION/TRAINING PROGRAM

## 2024-02-21 PROCEDURE — 84156 ASSAY OF PROTEIN URINE: CPT | Performed by: STUDENT IN AN ORGANIZED HEALTH CARE EDUCATION/TRAINING PROGRAM

## 2024-02-21 PROCEDURE — 83970 ASSAY OF PARATHORMONE: CPT | Performed by: STUDENT IN AN ORGANIZED HEALTH CARE EDUCATION/TRAINING PROGRAM

## 2024-02-21 PROCEDURE — 82043 UR ALBUMIN QUANTITATIVE: CPT | Performed by: STUDENT IN AN ORGANIZED HEALTH CARE EDUCATION/TRAINING PROGRAM

## 2024-02-21 PROCEDURE — 84100 ASSAY OF PHOSPHORUS: CPT | Performed by: STUDENT IN AN ORGANIZED HEALTH CARE EDUCATION/TRAINING PROGRAM

## 2024-02-21 PROCEDURE — 84550 ASSAY OF BLOOD/URIC ACID: CPT | Performed by: STUDENT IN AN ORGANIZED HEALTH CARE EDUCATION/TRAINING PROGRAM

## 2024-02-21 PROCEDURE — 84466 ASSAY OF TRANSFERRIN: CPT | Performed by: STUDENT IN AN ORGANIZED HEALTH CARE EDUCATION/TRAINING PROGRAM

## 2024-02-21 PROCEDURE — 81000 URINALYSIS NONAUTO W/SCOPE: CPT | Performed by: STUDENT IN AN ORGANIZED HEALTH CARE EDUCATION/TRAINING PROGRAM

## 2024-02-21 PROCEDURE — 85025 COMPLETE CBC W/AUTO DIFF WBC: CPT | Performed by: STUDENT IN AN ORGANIZED HEALTH CARE EDUCATION/TRAINING PROGRAM

## 2024-02-21 PROCEDURE — 82728 ASSAY OF FERRITIN: CPT | Performed by: STUDENT IN AN ORGANIZED HEALTH CARE EDUCATION/TRAINING PROGRAM

## 2024-02-21 PROCEDURE — 80053 COMPREHEN METABOLIC PANEL: CPT | Performed by: STUDENT IN AN ORGANIZED HEALTH CARE EDUCATION/TRAINING PROGRAM

## 2024-02-27 ENCOUNTER — EXTERNAL HOME HEALTH (OUTPATIENT)
Dept: HOME HEALTH SERVICES | Facility: HOSPITAL | Age: 69
End: 2024-02-27
Payer: MEDICARE

## 2024-03-18 ENCOUNTER — LAB VISIT (OUTPATIENT)
Dept: LAB | Facility: HOSPITAL | Age: 69
End: 2024-03-18
Attending: STUDENT IN AN ORGANIZED HEALTH CARE EDUCATION/TRAINING PROGRAM
Payer: MEDICARE

## 2024-03-18 DIAGNOSIS — N17.9 ACUTE KIDNEY INJURY: ICD-10-CM

## 2024-03-18 LAB
ALBUMIN SERPL BCP-MCNC: 2.7 G/DL (ref 3.5–5.2)
ANION GAP SERPL CALC-SCNC: 11 MMOL/L (ref 8–16)
BUN SERPL-MCNC: 7 MG/DL (ref 8–23)
CALCIUM SERPL-MCNC: 9.4 MG/DL (ref 8.7–10.5)
CHLORIDE SERPL-SCNC: 94 MMOL/L (ref 95–110)
CO2 SERPL-SCNC: 25 MMOL/L (ref 23–29)
CREAT SERPL-MCNC: 0.9 MG/DL (ref 0.5–1.4)
EST. GFR  (NO RACE VARIABLE): >60 ML/MIN/1.73 M^2
GLUCOSE SERPL-MCNC: 125 MG/DL (ref 70–110)
PHOSPHATE SERPL-MCNC: 3.5 MG/DL (ref 2.7–4.5)
POTASSIUM SERPL-SCNC: 4 MMOL/L (ref 3.5–5.1)
SODIUM SERPL-SCNC: 130 MMOL/L (ref 136–145)

## 2024-03-18 PROCEDURE — 36415 COLL VENOUS BLD VENIPUNCTURE: CPT | Performed by: STUDENT IN AN ORGANIZED HEALTH CARE EDUCATION/TRAINING PROGRAM

## 2024-03-18 PROCEDURE — 80069 RENAL FUNCTION PANEL: CPT | Performed by: STUDENT IN AN ORGANIZED HEALTH CARE EDUCATION/TRAINING PROGRAM

## 2024-03-25 ENCOUNTER — OFFICE VISIT (OUTPATIENT)
Dept: NEPHROLOGY | Facility: CLINIC | Age: 69
End: 2024-03-25
Payer: MEDICARE

## 2024-03-25 VITALS
BODY MASS INDEX: 23.93 KG/M2 | SYSTOLIC BLOOD PRESSURE: 126 MMHG | HEART RATE: 26 BPM | DIASTOLIC BLOOD PRESSURE: 70 MMHG | OXYGEN SATURATION: 72 % | WEIGHT: 130.06 LBS | HEIGHT: 62 IN

## 2024-03-25 DIAGNOSIS — E87.1 HYPONATREMIA: Primary | ICD-10-CM

## 2024-03-25 PROCEDURE — 3074F SYST BP LT 130 MM HG: CPT | Mod: CPTII,S$GLB,, | Performed by: STUDENT IN AN ORGANIZED HEALTH CARE EDUCATION/TRAINING PROGRAM

## 2024-03-25 PROCEDURE — 3066F NEPHROPATHY DOC TX: CPT | Mod: CPTII,S$GLB,, | Performed by: STUDENT IN AN ORGANIZED HEALTH CARE EDUCATION/TRAINING PROGRAM

## 2024-03-25 PROCEDURE — 3078F DIAST BP <80 MM HG: CPT | Mod: CPTII,S$GLB,, | Performed by: STUDENT IN AN ORGANIZED HEALTH CARE EDUCATION/TRAINING PROGRAM

## 2024-03-25 PROCEDURE — 99999 PR PBB SHADOW E&M-EST. PATIENT-LVL II: CPT | Mod: PBBFAC,,, | Performed by: STUDENT IN AN ORGANIZED HEALTH CARE EDUCATION/TRAINING PROGRAM

## 2024-03-25 PROCEDURE — 1101F PT FALLS ASSESS-DOCD LE1/YR: CPT | Mod: CPTII,S$GLB,, | Performed by: STUDENT IN AN ORGANIZED HEALTH CARE EDUCATION/TRAINING PROGRAM

## 2024-03-25 PROCEDURE — 3008F BODY MASS INDEX DOCD: CPT | Mod: CPTII,S$GLB,, | Performed by: STUDENT IN AN ORGANIZED HEALTH CARE EDUCATION/TRAINING PROGRAM

## 2024-03-25 PROCEDURE — 3288F FALL RISK ASSESSMENT DOCD: CPT | Mod: CPTII,S$GLB,, | Performed by: STUDENT IN AN ORGANIZED HEALTH CARE EDUCATION/TRAINING PROGRAM

## 2024-03-25 PROCEDURE — 3061F NEG MICROALBUMINURIA REV: CPT | Mod: CPTII,S$GLB,, | Performed by: STUDENT IN AN ORGANIZED HEALTH CARE EDUCATION/TRAINING PROGRAM

## 2024-03-25 PROCEDURE — 99213 OFFICE O/P EST LOW 20 MIN: CPT | Mod: S$GLB,,, | Performed by: STUDENT IN AN ORGANIZED HEALTH CARE EDUCATION/TRAINING PROGRAM

## 2024-03-25 PROCEDURE — 4010F ACE/ARB THERAPY RXD/TAKEN: CPT | Mod: CPTII,S$GLB,, | Performed by: STUDENT IN AN ORGANIZED HEALTH CARE EDUCATION/TRAINING PROGRAM

## 2024-03-25 RX ORDER — POTASSIUM CHLORIDE 750 MG/1
10 TABLET, EXTENDED RELEASE ORAL 2 TIMES DAILY
Status: ON HOLD | COMMUNITY
Start: 2024-03-18 | End: 2024-04-21 | Stop reason: HOSPADM

## 2024-03-25 NOTE — PROGRESS NOTES
Subjective:       Patient ID: Bhavana Lambert is a 68 y.o. White female who presents for new patient evaluation for chronic renal failure.    Bhavana Lambert is referred by Pepito Jhaveri IV, MD to be evaluated for chronic renal failure. She has a pertinent medical history hypertension, COPD on long term oxygen therapy, hypothyroidism, chronic smoking history. Recently was admitted last month for evaluation of encephalopathy d/t UTI/cystitis/pyelonephritis, sepsis d/t E coli UTI and bacteremia, and NSTEMI. She did have an LILLIE from septic shock which was believed ATN per chart review and was slowly improving at time of hospital discharge. Noted persistent hypokalemia throughout stay and inpatient nephrology service was planning additional workup at discharge. She was discharged to SNF facility after hospital stay. She recently returned home 5 days ago. Feeling much better today.     In terms of her renal history, she denies hospitalizations for LILLIE requiring RRT. Denies history of nephrolithiasis or hematuria episodes. She does admit to multiple doses of Aleve/naproxen per day for several years, and has not taken since she left the hospital in 10/2023. No pertinent rheumatologic or AI disease history. Denies any pertinent family history of known kidney disease, or ESRD requiring dialysis.  Recurrent UTI history: reports about once per year. Most recently believed her last UTI was related to a recent diarrhea episode.   Smoking history: 40pack years. Still active.        Interval history:  3/25/24 clinic visit - seen today for follow up.  LILLIE has resolved in the interim based on serum creatinine trend.  She did develop a new hyponatremia over the course of last 4 weeks.  We extensively reviewed her diet at chair side today.  She reports she eats 1 meal a day and does not intake a lot of protein throughout the day.  She has been drinking about 2 bottles of water per day in the rest of her fluids are about 4-5 cans  of diet [place].  We extensively discussed ways to increase protein/solute in her diet so that we can control her sodium with dietary modification.    Review of Systems   Constitutional:  Negative for chills, diaphoresis, fatigue and fever.   HENT:  Negative for congestion, hearing loss, rhinorrhea, sinus pressure and sore throat.    Eyes:  Negative for photophobia, pain and discharge.   Respiratory:  Negative for cough, shortness of breath (none from baseline. On chronic O2) and wheezing.    Cardiovascular:  Negative for chest pain, palpitations and leg swelling.   Gastrointestinal:  Negative for abdominal distention, abdominal pain, diarrhea, nausea and vomiting.   Endocrine: Negative for cold intolerance, polydipsia and polyuria.   Genitourinary:  Negative for dysuria, flank pain and frequency.   Musculoskeletal:  Negative for arthralgias, back pain and joint swelling.   Skin:  Negative for pallor and rash.   Allergic/Immunologic: Negative for immunocompromised state.   Neurological:  Negative for dizziness, weakness, light-headedness and headaches.   Psychiatric/Behavioral:  Negative for agitation, behavioral problems, confusion and hallucinations.        The past medical, family and social histories were reviewed for this encounter.     Past Medical History:   Diagnosis Date    Acquired hypothyroidism     Anxiety     Anxiety and depression     Cervical radicular pain     Closed left ankle fracture     COPD (chronic obstructive pulmonary disease)     H/O: stroke 11/08/2023    Hypertension     Hypokalemia     Hyponatremia     Multiple rib fractures     Requires continuous at home supplemental oxygen     PRN FOR COPD    Stroke     Tension type headache     Traumatic closed displaced fracture of distal end of radius, left, sequela      Past Surgical History:   Procedure Laterality Date    COLON SURGERY      COLOSTOMY      COLOSTOMY CLOSURE      HYSTERECTOMY      LEG SURGERY Right     OPEN REDUCTION AND INTERNAL  FIXATION (ORIF) OF INJURY OF WRIST Left 1/29/2019    Procedure: ORIF, WRIST;  Surgeon: Homero Jeff DO;  Location: [facility] OR;  Service: Orthopedics;  Laterality: Left;  Equipment: [employer] Geminus Wrist Plate Set  Vendor/Rep: [employer]  C-Arm: Entire  DME: None    REQUIRES ASSISTANT    WRIST SURGERY Right      Social History     Socioeconomic History    Marital status:    Tobacco Use    Smoking status: Every Day     Current packs/day: 1.00     Average packs/day: 1 pack/day for 20.0 years (20.0 ttl pk-yrs)     Types: Cigarettes    Smokeless tobacco: Never   Substance and Sexual Activity    Alcohol use: Yes     Comment: couple times a week-wine or bloody cari    Drug use: No    Sexual activity: Not Currently     Social Determinants of Health     Financial Resource Strain: Low Risk  (1/23/2024)    Overall Financial Resource Strain (CARDIA)     Difficulty of Paying Living Expenses: Not hard at all   Food Insecurity: No Food Insecurity (1/23/2024)    Hunger Vital Sign     Worried About Running Out of Food in the Last Year: Never true     Ran Out of Food in the Last Year: Never true   Transportation Needs: No Transportation Needs (1/23/2024)    PRAPARE - Transportation     Lack of Transportation (Medical): No     Lack of Transportation (Non-Medical): No   Physical Activity: Inactive (1/23/2024)    Exercise Vital Sign     Days of Exercise per Week: 0 days     Minutes of Exercise per Session: 0 min   Stress: No Stress Concern Present (1/23/2024)    [employer] [employer] - Occupational Stress Questionnaire     Feeling of Stress : Only a little   Social Connections: Socially Isolated (1/23/2024)    Social Connection and Isolation Panel [NHANES]     Frequency of Communication with Friends and Family: More than three times a week     Frequency of Social Gatherings with Friends and Family: Once a week     Attends Yarsanism Services: Never     Active Member of Clubs or  Organizations: No     Attends Club or Organization Meetings: Never     Marital Status:    Housing Stability: Low Risk  (1/23/2024)    Housing Stability Vital Sign     Unable to Pay for Housing in the Last Year: No     Number of Places Lived in the Last Year: 1     Unstable Housing in the Last Year: No     Current Outpatient Medications   Medication Sig    albuterol (PROVENTIL/VENTOLIN HFA) 90 mcg/actuation inhaler Inhale 2 puffs into the lungs every 6 (six) hours as needed for Wheezing or Shortness of Breath.    aspirin (ECOTRIN) 81 MG EC tablet Take 1 tablet (81 mg total) by mouth once daily.    atorvastatin (LIPITOR) 80 MG tablet Take 1 tablet (80 mg total) by mouth once daily.    busPIRone (BUSPAR) 15 MG tablet Take 15 mg by mouth 2 (two) times a day.    butalbital-acetaminophen-caffeine -40 mg (FIORICET, ESGIC) -40 mg per tablet Take 1 tablet by mouth every 8 (eight) hours as needed for Headaches.    carvediloL (COREG) 25 MG tablet Take 0.5 tablets (12.5 mg total) by mouth 2 (two) times daily.    clonazePAM (KLONOPIN) 0.5 MG tablet Take 0.25-0.5 mg by mouth.    clopidogreL (PLAVIX) 75 mg tablet Take 1 tablet (75 mg total) by mouth once daily.    cyclobenzaprine (FLEXERIL) 10 MG tablet Take 10 mg by mouth 3 (three) times daily as needed for Muscle spasms.    FLUoxetine 40 MG capsule Take 40 mg by mouth once daily.    levothyroxine (SYNTHROID) 50 MCG tablet Take 50 mcg by mouth before breakfast.    montelukast (SINGULAIR) 10 mg tablet Take 10 mg by mouth every morning.    ondansetron (ZOFRAN) 4 MG tablet Take 4 mg by mouth every 6 (six) hours as needed for Nausea.    potassium chloride (KLOR-CON) 10 MEQ TbSR Take 10 mEq by mouth 2 (two) times daily.    spironolactone (ALDACTONE) 25 MG tablet Take 0.5 tablets (12.5 mg total) by mouth once daily.    STIOLTO RESPIMAT 2.5-2.5 mcg/actuation Mist INHALE ONE PUFF INTO THE LUNGS ONCE DAILY. controller    cyanocobalamin 1,000 mcg/mL injection Inject  "1,000 mcg into the muscle every 30 days.     folic acid (FOLVITE) 1 MG tablet Take 1 tablet (1 mg total) by mouth once daily.    multivitamin Tab Take 1 tablet by mouth once daily. (Patient not taking: Reported on 3/25/2024)    thiamine 100 MG tablet Take 1 tablet (100 mg total) by mouth once daily. (Patient not taking: Reported on 3/25/2024)     No current facility-administered medications for this visit.     /70 (BP Location: Right arm, Patient Position: Sitting, BP Method: Small (Manual))   Pulse (!) 26   Ht 5' 2" (1.575 m)   Wt 59 kg (130 lb 1.1 oz)   SpO2 (!) 72%   BMI 23.79 kg/m²     Objective:      Physical Exam  Vitals reviewed.   Constitutional:       General: She is not in acute distress.     Appearance: Normal appearance. She is underweight.      Interventions: Nasal cannula in place.   HENT:      Head: Normocephalic and atraumatic.      Right Ear: External ear normal.      Left Ear: External ear normal.      Nose: Nose normal. No congestion.      Mouth/Throat:      Mouth: Mucous membranes are moist.      Pharynx: Oropharynx is clear. No oropharyngeal exudate or posterior oropharyngeal erythema.   Eyes:      General: No scleral icterus.     Extraocular Movements: Extraocular movements intact.   Cardiovascular:      Rate and Rhythm: Normal rate and regular rhythm.      Pulses: Normal pulses.      Heart sounds: Normal heart sounds.      No friction rub.   Pulmonary:      Effort: Pulmonary effort is normal. No respiratory distress.      Breath sounds: Examination of the right-upper field reveals decreased breath sounds. Examination of the left-upper field reveals decreased breath sounds. Examination of the right-middle field reveals decreased breath sounds. Examination of the left-middle field reveals decreased breath sounds. Decreased breath sounds present.   Abdominal:      General: Abdomen is flat.      Palpations: Abdomen is soft.   Musculoskeletal:         General: No swelling.      Cervical " back: Normal range of motion. No tenderness.      Right lower leg: No edema.      Left lower leg: No edema.   Neurological:      General: No focal deficit present.      Mental Status: She is oriented to person, place, and time.      Motor: No weakness.   Psychiatric:         Mood and Affect: Mood normal.         Behavior: Behavior normal.         Assessment:     Lab Results   Component Value Date    CREATININE 0.9 03/18/2024    BUN 7 (L) 03/18/2024     (L) 03/18/2024    K 4.0 03/18/2024    CL 94 (L) 03/18/2024    CO2 25 03/18/2024     Lab Results   Component Value Date    PTH 40.8 02/21/2024    CALCIUM 9.4 03/18/2024    PHOS 3.5 03/18/2024     Lab Results   Component Value Date    HCT 34.0 (L) 02/21/2024     Prot/Creat Ratio, Urine   Date Value Ref Range Status   02/21/2024 0.19 0.00 - 0.20 Final   10/28/2023 2.12 (H) 0.00 - 0.20 Final   10/26/2023 2.68 (H) 0.00 - 0.20 Final       Lab Results   Component Value Date    MICALBCREAT 21.1 02/21/2024           No diagnosis found.      Plan:   Return to clinic in 12 weeks  Labs for next visit include urine osm, rfp, urine na, serum osm  Baseline creatinine is 0.9mg/dL    LILLIE -resolved  -hospital course reviewed; believed to be recovering ATN at time of discharge  -US kidneys reviewed (1/2024) 11.5cm R K, 12.6cm L kidney    Hypertension  - currently on spironolactone and carvedilol.  Blood pressure at goal     Hypokalemia  - yovany/renin ratio okay in 11/2023, though I do not know the circumstances around this draw  - stable on isamar.     COPD - continue O2 therapy    HLD - continue atorvastatin    Tobacco use - needs to quit smoking; counseled.  He has not made large attempts at quitting.    Hyponatremia - asymptomatic on today's evaluation.  increase solute in the diet. We extensively discussed protein supplementation in the diet. Hyponatremia panel ordered for next visit.  I would like her to make these changes prior to reassessment.        Carl Royal,  MD [facility] [place]    Computed KFRE 2-Year unavailable. Necessary lab results were not found in the last year.    Computed KFRE 5-Year unavailable. Necessary lab results were not found in the last year.

## 2024-04-18 ENCOUNTER — HOSPITAL ENCOUNTER (INPATIENT)
Facility: HOSPITAL | Age: 69
LOS: 3 days | Discharge: HOME HEALTH | DRG: 871 | End: 2024-04-21
Attending: EMERGENCY MEDICINE | Admitting: INTERNAL MEDICINE
Payer: MEDICARE

## 2024-04-18 DIAGNOSIS — A41.9 SEPSIS DUE TO PNEUMONIA: ICD-10-CM

## 2024-04-18 DIAGNOSIS — J96.21 ACUTE ON CHRONIC HYPOXIC RESPIRATORY FAILURE: ICD-10-CM

## 2024-04-18 DIAGNOSIS — J18.9 PNEUMONIA OF RIGHT LOWER LOBE DUE TO INFECTIOUS ORGANISM: Primary | ICD-10-CM

## 2024-04-18 DIAGNOSIS — J43.8 OTHER EMPHYSEMA: ICD-10-CM

## 2024-04-18 DIAGNOSIS — R06.02 SHORTNESS OF BREATH: ICD-10-CM

## 2024-04-18 DIAGNOSIS — J18.9 SEPSIS DUE TO PNEUMONIA: ICD-10-CM

## 2024-04-18 PROBLEM — E83.42 HYPOMAGNESEMIA: Status: ACTIVE | Noted: 2024-04-18

## 2024-04-18 PROBLEM — I25.10 CORONARY ARTERY DISEASE INVOLVING NATIVE CORONARY ARTERY OF NATIVE HEART WITHOUT ANGINA PECTORIS: Status: ACTIVE | Noted: 2024-04-18

## 2024-04-18 LAB
ALBUMIN SERPL BCP-MCNC: 2.6 G/DL (ref 3.5–5.2)
ALP SERPL-CCNC: 135 U/L (ref 55–135)
ALT SERPL W/O P-5'-P-CCNC: 12 U/L (ref 10–44)
ANION GAP SERPL CALC-SCNC: 16 MMOL/L (ref 8–16)
AST SERPL-CCNC: 19 U/L (ref 10–40)
BASOPHILS # BLD AUTO: 0.04 K/UL (ref 0–0.2)
BASOPHILS NFR BLD: 0.2 % (ref 0–1.9)
BILIRUB SERPL-MCNC: 0.3 MG/DL (ref 0.1–1)
BILIRUB UR QL STRIP: NEGATIVE
BNP SERPL-MCNC: 68 PG/ML (ref 0–99)
BUN SERPL-MCNC: 8 MG/DL (ref 8–23)
CALCIUM SERPL-MCNC: 9 MG/DL (ref 8.7–10.5)
CHLORIDE SERPL-SCNC: 97 MMOL/L (ref 95–110)
CLARITY UR: CLEAR
CO2 SERPL-SCNC: 21 MMOL/L (ref 23–29)
COLOR UR: YELLOW
CREAT SERPL-MCNC: 0.8 MG/DL (ref 0.5–1.4)
DIFFERENTIAL METHOD BLD: ABNORMAL
EOSINOPHIL # BLD AUTO: 0 K/UL (ref 0–0.5)
EOSINOPHIL NFR BLD: 0.1 % (ref 0–8)
ERYTHROCYTE [DISTWIDTH] IN BLOOD BY AUTOMATED COUNT: 13.2 % (ref 11.5–14.5)
EST. GFR  (NO RACE VARIABLE): >60 ML/MIN/1.73 M^2
GLUCOSE SERPL-MCNC: 98 MG/DL (ref 70–110)
GLUCOSE UR QL STRIP: NEGATIVE
HCT VFR BLD AUTO: 32.2 % (ref 37–48.5)
HGB BLD-MCNC: 10.6 G/DL (ref 12–16)
HGB UR QL STRIP: ABNORMAL
IMM GRANULOCYTES # BLD AUTO: 0.07 K/UL (ref 0–0.04)
IMM GRANULOCYTES NFR BLD AUTO: 0.4 % (ref 0–0.5)
INFLUENZA A, MOLECULAR: NEGATIVE
INFLUENZA B, MOLECULAR: NEGATIVE
KETONES UR QL STRIP: ABNORMAL
LACTATE SERPL-SCNC: 0.9 MMOL/L (ref 0.5–2.2)
LACTATE SERPL-SCNC: 1 MMOL/L (ref 0.5–2.2)
LEUKOCYTE ESTERASE UR QL STRIP: NEGATIVE
LYMPHOCYTES # BLD AUTO: 1.3 K/UL (ref 1–4.8)
LYMPHOCYTES NFR BLD: 7.1 % (ref 18–48)
MAGNESIUM SERPL-MCNC: 1.4 MG/DL (ref 1.6–2.6)
MCH RBC QN AUTO: 30.1 PG (ref 27–31)
MCHC RBC AUTO-ENTMCNC: 32.9 G/DL (ref 32–36)
MCV RBC AUTO: 92 FL (ref 82–98)
MONOCYTES # BLD AUTO: 1.2 K/UL (ref 0.3–1)
MONOCYTES NFR BLD: 6.5 % (ref 4–15)
NEUTROPHILS # BLD AUTO: 15.6 K/UL (ref 1.8–7.7)
NEUTROPHILS NFR BLD: 85.7 % (ref 38–73)
NITRITE UR QL STRIP: NEGATIVE
NRBC BLD-RTO: 0 /100 WBC
OHS QRS DURATION: 72 MS
OHS QTC CALCULATION: 489 MS
PH UR STRIP: 7 [PH] (ref 5–8)
PLATELET # BLD AUTO: 298 K/UL (ref 150–450)
PMV BLD AUTO: 9.3 FL (ref 9.2–12.9)
POTASSIUM SERPL-SCNC: 3.4 MMOL/L (ref 3.5–5.1)
PROT SERPL-MCNC: 7.3 G/DL (ref 6–8.4)
PROT UR QL STRIP: NEGATIVE
RBC # BLD AUTO: 3.52 M/UL (ref 4–5.4)
SARS-COV-2 RDRP RESP QL NAA+PROBE: NEGATIVE
SODIUM SERPL-SCNC: 134 MMOL/L (ref 136–145)
SP GR UR STRIP: 1.01 (ref 1–1.03)
SPECIMEN SOURCE: NORMAL
TROPONIN I SERPL DL<=0.01 NG/ML-MCNC: 0.01 NG/ML (ref 0–0.03)
URN SPEC COLLECT METH UR: ABNORMAL
UROBILINOGEN UR STRIP-ACNC: NEGATIVE EU/DL
WBC # BLD AUTO: 18.2 K/UL (ref 3.9–12.7)

## 2024-04-18 PROCEDURE — 25000242 PHARM REV CODE 250 ALT 637 W/ HCPCS: Performed by: EMERGENCY MEDICINE

## 2024-04-18 PROCEDURE — 63600175 PHARM REV CODE 636 W HCPCS: Performed by: INTERNAL MEDICINE

## 2024-04-18 PROCEDURE — 99291 CRITICAL CARE FIRST HOUR: CPT

## 2024-04-18 PROCEDURE — 25000242 PHARM REV CODE 250 ALT 637 W/ HCPCS: Performed by: INTERNAL MEDICINE

## 2024-04-18 PROCEDURE — 80053 COMPREHEN METABOLIC PANEL: CPT | Performed by: EMERGENCY MEDICINE

## 2024-04-18 PROCEDURE — 87449 NOS EACH ORGANISM AG IA: CPT | Performed by: INTERNAL MEDICINE

## 2024-04-18 PROCEDURE — 63600175 PHARM REV CODE 636 W HCPCS: Performed by: EMERGENCY MEDICINE

## 2024-04-18 PROCEDURE — 84484 ASSAY OF TROPONIN QUANT: CPT | Performed by: EMERGENCY MEDICINE

## 2024-04-18 PROCEDURE — 87502 INFLUENZA DNA AMP PROBE: CPT | Performed by: EMERGENCY MEDICINE

## 2024-04-18 PROCEDURE — 83605 ASSAY OF LACTIC ACID: CPT | Mod: 91 | Performed by: STUDENT IN AN ORGANIZED HEALTH CARE EDUCATION/TRAINING PROGRAM

## 2024-04-18 PROCEDURE — U0002 COVID-19 LAB TEST NON-CDC: HCPCS | Performed by: EMERGENCY MEDICINE

## 2024-04-18 PROCEDURE — 71045 X-RAY EXAM CHEST 1 VIEW: CPT | Mod: TC

## 2024-04-18 PROCEDURE — 94640 AIRWAY INHALATION TREATMENT: CPT

## 2024-04-18 PROCEDURE — 96365 THER/PROPH/DIAG IV INF INIT: CPT

## 2024-04-18 PROCEDURE — 81003 URINALYSIS AUTO W/O SCOPE: CPT | Performed by: INTERNAL MEDICINE

## 2024-04-18 PROCEDURE — S4991 NICOTINE PATCH NONLEGEND: HCPCS | Performed by: INTERNAL MEDICINE

## 2024-04-18 PROCEDURE — 83605 ASSAY OF LACTIC ACID: CPT | Performed by: EMERGENCY MEDICINE

## 2024-04-18 PROCEDURE — 25000003 PHARM REV CODE 250: Performed by: INTERNAL MEDICINE

## 2024-04-18 PROCEDURE — 99223 1ST HOSP IP/OBS HIGH 75: CPT | Mod: AI,95,, | Performed by: INTERNAL MEDICINE

## 2024-04-18 PROCEDURE — 36415 COLL VENOUS BLD VENIPUNCTURE: CPT | Performed by: STUDENT IN AN ORGANIZED HEALTH CARE EDUCATION/TRAINING PROGRAM

## 2024-04-18 PROCEDURE — 83880 ASSAY OF NATRIURETIC PEPTIDE: CPT | Performed by: EMERGENCY MEDICINE

## 2024-04-18 PROCEDURE — 85025 COMPLETE CBC W/AUTO DIFF WBC: CPT | Performed by: EMERGENCY MEDICINE

## 2024-04-18 PROCEDURE — 25000003 PHARM REV CODE 250: Performed by: STUDENT IN AN ORGANIZED HEALTH CARE EDUCATION/TRAINING PROGRAM

## 2024-04-18 PROCEDURE — 87486 CHLMYD PNEUM DNA AMP PROBE: CPT | Performed by: STUDENT IN AN ORGANIZED HEALTH CARE EDUCATION/TRAINING PROGRAM

## 2024-04-18 PROCEDURE — 87040 BLOOD CULTURE FOR BACTERIA: CPT | Mod: 59 | Performed by: EMERGENCY MEDICINE

## 2024-04-18 PROCEDURE — 96375 TX/PRO/DX INJ NEW DRUG ADDON: CPT

## 2024-04-18 PROCEDURE — 25000003 PHARM REV CODE 250: Performed by: EMERGENCY MEDICINE

## 2024-04-18 PROCEDURE — 94761 N-INVAS EAR/PLS OXIMETRY MLT: CPT

## 2024-04-18 PROCEDURE — 71045 X-RAY EXAM CHEST 1 VIEW: CPT | Mod: 26,,, | Performed by: RADIOLOGY

## 2024-04-18 PROCEDURE — 99900031 HC PATIENT EDUCATION (STAT)

## 2024-04-18 PROCEDURE — 87798 DETECT AGENT NOS DNA AMP: CPT | Mod: 59 | Performed by: STUDENT IN AN ORGANIZED HEALTH CARE EDUCATION/TRAINING PROGRAM

## 2024-04-18 PROCEDURE — 83735 ASSAY OF MAGNESIUM: CPT | Performed by: EMERGENCY MEDICINE

## 2024-04-18 PROCEDURE — 93010 ELECTROCARDIOGRAM REPORT: CPT | Mod: ,,, | Performed by: INTERNAL MEDICINE

## 2024-04-18 PROCEDURE — 11000001 HC ACUTE MED/SURG PRIVATE ROOM

## 2024-04-18 PROCEDURE — 27000221 HC OXYGEN, UP TO 24 HOURS

## 2024-04-18 PROCEDURE — 93005 ELECTROCARDIOGRAM TRACING: CPT

## 2024-04-18 RX ORDER — FLUTICASONE FUROATE AND VILANTEROL 200; 25 UG/1; UG/1
1 POWDER RESPIRATORY (INHALATION) DAILY
Status: DISCONTINUED | OUTPATIENT
Start: 2024-04-18 | End: 2024-04-21 | Stop reason: HOSPADM

## 2024-04-18 RX ORDER — POTASSIUM CHLORIDE 20 MEQ/1
20 TABLET, EXTENDED RELEASE ORAL ONCE
Status: COMPLETED | OUTPATIENT
Start: 2024-04-18 | End: 2024-04-18

## 2024-04-18 RX ORDER — ONDANSETRON HYDROCHLORIDE 2 MG/ML
4 INJECTION, SOLUTION INTRAVENOUS EVERY 6 HOURS PRN
Status: DISCONTINUED | OUTPATIENT
Start: 2024-04-18 | End: 2024-04-21 | Stop reason: HOSPADM

## 2024-04-18 RX ORDER — IPRATROPIUM BROMIDE AND ALBUTEROL SULFATE 2.5; .5 MG/3ML; MG/3ML
3 SOLUTION RESPIRATORY (INHALATION)
Status: COMPLETED | OUTPATIENT
Start: 2024-04-18 | End: 2024-04-18

## 2024-04-18 RX ORDER — CLOPIDOGREL BISULFATE 75 MG/1
75 TABLET ORAL DAILY
Status: DISCONTINUED | OUTPATIENT
Start: 2024-04-18 | End: 2024-04-21 | Stop reason: HOSPADM

## 2024-04-18 RX ORDER — GUAIFENESIN AND DEXTROMETHORPHAN HYDROBROMIDE 10; 100 MG/5ML; MG/5ML
10 SYRUP ORAL EVERY 6 HOURS
Status: COMPLETED | OUTPATIENT
Start: 2024-04-18 | End: 2024-04-19

## 2024-04-18 RX ORDER — MONTELUKAST SODIUM 10 MG/1
10 TABLET ORAL EVERY MORNING
Status: DISCONTINUED | OUTPATIENT
Start: 2024-04-18 | End: 2024-04-21 | Stop reason: HOSPADM

## 2024-04-18 RX ORDER — SODIUM CHLORIDE 0.9 % (FLUSH) 0.9 %
10 SYRINGE (ML) INJECTION EVERY 12 HOURS PRN
Status: DISCONTINUED | OUTPATIENT
Start: 2024-04-18 | End: 2024-04-21 | Stop reason: HOSPADM

## 2024-04-18 RX ORDER — BUTALBITAL, ACETAMINOPHEN AND CAFFEINE 50; 325; 40 MG/1; MG/1; MG/1
1 TABLET ORAL EVERY 6 HOURS PRN
Status: DISCONTINUED | OUTPATIENT
Start: 2024-04-18 | End: 2024-04-21 | Stop reason: HOSPADM

## 2024-04-18 RX ORDER — AMOXICILLIN 250 MG
1 CAPSULE ORAL 2 TIMES DAILY PRN
Status: DISCONTINUED | OUTPATIENT
Start: 2024-04-18 | End: 2024-04-21 | Stop reason: HOSPADM

## 2024-04-18 RX ORDER — PROCHLORPERAZINE EDISYLATE 5 MG/ML
5 INJECTION INTRAMUSCULAR; INTRAVENOUS EVERY 6 HOURS PRN
Status: DISCONTINUED | OUTPATIENT
Start: 2024-04-18 | End: 2024-04-21 | Stop reason: HOSPADM

## 2024-04-18 RX ORDER — ACETAMINOPHEN 325 MG/1
650 TABLET ORAL EVERY 4 HOURS PRN
Status: DISCONTINUED | OUTPATIENT
Start: 2024-04-18 | End: 2024-04-18

## 2024-04-18 RX ORDER — IBUPROFEN 200 MG
1 TABLET ORAL DAILY
Status: DISCONTINUED | OUTPATIENT
Start: 2024-04-18 | End: 2024-04-21 | Stop reason: HOSPADM

## 2024-04-18 RX ORDER — TALC
6 POWDER (GRAM) TOPICAL NIGHTLY PRN
Status: DISCONTINUED | OUTPATIENT
Start: 2024-04-18 | End: 2024-04-21 | Stop reason: HOSPADM

## 2024-04-18 RX ORDER — BENZONATATE 100 MG/1
100 CAPSULE ORAL 3 TIMES DAILY PRN
Status: DISCONTINUED | OUTPATIENT
Start: 2024-04-18 | End: 2024-04-21 | Stop reason: HOSPADM

## 2024-04-18 RX ORDER — FLUOXETINE 10 MG/1
40 CAPSULE ORAL DAILY
Status: DISCONTINUED | OUTPATIENT
Start: 2024-04-18 | End: 2024-04-21 | Stop reason: HOSPADM

## 2024-04-18 RX ORDER — LEVOTHYROXINE SODIUM 25 UG/1
50 TABLET ORAL
Status: DISCONTINUED | OUTPATIENT
Start: 2024-04-18 | End: 2024-04-21 | Stop reason: HOSPADM

## 2024-04-18 RX ORDER — BUTALBITAL, ACETAMINOPHEN AND CAFFEINE 50; 325; 40 MG/1; MG/1; MG/1
1 TABLET ORAL
Status: COMPLETED | OUTPATIENT
Start: 2024-04-18 | End: 2024-04-18

## 2024-04-18 RX ORDER — MAGNESIUM SULFATE HEPTAHYDRATE 40 MG/ML
2 INJECTION, SOLUTION INTRAVENOUS
Status: COMPLETED | OUTPATIENT
Start: 2024-04-18 | End: 2024-04-18

## 2024-04-18 RX ORDER — UBIDECARENONE 75 MG
1000 CAPSULE ORAL DAILY
Status: DISCONTINUED | OUTPATIENT
Start: 2024-04-18 | End: 2024-04-21 | Stop reason: HOSPADM

## 2024-04-18 RX ORDER — METHYLPREDNISOLONE SOD SUCC 125 MG
125 VIAL (EA) INJECTION
Status: COMPLETED | OUTPATIENT
Start: 2024-04-18 | End: 2024-04-18

## 2024-04-18 RX ORDER — ENOXAPARIN SODIUM 100 MG/ML
40 INJECTION SUBCUTANEOUS EVERY 24 HOURS
Status: DISCONTINUED | OUTPATIENT
Start: 2024-04-18 | End: 2024-04-21 | Stop reason: HOSPADM

## 2024-04-18 RX ORDER — HYDROCODONE BITARTRATE AND ACETAMINOPHEN 5; 325 MG/1; MG/1
1 TABLET ORAL EVERY 6 HOURS PRN
Status: DISCONTINUED | OUTPATIENT
Start: 2024-04-18 | End: 2024-04-21 | Stop reason: HOSPADM

## 2024-04-18 RX ORDER — BUSPIRONE HYDROCHLORIDE 5 MG/1
15 TABLET ORAL 2 TIMES DAILY
Status: DISCONTINUED | OUTPATIENT
Start: 2024-04-18 | End: 2024-04-21 | Stop reason: HOSPADM

## 2024-04-18 RX ORDER — ATORVASTATIN CALCIUM 40 MG/1
80 TABLET, FILM COATED ORAL DAILY
Status: DISCONTINUED | OUTPATIENT
Start: 2024-04-18 | End: 2024-04-21 | Stop reason: HOSPADM

## 2024-04-18 RX ORDER — FOLIC ACID 1 MG/1
1 TABLET ORAL DAILY
Status: DISCONTINUED | OUTPATIENT
Start: 2024-04-18 | End: 2024-04-21 | Stop reason: HOSPADM

## 2024-04-18 RX ORDER — ASPIRIN 81 MG/1
81 TABLET ORAL DAILY
Status: DISCONTINUED | OUTPATIENT
Start: 2024-04-18 | End: 2024-04-21 | Stop reason: HOSPADM

## 2024-04-18 RX ORDER — CARVEDILOL 12.5 MG/1
12.5 TABLET ORAL 2 TIMES DAILY
Status: DISCONTINUED | OUTPATIENT
Start: 2024-04-18 | End: 2024-04-18

## 2024-04-18 RX ORDER — IPRATROPIUM BROMIDE AND ALBUTEROL SULFATE 2.5; .5 MG/3ML; MG/3ML
3 SOLUTION RESPIRATORY (INHALATION) EVERY 6 HOURS
Status: DISCONTINUED | OUTPATIENT
Start: 2024-04-18 | End: 2024-04-21 | Stop reason: HOSPADM

## 2024-04-18 RX ORDER — IPRATROPIUM BROMIDE AND ALBUTEROL SULFATE 2.5; .5 MG/3ML; MG/3ML
3 SOLUTION RESPIRATORY (INHALATION) EVERY 4 HOURS PRN
Status: DISCONTINUED | OUTPATIENT
Start: 2024-04-18 | End: 2024-04-21 | Stop reason: HOSPADM

## 2024-04-18 RX ORDER — CLONAZEPAM 0.5 MG/1
0.5 TABLET ORAL 2 TIMES DAILY PRN
Status: DISCONTINUED | OUTPATIENT
Start: 2024-04-18 | End: 2024-04-21 | Stop reason: HOSPADM

## 2024-04-18 RX ADMIN — SODIUM CHLORIDE 1000 ML: 9 INJECTION, SOLUTION INTRAVENOUS at 06:04

## 2024-04-18 RX ADMIN — IPRATROPIUM BROMIDE AND ALBUTEROL SULFATE 3 ML: .5; 2.5 SOLUTION RESPIRATORY (INHALATION) at 01:04

## 2024-04-18 RX ADMIN — GUAIFENESIN AND DEXTROMETHORPHAN 10 ML: 100; 10 SYRUP ORAL at 06:04

## 2024-04-18 RX ADMIN — CEFTRIAXONE SODIUM 2 G: 2 INJECTION, POWDER, FOR SOLUTION INTRAMUSCULAR; INTRAVENOUS at 04:04

## 2024-04-18 RX ADMIN — FLUTICASONE FUROATE AND VILANTEROL TRIFENATATE 1 PUFF: 200; 25 POWDER RESPIRATORY (INHALATION) at 09:04

## 2024-04-18 RX ADMIN — IPRATROPIUM BROMIDE AND ALBUTEROL SULFATE 3 ML: .5; 2.5 SOLUTION RESPIRATORY (INHALATION) at 07:04

## 2024-04-18 RX ADMIN — GUAIFENESIN AND DEXTROMETHORPHAN 10 ML: 100; 10 SYRUP ORAL at 05:04

## 2024-04-18 RX ADMIN — HYDROCODONE BITARTRATE AND ACETAMINOPHEN 1 TABLET: 5; 325 TABLET ORAL at 03:04

## 2024-04-18 RX ADMIN — MONTELUKAST 10 MG: 10 TABLET, FILM COATED ORAL at 09:04

## 2024-04-18 RX ADMIN — LEVOTHYROXINE SODIUM 50 MCG: 25 TABLET ORAL at 06:04

## 2024-04-18 RX ADMIN — CLOPIDOGREL BISULFATE 75 MG: 75 TABLET ORAL at 09:04

## 2024-04-18 RX ADMIN — IPRATROPIUM BROMIDE AND ALBUTEROL SULFATE 3 ML: .5; 2.5 SOLUTION RESPIRATORY (INHALATION) at 04:04

## 2024-04-18 RX ADMIN — ENOXAPARIN SODIUM 40 MG: 40 INJECTION SUBCUTANEOUS at 05:04

## 2024-04-18 RX ADMIN — BUSPIRONE HYDROCHLORIDE 15 MG: 5 TABLET ORAL at 08:04

## 2024-04-18 RX ADMIN — MAGNESIUM SULFATE HEPTAHYDRATE 2 G: 40 INJECTION, SOLUTION INTRAVENOUS at 05:04

## 2024-04-18 RX ADMIN — METHYLPREDNISOLONE SODIUM SUCCINATE 125 MG: 125 INJECTION, POWDER, FOR SOLUTION INTRAMUSCULAR; INTRAVENOUS at 04:04

## 2024-04-18 RX ADMIN — IPRATROPIUM BROMIDE AND ALBUTEROL SULFATE 3 ML: .5; 2.5 SOLUTION RESPIRATORY (INHALATION) at 06:04

## 2024-04-18 RX ADMIN — AZITHROMYCIN MONOHYDRATE 500 MG: 500 INJECTION, POWDER, LYOPHILIZED, FOR SOLUTION INTRAVENOUS at 06:04

## 2024-04-18 RX ADMIN — FOLIC ACID 1 MG: 1 TABLET ORAL at 09:04

## 2024-04-18 RX ADMIN — FLUOXETINE 40 MG: 10 CAPSULE ORAL at 09:04

## 2024-04-18 RX ADMIN — NICOTINE 1 PATCH: 21 PATCH, EXTENDED RELEASE TRANSDERMAL at 08:04

## 2024-04-18 RX ADMIN — BUTALBITAL, ACETAMINOPHEN, AND CAFFEINE 1 TABLET: 50; 325; 40 TABLET ORAL at 05:04

## 2024-04-18 RX ADMIN — GUAIFENESIN AND DEXTROMETHORPHAN 10 ML: 100; 10 SYRUP ORAL at 11:04

## 2024-04-18 RX ADMIN — POTASSIUM CHLORIDE 20 MEQ: 1500 TABLET, EXTENDED RELEASE ORAL at 09:04

## 2024-04-18 RX ADMIN — BUTALBITAL, ACETAMINOPHEN, AND CAFFEINE 1 TABLET: 50; 325; 40 TABLET ORAL at 08:04

## 2024-04-18 RX ADMIN — BUSPIRONE HYDROCHLORIDE 15 MG: 5 TABLET ORAL at 09:04

## 2024-04-18 RX ADMIN — ASPIRIN 81 MG: 81 TABLET, COATED ORAL at 09:04

## 2024-04-18 RX ADMIN — ATORVASTATIN CALCIUM 80 MG: 40 TABLET, FILM COATED ORAL at 09:04

## 2024-04-18 RX ADMIN — CYANOCOBALAMIN TAB 500 MCG 1000 MCG: 500 TAB at 09:04

## 2024-04-18 NOTE — ED PROVIDER NOTES
Encounter Date: 4/18/2024       History     Chief Complaint   Patient presents with    Shortness of Breath     60-year-old female presents with shortness of breath productive cough generalized fatigue and chest pain that has been going on for 2-3 weeks.  EMS states patient is also febrile.  She states she has been coughing up green sputum.  She only has an inhaler at home not a nebulizer machine for treatments.  She is normally on 2-3 L of oxygen nasal cannula.  She does still smoke.  States her child and their spouse were ill with something similar 1st and she believes that they got her sick.  No recent antibiotic or steroid usage.    The history is provided by the patient. No  was used.     Review of patient's allergies indicates:  No Known Allergies  Past Medical History:   Diagnosis Date    Acquired hypothyroidism     Anxiety and depression     Cervical radicular pain     Closed left ankle fracture     COPD (chronic obstructive pulmonary disease)     H/O: stroke 11/08/2023    Hypertension     Hypokalemia     Hyponatremia     Multiple rib fractures     NSTEMI (non-ST elevated myocardial infarction)     Requires continuous at home supplemental oxygen     PRN FOR COPD    Stroke     Tension type headache     Traumatic closed displaced fracture of distal end of radius, left, sequela      Past Surgical History:   Procedure Laterality Date    COLON SURGERY      COLOSTOMY      COLOSTOMY CLOSURE      HYSTERECTOMY      LEG SURGERY Right     OPEN REDUCTION AND INTERNAL FIXATION (ORIF) OF INJURY OF WRIST Left 1/29/2019    Procedure: ORIF, WRIST;  Surgeon: Homero Jeff DO;  Location: [facility];  Service: Orthopedics;  Laterality: Left;  Equipment: [employer] Geminus Wrist Plate Set  Vendor/Rep: [employer]  C-Arm: Entire  DME: None    REQUIRES ASSISTANT    WRIST SURGERY Right      Family History   Problem Relation Name Age of Onset    Cancer Mother      COPD Father      Cirrhosis Father       Arthritis Sister      No Known Problems Brother      Anxiety disorder Daughter      Depression Daughter      Anxiety disorder Daughter      Congenital heart disease Daughter      Valvular heart disease Daughter       Social History     Tobacco Use    Smoking status: Every Day     Current packs/day: 1.00     Average packs/day: 1 pack/day for 20.0 years (20.0 ttl pk-yrs)     Types: Cigarettes    Smokeless tobacco: Never   Substance Use Topics    Alcohol use: Yes     Comment: couple times a week-wine or bloody cari    Drug use: No     Review of Systems   Constitutional:  Positive for chills, fatigue and fever.   HENT:  Negative for sore throat.    Respiratory:  Positive for cough, chest tightness and shortness of breath.    Cardiovascular:  Negative for chest pain.   Gastrointestinal:  Negative for nausea.   Genitourinary:  Negative for dysuria.   Musculoskeletal:  Negative for back pain.   Skin:  Negative for rash.   Neurological:  Negative for weakness.   Hematological:  Does not bruise/bleed easily.   All other systems reviewed and are negative.      Physical Exam     Initial Vitals [04/18/24 0421]   BP Pulse Resp Temp SpO2   (!) 117/58 (!) 129 (!) 24 99.6 °F (37.6 °C) (!) 93 %      MAP       --         Physical Exam    Nursing note and vitals reviewed.  Constitutional: She is not diaphoretic.   HENT:   Head: Normocephalic and atraumatic.   Eyes: EOM are normal. Pupils are equal, round, and reactive to light.   Neck:   Normal range of motion.  Cardiovascular:  Regular rhythm.           Tachycardic   Pulmonary/Chest: She is in respiratory distress. She has wheezes. She has rhonchi.   Speaking in short clipped sentences   Abdominal: Abdomen is soft. She exhibits no distension. There is no abdominal tenderness.   Musculoskeletal:         General: Normal range of motion.      Cervical back: Normal range of motion.     Neurological: She is alert and oriented to person, place, and time.         ED Course   Critical  Care    Date/Time: 4/18/2024 4:42 AM    Performed by: Conchita Rosenthal MD  Authorized by: Conchita Rosenthal MD  Direct patient critical care time: 10 minutes  Additional history critical care time: 10 minutes  Ordering / reviewing critical care time: 10 minutes  Documentation critical care time: 10 minutes  Total critical care time (exclusive of procedural time) : 40 minutes  Critical care time was exclusive of separately billable procedures and treating other patients and teaching time.  Critical care was necessary to treat or prevent imminent or life-threatening deterioration of the following conditions: respiratory failure.  Critical care was time spent personally by me on the following activities: interpretation of cardiac output measurements, evaluation of patient's response to treatment, examination of patient, obtaining history from patient or surrogate, ordering and performing treatments and interventions, ordering and review of laboratory studies, ordering and review of radiographic studies, pulse oximetry, re-evaluation of patient's condition and review of old charts.        Labs Reviewed   CBC W/ AUTO DIFFERENTIAL - Abnormal; Notable for the following components:       Result Value    WBC 18.20 (*)     RBC 3.52 (*)     Hemoglobin 10.6 (*)     Hematocrit 32.2 (*)     Gran # (ANC) 15.6 (*)     Immature Grans (Abs) 0.07 (*)     Mono # 1.2 (*)     Gran % 85.7 (*)     Lymph % 7.1 (*)     All other components within normal limits   COMPREHENSIVE METABOLIC PANEL - Abnormal; Notable for the following components:    Sodium 134 (*)     Potassium 3.4 (*)     CO2 21 (*)     Albumin 2.6 (*)     All other components within normal limits   URINALYSIS, REFLEX TO URINE CULTURE - Abnormal; Notable for the following components:    Ketones, UA Trace (*)     Occult Blood UA Trace (*)     All other components within normal limits    Narrative:     Preferred Collection Type->Urine, Clean Catch  Specimen Source->Urine    MAGNESIUM - Abnormal; Notable for the following components:    Magnesium 1.4 (*)     All other components within normal limits   INFLUENZA A & B BY MOLECULAR   CULTURE, RESPIRATORY   SARS-COV-2 RNA AMPLIFICATION, QUAL   LACTIC ACID, PLASMA   B-TYPE NATRIURETIC PEPTIDE   TROPONIN I   CULTURE, LEGIONELLA   INFLUENZA A AND B ANTIGEN     EKG Readings: (Independently Interpreted)   Initial Reading: No STEMI.   EKG done at 4:26 a.m. shows a rate of 126, AZ interval 118, QRS duration 72, .  My interpretation of the EKGs this is sinus tachycardia without any findings concerning for acute ischemia at this time.     ECG Results              EKG 12-lead (Final result)        Collection Time Result Time QRS Duration OHS QTC Calculation    04/18/24 04:26:10 04/18/24 12:51:25 72 489                     Final result by Interface, Lab In [facility] (04/18/24 12:51:30)                   Narrative:    Test Reason : R06.02,    Vent. Rate : 126 BPM     Atrial Rate : 126 BPM     P-R Int : 118 ms          QRS Dur : 072 ms      QT Int : 338 ms       P-R-T Axes : 066 -27 058 degrees     QTc Int : 489 ms    Sinus tachycardia  ST and T wave abnormality, consider anterior ischemia  Abnormal ECG  When compared with ECG of 19-JAN-2024 17:10,  The axis Shifted left  T wave inversion no longer evident in Lateral leads  Confirmed by Bear Welch MD ([id number]) on 4/18/2024 12:51:22 PM    Referred By: [email or web address]   SELF           Confirmed By:Bear Welch MD                                  Imaging Results              X-Ray Chest 1 View (Final result)  Result time 04/18/24 08:27:27      Final result by Kaci Uribe MD (04/18/24 08:27:27)                   Impression:      Chronic interstitial changes with new patchy right basilar infiltrate suggesting pneumonia      Electronically signed by: Kaci Uribe MD  Date:    04/18/2024  Time:    08:27               Narrative:    EXAMINATION:  XR CHEST 1 VIEW    CLINICAL HISTORY:  shortness of  breath;    TECHNIQUE:  Single frontal view of the chest was performed.    COMPARISON:  01/21/2024    FINDINGS:  Stable cardiomediastinal silhouette.  Multiple old left rib fractures coarse interstitial infiltrates more so lung bases and right more so than.  Increased infiltrate right lung base compared to prior study.                                       Medications   albuterol-ipratropium 2.5 mg-0.5 mg/3 mL nebulizer solution 3 mL (has no administration in time range)   albuterol-ipratropium 2.5 mg-0.5 mg/3 mL nebulizer solution 3 mL (3 mLs Nebulization Given 4/18/24 1905)   benzonatate capsule 100 mg (has no administration in time range)   dextromethorphan-guaiFENesin  mg/5 ml liquid 10 mL (10 mLs Oral Given 4/18/24 1735)   azithromycin (ZITHROMAX) 500 mg in dextrose 5 % (D5W) 250 mL IVPB (Vial-Mate) (0 mg Intravenous Stopped 4/18/24 0740)   cefTRIAXone (ROCEPHIN) 2 g in dextrose 5 % in water (D5W) 100 mL IVPB (MB+) (has no administration in time range)   aspirin EC tablet 81 mg (81 mg Oral Given 4/18/24 0930)   atorvastatin tablet 80 mg (80 mg Oral Given 4/18/24 0931)   busPIRone tablet 15 mg (15 mg Oral Given 4/18/24 2011)   clonazePAM tablet 0.5 mg (has no administration in time range)   clopidogreL tablet 75 mg (75 mg Oral Given 4/18/24 0927)   FLUoxetine capsule 40 mg (40 mg Oral Given 4/18/24 0927)   folic acid tablet 1 mg (1 mg Oral Given 4/18/24 0930)   levothyroxine tablet 50 mcg (50 mcg Oral Given 4/18/24 0639)   montelukast tablet 10 mg (10 mg Oral Given 4/18/24 0928)   sodium chloride 0.9% flush 10 mL (has no administration in time range)   enoxaparin injection 40 mg (40 mg Subcutaneous Given 4/18/24 1734)   HYDROcodone-acetaminophen 5-325 mg per tablet 1 tablet (1 tablet Oral Given 4/18/24 1546)   senna-docusate 8.6-50 mg per tablet 1 tablet (has no administration in time range)   ondansetron injection 4 mg (has no administration in time range)   prochlorperazine injection Soln 5 mg (has no  administration in time range)   melatonin tablet 6 mg (has no administration in time range)   fluticasone furoate-vilanteroL 200-25 mcg/dose diskus inhaler 1 puff (1 puff Inhalation Given 4/18/24 0925)   cyanocobalamin tablet 1,000 mcg (1,000 mcg Oral Given 4/18/24 0937)   butalbital-acetaminophen-caffeine -40 mg per tablet 1 tablet (1 tablet Oral Given 4/18/24 2011)   nicotine 21 mg/24 hr 1 patch (1 patch Transdermal Patch Applied 4/18/24 2044)   albuterol-ipratropium 2.5 mg-0.5 mg/3 mL nebulizer solution 3 mL (3 mLs Nebulization Given 4/18/24 0436)   albuterol-ipratropium 2.5 mg-0.5 mg/3 mL nebulizer solution 3 mL (3 mLs Nebulization Given 4/18/24 0435)   methylPREDNISolone sodium succinate injection 125 mg (125 mg Intravenous Given 4/18/24 0448)   cefTRIAXone (ROCEPHIN) 2 g in dextrose 5 % in water (D5W) 100 mL IVPB (MB+) (0 g Intravenous Stopped 4/18/24 0522)   magnesium sulfate 2g in water 50mL IVPB (premix) (0 g Intravenous Stopped 4/18/24 0600)   butalbital-acetaminophen-caffeine -40 mg per tablet 1 tablet (1 tablet Oral Given 4/18/24 0548)   sodium chloride 0.9% bolus 1,000 mL 1,000 mL (0 mLs Intravenous Stopped 4/18/24 0741)   potassium chloride SA CR tablet 20 mEq (20 mEq Oral Given 4/18/24 0926)     Medical Decision Making  Differential diagnosis for this patient includes bronchitis, pneumonia, COPD exacerbation, COVID 19 infection, influenza.      plan will be labs chest x-ray breathing treatments with plan of likely admission    Not on oxygen patient is satting 84% on room air.  On her normal 2-3 L she satting approximately 89-90%.    Amount and/or Complexity of Data Reviewed  Labs: ordered. Decision-making details documented in ED Course.  Radiology: ordered. Decision-making details documented in ED Course.    Risk  Prescription drug management.  Decision regarding hospitalization.                                      Clinical Impression:  Final diagnoses:  [R06.02] Shortness of  breath  [J18.9] Pneumonia of right lower lobe due to infectious organism (Primary)  [J96.21] Acute on chronic hypoxic respiratory failure  [J18.9, A41.9] Sepsis due to pneumonia          ED Disposition Condition    Admit                 Conchita Rosenthal MD  04/18/24 1025

## 2024-04-18 NOTE — HPI
"Ms. Lambert is a 69yo lady with a past medical history of hypothyroidism, anxiety, depression, COPD, CVA, HTN, hyponatremia, NSTEMI, and chronic hypoxia on home O2 at 3L NC.    She states that her whole family got sick a month ago, then 3 weeks ago she developed a URI with thick nasal secretions.  This has gradually worsened to a chest cold and cough with green sputum.  Over the past 3 days she has had an increase in the severity of her cough with some fever and chills.  She has been taking OTC Robitussin with no relief.  She has had rib fractures on the right and the constant coughing has begun to cause pain to her right side as well.  She also has some chest tightness with SOB.  She has had nausea and diarrhea on and off too.  She does not feel like she is wheezing very much.    In the ED her VS were /68   Pulse (!) 129 -> 122   Temp max 99.6F -> 99.3 °F (37.4 °C) (Oral)   Resp 25 -> 18   Ht 5' 2" (1.575 m)   Wt 59 kg (130 lb 1.1 oz)   SpO2 (!) 84% RA -> 94% 3L NC  Breastfeeding No   BMI 23.79 kg/m².  Labs showed WBC 18, Hg 10.6, Na 134, K 3.4, Cr 0.8, Mg 1.4, COVID neg.     CXR shows flat diaphragms RLL infiltrate, prominent interstitial changes (personal read).  EKG showed sinus tachy 126, ST depressions and flipped T wave changes anteriorly, QTc 489 ms.    In the ED she was treated with:  Medications   magnesium sulfate 2g in water 50mL IVPB (premix) (2 g Intravenous New Bag 4/18/24 3067)   albuterol-ipratropium 2.5 mg-0.5 mg/3 mL nebulizer solution 3 mL (3 mLs Nebulization Given 4/18/24 3017)   albuterol-ipratropium 2.5 mg-0.5 mg/3 mL nebulizer solution 3 mL (3 mLs Nebulization Given 4/18/24 4158)   methylPREDNISolone sodium succinate injection 125 mg (125 mg Intravenous Given 4/18/24 1947)   cefTRIAXone (ROCEPHIN) 2 g in dextrose 5 % in water (D5W) 100 mL IVPB (MB+) (0 g Intravenous Stopped 4/18/24 0676)   butalbital-acetaminophen-caffeine -40 mg per tablet 1 tablet (1 tablet Oral Given " 4/18/24 0548)

## 2024-04-18 NOTE — ASSESSMENT & PLAN NOTE
This is a chronic problem for her  Holding home Aldactone  NS 1L iv    Recent Labs   Lab 04/18/24  0433   *

## 2024-04-18 NOTE — H&P
"[facility] Medicine  History & Physical    Patient Name: Bhavana Lambert  MRN: [id number]  Admission Date: 4/18/2024  Attending Physician: Daniel Rojo MD   Primary Care Provider: Pepito Jhaveri IV, MD         Patient information was obtained from patient, past medical records, and ER records.       Subjective:     Principal Problem:Sepsis due to pneumonia    Chief Complaint:   Chief Complaint   Patient presents with    Shortness of Breath        HPI: Ms. Lambert is a 69yo lady with a past medical history of hypothyroidism, anxiety, depression, COPD, CVA, HTN, hyponatremia, NSTEMI, and chronic hypoxia on home O2 at 3L NC.    She states that her whole family got sick a month ago, then 3 weeks ago she developed a URI with thick nasal secretions.  This has gradually worsened to a chest cold and cough with green sputum.  Over the past 3 days she has had an increase in the severity of her cough with some fever and chills.  She has been taking OTC Robitussin with no relief.  She has had rib fractures on the right and the constant coughing has begun to cause pain to her right side as well.  She also has some chest tightness with SOB.  She has had nausea and diarrhea on and off too.  She does not feel like she is wheezing very much.    In the ED her VS were /68   Pulse (!) 129 -> 122   Temp max 99.6F -> 99.3 °F (37.4 °C) (Oral)   Resp 25 -> 18   Ht 5' 2" (1.575 m)   Wt 59 kg (130 lb 1.1 oz)   SpO2 (!) 84% RA -> 94% 3L NC  Breastfeeding No   BMI 23.79 kg/m².  Labs showed WBC 18, Hg 10.6, Na 134, K 3.4, Cr 0.8, Mg 1.4, COVID neg.     CXR shows flat diaphragms RLL infiltrate, prominent interstitial changes (personal read).  EKG showed sinus tachy 126, ST depressions and flipped T wave changes anteriorly, QTc 489 ms.    In the ED she was treated with:  Medications   magnesium sulfate 2g in water 50mL IVPB (premix) (2 g Intravenous New Bag 4/18/24 0530)   albuterol-ipratropium 2.5 mg-0.5 " mg/3 mL nebulizer solution 3 mL (3 mLs Nebulization Given 4/18/24 0436)   albuterol-ipratropium 2.5 mg-0.5 mg/3 mL nebulizer solution 3 mL (3 mLs Nebulization Given 4/18/24 0435)   methylPREDNISolone sodium succinate injection 125 mg (125 mg Intravenous Given 4/18/24 0448)   cefTRIAXone (ROCEPHIN) 2 g in dextrose 5 % in water (D5W) 100 mL IVPB (MB+) (0 g Intravenous Stopped 4/18/24 0522)   butalbital-acetaminophen-caffeine -40 mg per tablet 1 tablet (1 tablet Oral Given 4/18/24 0548)             Past Medical History:   Diagnosis Date    Acquired hypothyroidism     Anxiety and depression     Cervical radicular pain     Closed left ankle fracture     COPD (chronic obstructive pulmonary disease)     H/O: stroke 11/08/2023    Hypertension     Hypokalemia     Hyponatremia     Multiple rib fractures     NSTEMI (non-ST elevated myocardial infarction)     Requires continuous at home supplemental oxygen     PRN FOR COPD    Stroke     Tension type headache     Traumatic closed displaced fracture of distal end of radius, left, sequela        Past Surgical History:   Procedure Laterality Date    COLON SURGERY      COLOSTOMY      COLOSTOMY CLOSURE      HYSTERECTOMY      LEG SURGERY Right     OPEN REDUCTION AND INTERNAL FIXATION (ORIF) OF INJURY OF WRIST Left 1/29/2019    Procedure: ORIF, WRIST;  Surgeon: Homero Jeff DO;  Location: [facility];  Service: Orthopedics;  Laterality: Left;  Equipment: [employer] Geminus Wrist Plate Set  Vendor/Rep: [employer]  C-Arm: Entire  DME: None    REQUIRES ASSISTANT    WRIST SURGERY Right        Review of patient's allergies indicates:  No Known Allergies    Current Facility-Administered Medications   Medication Dose Route Frequency Provider Last Rate Last Admin    acetaminophen tablet 650 mg  650 mg Oral Q4H PRN SUDHEER Chu MD        albuterol-ipratropium 2.5 mg-0.5 mg/3 mL nebulizer solution 3 mL  3 mL Nebulization Q4H PRN SUDHEER Chu MD         albuterol-ipratropium 2.5 mg-0.5 mg/3 mL nebulizer solution 3 mL  3 mL Nebulization Q6H SUDHEER Chu MD        aspirin EC tablet 81 mg  81 mg Oral Daily SUDHEER Chu MD        atorvastatin tablet 80 mg  80 mg Oral Daily SUDHEER Chu MD        azithromycin (ZITHROMAX) 500 mg in dextrose 5 % (D5W) 250 mL IVPB (Vial-Mate)  500 mg Intravenous Q24H SUDHEER Chu  mL/hr at 04/18/24 0640 500 mg at 04/18/24 0640    benzonatate capsule 100 mg  100 mg Oral TID PRN SUDHEER Chu MD        busPIRone tablet 15 mg  15 mg Oral BID SUDHEER Chu MD        [START ON 4/19/2024] cefTRIAXone (ROCEPHIN) 2 g in dextrose 5 % in water (D5W) 100 mL IVPB (MB+)  2 g Intravenous Q24H SUDHEER Chu MD        clonazePAM tablet 0.5 mg  0.5 mg Oral BID PRN SUDHEER Chu MD        clopidogreL tablet 75 mg  75 mg Oral Daily SUDHEER Chu MD        cyanocobalamin tablet 1,000 mcg  1,000 mcg Oral Daily SUDHEER Chu MD        dextromethorphan-guaiFENesin  mg/5 ml liquid 10 mL  10 mL Oral Q6H SUDHEER Chu MD   10 mL at 04/18/24 0639    enoxaparin injection 40 mg  40 mg Subcutaneous Daily SUDHEER Chu MD        FLUoxetine capsule 40 mg  40 mg Oral Daily SUDHEER Chu MD        fluticasone furoate-vilanteroL 200-25 mcg/dose diskus inhaler 1 puff  1 puff Inhalation Daily SUDHEER Chu MD        folic acid tablet 1 mg  1 mg Oral Daily SUDHEER Chu MD        HYDROcodone-acetaminophen 5-325 mg per tablet 1 tablet  1 tablet Oral Q6H PRN SUDHEER Chu MD        levothyroxine tablet 50 mcg  50 mcg Oral Before breakfast SUDHEER Chu MD   50 mcg at 04/18/24 0639    melatonin tablet 6 mg  6 mg Oral Nightly PRN SUDHEER Chu MD        montelukast tablet 10 mg  10 mg Oral QAM SUDHEER Chu MD        ondansetron injection 4 mg  4 mg Intravenous Q6H PRN SUDHERE Chu MD        potassium chloride SA CR tablet 20 mEq  20 mEq Oral Once SUDHEER Chu MD         prochlorperazine injection Soln 5 mg  5 mg Intravenous Q6H PRN SUDHEER Chu MD        senna-docusate 8.6-50 mg per tablet 1 tablet  1 tablet Oral BID PRN SUDHEER Chu MD        sodium chloride 0.9% bolus 1,000 mL 1,000 mL  1,000 mL Intravenous Once SUDHEER Chu  mL/hr at 04/18/24 0641 1,000 mL at 04/18/24 0641    sodium chloride 0.9% flush 10 mL  10 mL Intravenous Q12H PRN SUDHEER Chu MD         Current Outpatient Medications   Medication Sig Dispense Refill    albuterol (PROVENTIL/VENTOLIN HFA) 90 mcg/actuation inhaler Inhale 2 puffs into the lungs every 6 (six) hours as needed for Wheezing or Shortness of Breath.      aspirin (ECOTRIN) 81 MG EC tablet Take 1 tablet (81 mg total) by mouth once daily. 360 tablet 0    atorvastatin (LIPITOR) 80 MG tablet Take 1 tablet (80 mg total) by mouth once daily. 90 tablet 3    busPIRone (BUSPAR) 15 MG tablet Take 15 mg by mouth 2 (two) times a day.      butalbital-acetaminophen-caffeine -40 mg (FIORICET, ESGIC) -40 mg per tablet Take 1 tablet by mouth every 8 (eight) hours as needed for Headaches.      carvediloL (COREG) 25 MG tablet Take 0.5 tablets (12.5 mg total) by mouth 2 (two) times daily. 90 tablet 3    clonazePAM (KLONOPIN) 0.5 MG tablet Take 0.25-0.5 mg by mouth.      clopidogreL (PLAVIX) 75 mg tablet Take 1 tablet (75 mg total) by mouth once daily. 90 tablet 3    cyanocobalamin 1,000 mcg/mL injection Inject 1,000 mcg into the muscle every 30 days.       cyclobenzaprine (FLEXERIL) 10 MG tablet Take 10 mg by mouth 3 (three) times daily as needed for Muscle spasms.      FLUoxetine 40 MG capsule Take 40 mg by mouth once daily.      folic acid (FOLVITE) 1 MG tablet Take 1 tablet (1 mg total) by mouth once daily.  0    levothyroxine (SYNTHROID) 50 MCG tablet Take 50 mcg by mouth before breakfast.      montelukast (SINGULAIR) 10 mg tablet Take 10 mg by mouth every morning.      multivitamin Tab Take 1 tablet by mouth once daily.  (Patient not taking: Reported on 3/25/2024)      ondansetron (ZOFRAN) 4 MG tablet Take 4 mg by mouth every 6 (six) hours as needed for Nausea.      potassium chloride (KLOR-CON) 10 MEQ TbSR Take 10 mEq by mouth 2 (two) times daily.      spironolactone (ALDACTONE) 25 MG tablet Take 0.5 tablets (12.5 mg total) by mouth once daily. 15 tablet 11    STIOLTO RESPIMAT 2.5-2.5 mcg/actuation Mist INHALE ONE PUFF INTO THE LUNGS ONCE DAILY. controller 4 g 5    thiamine 100 MG tablet Take 1 tablet (100 mg total) by mouth once daily. (Patient not taking: Reported on 3/25/2024)       Family History       Problem Relation (Age of Onset)    Anxiety disorder Daughter, Daughter    Arthritis Sister    COPD Father    Cancer Mother    Cirrhosis Father    Congenital heart disease Daughter    Depression Daughter    No Known Problems Brother    Valvular heart disease Daughter          Tobacco Use    Smoking status: Every Day     Current packs/day: 1.00     Average packs/day: 1 pack/day for 20.0 years (20.0 ttl pk-yrs)     Types: Cigarettes    Smokeless tobacco: Never   Substance and Sexual Activity    Alcohol use: Yes     Comment: couple times a week-wine or bloody cari    Drug use: No    Sexual activity: Not Currently     Review of Systems   Constitutional:  Positive for activity change, chills, diaphoresis, fatigue and fever.   HENT:  Positive for congestion.    Eyes:  Negative for visual disturbance.   Respiratory:  Positive for chest tightness and shortness of breath.    Cardiovascular:  Positive for chest pain.   Gastrointestinal:  Positive for diarrhea and nausea. Negative for vomiting.   Endocrine: Positive for cold intolerance.   Genitourinary:  Positive for flank pain. Negative for dysuria.   Musculoskeletal:  Positive for myalgias.   Skin:  Negative for rash.   Allergic/Immunologic: Negative for immunocompromised state.   Neurological:  Positive for headaches.   Hematological:  Does not bruise/bleed easily.    Psychiatric/Behavioral:  Negative for decreased concentration and dysphoric mood.      Objective:     Vital Signs (Most Recent):  Temp: 99.2 °F (37.3 °C) (04/18/24 0552)  Pulse: (!) 112 (04/18/24 0619)  Resp: 18 (04/18/24 0619)  BP: 129/62 (04/18/24 0617)  SpO2: 95 % (04/18/24 0619) Vital Signs (24h Range):  Temp:  [99.2 °F (37.3 °C)-99.6 °F (37.6 °C)] 99.2 °F (37.3 °C)  Pulse:  [112-129] 112  Resp:  [18-25] 18  SpO2:  [93 %-99 %] 95 %  BP: ()/(58-78) 129/62     Weight: 59 kg (130 lb 1.1 oz)  Body mass index is 23.79 kg/m².     Physical Exam  Constitutional:       General: She is not in acute distress.     Appearance: She is normal weight. She is ill-appearing and diaphoretic. She is not toxic-appearing.   HENT:      Head: Normocephalic and atraumatic.   Pulmonary:      Effort: No tachypnea or bradypnea.   Abdominal:      General: Abdomen is flat.   Genitourinary:     Comments: No gomez in place  Skin:     Coloration: Skin is sallow.   Neurological:      Mental Status: She is alert and oriented to person, place, and time.      GCS: GCS eye subscore is 4. GCS verbal subscore is 5. GCS motor subscore is 6.   Psychiatric:         Attention and Perception: Attention and perception normal.         Cognition and Memory: Cognition and memory normal.                Significant Labs: All pertinent labs within the past 24 hours have been reviewed.  Recent Results (from the past 24 hour(s))   COVID-19 Rapid Screening    Collection Time: 04/18/24  4:28 AM   Result Value Ref Range    SARS-CoV-2 RNA, Amplification, Qual Negative Negative   Influenza A & B by Molecular    Collection Time: 04/18/24  4:28 AM    Specimen: Nasopharyngeal Swab   Result Value Ref Range    Influenza A, Molecular Negative Negative    Influenza B, Molecular Negative Negative    Flu A & B Source Nasal Swab    CBC Auto Differential    Collection Time: 04/18/24  4:33 AM   Result Value Ref Range    WBC 18.20 (H) 3.90 - 12.70 K/uL    RBC 3.52 (L) 4.00 -  5.40 M/uL    Hemoglobin 10.6 (L) 12.0 - 16.0 g/dL    Hematocrit 32.2 (L) 37.0 - 48.5 %    MCV 92 82 - 98 fL    MCH 30.1 27.0 - 31.0 pg    MCHC 32.9 32.0 - 36.0 g/dL    RDW 13.2 11.5 - 14.5 %    Platelets 298 150 - 450 K/uL    MPV 9.3 9.2 - 12.9 fL    Immature Granulocytes 0.4 0.0 - 0.5 %    Gran # (ANC) 15.6 (H) 1.8 - 7.7 K/uL    Immature Grans (Abs) 0.07 (H) 0.00 - 0.04 K/uL    Lymph # 1.3 1.0 - 4.8 K/uL    Mono # 1.2 (H) 0.3 - 1.0 K/uL    Eos # 0.0 0.0 - 0.5 K/uL    Baso # 0.04 0.00 - 0.20 K/uL    nRBC 0 0 /100 WBC    Gran % 85.7 (H) 38.0 - 73.0 %    Lymph % 7.1 (L) 18.0 - 48.0 %    Mono % 6.5 4.0 - 15.0 %    Eosinophil % 0.1 0.0 - 8.0 %    Basophil % 0.2 0.0 - 1.9 %    Differential Method Automated    Comprehensive Metabolic Panel    Collection Time: 04/18/24  4:33 AM   Result Value Ref Range    Sodium 134 (L) 136 - 145 mmol/L    Potassium 3.4 (L) 3.5 - 5.1 mmol/L    Chloride 97 95 - 110 mmol/L    CO2 21 (L) 23 - 29 mmol/L    Glucose 98 70 - 110 mg/dL    BUN 8 8 - 23 mg/dL    Creatinine 0.8 0.5 - 1.4 mg/dL    Calcium 9.0 8.7 - 10.5 mg/dL    Total Protein 7.3 6.0 - 8.4 g/dL    Albumin 2.6 (L) 3.5 - 5.2 g/dL    Total Bilirubin 0.3 0.1 - 1.0 mg/dL    Alkaline Phosphatase 135 55 - 135 U/L    AST 19 10 - 40 U/L    ALT 12 10 - 44 U/L    eGFR >60.0 >60 mL/min/1.73 m^2    Anion Gap 16 8 - 16 mmol/L   Lactic Acid, Plasma    Collection Time: 04/18/24  4:33 AM   Result Value Ref Range    Lactate (Lactic Acid) 0.9 0.5 - 2.2 mmol/L   Brain natriuretic peptide    Collection Time: 04/18/24  4:33 AM   Result Value Ref Range    BNP 68 0 - 99 pg/mL   Troponin I    Collection Time: 04/18/24  4:33 AM   Result Value Ref Range    Troponin I 0.012 0.000 - 0.026 ng/mL   Magnesium    Collection Time: 04/18/24  4:33 AM   Result Value Ref Range    Magnesium 1.4 (L) 1.6 - 2.6 mg/dL         Significant Imaging: I have reviewed all pertinent imaging results/findings within the past 24 hours.      Assessment/Plan:     * Sepsis due to  pneumonia  Sent sputum gram and cxs, along with Legionella UAb and CXS  Still quite tachy and some soft BP's at times -> 1L NS (has diastolic dysfunction, so care to risk of CHF)  Hold Coreg for now from home and restart tomorrow, as some BP 98/69, now improved to 129/62    This patient does have evidence of infective focus  My overall impression is sepsis.  Source: Respiratory  Antibiotics given-   Antibiotics (72h ago, onward)      Start     Stop Route Frequency Ordered    04/19/24 0500  cefTRIAXone (ROCEPHIN) 2 g in dextrose 5 % in water (D5W) 100 mL IVPB (MB+)         -- IV Every 24 hours (non-standard times) 04/18/24 0554    04/18/24 0700  azithromycin (ZITHROMAX) 500 mg in dextrose 5 % (D5W) 250 mL IVPB (Vial-Mate)         04/23/24 0659 IV Every 24 hours (non-standard times) 04/18/24 0554          Latest lactate reviewed-  Recent Labs   Lab 04/18/24  0433   LACTATE 0.9     Organ dysfunction indicated by  no end organ damage    Fluid challenge Other- Patient to receive 1000cc volume other than 30cc/kg due to Congestive Heart Failure     Post- resuscitation assessment Yes Perfusion exam was performed within 6 hours of septic shock presentation after bolus shows Adequate tissue perfusion assessed by non-invasive monitoring       Will Not start Pressors- Levophed for MAP of 65  Source control achieved by: iv antibiotics    Chronic respiratory failure with hypoxia  Patient with Hypoxic Respiratory failure which is Chronic.  she is on home oxygen at 3 LPM. Supplemental oxygen was provided and noted O2 sats of 94% on 3L NC.    Signs/symptoms of respiratory failure include- tachypnea and increased work of breathing. Contributing diagnoses includes - COPD and Pneumonia Labs and images were reviewed. Patient Has not had a recent ABG. Will treat underlying causes and adjust management of respiratory failure as follows- duonebs, antibiotics, mucolytics, solumedrol x 1 in ED    Emphysema/COPD  She states her wheezing is  about baseline for her and she does not feel as though she is having an exacerbation  She did receive Solumedrol 125mg iv x 1 in the ED  Holding on daily Prednisone for now with sepsis and PNA         albuterol-ipratropium 2.5 mg-0.5 mg/3 mL nebulizer solution 3 mL, 3 mL, Nebulization, Q4H PRN     albuterol-ipratropium 2.5 mg-0.5 mg/3 mL nebulizer solution 3 mL, 3 mL, Nebulization, Q6H     benzonatate capsule 100 mg, 100 mg, Oral, TID PRN, cough    dextromethorphan-guaiFENesin  mg/5 ml liquid 10 mL, 10 mL, Oral, Q6H     fluticasone furoate-vilanteroL 200-25 mcg/dose diskus inhaler 1 puff, 1 puff, Inhalation, Daily     montelukast tablet 10 mg, 10 mg, Oral, QAM     Primary hypertension  Holding the following home meds in sepsis x first 24 hours:    carvediloL (COREG) 25 MG tablet, Take 0.5 tablets (12.5 mg total) by mouth 2 (two) times daily     spironolactone (ALDACTONE) 25 MG tablet, Take 0.5 tablets (12.5 mg total) by mouth once daily       Anemia of chronic disease  Baseline, stable to follow up with PCP       cyanocobalamin tablet 1,000 mcg, 1,000 mcg, Oral, Daily     folic acid tablet 1 mg, 1 mg, Oral, Daily        Latest Reference Range & Units 01/29/24 06:04 01/30/24 05:35 02/21/24 12:51 04/18/24 04:33   Hemoglobin 12.0 - 16.0 g/dL 8.8 (L) 9.2 (L) 10.9 (L) 10.6 (L)          Hypokalemia  KCl 20 meq po x 1      Hypomagnesemia  MgSO4 2g iv x 1 given in the ED    The patient's magnesium results have been reviewed and are listed below.  Recent Labs   Lab 04/18/24  0433   MG 1.4*        Anxiety and depression  Continue home meds:    busPIRone tablet 15 mg, 15 mg, Oral, BID     clonazePAM tablet 0.5 mg, 0.5 mg, Oral, BID PRN     FLUoxetine capsule 40 mg, 40 mg, Oral, Daily     Coronary artery disease involving native coronary artery of native heart without angina pectoris  Per Epic, she has had NSTEMI in the past  Home Coreg held in sepsis, restart ASAP when stabilized    Continue home meds:    aspirin EC  tablet 81 mg, 81 mg, Oral, Daily     atorvastatin tablet 80 mg, 80 mg, Oral, Daily     clopidogreL tablet 75 mg, 75 mg, Oral, Daily     Hyponatremia  This is a chronic problem for her  Holding home Aldactone  NS 1L iv    Recent Labs   Lab 04/18/24  0433   *         VTE Risk Mitigation (From admission, onward)           Ordered     enoxaparin injection 40 mg  Daily         04/18/24 0619     Place ALEA hose  Until discontinued         04/18/24 0619     IP VTE HIGH RISK PATIENT  Once         04/18/24 0619     Place sequential compression device  Until discontinued         04/18/24 0619                         The attending portion of this evaluation, treatment, and documentation was performed per MATT Chu MD via Telemedicine AudioVisual using the secure [employer] software platform with 2 way audio/video. The provider was located off-site and the patient is located in the hospital. The aforementioned video software was utilized to document the relevant history and physical exam            MATT Chu MD  Department of Hospital Medicine   [place] - Emergency Dept

## 2024-04-18 NOTE — ASSESSMENT & PLAN NOTE
Baseline, stable to follow up with PCP       cyanocobalamin tablet 1,000 mcg, 1,000 mcg, Oral, Daily     folic acid tablet 1 mg, 1 mg, Oral, Daily        Latest Reference Range & Units 01/29/24 06:04 01/30/24 05:35 02/21/24 12:51 04/18/24 04:33   Hemoglobin 12.0 - 16.0 g/dL 8.8 (L) 9.2 (L) 10.9 (L) 10.6 (L)

## 2024-04-18 NOTE — ASSESSMENT & PLAN NOTE
Holding the following home meds in sepsis x first 24 hours:    carvediloL (COREG) 25 MG tablet, Take 0.5 tablets (12.5 mg total) by mouth 2 (two) times daily     spironolactone (ALDACTONE) 25 MG tablet, Take 0.5 tablets (12.5 mg total) by mouth once daily

## 2024-04-18 NOTE — ASSESSMENT & PLAN NOTE
Continue home meds:    busPIRone tablet 15 mg, 15 mg, Oral, BID     clonazePAM tablet 0.5 mg, 0.5 mg, Oral, BID PRN     FLUoxetine capsule 40 mg, 40 mg, Oral, Daily

## 2024-04-18 NOTE — ED NOTES
Pt moved in stretcher to med surge, upon reaching nurses station, informed room is being cleaned, apologies made to pt and family and escorted back to room

## 2024-04-18 NOTE — PLAN OF CARE
[facility] S[facility]  Initial Discharge Assessment    Assessment completed at bedside with Pt, and all information on FaceSheet confirmed, including demographics, PCP, pharmacy and insurance. Pt has not addressed advance directives, and reports Mellisa Miranda (Daughter) [phone]  is her NOK. She currently lives at home with daughter (Mellisa), daughters , their 7 children, her son and her sons wife (11 total + Pt).      Her PCP is Dr. Jhaveri, and last appointment was 1 month ago.  Her preferred pharmacy is [facility]. She denies any HD/Blood Thinners. Pt has rolling walker, rollator, shower chair and uses 3L oxygen for DME. For transportation to appointments, and at discharge, daughter to provide. She denies any recent hospitalizations. Plan for discharge is to return home with HH.  Case Management to continue to follow for discharge planning needs.             Primary Care Provider: Pepito Jhaveri IV, MD    Admission Diagnosis: Shortness of breath [R06.02]  Sepsis due to pneumonia [J18.9, A41.9]  Pneumonia of right lower lobe due to infectious organism [J18.9]  Acute on chronic hypoxic respiratory failure [J96.21]    Admission Date: 4/18/2024  Expected Discharge Date: 4/19/2024    Transition of Care Barriers: None    Payor: [employer] MEDICARE / Plan: [employer] HMO PPO SPECIAL NEEDS / Product Type: Medicare Advantage /     Extended Emergency Contact Information  Primary Emergency Contact: Mellisa Miranda  Mobile Phone: [phone]  Relation: Daughter  Preferred language: English  Secondary Emergency Contact: ASHLEY MIRANDA  Mobile Phone: [phone]  Relation: Daughter  Preferred language: English   needed? No    Discharge Plan A: Home Health  Discharge Plan B: Home Health      [facility] - [place], [place] - [address] [place]  [address] [place]  [place] [address]  Phone: [phone] Fax: [phone]      Initial Assessment (most recent)       Adult Discharge Assessment  - 04/18/24 1445          Discharge Assessment    Assessment Type Discharge Planning Assessment     Confirmed/corrected address, phone number and insurance Yes     Confirmed Demographics Correct on Facesheet     Source of Information patient     Communicated CHLOE with patient/caregiver No     Reason For Admission sespsis dueto pneumonia     People in Home child(jie), adult;grandchild(jie);other relative(s)     Facility Arrived From: home     Do you expect to return to your current living situation? Yes     Do you have help at home or someone to help you manage your care at home? Yes     Who are your caregiver(s) and their phone number(s)? Mellisa Miranda (Daughter)  [phone]     Prior to hospitilization cognitive status: Alert/Oriented     Current cognitive status: Alert/Oriented     Walking or Climbing Stairs Difficulty yes     Walking or Climbing Stairs stair climbing difficulty, requires equipment;stair climbing difficulty, assistance 1 person     Dressing/Bathing Difficulty no     Do you have any problems with: Errands/Grocery   Mellisa Miranda (Daughter)  [phone]    Home Layout Bathroom on 2nd floor;Bedroom on 2nd floor     Equipment Currently Used at Home walker, rolling;rollator;shower chair;oxygen   3L o2    Readmission within 30 days? No     Patient currently being followed by outpatient case management? No     Do you currently have service(s) that help you manage your care at home? Yes     How Many hours does patient receive services 1     [phone]     Is the pt/caregiver preference to resume services with current agency Yes     Do you take prescription medications? Yes     Do you have prescription coverage? Yes     Coverage [employer]     Do you have any problems affording any of your prescribed medications? No     Is the patient taking medications as prescribed? yes     Who is going to help you get home at discharge? Mellisa Miranda (Daughter)   [phone]     How do you get to doctors appointments? family or friend will provide     Are you on dialysis? No     Do you take coumadin? No     Discharge Plan A Home Health     Discharge Plan B Home Health     DME Needed Upon Discharge  none     Discharge Plan discussed with: Patient     Transition of Care Barriers None

## 2024-04-18 NOTE — ASSESSMENT & PLAN NOTE
She states her wheezing is about baseline for her and she does not feel as though she is having an exacerbation  She did receive Solumedrol 125mg iv x 1 in the ED  Holding on daily Prednisone for now with sepsis and PNA         albuterol-ipratropium 2.5 mg-0.5 mg/3 mL nebulizer solution 3 mL, 3 mL, Nebulization, Q4H PRN     albuterol-ipratropium 2.5 mg-0.5 mg/3 mL nebulizer solution 3 mL, 3 mL, Nebulization, Q6H     benzonatate capsule 100 mg, 100 mg, Oral, TID PRN, cough    dextromethorphan-guaiFENesin  mg/5 ml liquid 10 mL, 10 mL, Oral, Q6H     fluticasone furoate-vilanteroL 200-25 mcg/dose diskus inhaler 1 puff, 1 puff, Inhalation, Daily     montelukast tablet 10 mg, 10 mg, Oral, QAM

## 2024-04-18 NOTE — SUBJECTIVE & OBJECTIVE
Past Medical History:   Diagnosis Date    Acquired hypothyroidism     Anxiety and depression     Cervical radicular pain     Closed left ankle fracture     COPD (chronic obstructive pulmonary disease)     H/O: stroke 11/08/2023    Hypertension     Hypokalemia     Hyponatremia     Multiple rib fractures     NSTEMI (non-ST elevated myocardial infarction)     Requires continuous at home supplemental oxygen     PRN FOR COPD    Stroke     Tension type headache     Traumatic closed displaced fracture of distal end of radius, left, sequela        Past Surgical History:   Procedure Laterality Date    COLON SURGERY      COLOSTOMY      COLOSTOMY CLOSURE      HYSTERECTOMY      LEG SURGERY Right     OPEN REDUCTION AND INTERNAL FIXATION (ORIF) OF INJURY OF WRIST Left 1/29/2019    Procedure: ORIF, WRIST;  Surgeon: Homero Jeff DO;  Location: [facility] OR;  Service: Orthopedics;  Laterality: Left;  Equipment: [employer] Geminus Wrist Plate Set  Vendor/Rep: [employer]  C-Arm: Entire  DME: None    REQUIRES ASSISTANT    WRIST SURGERY Right        Review of patient's allergies indicates:  No Known Allergies    Current Facility-Administered Medications   Medication Dose Route Frequency Provider Last Rate Last Admin    acetaminophen tablet 650 mg  650 mg Oral Q4H PRN SUDHEER Chu MD        albuterol-ipratropium 2.5 mg-0.5 mg/3 mL nebulizer solution 3 mL  3 mL Nebulization Q4H PRN SUDHEER Chu MD        albuterol-ipratropium 2.5 mg-0.5 mg/3 mL nebulizer solution 3 mL  3 mL Nebulization Q6H SUDHEER Chu MD        aspirin EC tablet 81 mg  81 mg Oral Daily SUDHEER Chu MD        atorvastatin tablet 80 mg  80 mg Oral Daily SUDHEER Chu MD        azithromycin (ZITHROMAX) 500 mg in dextrose 5 % (D5W) 250 mL IVPB (Vial-Mate)  500 mg Intravenous Q24H SUDHEER Chu  mL/hr at 04/18/24 0640 500 mg at 04/18/24 0640    benzonatate capsule 100 mg  100 mg Oral TID PRN SUDHEER Chu MD         busPIRone tablet 15 mg  15 mg Oral BID SUDHEER Chu MD        [START ON 4/19/2024] cefTRIAXone (ROCEPHIN) 2 g in dextrose 5 % in water (D5W) 100 mL IVPB (MB+)  2 g Intravenous Q24H SUDHEER Chu MD        clonazePAM tablet 0.5 mg  0.5 mg Oral BID PRN SUDHEER Chu MD        clopidogreL tablet 75 mg  75 mg Oral Daily SUDHEER Chu MD        cyanocobalamin tablet 1,000 mcg  1,000 mcg Oral Daily SUDHEER Chu MD        dextromethorphan-guaiFENesin  mg/5 ml liquid 10 mL  10 mL Oral Q6H SUDHEER Chu MD   10 mL at 04/18/24 0639    enoxaparin injection 40 mg  40 mg Subcutaneous Daily SUDHEER Chu MD        FLUoxetine capsule 40 mg  40 mg Oral Daily SUDHEER Chu MD        fluticasone furoate-vilanteroL 200-25 mcg/dose diskus inhaler 1 puff  1 puff Inhalation Daily SUDHEER Chu MD        folic acid tablet 1 mg  1 mg Oral Daily SUDHEER Chu MD        HYDROcodone-acetaminophen 5-325 mg per tablet 1 tablet  1 tablet Oral Q6H PRN SUDHEER Chu MD        levothyroxine tablet 50 mcg  50 mcg Oral Before breakfast SUDHEER Chu MD   50 mcg at 04/18/24 0639    melatonin tablet 6 mg  6 mg Oral Nightly PRN SUDHEER Chu MD        montelukast tablet 10 mg  10 mg Oral QAM SUDHEER Chu MD        ondansetron injection 4 mg  4 mg Intravenous Q6H PRN SUDHEER Chu MD        potassium chloride SA CR tablet 20 mEq  20 mEq Oral Once SUDHEER Chu MD        prochlorperazine injection Soln 5 mg  5 mg Intravenous Q6H PRN SUDHEER Chu MD        senna-docusate 8.6-50 mg per tablet 1 tablet  1 tablet Oral BID PRN SUDHEER Chu MD        sodium chloride 0.9% bolus 1,000 mL 1,000 mL  1,000 mL Intravenous Once SUDHEER Chu  mL/hr at 04/18/24 0641 1,000 mL at 04/18/24 0641    sodium chloride 0.9% flush 10 mL  10 mL Intravenous Q12H PRN SUDHEER Chu MD         Current Outpatient Medications   Medication Sig Dispense Refill    albuterol (PROVENTIL/VENTOLIN  HFA) 90 mcg/actuation inhaler Inhale 2 puffs into the lungs every 6 (six) hours as needed for Wheezing or Shortness of Breath.      aspirin (ECOTRIN) 81 MG EC tablet Take 1 tablet (81 mg total) by mouth once daily. 360 tablet 0    atorvastatin (LIPITOR) 80 MG tablet Take 1 tablet (80 mg total) by mouth once daily. 90 tablet 3    busPIRone (BUSPAR) 15 MG tablet Take 15 mg by mouth 2 (two) times a day.      butalbital-acetaminophen-caffeine -40 mg (FIORICET, ESGIC) -40 mg per tablet Take 1 tablet by mouth every 8 (eight) hours as needed for Headaches.      carvediloL (COREG) 25 MG tablet Take 0.5 tablets (12.5 mg total) by mouth 2 (two) times daily. 90 tablet 3    clonazePAM (KLONOPIN) 0.5 MG tablet Take 0.25-0.5 mg by mouth.      clopidogreL (PLAVIX) 75 mg tablet Take 1 tablet (75 mg total) by mouth once daily. 90 tablet 3    cyanocobalamin 1,000 mcg/mL injection Inject 1,000 mcg into the muscle every 30 days.       cyclobenzaprine (FLEXERIL) 10 MG tablet Take 10 mg by mouth 3 (three) times daily as needed for Muscle spasms.      FLUoxetine 40 MG capsule Take 40 mg by mouth once daily.      folic acid (FOLVITE) 1 MG tablet Take 1 tablet (1 mg total) by mouth once daily.  0    levothyroxine (SYNTHROID) 50 MCG tablet Take 50 mcg by mouth before breakfast.      montelukast (SINGULAIR) 10 mg tablet Take 10 mg by mouth every morning.      multivitamin Tab Take 1 tablet by mouth once daily. (Patient not taking: Reported on 3/25/2024)      ondansetron (ZOFRAN) 4 MG tablet Take 4 mg by mouth every 6 (six) hours as needed for Nausea.      potassium chloride (KLOR-CON) 10 MEQ TbSR Take 10 mEq by mouth 2 (two) times daily.      spironolactone (ALDACTONE) 25 MG tablet Take 0.5 tablets (12.5 mg total) by mouth once daily. 15 tablet 11    STIOLTO RESPIMAT 2.5-2.5 mcg/actuation Mist INHALE ONE PUFF INTO THE LUNGS ONCE DAILY. controller 4 g 5    thiamine 100 MG tablet Take 1 tablet (100 mg total) by mouth once daily.  (Patient not taking: Reported on 3/25/2024)       Family History       Problem Relation (Age of Onset)    Anxiety disorder Daughter, Daughter    Arthritis Sister    COPD Father    Cancer Mother    Cirrhosis Father    Congenital heart disease Daughter    Depression Daughter    No Known Problems Brother    Valvular heart disease Daughter          Tobacco Use    Smoking status: Every Day     Current packs/day: 1.00     Average packs/day: 1 pack/day for 20.0 years (20.0 ttl pk-yrs)     Types: Cigarettes    Smokeless tobacco: Never   Substance and Sexual Activity    Alcohol use: Yes     Comment: couple times a week-wine or bloody cari    Drug use: No    Sexual activity: Not Currently     Review of Systems   Constitutional:  Positive for activity change, chills, diaphoresis, fatigue and fever.   HENT:  Positive for congestion.    Eyes:  Negative for visual disturbance.   Respiratory:  Positive for chest tightness and shortness of breath.    Cardiovascular:  Positive for chest pain.   Gastrointestinal:  Positive for diarrhea and nausea. Negative for vomiting.   Endocrine: Positive for cold intolerance.   Genitourinary:  Positive for flank pain. Negative for dysuria.   Musculoskeletal:  Positive for myalgias.   Skin:  Negative for rash.   Allergic/Immunologic: Negative for immunocompromised state.   Neurological:  Positive for headaches.   Hematological:  Does not bruise/bleed easily.   Psychiatric/Behavioral:  Negative for decreased concentration and dysphoric mood.      Objective:     Vital Signs (Most Recent):  Temp: 99.2 °F (37.3 °C) (04/18/24 0552)  Pulse: (!) 112 (04/18/24 0619)  Resp: 18 (04/18/24 0619)  BP: 129/62 (04/18/24 0617)  SpO2: 95 % (04/18/24 0619) Vital Signs (24h Range):  Temp:  [99.2 °F (37.3 °C)-99.6 °F (37.6 °C)] 99.2 °F (37.3 °C)  Pulse:  [112-129] 112  Resp:  [18-25] 18  SpO2:  [93 %-99 %] 95 %  BP: ()/(58-78) 129/62     Weight: 59 kg (130 lb 1.1 oz)  Body mass index is 23.79 kg/m².      Physical Exam  Constitutional:       General: She is not in acute distress.     Appearance: She is normal weight. She is ill-appearing and diaphoretic. She is not toxic-appearing.   HENT:      Head: Normocephalic and atraumatic.   Pulmonary:      Effort: No tachypnea or bradypnea.   Abdominal:      General: Abdomen is flat.   Genitourinary:     Comments: No gomez in place  Skin:     Coloration: Skin is sallow.   Neurological:      Mental Status: She is alert and oriented to person, place, and time.      GCS: GCS eye subscore is 4. GCS verbal subscore is 5. GCS motor subscore is 6.   Psychiatric:         Attention and Perception: Attention and perception normal.         Cognition and Memory: Cognition and memory normal.                Significant Labs: All pertinent labs within the past 24 hours have been reviewed.  Recent Results (from the past 24 hour(s))   COVID-19 Rapid Screening    Collection Time: 04/18/24  4:28 AM   Result Value Ref Range    SARS-CoV-2 RNA, Amplification, Qual Negative Negative   Influenza A & B by Molecular    Collection Time: 04/18/24  4:28 AM    Specimen: Nasopharyngeal Swab   Result Value Ref Range    Influenza A, Molecular Negative Negative    Influenza B, Molecular Negative Negative    Flu A & B Source Nasal Swab    CBC Auto Differential    Collection Time: 04/18/24  4:33 AM   Result Value Ref Range    WBC 18.20 (H) 3.90 - 12.70 K/uL    RBC 3.52 (L) 4.00 - 5.40 M/uL    Hemoglobin 10.6 (L) 12.0 - 16.0 g/dL    Hematocrit 32.2 (L) 37.0 - 48.5 %    MCV 92 82 - 98 fL    MCH 30.1 27.0 - 31.0 pg    MCHC 32.9 32.0 - 36.0 g/dL    RDW 13.2 11.5 - 14.5 %    Platelets 298 150 - 450 K/uL    MPV 9.3 9.2 - 12.9 fL    Immature Granulocytes 0.4 0.0 - 0.5 %    Gran # (ANC) 15.6 (H) 1.8 - 7.7 K/uL    Immature Grans (Abs) 0.07 (H) 0.00 - 0.04 K/uL    Lymph # 1.3 1.0 - 4.8 K/uL    Mono # 1.2 (H) 0.3 - 1.0 K/uL    Eos # 0.0 0.0 - 0.5 K/uL    Baso # 0.04 0.00 - 0.20 K/uL    nRBC 0 0 /100 WBC    Gran % 85.7  (H) 38.0 - 73.0 %    Lymph % 7.1 (L) 18.0 - 48.0 %    Mono % 6.5 4.0 - 15.0 %    Eosinophil % 0.1 0.0 - 8.0 %    Basophil % 0.2 0.0 - 1.9 %    Differential Method Automated    Comprehensive Metabolic Panel    Collection Time: 04/18/24  4:33 AM   Result Value Ref Range    Sodium 134 (L) 136 - 145 mmol/L    Potassium 3.4 (L) 3.5 - 5.1 mmol/L    Chloride 97 95 - 110 mmol/L    CO2 21 (L) 23 - 29 mmol/L    Glucose 98 70 - 110 mg/dL    BUN 8 8 - 23 mg/dL    Creatinine 0.8 0.5 - 1.4 mg/dL    Calcium 9.0 8.7 - 10.5 mg/dL    Total Protein 7.3 6.0 - 8.4 g/dL    Albumin 2.6 (L) 3.5 - 5.2 g/dL    Total Bilirubin 0.3 0.1 - 1.0 mg/dL    Alkaline Phosphatase 135 55 - 135 U/L    AST 19 10 - 40 U/L    ALT 12 10 - 44 U/L    eGFR >60.0 >60 mL/min/1.73 m^2    Anion Gap 16 8 - 16 mmol/L   Lactic Acid, Plasma    Collection Time: 04/18/24  4:33 AM   Result Value Ref Range    Lactate (Lactic Acid) 0.9 0.5 - 2.2 mmol/L   Brain natriuretic peptide    Collection Time: 04/18/24  4:33 AM   Result Value Ref Range    BNP 68 0 - 99 pg/mL   Troponin I    Collection Time: 04/18/24  4:33 AM   Result Value Ref Range    Troponin I 0.012 0.000 - 0.026 ng/mL   Magnesium    Collection Time: 04/18/24  4:33 AM   Result Value Ref Range    Magnesium 1.4 (L) 1.6 - 2.6 mg/dL         Significant Imaging: I have reviewed all pertinent imaging results/findings within the past 24 hours.

## 2024-04-18 NOTE — PLAN OF CARE
IMM signed by Pt at bedside.          04/18/24 1447   Medicare Message   Important Message from Medicare regarding Discharge Appeal Rights Given to patient/caregiver;Explained to patient/caregiver;Signed/date by patient/caregiver   Date IMM was signed 04/18/24   Time IMM was signed [id number]

## 2024-04-18 NOTE — ASSESSMENT & PLAN NOTE
Sent sputum gram and cxs, along with Legionella UAb and CXS  Still quite tachy and some soft BP's at times -> 1L NS (has diastolic dysfunction, so care to risk of CHF)  Hold Coreg for now from home and restart tomorrow, as some BP 98/69, now improved to 129/62    This patient does have evidence of infective focus  My overall impression is sepsis.  Source: Respiratory  Antibiotics given-   Antibiotics (72h ago, onward)      Start     Stop Route Frequency Ordered    04/19/24 0500  cefTRIAXone (ROCEPHIN) 2 g in dextrose 5 % in water (D5W) 100 mL IVPB (MB+)         -- IV Every 24 hours (non-standard times) 04/18/24 0554    04/18/24 0700  azithromycin (ZITHROMAX) 500 mg in dextrose 5 % (D5W) 250 mL IVPB (Vial-Mate)         04/23/24 0659 IV Every 24 hours (non-standard times) 04/18/24 0554          Latest lactate reviewed-  Recent Labs   Lab 04/18/24  0433   LACTATE 0.9     Organ dysfunction indicated by  no end organ damage    Fluid challenge Other- Patient to receive 1000cc volume other than 30cc/kg due to Congestive Heart Failure     Post- resuscitation assessment Yes Perfusion exam was performed within 6 hours of septic shock presentation after bolus shows Adequate tissue perfusion assessed by non-invasive monitoring       Will Not start Pressors- Levophed for MAP of 65  Source control achieved by: iv antibiotics

## 2024-04-18 NOTE — PLAN OF CARE
Ongoing and progressing towards goals, AOX4 since arrived on the floor, independently changes positions, swallow with no issues.

## 2024-04-18 NOTE — ED NOTES
1st contact with pt, assisting in pt care, a&ox3, in no acute distress, resp even non labored skin dry, warm, noted pt texting on cell phone,   Straight cath for urine, tolerated well, yellow urine obtained

## 2024-04-18 NOTE — ASSESSMENT & PLAN NOTE
MgSO4 2g iv x 1 given in the ED    The patient's magnesium results have been reviewed and are listed below.  Recent Labs   Lab 04/18/24  0433   MG 1.4*

## 2024-04-18 NOTE — ASSESSMENT & PLAN NOTE
Per Epic, she has had NSTEMI in the past  Home Coreg held in sepsis, restart ASAP when stabilized    Continue home meds:    aspirin EC tablet 81 mg, 81 mg, Oral, Daily     atorvastatin tablet 80 mg, 80 mg, Oral, Daily     clopidogreL tablet 75 mg, 75 mg, Oral, Daily

## 2024-04-18 NOTE — ASSESSMENT & PLAN NOTE
Patient with Hypoxic Respiratory failure which is Chronic.  she is on home oxygen at 3 LPM. Supplemental oxygen was provided and noted O2 sats of 94% on 3L NC.    Signs/symptoms of respiratory failure include- tachypnea and increased work of breathing. Contributing diagnoses includes - COPD and Pneumonia Labs and images were reviewed. Patient Has not had a recent ABG. Will treat underlying causes and adjust management of respiratory failure as follows- duonebs, antibiotics, mucolytics, solumedrol x 1 in ED

## 2024-04-19 LAB
ADENOVIRUS: NOT DETECTED
ANION GAP SERPL CALC-SCNC: 10 MMOL/L (ref 8–16)
BASOPHILS # BLD AUTO: 0.04 K/UL (ref 0–0.2)
BASOPHILS NFR BLD: 0.4 % (ref 0–1.9)
BORDETELLA PARAPERTUSSIS (IS1001): NOT DETECTED
BORDETELLA PERTUSSIS (PTXP): NOT DETECTED
BUN SERPL-MCNC: 7 MG/DL (ref 8–23)
CALCIUM SERPL-MCNC: 9.1 MG/DL (ref 8.7–10.5)
CHLAMYDIA PNEUMONIAE: NOT DETECTED
CHLORIDE SERPL-SCNC: 105 MMOL/L (ref 95–110)
CO2 SERPL-SCNC: 25 MMOL/L (ref 23–29)
CORONAVIRUS 229E, COMMON COLD VIRUS: NOT DETECTED
CORONAVIRUS HKU1, COMMON COLD VIRUS: NOT DETECTED
CORONAVIRUS NL63, COMMON COLD VIRUS: NOT DETECTED
CORONAVIRUS OC43, COMMON COLD VIRUS: NOT DETECTED
CREAT SERPL-MCNC: 0.7 MG/DL (ref 0.5–1.4)
DIFFERENTIAL METHOD BLD: ABNORMAL
EOSINOPHIL # BLD AUTO: 0.1 K/UL (ref 0–0.5)
EOSINOPHIL NFR BLD: 0.6 % (ref 0–8)
ERYTHROCYTE [DISTWIDTH] IN BLOOD BY AUTOMATED COUNT: 13.4 % (ref 11.5–14.5)
EST. GFR  (NO RACE VARIABLE): >60 ML/MIN/1.73 M^2
FLUBV RNA NPH QL NAA+NON-PROBE: NOT DETECTED
GLUCOSE SERPL-MCNC: 94 MG/DL (ref 70–110)
HCT VFR BLD AUTO: 30.3 % (ref 37–48.5)
HGB BLD-MCNC: 9.9 G/DL (ref 12–16)
HPIV1 RNA NPH QL NAA+NON-PROBE: NOT DETECTED
HPIV2 RNA NPH QL NAA+NON-PROBE: NOT DETECTED
HPIV3 RNA NPH QL NAA+NON-PROBE: NOT DETECTED
HPIV4 RNA NPH QL NAA+NON-PROBE: NOT DETECTED
HUMAN METAPNEUMOVIRUS: NOT DETECTED
IMM GRANULOCYTES # BLD AUTO: 0.06 K/UL (ref 0–0.04)
IMM GRANULOCYTES NFR BLD AUTO: 0.6 % (ref 0–0.5)
INFLUENZA A (SUBTYPES H1,H1-2009,H3): NOT DETECTED
LYMPHOCYTES # BLD AUTO: 1.7 K/UL (ref 1–4.8)
LYMPHOCYTES NFR BLD: 16.4 % (ref 18–48)
MAGNESIUM SERPL-MCNC: 2 MG/DL (ref 1.6–2.6)
MCH RBC QN AUTO: 30.3 PG (ref 27–31)
MCHC RBC AUTO-ENTMCNC: 32.7 G/DL (ref 32–36)
MCV RBC AUTO: 93 FL (ref 82–98)
MONOCYTES # BLD AUTO: 0.6 K/UL (ref 0.3–1)
MONOCYTES NFR BLD: 5.7 % (ref 4–15)
MYCOPLASMA PNEUMONIAE: NOT DETECTED
NEUTROPHILS # BLD AUTO: 8.1 K/UL (ref 1.8–7.7)
NEUTROPHILS NFR BLD: 76.3 % (ref 38–73)
NRBC BLD-RTO: 0 /100 WBC
PHOSPHATE SERPL-MCNC: 3.6 MG/DL (ref 2.7–4.5)
PLATELET # BLD AUTO: 294 K/UL (ref 150–450)
PMV BLD AUTO: 9.5 FL (ref 9.2–12.9)
POTASSIUM SERPL-SCNC: 3.7 MMOL/L (ref 3.5–5.1)
RBC # BLD AUTO: 3.27 M/UL (ref 4–5.4)
RESPIRATORY INFECTION PANEL SOURCE: NORMAL
RSV RNA NPH QL NAA+NON-PROBE: NOT DETECTED
RV+EV RNA NPH QL NAA+NON-PROBE: NOT DETECTED
SARS-COV-2 RNA RESP QL NAA+PROBE: NOT DETECTED
SODIUM SERPL-SCNC: 140 MMOL/L (ref 136–145)
WBC # BLD AUTO: 10.59 K/UL (ref 3.9–12.7)

## 2024-04-19 PROCEDURE — 63600175 PHARM REV CODE 636 W HCPCS: Performed by: STUDENT IN AN ORGANIZED HEALTH CARE EDUCATION/TRAINING PROGRAM

## 2024-04-19 PROCEDURE — 85025 COMPLETE CBC W/AUTO DIFF WBC: CPT | Performed by: INTERNAL MEDICINE

## 2024-04-19 PROCEDURE — 84100 ASSAY OF PHOSPHORUS: CPT | Performed by: INTERNAL MEDICINE

## 2024-04-19 PROCEDURE — S4991 NICOTINE PATCH NONLEGEND: HCPCS | Performed by: INTERNAL MEDICINE

## 2024-04-19 PROCEDURE — 83735 ASSAY OF MAGNESIUM: CPT | Performed by: INTERNAL MEDICINE

## 2024-04-19 PROCEDURE — 25000003 PHARM REV CODE 250: Performed by: INTERNAL MEDICINE

## 2024-04-19 PROCEDURE — 25000003 PHARM REV CODE 250: Performed by: STUDENT IN AN ORGANIZED HEALTH CARE EDUCATION/TRAINING PROGRAM

## 2024-04-19 PROCEDURE — 99233 SBSQ HOSP IP/OBS HIGH 50: CPT | Mod: ,,, | Performed by: STUDENT IN AN ORGANIZED HEALTH CARE EDUCATION/TRAINING PROGRAM

## 2024-04-19 PROCEDURE — 94640 AIRWAY INHALATION TREATMENT: CPT

## 2024-04-19 PROCEDURE — 99900031 HC PATIENT EDUCATION (STAT)

## 2024-04-19 PROCEDURE — 27000221 HC OXYGEN, UP TO 24 HOURS

## 2024-04-19 PROCEDURE — 11000001 HC ACUTE MED/SURG PRIVATE ROOM

## 2024-04-19 PROCEDURE — 97166 OT EVAL MOD COMPLEX 45 MIN: CPT

## 2024-04-19 PROCEDURE — 94761 N-INVAS EAR/PLS OXIMETRY MLT: CPT

## 2024-04-19 PROCEDURE — 25000242 PHARM REV CODE 250 ALT 637 W/ HCPCS: Performed by: INTERNAL MEDICINE

## 2024-04-19 PROCEDURE — 36415 COLL VENOUS BLD VENIPUNCTURE: CPT | Performed by: INTERNAL MEDICINE

## 2024-04-19 PROCEDURE — 80048 BASIC METABOLIC PNL TOTAL CA: CPT | Performed by: INTERNAL MEDICINE

## 2024-04-19 PROCEDURE — 63600175 PHARM REV CODE 636 W HCPCS: Performed by: INTERNAL MEDICINE

## 2024-04-19 RX ORDER — KETOROLAC TROMETHAMINE 30 MG/ML
15 INJECTION, SOLUTION INTRAMUSCULAR; INTRAVENOUS ONCE
Status: COMPLETED | OUTPATIENT
Start: 2024-04-19 | End: 2024-04-19

## 2024-04-19 RX ADMIN — ATORVASTATIN CALCIUM 80 MG: 40 TABLET, FILM COATED ORAL at 07:04

## 2024-04-19 RX ADMIN — BUSPIRONE HYDROCHLORIDE 15 MG: 5 TABLET ORAL at 07:04

## 2024-04-19 RX ADMIN — GUAIFENESIN AND DEXTROMETHORPHAN 10 ML: 100; 10 SYRUP ORAL at 11:04

## 2024-04-19 RX ADMIN — ENOXAPARIN SODIUM 40 MG: 40 INJECTION SUBCUTANEOUS at 04:04

## 2024-04-19 RX ADMIN — NICOTINE 1 PATCH: 21 PATCH, EXTENDED RELEASE TRANSDERMAL at 07:04

## 2024-04-19 RX ADMIN — IPRATROPIUM BROMIDE AND ALBUTEROL SULFATE 3 ML: .5; 2.5 SOLUTION RESPIRATORY (INHALATION) at 07:04

## 2024-04-19 RX ADMIN — CYANOCOBALAMIN TAB 500 MCG 1000 MCG: 500 TAB at 07:04

## 2024-04-19 RX ADMIN — IPRATROPIUM BROMIDE AND ALBUTEROL SULFATE 3 ML: .5; 2.5 SOLUTION RESPIRATORY (INHALATION) at 12:04

## 2024-04-19 RX ADMIN — HYDROCODONE BITARTRATE AND ACETAMINOPHEN 1 TABLET: 5; 325 TABLET ORAL at 11:04

## 2024-04-19 RX ADMIN — CLONAZEPAM 0.5 MG: 0.5 TABLET ORAL at 09:04

## 2024-04-19 RX ADMIN — BENZONATATE 100 MG: 100 CAPSULE ORAL at 07:04

## 2024-04-19 RX ADMIN — FLUOXETINE 40 MG: 10 CAPSULE ORAL at 07:04

## 2024-04-19 RX ADMIN — PROCHLORPERAZINE EDISYLATE 5 MG: 5 INJECTION INTRAMUSCULAR; INTRAVENOUS at 04:04

## 2024-04-19 RX ADMIN — BUTALBITAL, ACETAMINOPHEN, AND CAFFEINE 1 TABLET: 50; 325; 40 TABLET ORAL at 07:04

## 2024-04-19 RX ADMIN — FLUTICASONE FUROATE AND VILANTEROL TRIFENATATE 1 PUFF: 200; 25 POWDER RESPIRATORY (INHALATION) at 07:04

## 2024-04-19 RX ADMIN — LEVOTHYROXINE SODIUM 50 MCG: 25 TABLET ORAL at 05:04

## 2024-04-19 RX ADMIN — BUTALBITAL, ACETAMINOPHEN, AND CAFFEINE 1 TABLET: 50; 325; 40 TABLET ORAL at 04:04

## 2024-04-19 RX ADMIN — AZITHROMYCIN MONOHYDRATE 500 MG: 500 INJECTION, POWDER, LYOPHILIZED, FOR SOLUTION INTRAVENOUS at 07:04

## 2024-04-19 RX ADMIN — CEFTRIAXONE 2 G: 2 INJECTION, POWDER, FOR SOLUTION INTRAMUSCULAR; INTRAVENOUS at 05:04

## 2024-04-19 RX ADMIN — CLOPIDOGREL BISULFATE 75 MG: 75 TABLET ORAL at 07:04

## 2024-04-19 RX ADMIN — GUAIFENESIN AND DEXTROMETHORPHAN 10 ML: 100; 10 SYRUP ORAL at 05:04

## 2024-04-19 RX ADMIN — BUSPIRONE HYDROCHLORIDE 15 MG: 5 TABLET ORAL at 09:04

## 2024-04-19 RX ADMIN — FOLIC ACID 1 MG: 1 TABLET ORAL at 07:04

## 2024-04-19 RX ADMIN — MONTELUKAST 10 MG: 10 TABLET, FILM COATED ORAL at 07:04

## 2024-04-19 RX ADMIN — KETOROLAC TROMETHAMINE 15 MG: 30 INJECTION, SOLUTION INTRAMUSCULAR; INTRAVENOUS at 04:04

## 2024-04-19 RX ADMIN — CLONAZEPAM 0.5 MG: 0.5 TABLET ORAL at 07:04

## 2024-04-19 RX ADMIN — ASPIRIN 81 MG: 81 TABLET, COATED ORAL at 07:04

## 2024-04-19 NOTE — ASSESSMENT & PLAN NOTE
This is a chronic problem for her  Holding home Aldactone      Recent Labs   Lab 04/19/24  0523

## 2024-04-19 NOTE — SUBJECTIVE & OBJECTIVE
Interval History: NAEON. Patient continues to have significant SOB and is not yet at baseline respiratory status. Would likely be readmitted if discharged today. Continue current level of care.    Review of Systems   All other systems reviewed and are negative.    Objective:     Vital Signs (Most Recent):  Temp: 97.8 °F (36.6 °C) (04/19/24 1115)  Pulse: 88 (04/19/24 1244)  Resp: 20 (04/19/24 1244)  BP: 138/75 (04/19/24 1115)  SpO2: 96 % (04/19/24 1244) Vital Signs (24h Range):  Temp:  [97.6 °F (36.4 °C)-98.8 °F (37.1 °C)] 97.8 °F (36.6 °C)  Pulse:  [] 88  Resp:  [18-22] 20  SpO2:  [94 %-100 %] 96 %  BP: (135-157)/(75-92) 138/75     Weight: 48.1 kg (106 lb 1.6 oz)  Body mass index is 19.41 kg/m².    Intake/Output Summary (Last 24 hours) at 4/19/2024 1341  Last data filed at 4/19/2024 0618  Gross per 24 hour   Intake 1080 ml   Output 2900 ml   Net -1820 ml         Physical Exam      Vitals reviewed  General: NAD, frail appearing  Head: NC/AT  Eyes: EOMI, DOLLY  Cardiovascular: Pulses intact distally, Regular Rate and rhythm  Pulmonary: Normal Respiratory Rate, No respiratory distress  Gi: Soft, Non-tender  Extremities: Warm, No edema present  Skin: Warm, dry  Neuro: Alert, Oriented x3, No focal Deficit  Psych: Appropriate mood and affect      Significant Labs: All pertinent labs within the past 24 hours have been reviewed.    Significant Imaging: I have reviewed all pertinent imaging results/findings within the past 24 hours.

## 2024-04-19 NOTE — PLAN OF CARE
Problem: Infection  Goal: Absence of Infection Signs and Symptoms  4/19/2024 0524 by Jeremiah Hernandez RN  Outcome: Ongoing, Progressing  4/19/2024 0224 by Jeremiah Hernandez RN  Outcome: Ongoing, Progressing  4/19/2024 0224 by Jeremiah Hernandez RN  Outcome: Ongoing, Progressing     Problem: Adult Inpatient Plan of Care  Goal: Plan of Care Review  4/19/2024 0524 by Jeremiah Hernandez RN  Outcome: Ongoing, Progressing  4/19/2024 0224 by Jeremiah Hernandez RN  Outcome: Ongoing, Progressing  4/19/2024 0224 by Jeremiah Hernandez RN  Outcome: Ongoing, Progressing  Goal: Patient-Specific Goal (Individualized)  4/19/2024 0524 by Jeremiah Hernandez RN  Outcome: Ongoing, Progressing  4/19/2024 0224 by Jeremiah Hernandez RN  Outcome: Ongoing, Progressing  4/19/2024 0224 by Jeremiah Hernandez RN  Outcome: Ongoing, Progressing  Goal: Absence of Hospital-Acquired Illness or Injury  4/19/2024 0524 by Jeremiah Hernandez RN  Outcome: Ongoing, Progressing  4/19/2024 0224 by Jeremiah Hernandez RN  Outcome: Ongoing, Progressing  4/19/2024 0224 by Jeremiah Hernandez RN  Outcome: Ongoing, Progressing  Goal: Optimal Comfort and Wellbeing  4/19/2024 0524 by Jeremiah Hernandez RN  Outcome: Ongoing, Progressing  4/19/2024 0224 by Jeremiah Hernandez RN  Outcome: Ongoing, Progressing  4/19/2024 0224 by Jeremiah Hernandez RN  Outcome: Ongoing, Progressing  Goal: Readiness for Transition of Care  4/19/2024 0524 by Jeremiah Hernandez RN  Outcome: Ongoing, Progressing  4/19/2024 0224 by Jeremiah Hernandez RN  Outcome: Ongoing, Progressing  4/19/2024 0224 by Jeremiah Hernandez RN  Outcome: Ongoing, Progressing     Problem: Adjustment to Illness (Sepsis/Septic Shock)  Goal: Optimal Coping  4/19/2024 0524 by Jeremiah Hernandez RN  Outcome: Ongoing, Progressing  4/19/2024 0224 by Jeremiah Hernandez RN  Outcome: Ongoing, Progressing  4/19/2024 0224 by Hernandez, Jeremiah, RN  Outcome: Ongoing, Progressing     Problem: Bleeding (Sepsis/Septic Shock)  Goal: Absence of Bleeding  4/19/2024 0524 by Jeremiah Hernandez, RN  Outcome: Ongoing,  Progressing  4/19/2024 0224 by Jeremiah Hernandez RN  Outcome: Ongoing, Progressing  4/19/2024 0224 by Jeremiah Hernandez RN  Outcome: Ongoing, Progressing     Problem: Glycemic Control Impaired (Sepsis/Septic Shock)  Goal: Blood Glucose Level Within Desired Range  4/19/2024 0524 by Jeremiah Hernandez RN  Outcome: Ongoing, Progressing  4/19/2024 0224 by Jeremiah Hernandez RN  Outcome: Ongoing, Progressing  4/19/2024 0224 by Jeremiah Hernandez RN  Outcome: Ongoing, Progressing     Problem: Infection Progression (Sepsis/Septic Shock)  Goal: Absence of Infection Signs and Symptoms  4/19/2024 0524 by Jeremiah Hernandez RN  Outcome: Ongoing, Progressing  4/19/2024 0224 by Jeremiah Hernandez RN  Outcome: Ongoing, Progressing  4/19/2024 0224 by Jeremiah Hernandez RN  Outcome: Ongoing, Progressing     Problem: Nutrition Impaired (Sepsis/Septic Shock)  Goal: Optimal Nutrition Intake  4/19/2024 0524 by Jeremiah Hernandez RN  Outcome: Ongoing, Progressing  4/19/2024 0224 by Jeremiah Hernandez RN  Outcome: Ongoing, Progressing  4/19/2024 0224 by Jeremiah Hernandez RN  Outcome: Ongoing, Progressing     Problem: Fluid and Electrolyte Imbalance (Acute Kidney Injury/Impairment)  Goal: Fluid and Electrolyte Balance  4/19/2024 0524 by Jeremiah Hernandez RN  Outcome: Ongoing, Progressing  4/19/2024 0224 by Jeremiah Hernandez RN  Outcome: Ongoing, Progressing  4/19/2024 0224 by Jeremiah Hernandez RN  Outcome: Ongoing, Progressing     Problem: Oral Intake Inadequate (Acute Kidney Injury/Impairment)  Goal: Optimal Nutrition Intake  4/19/2024 0524 by Jeremiah Hernandez RN  Outcome: Ongoing, Progressing  4/19/2024 0224 by Jeremiah Hernandez RN  Outcome: Ongoing, Progressing  4/19/2024 0224 by Jeremiah Hernandez RN  Outcome: Ongoing, Progressing     Problem: Renal Function Impairment (Acute Kidney Injury/Impairment)  Goal: Effective Renal Function  4/19/2024 0524 by Jeremiah Hernandez RN  Outcome: Ongoing, Progressing  4/19/2024 0224 by Jeremiah Hernandez RN  Outcome: Ongoing, Progressing  4/19/2024 0224 by David  HALEIGH Daniels  Outcome: Ongoing, Progressing     Problem: Fluid Imbalance (Pneumonia)  Goal: Fluid Balance  4/19/2024 0524 by Jeremiah Hernandez RN  Outcome: Ongoing, Progressing  4/19/2024 0224 by Jeremiah Hernandez RN  Outcome: Ongoing, Progressing  4/19/2024 0224 by Jeremiah Hernandez RN  Outcome: Ongoing, Progressing     Problem: Infection (Pneumonia)  Goal: Resolution of Infection Signs and Symptoms  4/19/2024 0524 by Jeremiah Hernandez RN  Outcome: Ongoing, Progressing  4/19/2024 0224 by Jeremiah Hernandez RN  Outcome: Ongoing, Progressing  4/19/2024 0224 by Jeremiah Hernandez RN  Outcome: Ongoing, Progressing     Problem: Respiratory Compromise (Pneumonia)  Goal: Effective Oxygenation and Ventilation  4/19/2024 0524 by Jeremiah Hernandez RN  Outcome: Ongoing, Progressing  4/19/2024 0224 by Jeremiah Hernandez RN  Outcome: Ongoing, Progressing  4/19/2024 0224 by Jeremiah Hernandez RN  Outcome: Ongoing, Progressing     Problem: Skin Injury Risk Increased  Goal: Skin Health and Integrity  4/19/2024 0524 by Jeremiah Hernandez RN  Outcome: Ongoing, Progressing  4/19/2024 0224 by Jeremiah Hernandez RN  Outcome: Ongoing, Progressing  4/19/2024 0224 by Jeremiah Hernandez RN  Outcome: Ongoing, Progressing

## 2024-04-19 NOTE — ASSESSMENT & PLAN NOTE
Per Epic, she has had NSTEMI in the past  Home Coreg held in sepsis, restart ASAP when stabilized    Continue home meds:    aspirin EC tablet 81 mg, 81 mg, Oral, Daily     atorvastatin tablet 80 mg, 80 mg, Oral, Daily     clopidogreL tablet 75 mg, 75 mg, Oral, Daily

## 2024-04-19 NOTE — ASSESSMENT & PLAN NOTE
Sent sputum gram and cxs, along with Legionella UAb and CXS  Still quite tachy and some soft BP's at times -> 1L NS (has diastolic dysfunction, so care to risk of CHF)  Hold Coreg for now from home and restart tomorrow, as some BP 98/69, now improved to 129/62    This patient does have evidence of infective focus  My overall impression is sepsis.  Source: Respiratory  Antibiotics given-   Antibiotics (72h ago, onward)    Start     Stop Route Frequency Ordered    04/19/24 0500  cefTRIAXone (ROCEPHIN) 2 g in dextrose 5 % in water (D5W) 100 mL IVPB (MB+)         -- IV Every 24 hours (non-standard times) 04/18/24 0554    04/18/24 0700  azithromycin (ZITHROMAX) 500 mg in dextrose 5 % (D5W) 250 mL IVPB (Vial-Mate)         04/23/24 0659 IV Every 24 hours (non-standard times) 04/18/24 0554        Latest lactate reviewed-  Recent Labs   Lab 04/18/24  1537   LACTATE 1.0       Organ dysfunction indicated by  no end organ damage    Fluid challenge Other- Patient to receive 1000cc volume other than 30cc/kg due to Congestive Heart Failure     Post- resuscitation assessment Yes Perfusion exam was performed within 6 hours of septic shock presentation after bolus shows Adequate tissue perfusion assessed by non-invasive monitoring       Will Not start Pressors- Levophed for MAP of 65  Source control achieved by: iv antibiotics

## 2024-04-19 NOTE — PLAN OF CARE
Problem: Infection  Goal: Absence of Infection Signs and Symptoms  Outcome: Ongoing, Progressing     Problem: Adult Inpatient Plan of Care  Goal: Plan of Care Review  Outcome: Ongoing, Progressing  Goal: Patient-Specific Goal (Individualized)  Outcome: Ongoing, Progressing  Goal: Absence of Hospital-Acquired Illness or Injury  Outcome: Ongoing, Progressing  Goal: Optimal Comfort and Wellbeing  Outcome: Ongoing, Progressing  Goal: Readiness for Transition of Care  Outcome: Ongoing, Progressing     Problem: Adjustment to Illness (Sepsis/Septic Shock)  Goal: Optimal Coping  Outcome: Ongoing, Progressing     Problem: Fluid and Electrolyte Imbalance (Acute Kidney Injury/Impairment)  Goal: Fluid and Electrolyte Balance  Outcome: Ongoing, Progressing     Problem: Oral Intake Inadequate (Acute Kidney Injury/Impairment)  Goal: Optimal Nutrition Intake  Outcome: Ongoing, Progressing     Problem: Infection (Pneumonia)  Goal: Resolution of Infection Signs and Symptoms  Outcome: Ongoing, Progressing     Problem: Respiratory Compromise (Pneumonia)  Goal: Effective Oxygenation and Ventilation  Outcome: Ongoing, Progressing     Problem: Skin Injury Risk Increased  Goal: Skin Health and Integrity  Outcome: Ongoing, Progressing

## 2024-04-19 NOTE — PT/OT/SLP EVAL
"Occupational Therapy Evaluation     Name: Bhavana Lambert  MRN: [id number]  Admitting Diagnosis: Sepsis due to pneumonia  Recent Surgery: * No surgery found *      Recommendations:     Discharge Recommendations:  home with home health  Level of Assistance Recommended: Intermittent assistance  Discharge Equipment Recommendations:  none; patient has all necessary equipment  Barriers to discharge:  none    Assessment:     Bhavana Lambert is a 68 y.o. female with a medical diagnosis of Sepsis due to pneumonia. She presents with performance deficits affecting function including  weakness, impaired endurance, and impaired functional mobility.    Rehab Prognosis: Good; patient would benefit from acute OT services to address these deficits and reach maximum level of function.    Plan:     Patient to be seen 3-5X/week  to address the above listed problems via therapeutic activities, self-care/home management, and therapeutic exercises   Plan of Care Expires:  upon discharge  Plan of Care Reviewed with:  patient     Subjective     Chief Complaint: sepsis due to pneumonia  Patient Comments/Goals: "I want to walk around and not be weak"  Pain/Comfort:   8/10 headache pain    Patients cultural, spiritual, Restorationism conflicts given the current situation:  none    Social History:  Living Environment: Patient lives with their grandkids and child(jie) in a 2 story home with number of outside stair(s): 10-15  Prior Level of Function: Prior to admission, patient was independent  Roles and Routines: patient is a retired . She enjoys watching TV.  Equipment Used at Home:  rollator, shower chair  DME owned (not currently used): standard walker  Assistance Upon Discharge: family    Objective:     Communicated with RN prior to session. Patient found HOB elevated with lines and catheter in place   upon OT entry to room.    General Precautions: Standard,     Orthopedic Precautions:   none  Braces:    none  Respiratory Status: Nasal " cannula, flow 2 L/min    Occupational Performance    Bed Mobility:   Rolling/Turning to Left with independence  Rolling/Turning to Right with independence  Scooting to HOB in supine: independence  Supine to sit from left side of bed with stand by assistance    Functional Mobility/Transfers:  Functional Mobility: patient refused to engage in sit to stand or out of bed functional mobility due to severe headache.    Activities of Daily Living:  Feeding: independence  Grooming: independence  Bathing: modified independence  Upper Body Dressing: independence  Lower Body Dressing: independence  Toileting: independence    Cognitive/Visual Perceptual:  Cognitive/Psychosocial Skills:    -     Oriented to: Person, Place, Time, Situation  -     Follows Commands/attention: Follows multistep  commands  -     Communication: clear/fluent  -     Memory: No Deficits noted  -     Safety awareness/insight to disability: intact  -     Mood/Affect/Coping skills/emotional control: Agitated  Visual/Perceptual:    -     Intact  -     visual fields, pursuits, saccades; patient reported cataracts impair vision    Physical Exam:  Balance:    -     Sitting: independence  Dominant hand: Right  Upper Extremity Range of Motion:     -       Right Upper Extremity: WNL  -       Left Upper Extremity: WNL  Upper Extremity Strength:    -       Right Upper Extremity: 4 MMT in shoulder flexion, abduction, biceps flexion and 3 MMT in shoulder external rotation  -       Left Upper Extremity: 4 MMT in shoulder flexion, abduction, biceps flexion and 3 MMT in shoulder external rotation   Strength:    -       Right Upper Extremity: 4 MMT  -       Left Upper Extremity: 4 MMT    Treatment & Education:  Patient educated on role of OT, POC, and goals for therapy  Patient agitated but agreeable to evaluation. Patient reported independence in ADL tasks. She performed bed mobility with independence, and she performed supine to sit with SBA. She independently  "doffed/donned socks while seated EOB with good static/dynamic seated balance. Patient refused to engage in sit to stand or functional mobility stating she had a "pounding headache and can't do anything but lay down and be done." Patient demonstrated decreased activity tolerance. Patient on 2L of supplemental O2; she stated she independently manages O2 at home.     Patient left HOB elevated with all lines intact, call button in reach, and RN notified.    GOALS:   Patient will demonstrate increased activity tolerance required to perform ADLs/IADLs safely.      History:     Past Medical History:   Diagnosis Date    Acquired hypothyroidism     Anxiety and depression     Cervical radicular pain     Closed left ankle fracture     COPD (chronic obstructive pulmonary disease)     H/O: stroke 11/08/2023    Hypertension     Hypokalemia     Hyponatremia     Multiple rib fractures     NSTEMI (non-ST elevated myocardial infarction)     Requires continuous at home supplemental oxygen     PRN FOR COPD    Stroke     Tension type headache     Traumatic closed displaced fracture of distal end of radius, left, sequela          Past Surgical History:   Procedure Laterality Date    COLON SURGERY      COLOSTOMY      COLOSTOMY CLOSURE      HYSTERECTOMY      LEG SURGERY Right     OPEN REDUCTION AND INTERNAL FIXATION (ORIF) OF INJURY OF WRIST Left 1/29/2019    Procedure: ORIF, WRIST;  Surgeon: Homero Jeff DO;  Location: [facility] OR;  Service: Orthopedics;  Laterality: Left;  Equipment: [employer] Geminus Wrist Plate Set  Vendor/Rep: [employer]  C-Arm: Entire  DME: None    REQUIRES ASSISTANT    WRIST SURGERY Right        Time Tracking:     OT Date of Treatment:  4/19/24  OT Start Time:  3:25 PM  OT Stop Time:  3:45 PM  OT Total Time (min):  20    Billable Minutes: Evaluation 20  BRAYAN Mendez, OTR/L  4/19/2024        "

## 2024-04-19 NOTE — ASSESSMENT & PLAN NOTE
The patient's magnesium results have been reviewed and are listed below.  Recent Labs   Lab 04/19/24  0523   MG 2.0

## 2024-04-19 NOTE — ASSESSMENT & PLAN NOTE
Continue home meds:    busPIRone tablet 15 mg, 15 mg, Oral, BID     clonazePAM tablet 0.5 mg, 0.5 mg, Oral, BID PRN     FLUoxetine capsule 40 mg, 40 mg, Oral, Daily

## 2024-04-19 NOTE — CARE UPDATE
04/19/24 0709   Patient Assessment/Suction   Level of Consciousness (AVPU) alert   Respiratory Effort Mild   Expansion/Accessory Muscles/Retractions no use of accessory muscles   All Lung Fields Breath Sounds Anterior:;Posterior:;Lateral:;coarse;diminished   Rhythm/Pattern, Respiratory no shortness of breath reported   Cough Frequency frequent   Cough Type congested;nonproductive   PRE-TX-O2   Device (Oxygen Therapy) nasal cannula   $ Is the patient on Low Flow Oxygen? Yes   Flow (L/min) 3   SpO2 95 %   Pulse Oximetry Type Intermittent   $ Pulse Oximetry - Multiple Charge Pulse Oximetry - Multiple   Pulse 93   Resp 20   Aerosol Therapy   $ Aerosol Therapy Charges Aerosol Treatment   Daily Review of Necessity (SVN) completed   Respiratory Treatment Status (SVN) given   Treatment Route (SVN) mask   Patient Position (SVN) Billingsley's   Post Treatment Assessment (SVN) breath sounds improved   Signs of Intolerance (SVN) none   Inhaler   $ Inhaler Charges MDI (Metered Dose Inahler) Treatment   Respiratory Treatment Status (Inhaler) given   Treatment Route (Inhaler) mouthpiece   Patient Position (Inhaler) HOB elevated   Post Treatment Assessment (Inhaler) breath sounds improved   Signs of Intolerance (Inhaler) none   Breath Sounds Post-Respiratory Treatment   Throughout All Fields Post-Treatment All Fields   Throughout All Fields Post-Treatment aeration increased   Post-treatment Heart Rate (beats/min) 93   Post-treatment Resp Rate (breaths/min) 18   Education   $ Education Bronchodilator;15 min

## 2024-04-19 NOTE — PROGRESS NOTES
"[facility] Medicine  Progress Note    Patient Name: Bhavana Lambert  MRN: [id number]  Patient Class: IP- Inpatient   Admission Date: 4/18/2024  Length of Stay: 1 days  Attending Physician: Reynaldo Nick MD  Primary Care Provider: Pepito Jhaveri IV, MD        Subjective:     Principal Problem:Sepsis due to pneumonia        HPI:  Ms. Lambert is a 67yo lady with a past medical history of hypothyroidism, anxiety, depression, COPD, CVA, HTN, hyponatremia, NSTEMI, and chronic hypoxia on home O2 at 3L NC.    She states that her whole family got sick a month ago, then 3 weeks ago she developed a URI with thick nasal secretions.  This has gradually worsened to a chest cold and cough with green sputum.  Over the past 3 days she has had an increase in the severity of her cough with some fever and chills.  She has been taking OTC Robitussin with no relief.  She has had rib fractures on the right and the constant coughing has begun to cause pain to her right side as well.  She also has some chest tightness with SOB.  She has had nausea and diarrhea on and off too.  She does not feel like she is wheezing very much.    In the ED her VS were /68   Pulse (!) 129 -> 122   Temp max 99.6F -> 99.3 °F (37.4 °C) (Oral)   Resp 25 -> 18   Ht 5' 2" (1.575 m)   Wt 59 kg (130 lb 1.1 oz)   SpO2 (!) 84% RA -> 94% 3L NC  Breastfeeding No   BMI 23.79 kg/m².  Labs showed WBC 18, Hg 10.6, Na 134, K 3.4, Cr 0.8, Mg 1.4, COVID neg.     CXR shows flat diaphragms RLL infiltrate, prominent interstitial changes (personal read).  EKG showed sinus tachy 126, ST depressions and flipped T wave changes anteriorly, QTc 489 ms.    In the ED she was treated with:  Medications   magnesium sulfate 2g in water 50mL IVPB (premix) (2 g Intravenous New Bag 4/18/24 2993)   albuterol-ipratropium 2.5 mg-0.5 mg/3 mL nebulizer solution 3 mL (3 mLs Nebulization Given 4/18/24 9815)   albuterol-ipratropium 2.5 mg-0.5 mg/3 mL nebulizer " solution 3 mL (3 mLs Nebulization Given 4/18/24 0435)   methylPREDNISolone sodium succinate injection 125 mg (125 mg Intravenous Given 4/18/24 0448)   cefTRIAXone (ROCEPHIN) 2 g in dextrose 5 % in water (D5W) 100 mL IVPB (MB+) (0 g Intravenous Stopped 4/18/24 0522)   butalbital-acetaminophen-caffeine -40 mg per tablet 1 tablet (1 tablet Oral Given 4/18/24 0548)             Overview/Hospital Course:  No notes on file    Interval History: NAEON. Patient continues to have significant SOB and is not yet at baseline respiratory status. Would likely be readmitted if discharged today. Continue current level of care.    Review of Systems   All other systems reviewed and are negative.    Objective:     Vital Signs (Most Recent):  Temp: 97.8 °F (36.6 °C) (04/19/24 1115)  Pulse: 88 (04/19/24 1244)  Resp: 20 (04/19/24 1244)  BP: 138/75 (04/19/24 1115)  SpO2: 96 % (04/19/24 1244) Vital Signs (24h Range):  Temp:  [97.6 °F (36.4 °C)-98.8 °F (37.1 °C)] 97.8 °F (36.6 °C)  Pulse:  [] 88  Resp:  [18-22] 20  SpO2:  [94 %-100 %] 96 %  BP: (135-157)/(75-92) 138/75     Weight: 48.1 kg (106 lb 1.6 oz)  Body mass index is 19.41 kg/m².    Intake/Output Summary (Last 24 hours) at 4/19/2024 1341  Last data filed at 4/19/2024 0618  Gross per 24 hour   Intake 1080 ml   Output 2900 ml   Net -1820 ml         Physical Exam      Vitals reviewed  General: NAD, frail appearing  Head: NC/AT  Eyes: EOMI, DOLLY  Cardiovascular: Pulses intact distally, Regular Rate and rhythm  Pulmonary: Normal Respiratory Rate, No respiratory distress  Gi: Soft, Non-tender  Extremities: Warm, No edema present  Skin: Warm, dry  Neuro: Alert, Oriented x3, No focal Deficit  Psych: Appropriate mood and affect      Significant Labs: All pertinent labs within the past 24 hours have been reviewed.    Significant Imaging: I have reviewed all pertinent imaging results/findings within the past 24 hours.    Assessment/Plan:      * Sepsis due to pneumonia  Sent sputum  gram and cxs, along with Legionella UAb and CXS  Still quite tachy and some soft BP's at times -> 1L NS (has diastolic dysfunction, so care to risk of CHF)  Hold Coreg for now from home and restart tomorrow, as some BP 98/69, now improved to 129/62    This patient does have evidence of infective focus  My overall impression is sepsis.  Source: Respiratory  Antibiotics given-   Antibiotics (72h ago, onward)      Start     Stop Route Frequency Ordered    04/19/24 0500  cefTRIAXone (ROCEPHIN) 2 g in dextrose 5 % in water (D5W) 100 mL IVPB (MB+)         -- IV Every 24 hours (non-standard times) 04/18/24 0554    04/18/24 0700  azithromycin (ZITHROMAX) 500 mg in dextrose 5 % (D5W) 250 mL IVPB (Vial-Mate)         04/23/24 0659 IV Every 24 hours (non-standard times) 04/18/24 0554          Latest lactate reviewed-  Recent Labs   Lab 04/18/24  1537   LACTATE 1.0       Organ dysfunction indicated by  no end organ damage    Fluid challenge Other- Patient to receive 1000cc volume other than 30cc/kg due to Congestive Heart Failure     Post- resuscitation assessment Yes Perfusion exam was performed within 6 hours of septic shock presentation after bolus shows Adequate tissue perfusion assessed by non-invasive monitoring       Will Not start Pressors- Levophed for MAP of 65  Source control achieved by: iv antibiotics    Coronary artery disease involving native coronary artery of native heart without angina pectoris  Per Epic, she has had NSTEMI in the past  Home Coreg held in sepsis, restart ASAP when stabilized    Continue home meds:    aspirin EC tablet 81 mg, 81 mg, Oral, Daily     atorvastatin tablet 80 mg, 80 mg, Oral, Daily     clopidogreL tablet 75 mg, 75 mg, Oral, Daily     Hypomagnesemia      The patient's magnesium results have been reviewed and are listed below.  Recent Labs   Lab 04/19/24  0523   MG 2.0          Hyponatremia  This is a chronic problem for her  Holding home Aldactone      Recent Labs   Lab 04/19/24  0522             Emphysema/COPD  She states her wheezing is about baseline for her and she does not feel as though she is having an exacerbation  She did receive Solumedrol 125mg iv x 1 in the ED  Holding on daily Prednisone for now with sepsis and PNA         albuterol-ipratropium 2.5 mg-0.5 mg/3 mL nebulizer solution 3 mL, 3 mL, Nebulization, Q4H PRN     albuterol-ipratropium 2.5 mg-0.5 mg/3 mL nebulizer solution 3 mL, 3 mL, Nebulization, Q6H     benzonatate capsule 100 mg, 100 mg, Oral, TID PRN, cough    dextromethorphan-guaiFENesin  mg/5 ml liquid 10 mL, 10 mL, Oral, Q6H     fluticasone furoate-vilanteroL 200-25 mcg/dose diskus inhaler 1 puff, 1 puff, Inhalation, Daily     montelukast tablet 10 mg, 10 mg, Oral, QAM     Anemia of chronic disease  Baseline, stable to follow up with PCP       cyanocobalamin tablet 1,000 mcg, 1,000 mcg, Oral, Daily     folic acid tablet 1 mg, 1 mg, Oral, Daily        Latest Reference Range & Units 01/29/24 06:04 01/30/24 05:35 02/21/24 12:51 04/18/24 04:33   Hemoglobin 12.0 - 16.0 g/dL 8.8 (L) 9.2 (L) 10.9 (L) 10.6 (L)          Chronic respiratory failure with hypoxia  Patient with Hypoxic Respiratory failure which is Chronic.  she is on home oxygen at 3 LPM. Supplemental oxygen was provided and noted O2 sats of 94% on 3L NC.    Signs/symptoms of respiratory failure include- tachypnea and increased work of breathing. Contributing diagnoses includes - COPD and Pneumonia Labs and images were reviewed. Patient Has not had a recent ABG. Will treat underlying causes and adjust management of respiratory failure as follows- duonebs, antibiotics, mucolytics, solumedrol x 1 in ED    Hypokalemia  Replace as needed  tele      Primary hypertension  Continue home medications as tolerated        Anxiety and depression  Continue home meds:    busPIRone tablet 15 mg, 15 mg, Oral, BID     clonazePAM tablet 0.5 mg, 0.5 mg, Oral, BID PRN     FLUoxetine capsule 40 mg, 40 mg, Oral, Daily       VTE  Risk Mitigation (From admission, onward)           Ordered     enoxaparin injection 40 mg  Daily         04/18/24 0619     Place ALEA hose  Until discontinued         04/18/24 0619     IP VTE HIGH RISK PATIENT  Once         04/18/24 0619     Place sequential compression device  Until discontinued         04/18/24 0619                    Discharge Planning   CHLOE: 4/19/2024     Code Status: Full Code   Is the patient medically ready for discharge?:     Reason for patient still in hospital (select all that apply): Treatment  Discharge Plan A: Home Health   Discharge Delays: None known at this time              Reynaldo Nick MD  Department of Hospital Medicine   [facility]

## 2024-04-19 NOTE — ASSESSMENT & PLAN NOTE
She states her wheezing is about baseline for her and she does not feel as though she is having an exacerbation  She did receive Solumedrol 125mg iv x 1 in the ED  Holding on daily Prednisone for now with sepsis and PNA         albuterol-ipratropium 2.5 mg-0.5 mg/3 mL nebulizer solution 3 mL, 3 mL, Nebulization, Q4H PRN     albuterol-ipratropium 2.5 mg-0.5 mg/3 mL nebulizer solution 3 mL, 3 mL, Nebulization, Q6H     benzonatate capsule 100 mg, 100 mg, Oral, TID PRN, cough    dextromethorphan-guaiFENesin  mg/5 ml liquid 10 mL, 10 mL, Oral, Q6H     fluticasone furoate-vilanteroL 200-25 mcg/dose diskus inhaler 1 puff, 1 puff, Inhalation, Daily     montelukast tablet 10 mg, 10 mg, Oral, QAM

## 2024-04-19 NOTE — PLAN OF CARE
Pt resuming services with [facility] for HH.        04/19/24 3818   Post-Acute Status   Post-Acute Authorization Home Health   Home Health Status Referrals Sent   Patient choice form signed by patient/caregiver List with quality metrics by geographic area provided;List from CMS Compare   Discharge Delays None known at this time

## 2024-04-19 NOTE — PLAN OF CARE
Problem: Infection  Goal: Absence of Infection Signs and Symptoms  4/19/2024 0224 by Jeremiah Hernandez RN  Outcome: Ongoing, Progressing  4/19/2024 0224 by Jeremiah Hernandez RN  Outcome: Ongoing, Progressing     Problem: Adult Inpatient Plan of Care  Goal: Plan of Care Review  4/19/2024 0224 by Jeremiah Hernandez RN  Outcome: Ongoing, Progressing  4/19/2024 0224 by Jeremiah Hernandez RN  Outcome: Ongoing, Progressing  Goal: Patient-Specific Goal (Individualized)  4/19/2024 0224 by Jeremiah Hernandez RN  Outcome: Ongoing, Progressing  4/19/2024 0224 by Jeremiah Hernandez RN  Outcome: Ongoing, Progressing  Goal: Absence of Hospital-Acquired Illness or Injury  4/19/2024 0224 by Jeremiah Hernandez RN  Outcome: Ongoing, Progressing  4/19/2024 0224 by Jeremiah Hernandez RN  Outcome: Ongoing, Progressing  Goal: Optimal Comfort and Wellbeing  4/19/2024 0224 by Jeremiah Hernandez RN  Outcome: Ongoing, Progressing  4/19/2024 0224 by Jeremiah Hernandez RN  Outcome: Ongoing, Progressing  Goal: Readiness for Transition of Care  4/19/2024 0224 by Jeremiah Hernandez RN  Outcome: Ongoing, Progressing  4/19/2024 0224 by Jeremiah Hernandez RN  Outcome: Ongoing, Progressing     Problem: Adjustment to Illness (Sepsis/Septic Shock)  Goal: Optimal Coping  4/19/2024 0224 by Jeremiah Hernandez RN  Outcome: Ongoing, Progressing  4/19/2024 0224 by Jeremiah Hernandez RN  Outcome: Ongoing, Progressing     Problem: Bleeding (Sepsis/Septic Shock)  Goal: Absence of Bleeding  4/19/2024 0224 by Jeremiah Hernandez RN  Outcome: Ongoing, Progressing  4/19/2024 0224 by Jeremiah Hernandez RN  Outcome: Ongoing, Progressing     Problem: Glycemic Control Impaired (Sepsis/Septic Shock)  Goal: Blood Glucose Level Within Desired Range  4/19/2024 0224 by Jeremiah Hernandez RN  Outcome: Ongoing, Progressing  4/19/2024 0224 by Jeremiah Hernandez RN  Outcome: Ongoing, Progressing     Problem: Infection Progression (Sepsis/Septic Shock)  Goal: Absence of Infection Signs and Symptoms  4/19/2024 0224 by Jeremiah Hernandez, RN  Outcome:  Ongoing, Progressing  4/19/2024 0224 by Jeremiah Hernandez RN  Outcome: Ongoing, Progressing     Problem: Nutrition Impaired (Sepsis/Septic Shock)  Goal: Optimal Nutrition Intake  4/19/2024 0224 by Jeremiah Hernandez RN  Outcome: Ongoing, Progressing  4/19/2024 0224 by Jeremiah Hernandez RN  Outcome: Ongoing, Progressing     Problem: Fluid and Electrolyte Imbalance (Acute Kidney Injury/Impairment)  Goal: Fluid and Electrolyte Balance  4/19/2024 0224 by Jeremiah Hernandez RN  Outcome: Ongoing, Progressing  4/19/2024 0224 by Jeremiah Hernandez RN  Outcome: Ongoing, Progressing     Problem: Oral Intake Inadequate (Acute Kidney Injury/Impairment)  Goal: Optimal Nutrition Intake  4/19/2024 0224 by Jeremiah Hernandez RN  Outcome: Ongoing, Progressing  4/19/2024 0224 by Jeremiah Hernandez RN  Outcome: Ongoing, Progressing     Problem: Renal Function Impairment (Acute Kidney Injury/Impairment)  Goal: Effective Renal Function  4/19/2024 0224 by Jeremiah Hernandez RN  Outcome: Ongoing, Progressing  4/19/2024 0224 by Jeremiah Hernandez RN  Outcome: Ongoing, Progressing     Problem: Fluid Imbalance (Pneumonia)  Goal: Fluid Balance  4/19/2024 0224 by Jeremiah Hernandez RN  Outcome: Ongoing, Progressing  4/19/2024 0224 by Jeremiah Hernandez RN  Outcome: Ongoing, Progressing     Problem: Infection (Pneumonia)  Goal: Resolution of Infection Signs and Symptoms  4/19/2024 0224 by Jeremiah Hernandez RN  Outcome: Ongoing, Progressing  4/19/2024 0224 by Jeremiah Hernandez RN  Outcome: Ongoing, Progressing     Problem: Respiratory Compromise (Pneumonia)  Goal: Effective Oxygenation and Ventilation  4/19/2024 0224 by Jeremiah Hernandez RN  Outcome: Ongoing, Progressing  4/19/2024 0224 by Jeremiah Hernandez RN  Outcome: Ongoing, Progressing     Problem: Skin Injury Risk Increased  Goal: Skin Health and Integrity  4/19/2024 0224 by Jeremiah Hernandez RN  Outcome: Ongoing, Progressing  4/19/2024 0224 by Jeremiah Hernandez RN  Outcome: Ongoing, Progressing

## 2024-04-19 NOTE — ASSESSMENT & PLAN NOTE
Baseline, stable to follow up with PCP       cyanocobalamin tablet 1,000 mcg, 1,000 mcg, Oral, Daily     folic acid tablet 1 mg, 1 mg, Oral, Daily        Latest Reference Range & Units 01/29/24 06:04 01/30/24 05:35 02/21/24 12:51 04/18/24 04:33   Hemoglobin 12.0 - 16.0 g/dL 8.8 (L) 9.2 (L) 10.9 (L) 10.6 (L)

## 2024-04-20 LAB
ANION GAP SERPL CALC-SCNC: 11 MMOL/L (ref 8–16)
BASOPHILS # BLD AUTO: 0.02 K/UL (ref 0–0.2)
BASOPHILS NFR BLD: 0.2 % (ref 0–1.9)
BUN SERPL-MCNC: 6 MG/DL (ref 8–23)
CALCIUM SERPL-MCNC: 8.8 MG/DL (ref 8.7–10.5)
CHLORIDE SERPL-SCNC: 102 MMOL/L (ref 95–110)
CO2 SERPL-SCNC: 24 MMOL/L (ref 23–29)
CREAT SERPL-MCNC: 0.7 MG/DL (ref 0.5–1.4)
DIFFERENTIAL METHOD BLD: ABNORMAL
EOSINOPHIL # BLD AUTO: 0.1 K/UL (ref 0–0.5)
EOSINOPHIL NFR BLD: 1.5 % (ref 0–8)
ERYTHROCYTE [DISTWIDTH] IN BLOOD BY AUTOMATED COUNT: 13.2 % (ref 11.5–14.5)
EST. GFR  (NO RACE VARIABLE): >60 ML/MIN/1.73 M^2
GLUCOSE SERPL-MCNC: 88 MG/DL (ref 70–110)
HCT VFR BLD AUTO: 29.8 % (ref 37–48.5)
HGB BLD-MCNC: 9.7 G/DL (ref 12–16)
IMM GRANULOCYTES # BLD AUTO: 0.06 K/UL (ref 0–0.04)
IMM GRANULOCYTES NFR BLD AUTO: 0.7 % (ref 0–0.5)
LYMPHOCYTES # BLD AUTO: 1.6 K/UL (ref 1–4.8)
LYMPHOCYTES NFR BLD: 18.4 % (ref 18–48)
MAGNESIUM SERPL-MCNC: 1.8 MG/DL (ref 1.6–2.6)
MCH RBC QN AUTO: 30 PG (ref 27–31)
MCHC RBC AUTO-ENTMCNC: 32.6 G/DL (ref 32–36)
MCV RBC AUTO: 92 FL (ref 82–98)
MONOCYTES # BLD AUTO: 0.5 K/UL (ref 0.3–1)
MONOCYTES NFR BLD: 6.2 % (ref 4–15)
NEUTROPHILS # BLD AUTO: 6.3 K/UL (ref 1.8–7.7)
NEUTROPHILS NFR BLD: 73 % (ref 38–73)
NRBC BLD-RTO: 0 /100 WBC
PHOSPHATE SERPL-MCNC: 4.3 MG/DL (ref 2.7–4.5)
PLATELET # BLD AUTO: 300 K/UL (ref 150–450)
PMV BLD AUTO: 9.5 FL (ref 9.2–12.9)
POTASSIUM SERPL-SCNC: 3 MMOL/L (ref 3.5–5.1)
RBC # BLD AUTO: 3.23 M/UL (ref 4–5.4)
SODIUM SERPL-SCNC: 137 MMOL/L (ref 136–145)
WBC # BLD AUTO: 8.58 K/UL (ref 3.9–12.7)

## 2024-04-20 PROCEDURE — 25000242 PHARM REV CODE 250 ALT 637 W/ HCPCS: Performed by: INTERNAL MEDICINE

## 2024-04-20 PROCEDURE — 97535 SELF CARE MNGMENT TRAINING: CPT

## 2024-04-20 PROCEDURE — 99900035 HC TECH TIME PER 15 MIN (STAT)

## 2024-04-20 PROCEDURE — 85025 COMPLETE CBC W/AUTO DIFF WBC: CPT | Performed by: INTERNAL MEDICINE

## 2024-04-20 PROCEDURE — S4991 NICOTINE PATCH NONLEGEND: HCPCS | Performed by: INTERNAL MEDICINE

## 2024-04-20 PROCEDURE — 84100 ASSAY OF PHOSPHORUS: CPT | Performed by: INTERNAL MEDICINE

## 2024-04-20 PROCEDURE — 94760 N-INVAS EAR/PLS OXIMETRY 1: CPT

## 2024-04-20 PROCEDURE — 27000221 HC OXYGEN, UP TO 24 HOURS

## 2024-04-20 PROCEDURE — 94640 AIRWAY INHALATION TREATMENT: CPT

## 2024-04-20 PROCEDURE — 99233 SBSQ HOSP IP/OBS HIGH 50: CPT | Mod: ,,, | Performed by: STUDENT IN AN ORGANIZED HEALTH CARE EDUCATION/TRAINING PROGRAM

## 2024-04-20 PROCEDURE — 11000001 HC ACUTE MED/SURG PRIVATE ROOM

## 2024-04-20 PROCEDURE — 83735 ASSAY OF MAGNESIUM: CPT | Performed by: INTERNAL MEDICINE

## 2024-04-20 PROCEDURE — 63600175 PHARM REV CODE 636 W HCPCS: Performed by: INTERNAL MEDICINE

## 2024-04-20 PROCEDURE — 36415 COLL VENOUS BLD VENIPUNCTURE: CPT | Performed by: INTERNAL MEDICINE

## 2024-04-20 PROCEDURE — 25000003 PHARM REV CODE 250: Performed by: STUDENT IN AN ORGANIZED HEALTH CARE EDUCATION/TRAINING PROGRAM

## 2024-04-20 PROCEDURE — 94761 N-INVAS EAR/PLS OXIMETRY MLT: CPT

## 2024-04-20 PROCEDURE — 25000003 PHARM REV CODE 250: Performed by: INTERNAL MEDICINE

## 2024-04-20 PROCEDURE — 80048 BASIC METABOLIC PNL TOTAL CA: CPT | Performed by: INTERNAL MEDICINE

## 2024-04-20 RX ORDER — POTASSIUM CHLORIDE 20 MEQ/1
40 TABLET, EXTENDED RELEASE ORAL ONCE
Status: COMPLETED | OUTPATIENT
Start: 2024-04-20 | End: 2024-04-20

## 2024-04-20 RX ADMIN — AZITHROMYCIN MONOHYDRATE 500 MG: 500 INJECTION, POWDER, LYOPHILIZED, FOR SOLUTION INTRAVENOUS at 08:04

## 2024-04-20 RX ADMIN — BUSPIRONE HYDROCHLORIDE 15 MG: 5 TABLET ORAL at 10:04

## 2024-04-20 RX ADMIN — CYANOCOBALAMIN TAB 500 MCG 1000 MCG: 500 TAB at 08:04

## 2024-04-20 RX ADMIN — IPRATROPIUM BROMIDE AND ALBUTEROL SULFATE 3 ML: .5; 2.5 SOLUTION RESPIRATORY (INHALATION) at 01:04

## 2024-04-20 RX ADMIN — POTASSIUM CHLORIDE 40 MEQ: 1500 TABLET, EXTENDED RELEASE ORAL at 08:04

## 2024-04-20 RX ADMIN — LEVOTHYROXINE SODIUM 50 MCG: 25 TABLET ORAL at 05:04

## 2024-04-20 RX ADMIN — CLONAZEPAM 0.5 MG: 0.5 TABLET ORAL at 05:04

## 2024-04-20 RX ADMIN — BUTALBITAL, ACETAMINOPHEN, AND CAFFEINE 1 TABLET: 50; 325; 40 TABLET ORAL at 05:04

## 2024-04-20 RX ADMIN — ENOXAPARIN SODIUM 40 MG: 40 INJECTION SUBCUTANEOUS at 05:04

## 2024-04-20 RX ADMIN — MONTELUKAST 10 MG: 10 TABLET, FILM COATED ORAL at 08:04

## 2024-04-20 RX ADMIN — NICOTINE 1 PATCH: 21 PATCH, EXTENDED RELEASE TRANSDERMAL at 08:04

## 2024-04-20 RX ADMIN — CLONAZEPAM 0.5 MG: 0.5 TABLET ORAL at 08:04

## 2024-04-20 RX ADMIN — BUSPIRONE HYDROCHLORIDE 15 MG: 5 TABLET ORAL at 08:04

## 2024-04-20 RX ADMIN — ATORVASTATIN CALCIUM 80 MG: 40 TABLET, FILM COATED ORAL at 08:04

## 2024-04-20 RX ADMIN — ASPIRIN 81 MG: 81 TABLET, COATED ORAL at 08:04

## 2024-04-20 RX ADMIN — BUTALBITAL, ACETAMINOPHEN, AND CAFFEINE 1 TABLET: 50; 325; 40 TABLET ORAL at 10:04

## 2024-04-20 RX ADMIN — IPRATROPIUM BROMIDE AND ALBUTEROL SULFATE 3 ML: .5; 2.5 SOLUTION RESPIRATORY (INHALATION) at 04:04

## 2024-04-20 RX ADMIN — FLUOXETINE 40 MG: 10 CAPSULE ORAL at 08:04

## 2024-04-20 RX ADMIN — IPRATROPIUM BROMIDE AND ALBUTEROL SULFATE 3 ML: .5; 2.5 SOLUTION RESPIRATORY (INHALATION) at 08:04

## 2024-04-20 RX ADMIN — CEFTRIAXONE 2 G: 2 INJECTION, POWDER, FOR SOLUTION INTRAMUSCULAR; INTRAVENOUS at 05:04

## 2024-04-20 RX ADMIN — BUTALBITAL, ACETAMINOPHEN, AND CAFFEINE 1 TABLET: 50; 325; 40 TABLET ORAL at 08:04

## 2024-04-20 RX ADMIN — IPRATROPIUM BROMIDE AND ALBUTEROL SULFATE 3 ML: .5; 2.5 SOLUTION RESPIRATORY (INHALATION) at 06:04

## 2024-04-20 RX ADMIN — FLUTICASONE FUROATE AND VILANTEROL TRIFENATATE 1 PUFF: 200; 25 POWDER RESPIRATORY (INHALATION) at 01:04

## 2024-04-20 RX ADMIN — FOLIC ACID 1 MG: 1 TABLET ORAL at 08:04

## 2024-04-20 RX ADMIN — CLOPIDOGREL BISULFATE 75 MG: 75 TABLET ORAL at 08:04

## 2024-04-20 NOTE — RESPIRATORY THERAPY
04/20/24 1323   Patient Assessment/Suction   Level of Consciousness (AVPU) alert   Respiratory Effort Normal;Unlabored   Expansion/Accessory Muscles/Retractions no retractions;no use of accessory muscles   All Lung Fields Breath Sounds Anterior:;Posterior:;Lateral:;equal bilaterally;diminished   KOFFI Breath Sounds diminished   LLL Breath Sounds diminished   RUL Breath Sounds diminished   RML Breath Sounds diminished   RLL Breath Sounds diminished   Rhythm/Pattern, Respiratory depth regular;pattern regular;unlabored   Cough Frequency no cough   Skin Integrity   $ Wound Care Tech Time 15 min   Area Observed Behind ear;Cheek;Nares;Upper lip;Left;Right   Skin Appearance without discoloration   PRE-TX-O2   Device (Oxygen Therapy) nasal cannula with humidification   $ Is the patient on Low Flow Oxygen? Yes   Flow (L/min) 2   Oxygen Concentration (%) 28   SpO2 99 %   Pulse Oximetry Type Intermittent   $ Pulse Oximetry - Multiple Charge Pulse Oximetry - Multiple   Pulse 99   Resp 18   Aerosol Therapy   $ Aerosol Therapy Charges Aerosol Treatment   Daily Review of Necessity (SVN) completed   Respiratory Treatment Status (SVN) given   Treatment Route (SVN) oxygen;mouthpiece   Patient Position (SVN) HOB elevated   Post Treatment Assessment (SVN) breath sounds improved   Signs of Intolerance (SVN) none   Inhaler   $ Inhaler Charges MDI (Metered Dose Inahler) Treatment;Mouth rinsed post treatment   Daily Review of Necessity (Inhaler) completed   Respiratory Treatment Status (Inhaler) given   Treatment Route (Inhaler) mouthpiece   Patient Position (Inhaler) HOB elevated   Post Treatment Assessment (Inhaler) breath sounds improved   Signs of Intolerance (Inhaler) none   Breath Sounds Post-Respiratory Treatment   Throughout All Fields Post-Treatment All Fields   Throughout All Fields Post-Treatment aeration increased   Post-treatment Heart Rate (beats/min) 100   Post-treatment Resp Rate (breaths/min) 18

## 2024-04-20 NOTE — PROGRESS NOTES
"[facility] Medicine  Progress Note    Patient Name: Bhavana Lambert  MRN: [id number]  Patient Class: IP- Inpatient   Admission Date: 4/18/2024  Length of Stay: 2 days  Attending Physician: Reynaldo Nick MD  Primary Care Provider: Pepito Jhaveri IV, MD        Subjective:     Principal Problem:Sepsis due to pneumonia        HPI:  Ms. Lambert is a 69yo lady with a past medical history of hypothyroidism, anxiety, depression, COPD, CVA, HTN, hyponatremia, NSTEMI, and chronic hypoxia on home O2 at 3L NC.    She states that her whole family got sick a month ago, then 3 weeks ago she developed a URI with thick nasal secretions.  This has gradually worsened to a chest cold and cough with green sputum.  Over the past 3 days she has had an increase in the severity of her cough with some fever and chills.  She has been taking OTC Robitussin with no relief.  She has had rib fractures on the right and the constant coughing has begun to cause pain to her right side as well.  She also has some chest tightness with SOB.  She has had nausea and diarrhea on and off too.  She does not feel like she is wheezing very much.    In the ED her VS were /68   Pulse (!) 129 -> 122   Temp max 99.6F -> 99.3 °F (37.4 °C) (Oral)   Resp 25 -> 18   Ht 5' 2" (1.575 m)   Wt 59 kg (130 lb 1.1 oz)   SpO2 (!) 84% RA -> 94% 3L NC  Breastfeeding No   BMI 23.79 kg/m².  Labs showed WBC 18, Hg 10.6, Na 134, K 3.4, Cr 0.8, Mg 1.4, COVID neg.     CXR shows flat diaphragms RLL infiltrate, prominent interstitial changes (personal read).  EKG showed sinus tachy 126, ST depressions and flipped T wave changes anteriorly, QTc 489 ms.    In the ED she was treated with:  Medications   magnesium sulfate 2g in water 50mL IVPB (premix) (2 g Intravenous New Bag 4/18/24 6613)   albuterol-ipratropium 2.5 mg-0.5 mg/3 mL nebulizer solution 3 mL (3 mLs Nebulization Given 4/18/24 6473)   albuterol-ipratropium 2.5 mg-0.5 mg/3 mL nebulizer " solution 3 mL (3 mLs Nebulization Given 4/18/24 0435)   methylPREDNISolone sodium succinate injection 125 mg (125 mg Intravenous Given 4/18/24 0448)   cefTRIAXone (ROCEPHIN) 2 g in dextrose 5 % in water (D5W) 100 mL IVPB (MB+) (0 g Intravenous Stopped 4/18/24 0522)   butalbital-acetaminophen-caffeine -40 mg per tablet 1 tablet (1 tablet Oral Given 4/18/24 0548)             Overview/Hospital Course:  No notes on file    Interval History: NAEON. Patient continues to have significant SOB and is not yet at baseline respiratory status. Would likely be readmitted if discharged today. Continue current level of care.    Review of Systems   All other systems reviewed and are negative.    Objective:     Vital Signs (Most Recent):  Temp: 97.7 °F (36.5 °C) (04/20/24 0839)  Pulse: 105 (04/20/24 0839)  Resp: 16 (04/20/24 0653)  BP: (!) 160/72 (04/20/24 0839)  SpO2: 96 % (04/20/24 0839) Vital Signs (24h Range):  Temp:  [97.7 °F (36.5 °C)-98 °F (36.7 °C)] 97.7 °F (36.5 °C)  Pulse:  [] 105  Resp:  [16-20] 16  SpO2:  [92 %-98 %] 96 %  BP: (138-160)/(72-86) 160/72     Weight: 46.7 kg (102 lb 14.4 oz)  Body mass index is 18.82 kg/m².    Intake/Output Summary (Last 24 hours) at 4/20/2024 1048  Last data filed at 4/20/2024 0536  Gross per 24 hour   Intake 2132.74 ml   Output 3200 ml   Net -1067.26 ml         Physical Exam      Vitals reviewed  General: NAD, frail appearing  Head: NC/AT  Eyes: EOMI, DOLLY  Cardiovascular: Pulses intact distally, Regular Rate and rhythm  Pulmonary: Normal Respiratory Rate, No respiratory distress  Gi: Soft, Non-tender  Extremities: Warm, No edema present  Skin: Warm, dry  Neuro: Alert, Oriented x3, No focal Deficit  Psych: Appropriate mood and affect      Significant Labs: All pertinent labs within the past 24 hours have been reviewed.    Significant Imaging: I have reviewed all pertinent imaging results/findings within the past 24 hours.    Assessment/Plan:      * Sepsis due to pneumonia  Sent  sputum gram and cxs, along with Legionella UAb and CXS  Still quite tachy and some soft BP's at times -> 1L NS (has diastolic dysfunction, so care to risk of CHF)  Hold Coreg for now from home and restart tomorrow, as some BP 98/69, now improved to 129/62    This patient does have evidence of infective focus  My overall impression is sepsis.  Source: Respiratory  Antibiotics given-   Antibiotics (72h ago, onward)      Start     Stop Route Frequency Ordered    04/19/24 0500  cefTRIAXone (ROCEPHIN) 2 g in dextrose 5 % in water (D5W) 100 mL IVPB (MB+)         -- IV Every 24 hours (non-standard times) 04/18/24 0554    04/18/24 0700  azithromycin (ZITHROMAX) 500 mg in dextrose 5 % (D5W) 250 mL IVPB (Vial-Mate)         04/23/24 0659 IV Every 24 hours (non-standard times) 04/18/24 0554          Latest lactate reviewed-  Recent Labs   Lab 04/18/24  1537   LACTATE 1.0       Organ dysfunction indicated by  no end organ damage    Fluid challenge Other- Patient to receive 1000cc volume other than 30cc/kg due to Congestive Heart Failure     Post- resuscitation assessment Yes Perfusion exam was performed within 6 hours of septic shock presentation after bolus shows Adequate tissue perfusion assessed by non-invasive monitoring       Will Not start Pressors- Levophed for MAP of 65  Source control achieved by: iv antibiotics    Coronary artery disease involving native coronary artery of native heart without angina pectoris  Per Epic, she has had NSTEMI in the past  Home Coreg held in sepsis, restart ASAP when stabilized    Continue home meds:    aspirin EC tablet 81 mg, 81 mg, Oral, Daily     atorvastatin tablet 80 mg, 80 mg, Oral, Daily     clopidogreL tablet 75 mg, 75 mg, Oral, Daily     Hypomagnesemia      The patient's magnesium results have been reviewed and are listed below.  Recent Labs   Lab 04/19/24  0523   MG 2.0          Hyponatremia  This is a chronic problem for her  Holding home Aldactone      Recent Labs   Lab  04/19/24  0523            Emphysema/COPD  She states her wheezing is about baseline for her and she does not feel as though she is having an exacerbation  She did receive Solumedrol 125mg iv x 1 in the ED  Holding on daily Prednisone for now with sepsis and PNA         albuterol-ipratropium 2.5 mg-0.5 mg/3 mL nebulizer solution 3 mL, 3 mL, Nebulization, Q4H PRN     albuterol-ipratropium 2.5 mg-0.5 mg/3 mL nebulizer solution 3 mL, 3 mL, Nebulization, Q6H     benzonatate capsule 100 mg, 100 mg, Oral, TID PRN, cough    dextromethorphan-guaiFENesin  mg/5 ml liquid 10 mL, 10 mL, Oral, Q6H     fluticasone furoate-vilanteroL 200-25 mcg/dose diskus inhaler 1 puff, 1 puff, Inhalation, Daily     montelukast tablet 10 mg, 10 mg, Oral, QAM     Anemia of chronic disease  Baseline, stable to follow up with PCP       cyanocobalamin tablet 1,000 mcg, 1,000 mcg, Oral, Daily     folic acid tablet 1 mg, 1 mg, Oral, Daily        Latest Reference Range & Units 01/29/24 06:04 01/30/24 05:35 02/21/24 12:51 04/18/24 04:33   Hemoglobin 12.0 - 16.0 g/dL 8.8 (L) 9.2 (L) 10.9 (L) 10.6 (L)          Chronic respiratory failure with hypoxia  Patient with Hypoxic Respiratory failure which is Chronic.  she is on home oxygen at 3 LPM. Supplemental oxygen was provided and noted O2 sats of 94% on 3L NC.    Signs/symptoms of respiratory failure include- tachypnea and increased work of breathing. Contributing diagnoses includes - COPD and Pneumonia Labs and images were reviewed. Patient Has not had a recent ABG. Will treat underlying causes and adjust management of respiratory failure as follows- duonebs, antibiotics, mucolytics, solumedrol x 1 in ED    Hypokalemia  Replace as needed  tele      Primary hypertension  Continue home medications as tolerated        Anxiety and depression  Continue home meds:    busPIRone tablet 15 mg, 15 mg, Oral, BID     clonazePAM tablet 0.5 mg, 0.5 mg, Oral, BID PRN     FLUoxetine capsule 40 mg, 40 mg, Oral,  Daily       VTE Risk Mitigation (From admission, onward)           Ordered     enoxaparin injection 40 mg  Daily         04/18/24 0619     Place ALEA hose  Until discontinued         04/18/24 0619     IP VTE HIGH RISK PATIENT  Once         04/18/24 0619     Place sequential compression device  Until discontinued         04/18/24 0619                    Discharge Planning   CHLOE: 4/20/2024     Code Status: Full Code   Is the patient medically ready for discharge?:     Reason for patient still in hospital (select all that apply): Treatment  Discharge Plan A: Home Health   Discharge Delays: None known at this time              Reynaldo Nick MD  Department of Hospital Medicine   [facility]

## 2024-04-20 NOTE — RESPIRATORY THERAPY
04/20/24 1121   Inhaler   $ Inhaler Charges On hold  (Medication not available in Pyxis, message sent to pharmacy)

## 2024-04-20 NOTE — RESPIRATORY THERAPY
04/20/24 0653   Patient Assessment/Suction   Level of Consciousness (AVPU) alert   Respiratory Effort Normal;Unlabored   Expansion/Accessory Muscles/Retractions no retractions;no use of accessory muscles   All Lung Fields Breath Sounds Anterior:;Posterior:;Lateral:;equal bilaterally;clear;diminished   Rhythm/Pattern, Respiratory depth regular;pattern regular;unlabored   Cough Frequency no cough   Skin Integrity   $ Wound Care Tech Time 15 min   Area Observed Behind ear;Cheek;Nares;Upper lip;Left;Right   Skin Appearance without discoloration   PRE-TX-O2   Device (Oxygen Therapy) nasal cannula with humidification   $ Is the patient on Low Flow Oxygen? Yes   Flow (L/min) 2   Oxygen Concentration (%) 28   SpO2 96 %   Pulse Oximetry Type Intermittent   $ Pulse Oximetry - Multiple Charge Pulse Oximetry - Multiple   Pulse 97   Resp 16   Aerosol Therapy   $ Aerosol Therapy Charges Aerosol Treatment   Daily Review of Necessity (SVN) completed   Respiratory Treatment Status (SVN) given   Treatment Route (SVN) oxygen;mask   Patient Position (SVN) HOB elevated   Post Treatment Assessment (SVN) breath sounds improved   Signs of Intolerance (SVN) none   Inhaler   $ Inhaler Charges Other (see comments)  (not available from pharmacy yet)   Breath Sounds Post-Respiratory Treatment   Throughout All Fields Post-Treatment All Fields   Throughout All Fields Post-Treatment aeration increased   Post-treatment Heart Rate (beats/min) 97   Post-treatment Resp Rate (breaths/min) 16

## 2024-04-20 NOTE — PT/OT/SLP PROGRESS
"Occupational Therapy   Treatment    Name: Bhavana Lambert  MRN: [id number]  Admitting Diagnosis:  Sepsis due to pneumonia       Recommendations:   Discharge Recommendations:  home with home health  Level of Assistance Recommended: Intermittent assistance  Discharge Equipment Recommendations:  none; patient has all necessary equipment  Barriers to discharge:  none     Assessment:      Bhavana Lambert is a 68 y.o. female with a medical diagnosis of Sepsis due to pneumonia. She presents with performance deficits affecting function including  weakness, impaired endurance, and impaired functional mobility.     Rehab Prognosis: Good; patient would benefit from acute OT services to address these deficits and reach maximum level of function.     Plan:      Patient to be seen 3-5X/week  to address the above listed problems via therapeutic activities, self-care/home management, and therapeutic exercises   Plan of Care Expires:  upon discharge  Plan of Care Reviewed with:  patient     Subjective     Chief Complaint: sepsis due to pneumonia  Patient/Family Comments/goals: "I want to go home."  Pain/Comfort:   8/10 headache    Objective:     Communicated with: RN prior to session.  Patient found HOB elevated with all lines intact and catheter in place  upon OT entry to room.    General Precautions: Standard,      Orthopedic Precautions: none  Braces:  none  Respiratory Status: Nasal cannula, flow 2 L/min     Occupational Performance:     Bed Mobility:    Patient completed Rolling/Turning to Left with  independence  Patient completed Rolling/Turning to Right with independence  Patient completed Supine to Sit with independence  Patient completed Sit to Supine with independence     Functional Mobility/Transfers:  Patient completed Sit <> Stand Transfer with contact guard assistance  with  standard walker   Patient completed Toilet Transfer Step Transfer technique with contact guard assistance with  standard walker  Functional " Mobility: patient walked from EOB to bathroom to use toilet with walker and CGA.    Activities of Daily Living:  Lower Body Dressing: independence seated EOB  Toileting: contact guard assistance during transfer using walker  Grooming: patient performed hand hygiene in standing at sink using walker and CGA    Treatment & Education:  Upon OT arrival, patient very apologetic to OT about her agitated behavior yesterday. Patient agreeable and pleasant to engage in OT. Patient was independent in bed mobility. RN entered room, and patient requested catheter removal so she may use the restroom for BM. RN removed catheter. Patient independently doffed/donned socks while seated EOB. Patient performed sit to stand from EOB using walker and CGA from OT. Patient performed functional mobility from EOB to toilet with walker and CGA. Patient transferred to toilet with CGA using walker, then independently performed clothing management and perineal hygiene. Patient performed sit to stand from toilet using walker and CGA. Patient then walked several steps to perform hand hygiene at sink using walker with CGA. Patient then walked to bed using walker with CGA for support. Patient seated at EOB, then transferred to supine independently.     Patient left HOB elevated with all lines intact, call button in reach, RN notified, and RN stating she will return to replace catheter    GOALS:   Patient will demonstrate increased activity tolerance required to perform ADLs/IADLs safely.     Time Tracking:     OT Date of Treatment:  4/20/24  OT Start Time:  10:00 AM  OT Stop Time:  10: 29 AM  OT Total Time (min):  29    Billable Minutes:Self Care/Home Management 2       BRAYAN Mendez, OTR/L        4/20/2024

## 2024-04-20 NOTE — PLAN OF CARE
Problem: Infection  Goal: Absence of Infection Signs and Symptoms  Outcome: Ongoing, Progressing     Problem: Adult Inpatient Plan of Care  Goal: Plan of Care Review  Outcome: Ongoing, Progressing  Goal: Patient-Specific Goal (Individualized)  Outcome: Ongoing, Progressing  Goal: Absence of Hospital-Acquired Illness or Injury  Outcome: Ongoing, Progressing  Goal: Optimal Comfort and Wellbeing  Outcome: Ongoing, Progressing  Goal: Readiness for Transition of Care  Outcome: Ongoing, Progressing     Problem: Adjustment to Illness (Sepsis/Septic Shock)  Goal: Optimal Coping  Outcome: Ongoing, Progressing     Problem: Bleeding (Sepsis/Septic Shock)  Goal: Absence of Bleeding  Outcome: Ongoing, Progressing     Problem: Glycemic Control Impaired (Sepsis/Septic Shock)  Goal: Blood Glucose Level Within Desired Range  Outcome: Ongoing, Progressing     Problem: Infection Progression (Sepsis/Septic Shock)  Goal: Absence of Infection Signs and Symptoms  Outcome: Ongoing, Progressing     Problem: Nutrition Impaired (Sepsis/Septic Shock)  Goal: Optimal Nutrition Intake  Outcome: Ongoing, Progressing     Problem: Fluid and Electrolyte Imbalance (Acute Kidney Injury/Impairment)  Goal: Fluid and Electrolyte Balance  Outcome: Ongoing, Progressing     Problem: Oral Intake Inadequate (Acute Kidney Injury/Impairment)  Goal: Optimal Nutrition Intake  Outcome: Ongoing, Progressing     Problem: Renal Function Impairment (Acute Kidney Injury/Impairment)  Goal: Effective Renal Function  Outcome: Ongoing, Progressing     Problem: Fluid Imbalance (Pneumonia)  Goal: Fluid Balance  Outcome: Ongoing, Progressing     Problem: Infection (Pneumonia)  Goal: Resolution of Infection Signs and Symptoms  Outcome: Ongoing, Progressing     Problem: Respiratory Compromise (Pneumonia)  Goal: Effective Oxygenation and Ventilation  Outcome: Ongoing, Progressing     Problem: Skin Injury Risk Increased  Goal: Skin Health and Integrity  Outcome: Ongoing,  Progressing

## 2024-04-20 NOTE — SUBJECTIVE & OBJECTIVE
Interval History: NAEON. Patient continues to have significant SOB and is not yet at baseline respiratory status. Would likely be readmitted if discharged today. Continue current level of care.    Review of Systems   All other systems reviewed and are negative.    Objective:     Vital Signs (Most Recent):  Temp: 97.7 °F (36.5 °C) (04/20/24 0839)  Pulse: 105 (04/20/24 0839)  Resp: 16 (04/20/24 0653)  BP: (!) 160/72 (04/20/24 0839)  SpO2: 96 % (04/20/24 0839) Vital Signs (24h Range):  Temp:  [97.7 °F (36.5 °C)-98 °F (36.7 °C)] 97.7 °F (36.5 °C)  Pulse:  [] 105  Resp:  [16-20] 16  SpO2:  [92 %-98 %] 96 %  BP: (138-160)/(72-86) 160/72     Weight: 46.7 kg (102 lb 14.4 oz)  Body mass index is 18.82 kg/m².    Intake/Output Summary (Last 24 hours) at 4/20/2024 1048  Last data filed at 4/20/2024 0536  Gross per 24 hour   Intake 2132.74 ml   Output 3200 ml   Net -1067.26 ml         Physical Exam      Vitals reviewed  General: NAD, frail appearing  Head: NC/AT  Eyes: EOMI, DOLLY  Cardiovascular: Pulses intact distally, Regular Rate and rhythm  Pulmonary: Normal Respiratory Rate, No respiratory distress  Gi: Soft, Non-tender  Extremities: Warm, No edema present  Skin: Warm, dry  Neuro: Alert, Oriented x3, No focal Deficit  Psych: Appropriate mood and affect      Significant Labs: All pertinent labs within the past 24 hours have been reviewed.    Significant Imaging: I have reviewed all pertinent imaging results/findings within the past 24 hours.

## 2024-04-21 VITALS
HEIGHT: 62 IN | BODY MASS INDEX: 18.77 KG/M2 | TEMPERATURE: 98 F | WEIGHT: 102 LBS | HEART RATE: 106 BPM | OXYGEN SATURATION: 95 % | DIASTOLIC BLOOD PRESSURE: 82 MMHG | RESPIRATION RATE: 20 BRPM | SYSTOLIC BLOOD PRESSURE: 125 MMHG

## 2024-04-21 LAB
ANION GAP SERPL CALC-SCNC: 11 MMOL/L (ref 8–16)
BASOPHILS # BLD AUTO: 0.01 K/UL (ref 0–0.2)
BASOPHILS NFR BLD: 0.2 % (ref 0–1.9)
BUN SERPL-MCNC: 5 MG/DL (ref 8–23)
CALCIUM SERPL-MCNC: 8.9 MG/DL (ref 8.7–10.5)
CHLORIDE SERPL-SCNC: 105 MMOL/L (ref 95–110)
CO2 SERPL-SCNC: 25 MMOL/L (ref 23–29)
CREAT SERPL-MCNC: 0.7 MG/DL (ref 0.5–1.4)
DIFFERENTIAL METHOD BLD: ABNORMAL
EOSINOPHIL # BLD AUTO: 0.1 K/UL (ref 0–0.5)
EOSINOPHIL NFR BLD: 2.2 % (ref 0–8)
ERYTHROCYTE [DISTWIDTH] IN BLOOD BY AUTOMATED COUNT: 13.2 % (ref 11.5–14.5)
EST. GFR  (NO RACE VARIABLE): >60 ML/MIN/1.73 M^2
GLUCOSE SERPL-MCNC: 97 MG/DL (ref 70–110)
HCT VFR BLD AUTO: 30.2 % (ref 37–48.5)
HGB BLD-MCNC: 9.9 G/DL (ref 12–16)
IMM GRANULOCYTES # BLD AUTO: 0.04 K/UL (ref 0–0.04)
IMM GRANULOCYTES NFR BLD AUTO: 0.7 % (ref 0–0.5)
LYMPHOCYTES # BLD AUTO: 1.5 K/UL (ref 1–4.8)
LYMPHOCYTES NFR BLD: 25.9 % (ref 18–48)
MAGNESIUM SERPL-MCNC: 2 MG/DL (ref 1.6–2.6)
MCH RBC QN AUTO: 30.1 PG (ref 27–31)
MCHC RBC AUTO-ENTMCNC: 32.8 G/DL (ref 32–36)
MCV RBC AUTO: 92 FL (ref 82–98)
MONOCYTES # BLD AUTO: 0.5 K/UL (ref 0.3–1)
MONOCYTES NFR BLD: 7.7 % (ref 4–15)
NEUTROPHILS # BLD AUTO: 3.7 K/UL (ref 1.8–7.7)
NEUTROPHILS NFR BLD: 63.3 % (ref 38–73)
NRBC BLD-RTO: 0 /100 WBC
PHOSPHATE SERPL-MCNC: 4.9 MG/DL (ref 2.7–4.5)
PLATELET # BLD AUTO: 328 K/UL (ref 150–450)
PMV BLD AUTO: 9.5 FL (ref 9.2–12.9)
POTASSIUM SERPL-SCNC: 3.6 MMOL/L (ref 3.5–5.1)
RBC # BLD AUTO: 3.29 M/UL (ref 4–5.4)
SODIUM SERPL-SCNC: 141 MMOL/L (ref 136–145)
WBC # BLD AUTO: 5.86 K/UL (ref 3.9–12.7)

## 2024-04-21 PROCEDURE — 99238 HOSP IP/OBS DSCHRG MGMT 30/<: CPT | Mod: ,,, | Performed by: STUDENT IN AN ORGANIZED HEALTH CARE EDUCATION/TRAINING PROGRAM

## 2024-04-21 PROCEDURE — 80048 BASIC METABOLIC PNL TOTAL CA: CPT | Performed by: INTERNAL MEDICINE

## 2024-04-21 PROCEDURE — 85025 COMPLETE CBC W/AUTO DIFF WBC: CPT | Performed by: INTERNAL MEDICINE

## 2024-04-21 PROCEDURE — 36415 COLL VENOUS BLD VENIPUNCTURE: CPT | Performed by: INTERNAL MEDICINE

## 2024-04-21 PROCEDURE — 25000242 PHARM REV CODE 250 ALT 637 W/ HCPCS: Performed by: INTERNAL MEDICINE

## 2024-04-21 PROCEDURE — 94761 N-INVAS EAR/PLS OXIMETRY MLT: CPT

## 2024-04-21 PROCEDURE — S4991 NICOTINE PATCH NONLEGEND: HCPCS | Performed by: INTERNAL MEDICINE

## 2024-04-21 PROCEDURE — 94640 AIRWAY INHALATION TREATMENT: CPT

## 2024-04-21 PROCEDURE — 84100 ASSAY OF PHOSPHORUS: CPT | Performed by: INTERNAL MEDICINE

## 2024-04-21 PROCEDURE — 25000003 PHARM REV CODE 250: Performed by: INTERNAL MEDICINE

## 2024-04-21 PROCEDURE — 27000221 HC OXYGEN, UP TO 24 HOURS

## 2024-04-21 PROCEDURE — 83735 ASSAY OF MAGNESIUM: CPT | Performed by: INTERNAL MEDICINE

## 2024-04-21 PROCEDURE — 63600175 PHARM REV CODE 636 W HCPCS: Performed by: INTERNAL MEDICINE

## 2024-04-21 PROCEDURE — 1111F DSCHRG MED/CURRENT MED MERGE: CPT | Mod: CPTII,,, | Performed by: STUDENT IN AN ORGANIZED HEALTH CARE EDUCATION/TRAINING PROGRAM

## 2024-04-21 PROCEDURE — 25000003 PHARM REV CODE 250: Performed by: STUDENT IN AN ORGANIZED HEALTH CARE EDUCATION/TRAINING PROGRAM

## 2024-04-21 RX ORDER — AMOXICILLIN AND CLAVULANATE POTASSIUM 875; 125 MG/1; MG/1
1 TABLET, FILM COATED ORAL 2 TIMES DAILY
Qty: 4 TABLET | Refills: 0 | Status: SHIPPED | OUTPATIENT
Start: 2024-04-22 | End: 2024-04-24

## 2024-04-21 RX ORDER — ASPIRIN 81 MG/1
81 TABLET ORAL DAILY
Qty: 360 TABLET | Refills: 0 | Status: SHIPPED | OUTPATIENT
Start: 2024-04-21 | End: 2025-04-21

## 2024-04-21 RX ORDER — IPRATROPIUM BROMIDE AND ALBUTEROL SULFATE 2.5; .5 MG/3ML; MG/3ML
3 SOLUTION RESPIRATORY (INHALATION) EVERY 6 HOURS PRN
Qty: 75 ML | Refills: 0 | Status: SHIPPED | OUTPATIENT
Start: 2024-04-21 | End: 2025-04-21

## 2024-04-21 RX ORDER — BUTALBITAL, ACETAMINOPHEN AND CAFFEINE 50; 325; 40 MG/1; MG/1; MG/1
1 TABLET ORAL EVERY 8 HOURS PRN
Qty: 9 TABLET | Refills: 0 | Status: SHIPPED | OUTPATIENT
Start: 2024-04-21 | End: 2024-04-21

## 2024-04-21 RX ORDER — BUTALBITAL, ACETAMINOPHEN AND CAFFEINE 50; 325; 40 MG/1; MG/1; MG/1
1 TABLET ORAL EVERY 8 HOURS PRN
Qty: 9 TABLET | Refills: 0 | Status: SHIPPED | OUTPATIENT
Start: 2024-04-21 | End: 2024-04-24

## 2024-04-21 RX ADMIN — BUTALBITAL, ACETAMINOPHEN, AND CAFFEINE 1 TABLET: 50; 325; 40 TABLET ORAL at 08:04

## 2024-04-21 RX ADMIN — BUSPIRONE HYDROCHLORIDE 15 MG: 5 TABLET ORAL at 08:04

## 2024-04-21 RX ADMIN — AZITHROMYCIN MONOHYDRATE 500 MG: 500 INJECTION, POWDER, LYOPHILIZED, FOR SOLUTION INTRAVENOUS at 07:04

## 2024-04-21 RX ADMIN — CLONAZEPAM 0.5 MG: 0.5 TABLET ORAL at 08:04

## 2024-04-21 RX ADMIN — FLUTICASONE FUROATE AND VILANTEROL TRIFENATATE 1 PUFF: 200; 25 POWDER RESPIRATORY (INHALATION) at 07:04

## 2024-04-21 RX ADMIN — LEVOTHYROXINE SODIUM 50 MCG: 25 TABLET ORAL at 05:04

## 2024-04-21 RX ADMIN — IPRATROPIUM BROMIDE AND ALBUTEROL SULFATE 3 ML: .5; 2.5 SOLUTION RESPIRATORY (INHALATION) at 06:04

## 2024-04-21 RX ADMIN — NICOTINE 1 PATCH: 21 PATCH, EXTENDED RELEASE TRANSDERMAL at 08:04

## 2024-04-21 RX ADMIN — IPRATROPIUM BROMIDE AND ALBUTEROL SULFATE 3 ML: .5; 2.5 SOLUTION RESPIRATORY (INHALATION) at 12:04

## 2024-04-21 RX ADMIN — CEFTRIAXONE 2 G: 2 INJECTION, POWDER, FOR SOLUTION INTRAMUSCULAR; INTRAVENOUS at 05:04

## 2024-04-21 RX ADMIN — FLUOXETINE 40 MG: 10 CAPSULE ORAL at 08:04

## 2024-04-21 RX ADMIN — CLOPIDOGREL BISULFATE 75 MG: 75 TABLET ORAL at 08:04

## 2024-04-21 RX ADMIN — MONTELUKAST 10 MG: 10 TABLET, FILM COATED ORAL at 07:04

## 2024-04-21 RX ADMIN — FOLIC ACID 1 MG: 1 TABLET ORAL at 08:04

## 2024-04-21 RX ADMIN — CYANOCOBALAMIN TAB 500 MCG 1000 MCG: 500 TAB at 08:04

## 2024-04-21 RX ADMIN — ATORVASTATIN CALCIUM 80 MG: 40 TABLET, FILM COATED ORAL at 08:04

## 2024-04-21 RX ADMIN — ASPIRIN 81 MG: 81 TABLET, COATED ORAL at 08:04

## 2024-04-21 NOTE — PLAN OF CARE
Problem: Infection  Goal: Absence of Infection Signs and Symptoms  4/21/2024 1307 by Stephanie Garcia, RN  Outcome: Met  4/21/2024 0958 by Stephanie Garcia, RN  Outcome: Ongoing, Progressing

## 2024-04-21 NOTE — PLAN OF CARE
Problem: Respiratory Compromise (Pneumonia)  Goal: Effective Oxygenation and Ventilation  Outcome: Ongoing, Progressing  Intervention: Promote Airway Secretion Clearance  Flowsheets (Taken 4/21/2024 1237)  Cough And Deep Breathing: done independently per patient  Intervention: Optimize Oxygenation and Ventilation  Flowsheets (Taken 4/21/2024 1237)  Airway/Ventilation Management: airway patency maintained  Head of Bed (HOB) Positioning: HOB elevated   Patient in no apparent distress. Sat's 94-97  % on 3 lpm. She wears home O2 at 3 lpm. Aerosol treatments given Q 6. . Breo given daily. . Will continue to monitor.

## 2024-04-21 NOTE — NURSING
1420  PT dc home as per DR. Covington. Dc instructions on medication, diet, activity, and follow up care given to pt. Pt. Verbalized understanding. Hl dc with jelco intact. Pt to car per w/c. With dc instructions in hand. Pt tolerated well. No signs of resp. Distress. No c/o or needs voiced.

## 2024-04-22 PROBLEM — A41.9 SEPTIC SHOCK: Status: RESOLVED | Noted: 2024-01-21 | Resolved: 2024-04-22

## 2024-04-22 PROBLEM — R65.21 SEPTIC SHOCK: Status: RESOLVED | Noted: 2024-01-21 | Resolved: 2024-04-22

## 2024-04-22 PROBLEM — N12 PYELONEPHRITIS: Status: RESOLVED | Noted: 2023-10-26 | Resolved: 2024-04-22

## 2024-04-23 LAB
BACTERIA BLD CULT: NORMAL
BACTERIA BLD CULT: NORMAL
L PNEUMO AG UR QL IA: NEGATIVE

## 2024-05-01 NOTE — DISCHARGE SUMMARY
"[facility] Medicine  Discharge Summary      Patient Name: Bhavana Lambert  MRN: [id number]  AMALIA: [id number]  Patient Class: IP- Inpatient  Admission Date: 4/18/2024  Hospital Length of Stay: 3 days  Discharge Date and Time: 4/21/2024  2:20 PM  Attending Physician: No att. providers found   Discharging Provider: Reynaldo Nick MD  Primary Care Provider: Pepito Jhaveri IV, MD    Primary Care Team: Networked reference to record PCT     HPI:   Ms. Lambert is a 67yo lady with a past medical history of hypothyroidism, anxiety, depression, COPD, CVA, HTN, hyponatremia, NSTEMI, and chronic hypoxia on home O2 at 3L NC.    She states that her whole family got sick a month ago, then 3 weeks ago she developed a URI with thick nasal secretions.  This has gradually worsened to a chest cold and cough with green sputum.  Over the past 3 days she has had an increase in the severity of her cough with some fever and chills.  She has been taking OTC Robitussin with no relief.  She has had rib fractures on the right and the constant coughing has begun to cause pain to her right side as well.  She also has some chest tightness with SOB.  She has had nausea and diarrhea on and off too.  She does not feel like she is wheezing very much.    In the ED her VS were /68   Pulse (!) 129 -> 122   Temp max 99.6F -> 99.3 °F (37.4 °C) (Oral)   Resp 25 -> 18   Ht 5' 2" (1.575 m)   Wt 59 kg (130 lb 1.1 oz)   SpO2 (!) 84% RA -> 94% 3L NC  Breastfeeding No   BMI 23.79 kg/m².  Labs showed WBC 18, Hg 10.6, Na 134, K 3.4, Cr 0.8, Mg 1.4, COVID neg.     CXR shows flat diaphragms RLL infiltrate, prominent interstitial changes (personal read).  EKG showed sinus tachy 126, ST depressions and flipped T wave changes anteriorly, QTc 489 ms.    In the ED she was treated with:  Medications   magnesium sulfate 2g in water 50mL IVPB (premix) (2 g Intravenous New Bag 4/18/24 0530)   albuterol-ipratropium 2.5 mg-0.5 mg/3 mL " "nebulizer solution 3 mL (3 mLs Nebulization Given 4/18/24 0436)   albuterol-ipratropium 2.5 mg-0.5 mg/3 mL nebulizer solution 3 mL (3 mLs Nebulization Given 4/18/24 0435)   methylPREDNISolone sodium succinate injection 125 mg (125 mg Intravenous Given 4/18/24 0448)   cefTRIAXone (ROCEPHIN) 2 g in dextrose 5 % in water (D5W) 100 mL IVPB (MB+) (0 g Intravenous Stopped 4/18/24 0522)   butalbital-acetaminophen-caffeine -40 mg per tablet 1 tablet (1 tablet Oral Given 4/18/24 0548)             * No surgery found *      Hospital Course:   Patient admitted for COPD exacerbation 2/2 bacterial CAP (unknown organism). Treated with steroids, duonebs, abx until patient back at baseline respiratory status. Patient has received maximum benefit from inpatient stay and is medically clear for discharge. Provided discharge instructions and return precautions.       Goals of Care Treatment Preferences:  Code Status: Full Code      Consults:     No new Assessment & Plan notes have been filed under this hospital service since the last note was generated.  Service: Hospital Medicine    Final Active Diagnoses:    Diagnosis Date Noted POA    PRINCIPAL PROBLEM:  Sepsis due to pneumonia [J18.9, A41.9] 04/18/2024 Yes    Hypomagnesemia [E83.42] 04/18/2024 Yes    Coronary artery disease involving native coronary artery of native heart without angina pectoris [I25.10] 04/18/2024 Yes    Hyponatremia [E87.1] 10/27/2023 Yes    Emphysema/COPD [J43.9] 10/26/2023 Yes    Anemia of chronic disease [D63.8] 06/27/2023 Yes    Chronic respiratory failure with hypoxia [J96.11] 06/04/2023 Yes    Hypokalemia [E87.6] 09/13/2020 Yes    Primary hypertension [I10]  Yes    Anxiety and depression [F41.9, F32.A]  Yes      Problems Resolved During this Admission:       Discharged Condition: good    Disposition: Home-Health Care Svc    Follow Up:    Patient Instructions:      NEBULIZER FOR HOME USE     Order Specific Question Answer Comments   Height: 5' 2" (1.575 " m)    Weight: 46.3 kg (102 lb)    Does patient have medical equipment at home? walker, rolling 3L o2 / 3L o2 / 3L o2 / 3L o2   Does patient have medical equipment at home? rollator    Does patient have medical equipment at home? shower chair    Does patient have medical equipment at home? oxygen    Length of need (1-99 months): 99      Ambulatory referral/consult to Home Health   Standing Status: Future   Referral Priority: Routine Referral Type: Home Health   Referral Reason: Specialty Services Required   Requested Specialty: Home Health Services   Number of Visits Requested: 1       Significant Diagnostic Studies: N/A    Pending Diagnostic Studies:       Procedure Component Value Units Date/Time    Influenza antigen Nasopharyngeal Swab [[id number]] Collected: 04/18/24 0705    Order Status: Sent Lab Status: No result            Medications:  Reconciled Home Medications:      Medication List        START taking these medications      albuterol-ipratropium 2.5 mg-0.5 mg/3 mL nebulizer solution  Commonly known as: DUO-NEB  Take 3 mLs by nebulization every 6 (six) hours as needed for Wheezing. Rescue            CONTINUE taking these medications      albuterol 90 mcg/actuation inhaler  Commonly known as: PROVENTIL/VENTOLIN HFA  Inhale 2 puffs into the lungs every 6 (six) hours as needed for Wheezing or Shortness of Breath.     aspirin 81 MG EC tablet  Commonly known as: ECOTRIN  Take 1 tablet (81 mg total) by mouth once daily.     atorvastatin 80 MG tablet  Commonly known as: LIPITOR  Take 1 tablet (80 mg total) by mouth once daily.     busPIRone 15 MG tablet  Commonly known as: BUSPAR  Take 15 mg by mouth 2 (two) times a day.     carvediloL 25 MG tablet  Commonly known as: COREG  Take 0.5 tablets (12.5 mg total) by mouth 2 (two) times daily.     clopidogreL 75 mg tablet  Commonly known as: PLAVIX  Take 1 tablet (75 mg total) by mouth once daily.     cyanocobalamin 1,000 mcg/mL injection  Inject 1,000 mcg into the  muscle every 30 days.     cyclobenzaprine 10 MG tablet  Commonly known as: FLEXERIL  Take 10 mg by mouth 3 (three) times daily as needed for Muscle spasms.     FLUoxetine 40 MG capsule  Take 40 mg by mouth once daily.     folic acid 1 MG tablet  Commonly known as: FOLVITE  Take 1 tablet (1 mg total) by mouth once daily.     levothyroxine 50 MCG tablet  Commonly known as: SYNTHROID  Take 50 mcg by mouth before breakfast.     montelukast 10 mg tablet  Commonly known as: SINGULAIR  Take 10 mg by mouth every morning.     ondansetron 4 MG tablet  Commonly known as: ZOFRAN  Take 4 mg by mouth every 6 (six) hours as needed for Nausea.     spironolactone 25 MG tablet  Commonly known as: ALDACTONE  Take 0.5 tablets (12.5 mg total) by mouth once daily.     STIOLTO RESPIMAT 2.5-2.5 mcg/actuation Mist  Generic drug: tiotropium-olodateroL  INHALE ONE PUFF INTO THE LUNGS ONCE DAILY. controller            STOP taking these medications      butalbital-acetaminophen-caffeine -40 mg -40 mg per tablet  Commonly known as: FIORICET, ESGIC     clonazePAM 0.5 MG tablet  Commonly known as: KlonoPIN     multivitamin Tab     potassium chloride 10 MEQ Tbsr  Commonly known as: KLOR-CON     thiamine 100 MG tablet            ASK your doctor about these medications      amoxicillin-clavulanate 875-125mg 875-125 mg per tablet  Commonly known as: AUGMENTIN  Take 1 tablet by mouth 2 (two) times daily. for 2 days  Ask about: Should I take this medication?     butalbital-acetaminophen-caffeine -40 mg -40 mg per tablet  Commonly known as: FIORICET, ESGIC  Take 1 tablet by mouth every 8 (eight) hours as needed for Headaches.  Ask about: Should I take this medication?              Indwelling Lines/Drains at time of discharge:   Lines/Drains/Airways       None                   Time spent on the discharge of patient: 15 minutes         Reynaldo Nick MD  Department of Hospital Medicine  [facility]

## 2024-05-01 NOTE — HOSPITAL COURSE
Patient admitted for COPD exacerbation 2/2 bacterial CAP (unknown organism). Treated with steroids, duonebs, abx until patient back at baseline respiratory status. Patient has received maximum benefit from inpatient stay and is medically clear for discharge. Provided discharge instructions and return precautions.

## 2024-05-10 ENCOUNTER — DOCUMENT SCAN (OUTPATIENT)
Dept: HOME HEALTH SERVICES | Facility: HOSPITAL | Age: 69
End: 2024-05-10
Payer: MEDICARE

## 2024-05-20 PROBLEM — N17.9 ACUTE KIDNEY INJURY: Status: RESOLVED | Noted: 2023-10-26 | Resolved: 2024-05-20

## 2024-05-27 ENCOUNTER — HOSPITAL ENCOUNTER (EMERGENCY)
Facility: HOSPITAL | Age: 69
Discharge: HOME | End: 2024-05-27
Attending: STUDENT IN AN ORGANIZED HEALTH CARE EDUCATION/TRAINING PROGRAM
Payer: MEDICARE

## 2024-05-27 VITALS
RESPIRATION RATE: 19 BRPM | WEIGHT: 103 LBS | OXYGEN SATURATION: 95 % | SYSTOLIC BLOOD PRESSURE: 127 MMHG | BODY MASS INDEX: 18.95 KG/M2 | HEIGHT: 62 IN | HEART RATE: 104 BPM | DIASTOLIC BLOOD PRESSURE: 61 MMHG | TEMPERATURE: 100 F

## 2024-05-27 DIAGNOSIS — R11.2 NAUSEA VOMITING AND DIARRHEA: ICD-10-CM

## 2024-05-27 DIAGNOSIS — R50.9 FEVER, UNSPECIFIED FEVER CAUSE: ICD-10-CM

## 2024-05-27 DIAGNOSIS — R06.02 SHORTNESS OF BREATH: ICD-10-CM

## 2024-05-27 DIAGNOSIS — R19.7 NAUSEA VOMITING AND DIARRHEA: ICD-10-CM

## 2024-05-27 DIAGNOSIS — J44.1 COPD WITH ACUTE EXACERBATION: Primary | ICD-10-CM

## 2024-05-27 LAB
ALBUMIN SERPL BCP-MCNC: 2.9 G/DL (ref 3.5–5.2)
ALLENS TEST: ABNORMAL
ALP SERPL-CCNC: 141 U/L (ref 55–135)
ALT SERPL W/O P-5'-P-CCNC: 10 U/L (ref 10–44)
ANION GAP SERPL CALC-SCNC: 14 MMOL/L (ref 8–16)
AST SERPL-CCNC: 17 U/L (ref 10–40)
BASOPHILS # BLD AUTO: 0.02 K/UL (ref 0–0.2)
BASOPHILS NFR BLD: 0.2 % (ref 0–1.9)
BILIRUB SERPL-MCNC: 0.4 MG/DL (ref 0.1–1)
BNP SERPL-MCNC: 45 PG/ML (ref 0–99)
BUN SERPL-MCNC: 8 MG/DL (ref 8–23)
CALCIUM SERPL-MCNC: 9.2 MG/DL (ref 8.7–10.5)
CHLORIDE SERPL-SCNC: 98 MMOL/L (ref 95–110)
CO2 SERPL-SCNC: 25 MMOL/L (ref 23–29)
CREAT SERPL-MCNC: 0.8 MG/DL (ref 0.5–1.4)
DELSYS: ABNORMAL
DIFFERENTIAL METHOD BLD: ABNORMAL
EOSINOPHIL # BLD AUTO: 0 K/UL (ref 0–0.5)
EOSINOPHIL NFR BLD: 0.2 % (ref 0–8)
ERYTHROCYTE [DISTWIDTH] IN BLOOD BY AUTOMATED COUNT: 14.1 % (ref 11.5–14.5)
EST. GFR  (NO RACE VARIABLE): >60 ML/MIN/1.73 M^2
FIO2: 21
GLUCOSE SERPL-MCNC: 125 MG/DL (ref 70–110)
GRAM NEGATIVE ORGANISMS: NORMAL
GRAM POSITIVE ORGANISMS: NORMAL
GRAM STAIN - WHITE CELLS: NORMAL
HCO3 UR-SCNC: 29.6 MMOL/L (ref 24–28)
HCT VFR BLD AUTO: 32.9 % (ref 37–48.5)
HGB BLD-MCNC: 10.8 G/DL (ref 12–16)
IMM GRANULOCYTES # BLD AUTO: 0.03 K/UL (ref 0–0.04)
IMM GRANULOCYTES NFR BLD AUTO: 0.2 % (ref 0–0.5)
INFLUENZA A, MOLECULAR: NEGATIVE
INFLUENZA B, MOLECULAR: NEGATIVE
LACTATE SERPL-SCNC: 1.6 MMOL/L (ref 0.5–2.2)
LYMPHOCYTES # BLD AUTO: 1.1 K/UL (ref 1–4.8)
LYMPHOCYTES NFR BLD: 8.4 % (ref 18–48)
MAGNESIUM SERPL-MCNC: 1.6 MG/DL (ref 1.6–2.6)
MCH RBC QN AUTO: 30.7 PG (ref 27–31)
MCHC RBC AUTO-ENTMCNC: 32.8 G/DL (ref 32–36)
MCV RBC AUTO: 94 FL (ref 82–98)
MODE: ABNORMAL
MONOCYTES # BLD AUTO: 1.3 K/UL (ref 0.3–1)
MONOCYTES NFR BLD: 10.2 % (ref 4–15)
NEUTROPHILS # BLD AUTO: 10.2 K/UL (ref 1.8–7.7)
NEUTROPHILS NFR BLD: 80.8 % (ref 38–73)
NRBC BLD-RTO: 0 /100 WBC
PCO2 BLDA: 49 MMHG (ref 35–45)
PH SMN: 7.39 [PH] (ref 7.35–7.45)
PLATELET # BLD AUTO: 306 K/UL (ref 150–450)
PMV BLD AUTO: 10 FL (ref 9.2–12.9)
PO2 BLDA: 30 MMHG (ref 40–60)
POC BE: 5 MMOL/L
POC SATURATED O2: 55 % (ref 95–100)
POTASSIUM SERPL-SCNC: 3.7 MMOL/L (ref 3.5–5.1)
PROCALCITONIN SERPL IA-MCNC: 6.53 NG/ML
PROT SERPL-MCNC: 7.6 G/DL (ref 6–8.4)
RBC # BLD AUTO: 3.52 M/UL (ref 4–5.4)
SAMPLE: ABNORMAL
SARS-COV-2 RDRP RESP QL NAA+PROBE: NEGATIVE
SITE: ABNORMAL
SODIUM SERPL-SCNC: 137 MMOL/L (ref 136–145)
SPECIMEN SOURCE: NORMAL
SPECIMEN SOURCE: NORMAL
TROPONIN I SERPL DL<=0.01 NG/ML-MCNC: <0.006 NG/ML (ref 0–0.03)
WBC # BLD AUTO: 12.55 K/UL (ref 3.9–12.7)
YEAST ORGANISMS: NORMAL

## 2024-05-27 PROCEDURE — 87070 CULTURE OTHR SPECIMN AEROBIC: CPT | Performed by: STUDENT IN AN ORGANIZED HEALTH CARE EDUCATION/TRAINING PROGRAM

## 2024-05-27 PROCEDURE — 80053 COMPREHEN METABOLIC PANEL: CPT | Performed by: STUDENT IN AN ORGANIZED HEALTH CARE EDUCATION/TRAINING PROGRAM

## 2024-05-27 PROCEDURE — 99900031 HC PATIENT EDUCATION (STAT)

## 2024-05-27 PROCEDURE — 83880 ASSAY OF NATRIURETIC PEPTIDE: CPT | Performed by: STUDENT IN AN ORGANIZED HEALTH CARE EDUCATION/TRAINING PROGRAM

## 2024-05-27 PROCEDURE — 94640 AIRWAY INHALATION TREATMENT: CPT

## 2024-05-27 PROCEDURE — 93005 ELECTROCARDIOGRAM TRACING: CPT

## 2024-05-27 PROCEDURE — 85025 COMPLETE CBC W/AUTO DIFF WBC: CPT | Performed by: STUDENT IN AN ORGANIZED HEALTH CARE EDUCATION/TRAINING PROGRAM

## 2024-05-27 PROCEDURE — 99900035 HC TECH TIME PER 15 MIN (STAT)

## 2024-05-27 PROCEDURE — U0002 COVID-19 LAB TEST NON-CDC: HCPCS | Performed by: STUDENT IN AN ORGANIZED HEALTH CARE EDUCATION/TRAINING PROGRAM

## 2024-05-27 PROCEDURE — 27000221 HC OXYGEN, UP TO 24 HOURS

## 2024-05-27 PROCEDURE — 94761 N-INVAS EAR/PLS OXIMETRY MLT: CPT

## 2024-05-27 PROCEDURE — 96365 THER/PROPH/DIAG IV INF INIT: CPT

## 2024-05-27 PROCEDURE — 71045 X-RAY EXAM CHEST 1 VIEW: CPT | Mod: 26,,, | Performed by: RADIOLOGY

## 2024-05-27 PROCEDURE — 99285 EMERGENCY DEPT VISIT HI MDM: CPT | Mod: 25

## 2024-05-27 PROCEDURE — 87186 SC STD MICRODIL/AGAR DIL: CPT | Performed by: STUDENT IN AN ORGANIZED HEALTH CARE EDUCATION/TRAINING PROGRAM

## 2024-05-27 PROCEDURE — 63600175 PHARM REV CODE 636 W HCPCS: Performed by: STUDENT IN AN ORGANIZED HEALTH CARE EDUCATION/TRAINING PROGRAM

## 2024-05-27 PROCEDURE — 84484 ASSAY OF TROPONIN QUANT: CPT | Performed by: STUDENT IN AN ORGANIZED HEALTH CARE EDUCATION/TRAINING PROGRAM

## 2024-05-27 PROCEDURE — 82803 BLOOD GASES ANY COMBINATION: CPT

## 2024-05-27 PROCEDURE — 87205 SMEAR GRAM STAIN: CPT | Performed by: STUDENT IN AN ORGANIZED HEALTH CARE EDUCATION/TRAINING PROGRAM

## 2024-05-27 PROCEDURE — 87502 INFLUENZA DNA AMP PROBE: CPT | Performed by: STUDENT IN AN ORGANIZED HEALTH CARE EDUCATION/TRAINING PROGRAM

## 2024-05-27 PROCEDURE — 83605 ASSAY OF LACTIC ACID: CPT | Performed by: STUDENT IN AN ORGANIZED HEALTH CARE EDUCATION/TRAINING PROGRAM

## 2024-05-27 PROCEDURE — 83735 ASSAY OF MAGNESIUM: CPT | Performed by: STUDENT IN AN ORGANIZED HEALTH CARE EDUCATION/TRAINING PROGRAM

## 2024-05-27 PROCEDURE — 71045 X-RAY EXAM CHEST 1 VIEW: CPT | Mod: TC

## 2024-05-27 PROCEDURE — 93010 ELECTROCARDIOGRAM REPORT: CPT | Mod: ,,, | Performed by: INTERNAL MEDICINE

## 2024-05-27 PROCEDURE — 96366 THER/PROPH/DIAG IV INF ADDON: CPT

## 2024-05-27 PROCEDURE — 25000242 PHARM REV CODE 250 ALT 637 W/ HCPCS: Performed by: STUDENT IN AN ORGANIZED HEALTH CARE EDUCATION/TRAINING PROGRAM

## 2024-05-27 PROCEDURE — 87040 BLOOD CULTURE FOR BACTERIA: CPT | Performed by: STUDENT IN AN ORGANIZED HEALTH CARE EDUCATION/TRAINING PROGRAM

## 2024-05-27 PROCEDURE — 84145 PROCALCITONIN (PCT): CPT | Performed by: STUDENT IN AN ORGANIZED HEALTH CARE EDUCATION/TRAINING PROGRAM

## 2024-05-27 RX ORDER — IPRATROPIUM BROMIDE AND ALBUTEROL SULFATE 2.5; .5 MG/3ML; MG/3ML
3 SOLUTION RESPIRATORY (INHALATION)
Status: COMPLETED | OUTPATIENT
Start: 2024-05-27 | End: 2024-05-27

## 2024-05-27 RX ORDER — MAGNESIUM SULFATE HEPTAHYDRATE 40 MG/ML
2 INJECTION, SOLUTION INTRAVENOUS
Status: COMPLETED | OUTPATIENT
Start: 2024-05-27 | End: 2024-05-27

## 2024-05-27 RX ORDER — AMOXICILLIN AND CLAVULANATE POTASSIUM 875; 125 MG/1; MG/1
1 TABLET, FILM COATED ORAL 2 TIMES DAILY
Qty: 10 TABLET | Refills: 0 | Status: SHIPPED | OUTPATIENT
Start: 2024-05-27 | End: 2024-06-01

## 2024-05-27 RX ADMIN — MAGNESIUM SULFATE HEPTAHYDRATE 2 G: 40 INJECTION, SOLUTION INTRAVENOUS at 06:05

## 2024-05-27 RX ADMIN — IPRATROPIUM BROMIDE AND ALBUTEROL SULFATE 3 ML: 2.5; .5 SOLUTION RESPIRATORY (INHALATION) at 05:05

## 2024-05-27 NOTE — ED PROVIDER NOTES
Encounter Date: 5/27/2024       History     Chief Complaint   Patient presents with    Shortness of Breath     68-year-old female with a history of hypertension, hyperlipidemia, NSTEMI, anxiety, CVA, COPD on 3 L home O2, current tobacco use. She presents to ED with complaints of 1 day history of progressively worsened wheezing, shortness of breath. She endorses associated productive cough.  Reports a fever of 101 yesterday which resolved after Tylenol. Denies associated palpitations, chest pain, recent changes in urinary or bowel habits.  She received DuoNeb breathing treatment as well as Solu-Medrol 125 mg given by EMS prior to arrival.   EMS reports that she was sudden 94% on 3 L oxygen upon arrival to her home    The history is provided by the patient and the EMS personnel. No  was used.     Review of patient's allergies indicates:  No Known Allergies  Past Medical History:   Diagnosis Date    Acquired hypothyroidism     Anxiety and depression     Cervical radicular pain     Closed left ankle fracture     COPD (chronic obstructive pulmonary disease)     H/O: stroke 11/08/2023    Hypertension     Hypokalemia     Hyponatremia     Multiple rib fractures     NSTEMI (non-ST elevated myocardial infarction)     Requires continuous at home supplemental oxygen     PRN FOR COPD    Stroke     Tension type headache     Traumatic closed displaced fracture of distal end of radius, left, sequela      Past Surgical History:   Procedure Laterality Date    COLON SURGERY      COLOSTOMY      COLOSTOMY CLOSURE      HYSTERECTOMY      LEG SURGERY Right     OPEN REDUCTION AND INTERNAL FIXATION (ORIF) OF INJURY OF WRIST Left 1/29/2019    Procedure: ORIF, WRIST;  Surgeon: Homero Jeff DO;  [facility]: [facility];  Service: Orthopedics;  Laterality: Left;  Equipment: [employer] Geminus Wrist Plate Set  Vendor/Rep: [employer]  C-Arm: Entire  DME: None    REQUIRES ASSISTANT    WRIST SURGERY Right      Family  History   Problem Relation Name Age of Onset    Cancer Mother      COPD Father      Cirrhosis Father      Arthritis Sister      No Known Problems Brother      Anxiety disorder Daughter      Depression Daughter      Anxiety disorder Daughter      Congenital heart disease Daughter      Valvular heart disease Daughter       Social History     Tobacco Use    Smoking status: Every Day     Current packs/day: 1.00     Average packs/day: 1 pack/day for 20.0 years (20.0 ttl pk-yrs)     Types: Cigarettes    Smokeless tobacco: Never   Substance Use Topics    Alcohol use: Yes     Comment: couple times a week-wine or bloody cari    Drug use: No     Review of Systems   Constitutional: Negative.    HENT: Negative.     Eyes: Negative.    Respiratory:  Positive for cough, shortness of breath and wheezing.    Cardiovascular: Negative.    Gastrointestinal: Negative.    Endocrine: Negative.    Genitourinary: Negative.    Musculoskeletal: Negative.    Skin: Negative.    Allergic/Immunologic: Negative.    Neurological: Negative.    Hematological: Negative.    Psychiatric/Behavioral: Negative.     All other systems reviewed and are negative.      Physical Exam     Initial Vitals [05/27/24 1721]   BP Pulse Resp Temp SpO2   132/77 (!) 122 (!) 24 100.2 °F (37.9 °C) (!) 94 %      MAP       --         Physical Exam    Nursing note and vitals reviewed.  Constitutional: She appears well-developed and well-nourished.   HENT:   Head: Normocephalic.   Eyes: Pupils are equal, round, and reactive to light.   Neck: No JVD present.   Normal range of motion.  Cardiovascular:            Tachycardic   Pulmonary/Chest: She is in respiratory distress. She has wheezes (Inspiratory, expiratory wheeze).   Coarse lung sounds   Abdominal: Abdomen is soft. Bowel sounds are normal. She exhibits no distension.   Musculoskeletal:         General: Normal range of motion.      Cervical back: Normal range of motion.     Lymphadenopathy:     She has no cervical  adenopathy.   Neurological: She is alert and oriented to person, place, and time. She has normal strength. GCS score is 15. GCS eye subscore is 4. GCS verbal subscore is 5. GCS motor subscore is 6.   Skin: Skin is warm. Capillary refill takes less than 2 seconds.   Psychiatric: She has a normal mood and affect.         ED Course   Procedures  Labs Reviewed   CBC W/ AUTO DIFFERENTIAL - Abnormal; Notable for the following components:       Result Value    RBC 3.52 (*)     Hemoglobin 10.8 (*)     Hematocrit 32.9 (*)     Gran # (ANC) 10.2 (*)     Mono # 1.3 (*)     Gran % 80.8 (*)     Lymph % 8.4 (*)     All other components within normal limits   COMPREHENSIVE METABOLIC PANEL - Abnormal; Notable for the following components:    Glucose 125 (*)     Albumin 2.9 (*)     Alkaline Phosphatase 141 (*)     All other components within normal limits   ISTAT PROCEDURE - Abnormal; Notable for the following components:    POC PCO2 49.0 (*)     POC PO2 30 (*)     POC HCO3 29.6 (*)     POC BE 5 (*)     All other components within normal limits   INFLUENZA A & B BY MOLECULAR   CULTURE, RESPIRATORY   CULTURE, BLOOD   CULTURE, BLOOD   GRAM STAIN SCREEN   SARS-COV-2 RNA AMPLIFICATION, QUAL   TROPONIN I   B-TYPE NATRIURETIC PEPTIDE   MAGNESIUM   LACTIC ACID, PLASMA   PROCALCITONIN     EKG Readings: (Independently Interpreted)   EKG personally reviewed by me shows sinus tachycardia, 116 beats per minute.  Possible left atrial enlargement, possible septal infarct of undetermined age, diffuse ST and T-wave changes.  ID interval 124, .  No obvious STEMI, no arrhythmia       Imaging Results              X-Ray Chest 1 View (Final result)  Result time 05/27/24 18:16:29      Final result by Constanza Schmitz MD (05/27/24 18:16:29)                   Impression:      There is no evidence acute pulmonary disease.  The chest appears stable from 04/18/2024.      Electronically signed by: Constanza Schmitz MD  Date:    05/27/2024  Time:    18:16                Narrative:    EXAMINATION:  XR CHEST 1 VIEW    CLINICAL HISTORY:  shortness of breath;    TECHNIQUE:  Single frontal view of the chest was performed.    COMPARISON:  04/18/2024    FINDINGS:  There is no evidence pneumothorax or pleural effusion.  The cardiac silhouette is within normal limits in the trachea is midline.  The osseous structures are intact.                                    X-Rays:   Independently Interpreted Readings:   Other Readings:  Chest x-ray personally reviewed by me shows chronic interstitial changes consistent with COPD, no obvious infiltrate, no effusion, no edema no apparent acute disease process.  Normal cardiac silhouette, normal skeletal structures.    Medications   albuterol-ipratropium 2.5 mg-0.5 mg/3 mL nebulizer solution 3 mL (3 mLs Nebulization Given 5/27/24 1750)   magnesium sulfate 2g in water 50mL IVPB (premix) (0 g Intravenous Stopped 5/27/24 1945)     Medical Decision Making  68-year-old male with a history of hypertension, hyperlipidemia, NSTEMI, anxiety, CVA, COPD on 3 L home O2, current tobacco use. She presents to ED with complaints of 1 day history of progressively worsened wheezing, shortness of breath. She endorses associated productive cough.  Reports a fever of 101 yesterday which resolved after Tylenol. Denies associated palpitations, chest pain, recent changes in urinary or bowel habits.  She received DuoNeb breathing treatment as well as Solu-Medrol 125 mg given by EMS prior to arrival.   EMS reports that she was  94% on 3 L oxygen upon arrival to her home    Dr. Blake: Patient care assumed at shift change.  At that time some labs and a chest x-ray were pending.  Chest x-ray shows no evidence of pneumonia.  Her white blood cell count and lactic acid are normal.  She seems to be at her customary 90 3-94% on her customary 3 L of nasal cannula O2.  During my discussion with the patient she stated that she had fever yesterday and today.  She also added  that she has been having some mild nausea and diarrhea.  She may be suffering from a viral gastroenteritis which is causing her fever.  She was not appear to have an obvious pneumonia, but with a history of COPD, an occult pneumonia or early pneumonia is possible.  I believe it would be prudent start this patient on antibiotics.  Will use Augmentin.  She has Zofran for nausea and Imodium for loose stools at home already.  She will follow-up with her primary care provider, Dr. Pepito Jhaveri tomorrow, and will return here for any worsening signs or symptoms.  --------------------------------------------  Ddx includes COPD exacerbation, CHF, ACS, pneumonia, viral syndrome, others  On exam she has inspiratory, and expiratory wheezing, lungs sound coarse  Lab work pending chest x-ray pending  Case signed out to oncoming attending at shift change, who will follow up with workup and make disposition        Amount and/or Complexity of Data Reviewed  External Data Reviewed: labs.  Labs: ordered.  Radiology: ordered.    Risk  Prescription drug management.                                      Clinical Impression:  Final diagnoses:  [R06.02] Shortness of breath  [J44.1] COPD with acute exacerbation (Primary)  [R11.2, R19.7] Nausea vomiting and diarrhea  [R50.9] Fever, unspecified fever cause          ED Disposition Condition    Discharge Stable          ED Prescriptions       Medication Sig Dispense Start Date End Date Auth. Provider    amoxicillin-clavulanate 875-125mg (AUGMENTIN) 875-125 mg per tablet Take 1 tablet by mouth 2 (two) times daily. for 5 days 10 tablet 5/27/2024 6/1/2024 Shady Blake MD          Follow-up Information       Follow up With Specialties Details Why Contact Info    Pepito Jhaveri IV, MD Family Medicine Call in 1 day  [address]   [place]  [place] [address]  [phone]      [facility] Emergency Dept Emergency Medicine  As needed, If symptoms worsen [address]  [address]  [phone]             Shady Blake MD  05/27/24 2006

## 2024-05-28 LAB
OHS QRS DURATION: 72 MS
OHS QTC CALCULATION: 494 MS

## 2024-05-28 NOTE — DISCHARGE INSTRUCTIONS
Continue your previously prescribed medications and treatments, including your Zofran and Imodium as needed.  Start taking Augmentin as prescribed.  Call your doctor tomorrow to discuss today's visit to the emergency department.  Return here for any worsening signs or symptoms.

## 2024-05-31 LAB
BACTERIA SPEC AEROBE CULT: ABNORMAL
BACTERIA SPEC AEROBE CULT: ABNORMAL
GRAM STN SPEC: ABNORMAL

## 2024-06-01 ENCOUNTER — TELEPHONE (OUTPATIENT)
Dept: EMERGENCY MEDICINE | Facility: HOSPITAL | Age: 69
End: 2024-06-01
Payer: MEDICARE

## 2024-06-01 LAB
BACTERIA BLD CULT: NORMAL
BACTERIA BLD CULT: NORMAL

## 2024-06-01 RX ORDER — CLINDAMYCIN HYDROCHLORIDE 300 MG/1
300 CAPSULE ORAL EVERY 6 HOURS
Qty: 40 CAPSULE | Refills: 0 | Status: SHIPPED | OUTPATIENT
Start: 2024-06-01 | End: 2024-06-11

## 2024-06-01 NOTE — PROGRESS NOTES
Made contact with patient. Advised of need for additional antibiotics, Clindamycin susceptible. Clindamycin RX sent to [facility] per patient request. No additional concerns from patient.

## 2024-07-13 ENCOUNTER — HOSPITAL ENCOUNTER (EMERGENCY)
Facility: HOSPITAL | Age: 69
Discharge: HOME | End: 2024-07-14
Attending: EMERGENCY MEDICINE
Payer: MEDICARE

## 2024-07-13 DIAGNOSIS — R07.9 CHEST PAIN: ICD-10-CM

## 2024-07-13 DIAGNOSIS — M62.838 MUSCLE SPASM: Primary | ICD-10-CM

## 2024-07-13 PROCEDURE — 85025 COMPLETE CBC W/AUTO DIFF WBC: CPT | Performed by: EMERGENCY MEDICINE

## 2024-07-13 PROCEDURE — 80053 COMPREHEN METABOLIC PANEL: CPT | Performed by: EMERGENCY MEDICINE

## 2024-07-13 PROCEDURE — 83735 ASSAY OF MAGNESIUM: CPT | Performed by: EMERGENCY MEDICINE

## 2024-07-13 PROCEDURE — 71045 X-RAY EXAM CHEST 1 VIEW: CPT | Mod: 26,,, | Performed by: RADIOLOGY

## 2024-07-13 PROCEDURE — 84484 ASSAY OF TROPONIN QUANT: CPT | Performed by: EMERGENCY MEDICINE

## 2024-07-13 PROCEDURE — 99285 EMERGENCY DEPT VISIT HI MDM: CPT | Mod: 25

## 2024-07-13 PROCEDURE — 71045 X-RAY EXAM CHEST 1 VIEW: CPT | Mod: TC

## 2024-07-13 PROCEDURE — 93005 ELECTROCARDIOGRAM TRACING: CPT

## 2024-07-13 PROCEDURE — 93010 ELECTROCARDIOGRAM REPORT: CPT | Mod: ,,, | Performed by: INTERNAL MEDICINE

## 2024-07-13 RX ORDER — DIAZEPAM 5 MG/1
5 TABLET ORAL
Status: COMPLETED | OUTPATIENT
Start: 2024-07-14 | End: 2024-07-13

## 2024-07-13 RX ADMIN — DIAZEPAM 5 MG: 5 TABLET ORAL at 11:07

## 2024-07-14 VITALS
DIASTOLIC BLOOD PRESSURE: 74 MMHG | SYSTOLIC BLOOD PRESSURE: 145 MMHG | WEIGHT: 110 LBS | BODY MASS INDEX: 20.24 KG/M2 | RESPIRATION RATE: 17 BRPM | HEART RATE: 79 BPM | HEIGHT: 62 IN | TEMPERATURE: 99 F | OXYGEN SATURATION: 100 %

## 2024-07-14 LAB
ALBUMIN SERPL BCP-MCNC: 3.4 G/DL (ref 3.5–5.2)
ALP SERPL-CCNC: 132 U/L (ref 55–135)
ALT SERPL W/O P-5'-P-CCNC: 19 U/L (ref 10–44)
ANION GAP SERPL CALC-SCNC: 15 MMOL/L (ref 8–16)
AST SERPL-CCNC: 28 U/L (ref 10–40)
BASOPHILS # BLD AUTO: 0.02 K/UL (ref 0–0.2)
BASOPHILS NFR BLD: 0.3 % (ref 0–1.9)
BILIRUB SERPL-MCNC: 0.2 MG/DL (ref 0.1–1)
BUN SERPL-MCNC: 8 MG/DL (ref 8–23)
CALCIUM SERPL-MCNC: 8.9 MG/DL (ref 8.7–10.5)
CHLORIDE SERPL-SCNC: 95 MMOL/L (ref 95–110)
CO2 SERPL-SCNC: 22 MMOL/L (ref 23–29)
CREAT SERPL-MCNC: 0.7 MG/DL (ref 0.5–1.4)
DIFFERENTIAL METHOD BLD: ABNORMAL
EOSINOPHIL # BLD AUTO: 0.1 K/UL (ref 0–0.5)
EOSINOPHIL NFR BLD: 1.2 % (ref 0–8)
ERYTHROCYTE [DISTWIDTH] IN BLOOD BY AUTOMATED COUNT: 14.6 % (ref 11.5–14.5)
EST. GFR  (NO RACE VARIABLE): >60 ML/MIN/1.73 M^2
GLUCOSE SERPL-MCNC: 84 MG/DL (ref 70–110)
HCT VFR BLD AUTO: 32 % (ref 37–48.5)
HGB BLD-MCNC: 10.5 G/DL (ref 12–16)
IMM GRANULOCYTES # BLD AUTO: 0.01 K/UL (ref 0–0.04)
IMM GRANULOCYTES NFR BLD AUTO: 0.2 % (ref 0–0.5)
LYMPHOCYTES # BLD AUTO: 1.9 K/UL (ref 1–4.8)
LYMPHOCYTES NFR BLD: 29.3 % (ref 18–48)
MAGNESIUM SERPL-MCNC: 1.9 MG/DL (ref 1.6–2.6)
MCH RBC QN AUTO: 31.3 PG (ref 27–31)
MCHC RBC AUTO-ENTMCNC: 32.8 G/DL (ref 32–36)
MCV RBC AUTO: 95 FL (ref 82–98)
MONOCYTES # BLD AUTO: 0.6 K/UL (ref 0.3–1)
MONOCYTES NFR BLD: 8.6 % (ref 4–15)
NEUTROPHILS # BLD AUTO: 4 K/UL (ref 1.8–7.7)
NEUTROPHILS NFR BLD: 60.4 % (ref 38–73)
NRBC BLD-RTO: 0 /100 WBC
PLATELET # BLD AUTO: 205 K/UL (ref 150–450)
PMV BLD AUTO: 10.1 FL (ref 9.2–12.9)
POTASSIUM SERPL-SCNC: 4.4 MMOL/L (ref 3.5–5.1)
PROT SERPL-MCNC: 7.6 G/DL (ref 6–8.4)
RBC # BLD AUTO: 3.36 M/UL (ref 4–5.4)
SODIUM SERPL-SCNC: 132 MMOL/L (ref 136–145)
TROPONIN I SERPL DL<=0.01 NG/ML-MCNC: <0.006 NG/ML (ref 0–0.03)
WBC # BLD AUTO: 6.59 K/UL (ref 3.9–12.7)

## 2024-07-14 PROCEDURE — 25000003 PHARM REV CODE 250: Performed by: EMERGENCY MEDICINE

## 2024-07-14 RX ORDER — CYCLOBENZAPRINE HCL 10 MG
10 TABLET ORAL 3 TIMES DAILY PRN
Qty: 15 TABLET | Refills: 0 | Status: SHIPPED | OUTPATIENT
Start: 2024-07-14 | End: 2024-07-19

## 2024-07-14 NOTE — ED PROVIDER NOTES
Encounter Date: 7/13/2024       History     Chief Complaint   Patient presents with    chest/ back pain     Pt reports intermittent chest and back pain onset yesterday     Patient presents to the emergency department for evaluation of chest pain and upper back pain.  Patient states that she has chronic back pain, but it is usually in her lower back.  She states she is developed muscle spasms in her upper back for the last week or so.  She has had some chest pain for the last 3 days intermittently.  She has COPD and chronic shortness of breath, but her shortness of breath has not been worse.  She denies any nausea or vomiting.  She has had no fevers or chills.  Patient does have a history of heart disease.  She has not taken anything for her chest pain.  She continues to smoke cigarettes.  Patient denies any other complaints.    The history is provided by the patient.     Review of patient's allergies indicates:  No Known Allergies  Past Medical History:   Diagnosis Date    Acquired hypothyroidism     Anxiety and depression     Cervical radicular pain     Closed left ankle fracture     COPD (chronic obstructive pulmonary disease)     H/O: stroke 11/08/2023    Hypertension     Hypokalemia     Hyponatremia     Multiple rib fractures     NSTEMI (non-ST elevated myocardial infarction)     Requires continuous at home supplemental oxygen     PRN FOR COPD    Stroke     Tension type headache     TIA (transient ischemic attack)     Traumatic closed displaced fracture of distal end of radius, left, sequela      Past Surgical History:   Procedure Laterality Date    ABDOMINAL SURGERY      COLON SURGERY      COLOSTOMY      COLOSTOMY CLOSURE      HYSTERECTOMY      LEG SURGERY Right     OPEN REDUCTION AND INTERNAL FIXATION (ORIF) OF INJURY OF WRIST Left 1/29/2019    Procedure: ORIF, WRIST;  Surgeon: Homero Jeff DO;  Location: [facility];  Service: Orthopedics;  Laterality: Left;  Equipment: [employer] Geminus Wrist Plate  Set  Vendor/Rep: [employer]  C-Arm: Entire  DME: None    REQUIRES ASSISTANT    WRIST SURGERY Right      Family History   Problem Relation Name Age of Onset    Cancer Mother      COPD Father      Cirrhosis Father      Arthritis Sister      No Known Problems Brother      Anxiety disorder Daughter      Depression Daughter      Anxiety disorder Daughter      Congenital heart disease Daughter      Valvular heart disease Daughter       Social History     Tobacco Use    Smoking status: Every Day     Current packs/day: 1.00     Average packs/day: 1 pack/day for 20.0 years (20.0 ttl pk-yrs)     Types: Cigarettes    Smokeless tobacco: Never   Substance Use Topics    Alcohol use: Yes     Comment: couple times a week-wine or bloody cari    Drug use: No     Review of Systems   Constitutional:  Negative for activity change, appetite change, chills and fever.   HENT:  Negative for congestion, ear discharge, ear pain, nosebleeds, sore throat and voice change.    Eyes:  Negative for photophobia, pain and discharge.   Respiratory:  Negative for cough, chest tightness, shortness of breath and wheezing.    Cardiovascular:  Positive for chest pain. Negative for palpitations.   Gastrointestinal:  Negative for abdominal pain, constipation, nausea and vomiting.   Genitourinary:  Negative for dysuria, flank pain, frequency and urgency.   Musculoskeletal:  Positive for back pain. Negative for neck pain.   Skin:  Negative for rash and wound.   Neurological:  Negative for dizziness, seizures, weakness and headaches.   All other systems reviewed and are negative.      Physical Exam     Initial Vitals   BP Pulse Resp Temp SpO2   07/13/24 2344 07/13/24 2341 07/13/24 2341 07/13/24 2344 07/13/24 2341   (!) 145/74 97 18 98.8 °F (37.1 °C) (!) 88 %      MAP       --                Physical Exam    Nursing note and vitals reviewed.  Constitutional: She appears well-developed and well-nourished.   HENT:   Head: Normocephalic and atraumatic.    Right Ear: External ear normal.   Left Ear: External ear normal.   Nose: Nose normal.   Mouth/Throat: Oropharynx is clear and moist.   Eyes: Conjunctivae and EOM are normal. Pupils are equal, round, and reactive to light.   Neck: Neck supple. No tracheal deviation present.   Normal range of motion.  Cardiovascular:  Normal rate, regular rhythm and normal heart sounds.           Pulmonary/Chest: Breath sounds normal. She has no wheezes.   Breath sounds are diminished in both lung fields.   Abdominal: Abdomen is soft. Bowel sounds are normal. There is no abdominal tenderness.   Musculoskeletal:         General: No tenderness. Normal range of motion.      Cervical back: Normal range of motion and neck supple.     Neurological: She is alert and oriented to person, place, and time. She has normal reflexes.   Skin: Skin is warm and dry. Capillary refill takes less than 2 seconds. No rash noted.         ED Course   Procedures  Labs Reviewed   CBC W/ AUTO DIFFERENTIAL - Abnormal; Notable for the following components:       Result Value    RBC 3.36 (*)     Hemoglobin 10.5 (*)     Hematocrit 32.0 (*)     MCH 31.3 (*)     RDW 14.6 (*)     All other components within normal limits   COMPREHENSIVE METABOLIC PANEL - Abnormal; Notable for the following components:    Sodium 132 (*)     CO2 22 (*)     Albumin 3.4 (*)     All other components within normal limits   MAGNESIUM   TROPONIN I          Imaging Results              X-Ray Chest AP Portable (Final result)  Result time 07/14/24 00:34:44      Final result by Heike Brewer MD (07/14/24 00:34:44)                   Impression:      Please see above.      Electronically signed by: Heike Brewer MD  Date:    07/14/2024  Time:    00:34               Narrative:    EXAMINATION:  XR CHEST AP PORTABLE    CLINICAL HISTORY:  Chest pain, unspecified    TECHNIQUE:  Single frontal view of the chest was performed.    COMPARISON:  05/27/2024    FINDINGS:  Cardiac monitoring leads overlie  the chest.  The cardiomediastinal silhouette is unchanged in size and configuration.  The lungs are symmetrically expanded with diffuse increased interstitial prominence/interstitial coarsening suggestive underlying emphysematous/chronic interstitial change noting findings appear relatively unchanged from multiple prior exams including 05/27/2024.  Probable small bilateral pleural effusions.  No evidence of pneumothorax. Remote bilateral rib fracture deformities present.                                       Medications   diazePAM tablet 5 mg (5 mg Oral Given 7/13/24 2582)     Medical Decision Making  History is obtained from the patient.  All labs and x-rays are reviewed by myself.  Differential diagnosis includes, but is not limited to, AMI/ACS/STEMI/COPD/pneumonia/bronchitis/pleurisy/muscle spasm    Labs, EKG and chest x-ray revealed no acute process.  Suspect the patient has muscle spasm associated with her back spasm.  We will place almost over the boxers ever follow up with the primary care provider.    Amount and/or Complexity of Data Reviewed  Labs: ordered.  Radiology: ordered.  ECG/medicine tests: ordered and independent interpretation performed.     Details: EKG shows a normal sinus rhythm with a heart rate of 92.  There is evidence of previous anterior MI.  There is no acute ischemic change noted.  There is no axis deviation noted.    Risk  Prescription drug management.                                      Clinical Impression:  Final diagnoses:  [R07.9] Chest pain  [M62.838] Muscle spasm (Primary)          ED Disposition Condition    Discharge Stable          ED Prescriptions       Medication Sig Dispense Start Date End Date Auth. Provider    cyclobenzaprine (FLEXERIL) 10 MG tablet Take 1 tablet (10 mg total) by mouth 3 (three) times daily as needed for Muscle spasms. 15 tablet 7/14/2024 7/19/2024 Zi Bernardo, DO          Follow-up Information       Follow up With Specialties Details Why  Contact Info    Pepito Jhaveri IV, MD Family Medicine Schedule an appointment as soon as possible for a visit   [address]   [place]  [place] [address]  [phone]               Zi Bernardo, DO  07/14/24 0045

## 2024-07-15 LAB
OHS QRS DURATION: 78 MS
OHS QTC CALCULATION: 484 MS

## 2024-07-22 PROBLEM — J18.9 SEPSIS DUE TO PNEUMONIA: Status: RESOLVED | Noted: 2024-04-18 | Resolved: 2024-07-22

## 2024-07-22 PROBLEM — J96.11 CHRONIC RESPIRATORY FAILURE WITH HYPOXIA: Status: RESOLVED | Noted: 2023-06-04 | Resolved: 2024-07-22

## 2024-07-22 PROBLEM — A41.9 SEPSIS DUE TO PNEUMONIA: Status: RESOLVED | Noted: 2024-04-18 | Resolved: 2024-07-22

## 2024-08-01 DIAGNOSIS — J44.9 CHRONIC OBSTRUCTIVE PULMONARY DISEASE, UNSPECIFIED COPD TYPE: ICD-10-CM

## 2024-08-01 RX ORDER — TIOTROPIUM BROMIDE AND OLODATEROL 3.124; 2.736 UG/1; UG/1
SPRAY, METERED RESPIRATORY (INHALATION)
Qty: 4 G | Refills: 11 | Status: SHIPPED | OUTPATIENT
Start: 2024-08-01

## 2024-08-17 NOTE — ED TRIAGE NOTES
Pt presents to the er with c/o generalized weakness and having fall 2 weeks ago. Pt states she is having bilateral rib pain from fall and chest pain  
2

## 2024-09-23 ENCOUNTER — TELEPHONE (OUTPATIENT)
Dept: CARDIOLOGY | Facility: CLINIC | Age: 69
End: 2024-09-23
Payer: MEDICARE

## 2024-09-23 NOTE — TELEPHONE ENCOUNTER
----- Message from Marjorie Castañeda MA sent at 9/23/2024  4:54 PM CDT -----  Vicenta the patient need to talk to you about her condition early today the patient was having chest pain last visit was on 11-8-2024   please call the  patient at [phone]. Thank you.

## 2024-09-23 NOTE — TELEPHONE ENCOUNTER
Pt c/o CP to left side of chest x several weeks. Not related to activity. Also limited activity due to neuropathic pain.  c/o also of neuropathy and anxiety. Pain might be associated w. increased SOB (states hard to tell due to hx of COPD., pt is a stabbing pain. I scheduled her in Am w. Ms Johnson + note for EKG needed and she agreed to date/time of appointment(s).

## 2024-09-24 PROBLEM — I77.810 THORACIC AORTIC ECTASIA: Status: ACTIVE | Noted: 2024-09-24

## 2024-10-02 PROBLEM — I25.10 CORONARY ARTERY DISEASE INVOLVING NATIVE CORONARY ARTERY OF NATIVE HEART WITHOUT ANGINA PECTORIS: Status: RESOLVED | Noted: 2024-04-18 | Resolved: 2024-10-02

## 2025-02-09 ENCOUNTER — HOSPITAL ENCOUNTER (INPATIENT)
Facility: HOSPITAL | Age: 70
LOS: 4 days | Discharge: HOME HEALTH | DRG: 871 | End: 2025-02-14
Attending: STUDENT IN AN ORGANIZED HEALTH CARE EDUCATION/TRAINING PROGRAM | Admitting: INTERNAL MEDICINE
Payer: MEDICARE

## 2025-02-09 DIAGNOSIS — R06.02 SHORTNESS OF BREATH: ICD-10-CM

## 2025-02-09 DIAGNOSIS — R13.10 DYSPHAGIA, UNSPECIFIED TYPE: ICD-10-CM

## 2025-02-09 DIAGNOSIS — J69.0 ASPIRATION PNEUMONIA OF RIGHT LOWER LOBE, UNSPECIFIED ASPIRATION PNEUMONIA TYPE: ICD-10-CM

## 2025-02-09 DIAGNOSIS — S22.050A WEDGE COMPRESSION FRACTURE OF T5-T6 VERTEBRA, INITIAL ENCOUNTER FOR CLOSED FRACTURE: ICD-10-CM

## 2025-02-09 DIAGNOSIS — J44.1 COPD EXACERBATION: ICD-10-CM

## 2025-02-09 DIAGNOSIS — S22.050A COMPRESSION FRACTURE OF T6 VERTEBRA, INITIAL ENCOUNTER: Primary | ICD-10-CM

## 2025-02-09 DIAGNOSIS — R07.9 CHEST PAIN: ICD-10-CM

## 2025-02-09 DIAGNOSIS — J18.9 RIGHT LOWER LOBE PNEUMONIA: ICD-10-CM

## 2025-02-09 LAB
ALBUMIN SERPL BCP-MCNC: 3 G/DL (ref 3.5–5.2)
ALLENS TEST: ABNORMAL
ALP SERPL-CCNC: 111 U/L (ref 40–150)
ALT SERPL W/O P-5'-P-CCNC: 92 U/L (ref 10–44)
ANION GAP SERPL CALC-SCNC: 20 MMOL/L (ref 8–16)
AST SERPL-CCNC: 67 U/L (ref 10–40)
BASOPHILS # BLD AUTO: 0.02 K/UL (ref 0–0.2)
BASOPHILS NFR BLD: 0.2 % (ref 0–1.9)
BILIRUB SERPL-MCNC: 0.2 MG/DL (ref 0.1–1)
BNP SERPL-MCNC: 76 PG/ML (ref 0–99)
BUN SERPL-MCNC: 6 MG/DL (ref 8–23)
CALCIUM SERPL-MCNC: 9.3 MG/DL (ref 8.7–10.5)
CHLORIDE SERPL-SCNC: 94 MMOL/L (ref 95–110)
CO2 SERPL-SCNC: 23 MMOL/L (ref 23–29)
CREAT SERPL-MCNC: 0.8 MG/DL (ref 0.5–1.4)
DELSYS: ABNORMAL
DIFFERENTIAL METHOD BLD: ABNORMAL
EOSINOPHIL # BLD AUTO: 0 K/UL (ref 0–0.5)
EOSINOPHIL NFR BLD: 0 % (ref 0–8)
EP: 5
ERYTHROCYTE [DISTWIDTH] IN BLOOD BY AUTOMATED COUNT: 12.3 % (ref 11.5–14.5)
ERYTHROCYTE [SEDIMENTATION RATE] IN BLOOD BY WESTERGREN METHOD: 22 MM/H
EST. GFR  (NO RACE VARIABLE): >60 ML/MIN/1.73 M^2
FIO2: 40
GLUCOSE SERPL-MCNC: 96 MG/DL (ref 70–110)
HCO3 UR-SCNC: 29.1 MMOL/L (ref 24–28)
HCT VFR BLD AUTO: 38.1 % (ref 37–48.5)
HGB BLD-MCNC: 11.9 G/DL (ref 12–16)
IMM GRANULOCYTES # BLD AUTO: 0.2 K/UL (ref 0–0.04)
IMM GRANULOCYTES NFR BLD AUTO: 1.9 % (ref 0–0.5)
INFLUENZA A, MOLECULAR: NEGATIVE
INFLUENZA B, MOLECULAR: NEGATIVE
IP: 12
LACTATE SERPL-SCNC: 1.1 MMOL/L (ref 0.5–2.2)
LYMPHOCYTES # BLD AUTO: 1.5 K/UL (ref 1–4.8)
LYMPHOCYTES NFR BLD: 13.7 % (ref 18–48)
MAGNESIUM SERPL-MCNC: 1.6 MG/DL (ref 1.6–2.6)
MCH RBC QN AUTO: 30.1 PG (ref 27–31)
MCHC RBC AUTO-ENTMCNC: 31.2 G/DL (ref 32–36)
MCV RBC AUTO: 97 FL (ref 82–98)
MODE: ABNORMAL
MONOCYTES # BLD AUTO: 1.4 K/UL (ref 0.3–1)
MONOCYTES NFR BLD: 12.9 % (ref 4–15)
NEUTROPHILS # BLD AUTO: 7.5 K/UL (ref 1.8–7.7)
NEUTROPHILS NFR BLD: 71.3 % (ref 38–73)
NRBC BLD-RTO: 0 /100 WBC
PCO2 BLDA: 61.8 MMHG (ref 35–45)
PH SMN: 7.28 [PH] (ref 7.35–7.45)
PLATELET # BLD AUTO: 332 K/UL (ref 150–450)
PMV BLD AUTO: 10.4 FL (ref 9.2–12.9)
PO2 BLDA: 82 MMHG (ref 80–100)
POC BE: 2 MMOL/L
POC SATURATED O2: 94 % (ref 95–100)
POC TCO2: 31 MMOL/L (ref 23–27)
POTASSIUM SERPL-SCNC: 3.9 MMOL/L (ref 3.5–5.1)
PROT SERPL-MCNC: 7.9 G/DL (ref 6–8.4)
RBC # BLD AUTO: 3.95 M/UL (ref 4–5.4)
SAMPLE: ABNORMAL
SARS-COV-2 RDRP RESP QL NAA+PROBE: NEGATIVE
SITE: ABNORMAL
SODIUM SERPL-SCNC: 137 MMOL/L (ref 136–145)
SP02: 95
SPECIMEN SOURCE: NORMAL
TROPONIN I SERPL DL<=0.01 NG/ML-MCNC: 0.01 NG/ML (ref 0–0.03)
WBC # BLD AUTO: 10.55 K/UL (ref 3.9–12.7)

## 2025-02-09 PROCEDURE — 27000190 HC CPAP FULL FACE MASK W/VALVE

## 2025-02-09 PROCEDURE — 83735 ASSAY OF MAGNESIUM: CPT | Performed by: STUDENT IN AN ORGANIZED HEALTH CARE EDUCATION/TRAINING PROGRAM

## 2025-02-09 PROCEDURE — 94761 N-INVAS EAR/PLS OXIMETRY MLT: CPT | Mod: XB

## 2025-02-09 PROCEDURE — 27100171 HC OXYGEN HIGH FLOW UP TO 24 HOURS

## 2025-02-09 PROCEDURE — 83880 ASSAY OF NATRIURETIC PEPTIDE: CPT | Performed by: STUDENT IN AN ORGANIZED HEALTH CARE EDUCATION/TRAINING PROGRAM

## 2025-02-09 PROCEDURE — 96375 TX/PRO/DX INJ NEW DRUG ADDON: CPT

## 2025-02-09 PROCEDURE — 63600175 PHARM REV CODE 636 W HCPCS: Performed by: STUDENT IN AN ORGANIZED HEALTH CARE EDUCATION/TRAINING PROGRAM

## 2025-02-09 PROCEDURE — 87502 INFLUENZA DNA AMP PROBE: CPT | Performed by: STUDENT IN AN ORGANIZED HEALTH CARE EDUCATION/TRAINING PROGRAM

## 2025-02-09 PROCEDURE — 93005 ELECTROCARDIOGRAM TRACING: CPT | Performed by: INTERNAL MEDICINE

## 2025-02-09 PROCEDURE — 99285 EMERGENCY DEPT VISIT HI MDM: CPT | Mod: 25

## 2025-02-09 PROCEDURE — 87635 SARS-COV-2 COVID-19 AMP PRB: CPT | Performed by: STUDENT IN AN ORGANIZED HEALTH CARE EDUCATION/TRAINING PROGRAM

## 2025-02-09 PROCEDURE — 25000242 PHARM REV CODE 250 ALT 637 W/ HCPCS: Performed by: STUDENT IN AN ORGANIZED HEALTH CARE EDUCATION/TRAINING PROGRAM

## 2025-02-09 PROCEDURE — 36600 WITHDRAWAL OF ARTERIAL BLOOD: CPT

## 2025-02-09 PROCEDURE — 36415 COLL VENOUS BLD VENIPUNCTURE: CPT | Performed by: STUDENT IN AN ORGANIZED HEALTH CARE EDUCATION/TRAINING PROGRAM

## 2025-02-09 PROCEDURE — 83605 ASSAY OF LACTIC ACID: CPT | Performed by: STUDENT IN AN ORGANIZED HEALTH CARE EDUCATION/TRAINING PROGRAM

## 2025-02-09 PROCEDURE — 94799 UNLISTED PULMONARY SVC/PX: CPT

## 2025-02-09 PROCEDURE — 96365 THER/PROPH/DIAG IV INF INIT: CPT

## 2025-02-09 PROCEDURE — 5A09457 ASSISTANCE WITH RESPIRATORY VENTILATION, 24-96 CONSECUTIVE HOURS, CONTINUOUS POSITIVE AIRWAY PRESSURE: ICD-10-PCS | Performed by: STUDENT IN AN ORGANIZED HEALTH CARE EDUCATION/TRAINING PROGRAM

## 2025-02-09 PROCEDURE — 71045 X-RAY EXAM CHEST 1 VIEW: CPT | Mod: TC

## 2025-02-09 PROCEDURE — 85025 COMPLETE CBC W/AUTO DIFF WBC: CPT | Performed by: STUDENT IN AN ORGANIZED HEALTH CARE EDUCATION/TRAINING PROGRAM

## 2025-02-09 PROCEDURE — 94660 CPAP INITIATION&MGMT: CPT

## 2025-02-09 PROCEDURE — 80053 COMPREHEN METABOLIC PANEL: CPT | Performed by: STUDENT IN AN ORGANIZED HEALTH CARE EDUCATION/TRAINING PROGRAM

## 2025-02-09 PROCEDURE — 82803 BLOOD GASES ANY COMBINATION: CPT

## 2025-02-09 PROCEDURE — 87040 BLOOD CULTURE FOR BACTERIA: CPT | Mod: 59 | Performed by: STUDENT IN AN ORGANIZED HEALTH CARE EDUCATION/TRAINING PROGRAM

## 2025-02-09 PROCEDURE — 84484 ASSAY OF TROPONIN QUANT: CPT | Performed by: STUDENT IN AN ORGANIZED HEALTH CARE EDUCATION/TRAINING PROGRAM

## 2025-02-09 PROCEDURE — 71250 CT THORAX DX C-: CPT | Mod: TC

## 2025-02-09 PROCEDURE — 84145 PROCALCITONIN (PCT): CPT | Performed by: STUDENT IN AN ORGANIZED HEALTH CARE EDUCATION/TRAINING PROGRAM

## 2025-02-09 PROCEDURE — 99900035 HC TECH TIME PER 15 MIN (STAT)

## 2025-02-09 PROCEDURE — 94640 AIRWAY INHALATION TREATMENT: CPT

## 2025-02-09 RX ORDER — MAGNESIUM SULFATE HEPTAHYDRATE 40 MG/ML
2 INJECTION, SOLUTION INTRAVENOUS ONCE
Status: COMPLETED | OUTPATIENT
Start: 2025-02-09 | End: 2025-02-10

## 2025-02-09 RX ORDER — IPRATROPIUM BROMIDE AND ALBUTEROL SULFATE 2.5; .5 MG/3ML; MG/3ML
3 SOLUTION RESPIRATORY (INHALATION)
Status: COMPLETED | OUTPATIENT
Start: 2025-02-09 | End: 2025-02-09

## 2025-02-09 RX ADMIN — IPRATROPIUM BROMIDE AND ALBUTEROL SULFATE 3 ML: .5; 3 SOLUTION RESPIRATORY (INHALATION) at 11:02

## 2025-02-09 RX ADMIN — LORAZEPAM 0.5 MG: 2 INJECTION INTRAMUSCULAR; INTRAVENOUS at 10:02

## 2025-02-09 RX ADMIN — MAGNESIUM SULFATE HEPTAHYDRATE 2 G: 40 INJECTION, SOLUTION INTRAVENOUS at 10:02

## 2025-02-10 PROBLEM — J69.0 ASPIRATION PNEUMONIA OF RIGHT LOWER LOBE: Status: ACTIVE | Noted: 2025-02-10

## 2025-02-10 PROBLEM — J43.9 EMPHYSEMA/COPD: Status: RESOLVED | Noted: 2023-10-26 | Resolved: 2025-02-10

## 2025-02-10 PROBLEM — J44.1 COPD EXACERBATION: Status: ACTIVE | Noted: 2025-02-10

## 2025-02-10 PROBLEM — J44.1 COPD EXACERBATION: Status: RESOLVED | Noted: 2025-02-10 | Resolved: 2025-02-10

## 2025-02-10 PROBLEM — J96.21 ACUTE ON CHRONIC RESPIRATORY FAILURE WITH HYPOXIA AND HYPERCAPNIA: Status: ACTIVE | Noted: 2025-02-10

## 2025-02-10 PROBLEM — S60.00XA CONTUSION OF HAND INCLUDING FINGERS, LEFT, INITIAL ENCOUNTER: Status: RESOLVED | Noted: 2023-06-27 | Resolved: 2025-02-10

## 2025-02-10 PROBLEM — E87.1 HYPONATREMIA: Status: RESOLVED | Noted: 2023-10-27 | Resolved: 2025-02-10

## 2025-02-10 PROBLEM — E53.8 FOLATE DEFICIENCY: Status: RESOLVED | Noted: 2023-06-04 | Resolved: 2025-02-10

## 2025-02-10 PROBLEM — Z86.73 H/O: STROKE: Status: RESOLVED | Noted: 2023-11-08 | Resolved: 2025-02-10

## 2025-02-10 PROBLEM — J96.01 ACUTE RESPIRATORY FAILURE WITH HYPOXIA AND HYPERCARBIA: Status: ACTIVE | Noted: 2025-02-10

## 2025-02-10 PROBLEM — F17.210 HEAVY CIGARETTE SMOKER: Status: ACTIVE | Noted: 2023-11-08

## 2025-02-10 PROBLEM — S60.222A CONTUSION OF HAND INCLUDING FINGERS, LEFT, INITIAL ENCOUNTER: Status: RESOLVED | Noted: 2023-06-27 | Resolved: 2025-02-10

## 2025-02-10 PROBLEM — J96.02 ACUTE RESPIRATORY FAILURE WITH HYPOXIA AND HYPERCARBIA: Status: ACTIVE | Noted: 2025-02-10

## 2025-02-10 PROBLEM — J18.9 RIGHT LOWER LOBE PNEUMONIA: Status: ACTIVE | Noted: 2025-02-10

## 2025-02-10 PROBLEM — J18.9 RIGHT LOWER LOBE PNEUMONIA: Status: RESOLVED | Noted: 2025-02-10 | Resolved: 2025-02-10

## 2025-02-10 PROBLEM — S52.502A TRAUMATIC CLOSED DISPLACED FRACTURE OF DISTAL END OF RADIUS, LEFT, INITIAL ENCOUNTER: Status: RESOLVED | Noted: 2019-01-16 | Resolved: 2025-02-10

## 2025-02-10 PROBLEM — F10.10 ETOH ABUSE: Status: RESOLVED | Noted: 2024-01-25 | Resolved: 2025-02-10

## 2025-02-10 PROBLEM — D69.6 THROMBOCYTOPENIA: Status: RESOLVED | Noted: 2024-01-22 | Resolved: 2025-02-10

## 2025-02-10 PROBLEM — E86.1 HYPOTENSION DUE TO HYPOVOLEMIA: Status: RESOLVED | Noted: 2023-06-27 | Resolved: 2025-02-10

## 2025-02-10 PROBLEM — S22.050A: Status: ACTIVE | Noted: 2025-02-10

## 2025-02-10 PROBLEM — E87.1 DEHYDRATION WITH HYPONATREMIA: Status: RESOLVED | Noted: 2020-09-13 | Resolved: 2025-02-10

## 2025-02-10 PROBLEM — J96.22 ACUTE ON CHRONIC RESPIRATORY FAILURE WITH HYPOXIA AND HYPERCAPNIA: Status: ACTIVE | Noted: 2025-02-10

## 2025-02-10 PROBLEM — E86.0 DEHYDRATION WITH HYPONATREMIA: Status: RESOLVED | Noted: 2020-09-13 | Resolved: 2025-02-10

## 2025-02-10 PROBLEM — E83.42 HYPOMAGNESEMIA: Status: RESOLVED | Noted: 2024-04-18 | Resolved: 2025-02-10

## 2025-02-10 PROBLEM — E87.6 HYPOKALEMIA: Status: RESOLVED | Noted: 2020-09-13 | Resolved: 2025-02-10

## 2025-02-10 PROBLEM — N30.00 ACUTE CYSTITIS WITHOUT HEMATURIA: Status: RESOLVED | Noted: 2023-06-27 | Resolved: 2025-02-10

## 2025-02-10 LAB
ALBUMIN SERPL BCP-MCNC: 2.6 G/DL (ref 3.5–5.2)
ALLENS TEST: ABNORMAL
ALLENS TEST: ABNORMAL
ALP SERPL-CCNC: 90 U/L (ref 40–150)
ALT SERPL W/O P-5'-P-CCNC: 75 U/L (ref 10–44)
ANION GAP SERPL CALC-SCNC: 18 MMOL/L (ref 8–16)
AST SERPL-CCNC: 49 U/L (ref 10–40)
BASOPHILS # BLD AUTO: ABNORMAL K/UL (ref 0–0.2)
BASOPHILS NFR BLD: 0 % (ref 0–1.9)
BILIRUB SERPL-MCNC: 0.1 MG/DL (ref 0.1–1)
BUN SERPL-MCNC: 8 MG/DL (ref 8–23)
C DIFF GDH STL QL: NEGATIVE
C DIFF TOX A+B STL QL IA: NEGATIVE
CALCIUM SERPL-MCNC: 8.7 MG/DL (ref 8.7–10.5)
CHLORIDE SERPL-SCNC: 93 MMOL/L (ref 95–110)
CO2 SERPL-SCNC: 24 MMOL/L (ref 23–29)
CREAT SERPL-MCNC: 0.8 MG/DL (ref 0.5–1.4)
DELSYS: ABNORMAL
DELSYS: ABNORMAL
DIFFERENTIAL METHOD BLD: ABNORMAL
EOSINOPHIL # BLD AUTO: ABNORMAL K/UL (ref 0–0.5)
EOSINOPHIL NFR BLD: 0 % (ref 0–8)
EP: 5
EP: 7
ERYTHROCYTE [DISTWIDTH] IN BLOOD BY AUTOMATED COUNT: 12.1 % (ref 11.5–14.5)
ERYTHROCYTE [SEDIMENTATION RATE] IN BLOOD BY WESTERGREN METHOD: 22 MM/H
ERYTHROCYTE [SEDIMENTATION RATE] IN BLOOD BY WESTERGREN METHOD: 22 MM/H
EST. GFR  (NO RACE VARIABLE): >60 ML/MIN/1.73 M^2
FIO2: 0.4
FIO2: 40
FOLATE SERPL-MCNC: 10.3 NG/ML (ref 4–24)
GLUCOSE SERPL-MCNC: 142 MG/DL (ref 70–110)
HCO3 UR-SCNC: 32.4 MMOL/L (ref 24–28)
HCO3 UR-SCNC: 35.6 MMOL/L (ref 24–28)
HCT VFR BLD AUTO: 33 % (ref 37–48.5)
HGB BLD-MCNC: 10.5 G/DL (ref 12–16)
IMM GRANULOCYTES # BLD AUTO: ABNORMAL K/UL (ref 0–0.04)
IMM GRANULOCYTES NFR BLD AUTO: ABNORMAL % (ref 0–0.5)
IP: 12
IP: 14
LYMPHOCYTES # BLD AUTO: ABNORMAL K/UL (ref 1–4.8)
LYMPHOCYTES NFR BLD: 6 % (ref 18–48)
MAGNESIUM SERPL-MCNC: 2.2 MG/DL (ref 1.6–2.6)
MCH RBC QN AUTO: 30.6 PG (ref 27–31)
MCHC RBC AUTO-ENTMCNC: 31.8 G/DL (ref 32–36)
MCV RBC AUTO: 96 FL (ref 82–98)
METAMYELOCYTES NFR BLD MANUAL: 2 %
MIN VOL: 10.3
MIN VOL: 7.4
MODE: ABNORMAL
MODE: ABNORMAL
MONOCYTES # BLD AUTO: ABNORMAL K/UL (ref 0.3–1)
MONOCYTES NFR BLD: 1 % (ref 4–15)
NEUTROPHILS NFR BLD: 75 % (ref 38–73)
NEUTS BAND NFR BLD MANUAL: 16 %
NRBC BLD-RTO: 0 /100 WBC
OHS QRS DURATION: 76 MS
OHS QTC CALCULATION: 596 MS
PCO2 BLDA: 59.6 MMHG (ref 35–45)
PCO2 BLDA: 67.1 MMHG (ref 35–45)
PH SMN: 7.29 [PH] (ref 7.35–7.45)
PH SMN: 7.38 [PH] (ref 7.35–7.45)
PHOSPHATE SERPL-MCNC: 3.9 MG/DL (ref 2.7–4.5)
PLATELET # BLD AUTO: 283 K/UL (ref 150–450)
PMV BLD AUTO: 10.7 FL (ref 9.2–12.9)
PO2 BLDA: 85 MMHG (ref 80–100)
PO2 BLDA: 90 MMHG (ref 80–100)
POC BE: 10 MMOL/L
POC BE: 6 MMOL/L
POC SATURATED O2: 95 % (ref 95–100)
POC SATURATED O2: 96 % (ref 95–100)
POC TCO2: 34 MMOL/L (ref 23–27)
POC TCO2: 37 MMOL/L (ref 23–27)
POTASSIUM SERPL-SCNC: 3.5 MMOL/L (ref 3.5–5.1)
PROCALCITONIN SERPL IA-MCNC: 0.17 NG/ML
PROT SERPL-MCNC: 6.9 G/DL (ref 6–8.4)
RBC # BLD AUTO: 3.43 M/UL (ref 4–5.4)
SAMPLE: ABNORMAL
SAMPLE: ABNORMAL
SITE: ABNORMAL
SITE: ABNORMAL
SODIUM SERPL-SCNC: 135 MMOL/L (ref 136–145)
SP02: 98
SPONT RATE: 22
T4 FREE SERPL-MCNC: 1.14 NG/DL (ref 0.71–1.51)
TSH SERPL DL<=0.005 MIU/L-ACNC: 0.4 UIU/ML (ref 0.4–4)
VIT B12 SERPL-MCNC: >2000 PG/ML (ref 210–950)
WBC # BLD AUTO: 10.16 K/UL (ref 3.9–12.7)

## 2025-02-10 PROCEDURE — 36600 WITHDRAWAL OF ARTERIAL BLOOD: CPT

## 2025-02-10 PROCEDURE — 87449 NOS EACH ORGANISM AG IA: CPT | Mod: 59 | Performed by: INTERNAL MEDICINE

## 2025-02-10 PROCEDURE — 94660 CPAP INITIATION&MGMT: CPT

## 2025-02-10 PROCEDURE — 25000003 PHARM REV CODE 250: Performed by: INTERNAL MEDICINE

## 2025-02-10 PROCEDURE — S4991 NICOTINE PATCH NONLEGEND: HCPCS | Performed by: INTERNAL MEDICINE

## 2025-02-10 PROCEDURE — 97162 PT EVAL MOD COMPLEX 30 MIN: CPT

## 2025-02-10 PROCEDURE — 85007 BL SMEAR W/DIFF WBC COUNT: CPT | Performed by: INTERNAL MEDICINE

## 2025-02-10 PROCEDURE — 84100 ASSAY OF PHOSPHORUS: CPT | Performed by: INTERNAL MEDICINE

## 2025-02-10 PROCEDURE — 94761 N-INVAS EAR/PLS OXIMETRY MLT: CPT | Mod: XB

## 2025-02-10 PROCEDURE — 25000242 PHARM REV CODE 250 ALT 637 W/ HCPCS: Performed by: INTERNAL MEDICINE

## 2025-02-10 PROCEDURE — 92610 EVALUATE SWALLOWING FUNCTION: CPT

## 2025-02-10 PROCEDURE — 99900035 HC TECH TIME PER 15 MIN (STAT)

## 2025-02-10 PROCEDURE — 82607 VITAMIN B-12: CPT | Performed by: INTERNAL MEDICINE

## 2025-02-10 PROCEDURE — 82746 ASSAY OF FOLIC ACID SERUM: CPT | Performed by: INTERNAL MEDICINE

## 2025-02-10 PROCEDURE — 82803 BLOOD GASES ANY COMBINATION: CPT

## 2025-02-10 PROCEDURE — 63600175 PHARM REV CODE 636 W HCPCS: Performed by: INTERNAL MEDICINE

## 2025-02-10 PROCEDURE — 94640 AIRWAY INHALATION TREATMENT: CPT

## 2025-02-10 PROCEDURE — 99900031 HC PATIENT EDUCATION (STAT)

## 2025-02-10 PROCEDURE — 94799 UNLISTED PULMONARY SVC/PX: CPT

## 2025-02-10 PROCEDURE — 84439 ASSAY OF FREE THYROXINE: CPT | Performed by: INTERNAL MEDICINE

## 2025-02-10 PROCEDURE — 85027 COMPLETE CBC AUTOMATED: CPT | Performed by: INTERNAL MEDICINE

## 2025-02-10 PROCEDURE — 27100171 HC OXYGEN HIGH FLOW UP TO 24 HOURS

## 2025-02-10 PROCEDURE — 83735 ASSAY OF MAGNESIUM: CPT | Performed by: INTERNAL MEDICINE

## 2025-02-10 PROCEDURE — 80053 COMPREHEN METABOLIC PANEL: CPT | Performed by: INTERNAL MEDICINE

## 2025-02-10 PROCEDURE — 20600001 HC STEP DOWN PRIVATE ROOM

## 2025-02-10 PROCEDURE — 84443 ASSAY THYROID STIM HORMONE: CPT | Performed by: INTERNAL MEDICINE

## 2025-02-10 PROCEDURE — 87449 NOS EACH ORGANISM AG IA: CPT | Performed by: INTERNAL MEDICINE

## 2025-02-10 RX ORDER — IBUPROFEN 200 MG
24 TABLET ORAL
Status: DISCONTINUED | OUTPATIENT
Start: 2025-02-10 | End: 2025-02-14 | Stop reason: HOSPADM

## 2025-02-10 RX ORDER — LANOLIN ALCOHOL/MO/W.PET/CERES
800 CREAM (GRAM) TOPICAL
Status: DISCONTINUED | OUTPATIENT
Start: 2025-02-10 | End: 2025-02-14 | Stop reason: HOSPADM

## 2025-02-10 RX ORDER — CLOPIDOGREL BISULFATE 75 MG/1
75 TABLET ORAL DAILY
Status: DISCONTINUED | OUTPATIENT
Start: 2025-02-10 | End: 2025-02-14 | Stop reason: HOSPADM

## 2025-02-10 RX ORDER — ATORVASTATIN CALCIUM 40 MG/1
80 TABLET, FILM COATED ORAL DAILY
Status: DISCONTINUED | OUTPATIENT
Start: 2025-02-10 | End: 2025-02-14 | Stop reason: HOSPADM

## 2025-02-10 RX ORDER — CARVEDILOL 12.5 MG/1
12.5 TABLET ORAL 2 TIMES DAILY
Status: DISCONTINUED | OUTPATIENT
Start: 2025-02-10 | End: 2025-02-14 | Stop reason: HOSPADM

## 2025-02-10 RX ORDER — IBUPROFEN 200 MG
1 TABLET ORAL DAILY
Status: DISCONTINUED | OUTPATIENT
Start: 2025-02-10 | End: 2025-02-14 | Stop reason: HOSPADM

## 2025-02-10 RX ORDER — POLYETHYLENE GLYCOL 3350 17 G/17G
17 POWDER, FOR SOLUTION ORAL DAILY
Status: DISCONTINUED | OUTPATIENT
Start: 2025-02-10 | End: 2025-02-13

## 2025-02-10 RX ORDER — PROCHLORPERAZINE EDISYLATE 5 MG/ML
5 INJECTION INTRAMUSCULAR; INTRAVENOUS EVERY 6 HOURS PRN
Status: DISCONTINUED | OUTPATIENT
Start: 2025-02-10 | End: 2025-02-14 | Stop reason: HOSPADM

## 2025-02-10 RX ORDER — GUAIFENESIN AND DEXTROMETHORPHAN HYDROBROMIDE 10; 100 MG/5ML; MG/5ML
10 SYRUP ORAL EVERY 6 HOURS
Status: DISPENSED | OUTPATIENT
Start: 2025-02-10 | End: 2025-02-11

## 2025-02-10 RX ORDER — BUSPIRONE HYDROCHLORIDE 5 MG/1
15 TABLET ORAL 2 TIMES DAILY
Status: DISCONTINUED | OUTPATIENT
Start: 2025-02-10 | End: 2025-02-14 | Stop reason: HOSPADM

## 2025-02-10 RX ORDER — FLUOXETINE 10 MG/1
40 CAPSULE ORAL DAILY
Status: DISCONTINUED | OUTPATIENT
Start: 2025-02-10 | End: 2025-02-14 | Stop reason: HOSPADM

## 2025-02-10 RX ORDER — THIAMINE HCL 100 MG
100 TABLET ORAL DAILY
Status: DISCONTINUED | OUTPATIENT
Start: 2025-02-10 | End: 2025-02-14 | Stop reason: HOSPADM

## 2025-02-10 RX ORDER — ONDANSETRON HYDROCHLORIDE 2 MG/ML
4 INJECTION, SOLUTION INTRAVENOUS EVERY 8 HOURS PRN
Status: DISCONTINUED | OUTPATIENT
Start: 2025-02-10 | End: 2025-02-14 | Stop reason: HOSPADM

## 2025-02-10 RX ORDER — SODIUM,POTASSIUM PHOSPHATES 280-250MG
2 POWDER IN PACKET (EA) ORAL
Status: DISCONTINUED | OUTPATIENT
Start: 2025-02-10 | End: 2025-02-14 | Stop reason: HOSPADM

## 2025-02-10 RX ORDER — ENOXAPARIN SODIUM 100 MG/ML
40 INJECTION SUBCUTANEOUS EVERY 24 HOURS
Status: DISCONTINUED | OUTPATIENT
Start: 2025-02-10 | End: 2025-02-14 | Stop reason: HOSPADM

## 2025-02-10 RX ORDER — IPRATROPIUM BROMIDE AND ALBUTEROL SULFATE 2.5; .5 MG/3ML; MG/3ML
3 SOLUTION RESPIRATORY (INHALATION) EVERY 4 HOURS PRN
Status: DISCONTINUED | OUTPATIENT
Start: 2025-02-10 | End: 2025-02-14 | Stop reason: HOSPADM

## 2025-02-10 RX ORDER — DOXYCYCLINE HYCLATE 100 MG
100 TABLET ORAL EVERY 12 HOURS
Status: DISCONTINUED | OUTPATIENT
Start: 2025-02-10 | End: 2025-02-14 | Stop reason: HOSPADM

## 2025-02-10 RX ORDER — ACETAMINOPHEN 325 MG/1
650 TABLET ORAL EVERY 4 HOURS PRN
Status: DISCONTINUED | OUTPATIENT
Start: 2025-02-10 | End: 2025-02-11

## 2025-02-10 RX ORDER — IBUPROFEN 200 MG
16 TABLET ORAL
Status: DISCONTINUED | OUTPATIENT
Start: 2025-02-10 | End: 2025-02-14 | Stop reason: HOSPADM

## 2025-02-10 RX ORDER — NALOXONE HCL 0.4 MG/ML
0.02 VIAL (ML) INJECTION
Status: DISCONTINUED | OUTPATIENT
Start: 2025-02-10 | End: 2025-02-14 | Stop reason: HOSPADM

## 2025-02-10 RX ORDER — PREDNISONE 20 MG/1
40 TABLET ORAL DAILY
Status: DISCONTINUED | OUTPATIENT
Start: 2025-02-10 | End: 2025-02-11

## 2025-02-10 RX ORDER — SODIUM CHLORIDE 0.9 % (FLUSH) 0.9 %
10 SYRINGE (ML) INJECTION EVERY 12 HOURS PRN
Status: DISCONTINUED | OUTPATIENT
Start: 2025-02-10 | End: 2025-02-14 | Stop reason: HOSPADM

## 2025-02-10 RX ORDER — TALC
6 POWDER (GRAM) TOPICAL NIGHTLY PRN
Status: DISCONTINUED | OUTPATIENT
Start: 2025-02-10 | End: 2025-02-14 | Stop reason: HOSPADM

## 2025-02-10 RX ORDER — AMOXICILLIN 250 MG
1 CAPSULE ORAL 2 TIMES DAILY PRN
Status: DISCONTINUED | OUTPATIENT
Start: 2025-02-10 | End: 2025-02-14 | Stop reason: HOSPADM

## 2025-02-10 RX ORDER — CYCLOBENZAPRINE HCL 5 MG
5 TABLET ORAL 3 TIMES DAILY PRN
Status: DISCONTINUED | OUTPATIENT
Start: 2025-02-10 | End: 2025-02-14 | Stop reason: HOSPADM

## 2025-02-10 RX ORDER — ASPIRIN 81 MG/1
81 TABLET ORAL DAILY
Status: DISCONTINUED | OUTPATIENT
Start: 2025-02-10 | End: 2025-02-14 | Stop reason: HOSPADM

## 2025-02-10 RX ORDER — BENZONATATE 100 MG/1
100 CAPSULE ORAL 3 TIMES DAILY PRN
Status: DISCONTINUED | OUTPATIENT
Start: 2025-02-10 | End: 2025-02-14 | Stop reason: HOSPADM

## 2025-02-10 RX ORDER — FOLIC ACID 1 MG/1
1 TABLET ORAL DAILY
Status: DISCONTINUED | OUTPATIENT
Start: 2025-02-10 | End: 2025-02-14 | Stop reason: HOSPADM

## 2025-02-10 RX ORDER — GLUCAGON 1 MG
1 KIT INJECTION
Status: DISCONTINUED | OUTPATIENT
Start: 2025-02-10 | End: 2025-02-14 | Stop reason: HOSPADM

## 2025-02-10 RX ORDER — ALUMINUM HYDROXIDE, MAGNESIUM HYDROXIDE, AND SIMETHICONE 1200; 120; 1200 MG/30ML; MG/30ML; MG/30ML
30 SUSPENSION ORAL 4 TIMES DAILY PRN
Status: DISCONTINUED | OUTPATIENT
Start: 2025-02-10 | End: 2025-02-14 | Stop reason: HOSPADM

## 2025-02-10 RX ORDER — LEVOTHYROXINE SODIUM 25 UG/1
50 TABLET ORAL
Status: DISCONTINUED | OUTPATIENT
Start: 2025-02-10 | End: 2025-02-14 | Stop reason: HOSPADM

## 2025-02-10 RX ORDER — MONTELUKAST SODIUM 10 MG/1
10 TABLET ORAL EVERY MORNING
Status: DISCONTINUED | OUTPATIENT
Start: 2025-02-10 | End: 2025-02-14 | Stop reason: HOSPADM

## 2025-02-10 RX ORDER — IPRATROPIUM BROMIDE AND ALBUTEROL SULFATE 2.5; .5 MG/3ML; MG/3ML
3 SOLUTION RESPIRATORY (INHALATION) EVERY 6 HOURS
Status: DISCONTINUED | OUTPATIENT
Start: 2025-02-10 | End: 2025-02-12

## 2025-02-10 RX ORDER — SIMETHICONE 80 MG
1 TABLET,CHEWABLE ORAL 4 TIMES DAILY PRN
Status: DISCONTINUED | OUTPATIENT
Start: 2025-02-10 | End: 2025-02-14 | Stop reason: HOSPADM

## 2025-02-10 RX ADMIN — IPRATROPIUM BROMIDE AND ALBUTEROL SULFATE 3 ML: .5; 3 SOLUTION RESPIRATORY (INHALATION) at 01:02

## 2025-02-10 RX ADMIN — CLOPIDOGREL BISULFATE 75 MG: 75 TABLET ORAL at 08:02

## 2025-02-10 RX ADMIN — FLUOXETINE HYDROCHLORIDE 40 MG: 10 CAPSULE ORAL at 08:02

## 2025-02-10 RX ADMIN — CYCLOBENZAPRINE HYDROCHLORIDE 5 MG: 5 TABLET, FILM COATED ORAL at 08:02

## 2025-02-10 RX ADMIN — IPRATROPIUM BROMIDE AND ALBUTEROL SULFATE 3 ML: .5; 3 SOLUTION RESPIRATORY (INHALATION) at 07:02

## 2025-02-10 RX ADMIN — PIPERACILLIN SODIUM AND TAZOBACTAM SODIUM 4.5 G: 4; .5 INJECTION, POWDER, LYOPHILIZED, FOR SOLUTION INTRAVENOUS at 10:02

## 2025-02-10 RX ADMIN — ASPIRIN 81 MG: 81 TABLET, COATED ORAL at 08:02

## 2025-02-10 RX ADMIN — MONTELUKAST 10 MG: 10 TABLET, FILM COATED ORAL at 08:02

## 2025-02-10 RX ADMIN — DOXYCYCLINE HYCLATE 100 MG: 100 TABLET, COATED ORAL at 08:02

## 2025-02-10 RX ADMIN — GUAIFENESIN AND DEXTROMETHORPHAN 10 ML: 100; 10 SYRUP ORAL at 06:02

## 2025-02-10 RX ADMIN — BUSPIRONE HYDROCHLORIDE 15 MG: 5 TABLET ORAL at 08:02

## 2025-02-10 RX ADMIN — ENOXAPARIN SODIUM 40 MG: 40 INJECTION SUBCUTANEOUS at 06:02

## 2025-02-10 RX ADMIN — BUSPIRONE HYDROCHLORIDE 15 MG: 5 TABLET ORAL at 09:02

## 2025-02-10 RX ADMIN — NICOTINE 1 PATCH: 21 PATCH, EXTENDED RELEASE TRANSDERMAL at 08:02

## 2025-02-10 RX ADMIN — PIPERACILLIN SODIUM AND TAZOBACTAM SODIUM 4.5 G: 4; .5 INJECTION, POWDER, LYOPHILIZED, FOR SOLUTION INTRAVENOUS at 06:02

## 2025-02-10 RX ADMIN — CARVEDILOL 12.5 MG: 12.5 TABLET, FILM COATED ORAL at 09:02

## 2025-02-10 RX ADMIN — POTASSIUM BICARBONATE 50 MEQ: 978 TABLET, EFFERVESCENT ORAL at 08:02

## 2025-02-10 RX ADMIN — Medication 100 MG: at 08:02

## 2025-02-10 RX ADMIN — DOXYCYCLINE HYCLATE 100 MG: 100 TABLET, COATED ORAL at 09:02

## 2025-02-10 RX ADMIN — GUAIFENESIN AND DEXTROMETHORPHAN 10 ML: 100; 10 SYRUP ORAL at 12:02

## 2025-02-10 RX ADMIN — CARVEDILOL 12.5 MG: 12.5 TABLET, FILM COATED ORAL at 08:02

## 2025-02-10 RX ADMIN — PIPERACILLIN SODIUM AND TAZOBACTAM SODIUM 4.5 G: 4; .5 INJECTION, POWDER, LYOPHILIZED, FOR SOLUTION INTRAVENOUS at 02:02

## 2025-02-10 RX ADMIN — THERA TABS 1 TABLET: TAB at 08:02

## 2025-02-10 RX ADMIN — FOLIC ACID 1 MG: 1 TABLET ORAL at 08:02

## 2025-02-10 RX ADMIN — ATORVASTATIN CALCIUM 80 MG: 40 TABLET, FILM COATED ORAL at 08:02

## 2025-02-10 RX ADMIN — PREDNISONE 40 MG: 20 TABLET ORAL at 08:02

## 2025-02-10 NOTE — EICU
Intervention Initiated From:  Bedside    Eron intervened regarding:  Rounding (Video assessment)    Nurse Notified:  Yes    Doctor Notified:  Yes    Comments: Remote video rounding performed. New admission. Patient found resting in bed. Respiratory and nursing at bedside. No signs of acute distress noted. Receiving NIPPV.    For questions or patient concerns, please reach out to eICU.

## 2025-02-10 NOTE — ASSESSMENT & PLAN NOTE
She heavy smoker, 1/2 ppd cigarettes since 11 y/o = 29 pack years.  Nicotine patch 21mg ordered  Nursing cessation protocol ordered, no counseling from me due to resp distress on bipap

## 2025-02-10 NOTE — ASSESSMENT & PLAN NOTE
albuterol-ipratropium 2.5 mg-0.5 mg/3 mL nebulizer solution 3 mL, 3 mL, Nebulization, Q4H PRN     albuterol-ipratropium 2.5 mg-0.5 mg/3 mL nebulizer solution 3 mL, 3 mL, Nebulization, Q6H     benzonatate capsule 100 mg, 100 mg, Oral, TID PRN, cough    dextromethorphan-guaiFENesin  mg/5 ml liquid 10 mL, 10 mL, Oral, Q6H     montelukast tablet 10 mg, 10 mg, Oral, QAM     Solumedrol 125mg iv x 1 by EMS prior to arrival    predniSONE tablet 40 mg, 40 mg, Oral, Daily

## 2025-02-10 NOTE — ASSESSMENT & PLAN NOTE
This could explain her recurrent aspiration events  SLP evaluation       aspirin EC tablet 81 mg, 81 mg, Oral, Daily     atorvastatin tablet 80 mg, 80 mg, Oral, Daily     clopidogreL tablet 75 mg, 75 mg, Oral, Daily

## 2025-02-10 NOTE — ASSESSMENT & PLAN NOTE
folic acid tablet 1 mg, 1 mg, Oral, Daily   Repeat Folate B12 in am     Latest Reference Range & Units 02/21/24 12:51   Iron 30 - 160 ug/dL 79   TIBC 250 - 450 ug/dL 309   Saturated Iron 20 - 50 % 26   Transferrin 200 - 375 mg/dL 209   Ferritin 20.0 - 300.0 ng/mL 434 (H)       Latest Reference Range & Units 06/04/23 22:25   Folate 4.0 - 24.0 ng/mL <2.2 (L)

## 2025-02-10 NOTE — PT/OT/SLP EVAL
Physical Therapy Evaluation     Patient Name: Bhavana Lambert   MRN: [id number]  Recent Surgery: * No surgery found *      Recommendations:     Discharge Recommendations: Low Intensity Therapy (SNF)   Discharge Equipment Recommendations:  (TBD)   Barriers to discharge: Increased level of assist and Ongoing medical treatment    Assessment:     Bhavana Lambert is a 69 y.o. female admitted with a medical diagnosis of Aspiration pneumonia of right lower lobe. She presents with the following impairments/functional limitations: weakness, impaired sensation, impaired functional mobility, gait instability, pain, impaired cardiopulmonary response to activity.     The patient presented to the emergency department on 2/9/25 with complaints of worsening shortness of breath for 3 days.  She was diagnosed with aspiration pneumonia in the right lower lobe.  The patient reports she is feeling a little better now.  A TLSO brace has been ordered for the patient due to a T6 compression fracture, but it has not arrived yet.      Rehab Prognosis: Fair; patient would benefit from acute PT services to address these deficits and reach maximum level of function.    Plan:     During this hospitalization, patient to be seen 5 x/week to address the above listed problems via therapeutic activities, therapeutic exercises    Plan of Care Expires:  (Upon discharge from the hospital.)    Subjective     Chief Complaint: The patient complains she has had diarrhea this morning.    Patient Comments/Goals: The patient states she would like to be able to walk again.  Pain/Comfort:  Pain Rating 1: 0/10 (The patient rated her pain as an 8/10 earlier this morning but has taken pain medication and feels much better.)  Location - Orientation 1: lower  Location 1: back  Pain Addressed 1:  (Patient uses flexeril at home.)    Social History:    Living Environment: The patient's daughter and son-in-law live with her and they participate in her care.  She lives in  a raised house but has an elevator.      Prior Level of Function: The patient sleeps on her couch and stays on the couch most of the day.  She keeps a bedside commode next to her and can transfer.  She rarely walks but when she does, it is briefly and using a rollator.  She requires assistance with bathing.  The patient is on continuous oxygen at home at 3L/min.    Equipment Used at Home: walker, rolling, rollator, oxygen, shower chair, bedside commode, wheelchair    DME owned (not currently used): none    Assistance Upon Discharge: family    The patient reports she once had home health services and that went well until she lost her insurance.  At sometime, she had medicaid and [employer] but does not think she is covered anymore.  She is not interested in going to a SNF due to concerns about her provider.    Objective:     Communicated with nurse prior to session. Patient found HOB elevated with oxygen, telemetry, pulse ox (continuous), blood pressure cuff, bed alarm, ventilator upon PT entry to room.    General Precautions: Standard, fall, respiratory   Orthopedic Precautions: N/A   Braces: N/A    Respiratory Status: 4L/min    Exams:  Cognition: Patient is oriented to Person, Place, Time, Situation  RLE ROM: WNL  RLE Strength: Deficits: 3+/5  LLE ROM: WNL  LLE Strength: Deficits: 3+/5  Sensation:    -       Impaired  light/touch both legs all the way up to her thighs  RUE ROM: WNL  RUE Strength: Deficits: 3+/5  LUE ROM: WFL  LUE Strength: Deficits: 3+/5    Functional Mobility:  Gait belt applied - N/A  Bed Mobility  Patient declined assessment due to fatigue and fear of diarrhea.  Transfers  Patient declined assessment due to fatigue and fear of diarrhea.  Gait  Patient declined assessment due to fatigue and fear of diarrhea.    Therapeutic Activities and Exercises:   Patient educated on role of acute care PT and PT POC, safety while in hospital including calling nurse for mobility, and call light usage    AM-PAC 6  CLICK MOBILITY  Total Score:6    Patient left HOB elevated with all lines intact, call button in reach, RN notified, and bed alarm on.    GOALS:   Multidisciplinary Problems       Physical Therapy Goals          Problem: Physical Therapy    Goal Priority Disciplines Outcome Interventions   Physical Therapy Goal     PT, PT/OT     Description: Goals    Patient to increase lower extremity strength by 1/2 muscle grade.  Patient to roll left and right with no assistance.  Patient to transfer supine <> sit with min assistance.  Patient to transfer bed <> chair via stand-pivot with a rolling walker and mod assistance.  Patient to ambulate with a rolling walker and mod assistance > 20 feet.                         DME Justifications:  No DME recommended requiring DME justifications    History:     Past Medical History:   Diagnosis Date    Acquired hypothyroidism     Anxiety and depression     Cervical radicular pain     Closed left ankle fracture     COPD (chronic obstructive pulmonary disease)     Heavy cigarette smoker     She has smoked 1/2 PPD since the age of 12, and still smokes    Hypertension     Hyponatremia     Multiple rib fractures     NSTEMI (non-ST elevated myocardial infarction)     Requires continuous at home supplemental oxygen     She is on 3.5L NC continuous    Stroke 11/08/2023    Tension type headache     Traumatic closed displaced fracture of distal end of radius, left, sequela        Past Surgical History:   Procedure Laterality Date    ABDOMINAL SURGERY      COLON SURGERY      COLOSTOMY      COLOSTOMY CLOSURE      HYSTERECTOMY      LEG SURGERY Right     OPEN REDUCTION AND INTERNAL FIXATION (ORIF) OF INJURY OF WRIST Left 1/29/2019    Procedure: ORIF, WRIST;  Surgeon: Homero Jeff DO;  Location: [facility];  Service: Orthopedics;  Laterality: Left;  Equipment: [employer] Geminus Wrist Plate Set  Vendor/Rep: [employer]  C-Arm: Entire  DME: None    REQUIRES ASSISTANT    WRIST SURGERY  Right        Time Tracking:     PT Received On: 02/10/25  PT Start Time: 1315  PT Stop Time: 1335  PT Total Time (min): 20 min     Billable Minutes: Evaluation 20    2/10/2025

## 2025-02-10 NOTE — PLAN OF CARE
Patient was seen by  at bedside.Patient signed documentation and was provided with a copy.Original placed in blue chart.   02/10/25 1207   Medicare Message   Important Message from Medicare regarding Discharge Appeal Rights Given to patient/caregiver;Explained to patient/caregiver;Signed/date by patient/caregiver   Date IMM was signed 02/10/25   Time IMM was signed 0024        01-Feb-2020 22:56

## 2025-02-10 NOTE — ASSESSMENT & PLAN NOTE
TSH 0.14  Free T4 1.14    Continue with Levothyroxine tablet 50 mcg, 50 mcg, Oral, Before breakfast   Pt is euthyroid

## 2025-02-10 NOTE — NURSING
Spoke with Margarito Miranda, Pts daughter. [phone]. Update provided. Per Margarito, Pt does have a history of osteoporosis and compression FX.

## 2025-02-10 NOTE — ASSESSMENT & PLAN NOTE
She has had multiple fractures to wrists, arms, ribs, and now T-spine  I suspect she underplays her ETOH use  PT and OT eval for rehab prior to discharge to home

## 2025-02-10 NOTE — ASSESSMENT & PLAN NOTE
This patient does have evidence of infective focus  Sepsis has now resolved  Source: Respiratory  Antibiotics given-   Antibiotics (72h ago, onward)      Start     Stop Route Frequency Ordered    02/10/25 0230  piperacillin-tazobactam (ZOSYN) 4.5 g in D5W 100 mL IVPB (MB+)         -- IV Every 8 hours (non-standard times) 02/10/25 0123    02/10/25 0130  doxycycline tablet 100 mg         -- Oral Every 12 hours 02/10/25 0123          Latest lactate reviewed-  Recent Labs   Lab 02/09/25  2235   LACTATE 1.1       Organ dysfunction indicated by Acute respiratory failure  Fluid challenge Fluid Not Needed - Patient is not hypotensive and/or lactate is less than 4.0.   Post- resuscitation assessment Yes - I attest a sepsis perfusion exam was performed within 6 hours of sepsis, severe sepsis, or septic shock presentation, following fluid resuscitation.  Will Not start Pressors- Levophed for MAP of 65  Source control achieved by: iv abx  Procalcitonin and CRP pending for tomorrow  No cultures done prior to antibiotics

## 2025-02-10 NOTE — PLAN OF CARE
Problem: Adult Inpatient Plan of Care  Goal: Plan of Care Review  Outcome: Progressing     Problem: Adult Inpatient Plan of Care  Goal: Optimal Comfort and Wellbeing  Outcome: Progressing     Problem: Pneumonia  Goal: Effective Oxygenation and Ventilation  Outcome: Progressing     Problem: Skin Injury Risk Increased  Goal: Skin Health and Integrity  Outcome: Progressing     Problem: Gas Exchange Impaired  Goal: Optimal Gas Exchange  Outcome: Progressing

## 2025-02-10 NOTE — ASSESSMENT & PLAN NOTE
She tells me she only drinks 1 cocktail daily  Multiple falls and fractures with some avoidance to the questions raises concerns this is actually more than stated  MVI, Thiamin, Folate

## 2025-02-10 NOTE — ASSESSMENT & PLAN NOTE
Patient with Hypercapnic and Hypoxic Respiratory failure which is Acute on chronic.  she is on home oxygen at 3.5 LPM. Supplemental oxygen was provided and noted- Oxygen Concentration (%):  [40] 40    EMS reports she was satting in the 70s while on home oxygen upon arrival to her home.     SpO2 (!) 79% RA -> 93% BiPAP 40% 10/5   ABG BIPAP: pH 7.28, PCO2 62, PAO2 82.    Signs/symptoms of respiratory failure include- tachypnea, increased work of breathing, respiratory distress, use of accessory muscles, wheezing, and lethargy. Contributing diagnoses includes - COPD, Pleural effusion, and Pneumonia Labs and images were reviewed. Patient Has recent ABG, which has been reviewed. Will treat underlying causes and adjust management of respiratory failure as follows- BiPAP in ICU

## 2025-02-10 NOTE — ASSESSMENT & PLAN NOTE
folic acid tablet 1 mg, 1 mg, Oral, Daily   Repeat Folate B12 in am    All pertinent labs within the past 24 hours have been reviewed.  CBC:   Recent Labs   Lab 02/09/25  2235 02/10/25  0500   WBC 10.55 10.16   HGB 11.9* 10.5*   HCT 38.1 33.0*    283          Latest Reference Range & Units 02/21/24 12:51   Iron 30 - 160 ug/dL 79   TIBC 250 - 450 ug/dL 309   Saturated Iron 20 - 50 % 26   Transferrin 200 - 375 mg/dL 209   Ferritin 20.0 - 300.0 ng/mL 434 (H)       Latest Reference Range & Units 06/04/23 22:25   Folate 4.0 - 24.0 ng/mL <2.2 (L)      Pt has elevated Ferritin and does not have any iron deficiency  Folate is low and pt is on replacement  Pt has mild anemia and does not need transfusion       Name of Procedure: image guided nephrostomy tube change     Fentanyl:  75 mcg     Sedation Tolerated:       Start: 0830  End: 0840     Vital Signs:  VSS     Any complications related to procedure: none identified at this time     Patient is A&Ox4, on 3 l nc, and is in NAD at this time.

## 2025-02-10 NOTE — PT/OT/SLP EVAL
Speech Language Pathology Evaluation  Bedside Swallow    Patient Name:  Bhavana Lambert   MRN:  [id number]  Admitting Diagnosis: Aspiration pneumonia of right lower lobe    Recommendations:                 General Recommendations:  Dysphagia therapy  Diet recommendations:  Minced & Moist Diet - IDDSI Level 5, Thin (small sips by cup rim, NO STRAWS)   Aspiration Precautions: 1 bite/sip at a time, Avoid talking while eating, Feed only when awake/alert, Frequent oral care, HOB to 90 degrees, Meds crushed in puree, Monitor for s/s of aspiration, No straws, Small bites/sips, and Wear oxygen during intake   General Precautions: Standard, respiratory    Assessment:     Bhavana Lambert is a 69 y.o. female with an admitting diagnosis of aspiration pneumonia of the right lower lobe. Patient on nasal cannula, flow 4 L/min. Generalized weakness and labored breathing noted throughout assessment. Patient has just been taken off of BIPAP. Patient reported a history of pneumonia and swallowing difficulties (with all foods), unable to disclose time of onset. She noted that it has been occurring for some time. Patient stated she experiences globus sensation in the pharyngeal cavity when consuming medications. ST performed oral care prior to PO trials. S/s of aspiration evident with thin and nectar thickened liquids by straw 2' to poor expectorant cough reflex immediately after swallow, multiple swallows, and throat clear. No overt s/s of aspiration noted with small single sips of thin liquid by cup rim, puree, or solid textures. Prolonged mastication and poor endurance noted with solids.     At this time, ST recommends water (thin) by cup rim only (NO STRAWS, small/single sips), with minced and moist diet. Patient should be monitored to ensure she is pacing herself during meals and liquid consumption. Meds should be crushed in pudding. Oral care prior to any PO administration.     Patient will continue to be monitored by ST. MBSS may  "be warranted to further investigate pharyngeal functioning as related to aspiration pneumonia.      History:     Past Medical History:   Diagnosis Date    Acquired hypothyroidism     Anxiety and depression     Cervical radicular pain     Closed left ankle fracture     COPD (chronic obstructive pulmonary disease)     Heavy cigarette smoker     She has smoked 1/2 PPD since the age of 12, and still smokes    Hypertension     Hyponatremia     Multiple rib fractures     NSTEMI (non-ST elevated myocardial infarction)     Requires continuous at home supplemental oxygen     She is on 3.5L NC continuous    Stroke 11/08/2023    Tension type headache     Traumatic closed displaced fracture of distal end of radius, left, sequela        Past Surgical History:   Procedure Laterality Date    ABDOMINAL SURGERY      COLON SURGERY      COLOSTOMY      COLOSTOMY CLOSURE      HYSTERECTOMY      LEG SURGERY Right     OPEN REDUCTION AND INTERNAL FIXATION (ORIF) OF INJURY OF WRIST Left 1/29/2019    Procedure: ORIF, WRIST;  Surgeon: Homero Jeff DO;  Location: [facility];  Service: Orthopedics;  Laterality: Left;  Equipment: [employer] Geminus Wrist Plate Set  Vendor/Rep: [employer]  C-Arm: Entire  DME: None    REQUIRES ASSISTANT    WRIST SURGERY Right        Social History: Patient lives with her daughter.    MBSS: Patient reports no history.    Chest X-Rays: Results indicated "Presumed skin fold overlies the right upper lung zone, although a small right apical pneumothorax cannot be entirely excluded.  Consider further evaluation with CT. Chronic coarse interstitial attenuation with patchy right lower lung zone opacities possibly reflecting superimposed aspiration or infection.  Small volume of right-sided pleural fluid."    Prior diet: regular texture, thin liquids.    Subjective   Patient seated at [facility] in 90 degree position. Patient pleasant and compliant throughout assessment, poor endurance noted. "     Pain/Comfort:  Pain Rating 1: 0/10    Respiratory Status: Nasal cannula, flow 4 L/min    Objective:     Oral Musculature Evaluation  Oral Musculature: general weakness  Dentition: present and adequate  Secretion Management: adequate  Mucosal Quality: dry  Mandibular Strength and Mobility: WNL  Oral Labial Strength and Mobility: WNL  Lingual Strength and Mobility: WNL  Buccal Strength and Mobility: WNL  Volitional Cough: reduced  Voice Prior to PO Intake: raspy    Bedside Swallow Eval:   Consistencies Assessed:  Thin liquids 2 oz water by straw, cup rim - self regulated  Nectar thick liquids 1 oz apple juice by straw - self regulated  Puree .5 oz pudding by spoon - self regulated  Solids .5 oz cracker - self regulated      Oral Phase:   Thin, nectar, puree  WFL    Solid  Prolonged mastication    Pharyngeal Phase:   Thin, nectar  coughing/choking  delayed swallow initation  multiple spontaneous swallows  throat clearing    Puree, solid  Intermittent delayed swallow initation    Compensatory Strategies  Thin liquid by cup rim, small/single sips    Goals:   Multidisciplinary Problems       SLP Goals          Problem: SLP    Goal Priority Disciplines Outcome   SLP Goal     SLP Progressing   Description: 1. Patient will maintain adequate hydration/nutrition with optimum safety and efficiency of swallowing function on P.O. intake without overt signs and symptoms of aspiration for the highest appropriate diet level.                        Plan:     Patient to be seen:  4 x/week   Plan of Care expires:  02/24/25  Plan of Care reviewed with:  patient, other (see comments) (Charge Nurse)   SLP Follow-Up:  Yes       Discharge recommendations:  Moderate Intensity Therapy   Barriers to Discharge:   Possible further investigation of pharyngeal functioning     Time Tracking:     SLP Treatment Date:   02/10/25  Speech Start Time:  1240  Speech Stop Time:  1300     Speech Total Time (min):  20 min    Billable Minutes: Eval  Swallow and Oral Function 20 min    02/10/2025

## 2025-02-10 NOTE — PLAN OF CARE
Patient was seen by  and discussion of alcohol use. Patient did not want to discuss. SW offered her help with any case management needs. Patients daughter will pick her up when she is discharged home.

## 2025-02-10 NOTE — SUBJECTIVE & OBJECTIVE
Past Medical History:   Diagnosis Date    Acquired hypothyroidism     Anxiety and depression     Cervical radicular pain     Closed left ankle fracture     COPD (chronic obstructive pulmonary disease)     H/O: stroke 11/08/2023    Hypertension     Hypokalemia     Hyponatremia     Multiple rib fractures     NSTEMI (non-ST elevated myocardial infarction)     Requires continuous at home supplemental oxygen     PRN FOR COPD    Stroke     Tension type headache     TIA (transient ischemic attack)     Traumatic closed displaced fracture of distal end of radius, left, sequela        Past Surgical History:   Procedure Laterality Date    ABDOMINAL SURGERY      COLON SURGERY      COLOSTOMY      COLOSTOMY CLOSURE      HYSTERECTOMY      LEG SURGERY Right     OPEN REDUCTION AND INTERNAL FIXATION (ORIF) OF INJURY OF WRIST Left 1/29/2019    Procedure: ORIF, WRIST;  Surgeon: Homero Jeff DO;  Location: [facility] OR;  Service: Orthopedics;  Laterality: Left;  Equipment: [employer] Geminus Wrist Plate Set  Vendor/Rep: [employer]  C-Arm: Entire  DME: None    REQUIRES ASSISTANT    WRIST SURGERY Right        Review of patient's allergies indicates:  No Known Allergies    No current facility-administered medications on file prior to encounter.     Current Outpatient Medications on File Prior to Encounter   Medication Sig    albuterol (PROVENTIL/VENTOLIN HFA) 90 mcg/actuation inhaler Inhale 2 puffs into the lungs every 6 (six) hours as needed for Wheezing or Shortness of Breath.    albuterol-ipratropium (DUO-NEB) 2.5 mg-0.5 mg/3 mL nebulizer solution Take 3 mLs by nebulization every 6 (six) hours as needed for Wheezing. Rescue    aspirin (ECOTRIN) 81 MG EC tablet Take 1 tablet (81 mg total) by mouth once daily.    atorvastatin (LIPITOR) 80 MG tablet Take 1 tablet (80 mg total) by mouth once daily.    busPIRone (BUSPAR) 15 MG tablet Take 15 mg by mouth 2 (two) times a day.    carvediloL (COREG) 25 MG tablet Take 0.5 tablets  (12.5 mg total) by mouth 2 (two) times daily.    clopidogreL (PLAVIX) 75 mg tablet Take 1 tablet (75 mg total) by mouth once daily.    cyanocobalamin 1,000 mcg/mL injection Inject 1,000 mcg into the muscle every 30 days.     FLUoxetine 40 MG capsule Take 40 mg by mouth once daily.    folic acid (FOLVITE) 1 MG tablet Take 1 tablet (1 mg total) by mouth once daily.    HYDROcodone-acetaminophen (NORCO)  mg per tablet Take 1 tablet by mouth every 4 (four) hours as needed for Pain.    levothyroxine (SYNTHROID) 50 MCG tablet Take 50 mcg by mouth before breakfast.    LIDOcaine 4 % PtMd Apply 1 patch topically 2 (two) times daily as needed (pain).    montelukast (SINGULAIR) 10 mg tablet Take 10 mg by mouth every morning.    ondansetron (ZOFRAN) 4 MG tablet Take 4 mg by mouth every 6 (six) hours as needed for Nausea.    spironolactone (ALDACTONE) 25 MG tablet Take 0.5 tablets (12.5 mg total) by mouth once daily.    tiotropium-olodateroL (STIOLTO RESPIMAT) 2.5-2.5 mcg/actuation Mist INHALE ONE PUFF INTO THE LUNGS ONCE DAILY. controller     Family History       Problem Relation (Age of Onset)    Anxiety disorder Daughter, Daughter    Arthritis Sister    COPD Father    Cancer Mother    Cirrhosis Father    Congenital heart disease Daughter    Depression Daughter    No Known Problems Brother    Valvular heart disease Daughter          Tobacco Use    Smoking status: Every Day     Current packs/day: 1.00     Average packs/day: 1 pack/day for 20.0 years (20.0 ttl pk-yrs)     Types: Cigarettes    Smokeless tobacco: Never   Substance and Sexual Activity    Alcohol use: Yes     Comment: couple times a week-wine or bloody cari    Drug use: No    Sexual activity: Not Currently     Review of Systems   Constitutional:  Positive for activity change, appetite change, chills, diaphoresis, fatigue and fever.   HENT:  Positive for congestion.    Eyes:  Negative for visual disturbance.   Respiratory:  Positive for cough, shortness of  breath and wheezing.    Cardiovascular:  Negative for chest pain and leg swelling.   Gastrointestinal:  Positive for nausea. Negative for diarrhea and vomiting.   Endocrine: Positive for cold intolerance.   Genitourinary:  Negative for dysuria and flank pain.   Musculoskeletal:  Positive for arthralgias, back pain and gait problem. Negative for myalgias.   Skin:  Negative for rash.   Allergic/Immunologic: Negative for immunocompromised state.   Neurological:  Negative for light-headedness and headaches.   Hematological:  Bruises/bleeds easily.   Psychiatric/Behavioral:  Positive for decreased concentration. Negative for confusion. The patient is nervous/anxious.      Objective:     Vital Signs (Most Recent):  Temp: 98.8 °F (37.1 °C) (02/09/25 2232)  Pulse: (!) 112 (02/09/25 2334)  Resp: (!) 25 (02/09/25 2334)  BP: 135/62 (02/09/25 2232)  SpO2: (!) 93 % (02/09/25 2334) Vital Signs (24h Range):  Temp:  [98.8 °F (37.1 °C)] 98.8 °F (37.1 °C)  Pulse:  [112-126] 112  Resp:  [25-44] 25  SpO2:  [79 %-95 %] 93 %  BP: (135)/(62) 135/62     Weight: 49.9 kg (110 lb)  Body mass index is 20.12 kg/m².     Physical Exam  Constitutional:       General: She is not in acute distress.     Appearance: She is normal weight. She is ill-appearing. She is not toxic-appearing or diaphoretic.   HENT:      Head: Normocephalic and atraumatic.   Pulmonary:      Effort: Tachypnea present.      Comments: BiPAP in place, removed to do interview and could speak in complete sentences with no distress  Abdominal:      General: Abdomen is flat.   Genitourinary:     Comments: No gomez present  Lymphadenopathy:      Comments: No peripheral edema to legs   Skin:     Coloration: Skin is sallow.   Neurological:      Mental Status: She is oriented to person, place, and time. She is lethargic.      GCS: GCS eye subscore is 4. GCS verbal subscore is 5. GCS motor subscore is 6.   Psychiatric:         Attention and Perception: Attention and perception normal.          Mood and Affect: Mood is depressed.         Speech: Speech is delayed.         Behavior: Behavior is slowed. Behavior is not agitated.         Cognition and Memory: Cognition and memory normal.                Significant Labs: All pertinent labs within the past 24 hours have been reviewed.  Recent Results (from the past 24 hours)   COVID-19 Rapid Screening    Collection Time: 02/09/25 10:34 PM   Result Value Ref Range    SARS-CoV-2 RNA, Amplification, Qual Negative Negative   Influenza A & B by Molecular    Collection Time: 02/09/25 10:34 PM    Specimen: Nasopharyngeal Swab   Result Value Ref Range    Influenza A, Molecular Negative Negative    Influenza B, Molecular Negative Negative    Flu A & B Source Nasal Swab    CBC Auto Differential    Collection Time: 02/09/25 10:35 PM   Result Value Ref Range    WBC 10.55 3.90 - 12.70 K/uL    RBC 3.95 (L) 4.00 - 5.40 M/uL    Hemoglobin 11.9 (L) 12.0 - 16.0 g/dL    Hematocrit 38.1 37.0 - 48.5 %    MCV 97 82 - 98 fL    MCH 30.1 27.0 - 31.0 pg    MCHC 31.2 (L) 32.0 - 36.0 g/dL    RDW 12.3 11.5 - 14.5 %    Platelets 332 150 - 450 K/uL    MPV 10.4 9.2 - 12.9 fL    Immature Granulocytes 1.9 (H) 0.0 - 0.5 %    Gran # (ANC) 7.5 1.8 - 7.7 K/uL    Immature Grans (Abs) 0.20 (H) 0.00 - 0.04 K/uL    Lymph # 1.5 1.0 - 4.8 K/uL    Mono # 1.4 (H) 0.3 - 1.0 K/uL    Eos # 0.0 0.0 - 0.5 K/uL    Baso # 0.02 0.00 - 0.20 K/uL    nRBC 0 0 /100 WBC    Gran % 71.3 38.0 - 73.0 %    Lymph % 13.7 (L) 18.0 - 48.0 %    Mono % 12.9 4.0 - 15.0 %    Eosinophil % 0.0 0.0 - 8.0 %    Basophil % 0.2 0.0 - 1.9 %    Differential Method Automated    Comprehensive Metabolic Panel    Collection Time: 02/09/25 10:35 PM   Result Value Ref Range    Sodium 137 136 - 145 mmol/L    Potassium 3.9 3.5 - 5.1 mmol/L    Chloride 94 (L) 95 - 110 mmol/L    CO2 23 23 - 29 mmol/L    Glucose 96 70 - 110 mg/dL    BUN 6 (L) 8 - 23 mg/dL    Creatinine 0.8 0.5 - 1.4 mg/dL    Calcium 9.3 8.7 - 10.5 mg/dL    Total Protein 7.9 6.0 -  8.4 g/dL    Albumin 3.0 (L) 3.5 - 5.2 g/dL    Total Bilirubin 0.2 0.1 - 1.0 mg/dL    Alkaline Phosphatase 111 40 - 150 U/L    AST 67 (H) 10 - 40 U/L    ALT 92 (H) 10 - 44 U/L    eGFR >60.0 >60 mL/min/1.73 m^2    Anion Gap 20 (H) 8 - 16 mmol/L   Lactic Acid, Plasma    Collection Time: 02/09/25 10:35 PM   Result Value Ref Range    Lactate (Lactic Acid) 1.1 0.5 - 2.2 mmol/L   Troponin I    Collection Time: 02/09/25 10:35 PM   Result Value Ref Range    Troponin I 0.006 0.000 - 0.026 ng/mL   Brain Natriuretic Peptide    Collection Time: 02/09/25 10:35 PM   Result Value Ref Range    BNP 76 0 - 99 pg/mL   Magnesium    Collection Time: 02/09/25 10:35 PM   Result Value Ref Range    Magnesium 1.6 1.6 - 2.6 mg/dL   ISTAT PROCEDURE    Collection Time: 02/09/25 11:12 PM   Result Value Ref Range    POC PH 7.281 (LL) 7.35 - 7.45    POC PCO2 61.8 (HH) 35 - 45 mmHg    POC PO2 82 80 - 100 mmHg    POC HCO3 29.1 (H) 24 - 28 mmol/L    POC BE 2 -2 to 2 mmol/L    POC SATURATED O2 94 95 - 100 %    POC TCO2 31 (H) 23 - 27 mmol/L    Rate 22     Sample ARTERIAL     Site RR     Allens Test Pass     DelSys CPAP/BiPAP     Mode BiPAP     FiO2 40     Sp02 95     IP 12     EP 5          Significant Imaging: I have reviewed all pertinent imaging results/findings within the past 24 hours.

## 2025-02-10 NOTE — H&P
"Formerly [facility] Medicine  History & Physical    Patient Name: Bhavana Lambert  MRN: [id number]  Admission Date: 2/9/2025  Attending Physician: Bear Quick MD   Primary Care Provider: Pepito Jhaveri IV, MD         Patient information was obtained from patient, past medical records, and ER records.       Subjective:     Principal Problem:Aspiration pneumonia of right lower lobe    Chief Complaint:   Chief Complaint   Patient presents with    Shortness of Breath     Sob x 3 days. Hx of COPD        HPI: Ms. Lambert is a 70yo lady with a past medical history of hypothyroidism, anxiety and depression, COPD on home O2 3.5L NC due to long term smoking, CVA, HTN, NSTEMI, RLL pneumonia, ETOH abuse and multiple falls with bone fractures.  She is a daily drinker, "about a shot a day" and a heavy smoker, 1/2 ppd cigarettes since 13 y/o = 29 pack years.    Ms. Lambert is well known to me from having admitted her back on 4/18/24 to 4/21/24 with RLL pneumonia and COPD exacerbation.  She was treated then with Rocephin, Zithro, steroids and improved rapidly.    She now returns to the ED at [facility] with 1 week of worsening cough, SOB, MENA and fever with chills to 103.0F at home.  This really picked up over the past 3 days with the coughing and dyspnea.  She has just finished steroids, zithromax and duonebs with no relief at home.  Per ED, who got collateral from EMS, "EMS reports she was satting in the 70s while on home oxygen upon arrival to her home.  She received albuterol nebulizer, and Solu-Medrol 125 mg given by EMS prior to arrival."      She tells me she also was sleeping on her sofa at home last week and rolled off, striking her back.  Since that time, she has had generalized back pains, but not localized or any neurologic issues.  She also states she has nausea and chronic, "problems swallowing."  She has not passed out or had any episodic vomiting with the daily ETOH use.    In the ED her VS were /62 " "(BP Location: Left arm)   Pulse (!) 126 -> 112   Temp max 98.8 °F (37.1 °C) (Oral)   Resp (!) 44 -> 25   Ht 5' 2" (1.575 m)   Wt 49.9 kg (110 lb)   SpO2 (!) 79% RA -> 93% BiPAP 40% 10/5  BMI 20.12 kg/m².  Labs showed WBC 11, Hg 11.9, Cr 0.8, Alb 3, TB 0.2, AST 67, ALT 92, LA 1.1, Mg 1.6, COVID/FLU NEG, ABG BIPAP: pH 7.28, PCO2 62, PAO2 82.    CXR showed Presumed skin fold overlies the right upper lung zone, although a small right apical pneumothorax cannot be entirely excluded.  Chronic coarse interstitial attenuation with patchy right lower lung zone opacities possibly reflecting superimposed aspiration or infection.  Small volume of right-sided pleural fluid.  EKG showed sinus tachycardia, rate 121, non-specific ST-T changes, Q waves septal, QTc prolonged 596 ms.    NC CT chest showed Multifocal consolidation within the right lower lobe and to a lesser degree the right middle lobe.  Additional scattered irregular nodular airspace opacities throughout the aerated right lung.  This may reflect a nonspecific infectious or inflammatory process versus aspiration.  Advanced emphysematous change of the lungs. Scattered mildly enlarged mediastinal and right hilar lymph nodes.  These may be reactive in etiology.  No evidence of pneumothorax.  Previously questioned radiographic abnormality presumably related to prominent skin fold.  Moderate compression fracture of the T6 vertebral body, new from prior CT of 01/20/2024.  Additional chronic mild compression deformities of the thoracic and lumbar spine as above.  Diffuse osteopenia with chronic healed sternal fracture and multiple bilateral rib fractures.    In the ED she was treated with:  Medications   magnesium sulfate 2g in water 50mL IVPB (premix) (0 g Intravenous Stopped 2/10/25 0039)   ampicillin-sulbactam (UNASYN) 3 g in 0.9% NaCl 100 mL IVPB (MB+) (has no administration in time range)   albuterol-ipratropium 2.5 mg-0.5 mg/3 mL nebulizer solution 3 mL (3 mLs " Nebulization Given 2/9/25 2330)   albuterol-ipratropium 2.5 mg-0.5 mg/3 mL nebulizer solution 3 mL (3 mLs Nebulization Given 2/9/25 2337)   LORazepam (ATIVAN) injection 0.5 mg (0.5 mg Intravenous Given 2/9/25 2240)             Past Medical History:   Diagnosis Date    Acquired hypothyroidism     Anxiety and depression     Cervical radicular pain     Closed left ankle fracture     COPD (chronic obstructive pulmonary disease)     H/O: stroke 11/08/2023    Hypertension     Hypokalemia     Hyponatremia     Multiple rib fractures     NSTEMI (non-ST elevated myocardial infarction)     Requires continuous at home supplemental oxygen     PRN FOR COPD    Stroke     Tension type headache     TIA (transient ischemic attack)     Traumatic closed displaced fracture of distal end of radius, left, sequela        Past Surgical History:   Procedure Laterality Date    ABDOMINAL SURGERY      COLON SURGERY      COLOSTOMY      COLOSTOMY CLOSURE      HYSTERECTOMY      LEG SURGERY Right     OPEN REDUCTION AND INTERNAL FIXATION (ORIF) OF INJURY OF WRIST Left 1/29/2019    Procedure: ORIF, WRIST;  Surgeon: Homero Jeff DO;  Location: [facility] OR;  Service: Orthopedics;  Laterality: Left;  Equipment: [employer] Geminus Wrist Plate Set  Vendor/Rep: [employer]  C-Arm: Entire  DME: None    REQUIRES ASSISTANT    WRIST SURGERY Right        Review of patient's allergies indicates:  No Known Allergies    No current facility-administered medications on file prior to encounter.     Current Outpatient Medications on File Prior to Encounter   Medication Sig    albuterol (PROVENTIL/VENTOLIN HFA) 90 mcg/actuation inhaler Inhale 2 puffs into the lungs every 6 (six) hours as needed for Wheezing or Shortness of Breath.    albuterol-ipratropium (DUO-NEB) 2.5 mg-0.5 mg/3 mL nebulizer solution Take 3 mLs by nebulization every 6 (six) hours as needed for Wheezing. Rescue    aspirin (ECOTRIN) 81 MG EC tablet Take 1 tablet (81 mg total) by mouth  once daily.    atorvastatin (LIPITOR) 80 MG tablet Take 1 tablet (80 mg total) by mouth once daily.    busPIRone (BUSPAR) 15 MG tablet Take 15 mg by mouth 2 (two) times a day.    carvediloL (COREG) 25 MG tablet Take 0.5 tablets (12.5 mg total) by mouth 2 (two) times daily.    clopidogreL (PLAVIX) 75 mg tablet Take 1 tablet (75 mg total) by mouth once daily.    cyanocobalamin 1,000 mcg/mL injection Inject 1,000 mcg into the muscle every 30 days.     FLUoxetine 40 MG capsule Take 40 mg by mouth once daily.    folic acid (FOLVITE) 1 MG tablet Take 1 tablet (1 mg total) by mouth once daily.    HYDROcodone-acetaminophen (NORCO)  mg per tablet Take 1 tablet by mouth every 4 (four) hours as needed for Pain.    levothyroxine (SYNTHROID) 50 MCG tablet Take 50 mcg by mouth before breakfast.    LIDOcaine 4 % PtMd Apply 1 patch topically 2 (two) times daily as needed (pain).    montelukast (SINGULAIR) 10 mg tablet Take 10 mg by mouth every morning.    ondansetron (ZOFRAN) 4 MG tablet Take 4 mg by mouth every 6 (six) hours as needed for Nausea.    spironolactone (ALDACTONE) 25 MG tablet Take 0.5 tablets (12.5 mg total) by mouth once daily.    tiotropium-olodateroL (STIOLTO RESPIMAT) 2.5-2.5 mcg/actuation Mist INHALE ONE PUFF INTO THE LUNGS ONCE DAILY. controller     Family History       Problem Relation (Age of Onset)    Anxiety disorder Daughter, Daughter    Arthritis Sister    COPD Father    Cancer Mother    Cirrhosis Father    Congenital heart disease Daughter    Depression Daughter    No Known Problems Brother    Valvular heart disease Daughter          Tobacco Use    Smoking status: Every Day     Current packs/day: 1.00     Average packs/day: 1 pack/day for 20.0 years (20.0 ttl pk-yrs)     Types: Cigarettes    Smokeless tobacco: Never   Substance and Sexual Activity    Alcohol use: Yes     Comment: couple times a week-wine or bloody cari    Drug use: No    Sexual activity: Not Currently     Review of Systems    Constitutional:  Positive for activity change, appetite change, chills, diaphoresis, fatigue and fever.   HENT:  Positive for congestion.    Eyes:  Negative for visual disturbance.   Respiratory:  Positive for cough, shortness of breath and wheezing.    Cardiovascular:  Negative for chest pain and leg swelling.   Gastrointestinal:  Positive for nausea. Negative for diarrhea and vomiting.   Endocrine: Positive for cold intolerance.   Genitourinary:  Negative for dysuria and flank pain.   Musculoskeletal:  Positive for arthralgias, back pain and gait problem. Negative for myalgias.   Skin:  Negative for rash.   Allergic/Immunologic: Negative for immunocompromised state.   Neurological:  Negative for light-headedness and headaches.   Hematological:  Bruises/bleeds easily.   Psychiatric/Behavioral:  Positive for decreased concentration. Negative for confusion. The patient is nervous/anxious.      Objective:     Vital Signs (Most Recent):  Temp: 98.8 °F (37.1 °C) (02/09/25 2232)  Pulse: (!) 112 (02/09/25 2334)  Resp: (!) 25 (02/09/25 2334)  BP: 135/62 (02/09/25 2232)  SpO2: (!) 93 % (02/09/25 2334) Vital Signs (24h Range):  Temp:  [98.8 °F (37.1 °C)] 98.8 °F (37.1 °C)  Pulse:  [112-126] 112  Resp:  [25-44] 25  SpO2:  [79 %-95 %] 93 %  BP: (135)/(62) 135/62     Weight: 49.9 kg (110 lb)  Body mass index is 20.12 kg/m².     Physical Exam  Constitutional:       General: She is not in acute distress.     Appearance: She is normal weight. She is ill-appearing. She is not toxic-appearing or diaphoretic.   HENT:      Head: Normocephalic and atraumatic.   Pulmonary:      Effort: Tachypnea present.      Comments: BiPAP in place, removed to do interview and could speak in complete sentences with no distress  Abdominal:      General: Abdomen is flat.   Genitourinary:     Comments: No gomez present  Lymphadenopathy:      Comments: No peripheral edema to legs   Skin:     Coloration: Skin is sallow.   Neurological:      Mental  Status: She is oriented to person, place, and time. She is lethargic.      GCS: GCS eye subscore is 4. GCS verbal subscore is 5. GCS motor subscore is 6.   Psychiatric:         Attention and Perception: Attention and perception normal.         Mood and Affect: Mood is depressed.         Speech: Speech is delayed.         Behavior: Behavior is slowed. Behavior is not agitated.         Cognition and Memory: Cognition and memory normal.                Significant Labs: All pertinent labs within the past 24 hours have been reviewed.  Recent Results (from the past 24 hours)   COVID-19 Rapid Screening    Collection Time: 02/09/25 10:34 PM   Result Value Ref Range    SARS-CoV-2 RNA, Amplification, Qual Negative Negative   Influenza A & B by Molecular    Collection Time: 02/09/25 10:34 PM    Specimen: Nasopharyngeal Swab   Result Value Ref Range    Influenza A, Molecular Negative Negative    Influenza B, Molecular Negative Negative    Flu A & B Source Nasal Swab    CBC Auto Differential    Collection Time: 02/09/25 10:35 PM   Result Value Ref Range    WBC 10.55 3.90 - 12.70 K/uL    RBC 3.95 (L) 4.00 - 5.40 M/uL    Hemoglobin 11.9 (L) 12.0 - 16.0 g/dL    Hematocrit 38.1 37.0 - 48.5 %    MCV 97 82 - 98 fL    MCH 30.1 27.0 - 31.0 pg    MCHC 31.2 (L) 32.0 - 36.0 g/dL    RDW 12.3 11.5 - 14.5 %    Platelets 332 150 - 450 K/uL    MPV 10.4 9.2 - 12.9 fL    Immature Granulocytes 1.9 (H) 0.0 - 0.5 %    Gran # (ANC) 7.5 1.8 - 7.7 K/uL    Immature Grans (Abs) 0.20 (H) 0.00 - 0.04 K/uL    Lymph # 1.5 1.0 - 4.8 K/uL    Mono # 1.4 (H) 0.3 - 1.0 K/uL    Eos # 0.0 0.0 - 0.5 K/uL    Baso # 0.02 0.00 - 0.20 K/uL    nRBC 0 0 /100 WBC    Gran % 71.3 38.0 - 73.0 %    Lymph % 13.7 (L) 18.0 - 48.0 %    Mono % 12.9 4.0 - 15.0 %    Eosinophil % 0.0 0.0 - 8.0 %    Basophil % 0.2 0.0 - 1.9 %    Differential Method Automated    Comprehensive Metabolic Panel    Collection Time: 02/09/25 10:35 PM   Result Value Ref Range    Sodium 137 136 - 145 mmol/L     Potassium 3.9 3.5 - 5.1 mmol/L    Chloride 94 (L) 95 - 110 mmol/L    CO2 23 23 - 29 mmol/L    Glucose 96 70 - 110 mg/dL    BUN 6 (L) 8 - 23 mg/dL    Creatinine 0.8 0.5 - 1.4 mg/dL    Calcium 9.3 8.7 - 10.5 mg/dL    Total Protein 7.9 6.0 - 8.4 g/dL    Albumin 3.0 (L) 3.5 - 5.2 g/dL    Total Bilirubin 0.2 0.1 - 1.0 mg/dL    Alkaline Phosphatase 111 40 - 150 U/L    AST 67 (H) 10 - 40 U/L    ALT 92 (H) 10 - 44 U/L    eGFR >60.0 >60 mL/min/1.73 m^2    Anion Gap 20 (H) 8 - 16 mmol/L   Lactic Acid, Plasma    Collection Time: 02/09/25 10:35 PM   Result Value Ref Range    Lactate (Lactic Acid) 1.1 0.5 - 2.2 mmol/L   Troponin I    Collection Time: 02/09/25 10:35 PM   Result Value Ref Range    Troponin I 0.006 0.000 - 0.026 ng/mL   Brain Natriuretic Peptide    Collection Time: 02/09/25 10:35 PM   Result Value Ref Range    BNP 76 0 - 99 pg/mL   Magnesium    Collection Time: 02/09/25 10:35 PM   Result Value Ref Range    Magnesium 1.6 1.6 - 2.6 mg/dL   ISTAT PROCEDURE    Collection Time: 02/09/25 11:12 PM   Result Value Ref Range    POC PH 7.281 (LL) 7.35 - 7.45    POC PCO2 61.8 (HH) 35 - 45 mmHg    POC PO2 82 80 - 100 mmHg    POC HCO3 29.1 (H) 24 - 28 mmol/L    POC BE 2 -2 to 2 mmol/L    POC SATURATED O2 94 95 - 100 %    POC TCO2 31 (H) 23 - 27 mmol/L    Rate 22     Sample ARTERIAL     Site RR     Allens Test Pass     DelSys CPAP/BiPAP     Mode BiPAP     FiO2 40     Sp02 95     IP 12     EP 5          Significant Imaging: I have reviewed all pertinent imaging results/findings within the past 24 hours.        Assessment/Plan:     * Aspiration pneumonia of right lower lobe  She was admitted for PNA in exactly the same location 1 year ago  She reports problems swallowing and she is a daily drinker, both raising risk of recurrent aspiration events  Started Zosyn + Doxy (avoiding FQ and Macrolides with QTc of 596 ms  Sputum CXS and gram stain + Legionella      Sepsis due to pneumonia  This patient does have evidence of infective  focus  My overall impression is sepsis.  Source: Respiratory  Antibiotics given-   Antibiotics (72h ago, onward)      Start     Stop Route Frequency Ordered    02/10/25 0230  piperacillin-tazobactam (ZOSYN) 4.5 g in D5W 100 mL IVPB (MB+)         -- IV Every 8 hours (non-standard times) 02/10/25 0123    02/10/25 0130  doxycycline tablet 100 mg         -- Oral Every 12 hours 02/10/25 0123          Latest lactate reviewed-  Recent Labs   Lab 02/09/25  2235   LACTATE 1.1     Organ dysfunction indicated by Acute respiratory failure    Fluid challenge Fluid Not Needed - Patient is not hypotensive and/or lactate is less than 4.0.     Post- resuscitation assessment Yes - I attest a sepsis perfusion exam was performed within 6 hours of sepsis, severe sepsis, or septic shock presentation, following fluid resuscitation.      Will Not start Pressors- Levophed for MAP of 65  Source control achieved by: iv abx    COPD exacerbation    albuterol-ipratropium 2.5 mg-0.5 mg/3 mL nebulizer solution 3 mL, 3 mL, Nebulization, Q4H PRN     albuterol-ipratropium 2.5 mg-0.5 mg/3 mL nebulizer solution 3 mL, 3 mL, Nebulization, Q6H     benzonatate capsule 100 mg, 100 mg, Oral, TID PRN, cough    dextromethorphan-guaiFENesin  mg/5 ml liquid 10 mL, 10 mL, Oral, Q6H     montelukast tablet 10 mg, 10 mg, Oral, QAM     Solumedrol 125mg iv x 1 by EMS prior to arrival    predniSONE tablet 40 mg, 40 mg, Oral, Daily     Acute on chronic respiratory failure with hypoxia and hypercapnia  Patient with Hypercapnic and Hypoxic Respiratory failure which is Acute on chronic.  she is on home oxygen at 3.5 LPM. Supplemental oxygen was provided and noted- Oxygen Concentration (%):  [40] 40    EMS reports she was satting in the 70s while on home oxygen upon arrival to her home.     SpO2 (!) 79% RA -> 93% BiPAP 40% 10/5   ABG BIPAP: pH 7.28, PCO2 62, PAO2 82.    Signs/symptoms of respiratory failure include- tachypnea, increased work of breathing, respiratory  distress, use of accessory muscles, wheezing, and lethargy. Contributing diagnoses includes - COPD, Pleural effusion, and Pneumonia Labs and images were reviewed. Patient Has recent ABG, which has been reviewed. Will treat underlying causes and adjust management of respiratory failure as follows- BiPAP in ICU    Anemia of chronic disease    folic acid tablet 1 mg, 1 mg, Oral, Daily   Repeat Folate B12 in am     Latest Reference Range & Units 02/21/24 12:51   Iron 30 - 160 ug/dL 79   TIBC 250 - 450 ug/dL 309   Saturated Iron 20 - 50 % 26   Transferrin 200 - 375 mg/dL 209   Ferritin 20.0 - 300.0 ng/mL 434 (H)       Latest Reference Range & Units 06/04/23 22:25   Folate 4.0 - 24.0 ng/mL <2.2 (L)        Recurrent falls  She has had multiple fractures to wrists, arms, ribs, and now T-spine  I suspect she underplays her ETOH use  PT and OT      Primary hypertension    carvediloL tablet 12.5 mg, 12.5 mg, Oral, BID     Anxiety and depression    busPIRone tablet 15 mg, 15 mg, Oral, BID     FLUoxetine capsule 40 mg, 40 mg, Oral, Daily     Daily consumption of alcohol  She tells me she only drinks 1 cocktail daily  Multiple falls and fractures with some avoidance to the questions raises concerns this is actually more than stated  MVI, Thiamin, Folate      Heavy cigarette smoker  She heavy smoker, 1/2 ppd cigarettes since 11 y/o = 29 pack years.  Nicotine patch 21mg ordered  Nursing cessation protocol ordered, no counseling from me due to resp distress on bipap      H/O: stroke  This could explain her recurrent aspiration events  SLP evaluation       aspirin EC tablet 81 mg, 81 mg, Oral, Daily     atorvastatin tablet 80 mg, 80 mg, Oral, Daily     clopidogreL tablet 75 mg, 75 mg, Oral, Daily     Hypothyroid  TSH and FT4 in am      levothyroxine tablet 50 mcg, 50 mcg, Oral, Before breakfast     Wedge compression fracture of T5-T6 vertebra, initial encounter for closed fracture  TLSO brace ordered  PT and OT consulted  Avoid  opioids with acute respiratory failure      VTE Risk Mitigation (From admission, onward)           Ordered     enoxaparin injection 40 mg  Daily         02/10/25 0127     Place ALEA hose  Until discontinued         02/10/25 0127     IP VTE HIGH RISK PATIENT  Once         02/10/25 0127     Place sequential compression device  Until discontinued         02/10/25 0127                         The attending portion of this evaluation, treatment, and documentation was performed per MATT Chu MD via Telemedicine AudioVisual using the secure TapTap software platform with 2 way audio/video. The provider was located off-site and the patient is located in the hospital. The aforementioned video software was utilized to document the relevant history and physical exam      Critical care time spent on the evaluation and treatment of severe organ dysfunction, review of pertinent labs and imaging studies, discussions with consulting providers and discussions with patient/family: 70 minutes.       MATT Chu MD  Department of Hospital Medicine   [place] - Emergency Dept

## 2025-02-10 NOTE — NURSING
Pt received from Er to room # 1 via stretcher. Pt wearing non rebreathe , o2 sats 94-95%, resp 24  Pt changed over to BIPAP per Resp therapist.  NAD noted. Awake and alert, oriented x3 Connected to cardiac monitor. Vital signs obtained.  Assessment completed.. Oriented to room and POC. Instructed to use call light and notify RN of any needs  or concerns. Verbalized understanding. Call light within reach.     Yellow metal ring to right finger   White metal ring with blue stone to left hand  Cell phone  Shotrs and geoff shirt

## 2025-02-10 NOTE — PLAN OF CARE
[facility] Care Unit  Initial Discharge Assessment       Primary Care Provider: Pepito Jhaveri IV, MD    Admission Diagnosis: Shortness of breath [R06.02]  Right lower lobe pneumonia [J18.9]  Chest pain [R07.9]  Compression fracture of T6 vertebra, initial encounter [S22.050A]    Admission Date: 2/9/2025  Expected Discharge Date: 2/13/2025    Transition of Care Barriers: Substance Abuse    Payor: MEDICARE / Plan: MEDICARE PART A & B / Product Type: Government /     Extended Emergency Contact Information  Primary Emergency Contact: Ashley Miranda  Mobile Phone: [phone]  Relation: Daughter   needed? No  Secondary Emergency Contact: ASHLEY MIRANDA  Mobile Phone: [phone]  Relation: Daughter  Preferred language: English   needed? No    Discharge Plan A: Home Health  Discharge Plan B: Home Health  Patient was seen at bedside and shard she lives with her daughter Yanely and Son in law in [place]. Pt is on continuous oxygen, has COPD  and uses [employer]. Pt has portable concentrator and uses potty chair next to bed. Pt reports she cannot stand or hardly walk due to weakness. Pt demographics are correct. PCP Pepito Camejo in [place]. No HH currently.         [facility] [place] [address]  Phone: [phone] Fax: [phone]      Initial Assessment (most recent)       Adult Discharge Assessment - 02/10/25 1210          Discharge Assessment    Assessment Type Discharge Planning Assessment     Confirmed/corrected address, phone number and insurance Yes     Confirmed Demographics Correct on Facesheet     Source of Information patient     Does patient/caregiver understand observation status Yes     Communicated CHLOE with patient/caregiver Yes     People in Home child(jie), adult     Facility Arrived From: Home     Do you expect to return to your current living situation? Yes     Do you have help at home or someone to  help you manage your care at home? Yes     Who are your caregiver(s) and their phone number(s)? Daughter Mellisa whom lives with me     Prior to hospitilization cognitive status: Alert/Oriented     Current cognitive status: Alert/Oriented     Walking or Climbing Stairs Difficulty yes     Walking or Climbing Stairs ambulation difficulty, assistance 1 person     Dressing/Bathing Difficulty yes     Dressing/Bathing bathing difficulty, assistance 1 person     Home Accessibility wheelchair accessible     Home Layout Able to live on 1st floor     Equipment Currently Used at Home bath bench;BIPAP;walker, standard;wheelchair;bedside commode;oxygen;respiratory supplies     Readmission within 30 days? No     Patient currently being followed by outpatient case management? No     Do you currently have service(s) that help you manage your care at home? No     Do you take prescription medications? Yes     Do you have prescription coverage? Yes     Coverage Part D     Do you have any problems affording any of your prescribed medications? No     Is the patient taking medications as prescribed? yes     Who is going to help you get home at discharge? My daughter     How do you get to doctors appointments? family or friend will provide     Are you on dialysis? No     Do you take coumadin? No     Discharge Plan A Home Health     Discharge Plan B Home Health     DME Needed Upon Discharge  none     Transition of Care Barriers Substance Abuse        Physical Activity    On average, how many days per week do you engage in moderate to strenuous exercise (like a brisk walk)? 0 days     On average, how many minutes do you engage in exercise at this level? 0 min        Financial Resource Strain    How hard is it for you to pay for the very basics like food, housing, medical care, and heating? Not hard at all        Housing Stability    In the last 12 months, was there a time when you were not able to pay the mortgage or rent on time? No     At  any time in the past 12 months, were you homeless or living in a shelter (including now)? No        Transportation Needs    Has the lack of transportation kept you from medical appointments, meetings, work or from getting things needed for daily living? No        Food Insecurity    Within the past 12 months, you worried that your food would run out before you got the money to buy more. Sometimes true     Within the past 12 months, the food you bought just didn't last and you didn't have money to get more. Sometimes true        Stress    Do you feel stress - tense, restless, nervous, or anxious, or unable to sleep at night because your mind is troubled all the time - these days? Not at all        Social Isolation    How often do you feel lonely or isolated from those around you?  Never        Alcohol Use    Q1: How often do you have a drink containing alcohol? Patient declined     Q2: How many drinks containing alcohol do you have on a typical day when you are drinking? Patient declined     Q3: How often do you have six or more drinks on one occasion? Patient declined

## 2025-02-10 NOTE — HPI
"Ms. Lambert is a 68yo lady with a past medical history of hypothyroidism, anxiety and depression, COPD on home O2 3.5L NC due to long term smoking, CVA, HTN, NSTEMI, RLL pneumonia, ETOH abuse and multiple falls with bone fractures.  She is a daily drinker, "about a shot a day" and a heavy smoker, 1/2 ppd cigarettes since 11 y/o = 29 pack years.    Ms. Lambert is well known to me from having admitted her back on 4/18/24 to 4/21/24 with RLL pneumonia and COPD exacerbation.  She was treated then with Rocephin, Zithro, steroids and improved rapidly.    She now returns to the ED at [facility] with 1 week of worsening cough, SOB, MENA and fever with chills to 103.0F at home.  This really picked up over the past 3 days with the coughing and dyspnea.  She has just finished steroids, zithromax and duonebs with no relief at home.  Per ED, who got collateral from EMS, "EMS reports she was satting in the 70s while on home oxygen upon arrival to her home.  She received albuterol nebulizer, and Solu-Medrol 125 mg given by EMS prior to arrival."      She tells me she also was sleeping on her sofa at home last week and rolled off, striking her back.  Since that time, she has had generalized back pains, but not localized or any neurologic issues.  She also states she has nausea and chronic, "problems swallowing."  She has not passed out or had any episodic vomiting with the daily ETOH use.    In the ED her VS were /62 (BP Location: Left arm)   Pulse (!) 126 -> 112   Temp max 98.8 °F (37.1 °C) (Oral)   Resp (!) 44 -> 25   Ht 5' 2" (1.575 m)   Wt 49.9 kg (110 lb)   SpO2 (!) 79% RA -> 93% BiPAP 40% 10/5  BMI 20.12 kg/m².  Labs showed WBC 11, Hg 11.9, Cr 0.8, Alb 3, TB 0.2, AST 67, ALT 92, LA 1.1, Mg 1.6, COVID/FLU NEG, ABG BIPAP: pH 7.28, PCO2 62, PAO2 82.    CXR showed Presumed skin fold overlies the right upper lung zone, although a small right apical pneumothorax cannot be entirely excluded.  Chronic coarse interstitial attenuation " with patchy right lower lung zone opacities possibly reflecting superimposed aspiration or infection.  Small volume of right-sided pleural fluid.  EKG showed sinus tachycardia, rate 121, non-specific ST-T changes, Q waves septal, QTc prolonged 596 ms.    NC CT chest showed Multifocal consolidation within the right lower lobe and to a lesser degree the right middle lobe.  Additional scattered irregular nodular airspace opacities throughout the aerated right lung.  This may reflect a nonspecific infectious or inflammatory process versus aspiration.  Advanced emphysematous change of the lungs. Scattered mildly enlarged mediastinal and right hilar lymph nodes.  These may be reactive in etiology.  No evidence of pneumothorax.  Previously questioned radiographic abnormality presumably related to prominent skin fold.  Moderate compression fracture of the T6 vertebral body, new from prior CT of 01/20/2024.  Additional chronic mild compression deformities of the thoracic and lumbar spine as above.  Diffuse osteopenia with chronic healed sternal fracture and multiple bilateral rib fractures.    In the ED she was treated with:  Medications   magnesium sulfate 2g in water 50mL IVPB (premix) (0 g Intravenous Stopped 2/10/25 0039)   ampicillin-sulbactam (UNASYN) 3 g in 0.9% NaCl 100 mL IVPB (MB+) (has no administration in time range)   albuterol-ipratropium 2.5 mg-0.5 mg/3 mL nebulizer solution 3 mL (3 mLs Nebulization Given 2/9/25 2334)   albuterol-ipratropium 2.5 mg-0.5 mg/3 mL nebulizer solution 3 mL (3 mLs Nebulization Given 2/9/25 2334)   LORazepam (ATIVAN) injection 0.5 mg (0.5 mg Intravenous Given 2/9/25 2240)

## 2025-02-10 NOTE — ASSESSMENT & PLAN NOTE
Patient with Hypercapnic and Hypoxic Respiratory failure which is Acute on chronic.  she is on home oxygen at 3.5 LPM. Supplemental oxygen was provided and noted- Oxygen Concentration (%):  [40] 40    EMS reports she was satting in the 70s while on home oxygen upon arrival to her home.     SpO2 (!) 79% RA -> 93% BiPAP 40% 10/5->Currently on 14/7 97% 4 LPM at 40%  ABG BIPAP: pH 7.28, PCO2 62, PAO2 82.->   Recent Labs     02/10/25  1104   PH 7.384   PCO2 59.6*   PO2 90   HCO3 35.6*   POCSATURATED 96   BE 10*     Pt has had decreased work of breathing, decreased oxygen use, and improvement in ABG  Likely this is close to her baseline  Will reassess tomorrow and hopefully can decrease BIPAP this afternoon.

## 2025-02-10 NOTE — ASSESSMENT & PLAN NOTE
She tells me she only drinks 1 cocktail daily  Multiple falls and fractures with some avoidance to the questions raises concerns this is actually more than stated  MVI, Thiamin, Folate  No signs of withdrawal including DT's, seizures  No need for withdrawal treatement    CMP:   Recent Labs   Lab 02/09/25  2235 02/10/25  0500    135*   K 3.9 3.5   CL 94* 93*   CO2 23 24   GLU 96 142*   BUN 6* 8   CREATININE 0.8 0.8   CALCIUM 9.3 8.7   PROT 7.9 6.9   ALBUMIN 3.0* 2.6*   BILITOT 0.2 0.1   ALKPHOS 111 90   AST 67* 49*   ALT 92* 75*   ANIONGAP 20* 18*     Check PT/INR  AST/ALT are elevated consistent with chronic alcoholic hepatitis/cirrhosis

## 2025-02-10 NOTE — ASSESSMENT & PLAN NOTE
Pt appears stable with no HI/SI  Continue current treatment    Depression Medications:   BusPIRone tablet 15 mg, 15 mg, Oral, BID   FLUoxetine capsule 40 mg, 40 mg, Oral, Daily

## 2025-02-10 NOTE — SUBJECTIVE & OBJECTIVE
Interval History: Please see above    Review of Systems   Constitutional:  Positive for activity change, appetite change, chills, diaphoresis, fatigue and fever.   HENT:  Positive for congestion.    Eyes:  Negative for visual disturbance.   Respiratory:  Positive for cough, shortness of breath and wheezing.    Cardiovascular:  Negative for chest pain and leg swelling.   Gastrointestinal:  Positive for nausea. Negative for diarrhea and vomiting.   Endocrine: Positive for cold intolerance.   Genitourinary:  Negative for dysuria and flank pain.   Musculoskeletal:  Positive for arthralgias, back pain and gait problem. Negative for myalgias.   Skin:  Negative for rash.   Allergic/Immunologic: Negative for immunocompromised state.   Neurological:  Negative for light-headedness and headaches.   Hematological:  Bruises/bleeds easily.   Psychiatric/Behavioral:  Positive for decreased concentration. Negative for confusion. The patient is nervous/anxious.    All other systems reviewed and are negative.    Objective:     Vital Signs (Most Recent):  Temp: 98.5 °F (36.9 °C) (02/10/25 1145)  Pulse: 85 (02/10/25 1200)  Resp: (!) 24 (02/10/25 1200)  BP: (!) 141/72 (02/10/25 1200)  SpO2: 97 % (02/10/25 1200) Vital Signs (24h Range):  Temp:  [98 °F (36.7 °C)-98.8 °F (37.1 °C)] 98.5 °F (36.9 °C)  Pulse:  [] 85  Resp:  [17-45] 24  SpO2:  [79 %-100 %] 97 %  BP: ()/(57-80) 141/72     Weight: 51.1 kg (112 lb 10.5 oz)  Body mass index is 20.6 kg/m².    Intake/Output Summary (Last 24 hours) at 2/10/2025 1207  Last data filed at 2/10/2025 0538  Gross per 24 hour   Intake 85.68 ml   Output --   Net 85.68 ml         Physical Exam  Vitals and nursing note reviewed. Exam conducted with a chaperone present.   Constitutional:       General: She is not in acute distress.     Appearance: She is normal weight. She is ill-appearing. She is not toxic-appearing or diaphoretic.   HENT:      Head: Normocephalic and atraumatic.   Pulmonary:       Effort: Tachypnea present.      Comments: BiPAP in place, removed to do interview and could speak in complete sentences with no distress  Abdominal:      General: Abdomen is flat.   Genitourinary:     Comments: No gomez present  Lymphadenopathy:      Comments: No peripheral edema to legs   Skin:     Coloration: Skin is sallow.   Neurological:      Mental Status: She is oriented to person, place, and time. She is lethargic.      GCS: GCS eye subscore is 4. GCS verbal subscore is 5. GCS motor subscore is 6.   Psychiatric:         Attention and Perception: Attention and perception normal.         Mood and Affect: Mood is depressed.         Speech: Speech is delayed.         Behavior: Behavior is slowed. Behavior is not agitated.         Cognition and Memory: Cognition and memory normal.             Significant Labs: All pertinent labs within the past 24 hours have been reviewed.  ABGs:   Recent Labs   Lab 02/09/25  2312 02/10/25  0550 02/10/25  1104   PH 7.281* 7.292* 7.384   PCO2 61.8* 67.1* 59.6*   HCO3 29.1* 32.4* 35.6*   POCSATURATED 94 95 96   BE 2 6* 10*   PO2 82 85 90     CBC:   Recent Labs   Lab 02/09/25  2235 02/10/25  0500   WBC 10.55 10.16   HGB 11.9* 10.5*   HCT 38.1 33.0*    283     CMP:   Recent Labs   Lab 02/09/25  2235 02/10/25  0500    135*   K 3.9 3.5   CL 94* 93*   CO2 23 24   GLU 96 142*   BUN 6* 8   CREATININE 0.8 0.8   CALCIUM 9.3 8.7   PROT 7.9 6.9   ALBUMIN 3.0* 2.6*   BILITOT 0.2 0.1   ALKPHOS 111 90   AST 67* 49*   ALT 92* 75*   ANIONGAP 20* 18*       Significant Imaging: I have reviewed all pertinent imaging results/findings within the past 24 hours.

## 2025-02-10 NOTE — ASSESSMENT & PLAN NOTE
This patient does have evidence of infective focus  My overall impression is sepsis.  Source: Respiratory  Antibiotics given-   Antibiotics (72h ago, onward)      Start     Stop Route Frequency Ordered    02/10/25 0230  piperacillin-tazobactam (ZOSYN) 4.5 g in D5W 100 mL IVPB (MB+)         -- IV Every 8 hours (non-standard times) 02/10/25 0123    02/10/25 0130  doxycycline tablet 100 mg         -- Oral Every 12 hours 02/10/25 0123          Latest lactate reviewed-  Recent Labs   Lab 02/09/25  2235   LACTATE 1.1     Organ dysfunction indicated by Acute respiratory failure    Fluid challenge Fluid Not Needed - Patient is not hypotensive and/or lactate is less than 4.0.     Post- resuscitation assessment Yes - I attest a sepsis perfusion exam was performed within 6 hours of sepsis, severe sepsis, or septic shock presentation, following fluid resuscitation.      Will Not start Pressors- Levophed for MAP of 65  Source control achieved by: iv abx

## 2025-02-10 NOTE — ASSESSMENT & PLAN NOTE
TLSO brace ordered  PT and OT consulted  Avoid opioids with acute respiratory failure  Pain appears well controlled currently

## 2025-02-10 NOTE — CARE UPDATE
02/10/25 1415   Patient Assessment/Suction   Level of Consciousness (AVPU) alert   Respiratory Effort Mild;Labored   Expansion/Accessory Muscles/Retractions diaphragmatic   All Lung Fields Breath Sounds crackles   Rhythm/Pattern, Respiratory shortness of breath   Cough Frequency infrequent   PRE-TX-O2   Device (Oxygen Therapy) BIPAP   SpO2 96 %   Pulse Oximetry Type Continuous   Tobacco Cessation Intervention   Do you use any type of tobacco product? Yes   Are you interested in quitting use of tobacco products? Not interested   Respiratory Evaluation   $ Care Plan Tech Time 15 min   $ Respiratory Re-evaluation Complete   Evaluation For Transfer  (ICU to Stepdown)   Admitting Diagnosis aspiration pneumonia   Cardiac Diagnosis MI, stroke   Pulmonary Diagnosis COPD, Respiratory distress   Current Surgeries none   Home Oxygen   Has Home Oxygen? Yes   Liter Flow 3   Duration continuous   Route nasal cannula   Mode continuous   Device home concentrator   Home Aerosol, MDI, DPI, and Other Treatments/Therapies   Home Respiratory Therapy Per Patient/Review of Chart Yes   Aerosol Home Meds/Freq duoneb q6prn   MDI Home Meds/Freq albuterol 90mcg 2p   Oxygen Care Plan   Rationale Maintain Home oxygen   Bronchodilator Care Plan   Bronchodilator Care Plan Aerosol   Aerosol Meds w/ frequency Albuterol - Ipratropium (DUO-NEB) 0.5mg-3mg(2.5ml) 3ml Nebulizer Solution2.5mg Q 6Hr   Rationale Shortness of breath (increased WOB)   Atelectasis Care Plan   Rationale No Rational Found   Airway Clearance Care Plan   Rationale No rationale found

## 2025-02-10 NOTE — PLAN OF CARE
New admit, presented to ER with increased SOB and hypoxia. EMS reports Pts sats 70% the patient received ABX, steroids in ER, placed on BIPAP., O sats 95%. Diminished breath sounds RML, RLL. Pt alert and oriented. Vital signs stable, NAD noted. Pt reports last ETOH drink 2 noon. Pt has had multiple fall. Most recent within  the last week, reports falling off couch x2. Ct showed compressions FX T6. Brace ordered, cannot receive until day shift. Pain controlled at this time. Tolerating BIPAP with out any issues.

## 2025-02-10 NOTE — ED PROVIDER NOTES
Encounter Date: 2/9/2025       History     Chief Complaint   Patient presents with    Shortness of Breath     Sob x 3 days. Hx of COPD     69-year-old female with a history of alcohol abuse, multiple falls, rib fracture, active tobacco smoker, COPD on home oxygen presents to ED evaluation of shortness of breath, hypoxia just prior to arrival.  EMS reports she was satting in the 70s while on home oxygen upon arrival to her home.    She received albuterol nebulizer, and Solu-Medrol 125 mg given by EMS prior to arrival  She is unable to give further history secondary to severe respiratory distress, tachypnea upon arrival.  She was immediately placed on BiPAP.    She later reports that she had had 2-1/2 history of progressively worsening shortness of breath, productive cough, intermittent fever.  She completed steroids, and a course of azithromycin yesterday ago without resolution of symptoms.          The history is provided by the patient. No  was used.     Review of patient's allergies indicates:  No Known Allergies  Past Medical History:   Diagnosis Date    Acquired hypothyroidism     Anxiety and depression     Cervical radicular pain     Closed left ankle fracture     COPD (chronic obstructive pulmonary disease)     Heavy cigarette smoker     She has smoked 1/2 PPD since the age of 12, and still smokes    Hypertension     Hyponatremia     Multiple rib fractures     NSTEMI (non-ST elevated myocardial infarction)     Requires continuous at home supplemental oxygen     She is on 3.5L NC continuous    Stroke 11/08/2023    Tension type headache     Traumatic closed displaced fracture of distal end of radius, left, sequela      Past Surgical History:   Procedure Laterality Date    ABDOMINAL SURGERY      COLON SURGERY      COLOSTOMY      COLOSTOMY CLOSURE      HYSTERECTOMY      LEG SURGERY Right     OPEN REDUCTION AND INTERNAL FIXATION (ORIF) OF INJURY OF WRIST Left 1/29/2019    Procedure: ORIF, WRIST;   Surgeon: Homero Jeff DO;  Location: [facility] OR;  Service: Orthopedics;  Laterality: Left;  Equipment: [employer] Geminus Wrist Plate Set  Vendor/Rep: [employer]  C-Arm: Entire  DME: None    REQUIRES ASSISTANT    WRIST SURGERY Right      Family History   Problem Relation Name Age of Onset    Cancer Mother      COPD Father      Cirrhosis Father      Arthritis Sister      No Known Problems Brother      Anxiety disorder Daughter      Depression Daughter      Anxiety disorder Daughter      Congenital heart disease Daughter      Valvular heart disease Daughter       Social History     Tobacco Use    Smoking status: Every Day     Current packs/day: 1.00     Average packs/day: 1 pack/day for 20.0 years (20.0 ttl pk-yrs)     Types: Cigarettes    Smokeless tobacco: Never   Substance Use Topics    Alcohol use: Yes     Comment: couple times a week-wine or bloody cari    Drug use: No     Review of Systems   Constitutional: Negative.    HENT: Negative.     Eyes: Negative.    Respiratory:  Positive for shortness of breath and wheezing.    Gastrointestinal: Negative.    Endocrine: Negative.    Genitourinary: Negative.    Musculoskeletal: Negative.    Skin: Negative.    Neurological: Negative.    Hematological: Negative.    Psychiatric/Behavioral: Negative.     All other systems reviewed and are negative.      Physical Exam     Initial Vitals [02/09/25 2232]   BP Pulse Resp Temp SpO2   135/62 (!) 126 (!) 44 98.8 °F (37.1 °C) (!) 79 %      MAP       --         Physical Exam    Nursing note and vitals reviewed.  Constitutional: She appears well-developed.   Anxious   HENT:   Head: Normocephalic.   Eyes: Pupils are equal, round, and reactive to light.   Neck: No JVD present.   Normal range of motion.  Pulmonary/Chest: She is in respiratory distress. She has wheezes.   Tight lungs   Abdominal: Abdomen is soft. Bowel sounds are normal. She exhibits no distension. There is no abdominal tenderness.   Musculoskeletal:          General: Normal range of motion.      Cervical back: Normal range of motion.     Neurological: She is alert and oriented to person, place, and time. GCS score is 15. GCS eye subscore is 4. GCS verbal subscore is 5. GCS motor subscore is 6.   Skin: Skin is warm. Capillary refill takes less than 2 seconds.         ED Course   Critical Care    Date/Time: 2/10/2025 12:06 AM    Performed by: Neo Valentino MD  Authorized by: Neo Valentino MD  Direct patient critical care time: 10 minutes  Additional history critical care time: 5 minutes  Ordering / reviewing critical care time: 5 minutes  Documentation critical care time: 5 minutes  Consulting other physicians critical care time: 5 minutes  Consult with family critical care time: 5 minutes  Other critical care time: 5 minutes  Total critical care time (exclusive of procedural time) : 40 minutes  Critical care was necessary to treat or prevent imminent or life-threatening deterioration of the following conditions: respiratory failure.  Critical care was time spent personally by me on the following activities: blood draw for specimens, development of treatment plan with patient or surrogate, discussions with consultants, interpretation of cardiac output measurements, evaluation of patient's response to treatment, examination of patient, obtaining history from patient or surrogate, ordering and performing treatments and interventions, ordering and review of laboratory studies, ordering and review of radiographic studies, pulse oximetry, re-evaluation of patient's condition and review of old charts.        Labs Reviewed   CBC W/ AUTO DIFFERENTIAL - Abnormal       Result Value    WBC 10.55      RBC 3.95 (*)     Hemoglobin 11.9 (*)     Hematocrit 38.1      MCV 97      MCH 30.1      MCHC 31.2 (*)     RDW 12.3      Platelets 332      MPV 10.4      Immature Granulocytes 1.9 (*)     Gran # (ANC) 7.5      Immature Grans (Abs) 0.20 (*)     Lymph # 1.5      Mono # 1.4  (*)     Eos # 0.0      Baso # 0.02      nRBC 0      Gran % 71.3      Lymph % 13.7 (*)     Mono % 12.9      Eosinophil % 0.0      Basophil % 0.2      Differential Method Automated     COMPREHENSIVE METABOLIC PANEL - Abnormal    Sodium 137      Potassium 3.9      Chloride 94 (*)     CO2 23      Glucose 96      BUN 6 (*)     Creatinine 0.8      Calcium 9.3      Total Protein 7.9      Albumin 3.0 (*)     Total Bilirubin 0.2      Alkaline Phosphatase 111      AST 67 (*)     ALT 92 (*)     eGFR >60.0      Anion Gap 20 (*)    ISTAT PROCEDURE - Abnormal    POC PH 7.281 (*)     POC PCO2 61.8 (*)     POC PO2 82      POC HCO3 29.1 (*)     POC BE 2      POC SATURATED O2 94      POC TCO2 31 (*)     Rate 22      Sample ARTERIAL      Site RR      Allens Test Pass      DelSys CPAP/BiPAP      Mode BiPAP      FiO2 40      Sp02 95      IP 12      EP 5     INFLUENZA A & B BY MOLECULAR    Influenza A, Molecular Negative      Influenza B, Molecular Negative      Flu A & B Source Nasal Swab     CULTURE, BLOOD   CULTURE, BLOOD   CULTURE, RESPIRATORY   CULTURE, RESPIRATORY   SARS-COV-2 RNA AMPLIFICATION, QUAL    SARS-CoV-2 RNA, Amplification, Qual Negative     LACTIC ACID, PLASMA    Lactate (Lactic Acid) 1.1     TROPONIN I    Troponin I 0.006     B-TYPE NATRIURETIC PEPTIDE    BNP 76     MAGNESIUM    Magnesium 1.6     PROCALCITONIN   PROCALCITONIN   LEGIONELLA ANTIGEN, URINE RANDOM   CULTURE, LEGIONELLA   COMPREHENSIVE METABOLIC PANEL   MAGNESIUM   PHOSPHORUS   CBC W/ AUTO DIFFERENTIAL   VITAMIN B12   FOLATE   TSH   T4, FREE     EKG Readings: (Independently Interpreted)   Sinus tachycardic, no STEMI       Imaging Results              CT Chest Without Contrast (Final result)  Result time 02/09/25 23:47:40      Final result by hKalif Brewer MD (02/09/25 23:47:40)                   Impression:      Multifocal consolidation within the right lower lobe and to a lesser degree the right middle lobe.  Additional scattered irregular nodular  airspace opacities throughout the aerated right lung.  This may reflect a nonspecific infectious or inflammatory process versus aspiration.  Recommend short-term 3 month noncontrast chest CT follow-up to ensure appropriate temporal evolution/resolution as well as exclude underlying mass or neoplasm.    Advanced emphysematous change of the lungs.    Scattered mildly enlarged mediastinal and right hilar lymph nodes.  These may be reactive in etiology, although attention on follow-up imaging advised.    No evidence of pneumothorax.  Previously questioned radiographic abnormality presumably related to prominent skin fold.    Moderate compression fracture of the T6 vertebral body, new from prior CT of 01/20/2024.  Additional chronic mild compression deformities of the thoracic and lumbar spine as above.  Diffuse osteopenia with chronic healed sternal fracture and multiple bilateral rib fractures.    Additional findings as above.      Electronically signed by: Khalif Brewer MD  Date:    02/09/2025  Time:    23:47               Narrative:    EXAMINATION:  CT CHEST WITHOUT CONTRAST    CLINICAL HISTORY:  Respiratory illness, nondiagnostic xray;    TECHNIQUE:  Low dose axial images, sagittal and coronal reformations were obtained from the thoracic inlet to the lung bases. Contrast was not administered.    COMPARISON:  Chest radiograph 02/09/2025, CTA chest 01/20/2024    FINDINGS:  Examination of the vascular and soft tissue structures at the base of the neck is unremarkable.    Ascending thoracic aorta is minimally ectatic measuring 4.0 cm.  There is tortuosity of the descending thoracic aorta.  There is moderate aortic atherosclerosis.  The heart is not significantly enlarged.  There is trace pericardial fluid.  There is coronary artery calcification.    The esophagus maintains a normal course and caliber. There is no bulky axillary lymph node enlargement.  There are scattered mildly enlarged mediastinal lymph nodes.  For  example, there is a 1.1 cm precarinal lymph node.  There is mild right hilar lymph node enlargement, noting evaluation is limited by lack of IV contrast.    The trachea is midline and the proximal airways are patent. There is no pneumothorax.  There are advanced emphysematous changes of lungs.  There is multifocal consolidation identified within the right lower lobe and to a lesser degree the right middle lobe.  There are additional scattered small irregular nodular airspace opacities throughout the right upper, middle, and lower lobes.  There are several scattered pulmonary nodules identified throughout the left lung.  For example, there is a stable 0.6 cm left lower lobe pulmonary nodule.  There is no significant pleural fluid.    Limited views upper abdomen demonstrate no free intraperitoneal air.  There is asymmetric renal cortical thinning/scarring of the left kidney with associated atrophy.  There is aortic atherosclerosis.  There is a hiatal hernia.    There is diffuse osteopenia.  There is a moderate compression fracture deformity of the T6 vertebral body, new from prior CTA exam of 01/20/2024.  There are previously demonstrated mild compression deformities of the T1, T3, T11, and L2 vertebral bodies.  There are multiple chronic appearing bilateral rib deformities.  There is a remote healed deformity of the sternum.                                        X-Ray Chest 1 View (Final result)  Result time 02/09/25 22:55:03      Final result by Khalif Brewer MD (02/09/25 22:55:03)                   Impression:      Presumed skin fold overlies the right upper lung zone, although a small right apical pneumothorax cannot be entirely excluded.  Consider further evaluation with CT.    Chronic coarse interstitial attenuation with patchy right lower lung zone opacities possibly reflecting superimposed aspiration or infection.  Small volume of right-sided pleural fluid.    This report was flagged in Epic as  abnormal..      Electronically signed by: Khalif Brewer MD  Date:    02/09/2025  Time:    22:55               Narrative:    EXAMINATION:  XR CHEST 1 VIEW    CLINICAL HISTORY:  shortness of breath;    TECHNIQUE:  Single frontal view of the chest was performed.    COMPARISON:  07/13/2024    FINDINGS:  Cardiac monitoring leads overlie the chest.  Cardiac silhouette is stable in size and configuration compared to prior examination.  There are coarse bilateral interstitial opacities.  There are new patchy right lower lung zone airspace opacities which may reflect superimposed aspiration or infection.  There is a small volume of right-sided pleural fluid present.  Presumed skin fold overlies the right upper lung zone, although a small right apical pneumothorax cannot be excluded.  No definite left-sided pneumothorax.  There are multiple chronic appearing bilateral rib deformities.                                       Medications   aspirin EC tablet 81 mg (has no administration in time range)   atorvastatin tablet 80 mg (has no administration in time range)   busPIRone tablet 15 mg ( Oral Canceled Entry 2/10/25 0130)   carvediloL tablet 12.5 mg (has no administration in time range)   clopidogreL tablet 75 mg (has no administration in time range)   FLUoxetine capsule 40 mg (has no administration in time range)   folic acid tablet 1 mg (has no administration in time range)   levothyroxine tablet 50 mcg (has no administration in time range)   montelukast tablet 10 mg (has no administration in time range)   albuterol-ipratropium 2.5 mg-0.5 mg/3 mL nebulizer solution 3 mL (has no administration in time range)   albuterol-ipratropium 2.5 mg-0.5 mg/3 mL nebulizer solution 3 mL (has no administration in time range)   dextromethorphan-guaiFENesin  mg/5 ml liquid 10 mL (has no administration in time range)   benzonatate capsule 100 mg (has no administration in time range)   doxycycline tablet 100 mg ( Oral Canceled Entry  2/10/25 0130)   piperacillin-tazobactam (ZOSYN) 4.5 g in D5W 100 mL IVPB (MB+) (4.5 g Intravenous New Bag 2/10/25 0208)   sodium chloride 0.9% flush 10 mL (has no administration in time range)   naloxone 0.4 mg/mL injection 0.02 mg (has no administration in time range)   glucose chewable tablet 16 g (has no administration in time range)   glucose chewable tablet 24 g (has no administration in time range)   dextrose 50% injection 12.5 g (has no administration in time range)   dextrose 50% injection 25 g (has no administration in time range)   glucagon (human recombinant) injection 1 mg (has no administration in time range)   enoxaparin injection 40 mg (has no administration in time range)   potassium bicarbonate disintegrating tablet 60 mEq (has no administration in time range)   potassium bicarbonate disintegrating tablet 35 mEq (has no administration in time range)   potassium bicarbonate disintegrating tablet 50 mEq (has no administration in time range)   magnesium oxide tablet 800 mg (has no administration in time range)   magnesium oxide tablet 800 mg (has no administration in time range)   potassium, sodium phosphates 280-160-250 mg packet 2 packet (has no administration in time range)   potassium, sodium phosphates 280-160-250 mg packet 2 packet (has no administration in time range)   potassium, sodium phosphates 280-160-250 mg packet 2 packet (has no administration in time range)   acetaminophen tablet 650 mg (has no administration in time range)   polyethylene glycol packet 17 g (has no administration in time range)   senna-docusate 8.6-50 mg per tablet 1 tablet (has no administration in time range)   ondansetron injection 4 mg (has no administration in time range)   prochlorperazine injection Soln 5 mg (has no administration in time range)   melatonin tablet 6 mg (has no administration in time range)   aluminum-magnesium hydroxide-simethicone 200-200-20 mg/5 mL suspension 30 mL (has no administration in  time range)   simethicone chewable tablet 80 mg (has no administration in time range)   predniSONE tablet 40 mg (has no administration in time range)   multivitamin tablet (has no administration in time range)   thiamine tablet 100 mg (has no administration in time range)   nicotine 21 mg/24 hr 1 patch (has no administration in time range)   sodium chloride 0.9% bolus 1,000 mL 1,000 mL (has no administration in time range)   sodium chloride 0.9% bolus 500 mL 500 mL (has no administration in time range)   albuterol-ipratropium 2.5 mg-0.5 mg/3 mL nebulizer solution 3 mL (3 mLs Nebulization Given 2/9/25 2334)   albuterol-ipratropium 2.5 mg-0.5 mg/3 mL nebulizer solution 3 mL (3 mLs Nebulization Given 2/9/25 2334)   magnesium sulfate 2g in water 50mL IVPB (premix) (0 g Intravenous Stopped 2/10/25 0039)   LORazepam (ATIVAN) injection 0.5 mg (0.5 mg Intravenous Given 2/9/25 2240)     Medical Decision Making  Ddx COPD, CHF, ACS, pneumonia, others  See H&P above for details. Placed on BiPAP for respiratory failure  Screening labs including CBC, CMP, troponin, BNP, Mag are all without significant gross abnormalities  Chest x-ray, CT scan shows possible aspiration pneumonia right lungs, hx of alcohol abuse with multiple fractures from repeat falls. Afebrile no white count. SIRS criteria met with RR and HR. Sepsis due to pneumonia. IVF 30 ml/kg, Unasyn, sputum culture pending. CT also did show acute T6 compression fracture, multiple old vertebral fractures. TLSO brace ordered, place when out of bed.  Sepsis Perfusion Exam: I attest that a sepsis perfusion exam was performed within 6 hours of sepsis, severe sepsis, or septic shock presentation, following fluid resuscitation.         Amount and/or Complexity of Data Reviewed  Labs: ordered. Decision-making details documented in ED Course.  Radiology: ordered.    Risk  Prescription drug management.  Decision regarding hospitalization.               ED Course as of 02/10/25 6948    Mon Feb 10, 2025   0125 Troponin I: 0.006 [ES]   0126 BNP: 76 [ES]   0126 Magnesium : 1.6 [ES]   0126 WBC: 10.55 [ES]   0126 Influenza A, Molecular: Negative [ES]   0126 Influenza B, Molecular: Negative [ES]   0126 SARS-CoV-2 RNA, Amplification, Qual: Negative [ES]   0126 POC PH(!!): 7.281 [ES]   0126 POC PCO2(!!): 61.8 [ES]   0126 POC PO2: 82 [ES]   0126 POC HCO3(!): 29.1 [ES]      ED Course User Index  [ES] Neo Valentino MD                           Clinical Impression:  Final diagnoses:  [R06.02] Shortness of breath  [J18.9] Right lower lobe pneumonia  [S22.050A] Compression fracture of T6 vertebra, initial encounter (Primary)          ED Disposition Condition    Admit                 Neo Valentino MD  02/10/25 0133       Neo Valentino MD  02/10/25 0211

## 2025-02-10 NOTE — ASSESSMENT & PLAN NOTE
Goal SBP<140  Pt is currently at goal     BP medications:  CarvediloL tablet 12.5 mg, 12.5 mg, Oral, BID

## 2025-02-10 NOTE — EICU
EICU BRIEF ADMIT NOTE:    Reason for ICU admission:  Acute on chronic respiratory failure    Please refer to admission H&P for details.     Currently on BiPAP.  Seems comfortable and not in distress.    Chart reviewed: yes  Recent MD notes reviewed: Yes  Labs results reviewed: yes  Radiology: Chest images reviewed. Reports for others reviewed.  Telemetry tracing: yes  Evaluation via interactive audio and video telecommunications: yes on BiPAP.  Comfortable and not in distress.  Communicated issues/orders/plan with: bedside ICU RN    Best Practices Review:  Stress ulcer prophylaxis: not indicated  DVT prophylaxis: Pharmacological .         Ventilator review:  Intubated : No      Assessment & Plan:     Impression:  Acute on chronic hypoxic respiratory failure  Acute hypercapnic respiratory failure  Severe sepsis  Pneumonia with multilobar involvement with question of aspiration pneumonitis/pneumonia  Acute COPD exacerbation  T6 compression fracture  History of multiple falls  History of COPD/chronic hypoxic respiratory failure/hypothyroidism/hypertension      Plan:  Continue BiPAP overnight.  Wean BiPAP in the a.m. if tolerated.  Wean FiO2 to the minimum to have oxygen saturation between 89-92%.  Repeat ABG now for reassessment of resolution of the hypercapnic respiratory failure.  Continue Zosyn and doxycycline as per primary team.  Follow cultures.  Deescalate antibiotics based on further clinical data and culture results.  Continue steroid and bronchodilators.  Wean steroid based on clinical improvement.  Continue rest of supportive care.      Thank you for allowing the EICU to participate in your patient's care. Please feel free to call us as needed.     I have encourage bedside RN to call me with the results of labs/imaging if ordered.         Sandro Argueta MD  [facility]  [phone]

## 2025-02-10 NOTE — HOSPITAL COURSE
"Bhavana Lambert (Kathy) is a 69 year old female w/ PMH anxiety, depression, HTN, anemia, ETOH abuse, tobacco abuse, and chronic respiratory failure on home O2. She was admitted to the hospital for management of aspiration pneumonia. She was slow to improve but slowly weaned off of BIPAP. She was weaned back to her home O2 dose of 3.5L NC. She was treated with IV abx, steroids, and breathing tx. Speech therapy was consulted to evaluate her for aspiration. She underwent a barium swallow study with findings of  mild pharyngoesophageal dysphagia and recommendations given for prevention of aspiration and to f/u w/ GI at discharge. She also reported diarrhea, which is a chronic problem, so strongly encouraged compliance with GI f/u. PT/OT also saw patient with recommendations for SNF but patient refused. She agreed to discharge home with [facility] and states she lives with her family. Patient assessed on day of discharge and deemed stable for discharge. Patient agreeable to plan and to discharge.   "

## 2025-02-10 NOTE — ASSESSMENT & PLAN NOTE
She was admitted for PNA in exactly the same location 1 year ago  She reports problems swallowing and she is a daily drinker, both raising risk of recurrent aspiration events  Started Zosyn + Doxy (avoiding FQ and Macrolides with QTc of 596 ms  Sputum CXS and gram stain + Legionella

## 2025-02-11 LAB
ALBUMIN SERPL BCP-MCNC: 2.6 G/DL (ref 3.5–5.2)
ALLENS TEST: ABNORMAL
ALP SERPL-CCNC: 95 U/L (ref 40–150)
ALT SERPL W/O P-5'-P-CCNC: 60 U/L (ref 10–44)
ANION GAP SERPL CALC-SCNC: 15 MMOL/L (ref 8–16)
AST SERPL-CCNC: 37 U/L (ref 10–40)
BASOPHILS # BLD AUTO: 0.01 K/UL (ref 0–0.2)
BASOPHILS NFR BLD: 0.1 % (ref 0–1.9)
BILIRUB SERPL-MCNC: 0.1 MG/DL (ref 0.1–1)
BUN SERPL-MCNC: 10 MG/DL (ref 8–23)
CALCIUM SERPL-MCNC: 9 MG/DL (ref 8.7–10.5)
CHLORIDE SERPL-SCNC: 90 MMOL/L (ref 95–110)
CO2 SERPL-SCNC: 32 MMOL/L (ref 23–29)
CREAT SERPL-MCNC: 0.7 MG/DL (ref 0.5–1.4)
CRP SERPL-MCNC: 316.2 MG/L (ref 0–8.2)
DELSYS: ABNORMAL
DIFFERENTIAL METHOD BLD: ABNORMAL
EOSINOPHIL # BLD AUTO: 0 K/UL (ref 0–0.5)
EOSINOPHIL NFR BLD: 0 % (ref 0–8)
ERYTHROCYTE [DISTWIDTH] IN BLOOD BY AUTOMATED COUNT: 12.1 % (ref 11.5–14.5)
EST. GFR  (NO RACE VARIABLE): >60 ML/MIN/1.73 M^2
FLOW: 3.5
GLUCOSE SERPL-MCNC: 119 MG/DL (ref 70–110)
HCO3 UR-SCNC: 37.5 MMOL/L (ref 24–28)
HCT VFR BLD AUTO: 33.4 % (ref 37–48.5)
HGB BLD-MCNC: 10.7 G/DL (ref 12–16)
IMM GRANULOCYTES # BLD AUTO: 0.04 K/UL (ref 0–0.04)
IMM GRANULOCYTES NFR BLD AUTO: 0.4 % (ref 0–0.5)
INR PPP: 1 (ref 0.8–1.2)
LYMPHOCYTES # BLD AUTO: 0.8 K/UL (ref 1–4.8)
LYMPHOCYTES NFR BLD: 8.2 % (ref 18–48)
MAGNESIUM SERPL-MCNC: 2 MG/DL (ref 1.6–2.6)
MCH RBC QN AUTO: 30.4 PG (ref 27–31)
MCHC RBC AUTO-ENTMCNC: 32 G/DL (ref 32–36)
MCV RBC AUTO: 95 FL (ref 82–98)
MODE: ABNORMAL
MONOCYTES # BLD AUTO: 0.8 K/UL (ref 0.3–1)
MONOCYTES NFR BLD: 8.3 % (ref 4–15)
NEUTROPHILS # BLD AUTO: 8.2 K/UL (ref 1.8–7.7)
NEUTROPHILS NFR BLD: 83 % (ref 38–73)
NRBC BLD-RTO: 0 /100 WBC
PCO2 BLDA: 66.9 MMHG (ref 35–45)
PH SMN: 7.36 [PH] (ref 7.35–7.45)
PHOSPHATE SERPL-MCNC: 2.4 MG/DL (ref 2.7–4.5)
PLATELET # BLD AUTO: 360 K/UL (ref 150–450)
PMV BLD AUTO: 10.6 FL (ref 9.2–12.9)
PO2 BLDA: 75 MMHG (ref 80–100)
POC BE: 12 MMOL/L
POC SATURATED O2: 93 % (ref 95–100)
POC TCO2: 40 MMOL/L (ref 23–27)
POTASSIUM SERPL-SCNC: 3.7 MMOL/L (ref 3.5–5.1)
PROCALCITONIN SERPL IA-MCNC: 0.62 NG/ML
PROT SERPL-MCNC: 7.1 G/DL (ref 6–8.4)
PROTHROMBIN TIME: 11 SEC (ref 9–12.5)
RBC # BLD AUTO: 3.52 M/UL (ref 4–5.4)
SAMPLE: ABNORMAL
SITE: ABNORMAL
SODIUM SERPL-SCNC: 137 MMOL/L (ref 136–145)
WBC # BLD AUTO: 9.9 K/UL (ref 3.9–12.7)

## 2025-02-11 PROCEDURE — 94660 CPAP INITIATION&MGMT: CPT

## 2025-02-11 PROCEDURE — 97530 THERAPEUTIC ACTIVITIES: CPT

## 2025-02-11 PROCEDURE — 36600 WITHDRAWAL OF ARTERIAL BLOOD: CPT

## 2025-02-11 PROCEDURE — 80053 COMPREHEN METABOLIC PANEL: CPT | Performed by: INTERNAL MEDICINE

## 2025-02-11 PROCEDURE — 94640 AIRWAY INHALATION TREATMENT: CPT

## 2025-02-11 PROCEDURE — 85610 PROTHROMBIN TIME: CPT | Performed by: INTERNAL MEDICINE

## 2025-02-11 PROCEDURE — 99900035 HC TECH TIME PER 15 MIN (STAT)

## 2025-02-11 PROCEDURE — S4991 NICOTINE PATCH NONLEGEND: HCPCS | Performed by: INTERNAL MEDICINE

## 2025-02-11 PROCEDURE — 84100 ASSAY OF PHOSPHORUS: CPT | Performed by: INTERNAL MEDICINE

## 2025-02-11 PROCEDURE — 25000242 PHARM REV CODE 250 ALT 637 W/ HCPCS: Performed by: INTERNAL MEDICINE

## 2025-02-11 PROCEDURE — 92526 ORAL FUNCTION THERAPY: CPT

## 2025-02-11 PROCEDURE — 86140 C-REACTIVE PROTEIN: CPT | Performed by: INTERNAL MEDICINE

## 2025-02-11 PROCEDURE — 82803 BLOOD GASES ANY COMBINATION: CPT

## 2025-02-11 PROCEDURE — 25000003 PHARM REV CODE 250: Performed by: INTERNAL MEDICINE

## 2025-02-11 PROCEDURE — 20600001 HC STEP DOWN PRIVATE ROOM

## 2025-02-11 PROCEDURE — 63600175 PHARM REV CODE 636 W HCPCS: Mod: JZ,TB | Performed by: INTERNAL MEDICINE

## 2025-02-11 PROCEDURE — 63600175 PHARM REV CODE 636 W HCPCS: Performed by: INTERNAL MEDICINE

## 2025-02-11 PROCEDURE — 84145 PROCALCITONIN (PCT): CPT | Performed by: INTERNAL MEDICINE

## 2025-02-11 PROCEDURE — 85025 COMPLETE CBC W/AUTO DIFF WBC: CPT | Performed by: INTERNAL MEDICINE

## 2025-02-11 PROCEDURE — 94761 N-INVAS EAR/PLS OXIMETRY MLT: CPT

## 2025-02-11 PROCEDURE — 27100171 HC OXYGEN HIGH FLOW UP TO 24 HOURS

## 2025-02-11 PROCEDURE — 83735 ASSAY OF MAGNESIUM: CPT | Performed by: INTERNAL MEDICINE

## 2025-02-11 RX ORDER — CLONIDINE HYDROCHLORIDE 0.1 MG/1
0.1 TABLET ORAL 2 TIMES DAILY
Status: DISCONTINUED | OUTPATIENT
Start: 2025-02-11 | End: 2025-02-11

## 2025-02-11 RX ORDER — CLONAZEPAM 0.5 MG/1
0.5 TABLET ORAL EVERY 6 HOURS PRN
Status: DISCONTINUED | OUTPATIENT
Start: 2025-02-11 | End: 2025-02-14 | Stop reason: HOSPADM

## 2025-02-11 RX ORDER — ACETAMINOPHEN 325 MG/1
650 TABLET ORAL EVERY 4 HOURS PRN
Status: DISCONTINUED | OUTPATIENT
Start: 2025-02-11 | End: 2025-02-14 | Stop reason: HOSPADM

## 2025-02-11 RX ORDER — IPRATROPIUM BROMIDE AND ALBUTEROL SULFATE 2.5; .5 MG/3ML; MG/3ML
3 SOLUTION RESPIRATORY (INHALATION) EVERY 8 HOURS
Status: DISCONTINUED | OUTPATIENT
Start: 2025-02-11 | End: 2025-02-14 | Stop reason: HOSPADM

## 2025-02-11 RX ADMIN — PREDNISONE 40 MG: 20 TABLET ORAL at 09:02

## 2025-02-11 RX ADMIN — IPRATROPIUM BROMIDE AND ALBUTEROL SULFATE 3 ML: .5; 2.5 SOLUTION RESPIRATORY (INHALATION) at 03:02

## 2025-02-11 RX ADMIN — CYCLOBENZAPRINE HYDROCHLORIDE 5 MG: 5 TABLET, FILM COATED ORAL at 09:02

## 2025-02-11 RX ADMIN — BUSPIRONE HYDROCHLORIDE 15 MG: 5 TABLET ORAL at 09:02

## 2025-02-11 RX ADMIN — LEVOTHYROXINE SODIUM 50 MCG: 25 TABLET ORAL at 05:02

## 2025-02-11 RX ADMIN — ENOXAPARIN SODIUM 40 MG: 40 INJECTION SUBCUTANEOUS at 05:02

## 2025-02-11 RX ADMIN — Medication 100 MG: at 09:02

## 2025-02-11 RX ADMIN — PIPERACILLIN SODIUM AND TAZOBACTAM SODIUM 4.5 G: 4; .5 INJECTION, POWDER, LYOPHILIZED, FOR SOLUTION INTRAVENOUS at 06:02

## 2025-02-11 RX ADMIN — ATORVASTATIN CALCIUM 80 MG: 40 TABLET, FILM COATED ORAL at 09:02

## 2025-02-11 RX ADMIN — GUAIFENESIN AND DEXTROMETHORPHAN 10 ML: 100; 10 SYRUP ORAL at 03:02

## 2025-02-11 RX ADMIN — ASPIRIN 81 MG: 81 TABLET, COATED ORAL at 09:02

## 2025-02-11 RX ADMIN — PIPERACILLIN SODIUM AND TAZOBACTAM SODIUM 4.5 G: 4; .5 INJECTION, POWDER, LYOPHILIZED, FOR SOLUTION INTRAVENOUS at 11:02

## 2025-02-11 RX ADMIN — POTASSIUM BICARBONATE 50 MEQ: 978 TABLET, EFFERVESCENT ORAL at 08:02

## 2025-02-11 RX ADMIN — DOXYCYCLINE HYCLATE 100 MG: 100 TABLET, COATED ORAL at 09:02

## 2025-02-11 RX ADMIN — IPRATROPIUM BROMIDE AND ALBUTEROL SULFATE 3 ML: .5; 3 SOLUTION RESPIRATORY (INHALATION) at 06:02

## 2025-02-11 RX ADMIN — IPRATROPIUM BROMIDE AND ALBUTEROL SULFATE 3 ML: .5; 2.5 SOLUTION RESPIRATORY (INHALATION) at 11:02

## 2025-02-11 RX ADMIN — FOLIC ACID 1 MG: 1 TABLET ORAL at 09:02

## 2025-02-11 RX ADMIN — NICOTINE 1 PATCH: 21 PATCH, EXTENDED RELEASE TRANSDERMAL at 09:02

## 2025-02-11 RX ADMIN — POTASSIUM & SODIUM PHOSPHATES POWDER PACK 280-160-250 MG 2 PACKET: 280-160-250 PACK at 04:02

## 2025-02-11 RX ADMIN — POTASSIUM & SODIUM PHOSPHATES POWDER PACK 280-160-250 MG 2 PACKET: 280-160-250 PACK at 08:02

## 2025-02-11 RX ADMIN — IPRATROPIUM BROMIDE AND ALBUTEROL SULFATE 3 ML: .5; 3 SOLUTION RESPIRATORY (INHALATION) at 01:02

## 2025-02-11 RX ADMIN — CLONAZEPAM 0.5 MG: 0.5 TABLET ORAL at 12:02

## 2025-02-11 RX ADMIN — PIPERACILLIN SODIUM AND TAZOBACTAM SODIUM 4.5 G: 4; .5 INJECTION, POWDER, LYOPHILIZED, FOR SOLUTION INTRAVENOUS at 03:02

## 2025-02-11 RX ADMIN — ACETAMINOPHEN 650 MG: 325 TABLET ORAL at 05:02

## 2025-02-11 RX ADMIN — METHYLPREDNISOLONE SODIUM SUCCINATE 60 MG: 40 INJECTION, POWDER, FOR SOLUTION INTRAMUSCULAR; INTRAVENOUS at 03:02

## 2025-02-11 RX ADMIN — MONTELUKAST 10 MG: 10 TABLET, FILM COATED ORAL at 09:02

## 2025-02-11 RX ADMIN — CARVEDILOL 12.5 MG: 12.5 TABLET, FILM COATED ORAL at 09:02

## 2025-02-11 RX ADMIN — THERA TABS 1 TABLET: TAB at 09:02

## 2025-02-11 RX ADMIN — CLOPIDOGREL BISULFATE 75 MG: 75 TABLET ORAL at 09:02

## 2025-02-11 RX ADMIN — POTASSIUM & SODIUM PHOSPHATES POWDER PACK 280-160-250 MG 2 PACKET: 280-160-250 PACK at 03:02

## 2025-02-11 RX ADMIN — FLUOXETINE HYDROCHLORIDE 40 MG: 10 CAPSULE ORAL at 09:02

## 2025-02-11 NOTE — ASSESSMENT & PLAN NOTE
This patient does have evidence of infective focus  Sepsis has now resolved  Source: Respiratory  Antibiotics given-   Antibiotics (72h ago, onward)      Start     Stop Route Frequency Ordered    02/10/25 0230  piperacillin-tazobactam (ZOSYN) 4.5 g in D5W 100 mL IVPB (MB+)         -- IV Every 8 hours (non-standard times) 02/10/25 0123    02/10/25 0130  doxycycline tablet 100 mg         -- Oral Every 12 hours 02/10/25 0123          Latest lactate reviewed-  Recent Labs   Lab 02/09/25  2235   LACTATE 1.1       Organ dysfunction indicated by Acute respiratory failure  Fluid challenge Fluid Not Needed - Patient is not hypotensive and/or lactate is less than 4.0.   Post- resuscitation assessment Yes - I attest a sepsis perfusion exam was performed within 6 hours of sepsis, severe sepsis, or septic shock presentation, following fluid resuscitation.  Will Not start Pressors- Levophed for MAP of 65  Source control achieved by: iv abx    Recent Labs   Lab 02/11/25  0430   .2*     Pt has significant inflammation.     Recent Labs   Lab 02/09/25  2235 02/10/25  0500 02/11/25  0430   WBC 10.55 10.16 9.90     But WBC has not been significantly elevated.   Procalcitonin is 0.62  Pt appears to have a COPD exacerbation with smaller degree of infection  Continue current antibiotic  Schedule neb treatments  Increase steroids (Solumedrol 60 mg IV BID)  Reassess tomorrow.

## 2025-02-11 NOTE — ASSESSMENT & PLAN NOTE
Pt appears stable with no HI/SI  Continue current treatment    Depression Medications:   BusPIRone tablet 15 mg, 15 mg, Oral, BID   FLUoxetine capsule 40 mg, 40 mg, Oral, Daily   Klonopin 0.5 mg po q6 PRN Anxiety

## 2025-02-11 NOTE — PT/OT/SLP PROGRESS
Physical Therapy Treatment    Patient Name:  Bhavana Lambert   MRN:  [id number]    Recommendations:     Discharge Recommendations: Low Intensity Therapy (SNF)  Discharge Equipment Recommendations:  (TBD)  Barriers to discharge:  Increased level of assistance, ongoing medical treatment    Assessment:     Bhavana Lambert is a 69 y.o. female admitted with a medical diagnosis of Aspiration pneumonia of right lower lobe.  She presents with the following impairments/functional limitations: weakness, impaired sensation, impaired functional mobility, gait instability, pain, impaired cardiopulmonary response to activity.    Rehab Prognosis: Good; patient would benefit from acute skilled PT services to address these deficits and reach maximum level of function.    Recent Surgery: * No surgery found *      Plan:     During this hospitalization, patient to be seen 5 x/week to address the identified rehab impairments via therapeutic activities, therapeutic exercises and progress toward the following goals:    Plan of Care Expires:   (Upon discharge from the hospital.)    Subjective     Chief Complaint: Upon entering the patient's room, the therapist was greeted with a smile and the patient reports she is feeling better.  Patient/Family Comments/goals: The patient would like to maximize her potential for independent mobility.  Pain/Comfort:  Pain Rating 1: 4/10  Location - Orientation 1: lower  Location 1: back      Objective:     Communicated with nurse prior to session.  Patient found HOB elevated with oxygen, bed alarm, telemetry, blood pressure cuff, pulse ox (continuous), PureWick upon PT entry to room.     General Precautions: Standard, fall, respiratory  Orthopedic Precautions: N/A  Braces: N/A  Respiratory Status: Nasal Cannula at 3.5L/min     Functional Mobility:  Bed Mobility:     Rolling Left:  independence  Supine to Sit: supervision  Sit to Supine: supervision    Treatment & Education:  The patient received supine to  sit transfer training with supervision.  She was then able to scoot herself to the edge of the bed.  She sat on the edge of the bed with supervision working on deep breathing techniques.  She did get short of breath but was able to maintain an oxygen saturation of 94%.  She returned to supine in bed with supervision.    Patient left HOB elevated with all lines intact, call button in reach, bed alarm on, and nurse notified..    GOALS:   Multidisciplinary Problems       Physical Therapy Goals          Problem: Physical Therapy    Goal Priority Disciplines Outcome Interventions   Physical Therapy Goal     PT, PT/OT     Description: Goals    Patient to increase lower extremity strength by 1/2 muscle grade.  Patient to roll left and right with no assistance.  Patient to transfer supine <> sit with min assistance.  Patient to transfer bed <> chair via stand-pivot with a rolling walker and mod assistance.  Patient to ambulate with a rolling walker and mod assistance > 20 feet.                         DME Justifications:  No DME recommended requiring DME justifications    Time Tracking:     PT Received On: 02/11/25  PT Start Time: 1007     PT Stop Time: 1020  PT Total Time (min): 13 min     Billable Minutes: Therapeutic Activity 13    Treatment Type: Treatment  PT/PTA: PT     Number of PTA visits since last PT visit: 0     02/11/2025

## 2025-02-11 NOTE — ASSESSMENT & PLAN NOTE
"/67   Pulse 88   Temp 97.3 °F (36.3 °C) (Temporal)   Resp (!) 25   Ht 5' 2" (1.575 m)   Wt 51.1 kg (112 lb 10.5 oz)   SpO2 (!) 93%   BMI 20.60 kg/m²     Goal SBP<140  Pt is currently at goal     BP medications:  CarvediloL tablet 12.5 mg, 12.5 mg, Oral, BID     "

## 2025-02-11 NOTE — PROGRESS NOTES
"[facility] Medicine  Progress Note    Patient Name: Bhavana Lambert  MRN: [id number]  Patient Class: IP- Inpatient   Admission Date: 2/9/2025  Length of Stay: 1 days  Attending Physician: Bear Quick MD  Primary Care Provider: Pepito Jhaveri IV, MD        Subjective     Principal Problem:Aspiration pneumonia of right lower lobe        HPI:  Ms. Lambert is a 68yo lady with a past medical history of hypothyroidism, anxiety and depression, COPD on home O2 3.5L NC due to long term smoking, CVA, HTN, NSTEMI, RLL pneumonia, ETOH abuse and multiple falls with bone fractures.  She is a daily drinker, "about a shot a day" and a heavy smoker, 1/2 ppd cigarettes since 11 y/o = 29 pack years.    Ms. Lambert is well known to me from having admitted her back on 4/18/24 to 4/21/24 with RLL pneumonia and COPD exacerbation.  She was treated then with Rocephin, Zithro, steroids and improved rapidly.    She now returns to the ED at [facility] with 1 week of worsening cough, SOB, MENA and fever with chills to 103.0F at home.  This really picked up over the past 3 days with the coughing and dyspnea.  She has just finished steroids, zithromax and duonebs with no relief at home.  Per ED, who got collateral from EMS, "EMS reports she was satting in the 70s while on home oxygen upon arrival to her home.  She received albuterol nebulizer, and Solu-Medrol 125 mg given by EMS prior to arrival."      She tells me she also was sleeping on her sofa at home last week and rolled off, striking her back.  Since that time, she has had generalized back pains, but not localized or any neurologic issues.  She also states she has nausea and chronic, "problems swallowing."  She has not passed out or had any episodic vomiting with the daily ETOH use.    In the ED her VS were /62 (BP Location: Left arm)   Pulse (!) 126 -> 112   Temp max 98.8 °F (37.1 °C) (Oral)   Resp (!) 44 -> 25   Ht 5' 2" (1.575 m)   Wt 49.9 kg (110 " lb)   SpO2 (!) 79% RA -> 93% BiPAP 40% 10/5  BMI 20.12 kg/m².  Labs showed WBC 11, Hg 11.9, Cr 0.8, Alb 3, TB 0.2, AST 67, ALT 92, LA 1.1, Mg 1.6, COVID/FLU NEG, ABG BIPAP: pH 7.28, PCO2 62, PAO2 82.    CXR showed Presumed skin fold overlies the right upper lung zone, although a small right apical pneumothorax cannot be entirely excluded.  Chronic coarse interstitial attenuation with patchy right lower lung zone opacities possibly reflecting superimposed aspiration or infection.  Small volume of right-sided pleural fluid.  EKG showed sinus tachycardia, rate 121, non-specific ST-T changes, Q waves septal, QTc prolonged 596 ms.    NC CT chest showed Multifocal consolidation within the right lower lobe and to a lesser degree the right middle lobe.  Additional scattered irregular nodular airspace opacities throughout the aerated right lung.  This may reflect a nonspecific infectious or inflammatory process versus aspiration.  Advanced emphysematous change of the lungs. Scattered mildly enlarged mediastinal and right hilar lymph nodes.  These may be reactive in etiology.  No evidence of pneumothorax.  Previously questioned radiographic abnormality presumably related to prominent skin fold.  Moderate compression fracture of the T6 vertebral body, new from prior CT of 01/20/2024.  Additional chronic mild compression deformities of the thoracic and lumbar spine as above.  Diffuse osteopenia with chronic healed sternal fracture and multiple bilateral rib fractures.    In the ED she was treated with:  Medications   magnesium sulfate 2g in water 50mL IVPB (premix) (0 g Intravenous Stopped 2/10/25 0039)   ampicillin-sulbactam (UNASYN) 3 g in 0.9% NaCl 100 mL IVPB (MB+) (has no administration in time range)   albuterol-ipratropium 2.5 mg-0.5 mg/3 mL nebulizer solution 3 mL (3 mLs Nebulization Given 2/9/25 2334)   albuterol-ipratropium 2.5 mg-0.5 mg/3 mL nebulizer solution 3 mL (3 mLs Nebulization Given 2/9/25 2334)   LORazepam  (ATIVAN) injection 0.5 mg (0.5 mg Intravenous Given 2/9/25 2630)             Overview/Hospital Course:  Pt has been slow to improve. Her oxygen was turned down slightly and her HCO2 madelaine (66). Have increased her FiO2 (35->45) and will recheck a gas. Pt has a lot of anxiety at baseline and was asking for Klonopin. Pt was started on 0.5 mg Klonopin q6 PRN anxiety. Pt is now breathing easier. She is going to be a slow wean off the BIPAP.     Interval History: Please see above    Review of Systems   Constitutional:  Positive for activity change, appetite change, chills, diaphoresis, fatigue and fever.   HENT:  Positive for congestion.    Eyes:  Negative for visual disturbance.   Respiratory:  Positive for cough, shortness of breath and wheezing.    Cardiovascular:  Negative for chest pain and leg swelling.   Gastrointestinal:  Positive for nausea. Negative for diarrhea and vomiting.   Endocrine: Positive for cold intolerance.   Genitourinary:  Negative for dysuria and flank pain.   Musculoskeletal:  Positive for arthralgias, back pain and gait problem. Negative for myalgias.   Skin:  Negative for rash.   Allergic/Immunologic: Negative for immunocompromised state.   Neurological:  Negative for light-headedness and headaches.   Hematological:  Bruises/bleeds easily.   Psychiatric/Behavioral:  Positive for decreased concentration. Negative for confusion. The patient is nervous/anxious.    All other systems reviewed and are negative.    Objective:     Vital Signs (Most Recent):  Temp: 97.3 °F (36.3 °C) (02/11/25 1138)  Pulse: 88 (02/11/25 1200)  Resp: (!) 25 (02/11/25 1200)  BP: 125/67 (02/11/25 1200)  SpO2: (!) 93 % (02/11/25 1200) Vital Signs (24h Range):  Temp:  [97.3 °F (36.3 °C)-98.1 °F (36.7 °C)] 97.3 °F (36.3 °C)  Pulse:  [] 88  Resp:  [14-37] 25  SpO2:  [91 %-100 %] 93 %  BP: (122-174)/(64-77) 125/67     Weight: 51.1 kg (112 lb 10.5 oz)  Body mass index is 20.6 kg/m².    Intake/Output Summary (Last 24 hours)  at 2/11/2025 1307  Last data filed at 2/11/2025 0600  Gross per 24 hour   Intake 720 ml   Output 900 ml   Net -180 ml         Physical Exam  Vitals and nursing note reviewed. Exam conducted with a chaperone present.   Constitutional:       General: She is not in acute distress.     Appearance: She is normal weight. She is ill-appearing. She is not toxic-appearing or diaphoretic.   HENT:      Head: Normocephalic and atraumatic.   Pulmonary:      Effort: Tachypnea present.      Comments: BiPAP in place, removed to do interview and could speak in complete sentences with no distress  Abdominal:      General: Abdomen is flat.   Genitourinary:     Comments: No gomez present  Lymphadenopathy:      Comments: No peripheral edema to legs   Skin:     Coloration: Skin is sallow.   Neurological:      Mental Status: She is oriented to person, place, and time. She is lethargic.      GCS: GCS eye subscore is 4. GCS verbal subscore is 5. GCS motor subscore is 6.   Psychiatric:         Attention and Perception: Attention and perception normal.         Mood and Affect: Mood is depressed.         Speech: Speech is delayed.         Behavior: Behavior is slowed. Behavior is not agitated.         Cognition and Memory: Cognition and memory normal.             Significant Labs: All pertinent labs within the past 24 hours have been reviewed.  ABGs:   Recent Labs   Lab 02/10/25  0550 02/10/25  1104 02/11/25  1054   PH 7.292* 7.384 7.357   PCO2 67.1* 59.6* 66.9*   HCO3 32.4* 35.6* 37.5*   POCSATURATED 95 96 93   BE 6* 10* 12*   PO2 85 90 75*     CBC:   Recent Labs   Lab 02/09/25  2235 02/10/25  0500 02/11/25  0430   WBC 10.55 10.16 9.90   HGB 11.9* 10.5* 10.7*   HCT 38.1 33.0* 33.4*    283 360     CMP:   Recent Labs   Lab 02/09/25  2235 02/10/25  0500 02/11/25  0430    135* 137   K 3.9 3.5 3.7   CL 94* 93* 90*   CO2 23 24 32*   GLU 96 142* 119*   BUN 6* 8 10   CREATININE 0.8 0.8 0.7   CALCIUM 9.3 8.7 9.0   PROT 7.9 6.9 7.1    ALBUMIN 3.0* 2.6* 2.6*   BILITOT 0.2 0.1 0.1   ALKPHOS 111 90 95   AST 67* 49* 37   ALT 92* 75* 60*   ANIONGAP 20* 18* 15       Significant Imaging: I have reviewed all pertinent imaging results/findings within the past 24 hours.    Assessment and Plan     * Aspiration pneumonia of right lower lobe  She was admitted for PNA in exactly the same location 1 year ago  She reports problems swallowing and she is a daily drinker, both raising risk of recurrent aspiration events  Started Zosyn + Doxy (avoiding FQ and Macrolides with QTc of 596 ms)  Sputum CXS and gram stain + Legionella  BC times 2 show NGTD  C-diff was negative.         Acute on chronic respiratory failure with hypoxia and hypercapnia  Patient with Hypercapnic and Hypoxic Respiratory failure which is Acute on chronic.  she is on home oxygen at 3.5 LPM. Supplemental oxygen was provided and noted- Oxygen Concentration (%):  [35-40] 35    EMS reports she was satting in the 70s while on home oxygen upon arrival to her home.     SpO2 (!) 79% RA -> 93% BiPAP 40% 10/5->Currently on 14/7 97% 4 LPM at 40%  ABG BIPAP:    ABGs:   Recent Labs   Lab 02/10/25  0550 02/10/25  1104 02/11/25  1054   PH 7.292* 7.384 7.357   PCO2 67.1* 59.6* 66.9*   HCO3 32.4* 35.6* 37.5*   POCSATURATED 95 96 93   BE 6* 10* 12*   PO2 85 90 75*        Pt has had decreased work of breathing, decreased oxygen use, and improvement in ABG  Likely this is close to her baseline  Our attempt to decrease the BIPAP was not successful.   Will look at trying again tomorrow.     Wedge compression fracture of T5-T6 vertebra, initial encounter for closed fracture  TLSO brace ordered  PT and OT consulted  Avoid opioids with acute respiratory failure  Pain appears well controlled currently    Sepsis due to pneumonia  This patient does have evidence of infective focus  Sepsis has now resolved  Source: Respiratory  Antibiotics given-   Antibiotics (72h ago, onward)      Start     Stop Route Frequency Ordered  "   02/10/25 0230  piperacillin-tazobactam (ZOSYN) 4.5 g in D5W 100 mL IVPB (MB+)         -- IV Every 8 hours (non-standard times) 02/10/25 0123    02/10/25 0130  doxycycline tablet 100 mg         -- Oral Every 12 hours 02/10/25 0123          Latest lactate reviewed-  Recent Labs   Lab 02/09/25  2235   LACTATE 1.1       Organ dysfunction indicated by Acute respiratory failure  Fluid challenge Fluid Not Needed - Patient is not hypotensive and/or lactate is less than 4.0.   Post- resuscitation assessment Yes - I attest a sepsis perfusion exam was performed within 6 hours of sepsis, severe sepsis, or septic shock presentation, following fluid resuscitation.  Will Not start Pressors- Levophed for MAP of 65  Source control achieved by: iv abx    Recent Labs   Lab 02/11/25  0430   .2*     Pt has significant inflammation.     Recent Labs   Lab 02/09/25  2235 02/10/25  0500 02/11/25  0430   WBC 10.55 10.16 9.90     But WBC has not been significantly elevated.   Procalcitonin is 0.62  Pt appears to have a COPD exacerbation with smaller degree of infection  Continue current antibiotic  Schedule neb treatments  Increase steroids (Solumedrol 60 mg IV BID)  Reassess tomorrow.         Hypothyroid  TSH 0.14  Free T4 1.14    Continue with Levothyroxine tablet 50 mcg, 50 mcg, Oral, Before breakfast   Pt is euthyroid      Primary hypertension  /67   Pulse 88   Temp 97.3 °F (36.3 °C) (Temporal)   Resp (!) 25   Ht 5' 2" (1.575 m)   Wt 51.1 kg (112 lb 10.5 oz)   SpO2 (!) 93%   BMI 20.60 kg/m²     Goal SBP<140  Pt is currently at goal     BP medications:  CarvediloL tablet 12.5 mg, 12.5 mg, Oral, BID       Anxiety and depression  Pt appears stable with no HI/SI  Continue current treatment    Depression Medications:   BusPIRone tablet 15 mg, 15 mg, Oral, BID   FLUoxetine capsule 40 mg, 40 mg, Oral, Daily   Klonopin 0.5 mg po q6 PRN Anxiety    Anemia of chronic disease    folic acid tablet 1 mg, 1 mg, Oral, Daily "   Repeat Folate B12 in am    All pertinent labs within the past 24 hours have been reviewed.  CBC:   Recent Labs   Lab 02/09/25  2235 02/10/25  0500 02/11/25  0430   WBC 10.55 10.16 9.90   HGB 11.9* 10.5* 10.7*   HCT 38.1 33.0* 33.4*    283 360     Hb is more or less stable  No evidence bleed       Latest Reference Range & Units 02/21/24 12:51   Iron 30 - 160 ug/dL 79   TIBC 250 - 450 ug/dL 309   Saturated Iron 20 - 50 % 26   Transferrin 200 - 375 mg/dL 209   Ferritin 20.0 - 300.0 ng/mL 434 (H)       Latest Reference Range & Units 06/04/23 22:25   Folate 4.0 - 24.0 ng/mL <2.2 (L)      Pt has elevated Ferritin and does not have any iron deficiency  Folate is low and pt is on replacement  Pt has mild anemia and does not need transfusion        Recurrent falls  She has had multiple fractures to wrists, arms, ribs, and now T-spine  I suspect she underplays her ETOH use  PT and OT eval for rehab prior to discharge to home    Daily consumption of alcohol  She tells me she only drinks 1 cocktail daily  Multiple falls and fractures with some avoidance to the questions raises concerns this is actually more than stated  MVI, Thiamin, Folate  No signs of withdrawal including DT's, seizures  No need for withdrawal treatement    CMP:   Recent Labs   Lab 02/09/25  2235 02/10/25  0500 02/11/25  0430    135* 137   K 3.9 3.5 3.7   CL 94* 93* 90*   CO2 23 24 32*   GLU 96 142* 119*   BUN 6* 8 10   CREATININE 0.8 0.8 0.7   CALCIUM 9.3 8.7 9.0   PROT 7.9 6.9 7.1   ALBUMIN 3.0* 2.6* 2.6*   BILITOT 0.2 0.1 0.1   ALKPHOS 111 90 95   AST 67* 49* 37   ALT 92* 75* 60*   ANIONGAP 20* 18* 15     Coagulation:   Recent Labs   Lab 02/11/25  0430   INR 1.0       AST/ALT are elevated consistent with chronic alcoholic hepatitis/cirrhosis      Heavy cigarette smoker  She heavy smoker, 1/2 ppd cigarettes since 13 y/o = 29 pack years.  Nicotine patch 21mg ordered  Nursing cessation protocol ordered, no counseling from me due to resp  distress on bipap        VTE Risk Mitigation (From admission, onward)           Ordered     enoxaparin injection 40 mg  Daily         02/10/25 0127     Place ALEA hose  Until discontinued         02/10/25 0127     IP VTE HIGH RISK PATIENT  Once         02/10/25 0127     Place sequential compression device  Until discontinued         02/10/25 0127                    Discharge Planning   CHLOE: 2/14/2025     Code Status: Full Code   Medical Readiness for Discharge Date:   Discharge Plan A: Home Health                LEÓN MD  Department of Hospital Medicine   [place] [facility]

## 2025-02-11 NOTE — ASSESSMENT & PLAN NOTE
Patient with Hypercapnic and Hypoxic Respiratory failure which is Acute on chronic.  she is on home oxygen at 3.5 LPM. Supplemental oxygen was provided and noted- Oxygen Concentration (%):  [35-40] 35    EMS reports she was satting in the 70s while on home oxygen upon arrival to her home.     SpO2 (!) 79% RA -> 93% BiPAP 40% 10/5->Currently on 14/7 97% 4 LPM at 40%  ABG BIPAP:    ABGs:   Recent Labs   Lab 02/10/25  0550 02/10/25  1104 02/11/25  1054   PH 7.292* 7.384 7.357   PCO2 67.1* 59.6* 66.9*   HCO3 32.4* 35.6* 37.5*   POCSATURATED 95 96 93   BE 6* 10* 12*   PO2 85 90 75*        Pt has had decreased work of breathing, decreased oxygen use, and improvement in ABG  Likely this is close to her baseline  Our attempt to decrease the BIPAP was not successful.   Will look at trying again tomorrow.

## 2025-02-11 NOTE — PLAN OF CARE
Problem: Adult Inpatient Plan of Care  Goal: Plan of Care Review  Outcome: Progressing  Goal: Patient-Specific Goal (Individualized)  Outcome: Progressing  Goal: Absence of Hospital-Acquired Illness or Injury  Outcome: Progressing  Goal: Optimal Comfort and Wellbeing  Outcome: Progressing  Goal: Readiness for Transition of Care  Outcome: Progressing     Problem: Infection  Goal: Absence of Infection Signs and Symptoms  Outcome: Progressing     Problem: Sepsis/Septic Shock  Goal: Optimal Coping  Outcome: Progressing  Goal: Absence of Bleeding  Outcome: Progressing  Goal: Blood Glucose Level Within Targeted Range  Outcome: Progressing  Goal: Absence of Infection Signs and Symptoms  Outcome: Progressing  Goal: Optimal Nutrition Intake  Outcome: Progressing     Problem: Pneumonia  Goal: Fluid Balance  Outcome: Progressing  Goal: Resolution of Infection Signs and Symptoms  Outcome: Progressing  Goal: Effective Oxygenation and Ventilation  Outcome: Progressing     Problem: Skin Injury Risk Increased  Goal: Skin Health and Integrity  Outcome: Progressing     Problem: Gas Exchange Impaired  Goal: Optimal Gas Exchange  Outcome: Progressing     Problem: Excessive Substance Use  Goal: Optimized Energy Level (Excessive Substance Use)  Outcome: Progressing  Goal: Improved Behavioral Control (Excessive Substance Use)  Outcome: Progressing  Goal: Increased Participation and Engagement (Excessive Substance Use)  Outcome: Progressing  Goal: Improved Physiologic Symptoms (Excessive Substance Use)  Outcome: Progressing  Goal: Enhanced Social, Occupational or Functional Skills (Excessive Substance Use)  Outcome: Progressing     Problem: Activity Intolerance  Goal: Enhanced Capacity and Energy  Outcome: Progressing     Problem: Fall Injury Risk  Goal: Absence of Fall and Fall-Related Injury  Outcome: Progressing     Problem: COPD (Chronic Obstructive Pulmonary Disease)  Goal: Optimal Chronic Illness Coping  Outcome: Progressing  Goal:  Optimal Level of Functional [place]  Outcome: Progressing  Goal: Absence of Infection Signs and Symptoms  Outcome: Progressing  Goal: Improved Oral Intake  Outcome: Progressing  Goal: Effective Oxygenation and Ventilation  Outcome: Progressing     Problem: Mobility Impairment  Goal: Optimal Mobility  Outcome: Progressing

## 2025-02-11 NOTE — SUBJECTIVE & OBJECTIVE
Interval History: Please see above    Review of Systems   Constitutional:  Positive for activity change, appetite change, chills, diaphoresis, fatigue and fever.   HENT:  Positive for congestion.    Eyes:  Negative for visual disturbance.   Respiratory:  Positive for cough, shortness of breath and wheezing.    Cardiovascular:  Negative for chest pain and leg swelling.   Gastrointestinal:  Positive for nausea. Negative for diarrhea and vomiting.   Endocrine: Positive for cold intolerance.   Genitourinary:  Negative for dysuria and flank pain.   Musculoskeletal:  Positive for arthralgias, back pain and gait problem. Negative for myalgias.   Skin:  Negative for rash.   Allergic/Immunologic: Negative for immunocompromised state.   Neurological:  Negative for light-headedness and headaches.   Hematological:  Bruises/bleeds easily.   Psychiatric/Behavioral:  Positive for decreased concentration. Negative for confusion. The patient is nervous/anxious.    All other systems reviewed and are negative.    Objective:     Vital Signs (Most Recent):  Temp: 97.3 °F (36.3 °C) (02/11/25 1138)  Pulse: 88 (02/11/25 1200)  Resp: (!) 25 (02/11/25 1200)  BP: 125/67 (02/11/25 1200)  SpO2: (!) 93 % (02/11/25 1200) Vital Signs (24h Range):  Temp:  [97.3 °F (36.3 °C)-98.1 °F (36.7 °C)] 97.3 °F (36.3 °C)  Pulse:  [] 88  Resp:  [14-37] 25  SpO2:  [91 %-100 %] 93 %  BP: (122-174)/(64-77) 125/67     Weight: 51.1 kg (112 lb 10.5 oz)  Body mass index is 20.6 kg/m².    Intake/Output Summary (Last 24 hours) at 2/11/2025 1307  Last data filed at 2/11/2025 0600  Gross per 24 hour   Intake 720 ml   Output 900 ml   Net -180 ml         Physical Exam  Vitals and nursing note reviewed. Exam conducted with a chaperone present.   Constitutional:       General: She is not in acute distress.     Appearance: She is normal weight. She is ill-appearing. She is not toxic-appearing or diaphoretic.   HENT:      Head: Normocephalic and atraumatic.   Pulmonary:       Effort: Tachypnea present.      Comments: BiPAP in place, removed to do interview and could speak in complete sentences with no distress  Abdominal:      General: Abdomen is flat.   Genitourinary:     Comments: No gomez present  Lymphadenopathy:      Comments: No peripheral edema to legs   Skin:     Coloration: Skin is sallow.   Neurological:      Mental Status: She is oriented to person, place, and time. She is lethargic.      GCS: GCS eye subscore is 4. GCS verbal subscore is 5. GCS motor subscore is 6.   Psychiatric:         Attention and Perception: Attention and perception normal.         Mood and Affect: Mood is depressed.         Speech: Speech is delayed.         Behavior: Behavior is slowed. Behavior is not agitated.         Cognition and Memory: Cognition and memory normal.             Significant Labs: All pertinent labs within the past 24 hours have been reviewed.  ABGs:   Recent Labs   Lab 02/10/25  0550 02/10/25  1104 02/11/25  1054   PH 7.292* 7.384 7.357   PCO2 67.1* 59.6* 66.9*   HCO3 32.4* 35.6* 37.5*   POCSATURATED 95 96 93   BE 6* 10* 12*   PO2 85 90 75*     CBC:   Recent Labs   Lab 02/09/25 2235 02/10/25  0500 02/11/25  0430   WBC 10.55 10.16 9.90   HGB 11.9* 10.5* 10.7*   HCT 38.1 33.0* 33.4*    283 360     CMP:   Recent Labs   Lab 02/09/25 2235 02/10/25  0500 02/11/25  0430    135* 137   K 3.9 3.5 3.7   CL 94* 93* 90*   CO2 23 24 32*   GLU 96 142* 119*   BUN 6* 8 10   CREATININE 0.8 0.8 0.7   CALCIUM 9.3 8.7 9.0   PROT 7.9 6.9 7.1   ALBUMIN 3.0* 2.6* 2.6*   BILITOT 0.2 0.1 0.1   ALKPHOS 111 90 95   AST 67* 49* 37   ALT 92* 75* 60*   ANIONGAP 20* 18* 15       Significant Imaging: I have reviewed all pertinent imaging results/findings within the past 24 hours.

## 2025-02-11 NOTE — ASSESSMENT & PLAN NOTE
folic acid tablet 1 mg, 1 mg, Oral, Daily   Repeat Folate B12 in am    All pertinent labs within the past 24 hours have been reviewed.  CBC:   Recent Labs   Lab 02/09/25  2235 02/10/25  0500 02/11/25  0430   WBC 10.55 10.16 9.90   HGB 11.9* 10.5* 10.7*   HCT 38.1 33.0* 33.4*    283 360     Hb is more or less stable  No evidence bleed       Latest Reference Range & Units 02/21/24 12:51   Iron 30 - 160 ug/dL 79   TIBC 250 - 450 ug/dL 309   Saturated Iron 20 - 50 % 26   Transferrin 200 - 375 mg/dL 209   Ferritin 20.0 - 300.0 ng/mL 434 (H)       Latest Reference Range & Units 06/04/23 22:25   Folate 4.0 - 24.0 ng/mL <2.2 (L)      Pt has elevated Ferritin and does not have any iron deficiency  Folate is low and pt is on replacement  Pt has mild anemia and does not need transfusion

## 2025-02-11 NOTE — PT/OT/SLP PROGRESS
Speech Language Pathology Treatment    Patient Name:  Bhavana Lambert   MRN:  [id number]  Admitting Diagnosis: Aspiration pneumonia of right lower lobe    Recommendations:                 General Recommendations:  Dysphagia therapy  Diet recommendations:  Minced & Moist Diet - IDDSI Level 5, Liquid Diet Level: Thin (small sips by cup rim, NO STRAWS)   Aspiration Precautions:  1 bite/sip at a time, Avoid talking while eating, Feed only when awake/alert, Frequent oral care, HOB to 90 degrees, Meds crushed in puree, Monitor for s/s of aspiration, No straws, Small bites/sips, and Wear oxygen during intake   General Precautions: Standard, respiratory  Communication strategies:  none    Assessment:     Bhavana Lambert is a 69 y.o. female with an admitting diagnosis of aspiration pneumonia of the right lower lobe. Patient presented as alert, awake, and brushing teeth at onset of treatment. No complaints regarding current diet recommendations reported. Patient participated in thin liquid trials by cup rim. Patient able to recall and demonstrate aspiration precautions regarding recommendations with thin liquids. No overt s/s of aspiration noted during treatment session. Patient able to independently take small single sips of liquid. She politely refused any additional PO intake. Stated she did not sleep well the night before and did not have much of an appetite. At this time, ST continues to recommend minced and moist diet with thin liquids (no straws). Will continue to follow.     Subjective   Patient seated at HOB in 90 degree position. Patient cooperative throughout treatment.     Respiratory Status: Nasal cannula    Objective:     Has the patient been evaluated by SLP for swallowing?   Yes  Keep patient NPO? No     Goals:   Multidisciplinary Problems       SLP Goals          Problem: SLP    Goal Priority Disciplines Outcome   SLP Goal     SLP Progressing   Description: 1. Patient will maintain adequate  hydration/nutrition with optimum safety and efficiency of swallowing function on P.O. intake without overt signs and symptoms of aspiration for the highest appropriate diet level.                        Plan:     Patient to be seen:  4 x/week   Plan of Care expires:  02/24/25  Plan of Care reviewed with:  patient, other (see comments) (RN)   SLP Follow-Up:  Yes       Discharge recommendations:  Moderate Intensity Therapy   Barriers to Discharge:  Level of Skilled Assistance Needed      Time Tracking:     SLP Treatment Date:   02/11/25  Speech Start Time:  1021  Speech Stop Time:  1032     Speech Total Time (min):  11 min    Billable Minutes: Treatment Swallowing Dysfunction 11 min    02/11/2025

## 2025-02-11 NOTE — ASSESSMENT & PLAN NOTE
She was admitted for PNA in exactly the same location 1 year ago  She reports problems swallowing and she is a daily drinker, both raising risk of recurrent aspiration events  Started Zosyn + Doxy (avoiding FQ and Macrolides with QTc of 596 ms)  Sputum CXS and gram stain + Legionella  BC times 2 show NGTD  C-diff was negative.

## 2025-02-11 NOTE — ASSESSMENT & PLAN NOTE
She heavy smoker, 1/2 ppd cigarettes since 13 y/o = 29 pack years.  Nicotine patch 21mg ordered  Nursing cessation protocol ordered, no counseling from me due to resp distress on bipap

## 2025-02-11 NOTE — ASSESSMENT & PLAN NOTE
She tells me she only drinks 1 cocktail daily  Multiple falls and fractures with some avoidance to the questions raises concerns this is actually more than stated  MVI, Thiamin, Folate  No signs of withdrawal including DT's, seizures  No need for withdrawal treatement    CMP:   Recent Labs   Lab 02/09/25  2235 02/10/25  0500 02/11/25  0430    135* 137   K 3.9 3.5 3.7   CL 94* 93* 90*   CO2 23 24 32*   GLU 96 142* 119*   BUN 6* 8 10   CREATININE 0.8 0.8 0.7   CALCIUM 9.3 8.7 9.0   PROT 7.9 6.9 7.1   ALBUMIN 3.0* 2.6* 2.6*   BILITOT 0.2 0.1 0.1   ALKPHOS 111 90 95   AST 67* 49* 37   ALT 92* 75* 60*   ANIONGAP 20* 18* 15     Coagulation:   Recent Labs   Lab 02/11/25  0430   INR 1.0       AST/ALT are elevated consistent with chronic alcoholic hepatitis/cirrhosis     [de-identified] : Location: Right knee\par Quality: aching\par Duration: 2016 Previously treated\par Context: Atraumatic\par Aggravating Factors: Walking, bending, standing , stairs \par Conservative treatment: ICE, otc \par Associated Symptoms: Swelling, stiffness \par Prior Studies:  2016\par here with her son

## 2025-02-12 LAB
ALBUMIN SERPL BCP-MCNC: 2.5 G/DL (ref 3.5–5.2)
ALP SERPL-CCNC: 90 U/L (ref 40–150)
ALT SERPL W/O P-5'-P-CCNC: 44 U/L (ref 10–44)
ANION GAP SERPL CALC-SCNC: 13 MMOL/L (ref 8–16)
AST SERPL-CCNC: 30 U/L (ref 10–40)
BASOPHILS # BLD AUTO: 0.01 K/UL (ref 0–0.2)
BASOPHILS NFR BLD: 0.1 % (ref 0–1.9)
BILIRUB SERPL-MCNC: 0.1 MG/DL (ref 0.1–1)
BUN SERPL-MCNC: 10 MG/DL (ref 8–23)
CALCIUM SERPL-MCNC: 8.9 MG/DL (ref 8.7–10.5)
CHLORIDE SERPL-SCNC: 90 MMOL/L (ref 95–110)
CO2 SERPL-SCNC: 31 MMOL/L (ref 23–29)
CREAT SERPL-MCNC: 0.6 MG/DL (ref 0.5–1.4)
DIFFERENTIAL METHOD BLD: ABNORMAL
EOSINOPHIL # BLD AUTO: 0 K/UL (ref 0–0.5)
EOSINOPHIL NFR BLD: 0 % (ref 0–8)
ERYTHROCYTE [DISTWIDTH] IN BLOOD BY AUTOMATED COUNT: 12 % (ref 11.5–14.5)
EST. GFR  (NO RACE VARIABLE): >60 ML/MIN/1.73 M^2
GLUCOSE SERPL-MCNC: 108 MG/DL (ref 70–110)
HCT VFR BLD AUTO: 33.2 % (ref 37–48.5)
HGB BLD-MCNC: 10.8 G/DL (ref 12–16)
IMM GRANULOCYTES # BLD AUTO: 0.03 K/UL (ref 0–0.04)
IMM GRANULOCYTES NFR BLD AUTO: 0.3 % (ref 0–0.5)
LYMPHOCYTES # BLD AUTO: 0.4 K/UL (ref 1–4.8)
LYMPHOCYTES NFR BLD: 4.2 % (ref 18–48)
MAGNESIUM SERPL-MCNC: 1.9 MG/DL (ref 1.6–2.6)
MCH RBC QN AUTO: 30.9 PG (ref 27–31)
MCHC RBC AUTO-ENTMCNC: 32.5 G/DL (ref 32–36)
MCV RBC AUTO: 95 FL (ref 82–98)
MONOCYTES # BLD AUTO: 0.4 K/UL (ref 0.3–1)
MONOCYTES NFR BLD: 4 % (ref 4–15)
NEUTROPHILS # BLD AUTO: 8.1 K/UL (ref 1.8–7.7)
NEUTROPHILS NFR BLD: 91.4 % (ref 38–73)
NRBC BLD-RTO: 0 /100 WBC
PHOSPHATE SERPL-MCNC: 3.3 MG/DL (ref 2.7–4.5)
PLATELET # BLD AUTO: 419 K/UL (ref 150–450)
PMV BLD AUTO: 10.5 FL (ref 9.2–12.9)
POTASSIUM SERPL-SCNC: 4.2 MMOL/L (ref 3.5–5.1)
PROT SERPL-MCNC: 7.2 G/DL (ref 6–8.4)
RBC # BLD AUTO: 3.49 M/UL (ref 4–5.4)
SODIUM SERPL-SCNC: 134 MMOL/L (ref 136–145)
WBC # BLD AUTO: 8.9 K/UL (ref 3.9–12.7)

## 2025-02-12 PROCEDURE — 63600175 PHARM REV CODE 636 W HCPCS: Performed by: INTERNAL MEDICINE

## 2025-02-12 PROCEDURE — 25000003 PHARM REV CODE 250: Performed by: INTERNAL MEDICINE

## 2025-02-12 PROCEDURE — 11000001 HC ACUTE MED/SURG PRIVATE ROOM

## 2025-02-12 PROCEDURE — 84100 ASSAY OF PHOSPHORUS: CPT | Performed by: INTERNAL MEDICINE

## 2025-02-12 PROCEDURE — 25000242 PHARM REV CODE 250 ALT 637 W/ HCPCS: Performed by: INTERNAL MEDICINE

## 2025-02-12 PROCEDURE — S4991 NICOTINE PATCH NONLEGEND: HCPCS | Performed by: INTERNAL MEDICINE

## 2025-02-12 PROCEDURE — 27000221 HC OXYGEN, UP TO 24 HOURS

## 2025-02-12 PROCEDURE — 85025 COMPLETE CBC W/AUTO DIFF WBC: CPT | Performed by: INTERNAL MEDICINE

## 2025-02-12 PROCEDURE — 63600175 PHARM REV CODE 636 W HCPCS: Mod: JZ,TB | Performed by: INTERNAL MEDICINE

## 2025-02-12 PROCEDURE — 92526 ORAL FUNCTION THERAPY: CPT

## 2025-02-12 PROCEDURE — 83735 ASSAY OF MAGNESIUM: CPT | Performed by: INTERNAL MEDICINE

## 2025-02-12 PROCEDURE — 94761 N-INVAS EAR/PLS OXIMETRY MLT: CPT

## 2025-02-12 PROCEDURE — 99900035 HC TECH TIME PER 15 MIN (STAT)

## 2025-02-12 PROCEDURE — 80053 COMPREHEN METABOLIC PANEL: CPT | Performed by: INTERNAL MEDICINE

## 2025-02-12 PROCEDURE — 94640 AIRWAY INHALATION TREATMENT: CPT

## 2025-02-12 PROCEDURE — 97166 OT EVAL MOD COMPLEX 45 MIN: CPT

## 2025-02-12 RX ORDER — LOPERAMIDE HYDROCHLORIDE 2 MG/1
2 CAPSULE ORAL 4 TIMES DAILY PRN
Status: DISCONTINUED | OUTPATIENT
Start: 2025-02-12 | End: 2025-02-14 | Stop reason: HOSPADM

## 2025-02-12 RX ORDER — PREDNISONE 20 MG/1
60 TABLET ORAL DAILY
Status: DISCONTINUED | OUTPATIENT
Start: 2025-02-13 | End: 2025-02-14 | Stop reason: HOSPADM

## 2025-02-12 RX ADMIN — IPRATROPIUM BROMIDE AND ALBUTEROL SULFATE 3 ML: .5; 2.5 SOLUTION RESPIRATORY (INHALATION) at 07:02

## 2025-02-12 RX ADMIN — PIPERACILLIN SODIUM AND TAZOBACTAM SODIUM 4.5 G: 4; .5 INJECTION, POWDER, LYOPHILIZED, FOR SOLUTION INTRAVENOUS at 06:02

## 2025-02-12 RX ADMIN — CLONAZEPAM 0.5 MG: 0.5 TABLET ORAL at 11:02

## 2025-02-12 RX ADMIN — BUSPIRONE HYDROCHLORIDE 15 MG: 5 TABLET ORAL at 08:02

## 2025-02-12 RX ADMIN — FLUOXETINE HYDROCHLORIDE 40 MG: 10 CAPSULE ORAL at 08:02

## 2025-02-12 RX ADMIN — Medication 100 MG: at 08:02

## 2025-02-12 RX ADMIN — PIPERACILLIN SODIUM AND TAZOBACTAM SODIUM 4.5 G: 4; .5 INJECTION, POWDER, LYOPHILIZED, FOR SOLUTION INTRAVENOUS at 02:02

## 2025-02-12 RX ADMIN — THERA TABS 1 TABLET: TAB at 08:02

## 2025-02-12 RX ADMIN — CYCLOBENZAPRINE HYDROCHLORIDE 5 MG: 5 TABLET, FILM COATED ORAL at 08:02

## 2025-02-12 RX ADMIN — CARVEDILOL 12.5 MG: 12.5 TABLET, FILM COATED ORAL at 08:02

## 2025-02-12 RX ADMIN — ASPIRIN 81 MG: 81 TABLET, COATED ORAL at 08:02

## 2025-02-12 RX ADMIN — CARVEDILOL 12.5 MG: 12.5 TABLET, FILM COATED ORAL at 09:02

## 2025-02-12 RX ADMIN — FOLIC ACID 1 MG: 1 TABLET ORAL at 08:02

## 2025-02-12 RX ADMIN — BUSPIRONE HYDROCHLORIDE 15 MG: 5 TABLET ORAL at 09:02

## 2025-02-12 RX ADMIN — ACETAMINOPHEN 650 MG: 325 TABLET ORAL at 11:02

## 2025-02-12 RX ADMIN — DOXYCYCLINE HYCLATE 100 MG: 100 TABLET, COATED ORAL at 09:02

## 2025-02-12 RX ADMIN — LEVOTHYROXINE SODIUM 50 MCG: 25 TABLET ORAL at 06:02

## 2025-02-12 RX ADMIN — MONTELUKAST 10 MG: 10 TABLET, FILM COATED ORAL at 06:02

## 2025-02-12 RX ADMIN — ENOXAPARIN SODIUM 40 MG: 40 INJECTION SUBCUTANEOUS at 06:02

## 2025-02-12 RX ADMIN — METHYLPREDNISOLONE SODIUM SUCCINATE 60 MG: 40 INJECTION, POWDER, FOR SOLUTION INTRAMUSCULAR; INTRAVENOUS at 02:02

## 2025-02-12 RX ADMIN — NICOTINE 1 PATCH: 21 PATCH, EXTENDED RELEASE TRANSDERMAL at 08:02

## 2025-02-12 RX ADMIN — PIPERACILLIN SODIUM AND TAZOBACTAM SODIUM 4.5 G: 4; .5 INJECTION, POWDER, LYOPHILIZED, FOR SOLUTION INTRAVENOUS at 11:02

## 2025-02-12 RX ADMIN — IPRATROPIUM BROMIDE AND ALBUTEROL SULFATE 3 ML: .5; 2.5 SOLUTION RESPIRATORY (INHALATION) at 03:02

## 2025-02-12 RX ADMIN — ATORVASTATIN CALCIUM 80 MG: 40 TABLET, FILM COATED ORAL at 08:02

## 2025-02-12 RX ADMIN — DOXYCYCLINE HYCLATE 100 MG: 100 TABLET, COATED ORAL at 08:02

## 2025-02-12 RX ADMIN — CLONAZEPAM 0.5 MG: 0.5 TABLET ORAL at 08:02

## 2025-02-12 RX ADMIN — LOPERAMIDE HYDROCHLORIDE 2 MG: 2 CAPSULE ORAL at 11:02

## 2025-02-12 RX ADMIN — CLOPIDOGREL BISULFATE 75 MG: 75 TABLET ORAL at 08:02

## 2025-02-12 NOTE — PLAN OF CARE
Problem: Adult Inpatient Plan of Care  Goal: Plan of Care Review  Outcome: Progressing  Goal: Patient-Specific Goal (Individualized)  Outcome: Progressing  Goal: Absence of Hospital-Acquired Illness or Injury  Outcome: Progressing  Goal: Optimal Comfort and Wellbeing  Outcome: Progressing  Goal: Readiness for Transition of Care  Outcome: Progressing     Problem: Infection  Goal: Absence of Infection Signs and Symptoms  Outcome: Progressing     Problem: Sepsis/Septic Shock  Goal: Optimal Coping  Outcome: Progressing  Goal: Absence of Bleeding  Outcome: Progressing  Goal: Blood Glucose Level Within Targeted Range  Outcome: Progressing  Goal: Absence of Infection Signs and Symptoms  Outcome: Progressing  Goal: Optimal Nutrition Intake  Outcome: Progressing     Problem: Pneumonia  Goal: Fluid Balance  Outcome: Progressing  Goal: Resolution of Infection Signs and Symptoms  Outcome: Progressing  Goal: Effective Oxygenation and Ventilation  Outcome: Progressing     Problem: Skin Injury Risk Increased  Goal: Skin Health and Integrity  Outcome: Progressing     Problem: Gas Exchange Impaired  Goal: Optimal Gas Exchange  Outcome: Progressing     Problem: Excessive Substance Use  Goal: Optimized Energy Level (Excessive Substance Use)  Outcome: Progressing  Goal: Improved Behavioral Control (Excessive Substance Use)  Outcome: Progressing  Goal: Increased Participation and Engagement (Excessive Substance Use)  Outcome: Progressing  Goal: Improved Physiologic Symptoms (Excessive Substance Use)  Outcome: Progressing  Goal: Enhanced Social, Occupational or Functional Skills (Excessive Substance Use)  Outcome: Progressing     Problem: Activity Intolerance  Goal: Enhanced Capacity and Energy  Outcome: Progressing     Problem: Fall Injury Risk  Goal: Absence of Fall and Fall-Related Injury  Outcome: Progressing     Problem: COPD (Chronic Obstructive Pulmonary Disease)  Goal: Optimal Chronic Illness Coping  Outcome: Progressing  Goal:  Optimal Level of Functional [place]  Outcome: Progressing  Goal: Absence of Infection Signs and Symptoms  Outcome: Progressing  Goal: Improved Oral Intake  Outcome: Progressing  Goal: Effective Oxygenation and Ventilation  Outcome: Progressing     Problem: Mobility Impairment  Goal: Optimal Mobility  Outcome: Progressing

## 2025-02-12 NOTE — PLAN OF CARE
02/12/25 1525   Medicare Message   Important Message from Medicare regarding Discharge Appeal Rights Given to patient/caregiver;Explained to patient/caregiver;Signed/date by patient/caregiver   Date IMM was signed 02/12/25   Time IMM was signed 1511     Placed in folder

## 2025-02-12 NOTE — PT/OT/SLP EVAL
Occupational Therapy Evaluation     Name: Bhavana Lambert  MRN: [id number]  Admitting Diagnosis: Aspiration pneumonia of right lower lobe  Recent Surgery: * No surgery found *      Recommendations:     Discharge Recommendations: Moderate Intensity Therapy  Level of Assistance Recommended: 24 hours light assistance  Discharge Equipment Recommendations: bedside commode, oxygen, walker, rolling, walker, standard, shower chair, hospital bed  Barriers to discharge: Inaccessible home environment, Decreased caregiver support    Assessment:     Bhavana Lambert is a 69 y.o. female with a medical diagnosis of Aspiration pneumonia of right lower lobe. She presents with performance deficits affecting function including weakness, impaired endurance, impaired self care skills, impaired functional mobility, gait instability, impaired balance, decreased safety awareness, pain, orthopedic precautions, other (comment) (Pt has back pain with noted thoracic fractures. no TLSO brace present so mobility was limited.).     Rehab Prognosis: Poor; patient would benefit from acute OT services to address these deficits and reach maximum level of function.    Plan:     Patient to be seen 2 x/week to address the above listed problems via self-care/home management, therapeutic activities, therapeutic exercises, neuromuscular re-education  Plan of Care Expires:    Plan of Care Reviewed with: patient    Subjective     Chief Complaint: back pain and activity tolerance   Patient Comments/Goals: return home without any further therapy services   Pain/Comfort:  Pain Rating 1: 3/10  Location - Side 1: Bilateral  Location 1: back    Patients cultural, spiritual, Temple conflicts given the current situation:      Social History:  Living Environment: Patient lives with their family  and child(jie) in a 2 story home with number of outside stair(s): 15 but she has a elevator  Prior Level of Function: Prior to admission, patient requires assistance with  ADLs including cooking cleaning and laundry   Roles and Routines: Patient was not driving prior to admission.  Equipment Used at Home: bedside commode, walker, rolling, walker, standard, wheelchair, hospital bed, oxygen, shower chair  DME owned (not currently used): rollator, bedside commode, shower chair, wheelchair, and hospital bed  Assistance Upon Discharge: family    Objective:     Communicated with nursing prior to session. Patient found HOB elevated with bed alarm, blood pressure cuff, oxygen, PureWick, peripheral IV, pulse ox (continuous), telemetry upon OT entry to room.    General Precautions: Standard, fall   Orthopedic Precautions:     Braces:      Respiratory Status: Nasal cannula, flow 4 L/min    Occupational Performance    Gait belt applied - No    Bed Mobility:   Scooting to HOB in supine: minimum assistance    Functional Mobility/Transfers:  Did not perform   Functional Mobility: Mod A for bed mobility due to pain in her back and decreased activity tolerance    Activities of Daily Living:  Bathing: maximal assistance  Upper Body Dressing: minimum assistance  Lower Body Dressing: maximal assistance  Toileting: maximal assistance    Cognitive/Visual Perceptual:  WNL    Physical Exam:  Upper Extremity Range of Motion:     -       Right Upper Extremity: WNL  -       Left Upper Extremity: WNL  Upper Extremity Strength:    -       Right Upper Extremity: WNL   Strength:    -       Right Upper Extremity: WNL  -       Left Upper Extremity: WNL    AMPAC 6 Click ADL:  AMPAC Total Score: 17    Treatment & Education:  Patient educated on role of OT, POC, and goals for therapy  Patient educated on importance of OOB activities with staff member assistance and sitting OOB majority of the day  Pt was supine in bed. Required repositioning to HOB due to feet reaching the footboard. She was able to A with some bed mobility but min/mod A due to weakness and activity tolerance deficits. She was unable to sit EOB due  to pain in her back. She completed UB assessment with 3+/5 strength in all planes of movement. She would benefit from further skilled services to focus on safety with functional ambulation and transfers but pt is requesting d/c home at this time.     Patient clear to stand pivot transfer with RN/PCT, assist x1 .    Patient left HOB elevated with all lines intact, call button in reach, and RN notified.    GOALS:   Pt to increase OOB tolerance to 30 min per day seated in upright chair by shyc.  Pt to increase LB dressing IND to set up/ SBA for donning socks and pants by d.c  Pt to increase IND with stand pivot transfer in order to t/f to and from [facility] by d/c.   Pt to complete toileting with SBA for all components (LB drsg, pericare and stand pivot) by d/c.         DME Justifications:  No DME recommended requiring DME justifications    History:     Past Medical History:   Diagnosis Date    Acquired hypothyroidism     Anxiety and depression     Cervical radicular pain     Closed left ankle fracture     COPD (chronic obstructive pulmonary disease)     Heavy cigarette smoker     She has smoked 1/2 PPD since the age of 12, and still smokes    Hypertension     Hyponatremia     Multiple rib fractures     NSTEMI (non-ST elevated myocardial infarction)     Requires continuous at home supplemental oxygen     She is on 3.5L NC continuous    Stroke 11/08/2023    Tension type headache     Traumatic closed displaced fracture of distal end of radius, left, sequela          Past Surgical History:   Procedure Laterality Date    ABDOMINAL SURGERY      COLON SURGERY      COLOSTOMY      COLOSTOMY CLOSURE      HYSTERECTOMY      LEG SURGERY Right     OPEN REDUCTION AND INTERNAL FIXATION (ORIF) OF INJURY OF WRIST Left 1/29/2019    Procedure: ORIF, WRIST;  Surgeon: Homero Jeff DO;  Location: [facility] OR;  Service: Orthopedics;  Laterality: Left;  Equipment: [employer] Geminus Wrist Plate Set  Vendor/Rep: [employer]  C-Arm:  Entire  DME: None    REQUIRES ASSISTANT    WRIST SURGERY Right        Time Tracking:     OT Date of Treatment: 02/12/25  OT Start Time: 0100  OT Stop Time: 0120  OT Total Time (min): 20 min    Billable Minutes: Evaluation 20 2/12/2025

## 2025-02-12 NOTE — PROGRESS NOTES
02/12/25 1522   Missed Time Reason   PT Attempted Eval Date 02/12/25   PT Attempted Eval Time 1522   Missed Time Reason Other (Comment)     The physical therapist checked on the patient 5 times today.  The first 4 times, she was unavailable due to either nursing care, occupational therapy, or speech therapy.  On the 5th attempt, she declined physical therapy services due to fatigue and lower back pain rated at 8/10.  Physical therapy to resume tomorrow.

## 2025-02-12 NOTE — SUBJECTIVE & OBJECTIVE
Interval History:  Patient seen and examined.  No acute events overnight.  Seems to be improving since yesterday afternoon.  She is been weaned off of BiPAP and is now on nasal cannula.  We will monitor her overnight to make sure that she does not decompensate.  Anticipate discharge home tomorrow.  We will send a referral to pulmonology for her for potential home BiPAP.    Review of Systems   Constitutional:  Positive for activity change.   Respiratory:  Positive for cough, shortness of breath and wheezing.    All other systems reviewed and are negative.    Objective:     Vital Signs (Most Recent):  Temp: 97.7 °F (36.5 °C) (02/12/25 0745)  Pulse: 93 (02/12/25 0745)  Resp: (!) 25 (02/12/25 0745)  BP: 138/81 (02/12/25 0745)  SpO2: 95 % (02/12/25 0745) Vital Signs (24h Range):  Temp:  [96.2 °F (35.7 °C)-97.9 °F (36.6 °C)] 97.7 °F (36.5 °C)  Pulse:  [] 93  Resp:  [14-41] 25  SpO2:  [91 %-100 %] 95 %  BP: (125-174)/(67-98) 138/81     Weight: 51.1 kg (112 lb 10.5 oz)  Body mass index is 20.6 kg/m².    Intake/Output Summary (Last 24 hours) at 2/12/2025 1044  Last data filed at 2/12/2025 0507  Gross per 24 hour   Intake 563.42 ml   Output 350 ml   Net 213.42 ml         Physical Exam  Constitutional:       General: She is not in acute distress.     Appearance: She is ill-appearing (Appears chronically ill). She is not toxic-appearing or diaphoretic.   HENT:      Head: Normocephalic and atraumatic.   Eyes:      General: No scleral icterus.     Extraocular Movements: Extraocular movements intact.      Conjunctiva/sclera: Conjunctivae normal.      Pupils: Pupils are equal, round, and reactive to light.   Cardiovascular:      Rate and Rhythm: Normal rate and regular rhythm.      Heart sounds: Normal heart sounds.   Pulmonary:      Effort: Pulmonary effort is normal. No respiratory distress.      Comments: Resting comfortably on 3.5 L per nasal cannula  Musculoskeletal:         General: Normal range of motion.   Skin:      General: Skin is warm and dry.      Coloration: Skin is not jaundiced.   Neurological:      Mental Status: She is alert and oriented to person, place, and time. Mental status is at baseline.             Significant Labs: All pertinent labs within the past 24 hours have been reviewed.  CBC:   Recent Labs   Lab 02/11/25  0430 02/12/25  0505   WBC 9.90 8.90   HGB 10.7* 10.8*   HCT 33.4* 33.2*    419     CMP:   Recent Labs   Lab 02/11/25  0430 02/12/25  0505    134*   K 3.7 4.2   CL 90* 90*   CO2 32* 31*   * 108   BUN 10 10   CREATININE 0.7 0.6   CALCIUM 9.0 8.9   PROT 7.1 7.2   ALBUMIN 2.6* 2.5*   BILITOT 0.1 0.1   ALKPHOS 95 90   AST 37 30   ALT 60* 44   ANIONGAP 15 13       Significant Imaging: I have reviewed all pertinent imaging results/findings within the past 24 hours.

## 2025-02-12 NOTE — ASSESSMENT & PLAN NOTE
She tells me she only drinks 1 cocktail daily  Multiple falls and fractures with some avoidance to the questions raises concerns this is actually more than stated  MVI, Thiamin, Folate  No signs of withdrawal including DT's, seizures  No need for withdrawal treatement    CMP:   Recent Labs   Lab 02/11/25  0430 02/12/25  0505    134*   K 3.7 4.2   CL 90* 90*   CO2 32* 31*   * 108   BUN 10 10   CREATININE 0.7 0.6   CALCIUM 9.0 8.9   PROT 7.1 7.2   ALBUMIN 2.6* 2.5*   BILITOT 0.1 0.1   ALKPHOS 95 90   AST 37 30   ALT 60* 44   ANIONGAP 15 13     Coagulation:   Recent Labs   Lab 02/11/25  0430   INR 1.0         AST/ALT are elevated consistent with chronic alcoholic hepatitis/cirrhosis

## 2025-02-12 NOTE — ASSESSMENT & PLAN NOTE
This patient does have evidence of infective focus  Sepsis has now resolved  Source: Respiratory  Antibiotics given-   Antibiotics (72h ago, onward)      Start     Stop Route Frequency Ordered    02/10/25 0230  piperacillin-tazobactam (ZOSYN) 4.5 g in D5W 100 mL IVPB (MB+)         -- IV Every 8 hours (non-standard times) 02/10/25 0123    02/10/25 0130  doxycycline tablet 100 mg         -- Oral Every 12 hours 02/10/25 0123          Latest lactate reviewed-  Recent Labs   Lab 02/09/25  2235   LACTATE 1.1       Organ dysfunction indicated by Acute respiratory failure  Fluid challenge Fluid Not Needed - Patient is not hypotensive and/or lactate is less than 4.0.   Post- resuscitation assessment Yes - I attest a sepsis perfusion exam was performed within 6 hours of sepsis, severe sepsis, or septic shock presentation, following fluid resuscitation.  Will Not start Pressors- Levophed for MAP of 65  Source control achieved by: iv abx    Recent Labs   Lab 02/11/25  0430   .2*       Pt has significant inflammation.     Recent Labs   Lab 02/09/25  2235 02/10/25  0500 02/11/25  0430 02/12/25  0505   WBC 10.55 10.16 9.90 8.90       But WBC has not been significantly elevated.   Procalcitonin is 0.62  Pt appears to have a COPD exacerbation with smaller degree of infection  Continue current antibiotic  Schedule neb treatments  On Solu-Medrol 60 mg IV b.i.d..  Will trial on prednisone 60 mg daily beginning tomorrow.  Patient has been off of BiPAP since 3:00 p.m. yesterday - will downgrade to med tele

## 2025-02-12 NOTE — NURSING
Patient sustained oxygenation on 3.5L NC today without BIPAP use. Coughing decreased, respiratory status greatly improved. Hygiene, comfort, safety maintained. Loose stools x2. VSS.

## 2025-02-12 NOTE — PROGRESS NOTES
"[facility] Medicine  Progress Note    Patient Name: Bhavana Lambert  MRN: [id number]  Patient Class: IP- Inpatient   Admission Date: 2/9/2025  Length of Stay: 2 days  Attending Physician: Bear Quick MD  Primary Care Provider: Pepito Jhaveri IV, MD        Subjective     Principal Problem:Aspiration pneumonia of right lower lobe        HPI:  Ms. Lambert is a 68yo lady with a past medical history of hypothyroidism, anxiety and depression, COPD on home O2 3.5L NC due to long term smoking, CVA, HTN, NSTEMI, RLL pneumonia, ETOH abuse and multiple falls with bone fractures.  She is a daily drinker, "about a shot a day" and a heavy smoker, 1/2 ppd cigarettes since 13 y/o = 29 pack years.    Ms. Lambert is well known to me from having admitted her back on 4/18/24 to 4/21/24 with RLL pneumonia and COPD exacerbation.  She was treated then with Rocephin, Zithro, steroids and improved rapidly.    She now returns to the ED at [facility] with 1 week of worsening cough, SOB, MENA and fever with chills to 103.0F at home.  This really picked up over the past 3 days with the coughing and dyspnea.  She has just finished steroids, zithromax and duonebs with no relief at home.  Per ED, who got collateral from EMS, "EMS reports she was satting in the 70s while on home oxygen upon arrival to her home.  She received albuterol nebulizer, and Solu-Medrol 125 mg given by EMS prior to arrival."      She tells me she also was sleeping on her sofa at home last week and rolled off, striking her back.  Since that time, she has had generalized back pains, but not localized or any neurologic issues.  She also states she has nausea and chronic, "problems swallowing."  She has not passed out or had any episodic vomiting with the daily ETOH use.    In the ED her VS were /62 (BP Location: Left arm)   Pulse (!) 126 -> 112   Temp max 98.8 °F (37.1 °C) (Oral)   Resp (!) 44 -> 25   Ht 5' 2" (1.575 m)   Wt 49.9 kg (110 " lb)   SpO2 (!) 79% RA -> 93% BiPAP 40% 10/5  BMI 20.12 kg/m².  Labs showed WBC 11, Hg 11.9, Cr 0.8, Alb 3, TB 0.2, AST 67, ALT 92, LA 1.1, Mg 1.6, COVID/FLU NEG, ABG BIPAP: pH 7.28, PCO2 62, PAO2 82.    CXR showed Presumed skin fold overlies the right upper lung zone, although a small right apical pneumothorax cannot be entirely excluded.  Chronic coarse interstitial attenuation with patchy right lower lung zone opacities possibly reflecting superimposed aspiration or infection.  Small volume of right-sided pleural fluid.  EKG showed sinus tachycardia, rate 121, non-specific ST-T changes, Q waves septal, QTc prolonged 596 ms.    NC CT chest showed Multifocal consolidation within the right lower lobe and to a lesser degree the right middle lobe.  Additional scattered irregular nodular airspace opacities throughout the aerated right lung.  This may reflect a nonspecific infectious or inflammatory process versus aspiration.  Advanced emphysematous change of the lungs. Scattered mildly enlarged mediastinal and right hilar lymph nodes.  These may be reactive in etiology.  No evidence of pneumothorax.  Previously questioned radiographic abnormality presumably related to prominent skin fold.  Moderate compression fracture of the T6 vertebral body, new from prior CT of 01/20/2024.  Additional chronic mild compression deformities of the thoracic and lumbar spine as above.  Diffuse osteopenia with chronic healed sternal fracture and multiple bilateral rib fractures.    In the ED she was treated with:  Medications   magnesium sulfate 2g in water 50mL IVPB (premix) (0 g Intravenous Stopped 2/10/25 0039)   ampicillin-sulbactam (UNASYN) 3 g in 0.9% NaCl 100 mL IVPB (MB+) (has no administration in time range)   albuterol-ipratropium 2.5 mg-0.5 mg/3 mL nebulizer solution 3 mL (3 mLs Nebulization Given 2/9/25 2334)   albuterol-ipratropium 2.5 mg-0.5 mg/3 mL nebulizer solution 3 mL (3 mLs Nebulization Given 2/9/25 2334)   LORazepam  (ATIVAN) injection 0.5 mg (0.5 mg Intravenous Given 2/9/25 9380)             Overview/Hospital Course:  Pt has been slow to improve. Her oxygen was turned down slightly and her HCO2 madelaine (66). Have increased her FiO2 (35->45) and will recheck a gas. Pt has a lot of anxiety at baseline and was asking for Klonopin. Pt was started on 0.5 mg Klonopin q6 PRN anxiety. Pt is now breathing easier. She is going to be a slow wean off the BIPAP.     Interval History:  Patient seen and examined.  No acute events overnight.  Seems to be improving since yesterday afternoon.  She is been weaned off of BiPAP and is now on nasal cannula.  We will monitor her overnight to make sure that she does not decompensate.  Anticipate discharge home tomorrow.  We will send a referral to pulmonology for her for potential home BiPAP.    Review of Systems   Constitutional:  Positive for activity change.   Respiratory:  Positive for cough, shortness of breath and wheezing.    All other systems reviewed and are negative.    Objective:     Vital Signs (Most Recent):  Temp: 97.7 °F (36.5 °C) (02/12/25 0745)  Pulse: 93 (02/12/25 0745)  Resp: (!) 25 (02/12/25 0745)  BP: 138/81 (02/12/25 0745)  SpO2: 95 % (02/12/25 0745) Vital Signs (24h Range):  Temp:  [96.2 °F (35.7 °C)-97.9 °F (36.6 °C)] 97.7 °F (36.5 °C)  Pulse:  [] 93  Resp:  [14-41] 25  SpO2:  [91 %-100 %] 95 %  BP: (125-174)/(67-98) 138/81     Weight: 51.1 kg (112 lb 10.5 oz)  Body mass index is 20.6 kg/m².    Intake/Output Summary (Last 24 hours) at 2/12/2025 1044  Last data filed at 2/12/2025 0507  Gross per 24 hour   Intake 563.42 ml   Output 350 ml   Net 213.42 ml         Physical Exam  Constitutional:       General: She is not in acute distress.     Appearance: She is ill-appearing (Appears chronically ill). She is not toxic-appearing or diaphoretic.   HENT:      Head: Normocephalic and atraumatic.   Eyes:      General: No scleral icterus.     Extraocular Movements: Extraocular  movements intact.      Conjunctiva/sclera: Conjunctivae normal.      Pupils: Pupils are equal, round, and reactive to light.   Cardiovascular:      Rate and Rhythm: Normal rate and regular rhythm.      Heart sounds: Normal heart sounds.   Pulmonary:      Effort: Pulmonary effort is normal. No respiratory distress.      Comments: Resting comfortably on 3.5 L per nasal cannula  Musculoskeletal:         General: Normal range of motion.   Skin:     General: Skin is warm and dry.      Coloration: Skin is not jaundiced.   Neurological:      Mental Status: She is alert and oriented to person, place, and time. Mental status is at baseline.             Significant Labs: All pertinent labs within the past 24 hours have been reviewed.  CBC:   Recent Labs   Lab 02/11/25  0430 02/12/25  0505   WBC 9.90 8.90   HGB 10.7* 10.8*   HCT 33.4* 33.2*    419     CMP:   Recent Labs   Lab 02/11/25  0430 02/12/25  0505    134*   K 3.7 4.2   CL 90* 90*   CO2 32* 31*   * 108   BUN 10 10   CREATININE 0.7 0.6   CALCIUM 9.0 8.9   PROT 7.1 7.2   ALBUMIN 2.6* 2.5*   BILITOT 0.1 0.1   ALKPHOS 95 90   AST 37 30   ALT 60* 44   ANIONGAP 15 13       Significant Imaging: I have reviewed all pertinent imaging results/findings within the past 24 hours.    Assessment and Plan     * Aspiration pneumonia of right lower lobe  She was admitted for PNA in exactly the same location 1 year ago  She reports problems swallowing and she is a daily drinker, both raising risk of recurrent aspiration events  Started Zosyn + Doxy (avoiding FQ and Macrolides with QTc of 596 ms)  Sputum CXS and gram stain + Legionella  BC times 2 show NGTD  C-diff was negative.         Wedge compression fracture of T5-T6 vertebra, initial encounter for closed fracture  TLSO brace ordered  PT and OT consulted  Avoid opioids with acute respiratory failure  Pain appears well controlled currently    Acute on chronic respiratory failure with hypoxia and hypercapnia  Patient  with Hypercapnic and Hypoxic Respiratory failure which is Acute on chronic.  she is on home oxygen at 3.5 LPM. Supplemental oxygen was provided and noted- Oxygen Concentration (%):  [35-40] 35    EMS reports she was satting in the 70s while on home oxygen upon arrival to her home.     SpO2 (!) 79% RA -> 93% BiPAP 40% 10/5->Currently on 14/7 97% 4 LPM at 40%  ABG BIPAP:    ABGs:   Recent Labs   Lab 02/10/25  0550 02/10/25  1104 02/11/25  1054   PH 7.292* 7.384 7.357   PCO2 67.1* 59.6* 66.9*   HCO3 32.4* 35.6* 37.5*   POCSATURATED 95 96 93   BE 6* 10* 12*   PO2 85 90 75*        Pt has had decreased work of breathing, decreased oxygen use, and improvement in ABG  Likely this is close to her baseline  Our attempt to decrease the BIPAP was not successful.   Will look at trying again tomorrow.     Sepsis due to pneumonia  This patient does have evidence of infective focus  Sepsis has now resolved  Source: Respiratory  Antibiotics given-   Antibiotics (72h ago, onward)      Start     Stop Route Frequency Ordered    02/10/25 0230  piperacillin-tazobactam (ZOSYN) 4.5 g in D5W 100 mL IVPB (MB+)         -- IV Every 8 hours (non-standard times) 02/10/25 0123    02/10/25 0130  doxycycline tablet 100 mg         -- Oral Every 12 hours 02/10/25 0123          Latest lactate reviewed-  Recent Labs   Lab 02/09/25 2235   LACTATE 1.1       Organ dysfunction indicated by Acute respiratory failure  Fluid challenge Fluid Not Needed - Patient is not hypotensive and/or lactate is less than 4.0.   Post- resuscitation assessment Yes - I attest a sepsis perfusion exam was performed within 6 hours of sepsis, severe sepsis, or septic shock presentation, following fluid resuscitation.  Will Not start Pressors- Levophed for MAP of 65  Source control achieved by: iv abx    Recent Labs   Lab 02/11/25  0430   .2*       Pt has significant inflammation.     Recent Labs   Lab 02/09/25  2235 02/10/25  0500 02/11/25  0430 02/12/25  0505   WBC 10.55  10.16 9.90 8.90       But WBC has not been significantly elevated.   Procalcitonin is 0.62  Pt appears to have a COPD exacerbation with smaller degree of infection  Continue current antibiotic  Schedule neb treatments  On Solu-Medrol 60 mg IV b.i.d..  Will trial on prednisone 60 mg daily beginning tomorrow.  Patient has been off of BiPAP since 3:00 p.m. yesterday - will downgrade to med tele          Heavy cigarette smoker  She heavy smoker, 1/2 ppd cigarettes since 13 y/o = 29 pack years.  Nicotine patch 21mg ordered  Nursing cessation protocol ordered, no counseling from me due to resp distress on bipap      Daily consumption of alcohol  She tells me she only drinks 1 cocktail daily  Multiple falls and fractures with some avoidance to the questions raises concerns this is actually more than stated  MVI, Thiamin, Folate  No signs of withdrawal including DT's, seizures  No need for withdrawal treatement    CMP:   Recent Labs   Lab 02/11/25  0430 02/12/25  0505    134*   K 3.7 4.2   CL 90* 90*   CO2 32* 31*   * 108   BUN 10 10   CREATININE 0.7 0.6   CALCIUM 9.0 8.9   PROT 7.1 7.2   ALBUMIN 2.6* 2.5*   BILITOT 0.1 0.1   ALKPHOS 95 90   AST 37 30   ALT 60* 44   ANIONGAP 15 13     Coagulation:   Recent Labs   Lab 02/11/25  0430   INR 1.0         AST/ALT are elevated consistent with chronic alcoholic hepatitis/cirrhosis      Hypothyroid  TSH 0.14  Free T4 1.14    Continue with Levothyroxine tablet 50 mcg, 50 mcg, Oral, Before breakfast   Pt is euthyroid      Anemia of chronic disease    folic acid tablet 1 mg, 1 mg, Oral, Daily   Repeat Folate B12 in am    All pertinent labs within the past 24 hours have been reviewed.  CBC:   Recent Labs   Lab 02/09/25  2235 02/10/25  0500 02/11/25  0430   WBC 10.55 10.16 9.90   HGB 11.9* 10.5* 10.7*   HCT 38.1 33.0* 33.4*    283 360     Hb is more or less stable  No evidence bleed       Latest Reference Range & Units 02/21/24 12:51   Iron 30 - 160 ug/dL 79   TIBC  "250 - 450 ug/dL 309   Saturated Iron 20 - 50 % 26   Transferrin 200 - 375 mg/dL 209   Ferritin 20.0 - 300.0 ng/mL 434 (H)       Latest Reference Range & Units 06/04/23 22:25   Folate 4.0 - 24.0 ng/mL <2.2 (L)      Pt has elevated Ferritin and does not have any iron deficiency  Folate is low and pt is on replacement  Pt has mild anemia and does not need transfusion        Recurrent falls  She has had multiple fractures to wrists, arms, ribs, and now T-spine  I suspect she underplays her ETOH use  PT and OT eval for rehab prior to discharge to home    Primary hypertension  /67   Pulse 88   Temp 97.3 °F (36.3 °C) (Temporal)   Resp (!) 25   Ht 5' 2" (1.575 m)   Wt 51.1 kg (112 lb 10.5 oz)   SpO2 (!) 93%   BMI 20.60 kg/m²     Goal SBP<140  Pt is currently at goal     BP medications:  CarvediloL tablet 12.5 mg, 12.5 mg, Oral, BID       Anxiety and depression  Pt appears stable with no HI/SI  Continue current treatment    Depression Medications:   BusPIRone tablet 15 mg, 15 mg, Oral, BID   FLUoxetine capsule 40 mg, 40 mg, Oral, Daily   Klonopin 0.5 mg po q6 PRN Anxiety      VTE Risk Mitigation (From admission, onward)           Ordered     enoxaparin injection 40 mg  Daily         02/10/25 0127     Place ALEA hose  Until discontinued         02/10/25 0127     IP VTE HIGH RISK PATIENT  Once         02/10/25 0127     Place sequential compression device  Until discontinued         02/10/25 0127                    Discharge Planning   CHLOE: 2/14/2025     Code Status: Full Code   Medical Readiness for Discharge Date:   Discharge Plan A: Home Health                Please place Justification for DME        Shae Jha NP  Department of Hospital Medicine   [facility]    "

## 2025-02-12 NOTE — PT/OT/SLP PROGRESS
Speech Language Pathology Treatment    Patient Name:  Bhavana Lambert   MRN:  [id number]  Admitting Diagnosis: Aspiration pneumonia of right lower lobe    Recommendations:                 General Recommendations:  Dysphagia therapy  Diet recommendations:  Soft & Bite Sized Diet - IDDSI Level 6, Liquid Diet Level: Thin   Aspiration Precautions: 1 bite/sip at a time, Feed only when awake/alert, Frequent oral care, HOB to 90 degrees, Monitor for s/s of aspiration, No straws, Small bites/sips, and Wear oxygen during intake   General Precautions: Standard, respiratory  Communication strategies:  none    Assessment:     Bhavana Lambert is a 69 y.o. female with an admitting diagnosis of aspiration pneumonia of the right lower lobe. RN reported that respiratory status continues to improve. Patient presented as more interactive and alert during treatment. No overt s/s of aspiration noted with thin liquids by cup rim and solids. Prolonged mastication noted with solids. However, oxygen levels remained WFL throughout assessment. Patient appropriate for diet upgrade at this time. ST recommends soft and bite sized diet with thin liquids. Will continue to follow.     Subjective   Patient seated at HOB in 90 degree position. Patient pleasant and cooperative throughout treatment.       Respiratory Status: Nasal cannula    Objective:     Has the patient been evaluated by SLP for swallowing?   Yes  Keep patient NPO? No     Goals:   Multidisciplinary Problems       SLP Goals          Problem: SLP    Goal Priority Disciplines Outcome   SLP Goal     SLP Progressing   Description: 1. Patient will maintain adequate hydration/nutrition with optimum safety and efficiency of swallowing function on P.O. intake without overt signs and symptoms of aspiration for the highest appropriate diet level.                        Plan:     Patient to be seen:  2 x/week   Plan of Care expires:  02/24/25  Plan of Care reviewed with:  patient, other (see  comments) (RN)   SLP Follow-Up:  Yes       Discharge recommendations:  Moderate Intensity Therapy   Barriers to Discharge:  None    Time Tracking:     SLP Treatment Date:   02/12/25  Speech Start Time:  0124  Speech Stop Time:  0134     Speech Total Time (min):  10 min    Billable Minutes: Treatment Swallowing Dysfunction 10 min    02/12/2025

## 2025-02-13 ENCOUNTER — CLINICAL SUPPORT (OUTPATIENT)
Dept: REHABILITATION | Facility: HOSPITAL | Age: 70
End: 2025-02-13
Attending: STUDENT IN AN ORGANIZED HEALTH CARE EDUCATION/TRAINING PROGRAM
Payer: MEDICARE

## 2025-02-13 DIAGNOSIS — J69.0 ASPIRATION PNEUMONIA OF RIGHT LOWER LOBE, UNSPECIFIED ASPIRATION PNEUMONIA TYPE: Primary | ICD-10-CM

## 2025-02-13 LAB
ALBUMIN SERPL BCP-MCNC: 2.6 G/DL (ref 3.5–5.2)
ALP SERPL-CCNC: 86 U/L (ref 40–150)
ALT SERPL W/O P-5'-P-CCNC: 35 U/L (ref 10–44)
ANION GAP SERPL CALC-SCNC: 9 MMOL/L (ref 8–16)
AST SERPL-CCNC: 26 U/L (ref 10–40)
BASOPHILS # BLD AUTO: 0.01 K/UL (ref 0–0.2)
BASOPHILS NFR BLD: 0.1 % (ref 0–1.9)
BILIRUB SERPL-MCNC: 0.2 MG/DL (ref 0.1–1)
BUN SERPL-MCNC: 12 MG/DL (ref 8–23)
CALCIUM SERPL-MCNC: 9.1 MG/DL (ref 8.7–10.5)
CHLORIDE SERPL-SCNC: 89 MMOL/L (ref 95–110)
CO2 SERPL-SCNC: 35 MMOL/L (ref 23–29)
CREAT SERPL-MCNC: 0.6 MG/DL (ref 0.5–1.4)
DIFFERENTIAL METHOD BLD: ABNORMAL
EOSINOPHIL # BLD AUTO: 0 K/UL (ref 0–0.5)
EOSINOPHIL NFR BLD: 0.1 % (ref 0–8)
ERYTHROCYTE [DISTWIDTH] IN BLOOD BY AUTOMATED COUNT: 12.2 % (ref 11.5–14.5)
EST. GFR  (NO RACE VARIABLE): >60 ML/MIN/1.73 M^2
GLUCOSE SERPL-MCNC: 109 MG/DL (ref 70–110)
HCT VFR BLD AUTO: 36.5 % (ref 37–48.5)
HGB BLD-MCNC: 11.4 G/DL (ref 12–16)
IMM GRANULOCYTES # BLD AUTO: 0.04 K/UL (ref 0–0.04)
IMM GRANULOCYTES NFR BLD AUTO: 0.5 % (ref 0–0.5)
L PNEUMO AG UR QL IA: NEGATIVE
LYMPHOCYTES # BLD AUTO: 0.6 K/UL (ref 1–4.8)
LYMPHOCYTES NFR BLD: 7.3 % (ref 18–48)
MAGNESIUM SERPL-MCNC: 1.8 MG/DL (ref 1.6–2.6)
MCH RBC QN AUTO: 30.5 PG (ref 27–31)
MCHC RBC AUTO-ENTMCNC: 31.2 G/DL (ref 32–36)
MCV RBC AUTO: 98 FL (ref 82–98)
MONOCYTES # BLD AUTO: 0.3 K/UL (ref 0.3–1)
MONOCYTES NFR BLD: 3.6 % (ref 4–15)
NEUTROPHILS # BLD AUTO: 7.3 K/UL (ref 1.8–7.7)
NEUTROPHILS NFR BLD: 88.4 % (ref 38–73)
NRBC BLD-RTO: 0 /100 WBC
PLATELET # BLD AUTO: 459 K/UL (ref 150–450)
PMV BLD AUTO: 10.3 FL (ref 9.2–12.9)
POTASSIUM SERPL-SCNC: 3.7 MMOL/L (ref 3.5–5.1)
PROT SERPL-MCNC: 6.8 G/DL (ref 6–8.4)
RBC # BLD AUTO: 3.74 M/UL (ref 4–5.4)
SODIUM SERPL-SCNC: 133 MMOL/L (ref 136–145)
WBC # BLD AUTO: 8.24 K/UL (ref 3.9–12.7)

## 2025-02-13 PROCEDURE — 25500020 PHARM REV CODE 255: Performed by: STUDENT IN AN ORGANIZED HEALTH CARE EDUCATION/TRAINING PROGRAM

## 2025-02-13 PROCEDURE — 85025 COMPLETE CBC W/AUTO DIFF WBC: CPT | Performed by: NURSE PRACTITIONER

## 2025-02-13 PROCEDURE — 92611 MOTION FLUOROSCOPY/SWALLOW: CPT

## 2025-02-13 PROCEDURE — 94761 N-INVAS EAR/PLS OXIMETRY MLT: CPT

## 2025-02-13 PROCEDURE — 25000003 PHARM REV CODE 250: Performed by: INTERNAL MEDICINE

## 2025-02-13 PROCEDURE — 97530 THERAPEUTIC ACTIVITIES: CPT

## 2025-02-13 PROCEDURE — 80053 COMPREHEN METABOLIC PANEL: CPT | Performed by: NURSE PRACTITIONER

## 2025-02-13 PROCEDURE — 94640 AIRWAY INHALATION TREATMENT: CPT

## 2025-02-13 PROCEDURE — 25000242 PHARM REV CODE 250 ALT 637 W/ HCPCS: Performed by: INTERNAL MEDICINE

## 2025-02-13 PROCEDURE — 63600175 PHARM REV CODE 636 W HCPCS: Performed by: NURSE PRACTITIONER

## 2025-02-13 PROCEDURE — 11000001 HC ACUTE MED/SURG PRIVATE ROOM

## 2025-02-13 PROCEDURE — A9698 NON-RAD CONTRAST MATERIALNOC: HCPCS | Performed by: STUDENT IN AN ORGANIZED HEALTH CARE EDUCATION/TRAINING PROGRAM

## 2025-02-13 PROCEDURE — 36415 COLL VENOUS BLD VENIPUNCTURE: CPT | Performed by: NURSE PRACTITIONER

## 2025-02-13 PROCEDURE — 83735 ASSAY OF MAGNESIUM: CPT | Performed by: NURSE PRACTITIONER

## 2025-02-13 PROCEDURE — S4991 NICOTINE PATCH NONLEGEND: HCPCS | Performed by: INTERNAL MEDICINE

## 2025-02-13 PROCEDURE — 63600175 PHARM REV CODE 636 W HCPCS: Performed by: INTERNAL MEDICINE

## 2025-02-13 PROCEDURE — 27000221 HC OXYGEN, UP TO 24 HOURS

## 2025-02-13 RX ORDER — AMOXICILLIN AND CLAVULANATE POTASSIUM 875; 125 MG/1; MG/1
1 TABLET, FILM COATED ORAL 2 TIMES DAILY
Qty: 12 TABLET | Refills: 0 | Status: SHIPPED | OUTPATIENT
Start: 2025-02-13 | End: 2025-02-19

## 2025-02-13 RX ORDER — BENZONATATE 100 MG/1
100 CAPSULE ORAL 3 TIMES DAILY PRN
Qty: 30 CAPSULE | Refills: 0 | Status: SHIPPED | OUTPATIENT
Start: 2025-02-13 | End: 2025-02-23

## 2025-02-13 RX ORDER — IBUPROFEN 200 MG
1 TABLET ORAL DAILY
Qty: 28 PATCH | Refills: 0 | Status: SHIPPED | OUTPATIENT
Start: 2025-02-13

## 2025-02-13 RX ORDER — PREDNISONE 20 MG/1
40 TABLET ORAL DAILY
Qty: 10 TABLET | Refills: 0 | Status: SHIPPED | OUTPATIENT
Start: 2025-02-13 | End: 2025-02-18

## 2025-02-13 RX ORDER — DOXYCYCLINE HYCLATE 100 MG
100 TABLET ORAL EVERY 12 HOURS
Qty: 12 TABLET | Refills: 0 | Status: SHIPPED | OUTPATIENT
Start: 2025-02-13 | End: 2025-02-19

## 2025-02-13 RX ADMIN — LEVOTHYROXINE SODIUM 50 MCG: 25 TABLET ORAL at 05:02

## 2025-02-13 RX ADMIN — THERA TABS 1 TABLET: TAB at 08:02

## 2025-02-13 RX ADMIN — BUSPIRONE HYDROCHLORIDE 15 MG: 5 TABLET ORAL at 08:02

## 2025-02-13 RX ADMIN — FLUOXETINE HYDROCHLORIDE 40 MG: 10 CAPSULE ORAL at 08:02

## 2025-02-13 RX ADMIN — ENOXAPARIN SODIUM 40 MG: 40 INJECTION SUBCUTANEOUS at 04:02

## 2025-02-13 RX ADMIN — FOLIC ACID 1 MG: 1 TABLET ORAL at 08:02

## 2025-02-13 RX ADMIN — CARVEDILOL 12.5 MG: 12.5 TABLET, FILM COATED ORAL at 08:02

## 2025-02-13 RX ADMIN — PIPERACILLIN SODIUM AND TAZOBACTAM SODIUM 4.5 G: 4; .5 INJECTION, POWDER, LYOPHILIZED, FOR SOLUTION INTRAVENOUS at 03:02

## 2025-02-13 RX ADMIN — IPRATROPIUM BROMIDE AND ALBUTEROL SULFATE 3 ML: .5; 2.5 SOLUTION RESPIRATORY (INHALATION) at 03:02

## 2025-02-13 RX ADMIN — IPRATROPIUM BROMIDE AND ALBUTEROL SULFATE 3 ML: .5; 2.5 SOLUTION RESPIRATORY (INHALATION) at 01:02

## 2025-02-13 RX ADMIN — PREDNISONE 60 MG: 20 TABLET ORAL at 08:02

## 2025-02-13 RX ADMIN — CLOPIDOGREL BISULFATE 75 MG: 75 TABLET ORAL at 08:02

## 2025-02-13 RX ADMIN — MONTELUKAST 10 MG: 10 TABLET, FILM COATED ORAL at 08:02

## 2025-02-13 RX ADMIN — PIPERACILLIN SODIUM AND TAZOBACTAM SODIUM 4.5 G: 4; .5 INJECTION, POWDER, LYOPHILIZED, FOR SOLUTION INTRAVENOUS at 04:02

## 2025-02-13 RX ADMIN — PIPERACILLIN SODIUM AND TAZOBACTAM SODIUM 4.5 G: 4; .5 INJECTION, POWDER, LYOPHILIZED, FOR SOLUTION INTRAVENOUS at 10:02

## 2025-02-13 RX ADMIN — NICOTINE 1 PATCH: 21 PATCH, EXTENDED RELEASE TRANSDERMAL at 08:02

## 2025-02-13 RX ADMIN — IPRATROPIUM BROMIDE AND ALBUTEROL SULFATE 3 ML: .5; 2.5 SOLUTION RESPIRATORY (INHALATION) at 07:02

## 2025-02-13 RX ADMIN — CLONAZEPAM 0.5 MG: 0.5 TABLET ORAL at 12:02

## 2025-02-13 RX ADMIN — DOXYCYCLINE HYCLATE 100 MG: 100 TABLET, COATED ORAL at 08:02

## 2025-02-13 RX ADMIN — Medication 100 MG: at 08:02

## 2025-02-13 RX ADMIN — BARIUM SULFATE 50 ML: 0.81 POWDER, FOR SUSPENSION ORAL at 01:02

## 2025-02-13 RX ADMIN — ATORVASTATIN CALCIUM 80 MG: 40 TABLET, FILM COATED ORAL at 08:02

## 2025-02-13 RX ADMIN — ASPIRIN 81 MG: 81 TABLET, COATED ORAL at 08:02

## 2025-02-13 NOTE — PLAN OF CARE
02/13/25 1419   Discharge Reassessment   Assessment Type Discharge Planning Reassessment   Did the patient's condition or plan change since previous assessment? No   Discharge Plan discussed with: Patient   Discharge Plan A Home Health   Discharge Plan B Home Health   DME Needed Upon Discharge  orthotic device     Case Management continuing to follow and will assist with discharge planning as needed.   Pt has her TSLO brace on, will continue with PT/OT while wearing brace. SLP recommends additional testing for aspiration. Plan is to go home with [facility]. At [place].

## 2025-02-13 NOTE — PLAN OF CARE
Patient was provided with preference form at bedside for HH. She chose [facility].Referrals sent.   02/13/25 6946   Post-Acute Status   Post-Acute Authorization Home Health   HME Status Set-up Complete/Auth obtained   Home Health Status Set-up Complete/Auth obtained   Patient choice form signed by patient/caregiver List from CMS Compare;List with quality metrics by geographic area provided;List from System Post-Acute Care   Discharge Delays None known at this time   Discharge Plan   Discharge Plan A Home Health   Discharge Plan B Home Health

## 2025-02-13 NOTE — CARE UPDATE
02/13/25 0118   Patient Assessment/Suction   Level of Consciousness (AVPU) alert   Respiratory Effort Unlabored   Expansion/Accessory Muscles/Retractions no retractions;no use of accessory muscles   All Lung Fields Breath Sounds Anterior:;diminished   Rhythm/Pattern, Respiratory no shortness of breath reported   Cough Frequency infrequent   Cough Type congested;loose;nonproductive   PRE-TX-O2   Device (Oxygen Therapy) nasal cannula with humidification   $ Is the patient on Low Flow Oxygen? Yes   Flow (L/min) (Oxygen Therapy) 3.5   Oxygen Concentration (%) 34   SpO2 98 %   Pulse Oximetry Type Continuous   $ Pulse Oximetry - Multiple Charge Pulse Oximetry - Multiple   Pulse 83   Resp 20   Aerosol Therapy   $ Aerosol Therapy Charges Aerosol Treatment   Daily Review of Necessity (SVN) completed   Respiratory Treatment Status (SVN) given   Treatment Route (SVN) mask;oxygen   Patient Position HOB elevated   Post Treatment Assessment (SVN) breath sounds unchanged;increased aeration   Signs of Intolerance (SVN) none   Preset CPAP/BiPAP Settings   Mode Of Delivery BiPAP S/T;standby   CPAP/BIPAP charged w/in last 24 h NO

## 2025-02-13 NOTE — PROGRESS NOTES
MODIFIED BARIUM SWALLOW STUDY  (MBSS)    Date of Evaluation: 02/13/2025     Name: Bhavana Lambert   MRN: [id number]    Diagnosis: Aspiration pneumonia of the right lower lobe    Recommendations:     Consistency Recommendations: Soft & Bite-Sized (IDDSI 6) and thin liquids (IDDSI 0)  Aspiration Precautions: R head turn w chin tuck with solids, use good oral hygiene , sit upright for all PO intake, increase physical mobility as tolerated, behavioral reflux precautions, alternate bites and sips, small bites and sips, multiple swallows per bolus, NO straws, Wear O2 with all PO intake, Slow pace, Respites as needed, remain upright for at least 30 minutes to a hours following any PO intake, eat small meals throughout the day to reduce discomfort associated with delayed emptying of the esophagus, and encourage volitional dry swallows and coughs throughout meals, follow reflux precautions   Specialist Referrals: GI  Therapy: Dysphagia therapy is recommended.  Communication Strategies: none    Subjective   No notes on file     Past Medical History: Bhavana Lambert  has a past medical history of Acquired hypothyroidism, Anxiety and depression, Cervical radicular pain, Closed left ankle fracture, COPD (chronic obstructive pulmonary disease), Heavy cigarette smoker, Hypertension, Hyponatremia, Multiple rib fractures, NSTEMI (non-ST elevated myocardial infarction), Requires continuous at home supplemental oxygen, Stroke (11/08/2023), Tension type headache, and Traumatic closed displaced fracture of distal end of radius, left, sequela.  Bhavana Lambert  has a past surgical history that includes Abdominal surgery; Leg Surgery (Right); Hysterectomy; Wrist surgery (Right); Colon surgery; Colostomy; Colostomy closure; and Open reduction and internal fixation (ORIF) of injury of wrist (Left, 1/29/2019).    The patient is a 69 y.o. female who complains of coughing.     The following observations were made:   -Mental status: Alert and  "Cooperative  -Factors affecting study: no difficulties participating in the study  -Feeding Method: independent in self-feeding    Respiratory Status: Nasal cannula, flow 4 L/min    Pneumonia History: Yes - current pneumonia of the right lower lobe  Previous MBSS: No  Previous FEES: No    Pain Scale:  0/10 on VAS currently.     Objective     Modified Barium Swallow Study  Purpose: to evaluate anatomy and physiology of the oropharyngeal swallow, to determine effectiveness of rehabilitation strategies, and to determine diet consistency and intervention recommendations. The study was performed using the "Gold Standard" of 30 fps with as low as reasonably achievable (ALARA) exposure.     The patient was seen in radiology seated in High Billingsley's position in a video imaging chair for lateral views of the larynx and an A/P view. nasal cannula. The study was conducted using Varibar thin liquid (IDDSI 0), Varibar pudding (IDDSI 4), and solid coated in Varibar pudding (IDDSI 7). She tolerated the procedure well.       CONSISTENCIES ADMINISTERED:    Consistency  Presentation  Findings Rosenbeck's Penetration/Aspiration Scale (PAS) Strategy Attempted    Thin (IDDSI 0) Self-regulated cup sip x 8    Views:  - Lateral view  - AP view   Oral phase: premature spillage to the vallecular space    Pharyngeal phase:  trace to min vallecular and pyriform sinus residue 2' to reduced epiglottic retroflexion and pharyngeal squeeze    Esophageal sweep: possible cricopharyngeal bar/prominent cricopharyngeus Best: (1) Material does not enter the airway    Worst: (1) Material does not enter the airway   No strategies completed or needed   Puree (extremely thick/ IDDSI 4) tsp x 2    Views:  - Lateral view  - AP view   Oral phase: premature spillage to the base of tongue    Pharyngeal phase:  minimal vallecular residue 2' to reduced epiglottic retroflexion    Esophageal sweep: possible cricopharyngeal bar/prominent cricopharyngeus and delayed " emptying/retention of bolus   Best: (1) Material does not enter the airway    Worst: (1) Material does not enter the airway   R Head Turn with chin tuck - assisted with clearance of residue  Liquid wash - assisted with clearance of vallecular residue   Solid (regular/ IDDSI 7) - bite of kye moore x1    View:  - Lateral view  Oral phase: WFL    Pharyngeal phase:  minimal base of tongue and vallecular residue 2' to reduced epiglottic retroflexion and tongue base retraction    Esophageal sweep: possible cricopharyngeal bar/prominent cricopharyngeus and delayed emptying/retention of bolus Best: (1) Material does not enter the airway    Worst: (1) Material does not enter the airway   Liquid wash - assisted with clearance of base of tongue and vallecular residue       Treatment   Total Treatment Time:  60 minutes    Patient educated regarding results and recommendations of MBSS, Plan of Care, role of SLP in care, and anatomy and physiology of swallow mechanism as it relates to MBSS findings and recommendations. Patient expressed understanding. All questions were answered.     Assessment     Bhavana Lambert is a 69 y.o. female referred for Modified Barium Swallow Study with a medical diagnosis of aspiration pneumonia of the right lower lobe. The patient presents with mild pharyngeal-esophageal dysphagia as determined by the Dysphagia Outcome and Severity Scale (CARMENCITA). Level 5: Mild Dysphagia.    Modified Barium Swallow Study (MBSS) revealed oral phase to be WFL--lingual and labial strength and range of motion WFL for tongue control, bolus preparation and transport. Lip closure was WFL with no labial escape. Bolus prep and mastication was timely and efficient. Lingual motion was brisk for adequate bolus transport. There was no significant oral residue.     Pharyngeal phase characterized by decreased base of tongue retraction, decreased pharyngeal wall contraction, multiple spontaneous swallows, and premature spillage  to level of the vallecula    Esophageal screen characterized by delayed esophageal emptying. and Further esophageal imaging including EGD and/or barium esophagram as well as follow-up with GI is recommended.    Impressions: MBSS completed. Pt presents with mild pharyngoesophageal dysphagia. Swallow safety is preserved; however, swallow efficiency is impaired..     Prognosis: Fair    Barriers: Fatigue  Respiratory compromise  Poor intake    Note: Conditions during a Modified barium Swallow Study are often considered optimal. This includes position of patient, timing and control of bolus, environment, and cueing for any necessary strategies    Goals:      Plan:   Patient is to continue being seen by ST while inpatient  Outpatient GI consult is warranted       Discharge recommendations:   Continue to follow current diet recommendations, follow up with GI  Barriers to Discharge:  Safety Awareness      Time Tracking:   SLP Treatment Date:    2/13/25  Speech Start Time:   12:30  Speech Stop Time:    1:30    Speech Total Time (min):   60 min    Billable Minutes: Motion Fluoro Swallow, Cine/Vid 60 min    02/13/2025  Therapist's Name:   Michelle Buck CCC-SLP   Speech Language Pathologist

## 2025-02-13 NOTE — PT/OT/SLP PROGRESS
Physical Therapy Treatment    Patient Name:  Bhavana Lambert   MRN:  [id number]    Recommendations:     Discharge Recommendations: Low Intensity Therapy (SNF)  Discharge Equipment Recommendations:  (TBD)  Barriers to discharge:  Increased level of assistance, ongoing medical treatment    Assessment:     Bhavana Lambert is a 69 y.o. female admitted with a medical diagnosis of Aspiration pneumonia of right lower lobe.  She presents with the following impairments/functional limitations: weakness, impaired sensation, impaired functional mobility, gait instability, pain, impaired cardiopulmonary response to activity.    Rehab Prognosis: Good; patient would benefit from acute skilled PT services to address these deficits and reach maximum level of function.    Recent Surgery: * No surgery found *      Plan:     During this hospitalization, patient to be seen 5 x/week to address the identified rehab impairments via therapeutic activities, therapeutic exercises and progress toward the following goals:    Plan of Care Expires:   (Upon discharge from the hospital.)    Subjective     Chief Complaint: No new complaints.  Patient/Family Comments/goals: None stated  Pain/Comfort:  Pain Rating 1: 8/10  Location - Orientation 1: lower  Location 1: back      Objective:     Communicated with nurse prior to session.  Patient found  in the wheelchair  with oxygen, bed alarm, telemetry, blood pressure cuff, pulse ox (continuous), PureWick upon PT entry to room.     General Precautions: Standard, fall, respiratory  Orthopedic Precautions: N/A  Braces: TLSO  Respiratory Status: Nasal cannula, flow 3.5 L/min     Functional Mobility:  Bed Mobility:     Sit to Supine: modified independence  Transfers:     Sit to Stand:  minimum assistance with no AD  Gait: Ambulated 3 feet from the wheelchair back to the bed with mod assist    Treatment & Education:  The patient received sit to stand transfer training with min assist followed by gait  training with mod assist approximately 3 feet from the wheelchair to the bed.  She transferred sit to supine on her own.    Patient left supine with all lines intact, call button in reach, bed alarm on, and nurse notified..    GOALS:   Multidisciplinary Problems       Physical Therapy Goals          Problem: Physical Therapy    Goal Priority Disciplines Outcome Interventions   Physical Therapy Goal     PT, PT/OT     Description: Goals    Patient to increase lower extremity strength by 1/2 muscle grade.  Patient to roll left and right with no assistance.  Patient to transfer supine <> sit with min assistance.  Patient to transfer bed <> chair via stand-pivot with a rolling walker and mod assistance.  Patient to ambulate with a rolling walker and mod assistance > 20 feet.                         DME Justifications:  No DME recommended requiring DME justifications    Time Tracking:     PT Received On: 02/13/25  PT Start Time: 1310     PT Stop Time: 1320  PT Total Time (min): 10 min     Billable Minutes: Gait Training 10    Treatment Type: Treatment  PT/PTA: PT     Number of PTA visits since last PT visit: 0     02/13/2025

## 2025-02-13 NOTE — PT/OT/SLP PROGRESS
Physical Therapy Treatment    Patient Name:  Bhavana Lambert   MRN:  [id number]    Recommendations:     Discharge Recommendations: Low Intensity Therapy (SNF)  Discharge Equipment Recommendations:  (TBD)  Barriers to discharge:  Ongoing medical treatment, increased level of assistance    Assessment:     Bhavana Lambert is a 69 y.o. female admitted with a medical diagnosis of Aspiration pneumonia of right lower lobe.  She presents with the following impairments/functional limitations: weakness, impaired sensation, impaired functional mobility, gait instability, pain, impaired cardiopulmonary response to activity.    Rehab Prognosis: Good; patient would benefit from acute skilled PT services to address these deficits and reach maximum level of function.    Recent Surgery: * No surgery found *      Plan:     During this hospitalization, patient to be seen 5 x/week to address the identified rehab impairments via therapeutic activities, therapeutic exercises and progress toward the following goals:    Plan of Care Expires:   (Upon discharge from the hospital.)    Subjective     Chief Complaint: Upon entering the patient's room, she was lying in bed with the TLSO on.    Patient/Family Comments/goals: The patient wishes to return to independent living.  Pain/Comfort:  Pain Rating 1: 8/10  Location - Orientation 1: lower  Location 1: back      Objective:     Communicated with nurse prior to session.  Patient found HOB elevated with oxygen, bed alarm, telemetry, blood pressure cuff, pulse ox (continuous), PureWick upon PT entry to room.     General Precautions: Standard, fall, respiratory  Orthopedic Precautions: N/A  Braces: TLSO  Respiratory Status: Nasal cannula, flow 3.5 L/min     Functional Mobility:  Bed Mobility:     Supine to Sit: modified independence  Sit to Supine: modified independence  Transfers:     Bed to Chair: moderate assistance with  no AD  using  Stand Pivot    Treatment & Education:  The DME company had  delivered the TLSO and the patient is now wearing it.  She transferred supine to sit independently but with increased time.  She received stand-pivot transfer training bed to wheelchair with mod assist.  She was able to bear weight and take steps.     Patient left  in the wheelchari  with all lines intact, nurse notified, and speech therapist present..    GOALS:   Multidisciplinary Problems       Physical Therapy Goals          Problem: Physical Therapy    Goal Priority Disciplines Outcome Interventions   Physical Therapy Goal     PT, PT/OT     Description: Goals    Patient to increase lower extremity strength by 1/2 muscle grade.  Patient to roll left and right with no assistance.  Patient to transfer supine <> sit with min assistance.  Patient to transfer bed <> chair via stand-pivot with a rolling walker and mod assistance.  Patient to ambulate with a rolling walker and mod assistance > 20 feet.                         DME Justifications:  No DME recommended requiring DME justifications    Time Tracking:     PT Received On: 02/13/25  PT Start Time: 1230     PT Stop Time: 1245  PT Total Time (min): 15 min     Billable Minutes: Therapeutic Activity 15    Treatment Type: Treatment  PT/PTA: PT     Number of PTA visits since last PT visit: 0     02/13/2025

## 2025-02-13 NOTE — NURSING
No acute events throughout the night.     Pt with need TLSO brace and to be fitted before D/C home.    Drysol Counseling:  I discussed with the patient the risks of drysol/aluminum chloride including but not limited to skin rash, itching, irritation, burning.

## 2025-02-13 NOTE — PT/OT/SLP PROGRESS
Prior to tx, ST spoke with RN who reported overt s/s of aspiration with thin liquids I.e. eyes watering, coughing/choking. ST spoke with patient who reported her cough is worsening. Patient politely declined bedside treatment. However, stated she would be compliant with an MBSS to further investigate pharyngeal functioning. Study is scheduled for 12:00 pm 2/13/25.

## 2025-02-13 NOTE — PROGRESS NOTES
"[facility] Medicine  Progress Note    Patient Name: Bhavana Lambert  MRN: [id number]  Patient Class: IP- Inpatient   Admission Date: 2/9/2025  Length of Stay: 3 days  Attending Physician: Reynaldo Nick MD  Primary Care Provider: Pepito Jhaveri IV, MD        Subjective     Principal Problem:Aspiration pneumonia of right lower lobe        HPI:  Ms. Lambert is a 68yo lady with a past medical history of hypothyroidism, anxiety and depression, COPD on home O2 3.5L NC due to long term smoking, CVA, HTN, NSTEMI, RLL pneumonia, ETOH abuse and multiple falls with bone fractures.  She is a daily drinker, "about a shot a day" and a heavy smoker, 1/2 ppd cigarettes since 13 y/o = 29 pack years.    Ms. Lambert is well known to me from having admitted her back on 4/18/24 to 4/21/24 with RLL pneumonia and COPD exacerbation.  She was treated then with Rocephin, Zithro, steroids and improved rapidly.    She now returns to the ED at [facility] with 1 week of worsening cough, SOB, MENA and fever with chills to 103.0F at home.  This really picked up over the past 3 days with the coughing and dyspnea.  She has just finished steroids, zithromax and duonebs with no relief at home.  Per ED, who got collateral from EMS, "EMS reports she was satting in the 70s while on home oxygen upon arrival to her home.  She received albuterol nebulizer, and Solu-Medrol 125 mg given by EMS prior to arrival."      She tells me she also was sleeping on her sofa at home last week and rolled off, striking her back.  Since that time, she has had generalized back pains, but not localized or any neurologic issues.  She also states she has nausea and chronic, "problems swallowing."  She has not passed out or had any episodic vomiting with the daily ETOH use.    In the ED her VS were /62 (BP Location: Left arm)   Pulse (!) 126 -> 112   Temp max 98.8 °F (37.1 °C) (Oral)   Resp (!) 44 -> 25   Ht 5' 2" (1.575 m)   Wt 49.9 kg " (110 lb)   SpO2 (!) 79% RA -> 93% BiPAP 40% 10/5  BMI 20.12 kg/m².  Labs showed WBC 11, Hg 11.9, Cr 0.8, Alb 3, TB 0.2, AST 67, ALT 92, LA 1.1, Mg 1.6, COVID/FLU NEG, ABG BIPAP: pH 7.28, PCO2 62, PAO2 82.    CXR showed Presumed skin fold overlies the right upper lung zone, although a small right apical pneumothorax cannot be entirely excluded.  Chronic coarse interstitial attenuation with patchy right lower lung zone opacities possibly reflecting superimposed aspiration or infection.  Small volume of right-sided pleural fluid.  EKG showed sinus tachycardia, rate 121, non-specific ST-T changes, Q waves septal, QTc prolonged 596 ms.    NC CT chest showed Multifocal consolidation within the right lower lobe and to a lesser degree the right middle lobe.  Additional scattered irregular nodular airspace opacities throughout the aerated right lung.  This may reflect a nonspecific infectious or inflammatory process versus aspiration.  Advanced emphysematous change of the lungs. Scattered mildly enlarged mediastinal and right hilar lymph nodes.  These may be reactive in etiology.  No evidence of pneumothorax.  Previously questioned radiographic abnormality presumably related to prominent skin fold.  Moderate compression fracture of the T6 vertebral body, new from prior CT of 01/20/2024.  Additional chronic mild compression deformities of the thoracic and lumbar spine as above.  Diffuse osteopenia with chronic healed sternal fracture and multiple bilateral rib fractures.    In the ED she was treated with:  Medications   magnesium sulfate 2g in water 50mL IVPB (premix) (0 g Intravenous Stopped 2/10/25 0039)   ampicillin-sulbactam (UNASYN) 3 g in 0.9% NaCl 100 mL IVPB (MB+) (has no administration in time range)   albuterol-ipratropium 2.5 mg-0.5 mg/3 mL nebulizer solution 3 mL (3 mLs Nebulization Given 2/9/25 0974)   albuterol-ipratropium 2.5 mg-0.5 mg/3 mL nebulizer solution 3 mL (3 mLs Nebulization Given 2/9/25 2334)    LORazepam (ATIVAN) injection 0.5 mg (0.5 mg Intravenous Given 2/9/25 5230)             Overview/Hospital Course:  Pt has been slow to improve. Her oxygen was turned down slightly and her HCO2 madelaine (66). Have increased her FiO2 (35->45) and will recheck a gas. Pt has a lot of anxiety at baseline and was asking for Klonopin. Pt was started on 0.5 mg Klonopin q6 PRN anxiety. Pt is now breathing easier. She is going to be a slow wean off the BIPAP.     Interval History:  And examined.  No acute events overnight.  Has remained off of BiPAP and is on her home oxygen concentration.  She is still having diarrhea.  C diff was negative.  She has been given loperamide today.  Case management has been consulted for TLSO brace.  Once this arrived patient's room we will have physical therapy work with patient for further evaluation.    Review of Systems   Constitutional:  Positive for activity change.   Respiratory:  Positive for cough, shortness of breath and wheezing.    Gastrointestinal:  Positive for diarrhea.   All other systems reviewed and are negative.    Objective:     Vital Signs (Most Recent):  Temp: 96.8 °F (36 °C) (02/13/25 0321)  Pulse: 80 (02/13/25 0738)  Resp: 18 (02/13/25 0738)  BP: 123/74 (02/13/25 0321)  SpO2: 99 % (02/13/25 0738) Vital Signs (24h Range):  Temp:  [96.2 °F (35.7 °C)-97.9 °F (36.6 °C)] 96.8 °F (36 °C)  Pulse:  [72-87] 80  Resp:  [17-29] 18  SpO2:  [96 %-100 %] 99 %  BP: (123-164)/(69-80) 123/74     Weight: 51.1 kg (112 lb 10.5 oz)  Body mass index is 20.6 kg/m².    Intake/Output Summary (Last 24 hours) at 2/13/2025 1016  Last data filed at 2/13/2025 0613  Gross per 24 hour   Intake 770.25 ml   Output 450 ml   Net 320.25 ml         Physical Exam  Constitutional:       General: She is not in acute distress.     Appearance: She is ill-appearing (Appears chronically ill). She is not toxic-appearing or diaphoretic.   HENT:      Head: Normocephalic and atraumatic.   Eyes:      General: No scleral  icterus.     Extraocular Movements: Extraocular movements intact.      Conjunctiva/sclera: Conjunctivae normal.      Pupils: Pupils are equal, round, and reactive to light.   Cardiovascular:      Rate and Rhythm: Normal rate and regular rhythm.      Heart sounds: Normal heart sounds.   Pulmonary:      Effort: Pulmonary effort is normal. No respiratory distress.      Comments: Comfortable on 3.5 L per nasal cannula  Musculoskeletal:         General: Normal range of motion.   Skin:     General: Skin is warm and dry.      Coloration: Skin is not jaundiced.   Neurological:      Mental Status: She is alert and oriented to person, place, and time. Mental status is at baseline.   Psychiatric:         Mood and Affect: Mood normal.         Behavior: Behavior normal.             Significant Labs: All pertinent labs within the past 24 hours have been reviewed.  CBC:   Recent Labs   Lab 02/12/25  0505   WBC 8.90   HGB 10.8*   HCT 33.2*        CMP:   Recent Labs   Lab 02/12/25  0505   *   K 4.2   CL 90*   CO2 31*      BUN 10   CREATININE 0.6   CALCIUM 8.9   PROT 7.2   ALBUMIN 2.5*   BILITOT 0.1   ALKPHOS 90   AST 30   ALT 44   ANIONGAP 13       Significant Imaging: I have reviewed all pertinent imaging results/findings within the past 24 hours.    Assessment and Plan     * Aspiration pneumonia of right lower lobe  She was admitted for PNA in exactly the same location 1 year ago  She reports problems swallowing and she is a daily drinker, both raising risk of recurrent aspiration events  Started Zosyn + Doxy (avoiding FQ and Macrolides with QTc of 596 ms)  Sputum CXS and gram stain + Legionella - patient has not been able expectorate as of 2/13/2025  BC times 2 show NGTD  C-diff was negative.   Speech Therapy following  Aspiration precautions      Wedge compression fracture of T5-T6 vertebra, initial encounter for closed fracture  TLSO brace ordered  PT and OT consulted  Avoid opioids with acute respiratory  failure  Pain appears well controlled currently    Acute on chronic respiratory failure with hypoxia and hypercapnia  Patient with Hypercapnic and Hypoxic Respiratory failure which is Acute on chronic.  she is on home oxygen at 3.5 LPM. Supplemental oxygen was provided and noted- Oxygen Concentration (%):  [35-40] 35    EMS reports she was satting in the 70s while on home oxygen upon arrival to her home.     SpO2 (!) 79% RA -> 93% BiPAP 40% 10/5->Currently on 14/7 97% 4 LPM at 40%  ABG BIPAP:    ABGs:   Recent Labs   Lab 02/10/25  0550 02/10/25  1104 02/11/25  1054   PH 7.292* 7.384 7.357   PCO2 67.1* 59.6* 66.9*   HCO3 32.4* 35.6* 37.5*   POCSATURATED 95 96 93   BE 6* 10* 12*   PO2 85 90 75*        Pt has had decreased work of breathing, decreased oxygen use, and improvement in ABG  Likely this is close to her baseline  Our attempt to decrease the BIPAP was not successful.   Will look at trying again tomorrow.     Sepsis due to pneumonia  This patient does have evidence of infective focus  Sepsis has now resolved  Source: Respiratory  Antibiotics given-   Antibiotics (72h ago, onward)      Start     Stop Route Frequency Ordered    02/10/25 0230  piperacillin-tazobactam (ZOSYN) 4.5 g in D5W 100 mL IVPB (MB+)         -- IV Every 8 hours (non-standard times) 02/10/25 0123    02/10/25 0130  doxycycline tablet 100 mg         -- Oral Every 12 hours 02/10/25 0123          Latest lactate reviewed-  Recent Labs   Lab 02/09/25  2235   LACTATE 1.1       Organ dysfunction indicated by Acute respiratory failure  Fluid challenge Fluid Not Needed - Patient is not hypotensive and/or lactate is less than 4.0.   Post- resuscitation assessment Yes - I attest a sepsis perfusion exam was performed within 6 hours of sepsis, severe sepsis, or septic shock presentation, following fluid resuscitation.  Will Not start Pressors- Levophed for MAP of 65  Source control achieved by: iv abx    Recent Labs   Lab 02/11/25  0430   .2*        Pt has significant inflammation.     Recent Labs   Lab 02/09/25  2235 02/10/25  0500 02/11/25  0430 02/12/25  0505   WBC 10.55 10.16 9.90 8.90       But WBC has not been significantly elevated.   Procalcitonin is 0.62  Pt appears to have a COPD exacerbation with smaller degree of infection  Continue current antibiotic  Schedule neb treatments  On Solu-Medrol 60 mg IV b.i.d..  Will trial on prednisone 60 mg daily beginning tomorrow.  Patient has been off of BiPAP since 3:00 p.m. yesterday - will downgrade to med tele          Heavy cigarette smoker  She heavy smoker, 1/2 ppd cigarettes since 11 y/o = 29 pack years.  Nicotine patch 21mg ordered  Nursing cessation protocol ordered, no counseling from me due to resp distress on bipap      Daily consumption of alcohol  She tells me she only drinks 1 cocktail daily  Multiple falls and fractures with some avoidance to the questions raises concerns this is actually more than stated  MVI, Thiamin, Folate  No signs of withdrawal including DT's, seizures  No need for withdrawal treatement    CMP:   Recent Labs   Lab 02/11/25 0430 02/12/25 0505    134*   K 3.7 4.2   CL 90* 90*   CO2 32* 31*   * 108   BUN 10 10   CREATININE 0.7 0.6   CALCIUM 9.0 8.9   PROT 7.1 7.2   ALBUMIN 2.6* 2.5*   BILITOT 0.1 0.1   ALKPHOS 95 90   AST 37 30   ALT 60* 44   ANIONGAP 15 13     Coagulation:   Recent Labs   Lab 02/11/25 0430   INR 1.0         AST/ALT are elevated consistent with chronic alcoholic hepatitis/cirrhosis      Hypothyroid  TSH 0.14  Free T4 1.14    Continue with Levothyroxine tablet 50 mcg, 50 mcg, Oral, Before breakfast   Pt is euthyroid      Anemia of chronic disease    folic acid tablet 1 mg, 1 mg, Oral, Daily   Repeat Folate B12 in am    All pertinent labs within the past 24 hours have been reviewed.  CBC:   Recent Labs   Lab 02/09/25  2235 02/10/25  0500 02/11/25  0430   WBC 10.55 10.16 9.90   HGB 11.9* 10.5* 10.7*   HCT 38.1 33.0* 33.4*    283 360  "    Hb is more or less stable  No evidence bleed       Latest Reference Range & Units 02/21/24 12:51   Iron 30 - 160 ug/dL 79   TIBC 250 - 450 ug/dL 309   Saturated Iron 20 - 50 % 26   Transferrin 200 - 375 mg/dL 209   Ferritin 20.0 - 300.0 ng/mL 434 (H)       Latest Reference Range & Units 06/04/23 22:25   Folate 4.0 - 24.0 ng/mL <2.2 (L)      Pt has elevated Ferritin and does not have any iron deficiency  Folate is low and pt is on replacement  Pt has mild anemia and does not need transfusion        Recurrent falls  She has had multiple fractures to wrists, arms, ribs, and now T-spine  I suspect she underplays her ETOH use  PT and OT eval for rehab prior to discharge to home    Primary hypertension  /67   Pulse 88   Temp 97.3 °F (36.3 °C) (Temporal)   Resp (!) 25   Ht 5' 2" (1.575 m)   Wt 51.1 kg (112 lb 10.5 oz)   SpO2 (!) 93%   BMI 20.60 kg/m²     Goal SBP<140  Pt is currently at goal     BP medications:  CarvediloL tablet 12.5 mg, 12.5 mg, Oral, BID       Anxiety and depression  Pt appears stable with no HI/SI  Continue current treatment    Depression Medications:   BusPIRone tablet 15 mg, 15 mg, Oral, BID   FLUoxetine capsule 40 mg, 40 mg, Oral, Daily   Klonopin 0.5 mg po q6 PRN Anxiety      VTE Risk Mitigation (From admission, onward)           Ordered     enoxaparin injection 40 mg  Daily         02/10/25 0127     Place ALEA hose  Until discontinued         02/10/25 0127     IP VTE HIGH RISK PATIENT  Once         02/10/25 0127     Place sequential compression device  Until discontinued         02/10/25 0127                    Discharge Planning   CHLOE: 2/14/2025     Code Status: Full Code   Medical Readiness for Discharge Date: 2/13/2025  Discharge Plan A: Home Health                Please place Justification for DME        Shae Jha NP  Department of Hospital Medicine   [facility]    "

## 2025-02-13 NOTE — SUBJECTIVE & OBJECTIVE
Interval History:  And examined.  No acute events overnight.  Has remained off of BiPAP and is on her home oxygen concentration.  She is still having diarrhea.  C diff was negative.  She has been given loperamide today.  Case management has been consulted for TLSO brace.  Once this arrived patient's room we will have physical therapy work with patient for further evaluation.    Review of Systems   Constitutional:  Positive for activity change.   Respiratory:  Positive for cough, shortness of breath and wheezing.    Gastrointestinal:  Positive for diarrhea.   All other systems reviewed and are negative.    Objective:     Vital Signs (Most Recent):  Temp: 96.8 °F (36 °C) (02/13/25 0321)  Pulse: 80 (02/13/25 0738)  Resp: 18 (02/13/25 0738)  BP: 123/74 (02/13/25 0321)  SpO2: 99 % (02/13/25 0738) Vital Signs (24h Range):  Temp:  [96.2 °F (35.7 °C)-97.9 °F (36.6 °C)] 96.8 °F (36 °C)  Pulse:  [72-87] 80  Resp:  [17-29] 18  SpO2:  [96 %-100 %] 99 %  BP: (123-164)/(69-80) 123/74     Weight: 51.1 kg (112 lb 10.5 oz)  Body mass index is 20.6 kg/m².    Intake/Output Summary (Last 24 hours) at 2/13/2025 1016  Last data filed at 2/13/2025 0613  Gross per 24 hour   Intake 770.25 ml   Output 450 ml   Net 320.25 ml         Physical Exam  Constitutional:       General: She is not in acute distress.     Appearance: She is ill-appearing (Appears chronically ill). She is not toxic-appearing or diaphoretic.   HENT:      Head: Normocephalic and atraumatic.   Eyes:      General: No scleral icterus.     Extraocular Movements: Extraocular movements intact.      Conjunctiva/sclera: Conjunctivae normal.      Pupils: Pupils are equal, round, and reactive to light.   Cardiovascular:      Rate and Rhythm: Normal rate and regular rhythm.      Heart sounds: Normal heart sounds.   Pulmonary:      Effort: Pulmonary effort is normal. No respiratory distress.      Comments: Comfortable on 3.5 L per nasal cannula  Musculoskeletal:         General:  Normal range of motion.   Skin:     General: Skin is warm and dry.      Coloration: Skin is not jaundiced.   Neurological:      Mental Status: She is alert and oriented to person, place, and time. Mental status is at baseline.   Psychiatric:         Mood and Affect: Mood normal.         Behavior: Behavior normal.             Significant Labs: All pertinent labs within the past 24 hours have been reviewed.  CBC:   Recent Labs   Lab 02/12/25  0505   WBC 8.90   HGB 10.8*   HCT 33.2*        CMP:   Recent Labs   Lab 02/12/25  0505   *   K 4.2   CL 90*   CO2 31*      BUN 10   CREATININE 0.6   CALCIUM 8.9   PROT 7.2   ALBUMIN 2.5*   BILITOT 0.1   ALKPHOS 90   AST 30   ALT 44   ANIONGAP 13       Significant Imaging: I have reviewed all pertinent imaging results/findings within the past 24 hours.

## 2025-02-13 NOTE — ASSESSMENT & PLAN NOTE
She was admitted for PNA in exactly the same location 1 year ago  She reports problems swallowing and she is a daily drinker, both raising risk of recurrent aspiration events  Started Zosyn + Doxy (avoiding FQ and Macrolides with QTc of 596 ms)  Sputum CXS and gram stain + Legionella - patient has not been able expectorate as of 2/13/2025  BC times 2 show NGTD  C-diff was negative.   Speech Therapy following  Aspiration precautions

## 2025-02-14 VITALS
HEIGHT: 62 IN | HEART RATE: 87 BPM | DIASTOLIC BLOOD PRESSURE: 87 MMHG | SYSTOLIC BLOOD PRESSURE: 146 MMHG | OXYGEN SATURATION: 95 % | WEIGHT: 108 LBS | BODY MASS INDEX: 19.88 KG/M2 | RESPIRATION RATE: 20 BRPM | TEMPERATURE: 98 F

## 2025-02-14 PROBLEM — A41.9 SEPSIS DUE TO PNEUMONIA: Status: RESOLVED | Noted: 2024-04-18 | Resolved: 2025-02-14

## 2025-02-14 PROBLEM — J18.9 SEPSIS DUE TO PNEUMONIA: Status: RESOLVED | Noted: 2024-04-18 | Resolved: 2025-02-14

## 2025-02-14 LAB
ALBUMIN SERPL BCP-MCNC: 2.5 G/DL (ref 3.5–5.2)
ALP SERPL-CCNC: 78 U/L (ref 40–150)
ALT SERPL W/O P-5'-P-CCNC: 30 U/L (ref 10–44)
ANION GAP SERPL CALC-SCNC: 10 MMOL/L (ref 8–16)
AST SERPL-CCNC: 24 U/L (ref 10–40)
BASOPHILS # BLD AUTO: 0.01 K/UL (ref 0–0.2)
BASOPHILS NFR BLD: 0.1 % (ref 0–1.9)
BILIRUB SERPL-MCNC: 0.2 MG/DL (ref 0.1–1)
BUN SERPL-MCNC: 12 MG/DL (ref 8–23)
CALCIUM SERPL-MCNC: 9 MG/DL (ref 8.7–10.5)
CHLORIDE SERPL-SCNC: 89 MMOL/L (ref 95–110)
CO2 SERPL-SCNC: 34 MMOL/L (ref 23–29)
CREAT SERPL-MCNC: 0.6 MG/DL (ref 0.5–1.4)
DIFFERENTIAL METHOD BLD: ABNORMAL
EOSINOPHIL # BLD AUTO: 0 K/UL (ref 0–0.5)
EOSINOPHIL NFR BLD: 0.1 % (ref 0–8)
ERYTHROCYTE [DISTWIDTH] IN BLOOD BY AUTOMATED COUNT: 11.7 % (ref 11.5–14.5)
EST. GFR  (NO RACE VARIABLE): >60 ML/MIN/1.73 M^2
GLUCOSE SERPL-MCNC: 79 MG/DL (ref 70–110)
HCT VFR BLD AUTO: 35.1 % (ref 37–48.5)
HGB BLD-MCNC: 11.3 G/DL (ref 12–16)
IMM GRANULOCYTES # BLD AUTO: 0.04 K/UL (ref 0–0.04)
IMM GRANULOCYTES NFR BLD AUTO: 0.5 % (ref 0–0.5)
LYMPHOCYTES # BLD AUTO: 1.4 K/UL (ref 1–4.8)
LYMPHOCYTES NFR BLD: 17.5 % (ref 18–48)
MAGNESIUM SERPL-MCNC: 1.8 MG/DL (ref 1.6–2.6)
MCH RBC QN AUTO: 30.8 PG (ref 27–31)
MCHC RBC AUTO-ENTMCNC: 32.2 G/DL (ref 32–36)
MCV RBC AUTO: 96 FL (ref 82–98)
MONOCYTES # BLD AUTO: 0.7 K/UL (ref 0.3–1)
MONOCYTES NFR BLD: 9.4 % (ref 4–15)
NEUTROPHILS # BLD AUTO: 5.6 K/UL (ref 1.8–7.7)
NEUTROPHILS NFR BLD: 72.4 % (ref 38–73)
NRBC BLD-RTO: 0 /100 WBC
PLATELET # BLD AUTO: 474 K/UL (ref 150–450)
PMV BLD AUTO: 10.4 FL (ref 9.2–12.9)
POTASSIUM SERPL-SCNC: 3.6 MMOL/L (ref 3.5–5.1)
PROT SERPL-MCNC: 6.7 G/DL (ref 6–8.4)
RBC # BLD AUTO: 3.67 M/UL (ref 4–5.4)
SODIUM SERPL-SCNC: 133 MMOL/L (ref 136–145)
WBC # BLD AUTO: 7.75 K/UL (ref 3.9–12.7)

## 2025-02-14 PROCEDURE — 63600175 PHARM REV CODE 636 W HCPCS: Performed by: NURSE PRACTITIONER

## 2025-02-14 PROCEDURE — 80053 COMPREHEN METABOLIC PANEL: CPT | Performed by: NURSE PRACTITIONER

## 2025-02-14 PROCEDURE — S4991 NICOTINE PATCH NONLEGEND: HCPCS | Performed by: INTERNAL MEDICINE

## 2025-02-14 PROCEDURE — 94761 N-INVAS EAR/PLS OXIMETRY MLT: CPT

## 2025-02-14 PROCEDURE — 25000003 PHARM REV CODE 250: Performed by: INTERNAL MEDICINE

## 2025-02-14 PROCEDURE — 85025 COMPLETE CBC W/AUTO DIFF WBC: CPT | Performed by: NURSE PRACTITIONER

## 2025-02-14 PROCEDURE — 25000242 PHARM REV CODE 250 ALT 637 W/ HCPCS: Performed by: INTERNAL MEDICINE

## 2025-02-14 PROCEDURE — 94660 CPAP INITIATION&MGMT: CPT

## 2025-02-14 PROCEDURE — 92526 ORAL FUNCTION THERAPY: CPT

## 2025-02-14 PROCEDURE — 63600175 PHARM REV CODE 636 W HCPCS: Performed by: INTERNAL MEDICINE

## 2025-02-14 PROCEDURE — 83735 ASSAY OF MAGNESIUM: CPT | Performed by: NURSE PRACTITIONER

## 2025-02-14 PROCEDURE — 27000221 HC OXYGEN, UP TO 24 HOURS

## 2025-02-14 PROCEDURE — 94640 AIRWAY INHALATION TREATMENT: CPT

## 2025-02-14 PROCEDURE — 99900035 HC TECH TIME PER 15 MIN (STAT)

## 2025-02-14 RX ADMIN — LEVOTHYROXINE SODIUM 50 MCG: 25 TABLET ORAL at 05:02

## 2025-02-14 RX ADMIN — Medication 100 MG: at 08:02

## 2025-02-14 RX ADMIN — CLONAZEPAM 0.5 MG: 0.5 TABLET ORAL at 10:02

## 2025-02-14 RX ADMIN — IPRATROPIUM BROMIDE AND ALBUTEROL SULFATE 3 ML: .5; 2.5 SOLUTION RESPIRATORY (INHALATION) at 12:02

## 2025-02-14 RX ADMIN — IPRATROPIUM BROMIDE AND ALBUTEROL SULFATE 3 ML: .5; 2.5 SOLUTION RESPIRATORY (INHALATION) at 07:02

## 2025-02-14 RX ADMIN — CLOPIDOGREL BISULFATE 75 MG: 75 TABLET ORAL at 08:02

## 2025-02-14 RX ADMIN — IPRATROPIUM BROMIDE AND ALBUTEROL SULFATE 3 ML: .5; 2.5 SOLUTION RESPIRATORY (INHALATION) at 03:02

## 2025-02-14 RX ADMIN — PREDNISONE 60 MG: 20 TABLET ORAL at 08:02

## 2025-02-14 RX ADMIN — PIPERACILLIN SODIUM AND TAZOBACTAM SODIUM 4.5 G: 4; .5 INJECTION, POWDER, LYOPHILIZED, FOR SOLUTION INTRAVENOUS at 03:02

## 2025-02-14 RX ADMIN — ATORVASTATIN CALCIUM 80 MG: 40 TABLET, FILM COATED ORAL at 08:02

## 2025-02-14 RX ADMIN — PIPERACILLIN SODIUM AND TAZOBACTAM SODIUM 4.5 G: 4; .5 INJECTION, POWDER, LYOPHILIZED, FOR SOLUTION INTRAVENOUS at 09:02

## 2025-02-14 RX ADMIN — MONTELUKAST 10 MG: 10 TABLET, FILM COATED ORAL at 07:02

## 2025-02-14 RX ADMIN — FLUOXETINE HYDROCHLORIDE 40 MG: 10 CAPSULE ORAL at 08:02

## 2025-02-14 RX ADMIN — ASPIRIN 81 MG: 81 TABLET, COATED ORAL at 08:02

## 2025-02-14 RX ADMIN — DOXYCYCLINE HYCLATE 100 MG: 100 TABLET, COATED ORAL at 08:02

## 2025-02-14 RX ADMIN — BUSPIRONE HYDROCHLORIDE 15 MG: 5 TABLET ORAL at 08:02

## 2025-02-14 RX ADMIN — FOLIC ACID 1 MG: 1 TABLET ORAL at 08:02

## 2025-02-14 RX ADMIN — THERA TABS 1 TABLET: TAB at 08:02

## 2025-02-14 RX ADMIN — CARVEDILOL 12.5 MG: 12.5 TABLET, FILM COATED ORAL at 08:02

## 2025-02-14 RX ADMIN — NICOTINE 1 PATCH: 21 PATCH, EXTENDED RELEASE TRANSDERMAL at 08:02

## 2025-02-14 NOTE — DISCHARGE SUMMARY
"[facility] Medicine  Discharge Summary      Patient Name: Bhavana Lambert  MRN: [id number]  AMALIA: [id number]  Patient Class: IP- Inpatient  Admission Date: 2/9/2025  Hospital Length of Stay: 4 days  Discharge Date and Time:  02/14/2025 1:04 PM  Attending Physician: Reynaldo Nick MD   Discharging Provider: Dionne Ortiz NP  Primary Care Provider: Pepito Jhaveri IV, MD    Primary Care Team: Networked reference to record PCT     HPI:   Ms. Lambert is a 70yo lady with a past medical history of hypothyroidism, anxiety and depression, COPD on home O2 3.5L NC due to long term smoking, CVA, HTN, NSTEMI, RLL pneumonia, ETOH abuse and multiple falls with bone fractures.  She is a daily drinker, "about a shot a day" and a heavy smoker, 1/2 ppd cigarettes since 13 y/o = 29 pack years.    Ms. Lambert is well known to me from having admitted her back on 4/18/24 to 4/21/24 with RLL pneumonia and COPD exacerbation.  She was treated then with Rocephin, Zithro, steroids and improved rapidly.    She now returns to the ED at [facility] with 1 week of worsening cough, SOB, MENA and fever with chills to 103.0F at home.  This really picked up over the past 3 days with the coughing and dyspnea.  She has just finished steroids, zithromax and duonebs with no relief at home.  Per ED, who got collateral from EMS, "EMS reports she was satting in the 70s while on home oxygen upon arrival to her home.  She received albuterol nebulizer, and Solu-Medrol 125 mg given by EMS prior to arrival."      She tells me she also was sleeping on her sofa at home last week and rolled off, striking her back.  Since that time, she has had generalized back pains, but not localized or any neurologic issues.  She also states she has nausea and chronic, "problems swallowing."  She has not passed out or had any episodic vomiting with the daily ETOH use.    In the ED her VS were /62 (BP Location: Left arm)   Pulse (!) 126 -> 112   " "Temp max 98.8 °F (37.1 °C) (Oral)   Resp (!) 44 -> 25   Ht 5' 2" (1.575 m)   Wt 49.9 kg (110 lb)   SpO2 (!) 79% RA -> 93% BiPAP 40% 10/5  BMI 20.12 kg/m².  Labs showed WBC 11, Hg 11.9, Cr 0.8, Alb 3, TB 0.2, AST 67, ALT 92, LA 1.1, Mg 1.6, COVID/FLU NEG, ABG BIPAP: pH 7.28, PCO2 62, PAO2 82.    CXR showed Presumed skin fold overlies the right upper lung zone, although a small right apical pneumothorax cannot be entirely excluded.  Chronic coarse interstitial attenuation with patchy right lower lung zone opacities possibly reflecting superimposed aspiration or infection.  Small volume of right-sided pleural fluid.  EKG showed sinus tachycardia, rate 121, non-specific ST-T changes, Q waves septal, QTc prolonged 596 ms.    NC CT chest showed Multifocal consolidation within the right lower lobe and to a lesser degree the right middle lobe.  Additional scattered irregular nodular airspace opacities throughout the aerated right lung.  This may reflect a nonspecific infectious or inflammatory process versus aspiration.  Advanced emphysematous change of the lungs. Scattered mildly enlarged mediastinal and right hilar lymph nodes.  These may be reactive in etiology.  No evidence of pneumothorax.  Previously questioned radiographic abnormality presumably related to prominent skin fold.  Moderate compression fracture of the T6 vertebral body, new from prior CT of 01/20/2024.  Additional chronic mild compression deformities of the thoracic and lumbar spine as above.  Diffuse osteopenia with chronic healed sternal fracture and multiple bilateral rib fractures.    In the ED she was treated with:  Medications   magnesium sulfate 2g in water 50mL IVPB (premix) (0 g Intravenous Stopped 2/10/25 0039)   ampicillin-sulbactam (UNASYN) 3 g in 0.9% NaCl 100 mL IVPB (MB+) (has no administration in time range)   albuterol-ipratropium 2.5 mg-0.5 mg/3 mL nebulizer solution 3 mL (3 mLs Nebulization Given 2/9/25 6345) " "  albuterol-ipratropium 2.5 mg-0.5 mg/3 mL nebulizer solution 3 mL (3 mLs Nebulization Given 2/9/25 8762)   LORazepam (ATIVAN) injection 0.5 mg (0.5 mg Intravenous Given 2/9/25 5669)             * No surgery found *      Hospital Course:   Bhavana Lambert (Kathy) is a 69 year old female w/ PMH anxiety, depression, HTN, anemia, ETOH abuse, tobacco abuse, and chronic respiratory failure on home O2. She was admitted to the hospital for management of aspiration pneumonia. She was slow to improve but slowly weaned off of BIPAP. She was weaned back to her home O2 dose of 3.5L NC. She was treated with IV abx, steroids, and breathing tx. Speech therapy was consulted to evaluate her for aspiration. She underwent a barium swallow study with findings of  mild pharyngoesophageal dysphagia and recommendations given for prevention of aspiration and to f/u w/ GI at discharge. She also reported diarrhea, which is a chronic problem, so strongly encouraged compliance with GI f/u. PT/OT also saw patient with recommendations for SNF but patient refused. She agreed to discharge home with [facility] and states she lives with her family. Patient assessed on day of discharge and deemed stable for discharge. Patient agreeable to plan and to discharge.      Goals of Care Treatment Preferences:  Code Status: Full Code      SDOH Screening:  The patient was screened for food insecurity, housing instability, transportation needs, utility difficulties, and interpersonal safety. The social determinant(s) of health identified as a concern this admission are:  Food insecurity    Will discuss with case management and/or community health workers.    Social Drivers of Health with Concerns     Food Insecurity: Food Insecurity Present (2/10/2025)   Utilities: Patient Declined (2/10/2025)        Consults:   Consults (From admission, onward)          Status Ordering Provider     Inpatient consult to Social Work/Case Management  Once        Provider:  (Not yet " assigned)    Completed SUDHEER GERONIMO            * Aspiration pneumonia of right lower lobe  She was admitted for PNA in exactly the same location 1 year ago  She reports problems swallowing and she is a daily drinker, both raising risk of recurrent aspiration events  Started Zosyn + Doxy (avoiding FQ and Macrolides with QTc of 596 ms) - Augmentin and doxy at discharge  Sputum CXS and gram stain + Legionella - patient has not been able expectorate as of 2/13/2025  BC times 2 show NGTD  C-diff was negative.   Speech Therapy following, appreciate recs   Consistency Recommendations: Soft & Bite-Sized (IDDSI 6) and thin liquids (IDDSI 0)  Aspiration Precautions: R head turn w chin tuck with solids, use good oral hygiene , sit upright for all PO intake, increase physical mobility as tolerated, behavioral reflux precautions, alternate bites and sips, small bites and sips, multiple swallows per bolus, NO straws, Wear O2 with all PO intake, Slow pace, Respites as needed, remain upright for at least 30 minutes to a hours following any PO intake, eat small meals throughout the day to reduce discomfort associated with delayed emptying of the esophagus, and encourage volitional dry swallows and coughs throughout meals, follow reflux precautions   Specialist Referrals: GI  Therapy: Dysphagia therapy is recommended.  Aspiration precautions  Slow to wean off BIPAP but now back to home O2 requirement      Wedge compression fracture of T5-T6 vertebra, initial encounter for closed fracture  TLSO brace ordered  PT and OT consulted  Avoid opioids with acute respiratory failure  Pain appears well controlled currently    Acute on chronic respiratory failure with hypoxia and hypercapnia  Patient with Hypercapnic and Hypoxic Respiratory failure which is Acute on chronic.  she is on home oxygen at 3.5 LPM. Supplemental oxygen was provided and noted- Oxygen Concentration (%):  [35-40] 35    EMS reports she was satting in the 70s while on  "home oxygen upon arrival to her home.     SpO2 (!) 79% RA -> 93% BiPAP 40% 10/5->Currently on 14/7 97% 4 LPM at 40%  ABG BIPAP:    ABGs:   Recent Labs   Lab 02/10/25  0550 02/10/25  1104 02/11/25  1054   PH 7.292* 7.384 7.357   PCO2 67.1* 59.6* 66.9*   HCO3 32.4* 35.6* 37.5*   POCSATURATED 95 96 93   BE 6* 10* 12*   PO2 85 90 75*        Pt has had decreased work of breathing, decreased oxygen use, and improvement in ABG  Likely this is close to her baseline  Our attempt to decrease the BIPAP was not successful.   Will look at trying again tomorrow.     Heavy cigarette smoker  She heavy smoker, 1/2 ppd cigarettes since 11 y/o = 29 pack years.  Nicotine patch 21mg ordered  Nursing cessation protocol ordered, strongly encouraged cessation      Daily consumption of alcohol  She tells me she only drinks 1 cocktail daily  Multiple falls and fractures with some avoidance to the questions raises concerns this is actually more than stated  MVI, Thiamin, Folate  No signs of withdrawal including DT's, seizures  No need for withdrawal treatement  Strongly encourage cessation    CMP:   Recent Labs   Lab 02/13/25  1149 02/14/25  0338   * 133*   K 3.7 3.6   CL 89* 89*   CO2 35* 34*    79   BUN 12 12   CREATININE 0.6 0.6   CALCIUM 9.1 9.0   PROT 6.8 6.7   ALBUMIN 2.6* 2.5*   BILITOT 0.2 0.2   ALKPHOS 86 78   AST 26 24   ALT 35 30   ANIONGAP 9 10     Coagulation:   No results for input(s): "PT", "INR", "APTT" in the last 48 hours.      AST/ALT are elevated consistent with chronic alcoholic hepatitis/cirrhosis      Hypothyroid  TSH 0.14  Free T4 1.14    Continue with Levothyroxine tablet 50 mcg, 50 mcg, Oral, Before breakfast   Pt is euthyroid      Anemia of chronic disease    folic acid tablet 1 mg, 1 mg, Oral, Daily   Repeat Folate B12 in am    All pertinent labs within the past 24 hours have been reviewed.  CBC:   Recent Labs   Lab 02/13/25  1149 02/14/25  0338   WBC 8.24 7.75   HGB 11.4* 11.3*   HCT 36.5* 35.1*   PLT " "459* 474*     Hb is more or less stable  No evidence bleed       Latest Reference Range & Units 02/21/24 12:51   Iron 30 - 160 ug/dL 79   TIBC 250 - 450 ug/dL 309   Saturated Iron 20 - 50 % 26   Transferrin 200 - 375 mg/dL 209   Ferritin 20.0 - 300.0 ng/mL 434 (H)       Latest Reference Range & Units 06/04/23 22:25   Folate 4.0 - 24.0 ng/mL <2.2 (L)      Pt has elevated Ferritin and does not have any iron deficiency  Folate is low and pt is on replacement  Pt has mild anemia and does not need transfusion        Recurrent falls  She has had multiple fractures to wrists, arms, ribs, and now T-spine  I suspect she underplays her ETOH use  PT and OT eval for rehab prior to discharge to home    Primary hypertension  BP (!) 141/78 (BP Location: Left arm, Patient Position: Lying)   Pulse 77   Temp 97.6 °F (36.4 °C) (Axillary)   Resp (!) 26 Comment: getting breathing tx  Ht 5' 2" (1.575 m)   Wt 49 kg (108 lb 0.4 oz)   SpO2 100%   BMI 19.76 kg/m²     Goal SBP<140  Pt is currently at goal     BP medications:  CarvediloL tablet 12.5 mg, 12.5 mg, Oral, BID       Anxiety and depression  Pt appears stable with no HI/SI  Continue current treatment    Depression Medications:   BusPIRone tablet 15 mg, 15 mg, Oral, BID   FLUoxetine capsule 40 mg, 40 mg, Oral, Daily   Klonopin 0.5 mg po q6 PRN Anxiety      Final Active Diagnoses:    Diagnosis Date Noted POA    PRINCIPAL PROBLEM:  Aspiration pneumonia of right lower lobe [J69.0] 02/10/2025 Yes    Acute on chronic respiratory failure with hypoxia and hypercapnia [J96.21, J96.22] 02/10/2025 Yes    Wedge compression fracture of T5-T6 vertebra, initial encounter for closed fracture [S22.050A] 02/10/2025 Yes    Heavy cigarette smoker [F17.210] 11/08/2023 Yes    Daily consumption of alcohol [Z78.9] 10/26/2023 Yes    Hypothyroid [E03.9] 10/26/2023 Yes    Anemia of chronic disease [D63.8] 06/27/2023 Yes    Recurrent falls [R29.6] 06/04/2023 Not Applicable    Primary hypertension [I10]  " "Yes    Anxiety and depression [F41.9, F32.A]  Yes      Problems Resolved During this Admission:    Diagnosis Date Noted Date Resolved POA    COPD exacerbation [J44.1] 02/10/2025 02/10/2025 Yes    Right lower lobe pneumonia [J18.9] 02/10/2025 02/10/2025 Yes    Sepsis due to pneumonia [J18.9, A41.9] 04/18/2024 02/14/2025 Yes    ETOH abuse [F10.10] 01/25/2024 02/10/2025 Yes    H/O: stroke [Z86.73] 11/08/2023 02/10/2025 Not Applicable       Discharged Condition: stable    Disposition: Home or Self Care    Follow Up:   Follow-up Information       Pepito Jhaveri IV, MD Follow up on 2/18/2025.    Specialty: Family Medicine  Why: Hospital Follow- up appt Tuesday Feb 18th at 10:30AM, you will see the NP at this visit.  Contact information:  [address]   [place]  [place] [id number] [phone]               [facility] Follow up.    Contact information:  [address] [place] [address] [id number] [phone]             Gastroenterology. Schedule an appointment as soon as possible for a visit.    Why: A referral has been placed for GI to contact you to set up an appointment.             Pulmonology. Schedule an appointment as soon as possible for a visit.    Why: A referral has been sent to pulmonology. You will get a call to be set-up for an appointment from a pulmonoligist office.                         Patient Instructions:      Back/Cervical Brace For Home Use     Order Specific Question Answer Comments   Height: 5' 2" (1.575 m)    Weight: 51.1 kg (112 lb 10.5 oz)    Length of need (1-99 months): 12    Product(s) ordered: TLSO BRACE    Does patient have medical equipment at home? bedside commode    Does patient have medical equipment at home? walker, rolling    Does patient have medical equipment at home? walker, standard    Does patient have medical equipment at home? wheelchair    Does patient have medical equipment at home? hospital bed    Does patient have medical equipment at home? " oxygen    Does patient have medical equipment at home? shower chair      Ambulatory referral/consult to Smoking Cessation Program   Standing Status: Future   Referral Priority: Routine Referral Type: Consultation   Referral Reason: Specialty Services Required   Requested Specialty: CTTS   Number of Visits Requested: 1     Ambulatory referral/consult to Pulmonology   Standing Status: Future   Referral Priority: Routine Referral Type: Consultation   Referral Reason: Specialty Services Required   Requested Specialty: Pulmonary Disease   Number of Visits Requested: 1     Ambulatory referral/consult to Home Health   Standing Status: Future   Referral Priority: Routine Referral Type: Home Health   Referral Reason: Specialty Services Required   Requested Specialty: Home Health Services   Number of Visits Requested: 1     Ambulatory referral/consult to Gastroenterology   Standing Status: Future   Referral Priority: Routine Referral Type: Consultation   Referral Reason: Specialty Services Required   Requested Specialty: Gastroenterology   Number of Visits Requested: 1     Diet Cardiac     Diet Dysphagia Soft   Order Comments: Eat bite-sized foods.     Notify your health care provider if you experience any of the following:  temperature >100.4     Notify your health care provider if you experience any of the following:  persistent nausea and vomiting or diarrhea     Notify your health care provider if you experience any of the following:  severe uncontrolled pain     Notify your health care provider if you experience any of the following:  difficulty breathing or increased cough     Notify your health care provider if you experience any of the following:  severe persistent headache     Notify your health care provider if you experience any of the following:  worsening rash     Notify your health care provider if you experience any of the following:  persistent dizziness, light-headedness, or visual disturbances     Notify  your health care provider if you experience any of the following:  increased confusion or weakness     Activity as tolerated       Significant Diagnostic Studies: Labs: CMP   Recent Labs   Lab 02/13/25  1149 02/14/25  0338   * 133*   K 3.7 3.6   CL 89* 89*   CO2 35* 34*    79   BUN 12 12   CREATININE 0.6 0.6   CALCIUM 9.1 9.0   PROT 6.8 6.7   ALBUMIN 2.6* 2.5*   BILITOT 0.2 0.2   ALKPHOS 86 78   AST 26 24   ALT 35 30   ANIONGAP 9 10    and CBC   Recent Labs   Lab 02/13/25  1149 02/14/25  0338   WBC 8.24 7.75   HGB 11.4* 11.3*   HCT 36.5* 35.1*   * 474*     Radiology: X-Ray: CXR: X-Ray Chest 1 View (CXR):   Results for orders placed or performed during the hospital encounter of 02/09/25   X-Ray Chest 1 View    Narrative    EXAMINATION:  XR CHEST 1 VIEW    CLINICAL HISTORY:  shortness of breath;    TECHNIQUE:  Single frontal view of the chest was performed.    COMPARISON:  07/13/2024    FINDINGS:  Cardiac monitoring leads overlie the chest.  Cardiac silhouette is stable in size and configuration compared to prior examination.  There are coarse bilateral interstitial opacities.  There are new patchy right lower lung zone airspace opacities which may reflect superimposed aspiration or infection.  There is a small volume of right-sided pleural fluid present.  Presumed skin fold overlies the right upper lung zone, although a small right apical pneumothorax cannot be excluded.  No definite left-sided pneumothorax.  There are multiple chronic appearing bilateral rib deformities.      Impression    Presumed skin fold overlies the right upper lung zone, although a small right apical pneumothorax cannot be entirely excluded.  Consider further evaluation with CT.    Chronic coarse interstitial attenuation with patchy right lower lung zone opacities possibly reflecting superimposed aspiration or infection.  Small volume of right-sided pleural fluid.    This report was flagged in Epic as  abnormal..      Electronically signed by: Khalif Brewer MD  Date:    02/09/2025  Time:    22:55     CT scan:  CT chest:  Multifocal consolidation within the right lower lobe and to a lesser degree the right middle lobe.  Additional scattered irregular nodular airspace opacities throughout the aerated right lung.  This may reflect a nonspecific infectious or inflammatory process versus aspiration.  Recommend short-term 3 month noncontrast chest CT follow-up to ensure appropriate temporal evolution/resolution as well as exclude underlying mass or neoplasm.     Advanced emphysematous change of the lungs.     Scattered mildly enlarged mediastinal and right hilar lymph nodes.  These may be reactive in etiology, although attention on follow-up imaging advised.     No evidence of pneumothorax.  Previously questioned radiographic abnormality presumably related to prominent skin fold.     Moderate compression fracture of the T6 vertebral body, new from prior CT of 01/20/2024.  Additional chronic mild compression deformities of the thoracic and lumbar spine as above.  Diffuse osteopenia with chronic healed sternal fracture and multiple bilateral rib fractures.    FL Modified Barium Swallow Speech Study:  The images sent to PACS demonstrate no evidence of aspiration. There are varying degrees of hypopharyngeal residue depending on the consistency administered. Mild degree of extrinsic impression on the proximal thoracic esophagus noted by prominent aortic arch. Please refer to the speech pathologist's complete report     Pending Diagnostic Studies:       None           Medications:  Reconciled Home Medications:      Medication List        START taking these medications      amoxicillin-clavulanate 875-125mg 875-125 mg per tablet  Commonly known as: AUGMENTIN  Take 1 tablet by mouth 2 (two) times daily. for 6 days     benzonatate 100 MG capsule  Commonly known as: TESSALON  Take 1 capsule (100 mg total) by mouth 3 (three)  times daily as needed for Cough.     doxycycline 100 MG tablet  Commonly known as: VIBRA-TABS  Take 1 tablet (100 mg total) by mouth every 12 (twelve) hours. for 6 days     nicotine 21 mg/24 hr  Commonly known as: NICODERM CQ  Place 1 patch onto the skin once daily.     predniSONE 20 MG tablet  Commonly known as: DELTASONE  Take 2 tablets (40 mg total) by mouth once daily. for 5 days            CONTINUE taking these medications      albuterol 90 mcg/actuation inhaler  Commonly known as: PROVENTIL/VENTOLIN HFA  Inhale 2 puffs into the lungs every 6 (six) hours as needed for Wheezing or Shortness of Breath.     albuterol-ipratropium 2.5 mg-0.5 mg/3 mL nebulizer solution  Commonly known as: DUO-NEB  Take 3 mLs by nebulization every 6 (six) hours as needed for Wheezing. Rescue     aspirin 81 MG EC tablet  Commonly known as: ECOTRIN  Take 1 tablet (81 mg total) by mouth once daily.     atorvastatin 80 MG tablet  Commonly known as: LIPITOR  Take 1 tablet (80 mg total) by mouth once daily.     busPIRone 15 MG tablet  Commonly known as: BUSPAR  Take 15 mg by mouth 2 (two) times a day.     carvediloL 25 MG tablet  Commonly known as: COREG  Take 0.5 tablets (12.5 mg total) by mouth 2 (two) times daily.     clopidogreL 75 mg tablet  Commonly known as: PLAVIX  Take 1 tablet (75 mg total) by mouth once daily.     cyanocobalamin 1,000 mcg/mL injection  Inject 1,000 mcg into the muscle every 30 days.     FLUoxetine 40 MG capsule  Take 40 mg by mouth once daily.     folic acid 1 MG tablet  Commonly known as: FOLVITE  Take 1 tablet (1 mg total) by mouth once daily.     HYDROcodone-acetaminophen  mg per tablet  Commonly known as: NORCO  Take 1 tablet by mouth every 4 (four) hours as needed for Pain.     levothyroxine 50 MCG tablet  Commonly known as: SYNTHROID  Take 50 mcg by mouth before breakfast.     LIDOcaine 4 % Ptmd  Apply 1 patch topically 2 (two) times daily as needed (pain).     montelukast 10 mg tablet  Commonly known  as: SINGULAIR  Take 10 mg by mouth every morning.     ondansetron 4 MG tablet  Commonly known as: ZOFRAN  Take 4 mg by mouth every 6 (six) hours as needed for Nausea.     spironolactone 25 MG tablet  Commonly known as: ALDACTONE  Take 0.5 tablets (12.5 mg total) by mouth once daily.     STIOLTO RESPIMAT 2.5-2.5 mcg/actuation Mist  Generic drug: tiotropium-olodateroL  INHALE ONE PUFF INTO THE LUNGS ONCE DAILY. controller              Indwelling Lines/Drains at time of discharge:   Lines/Drains/Airways       Drain  Duration             Female External Urinary Catheter w/ Suction 02/11/25 0600 3 days                    Time spent on the discharge of patient: 35 minutes         Dionne Ortiz NP  Department of Hospital Medicine  [place] [facility]

## 2025-02-14 NOTE — PLAN OF CARE
02/14/25 1449   Medicare Message   Important Message from Medicare regarding Discharge Appeal Rights Given to patient/caregiver;Explained to patient/caregiver;Signed/date by patient/caregiver   Date IMM was signed 02/14/25   Time IMM was signed [id number]     Placed in folder

## 2025-02-14 NOTE — ASSESSMENT & PLAN NOTE
"She tells me she only drinks 1 cocktail daily  Multiple falls and fractures with some avoidance to the questions raises concerns this is actually more than stated  MVI, Thiamin, Folate  No signs of withdrawal including DT's, seizures  No need for withdrawal treatement  Strongly encourage cessation    CMP:   Recent Labs   Lab 02/13/25  1149 02/14/25  0338   * 133*   K 3.7 3.6   CL 89* 89*   CO2 35* 34*    79   BUN 12 12   CREATININE 0.6 0.6   CALCIUM 9.1 9.0   PROT 6.8 6.7   ALBUMIN 2.6* 2.5*   BILITOT 0.2 0.2   ALKPHOS 86 78   AST 26 24   ALT 35 30   ANIONGAP 9 10     Coagulation:   No results for input(s): "PT", "INR", "APTT" in the last 48 hours.      AST/ALT are elevated consistent with chronic alcoholic hepatitis/cirrhosis    "

## 2025-02-14 NOTE — PLAN OF CARE
[facility] was sent records discharge summary, Home Health Orders. Marii daughter Low will pick her up around 4 pm today and transport her home. SW called and asked her to bring her mothers oxygen tank.Pt all clear to discharge.   02/14/25 1144   Final Note   Assessment Type Final Discharge Note   Anticipated Discharge Disposition Home-Health   Post-Acute Status   Post-Acute Authorization Home Health   HME Status Set-up Complete/Auth obtained   Home Health Status Set-up Complete/Auth obtained   Patient choice form signed by patient/caregiver List with quality metrics by geographic area provided;List from CMS Compare;List from System Post-Acute Care   Discharge Delays None known at this time

## 2025-02-14 NOTE — ASSESSMENT & PLAN NOTE
  folic acid tablet 1 mg, 1 mg, Oral, Daily   Repeat Folate B12 in am    All pertinent labs within the past 24 hours have been reviewed.  CBC:   Recent Labs   Lab 02/13/25  1149 02/14/25  0338   WBC 8.24 7.75   HGB 11.4* 11.3*   HCT 36.5* 35.1*   * 474*     Hb is more or less stable  No evidence bleed       Latest Reference Range & Units 02/21/24 12:51   Iron 30 - 160 ug/dL 79   TIBC 250 - 450 ug/dL 309   Saturated Iron 20 - 50 % 26   Transferrin 200 - 375 mg/dL 209   Ferritin 20.0 - 300.0 ng/mL 434 (H)       Latest Reference Range & Units 06/04/23 22:25   Folate 4.0 - 24.0 ng/mL <2.2 (L)      Pt has elevated Ferritin and does not have any iron deficiency  Folate is low and pt is on replacement  Pt has mild anemia and does not need transfusion

## 2025-02-14 NOTE — ASSESSMENT & PLAN NOTE
She heavy smoker, 1/2 ppd cigarettes since 13 y/o = 29 pack years.  Nicotine patch 21mg ordered  Nursing cessation protocol ordered, strongly encouraged cessation
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
## 2025-02-14 NOTE — RESPIRATORY THERAPY
02/13/25 1915   Patient Assessment/Suction   Level of Consciousness (AVPU) alert   Respiratory Effort Unlabored   Expansion/Accessory Muscles/Retractions no retractions;no use of accessory muscles   All Lung Fields Breath Sounds Anterior:;diminished   Rhythm/Pattern, Respiratory no shortness of breath reported   Skin Integrity   Area Observed Right;Left;Behind ear;Upper lip   Skin Appearance without discoloration   PRE-TX-O2   Device (Oxygen Therapy) nasal cannula   $ Is the patient on Low Flow Oxygen? Yes   Flow (L/min) (Oxygen Therapy) 3.5   Oxygen Concentration (%) 34   Pulse Oximetry Type Continuous   $ Pulse Oximetry - Multiple Charge Pulse Oximetry - Multiple   Pulse 103   Resp (!) 22   Preset CPAP/BiPAP Settings   Mode Of Delivery BiPAP;standby   CPAP/BIPAP charged w/in last 24 h NO

## 2025-02-14 NOTE — ASSESSMENT & PLAN NOTE
"BP (!) 141/78 (BP Location: Left arm, Patient Position: Lying)   Pulse 77   Temp 97.6 °F (36.4 °C) (Axillary)   Resp (!) 26 Comment: getting breathing tx  Ht 5' 2" (1.575 m)   Wt 49 kg (108 lb 0.4 oz)   SpO2 100%   BMI 19.76 kg/m²     Goal SBP<140  Pt is currently at goal     BP medications:  CarvediloL tablet 12.5 mg, 12.5 mg, Oral, BID     "

## 2025-02-14 NOTE — DISCHARGE INSTRUCTIONS
Consistency Recommendations: Soft & Bite-Sized (IDDSI 6) and thin liquids (IDDSI 0)  Aspiration Precautions: R head turn w chin tuck with solids, use good oral hygiene , sit upright for all PO intake, increase physical mobility as tolerated, behavioral reflux precautions, alternate bites and sips, small bites and sips, multiple swallows per bolus, NO straws, Wear O2 with all PO intake, Slow pace, Respites as needed, remain upright for at least 30 minutes to a hours following any PO intake, eat small meals throughout the day to reduce discomfort associated with delayed emptying of the esophagus, and encourage volitional dry swallows and coughs throughout meals, follow reflux precautions   Specialist Referrals: GI  Therapy: Dysphagia therapy is recommended.    [facility]      HOME HEALTH ORDERS  FACE TO FACE ENCOUNTER    Patient Name: Bhavana Lambert  YOB: 1955    PCP: Pepito Jhaveri IV, MD   PCP Address: [address] [place] [address] [place] [address]  PCP Phone Number: [phone]  PCP Fax: [phone]    Encounter Date: 2/9/25    Admit to Home Health    Diagnoses:  Active Hospital Problems    Diagnosis  POA    *Aspiration pneumonia of right lower lobe [J69.0]  Yes    Acute on chronic respiratory failure with hypoxia and hypercapnia [J96.21, J96.22]  Yes    Wedge compression fracture of T5-T6 vertebra, initial encounter for closed fracture [S22.050A]  Yes    Heavy cigarette smoker [F17.210]  Yes    Daily consumption of alcohol [Z78.9]  Yes    Hypothyroid [E03.9]  Yes    Anemia of chronic disease [D63.8]  Yes    Recurrent falls [R29.6]  Not Applicable    Primary hypertension [I10]  Yes    Anxiety and depression [F41.9, F32.A]  Yes      Resolved Hospital Problems    Diagnosis Date Resolved POA    COPD exacerbation [J44.1] 02/10/2025 Yes    Right lower lobe pneumonia [J18.9] 02/10/2025 Yes    Sepsis due to pneumonia [J18.9, A41.9] 02/14/2025 Yes    ETOH abuse [F10.10] 02/10/2025  Yes    H/O: stroke [Z86.73] 02/10/2025 Not Applicable       Follow Up Appointments:  No future appointments.    Allergies:Review of patient's allergies indicates:  No Known Allergies    Medications: Review discharge medications with patient and family and provide education.    Current Facility-Administered Medications   Medication Dose Route Frequency Provider Last Rate Last Admin    acetaminophen tablet 650 mg  650 mg Oral Q4H PRN SUDHEER Chu MD   650 mg at 02/12/25 2347    albuterol-ipratropium 2.5 mg-0.5 mg/3 mL nebulizer solution 3 mL  3 mL Nebulization Q4H PRN SUDHEER Chu MD   3 mL at 02/11/25 1138    albuterol-ipratropium 2.5 mg-0.5 mg/3 mL nebulizer solution 3 mL  3 mL Nebulization Q8H Bear Quick MD   3 mL at 02/14/25 0707    aluminum-magnesium hydroxide-simethicone 200-200-20 mg/5 mL suspension 30 mL  30 mL Oral QID PRN SUDHEER Chu MD        aspirin EC tablet 81 mg  81 mg Oral Daily SUDHEER Chu MD   81 mg at 02/14/25 0834    atorvastatin tablet 80 mg  80 mg Oral Daily SUDHEER Chu MD   80 mg at 02/14/25 0834    benzonatate capsule 100 mg  100 mg Oral TID PRN SUDHEER Chu MD        busPIRone tablet 15 mg  15 mg Oral BID SUDHEER Chu MD   15 mg at 02/14/25 0834    carvediloL tablet 12.5 mg  12.5 mg Oral BID SUDHEER Chu MD   12.5 mg at 02/14/25 0834    clonazePAM tablet 0.5 mg  0.5 mg Oral Q6H PRN Bear Quick MD   0.5 mg at 02/14/25 1003    clopidogreL tablet 75 mg  75 mg Oral Daily SUDHEER Chu MD   75 mg at 02/14/25 0834    cyclobenzaprine tablet 5 mg  5 mg Oral TID PRN Bear Quick MD   5 mg at 02/12/25 0836    dextrose 50% injection 12.5 g  12.5 g Intravenous PRN SUDHEER Chu MD        dextrose 50% injection 25 g  25 g Intravenous PRN SUDHEER Chu MD        doxycycline tablet 100 mg  100 mg Oral Q12H SUDHEER Chu MD   100 mg at 02/14/25 0834    enoxaparin injection 40 mg  40 mg Subcutaneous Daily SUDHEER Chu MD   40 mg  at 02/13/25 1640    FLUoxetine capsule 40 mg  40 mg Oral Daily SUDHEER Chu MD   40 mg at 02/14/25 0833    folic acid tablet 1 mg  1 mg Oral Daily SUDHEER Chu MD   1 mg at 02/14/25 0834    glucagon (human recombinant) injection 1 mg  1 mg Intramuscular PRN SUDHEER Chu MD        glucose chewable tablet 16 g  16 g Oral PRN SUDHEER Chu MD        glucose chewable tablet 24 g  24 g Oral PRN SUDHEER Chu MD        levothyroxine tablet 50 mcg  50 mcg Oral Before breakfast SUDHEER Chu MD   50 mcg at 02/14/25 0539    loperamide capsule 2 mg  2 mg Oral QID PRN Bear Quick MD   2 mg at 02/12/25 1109    magnesium oxide tablet 800 mg  800 mg Oral PRN SUDHEER Chu MD        magnesium oxide tablet 800 mg  800 mg Oral PRN SUDHEER Chu MD        melatonin tablet 6 mg  6 mg Oral Nightly PRN SUDHEER Chu MD        montelukast tablet 10 mg  10 mg Oral QAM SUDHEER Chu MD   10 mg at 02/14/25 0705    multivitamin tablet  1 tablet Oral Daily SUDHEER Chu MD   1 tablet at 02/14/25 0833    naloxone 0.4 mg/mL injection 0.02 mg  0.02 mg Intravenous PRN SUDHEER Chu MD        nicotine 21 mg/24 hr 1 patch  1 patch Transdermal Daily SUDHEER Chu MD   1 patch at 02/14/25 0836    ondansetron injection 4 mg  4 mg Intravenous Q8H PRN SUDHEER Chu MD        piperacillin-tazobactam (ZOSYN) 4.5 g in D5W 100 mL IVPB (MB+)  4.5 g Intravenous Q8H SUDHEER Chu MD 25 mL/hr at 02/14/25 0959 4.5 g at 02/14/25 0959    potassium bicarbonate disintegrating tablet 35 mEq  35 mEq Oral PRN SUDHEER Chu MD        potassium bicarbonate disintegrating tablet 50 mEq  50 mEq Oral PRN SUDHEER Chu MD   50 mEq at 02/11/25 0859    potassium bicarbonate disintegrating tablet 60 mEq  60 mEq Oral PRN SUDHEER Chu MD        potassium, sodium phosphates 280-160-250 mg packet 2 packet  2 packet Oral PRN SUDHEER Chu MD        potassium, sodium phosphates 280-160-250 mg packet  2 packet  2 packet Oral PRN SUDHEER Chu MD        potassium, sodium phosphates 280-160-250 mg packet 2 packet  2 packet Oral PRN SUDHEER Chu MD   2 packet at 02/11/25 1615    predniSONE tablet 60 mg  60 mg Oral Daily Kaur Shae S., NP   60 mg at 02/14/25 0833    prochlorperazine injection Soln 5 mg  5 mg Intravenous Q6H PRN SUDHEER Chu MD        senna-docusate 8.6-50 mg per tablet 1 tablet  1 tablet Oral BID PRN SUDHEER Chu MD        simethicone chewable tablet 80 mg  1 tablet Oral QID PRN SUDHEER Chu MD        sodium chloride 0.9% flush 10 mL  10 mL Intravenous Q12H PRN SUDHEER Chu MD        thiamine tablet 100 mg  100 mg Oral Daily SUDHEER Chu MD   100 mg at 02/14/25 0833        Medication List        START taking these medications      amoxicillin-clavulanate 875-125mg 875-125 mg per tablet  Commonly known as: AUGMENTIN  Take 1 tablet by mouth 2 (two) times daily. for 6 days     benzonatate 100 MG capsule  Commonly known as: TESSALON  Take 1 capsule (100 mg total) by mouth 3 (three) times daily as needed for Cough.     doxycycline 100 MG tablet  Commonly known as: VIBRA-TABS  Take 1 tablet (100 mg total) by mouth every 12 (twelve) hours. for 6 days     nicotine 21 mg/24 hr  Commonly known as: NICODERM CQ  Place 1 patch onto the skin once daily.     predniSONE 20 MG tablet  Commonly known as: DELTASONE  Take 2 tablets (40 mg total) by mouth once daily. for 5 days            CONTINUE taking these medications      albuterol 90 mcg/actuation inhaler  Commonly known as: PROVENTIL/VENTOLIN HFA  Inhale 2 puffs into the lungs every 6 (six) hours as needed for Wheezing or Shortness of Breath.     albuterol-ipratropium 2.5 mg-0.5 mg/3 mL nebulizer solution  Commonly known as: DUO-NEB  Take 3 mLs by nebulization every 6 (six) hours as needed for Wheezing. Rescue     aspirin 81 MG EC tablet  Commonly known as: ECOTRIN  Take 1 tablet (81 mg total) by mouth once daily.      atorvastatin 80 MG tablet  Commonly known as: LIPITOR  Take 1 tablet (80 mg total) by mouth once daily.     busPIRone 15 MG tablet  Commonly known as: BUSPAR  Take 15 mg by mouth 2 (two) times a day.     carvediloL 25 MG tablet  Commonly known as: COREG  Take 0.5 tablets (12.5 mg total) by mouth 2 (two) times daily.     clopidogreL 75 mg tablet  Commonly known as: PLAVIX  Take 1 tablet (75 mg total) by mouth once daily.     cyanocobalamin 1,000 mcg/mL injection  Inject 1,000 mcg into the muscle every 30 days.     FLUoxetine 40 MG capsule  Take 40 mg by mouth once daily.     folic acid 1 MG tablet  Commonly known as: FOLVITE  Take 1 tablet (1 mg total) by mouth once daily.     HYDROcodone-acetaminophen  mg per tablet  Commonly known as: NORCO  Take 1 tablet by mouth every 4 (four) hours as needed for Pain.     levothyroxine 50 MCG tablet  Commonly known as: SYNTHROID  Take 50 mcg by mouth before breakfast.     LIDOcaine 4 % Ptmd  Apply 1 patch topically 2 (two) times daily as needed (pain).     montelukast 10 mg tablet  Commonly known as: SINGULAIR  Take 10 mg by mouth every morning.     ondansetron 4 MG tablet  Commonly known as: ZOFRAN  Take 4 mg by mouth every 6 (six) hours as needed for Nausea.     spironolactone 25 MG tablet  Commonly known as: ALDACTONE  Take 0.5 tablets (12.5 mg total) by mouth once daily.     STIOLTO RESPIMAT 2.5-2.5 mcg/actuation Mist  Generic drug: tiotropium-olodateroL  INHALE ONE PUFF INTO THE LUNGS ONCE DAILY. controller                I have seen and examined this patient within the last 30 days. My clinical findings that support the need for the home health skilled services and home bound status are the following:no   Weakness/numbness causing balance and gait disturbance due to COPD Exacerbation, Fracture, and Weakness/Debility making it taxing to leave home.  Requiring assistive device to leave home due to unsteady gait caused by  COPD Exacerbation, Fracture, and  Weakness/Debility.     Diet:   Regular diet  Consistency Recommendations: Soft & Bite-Sized (IDDSI 6) and thin liquids (IDDSI 0)  Aspiration Precautions: R head turn w chin tuck with solids, use good oral hygiene , sit upright for all PO intake, increase physical mobility as tolerated, behavioral reflux precautions, alternate bites and sips, small bites and sips, multiple swallows per bolus, NO straws, Wear O2 with all PO intake, Slow pace, Respites as needed, remain upright for at least 30 minutes to a hours following any PO intake, eat small meals throughout the day to reduce discomfort associated with delayed emptying of the esophagus, and encourage volitional dry swallows and coughs throughout meals, follow reflux precautions   Specialist Referrals: GI  Therapy: Dysphagia therapy is recommended.    Labs:  N/A    Referrals/ Consults  Physical Therapy to evaluate and treat. Evaluate for home safety and equipment needs; Establish/upgrade home exercise program. Perform / instruct on therapeutic exercises, gait training, transfer training, and Range of Motion.  Occupational Therapy to evaluate and treat. Evaluate home environment for safety and equipment needs. Perform/Instruct on transfers, ADL training, ROM, and therapeutic exercises.  Speech Therapy  to evaluate and treat for  Swallowing.  Aide to provide assistance with personal care, ADLs, and vital signs.    Activities:   activity as tolerated and other wear TLSO brace with ambulation    Nursing:   Agency to admit patient within 24 hours of hospital discharge unless specified on physician order or at patient request    SN to complete comprehensive assessment including routine vital signs. Instruct on disease process and s/s of complications to report to MD. Review/verify medication list sent home with the patient at time of discharge  and instruct patient/caregiver as needed. Frequency may be adjusted depending on start of care date.     Skilled nurse to  perform up to 3 visits PRN for symptoms related to diagnosis    Notify MD if SBP > 160 or < 90; DBP > 90 or < 50; HR > 120 or < 50; Temp > 101; O2 < 88%    Ok to schedule additional visits based on staff availability and patient request on consecutive days within the home health episode.    When multiple disciplines ordered:    Start of Care occurs on Sunday - Wednesday schedule remaining discipline evaluations as ordered on separate consecutive days following the start of care.    Thursday SOC -schedule subsequent evaluations Friday and Monday the following week.     Friday - Saturday SOC - schedule subsequent discipline evaluations on consecutive days starting Monday of the following week.    For all post-discharge communication and subsequent orders please contact patient's primary care physician. If unable to reach primary care physician or do not receive response within 30 minutes, please contact office for clinical staff order clarification    Miscellaneous   Home Oxygen:  Oxygen at 3.5 L/min nasal canula to be used:  Continuously. and Notify physician if oxygen saturation less than 88%    Home Health Aide:  Nursing Three times weekly, Physical Therapy Three times weekly, Occupational Therapy Three times weekly, and Speech Language Pathology Three times weekly    Wound Care Orders  no    I certify that this patient is confined to her home and needs intermittent skilled nursing care, physical therapy, speech therapy, and occupational therapy.

## 2025-02-14 NOTE — PLAN OF CARE
Problem: Adult Inpatient Plan of Care  Goal: Plan of Care Review  Outcome: Progressing  Goal: Patient-Specific Goal (Individualized)  Outcome: Progressing  Goal: Absence of Hospital-Acquired Illness or Injury  Outcome: Progressing  Goal: Optimal Comfort and Wellbeing  Outcome: Progressing  Goal: Readiness for Transition of Care  Outcome: Progressing     Problem: Infection  Goal: Absence of Infection Signs and Symptoms  Outcome: Progressing     Problem: Sepsis/Septic Shock  Goal: Optimal Coping  Outcome: Progressing  Goal: Absence of Bleeding  Outcome: Progressing  Goal: Blood Glucose Level Within Targeted Range  Outcome: Progressing  Goal: Absence of Infection Signs and Symptoms  Outcome: Progressing  Goal: Optimal Nutrition Intake  Outcome: Progressing     Problem: Pneumonia  Goal: Fluid Balance  Outcome: Progressing  Goal: Resolution of Infection Signs and Symptoms  Outcome: Progressing  Goal: Effective Oxygenation and Ventilation  Outcome: Progressing     Problem: Skin Injury Risk Increased  Goal: Skin Health and Integrity  Outcome: Progressing     Problem: Gas Exchange Impaired  Goal: Optimal Gas Exchange  Outcome: Progressing     Problem: Excessive Substance Use  Goal: Optimized Energy Level (Excessive Substance Use)  Outcome: Progressing  Goal: Improved Behavioral Control (Excessive Substance Use)  Outcome: Progressing  Goal: Increased Participation and Engagement (Excessive Substance Use)  Outcome: Progressing  Goal: Improved Physiologic Symptoms (Excessive Substance Use)  Outcome: Progressing  Goal: Enhanced Social, Occupational or Functional Skills (Excessive Substance Use)  Outcome: Progressing     Problem: Activity Intolerance  Goal: Enhanced Capacity and Energy  Outcome: Progressing     Problem: Fall Injury Risk  Goal: Absence of Fall and Fall-Related Injury  Outcome: Progressing     Problem: COPD (Chronic Obstructive Pulmonary Disease)  Goal: Optimal Chronic Illness Coping  Outcome: Progressing  Goal:  Optimal Level of Functional [place]  Outcome: Progressing  Goal: Absence of Infection Signs and Symptoms  Outcome: Progressing  Goal: Improved Oral Intake  Outcome: Progressing  Goal: Effective Oxygenation and Ventilation  Outcome: Progressing     Problem: Mobility Impairment  Goal: Optimal Mobility  Outcome: Progressing   No acute events overnight. Pt VSS. Purposeful rounding complete. All questions answered. Call light within reach. Bed in locked and low position.

## 2025-02-14 NOTE — RESPIRATORY THERAPY
02/14/25 0054   Patient Assessment/Suction   Level of Consciousness (AVPU) alert   Respiratory Effort Unlabored   Expansion/Accessory Muscles/Retractions no retractions;no use of accessory muscles   All Lung Fields Breath Sounds Anterior:;diminished   PRE-TX-O2   Device (Oxygen Therapy) nasal cannula   $ Is the patient on Low Flow Oxygen? Yes   Flow (L/min) (Oxygen Therapy) 3.5   Oxygen Concentration (%) 34   SpO2 100 %   Pulse Oximetry Type Continuous   $ Pulse Oximetry - Multiple Charge Pulse Oximetry - Multiple   Pulse 75   Resp 20   Aerosol Therapy   $ Aerosol Therapy Charges Aerosol Treatment   Respiratory Treatment Status (SVN) given   Treatment Route (SVN) oxygen;mask   Patient Position HOB elevated   Signs of Intolerance (SVN) other (see comments)  (patient did not want to complete therapy. Duration-2min and she pulled it off.)   Preset CPAP/BiPAP Settings   Mode Of Delivery BiPAP S/T;standby   CPAP/BIPAP charged w/in last 24 h NO

## 2025-02-14 NOTE — ASSESSMENT & PLAN NOTE
She was admitted for PNA in exactly the same location 1 year ago  She reports problems swallowing and she is a daily drinker, both raising risk of recurrent aspiration events  Started Zosyn + Doxy (avoiding FQ and Macrolides with QTc of 596 ms) - Augmentin and doxy at discharge  Sputum CXS and gram stain + Legionella - patient has not been able expectorate as of 2/13/2025  BC times 2 show NGTD  C-diff was negative.   Speech Therapy following, appreciate recs   Consistency Recommendations: Soft & Bite-Sized (IDDSI 6) and thin liquids (IDDSI 0)  Aspiration Precautions: R head turn w chin tuck with solids, use good oral hygiene , sit upright for all PO intake, increase physical mobility as tolerated, behavioral reflux precautions, alternate bites and sips, small bites and sips, multiple swallows per bolus, NO straws, Wear O2 with all PO intake, Slow pace, Respites as needed, remain upright for at least 30 minutes to a hours following any PO intake, eat small meals throughout the day to reduce discomfort associated with delayed emptying of the esophagus, and encourage volitional dry swallows and coughs throughout meals, follow reflux precautions   Specialist Referrals: GI  Therapy: Dysphagia therapy is recommended.  Aspiration precautions  Slow to wean off BIPAP but now back to home O2 requirement

## 2025-02-15 LAB
BACTERIA BLD CULT: NORMAL
BACTERIA BLD CULT: NORMAL

## 2025-02-15 NOTE — PLAN OF CARE
Pt received d/c instructions. Assisted to car by tech and nurse with daughter. Alert and oriented upon departure with own home O2 tank.

## 2025-02-24 ENCOUNTER — OFFICE VISIT (OUTPATIENT)
Dept: PULMONOLOGY | Facility: CLINIC | Age: 70
End: 2025-02-24
Payer: MEDICARE

## 2025-02-24 VITALS
HEIGHT: 62 IN | SYSTOLIC BLOOD PRESSURE: 133 MMHG | BODY MASS INDEX: 19.25 KG/M2 | OXYGEN SATURATION: 92 % | WEIGHT: 104.63 LBS | DIASTOLIC BLOOD PRESSURE: 82 MMHG

## 2025-02-24 DIAGNOSIS — F17.200 TOBACCO DEPENDENCE: ICD-10-CM

## 2025-02-24 DIAGNOSIS — J44.1 COPD EXACERBATION: Primary | ICD-10-CM

## 2025-02-24 DIAGNOSIS — J96.12 CHRONIC RESPIRATORY FAILURE WITH HYPOXIA AND HYPERCAPNIA: ICD-10-CM

## 2025-02-24 DIAGNOSIS — R22.31 LOCALIZED SWELLING OF RIGHT UPPER EXTREMITY: ICD-10-CM

## 2025-02-24 DIAGNOSIS — F10.10 ETOH ABUSE: ICD-10-CM

## 2025-02-24 DIAGNOSIS — J96.11 CHRONIC RESPIRATORY FAILURE WITH HYPOXIA AND HYPERCAPNIA: ICD-10-CM

## 2025-02-24 DIAGNOSIS — S22.050A COMPRESSION FRACTURE OF T6 VERTEBRA, INITIAL ENCOUNTER: ICD-10-CM

## 2025-02-24 DIAGNOSIS — Z87.01 HISTORY OF ASPIRATION PNEUMONIA: ICD-10-CM

## 2025-02-24 PROCEDURE — 99999 PR PBB SHADOW E&M-EST. PATIENT-LVL V: CPT | Mod: PBBFAC,,, | Performed by: NURSE PRACTITIONER

## 2025-02-24 PROCEDURE — 99215 OFFICE O/P EST HI 40 MIN: CPT | Mod: PBBFAC,PO | Performed by: NURSE PRACTITIONER

## 2025-02-24 RX ORDER — ERGOCALCIFEROL 1.25 MG/1
50000 CAPSULE ORAL
COMMUNITY

## 2025-02-24 RX ORDER — CYCLOBENZAPRINE HCL 10 MG
1 TABLET ORAL 3 TIMES DAILY PRN
COMMUNITY
Start: 2025-02-03

## 2025-02-24 RX ORDER — FLUTICASONE FUROATE, UMECLIDINIUM BROMIDE AND VILANTEROL TRIFENATATE 100; 62.5; 25 UG/1; UG/1; UG/1
1 POWDER RESPIRATORY (INHALATION) DAILY
Qty: 60 EACH | Refills: 11 | Status: SHIPPED | OUTPATIENT
Start: 2025-02-24

## 2025-02-24 RX ORDER — ALBUTEROL SULFATE 90 UG/1
2 INHALANT RESPIRATORY (INHALATION) EVERY 6 HOURS PRN
Qty: 18 G | Refills: 11 | Status: SHIPPED | OUTPATIENT
Start: 2025-02-24

## 2025-02-24 NOTE — PROGRESS NOTES
2/24/2025    Bhavana SINDY Fausto  In office visit     Chief Complaint   Patient presents with    COPD       HPI:  02/24/2025 - Patient is new to me; previously seen per Dr. Tammy Anand for management of COPD with chronic hypoxic respiratory failure.  Recent hospitalization at [facility] on 02/09 for COPD exacerbation, acute on chronic hypoxic and hypercapnic respiratory failure, aspiration pneumonia.  Patient states leading up to hospitalization she was suffering with a GI illness that included diarrhea and nausea, specifically denies vomiting.  Patient received BiPAP therapy during hospital admission.  Patient also underwent speech therapy evaluation in which swallowing recommendations were given, along with a referral to GI.  Patient was subsequently discharged home on 02/14/2025 with prescription for doxycycline, Augmentin, and prednisone 40 mg x 5 days.  Fluoroquinolones avoided due to EKG findings.  Patient with severe COPD, currently using Stiolto 1 puff once per day (if she remembers), and albuterol p.r.n., approximately 2-3 times per day depending on activity level.  Also using nebulizer treatments 1-2 times per day.  Patient using supplemental oxygen 247 at 3.5 L via nasal cannula.  Denies the use of home positive pressure device.  Patient currently smoking approximately 4 cigarettes per day.  Has a follow up appointment with GI scheduled for later this week.  Feels as if breathing has essentially returned back to baseline.  Denies current mucus production.        08/30/2023- Pt is a 67 yo female with hypothyroidism, COPD, O2 dependence, anxiety/depression, migraines, osteoporosis who presents for new evaluation to establish care for lungs.  She is here today w/ daughter Yanely who assists w/ history.   PCP Dr. Camejo has been taking care of her so far   For lungs she takes albuterol, spiriva  Symptoms: SOB walking to kitchen, gradually worsening past few mos. Denies cough/phlegm  Has sinus drainage  sometimes. Back hurts w/ breathing sometimes  She has home O2 3L for past few years. She has portable tank today. Her POC overheated and stopped working.   She denies frequent exacerbations. It has been since last yr fall that she had bronchitis. She gets exacerbation maybe 1-2x/yr.  She tripped over the dog and broke 8 ribs in the past.   She is a current smoker, <1 ppd since age 21. Was able to quit in past for 5 yrs during a period of time she was hospitalized a lot for bowel blockages and bowel surgeries.  She restarted smoking 4/2016 when her spouse passed away.  FH grandfather had lung cancer  Patient Instructions   Stop spiriva and start stiolto inhaler- 2 puffs once a day  Continue albuterol as needed  Quit smoking recommended  Continue oxygen - walk test to see if you can have a portable concentrator reordered  Lung cancer screening CT ordered        The chief complaint problem is New to me - previously seen per Dr. Anand    PFSH:  Past Medical History:   Diagnosis Date    Acquired hypothyroidism     Anxiety and depression     Cervical radicular pain     Closed left ankle fracture     COPD (chronic obstructive pulmonary disease)     Heavy cigarette smoker     She has smoked 1/2 PPD since the age of 12, and still smokes    Hypertension     Hyponatremia     Multiple rib fractures     NSTEMI (non-ST elevated myocardial infarction)     Requires continuous at home supplemental oxygen     She is on 3.5L NC continuous    Stroke 11/08/2023    Tension type headache     Traumatic closed displaced fracture of distal end of radius, left, sequela          Past Surgical History:   Procedure Laterality Date    ABDOMINAL SURGERY      COLON SURGERY      COLOSTOMY      COLOSTOMY CLOSURE      HYSTERECTOMY      LEG SURGERY Right     OPEN REDUCTION AND INTERNAL FIXATION (ORIF) OF INJURY OF WRIST Left 1/29/2019    Procedure: ORIF, WRIST;  Surgeon: Homero Jeff DO;  Location: [facility] OR;  Service: Orthopedics;  Laterality:  "Left;  Equipment: [employer] Geminus Wrist Plate Set  Vendor/Rep: [employer]  C-Arm: Entire  DME: None    REQUIRES ASSISTANT    WRIST SURGERY Right      Social History     Tobacco Use    Smoking status: Every Day     Current packs/day: 1.00     Average packs/day: 1 pack/day for 20.0 years (20.0 ttl pk-yrs)     Types: Cigarettes    Smokeless tobacco: Never   Substance Use Topics    Alcohol use: Yes     Comment: couple times a week-wine or bloody cari    Drug use: No     Family History   Problem Relation Name Age of Onset    Cancer Mother      COPD Father      Cirrhosis Father      Arthritis Sister      No Known Problems Brother      Anxiety disorder Daughter      Depression Daughter      Anxiety disorder Daughter      Congenital heart disease Daughter      Valvular heart disease Daughter       Review of patient's allergies indicates:  No Known Allergies    Performance Status:The patient's activity level is housebound activities.    In wheelchair today    Review of Systems:  a review of eleven systems covering constitutional, Eye, HEENT, Psych, Respiratory, Cardiac, GI, , Musculoskeletal, Endocrine, Dermatologic was negative except for pertinent findings as listed ABOVE and below:      Exam:Comprehensive exam done. /82 (BP Location: Right arm, Patient Position: Sitting)   Ht 5' 2" (1.575 m)   Wt 47.4 kg (104 lb 9.7 oz)   SpO2 (!) 92% Comment: on room air at rest  BMI 19.13 kg/m²   Exam included Vitals as listed, and patient's appearance and affect and alertness and mood, oral exam for yeast and hygiene and pharynx lesions and Mallapatti (M) score, neck with inspection for jvd and masses and thyroid abnormalities and lymph nodes (supraclavicular and infraclavicular nodes and axillary also examined and noted if abn), chest exam included symmetry and effort and fremitus and percussion and auscultation, cardiac exam included rhythm and gallops and murmur and rubs and jvd and edema, abdominal " exam for mass and hepatosplenomegaly and tenderness and hernias and bowel sounds, Musculoskeletal exam with muscle tone and posture and mobility/gait and  strength, and skin for rashes and cyanosis and pallor and turgor, extremity for clubbing.  Findings were normal except for pertinent findings listed below:  Patient is awake alert oriented x4.  Conversing appropriately.  Pleasant.  On supplemental oxygen at 3 L via nasal cannula.  In no acute respiratory distress.  Mild clubbing noted, no lower extremity edema.  Localized swelling to right antecubital area, generalized bruising throughout bilateral upper extremities.  In wheelchair. S1S2, clear breath sounds throughout.       Radiographs (ct chest and cxr) reviewed: view by direct vision and interpretation as below   CT Chest Without Contrast 2/9/2025 Impression:  Patchy infiltration to the right lower lobe, right middle lobe.  Diffuse emphysema bilaterally.  Mediastinal adenopathy.  CXR 6/27/23- interstitial changes, flattened diaphragm    Patient's labs were reviewed including CBC and CMP    Lab Results   Component Value Date    WBC 7.75 02/14/2025    HGB 11.3 (L) 02/14/2025    HCT 35.1 (L) 02/14/2025    MCV 96 02/14/2025     (H) 02/14/2025       CMP  Sodium   Date Value Ref Range Status   02/14/2025 133 (L) 136 - 145 mmol/L Final     Potassium   Date Value Ref Range Status   02/14/2025 3.6 3.5 - 5.1 mmol/L Final     Chloride   Date Value Ref Range Status   02/14/2025 89 (L) 95 - 110 mmol/L Final     CO2   Date Value Ref Range Status   02/14/2025 34 (H) 23 - 29 mmol/L Final     Glucose   Date Value Ref Range Status   02/14/2025 79 70 - 110 mg/dL Final     BUN   Date Value Ref Range Status   02/14/2025 12 8 - 23 mg/dL Final     Creatinine   Date Value Ref Range Status   02/14/2025 0.6 0.5 - 1.4 mg/dL Final     Calcium   Date Value Ref Range Status   02/14/2025 9.0 8.7 - 10.5 mg/dL Final     Total Protein   Date Value Ref Range Status   02/14/2025 6.7  6.0 - 8.4 g/dL Final     Albumin   Date Value Ref Range Status   02/14/2025 2.5 (L) 3.5 - 5.2 g/dL Final     Total Bilirubin   Date Value Ref Range Status   02/14/2025 0.2 0.1 - 1.0 mg/dL Final     Comment:     For infants and newborns, interpretation of results should be based  on gestational age, weight and in agreement with clinical  observations.    Premature Infant recommended reference ranges:  Up to 24 hours.............<8.0 mg/dL  Up to 48 hours............<12.0 mg/dL  3-5 days..................<15.0 mg/dL  6-29 days.................<15.0 mg/dL       Alkaline Phosphatase   Date Value Ref Range Status   02/14/2025 78 40 - 150 U/L Final     AST   Date Value Ref Range Status   02/14/2025 24 10 - 40 U/L Final     ALT   Date Value Ref Range Status   02/14/2025 30 10 - 44 U/L Final     Anion Gap   Date Value Ref Range Status   02/14/2025 10 8 - 16 mmol/L Final     eGFR   Date Value Ref Range Status   02/14/2025 >60.0 >60 mL/min/1.73 m^2 Final         PFT will be done and results to be reviewed      Plan:  Clinical impression is apparently straight forward and impression with management as below.    Problem List Items Addressed This Visit    None  Visit Diagnoses         COPD exacerbation    -  Primary    Relevant Medications    fluticasone-umeclidin-vilanter (TRELEGY ELLIPTA) 100-62.5-25 mcg DsDv    albuterol (PROVENTIL/VENTOLIN HFA) 90 mcg/actuation inhaler    Other Relevant Orders    X-Ray Chest PA And Lateral    CT Chest Without Contrast    NIPPV FOR HOME USE      Compression fracture of T6 vertebra, initial encounter          Chronic respiratory failure with hypoxia and hypercapnia        Relevant Orders    NIPPV FOR HOME USE      Tobacco dependence          ETOH abuse          History of aspiration pneumonia          Localized swelling of right upper extremity        Relevant Orders    US Upper Extremity Veins Right            Follow up in about 6 weeks (around 4/7/2025), or if symptoms worsen or fail to  improve.    Discussed with patient above for education the following:      Patient Instructions   From a COPD perspective, I recommend advancing your inhaler regimen at this time.  Stop Stiolto and start Trelegy inhaler.  This is 1 puff once per day. This inhaler contains an inhaled steroid component. Rinse mouth after each use due to risk for thrush development. If mouth or tongue develops white sores please contact the clinic and I will order a prescription mouth wash.     Continue albuterol as needed for shortness of breath, wheezing, cough.    Continue to use nebulizer treatments as needed for shortness of breath, wheezing, cough.    Complete current antibiotic regimen from hospital discharge.    Recommend chest x-ray once monthly for 3 months followed by a CT scan due in May.    Aspiration precautions, keep follow-up appointment with GI.    It would be beneficial to stop tobacco use. Please inform the office if you become interested in the [facility] as discussed in the clinic today.      Patient has chronic respiratory failure secondary to COPD.  Patient will need a specific targeted tidal volume which cannot be provided via a CPAP or BiPAP.  This patient will require a set tidal volume to ensure CO2 levels were lowered adequately; as well as, to assist in establishing proper diaphragmatic functions.  Therefore, NIV is being ordered due to the severe state of this patient's disease.  Without this therapy, the patient runs a higher risk of expiration and readmittance.    Will ultrasound right upper extremity to ensure no clot development.    Recommend alcohol cessation.     Continue supplemental oxygen at 3.5 L 24/7.  I have ordered you NIV device and will send order to [facility].      Consider Ohtuvayre  and Dupixent in the future.    I had a lengthy discussion in clinic regarding patient's code status upon future admission to the hospital. Patient will discuss with family regarding.      Continue current prescription medication regiment. Keep follow up appointment as scheduled. Please call the office if you have any questions or concerns.

## 2025-02-24 NOTE — PATIENT INSTRUCTIONS
From a COPD perspective, I recommend advancing your inhaler regimen at this time.  Stop Stiolto and start Trelegy inhaler.  This is 1 puff once per day. This inhaler contains an inhaled steroid component. Rinse mouth after each use due to risk for thrush development. If mouth or tongue develops white sores please contact the clinic and I will order a prescription mouth wash.     Continue albuterol as needed for shortness of breath, wheezing, cough.    Continue to use nebulizer treatments as needed for shortness of breath, wheezing, cough.    Complete current antibiotic regimen from hospital discharge.    Recommend chest x-ray once monthly for 3 months followed by a CT scan due in May.    Aspiration precautions, keep follow-up appointment with GI.    It would be beneficial to stop tobacco use. Please inform the office if you become interested in the [facility] as discussed in the clinic today.      Patient has chronic respiratory failure secondary to COPD.  Patient will need a specific targeted tidal volume which cannot be provided via a CPAP or BiPAP.  This patient will require a set tidal volume to ensure CO2 levels were lowered adequately; as well as, to assist in establishing proper diaphragmatic functions.  Therefore, NIV is being ordered due to the severe state of this patient's disease.  Without this therapy, the patient runs a higher risk of expiration and readmittance.    Will ultrasound right upper extremity to ensure no clot development.    Recommend alcohol cessation.     Continue supplemental oxygen at 3.5 L 24/7.  I have ordered you NIV device and will send order to [facility].      Consider Ohtuvayre  and Dupixent in the future.    I had a lengthy discussion in clinic regarding patient's code status upon future admission to the hospital. Patient will discuss with family regarding.     Continue current prescription medication regiment. Keep follow up appointment as scheduled. Please  call the office if you have any questions or concerns.

## 2025-03-03 ENCOUNTER — HOSPITAL ENCOUNTER (EMERGENCY)
Facility: HOSPITAL | Age: 70
Discharge: ANOTHER HOSPITAL | End: 2025-03-04
Attending: EMERGENCY MEDICINE
Payer: MEDICARE

## 2025-03-03 DIAGNOSIS — S22.081A BURST FRACTURE OF T12 VERTEBRA: ICD-10-CM

## 2025-03-03 DIAGNOSIS — S22.080A T12 COMPRESSION FRACTURE, INITIAL ENCOUNTER: Primary | ICD-10-CM
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
## 2025-03-03 PROCEDURE — 96375 TX/PRO/DX INJ NEW DRUG ADDON: CPT

## 2025-03-03 PROCEDURE — 94761 N-INVAS EAR/PLS OXIMETRY MLT: CPT

## 2025-03-03 PROCEDURE — 73521 X-RAY EXAM HIPS BI 2 VIEWS: CPT | Mod: TC

## 2025-03-03 PROCEDURE — 25000003 PHARM REV CODE 250

## 2025-03-03 PROCEDURE — 99285 EMERGENCY DEPT VISIT HI MDM: CPT | Mod: 25

## 2025-03-03 PROCEDURE — 72128 CT CHEST SPINE W/O DYE: CPT | Mod: TC

## 2025-03-03 PROCEDURE — 72128 CT CHEST SPINE W/O DYE: CPT | Mod: 26,,, | Performed by: RADIOLOGY

## 2025-03-03 PROCEDURE — 96374 THER/PROPH/DIAG INJ IV PUSH: CPT

## 2025-03-03 PROCEDURE — 27000221 HC OXYGEN, UP TO 24 HOURS

## 2025-03-03 PROCEDURE — 63600175 PHARM REV CODE 636 W HCPCS

## 2025-03-03 PROCEDURE — 72100 X-RAY EXAM L-S SPINE 2/3 VWS: CPT | Mod: TC

## 2025-03-03 PROCEDURE — 73521 X-RAY EXAM HIPS BI 2 VIEWS: CPT | Mod: 26,,, | Performed by: RADIOLOGY

## 2025-03-03 PROCEDURE — 72100 X-RAY EXAM L-S SPINE 2/3 VWS: CPT | Mod: 26,,, | Performed by: RADIOLOGY

## 2025-03-03 RX ORDER — ONDANSETRON HYDROCHLORIDE 2 MG/ML
4 INJECTION, SOLUTION INTRAVENOUS
Status: COMPLETED | OUTPATIENT
Start: 2025-03-03 | End: 2025-03-03

## 2025-03-03 RX ORDER — ACETAMINOPHEN 325 MG/1
650 TABLET ORAL
Status: COMPLETED | OUTPATIENT
Start: 2025-03-03 | End: 2025-03-03

## 2025-03-03 RX ORDER — MORPHINE SULFATE 2 MG/ML
2 INJECTION, SOLUTION INTRAMUSCULAR; INTRAVENOUS
Status: COMPLETED | OUTPATIENT
Start: 2025-03-03 | End: 2025-03-03

## 2025-03-03 RX ADMIN — MORPHINE SULFATE 2 MG: 2 INJECTION, SOLUTION INTRAMUSCULAR; INTRAVENOUS at 07:03

## 2025-03-03 RX ADMIN — ONDANSETRON 4 MG: 2 INJECTION INTRAMUSCULAR; INTRAVENOUS at 07:03

## 2025-03-03 RX ADMIN — ACETAMINOPHEN 650 MG: 325 TABLET ORAL at 06:03

## 2025-03-03 NOTE — ED PROVIDER NOTES
Encounter Date: 3/3/2025       History     Chief Complaint   Patient presents with    Fall     Patient states 3 days ago she slipped, hit the wall, then slid down the wall landing on her buttox.  Patient complaining of lower back and bilateral hip pain.     69-year-old female with a history significant for COPD, hypertension, and previous stroke presents to the emergency department with a complaint of lumbar and bilateral hip pain status post fall several days ago.  The patient reports that she was wearing socks and she slipped, falling backwards towards a wall and then slid down the wall to a sitting position.  She denies loss of consciousness, chest pain, current shortness of breath, dizziness, nausea or vomiting, incontinence of bowel or bladder.  She does endorse baseline paresthesias secondary to a history of neuropathy for which she takes gabapentin.  She states that this is not worse than baseline.  She reports no change in pain with Tylenol.  She is performing her normal ADLs which include bedside commode, wheelchair at home secondary to her neuropathy.    The history is provided by the patient. No  was used.     Review of patient's allergies indicates:  No Known Allergies  Past Medical History:   Diagnosis Date    Acquired hypothyroidism     Anxiety and depression     Cervical radicular pain     Closed left ankle fracture     COPD (chronic obstructive pulmonary disease)     Heavy cigarette smoker     She has smoked 1/2 PPD since the age of 12, and still smokes    Hypertension     Hyponatremia     Multiple rib fractures     NSTEMI (non-ST elevated myocardial infarction)     Requires continuous at home supplemental oxygen     She is on 3.5L NC continuous    Stroke 11/08/2023    Tension type headache     Traumatic closed displaced fracture of distal end of radius, left, sequela      Past Surgical History:   Procedure Laterality Date    ABDOMINAL SURGERY      COLON SURGERY      COLOSTOMY       COLOSTOMY CLOSURE      HYSTERECTOMY      LEG SURGERY Right     OPEN REDUCTION AND INTERNAL FIXATION (ORIF) OF INJURY OF WRIST Left 1/29/2019    Procedure: ORIF, WRIST;  Surgeon: Homero Jeff DO;  Location: [facility] OR;  Service: Orthopedics;  Laterality: Left;  Equipment: [employer] Geminus Wrist Plate Set  Vendor/Rep: [employer]  C-Arm: Entire  DME: None    REQUIRES ASSISTANT    WRIST SURGERY Right      Family History   Problem Relation Name Age of Onset    Cancer Mother      COPD Father      Cirrhosis Father      Arthritis Sister      No Known Problems Brother      Anxiety disorder Daughter      Depression Daughter      Anxiety disorder Daughter      Congenital heart disease Daughter      Valvular heart disease Daughter       Social History[1]  Review of Systems   Constitutional:  Positive for activity change.   Musculoskeletal:  Positive for arthralgias and back pain. Negative for neck pain and neck stiffness.   All other systems reviewed and are negative.      Physical Exam     Initial Vitals [03/03/25 1711]   BP Pulse Resp Temp SpO2   (!) 150/79 110 20 97.9 °F (36.6 °C) 96 %      MAP       --         Physical Exam    Nursing note and vitals reviewed.  Constitutional: She appears well-developed and well-nourished. No distress.   HENT:   Head: Normocephalic and atraumatic.   Right Ear: External ear normal.   Left Ear: External ear normal. Mouth/Throat: No oropharyngeal exudate.   Eyes: Conjunctivae are normal. Pupils are equal, round, and reactive to light. No scleral icterus.   Neck: Neck supple. No JVD present.   Normal range of motion.  Cardiovascular:  Normal rate, regular rhythm, normal heart sounds and intact distal pulses.           No murmur heard.  Pulmonary/Chest: Breath sounds normal. No respiratory distress. She has no rhonchi. She has no rales.   Abdominal: Abdomen is soft. Bowel sounds are normal. There is no abdominal tenderness. There is no rebound and no guarding.    Musculoskeletal:         General: Tenderness present. No edema. Normal range of motion.      Cervical back: Normal range of motion and neck supple.      Comments: Tenderness to palpation of the lateral aspects of the lumbar region bilaterally and pelvic girdle.  There is no palpable crepitus.  There is no shortening or rotation of the lower extremities.     Neurological: She is alert and oriented to person, place, and time. GCS score is 15. GCS eye subscore is 4. GCS verbal subscore is 5. GCS motor subscore is 6.   Skin: Skin is warm and dry. Capillary refill takes less than 2 seconds.   Psychiatric: She has a normal mood and affect. Her behavior is normal. Judgment and thought content normal.         ED Course   Procedures  Labs Reviewed - No data to display       Imaging Results              CT Thoracic Spine Without Contrast (Final result)  Result time 03/03/25 21:44:46      Final result by Jhon Crockett MD (03/03/25 21:44:46)                   Impression:      Acute burst fracture of T12.  Referral to neurosurgery advised.      Electronically signed by: Jhon Crockett  Date:    03/03/2025  Time:    21:44               Narrative:    EXAMINATION:  CT THORACIC SPINE WITHOUT CONTRAST    CLINICAL HISTORY:  Compression fracture, thoracic;    TECHNIQUE:  CT thoracic spine without contrast    COMPARISON:  Same day radiographs and CT chest 02/09/2025    FINDINGS:  Osteopenia.  Acute burst fracture of T12 with up to 50% height loss.  Retropulsion into the spinal canal measures up to 3 mm.    Subacute to chronic compression fracture of T6 and chronic compression fracture of T11.    Multiple chronic bilateral rib fracture deformities                                       X-Ray Lumbar Spine Ap And Lateral (Final result)  Result time 03/03/25 19:19:22      Final result by Jhon Crockett MD (03/03/25 19:19:22)                   Impression:      Acute compression fracture of T12.  Recommend  CT.      Electronically signed by: Jhon Crockett  Date:    03/03/2025  Time:    19:19               Narrative:    EXAMINATION:  XR LUMBAR SPINE AP AND LATERAL    CLINICAL HISTORY:  fall;    TECHNIQUE:  AP, lateral and spot images were performed of the lumbar spine.    COMPARISON:  CT 02/29/2025    FINDINGS:  Osteopenia.  Acute compression fracture of T12 with up to moderate height loss.  Chronic compression fracture deformity of T11.                                       X-Ray Hips Bilateral 2 View Inc AP Pelvis (Final result)  Result time 03/03/25 19:16:56   Procedure changed from X-Ray Hip 2 or 3 views Left with Pelvis when performed     Final result by Jhon Crockett MD (03/03/25 19:16:56)                   Impression:      No acute findings.      Electronically signed by: Jhon Crockett  Date:    03/03/2025  Time:    19:16               Narrative:    EXAMINATION:  XR HIPS BILATERAL 2 VIEW INCL AP PELVIS    CLINICAL HISTORY:  fall;  Unspecified fall, initial encounter    TECHNIQUE:  AP view of the pelvis and frogleg lateral views of both hips were performed.    COMPARISON:  None.    FINDINGS:  Osteopenia.  No acute fracture or dislocation.  Femoroacetabular joint spaces are maintained.  At least mild bilateral SI joint osteoarthritis.                                       Medications   acetaminophen tablet 650 mg (650 mg Oral Given 3/3/25 1859)   morphine injection 2 mg (2 mg Intravenous Given 3/3/25 1959)   ondansetron injection 4 mg (4 mg Intravenous Given 3/3/25 1958)   morphine injection 4 mg (4 mg Intravenous Given 3/4/25 0050)   ondansetron injection 4 mg (4 mg Intravenous Given 3/4/25 0050)     Medical Decision Making  I spoke to Dr. Boothe, the neurosurgeon for [facility] at [facility] and discussed the case with the him.  He recommends the patient be transferred there for further evaluation and treatment.    I spoke with the hospitalist at [facility] for [facility] who agrees to accept the  patient in transfer.    Amount and/or Complexity of Data Reviewed  Radiology: ordered. Decision-making details documented in ED Course.    Risk  OTC drugs.  Prescription drug management.               ED Course as of 03/04/25 0250   Mon Mar 03, 2025   1938 X-Ray Lumbar Spine Ap And Lateral  Impression:     Acute compression fracture of T12.  Recommend CT.   [NC]   1941 X-Ray Hips Bilateral 2 View Inc AP Pelvis     Impression:     No acute findings.   [NC]   1942 Patient was advised xray findings including the compression fracture of T12.  She was advised that a CT scan is being ordered.  She is agreeable to this. Patient reporting minimal pain relief at this time.  We will escalate pain control this time. [NC]   2043 On reassessment, the patient had returned from CT scan.  She reports mild relief of pain.  Her daughter is at the bedside.  The patient is amenable to discussing the findings of the case with the daughter present.  Plan of care is ongoing.  Disposition pending. [NC]   2117 On reassessment, the patient is sleeping at this time.  Family member remains at the bedside.  CT read pending. [NC]      ED Course User Index  [NC] Kimberly Hull NP                           Clinical Impression:  Final diagnoses:  [W19.XXXA] Fall  [S22.080A] T12 compression fracture, initial encounter (Primary)  [S22.081A] Burst fracture of T12 vertebra          ED Disposition Condition    Transfer to Another Facility Stable                    [1]   Social History  Tobacco Use    Smoking status: Every Day     Current packs/day: 1.00     Average packs/day: 1 pack/day for 20.0 years (20.0 ttl pk-yrs)     Types: Cigarettes    Smokeless tobacco: Never   Substance Use Topics    Alcohol use: Yes     Comment: couple times a week-wine or bloody cari    Drug use: No        Zi Bernardo, DO  03/04/25 0250

## 2025-03-04 ENCOUNTER — HOSPITAL ENCOUNTER (INPATIENT)
Facility: HOSPITAL | Age: 70
LOS: 14 days | Discharge: NURSING, REHAB OR LONG-TERM CARE | DRG: 542 | End: 2025-03-18
Attending: STUDENT IN AN ORGANIZED HEALTH CARE EDUCATION/TRAINING PROGRAM | Admitting: STUDENT IN AN ORGANIZED HEALTH CARE EDUCATION/TRAINING PROGRAM
Payer: MEDICARE

## 2025-03-04 VITALS
WEIGHT: 104 LBS | BODY MASS INDEX: 19.14 KG/M2 | RESPIRATION RATE: 13 BRPM | DIASTOLIC BLOOD PRESSURE: 90 MMHG | HEART RATE: 100 BPM | HEIGHT: 62 IN | OXYGEN SATURATION: 98 % | SYSTOLIC BLOOD PRESSURE: 150 MMHG | TEMPERATURE: 98 F

## 2025-03-04 DIAGNOSIS — F17.210 HEAVY CIGARETTE SMOKER: ICD-10-CM

## 2025-03-04 DIAGNOSIS — F32.A ANXIETY AND DEPRESSION: ICD-10-CM

## 2025-03-04 DIAGNOSIS — G43.E09 CHRONIC MIGRAINE WITH AURA WITHOUT STATUS MIGRAINOSUS, NOT INTRACTABLE: ICD-10-CM

## 2025-03-04 DIAGNOSIS — S22.001A: ICD-10-CM

## 2025-03-04 DIAGNOSIS — F41.9 ANXIETY: ICD-10-CM

## 2025-03-04 DIAGNOSIS — R07.9 CHEST PAIN: ICD-10-CM

## 2025-03-04 DIAGNOSIS — S22.081A T12 BURST FRACTURE: Primary | ICD-10-CM

## 2025-03-04 DIAGNOSIS — S22.080A T12 COMPRESSION FRACTURE, INITIAL ENCOUNTER: ICD-10-CM

## 2025-03-04 DIAGNOSIS — J96.12 CHRONIC HYPERCAPNIC RESPIRATORY FAILURE: ICD-10-CM

## 2025-03-04 DIAGNOSIS — F41.9 ANXIETY AND DEPRESSION: ICD-10-CM

## 2025-03-04 PROBLEM — Z72.0 TOBACCO ABUSE: Status: ACTIVE | Noted: 2025-03-04

## 2025-03-04 LAB
ALBUMIN SERPL BCP-MCNC: 3.3 G/DL (ref 3.5–5.2)
ALP SERPL-CCNC: 97 U/L (ref 55–135)
ALT SERPL W/O P-5'-P-CCNC: 11 U/L (ref 10–44)
ANION GAP SERPL CALC-SCNC: 7 MMOL/L (ref 8–16)
AST SERPL-CCNC: 19 U/L (ref 10–40)
BASOPHILS # BLD AUTO: 0.03 K/UL (ref 0–0.2)
BASOPHILS NFR BLD: 0.4 % (ref 0–1.9)
BILIRUB SERPL-MCNC: 0.3 MG/DL (ref 0.1–1)
BUN SERPL-MCNC: 6 MG/DL (ref 8–23)
CALCIUM SERPL-MCNC: 8.9 MG/DL (ref 8.7–10.5)
CHLORIDE SERPL-SCNC: 93 MMOL/L (ref 95–110)
CO2 SERPL-SCNC: 31 MMOL/L (ref 23–29)
CREAT SERPL-MCNC: 0.6 MG/DL (ref 0.5–1.4)
DIFFERENTIAL METHOD BLD: ABNORMAL
EOSINOPHIL # BLD AUTO: 0.2 K/UL (ref 0–0.5)
EOSINOPHIL NFR BLD: 2 % (ref 0–8)
ERYTHROCYTE [DISTWIDTH] IN BLOOD BY AUTOMATED COUNT: 13.4 % (ref 11.5–14.5)
EST. GFR  (NO RACE VARIABLE): >60 ML/MIN/1.73 M^2
GLUCOSE SERPL-MCNC: 91 MG/DL (ref 70–110)
HCT VFR BLD AUTO: 34.3 % (ref 37–48.5)
HGB BLD-MCNC: 11 G/DL (ref 12–16)
IMM GRANULOCYTES # BLD AUTO: 0.04 K/UL (ref 0–0.04)
IMM GRANULOCYTES NFR BLD AUTO: 0.5 % (ref 0–0.5)
LYMPHOCYTES # BLD AUTO: 1.3 K/UL (ref 1–4.8)
LYMPHOCYTES NFR BLD: 15.7 % (ref 18–48)
MAGNESIUM SERPL-MCNC: 1.4 MG/DL (ref 1.6–2.6)
MCH RBC QN AUTO: 31.1 PG (ref 27–31)
MCHC RBC AUTO-ENTMCNC: 32.1 G/DL (ref 32–36)
MCV RBC AUTO: 97 FL (ref 82–98)
MONOCYTES # BLD AUTO: 0.9 K/UL (ref 0.3–1)
MONOCYTES NFR BLD: 10.2 % (ref 4–15)
NEUTROPHILS # BLD AUTO: 6 K/UL (ref 1.8–7.7)
NEUTROPHILS NFR BLD: 71.2 % (ref 38–73)
NRBC BLD-RTO: 0 /100 WBC
PLATELET # BLD AUTO: 226 K/UL (ref 150–450)
PMV BLD AUTO: 10.5 FL (ref 9.2–12.9)
POTASSIUM SERPL-SCNC: 4.4 MMOL/L (ref 3.5–5.1)
PROT SERPL-MCNC: 6.7 G/DL (ref 6–8.4)
RBC # BLD AUTO: 3.54 M/UL (ref 4–5.4)
SODIUM SERPL-SCNC: 131 MMOL/L (ref 136–145)
WBC # BLD AUTO: 8.36 K/UL (ref 3.9–12.7)

## 2025-03-04 PROCEDURE — S4991 NICOTINE PATCH NONLEGEND: HCPCS | Performed by: EMERGENCY MEDICINE

## 2025-03-04 PROCEDURE — 63600175 PHARM REV CODE 636 W HCPCS: Performed by: EMERGENCY MEDICINE

## 2025-03-04 PROCEDURE — 36415 COLL VENOUS BLD VENIPUNCTURE: CPT

## 2025-03-04 PROCEDURE — 12000002 HC ACUTE/MED SURGE SEMI-PRIVATE ROOM

## 2025-03-04 PROCEDURE — 25000003 PHARM REV CODE 250: Performed by: NURSE PRACTITIONER

## 2025-03-04 PROCEDURE — 27000221 HC OXYGEN, UP TO 24 HOURS

## 2025-03-04 PROCEDURE — 96376 TX/PRO/DX INJ SAME DRUG ADON: CPT

## 2025-03-04 PROCEDURE — 25000003 PHARM REV CODE 250

## 2025-03-04 PROCEDURE — 83735 ASSAY OF MAGNESIUM: CPT

## 2025-03-04 PROCEDURE — 63600175 PHARM REV CODE 636 W HCPCS: Performed by: NURSE PRACTITIONER

## 2025-03-04 PROCEDURE — 94761 N-INVAS EAR/PLS OXIMETRY MLT: CPT

## 2025-03-04 PROCEDURE — 80053 COMPREHEN METABOLIC PANEL: CPT

## 2025-03-04 PROCEDURE — 85025 COMPLETE CBC W/AUTO DIFF WBC: CPT

## 2025-03-04 PROCEDURE — 25000003 PHARM REV CODE 250: Performed by: EMERGENCY MEDICINE

## 2025-03-04 RX ORDER — NALOXONE HCL 0.4 MG/ML
0.02 VIAL (ML) INJECTION
Status: DISCONTINUED | OUTPATIENT
Start: 2025-03-04 | End: 2025-03-18 | Stop reason: HOSPADM

## 2025-03-04 RX ORDER — ATORVASTATIN CALCIUM 40 MG/1
80 TABLET, FILM COATED ORAL DAILY
Status: DISCONTINUED | OUTPATIENT
Start: 2025-03-05 | End: 2025-03-18 | Stop reason: HOSPADM

## 2025-03-04 RX ORDER — IPRATROPIUM BROMIDE 0.5 MG/2.5ML
0.5 SOLUTION RESPIRATORY (INHALATION) EVERY 6 HOURS
Status: DISCONTINUED | OUTPATIENT
Start: 2025-03-05 | End: 2025-03-11

## 2025-03-04 RX ORDER — ARFORMOTEROL TARTRATE 15 UG/2ML
15 SOLUTION RESPIRATORY (INHALATION) 2 TIMES DAILY
Status: DISCONTINUED | OUTPATIENT
Start: 2025-03-05 | End: 2025-03-11

## 2025-03-04 RX ORDER — MORPHINE SULFATE 2 MG/ML
4 INJECTION, SOLUTION INTRAMUSCULAR; INTRAVENOUS
Refills: 0 | Status: COMPLETED | OUTPATIENT
Start: 2025-03-04 | End: 2025-03-04

## 2025-03-04 RX ORDER — FOLIC ACID 1 MG/1
1 TABLET ORAL DAILY
Status: DISCONTINUED | OUTPATIENT
Start: 2025-03-05 | End: 2025-03-18 | Stop reason: HOSPADM

## 2025-03-04 RX ORDER — GABAPENTIN 300 MG/1
300 CAPSULE ORAL 3 TIMES DAILY
Status: DISCONTINUED | OUTPATIENT
Start: 2025-03-04 | End: 2025-03-18 | Stop reason: HOSPADM

## 2025-03-04 RX ORDER — IPRATROPIUM BROMIDE AND ALBUTEROL SULFATE 2.5; .5 MG/3ML; MG/3ML
3 SOLUTION RESPIRATORY (INHALATION) EVERY 6 HOURS PRN
Status: DISCONTINUED | OUTPATIENT
Start: 2025-03-04 | End: 2025-03-18 | Stop reason: HOSPADM

## 2025-03-04 RX ORDER — LORAZEPAM 2 MG/ML
1 INJECTION INTRAMUSCULAR EVERY 6 HOURS PRN
Status: DISCONTINUED | OUTPATIENT
Start: 2025-03-04 | End: 2025-03-13

## 2025-03-04 RX ORDER — ONDANSETRON HYDROCHLORIDE 2 MG/ML
4 INJECTION, SOLUTION INTRAVENOUS
Status: COMPLETED | OUTPATIENT
Start: 2025-03-04 | End: 2025-03-04

## 2025-03-04 RX ORDER — LEVOTHYROXINE SODIUM 25 UG/1
50 TABLET ORAL
Status: DISCONTINUED | OUTPATIENT
Start: 2025-03-05 | End: 2025-03-18 | Stop reason: HOSPADM

## 2025-03-04 RX ORDER — IBUPROFEN 200 MG
1 TABLET ORAL DAILY
Status: DISCONTINUED | OUTPATIENT
Start: 2025-03-04 | End: 2025-03-18 | Stop reason: HOSPADM

## 2025-03-04 RX ORDER — CLONAZEPAM 0.5 MG/1
0.5 TABLET ORAL 2 TIMES DAILY PRN
Status: DISCONTINUED | OUTPATIENT
Start: 2025-03-04 | End: 2025-03-06

## 2025-03-04 RX ORDER — MONTELUKAST SODIUM 10 MG/1
10 TABLET ORAL NIGHTLY
Status: DISCONTINUED | OUTPATIENT
Start: 2025-03-04 | End: 2025-03-18 | Stop reason: HOSPADM

## 2025-03-04 RX ORDER — ACETAMINOPHEN 325 MG/1
650 TABLET ORAL EVERY 4 HOURS PRN
Status: DISCONTINUED | OUTPATIENT
Start: 2025-03-04 | End: 2025-03-18 | Stop reason: HOSPADM

## 2025-03-04 RX ORDER — FLUOXETINE HYDROCHLORIDE 20 MG/1
40 CAPSULE ORAL DAILY
Status: DISCONTINUED | OUTPATIENT
Start: 2025-03-05 | End: 2025-03-06

## 2025-03-04 RX ORDER — CYCLOBENZAPRINE HCL 10 MG
10 TABLET ORAL 3 TIMES DAILY PRN
Status: DISCONTINUED | OUTPATIENT
Start: 2025-03-04 | End: 2025-03-06

## 2025-03-04 RX ORDER — IBUPROFEN 200 MG
1 TABLET ORAL
Status: DISCONTINUED | OUTPATIENT
Start: 2025-03-04 | End: 2025-03-04 | Stop reason: HOSPADM

## 2025-03-04 RX ORDER — BUDESONIDE 0.5 MG/2ML
0.5 INHALANT ORAL EVERY 12 HOURS
Status: DISCONTINUED | OUTPATIENT
Start: 2025-03-05 | End: 2025-03-11

## 2025-03-04 RX ORDER — MAGNESIUM SULFATE HEPTAHYDRATE 40 MG/ML
2 INJECTION, SOLUTION INTRAVENOUS ONCE
Status: COMPLETED | OUTPATIENT
Start: 2025-03-04 | End: 2025-03-04

## 2025-03-04 RX ORDER — THIAMINE HCL 100 MG
100 TABLET ORAL DAILY
Status: DISCONTINUED | OUTPATIENT
Start: 2025-03-05 | End: 2025-03-18 | Stop reason: HOSPADM

## 2025-03-04 RX ORDER — CARVEDILOL 12.5 MG/1
12.5 TABLET ORAL 2 TIMES DAILY
Status: DISCONTINUED | OUTPATIENT
Start: 2025-03-04 | End: 2025-03-18 | Stop reason: HOSPADM

## 2025-03-04 RX ORDER — HYDROCODONE BITARTRATE AND ACETAMINOPHEN 5; 325 MG/1; MG/1
1 TABLET ORAL EVERY 6 HOURS PRN
Refills: 0 | Status: DISCONTINUED | OUTPATIENT
Start: 2025-03-04 | End: 2025-03-18 | Stop reason: HOSPADM

## 2025-03-04 RX ORDER — LANOLIN ALCOHOL/MO/W.PET/CERES
800 CREAM (GRAM) TOPICAL
Status: DISCONTINUED | OUTPATIENT
Start: 2025-03-04 | End: 2025-03-18 | Stop reason: HOSPADM

## 2025-03-04 RX ORDER — SODIUM CHLORIDE 0.9 % (FLUSH) 0.9 %
10 SYRINGE (ML) INJECTION EVERY 12 HOURS PRN
Status: DISCONTINUED | OUTPATIENT
Start: 2025-03-04 | End: 2025-03-18 | Stop reason: HOSPADM

## 2025-03-04 RX ADMIN — NICOTINE 1 PATCH: 21 PATCH, EXTENDED RELEASE TRANSDERMAL at 12:03

## 2025-03-04 RX ADMIN — ONDANSETRON 4 MG: 2 INJECTION INTRAMUSCULAR; INTRAVENOUS at 07:03

## 2025-03-04 RX ADMIN — MONTELUKAST 10 MG: 10 TABLET, FILM COATED ORAL at 08:03

## 2025-03-04 RX ADMIN — GABAPENTIN 300 MG: 300 CAPSULE ORAL at 08:03

## 2025-03-04 RX ADMIN — CARVEDILOL 12.5 MG: 12.5 TABLET, FILM COATED ORAL at 08:03

## 2025-03-04 RX ADMIN — MORPHINE SULFATE 4 MG: 2 INJECTION, SOLUTION INTRAMUSCULAR; INTRAVENOUS at 07:03

## 2025-03-04 RX ADMIN — CLONAZEPAM 0.5 MG: 0.5 TABLET ORAL at 11:03

## 2025-03-04 RX ADMIN — ONDANSETRON 4 MG: 2 INJECTION INTRAMUSCULAR; INTRAVENOUS at 12:03

## 2025-03-04 RX ADMIN — MAGNESIUM SULFATE HEPTAHYDRATE 2 G: 40 INJECTION, SOLUTION INTRAVENOUS at 08:03

## 2025-03-04 RX ADMIN — HYDROCODONE BITARTRATE AND ACETAMINOPHEN 1 TABLET: 5; 325 TABLET ORAL at 11:03

## 2025-03-04 RX ADMIN — MORPHINE SULFATE 4 MG: 2 INJECTION, SOLUTION INTRAMUSCULAR; INTRAVENOUS at 12:03

## 2025-03-04 NOTE — ASSESSMENT & PLAN NOTE
1 drink daily per patient however there is EtOH abuse listed in her previous medical records  MV, folate, thiamine  Ativan prn withdrawal symptoms

## 2025-03-04 NOTE — PROVIDER TRANSFER
Outside Transfer Acceptance Note / Regional Referral Center    Referring facility: [facility]   Referring provider: SHAYNA PABLO, NAHOMY EASON, LARISA LIZ, MCKENZIE FINE, ALEXANDER GROSSMAN  Accepting facility: [facility]  Accepting provider: JC SAMANIEGO W. OREN NAPIER, MALCOLM G. CREEK, GRADY PATRICK REDDY, MADHUMATI G. RAJAN, VIJIL  Admitting provider: Dr. Juan A Armas  Reason for transfer:  HLOC  Transfer diagnosis: Burst of Thoracic Vertebrae  Transfer specialty requested:   Transfer specialty notified: Yes  Transfer level: NUMBER 1-5: 2  Bed type requested: med-tele  Isolation status: No active isolations   Admission class or status: IP- Inpatient  IP- Inpatient  IP- Inpatient  IP- Inpatient      Narrative     69-year-old female with PMHx of COPD, HTN, hypothyroidism, prior stroke who presented to the ED with a complaint of lumbar and bilateral hip pain status post fall several days ago. The patient reports that she was wearing socks and she slipped, falling backwards towards a wall and then slid down the wall to a sitting position. She denies loss of consciousness, chest pain, current shortness of breath, dizziness, nausea or vomiting, incontinence of bowel or bladder. She is on plavix at home.    In the ED, /79  RR 20 afebrile and 96% on 3L NC (baseline).    Imaging showed Acute burst fracture of T12. Case discussed with Dr. Boothe ([facility]) who will consult on the patient. She was given tylenol, morphine, and zofran and stable for transfer, no neuro deficits.     Objective     Vitals: Temp: 98.1 °F (36.7 °C) (03/03/25 2345)  Pulse: 92 (03/04/25 0402)  Resp: 14 (03/04/25 0402)  BP: (!) 155/86 (03/04/25 0402)  SpO2: 99 % (03/04/25 0402)  Recent Labs: All  pertinent labs within the past 24 hours have been reviewed.  Recent imaging:   Imaging Results              CT Thoracic Spine Without Contrast (Final result)  Result time 03/03/25 21:44:46      Final result by Jhon Crockett MD (03/03/25 21:44:46)                   Impression:      Acute burst fracture of T12.  Referral to neurosurgery advised.      Electronically signed by: Jhon Crockett  Date:    03/03/2025  Time:    21:44               Narrative:    EXAMINATION:  CT THORACIC SPINE WITHOUT CONTRAST    CLINICAL HISTORY:  Compression fracture, thoracic;    TECHNIQUE:  CT thoracic spine without contrast    COMPARISON:  Same day radiographs and CT chest 02/09/2025    FINDINGS:  Osteopenia.  Acute burst fracture of T12 with up to 50% height loss.  Retropulsion into the spinal canal measures up to 3 mm.    Subacute to chronic compression fracture of T6 and chronic compression fracture of T11.    Multiple chronic bilateral rib fracture deformities                                       X-Ray Lumbar Spine Ap And Lateral (Final result)  Result time 03/03/25 19:19:22      Final result by Jhon Crockett MD (03/03/25 19:19:22)                   Impression:      Acute compression fracture of T12.  Recommend CT.      Electronically signed by: Jhon Crockett  Date:    03/03/2025  Time:    19:19               Narrative:    EXAMINATION:  XR LUMBAR SPINE AP AND LATERAL    CLINICAL HISTORY:  fall;    TECHNIQUE:  AP, lateral and spot images were performed of the lumbar spine.    COMPARISON:  CT 02/29/2025    FINDINGS:  Osteopenia.  Acute compression fracture of T12 with up to moderate height loss.  Chronic compression fracture deformity of T11.                                       X-Ray Hips Bilateral 2 View Inc AP Pelvis (Final result)  Result time 03/03/25 19:16:56   Procedure changed from X-Ray Hip 2 or 3 views Left with Pelvis when performed     Final result by Jhon Crockett MD (03/03/25 19:16:56)                    Impression:      No acute findings.      Electronically signed by: Jhon Crockett  Date:    03/03/2025  Time:    19:16               Narrative:    EXAMINATION:  XR HIPS BILATERAL 2 VIEW INCL AP PELVIS    CLINICAL HISTORY:  fall;  Unspecified fall, initial encounter    TECHNIQUE:  AP view of the pelvis and frogleg lateral views of both hips were performed.    COMPARISON:  None.    FINDINGS:  Osteopenia.  No acute fracture or dislocation.  Femoroacetabular joint spaces are maintained.  At least mild bilateral SI joint osteoarthritis.                                       Airway:     Vent settings: Oxygen Concentration (%):  [34] 34       IV access:        Peripheral IV - Single Lumen 03/03/25 1958 20 G Left Antecubital (Active)   Site Assessment Clean;Dry;Intact;No redness;No swelling 03/03/25 1958   Extremity Assessment Distal to IV No abnormal discoloration;No redness;No swelling 03/03/25 1958   Line Status Blood return noted;Flushed;Saline locked 03/03/25 1958   Dressing Status Clean;Dry;Intact 03/03/25 1958   Dressing Intervention First dressing 03/03/25 1958     Infusions: None  Allergies: Review of patient's allergies indicates:  No Known Allergies   NPO: No    Anticoagulation:   Anticoagulants       None             Instructions      [facility]  Admit to Hospital Medicine  Upon patient arrival to floor, please contact Hospital Medicine on call.     Consult NSGY

## 2025-03-04 NOTE — H&P
[facility]  History & Physical    Patient Name: Bhavana Lambert  MRN: [id number]  Patient Class: IP- Inpatient  Admission Date: 3/4/2025  Attending Physician: Osmin Grande MD   Primary Care Provider: Pepito Jhaveri IV, MD         Patient information was obtained from ER records previous records, patient .     Subjective:     Principal Problem:T12 burst fracture    Chief Complaint: No chief complaint on file.       HPI: 69-year-old female with PMHx of COPD, HTN, hypothyroidism, prior stroke who presented to the ED with a complaint of lumbar and bilateral hip pain status post fall several days ago. The patient reports that she was wearing socks and she slipped, falling backwards towards a wall and then slid down the wall to a sitting position. She denies loss of consciousness, chest pain, current shortness of breath, dizziness, nausea or vomiting, incontinence of bowel or bladder. She is on plavix at home.     In the ED, /79  RR 20 afebrile and 96% on 3L NC (baseline).     Imaging showed Acute burst fracture of T12. Case was discussed with Dr. Boothe ([facility]) who will consult on the patient. She was given tylenol, morphine, and zofran and stable for transfer, no neuro deficits.     Per chart review from recent admission on February 9th to February 14, 2025, the patient was admitted for COPD exacerbation and did have a history of previous EtOH abuse with subsequent falls.     Past Medical History:   Diagnosis Date    Acquired hypothyroidism     Anxiety and depression     Cervical radicular pain     Closed left ankle fracture     COPD (chronic obstructive pulmonary disease)     Heavy cigarette smoker     She has smoked 1/2 PPD since the age of 12, and still smokes    Hypertension     Hyponatremia     Multiple rib fractures     NSTEMI (non-ST elevated myocardial infarction)     Requires continuous at home supplemental oxygen     She is on 3.5L NC continuous    Stroke  11/08/2023    Tension type headache     Traumatic closed displaced fracture of distal end of radius, left, sequela        Past Surgical History:   Procedure Laterality Date    ABDOMINAL SURGERY      COLON SURGERY      COLOSTOMY      COLOSTOMY CLOSURE      HYSTERECTOMY      LEG SURGERY Right     OPEN REDUCTION AND INTERNAL FIXATION (ORIF) OF INJURY OF WRIST Left 1/29/2019    Procedure: ORIF, WRIST;  Surgeon: Homero Jeff DO;  Location: [facility] OR;  Service: Orthopedics;  Laterality: Left;  Equipment: [employer] Geminus Wrist Plate Set  Vendor/Rep: [employer]  C-Arm: Entire  DME: None    REQUIRES ASSISTANT    WRIST SURGERY Right        Review of patient's allergies indicates:  No Known Allergies    Current Facility-Administered Medications on File Prior to Encounter   Medication    [COMPLETED] acetaminophen tablet 650 mg    [COMPLETED] morphine injection 2 mg    [COMPLETED] morphine injection 4 mg    [COMPLETED] morphine injection 4 mg    [COMPLETED] morphine injection 4 mg    [COMPLETED] ondansetron injection 4 mg    [COMPLETED] ondansetron injection 4 mg    [COMPLETED] ondansetron injection 4 mg    [COMPLETED] ondansetron injection 4 mg    [DISCONTINUED] nicotine 21 mg/24 hr 1 patch     Current Outpatient Medications on File Prior to Encounter   Medication Sig    albuterol (PROVENTIL/VENTOLIN HFA) 90 mcg/actuation inhaler Inhale 2 puffs into the lungs every 6 (six) hours as needed for Wheezing or Shortness of Breath.    albuterol (PROVENTIL/VENTOLIN HFA) 90 mcg/actuation inhaler Inhale 2 puffs into the lungs every 6 (six) hours as needed for Wheezing or Shortness of Breath. Rescue    albuterol-ipratropium (DUO-NEB) 2.5 mg-0.5 mg/3 mL nebulizer solution Take 3 mLs by nebulization every 6 (six) hours as needed for Wheezing. Rescue    aspirin (ECOTRIN) 81 MG EC tablet Take 1 tablet (81 mg total) by mouth once daily.    atorvastatin (LIPITOR) 80 MG tablet Take 1 tablet (80 mg total) by mouth once daily.     busPIRone (BUSPAR) 15 MG tablet Take 15 mg by mouth 2 (two) times a day.    carvediloL (COREG) 25 MG tablet Take 0.5 tablets (12.5 mg total) by mouth 2 (two) times daily.    clopidogreL (PLAVIX) 75 mg tablet Take 1 tablet (75 mg total) by mouth once daily.    cyanocobalamin 1,000 mcg/mL injection Inject 1,000 mcg into the muscle every 30 days.     cyclobenzaprine (FLEXERIL) 10 MG tablet Take 1 tablet by mouth 3 times daily as needed.    FLUoxetine 40 MG capsule Take 40 mg by mouth once daily.    fluticasone-umeclidin-vilanter (TRELEGY ELLIPTA) 100-62.5-25 mcg DsDv Inhale 1 puff into the lungs once daily.    folic acid (FOLVITE) 1 MG tablet Take 1 tablet (1 mg total) by mouth once daily.    HYDROcodone-acetaminophen (NORCO)  mg per tablet Take 1 tablet by mouth every 4 (four) hours as needed for Pain.    levothyroxine (SYNTHROID) 50 MCG tablet Take 50 mcg by mouth before breakfast.    LIDOcaine 4 % PtMd Apply 1 patch topically 2 (two) times daily as needed (pain).    montelukast (SINGULAIR) 10 mg tablet Take 10 mg by mouth every morning.    nicotine (NICODERM CQ) 21 mg/24 hr Place 1 patch onto the skin once daily.    ondansetron (ZOFRAN) 4 MG tablet Take 4 mg by mouth every 6 (six) hours as needed for Nausea.    spironolactone (ALDACTONE) 25 MG tablet Take 0.5 tablets (12.5 mg total) by mouth once daily.    VITAMIN D2 1,250 mcg (50,000 unit) capsule Take 50,000 Units by mouth every 7 days.     Family History       Problem Relation (Age of Onset)    Anxiety disorder Daughter, Daughter    Arthritis Sister    COPD Father    Cancer Mother    Cirrhosis Father    Congenital heart disease Daughter    Depression Daughter    No Known Problems Brother    Valvular heart disease Daughter          Tobacco Use    Smoking status: Every Day     Current packs/day: 1.00     Average packs/day: 1 pack/day for 20.0 years (20.0 ttl pk-yrs)     Types: Cigarettes    Smokeless tobacco: Never   Substance and Sexual Activity     Alcohol use: Yes     Comment: couple times a week-wine or bloody cari    Drug use: No    Sexual activity: Not Currently     Review of Systems  Objective:     Vital Signs (Most Recent):  Temp: 98 °F (36.7 °C) (03/04/25 1550)  Pulse: 95 (03/04/25 1550)  Resp: 17 (03/04/25 1550)  BP: (!) 150/85 (03/04/25 1550)  SpO2: 95 % (03/04/25 1550) Vital Signs (24h Range):  Temp:  [97.9 °F (36.6 °C)-98.1 °F (36.7 °C)] 98 °F (36.7 °C)  Pulse:  [] 95  Resp:  [13-23] 17  SpO2:  [95 %-99 %] 95 %  BP: (135-178)/() 150/85     Weight: 47.1 kg (103 lb 13.4 oz)  Body mass index is 18.99 kg/m².     Physical Exam  Constitutional:       Appearance: She is not ill-appearing or toxic-appearing.      Comments: Chronically ill appearing    HENT:      Head: Normocephalic.      Mouth/Throat:      Mouth: Mucous membranes are dry.      Pharynx: Oropharynx is clear.   Cardiovascular:      Rate and Rhythm: Normal rate and regular rhythm.      Pulses: Normal pulses.      Heart sounds: Normal heart sounds.   Pulmonary:      Effort: Pulmonary effort is normal.      Breath sounds: Normal breath sounds.   Abdominal:      General: Bowel sounds are normal. There is no distension.      Palpations: Abdomen is soft.      Tenderness: There is no abdominal tenderness. There is no guarding.   Musculoskeletal:         General: Tenderness present.   Skin:     General: Skin is warm.      Capillary Refill: Capillary refill takes less than 2 seconds.   Neurological:      General: No focal deficit present.      Mental Status: She is alert and oriented to person, place, and time.      Sensory: No sensory deficit.      Motor: No weakness.   Psychiatric:         Mood and Affect: Mood normal.                Significant Labs: All pertinent labs within the past 24 hours have been reviewed.  Recent Lab Results       None            Significant Imaging: I have reviewed all pertinent imaging results/findings within the past 24 hours.  Assessment/Plan:     * T12  burst fracture  Pain control  Neurochecks   Neurosurgery consulted      Tobacco abuse  Smoking cessation counseling  Nicotine patch ordered      Mixed hyperlipidemia  Continue home statin      Daily consumption of alcohol  1 drink daily per patient however there is EtOH abuse listed in her previous medical records  MV, folate, thiamine  Ativan prn withdrawal symptoms       Hypothyroid  Continue home synthroid        VTE Risk Mitigation (From admission, onward)           Ordered     IP VTE HIGH RISK PATIENT  Once         03/04/25 1604     Place sequential compression device  Until discontinued         03/04/25 1604     Reason for No Pharmacological VTE Prophylaxis  Once        Question:  Reasons:  Answer:  Physician Provided (leave comment)  Comment:  pending neurosurgery eval    03/04/25 1604                                    DUANE Blanco  Department of Hospital Medicine  On license of [facility]

## 2025-03-04 NOTE — PROGRESS NOTES
Automatic Inhaler to Nebulizer Interchange    fluticasone/umeclidinium/vilanterol (Trelegy) 100 mcg/ 62.5 mcg/ 25 mcg changed to budesonide 0.5 mg twice daily AND ipratropium 0.5 mg every 6 hour scheduled AND arformoterol 15 mcg twice daily per [facility] Automatic Therapeutic Substitutions Protocol.    Please contact pharmacy at extension [phone] with any questions.     Thank you,   Bhavana Sanchez

## 2025-03-04 NOTE — RESPIRATORY THERAPY
03/04/25 0732   Patient Assessment/Suction   Level of Consciousness (AVPU) alert   Respiratory Effort Unlabored   Rhythm/Pattern, Respiratory depth regular;pattern regular;unlabored   Cough Frequency infrequent   PRE-TX-O2   Device (Oxygen Therapy) nasal cannula  (pt wears NC 3.5lpm at home)   $ Is the patient on Low Flow Oxygen? Yes   Flow (L/min) (Oxygen Therapy) 3.5   SpO2 97 %   Pulse Oximetry Type Continuous   $ Pulse Oximetry - Multiple Charge Pulse Oximetry - Multiple

## 2025-03-04 NOTE — ED NOTES
Transfer center called requesting a updated set of vitals, these were given from 1402 vitals on monitor. Pt going to [facility] RM 3107 Accepting provider    Number for report [phone]. Ground/routine per charge nurse. No equipment/precautions/restraints.

## 2025-03-04 NOTE — HPI
69-year-old female with PMHx of COPD, HTN, hypothyroidism, prior stroke who presented to the ED with a complaint of lumbar and bilateral hip pain status post fall several days ago. The patient reports that she was wearing socks and she slipped, falling backwards towards a wall and then slid down the wall to a sitting position. She denies loss of consciousness, chest pain, current shortness of breath, dizziness, nausea or vomiting, incontinence of bowel or bladder. She is on plavix at home.     In the ED, /79  RR 20 afebrile and 96% on 3L NC (baseline).     Imaging showed Acute burst fracture of T12. Case was discussed with Dr. Boothe ([facility]) who will consult on the patient. She was given tylenol, morphine, and zofran and stable for transfer, no neuro deficits.     Per chart review from recent admission on February 9th to February 14, 2025, the patient was admitted for COPD exacerbation and did have a history of previous EtOH abuse with subsequent falls.

## 2025-03-04 NOTE — ED NOTES
Attempted to call report x 3 to [facility] on the phone number provided but got no answer and then was transferred several times with no answer. Unable to leave message.

## 2025-03-04 NOTE — SUBJECTIVE & OBJECTIVE
Past Medical History:   Diagnosis Date    Acquired hypothyroidism     Anxiety and depression     Cervical radicular pain     Closed left ankle fracture     COPD (chronic obstructive pulmonary disease)     Heavy cigarette smoker     She has smoked 1/2 PPD since the age of 12, and still smokes    Hypertension     Hyponatremia     Multiple rib fractures     NSTEMI (non-ST elevated myocardial infarction)     Requires continuous at home supplemental oxygen     She is on 3.5L NC continuous    Stroke 11/08/2023    Tension type headache     Traumatic closed displaced fracture of distal end of radius, left, sequela        Past Surgical History:   Procedure Laterality Date    ABDOMINAL SURGERY      COLON SURGERY      COLOSTOMY      COLOSTOMY CLOSURE      HYSTERECTOMY      LEG SURGERY Right     OPEN REDUCTION AND INTERNAL FIXATION (ORIF) OF INJURY OF WRIST Left 1/29/2019    Procedure: ORIF, WRIST;  Surgeon: Homero Jeff DO;  Location: [facility] OR;  Service: Orthopedics;  Laterality: Left;  Equipment: [employer] Geminus Wrist Plate Set  Vendor/Rep: [employer]  C-Arm: Entire  DME: None    REQUIRES ASSISTANT    WRIST SURGERY Right        Review of patient's allergies indicates:  No Known Allergies    Current Facility-Administered Medications on File Prior to Encounter   Medication    [COMPLETED] acetaminophen tablet 650 mg    [COMPLETED] morphine injection 2 mg    [COMPLETED] morphine injection 4 mg    [COMPLETED] morphine injection 4 mg    [COMPLETED] morphine injection 4 mg    [COMPLETED] ondansetron injection 4 mg    [COMPLETED] ondansetron injection 4 mg    [COMPLETED] ondansetron injection 4 mg    [COMPLETED] ondansetron injection 4 mg    [DISCONTINUED] nicotine 21 mg/24 hr 1 patch     Current Outpatient Medications on File Prior to Encounter   Medication Sig    albuterol (PROVENTIL/VENTOLIN HFA) 90 mcg/actuation inhaler Inhale 2 puffs into the lungs every 6 (six) hours as needed for Wheezing or Shortness of  Breath.    albuterol (PROVENTIL/VENTOLIN HFA) 90 mcg/actuation inhaler Inhale 2 puffs into the lungs every 6 (six) hours as needed for Wheezing or Shortness of Breath. Rescue    albuterol-ipratropium (DUO-NEB) 2.5 mg-0.5 mg/3 mL nebulizer solution Take 3 mLs by nebulization every 6 (six) hours as needed for Wheezing. Rescue    aspirin (ECOTRIN) 81 MG EC tablet Take 1 tablet (81 mg total) by mouth once daily.    atorvastatin (LIPITOR) 80 MG tablet Take 1 tablet (80 mg total) by mouth once daily.    busPIRone (BUSPAR) 15 MG tablet Take 15 mg by mouth 2 (two) times a day.    carvediloL (COREG) 25 MG tablet Take 0.5 tablets (12.5 mg total) by mouth 2 (two) times daily.    clopidogreL (PLAVIX) 75 mg tablet Take 1 tablet (75 mg total) by mouth once daily.    cyanocobalamin 1,000 mcg/mL injection Inject 1,000 mcg into the muscle every 30 days.     cyclobenzaprine (FLEXERIL) 10 MG tablet Take 1 tablet by mouth 3 times daily as needed.    FLUoxetine 40 MG capsule Take 40 mg by mouth once daily.    fluticasone-umeclidin-vilanter (TRELEGY ELLIPTA) 100-62.5-25 mcg DsDv Inhale 1 puff into the lungs once daily.    folic acid (FOLVITE) 1 MG tablet Take 1 tablet (1 mg total) by mouth once daily.    HYDROcodone-acetaminophen (NORCO)  mg per tablet Take 1 tablet by mouth every 4 (four) hours as needed for Pain.    levothyroxine (SYNTHROID) 50 MCG tablet Take 50 mcg by mouth before breakfast.    LIDOcaine 4 % PtMd Apply 1 patch topically 2 (two) times daily as needed (pain).    montelukast (SINGULAIR) 10 mg tablet Take 10 mg by mouth every morning.    nicotine (NICODERM CQ) 21 mg/24 hr Place 1 patch onto the skin once daily.    ondansetron (ZOFRAN) 4 MG tablet Take 4 mg by mouth every 6 (six) hours as needed for Nausea.    spironolactone (ALDACTONE) 25 MG tablet Take 0.5 tablets (12.5 mg total) by mouth once daily.    VITAMIN D2 1,250 mcg (50,000 unit) capsule Take 50,000 Units by mouth every 7 days.     Family History        Problem Relation (Age of Onset)    Anxiety disorder Daughter, Daughter    Arthritis Sister    COPD Father    Cancer Mother    Cirrhosis Father    Congenital heart disease Daughter    Depression Daughter    No Known Problems Brother    Valvular heart disease Daughter          Tobacco Use    Smoking status: Every Day     Current packs/day: 1.00     Average packs/day: 1 pack/day for 20.0 years (20.0 ttl pk-yrs)     Types: Cigarettes    Smokeless tobacco: Never   Substance and Sexual Activity    Alcohol use: Yes     Comment: couple times a week-wine or bloody cari    Drug use: No    Sexual activity: Not Currently     Review of Systems  Objective:     Vital Signs (Most Recent):  Temp: 98 °F (36.7 °C) (03/04/25 1550)  Pulse: 95 (03/04/25 1550)  Resp: 17 (03/04/25 1550)  BP: (!) 150/85 (03/04/25 1550)  SpO2: 95 % (03/04/25 1550) Vital Signs (24h Range):  Temp:  [97.9 °F (36.6 °C)-98.1 °F (36.7 °C)] 98 °F (36.7 °C)  Pulse:  [] 95  Resp:  [13-23] 17  SpO2:  [95 %-99 %] 95 %  BP: (135-178)/() 150/85     Weight: 47.1 kg (103 lb 13.4 oz)  Body mass index is 18.99 kg/m².     Physical Exam  Constitutional:       Appearance: She is not ill-appearing or toxic-appearing.      Comments: Chronically ill appearing    HENT:      Head: Normocephalic.      Mouth/Throat:      Mouth: Mucous membranes are dry.      Pharynx: Oropharynx is clear.   Cardiovascular:      Rate and Rhythm: Normal rate and regular rhythm.      Pulses: Normal pulses.      Heart sounds: Normal heart sounds.   Pulmonary:      Effort: Pulmonary effort is normal.      Breath sounds: Normal breath sounds.   Abdominal:      General: Bowel sounds are normal. There is no distension.      Palpations: Abdomen is soft.      Tenderness: There is no abdominal tenderness. There is no guarding.   Musculoskeletal:         General: Tenderness present.   Skin:     General: Skin is warm.      Capillary Refill: Capillary refill takes less than 2 seconds.   Neurological:       General: No focal deficit present.      Mental Status: She is alert and oriented to person, place, and time.      Sensory: No sensory deficit.      Motor: No weakness.   Psychiatric:         Mood and Affect: Mood normal.                Significant Labs: All pertinent labs within the past 24 hours have been reviewed.  Recent Lab Results       None            Significant Imaging: I have reviewed all pertinent imaging results/findings within the past 24 hours.

## 2025-03-05 ENCOUNTER — CLINICAL SUPPORT (OUTPATIENT)
Dept: SMOKING CESSATION | Facility: CLINIC | Age: 70
End: 2025-03-05

## 2025-03-05 DIAGNOSIS — F17.200 NICOTINE DEPENDENCE: Primary | ICD-10-CM

## 2025-03-05 LAB
ALBUMIN SERPL BCP-MCNC: 3.1 G/DL (ref 3.5–5.2)
ALP SERPL-CCNC: 86 U/L (ref 55–135)
ALT SERPL W/O P-5'-P-CCNC: 10 U/L (ref 10–44)
ANION GAP SERPL CALC-SCNC: 11 MMOL/L (ref 8–16)
AST SERPL-CCNC: 16 U/L (ref 10–40)
BASOPHILS # BLD AUTO: 0.02 K/UL (ref 0–0.2)
BASOPHILS NFR BLD: 0.3 % (ref 0–1.9)
BILIRUB SERPL-MCNC: 0.3 MG/DL (ref 0.1–1)
BUN SERPL-MCNC: 7 MG/DL (ref 8–23)
CALCIUM SERPL-MCNC: 8.3 MG/DL (ref 8.7–10.5)
CHLORIDE SERPL-SCNC: 91 MMOL/L (ref 95–110)
CO2 SERPL-SCNC: 27 MMOL/L (ref 23–29)
CREAT SERPL-MCNC: 0.6 MG/DL (ref 0.5–1.4)
DIFFERENTIAL METHOD BLD: ABNORMAL
EOSINOPHIL # BLD AUTO: 0.2 K/UL (ref 0–0.5)
EOSINOPHIL NFR BLD: 1.9 % (ref 0–8)
ERYTHROCYTE [DISTWIDTH] IN BLOOD BY AUTOMATED COUNT: 13.2 % (ref 11.5–14.5)
EST. GFR  (NO RACE VARIABLE): >60 ML/MIN/1.73 M^2
GLUCOSE SERPL-MCNC: 75 MG/DL (ref 70–110)
HCT VFR BLD AUTO: 30.8 % (ref 37–48.5)
HGB BLD-MCNC: 9.7 G/DL (ref 12–16)
IMM GRANULOCYTES # BLD AUTO: 0.03 K/UL (ref 0–0.04)
IMM GRANULOCYTES NFR BLD AUTO: 0.4 % (ref 0–0.5)
LYMPHOCYTES # BLD AUTO: 1.1 K/UL (ref 1–4.8)
LYMPHOCYTES NFR BLD: 13.3 % (ref 18–48)
MAGNESIUM SERPL-MCNC: 1.7 MG/DL (ref 1.6–2.6)
MCH RBC QN AUTO: 30.6 PG (ref 27–31)
MCHC RBC AUTO-ENTMCNC: 31.5 G/DL (ref 32–36)
MCV RBC AUTO: 97 FL (ref 82–98)
MONOCYTES # BLD AUTO: 0.8 K/UL (ref 0.3–1)
MONOCYTES NFR BLD: 10.3 % (ref 4–15)
NEUTROPHILS # BLD AUTO: 5.9 K/UL (ref 1.8–7.7)
NEUTROPHILS NFR BLD: 73.8 % (ref 38–73)
NRBC BLD-RTO: 0 /100 WBC
PLATELET # BLD AUTO: 212 K/UL (ref 150–450)
PMV BLD AUTO: 11 FL (ref 9.2–12.9)
POTASSIUM SERPL-SCNC: 4.1 MMOL/L (ref 3.5–5.1)
PROT SERPL-MCNC: 6 G/DL (ref 6–8.4)
RBC # BLD AUTO: 3.17 M/UL (ref 4–5.4)
SODIUM SERPL-SCNC: 129 MMOL/L (ref 136–145)
WBC # BLD AUTO: 7.95 K/UL (ref 3.9–12.7)

## 2025-03-05 PROCEDURE — 80053 COMPREHEN METABOLIC PANEL: CPT

## 2025-03-05 PROCEDURE — 12000002 HC ACUTE/MED SURGE SEMI-PRIVATE ROOM

## 2025-03-05 PROCEDURE — 25000242 PHARM REV CODE 250 ALT 637 W/ HCPCS: Performed by: INTERNAL MEDICINE

## 2025-03-05 PROCEDURE — 99900031 HC PATIENT EDUCATION (STAT)

## 2025-03-05 PROCEDURE — 94799 UNLISTED PULMONARY SVC/PX: CPT

## 2025-03-05 PROCEDURE — 99406 BEHAV CHNG SMOKING 3-10 MIN: CPT

## 2025-03-05 PROCEDURE — 85025 COMPLETE CBC W/AUTO DIFF WBC: CPT

## 2025-03-05 PROCEDURE — 36415 COLL VENOUS BLD VENIPUNCTURE: CPT

## 2025-03-05 PROCEDURE — 99406 BEHAV CHNG SMOKING 3-10 MIN: CPT | Performed by: CLINIC/CENTER

## 2025-03-05 PROCEDURE — 99222 1ST HOSP IP/OBS MODERATE 55: CPT | Mod: ,,, | Performed by: NEUROLOGICAL SURGERY

## 2025-03-05 PROCEDURE — 83735 ASSAY OF MAGNESIUM: CPT

## 2025-03-05 PROCEDURE — 27000221 HC OXYGEN, UP TO 24 HOURS

## 2025-03-05 PROCEDURE — 25000003 PHARM REV CODE 250

## 2025-03-05 PROCEDURE — 94761 N-INVAS EAR/PLS OXIMETRY MLT: CPT

## 2025-03-05 PROCEDURE — 94640 AIRWAY INHALATION TREATMENT: CPT

## 2025-03-05 PROCEDURE — 25000003 PHARM REV CODE 250: Performed by: NURSE PRACTITIONER

## 2025-03-05 PROCEDURE — S4991 NICOTINE PATCH NONLEGEND: HCPCS

## 2025-03-05 PROCEDURE — 99900035 HC TECH TIME PER 15 MIN (STAT)

## 2025-03-05 RX ORDER — BUTALBITAL, ACETAMINOPHEN AND CAFFEINE 50; 325; 40 MG/1; MG/1; MG/1
1 TABLET ORAL EVERY 4 HOURS PRN
Status: DISCONTINUED | OUTPATIENT
Start: 2025-03-05 | End: 2025-03-18 | Stop reason: HOSPADM

## 2025-03-05 RX ADMIN — ARFORMOTEROL TARTRATE 15 MCG: 15 SOLUTION RESPIRATORY (INHALATION) at 07:03

## 2025-03-05 RX ADMIN — HYDROCODONE BITARTRATE AND ACETAMINOPHEN 1 TABLET: 5; 325 TABLET ORAL at 07:03

## 2025-03-05 RX ADMIN — BUDESONIDE INHALATION 0.5 MG: 0.5 SUSPENSION RESPIRATORY (INHALATION) at 06:03

## 2025-03-05 RX ADMIN — LEVOTHYROXINE SODIUM 50 MCG: 0.03 TABLET ORAL at 05:03

## 2025-03-05 RX ADMIN — GABAPENTIN 300 MG: 300 CAPSULE ORAL at 08:03

## 2025-03-05 RX ADMIN — BUDESONIDE INHALATION 0.5 MG: 0.5 SUSPENSION RESPIRATORY (INHALATION) at 07:03

## 2025-03-05 RX ADMIN — BUTALBITAL, ACETAMINOPHEN, AND CAFFEINE 1 TABLET: 50; 325; 40 TABLET, COATED ORAL at 11:03

## 2025-03-05 RX ADMIN — IPRATROPIUM BROMIDE 0.5 MG: 0.5 SOLUTION RESPIRATORY (INHALATION) at 01:03

## 2025-03-05 RX ADMIN — THIAMINE HCL TAB 100 MG 100 MG: 100 TAB at 11:03

## 2025-03-05 RX ADMIN — ATORVASTATIN CALCIUM 80 MG: 40 TABLET, FILM COATED ORAL at 11:03

## 2025-03-05 RX ADMIN — CARVEDILOL 12.5 MG: 12.5 TABLET, FILM COATED ORAL at 08:03

## 2025-03-05 RX ADMIN — GABAPENTIN 300 MG: 300 CAPSULE ORAL at 11:03

## 2025-03-05 RX ADMIN — CYCLOBENZAPRINE 10 MG: 10 TABLET, FILM COATED ORAL at 04:03

## 2025-03-05 RX ADMIN — FOLIC ACID 1 MG: 1 TABLET ORAL at 11:03

## 2025-03-05 RX ADMIN — IPRATROPIUM BROMIDE 0.5 MG: 0.5 SOLUTION RESPIRATORY (INHALATION) at 07:03

## 2025-03-05 RX ADMIN — IPRATROPIUM BROMIDE 0.5 MG: 0.5 SOLUTION RESPIRATORY (INHALATION) at 06:03

## 2025-03-05 RX ADMIN — CARVEDILOL 12.5 MG: 12.5 TABLET, FILM COATED ORAL at 11:03

## 2025-03-05 RX ADMIN — BUTALBITAL, ACETAMINOPHEN, AND CAFFEINE 1 TABLET: 50; 325; 40 TABLET, COATED ORAL at 08:03

## 2025-03-05 RX ADMIN — CYCLOBENZAPRINE 10 MG: 10 TABLET, FILM COATED ORAL at 08:03

## 2025-03-05 RX ADMIN — IPRATROPIUM BROMIDE 0.5 MG: 0.5 SOLUTION RESPIRATORY (INHALATION) at 12:03

## 2025-03-05 RX ADMIN — NICOTINE 1 PATCH: 21 PATCH, EXTENDED RELEASE TRANSDERMAL at 04:03

## 2025-03-05 RX ADMIN — FLUOXETINE HYDROCHLORIDE 40 MG: 20 CAPSULE ORAL at 11:03

## 2025-03-05 RX ADMIN — CLONAZEPAM 0.5 MG: 0.5 TABLET ORAL at 08:03

## 2025-03-05 RX ADMIN — ARFORMOTEROL TARTRATE 15 MCG: 15 SOLUTION RESPIRATORY (INHALATION) at 06:03

## 2025-03-05 RX ADMIN — THERA TABS 1 TABLET: TAB at 09:03

## 2025-03-05 RX ADMIN — MONTELUKAST 10 MG: 10 TABLET, FILM COATED ORAL at 08:03

## 2025-03-05 RX ADMIN — CLONAZEPAM 0.5 MG: 0.5 TABLET ORAL at 07:03

## 2025-03-05 NOTE — PT/OT/SLP PROGRESS
Occupational Therapy      Patient Name:  Bhavana Lambert   MRN:  [id number]    Patient not seen today secondary to MD hold (Comment) (Waiting on MRI thoracic spine and further instruction from Neuro prior to eval). Will follow-up next service date.    3/5/2025

## 2025-03-05 NOTE — ASSESSMENT & PLAN NOTE
Bhavana Lambert is a 69-year-old female with past medical history of hyperlipidemia, hypertension, recurrent falls, anxiety presented [facility] emergency department on 03/03/2025 reporting lumbar pain after a mechanical fall.  CT thoracic spine concerning for acute T12 burst fracture.  Patient was subsequently transferred to [facility] and Neurosurgery was consulted.    Patient is neurologically intact.    Lumbar MRI demonstrate acute T12 fracture with increased STIR signal an without significant canal stenosis.  Imaging also demonstrates multilevel degenerative disc disease with central canal stenosis at L2-3, L3-4, and L4-5.    Patient could not tolerate thoracic MRI.      TLSO ordered.     Further recommendations to follow.

## 2025-03-05 NOTE — PROGRESS NOTES
Case d/w ED MD and CT Thoracic reviewed: minimal retropulsion, normal alignment, and no kyphosis relative to T12 vertebral body fracture. Bracing/serial imaging may be indicated. TLSO ordered.     Final recommendations are pending MRI T/L findings    Full consult to follow

## 2025-03-05 NOTE — HPI
Bhavana Lambert is a 69-year-old female with past medical history of hyperlipidemia, hypertension, recurrent falls, and anxiety presented to [facility] emergency department on 03/03/2025 reporting lumbar pain after a mechanical fall.  CT thoracic spine concerning for acute T12 burst fracture.  Patient was subsequently transferred to [facility] for Neurosurgery evaluation.    On initiation of encounter, patient was found in bed, awake and alert.  No family was present at time of encounter.  Patient primarily reports lumbosacral pain which radiates to bilateral posterior thighs and calves that has been present for the last few months.  She also report episodes of urinary incontinence that also begun a few months ago.  She is currently taking gabapentin for bilateral foot paresthesias which does not relieve symptoms.  She denies lower extremity weakness, saddle anesthesia or bowel incontinence.

## 2025-03-05 NOTE — NURSING
MRI up to T10 done this morning. MRI reported pt had spasms and unable to complete. Requested if MRI could repeat with flexeril and ativan. Declined. CLAUDETTE Ortiz notified. Anesthesia to be consulted. Attempt to notify Dr Boothe unsuccessful.     1230: Dr Carlson plans for MRI 3/6 am. NPO after midnight.

## 2025-03-05 NOTE — ASSESSMENT & PLAN NOTE
Pain control  Neurochecks   Neurosurgery consulted  TLSO brace   MRI shows acute compression fracture of T12 with loss of height of approximately 25%.  There is minimal retropulsion of the posterior cortex measuring 3 mm without central canal stenosis.  Old mild chronic compression fracture of T11.  Multilevel degenerative disc disease and facet hypertrophy with mild central canal stenosis and foraminal narrowing at L2-3, L3-4, and L4-5  X-ray thoracolumbar spine ordered

## 2025-03-05 NOTE — PLAN OF CARE
MRI lumbar spine shows no significant canal stenosis T12.  Posterior ligamentous complex intact at T12.  Vertebral body height loss 25% at T12.  Remote mild superior endplate deformity T11.  Moderate degenerative canal stenosis L3-4 L4-5.    Recommend initial nonoperative management with brace.  Obtain standing thoracolumbar x-rays with brace.  If stable, begin mobilization with PT OT.

## 2025-03-05 NOTE — PLAN OF CARE
Per [facility]( [phone])  , TSLO Brace will be delivered by this evening .       1253 : TSLO brace delivered to patient at this time .        03/05/25 1111   Post-Acute Status   Post-Acute Authorization [facility] Status Referrals Sent

## 2025-03-05 NOTE — SUBJECTIVE & OBJECTIVE
Interval History:  Patient went for MRI this morning.  She was then unable to complete it due to involuntary muscle spasms.  Fortunately, the scan that was able to be done was adequate.  Patient reporting headache, for which she takes Fioricet.    Review of Systems   Constitutional:  Negative for chills and fever.   HENT:  Negative for congestion and trouble swallowing.    Eyes:  Negative for visual disturbance.   Respiratory:  Negative for shortness of breath.    Cardiovascular:  Negative for chest pain.   Gastrointestinal:  Negative for abdominal pain, nausea and vomiting.   Genitourinary:  Negative for difficulty urinating and dysuria.   Musculoskeletal:  Positive for back pain and gait problem.   Neurological:  Positive for headaches.   Psychiatric/Behavioral:  Negative for agitation and confusion. The patient is not nervous/anxious.      Objective:     Vital Signs (Most Recent):  Temp: 97.5 °F (36.4 °C) (03/05/25 1130)  Pulse: 96 (03/05/25 1323)  Resp: 16 (03/05/25 1323)  BP: 94/62 (03/05/25 1130)  SpO2: 95 % (03/05/25 1323) Vital Signs (24h Range):  Temp:  [97.5 °F (36.4 °C)-98 °F (36.7 °C)] 97.5 °F (36.4 °C)  Pulse:  [] 96  Resp:  [16-18] 16  SpO2:  [91 %-98 %] 95 %  BP: ()/(62-88) 94/62     Weight: 47.1 kg (103 lb 13.4 oz)  Body mass index is 18.99 kg/m².    Intake/Output Summary (Last 24 hours) at 3/5/2025 1602  Last data filed at 3/5/2025 0925  Gross per 24 hour   Intake 31.16 ml   Output 0 ml   Net 31.16 ml      Assessment & Plan  T12 burst fracture  Pain control  Neurochecks   Neurosurgery consulted    Hypothyroid  Continue home synthroid    Daily consumption of alcohol  1 drink daily per patient however there is EtOH abuse listed in her previous medical records  MV, folate, thiamine  Ativan prn withdrawal symptoms     Mixed hyperlipidemia  Continue home statin    Tobacco abuse  Smoking cessation counseling  Nicotine patch ordered    Tobacco dependency  Dangers of cigarette smoking were  reviewed with patient in detail. Patient was Counseled for 3-10 minutes. Nicotine replacement options were discussed. Nicotine replacement was discussed- prescribed     Physical Exam  Constitutional:       General: She is not in acute distress.     Interventions: Nasal cannula in place.   HENT:      Head: Normocephalic and atraumatic.      Mouth/Throat:      Mouth: Mucous membranes are moist.   Eyes:      Extraocular Movements: Extraocular movements intact.      Pupils: Pupils are equal, round, and reactive to light.   Cardiovascular:      Rate and Rhythm: Normal rate and regular rhythm.      Pulses: Normal pulses.      Heart sounds: Normal heart sounds.   Pulmonary:      Effort: Pulmonary effort is normal. No respiratory distress.      Breath sounds: Normal breath sounds. No wheezing, rhonchi or rales.   Abdominal:      General: Bowel sounds are normal. There is no distension.      Palpations: Abdomen is soft.      Tenderness: There is no abdominal tenderness. There is no guarding or rebound.   Musculoskeletal:      Right lower leg: No edema.      Left lower leg: No edema.   Skin:     General: Skin is warm.   Neurological:      Mental Status: She is alert and oriented to person, place, and time.   Psychiatric:         Mood and Affect: Mood normal.         Behavior: Behavior normal.         Thought Content: Thought content normal.         Judgment: Judgment normal.               Significant Labs: All pertinent labs within the past 24 hours have been reviewed.    Significant Imaging: I have reviewed all pertinent imaging results/findings within the past 24 hours.

## 2025-03-05 NOTE — PROGRESS NOTES
03/05/25 1420   Tobacco Cessation Intervention   Do you use any type of tobacco product? Yes   Are you interested in quitting use of tobacco products? Thinking about quitting   Are you interested in Nicotine Replacement for symptom relief? Yes   $ Smoking Cessation Charges Smoking Cessation - Intermediate (CTTS)

## 2025-03-05 NOTE — PLAN OF CARE
[facility]  Initial Discharge Assessment       Primary Care Provider: Pepito Jhaveri IV, MD    Admission Diagnosis: Burst fracture of thoracic vertebra [S22.001A]    Admission Date: 3/4/2025  Expected Discharge Date:   Assessment and facesheet verification done at bedside. All demographic information is correct in chart. Patient lives with Mellisa Hoang ( daughter)  #  [phone].   . Pt is on continuous oxygen, has COPD and uses [employer]. Pt has portable concentrator and uses potty chair next to bed. Pt reports she cannot stand or hardly walk due to weakness. PCP Pepito Camejo in [facility]. Patient is currently on service with [facility] .  Denies Dialysis or Coumadin use     Plan for DC : TBD     Payor: MEDICARE / Plan: MEDICARE PART A & B / Product Type: Government /     Extended Emergency Contact Information  Primary Emergency Contact: Ashley Hoang  Mobile Phone: [phone]  Relation: Daughter   needed? No  Secondary Emergency Contact: ASHLEY HOANG  Mobile Phone: [phone]  Relation: Daughter  Preferred language: English   needed? No    Discharge Plan A: Home Health  Discharge Plan B: Skilled Nursing Facility      [facility] [place] [place] - [address]  [address] [facility] [address] [address]  Phone: [phone] Fax: [phone]      Initial Assessment (most recent)       Adult Discharge Assessment - 03/05/25 1447          Discharge Assessment    Assessment Type Discharge Planning Assessment     Confirmed/corrected address, phone number and insurance Yes     Confirmed Demographics Correct on Facesheet     Source of Information patient     Communicated CHLOE with patient/caregiver Date not available/Unable to determine     Reason For Admission T12 Burst fracture     People in Home child(jie), adult     Do you expect to return to your current living situation? Yes     Do you have help at home or someone to help you manage your care at home?  Yes     Who are your caregiver(s) and their phone number(s)? Mellisa Miranda ( daughter ) # [phone]     Prior to hospitilization cognitive status: Alert/Oriented     Current cognitive status: Alert/Oriented     Walking or Climbing Stairs Difficulty yes     Walking or Climbing Stairs ambulation difficulty, assistance 1 person     Dressing/Bathing Difficulty yes     Dressing/Bathing bathing difficulty, assistance 1 person     Home Accessibility wheelchair accessible     Home Layout Able to live on 1st floor     Equipment Currently Used at Home bath bench;BIPAP;walker, standard;bedside commode;oxygen;respiratory supplies     Readmission within 30 days? Yes     Patient currently being followed by outpatient case management? No     Do you currently have service(s) that help you manage your care at home? Yes     Name and Contact number of agency [facility]     Is the pt/caregiver preference to resume services with current agency Yes     Do you take prescription medications? Yes     Do you have prescription coverage? Yes     Do you have any problems affording any of your prescribed medications? No     Is the patient taking medications as prescribed? yes     Who is going to help you get home at discharge? Mellisa Miranda ( daughter ) # [phone]     How do you get to doctors appointments? family or friend will provide     Are you on dialysis? No     Do you take coumadin? No     Discharge Plan A Home Health     Discharge Plan B Skilled Nursing Facility

## 2025-03-05 NOTE — SUBJECTIVE & OBJECTIVE
Prescriptions Prior to Admission[1]    Review of patient's allergies indicates:  No Known Allergies    Past Medical History:   Diagnosis Date    Acquired hypothyroidism     Anxiety and depression     Cervical radicular pain     Closed left ankle fracture     COPD (chronic obstructive pulmonary disease)     Heavy cigarette smoker     She has smoked 1/2 PPD since the age of 12, and still smokes    Hypertension     Hyponatremia     Multiple rib fractures     NSTEMI (non-ST elevated myocardial infarction)     Requires continuous at home supplemental oxygen     She is on 3.5L NC continuous    Stroke 11/08/2023    Tension type headache     Traumatic closed displaced fracture of distal end of radius, left, sequela      Past Surgical History:   Procedure Laterality Date    ABDOMINAL SURGERY      COLON SURGERY      COLOSTOMY      COLOSTOMY CLOSURE      HYSTERECTOMY      LEG SURGERY Right     OPEN REDUCTION AND INTERNAL FIXATION (ORIF) OF INJURY OF WRIST Left 1/29/2019    Procedure: ORIF, WRIST;  Surgeon: Homero Jeff DO;  Location: [facility];  Service: Orthopedics;  Laterality: Left;  Equipment: [employer] Geminus Wrist Plate Set  Vendor/Rep: [employer]  C-Arm: Entire  DME: None    REQUIRES ASSISTANT    WRIST SURGERY Right      Family History       Problem Relation (Age of Onset)    Anxiety disorder Daughter, Daughter    Arthritis Sister    COPD Father    Cancer Mother    Cirrhosis Father    Congenital heart disease Daughter    Depression Daughter    No Known Problems Brother    Valvular heart disease Daughter          Tobacco Use    Smoking status: Every Day     Current packs/day: 1.00     Average packs/day: 1 pack/day for 20.0 years (20.0 ttl pk-yrs)     Types: Cigarettes    Smokeless tobacco: Never   Substance and Sexual Activity    Alcohol use: Yes     Comment: couple times a week-wine or bloody cari    Drug use: No    Sexual activity: Not Currently       Objective:     Weight: 47.1 kg (103 lb 13.4  "oz)  Body mass index is 18.99 kg/m².  Vital Signs (Most Recent):  Temp: 97.5 °F (36.4 °C) (03/05/25 1130)  Pulse: 91 (03/05/25 1130)  Resp: 18 (03/05/25 1130)  BP: 94/62 (03/05/25 1130)  SpO2: (!) 94 % (03/05/25 1130) Vital Signs (24h Range):  Temp:  [97.5 °F (36.4 °C)-98 °F (36.7 °C)] 97.5 °F (36.4 °C)  Pulse:  [] 91  Resp:  [13-19] 18  SpO2:  [91 %-98 %] 94 %  BP: ()/(62-90) 94/62     Date 03/05/25 0700 - 03/06/25 0659   Shift 9956-9909 2832-9067 6356-0214 24 Hour Total   INTAKE   P.O. 0   0   Shift Total(mL/kg) 0(0)   0(0)   OUTPUT   Shift Total(mL/kg)       Weight (kg) 47.1 47.1 47.1 47.1                       Female External Urinary Catheter w/ Suction 03/04/25 1600 (Active)   Skin no redness;no breakdown 03/05/25 0800   Tolerance no signs/symptoms of discomfort 03/05/25 0800   Suction Continuous suction at 40 mmHg 03/05/25 0800   Date of last wick change 03/04/25 03/05/25 0800   Time of last wick change 2000 03/04/25 1945       Neurosurgery Physical Exam  General:  No acute distress.  Neurologic: Alert and oriented. Thought content appropriate.  GCS: E4 V5 M6; Total: 15  Pulmonary: normal respirations, no signs of respiratory distress  Skin: Skin is warm, dry and intact.    Sensory: intact to light touch throughout  Motor Strength: Moves all extremities spontaneously with good tone.    Bilateral upper extremity strength deltoid/biceps/triceps/hand  5/5   Bilateral lower extremity strength iliopsoas/TA/EHL/gastrocnemius 5/5   No abnormal movements seen.          Significant Labs:  Recent Labs   Lab 03/04/25  1617 03/05/25  0619   GLU 91 75   * 129*   K 4.4 4.1   CL 93* 91*   CO2 31* 27   BUN 6* 7*   CREATININE 0.6 0.6   CALCIUM 8.9 8.3*   MG 1.4* 1.7     Recent Labs   Lab 03/04/25  1617 03/05/25  0619   WBC 8.36 7.95   HGB 11.0* 9.7*   HCT 34.3* 30.8*    212     No results for input(s): "LABPT", "INR", "APTT" in the last 48 hours.  Microbiology Results (last 7 days)       ** No " results found for the last 168 hours. **                 [1]   Medications Prior to Admission   Medication Sig Dispense Refill Last Dose/Taking    albuterol (PROVENTIL/VENTOLIN HFA) 90 mcg/actuation inhaler Inhale 2 puffs into the lungs every 6 (six) hours as needed for Wheezing or Shortness of Breath.       albuterol (PROVENTIL/VENTOLIN HFA) 90 mcg/actuation inhaler Inhale 2 puffs into the lungs every 6 (six) hours as needed for Wheezing or Shortness of Breath. Rescue 18 g 11     albuterol-ipratropium (DUO-NEB) 2.5 mg-0.5 mg/3 mL nebulizer solution Take 3 mLs by nebulization every 6 (six) hours as needed for Wheezing. Rescue 75 mL 0     aspirin (ECOTRIN) 81 MG EC tablet Take 1 tablet (81 mg total) by mouth once daily. 360 tablet 0     atorvastatin (LIPITOR) 80 MG tablet Take 1 tablet (80 mg total) by mouth once daily. 90 tablet 3     busPIRone (BUSPAR) 15 MG tablet Take 15 mg by mouth 2 (two) times a day.       carvediloL (COREG) 25 MG tablet Take 0.5 tablets (12.5 mg total) by mouth 2 (two) times daily. 90 tablet 3     clopidogreL (PLAVIX) 75 mg tablet Take 1 tablet (75 mg total) by mouth once daily. 90 tablet 3     cyanocobalamin 1,000 mcg/mL injection Inject 1,000 mcg into the muscle every 30 days.        cyclobenzaprine (FLEXERIL) 10 MG tablet Take 1 tablet by mouth 3 times daily as needed.       FLUoxetine 40 MG capsule Take 40 mg by mouth once daily.       fluticasone-umeclidin-vilanter (TRELEGY ELLIPTA) 100-62.5-25 mcg DsDv Inhale 1 puff into the lungs once daily. 60 each 11     folic acid (FOLVITE) 1 MG tablet Take 1 tablet (1 mg total) by mouth once daily.  0     HYDROcodone-acetaminophen (NORCO)  mg per tablet Take 1 tablet by mouth every 4 (four) hours as needed for Pain. 20 tablet 0     levothyroxine (SYNTHROID) 50 MCG tablet Take 50 mcg by mouth before breakfast.       LIDOcaine 4 % PtMd Apply 1 patch topically 2 (two) times daily as needed (pain). 10 patch 0     montelukast (SINGULAIR) 10 mg  tablet Take 10 mg by mouth every morning.       nicotine (NICODERM CQ) 21 mg/24 hr Place 1 patch onto the skin once daily. 28 patch 0     ondansetron (ZOFRAN) 4 MG tablet Take 4 mg by mouth every 6 (six) hours as needed for Nausea.       spironolactone (ALDACTONE) 25 MG tablet Take 0.5 tablets (12.5 mg total) by mouth once daily. 15 tablet 11     VITAMIN D2 1,250 mcg (50,000 unit) capsule Take 50,000 Units by mouth every 7 days.

## 2025-03-05 NOTE — CARE UPDATE
03/05/25 0655   Patient Assessment/Suction   Level of Consciousness (AVPU) alert   Respiratory Effort Normal;Unlabored   Expansion/Accessory Muscles/Retractions no use of accessory muscles;no retractions;expansion symmetric   All Lung Fields Breath Sounds clear   Rhythm/Pattern, Respiratory unlabored;pattern regular;depth regular   Cough Frequency no cough   PRE-TX-O2   Device (Oxygen Therapy) nasal cannula  (pt wears 3.5 at home)   $ Is the patient on Low Flow Oxygen? Yes   Flow (L/min) (Oxygen Therapy) 3.5   SpO2 (!) 91 %   Pulse Oximetry Type Intermittent   $ Pulse Oximetry - Multiple Charge Pulse Oximetry - Multiple   Pulse 90   Resp 16   Aerosol Therapy   $ Aerosol Therapy Charges Aerosol Treatment   Daily Review of Necessity (SVN) completed   Respiratory Treatment Status (SVN) given   Treatment Route (SVN) oxygen;mask   Patient Position HOB elevated   Post Treatment Assessment (SVN) breath sounds unchanged   Signs of Intolerance (SVN) none

## 2025-03-05 NOTE — HOSPITAL COURSE
Bhavana Lambert closely monitored while in the hospital.  Patient is a 69-year-old female with PMH COPD, HTN, hypothyroidism, prior stroke.  She was admitted to the hospital for management of an acute burst fracture of T12.  Neurosurgery following.  MRI obtained.  X-ray thoracolumbar spine ordered and showed stability.  PT/OT consulted.  TLSO brace applied for activity per neurosurgery recommendation.  Pt developed ETOH withdrawals with delirium on 03/06 and was placed on Librium protocol.  She had worsening hyponatremia prompting further workup though this notably improved with diet liberalization.  Her DTs were much improved on 03/07 after Librium initiation.  Sodium levels continued to improve.  Pain was controlled with oral narcotics.  Librium was down titrated.  On 03/11 she developed increased O2 requirements with low-grade temp 100.5°.  CXR revealed acute on chronic changes of patchy infiltrates.  She reported reflux overnight-concerning clinically for aspiration.  She was placed on BiPAP after ABG revealed hypercapnia.  She was transferred to ICU for continued BiPAP therapy and pulmonology was consulted.  She was placed on IV Abx and sepsis workup was pursued. Patient weaned off on intravenous Levophed.  Urine cultures grew Proteus mirabilis and group B strep.  Physical therapy and occupational therapy has been consulted.   arranging skilled nursing facility placement.  Nutrition consulted secondary to decreased oral intake.  Deescalated antibiotics to Augmentin.  Patient accepted to LTAC facility.    Patient seen and examined on day of discharge.  Patient in no acute distress.  Patient continues to require BiPAP with naps and at nighttime.  Pulmonology cleared patient for discharge. Patient deemed okay to discharge.  Facility transfer orders sent to accepting facility.  Patient educated on discharge planning, she verbalized understanding.  Patient is safely discharged to LTAC facility.

## 2025-03-05 NOTE — PROGRESS NOTES
[facility]  Progress Note    Patient Name: Bhavana Lambert  MRN: [id number]  Patient Class: IP- Inpatient   Admission Date: 3/4/2025  Length of Stay: 1 days  Attending Physician: Osmin Grande MD  Primary Care Provider: Pepito Jhaveri IV, MD        Subjective     Principal Problem:T12 burst fracture        HPI:  69-year-old female with PMHx of COPD, HTN, hypothyroidism, prior stroke who presented to the ED with a complaint of lumbar and bilateral hip pain status post fall several days ago. The patient reports that she was wearing socks and she slipped, falling backwards towards a wall and then slid down the wall to a sitting position. She denies loss of consciousness, chest pain, current shortness of breath, dizziness, nausea or vomiting, incontinence of bowel or bladder. She is on plavix at home.     In the ED, /79  RR 20 afebrile and 96% on 3L NC (baseline).     Imaging showed Acute burst fracture of T12. Case was discussed with Dr. Boothe ([facility]) who will consult on the patient. She was given tylenol, morphine, and zofran and stable for transfer, no neuro deficits.     Per chart review from recent admission on February 9th to February 14, 2025, the patient was admitted for COPD exacerbation and did have a history of previous EtOH abuse with subsequent falls.     Overview/Hospital Course:  Bhavnaa Lambert is 69-year-old female with PMH COPD, HTN, hypothyroidism, prior stroke.  She was admitted to the hospital for management of an acute burst fracture of T12.  Neurosurgery following.  MRI obtained.  X-ray thoracolumbar spine ordered.  PT/OT consulted.  TLSO brace applied.    Interval History:  Patient went for MRI this morning.  She was then unable to complete it due to involuntary muscle spasms.  Fortunately, the scan that was able to be done was adequate.  Patient reporting headache, for which she takes Fioricet.    Review of Systems   Constitutional:  Negative  for chills and fever.   HENT:  Negative for congestion and trouble swallowing.    Eyes:  Negative for visual disturbance.   Respiratory:  Negative for shortness of breath.    Cardiovascular:  Negative for chest pain.   Gastrointestinal:  Negative for abdominal pain, nausea and vomiting.   Genitourinary:  Negative for difficulty urinating and dysuria.   Musculoskeletal:  Positive for back pain and gait problem.   Neurological:  Positive for headaches.   Psychiatric/Behavioral:  Negative for agitation and confusion. The patient is not nervous/anxious.      Objective:     Vital Signs (Most Recent):  Temp: 97.5 °F (36.4 °C) (03/05/25 1130)  Pulse: 96 (03/05/25 1323)  Resp: 16 (03/05/25 1323)  BP: 94/62 (03/05/25 1130)  SpO2: 95 % (03/05/25 1323) Vital Signs (24h Range):  Temp:  [97.5 °F (36.4 °C)-98 °F (36.7 °C)] 97.5 °F (36.4 °C)  Pulse:  [] 96  Resp:  [16-18] 16  SpO2:  [91 %-98 %] 95 %  BP: ()/(62-88) 94/62     Weight: 47.1 kg (103 lb 13.4 oz)  Body mass index is 18.99 kg/m².    Intake/Output Summary (Last 24 hours) at 3/5/2025 1602  Last data filed at 3/5/2025 0925  Gross per 24 hour   Intake 31.16 ml   Output 0 ml   Net 31.16 ml      Assessment & Plan  T12 burst fracture  Pain control  Neurochecks   Neurosurgery consulted    Hypothyroid  Continue home synthroid    Daily consumption of alcohol  1 drink daily per patient however there is EtOH abuse listed in her previous medical records  MV, folate, thiamine  Ativan prn withdrawal symptoms     Mixed hyperlipidemia  Continue home statin    Tobacco abuse  Smoking cessation counseling  Nicotine patch ordered    Tobacco dependency  Dangers of cigarette smoking were reviewed with patient in detail. Patient was Counseled for 3-10 minutes. Nicotine replacement options were discussed. Nicotine replacement was discussed- prescribed     Physical Exam  Constitutional:       General: She is not in acute distress.     Interventions: Nasal cannula in place.   HENT:       Head: Normocephalic and atraumatic.      Mouth/Throat:      Mouth: Mucous membranes are moist.   Eyes:      Extraocular Movements: Extraocular movements intact.      Pupils: Pupils are equal, round, and reactive to light.   Cardiovascular:      Rate and Rhythm: Normal rate and regular rhythm.      Pulses: Normal pulses.      Heart sounds: Normal heart sounds.   Pulmonary:      Effort: Pulmonary effort is normal. No respiratory distress.      Breath sounds: Normal breath sounds. No wheezing, rhonchi or rales.   Abdominal:      General: Bowel sounds are normal. There is no distension.      Palpations: Abdomen is soft.      Tenderness: There is no abdominal tenderness. There is no guarding or rebound.   Musculoskeletal:      Right lower leg: No edema.      Left lower leg: No edema.   Skin:     General: Skin is warm.   Neurological:      Mental Status: She is alert and oriented to person, place, and time.   Psychiatric:         Mood and Affect: Mood normal.         Behavior: Behavior normal.         Thought Content: Thought content normal.         Judgment: Judgment normal.               Significant Labs: All pertinent labs within the past 24 hours have been reviewed.    Significant Imaging: I have reviewed all pertinent imaging results/findings within the past 24 hours.    Assessment and Plan     Assessment & Plan  T12 burst fracture  Pain control  Neurochecks   Neurosurgery consulted  TLSO brace   MRI shows acute compression fracture of T12 with loss of height of approximately 25%.  There is minimal retropulsion of the posterior cortex measuring 3 mm without central canal stenosis.  Old mild chronic compression fracture of T11.  Multilevel degenerative disc disease and facet hypertrophy with mild central canal stenosis and foraminal narrowing at L2-3, L3-4, and L4-5  X-ray thoracolumbar spine ordered    Hypothyroid  Continue home synthroid    Daily consumption of alcohol  1 drink daily per patient however there is  EtOH abuse listed in her previous medical records  MV, folate, thiamine  Ativan prn withdrawal symptoms     Mixed hyperlipidemia  Continue home statin    Tobacco abuse  Smoking cessation counseling  Nicotine patch ordered    Tobacco dependency  Dangers of cigarette smoking were reviewed with patient in detail. Patient was Counseled for 3-10 minutes. Nicotine replacement options were discussed. Nicotine replacement was discussed- prescribed  VTE Risk Mitigation (From admission, onward)           Ordered     IP VTE HIGH RISK PATIENT  Once         03/04/25 1604     Place sequential compression device  Until discontinued         03/04/25 1604     Reason for No Pharmacological VTE Prophylaxis  Once        Question:  Reasons:  Answer:  Physician Provided (leave comment)  Comment:  pending neurosurgery eval    03/04/25 1604                    Discharge Planning   CHLOE:      Code Status: Full Code   Medical Readiness for Discharge Date:   Discharge Plan A: Home Health                Please place Justification for DME        Dionne Ortiz NP  Department of Hospital Medicine   Formerly [facility], [facility]

## 2025-03-05 NOTE — CONSULTS
[facility]  Neurosurgery  Consult Note    Consults  Subjective:     Chief Complaint/Reason for Admission: Burst fracture    History of Present Illness: Bhavana Lambert is a 69-year-old female with past medical history of hyperlipidemia, hypertension, recurrent falls, and anxiety presented to [place] emergency department on 03/03/2025 reporting lumbar pain after a mechanical fall.  CT thoracic spine concerning for acute T12 burst fracture.  Patient was subsequently transferred to [facility] for Neurosurgery evaluation.    On initiation of encounter, patient was found in bed, awake and alert.  No family was present at time of encounter.  Patient primarily reports lumbosacral pain which radiates to bilateral posterior thighs and calves that has been present for the last few months.  She also report episodes of urinary incontinence that also begun a few months ago.  She is currently taking gabapentin for bilateral foot paresthesias which does not relieve symptoms.  She denies lower extremity weakness, saddle anesthesia or bowel incontinence.    Prescriptions Prior to Admission[1]    Review of patient's allergies indicates:  No Known Allergies    Past Medical History:   Diagnosis Date    Acquired hypothyroidism     Anxiety and depression     Cervical radicular pain     Closed left ankle fracture     COPD (chronic obstructive pulmonary disease)     Heavy cigarette smoker     She has smoked 1/2 PPD since the age of 12, and still smokes    Hypertension     Hyponatremia     Multiple rib fractures     NSTEMI (non-ST elevated myocardial infarction)     Requires continuous at home supplemental oxygen     She is on 3.5L NC continuous    Stroke 11/08/2023    Tension type headache     Traumatic closed displaced fracture of distal end of radius, left, sequela      Past Surgical History:   Procedure Laterality Date    ABDOMINAL SURGERY      COLON SURGERY      COLOSTOMY      COLOSTOMY CLOSURE       HYSTERECTOMY      LEG SURGERY Right     OPEN REDUCTION AND INTERNAL FIXATION (ORIF) OF INJURY OF WRIST Left 1/29/2019    Procedure: ORIF, WRIST;  Surgeon: Homero Jeff DO;  Location: [facility] OR;  Service: Orthopedics;  Laterality: Left;  Equipment: [employer] Geminus Wrist Plate Set  Vendor/Rep: [employer]  C-Arm: Entire  DME: None    REQUIRES ASSISTANT    WRIST SURGERY Right      Family History       Problem Relation (Age of Onset)    Anxiety disorder Daughter, Daughter    Arthritis Sister    COPD Father    Cancer Mother    Cirrhosis Father    Congenital heart disease Daughter    Depression Daughter    No Known Problems Brother    Valvular heart disease Daughter          Tobacco Use    Smoking status: Every Day     Current packs/day: 1.00     Average packs/day: 1 pack/day for 20.0 years (20.0 ttl pk-yrs)     Types: Cigarettes    Smokeless tobacco: Never   Substance and Sexual Activity    Alcohol use: Yes     Comment: couple times a week-wine or bloody cari    Drug use: No    Sexual activity: Not Currently       Objective:     Weight: 47.1 kg (103 lb 13.4 oz)  Body mass index is 18.99 kg/m².  Vital Signs (Most Recent):  Temp: 97.5 °F (36.4 °C) (03/05/25 1130)  Pulse: 91 (03/05/25 1130)  Resp: 18 (03/05/25 1130)  BP: 94/62 (03/05/25 1130)  SpO2: (!) 94 % (03/05/25 1130) Vital Signs (24h Range):  Temp:  [97.5 °F (36.4 °C)-98 °F (36.7 °C)] 97.5 °F (36.4 °C)  Pulse:  [] 91  Resp:  [13-19] 18  SpO2:  [91 %-98 %] 94 %  BP: ()/(62-90) 94/62     Date 03/05/25 0700 - 03/06/25 0659   Shift 1067-7775 1629-8662 0964-1384 24 Hour Total   INTAKE   P.O. 0   0   Shift Total(mL/kg) 0(0)   0(0)   OUTPUT   Shift Total(mL/kg)       Weight (kg) 47.1 47.1 47.1 47.1                       Female External Urinary Catheter w/ Suction 03/04/25 1600 (Active)   Skin no redness;no breakdown 03/05/25 0800   Tolerance no signs/symptoms of discomfort 03/05/25 0800   Suction Continuous suction at 40 mmHg 03/05/25 0800  "  Date of last wick change 03/04/25 03/05/25 0800   Time of last wick change 2000 03/04/25 1945       Neurosurgery Physical Exam  General:  No acute distress.  Neurologic: Alert and oriented. Thought content appropriate.  GCS: E4 V5 M6; Total: 15  Pulmonary: normal respirations, no signs of respiratory distress  Skin: Skin is warm, dry and intact.    Sensory: intact to light touch throughout  Motor Strength: Moves all extremities spontaneously with good tone.    Bilateral upper extremity strength deltoid/biceps/triceps/hand  5/5   Bilateral lower extremity strength iliopsoas/TA/EHL/gastrocnemius 5/5   No abnormal movements seen.          Significant Labs:  Recent Labs   Lab 03/04/25 1617 03/05/25  0619   GLU 91 75   * 129*   K 4.4 4.1   CL 93* 91*   CO2 31* 27   BUN 6* 7*   CREATININE 0.6 0.6   CALCIUM 8.9 8.3*   MG 1.4* 1.7     Recent Labs   Lab 03/04/25 1617 03/05/25  0619   WBC 8.36 7.95   HGB 11.0* 9.7*   HCT 34.3* 30.8*    212     No results for input(s): "LABPT", "INR", "APTT" in the last 48 hours.  Microbiology Results (last 7 days)       ** No results found for the last 168 hours. **            Assessment/Plan:     * T12 burst fracture  Bhavana Lambert is a 69-year-old female with past medical history of hyperlipidemia, hypertension, recurrent falls, anxiety presented [place] emergency department on 03/03/2025 reporting lumbar pain after a mechanical fall.  CT thoracic spine concerning for acute T12 burst fracture.  Patient was subsequently transferred to [facility] and Neurosurgery was consulted.    Patient is neurologically intact.    Lumbar MRI demonstrate acute T12 fracture with increased STIR signal an without significant canal stenosis.  Imaging also demonstrates multilevel degenerative disc disease with central canal stenosis at L2-3, L3-4, and L4-5.    Patient could not tolerate thoracic MRI.      TLSO ordered.     Further recommendations to follow.             Thank " you for your consult.     FRANCO JUAREZ PA-C  Neurosurgery  [facility]       [1]   Medications Prior to Admission   Medication Sig Dispense Refill Last Dose/Taking    albuterol (PROVENTIL/VENTOLIN HFA) 90 mcg/actuation inhaler Inhale 2 puffs into the lungs every 6 (six) hours as needed for Wheezing or Shortness of Breath.       albuterol (PROVENTIL/VENTOLIN HFA) 90 mcg/actuation inhaler Inhale 2 puffs into the lungs every 6 (six) hours as needed for Wheezing or Shortness of Breath. Rescue 18 g 11     albuterol-ipratropium (DUO-NEB) 2.5 mg-0.5 mg/3 mL nebulizer solution Take 3 mLs by nebulization every 6 (six) hours as needed for Wheezing. Rescue 75 mL 0     aspirin (ECOTRIN) 81 MG EC tablet Take 1 tablet (81 mg total) by mouth once daily. 360 tablet 0     atorvastatin (LIPITOR) 80 MG tablet Take 1 tablet (80 mg total) by mouth once daily. 90 tablet 3     busPIRone (BUSPAR) 15 MG tablet Take 15 mg by mouth 2 (two) times a day.       carvediloL (COREG) 25 MG tablet Take 0.5 tablets (12.5 mg total) by mouth 2 (two) times daily. 90 tablet 3     clopidogreL (PLAVIX) 75 mg tablet Take 1 tablet (75 mg total) by mouth once daily. 90 tablet 3     cyanocobalamin 1,000 mcg/mL injection Inject 1,000 mcg into the muscle every 30 days.        cyclobenzaprine (FLEXERIL) 10 MG tablet Take 1 tablet by mouth 3 times daily as needed.       FLUoxetine 40 MG capsule Take 40 mg by mouth once daily.       fluticasone-umeclidin-vilanter (TRELEGY ELLIPTA) 100-62.5-25 mcg DsDv Inhale 1 puff into the lungs once daily. 60 each 11     folic acid (FOLVITE) 1 MG tablet Take 1 tablet (1 mg total) by mouth once daily.  0     HYDROcodone-acetaminophen (NORCO)  mg per tablet Take 1 tablet by mouth every 4 (four) hours as needed for Pain. 20 tablet 0     levothyroxine (SYNTHROID) 50 MCG tablet Take 50 mcg by mouth before breakfast.       LIDOcaine 4 % PtMd Apply 1 patch topically 2 (two) times daily as needed (pain). 10 patch 0      montelukast (SINGULAIR) 10 mg tablet Take 10 mg by mouth every morning.       nicotine (NICODERM CQ) 21 mg/24 hr Place 1 patch onto the skin once daily. 28 patch 0     ondansetron (ZOFRAN) 4 MG tablet Take 4 mg by mouth every 6 (six) hours as needed for Nausea.       spironolactone (ALDACTONE) 25 MG tablet Take 0.5 tablets (12.5 mg total) by mouth once daily. 15 tablet 11     VITAMIN D2 1,250 mcg (50,000 unit) capsule Take 50,000 Units by mouth every 7 days.

## 2025-03-05 NOTE — PT/OT/SLP PROGRESS
Physical Therapy      Patient Name:  Bhavana Lambert   MRN:  [id number]    Patient not seen today secondary to MD hold (Comment) (waiting on MRI thoracic spine  and fther instruction from Neuro prior to eval. New TLSO delivered and donned today.). Will follow-up tomorrow.

## 2025-03-06 PROBLEM — F10.231 ALCOHOL DEPENDENCE WITH WITHDRAWAL DELIRIUM: Status: ACTIVE | Noted: 2025-03-06

## 2025-03-06 PROBLEM — F10.931 ACUTE HYPERACTIVE ALCOHOL WITHDRAWAL DELIRIUM: Status: ACTIVE | Noted: 2025-03-06

## 2025-03-06 LAB
ALBUMIN SERPL BCP-MCNC: 3.3 G/DL (ref 3.5–5.2)
ALP SERPL-CCNC: 86 U/L (ref 55–135)
ALT SERPL W/O P-5'-P-CCNC: 10 U/L (ref 10–44)
ANION GAP SERPL CALC-SCNC: 6 MMOL/L (ref 8–16)
ANION GAP SERPL CALC-SCNC: 6 MMOL/L (ref 8–16)
AST SERPL-CCNC: 20 U/L (ref 10–40)
BASOPHILS # BLD AUTO: 0.03 K/UL (ref 0–0.2)
BASOPHILS NFR BLD: 0.3 % (ref 0–1.9)
BILIRUB SERPL-MCNC: 0.4 MG/DL (ref 0.1–1)
BILIRUB UR QL STRIP: NEGATIVE
BUN SERPL-MCNC: 5 MG/DL (ref 8–23)
BUN SERPL-MCNC: 8 MG/DL (ref 8–23)
CALCIUM SERPL-MCNC: 8.4 MG/DL (ref 8.7–10.5)
CALCIUM SERPL-MCNC: 8.5 MG/DL (ref 8.7–10.5)
CHLORIDE SERPL-SCNC: 89 MMOL/L (ref 95–110)
CHLORIDE SERPL-SCNC: 90 MMOL/L (ref 95–110)
CLARITY UR: CLEAR
CO2 SERPL-SCNC: 31 MMOL/L (ref 23–29)
CO2 SERPL-SCNC: 31 MMOL/L (ref 23–29)
COLOR UR: YELLOW
CREAT SERPL-MCNC: 0.5 MG/DL (ref 0.5–1.4)
CREAT SERPL-MCNC: 0.6 MG/DL (ref 0.5–1.4)
DIFFERENTIAL METHOD BLD: ABNORMAL
EOSINOPHIL # BLD AUTO: 0.2 K/UL (ref 0–0.5)
EOSINOPHIL NFR BLD: 2.2 % (ref 0–8)
ERYTHROCYTE [DISTWIDTH] IN BLOOD BY AUTOMATED COUNT: 13 % (ref 11.5–14.5)
EST. GFR  (NO RACE VARIABLE): >60 ML/MIN/1.73 M^2
EST. GFR  (NO RACE VARIABLE): >60 ML/MIN/1.73 M^2
GLUCOSE SERPL-MCNC: 87 MG/DL (ref 70–110)
GLUCOSE SERPL-MCNC: 94 MG/DL (ref 70–110)
GLUCOSE UR QL STRIP: NEGATIVE
HCT VFR BLD AUTO: 31.9 % (ref 37–48.5)
HGB BLD-MCNC: 10.1 G/DL (ref 12–16)
HGB UR QL STRIP: NEGATIVE
IMM GRANULOCYTES # BLD AUTO: 0.03 K/UL (ref 0–0.04)
IMM GRANULOCYTES NFR BLD AUTO: 0.3 % (ref 0–0.5)
KETONES UR QL STRIP: NEGATIVE
LEUKOCYTE ESTERASE UR QL STRIP: NEGATIVE
LYMPHOCYTES # BLD AUTO: 1.4 K/UL (ref 1–4.8)
LYMPHOCYTES NFR BLD: 15.9 % (ref 18–48)
MAGNESIUM SERPL-MCNC: 1.5 MG/DL (ref 1.6–2.6)
MCH RBC QN AUTO: 30.6 PG (ref 27–31)
MCHC RBC AUTO-ENTMCNC: 31.7 G/DL (ref 32–36)
MCV RBC AUTO: 97 FL (ref 82–98)
MONOCYTES # BLD AUTO: 0.9 K/UL (ref 0.3–1)
MONOCYTES NFR BLD: 10 % (ref 4–15)
NEUTROPHILS # BLD AUTO: 6.3 K/UL (ref 1.8–7.7)
NEUTROPHILS NFR BLD: 71.3 % (ref 38–73)
NITRITE UR QL STRIP: NEGATIVE
NRBC BLD-RTO: 0 /100 WBC
OSMOLALITY UR: 168 MOSM/KG (ref 50–1200)
PH UR STRIP: 7 [PH] (ref 5–8)
PLATELET # BLD AUTO: 218 K/UL (ref 150–450)
PMV BLD AUTO: 11.4 FL (ref 9.2–12.9)
POTASSIUM SERPL-SCNC: 3.6 MMOL/L (ref 3.5–5.1)
POTASSIUM SERPL-SCNC: 3.8 MMOL/L (ref 3.5–5.1)
PROT SERPL-MCNC: 6.3 G/DL (ref 6–8.4)
PROT UR QL STRIP: NEGATIVE
RBC # BLD AUTO: 3.3 M/UL (ref 4–5.4)
SODIUM SERPL-SCNC: 126 MMOL/L (ref 136–145)
SODIUM SERPL-SCNC: 127 MMOL/L (ref 136–145)
SODIUM UR-SCNC: 28 MMOL/L (ref 20–250)
SP GR UR STRIP: 1 (ref 1–1.03)
TSH SERPL DL<=0.005 MIU/L-ACNC: 3.15 UIU/ML (ref 0.34–5.6)
URN SPEC COLLECT METH UR: NORMAL
UROBILINOGEN UR STRIP-ACNC: NEGATIVE EU/DL
WBC # BLD AUTO: 8.8 K/UL (ref 3.9–12.7)

## 2025-03-06 PROCEDURE — S4991 NICOTINE PATCH NONLEGEND: HCPCS

## 2025-03-06 PROCEDURE — 25000242 PHARM REV CODE 250 ALT 637 W/ HCPCS: Performed by: INTERNAL MEDICINE

## 2025-03-06 PROCEDURE — 84300 ASSAY OF URINE SODIUM: CPT | Performed by: NURSE PRACTITIONER

## 2025-03-06 PROCEDURE — 86580 TB INTRADERMAL TEST: CPT | Performed by: NURSE PRACTITIONER

## 2025-03-06 PROCEDURE — 99900031 HC PATIENT EDUCATION (STAT)

## 2025-03-06 PROCEDURE — 27000221 HC OXYGEN, UP TO 24 HOURS

## 2025-03-06 PROCEDURE — 63600175 PHARM REV CODE 636 W HCPCS: Performed by: NURSE PRACTITIONER

## 2025-03-06 PROCEDURE — 30200315 PPD INTRADERMAL TEST REV CODE 302: Performed by: NURSE PRACTITIONER

## 2025-03-06 PROCEDURE — 97161 PT EVAL LOW COMPLEX 20 MIN: CPT

## 2025-03-06 PROCEDURE — 94640 AIRWAY INHALATION TREATMENT: CPT

## 2025-03-06 PROCEDURE — 99900035 HC TECH TIME PER 15 MIN (STAT)

## 2025-03-06 PROCEDURE — 80048 BASIC METABOLIC PNL TOTAL CA: CPT | Performed by: NURSE PRACTITIONER

## 2025-03-06 PROCEDURE — 83735 ASSAY OF MAGNESIUM: CPT

## 2025-03-06 PROCEDURE — 94761 N-INVAS EAR/PLS OXIMETRY MLT: CPT

## 2025-03-06 PROCEDURE — 25000003 PHARM REV CODE 250: Performed by: NURSE PRACTITIONER

## 2025-03-06 PROCEDURE — 81003 URINALYSIS AUTO W/O SCOPE: CPT | Performed by: NURSE PRACTITIONER

## 2025-03-06 PROCEDURE — 80053 COMPREHEN METABOLIC PANEL: CPT

## 2025-03-06 PROCEDURE — 25000003 PHARM REV CODE 250

## 2025-03-06 PROCEDURE — 36415 COLL VENOUS BLD VENIPUNCTURE: CPT | Performed by: NURSE PRACTITIONER

## 2025-03-06 PROCEDURE — 97530 THERAPEUTIC ACTIVITIES: CPT

## 2025-03-06 PROCEDURE — 12000002 HC ACUTE/MED SURGE SEMI-PRIVATE ROOM

## 2025-03-06 PROCEDURE — 85025 COMPLETE CBC W/AUTO DIFF WBC: CPT

## 2025-03-06 PROCEDURE — 36415 COLL VENOUS BLD VENIPUNCTURE: CPT

## 2025-03-06 PROCEDURE — 84443 ASSAY THYROID STIM HORMONE: CPT | Performed by: NURSE PRACTITIONER

## 2025-03-06 PROCEDURE — 83935 ASSAY OF URINE OSMOLALITY: CPT | Performed by: NURSE PRACTITIONER

## 2025-03-06 RX ORDER — MAGNESIUM SULFATE HEPTAHYDRATE 40 MG/ML
2 INJECTION, SOLUTION INTRAVENOUS ONCE
Status: COMPLETED | OUTPATIENT
Start: 2025-03-06 | End: 2025-03-06

## 2025-03-06 RX ORDER — CYCLOBENZAPRINE HCL 5 MG
5 TABLET ORAL 2 TIMES DAILY PRN
Status: DISCONTINUED | OUTPATIENT
Start: 2025-03-06 | End: 2025-03-06

## 2025-03-06 RX ORDER — CHLORDIAZEPOXIDE HYDROCHLORIDE 25 MG/1
25 CAPSULE, GELATIN COATED ORAL 4 TIMES DAILY
Status: DISCONTINUED | OUTPATIENT
Start: 2025-03-06 | End: 2025-03-08

## 2025-03-06 RX ADMIN — ARFORMOTEROL TARTRATE 15 MCG: 15 SOLUTION RESPIRATORY (INHALATION) at 07:03

## 2025-03-06 RX ADMIN — FOLIC ACID 1 MG: 1 TABLET ORAL at 09:03

## 2025-03-06 RX ADMIN — CHLORDIAZEPOXIDE HYDROCHLORIDE 25 MG: 25 CAPSULE ORAL at 02:03

## 2025-03-06 RX ADMIN — NICOTINE 1 PATCH: 21 PATCH, EXTENDED RELEASE TRANSDERMAL at 05:03

## 2025-03-06 RX ADMIN — CARVEDILOL 12.5 MG: 12.5 TABLET, FILM COATED ORAL at 09:03

## 2025-03-06 RX ADMIN — CHLORDIAZEPOXIDE HYDROCHLORIDE 25 MG: 25 CAPSULE ORAL at 05:03

## 2025-03-06 RX ADMIN — IPRATROPIUM BROMIDE 0.5 MG: 0.5 SOLUTION RESPIRATORY (INHALATION) at 02:03

## 2025-03-06 RX ADMIN — BUDESONIDE INHALATION 0.5 MG: 0.5 SUSPENSION RESPIRATORY (INHALATION) at 07:03

## 2025-03-06 RX ADMIN — LORAZEPAM 1 MG: 2 INJECTION INTRAMUSCULAR; INTRAVENOUS at 10:03

## 2025-03-06 RX ADMIN — IPRATROPIUM BROMIDE 0.5 MG: 0.5 SOLUTION RESPIRATORY (INHALATION) at 12:03

## 2025-03-06 RX ADMIN — MONTELUKAST 10 MG: 10 TABLET, FILM COATED ORAL at 08:03

## 2025-03-06 RX ADMIN — CARVEDILOL 12.5 MG: 12.5 TABLET, FILM COATED ORAL at 08:03

## 2025-03-06 RX ADMIN — THIAMINE HCL TAB 100 MG 100 MG: 100 TAB at 09:03

## 2025-03-06 RX ADMIN — IPRATROPIUM BROMIDE 0.5 MG: 0.5 SOLUTION RESPIRATORY (INHALATION) at 07:03

## 2025-03-06 RX ADMIN — THERA TABS 1 TABLET: TAB at 09:03

## 2025-03-06 RX ADMIN — GABAPENTIN 300 MG: 300 CAPSULE ORAL at 09:03

## 2025-03-06 RX ADMIN — ATORVASTATIN CALCIUM 80 MG: 40 TABLET, FILM COATED ORAL at 09:03

## 2025-03-06 RX ADMIN — LEVOTHYROXINE SODIUM 50 MCG: 0.03 TABLET ORAL at 05:03

## 2025-03-06 RX ADMIN — MAGNESIUM SULFATE HEPTAHYDRATE 2 G: 40 INJECTION, SOLUTION INTRAVENOUS at 09:03

## 2025-03-06 RX ADMIN — CHLORDIAZEPOXIDE HYDROCHLORIDE 25 MG: 25 CAPSULE ORAL at 08:03

## 2025-03-06 RX ADMIN — GABAPENTIN 300 MG: 300 CAPSULE ORAL at 08:03

## 2025-03-06 RX ADMIN — TUBERCULIN PURIFIED PROTEIN DERIVATIVE 5 UNITS: 5 INJECTION, SOLUTION INTRADERMAL at 02:03

## 2025-03-06 RX ADMIN — CYCLOBENZAPRINE 10 MG: 10 TABLET, FILM COATED ORAL at 05:03

## 2025-03-06 NOTE — NURSING
Pt requiring sitter due to hallucinations and attempts to get out of bed. No sitter available at this time. Primary RN sitting at bedside between patient care.

## 2025-03-06 NOTE — CARE UPDATE
03/05/25 1914   Patient Assessment/Suction   Level of Consciousness (AVPU) alert   Respiratory Effort Unlabored   Expansion/Accessory Muscles/Retractions expansion symmetric;no retractions   All Lung Fields Breath Sounds Anterior:;Lateral:;clear   Rhythm/Pattern, Respiratory no shortness of breath reported   Cough Frequency infrequent   PRE-TX-O2   Device (Oxygen Therapy) nasal cannula   Flow (L/min) (Oxygen Therapy) 3.5   SpO2 (!) 93 %   Pulse Oximetry Type Intermittent   $ Pulse Oximetry - Multiple Charge Pulse Oximetry - Multiple   Pulse 88   Resp 18   Aerosol Therapy   $ Aerosol Therapy Charges Aerosol Treatment   Daily Review of Necessity (SVN) completed   Respiratory Treatment Status (SVN) given   Treatment Route (SVN) mask;oxygen   Patient Position HOB elevated   Post Treatment Assessment (SVN) breath sounds unchanged   Signs of Intolerance (SVN) none   Breath Sounds Post-Respiratory Treatment   Throughout All Fields Post-Treatment All Fields   Throughout All Fields Post-Treatment aeration increased   Post-treatment Heart Rate (beats/min) 89   Post-treatment Resp Rate (breaths/min) 18   Education   $ Education Bronchodilator;Oxygen;15 min

## 2025-03-06 NOTE — ASSESSMENT & PLAN NOTE
MRI shows acute compression fracture of T12 with loss of height of approximately 25%.  There is minimal retropulsion of the posterior cortex measuring 3 mm without central canal stenosis.  Old mild chronic compression fracture of T11.  Multilevel degenerative disc disease and facet hypertrophy with mild central canal stenosis and foraminal narrowing at L2-3, L3-4, and L4-5  Pain control  Neurochecks   Neurosurgery consulted- T XR stable  TLSO brace   Repeat XR 2/2 fall  PT/OT- expect SNF need upon D/C.

## 2025-03-06 NOTE — ASSESSMENT & PLAN NOTE
Pt with confirmed daily ETOH utilization who now appears in acute ETOH withdrawals   -initiate scheduled Librium  -continue p.r.n. Ativan   -nursing to perform CIWA  -fall, seizure precautions   -sitter at bedside.  -daily MVI, folic acid, thiamine

## 2025-03-06 NOTE — PLAN OF CARE
Spoke with patient daughter concerning SNF placement, to which patient daughter verbalizes understanding and agreement.  Patient choice form signed and scanned into [employer].  Referral sent to the following facilities via [employer]:    [facility] Deann Lamb    LOCET needing to be completed, pending therapy notes    03/06/25 1420   Post-Acute Status   Post-Acute Authorization Placement   Post-Acute Placement Status Referrals Sent   Patient choice form signed by patient/caregiver List from [employer]   Discharge Plan   Discharge Plan A Skilled Nursing Facility   Discharge Plan B Skilled Nursing Facility

## 2025-03-06 NOTE — PROGRESS NOTES
[facility] Medicine  Progress Note    Patient Name: Bhavana Lambert  MRN: [id number]  Patient Class: IP- Inpatient   Admission Date: 3/4/2025  Length of Stay: 2 days  Attending Physician: Osmin Grande MD  Primary Care Provider: Pepito Jhaveri IV, MD        Subjective     Principal Problem:T12 burst fracture        HPI:  69-year-old female with PMHx of COPD, HTN, hypothyroidism, prior stroke who presented to the ED with a complaint of lumbar and bilateral hip pain status post fall several days ago. The patient reports that she was wearing socks and she slipped, falling backwards towards a wall and then slid down the wall to a sitting position. She denies loss of consciousness, chest pain, current shortness of breath, dizziness, nausea or vomiting, incontinence of bowel or bladder. She is on plavix at home.     In the ED, /79  RR 20 afebrile and 96% on 3L NC (baseline).     Imaging showed Acute burst fracture of T12. [place] [place]. She was given tylenol, morphine, and zofran and stable for transfer, no neuro deficits.     Per chart review from recent admission on February 9th to February 14, 2025, the patient was admitted for COPD exacerbation and did have a history of previous EtOH abuse with subsequent falls.     Overview/Hospital Course:  Bhavana Lambert is 69-year-old female with PMH COPD, HTN, hypothyroidism, prior stroke.  She was admitted to the hospital for management of an acute burst fracture of T12.  Neurosurgery following.  MRI obtained.  X-ray thoracolumbar spine ordered and showed stability.  PT/OT consulted.  TLSO brace applied for activity per neurosurgery recommendation.  Pt developed ETOH withdrawals with delirium on 03/06 and was placed on Librium protocol.  She had worsening hyponatremia prompting further workup nephrology consultation given Hx of profound hyponatremia in past.    Interval  History:  Pt seen and examined.  Slid out of bed this morning and was found sitting at side of bed with no injuries.  More confused today, smoking hallucinatory cigarettes in bed and drinking invisible drinks.  Pt states she only drinks 1-2 times per week.  She answers questions appropriately, but is visibly delirious.  I discussed with her daughter, Ashtyn, who confirms Pt drinks Tequila all day long home.  She has a Hx of ETOH withdrawals, no Hx of withdrawal seizures.  We will initiate Librium protocol, obtain CT head and x-rays of the thoracic spine to ensure stability of prior findings given slide out of bed this morning.    Review of Systems   Constitutional:  Negative for chills and fever.   Respiratory:  Negative for shortness of breath.    Gastrointestinal:  Negative for nausea and vomiting.   Musculoskeletal:  Positive for back pain.   Psychiatric/Behavioral:  Positive for confusion and hallucinations.      Objective:     Vital Signs (Most Recent):  Temp: 97.5 °F (36.4 °C) (03/06/25 1151)  Pulse: 101 (03/06/25 1151)  Resp: 18 (03/06/25 0731)  BP: 123/77 (03/06/25 1151)  SpO2: (!) 91 % (03/06/25 1151) Vital Signs (24h Range):  Temp:  [97.3 °F (36.3 °C)-99.2 °F (37.3 °C)] 97.5 °F (36.4 °C)  Pulse:  [] 101  Resp:  [16-19] 18  SpO2:  [90 %-100 %] 91 %  BP: (107-136)/(71-83) 123/77     Weight: 47.1 kg (103 lb 13.4 oz)  Body mass index is 18.99 kg/m².    Intake/Output Summary (Last 24 hours) at 3/6/2025 1156  Last data filed at 3/5/2025 1823  Gross per 24 hour   Intake 480 ml   Output 200 ml   Net 280 ml         Physical Exam  Vitals and nursing note reviewed.   Constitutional:       Appearance: Normal appearance.   Cardiovascular:      Rate and Rhythm: Normal rate and regular rhythm.   Pulmonary:      Effort: Pulmonary effort is normal. No respiratory distress.      Breath sounds: Normal breath sounds.   Abdominal:      General: Abdomen is flat. Bowel sounds are normal.      Palpations: Abdomen is soft.    Musculoskeletal:      Cervical back: Normal range of motion and neck supple.   Skin:     General: Skin is warm and dry.   Neurological:      General: No focal deficit present.      Mental Status: She is alert. Mental status is at baseline.      Comments: Oriented to person and situation.  Disoriented to time and place.   Psychiatric:         Attention and Perception: She is inattentive. She perceives visual hallucinations.               Significant Labs: All pertinent labs within the past 24 hours have been reviewed.  CBC:   Recent Labs   Lab 03/04/25  1617 03/05/25  0619 03/06/25  0541   WBC 8.36 7.95 8.80   HGB 11.0* 9.7* 10.1*   HCT 34.3* 30.8* 31.9*    212 218     CMP:   Recent Labs   Lab 03/04/25 1617 03/05/25 0619 03/06/25  0541   * 129* 126*   K 4.4 4.1 3.8   CL 93* 91* 89*   CO2 31* 27 31*   GLU 91 75 87   BUN 6* 7* 8   CREATININE 0.6 0.6 0.6   CALCIUM 8.9 8.3* 8.5*   PROT 6.7 6.0 6.3   ALBUMIN 3.3* 3.1* 3.3*   BILITOT 0.3 0.3 0.4   ALKPHOS 97 86 86   AST 19 16 20   ALT 11 10 10   ANIONGAP 7* 11 6*       Significant Imaging: I have reviewed all pertinent imaging results/findings within the past 24 hours.    Assessment and Plan     Assessment & Plan  T12 burst fracture  MRI shows acute compression fracture of T12 with loss of height of approximately 25%.  There is minimal retropulsion of the posterior cortex measuring 3 mm without central canal stenosis.  Old mild chronic compression fracture of T11.  Multilevel degenerative disc disease and facet hypertrophy with mild central canal stenosis and foraminal narrowing at L2-3, L3-4, and L4-5  Pain control  Neurochecks   Neurosurgery consulted- T XR stable  TLSO brace   Repeat XR 2/2 fall  PT/OT- expect SNF need upon D/C.        Hypothyroid  Continue home synthroid    Mixed hyperlipidemia  Continue home statin    Tobacco dependency  -nicotine patch  Hyponatremia  Hyponatremia is a historical properly acutely worsening here in the hospital.  The  patient's most recent sodium results are listed below.  Recent Labs     03/04/25  1617 03/05/25  0619 03/06/25  0541   * 129* 126*     Plan  - Correct the sodium by 4-6mEq in 24 hours.   - Obtain the following studies: Urine sodium, urine osmolality, serum osmolality or TSH, T4.  - liberalize the diet.  Follow up the lab studies.  -given her Hx of profound hyponatremia we will go ahead and consult with Nephrology  -this is a recurrent issue in the setting of her ETOH abuse  -I am going to hold her SSRI  - Monitor sodium Daily for now.   - Patient hyponatremia is  worsening    Alcohol dependence with withdrawal delirium  Pt with confirmed daily ETOH utilization who now appears in acute ETOH withdrawals   -initiate scheduled Librium  -continue p.r.n. Ativan   -nursing to perform CIWA  -fall, seizure precautions   -sitter at bedside.  -daily MVI, folic acid, thiamine    VTE Risk Mitigation (From admission, onward)           Ordered     IP VTE HIGH RISK PATIENT  Once         03/04/25 1604     Place sequential compression device  Until discontinued         03/04/25 1604     Reason for No Pharmacological VTE Prophylaxis  Once        Question:  Reasons:  Answer:  Physician Provided (leave comment)  Comment:  pending neurosurgery eval    03/04/25 1604                    Discharge Planning   CHLOE:      Code Status: Full Code   Medical Readiness for Discharge Date:   Discharge Plan A: Home Health                Please place Justification for DME        Nany Sesay NP  Department of Hospital Medicine   [facility]

## 2025-03-06 NOTE — ASSESSMENT & PLAN NOTE
Hyponatremia is a historical properly acutely worsening here in the hospital.  The patient's most recent sodium results are listed below.  Recent Labs     03/04/25  1617 03/05/25  0619 03/06/25  0541   * 129* 126*     Plan  - Correct the sodium by 4-6mEq in 24 hours.   - Obtain the following studies: Urine sodium, urine osmolality, serum osmolality or TSH, T4.  - liberalize the diet.  Follow up the lab studies.  -given her Hx of profound hyponatremia we will go ahead and consult with Nephrology  -this is a recurrent issue in the setting of her ETOH abuse  -I am going to hold her SSRI  - Monitor sodium Daily for now.   - Patient hyponatremia is worsening

## 2025-03-06 NOTE — SUBJECTIVE & OBJECTIVE
Interval History:  Pt seen and examined.  Slid out of bed this morning and was found sitting at side of bed with no injuries.  More confused today, smoking hallucinatory cigarettes in bed and drinking invisible drinks.  Pt states she only drinks 1-2 times per week.  She answers questions appropriately, but is visibly delirious.  I discussed with her daughter, Ashtyn, who confirms Pt drinks Tequila all day long home.  She has a Hx of ETOH withdrawals, no Hx of withdrawal seizures.  We will initiate Librium protocol, obtain CT head and x-rays of the thoracic spine to ensure stability of prior findings given slide out of bed this morning.    Review of Systems   Constitutional:  Negative for chills and fever.   Respiratory:  Negative for shortness of breath.    Gastrointestinal:  Negative for nausea and vomiting.   Musculoskeletal:  Positive for back pain.   Psychiatric/Behavioral:  Positive for confusion and hallucinations.      Objective:     Vital Signs (Most Recent):  Temp: 97.5 °F (36.4 °C) (03/06/25 1151)  Pulse: 101 (03/06/25 1151)  Resp: 18 (03/06/25 0731)  BP: 123/77 (03/06/25 1151)  SpO2: (!) 91 % (03/06/25 1151) Vital Signs (24h Range):  Temp:  [97.3 °F (36.3 °C)-99.2 °F (37.3 °C)] 97.5 °F (36.4 °C)  Pulse:  [] 101  Resp:  [16-19] 18  SpO2:  [90 %-100 %] 91 %  BP: (107-136)/(71-83) 123/77     Weight: 47.1 kg (103 lb 13.4 oz)  Body mass index is 18.99 kg/m².    Intake/Output Summary (Last 24 hours) at 3/6/2025 1156  Last data filed at 3/5/2025 1823  Gross per 24 hour   Intake 480 ml   Output 200 ml   Net 280 ml         Physical Exam  Vitals and nursing note reviewed.   Constitutional:       Appearance: Normal appearance.   Cardiovascular:      Rate and Rhythm: Normal rate and regular rhythm.   Pulmonary:      Effort: Pulmonary effort is normal. No respiratory distress.      Breath sounds: Normal breath sounds.   Abdominal:      General: Abdomen is flat. Bowel sounds are normal.      Palpations: Abdomen is  soft.   Musculoskeletal:      Cervical back: Normal range of motion and neck supple.   Skin:     General: Skin is warm and dry.   Neurological:      General: No focal deficit present.      Mental Status: She is alert. Mental status is at baseline.      Comments: Oriented to person and situation.  Disoriented to time and place.   Psychiatric:         Attention and Perception: She is inattentive. She perceives visual hallucinations.               Significant Labs: All pertinent labs within the past 24 hours have been reviewed.  CBC:   Recent Labs   Lab 03/04/25  1617 03/05/25  0619 03/06/25  0541   WBC 8.36 7.95 8.80   HGB 11.0* 9.7* 10.1*   HCT 34.3* 30.8* 31.9*    212 218     CMP:   Recent Labs   Lab 03/04/25  1617 03/05/25  0619 03/06/25  0541   * 129* 126*   K 4.4 4.1 3.8   CL 93* 91* 89*   CO2 31* 27 31*   GLU 91 75 87   BUN 6* 7* 8   CREATININE 0.6 0.6 0.6   CALCIUM 8.9 8.3* 8.5*   PROT 6.7 6.0 6.3   ALBUMIN 3.3* 3.1* 3.3*   BILITOT 0.3 0.3 0.4   ALKPHOS 97 86 86   AST 19 16 20   ALT 11 10 10   ANIONGAP 7* 11 6*       Significant Imaging: I have reviewed all pertinent imaging results/findings within the past 24 hours.

## 2025-03-06 NOTE — PT/OT/SLP PROGRESS
Occupational Therapy      Patient Name:  Bhavana Lambert   MRN:  [id number]    Patient not seen today secondary to Therapist assessment (patient experiencing ETOH withdrawal, hallucinations, unable to follow commands and unsafe for therapy eval.). Will follow-up 3/7/2025.    3/6/2025

## 2025-03-06 NOTE — PT/OT/SLP EVAL
Physical Therapy Evaluation    Patient Name:  Bhavana Lambert   MRN:  [id number]    Recommendations:     Discharge Recommendations: Moderate Intensity Therapy   Discharge Equipment Recommendations: to be determined by next level of care   Barriers to discharge: increased pain, increased need for physical assistance    Assessment:     Bhavana Lambert is a 69 y.o. female admitted with a medical diagnosis of T12 burst fracture.  She presents with the following impairments/functional limitations: weakness, impaired endurance, impaired self care skills, impaired functional mobility, gait instability, impaired balance, decreased lower extremity function, pain, impaired cardiopulmonary response to activity, impaired joint extensibility, impaired muscle length.    Pt found supine upon arrival, pt speech unintelligible but agreeable to session. Therapist rolled pt to don TLSO prior to mobility. Pt transferred supine<>sit with maxAx2, pt then sat EOB with a posterior trunk lean, requiring maxAx1 to maintain sitting balance. Pt reporting some dizziness while sitting EOB, pt returned to supine with maxAx2. Therapist left TLSO on while pt supine. Pt left in supine in NAD.     Rehab Prognosis: Fair; patient would benefit from acute skilled PT services to address these deficits and reach maximum level of function.    Recent Surgery: * No surgery found *      Plan:     During this hospitalization, patient to be seen 5 x/week to address the identified rehab impairments via therapeutic activities, therapeutic exercises, neuromuscular re-education and progress toward the following goals:    Plan of Care Expires:  04/06/25    Subjective     Chief Complaint: back pain  Patient/Family Comments/goals: none stated  Pain/Comfort:  Pain Rating 1: other (see comments) (pt did not quantify)  Location - Side 1: Bilateral  Location - Orientation 1: lower  Location 1: back    Patients cultural, spiritual, Religion conflicts given the current  situation: no    Living Environment:  Pt lives with daughter with 3-4 steps to enter  Prior to admission, patients level of function was receiving assistance from daughter, pt reports wheelchair bound, was unable to walk for the last 2-3 years.  Equipment used at home: wheelchair, rollator, oxygen, walker, rolling.  DME owned (not currently used): none.  Upon discharge, patient will have assistance from daughter.    Objective:     Communicated with RN prior to session.  Patient found supine with telemetry, oxygen, blood pressure cuff, peripheral IV  upon PT entry to room.    General Precautions: Standard, fall  Orthopedic Precautions:spinal precautions (TLSO brace with activity)   Braces: TLSO  Respiratory Status: Nasal cannula, flow 3 L/min    Exams:  RLE ROM: Pt resting in 30-40 degrees of knee flexion, PROM ankle DF decreased  RLE Strength: unable to assess at this time, pt difficulty following commands  LLE ROM: Pt resting in 30-40 degrees of knee flexion, PROM ankle DF decreased  LLE Strength: unable to assess at this time, pt difficulty following commands    Functional Mobility:  Bed Mobility:     Supine to Sit: maximal assistance and of 2 persons  Sit to Supine: maximal assistance and of 2 persons  Balance: static sitting EOB maxAx1      AM-PAC 6 CLICK MOBILITY  Total Score:10       Treatment & Education:  Pt was educated on the following: call light use, importance of OOB activity and functional mobility to negate the negative effects of prolonged bed rest during this hospitalization, safe transfers/ambulation and discharge planning recommendations/options. Pt educated on log rolling while transferring, and the importance of wearing TLSO brace with activity.      Patient left supine with all lines intact, call button in reach, and bed alarm on.    GOALS:   Multidisciplinary Problems       Physical Therapy Goals          Problem: Physical Therapy    Goal Priority Disciplines Outcome Interventions   Physical  Therapy Goal     PT, PT/OT     Description: Goals to be met by: 25     Patient will increase functional independence with mobility by performin. Supine to sit with MInimal Assistance  2. Sit to supine with MInimal Assistance  3. Bed to chair transfer with Moderate Assistance using No Assistive Device  4. Sitting at edge of bed x10 minutes with Minimal Assistance                         DME Justifications:  TBD at next level of care    History:     Past Medical History:   Diagnosis Date    Acquired hypothyroidism     Anxiety and depression     Cervical radicular pain     Closed left ankle fracture     COPD (chronic obstructive pulmonary disease)     Heavy cigarette smoker     She has smoked 1/2 PPD since the age of 12, and still smokes    Hypertension     Hyponatremia     Multiple rib fractures     NSTEMI (non-ST elevated myocardial infarction)     Requires continuous at home supplemental oxygen     She is on 3.5L NC continuous    Stroke 2023    Tension type headache     Traumatic closed displaced fracture of distal end of radius, left, sequela        Past Surgical History:   Procedure Laterality Date    ABDOMINAL SURGERY      COLON SURGERY      COLOSTOMY      COLOSTOMY CLOSURE      HYSTERECTOMY      LEG SURGERY Right     OPEN REDUCTION AND INTERNAL FIXATION (ORIF) OF INJURY OF WRIST Left 2019    Procedure: ORIF, WRIST;  Surgeon: Homero Jeff DO;  Location: [facility];  Service: Orthopedics;  Laterality: Left;  Equipment: [employer] Geminus Wrist Plate Set  Vendor/Rep: [employer]  C-Arm: Entire  DME: None    REQUIRES ASSISTANT    WRIST SURGERY Right        Time Tracking:     PT Received On: 25  PT Start Time: 1036     PT Stop Time: 1052  PT Total Time (min): 16 min     Billable Minutes: Evaluation 8 and Therapeutic Activity 8      2025

## 2025-03-06 NOTE — CARE UPDATE
Thoracolumbar x-rays reviewed which demonstrate stable T12 vertebrae fracture.  Okay to mobilize with PT/OT with brace.    From neurosurgery perspective, patient can be discharged home.    Outpatient follow-up in 3-4 weeks with thoracolumbar x-rays.

## 2025-03-06 NOTE — NURSING
RN medicated patient around 0530 and lab was at bedside.  RN went to help another pt to bathroom  Respiratory walked in patient room and patient was on the floor in a sitting position   Respiratory notified RN   RN asked pt what she was doing and educated to use call light.  Bed alarm was on but did not alarm   Pt stated she knew she was not supposed to get out of bed and call light was in reach.  Pt stated she just thought she would try and get to bathroom.  Pt was Aox3. Person,place, situation  MD Grande, MD Armas, and genesis notified   All stated to just monitor pt  No new orders   Charge nurses aware   No further complications

## 2025-03-07 LAB
ALBUMIN SERPL BCP-MCNC: 3 G/DL (ref 3.5–5.2)
ALP SERPL-CCNC: 80 U/L (ref 55–135)
ALT SERPL W/O P-5'-P-CCNC: 11 U/L (ref 10–44)
ANION GAP SERPL CALC-SCNC: 6 MMOL/L (ref 8–16)
AST SERPL-CCNC: 21 U/L (ref 10–40)
BASOPHILS # BLD AUTO: 0.01 K/UL (ref 0–0.2)
BASOPHILS NFR BLD: 0.1 % (ref 0–1.9)
BILIRUB SERPL-MCNC: 0.3 MG/DL (ref 0.1–1)
BUN SERPL-MCNC: 7 MG/DL (ref 8–23)
CALCIUM SERPL-MCNC: 8.5 MG/DL (ref 8.7–10.5)
CHLORIDE SERPL-SCNC: 92 MMOL/L (ref 95–110)
CO2 SERPL-SCNC: 35 MMOL/L (ref 23–29)
CREAT SERPL-MCNC: 0.6 MG/DL (ref 0.5–1.4)
DIFFERENTIAL METHOD BLD: ABNORMAL
EOSINOPHIL # BLD AUTO: 0.2 K/UL (ref 0–0.5)
EOSINOPHIL NFR BLD: 3 % (ref 0–8)
ERYTHROCYTE [DISTWIDTH] IN BLOOD BY AUTOMATED COUNT: 13.2 % (ref 11.5–14.5)
EST. GFR  (NO RACE VARIABLE): >60 ML/MIN/1.73 M^2
GLUCOSE SERPL-MCNC: 102 MG/DL (ref 70–110)
HCT VFR BLD AUTO: 30.2 % (ref 37–48.5)
HGB BLD-MCNC: 9.6 G/DL (ref 12–16)
IMM GRANULOCYTES # BLD AUTO: 0.03 K/UL (ref 0–0.04)
IMM GRANULOCYTES NFR BLD AUTO: 0.4 % (ref 0–0.5)
LYMPHOCYTES # BLD AUTO: 1.1 K/UL (ref 1–4.8)
LYMPHOCYTES NFR BLD: 15 % (ref 18–48)
MAGNESIUM SERPL-MCNC: 1.8 MG/DL (ref 1.6–2.6)
MCH RBC QN AUTO: 30.4 PG (ref 27–31)
MCHC RBC AUTO-ENTMCNC: 31.8 G/DL (ref 32–36)
MCV RBC AUTO: 96 FL (ref 82–98)
MONOCYTES # BLD AUTO: 1 K/UL (ref 0.3–1)
MONOCYTES NFR BLD: 14 % (ref 4–15)
NEUTROPHILS # BLD AUTO: 4.8 K/UL (ref 1.8–7.7)
NEUTROPHILS NFR BLD: 67.5 % (ref 38–73)
NRBC BLD-RTO: 0 /100 WBC
OSMOLALITY SERPL: 280 MOSM/KG (ref 275–295)
PLATELET # BLD AUTO: 221 K/UL (ref 150–450)
PMV BLD AUTO: 11 FL (ref 9.2–12.9)
POTASSIUM SERPL-SCNC: 3.6 MMOL/L (ref 3.5–5.1)
PROT SERPL-MCNC: 6.2 G/DL (ref 6–8.4)
RBC # BLD AUTO: 3.16 M/UL (ref 4–5.4)
SODIUM SERPL-SCNC: 133 MMOL/L (ref 136–145)
WBC # BLD AUTO: 7.06 K/UL (ref 3.9–12.7)

## 2025-03-07 PROCEDURE — 36415 COLL VENOUS BLD VENIPUNCTURE: CPT

## 2025-03-07 PROCEDURE — 27000221 HC OXYGEN, UP TO 24 HOURS

## 2025-03-07 PROCEDURE — 97166 OT EVAL MOD COMPLEX 45 MIN: CPT

## 2025-03-07 PROCEDURE — 12000002 HC ACUTE/MED SURGE SEMI-PRIVATE ROOM

## 2025-03-07 PROCEDURE — 25000003 PHARM REV CODE 250: Performed by: NURSE PRACTITIONER

## 2025-03-07 PROCEDURE — 97535 SELF CARE MNGMENT TRAINING: CPT

## 2025-03-07 PROCEDURE — 25000003 PHARM REV CODE 250

## 2025-03-07 PROCEDURE — S4991 NICOTINE PATCH NONLEGEND: HCPCS

## 2025-03-07 PROCEDURE — 83735 ASSAY OF MAGNESIUM: CPT

## 2025-03-07 PROCEDURE — 80053 COMPREHEN METABOLIC PANEL: CPT

## 2025-03-07 PROCEDURE — 83930 ASSAY OF BLOOD OSMOLALITY: CPT | Performed by: NURSE PRACTITIONER

## 2025-03-07 PROCEDURE — 94761 N-INVAS EAR/PLS OXIMETRY MLT: CPT

## 2025-03-07 PROCEDURE — 99900031 HC PATIENT EDUCATION (STAT)

## 2025-03-07 PROCEDURE — 25000242 PHARM REV CODE 250 ALT 637 W/ HCPCS: Performed by: INTERNAL MEDICINE

## 2025-03-07 PROCEDURE — 94640 AIRWAY INHALATION TREATMENT: CPT

## 2025-03-07 PROCEDURE — 99900035 HC TECH TIME PER 15 MIN (STAT)

## 2025-03-07 PROCEDURE — 85025 COMPLETE CBC W/AUTO DIFF WBC: CPT

## 2025-03-07 PROCEDURE — 97530 THERAPEUTIC ACTIVITIES: CPT

## 2025-03-07 RX ORDER — POLYETHYLENE GLYCOL 3350 17 G/17G
17 POWDER, FOR SOLUTION ORAL DAILY
Status: DISCONTINUED | OUTPATIENT
Start: 2025-03-07 | End: 2025-03-15

## 2025-03-07 RX ORDER — METHOCARBAMOL 500 MG/1
500 TABLET, FILM COATED ORAL 4 TIMES DAILY
Status: DISCONTINUED | OUTPATIENT
Start: 2025-03-07 | End: 2025-03-11

## 2025-03-07 RX ORDER — FLUOXETINE HYDROCHLORIDE 20 MG/1
40 CAPSULE ORAL DAILY
Status: DISCONTINUED | OUTPATIENT
Start: 2025-03-07 | End: 2025-03-18 | Stop reason: HOSPADM

## 2025-03-07 RX ADMIN — METHOCARBAMOL TABLETS 500 MG: 500 TABLET, COATED ORAL at 09:03

## 2025-03-07 RX ADMIN — ARFORMOTEROL TARTRATE 15 MCG: 15 SOLUTION RESPIRATORY (INHALATION) at 06:03

## 2025-03-07 RX ADMIN — HYDROCODONE BITARTRATE AND ACETAMINOPHEN 1 TABLET: 5; 325 TABLET ORAL at 09:03

## 2025-03-07 RX ADMIN — THERA TABS 1 TABLET: TAB at 08:03

## 2025-03-07 RX ADMIN — GABAPENTIN 300 MG: 300 CAPSULE ORAL at 08:03

## 2025-03-07 RX ADMIN — GABAPENTIN 300 MG: 300 CAPSULE ORAL at 01:03

## 2025-03-07 RX ADMIN — CHLORDIAZEPOXIDE HYDROCHLORIDE 25 MG: 25 CAPSULE ORAL at 08:03

## 2025-03-07 RX ADMIN — CHLORDIAZEPOXIDE HYDROCHLORIDE 25 MG: 25 CAPSULE ORAL at 09:03

## 2025-03-07 RX ADMIN — IPRATROPIUM BROMIDE 0.5 MG: 0.5 SOLUTION RESPIRATORY (INHALATION) at 01:03

## 2025-03-07 RX ADMIN — CHLORDIAZEPOXIDE HYDROCHLORIDE 25 MG: 25 CAPSULE ORAL at 01:03

## 2025-03-07 RX ADMIN — ATORVASTATIN CALCIUM 80 MG: 40 TABLET, FILM COATED ORAL at 08:03

## 2025-03-07 RX ADMIN — FLUOXETINE HYDROCHLORIDE 40 MG: 20 CAPSULE ORAL at 01:03

## 2025-03-07 RX ADMIN — BUDESONIDE INHALATION 0.5 MG: 0.5 SUSPENSION RESPIRATORY (INHALATION) at 07:03

## 2025-03-07 RX ADMIN — CARVEDILOL 12.5 MG: 12.5 TABLET, FILM COATED ORAL at 08:03

## 2025-03-07 RX ADMIN — METHOCARBAMOL TABLETS 500 MG: 500 TABLET, COATED ORAL at 05:03

## 2025-03-07 RX ADMIN — IPRATROPIUM BROMIDE 0.5 MG: 0.5 SOLUTION RESPIRATORY (INHALATION) at 07:03

## 2025-03-07 RX ADMIN — GABAPENTIN 300 MG: 300 CAPSULE ORAL at 09:03

## 2025-03-07 RX ADMIN — CARVEDILOL 12.5 MG: 12.5 TABLET, FILM COATED ORAL at 09:03

## 2025-03-07 RX ADMIN — ARFORMOTEROL TARTRATE 15 MCG: 15 SOLUTION RESPIRATORY (INHALATION) at 07:03

## 2025-03-07 RX ADMIN — NICOTINE 1 PATCH: 21 PATCH, EXTENDED RELEASE TRANSDERMAL at 05:03

## 2025-03-07 RX ADMIN — BUDESONIDE INHALATION 0.5 MG: 0.5 SUSPENSION RESPIRATORY (INHALATION) at 06:03

## 2025-03-07 RX ADMIN — MONTELUKAST 10 MG: 10 TABLET, FILM COATED ORAL at 09:03

## 2025-03-07 RX ADMIN — POTASSIUM BICARBONATE 50 MEQ: 978 TABLET, EFFERVESCENT ORAL at 08:03

## 2025-03-07 RX ADMIN — POLYETHYLENE GLYCOL 3350 17 G: 17 POWDER, FOR SOLUTION ORAL at 09:03

## 2025-03-07 RX ADMIN — HYDROCODONE BITARTRATE AND ACETAMINOPHEN 1 TABLET: 5; 325 TABLET ORAL at 04:03

## 2025-03-07 RX ADMIN — CHLORDIAZEPOXIDE HYDROCHLORIDE 25 MG: 25 CAPSULE ORAL at 05:03

## 2025-03-07 RX ADMIN — LEVOTHYROXINE SODIUM 50 MCG: 0.03 TABLET ORAL at 05:03

## 2025-03-07 RX ADMIN — IPRATROPIUM BROMIDE 0.5 MG: 0.5 SOLUTION RESPIRATORY (INHALATION) at 06:03

## 2025-03-07 RX ADMIN — THIAMINE HCL TAB 100 MG 100 MG: 100 TAB at 08:03

## 2025-03-07 RX ADMIN — FOLIC ACID 1 MG: 1 TABLET ORAL at 08:03

## 2025-03-07 NOTE — ASSESSMENT & PLAN NOTE
MRI shows acute compression fracture of T12 with loss of height of approximately 25%.  There is minimal retropulsion of the posterior cortex measuring 3 mm without central canal stenosis.  Old mild chronic compression fracture of T11.  Multilevel degenerative disc disease and facet hypertrophy with mild central canal stenosis and foraminal narrowing at L2-3, L3-4, and L4-5  Pain control  Neurochecks   Neurosurgery consulted- T XR stable  TLSO brace with mobilization  Repeat XR 2/2 fall  PT/OT- expect SNF need upon D/C.

## 2025-03-07 NOTE — ASSESSMENT & PLAN NOTE
Hyponatremia is a historical properly acutely worsening here in the hospital.  The patient's most recent sodium results are listed below.  Recent Labs     03/06/25  0541 03/06/25  1525 03/07/25  0603   * 127* 133*     Plan  - Correct the sodium by 4-6mEq in 24 hours.   - sodium is self corrected the holding of the SSRI and diet liberalization   -I will cautiously resume her SSRI today and evaluate response.

## 2025-03-07 NOTE — ASSESSMENT & PLAN NOTE
Patient has chronic hypothyroidism. TFTs reviewed-   Lab Results   Component Value Date    TSH 3.155 03/06/2025   . Will continue chronic levothyroxine and adjust for and clinical changes.

## 2025-03-07 NOTE — PROGRESS NOTES
[facility] Medicine  Progress Note    Patient Name: Bhavana Lambert  MRN: [id number]  Patient Class: IP- Inpatient   Admission Date: 3/4/2025  Length of Stay: 3 days  Attending Physician: Osmin Grande MD  Primary Care Provider: Pepito Jhaveri IV, MD        Subjective     Principal Problem:T12 burst fracture        HPI:  69-year-old female with PMHx of COPD, HTN, hypothyroidism, prior stroke who presented to the ED with a complaint of lumbar and bilateral hip pain status post fall several days ago. The patient reports that she was wearing socks and she slipped, falling backwards towards a wall and then slid down the wall to a sitting position. She denies loss of consciousness, chest pain, current shortness of breath, dizziness, nausea or vomiting, incontinence of bowel or bladder. She is on plavix at home.     In the ED, /79  RR 20 afebrile and 96% on 3L NC (baseline).     Imaging showed Acute burst fracture of T12. Case was discussed with Dr. Boothe ([facility]) who will consult on the patient. She was given tylenol, morphine, and zofran and stable for transfer, no neuro deficits.     Per chart review from recent admission on February 9th to February 14, 2025, the patient was admitted for COPD exacerbation and did have a history of previous EtOH abuse with subsequent falls.     Overview/Hospital Course:  Bhavana Lambert is 69-year-old female with PMH COPD, HTN, hypothyroidism, prior stroke.  She was admitted to the hospital for management of an acute burst fracture of T12.  Neurosurgery following.  MRI obtained.  X-ray thoracolumbar spine ordered and showed stability.  PT/OT consulted.  TLSO brace applied for activity per neurosurgery recommendation.  Pt developed ETOH withdrawals with delirium on 03/06 and was placed on Librium protocol.  She had worsening hyponatremia prompting further workup though this notably improved with diet liberalization.  Her DTs were much improved  on 03/07 after Librium initiation.    Interval History:  Pt seen and examined.  CT head done yesterday negative an x-ray of the thoracic spine unchanged.  Pt much improved today, less tremulous.  Oriented.  Recalls disorientation yesterday.  Discussed ETOH cessation and concern for DTs.  Discussed potential need for SNF pending PT/OT evaluation.  Confirmed with Neurosurgery that Pt can take brace off when in bed.    Review of Systems   Constitutional:  Negative for chills and fever.   Respiratory:  Negative for cough and shortness of breath.    Cardiovascular:  Negative for chest pain.   Gastrointestinal:  Positive for constipation. Negative for nausea and vomiting.   Genitourinary:  Negative for difficulty urinating.   Musculoskeletal:  Positive for back pain.   Neurological:  Positive for numbness (she has chronic neuropathy which is unchanged per her report).   Psychiatric/Behavioral:  Negative for confusion and hallucinations.      Objective:     Vital Signs (Most Recent):  Temp: 97.6 °F (36.4 °C) (03/07/25 0712)  Pulse: 88 (03/07/25 0712)  Resp: 16 (03/07/25 0927)  BP: 123/76 (03/07/25 0712)  SpO2: 98 % (03/07/25 0712) Vital Signs (24h Range):  Temp:  [97.5 °F (36.4 °C)-98.7 °F (37.1 °C)] 97.6 °F (36.4 °C)  Pulse:  [] 88  Resp:  [16-19] 16  SpO2:  [91 %-98 %] 98 %  BP: (106-126)/(61-80) 123/76     Weight: 47.1 kg (103 lb 13.4 oz)  Body mass index is 18.99 kg/m².    Intake/Output Summary (Last 24 hours) at 3/7/2025 1109  Last data filed at 3/6/2025 2236  Gross per 24 hour   Intake 240 ml   Output --   Net 240 ml         Physical Exam  Vitals and nursing note reviewed.   Constitutional:       Appearance: Normal appearance.   HENT:      Head: Normocephalic and atraumatic.   Cardiovascular:      Rate and Rhythm: Normal rate and regular rhythm.   Pulmonary:      Effort: Pulmonary effort is normal. No respiratory distress.      Breath sounds: Normal breath sounds.      Comments: Appears comfortable on 3 L nasal  cannula  Abdominal:      General: Abdomen is flat. Bowel sounds are normal.      Palpations: Abdomen is soft.   Musculoskeletal:      Cervical back: Normal range of motion and neck supple.   Skin:     General: Skin is warm and dry.   Neurological:      General: No focal deficit present.      Mental Status: She is alert and oriented to person, place, and time.               Significant Labs: All pertinent labs within the past 24 hours have been reviewed.  CBC:   Recent Labs   Lab 03/06/25  0541 03/07/25  0603   WBC 8.80 7.06   HGB 10.1* 9.6*   HCT 31.9* 30.2*    221     CMP:   Recent Labs   Lab 03/06/25  0541 03/06/25  1525 03/07/25  0603   * 127* 133*   K 3.8 3.6 3.6   CL 89* 90* 92*   CO2 31* 31* 35*   GLU 87 94 102   BUN 8 5* 7*   CREATININE 0.6 0.5 0.6   CALCIUM 8.5* 8.4* 8.5*   PROT 6.3  --  6.2   ALBUMIN 3.3*  --  3.0*   BILITOT 0.4  --  0.3   ALKPHOS 86  --  80   AST 20  --  21   ALT 10  --  11   ANIONGAP 6* 6* 6*     Urine Studies:   Recent Labs   Lab 03/06/25  1800   COLORU Yellow   APPEARANCEUA Clear   PHUR 7.0   SPECGRAV 1.005   PROTEINUA Negative   GLUCUA Negative   KETONESU Negative   BILIRUBINUA Negative   OCCULTUA Negative   NITRITE Negative   UROBILINOGEN Negative   LEUKOCYTESUR Negative       Significant Imaging: I have reviewed all pertinent imaging results/findings within the past 24 hours.      Assessment & Plan  T12 burst fracture  MRI shows acute compression fracture of T12 with loss of height of approximately 25%.  There is minimal retropulsion of the posterior cortex measuring 3 mm without central canal stenosis.  Old mild chronic compression fracture of T11.  Multilevel degenerative disc disease and facet hypertrophy with mild central canal stenosis and foraminal narrowing at L2-3, L3-4, and L4-5  Pain control  Neurochecks   Neurosurgery consulted- T XR stable  TLSO brace with mobilization  Repeat XR 2/2 fall  PT/OT- expect SNF need upon D/C.        Hypothyroid  Patient has  chronic hypothyroidism. TFTs reviewed-   Lab Results   Component Value Date    TSH 3.155 03/06/2025   . Will continue chronic levothyroxine and adjust for and clinical changes.     Mixed hyperlipidemia  Continue home statin    Tobacco dependency  -nicotine patch  Hyponatremia  Hyponatremia is a historical properly acutely worsening here in the hospital.  The patient's most recent sodium results are listed below.  Recent Labs     03/06/25  0541 03/06/25  1525 03/07/25  0603   * 127* 133*     Plan  - Correct the sodium by 4-6mEq in 24 hours.   - sodium is self corrected the holding of the SSRI and diet liberalization   -I will cautiously resume her SSRI today and evaluate response.  Alcohol dependence with withdrawal delirium  Pt with confirmed daily ETOH utilization appeared with DTs on 03/06   -initiated scheduled Librium 3/6 with much improvement.  Will down taper the Librium.  -continue p.r.n. Ativan   -nursing to perform CIWA  -fall, seizure precautions   -sitter at bedside.  -daily MVI, folic acid, thiamine    VTE Risk Mitigation (From admission, onward)           Ordered     IP VTE HIGH RISK PATIENT  Once         03/04/25 1604     Place sequential compression device  Until discontinued         03/04/25 1604     Reason for No Pharmacological VTE Prophylaxis  Once        Question:  Reasons:  Answer:  Physician Provided (leave comment)  Comment:  pending neurosurgery eval    03/04/25 1604                    Discharge Planning   CHLOE:      Code Status: Full Code   Medical Readiness for Discharge Date:   Discharge Plan A: Skilled Nursing Facility                Please place Justification for DME        Nany Sesay NP  Department of Hospital Medicine   [facility]

## 2025-03-07 NOTE — PROGRESS NOTES
LOCET completed with Crystal and MELVA completed and successfully faxed to the Office of [facility] at [phone].  Awaiting form 142.

## 2025-03-07 NOTE — PT/OT/SLP PROGRESS
Physical Therapy Treatment    Patient Name:  Bhavana Lambert   MRN:  [id number]    Recommendations:     Discharge Recommendations: Moderate Intensity Therapy  Discharge Equipment Recommendations: to be determined by next level of care  Barriers to discharge: None    Assessment:     Bhavana Lambert is a 69 y.o. female admitted with a medical diagnosis of T12 burst fracture.  She presents with the following impairments/functional limitations: weakness, impaired endurance, impaired self care skills, impaired functional mobility, impaired balance, decreased upper extremity function, decreased lower extremity function, decreased safety awareness, pain, impaired cardiopulmonary response to activity, orthopedic precautions. Patient lying in bed with TLSO on and is agreeable to participation with PT treatment. Her mental status is much better today with patient less tremulous. She was educated on spinal precautions, log rolling, and TLSO use. She requires Jimi for rolling right and side lying to sitting transfer. She requires ModA for sit to stand with RW. She took 6 small side steps to the right with RW, Jimi, and VC for sequencing. She declines sitting up in chair and returned to bed with TLSO removed, RN notified, bed alarm on, and all needs met.     Rehab Prognosis: Fair; patient would benefit from acute skilled PT services to address these deficits and reach maximum level of function.    Recent Surgery: * No surgery found *      Plan:     During this hospitalization, patient to be seen 6 x/week to address the identified rehab impairments via therapeutic activities, therapeutic exercises, neuromuscular re-education and progress toward the following goals:    Plan of Care Expires:  04/06/25    Subjective   Patient reports she has been non-ambulatory for several years due to neuropathy . She primarily uses a WC for mobility and does keep a BSC next to her bed   Chief Complaint: 10/10 headache and back pain with RN  notified   Patient/Family Comments/goals: SNF  Pain/Comfort:  Pain Rating 1: 1010  Location 1:  (back and head)      Objective:     Communicated with HALEIGH Arrington prior to session.  Patient found HOB elevated with bed alarm, oxygen, telemetry upon PT entry to room.     General Precautions: Standard, fall, respiratory  Orthopedic Precautions: spinal precautions  Braces: TLSO  Respiratory Status: Nasal cannula, flow 3 L/min     Functional Mobility:  Bed Mobility:     Supine to Sit: minimum assistance  Transfers:     Sit to Stand:  moderate assistance with rolling walker  Gait: 6 small side steps to the right with RW, Jimi, and VC for sequencing      AM-PAC 6 CLICK MOBILITY  Turning over in bed (including adjusting bedclothes, sheets and blankets)?: 3  Sitting down on and standing up from a chair with arms (e.g., wheelchair, bedside commode, etc.): 2  Moving from lying on back to sitting on the side of the bed?: 3  Moving to and from a bed to a chair (including a wheelchair)?: 3  Need to walk in hospital room?: 1  Climbing 3-5 steps with a railing?: 1  Basic Mobility Total Score: 13       Treatment & Education:  Patient was educated on the importance of OOB activity and functional mobility to negate negative effects of prolonged bed rest during hospitalization, safe transfers and ambulation, spinal precautions, log rolling, TLSO use, and D/C planning     Patient left HOB elevated with all lines intact, call button in reach, bed alarm on, and RN notified..    GOALS:   Multidisciplinary Problems       Physical Therapy Goals          Problem: Physical Therapy    Goal Priority Disciplines Outcome Interventions   Physical Therapy Goal     PT, PT/OT Progressing    Description: Goals to be met by: 25     Patient will increase functional independence with mobility by performin. Supine to sit with Standby Assistance  2. Sit to stand with Standby Assistance using Rolling Walker   3. Bed to chair transfer with Standby  Assistance using Rolling Walker  4. Sitting at edge of bed x10 minutes with Minimal Assistance                       DME Justifications:  TBD at next level of care    Time Tracking:     PT Received On: 03/07/25  PT Start Time: 1122     PT Stop Time: 1136  PT Total Time (min): 14 min     Billable Minutes: Therapeutic Activity 14    Treatment Type: Treatment  PT/PTA: PT     Number of PTA visits since last PT visit: 0     03/07/2025

## 2025-03-07 NOTE — SUBJECTIVE & OBJECTIVE
Interval History:  Pt seen and examined.  CT head done yesterday negative an x-ray of the thoracic spine unchanged.  Pt much improved today, less tremulous.  Oriented.  Recalls disorientation yesterday.  Discussed ETOH cessation and concern for DTs.  Discussed potential need for SNF pending PT/OT evaluation.  Confirmed with Neurosurgery that Pt can take brace off when in bed.    Review of Systems   Constitutional:  Negative for chills and fever.   Respiratory:  Negative for cough and shortness of breath.    Cardiovascular:  Negative for chest pain.   Gastrointestinal:  Positive for constipation. Negative for nausea and vomiting.   Genitourinary:  Negative for difficulty urinating.   Musculoskeletal:  Positive for back pain.   Neurological:  Positive for numbness (she has chronic neuropathy which is unchanged per her report).   Psychiatric/Behavioral:  Negative for confusion and hallucinations.      Objective:     Vital Signs (Most Recent):  Temp: 97.6 °F (36.4 °C) (03/07/25 0712)  Pulse: 88 (03/07/25 0712)  Resp: 16 (03/07/25 0927)  BP: 123/76 (03/07/25 0712)  SpO2: 98 % (03/07/25 0712) Vital Signs (24h Range):  Temp:  [97.5 °F (36.4 °C)-98.7 °F (37.1 °C)] 97.6 °F (36.4 °C)  Pulse:  [] 88  Resp:  [16-19] 16  SpO2:  [91 %-98 %] 98 %  BP: (106-126)/(61-80) 123/76     Weight: 47.1 kg (103 lb 13.4 oz)  Body mass index is 18.99 kg/m².    Intake/Output Summary (Last 24 hours) at 3/7/2025 1109  Last data filed at 3/6/2025 2236  Gross per 24 hour   Intake 240 ml   Output --   Net 240 ml         Physical Exam  Vitals and nursing note reviewed.   Constitutional:       Appearance: Normal appearance.   HENT:      Head: Normocephalic and atraumatic.   Cardiovascular:      Rate and Rhythm: Normal rate and regular rhythm.   Pulmonary:      Effort: Pulmonary effort is normal. No respiratory distress.      Breath sounds: Normal breath sounds.      Comments: Appears comfortable on 3 L nasal cannula  Abdominal:      General:  Abdomen is flat. Bowel sounds are normal.      Palpations: Abdomen is soft.   Musculoskeletal:      Cervical back: Normal range of motion and neck supple.   Skin:     General: Skin is warm and dry.   Neurological:      General: No focal deficit present.      Mental Status: She is alert and oriented to person, place, and time.               Significant Labs: All pertinent labs within the past 24 hours have been reviewed.  CBC:   Recent Labs   Lab 03/06/25  0541 03/07/25  0603   WBC 8.80 7.06   HGB 10.1* 9.6*   HCT 31.9* 30.2*    221     CMP:   Recent Labs   Lab 03/06/25  0541 03/06/25  1525 03/07/25  0603   * 127* 133*   K 3.8 3.6 3.6   CL 89* 90* 92*   CO2 31* 31* 35*   GLU 87 94 102   BUN 8 5* 7*   CREATININE 0.6 0.5 0.6   CALCIUM 8.5* 8.4* 8.5*   PROT 6.3  --  6.2   ALBUMIN 3.3*  --  3.0*   BILITOT 0.4  --  0.3   ALKPHOS 86  --  80   AST 20  --  21   ALT 10  --  11   ANIONGAP 6* 6* 6*     Urine Studies:   Recent Labs   Lab 03/06/25  1800   COLORU Yellow   APPEARANCEUA Clear   PHUR 7.0   SPECGRAV 1.005   PROTEINUA Negative   GLUCUA Negative   KETONESU Negative   BILIRUBINUA Negative   OCCULTUA Negative   NITRITE Negative   UROBILINOGEN Negative   LEUKOCYTESUR Negative       Significant Imaging: I have reviewed all pertinent imaging results/findings within the past 24 hours.

## 2025-03-07 NOTE — CARE UPDATE
03/06/25 1920   Patient Assessment/Suction   Level of Consciousness (AVPU) alert   Respiratory Effort Normal   Expansion/Accessory Muscles/Retractions no use of accessory muscles   All Lung Fields Breath Sounds diminished   Rhythm/Pattern, Respiratory unlabored   Cough Frequency no cough   Skin Integrity   $ Wound Care Tech Time 15 min   Area Observed Left;Right;Behind ear;Cheek;Nares   Skin Appearance without discoloration   PRE-TX-O2   Device (Oxygen Therapy) nasal cannula   Flow (L/min) (Oxygen Therapy) 3   SpO2 (!) 93 %   Pulse Oximetry Type Intermittent   $ Pulse Oximetry - Multiple Charge Pulse Oximetry - Multiple   Pulse 99   Resp 19   Aerosol Therapy   $ Aerosol Therapy Charges Aerosol Treatment   Daily Review of Necessity (SVN) completed   Respiratory Treatment Status (SVN) given   Treatment Route (SVN) mask;oxygen   Patient Position HOB elevated   Post Treatment Assessment (SVN) breath sounds unchanged;vital signs unchanged   Signs of Intolerance (SVN) none   Education   $ Education Bronchodilator;Oxygen;15 min   Respiratory Evaluation   $ Care Plan Tech Time 15 min

## 2025-03-07 NOTE — PLAN OF CARE
Problem: Physical Therapy  Goal: Physical Therapy Goal  Description: Goals to be met by: 25     Patient will increase functional independence with mobility by performin. Supine to sit with Standby Assistance  2. Sit to stand with Standby Assistance using Rolling Walker   3. Bed to chair transfer with Standby Assistance using Rolling Walker  4. Sitting at edge of bed x10 minutes with Minimal Assistance  Outcome: Progressing

## 2025-03-07 NOTE — PT/OT/SLP EVAL
Occupational Therapy   Evaluation    Name: Bhavana Lambert  MRN: [id number]  Admitting Diagnosis: T12 burst fracture  Recent Surgery: * No surgery found *      Recommendations:     Discharge Recommendations: Moderate Intensity Therapy  Discharge Equipment Recommendations:  to be determined by next level of care  Barriers to discharge:   (Increased assist with ADLs, IADLs, and mobility)    Assessment:     Bhavana Lambert is a 69 y.o. female with a medical diagnosis of T12 burst fracture. Pt is agreeable to OT evaluation this AM. Performance deficits affecting function: weakness, impaired endurance, impaired self care skills, impaired functional mobility, gait instability, impaired balance, decreased upper extremity function, decreased lower extremity function, pain, decreased safety awareness, impaired cardiopulmonary response to activity, orthopedic precautions.  Pt wearing TLSO brace in bed upon OT entry. Reiterated spinal precautions this date.    Rehab Prognosis: Fair; patient would benefit from acute skilled OT services to address these deficits and reach maximum level of function.       Plan:     Patient to be seen 5 x/week to address the above listed problems via self-care/home management, therapeutic activities, therapeutic exercises  Plan of Care Expires: 04/07/25  Plan of Care Reviewed with: patient    Subjective     Chief Complaint: back pain  Patient/Family Comments/goals: agreeable to sit EOB    Occupational Profile:  Living Environment: Pt lives with daughter in [address] with 10 [address]; pt reports that there is a built in elevator that she uses to get to 2nd floor; has a WIS with a shower chair   Previous level of function: assist with ADLs prior to admission  Roles and Routines: mother; limited homemaker  Equipment Used at Home: hospital bed, walker, rolling, wheelchair, rollator, oxygen, shower chair  Assistance upon Discharge: yes, from facility    Pain/Comfort:  Pain Rating 1:  (not rated)  Location 1:  back  Pain Addressed 1: Distraction, Reposition    Patients cultural, spiritual, Moravian conflicts given the current situation: no    Objective:     Communicated with: nursing prior to session.  Patient found HOB elevated with TLSO, bed alarm, telemetry, oxygen upon OT entry to room.    General Precautions: Standard, fall  Orthopedic Precautions: spinal precautions  Braces: TLSO  Respiratory Status: Nasal cannula, flow 3 L/min    Occupational Performance:    Bed Mobility:    Patient completed Supine to Sit with minimum assistance and log roll technique  Patient completed Sit to Supine with minimum assistance and log roll technique    Activities of Daily Living:  Grooming: s/u to brush teeth and wash face seated EOB ; SBA for seated balance during task  Lower Body Dressing: total assistance to don/doff socks due to spinal precautions     Cognitive/Visual Perceptual:  Cognitive/Psychosocial Skills:     -       Follows Commands/attention:Follows one-step commands  -       Communication: clear/fluent  -       Memory: No Deficits noted  -       Safety awareness/insight to disability: impaired   -       Mood/Affect/Coping skills/emotional control: Appropriate to situation, Cooperative, and Pleasant    Physical Exam:  Balance:    -       SBA seated balance EOB  Upper Extremity Range of Motion:     -       Right Upper Extremity: WFL  -       Left Upper Extremity: WFL  Upper Extremity Strength:    -       Right Upper Extremity: NT due to pain  -       Left Upper Extremity: NT due to pain   Strength:    -       Right Upper Extremity: fair   -       Left Upper Extremity: fair     AMPAC 6 Click ADL:  AMPAC Total Score: 16    Treatment & Education:  Pt educated on role of OT/POC, importance of OOB/EOB activity, use of call bell, and safety during ADLs, transfers, and functional mobility.      Patient left HOB elevated with all lines intact, call button in reach, and bed alarm on    GOALS:   Multidisciplinary  Problems       Occupational Therapy Goals          Problem: Occupational Therapy    Goal Priority Disciplines Outcome Interventions   Occupational Therapy Goal     OT, PT/OT     Description: Goals to be met by: 4/7/25     Patient will increase functional independence with ADLs by performing:    UE Dressing with Supervision.  LE Dressing with Contact Guard Assistance.  Grooming while seated at sink with Supervision.  Toileting from bedside commode with Supervision for hygiene and clothing management.   Toilet transfer to bedside commode with Contact Guard Assistance.                           History:     Past Medical History:   Diagnosis Date    Acquired hypothyroidism     Anxiety and depression     Cervical radicular pain     Closed left ankle fracture     COPD (chronic obstructive pulmonary disease)     Heavy cigarette smoker     She has smoked 1/2 PPD since the age of 12, and still smokes    Hypertension     Hyponatremia     Multiple rib fractures     NSTEMI (non-ST elevated myocardial infarction)     Requires continuous at home supplemental oxygen     She is on 3.5L NC continuous    Stroke 11/08/2023    Tension type headache     Traumatic closed displaced fracture of distal end of radius, left, sequela          Past Surgical History:   Procedure Laterality Date    ABDOMINAL SURGERY      COLON SURGERY      COLOSTOMY      COLOSTOMY CLOSURE      HYSTERECTOMY      LEG SURGERY Right     OPEN REDUCTION AND INTERNAL FIXATION (ORIF) OF INJURY OF WRIST Left 1/29/2019    Procedure: ORIF, WRIST;  Surgeon: Homero Jeff DO;  Location: [facility] OR;  Service: Orthopedics;  Laterality: Left;  Equipment: [employer] Geminus Wrist Plate Set  Vendor/Rep: [employer]  C-Arm: Entire  DME: None    REQUIRES ASSISTANT    WRIST SURGERY Right        Time Tracking:     OT Date of Treatment: 03/07/25  OT Start Time: 1025  OT Stop Time: 1049  OT Total Time (min): 24 min    Billable Minutes:Evaluation 12  Self Care/Home  Management 12    3/7/2025

## 2025-03-07 NOTE — ASSESSMENT & PLAN NOTE
Pt with confirmed daily ETOH utilization appeared with DTs on 03/06   -initiated scheduled Librium 3/6 with much improvement.  Will down taper the Librium.  -continue p.r.n. Ativan   -nursing to perform CIWA  -fall, seizure precautions   -sitter at bedside.  -daily MVI, folic acid, thiamine

## 2025-03-07 NOTE — PLAN OF CARE
03/07/25 0651   Patient Assessment/Suction   Level of Consciousness (AVPU) alert   Respiratory Effort Normal;Unlabored   Expansion/Accessory Muscles/Retractions no retractions;no use of accessory muscles   All Lung Fields Breath Sounds diminished;clear   Rhythm/Pattern, Respiratory unlabored;pattern regular;depth regular   PRE-TX-O2   Device (Oxygen Therapy) nasal cannula   $ Is the patient on Low Flow Oxygen? Yes   Flow (L/min) (Oxygen Therapy) 3   SpO2 (!) 94 %   Pulse Oximetry Type Intermittent   $ Pulse Oximetry - Multiple Charge Pulse Oximetry - Multiple   Pulse 90   Resp 18   Aerosol Therapy   $ Aerosol Therapy Charges Aerosol Treatment   Daily Review of Necessity (SVN) completed   Respiratory Treatment Status (SVN) given   Treatment Route (SVN) mask;oxygen   Patient Position HOB elevated   Post Treatment Assessment (SVN) breath sounds unchanged   Signs of Intolerance (SVN) none   Breath Sounds Post-Respiratory Treatment   Post-treatment Heart Rate (beats/min) 94   Post-treatment Resp Rate (breaths/min) 18   Education   $ Education Bronchodilator;15 min

## 2025-03-08 LAB
TB INDURATION 48 - 72 HR READ: NORMAL
TB SKIN TEST 48 - 72 HR READ: NORMAL

## 2025-03-08 PROCEDURE — 94640 AIRWAY INHALATION TREATMENT: CPT

## 2025-03-08 PROCEDURE — 25000242 PHARM REV CODE 250 ALT 637 W/ HCPCS: Performed by: INTERNAL MEDICINE

## 2025-03-08 PROCEDURE — S4991 NICOTINE PATCH NONLEGEND: HCPCS

## 2025-03-08 PROCEDURE — 99900031 HC PATIENT EDUCATION (STAT)

## 2025-03-08 PROCEDURE — 25000003 PHARM REV CODE 250: Performed by: NURSE PRACTITIONER

## 2025-03-08 PROCEDURE — 25000003 PHARM REV CODE 250

## 2025-03-08 PROCEDURE — 97530 THERAPEUTIC ACTIVITIES: CPT | Mod: CQ

## 2025-03-08 PROCEDURE — 94761 N-INVAS EAR/PLS OXIMETRY MLT: CPT

## 2025-03-08 PROCEDURE — 99900035 HC TECH TIME PER 15 MIN (STAT)

## 2025-03-08 PROCEDURE — 27000221 HC OXYGEN, UP TO 24 HOURS

## 2025-03-08 PROCEDURE — 12000002 HC ACUTE/MED SURGE SEMI-PRIVATE ROOM

## 2025-03-08 RX ORDER — CHLORDIAZEPOXIDE HYDROCHLORIDE 25 MG/1
25 CAPSULE, GELATIN COATED ORAL 3 TIMES DAILY
Status: DISCONTINUED | OUTPATIENT
Start: 2025-03-08 | End: 2025-03-10

## 2025-03-08 RX ADMIN — ARFORMOTEROL TARTRATE 15 MCG: 15 SOLUTION RESPIRATORY (INHALATION) at 07:03

## 2025-03-08 RX ADMIN — IPRATROPIUM BROMIDE 0.5 MG: 0.5 SOLUTION RESPIRATORY (INHALATION) at 07:03

## 2025-03-08 RX ADMIN — CARVEDILOL 12.5 MG: 12.5 TABLET, FILM COATED ORAL at 08:03

## 2025-03-08 RX ADMIN — BUDESONIDE INHALATION 0.5 MG: 0.5 SUSPENSION RESPIRATORY (INHALATION) at 07:03

## 2025-03-08 RX ADMIN — CHLORDIAZEPOXIDE HYDROCHLORIDE 25 MG: 25 CAPSULE ORAL at 02:03

## 2025-03-08 RX ADMIN — FLUOXETINE HYDROCHLORIDE 40 MG: 20 CAPSULE ORAL at 08:03

## 2025-03-08 RX ADMIN — HYDROCODONE BITARTRATE AND ACETAMINOPHEN 1 TABLET: 5; 325 TABLET ORAL at 07:03

## 2025-03-08 RX ADMIN — THIAMINE HCL TAB 100 MG 100 MG: 100 TAB at 08:03

## 2025-03-08 RX ADMIN — LEVOTHYROXINE SODIUM 50 MCG: 0.03 TABLET ORAL at 05:03

## 2025-03-08 RX ADMIN — CHLORDIAZEPOXIDE HYDROCHLORIDE 25 MG: 25 CAPSULE ORAL at 08:03

## 2025-03-08 RX ADMIN — NICOTINE 1 PATCH: 21 PATCH, EXTENDED RELEASE TRANSDERMAL at 04:03

## 2025-03-08 RX ADMIN — FOLIC ACID 1 MG: 1 TABLET ORAL at 08:03

## 2025-03-08 RX ADMIN — THERA TABS 1 TABLET: TAB at 08:03

## 2025-03-08 RX ADMIN — METHOCARBAMOL TABLETS 500 MG: 500 TABLET, COATED ORAL at 08:03

## 2025-03-08 RX ADMIN — METHOCARBAMOL TABLETS 500 MG: 500 TABLET, COATED ORAL at 12:03

## 2025-03-08 RX ADMIN — GABAPENTIN 300 MG: 300 CAPSULE ORAL at 08:03

## 2025-03-08 RX ADMIN — ATORVASTATIN CALCIUM 80 MG: 40 TABLET, FILM COATED ORAL at 08:03

## 2025-03-08 RX ADMIN — HYDROCODONE BITARTRATE AND ACETAMINOPHEN 1 TABLET: 5; 325 TABLET ORAL at 02:03

## 2025-03-08 RX ADMIN — IPRATROPIUM BROMIDE 0.5 MG: 0.5 SOLUTION RESPIRATORY (INHALATION) at 01:03

## 2025-03-08 RX ADMIN — MONTELUKAST 10 MG: 10 TABLET, FILM COATED ORAL at 08:03

## 2025-03-08 RX ADMIN — METHOCARBAMOL TABLETS 500 MG: 500 TABLET, COATED ORAL at 04:03

## 2025-03-08 RX ADMIN — GABAPENTIN 300 MG: 300 CAPSULE ORAL at 02:03

## 2025-03-08 RX ADMIN — POLYETHYLENE GLYCOL 3350 17 G: 17 POWDER, FOR SOLUTION ORAL at 08:03

## 2025-03-08 NOTE — SUBJECTIVE & OBJECTIVE
Interval History: Pt seen and evaluated at bedside. NAD. Pleasant. Decreased librium to TID. Patient reports pain well controlled.    Review of Systems   Constitutional:  Negative for chills and fever.   Respiratory:  Negative for cough and shortness of breath.    Cardiovascular:  Negative for chest pain.   Gastrointestinal:  Negative for nausea and vomiting.   Genitourinary:  Negative for difficulty urinating.   Musculoskeletal:  Positive for back pain.   Neurological:  Positive for numbness (she has chronic neuropathy which is unchanged per her report).   Psychiatric/Behavioral:  Negative for confusion and hallucinations.      Objective:     Vital Signs (Most Recent):  Temp: 97.5 °F (36.4 °C) (03/08/25 1104)  Pulse: 76 (03/08/25 1104)  Resp: 18 (03/08/25 1104)  BP: 115/62 (03/08/25 1104)  SpO2: 95 % (03/08/25 1104) Vital Signs (24h Range):  Temp:  [97.4 °F (36.3 °C)-99 °F (37.2 °C)] 97.5 °F (36.4 °C)  Pulse:  [76-91] 76  Resp:  [16-19] 18  SpO2:  [92 %-99 %] 95 %  BP: ()/(51-78) 115/62     Weight: 47.1 kg (103 lb 13.4 oz)  Body mass index is 18.99 kg/m².    Intake/Output Summary (Last 24 hours) at 3/8/2025 1242  Last data filed at 3/8/2025 0952  Gross per 24 hour   Intake 200 ml   Output --   Net 200 ml         Physical Exam  Vitals and nursing note reviewed.   Constitutional:       Appearance: Normal appearance.   HENT:      Head: Normocephalic and atraumatic.   Cardiovascular:      Rate and Rhythm: Normal rate and regular rhythm.   Pulmonary:      Effort: Pulmonary effort is normal. No respiratory distress.      Breath sounds: Normal breath sounds.      Comments: Appears comfortable on 3 L nasal cannula  Abdominal:      General: Abdomen is flat. Bowel sounds are normal.      Palpations: Abdomen is soft.   Musculoskeletal:      Cervical back: Normal range of motion and neck supple.   Skin:     General: Skin is warm and dry.   Neurological:      General: No focal deficit present.      Mental Status: She is  alert and oriented to person, place, and time.               Significant Labs: All pertinent labs within the past 24 hours have been reviewed.    Significant Imaging: I have reviewed all pertinent imaging results/findings within the past 24 hours.

## 2025-03-08 NOTE — CARE UPDATE
03/08/25 0716   Patient Assessment/Suction   Level of Consciousness (AVPU) alert   Respiratory Effort Normal;Unlabored   Expansion/Accessory Muscles/Retractions expansion symmetric;no retractions   All Lung Fields Breath Sounds Anterior:;Lateral:;clear   Rhythm/Pattern, Respiratory unlabored;pattern regular;depth regular   Cough Frequency infrequent   PRE-TX-O2   Device (Oxygen Therapy) nasal cannula   $ Is the patient on Low Flow Oxygen? Yes   Flow (L/min) (Oxygen Therapy) 4   SpO2 99 %  (decreeased to 3L. Pt states she wears 3.5L at home)   Pulse Oximetry Type Intermittent   $ Pulse Oximetry - Multiple Charge Pulse Oximetry - Multiple   Resp 16   Aerosol Therapy   $ Aerosol Therapy Charges Aerosol Treatment   Daily Review of Necessity (SVN) completed   Respiratory Treatment Status (SVN) given   Treatment Route (SVN) mask   Patient Position HOB elevated   Post Treatment Assessment (SVN) breath sounds unchanged   Signs of Intolerance (SVN) none   Breath Sounds Post-Respiratory Treatment   Throughout All Fields Post-Treatment All Fields   Throughout All Fields Post-Treatment no change   Post-treatment Heart Rate (beats/min) 77   Post-treatment Resp Rate (breaths/min) 16   Education   $ Education Bronchodilator;Oxygen;15 min

## 2025-03-08 NOTE — ASSESSMENT & PLAN NOTE
Hyponatremia is a historical properly acutely worsening here in the hospital.  The patient's most recent sodium results are listed below.  Recent Labs     03/06/25  0541 03/06/25  1525 03/07/25  0603   * 127* 133*     Plan  - Correct the sodium by 4-6mEq in 24 hours.   - sodium is self corrected with holding of the SSRI and diet liberalization   - SSRI resumed 3/7, and evaluating response.

## 2025-03-08 NOTE — PROGRESS NOTES
[facility] Medicine  Progress Note    Patient Name: Bhavana Lambert  MRN: [id number]  Patient Class: IP- Inpatient   Admission Date: 3/4/2025  Length of Stay: 4 days  Attending Physician: Theresa Kaufman MD  Primary Care Provider: Pepito Jhaveri IV, MD        Subjective     Principal Problem:T12 burst fracture        HPI:  69-year-old female with PMHx of COPD, HTN, hypothyroidism, prior stroke who presented to the ED with a complaint of lumbar and bilateral hip pain status post fall several days ago. The patient reports that she was wearing socks and she slipped, falling backwards towards a wall and then slid down the wall to a sitting position. She denies loss of consciousness, chest pain, current shortness of breath, dizziness, nausea or vomiting, incontinence of bowel or bladder. She is on plavix at home.     In the ED, /79  RR 20 afebrile and 96% on 3L NC (baseline).     Imaging showed Acute burst fracture of T12. Case was discussed with Dr. Boothe ([facility]) who will consult on the patient. She was given tylenol, morphine, and zofran and stable for transfer, no neuro deficits.     Per chart review from recent admission on February 9th to February 14, 2025, the patient was admitted for COPD exacerbation and did have a history of previous EtOH abuse with subsequent falls.     Overview/Hospital Course:  Bhavana Lambert is 69-year-old female with PMH COPD, HTN, hypothyroidism, prior stroke.  She was admitted to the hospital for management of an acute burst fracture of T12.  Neurosurgery following.  MRI obtained.  X-ray thoracolumbar spine ordered and showed stability.  PT/OT consulted.  TLSO brace applied for activity per neurosurgery recommendation.  Pt developed ETOH withdrawals with delirium on 03/06 and was placed on Librium protocol.  She had worsening hyponatremia prompting further workup though this notably improved with diet liberalization.  Her DTs were much  improved on 03/07 after Librium initiation.    Interval History: Pt seen and evaluated at bedside. NAD. Pleasant. Decreased librium to TID. Patient reports pain well controlled.    Review of Systems   Constitutional:  Negative for chills and fever.   Respiratory:  Negative for cough and shortness of breath.    Cardiovascular:  Negative for chest pain.   Gastrointestinal:  Negative for nausea and vomiting.   Genitourinary:  Negative for difficulty urinating.   Musculoskeletal:  Positive for back pain.   Neurological:  Positive for numbness (she has chronic neuropathy which is unchanged per her report).   Psychiatric/Behavioral:  Negative for confusion and hallucinations.      Objective:     Vital Signs (Most Recent):  Temp: 97.5 °F (36.4 °C) (03/08/25 1104)  Pulse: 76 (03/08/25 1104)  Resp: 18 (03/08/25 1104)  BP: 115/62 (03/08/25 1104)  SpO2: 95 % (03/08/25 1104) Vital Signs (24h Range):  Temp:  [97.4 °F (36.3 °C)-99 °F (37.2 °C)] 97.5 °F (36.4 °C)  Pulse:  [76-91] 76  Resp:  [16-19] 18  SpO2:  [92 %-99 %] 95 %  BP: ()/(51-78) 115/62     Weight: 47.1 kg (103 lb 13.4 oz)  Body mass index is 18.99 kg/m².    Intake/Output Summary (Last 24 hours) at 3/8/2025 1242  Last data filed at 3/8/2025 0952  Gross per 24 hour   Intake 200 ml   Output --   Net 200 ml         Physical Exam  Vitals and nursing note reviewed.   Constitutional:       Appearance: Normal appearance.   HENT:      Head: Normocephalic and atraumatic.   Cardiovascular:      Rate and Rhythm: Normal rate and regular rhythm.   Pulmonary:      Effort: Pulmonary effort is normal. No respiratory distress.      Breath sounds: Normal breath sounds.      Comments: Appears comfortable on 3 L nasal cannula  Abdominal:      General: Abdomen is flat. Bowel sounds are normal.      Palpations: Abdomen is soft.   Musculoskeletal:      Cervical back: Normal range of motion and neck supple.   Skin:     General: Skin is warm and dry.   Neurological:      General: No  focal deficit present.      Mental Status: She is alert and oriented to person, place, and time.               Significant Labs: All pertinent labs within the past 24 hours have been reviewed.    Significant Imaging: I have reviewed all pertinent imaging results/findings within the past 24 hours.      Assessment & Plan  T12 burst fracture  MRI shows acute compression fracture of T12 with loss of height of approximately 25%.  There is minimal retropulsion of the posterior cortex measuring 3 mm without central canal stenosis.  Old mild chronic compression fracture of T11.  Multilevel degenerative disc disease and facet hypertrophy with mild central canal stenosis and foraminal narrowing at L2-3, L3-4, and L4-5  Pain control  Neurochecks   Neurosurgery consulted- T XR stable  TLSO brace with mobilization  Repeat XR 2/2 fall  PT/OT- expect SNF need upon D/C.        Hypothyroid  Patient has chronic hypothyroidism. TFTs reviewed-   Lab Results   Component Value Date    TSH 3.155 03/06/2025   . Will continue chronic levothyroxine and adjust for and clinical changes.     Mixed hyperlipidemia  Continue home statin    Tobacco dependency  -nicotine patch  Hyponatremia  Hyponatremia is a historical properly acutely worsening here in the hospital.  The patient's most recent sodium results are listed below.  Recent Labs     03/06/25  0541 03/06/25  1525 03/07/25  0603   * 127* 133*     Plan  - Correct the sodium by 4-6mEq in 24 hours.   - sodium is self corrected with holding of the SSRI and diet liberalization   - SSRI resumed 3/7, and evaluating response.  Alcohol dependence with withdrawal delirium  Pt with confirmed daily ETOH utilization appeared with DTs on 03/06   -initiated scheduled Librium 3/6 with much improvement.  Will down taper the Librium.  -continue p.r.n. Ativan   -nursing to perform CIWA  -fall, seizure precautions   -sitter at bedside.  -daily MVI, folic acid, thiamine    VTE Risk Mitigation (From  admission, onward)           Ordered     IP VTE HIGH RISK PATIENT  Once         03/04/25 1604     Place sequential compression device  Until discontinued         03/04/25 1604     Reason for No Pharmacological VTE Prophylaxis  Once        Question:  Reasons:  Answer:  Physician Provided (leave comment)  Comment:  pending neurosurgery eval    03/04/25 1604                    Discharge Planning   CHLOE:      Code Status: Full Code   Medical Readiness for Discharge Date:   Discharge Plan A: Skilled Nursing Facility                Please place Justification for DME        Jericho Samson NP  Department of Hospital Medicine   [facility]

## 2025-03-08 NOTE — CARE UPDATE
03/07/25 1927   Patient Assessment/Suction   Level of Consciousness (AVPU) alert   Respiratory Effort Normal   Expansion/Accessory Muscles/Retractions no use of accessory muscles   All Lung Fields Breath Sounds clear   Rhythm/Pattern, Respiratory unlabored   Cough Frequency no cough   Skin Integrity   $ Wound Care Tech Time 15 min   Area Observed Left;Right;Behind ear;Cheek;Nares   Skin Appearance without discoloration   PRE-TX-O2   Device (Oxygen Therapy) nasal cannula   Flow (L/min) (Oxygen Therapy) 3   SpO2 96 %   Pulse Oximetry Type Intermittent   $ Pulse Oximetry - Multiple Charge Pulse Oximetry - Multiple   Pulse 88   Resp 19   Aerosol Therapy   $ Aerosol Therapy Charges Aerosol Treatment   Daily Review of Necessity (SVN) completed   Respiratory Treatment Status (SVN) given   Treatment Route (SVN) mask;oxygen   Patient Position semi-Billingsley's   Post Treatment Assessment (SVN) breath sounds unchanged;vital signs unchanged   Signs of Intolerance (SVN) none   Education   $ Education Bronchodilator;Oxygen;15 min   Respiratory Evaluation   $ Care Plan Tech Time 15 min

## 2025-03-08 NOTE — PT/OT/SLP PROGRESS
Physical Therapy Treatment    Patient Name:  Bhavana Lambert   MRN:  [id number]    Recommendations:     Discharge Recommendations: Moderate Intensity Therapy  Discharge Equipment Recommendations: to be determined by next level of care  Barriers to discharge:  increased assist with mobility, decreased activity tolerance, balance deficits    Assessment:     Bhavana Lambert is a 69 y.o. female admitted with a medical diagnosis of T12 burst fracture.  She presents with the following impairments/functional limitations: weakness, impaired endurance, impaired self care skills, impaired functional mobility, gait instability, impaired balance, decreased upper extremity function, decreased lower extremity function, pain, decreased safety awareness, impaired cardiopulmonary response to activity, orthopedic precautions.    Pt agreeable to visit. Pt reports 10/10 lower back pain. Pt states that she received pain medication about an hour ago but it had not kicked in yet.    Pt performed log roll to sitting EOB with min assist, verbal cuing to ensure spinal precautions. Pt able to maintain balance sitting EOB with stand by assist. Pt assisted with donning TLSO EOB.    Pt performed sit to stand transfer with RW and mod assist. Pt took 3 side steps with RW and min assist. Assist to advance RW with verbal cuing for sequencing.    Pt returned to sitting EOB and was assisted with doffing TLSO. Pt returned to bed with min assist, verbal cuing for spinal precautions.    Rehab Prognosis: Fair; patient would benefit from acute skilled PT services to address these deficits and reach maximum level of function.    Recent Surgery: * No surgery found *      Plan:     During this hospitalization, patient to be seen 6 x/week to address the identified rehab impairments via therapeutic activities, neuromuscular re-education, therapeutic exercises and progress toward the following goals:    Plan of Care Expires:  04/06/25    Subjective     Chief  Complaint: 10/10 back pain  Patient/Family Comments/goals: to get better  Pain/Comfort:  Pain Rating 1: 10/10  Location - Orientation 1: lower  Location 1: back  Pain Addressed 1: Reposition, Distraction, Cessation of Activity, Pre-medicate for activity  Pain Rating Post-Intervention 1: 10/10      Objective:     Communicated with nurse prior to session.  Patient found HOB elevated with bed alarm, telemetry, oxygen upon PT entry to room.     General Precautions: Standard, fall, respiratory  Orthopedic Precautions: spinal precautions  Braces: TLSO  Respiratory Status: Nasal cannula, flow 4 L/min     Functional Mobility:  Bed Mobility:     Supine to Sit: minimum assistance  Sit to Supine: minimum assistance  Transfers:     Sit to Stand:  moderate assistance with rolling walker  Gait: 3 side steps with RW and Thomas      AM-PAC 6 CLICK MOBILITY          Treatment & Education:  Pt educated on importance of time OOB, importance of intermittent mobility, safe techniques for transfers/ambulation, discharge recommendations/options, and use of call light for assistance and fall prevention.      Patient left HOB elevated with all lines intact, call button in reach, and bed alarm on..    GOALS:   Multidisciplinary Problems       Physical Therapy Goals          Problem: Physical Therapy    Goal Priority Disciplines Outcome Interventions   Physical Therapy Goal     PT, PT/OT Progressing    Description: Goals to be met by: 25     Patient will increase functional independence with mobility by performin. Supine to sit with Standby Assistance  2. Sit to stand with Standby Assistance using Rolling Walker   3. Bed to chair transfer with Standby Assistance using Rolling Walker  4. Sitting at edge of bed x10 minutes with Minimal Assistance                       DME Justifications:  TBD next level of care    Time Tracking:     PT Received On: 25  PT Start Time: 907     PT Stop Time: 930  PT Total Time (min): 23 min      Billable Minutes: Therapeutic Activity 23    Treatment Type: Treatment  PT/PTA: PTA     Number of PTA visits since last PT visit: 1 03/08/2025

## 2025-03-09 PROBLEM — D64.9 ANEMIA: Status: ACTIVE | Noted: 2025-03-09

## 2025-03-09 LAB
ALBUMIN SERPL BCP-MCNC: 2.9 G/DL (ref 3.5–5.2)
ALP SERPL-CCNC: 88 U/L (ref 55–135)
ALT SERPL W/O P-5'-P-CCNC: 13 U/L (ref 10–44)
ANION GAP SERPL CALC-SCNC: 6 MMOL/L (ref 8–16)
AST SERPL-CCNC: 24 U/L (ref 10–40)
BASOPHILS # BLD AUTO: 0.03 K/UL (ref 0–0.2)
BASOPHILS NFR BLD: 0.4 % (ref 0–1.9)
BILIRUB SERPL-MCNC: 0.2 MG/DL (ref 0.1–1)
BUN SERPL-MCNC: 9 MG/DL (ref 8–23)
CALCIUM SERPL-MCNC: 8.6 MG/DL (ref 8.7–10.5)
CHLORIDE SERPL-SCNC: 93 MMOL/L (ref 95–110)
CO2 SERPL-SCNC: 35 MMOL/L (ref 23–29)
CREAT SERPL-MCNC: 0.7 MG/DL (ref 0.5–1.4)
DIFFERENTIAL METHOD BLD: ABNORMAL
EOSINOPHIL # BLD AUTO: 0.3 K/UL (ref 0–0.5)
EOSINOPHIL NFR BLD: 4.2 % (ref 0–8)
ERYTHROCYTE [DISTWIDTH] IN BLOOD BY AUTOMATED COUNT: 13.4 % (ref 11.5–14.5)
EST. GFR  (NO RACE VARIABLE): >60 ML/MIN/1.73 M^2
GLUCOSE SERPL-MCNC: 93 MG/DL (ref 70–110)
HCT VFR BLD AUTO: 29.7 % (ref 37–48.5)
HGB BLD-MCNC: 9.1 G/DL (ref 12–16)
IMM GRANULOCYTES # BLD AUTO: 0.03 K/UL (ref 0–0.04)
IMM GRANULOCYTES NFR BLD AUTO: 0.4 % (ref 0–0.5)
LYMPHOCYTES # BLD AUTO: 1.3 K/UL (ref 1–4.8)
LYMPHOCYTES NFR BLD: 15.7 % (ref 18–48)
MAGNESIUM SERPL-MCNC: 1.7 MG/DL (ref 1.6–2.6)
MCH RBC QN AUTO: 30.7 PG (ref 27–31)
MCHC RBC AUTO-ENTMCNC: 30.6 G/DL (ref 32–36)
MCV RBC AUTO: 100 FL (ref 82–98)
MONOCYTES # BLD AUTO: 1 K/UL (ref 0.3–1)
MONOCYTES NFR BLD: 11.8 % (ref 4–15)
NEUTROPHILS # BLD AUTO: 5.5 K/UL (ref 1.8–7.7)
NEUTROPHILS NFR BLD: 67.5 % (ref 38–73)
NRBC BLD-RTO: 0 /100 WBC
PHOSPHATE SERPL-MCNC: 4.5 MG/DL (ref 2.7–4.5)
PLATELET # BLD AUTO: 268 K/UL (ref 150–450)
PMV BLD AUTO: 10.8 FL (ref 9.2–12.9)
POTASSIUM SERPL-SCNC: 4.3 MMOL/L (ref 3.5–5.1)
PROT SERPL-MCNC: 6.3 G/DL (ref 6–8.4)
RBC # BLD AUTO: 2.96 M/UL (ref 4–5.4)
SODIUM SERPL-SCNC: 134 MMOL/L (ref 136–145)
WBC # BLD AUTO: 8.14 K/UL (ref 3.9–12.7)

## 2025-03-09 PROCEDURE — 99900031 HC PATIENT EDUCATION (STAT)

## 2025-03-09 PROCEDURE — 94640 AIRWAY INHALATION TREATMENT: CPT

## 2025-03-09 PROCEDURE — 25000003 PHARM REV CODE 250: Performed by: NURSE PRACTITIONER

## 2025-03-09 PROCEDURE — 84100 ASSAY OF PHOSPHORUS: CPT | Performed by: NURSE PRACTITIONER

## 2025-03-09 PROCEDURE — 80053 COMPREHEN METABOLIC PANEL: CPT | Performed by: NURSE PRACTITIONER

## 2025-03-09 PROCEDURE — S4991 NICOTINE PATCH NONLEGEND: HCPCS

## 2025-03-09 PROCEDURE — 94761 N-INVAS EAR/PLS OXIMETRY MLT: CPT

## 2025-03-09 PROCEDURE — 25000242 PHARM REV CODE 250 ALT 637 W/ HCPCS: Performed by: NURSE PRACTITIONER

## 2025-03-09 PROCEDURE — 85025 COMPLETE CBC W/AUTO DIFF WBC: CPT | Performed by: NURSE PRACTITIONER

## 2025-03-09 PROCEDURE — 99900035 HC TECH TIME PER 15 MIN (STAT)

## 2025-03-09 PROCEDURE — 36415 COLL VENOUS BLD VENIPUNCTURE: CPT | Performed by: NURSE PRACTITIONER

## 2025-03-09 PROCEDURE — 27000221 HC OXYGEN, UP TO 24 HOURS

## 2025-03-09 PROCEDURE — 12000002 HC ACUTE/MED SURGE SEMI-PRIVATE ROOM

## 2025-03-09 PROCEDURE — 83735 ASSAY OF MAGNESIUM: CPT | Performed by: NURSE PRACTITIONER

## 2025-03-09 PROCEDURE — 25000003 PHARM REV CODE 250

## 2025-03-09 PROCEDURE — 25000242 PHARM REV CODE 250 ALT 637 W/ HCPCS: Performed by: INTERNAL MEDICINE

## 2025-03-09 RX ORDER — BUSPIRONE HYDROCHLORIDE 5 MG/1
15 TABLET ORAL 3 TIMES DAILY
Status: DISCONTINUED | OUTPATIENT
Start: 2025-03-09 | End: 2025-03-18 | Stop reason: HOSPADM

## 2025-03-09 RX ORDER — HYDROCODONE BITARTRATE AND ACETAMINOPHEN 10; 325 MG/1; MG/1
1 TABLET ORAL EVERY 6 HOURS PRN
Refills: 0 | Status: DISCONTINUED | OUTPATIENT
Start: 2025-03-09 | End: 2025-03-18 | Stop reason: HOSPADM

## 2025-03-09 RX ADMIN — IPRATROPIUM BROMIDE 0.5 MG: 0.5 SOLUTION RESPIRATORY (INHALATION) at 01:03

## 2025-03-09 RX ADMIN — POLYETHYLENE GLYCOL 3350 17 G: 17 POWDER, FOR SOLUTION ORAL at 09:03

## 2025-03-09 RX ADMIN — Medication 800 MG: at 02:03

## 2025-03-09 RX ADMIN — METHOCARBAMOL TABLETS 500 MG: 500 TABLET, COATED ORAL at 01:03

## 2025-03-09 RX ADMIN — ARFORMOTEROL TARTRATE 15 MCG: 15 SOLUTION RESPIRATORY (INHALATION) at 07:03

## 2025-03-09 RX ADMIN — IPRATROPIUM BROMIDE 0.5 MG: 0.5 SOLUTION RESPIRATORY (INHALATION) at 07:03

## 2025-03-09 RX ADMIN — IPRATROPIUM BROMIDE AND ALBUTEROL SULFATE 3 ML: .5; 3 SOLUTION RESPIRATORY (INHALATION) at 01:03

## 2025-03-09 RX ADMIN — HYDROCODONE BITARTRATE AND ACETAMINOPHEN 1 TABLET: 10; 325 TABLET ORAL at 04:03

## 2025-03-09 RX ADMIN — CHLORDIAZEPOXIDE HYDROCHLORIDE 25 MG: 25 CAPSULE ORAL at 08:03

## 2025-03-09 RX ADMIN — METHOCARBAMOL TABLETS 500 MG: 500 TABLET, COATED ORAL at 08:03

## 2025-03-09 RX ADMIN — FOLIC ACID 1 MG: 1 TABLET ORAL at 09:03

## 2025-03-09 RX ADMIN — THERA TABS 1 TABLET: TAB at 09:03

## 2025-03-09 RX ADMIN — BUDESONIDE INHALATION 0.5 MG: 0.5 SUSPENSION RESPIRATORY (INHALATION) at 07:03

## 2025-03-09 RX ADMIN — CHLORDIAZEPOXIDE HYDROCHLORIDE 25 MG: 25 CAPSULE ORAL at 09:03

## 2025-03-09 RX ADMIN — GABAPENTIN 300 MG: 300 CAPSULE ORAL at 08:03

## 2025-03-09 RX ADMIN — MONTELUKAST 10 MG: 10 TABLET, FILM COATED ORAL at 08:03

## 2025-03-09 RX ADMIN — BUSPIRONE HYDROCHLORIDE 15 MG: 5 TABLET ORAL at 06:03

## 2025-03-09 RX ADMIN — FLUOXETINE HYDROCHLORIDE 40 MG: 20 CAPSULE ORAL at 09:03

## 2025-03-09 RX ADMIN — ATORVASTATIN CALCIUM 80 MG: 40 TABLET, FILM COATED ORAL at 09:03

## 2025-03-09 RX ADMIN — GABAPENTIN 300 MG: 300 CAPSULE ORAL at 09:03

## 2025-03-09 RX ADMIN — THIAMINE HCL TAB 100 MG 100 MG: 100 TAB at 09:03

## 2025-03-09 RX ADMIN — CARVEDILOL 12.5 MG: 12.5 TABLET, FILM COATED ORAL at 08:03

## 2025-03-09 RX ADMIN — NICOTINE 1 PATCH: 21 PATCH, EXTENDED RELEASE TRANSDERMAL at 04:03

## 2025-03-09 RX ADMIN — HYDROCODONE BITARTRATE AND ACETAMINOPHEN 1 TABLET: 5; 325 TABLET ORAL at 05:03

## 2025-03-09 RX ADMIN — CHLORDIAZEPOXIDE HYDROCHLORIDE 25 MG: 25 CAPSULE ORAL at 02:03

## 2025-03-09 RX ADMIN — METHOCARBAMOL TABLETS 500 MG: 500 TABLET, COATED ORAL at 09:03

## 2025-03-09 RX ADMIN — METHOCARBAMOL TABLETS 500 MG: 500 TABLET, COATED ORAL at 04:03

## 2025-03-09 RX ADMIN — GABAPENTIN 300 MG: 300 CAPSULE ORAL at 02:03

## 2025-03-09 RX ADMIN — BUSPIRONE HYDROCHLORIDE 15 MG: 5 TABLET ORAL at 08:03

## 2025-03-09 RX ADMIN — Medication 800 MG: at 09:03

## 2025-03-09 RX ADMIN — HYDROCODONE BITARTRATE AND ACETAMINOPHEN 1 TABLET: 10; 325 TABLET ORAL at 10:03

## 2025-03-09 RX ADMIN — LEVOTHYROXINE SODIUM 50 MCG: 0.03 TABLET ORAL at 05:03

## 2025-03-09 NOTE — PROGRESS NOTES
[facility]  Progress Note    Patient Name: Bhavana Lambert  MRN: [id number]  Patient Class: IP- Inpatient   Admission Date: 3/4/2025  Length of Stay: 5 days  Attending Physician: Theresa Kaufman MD  Primary Care Provider: Pepito Jhaveri IV, MD        Subjective     Principal Problem:T12 burst fracture        HPI:  69-year-old female with PMHx of COPD, HTN, hypothyroidism, prior stroke who presented to the ED with a complaint of lumbar and bilateral hip pain status post fall several days ago. The patient reports that she was wearing socks and she slipped, falling backwards towards a wall and then slid down the wall to a sitting position. She denies loss of consciousness, chest pain, current shortness of breath, dizziness, nausea or vomiting, incontinence of bowel or bladder. She is on plavix at home.     In the ED, /79  RR 20 afebrile and 96% on 3L NC (baseline).     Imaging showed Acute burst fracture of T12. Case was discussed with [place]. Boothe ([facility] who will consult on the patient. She was given tylenol, morphine, and zofran and stable for transfer, no neuro deficits.     Per chart review from recent admission on February 9th to February 14, 2025, the patient was admitted for COPD exacerbation and did have a history of previous EtOH abuse with subsequent falls.     Overview/Hospital Course:  Bhavana Lambert is 69-year-old female with PMH COPD, HTN, hypothyroidism, prior stroke.  She was admitted to the hospital for management of an acute burst fracture of T12.  Neurosurgery following.  MRI obtained.  X-ray thoracolumbar spine ordered and showed stability.  PT/OT consulted.  TLSO brace applied for activity per neurosurgery recommendation.  Pt developed ETOH withdrawals with delirium on 03/06 and was placed on Librium protocol.  She had worsening hyponatremia prompting further workup though this notably improved with diet liberalization.  Her DTs were much  improved on 03/07 after Librium initiation.  Sodium levels continued to improve.  Pain was controlled with oral narcotics.    Interval History: no acute events overnight.  Rates pain as 7/10 fairly consistently.  Will modify pain regimen with caution secondary to mild hypotension.  Eating and drinking OK.    Review of Systems   Constitutional:  Negative for chills and fever.   Respiratory:  Negative for cough and shortness of breath.    Cardiovascular:  Negative for chest pain.   Gastrointestinal:  Negative for nausea and vomiting.   Genitourinary:  Negative for difficulty urinating.   Musculoskeletal:  Positive for back pain.   Neurological:  Positive for numbness (she has chronic neuropathy which is unchanged per her report).   Psychiatric/Behavioral:  Negative for confusion and hallucinations.      Objective:     Vital Signs (Most Recent):  Temp: 97.5 °F (36.4 °C) (03/09/25 0728)  Pulse: 60 (03/09/25 1323)  Resp: 16 (03/09/25 1323)  BP: (!) 91/57 (03/09/25 0728)  SpO2: 98 % (03/09/25 1323) Vital Signs (24h Range):  Temp:  [97 °F (36.1 °C)-98.6 °F (37 °C)] 97.5 °F (36.4 °C)  Pulse:  [60-83] 60  Resp:  [16-19] 16  SpO2:  [92 %-100 %] 98 %  BP: ()/(57-89) 91/57     Weight: 47.1 kg (103 lb 13.4 oz)  Body mass index is 18.99 kg/m².    Intake/Output Summary (Last 24 hours) at 3/9/2025 1511  Last data filed at 3/8/2025 1520  Gross per 24 hour   Intake 240 ml   Output --   Net 240 ml         Physical Exam  Vitals and nursing note reviewed.   Constitutional:       Appearance: Normal appearance.      Interventions: Nasal cannula in place.   HENT:      Head: Normocephalic and atraumatic.   Cardiovascular:      Rate and Rhythm: Normal rate and regular rhythm.   Pulmonary:      Effort: Pulmonary effort is normal. No respiratory distress.      Breath sounds: Normal breath sounds.      Comments: Remains on 3 L nasal cannula  Abdominal:      General: Abdomen is flat. Bowel sounds are normal.      Palpations: Abdomen is  soft.   Musculoskeletal:      Cervical back: Normal range of motion and neck supple.   Skin:     General: Skin is warm and dry.   Neurological:      General: No focal deficit present.      Mental Status: She is alert and oriented to person, place, and time.               Significant Labs: All pertinent labs within the past 24 hours have been reviewed.  CBC:   Recent Labs   Lab 03/09/25  0632   WBC 8.14   HGB 9.1*   HCT 29.7*        CMP:   Recent Labs   Lab 03/09/25  0632   *   K 4.3   CL 93*   CO2 35*   GLU 93   BUN 9   CREATININE 0.7   CALCIUM 8.6*   PROT 6.3   ALBUMIN 2.9*   BILITOT 0.2   ALKPHOS 88   AST 24   ALT 13   ANIONGAP 6*     Magnesium:   Recent Labs   Lab 03/09/25  0632   MG 1.7       Significant Imaging: I have reviewed all pertinent imaging results/findings within the past 24 hours.      Assessment & Plan  T12 burst fracture  MRI shows acute compression fracture of T12 with loss of height of approximately 25%.  There is minimal retropulsion of the posterior cortex measuring 3 mm without central canal stenosis.  Old mild chronic compression fracture of T11.  Multilevel degenerative disc disease and facet hypertrophy with mild central canal stenosis and foraminal narrowing at L2-3, L3-4, and L4-5  Pain control  Neurochecks   Neurosurgery consulted- T XR stable  TLSO brace with mobilization  Repeat XR 2/2 fall  PT/OT- expect SNF need upon D/C.        Hypothyroid  Patient has chronic hypothyroidism. TFTs reviewed-   Lab Results   Component Value Date    TSH 3.155 03/06/2025   . Will continue chronic levothyroxine and adjust for and clinical changes.     Mixed hyperlipidemia  Continue home statin    Tobacco dependency  -nicotine patch  Hyponatremia  Hyponatremia is a historical properly acutely worsening here in the hospital.  The patient's most recent sodium results are listed below.  Recent Labs     03/06/25  1525 03/07/25  0603 03/09/25  0632   * 133* 134*     Plan  - Correct the  sodium by 4-6mEq in 24 hours.   - sodium is self corrected with holding of the SSRI and diet liberalization   - SSRI resumed 3/7, and evaluating response-stable thus far.  Alcohol dependence with withdrawal delirium  Pt with confirmed daily ETOH utilization appeared with DTs on 03/06   -initiated scheduled Librium 3/6 with much improvement.  Will down taper the Librium.  -continue p.r.n. Ativan   -nursing to perform CIWA  -fall, seizure precautions   -sitter at bedside.  -daily MVI, folic acid, thiamine    Anemia  Chronic problem.  Stable.  Monitor H/H.    Transfuse for hemodynamic instability and/or H/H <7/21    VTE Risk Mitigation (From admission, onward)           Ordered     IP VTE HIGH RISK PATIENT  Once         03/04/25 1604     Place sequential compression device  Until discontinued         03/04/25 1604     Reason for No Pharmacological VTE Prophylaxis  Once        Question:  Reasons:  Answer:  Physician Provided (leave comment)  Comment:  pending neurosurgery eval    03/04/25 1604                    Discharge Planning   CHLOE:      Code Status: Full Code   Medical Readiness for Discharge Date:   Discharge Plan A: Skilled Nursing Facility          DUANE Nicholas  Department of Hospital Medicine   [facility]

## 2025-03-09 NOTE — CARE UPDATE
03/08/25 1935   Patient Assessment/Suction   Level of Consciousness (AVPU) alert   Respiratory Effort Normal   Expansion/Accessory Muscles/Retractions no use of accessory muscles   All Lung Fields Breath Sounds clear   Rhythm/Pattern, Respiratory unlabored   Cough Frequency no cough   Skin Integrity   $ Wound Care Tech Time 15 min   Area Observed Left;Right;Behind ear;Cheek;Nares   Skin Appearance without discoloration   PRE-TX-O2   Device (Oxygen Therapy) nasal cannula   Flow (L/min) (Oxygen Therapy) 3   SpO2 (!) 92 %   Pulse Oximetry Type Intermittent   $ Pulse Oximetry - Multiple Charge Pulse Oximetry - Multiple   Pulse 71   Resp 19   Aerosol Therapy   $ Aerosol Therapy Charges Aerosol Treatment   Daily Review of Necessity (SVN) completed   Respiratory Treatment Status (SVN) given   Treatment Route (SVN) mask   Patient Position semi-Billingsley's   Post Treatment Assessment (SVN) breath sounds unchanged;vital signs unchanged   Signs of Intolerance (SVN) none   Education   $ Education Bronchodilator;15 min   Respiratory Evaluation   $ Care Plan Tech Time 15 min        154.94

## 2025-03-09 NOTE — SUBJECTIVE & OBJECTIVE
Interval History: no acute events overnight.  Rates pain as 7/10 fairly consistently.  Will modify pain regimen with caution secondary to mild hypotension.  Eating and drinking OK.    Review of Systems   Constitutional:  Negative for chills and fever.   Respiratory:  Negative for cough and shortness of breath.    Cardiovascular:  Negative for chest pain.   Gastrointestinal:  Negative for nausea and vomiting.   Genitourinary:  Negative for difficulty urinating.   Musculoskeletal:  Positive for back pain.   Neurological:  Positive for numbness (she has chronic neuropathy which is unchanged per her report).   Psychiatric/Behavioral:  Negative for confusion and hallucinations.      Objective:     Vital Signs (Most Recent):  Temp: 97.5 °F (36.4 °C) (03/09/25 0728)  Pulse: 60 (03/09/25 1323)  Resp: 16 (03/09/25 1323)  BP: (!) 91/57 (03/09/25 0728)  SpO2: 98 % (03/09/25 1323) Vital Signs (24h Range):  Temp:  [97 °F (36.1 °C)-98.6 °F (37 °C)] 97.5 °F (36.4 °C)  Pulse:  [60-83] 60  Resp:  [16-19] 16  SpO2:  [92 %-100 %] 98 %  BP: ()/(57-89) 91/57     Weight: 47.1 kg (103 lb 13.4 oz)  Body mass index is 18.99 kg/m².    Intake/Output Summary (Last 24 hours) at 3/9/2025 1511  Last data filed at 3/8/2025 1520  Gross per 24 hour   Intake 240 ml   Output --   Net 240 ml         Physical Exam  Vitals and nursing note reviewed.   Constitutional:       Appearance: Normal appearance.      Interventions: Nasal cannula in place.   HENT:      Head: Normocephalic and atraumatic.   Cardiovascular:      Rate and Rhythm: Normal rate and regular rhythm.   Pulmonary:      Effort: Pulmonary effort is normal. No respiratory distress.      Breath sounds: Normal breath sounds.      Comments: Remains on 3 L nasal cannula  Abdominal:      General: Abdomen is flat. Bowel sounds are normal.      Palpations: Abdomen is soft.   Musculoskeletal:      Cervical back: Normal range of motion and neck supple.   Skin:     General: Skin is warm and dry.    Neurological:      General: No focal deficit present.      Mental Status: She is alert and oriented to person, place, and time.               Significant Labs: All pertinent labs within the past 24 hours have been reviewed.  CBC:   Recent Labs   Lab 03/09/25  0632   WBC 8.14   HGB 9.1*   HCT 29.7*        CMP:   Recent Labs   Lab 03/09/25  0632   *   K 4.3   CL 93*   CO2 35*   GLU 93   BUN 9   CREATININE 0.7   CALCIUM 8.6*   PROT 6.3   ALBUMIN 2.9*   BILITOT 0.2   ALKPHOS 88   AST 24   ALT 13   ANIONGAP 6*     Magnesium:   Recent Labs   Lab 03/09/25  0632   MG 1.7       Significant Imaging: I have reviewed all pertinent imaging results/findings within the past 24 hours.

## 2025-03-09 NOTE — ASSESSMENT & PLAN NOTE
Hyponatremia is a historical properly acutely worsening here in the hospital.  The patient's most recent sodium results are listed below.  Recent Labs     03/06/25  1525 03/07/25  0603 03/09/25  0632   * 133* 134*     Plan  - Correct the sodium by 4-6mEq in 24 hours.   - sodium is self corrected with holding of the SSRI and diet liberalization   - SSRI resumed 3/7, and evaluating response-stable thus far.

## 2025-03-10 LAB
BASOPHILS # BLD AUTO: 0.01 K/UL (ref 0–0.2)
BASOPHILS NFR BLD: 0.1 % (ref 0–1.9)
DIFFERENTIAL METHOD BLD: ABNORMAL
EOSINOPHIL # BLD AUTO: 0.3 K/UL (ref 0–0.5)
EOSINOPHIL NFR BLD: 3.6 % (ref 0–8)
ERYTHROCYTE [DISTWIDTH] IN BLOOD BY AUTOMATED COUNT: 13.2 % (ref 11.5–14.5)
HCT VFR BLD AUTO: 32.2 % (ref 37–48.5)
HGB BLD-MCNC: 9.7 G/DL (ref 12–16)
IMM GRANULOCYTES # BLD AUTO: 0.03 K/UL (ref 0–0.04)
IMM GRANULOCYTES NFR BLD AUTO: 0.4 % (ref 0–0.5)
LYMPHOCYTES # BLD AUTO: 1.3 K/UL (ref 1–4.8)
LYMPHOCYTES NFR BLD: 16.8 % (ref 18–48)
MCH RBC QN AUTO: 30.4 PG (ref 27–31)
MCHC RBC AUTO-ENTMCNC: 30.1 G/DL (ref 32–36)
MCV RBC AUTO: 101 FL (ref 82–98)
MONOCYTES # BLD AUTO: 0.8 K/UL (ref 0.3–1)
MONOCYTES NFR BLD: 10 % (ref 4–15)
NEUTROPHILS # BLD AUTO: 5.2 K/UL (ref 1.8–7.7)
NEUTROPHILS NFR BLD: 69.1 % (ref 38–73)
NRBC BLD-RTO: 0 /100 WBC
PLATELET # BLD AUTO: 350 K/UL (ref 150–450)
PMV BLD AUTO: 10.7 FL (ref 9.2–12.9)
RBC # BLD AUTO: 3.19 M/UL (ref 4–5.4)
WBC # BLD AUTO: 7.58 K/UL (ref 3.9–12.7)

## 2025-03-10 PROCEDURE — 99900035 HC TECH TIME PER 15 MIN (STAT)

## 2025-03-10 PROCEDURE — 97530 THERAPEUTIC ACTIVITIES: CPT

## 2025-03-10 PROCEDURE — 85025 COMPLETE CBC W/AUTO DIFF WBC: CPT | Performed by: NURSE PRACTITIONER

## 2025-03-10 PROCEDURE — 94640 AIRWAY INHALATION TREATMENT: CPT

## 2025-03-10 PROCEDURE — 25000242 PHARM REV CODE 250 ALT 637 W/ HCPCS: Performed by: INTERNAL MEDICINE

## 2025-03-10 PROCEDURE — 25000003 PHARM REV CODE 250: Performed by: NURSE PRACTITIONER

## 2025-03-10 PROCEDURE — S4991 NICOTINE PATCH NONLEGEND: HCPCS

## 2025-03-10 PROCEDURE — 94761 N-INVAS EAR/PLS OXIMETRY MLT: CPT

## 2025-03-10 PROCEDURE — 12000002 HC ACUTE/MED SURGE SEMI-PRIVATE ROOM

## 2025-03-10 PROCEDURE — 99900031 HC PATIENT EDUCATION (STAT)

## 2025-03-10 PROCEDURE — 25000003 PHARM REV CODE 250

## 2025-03-10 PROCEDURE — 36415 COLL VENOUS BLD VENIPUNCTURE: CPT | Performed by: NURSE PRACTITIONER

## 2025-03-10 PROCEDURE — 97535 SELF CARE MNGMENT TRAINING: CPT

## 2025-03-10 PROCEDURE — 27000221 HC OXYGEN, UP TO 24 HOURS

## 2025-03-10 PROCEDURE — 25000242 PHARM REV CODE 250 ALT 637 W/ HCPCS: Performed by: NURSE PRACTITIONER

## 2025-03-10 RX ORDER — CHLORDIAZEPOXIDE HYDROCHLORIDE 5 MG/1
10 CAPSULE, GELATIN COATED ORAL 3 TIMES DAILY
Status: DISCONTINUED | OUTPATIENT
Start: 2025-03-10 | End: 2025-03-11

## 2025-03-10 RX ORDER — ONDANSETRON HYDROCHLORIDE 2 MG/ML
4 INJECTION, SOLUTION INTRAVENOUS EVERY 4 HOURS PRN
Status: DISCONTINUED | OUTPATIENT
Start: 2025-03-11 | End: 2025-03-18 | Stop reason: HOSPADM

## 2025-03-10 RX ADMIN — MONTELUKAST 10 MG: 10 TABLET, FILM COATED ORAL at 10:03

## 2025-03-10 RX ADMIN — BUSPIRONE HYDROCHLORIDE 15 MG: 5 TABLET ORAL at 10:03

## 2025-03-10 RX ADMIN — CHLORDIAZEPOXIDE HYDROCHLORIDE 10 MG: 5 CAPSULE ORAL at 10:03

## 2025-03-10 RX ADMIN — FLUOXETINE HYDROCHLORIDE 40 MG: 20 CAPSULE ORAL at 09:03

## 2025-03-10 RX ADMIN — FOLIC ACID 1 MG: 1 TABLET ORAL at 09:03

## 2025-03-10 RX ADMIN — CHLORDIAZEPOXIDE HYDROCHLORIDE 10 MG: 5 CAPSULE ORAL at 02:03

## 2025-03-10 RX ADMIN — METHOCARBAMOL TABLETS 500 MG: 500 TABLET, COATED ORAL at 09:03

## 2025-03-10 RX ADMIN — ARFORMOTEROL TARTRATE 15 MCG: 15 SOLUTION RESPIRATORY (INHALATION) at 07:03

## 2025-03-10 RX ADMIN — HYDROCODONE BITARTRATE AND ACETAMINOPHEN 1 TABLET: 5; 325 TABLET ORAL at 11:03

## 2025-03-10 RX ADMIN — IPRATROPIUM BROMIDE 0.5 MG: 0.5 SOLUTION RESPIRATORY (INHALATION) at 07:03

## 2025-03-10 RX ADMIN — ATORVASTATIN CALCIUM 80 MG: 40 TABLET, FILM COATED ORAL at 09:03

## 2025-03-10 RX ADMIN — HYDROCODONE BITARTRATE AND ACETAMINOPHEN 1 TABLET: 10; 325 TABLET ORAL at 10:03

## 2025-03-10 RX ADMIN — BUSPIRONE HYDROCHLORIDE 15 MG: 5 TABLET ORAL at 09:03

## 2025-03-10 RX ADMIN — BUSPIRONE HYDROCHLORIDE 15 MG: 5 TABLET ORAL at 02:03

## 2025-03-10 RX ADMIN — CARVEDILOL 12.5 MG: 12.5 TABLET, FILM COATED ORAL at 10:03

## 2025-03-10 RX ADMIN — CHLORDIAZEPOXIDE HYDROCHLORIDE 10 MG: 5 CAPSULE ORAL at 09:03

## 2025-03-10 RX ADMIN — METHOCARBAMOL TABLETS 500 MG: 500 TABLET, COATED ORAL at 10:03

## 2025-03-10 RX ADMIN — IPRATROPIUM BROMIDE 0.5 MG: 0.5 SOLUTION RESPIRATORY (INHALATION) at 01:03

## 2025-03-10 RX ADMIN — THERA TABS 1 TABLET: TAB at 09:03

## 2025-03-10 RX ADMIN — METHOCARBAMOL TABLETS 500 MG: 500 TABLET, COATED ORAL at 12:03

## 2025-03-10 RX ADMIN — BUDESONIDE INHALATION 0.5 MG: 0.5 SUSPENSION RESPIRATORY (INHALATION) at 07:03

## 2025-03-10 RX ADMIN — NICOTINE 1 PATCH: 21 PATCH, EXTENDED RELEASE TRANSDERMAL at 05:03

## 2025-03-10 RX ADMIN — CARVEDILOL 12.5 MG: 12.5 TABLET, FILM COATED ORAL at 09:03

## 2025-03-10 RX ADMIN — LEVOTHYROXINE SODIUM 50 MCG: 0.03 TABLET ORAL at 05:03

## 2025-03-10 RX ADMIN — THIAMINE HCL TAB 100 MG 100 MG: 100 TAB at 09:03

## 2025-03-10 RX ADMIN — GABAPENTIN 300 MG: 300 CAPSULE ORAL at 09:03

## 2025-03-10 RX ADMIN — IPRATROPIUM BROMIDE 0.5 MG: 0.5 SOLUTION RESPIRATORY (INHALATION) at 06:03

## 2025-03-10 RX ADMIN — GABAPENTIN 300 MG: 300 CAPSULE ORAL at 10:03

## 2025-03-10 RX ADMIN — GABAPENTIN 300 MG: 300 CAPSULE ORAL at 02:03

## 2025-03-10 RX ADMIN — BUDESONIDE INHALATION 0.5 MG: 0.5 SUSPENSION RESPIRATORY (INHALATION) at 06:03

## 2025-03-10 RX ADMIN — POLYETHYLENE GLYCOL 3350 17 G: 17 POWDER, FOR SOLUTION ORAL at 09:03

## 2025-03-10 RX ADMIN — ARFORMOTEROL TARTRATE 15 MCG: 15 SOLUTION RESPIRATORY (INHALATION) at 06:03

## 2025-03-10 RX ADMIN — METHOCARBAMOL TABLETS 500 MG: 500 TABLET, COATED ORAL at 05:03

## 2025-03-10 NOTE — CARE UPDATE
03/09/25 1917   Patient Assessment/Suction   Level of Consciousness (AVPU) alert   Respiratory Effort Normal   Expansion/Accessory Muscles/Retractions no use of accessory muscles   All Lung Fields Breath Sounds clear   Rhythm/Pattern, Respiratory unlabored   Cough Frequency no cough   Skin Integrity   $ Wound Care Tech Time 15 min   Area Observed Left;Right;Behind ear;Cheek;Nares   Skin Appearance without discoloration   PRE-TX-O2   Device (Oxygen Therapy) nasal cannula with humidification   Flow (L/min) (Oxygen Therapy) 3   SpO2 (!) 94 %   Pulse Oximetry Type Intermittent   $ Pulse Oximetry - Multiple Charge Pulse Oximetry - Multiple   Pulse 81   Resp 19   Aerosol Therapy   $ Aerosol Therapy Charges Aerosol Treatment   Daily Review of Necessity (SVN) completed   Respiratory Treatment Status (SVN) given   Treatment Route (SVN) mask   Patient Position semi-Billingsley's   Post Treatment Assessment (SVN) breath sounds unchanged;vital signs unchanged   Signs of Intolerance (SVN) none   Education   $ Education Bronchodilator;15 min   Respiratory Evaluation   $ Care Plan Tech Time 15 min

## 2025-03-10 NOTE — PLAN OF CARE
Attempted to contact Jessika with  [facility] to check status of referral . Awaiting response .     1252 : Per Jessika with  [facility] , Facility can accept patient if family is agreeable . States that [facility] is first preference.     1314 : Contacted [facility] to check status of acceptance. Per Tylor , Updated clinicals needed .     Updated Clinicals sent via [employer] for review at this time . Pending response        03/10/25 8880   Post-Acute Status   Post-Acute Authorization Placement

## 2025-03-10 NOTE — PLAN OF CARE
Rudy at [facility] states she is accepted; I told her anticipate DC on Tuesday    03/10/25 6792   Post-Acute Status   Post-Acute Authorization Placement   Post-Acute Placement Status Pending medical clearance/testing

## 2025-03-10 NOTE — NURSING
Assumed care of patient at 1615. No report received. Patient AAXO4, VSS, NAD noted, Resting in bed comfortably, call light and all personal belongings are within reach. No patient needs at this time. Bed alarm is ON.

## 2025-03-10 NOTE — ASSESSMENT & PLAN NOTE
Hyponatremia is a historical properly acutely worsening here in the hospital.  The patient's most recent sodium results are listed below.  Recent Labs     03/09/25  0632   *     Plan  - Correct the sodium by 4-6mEq in 24 hours.   - sodium is self corrected with holding of the SSRI and diet liberalization   - SSRI resumed 3/7, and evaluating response-stable thus far.

## 2025-03-10 NOTE — SUBJECTIVE & OBJECTIVE
Interval History:  Pt seen and examined.  Upset with me this morning that I am not reordering her Klonopin-will continue to wean Librium down and then switch over to her clonazepam per outpatient use.  She does report pain, but has been relatively controlled thus far.  She is working with PT/OT.  She endorses some constipation, but refused bowel regimen this morning.  This was encouraged.    Review of Systems   Constitutional:  Negative for fever.   Respiratory:  Negative for shortness of breath.    Cardiovascular:  Negative for chest pain.   Gastrointestinal:  Positive for constipation. Negative for nausea and vomiting.   Musculoskeletal:  Positive for back pain.     Objective:     Vital Signs (Most Recent):  Temp: 97.8 °F (36.6 °C) (03/10/25 1148)  Pulse: 87 (03/10/25 1312)  Resp: 18 (03/10/25 1312)  BP: 92/60 (03/10/25 1148)  SpO2: (!) 94 % (03/10/25 1312) Vital Signs (24h Range):  Temp:  [97.5 °F (36.4 °C)-99.5 °F (37.5 °C)] 97.8 °F (36.6 °C)  Pulse:  [63-87] 87  Resp:  [15-19] 18  SpO2:  [92 %-99 %] 94 %  BP: ()/(60-80) 92/60     Weight: 47.1 kg (103 lb 13.4 oz)  Body mass index is 18.99 kg/m².    Intake/Output Summary (Last 24 hours) at 3/10/2025 1416  Last data filed at 3/9/2025 1754  Gross per 24 hour   Intake 240 ml   Output --   Net 240 ml         Physical Exam  Vitals and nursing note reviewed.   Constitutional:       Appearance: Normal appearance.   Cardiovascular:      Rate and Rhythm: Normal rate and regular rhythm.   Pulmonary:      Effort: Pulmonary effort is normal.      Breath sounds: Normal breath sounds.      Comments: Comfortable on NC  Abdominal:      General: Abdomen is flat. Bowel sounds are normal.      Palpations: Abdomen is soft.   Skin:     General: Skin is warm and dry.   Neurological:      General: No focal deficit present.      Mental Status: She is alert.   Psychiatric:         Mood and Affect: Affect is angry.               Significant Labs: All pertinent labs within the past  24 hours have been reviewed.  CBC:   Recent Labs   Lab 03/09/25  0632 03/10/25  0630   WBC 8.14 7.58   HGB 9.1* 9.7*   HCT 29.7* 32.2*    350     CMP:   Recent Labs   Lab 03/09/25  0632   *   K 4.3   CL 93*   CO2 35*   GLU 93   BUN 9   CREATININE 0.7   CALCIUM 8.6*   PROT 6.3   ALBUMIN 2.9*   BILITOT 0.2   ALKPHOS 88   AST 24   ALT 13   ANIONGAP 6*       Significant Imaging: I have reviewed all pertinent imaging results/findings within the past 24 hours.

## 2025-03-10 NOTE — PT/OT/SLP PROGRESS
Physical Therapy Treatment    Patient Name:  Bhavana Lambert   MRN:  [id number]    Recommendations:     Discharge Recommendations: Moderate Intensity Therapy  Discharge Equipment Recommendations: to be determined by next level of care  Barriers to discharge: None    Assessment:     Bhavana Lambert is a 69 y.o. female admitted with a medical diagnosis of T12 burst fracture.  She presents with the following impairments/functional limitations: weakness, impaired endurance, impaired self care skills, impaired functional mobility, impaired balance, decreased upper extremity function, decreased lower extremity function, decreased safety awareness, impaired cardiopulmonary response to activity, orthopedic precautions. Patient sleeping, but upon awakening she is agreeable to participation with PT treatment. She requires Jimi for log rolling right and side lying to sitting transfer. TLSO applied while patient sitting EOB. He requires Jimi for sit to stand with RW. She had a posterior lean while standing requiring Jimi to maintain standing balance with patient mildly tremulous. Her brief was saturated and it was removed by PT. She returned to bed with Jimi for rolling with new brief applied. Bed alarm on, RN notified, and all needs met.     Rehab Prognosis: Fair; patient would benefit from acute skilled PT services to address these deficits and reach maximum level of function.    Recent Surgery: * No surgery found *      Plan:     During this hospitalization, patient to be seen 5 x/week to address the identified rehab impairments via therapeutic activities, therapeutic exercises, neuromuscular re-education and progress toward the following goals:    Plan of Care Expires:  04/06/25    Subjective     Chief Complaint: requests another pillow which PT provided   Patient/Family Comments/goals: SNF  Pain/Comfort:  Pain Rating 1:  (no pain verbalized)      Objective:     Communicated with HALEIGH Gutierrez prior to session.  Patient found HOB  elevated with bed alarm, oxygen, telemetry upon PT entry to room.     General Precautions: Standard, fall, respiratory  Orthopedic Precautions: spinal precautions  Braces: TLSO  Respiratory Status: Nasal cannula, flow 3 L/min     Functional Mobility:  Bed Mobility:     Supine to Sit: minimum assistance  Transfers:     Sit to Stand:  minimum assistance with rolling walker  Gait: 3 side steps to the right with RW, Jimi, and posterior lean with assistance advancing RW      AM-PAC 6 CLICK MOBILITY  Turning over in bed (including adjusting bedclothes, sheets and blankets)?: 3  Sitting down on and standing up from a chair with arms (e.g., wheelchair, bedside commode, etc.): 3  Moving from lying on back to sitting on the side of the bed?: 3  Moving to and from a bed to a chair (including a wheelchair)?: 2  Need to walk in hospital room?: 1  Climbing 3-5 steps with a railing?: 1  Basic Mobility Total Score: 13       Treatment & Education:  Patient was educated on the importance of OOB activity and functional mobility to negate negative effects of prolonged bed rest during hospitalization, safe transfers and ambulation, and D/C planning     Patient left HOB elevated with all lines intact, call button in reach, bed alarm on, and RN notified..    GOALS:   Multidisciplinary Problems       Physical Therapy Goals          Problem: Physical Therapy    Goal Priority Disciplines Outcome Interventions   Physical Therapy Goal     PT, PT/OT Progressing    Description: Goals to be met by: 25     Patient will increase functional independence with mobility by performin. Supine to sit with Standby Assistance  2. Sit to stand with Standby Assistance using Rolling Walker   3. Bed to chair transfer with Standby Assistance using Rolling Walker  4. Sitting at edge of bed x10 minutes with Minimal Assistance                       DME Justifications:  TBD at next level of care    Time Tracking:     PT Received On: 03/10/25  PT Start  Time: 1109     PT Stop Time: 1129  PT Total Time (min): 20 min     Billable Minutes: Therapeutic Activity 20    Treatment Type: Treatment  PT/PTA: PT     Number of PTA visits since last PT visit: 0     03/10/2025

## 2025-03-10 NOTE — ASSESSMENT & PLAN NOTE
MRI shows acute compression fracture of T12 with loss of height of approximately 25%.  There is minimal retropulsion of the posterior cortex measuring 3 mm without central canal stenosis.  Old mild chronic compression fracture of T11.  Multilevel degenerative disc disease and facet hypertrophy with mild central canal stenosis and foraminal narrowing at L2-3, L3-4, and L4-5  Pain control  Neurochecks   Neurosurgery consulted- T XR stable  TLSO brace with mobilization  Repeat XR 2/2 fall  PT/OT- SNF recommending.  CM pursuing

## 2025-03-10 NOTE — CARE UPDATE
03/10/25 0647   Patient Assessment/Suction   Level of Consciousness (AVPU) alert   Respiratory Effort Normal;Unlabored   All Lung Fields Breath Sounds Anterior:;Lateral:;diminished;clear   Rhythm/Pattern, Respiratory unlabored;pattern regular;depth regular   Cough Frequency no cough   Skin Integrity   $ Wound Care Tech Time 15 min   Area Observed Left;Right;Behind ear;Nares   Skin Appearance without discoloration   PRE-TX-O2   Device (Oxygen Therapy) nasal cannula with humidification   $ Is the patient on Low Flow Oxygen? Yes   Flow (L/min) (Oxygen Therapy) 3   SpO2 95 %   Pulse Oximetry Type Intermittent   $ Pulse Oximetry - Multiple Charge Pulse Oximetry - Multiple   Pulse 80   Resp 18   Aerosol Therapy   $ Aerosol Therapy Charges Aerosol Treatment   Daily Review of Necessity (SVN) completed   Respiratory Treatment Status (SVN) given   Treatment Route (SVN) mask;oxygen   Patient Position HOB elevated   Post Treatment Assessment (SVN) breath sounds improved   Signs of Intolerance (SVN) none   Breath Sounds Post-Respiratory Treatment   Throughout All Fields Post-Treatment All Fields   Throughout All Fields Post-Treatment aeration increased   Post-treatment Heart Rate (beats/min) 82   Post-treatment Resp Rate (breaths/min) 18   Education   $ Education Bronchodilator;15 min

## 2025-03-10 NOTE — PROGRESS NOTES
[facility] Medicine  Progress Note    Patient Name: Bhavana Lambert  MRN: [id number]  Patient Class: IP- Inpatient   Admission Date: 3/4/2025  Length of Stay: 6 days  Attending Physician: Theresa Kaufman MD  Primary Care Provider: Pepito Jhaveri IV, MD        Subjective     Principal Problem:T12 burst fracture        HPI:  69-year-old female with PMHx of COPD, HTN, hypothyroidism, prior stroke who presented to the ED with a complaint of lumbar and bilateral hip pain status post fall several days ago. The patient reports that she was wearing socks and she slipped, falling backwards towards a wall and then slid down the wall to a sitting position. She denies loss of consciousness, chest pain, current shortness of breath, dizziness, nausea or vomiting, incontinence of bowel or bladder. She is on plavix at home.     In the ED, /79  RR 20 afebrile and 96% on 3L NC (baseline).     Imaging showed Acute burst fracture of T12. Case was discussed with Dr. Boothe ([facility]) who will consult on the patient. She was given tylenol, morphine, and zofran and stable for transfer, no neuro deficits.     Per chart review from recent admission on February 9th to February 14, 2025, the patient was admitted for COPD exacerbation and did have a history of previous EtOH abuse with subsequent falls.     Overview/Hospital Course:  Bhavana Lambert is 69-year-old female with PMH COPD, HTN, hypothyroidism, prior stroke.  She was admitted to the hospital for management of an acute burst fracture of T12.  Neurosurgery following.  MRI obtained.  X-ray thoracolumbar spine ordered and showed stability.  PT/OT consulted.  TLSO brace applied for activity per neurosurgery recommendation.  Pt developed ETOH withdrawals with delirium on 03/06 and was placed on Librium protocol.  She had worsening hyponatremia prompting further workup though this notably improved with diet liberalization.  Her DTs were much  improved on 03/07 after Librium initiation.  Sodium levels continued to improve.  Pain was controlled with oral narcotics.  Librium was down titrated    Interval History:  Pt seen and examined.  Upset with me this morning that I am not reordering her Klonopin-will continue to wean Librium down and then switch over to her clonazepam per outpatient use.  She does report pain, but has been relatively controlled thus far.  She is working with PT/OT.  She endorses some constipation, but refused bowel regimen this morning.  This was encouraged.    Review of Systems   Constitutional:  Negative for fever.   Respiratory:  Negative for shortness of breath.    Cardiovascular:  Negative for chest pain.   Gastrointestinal:  Positive for constipation. Negative for nausea and vomiting.   Musculoskeletal:  Positive for back pain.     Objective:     Vital Signs (Most Recent):  Temp: 97.8 °F (36.6 °C) (03/10/25 1148)  Pulse: 87 (03/10/25 1312)  Resp: 18 (03/10/25 1312)  BP: 92/60 (03/10/25 1148)  SpO2: (!) 94 % (03/10/25 1312) Vital Signs (24h Range):  Temp:  [97.5 °F (36.4 °C)-99.5 °F (37.5 °C)] 97.8 °F (36.6 °C)  Pulse:  [63-87] 87  Resp:  [15-19] 18  SpO2:  [92 %-99 %] 94 %  BP: ()/(60-80) 92/60     Weight: 47.1 kg (103 lb 13.4 oz)  Body mass index is 18.99 kg/m².    Intake/Output Summary (Last 24 hours) at 3/10/2025 1416  Last data filed at 3/9/2025 1759  Gross per 24 hour   Intake 240 ml   Output --   Net 240 ml         Physical Exam  Vitals and nursing note reviewed.   Constitutional:       Appearance: Normal appearance.   Cardiovascular:      Rate and Rhythm: Normal rate and regular rhythm.   Pulmonary:      Effort: Pulmonary effort is normal.      Breath sounds: Normal breath sounds.      Comments: Comfortable on NC  Abdominal:      General: Abdomen is flat. Bowel sounds are normal.      Palpations: Abdomen is soft.   Skin:     General: Skin is warm and dry.   Neurological:      General: No focal deficit present.       Mental Status: She is alert.   Psychiatric:         Mood and Affect: Affect is angry.               Significant Labs: All pertinent labs within the past 24 hours have been reviewed.  CBC:   Recent Labs   Lab 03/09/25  0632 03/10/25  0630   WBC 8.14 7.58   HGB 9.1* 9.7*   HCT 29.7* 32.2*    350     CMP:   Recent Labs   Lab 03/09/25  0632   *   K 4.3   CL 93*   CO2 35*   GLU 93   BUN 9   CREATININE 0.7   CALCIUM 8.6*   PROT 6.3   ALBUMIN 2.9*   BILITOT 0.2   ALKPHOS 88   AST 24   ALT 13   ANIONGAP 6*       Significant Imaging: I have reviewed all pertinent imaging results/findings within the past 24 hours.      Assessment & Plan  T12 burst fracture  MRI shows acute compression fracture of T12 with loss of height of approximately 25%.  There is minimal retropulsion of the posterior cortex measuring 3 mm without central canal stenosis.  Old mild chronic compression fracture of T11.  Multilevel degenerative disc disease and facet hypertrophy with mild central canal stenosis and foraminal narrowing at L2-3, L3-4, and L4-5  Pain control  Neurochecks   Neurosurgery consulted- T XR stable  TLSO brace with mobilization  Repeat XR 2/2 fall  PT/OT- SNF recommending.  CM pursuing        Hypothyroid  Patient has chronic hypothyroidism. TFTs reviewed-   Lab Results   Component Value Date    TSH 3.155 03/06/2025   . Will continue chronic levothyroxine and adjust for and clinical changes.     Mixed hyperlipidemia  Continue home statin    Tobacco dependency  -nicotine patch  Hyponatremia  Hyponatremia is a historical properly acutely worsening here in the hospital.  The patient's most recent sodium results are listed below.  Recent Labs     03/09/25  0632   *     Plan  - Correct the sodium by 4-6mEq in 24 hours.   - sodium is self corrected with holding of the SSRI and diet liberalization   - SSRI resumed 3/7, and evaluating response-stable thus far.  Alcohol dependence with withdrawal delirium  Pt with confirmed  daily ETOH utilization appeared with DTs on 03/06   -initiated scheduled Librium 3/6 with much improvement.  Will continue to down taper the Librium.  -continue p.r.n. Ativan   -nursing to perform CIWA  -fall, seizure precautions   -daily MVI, folic acid, thiamine    Anemia  Chronic problem.  Stable.  Monitor H/H.    Transfuse for hemodynamic instability and/or H/H <7/21    VTE Risk Mitigation (From admission, onward)           Ordered     IP VTE HIGH RISK PATIENT  Once         03/04/25 1604     Place sequential compression device  Until discontinued         03/04/25 1604     Reason for No Pharmacological VTE Prophylaxis  Once        Question:  Reasons:  Answer:  Physician Provided (leave comment)  Comment:  pending neurosurgery eval    03/04/25 1604                    Discharge Planning   CHLOE:      Code Status: Full Code   Medical Readiness for Discharge Date:   Discharge Plan A: Skilled Nursing Facility                Please place Justification for DME        Nany Sesay NP  Department of Hospital Medicine   [facility]

## 2025-03-10 NOTE — ASSESSMENT & PLAN NOTE
Pt with confirmed daily ETOH utilization appeared with DTs on 03/06   -initiated scheduled Librium 3/6 with much improvement.  Will continue to down taper the Librium.  -continue p.r.n. Ativan   -nursing to perform CIWA  -fall, seizure precautions   -daily MVI, folic acid, thiamine

## 2025-03-11 LAB
ALLENS TEST: ABNORMAL
ALLENS TEST: POSITIVE
ANION GAP SERPL CALC-SCNC: 6 MMOL/L (ref 8–16)
BACTERIA #/AREA URNS HPF: ABNORMAL /HPF
BASOPHILS # BLD AUTO: 0.02 K/UL (ref 0–0.2)
BASOPHILS NFR BLD: 0.2 % (ref 0–1.9)
BILIRUB UR QL STRIP: NEGATIVE
BNP SERPL-MCNC: 354 PG/ML (ref 0–99)
BUN SERPL-MCNC: 10 MG/DL (ref 8–23)
CALCIUM SERPL-MCNC: 9.2 MG/DL (ref 8.7–10.5)
CHLORIDE SERPL-SCNC: 92 MMOL/L (ref 95–110)
CLARITY UR: ABNORMAL
CO2 SERPL-SCNC: 36 MMOL/L (ref 23–29)
COLOR UR: YELLOW
CORRECTED TEMPERATURE (PCO2): 67.9 MMHG
CORRECTED TEMPERATURE (PH): 7.34
CORRECTED TEMPERATURE (PO2): 64.3 MMHG
CREAT SERPL-MCNC: 0.7 MG/DL (ref 0.5–1.4)
DELSYS: ABNORMAL
DIFFERENTIAL METHOD BLD: ABNORMAL
EOSINOPHIL # BLD AUTO: 0.2 K/UL (ref 0–0.5)
EOSINOPHIL NFR BLD: 2 % (ref 0–8)
EP: 6
ERYTHROCYTE [DISTWIDTH] IN BLOOD BY AUTOMATED COUNT: 13.2 % (ref 11.5–14.5)
ERYTHROCYTE [SEDIMENTATION RATE] IN BLOOD BY WESTERGREN METHOD: 20 MM/H
ERYTHROCYTE [SEDIMENTATION RATE] IN BLOOD BY WESTERGREN METHOD: 20 MM/H
EST. GFR  (NO RACE VARIABLE): >60 ML/MIN/1.73 M^2
FIO2: 60 %
FIO2: 70
FLOW: 10
GLUCOSE SERPL-MCNC: 119 MG/DL (ref 70–110)
GLUCOSE SERPL-MCNC: 120 MG/DL (ref 70–110)
GLUCOSE SERPL-MCNC: 121 MG/DL (ref 70–110)
GLUCOSE UR QL STRIP: NEGATIVE
HCO3 UR-SCNC: 36 MMOL/L (ref 24–28)
HCO3 UR-SCNC: 36.3 MMOL/L (ref 24–28)
HCO3 UR-SCNC: 37.7 MMOL/L (ref 24–28)
HCT VFR BLD AUTO: 35 % (ref 37–48.5)
HCT VFR BLD CALC: 30 %PCV (ref 36–54)
HCT VFR BLD CALC: 31 %PCV (ref 36–54)
HGB BLD-MCNC: 10.4 G/DL (ref 12–16)
HGB UR QL STRIP: NEGATIVE
IMM GRANULOCYTES # BLD AUTO: 0.03 K/UL (ref 0–0.04)
IMM GRANULOCYTES NFR BLD AUTO: 0.3 % (ref 0–0.5)
INFLUENZA A, MOLECULAR: NEGATIVE
INFLUENZA B, MOLECULAR: NEGATIVE
IP: 12
KETONES UR QL STRIP: NEGATIVE
LACTATE SERPL-SCNC: 0.7 MMOL/L (ref 0.5–1.9)
LEUKOCYTE ESTERASE UR QL STRIP: ABNORMAL
LPM: 0
LYMPHOCYTES # BLD AUTO: 0.9 K/UL (ref 1–4.8)
LYMPHOCYTES NFR BLD: 9 % (ref 18–48)
Lab: ABNORMAL
MCH RBC QN AUTO: 30.3 PG (ref 27–31)
MCHC RBC AUTO-ENTMCNC: 29.7 G/DL (ref 32–36)
MCV RBC AUTO: 102 FL (ref 82–98)
MICROSCOPIC COMMENT: ABNORMAL
MODE: ABNORMAL
MONOCYTES # BLD AUTO: 0.9 K/UL (ref 0.3–1)
MONOCYTES NFR BLD: 8.5 % (ref 4–15)
NEUTROPHILS # BLD AUTO: 8.2 K/UL (ref 1.8–7.7)
NEUTROPHILS NFR BLD: 80 % (ref 38–73)
NITRITE UR QL STRIP: POSITIVE
NOTIFIED BY: ABNORMAL
NRBC BLD-RTO: 0 /100 WBC
O2DEVICE: ABNORMAL
PCO2 BLDA: 64 MMHG (ref 35–45)
PCO2 BLDA: 67.9 MMHG (ref 35–45)
PCO2 BLDA: 68 MMHG (ref 35–45)
PCO2 BLDA: 68.8 MMHG (ref 35–45)
PH SMN: 7.33 [PH] (ref 7.35–7.45)
PH SMN: 7.34 [PH] (ref 7.35–7.45)
PH SMN: 7.34 [PH] (ref 7.35–7.45)
PH SMN: 7.38 [PH] (ref 7.35–7.45)
PH UR STRIP: 8 [PH] (ref 5–8)
PLATELET # BLD AUTO: 393 K/UL (ref 150–450)
PMV BLD AUTO: 10.5 FL (ref 9.2–12.9)
PO2 BLDA: 48 MMHG (ref 80–100)
PO2 BLDA: 64.3 MMHG (ref 80–100)
PO2 BLDA: 78 MMHG (ref 80–100)
PO2 BLDA: 84 MMHG (ref 80–100)
POC BASE DEFICIT: 11 MMOL/L (ref -2–2)
POC BE: 10 MMOL/L
POC BE: 10 MMOL/L
POC BE: 13 MMOL/L
POC HCO3: 36.8 MMOL/L (ref 24–28)
POC IONIZED CALCIUM: 1.19 MMOL/L (ref 1.06–1.42)
POC IONIZED CALCIUM: 1.21 MMOL/L (ref 1.06–1.42)
POC PERFORMED BY: ABNORMAL
POC SATURATED O2: 79 % (ref 95–100)
POC SATURATED O2: 93.1 % (ref 95–100)
POC SATURATED O2: 94 % (ref 95–100)
POC SATURATED O2: 96 % (ref 95–100)
POC TCO2: 38 MMOL/L (ref 23–27)
POC TCO2: 38 MMOL/L (ref 23–27)
POC TCO2: 40 MMOL/L (ref 23–27)
POC TEMPERATURE: 37 C
POTASSIUM BLD-SCNC: 4 MMOL/L (ref 3.5–5.1)
POTASSIUM BLD-SCNC: 4 MMOL/L (ref 3.5–5.1)
POTASSIUM SERPL-SCNC: 4.2 MMOL/L (ref 3.5–5.1)
PROCALCITONIN SERPL IA-MCNC: 0.59 NG/ML (ref 0–0.5)
PROT UR QL STRIP: ABNORMAL
PROVIDER NOTIFIED: ABNORMAL
RBC # BLD AUTO: 3.43 M/UL (ref 4–5.4)
RBC #/AREA URNS HPF: 2 /HPF (ref 0–4)
SAMPLE: ABNORMAL
SITE: ABNORMAL
SODIUM BLD-SCNC: 131 MMOL/L (ref 136–145)
SODIUM BLD-SCNC: 133 MMOL/L (ref 136–145)
SODIUM SERPL-SCNC: 134 MMOL/L (ref 136–145)
SP GR UR STRIP: 1.02 (ref 1–1.03)
SPECIMEN SOURCE: ABNORMAL
SPECIMEN SOURCE: NORMAL
SQUAMOUS #/AREA URNS HPF: 11 /HPF
URN SPEC COLLECT METH UR: ABNORMAL
UROBILINOGEN UR STRIP-ACNC: NEGATIVE EU/DL
VT: 380
WBC # BLD AUTO: 10.25 K/UL (ref 3.9–12.7)
WBC #/AREA URNS HPF: 41 /HPF (ref 0–5)

## 2025-03-11 PROCEDURE — S4991 NICOTINE PATCH NONLEGEND: HCPCS

## 2025-03-11 PROCEDURE — 99291 CRITICAL CARE FIRST HOUR: CPT | Mod: ,,, | Performed by: INTERNAL MEDICINE

## 2025-03-11 PROCEDURE — 85025 COMPLETE CBC W/AUTO DIFF WBC: CPT | Performed by: NURSE PRACTITIONER

## 2025-03-11 PROCEDURE — 27100171 HC OXYGEN HIGH FLOW UP TO 24 HOURS

## 2025-03-11 PROCEDURE — 25000003 PHARM REV CODE 250: Performed by: NURSE PRACTITIONER

## 2025-03-11 PROCEDURE — 99900035 HC TECH TIME PER 15 MIN (STAT)

## 2025-03-11 PROCEDURE — 87040 BLOOD CULTURE FOR BACTERIA: CPT | Performed by: NURSE PRACTITIONER

## 2025-03-11 PROCEDURE — 27000221 HC OXYGEN, UP TO 24 HOURS

## 2025-03-11 PROCEDURE — 81001 URINALYSIS AUTO W/SCOPE: CPT | Performed by: NURSE PRACTITIONER

## 2025-03-11 PROCEDURE — 84145 PROCALCITONIN (PCT): CPT | Performed by: NURSE PRACTITIONER

## 2025-03-11 PROCEDURE — 94761 N-INVAS EAR/PLS OXIMETRY MLT: CPT

## 2025-03-11 PROCEDURE — 25000003 PHARM REV CODE 250

## 2025-03-11 PROCEDURE — 5A09457 ASSISTANCE WITH RESPIRATORY VENTILATION, 24-96 CONSECUTIVE HOURS, CONTINUOUS POSITIVE AIRWAY PRESSURE: ICD-10-PCS | Performed by: HOSPITALIST

## 2025-03-11 PROCEDURE — 94640 AIRWAY INHALATION TREATMENT: CPT

## 2025-03-11 PROCEDURE — 63600175 PHARM REV CODE 636 W HCPCS: Performed by: HOSPITALIST

## 2025-03-11 PROCEDURE — 99900031 HC PATIENT EDUCATION (STAT)

## 2025-03-11 PROCEDURE — 87147 CULTURE TYPE IMMUNOLOGIC: CPT | Mod: 59 | Performed by: NURSE PRACTITIONER

## 2025-03-11 PROCEDURE — 83605 ASSAY OF LACTIC ACID: CPT | Performed by: NURSE PRACTITIONER

## 2025-03-11 PROCEDURE — 84295 ASSAY OF SERUM SODIUM: CPT

## 2025-03-11 PROCEDURE — 82330 ASSAY OF CALCIUM: CPT

## 2025-03-11 PROCEDURE — 20000000 HC ICU ROOM

## 2025-03-11 PROCEDURE — 25000242 PHARM REV CODE 250 ALT 637 W/ HCPCS: Performed by: HOSPITALIST

## 2025-03-11 PROCEDURE — 80048 BASIC METABOLIC PNL TOTAL CA: CPT | Performed by: NURSE PRACTITIONER

## 2025-03-11 PROCEDURE — 83880 ASSAY OF NATRIURETIC PEPTIDE: CPT | Performed by: NURSE PRACTITIONER

## 2025-03-11 PROCEDURE — 82803 BLOOD GASES ANY COMBINATION: CPT

## 2025-03-11 PROCEDURE — 94660 CPAP INITIATION&MGMT: CPT

## 2025-03-11 PROCEDURE — 84132 ASSAY OF SERUM POTASSIUM: CPT

## 2025-03-11 PROCEDURE — 36415 COLL VENOUS BLD VENIPUNCTURE: CPT | Performed by: NURSE PRACTITIONER

## 2025-03-11 PROCEDURE — 87186 SC STD MICRODIL/AGAR DIL: CPT | Performed by: NURSE PRACTITIONER

## 2025-03-11 PROCEDURE — 25000003 PHARM REV CODE 250: Performed by: HOSPITALIST

## 2025-03-11 PROCEDURE — 87086 URINE CULTURE/COLONY COUNT: CPT | Performed by: NURSE PRACTITIONER

## 2025-03-11 PROCEDURE — 87502 INFLUENZA DNA AMP PROBE: CPT | Performed by: NURSE PRACTITIONER

## 2025-03-11 PROCEDURE — 36600 WITHDRAWAL OF ARTERIAL BLOOD: CPT

## 2025-03-11 PROCEDURE — 85014 HEMATOCRIT: CPT

## 2025-03-11 RX ORDER — IPRATROPIUM BROMIDE 0.5 MG/2.5ML
0.5 SOLUTION RESPIRATORY (INHALATION)
Status: DISCONTINUED | OUTPATIENT
Start: 2025-03-11 | End: 2025-03-18 | Stop reason: HOSPADM

## 2025-03-11 RX ORDER — ARFORMOTEROL TARTRATE 15 UG/2ML
15 SOLUTION RESPIRATORY (INHALATION) 2 TIMES DAILY
Status: DISCONTINUED | OUTPATIENT
Start: 2025-03-11 | End: 2025-03-18 | Stop reason: HOSPADM

## 2025-03-11 RX ORDER — METHOCARBAMOL 500 MG/1
500 TABLET, FILM COATED ORAL 4 TIMES DAILY PRN
Status: DISCONTINUED | OUTPATIENT
Start: 2025-03-11 | End: 2025-03-18 | Stop reason: HOSPADM

## 2025-03-11 RX ORDER — CLONAZEPAM 0.5 MG/1
0.5 TABLET ORAL 2 TIMES DAILY PRN
Status: DISCONTINUED | OUTPATIENT
Start: 2025-03-11 | End: 2025-03-18 | Stop reason: HOSPADM

## 2025-03-11 RX ORDER — SODIUM CHLORIDE 9 MG/ML
INJECTION, SOLUTION INTRAVENOUS CONTINUOUS
Status: DISCONTINUED | OUTPATIENT
Start: 2025-03-11 | End: 2025-03-15

## 2025-03-11 RX ORDER — AZITHROMYCIN 250 MG/1
500 TABLET, FILM COATED ORAL DAILY
Status: DISCONTINUED | OUTPATIENT
Start: 2025-03-11 | End: 2025-03-11

## 2025-03-11 RX ORDER — BUDESONIDE 0.5 MG/2ML
0.5 INHALANT ORAL EVERY 12 HOURS
Status: DISCONTINUED | OUTPATIENT
Start: 2025-03-11 | End: 2025-03-18 | Stop reason: HOSPADM

## 2025-03-11 RX ORDER — MUPIROCIN 20 MG/G
OINTMENT TOPICAL 2 TIMES DAILY
Status: COMPLETED | OUTPATIENT
Start: 2025-03-11 | End: 2025-03-15

## 2025-03-11 RX ORDER — CEFTRIAXONE 1 G/1
1 INJECTION, POWDER, FOR SOLUTION INTRAMUSCULAR; INTRAVENOUS
Status: DISCONTINUED | OUTPATIENT
Start: 2025-03-11 | End: 2025-03-11

## 2025-03-11 RX ORDER — CLONAZEPAM 0.5 MG/1
0.5 TABLET ORAL 2 TIMES DAILY
Status: DISCONTINUED | OUTPATIENT
Start: 2025-03-11 | End: 2025-03-11

## 2025-03-11 RX ORDER — DEXMEDETOMIDINE HYDROCHLORIDE 4 UG/ML
0-1.4 INJECTION, SOLUTION INTRAVENOUS ONCE AS NEEDED
Status: DISCONTINUED | OUTPATIENT
Start: 2025-03-11 | End: 2025-03-15

## 2025-03-11 RX ORDER — FAMOTIDINE 10 MG/ML
20 INJECTION, SOLUTION INTRAVENOUS 2 TIMES DAILY
Status: DISCONTINUED | OUTPATIENT
Start: 2025-03-11 | End: 2025-03-13

## 2025-03-11 RX ORDER — ENOXAPARIN SODIUM 100 MG/ML
30 INJECTION SUBCUTANEOUS EVERY 24 HOURS
Status: DISCONTINUED | OUTPATIENT
Start: 2025-03-11 | End: 2025-03-15

## 2025-03-11 RX ORDER — ENOXAPARIN SODIUM 100 MG/ML
40 INJECTION SUBCUTANEOUS EVERY 24 HOURS
Status: DISCONTINUED | OUTPATIENT
Start: 2025-03-11 | End: 2025-03-11

## 2025-03-11 RX ADMIN — FAMOTIDINE 20 MG: 10 INJECTION, SOLUTION INTRAVENOUS at 09:03

## 2025-03-11 RX ADMIN — CLONAZEPAM 0.5 MG: 0.5 TABLET ORAL at 12:03

## 2025-03-11 RX ADMIN — SODIUM CHLORIDE: 9 INJECTION, SOLUTION INTRAVENOUS at 06:03

## 2025-03-11 RX ADMIN — LEVOTHYROXINE SODIUM 50 MCG: 0.03 TABLET ORAL at 06:03

## 2025-03-11 RX ADMIN — PIPERACILLIN SODIUM AND TAZOBACTAM SODIUM 4.5 G: 4; .5 INJECTION, POWDER, LYOPHILIZED, FOR SOLUTION INTRAVENOUS at 11:03

## 2025-03-11 RX ADMIN — BUSPIRONE HYDROCHLORIDE 15 MG: 5 TABLET ORAL at 11:03

## 2025-03-11 RX ADMIN — THIAMINE HCL TAB 100 MG 100 MG: 100 TAB at 11:03

## 2025-03-11 RX ADMIN — BUDESONIDE INHALATION 0.5 MG: 0.5 SUSPENSION RESPIRATORY (INHALATION) at 06:03

## 2025-03-11 RX ADMIN — MUPIROCIN 1 G: 20 OINTMENT TOPICAL at 09:03

## 2025-03-11 RX ADMIN — SODIUM CHLORIDE 1413 ML: 9 INJECTION, SOLUTION INTRAVENOUS at 08:03

## 2025-03-11 RX ADMIN — BUSPIRONE HYDROCHLORIDE 15 MG: 5 TABLET ORAL at 09:03

## 2025-03-11 RX ADMIN — BUTALBITAL, ACETAMINOPHEN, AND CAFFEINE 1 TABLET: 50; 325; 40 TABLET, COATED ORAL at 12:03

## 2025-03-11 RX ADMIN — MUPIROCIN 1 G: 20 OINTMENT TOPICAL at 11:03

## 2025-03-11 RX ADMIN — IPRATROPIUM BROMIDE 0.5 MG: 0.5 SOLUTION RESPIRATORY (INHALATION) at 06:03

## 2025-03-11 RX ADMIN — FLUOXETINE HYDROCHLORIDE 40 MG: 20 CAPSULE ORAL at 11:03

## 2025-03-11 RX ADMIN — ARFORMOTEROL TARTRATE 15 MCG: 15 SOLUTION RESPIRATORY (INHALATION) at 06:03

## 2025-03-11 RX ADMIN — THERA TABS 1 TABLET: TAB at 11:03

## 2025-03-11 RX ADMIN — POLYETHYLENE GLYCOL 3350 17 G: 17 POWDER, FOR SOLUTION ORAL at 11:03

## 2025-03-11 RX ADMIN — HYDROCODONE BITARTRATE AND ACETAMINOPHEN 1 TABLET: 5; 325 TABLET ORAL at 02:03

## 2025-03-11 RX ADMIN — ATORVASTATIN CALCIUM 80 MG: 40 TABLET, FILM COATED ORAL at 11:03

## 2025-03-11 RX ADMIN — IPRATROPIUM BROMIDE 0.5 MG: 0.5 SOLUTION RESPIRATORY (INHALATION) at 01:03

## 2025-03-11 RX ADMIN — FAMOTIDINE 20 MG: 10 INJECTION, SOLUTION INTRAVENOUS at 11:03

## 2025-03-11 RX ADMIN — PIPERACILLIN SODIUM AND TAZOBACTAM SODIUM 4.5 G: 4; .5 INJECTION, POWDER, LYOPHILIZED, FOR SOLUTION INTRAVENOUS at 05:03

## 2025-03-11 RX ADMIN — FOLIC ACID 1 MG: 1 TABLET ORAL at 11:03

## 2025-03-11 RX ADMIN — GABAPENTIN 300 MG: 300 CAPSULE ORAL at 02:03

## 2025-03-11 RX ADMIN — ENOXAPARIN SODIUM 30 MG: 40 INJECTION SUBCUTANEOUS at 05:03

## 2025-03-11 RX ADMIN — MONTELUKAST 10 MG: 10 TABLET, FILM COATED ORAL at 09:03

## 2025-03-11 RX ADMIN — NICOTINE 1 PATCH: 21 PATCH, EXTENDED RELEASE TRANSDERMAL at 05:03

## 2025-03-11 RX ADMIN — GABAPENTIN 300 MG: 300 CAPSULE ORAL at 09:03

## 2025-03-11 NOTE — ASSESSMENT & PLAN NOTE
Patient has a diagnosis of pneumonia. The cause of the pneumonia is bacterial in etiology due to Gram negative pathogens associated with aspiration. The pneumonia is acute. The patient has the following signs/symptoms of pneumonia: persistent hypoxia , cough, and shortness of breath. The patient does have a current oxygen requirement and the patient does have a home oxygen requirement. I have reviewed the pertinent imaging. The following cultures have been collected: Blood cultures and Sputum culture The culture results are listed below.     Current antimicrobial regimen consists of the antibiotics listed below. Will monitor patient closely and continue current treatment plan unchanged.    Antibiotics (From admission, onward)      Start     Stop Route Frequency Ordered    03/11/25 1215  mupirocin 2 % ointment         03/16/25 0859 Nasl 2 times daily 03/11/25 1114    03/11/25 1030  piperacillin-tazobactam (ZOSYN) 4.5 g in D5W 100 mL IVPB (MB+)         -- IV Every 8 hours (non-standard times) 03/11/25 0927            Microbiology Results (last 7 days)       Procedure Component Value Units Date/Time    Influenza A & B by Molecular [[id number]] Collected: 03/11/25 1110    Order Status: Sent Specimen: Nasopharyngeal Swab     Culture, Respiratory with Gram Stain [[id number]]     Order Status: Sent Specimen: Respiratory from Sputum, Expectorated     Blood culture [[id number]] Collected: 03/11/25 0944    Order Status: Sent Specimen: Blood from Peripheral, Hand, Right Updated: 03/11/25 1007    Blood culture [[id number]] Collected: 03/11/25 0944    Order Status: Sent Specimen: Blood from Peripheral, Antecubital, Right Updated: 03/11/25 1007

## 2025-03-11 NOTE — ASSESSMENT & PLAN NOTE
Hyponatremia is a historical properly acutely worsening here in the hospital.  The patient's most recent sodium results are listed below.  Recent Labs     03/09/25  0632 03/11/25  0632   * 134*     Plan  - Correct the sodium by 4-6mEq in 24 hours.   - sodium is self corrected with holding of the SSRI and diet liberalization   - SSRI resumed 3/7, and evaluating response-stable thus far.

## 2025-03-11 NOTE — PT/OT/SLP PROGRESS
Occupational Therapy   Treatment    Name: Bhavana Lambert  MRN: [id number]  Admitting Diagnosis:  T12 burst fracture     Diagnoses of Burst fracture of thoracic vertebra and Chest pain were pertinent to this visit.    Recommendations:     Discharge Recommendations: Moderate Intensity Therapy  Discharge Equipment Recommendations:  to be determined by next level of care  Barriers to discharge:   (increased level of assist)    Assessment:     Bhavana Lambert is a 69 y.o. female with a medical diagnosis of T12 burst fracture.  She presents with  Performance deficits affecting function are weakness, impaired endurance, impaired self care skills, impaired functional mobility, gait instability, impaired balance, decreased upper extremity function, decreased lower extremity function, pain, decreased safety awareness, impaired cardiopulmonary response to activity, orthopedic precautions.     Pt sat 3 hrs up in BS chair. Min A stand step t/f bed<>chair w/ HHA. TLSO brace donned/doffed Max a seated EOB. Feeding setup seated BS chair. G/hygiene SBA to brush teeth seated EOB.    Rehab Prognosis:  Good; patient would benefit from acute skilled OT services to address these deficits and reach maximum level of function.       Plan:     Patient to be seen 5 x/week to address the above listed problems via self-care/home management, therapeutic activities, therapeutic exercises  Plan of Care Expires: 04/07/25  Plan of Care Reviewed with: patient    Subjective     Chief Complaint: back  pain  Patient/Family Comments/goals: pt willing to sit UIC.  Pain/Comfort:  Pain Rating 1: 7/10  Location - Side 1: Bilateral  Location - Orientation 1: lower  Location 1: back  Pain Addressed 1: Pre-medicate for activity, Reposition, Distraction, Cessation of Activity  Pain Rating Post-Intervention 1: 7/10    Objective:     Communicated with: nurse prior to session.  Patient found HOB elevated with bed alarm, oxygen, telemetry upon OT entry to  room.    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     Activities of Daily Living:  Feeding:  setup seated BS chair.   Grooming: stand by assistance to wash face and brush teeth seated EOB w/ TLSO brace on  Upper Body Dressing: maximal assistance to don TLSO brace seated EOB    Treatment & Education:  Pt seen for safe self care activities and fx mobility retraining as stated above.  All questions/concerns addressed within scope.  Call staff for BTB/OOB assist. Call bell use explained. Pt acknowledged.  Purpose of OT and POC  Impt of OOB activity and sitting UIC explained to pt to prevent negative effects of prolonged bed rest. Pt verbalized understanding and agreeable to sit UIC.   Pt assisted BTB after 3 hrs, Min A sit<>stand, Min A stand step tf w/ HHA.      Patient left HOB elevated with all lines intact, call button in reach, bed alarm on, and nurse notified    GOALS:   Multidisciplinary Problems       Occupational Therapy Goals          Problem: Occupational Therapy    Goal Priority Disciplines Outcome Interventions   Occupational Therapy Goal     OT, PT/OT     Description: Goals to be met by: 4/7/25     Patient will increase  functional independence with ADLs by performing:    UE Dressing with Supervision.  LE Dressing with Contact Guard Assistance.  Grooming while seated at sink with Supervision.  Toileting from bedside commode with Supervision for hygiene and clothing management.   Toilet transfer to bedside commode with Contact Guard Assistance.                         DME Justifications:  No DME recommended requiring DME justifications    Time Tracking:     OT Date of Treatment: 03/10/25  OT Start Time: 1348 (1458)(1657)  OT Stop Time: 1420 (1504)(1703)  OT Total Time (min): 32 min+ 6+6=44    Billable Minutes:Self Care/Home Management 20  Therapeutic Activity 24  Total Time 44    OT/ANDREW: OT          3/10/2025

## 2025-03-11 NOTE — PROGRESS NOTES
[facility] Medicine  Progress Note    Patient Name: Bhavana Lambert  MRN: [id number]  Patient Class: IP- Inpatient   Admission Date: 3/4/2025  Length of Stay: 7 days  Attending Physician: Theresa Kaufman MD  Primary Care Provider: Pepito Jhaveri IV, MD        Subjective     Principal Problem:T12 burst fracture        HPI:  69-year-old female with PMHx of COPD, HTN, hypothyroidism, prior stroke who presented to the ED with a complaint of lumbar and bilateral hip pain status post fall several days ago. The patient reports that she was wearing socks and she slipped, falling backwards towards a wall and then slid down the wall to a sitting position. She denies loss of consciousness, chest pain, current shortness of breath, dizziness, nausea or vomiting, incontinence of bowel or bladder. She is on plavix at home.     In the ED, /79  RR 20 afebrile and 96% on 3L NC (baseline).     Imaging showed Acute burst fracture of T12. [place] was [place] [place] the patient. She was given tylenol, morphine, and zofran and stable for transfer, no neuro deficits.     Per chart review from recent admission on February 9th to February 14, 2025, the patient was admitted for COPD exacerbation and did have a history of previous EtOH abuse with subsequent falls.     Overview/Hospital Course:  Bhavana Lambert is 69-year-old female with PMH COPD, HTN, hypothyroidism, prior stroke.  She was admitted to the hospital for management of an acute burst fracture of T12.  Neurosurgery following.  MRI obtained.  X-ray thoracolumbar spine ordered and showed stability.  PT/OT consulted.  TLSO brace applied for activity per neurosurgery recommendation.  Pt developed ETOH withdrawals with delirium on 03/06 and was placed on Librium protocol.  She had worsening hyponatremia prompting further workup though this notably improved with diet liberalization.  Her DTs were much  improved on 03/07 after Librium initiation.  Sodium levels continued to improve.  Pain was controlled with oral narcotics.  Librium was down titrated.  On 03/11 she developed increased O2 requirements with low-grade temp 100.5°.  CXR revealed acute on chronic changes of patchy infiltrates.  She reported reflux overnight-concerning clinically for aspiration.  She was placed on BiPAP after ABG revealed hypercapnia.  She was transferred to ICU for continued BiPAP therapy and pulmonology was consulted.  She was placed on IV Abx and sepsis workup was pursued.    Interval History:  Pt seen and examined.  Increased oxygen need this morning up to 10 L with low-grade temp of 100.5°.  This is workup initiated, CXR ordered.  ABG done revealing for some hypercapnia.  Pt was placed on BiPAP and transferred to ICU after communication with pulmonology.  I updated her daughter, Margarito.  Reports Hx of same.    Review of Systems   Constitutional:  Positive for fever.   Respiratory:  Positive for cough and shortness of breath.    Cardiovascular:  Negative for chest pain.   Gastrointestinal:  Negative for nausea and vomiting.     Objective:     Vital Signs (Most Recent):  Temp: (!) 100.5 °F (38.1 °C) (03/11/25 0739)  Pulse: 87 (03/11/25 0925)  Resp: 20 (03/11/25 0925)  BP: 119/65 (03/11/25 0739)  SpO2: (!) 92 % (03/11/25 0925) Vital Signs (24h Range):  Temp:  [97.8 °F (36.6 °C)-100.5 °F (38.1 °C)] 100.5 °F (38.1 °C)  Pulse:  [] 87  Resp:  [16-20] 20  SpO2:  [90 %-100 %] 92 %  BP: ()/(60-75) 119/65     Weight: 47.1 kg (103 lb 13.4 oz)  Body mass index is 18.99 kg/m².    Intake/Output Summary (Last 24 hours) at 3/11/2025 1110  Last data filed at 3/11/2025 1036  Gross per 24 hour   Intake 340 ml   Output 2 ml   Net 338 ml         Physical Exam  Vitals and nursing note reviewed.   Constitutional:       Appearance: Normal appearance.   Cardiovascular:      Rate and Rhythm: Normal rate and regular rhythm.   Pulmonary:       Comments: Increased work of breathing on 10 L nasal cannula with rhonchus breath sounds  Abdominal:      General: Abdomen is flat. Bowel sounds are normal.      Palpations: Abdomen is soft.   Musculoskeletal:         General: Normal range of motion.   Neurological:      Mental Status: She is alert.      Comments: Alert, oriented to person, place, situation.  Though speech is less intelligible this morning was yesterday when she was completely at her mental baseline and communicative               Significant Labs: All pertinent labs within the past 24 hours have been reviewed.  CBC:   Recent Labs   Lab 03/10/25  0630 03/11/25  0632 03/11/25  0902 03/11/25  1028   WBC 7.58 10.25  --   --    HGB 9.7* 10.4*  --   --    HCT 32.2* 35.0* 31* 30*    393  --   --      CMP:   Recent Labs   Lab 03/11/25  0632   *   K 4.2   CL 92*   CO2 36*   *   BUN 10   CREATININE 0.7   CALCIUM 9.2   ANIONGAP 6*       Significant Imaging: I have reviewed all pertinent imaging results/findings within the past 24 hours.      Assessment & Plan  T12 burst fracture  MRI shows acute compression fracture of T12 with loss of height of approximately 25%.  There is minimal retropulsion of the posterior cortex measuring 3 mm without central canal stenosis.  Old mild chronic compression fracture of T11.  Multilevel degenerative disc disease and facet hypertrophy with mild central canal stenosis and foraminal narrowing at L2-3, L3-4, and L4-5  Pain control  Neurochecks   Neurosurgery consulted- T XR stable  TLSO brace with mobilization  Repeat XR 2/2 fall  PT/OT- SNF recommending.  CM pursuing        Hypothyroid  Patient has chronic hypothyroidism. TFTs reviewed-   Lab Results   Component Value Date    TSH 3.155 03/06/2025   . Will continue chronic levothyroxine and adjust for and clinical changes.     Mixed hyperlipidemia  Continue home statin    Tobacco dependency  -nicotine patch  Hyponatremia  Hyponatremia is a historical properly  acutely worsening here in the hospital.  The patient's most recent sodium results are listed below.  Recent Labs     03/09/25  0632 03/11/25  0632   * 134*     Plan  - Correct the sodium by 4-6mEq in 24 hours.   - sodium is self corrected with holding of the SSRI and diet liberalization   - SSRI resumed 3/7, and evaluating response-stable thus far.  Alcohol dependence with withdrawal delirium  Pt with confirmed daily ETOH utilization appeared with DTs on 03/06   -initiated scheduled Librium 3/6 with much improvement.  Will continue to down taper the Librium.  -continue p.r.n. Ativan   -nursing to perform CIWA  -fall, seizure precautions   -daily MVI, folic acid, thiamine    Anemia  Chronic problem.  Stable.  Monitor H/H.    Transfuse for hemodynamic instability and/or H/H <7/21    Aspiration pneumonia of both lungs  Patient has a diagnosis of pneumonia. The cause of the pneumonia is bacterial in etiology due to Gram negative pathogens associated with aspiration . The pneumonia is  acute . The patient has the following signs/symptoms of pneumonia: persistent hypoxia , cough, and shortness of breath. The patient does have a current oxygen requirement and the patient does have a home oxygen requirement. I have reviewed the pertinent imaging. The following cultures have been collected: Blood cultures and Sputum culture The culture results are listed below.     Current antimicrobial regimen consists of the antibiotics listed below. Will monitor patient closely and continue current treatment plan unchanged.    Antibiotics (From admission, onward)      Start     Stop Route Frequency Ordered    03/11/25 1215  mupirocin 2 % ointment         03/16/25 0859 Nasl 2 times daily 03/11/25 1114    03/11/25 1030  piperacillin-tazobactam (ZOSYN) 4.5 g in D5W 100 mL IVPB (MB+)         -- IV Every 8 hours (non-standard times) 03/11/25 0927            Microbiology Results (last 7 days)       Procedure Component Value Units  Date/Time    Influenza A & B by Molecular [[id number]] Collected: 03/11/25 1110    Order Status: Sent Specimen: Nasopharyngeal Swab     Culture, Respiratory with Gram Stain [[id number]]     Order Status: Sent Specimen: Respiratory from Sputum, Expectorated     Blood culture [[id number]] Collected: 03/11/25 0944    Order Status: Sent Specimen: Blood from Peripheral, Hand, Right Updated: 03/11/25 1007    Blood culture [[id number]] Collected: 03/11/25 0944    Order Status: Sent Specimen: Blood from Peripheral, Antecubital, Right Updated: 03/11/25 1007          Acute on chronic respiratory failure with hypoxia and hypercapnia  Patient with Hypercapnic and Hypoxic Respiratory failure which is Acute on chronic.  she is on home oxygen at 3.5 LPM. Supplemental oxygen was provided and noted- Oxygen Concentration (%):  [70] 70    .   Signs/symptoms of respiratory failure include- tachypnea and increased work of breathing. Contributing diagnoses includes - Aspiration, COPD, Interstitial lung disease, and Pneumonia Labs and images were reviewed. Patient Has recent ABG, which has been reviewed. Will treat underlying causes and adjust management of respiratory failure as follows-   -continue BiPAP   -continue IV Abx   -pulmonary consulted  VTE Risk Mitigation (From admission, onward)           Ordered     enoxaparin injection 30 mg  Daily         03/11/25 1130     IP VTE HIGH RISK PATIENT  Once         03/04/25 1604     Place sequential compression device  Until discontinued         03/04/25 1604     Reason for No Pharmacological VTE Prophylaxis  Once        Question:  Reasons:  Answer:  Physician Provided (leave comment)  Comment:  pending neurosurgery eval    03/04/25 1604                    Discharge Planning   CHLOE:      Code Status: Full Code   Medical Readiness for Discharge Date:   Discharge Plan A: Skilled Nursing Facility            Critical care time spent on the evaluation and treatment of severe organ dysfunction,  review of pertinent labs and imaging studies, discussions with consulting providers and discussions with patient/family: 35 minutes.    Please place Justification for DME        Nany Sesay NP  Department of Hospital Medicine   [facility]

## 2025-03-11 NOTE — ASSESSMENT & PLAN NOTE
Patient with Hypercapnic and Hypoxic Respiratory failure which is Acute on chronic.  she is on home oxygen at 3.5 LPM. Supplemental oxygen was provided and noted- Oxygen Concentration (%):  [70] 70    .   Signs/symptoms of respiratory failure include- tachypnea and increased work of breathing. Contributing diagnoses includes - Aspiration, COPD, Interstitial lung disease, and Pneumonia Labs and images were reviewed. Patient Has recent ABG, which has been reviewed. Will treat underlying causes and adjust management of respiratory failure as follows-   -continue BiPAP   -continue IV Abx   -pulmonary consulted

## 2025-03-11 NOTE — CARE UPDATE
03/10/25 1905   Patient Assessment/Suction   Level of Consciousness (AVPU) alert   Respiratory Effort Normal   Expansion/Accessory Muscles/Retractions no use of accessory muscles   All Lung Fields Breath Sounds clear   Rhythm/Pattern, Respiratory unlabored   Cough Frequency no cough   Skin Integrity   $ Wound Care Tech Time 15 min   Area Observed Left;Right;Behind ear;Cheek;Nares   Skin Appearance without discoloration   PRE-TX-O2   Device (Oxygen Therapy) nasal cannula with humidification   Flow (L/min) (Oxygen Therapy) 3   SpO2 97 %   Pulse Oximetry Type Intermittent   $ Pulse Oximetry - Multiple Charge Pulse Oximetry - Multiple   Pulse 80   Resp 19   Aerosol Therapy   $ Aerosol Therapy Charges Aerosol Treatment   Daily Review of Necessity (SVN) completed   Respiratory Treatment Status (SVN) given   Treatment Route (SVN) mask   Patient Position semi-Billingsley's   Post Treatment Assessment (SVN) breath sounds unchanged;vital signs unchanged   Signs of Intolerance (SVN) none   Education   $ Education Bronchodilator;15 min   Respiratory Evaluation   $ Care Plan Tech Time 15 min

## 2025-03-11 NOTE — CARE UPDATE
03/11/25 0725   PRE-TX-O2   Device (Oxygen Therapy) high flow mask   Flow (L/min) (Oxygen Therapy) 10   SpO2 (!) 91 %     Pt sats were 80% when I can to do breathing tx. I increased o2 to 6lnc and sats were still only 83%. I called nurse and placed pt on 10 oxymask. Pt sats are still only 91% on 10oxymask.

## 2025-03-11 NOTE — NURSING
Pt having increased oxygen requirements. Respiratory at that bedside. Pt put on 10L oxygen mask.   0930: ABG obtained. See results review. Pt placed on BiPAP 12/5 50%. Orders placed to transfer to ICU. PO meds held.   1050: report called to HALEIGH Ley. Pt going to 2201.  1100: pt transferred to 2201 on BiPAP.

## 2025-03-11 NOTE — NURSING
Pt received to room 2201 from 3100 wing. Pt currently on continuous BiPap resting comfortably. Arouses easily and answers questions appropriately. Brief and linens changed. Pictures of heels taken on arrival. No other altered skin integrity noted. See below. Wound care consult placed.

## 2025-03-11 NOTE — PT/OT/SLP PROGRESS
From: Bandar Israel  To: Eileen Pendleton  Sent: 5/19/2023 1:45 AM CDT  Subject: Shoot pain in my face    Hi I believe I spoke to fast today at my appointment because right now it’s 1:37am and I’ve been up for quite some time with the shooting pain in my jaw. It’s all in the same places and feels like and electric shock going through my face. I can’t sleep and am in a lot of pain. If this keeps up do I just go back to the Emergency Room? Neurology never even called me back the other day when I left a message.     Thank you, Bandar Israel   Physical Therapy      Patient Name:  Bhavana Lambert   MRN:  [id number]    Patient not seen today secondary to patient with increased oxygen needs from 3L up to 10L and low grade fever. She is pending transfer to ICU. Will follow-up 03/12/25.

## 2025-03-11 NOTE — PLAN OF CARE
JUAN followed up with Rudy at at [facility], and informed her of pt not being medically cleared to discharge. Per Rudy when pt is cleared, she will need Orders, Med rec, and Scripts for any narcotics. Sent forms to JUAN email to fill out.    03/11/25 4096   Post-Acute Status   Post-Acute Authorization Placement   Post-Acute Placement Status Pending medical clearance/testing   Discharge Plan   Discharge Plan A Skilled Nursing Facility   Discharge Plan B Skilled Nursing Facility

## 2025-03-11 NOTE — PT/OT/SLP PROGRESS
Occupational Therapy      Patient Name:  Bhavana Lambert   MRN:  [id number]    Patient not seen today secondary to Therapist assessment (Pt requiring increased O2, now on BIPAP and will  transfer to ICU due to hypoxia.). Will follow-up 3/12/2025.    3/11/2025

## 2025-03-11 NOTE — PLAN OF CARE
Discharge orders and chart reviewed with no further post-acute discharge needs identified at this time.  At this time, patient is cleared for discharge from Case Management standpoint.     ***

## 2025-03-11 NOTE — CONSULTS
03/11/2025      Admit Date: 3/4/2025  Bhavana Lambert  New Patient Consult    No chief complaint on file.      History of Present Illness:  Pt is a 70 yo female with COPD, HTN, hypothyroidism who presented to the hospital due to a fall, T12 burst fracture. She has developed progressive respiratory failure and today requiring bipap and planned to transfer to the ICU. Pulmonary consulted regarding respiratory failure management. Pt is incoherent and unable to give solid history but apparently has a hx of EtOH abuse and smoking. She does endorse spitting up mucous recently. She had been getting librium, robaxin and PRN hydrocodone 5mg, last doses yesterday PM. These have been held. Antibiotics have been started for possible respiratory infection. She was hospitalized last month with COPD exacerbation, resp failure and aspiration pneumonia. Seen by Ila Perez for f/u visit in pulm clinic on 2/24/25. She uses home O2 at 3.5L.      PFSH:  Past Medical History:   Diagnosis Date    Acquired hypothyroidism     Anxiety and depression     Cervical radicular pain     Closed left ankle fracture     COPD (chronic obstructive pulmonary disease)     Heavy cigarette smoker     She has smoked 1/2 PPD since the age of 12, and still smokes    Hypertension     Hyponatremia     Multiple rib fractures     NSTEMI (non-ST elevated myocardial infarction)     Requires continuous at home supplemental oxygen     She is on 3.5L NC continuous    Stroke 11/08/2023    Tension type headache     Traumatic closed displaced fracture of distal end of radius, left, sequela      Past Surgical History:   Procedure Laterality Date    ABDOMINAL SURGERY      COLON SURGERY      COLOSTOMY      COLOSTOMY CLOSURE      HYSTERECTOMY      LEG SURGERY Right     OPEN REDUCTION AND INTERNAL FIXATION (ORIF) OF INJURY OF WRIST Left 1/29/2019    Procedure: ORIF, WRIST;  Surgeon: Homero Jeff DO;  Location: [facility] OR;  Service: Orthopedics;  Laterality: Left;   "Equipment: [employer] Geminus Wrist Plate Set  Vendor/Rep: [employer]  C-Arm: Entire  DME: None    REQUIRES ASSISTANT    WRIST SURGERY Right      Social History[1]  Family History   Problem Relation Name Age of Onset    Cancer Mother      COPD Father      Cirrhosis Father      Arthritis Sister      No Known Problems Brother      Anxiety disorder Daughter      Depression Daughter      Anxiety disorder Daughter      Congenital heart disease Daughter      Valvular heart disease Daughter       Review of patient's allergies indicates:  No Known Allergies    Performance Status:Performance Status:The patient's activity level is was not done.    Review of Systems:  due to neurologic status/impairments a Review of Systems could not be obtained       Exam:Comprehensive exam done. /65   Pulse 87   Temp (!) 100.5 °F (38.1 °C) (Oral)   Resp 20   Ht 5' 2" (1.575 m)   Wt 47.1 kg (103 lb 13.4 oz)   SpO2 (!) 92%   BMI 18.99 kg/m²   Exam included Vitals as listed, and patient's appearance and affect and alertness and mood, oral exam for yeast and hygiene and pharynx lesions and Mallapatti (M) score, neck with inspection for jvd and masses and thyroid abnormalities and lymph nodes (supraclavicular and infraclavicular nodes also examined and noted if abn), chest exam included symmetry and effort and fremitus and percussion and auscultation, cardiac exam included rhythm and gallops and murmur and rubs and jvd and edema, abdominal exam for mass and hepatosplenomegaly and tenderness and hernias and bowel sounds, Musculoskeletal exam with muscle tone and posture and mobility/gait and  strenght, and skin for rashes and cyanosis and pallor and turgor, extremity for clubbing.  Findings were normal except as listed below:  Drowsy, arousable, A&Ox2  On bipap  PERRL  Brown sputum on gown  HR regular  Crackles R lower lung, clear on left  Abd soft nontender, BS+  No edema/clubbing  Follows commands in all " extremities  Chronically ill appearing and with muscle wasting      Radiographs reviewed: view by direct vision     CXR 3/11/25- unchanged chronic interstitial changes bilaterally  CT chest 2/9/25- emphysema and R lateral and basilar consolidations      Labs     Recent Labs   Lab 03/11/25 0632 03/11/25 0902   WBC 10.25  --    HGB 10.4*  --    HCT 35.0* 31*     --      Recent Labs   Lab 03/11/25 0632   *   K 4.2   CL 92*   CO2 36*   BUN 10   CREATININE 0.7   *   CALCIUM 9.2     Recent Labs   Lab 03/11/25  0902   PH 7.336*   PCO2 68.0*   PO2 48*   HCO3 36.3*     Microbiology Results (last 7 days)       Procedure Component Value Units Date/Time    Blood culture [[id number]] Collected: 03/11/25 0944    Order Status: Sent Specimen: Blood from Peripheral, Hand, Right Updated: 03/11/25 1007    Blood culture [[id number]] Collected: 03/11/25 0944    Order Status: Sent Specimen: Blood from Peripheral, Antecubital, Right Updated: 03/11/25 1007    Influenza A & B by Molecular [[id number]]     Order Status: No result Specimen: Nasopharyngeal Swab             Impression:  Active Hospital Problems    Diagnosis  POA    *T12 burst fracture [S22.081A]  Yes    Anemia [D64.9]  Yes    Alcohol dependence with withdrawal delirium [F10.231]  No    Tobacco dependency [F17.200]  Yes    Acute on chronic respiratory failure with hypoxia and hypercapnia [J96.21, J96.22]  Yes    Mixed hyperlipidemia [E78.2]  Yes    Hyponatremia [E87.1]  Yes    Hypothyroid [E03.9]  Yes      Resolved Hospital Problems   No resolved problems to display.               Plan:       - transfer to ICU  - continue BIPAP, repeat ABG in 1 hr  - hyponatremia, chronic- follow  - suspect ongoing EtOH withdrawal, will avoid oversedation and use precedex if needed  - f/u cultures and continue zosyn  - febrile today, will check flu swab, procalcitonin, sputum culture  - continue inhaled bronchodilators  - CXR repeat in am  - per nsg non operative  management for T12 burst fracture recommended with TLSO brace      The following were evaluated and adjusted as needed: mechanical ventilator settings and weaning status, sedation and neurologic status, antibiotics, and acid base balance and oxygenation needs       Critical Care  - THE PATIENT HAS A HIGH PROBABILITY OF IMMINENT OR LIFE THREATENING DETERIORATION.  Over 50%time of encounter was in direct care at bedside.  Time was 30 to 74 minutes required for patient care.  Time needed for all of the above totaled 31 minutes.    Tammy Anand MD  Pulmonary & Critical Care Medicine           [1]   Social History  Tobacco Use    Smoking status: Every Day     Current packs/day: 1.00     Average packs/day: 1 pack/day for 20.0 years (20.0 ttl pk-yrs)     Types: Cigarettes    Smokeless tobacco: Never   Substance Use Topics    Alcohol use: Yes     Comment: couple times a week-wine or bloody cari    Drug use: No

## 2025-03-11 NOTE — CONSULTS
[facility]  Wound Care    Patient Name:  Bhavana Lambert   MRN:  [id number]  Date: 3/11/2025  Diagnosis: T12 burst fracture    History:     Past Medical History:   Diagnosis Date    Acquired hypothyroidism     Anxiety and depression     Cervical radicular pain     Closed left ankle fracture     COPD (chronic obstructive pulmonary disease)     Heavy cigarette smoker     She has smoked 1/2 PPD since the age of 12, and still smokes    Hypertension     Hyponatremia     Multiple rib fractures     NSTEMI (non-ST elevated myocardial infarction)     Requires continuous at home supplemental oxygen     She is on 3.5L NC continuous    Stroke 11/08/2023    Tension type headache     Traumatic closed displaced fracture of distal end of radius, left, sequela        Social History[1]    Precautions:     Allergies as of 03/04/2025    (No Known Allergies)       WOC Assessment Details/Treatment     Patient noted with non blanchable redness to bilateral posterior heels. Skin to feet is dry and flaky. Offloaded heels with pillows. Recommend offloading heels with quilted heel boots while in bed. Plan of care discussed with patients nurse. Wound care team to continue to follow up throughout hospital stay.        03/11/25 1415   WOCN Assessment   WOCN Total Time (mins) 15   Visit Date 03/11/25   Visit Time 1350   Consult Type New   WOCN Speciality Wound   Wound pressure   Intervention assessed;applied;chart review;coordination of care;team conference;orders   Teaching on-going        Wound 03/11/25 1100 Pressure Injury Left posterior Heel #1   Date First Assessed/Time First Assessed: 03/11/25 1100   Present on Original Admission: No  Primary Wound Type: Pressure Injury  Side: Left  Orientation: posterior  Location: Heel  Wound Number: #1   Wound Image    Pressure Injury Stage 1   Dressing Appearance Open to air   Drainage Amount None   Appearance Red   Care Cleansed with:;Antimicrobial agent;Soap and water   Off Loading  Pillow        Wound 03/11/25 1100 Pressure Injury Right Heel #2   Date First Assessed/Time First Assessed: 03/11/25 1100   Present on Original Admission: No  Primary Wound Type: Pressure Injury  Side: Right  Location: Heel  Wound Number: #2   Wound Image    Pressure Injury Stage 1   Dressing Appearance Open to air   Drainage Amount None   Appearance Red   Care Cleansed with:;Antimicrobial agent;Soap and water   Off Loading Pillow       Recommendations made to primary team for Keep heels offloaded from bed at all times.     03/11/2025         [1]   Social History  Socioeconomic History    Marital status:    Tobacco Use    Smoking status: Every Day     Current packs/day: 1.00     Average packs/day: 1 pack/day for 20.0 years (20.0 ttl pk-yrs)     Types: Cigarettes    Smokeless tobacco: Never   Substance and Sexual Activity    Alcohol use: Yes     Comment: couple times a week-wine or bloody cari    Drug use: No    Sexual activity: Not Currently   Social History Narrative    ** Merged History Encounter **          Social Drivers of Health     Financial Resource Strain: Patient Declined (3/4/2025)    Overall Financial Resource Strain (CARDIA)     Difficulty of Paying Living Expenses: Patient declined   Food Insecurity: Patient Declined (3/4/2025)    Hunger Vital Sign     Worried About Running Out of Food in the Last Year: Patient declined     Ran Out of Food in the Last Year: Patient declined   Recent Concern: Food Insecurity - Food Insecurity Present (2/10/2025)    Hunger Vital Sign     Worried About Running Out of Food in the Last Year: Sometimes true     Ran Out of Food in the Last Year: Sometimes true   Transportation Needs: No Transportation Needs (2/10/2025)    TRANSPORTATION NEEDS     Transportation : No   Physical Activity: Inactive (2/10/2025)    Exercise Vital Sign     Days of Exercise per Week: 0 days     Minutes of Exercise per Session: 0 min   Stress: Patient Declined (3/4/2025)    [employer] - Occupational Stress Questionnaire     Feeling of Stress : Patient declined   Housing Stability: Patient Declined (3/4/2025)    Housing Stability Vital Sign     Unable to Pay for Housing in the Last Year: Patient declined     Number of Times Moved in the Last Year: 1     Homeless in the Last Year: Patient declined

## 2025-03-11 NOTE — SUBJECTIVE & OBJECTIVE
Interval History:  Pt seen and examined.  Increased oxygen need this morning up to 10 L with low-grade temp of 100.5°.  This is workup initiated, CXR ordered.  ABG done revealing for some hypercapnia.  Pt was placed on BiPAP and transferred to ICU after communication with pulmonology.  I updated her daughter, Margarito.  Reports Hx of same.    Review of Systems   Constitutional:  Positive for fever.   Respiratory:  Positive for cough and shortness of breath.    Cardiovascular:  Negative for chest pain.   Gastrointestinal:  Negative for nausea and vomiting.     Objective:     Vital Signs (Most Recent):  Temp: (!) 100.5 °F (38.1 °C) (03/11/25 0739)  Pulse: 87 (03/11/25 0925)  Resp: 20 (03/11/25 0925)  BP: 119/65 (03/11/25 0739)  SpO2: (!) 92 % (03/11/25 0925) Vital Signs (24h Range):  Temp:  [97.8 °F (36.6 °C)-100.5 °F (38.1 °C)] 100.5 °F (38.1 °C)  Pulse:  [] 87  Resp:  [16-20] 20  SpO2:  [90 %-100 %] 92 %  BP: ()/(60-75) 119/65     Weight: 47.1 kg (103 lb 13.4 oz)  Body mass index is 18.99 kg/m².    Intake/Output Summary (Last 24 hours) at 3/11/2025 1110  Last data filed at 3/11/2025 1036  Gross per 24 hour   Intake 340 ml   Output 2 ml   Net 338 ml         Physical Exam  Vitals and nursing note reviewed.   Constitutional:       Appearance: Normal appearance.   Cardiovascular:      Rate and Rhythm: Normal rate and regular rhythm.   Pulmonary:      Comments: Increased work of breathing on 10 L nasal cannula with rhonchus breath sounds  Abdominal:      General: Abdomen is flat. Bowel sounds are normal.      Palpations: Abdomen is soft.   Musculoskeletal:         General: Normal range of motion.   Neurological:      Mental Status: She is alert.      Comments: Alert, oriented to person, place, situation.  Though speech is less intelligible this morning was yesterday when she was completely at her mental baseline and communicative               Significant Labs: All pertinent labs within the past 24 hours have  been reviewed.  CBC:   Recent Labs   Lab 03/10/25  0630 03/11/25  0632 03/11/25  0902 03/11/25  1028   WBC 7.58 10.25  --   --    HGB 9.7* 10.4*  --   --    HCT 32.2* 35.0* 31* 30*    393  --   --      CMP:   Recent Labs   Lab 03/11/25  0632   *   K 4.2   CL 92*   CO2 36*   *   BUN 10   CREATININE 0.7   CALCIUM 9.2   ANIONGAP 6*       Significant Imaging: I have reviewed all pertinent imaging results/findings within the past 24 hours.

## 2025-03-12 PROBLEM — N39.0 UTI (URINARY TRACT INFECTION): Status: ACTIVE | Noted: 2025-03-12

## 2025-03-12 LAB
ALLENS TEST: ABNORMAL
ALLENS TEST: ABNORMAL
ANION GAP SERPL CALC-SCNC: 4 MMOL/L (ref 8–16)
BASOPHILS # BLD AUTO: 0.01 K/UL (ref 0–0.2)
BASOPHILS NFR BLD: 0.1 % (ref 0–1.9)
BUN SERPL-MCNC: 10 MG/DL (ref 8–23)
CALCIUM SERPL-MCNC: 8 MG/DL (ref 8.7–10.5)
CHLORIDE SERPL-SCNC: 98 MMOL/L (ref 95–110)
CO2 SERPL-SCNC: 34 MMOL/L (ref 23–29)
CREAT SERPL-MCNC: 0.6 MG/DL (ref 0.5–1.4)
DELSYS: ABNORMAL
DELSYS: ABNORMAL
DIFFERENTIAL METHOD BLD: ABNORMAL
EOSINOPHIL # BLD AUTO: 0.1 K/UL (ref 0–0.5)
EOSINOPHIL NFR BLD: 1.3 % (ref 0–8)
EP: 6
EP: 6
ERYTHROCYTE [DISTWIDTH] IN BLOOD BY AUTOMATED COUNT: 13.2 % (ref 11.5–14.5)
ERYTHROCYTE [SEDIMENTATION RATE] IN BLOOD BY WESTERGREN METHOD: 20 MM/H
ERYTHROCYTE [SEDIMENTATION RATE] IN BLOOD BY WESTERGREN METHOD: 26 MM/H
EST. GFR  (NO RACE VARIABLE): >60 ML/MIN/1.73 M^2
FIO2: 40
GLUCOSE SERPL-MCNC: 104 MG/DL (ref 70–110)
HCO3 UR-SCNC: 30.7 MMOL/L (ref 24–28)
HCO3 UR-SCNC: 30.8 MMOL/L (ref 24–28)
HCT VFR BLD AUTO: 27.1 % (ref 37–48.5)
HGB BLD-MCNC: 8.1 G/DL (ref 12–16)
IMM GRANULOCYTES # BLD AUTO: 0.02 K/UL (ref 0–0.04)
IMM GRANULOCYTES NFR BLD AUTO: 0.2 % (ref 0–0.5)
IP: 12
IP: 18
LYMPHOCYTES # BLD AUTO: 1.1 K/UL (ref 1–4.8)
LYMPHOCYTES NFR BLD: 11.1 % (ref 18–48)
MAGNESIUM SERPL-MCNC: 1.6 MG/DL (ref 1.6–2.6)
MCH RBC QN AUTO: 30.5 PG (ref 27–31)
MCHC RBC AUTO-ENTMCNC: 29.9 G/DL (ref 32–36)
MCV RBC AUTO: 102 FL (ref 82–98)
MODE: ABNORMAL
MODE: ABNORMAL
MONOCYTES # BLD AUTO: 1.3 K/UL (ref 0.3–1)
MONOCYTES NFR BLD: 13 % (ref 4–15)
NEUTROPHILS # BLD AUTO: 7.5 K/UL (ref 1.8–7.7)
NEUTROPHILS NFR BLD: 74.3 % (ref 38–73)
NRBC BLD-RTO: 0 /100 WBC
PCO2 BLDA: 63.2 MMHG (ref 35–45)
PCO2 BLDA: 65.9 MMHG (ref 35–45)
PH SMN: 7.28 [PH] (ref 7.35–7.45)
PH SMN: 7.3 [PH] (ref 7.35–7.45)
PLATELET # BLD AUTO: 277 K/UL (ref 150–450)
PMV BLD AUTO: 10.9 FL (ref 9.2–12.9)
PO2 BLDA: 105 MMHG (ref 80–100)
PO2 BLDA: 66 MMHG (ref 80–100)
POC BE: 4 MMOL/L
POC BE: 4 MMOL/L
POC SATURATED O2: 89 % (ref 95–100)
POC SATURATED O2: 97 % (ref 95–100)
POC TCO2: 33 MMOL/L (ref 23–27)
POC TCO2: 33 MMOL/L (ref 23–27)
POTASSIUM SERPL-SCNC: 3.6 MMOL/L (ref 3.5–5.1)
RBC # BLD AUTO: 2.66 M/UL (ref 4–5.4)
SAMPLE: ABNORMAL
SAMPLE: ABNORMAL
SITE: ABNORMAL
SITE: ABNORMAL
SODIUM SERPL-SCNC: 136 MMOL/L (ref 136–145)
SP02: 94
SPONT RATE: 21
WBC # BLD AUTO: 10.02 K/UL (ref 3.9–12.7)

## 2025-03-12 PROCEDURE — 87205 SMEAR GRAM STAIN: CPT | Performed by: INTERNAL MEDICINE

## 2025-03-12 PROCEDURE — 99900035 HC TECH TIME PER 15 MIN (STAT)

## 2025-03-12 PROCEDURE — 87070 CULTURE OTHR SPECIMN AEROBIC: CPT | Performed by: INTERNAL MEDICINE

## 2025-03-12 PROCEDURE — 25000003 PHARM REV CODE 250

## 2025-03-12 PROCEDURE — 25000003 PHARM REV CODE 250: Performed by: NURSE PRACTITIONER

## 2025-03-12 PROCEDURE — 27100171 HC OXYGEN HIGH FLOW UP TO 24 HOURS

## 2025-03-12 PROCEDURE — 25000003 PHARM REV CODE 250: Performed by: INTERNAL MEDICINE

## 2025-03-12 PROCEDURE — 99900031 HC PATIENT EDUCATION (STAT)

## 2025-03-12 PROCEDURE — 94660 CPAP INITIATION&MGMT: CPT

## 2025-03-12 PROCEDURE — S4991 NICOTINE PATCH NONLEGEND: HCPCS

## 2025-03-12 PROCEDURE — 25000242 PHARM REV CODE 250 ALT 637 W/ HCPCS: Performed by: HOSPITALIST

## 2025-03-12 PROCEDURE — 94640 AIRWAY INHALATION TREATMENT: CPT

## 2025-03-12 PROCEDURE — 36410 VNPNXR 3YR/> PHY/QHP DX/THER: CPT

## 2025-03-12 PROCEDURE — 63600175 PHARM REV CODE 636 W HCPCS: Performed by: HOSPITALIST

## 2025-03-12 PROCEDURE — 99291 CRITICAL CARE FIRST HOUR: CPT | Mod: ,,, | Performed by: INTERNAL MEDICINE

## 2025-03-12 PROCEDURE — 36600 WITHDRAWAL OF ARTERIAL BLOOD: CPT

## 2025-03-12 PROCEDURE — 25000003 PHARM REV CODE 250: Performed by: HOSPITALIST

## 2025-03-12 PROCEDURE — 36415 COLL VENOUS BLD VENIPUNCTURE: CPT | Performed by: NURSE PRACTITIONER

## 2025-03-12 PROCEDURE — 20000000 HC ICU ROOM

## 2025-03-12 PROCEDURE — 85025 COMPLETE CBC W/AUTO DIFF WBC: CPT | Performed by: NURSE PRACTITIONER

## 2025-03-12 PROCEDURE — 76937 US GUIDE VASCULAR ACCESS: CPT

## 2025-03-12 PROCEDURE — 83735 ASSAY OF MAGNESIUM: CPT | Performed by: NURSE PRACTITIONER

## 2025-03-12 PROCEDURE — 82803 BLOOD GASES ANY COMBINATION: CPT

## 2025-03-12 PROCEDURE — 94761 N-INVAS EAR/PLS OXIMETRY MLT: CPT

## 2025-03-12 PROCEDURE — 80048 BASIC METABOLIC PNL TOTAL CA: CPT | Performed by: NURSE PRACTITIONER

## 2025-03-12 PROCEDURE — C1751 CATH, INF, PER/CENT/MIDLINE: HCPCS

## 2025-03-12 RX ORDER — NOREPINEPHRINE BITARTRATE/D5W 4MG/250ML
0-3 PLASTIC BAG, INJECTION (ML) INTRAVENOUS CONTINUOUS
Status: DISCONTINUED | OUTPATIENT
Start: 2025-03-12 | End: 2025-03-15

## 2025-03-12 RX ADMIN — MONTELUKAST 10 MG: 10 TABLET, FILM COATED ORAL at 09:03

## 2025-03-12 RX ADMIN — IPRATROPIUM BROMIDE 0.5 MG: 0.5 SOLUTION RESPIRATORY (INHALATION) at 07:03

## 2025-03-12 RX ADMIN — POLYETHYLENE GLYCOL 3350 17 G: 17 POWDER, FOR SOLUTION ORAL at 09:03

## 2025-03-12 RX ADMIN — IPRATROPIUM BROMIDE 0.5 MG: 0.5 SOLUTION RESPIRATORY (INHALATION) at 01:03

## 2025-03-12 RX ADMIN — HYDROCODONE BITARTRATE AND ACETAMINOPHEN 1 TABLET: 10; 325 TABLET ORAL at 06:03

## 2025-03-12 RX ADMIN — NICOTINE 1 PATCH: 21 PATCH, EXTENDED RELEASE TRANSDERMAL at 05:03

## 2025-03-12 RX ADMIN — ARFORMOTEROL TARTRATE 15 MCG: 15 SOLUTION RESPIRATORY (INHALATION) at 06:03

## 2025-03-12 RX ADMIN — ATORVASTATIN CALCIUM 80 MG: 40 TABLET, FILM COATED ORAL at 09:03

## 2025-03-12 RX ADMIN — FAMOTIDINE 20 MG: 10 INJECTION, SOLUTION INTRAVENOUS at 09:03

## 2025-03-12 RX ADMIN — BUDESONIDE INHALATION 0.5 MG: 0.5 SUSPENSION RESPIRATORY (INHALATION) at 06:03

## 2025-03-12 RX ADMIN — PIPERACILLIN SODIUM AND TAZOBACTAM SODIUM 4.5 G: 4; .5 INJECTION, POWDER, LYOPHILIZED, FOR SOLUTION INTRAVENOUS at 05:03

## 2025-03-12 RX ADMIN — PIPERACILLIN SODIUM AND TAZOBACTAM SODIUM 4.5 G: 4; .5 INJECTION, POWDER, LYOPHILIZED, FOR SOLUTION INTRAVENOUS at 10:03

## 2025-03-12 RX ADMIN — BUSPIRONE HYDROCHLORIDE 15 MG: 5 TABLET ORAL at 09:03

## 2025-03-12 RX ADMIN — PIPERACILLIN SODIUM AND TAZOBACTAM SODIUM 4.5 G: 4; .5 INJECTION, POWDER, LYOPHILIZED, FOR SOLUTION INTRAVENOUS at 02:03

## 2025-03-12 RX ADMIN — ENOXAPARIN SODIUM 30 MG: 40 INJECTION SUBCUTANEOUS at 05:03

## 2025-03-12 RX ADMIN — CLONAZEPAM 0.5 MG: 0.5 TABLET ORAL at 09:03

## 2025-03-12 RX ADMIN — FLUOXETINE HYDROCHLORIDE 40 MG: 20 CAPSULE ORAL at 09:03

## 2025-03-12 RX ADMIN — MUPIROCIN 1 G: 20 OINTMENT TOPICAL at 09:03

## 2025-03-12 RX ADMIN — GABAPENTIN 300 MG: 300 CAPSULE ORAL at 09:03

## 2025-03-12 RX ADMIN — Medication 800 MG: at 10:03

## 2025-03-12 RX ADMIN — LEVOTHYROXINE SODIUM 50 MCG: 0.03 TABLET ORAL at 06:03

## 2025-03-12 RX ADMIN — THIAMINE HCL TAB 100 MG 100 MG: 100 TAB at 09:03

## 2025-03-12 RX ADMIN — ARFORMOTEROL TARTRATE 15 MCG: 15 SOLUTION RESPIRATORY (INHALATION) at 07:03

## 2025-03-12 RX ADMIN — POTASSIUM BICARBONATE 50 MEQ: 978 TABLET, EFFERVESCENT ORAL at 10:03

## 2025-03-12 RX ADMIN — NOREPINEPHRINE BITARTRATE 0.02 MCG/KG/MIN: 4 INJECTION, SOLUTION INTRAVENOUS at 07:03

## 2025-03-12 RX ADMIN — CLONAZEPAM 0.5 MG: 0.5 TABLET ORAL at 06:03

## 2025-03-12 RX ADMIN — SODIUM CHLORIDE: 9 INJECTION, SOLUTION INTRAVENOUS at 11:03

## 2025-03-12 RX ADMIN — SODIUM CHLORIDE: 9 INJECTION, SOLUTION INTRAVENOUS at 07:03

## 2025-03-12 RX ADMIN — THERA TABS 1 TABLET: TAB at 09:03

## 2025-03-12 RX ADMIN — HYDROCODONE BITARTRATE AND ACETAMINOPHEN 1 TABLET: 10; 325 TABLET ORAL at 09:03

## 2025-03-12 RX ADMIN — FOLIC ACID 1 MG: 1 TABLET ORAL at 09:03

## 2025-03-12 RX ADMIN — BUDESONIDE INHALATION 0.5 MG: 0.5 SUSPENSION RESPIRATORY (INHALATION) at 07:03

## 2025-03-12 NOTE — NURSING
Upon   receiving report, the patient's blood pressure decreased to 75 systolic. Pt asymptomatic. Night shift RN started a bolus, notified the physician and Levophed order was placed per MD. BP did increase to 80's systolic without intervention but did not continue to resolve. Levophed was ordered per orders and BP slowly responding. Will assess for need for midline or central line placement shortly.

## 2025-03-12 NOTE — ASSESSMENT & PLAN NOTE
Patient with Hypercapnic and Hypoxic Respiratory failure which is Acute on chronic.  she is on home oxygen at 3.5 LPM. Supplemental oxygen was provided and noted- Oxygen Concentration (%):  [50-70] 50    .   Signs/symptoms of respiratory failure include- tachypnea and increased work of breathing. Contributing diagnoses includes - Aspiration, COPD, Interstitial lung disease, and Pneumonia Labs and images were reviewed. Patient Has recent ABG, which has been reviewed. Will treat underlying causes and adjust management of respiratory failure as follows-   -continue BiPAP   -continue IV Abx   - pulmonary Medicine following.      Increase IV fluids to 125 cc an hour.  Repeat ABG now.  Discussed with bedside nurse and .

## 2025-03-12 NOTE — PT/OT/SLP PROGRESS
Physical Therapy      Patient Name:  Bhavana Lambert   MRN:  [id number]    Patient not seen today secondary to Nurse/ [facility] hold. Will follow-up 3/13/25.

## 2025-03-12 NOTE — PROGRESS NOTES
Progress Note  PULMONARY    Admit Date: 3/4/2025   03/12/2025    History of Present Illness:  Pt is a 68 yo female with COPD, HTN, hypothyroidism who presented to the hospital due to a fall, T12 burst fracture. She has developed progressive respiratory failure and today requiring bipap and planned to transfer to the ICU. Pulmonary consulted regarding respiratory failure management. Pt is incoherent and unable to give solid history but apparently has a hx of EtOH abuse and smoking. She does endorse spitting up mucous recently. She had been getting librium, robaxin and PRN hydrocodone 5mg, last doses yesterday PM. These have been held. Antibiotics have been started for possible respiratory infection. She was hospitalized last month with COPD exacerbation, resp failure and aspiration pneumonia. Seen by Ila Perez for f/u visit in pulm clinic on 2/24/25. She uses home O2 at 3.5L.     SUBJECTIVE:     3/12- pt continues on bipap, awake and alert, somewhat confused/mumbling responses. ABG slightly worse and BIPAP has been adjusted      OBJECTIVE:     Vitals (Most recent):  Vitals:    03/12/25 0909   BP:    Pulse: 77   Resp:    Temp:        Vitals (24 hour range):  Temp:  [97.2 °F (36.2 °C)-99 °F (37.2 °C)]   Pulse:  [74-94]   Resp:  [11-44]   BP: ()/(50-69)   SpO2:  [88 %-100 %]       Intake/Output Summary (Last 24 hours) at 3/12/2025 0917  Last data filed at 3/12/2025 0803  Gross per 24 hour   Intake 3355.63 ml   Output 951 ml   Net 2404.63 ml          Physical Exam:  The patient's neuro status (alertness,orientation,cognitive function,motor skills,), pharyngeal exam (oral lesions, hygiene, abn dentition,), Neck (jvd,mass,thyroid,nodes in neck and above/below clavicle),RESPIRATORY(symmetry,effort,fremitus,percussion,auscultation),  Cor(rhythm,heart tones including gallops,perfusion,edema)ABD(distention,hepatic&splenomegaly,tenderness,masses), Skin(rash,cyanosis),Psyc(affect,judgement,).  Exam negative except for  "these pertinent findings:    Awake, A&Ox3  Speaking, difficult to understand, some inappropriate responses "is there a store nearby"  On bipap  PERRL  HR regular  Diminished breath sounds bilaterally  Abd soft nontender, BS+  No edema/clubbing  Follows commands in all extremities  Chronically ill appearing and with muscle wasting        Radiographs reviewed: view by direct vision    CXR 3/12/25- increasing RUL opacity  CXR 3/11/25- unchanged chronic interstitial changes bilaterally  CT chest 2/9/25- emphysema and R lateral and basilar consolidations      Labs     Recent Labs   Lab 03/12/25  0234   WBC 10.02   HGB 8.1*   HCT 27.1*        Recent Labs   Lab 03/11/25  0944 03/11/25  0945 03/12/25  0234   NA  --   --  136   K  --   --  3.6   CL  --   --  98   CO2  --   --  34*   BUN  --   --  10   CREATININE  --   --  0.6   GLU  --   --  104   CALCIUM  --   --  8.0*   PROCAL 0.590*  --   --    LACTATE 0.7  --   --    BNP  --  354*  --      Recent Labs   Lab 03/12/25  0840   PH 7.276*   PCO2 65.9*   PO2 66*   HCO3 30.7*     Microbiology Results (last 7 days)       Procedure Component Value Units Date/Time    Urine culture [[id number]]  (Abnormal) Collected: 03/11/25 1406    Order Status: Completed Specimen: Urine Updated: 03/12/25 0847     Urine Culture, Routine PRESUMPTIVE PROTEUS SPECIES  >100,000 cfu/ml  Identification and susceptibility pending        STREPTOCOCCUS AGALACTIAE (GROUP B)  > 100,000 cfu/ml  In case of Penicillin allergy, call lab for further testing.  Beta-hemolytic streptococci are routinely susceptible to   penicillins,cephalosporins and carbapenems.      Narrative:      Specimen Source->Urine    Blood culture [[id number]] Collected: 03/11/25 0944    Order Status: Completed Specimen: Blood from Peripheral, Antecubital, Right Updated: 03/11/25 1717     Blood Culture, Routine No Growth to date    Blood culture [[id number]] Collected: 03/11/25 0944    Order Status: Completed Specimen: Blood from " Peripheral, Hand, Right Updated: 03/11/25 1717     Blood Culture, Routine No Growth to date    Influenza A & B by Molecular [[id number]] Collected: 03/11/25 1110    Order Status: Sent Specimen: Nasopharyngeal Swab     Culture, Respiratory with Gram Stain [[id number]]     Order Status: Sent Specimen: Respiratory from Sputum, Expectorated             Impression:  Active Hospital Problems    Diagnosis  POA    *T12 burst fracture [S22.081A]  Yes    Anemia [D64.9]  Yes    Alcohol dependence with withdrawal delirium [F10.231]  No    Tobacco dependency [F17.200]  Yes    Acute on chronic respiratory failure with hypoxia and hypercapnia [J96.21, J96.22]  Yes    Aspiration pneumonia of both lungs [J69.0]  Yes    Mixed hyperlipidemia [E78.2]  Yes    Hyponatremia [E87.1]  Yes    Hypothyroid [E03.9]  Yes      Resolved Hospital Problems   No resolved problems to display.               Plan:       - continue BIPAP, adjusted and repeat abg in 2 hrs  - she continues to be awake and alert, hypercapnea is acute on chronic so I do not feel she needs to be intubated at this time  - hyponatremia, chronic- follow  - suspect ongoing EtOH withdrawal, will avoid oversedation and use precedex if needed  - f/u cultures and continue zosyn- UTI  - continue inhaled bronchodilators  - CXR repeat shows pneumonia RUL  - per nsg non operative management for T12 burst fracture recommended with TLSO brace     The following were evaluated and adjusted as needed: mechanical ventilator settings and weaning status, sedation and neurologic status, antibiotics, and acid base balance and oxygenation needs       Critical Care  - THE PATIENT HAS A HIGH PROBABILITY OF IMMINENT OR LIFE THREATENING DETERIORATION.  Over 50%time of encounter was in direct care at bedside.  Time was 30 to 74 minutes required for patient care.  Time needed for all of the above totaled 36 minutes.      Tammy Anand MD  Pulmonary & Critical Care Medicine                        .

## 2025-03-12 NOTE — PROGRESS NOTES
formerly [facility] Medicine  Progress Note    Patient Name: Bhavana Lambert  MRN: [id number]  Patient Class: IP- Inpatient   Admission Date: 3/4/2025  Length of Stay: 8 days  Attending Physician: No att. providers found  Primary Care Provider: Pepito Jhaveri IV, MD        Subjective     Principal Problem:T12 burst fracture        HPI:  69-year-old female with PMHx of COPD, HTN, hypothyroidism, prior stroke who presented to the ED with a complaint of lumbar and bilateral hip pain status post fall several days ago. The patient reports that she was wearing socks and she slipped, falling backwards towards a wall and then slid down the wall to a sitting position. She denies loss of consciousness, chest pain, current shortness of breath, dizziness, nausea or vomiting, incontinence of bowel or bladder. She is on plavix at home.     In the ED, /79  RR 20 afebrile and 96% on 3L NC (baseline).     Imaging showed Acute burst fracture of T12. [place] [place] the patient. She was given tylenol, morphine, and zofran and stable for transfer, no neuro deficits.     Per chart review from recent admission on February 9th to February 14, 2025, the patient was admitted for COPD exacerbation and did have a history of previous EtOH abuse with subsequent falls.     Overview/Hospital Course:  Bhavana Lambert is 69-year-old female with PMH COPD, HTN, hypothyroidism, prior stroke.  She was admitted to the hospital for management of an acute burst fracture of T12.  Neurosurgery following.  MRI obtained.  X-ray thoracolumbar spine ordered and showed stability.  PT/OT consulted.  TLSO brace applied for activity per neurosurgery recommendation.  Pt developed ETOH withdrawals with delirium on 03/06 and was placed on Librium protocol.  She had worsening hyponatremia prompting further workup though this notably improved with diet liberalization.  Her DTs were much  improved on 03/07 after Librium initiation.  Sodium levels continued to improve.  Pain was controlled with oral narcotics.  Librium was down titrated.  On 03/11 she developed increased O2 requirements with low-grade temp 100.5°.  CXR revealed acute on chronic changes of patchy infiltrates.  She reported reflux overnight-concerning clinically for aspiration.  She was placed on BiPAP after ABG revealed hypercapnia.  She was transferred to ICU for continued BiPAP therapy and pulmonology was consulted.  She was placed on IV Abx and sepsis workup was pursued.    Interval History:  patient seen and examined during multidisciplinary rounds.  Currently on BiPAP noninvasive ventilatory therapy with FiO2 50%.  Patient also receiving intravenous Levophed infusion.  T-max 100.5° F.  Urine cultures growing Proteus species.  Patient possibly hallucinating as well.  No family member present at bedside.  Pulmonary medicine closely following.     Review of Systems   Constitutional:  Positive for fever.   Respiratory:  Positive for cough and shortness of breath.    Cardiovascular:  Negative for chest pain.   Gastrointestinal:  Negative for nausea and vomiting.     Objective:     Vital Signs (Most Recent):  Temp: 98 °F (36.7 °C) (03/12/25 0530)  Pulse: 79 (03/12/25 0655)  Resp: 12 (03/12/25 0655)  BP: 102/66 (03/12/25 0600)  SpO2: 98 % (03/12/25 0655) Vital Signs (24h Range):  Temp:  [97.2 °F (36.2 °C)-100.5 °F (38.1 °C)] 98 °F (36.7 °C)  Pulse:  [76-98] 79  Resp:  [11-44] 12  SpO2:  [88 %-100 %] 98 %  BP: ()/(50-69) 102/66     Weight: 47.1 kg (103 lb 13.4 oz)  Body mass index is 18.99 kg/m².    Intake/Output Summary (Last 24 hours) at 3/12/2025 0720  Last data filed at 3/12/2025 0301  Gross per 24 hour   Intake 323.34 ml   Output 951 ml   Net -627.66 ml         Physical Exam  Vitals and nursing note reviewed.   Constitutional:       Appearance: Normal appearance.   Cardiovascular:      Rate and Rhythm: Normal rate and regular  rhythm.   Pulmonary:      Comments:   Presently on BiPAP  Abdominal:      General: Abdomen is flat. Bowel sounds are normal.      Palpations: Abdomen is soft.   Musculoskeletal:         General: Normal range of motion.   Neurological:      Mental Status: She is alert.      Comments:   Patient is alert and oriented but exhibiting intermittent hallucinations as well.               Significant Labs: All pertinent labs within the past 24 hours have been reviewed.  CBC:   Recent Labs   Lab 03/11/25  0632 03/11/25  0902 03/11/25  1028 03/12/25  0234   WBC 10.25  --   --  10.02   HGB 10.4*  --   --  8.1*   HCT 35.0* 31* 30* 27.1*     --   --  277     CMP:   Recent Labs   Lab 03/11/25  0632 03/12/25  0234   * 136   K 4.2 3.6   CL 92* 98   CO2 36* 34*   * 104   BUN 10 10   CREATININE 0.7 0.6   CALCIUM 9.2 8.0*   ANIONGAP 6* 4*     Microbiology Results (last 7 days)       Procedure Component Value Units Date/Time    Urine culture [[id number]]  (Abnormal) Collected: 03/11/25 1406    Order Status: Completed Specimen: Urine Updated: 03/12/25 0705     Urine Culture, Routine PRESUMPTIVE PROTEUS SPECIES  >100,000 cfu/ml  Identification and susceptibility pending      Narrative:      Specimen Source->Urine    Blood culture [[id number]] Collected: 03/11/25 0944    Order Status: Completed Specimen: Blood from Peripheral, Antecubital, Right Updated: 03/11/25 1717     Blood Culture, Routine No Growth to date    Blood culture [[id number]] Collected: 03/11/25 0944    Order Status: Completed Specimen: Blood from Peripheral, Hand, Right Updated: 03/11/25 1717     Blood Culture, Routine No Growth to date    Influenza A & B by Molecular [[id number]] Collected: 03/11/25 1110    Order Status: Sent Specimen: Nasopharyngeal Swab     Culture, Respiratory with Gram Stain [[id number]]     Order Status: Sent Specimen: Respiratory from Sputum, Expectorated           Significant Imaging:   B/L hip x-ray:  No acute findings.      Lumbar x-ray:  Acute compression fracture of T12. Recommend CT.     CT T-spine:  Acute burst fracture of T12. Referral to neurosurgery advised.     MRI Lumbar spine:  Acute compression fracture of T12 with loss of height of approximately 25%.  There is minimal retropulsion of the posterior cortex measuring 3 mm without central canal stenosis  Old mild chronic compression fracture of T11  Multilevel degenerative disc disease and facet hypertrophy with mild central canal stenosis and foraminal narrowing at L2-3, L3-4 and L4-5    X-ray thoracolumar AP/lateral:  Acute fracture of the T12 vertebra is again observed with no detrimental change from prior CT exam.     CT Head:  No acute intracranial abnormalities. Chronic findings as discussed above.     X-ray thoracolumbar:  Stable mild compression fracture of T12  Chronic mild compression fracture of T11    CXR: Interval improvement, with near complete resolution of right basilar infiltrate.     CXR:  Persistent diffuse interstitial prominence with mild patchy airspace opacities having progressed slightly in the interval.  Mild blunting of the left costophrenic angle      Assessment & Plan  T12 burst fracture  MRI shows acute compression fracture of T12 with loss of height of approximately 25%.  There is minimal retropulsion of the posterior cortex measuring 3 mm without central canal stenosis.  Old mild chronic compression fracture of T11.  Multilevel degenerative disc disease and facet hypertrophy with mild central canal stenosis and foraminal narrowing at L2-3, L3-4, and L4-5  Pain control  Neurochecks   Neurosurgery consulted- T XR stable  TLSO brace with mobilization  Repeat XR 2/2 fall  PT/OT- SNF recommending.  CM pursuing    Hypothyroid  Patient has chronic hypothyroidism. TFTs reviewed-   Lab Results   Component Value Date    TSH 3.155 03/06/2025   Will continue chronic levothyroxine and adjust for and clinical changes.     Mixed hyperlipidemia  Continue home  statin    Tobacco dependency  Smoking cessation counseling performed. Dangers of cigarette smoking were reviewed with patient in detail and patient was encouraged to quit. Nicotine replacement options were discussed for > 3 minutes.  Receiving Nicoderm.  Hyponatremia  Hyponatremia is a historical properly acutely worsening here in the hospital.  The patient's most recent sodium results are listed below.  Recent Labs     03/11/25  0632 03/12/25  0234   * 136     Plan  - Correct the sodium by 4-6mEq in 24 hours.   - sodium is self corrected with holding of the SSRI and diet liberalization   - SSRI resumed 3/7, and evaluating response-stable thus far.  Alcohol dependence with withdrawal delirium  Pt with confirmed daily ETOH utilization appeared with DTs on 03/06   -initiated scheduled Librium 3/6 with much improvement.  Will continue to down taper the Librium.  -continue p.r.n. Ativan   -nursing to perform CIWA  -fall, seizure precautions   -daily MVI, folic acid, thiamine    Anemia  Chronic problem.  Stable.  Monitor H/H.    Transfuse for hemodynamic instability and/or H/H <7/21    Aspiration pneumonia of both lungs  UTI (urinary tract infection) due to Proteus mirabilis species  Patient has a diagnosis of pneumonia. The cause of the pneumonia is bacterial in etiology due to Gram negative pathogens associated with aspiration . The pneumonia is  acute . The patient has the following signs/symptoms of pneumonia: persistent hypoxia , cough, and shortness of breath. The patient does have a current oxygen requirement and the patient does have a home oxygen requirement. I have reviewed the pertinent imaging. The following cultures have been collected: Blood cultures and Sputum culture The culture results are listed below.     Current antimicrobial regimen consists of the antibiotics listed below. Will monitor patient closely and continue current treatment plan unchanged.    Antibiotics (From admission, onward)       Start     Stop Route Frequency Ordered    03/11/25 1215  mupirocin 2 % ointment         03/16/25 0859 Nasl 2 times daily 03/11/25 1114    03/11/25 1030  piperacillin-tazobactam (ZOSYN) 4.5 g in D5W 100 mL IVPB (MB+)         -- IV Every 8 hours (non-standard times) 03/11/25 0927            Microbiology Results (last 7 days)       Procedure Component Value Units Date/Time    Urine culture [[id number]]  (Abnormal) Collected: 03/11/25 1406    Order Status: Completed Specimen: Urine Updated: 03/12/25 0705     Urine Culture, Routine PRESUMPTIVE PROTEUS SPECIES  >100,000 cfu/ml  Identification and susceptibility pending      Narrative:      Specimen Source->Urine    Blood culture [[id number]] Collected: 03/11/25 0944    Order Status: Completed Specimen: Blood from Peripheral, Antecubital, Right Updated: 03/11/25 1717     Blood Culture, Routine No Growth to date    Blood culture [[id number]] Collected: 03/11/25 0944    Order Status: Completed Specimen: Blood from Peripheral, Hand, Right Updated: 03/11/25 1717     Blood Culture, Routine No Growth to date    Influenza A & B by Molecular [[id number]] Collected: 03/11/25 1110    Order Status: Sent Specimen: Nasopharyngeal Swab     Culture, Respiratory with Gram Stain [[id number]]     Order Status: Sent Specimen: Respiratory from Sputum, Expectorated               Acute on chronic respiratory failure with hypoxia and hypercapnia  Patient with Hypercapnic and Hypoxic Respiratory failure which is Acute on chronic.  she is on home oxygen at 3.5 LPM. Supplemental oxygen was provided and noted- Oxygen Concentration (%):  [50-70] 50    .   Signs/symptoms of respiratory failure include- tachypnea and increased work of breathing. Contributing diagnoses includes - Aspiration, COPD, Interstitial lung disease, and Pneumonia Labs and images were reviewed. Patient Has recent ABG, which has been reviewed. Will treat underlying causes and adjust management of respiratory failure as follows-    -continue BiPAP   -continue IV Abx   - pulmonary Medicine following.      Increase IV fluids to 125 cc an hour.  Repeat ABG now.  Discussed with bedside nurse and .  VTE Risk Mitigation (From admission, onward)           Ordered     enoxaparin injection 30 mg  Daily         03/11/25 1130     IP VTE HIGH RISK PATIENT  Once         03/04/25 1604     Place sequential compression device  Until discontinued         03/04/25 1604     Reason for No Pharmacological VTE Prophylaxis  Once        Question:  Reasons:  Answer:  Physician Provided (leave comment)  Comment:  pending neurosurgery eval    03/04/25 1604                    Discharge Planning   CHLOE:  TBD    Code Status: Full Code   Medical Readiness for Discharge Date:   Discharge Plan A: Skilled Nursing Facility            Critical care time spent on the evaluation and treatment of severe organ dysfunction, review of pertinent labs and imaging studies, discussions with consulting providers and discussions with patient/family: 35 minutes.    Please place Justification for DME        Shayy Flowers MD  Department of Hospital Medicine   [facility]

## 2025-03-12 NOTE — CARE UPDATE
03/12/25 0654   PRE-TX-O2   SpO2 97 %   Pulse 79   Resp 16   Aerosol Therapy   $ Aerosol Therapy Charges Aerosol Treatment  (pulmicort)   Daily Review of Necessity (SVN) completed   Respiratory Treatment Status (SVN) given   Treatment Route (SVN) in-line;mask   Patient Position HOB elevated   Post Treatment Assessment (SVN) breath sounds unchanged;vital signs unchanged   Signs of Intolerance (SVN) none

## 2025-03-12 NOTE — PLAN OF CARE
CM present this am during SIBR with pt who was on BiPAP. Spoke with Rudy with [facility] and they will need updates on Friday and will review new clinical updates closer to discharge to ensure that she is appropriate for SNF. Call placed to pt daughter Margarito and discussed above information. Discussed anticipated discharge Monday pending medical clearance and she is in agreement with plan. Will continue to follow.       03/12/25 2292   Discharge Reassessment   Assessment Type Discharge Planning Reassessment   Did the patient's condition or plan change since previous assessment? Yes   Discharge Plan discussed with: Adult children  (Margarito Miranda (Daughter)  [phone])   Communicated CHLOE with patient/caregiver Yes   Discharge Plan A Skilled Nursing Facility   Discharge Plan B Skilled Nursing Facility   DME Needed Upon Discharge    (Monitor for need for BiPAP)   Transition of Care Barriers None   Why the patient remains in the hospital Requires continued medical care   Post-Acute Status   Post-Acute Authorization Placement   Post-Acute Placement Status Pending medical clearance/testing   Discharge Delays None known at this time

## 2025-03-12 NOTE — ASSESSMENT & PLAN NOTE
Patient has a diagnosis of pneumonia. The cause of the pneumonia is bacterial in etiology due to Gram negative pathogens associated with aspiration. The pneumonia is acute. The patient has the following signs/symptoms of pneumonia: persistent hypoxia , cough, and shortness of breath. The patient does have a current oxygen requirement and the patient does have a home oxygen requirement. I have reviewed the pertinent imaging. The following cultures have been collected: Blood cultures and Sputum culture The culture results are listed below.     Current antimicrobial regimen consists of the antibiotics listed below. Will monitor patient closely and continue current treatment plan unchanged.    Antibiotics (From admission, onward)      Start     Stop Route Frequency Ordered    03/11/25 1215  mupirocin 2 % ointment         03/16/25 0859 Nasl 2 times daily 03/11/25 1114    03/11/25 1030  piperacillin-tazobactam (ZOSYN) 4.5 g in D5W 100 mL IVPB (MB+)         -- IV Every 8 hours (non-standard times) 03/11/25 0927            Microbiology Results (last 7 days)       Procedure Component Value Units Date/Time    Urine culture [[id number]]  (Abnormal) Collected: 03/11/25 1406    Order Status: Completed Specimen: Urine Updated: 03/12/25 0705     Urine Culture, Routine PRESUMPTIVE PROTEUS SPECIES  >100,000 cfu/ml  Identification and susceptibility pending      Narrative:      Specimen Source->Urine    Blood culture [[id number]] Collected: 03/11/25 0944    Order Status: Completed Specimen: Blood from Peripheral, Antecubital, Right Updated: 03/11/25 1717     Blood Culture, Routine No Growth to date    Blood culture [[id number]] Collected: 03/11/25 0944    Order Status: Completed Specimen: Blood from Peripheral, Hand, Right Updated: 03/11/25 1717     Blood Culture, Routine No Growth to date    Influenza A & B by Molecular [[id number]] Collected: 03/11/25 1110    Order Status: Sent Specimen: Nasopharyngeal Swab     Culture, Respiratory  with Gram Stain [[id number]]     Order Status: Sent Specimen: Respiratory from Sputum, Expectorated

## 2025-03-12 NOTE — NURSING
Dr. Anand notified of ABG result. MD at bedside now. Pressures changed on Bipap. Repeat ABG at 1100.

## 2025-03-12 NOTE — CONSULTS
VASCULAR ACCESS NOTE       Bed:2201/2201-A    Single lumen 18G, 10CM midline placed in the right basilic vein. Needle advanced into the vessel under real time ultrasound guidance.    Attempts: 1  Max dwell date: 4/10/2025  Lot number: [id number]    Eze Kirby RN

## 2025-03-12 NOTE — CARE UPDATE
03/12/25 0909   PRE-TX-O2   SpO2 100 %   Pulse 77   Preset CPAP/BiPAP Settings   Mode Of Delivery BiPAP S/T   CPAP/BIPAP charged w/in last 24 h YES   Ipap 18   EPAP (cm H2O) 6   Pressure Support (cm H2O) 12   Set Rate (Breaths/Min) 26   ITime (sec) 0.9   Rise Time (sec) 3     Discussed with Dr Anand

## 2025-03-12 NOTE — CONSULTS
Wound care already following this patient for bilateral posterior heel areas. Wound care to continue to follow patient. Plan offload bilateral heels with offloading quilted heel protectors.

## 2025-03-12 NOTE — ASSESSMENT & PLAN NOTE
Hyponatremia is a historical properly acutely worsening here in the hospital.  The patient's most recent sodium results are listed below.  Recent Labs     03/11/25  0632 03/12/25  0234   * 136     Plan  - Correct the sodium by 4-6mEq in 24 hours.   - sodium is self corrected with holding of the SSRI and diet liberalization   - SSRI resumed 3/7, and evaluating response-stable thus far.

## 2025-03-12 NOTE — ASSESSMENT & PLAN NOTE
Patient has chronic hypothyroidism. TFTs reviewed-   Lab Results   Component Value Date    TSH 3.155 03/06/2025   Will continue chronic levothyroxine and adjust for and clinical changes.

## 2025-03-12 NOTE — PLAN OF CARE
Pt awake but intermittently drowsy, oriented to self, place, time. Bipap 50% 2/2 aspiration pneumonia/COPD exac. BM x3. Continue to reorient as needed. Turning q2. Pt educated on call light, fall precautions plan of care. Pt nodded head yes. Bed in low locked position, call light in reach, bed rails up x3, bed alarm active (zone 2). Pt stayed on Bipap throughout the day 2/2 lethargy and inability to follow commands. Ate 0% of all meals today. Sheets changed completely. Pure wick discontinued 2/2 incontinence. Pt is now more alert and intermittently following commands. Continue to monitor closely.   Problem: Adult Inpatient Plan of Care  Goal: Plan of Care Review  Outcome: Progressing  Flowsheets (Taken 3/12/2025 1815)  Plan of Care Reviewed With:   patient   family  Goal: Patient-Specific Goal (Individualized)  Outcome: Progressing  Flowsheets (Taken 3/12/2025 1815)  Individualized Care Needs: fall precautions, aspiration precautions, encourage PO intake when tolerated, turning q2, heels elevated on pillow with mepilex.  Anxieties, Fears or Concerns: no fears or concerns verbalized  Patient/Family-Specific Goals (Include Timeframe): Pt to maintain sp02 >93% on 3.5L/NC by 3/13/2025 1100  Goal: Absence of Hospital-Acquired Illness or Injury  Outcome: Progressing  Intervention: Identify and Manage Fall Risk  Flowsheets (Taken 3/12/2025 1815)  Safety Promotion/Fall Prevention:   assistive device/personal item within reach   bed alarm set   Fall Risk signage in place   Fall Risk reviewed with patient/family   lighting adjusted   instructed to call staff for mobility   nonskid shoes/socks when out of bed   side rails raised x 3   room near unit station  Intervention: Prevent Skin Injury  Flowsheets (Taken 3/12/2025 1815)  Skin Protection: transparent dressing maintained  Device Skin Pressure Protection:   skin-to-device areas padded   skin-to-skin areas padded   tubing/devices free from skin contact   pressure points  protected  Intervention: Prevent and Manage VTE (Venous Thromboembolism) Risk  Flowsheets (Taken 3/12/2025 1815)  VTE Prevention/Management: ambulation promoted  Intervention: Prevent Infection  Flowsheets (Taken 3/12/2025 1815)  Infection Prevention: rest/sleep promoted  Goal: Optimal Comfort and Wellbeing  Outcome: Progressing  Intervention: Monitor Pain and Promote Comfort  Flowsheets (Taken 3/12/2025 1815)  Pain Management Interventions: pain management plan reviewed with patient/caregiver  Intervention: Provide Person-Centered Care  Flowsheets (Taken 3/12/2025 1815)  Trust Relationship/Rapport:   care explained   choices provided   reassurance provided   thoughts/feelings acknowledged   emotional support provided   empathic listening provided   questions answered   questions encouraged  Goal: Readiness for Transition of Care  Outcome: Progressing     Problem: Skin Injury Risk Increased  Goal: Skin Health and Integrity  Outcome: Progressing  Intervention: Optimize Skin Protection  Flowsheets (Taken 3/12/2025 1815)  Pressure Reduction Techniques: frequent weight shift encouraged  Pressure Reduction Devices: heel offloading device utilized  Skin Protection: transparent dressing maintained  Activity Management: Rolling - L1  Head of Bed (HOB) Positioning: HOB at 30-45 degrees  Intervention: Promote and Optimize Oral Intake  Flowsheets (Taken 3/12/2025 1815)  Nutrition Interventions: referred to dietitian     Problem: Fall Injury Risk  Goal: Absence of Fall and Fall-Related Injury  Outcome: Progressing  Intervention: Identify and Manage Contributors  Flowsheets (Taken 3/12/2025 1815)  Self-Care Promotion: independence encouraged  Medication Review/Management: medications reviewed  Intervention: Promote Injury-Free Environment  Flowsheets (Taken 3/12/2025 1815)  Safety Promotion/Fall Prevention:   assistive device/personal item within reach   bed alarm set   Fall Risk signage in place   Fall Risk reviewed with  patient/family   lighting adjusted   instructed to call staff for mobility   nonskid shoes/socks when out of bed   side rails raised x 3   room near unit station     Problem: Wound  Goal: Optimal Coping  Outcome: Progressing  Intervention: Support Patient and Family Response  Flowsheets (Taken 3/12/2025 1815)  Supportive Measures: relaxation techniques promoted  Goal: Optimal Functional Ability  Outcome: Progressing  Intervention: Optimize Functional Ability  Flowsheets (Taken 3/12/2025 1815)  Activity Management: Rolling - L1  Goal: Absence of Infection Signs and Symptoms  Outcome: Progressing  Intervention: Prevent or Manage Infection  Flowsheets (Taken 3/12/2025 1815)  Infection Management: aseptic technique maintained  Goal: Improved Oral Intake  Outcome: Progressing  Intervention: Promote and Optimize Oral Intake  Flowsheets (Taken 3/12/2025 1815)  Nutrition Interventions: referred to dietitian  Nutrition Support Management: weight trending reviewed  Goal: Optimal Pain Control and Function  Outcome: Progressing  Intervention: Prevent or Manage Pain  Flowsheets (Taken 3/12/2025 1815)  Sleep/Rest Enhancement:   noise level reduced   room darkened  Pain Management Interventions: pain management plan reviewed with patient/caregiver  Goal: Skin Health and Integrity  Outcome: Progressing  Intervention: Optimize Skin Protection  Flowsheets (Taken 3/12/2025 1815)  Pressure Reduction Techniques: frequent weight shift encouraged  Pressure Reduction Devices: heel offloading device utilized  Skin Protection: transparent dressing maintained  Activity Management: Rolling - L1  Head of Bed (HOB) Positioning: HOB at 30-45 degrees  Goal: Optimal Wound Healing  Outcome: Progressing  Intervention: Promote Wound Healing  Flowsheets (Taken 3/12/2025 1815)  Sleep/Rest Enhancement:   noise level reduced   room darkened     Problem: Infection  Goal: Absence of Infection Signs and Symptoms  Outcome: Progressing  Intervention: Prevent  or Manage Infection  Flowsheets (Taken 3/12/2025 1815)  Infection Management: aseptic technique maintained

## 2025-03-12 NOTE — ASSESSMENT & PLAN NOTE
Patient has a diagnosis of pneumonia. The cause of the pneumonia is bacterial in etiology due to Gram negative pathogens associated with aspiration. The pneumonia is acute. The patient has the following signs/symptoms of pneumonia: persistent hypoxia , cough, and shortness of breath. The patient does have a current oxygen requirement and the patient does have a home oxygen requirement. I have reviewed the pertinent imaging. The following cultures have been collected: Blood cultures and Sputum culture The culture results are listed below.     Current antimicrobial regimen consists of the antibiotics listed below. Will monitor patient closely and continue current treatment plan unchanged.    Antibiotics (From admission, onward)      Start     Stop Route Frequency Ordered    03/11/25 1215  mupirocin 2 % ointment         03/16/25 0859 Nasl 2 times daily 03/11/25 1114    03/11/25 1030  piperacillin-tazobactam (ZOSYN) 4.5 g in D5W 100 mL IVPB (MB+)         -- IV Every 8 hours (non-standard times) 03/11/25 0927            Microbiology Results (last 7 days)       Procedure Component Value Units Date/Time    Urine culture [[id number]]  (Abnormal) Collected: 03/11/25 1406    Order Status: Completed Specimen: Urine Updated: 03/12/25 0705     Urine Culture, Routine PRESUMPTIVE PROTEUS SPECIES  >100,000 cfu/ml  Identification and susceptibility pending      Narrative:      Specimen Source->Urine    Blood culture [[id number]] Collected: 03/11/25 0944    Order Status: Completed Specimen: Blood from Peripheral, Antecubital, Right Updated: 03/11/25 1717     Blood Culture, Routine No Growth to date    Blood culture [[id number]] Collected: 03/11/25 0944    Order Status: Completed Specimen: Blood from Peripheral, Hand, Right Updated: 03/11/25 1717     Blood Culture, Routine No Growth to date    Influenza A & B by Molecular [[id number]] Collected: 03/11/25 1110    Order Status: Sent Specimen: Nasopharyngeal Swab     Culture, Respiratory  with Gram Stain [[id number]]     Order Status: Sent Specimen: Respiratory from Sputum, Expectorated

## 2025-03-12 NOTE — PLAN OF CARE
Problem: Wound  Goal: Improved Oral Intake  Intervention: Promote and Optimize Oral Intake  Flowsheets (Taken 3/12/2025 1525)  Nutrition Interventions:   supplemental drinks provided   food preferences provided

## 2025-03-12 NOTE — ASSESSMENT & PLAN NOTE
Smoking cessation counseling performed. Dangers of cigarette smoking were reviewed with patient in detail and patient was encouraged to quit. Nicotine replacement options were discussed for > 3 minutes.  Receiving Nicoderm.

## 2025-03-12 NOTE — PT/OT/SLP PROGRESS
Occupational Therapy      Patient Name:  Bhavana Lambert   MRN:  [id number]    Patient not seen today secondary to Nurse/ [facility] hold. Will follow-up 3.12.2025.    3/12/2025

## 2025-03-12 NOTE — SUBJECTIVE & OBJECTIVE
Interval History:  patient seen and examined during multidisciplinary rounds.  Currently on BiPAP noninvasive ventilatory therapy with FiO2 50%.  Patient also receiving intravenous Levophed infusion.  T-max 100.5° F.  Urine cultures growing Proteus species.  Patient possibly hallucinating as well.  No family member present at bedside.  Pulmonary medicine closely following.     Review of Systems   Constitutional:  Positive for fever.   Respiratory:  Positive for cough and shortness of breath.    Cardiovascular:  Negative for chest pain.   Gastrointestinal:  Negative for nausea and vomiting.     Objective:     Vital Signs (Most Recent):  Temp: 98 °F (36.7 °C) (03/12/25 0530)  Pulse: 79 (03/12/25 0655)  Resp: 12 (03/12/25 0655)  BP: 102/66 (03/12/25 0600)  SpO2: 98 % (03/12/25 0655) Vital Signs (24h Range):  Temp:  [97.2 °F (36.2 °C)-100.5 °F (38.1 °C)] 98 °F (36.7 °C)  Pulse:  [76-98] 79  Resp:  [11-44] 12  SpO2:  [88 %-100 %] 98 %  BP: ()/(50-69) 102/66     Weight: 47.1 kg (103 lb 13.4 oz)  Body mass index is 18.99 kg/m².    Intake/Output Summary (Last 24 hours) at 3/12/2025 0720  Last data filed at 3/12/2025 0301  Gross per 24 hour   Intake 323.34 ml   Output 951 ml   Net -627.66 ml         Physical Exam  Vitals and nursing note reviewed.   Constitutional:       Appearance: Normal appearance.   Cardiovascular:      Rate and Rhythm: Normal rate and regular rhythm.   Pulmonary:      Comments:   Presently on BiPAP  Abdominal:      General: Abdomen is flat. Bowel sounds are normal.      Palpations: Abdomen is soft.   Musculoskeletal:         General: Normal range of motion.   Neurological:      Mental Status: She is alert.      Comments:   Patient is alert and oriented but exhibiting intermittent hallucinations as well.               Significant Labs: All pertinent labs within the past 24 hours have been reviewed.  CBC:   Recent Labs   Lab 03/11/25  0632 03/11/25  0902 03/11/25  1028 03/12/25  0234   WBC 10.25  --    --  10.02   HGB 10.4*  --   --  8.1*   HCT 35.0* 31* 30* 27.1*     --   --  277     CMP:   Recent Labs   Lab 03/11/25  0632 03/12/25  0234   * 136   K 4.2 3.6   CL 92* 98   CO2 36* 34*   * 104   BUN 10 10   CREATININE 0.7 0.6   CALCIUM 9.2 8.0*   ANIONGAP 6* 4*     Microbiology Results (last 7 days)       Procedure Component Value Units Date/Time    Urine culture [[id number]]  (Abnormal) Collected: 03/11/25 1406    Order Status: Completed Specimen: Urine Updated: 03/12/25 0705     Urine Culture, Routine PRESUMPTIVE PROTEUS SPECIES  >100,000 cfu/ml  Identification and susceptibility pending      Narrative:      Specimen Source->Urine    Blood culture [[id number]] Collected: 03/11/25 0944    Order Status: Completed Specimen: Blood from Peripheral, Antecubital, Right Updated: 03/11/25 1717     Blood Culture, Routine No Growth to date    Blood culture [[id number]] Collected: 03/11/25 0944    Order Status: Completed Specimen: Blood from Peripheral, Hand, Right Updated: 03/11/25 1717     Blood Culture, Routine No Growth to date    Influenza A & B by Molecular [[id number]] Collected: 03/11/25 1110    Order Status: Sent Specimen: Nasopharyngeal Swab     Culture, Respiratory with Gram Stain [[id number]]     Order Status: Sent Specimen: Respiratory from Sputum, Expectorated           Significant Imaging:   B/L hip x-ray:  No acute findings.     Lumbar x-ray:  Acute compression fracture of T12. Recommend CT.     CT T-spine:  Acute burst fracture of T12. Referral to neurosurgery advised.     MRI Lumbar spine:  Acute compression fracture of T12 with loss of height of approximately 25%.  There is minimal retropulsion of the posterior cortex measuring 3 mm without central canal stenosis  Old mild chronic compression fracture of T11  Multilevel degenerative disc disease and facet hypertrophy with mild central canal stenosis and foraminal narrowing at L2-3, L3-4 and L4-5    X-ray thoracolumar AP/lateral:  Acute  fracture of the T12 vertebra is again observed with no detrimental change from prior CT exam.     CT Head:  No acute intracranial abnormalities. Chronic findings as discussed above.     X-ray thoracolumbar:  Stable mild compression fracture of T12  Chronic mild compression fracture of T11    CXR: Interval improvement, with near complete resolution of right basilar infiltrate.     CXR:  Persistent diffuse interstitial prominence with mild patchy airspace opacities having progressed slightly in the interval.  Mild blunting of the left costophrenic angle

## 2025-03-12 NOTE — PROGRESS NOTES
[facility]  Adult Nutrition   Progress Note (Initial Assessment)     SUMMARY     Recommendations  Recommendation/Intervention: 1. Continue regular die tas tolerated. 2. Offer Boost + BID for increased protein/energy. 3.  to obtain daily menu choices.  Nutrition Goal Status: new  Communication of RD Recs: reviewed with RN    Nutrition Goals: PO intake will meet >50% of estimated needs by RD follow up. and Oral supplement consumption of >50% by RD follow up.    Nutrition Interventions: General healthful diet and Medical Food Supplement Therapy    Nutrition Diagnosis PES Statement: Increased nutrition needs related to wound healing as evidenced by Bilateral heel wounds being followed by WC Mi      Nutrition Diagnosis Status:   New    Dietitian Rounds Brief  RD screen for LOS. Patient admitted to ICU 3/11/25 for respiratory status. Patient s/p fall pending neurosurgeon consult. Patient on bipap, with respiratory at visit. No overt signs of malnutrition. RD added Boost + BID. Intake > 50% per nursing. RD to follow for intake and status change PRN.    Nutrition Related Social Determinants of Health: SDOH: Unable to assess at this time.   Food Insecurity: Patient Declined (3/4/2025)    Hunger Vital Sign     Worried About Running Out of Food in the Last Year: Patient declined     Ran Out of Food in the Last Year: Patient declined   Recent Concern: Food Insecurity - Food Insecurity Present (2/10/2025)    Hunger Vital Sign     Worried About Running Out of Food in the Last Year: Sometimes true     Ran Out of Food in the Last Year: Sometimes true       Malnutrition Assessment  Unable to consent for NFPE at this time.                  Diet order:   Current Diet Order: Regular diet                 Evaluation of Received Nutrient/Fluid Intake  Energy Calories Required: not meeting needs  Protein Required: not meeting needs  Fluid Required: meeting needs  Tolerance: tolerating     % Intake of Estimated  "Energy Needs: 50 - 75 %  % Meal Intake: 50 - 75 %      Intake/Output Summary (Last 24 hours) at 3/12/2025 1518  Last data filed at 3/12/2025 1300  Gross per 24 hour   Intake 4139.28 ml   Output 1150 ml   Net 2989.28 ml        Anthropometrics  Height: 5' 2" (157.5 cm)  Height (inches): 62 in  Height Method: Stated  Weight: 47.1 kg (103 lb 13.4 oz)  Weight (lb): 103.84 lb  Weight Method: Bed Scale  Ideal Body Weight (IBW), Female: 110 lb  % Ideal Body Weight, Female (lb): 94.4 %  BMI (Calculated): 19  BMI Grade: 18.5-24.9 - normal       Estimated/Assessed Needs  Weight Used For Calorie Calculations: 47.1 kg (103 lb 13.4 oz)  Energy Calorie Requirements (kcal): 9982-2461 kcal/day (25-30 kcal/kg)  Energy Need Method: Kcal/kg  Protein Requirements: 56-71 gm/day (1.2-1.5 gm/kg)  Weight Used For Protein Calculations: 47.1 kg (103 lb 13.4 oz)     Estimated Fluid Requirement Method: RDA Method  RDA Method (mL): 1177       Reason for Assessment  Reason For Assessment: length of stay  Diagnosis: trauma  General Information Comments: Transferr from [facility] 3/04/25. Patient with history of COPD, HTN, hypothyroidism, prior stroke who presented to the ED with a complaint of lumbar and bilateral hip pain status post fall several days ago. Patient transfered to ICU 3/11/25. Pt with respiratory at visit.    Final diagnoses:  [S22.001A] Burst fracture of thoracic vertebra     Past Medical History:   Diagnosis Date    Acquired hypothyroidism     Anxiety and depression     Cervical radicular pain     Closed left ankle fracture     COPD (chronic obstructive pulmonary disease)     Heavy cigarette smoker     She has smoked 1/2 PPD since the age of 12, and still smokes    Hypertension     Hyponatremia     Multiple rib fractures     NSTEMI (non-ST elevated myocardial infarction)     Requires continuous at home supplemental oxygen     She is on 3.5L NC continuous    Stroke 11/08/2023    Tension type headache     Traumatic closed displaced " fracture of distal end of radius, left, sequela         Nutrition/Diet History  Nutrition Intake History: Good intake and appetite.  Food Preferences: Obtained by   Spiritual, Cultural Beliefs, Baptism Practices, Values that Affect Care: no  Food Allergies: NKFA  Factors Affecting Nutritional Intake: None identified at this time    Nutrition Risk Screen          Wound 03/11/25 1100 Pressure Injury Left posterior Heel #1-Wound Image: Images linked       Wound 03/11/25 1100 Pressure Injury Right Heel #2-Wound Image: Images linked  MST Score: 0  Have you recently lost weight without trying?: No  Weight loss score: 0  Have you been eating poorly because of a decreased appetite?: No  Appetite score: 0       Weight History:  Wt Readings from Last 10 Encounters:   03/04/25 47.1 kg (103 lb 13.4 oz)   03/03/25 47.2 kg (104 lb)   02/24/25 47.4 kg (104 lb 9.7 oz)   02/14/25 49 kg (108 lb 0.4 oz)   07/13/24 49.9 kg (110 lb)   05/27/24 46.7 kg (103 lb)   04/21/24 46.3 kg (102 lb)   03/25/24 59 kg (130 lb 1.1 oz)   01/23/24 59 kg (130 lb)   01/21/24 59.5 kg (131 lb 2.8 oz)        Lab/Procedures/Meds: Pertinent Labs/Meds Reviewed    Medications:Pertinent Medications Reviewed  Scheduled Meds:   budesonide  0.5 mg Nebulization Q12H    And    arformoteroL  15 mcg Nebulization BID    And    ipratropium  0.5 mg Nebulization Q6H WAKE    atorvastatin  80 mg Oral Daily    busPIRone  15 mg Oral TID    carvediloL  12.5 mg Oral BID    enoxaparin  30 mg Subcutaneous Daily    famotidine (PF)  20 mg Intravenous BID    FLUoxetine  40 mg Oral Daily    folic acid  1 mg Oral Daily    gabapentin  300 mg Oral TID    levothyroxine  50 mcg Oral Before breakfast    montelukast  10 mg Oral QHS    multivitamin  1 tablet Oral Daily    mupirocin   Nasal BID    nicotine  1 patch Transdermal Daily    piperacillin-tazobactam (Zosyn) IV (PEDS and ADULTS) (extended infusion is not appropriate)  4.5 g Intravenous Q8H    polyethylene glycol  17 g Oral  Daily    thiamine  100 mg Oral Daily     Continuous Infusions:   0.9% NaCl   Intravenous Continuous 125 mL/hr at 03/12/25 1300 Rate Verify at 03/12/25 1300    NORepinephrine bitartrate-D5W  0-3 mcg/kg/min Intravenous Continuous 7.1 mL/hr at 03/12/25 1300 0.04 mcg/kg/min at 03/12/25 1300     PRN Meds:.  Current Facility-Administered Medications:     acetaminophen, 650 mg, Oral, Q4H PRN    albuterol-ipratropium, 3 mL, Nebulization, Q6H PRN    butalbital-acetaminophen-caffeine -40 mg, 1 tablet, Oral, Q4H PRN    clonazePAM, 0.5 mg, Oral, BID PRN    dexmedeTOMIDine (Precedex) infusion (titrating), 0-1.4 mcg/kg/hr, Intravenous, Once PRN    HYDROcodone-acetaminophen, 1 tablet, Oral, Q6H PRN    HYDROcodone-acetaminophen, 1 tablet, Oral, Q6H PRN    lorazepam, 1 mg, Intravenous, Q6H PRN    magnesium oxide, 800 mg, Oral, PRN    magnesium oxide, 800 mg, Oral, PRN    methocarbamoL, 500 mg, Oral, QID PRN    naloxone, 0.02 mg, Intravenous, PRN    ondansetron, 4 mg, Intravenous, Q4H PRN    potassium bicarbonate, 35 mEq, Oral, PRN    potassium bicarbonate, 50 mEq, Oral, PRN    potassium bicarbonate, 60 mEq, Oral, PRN    sodium chloride 0.9%, 10 mL, Intravenous, Q12H PRN    Labs: Pertinent Labs Reviewed  Clinical Chemistry:  Recent Labs   Lab 03/06/25  0541 03/06/25  1525 03/07/25  0603 03/09/25  0632 03/11/25  0632 03/12/25  0234 03/12/25  0235   *   < > 133* 134*   < > 136  --    K 3.8   < > 3.6 4.3   < > 3.6  --    CL 89*   < > 92* 93*   < > 98  --    CO2 31*   < > 35* 35*   < > 34*  --    GLU 87   < > 102 93   < > 104  --    BUN 8   < > 7* 9   < > 10  --    CREATININE 0.6   < > 0.6 0.7   < > 0.6  --    CALCIUM 8.5*   < > 8.5* 8.6*   < > 8.0*  --    PROT 6.3  --  6.2 6.3  --   --   --    ALBUMIN 3.3*  --  3.0* 2.9*  --   --   --    BILITOT 0.4  --  0.3 0.2  --   --   --    ALKPHOS 86  --  80 88  --   --   --    AST 20  --  21 24  --   --   --    ALT 10  --  11 13  --   --   --    ANIONGAP 6*   < > 6* 6*   < > 4*  --   "  MG 1.5*  --  1.8 1.7  --   --  1.6   PHOS  --   --   --  4.5  --   --   --     < > = values in this interval not displayed.     CBC:   Recent Labs   Lab 03/12/25  0234   WBC 10.02   RBC 2.66*   HGB 8.1*   HCT 27.1*      *   MCH 30.5   MCHC 29.9*     Lipid Panel:  No results for input(s): "CHOL", "HDL", "LDLCALC", "TRIG", "CHOLHDL" in the last 168 hours.  Cardiac Profile:  Recent Labs   Lab 03/11/25  0945   *     Inflammatory Labs:  No results for input(s): "CRP" in the last 168 hours.  Diabetes:  No results for input(s): "HGBA1C", "POCTGLUCOSE" in the last 168 hours.  Thyroid & Parathyroid:  Recent Labs   Lab 03/06/25  0541   TSH 3.155       Monitor and Evaluation  Monitor and Evaluation: Diet order, Food and beverage intake     Discharge Planning  Nutrition Discharge Planning: General healthy diet, Oral supplement regimen (comments)    Nutrition Risk  Level of Risk/Frequency of Follow-up: comfort care (1 x / week)     Nutrition Follow-Up  RD Follow-up?: Yes            "

## 2025-03-12 NOTE — CARE UPDATE
03/12/25 1148   Patient Assessment/Suction   Level of Consciousness (AVPU) alert   Respiratory Effort Unlabored   Skin Integrity   $ Wound Care Tech Time 15 min   Area Observed Forehead   Skin Appearance redness blanchable   PRE-TX-O2   Device (Oxygen Therapy) BIPAP   $ Is the patient on High Flow Oxygen? Yes   Oxygen Concentration (%) 50   SpO2 98 %   Resp (!) 29   Preset CPAP/BiPAP Settings   Mode Of Delivery BiPAP S/T   CPAP/BIPAP charged w/in last 24 h YES   Ipap 20  (after abg results)   EPAP (cm H2O) 6   Pressure Support (cm H2O) 14   Set Rate (Breaths/Min) 26   ITime (sec) 0.9   Rise Time (sec) 3   Patient CPAP/BiPAP Settings   RR Total (Breaths/Min) 28   Tidal Volume (mL) 489   VE Minute Ventilation (L/min) 15.3 L/min   Peak Inspiratory Pressure (cm H2O) 19   TiTOT (%) 38   Total Leak (L/Min) 16   Patient Trigger - ST Mode Only (%) 38

## 2025-03-12 NOTE — CARE UPDATE
03/11/25 1856   Patient Assessment/Suction   Level of Consciousness (AVPU) responds to voice   Respiratory Effort Normal;Unlabored   Expansion/Accessory Muscles/Retractions no use of accessory muscles;no retractions;expansion symmetric   All Lung Fields Breath Sounds diminished;equal bilaterally   Rhythm/Pattern, Respiratory assisted mechanically  (non invasive bipap)   Cough Frequency no cough   Skin Integrity   $ Wound Care Tech Time 15 min   Area Observed Bridge of nose;Cheek   Skin Appearance without discoloration   PRE-TX-O2   Device (Oxygen Therapy) BIPAP   $ Is the patient on Low Flow Oxygen? Yes   Oxygen Concentration (%) 60   SpO2 98 %   Pulse Oximetry Type Continuous   $ Pulse Oximetry - Multiple Charge Pulse Oximetry - Multiple   Pulse 79   Resp 20   Aerosol Therapy   $ Aerosol Therapy Charges Aerosol Treatment   Daily Review of Necessity (SVN) completed   Respiratory Treatment Status (SVN) given   Treatment Route (SVN) mask;in-line   Patient Position HOB elevated   Post Treatment Assessment (SVN) breath sounds unchanged   Signs of Intolerance (SVN) none   Breath Sounds Post-Respiratory Treatment   Throughout All Fields Post-Treatment All Fields   Throughout All Fields Post-Treatment no change   Post-treatment Heart Rate (beats/min) 79   Post-treatment Resp Rate (breaths/min) 20   Ready to Wean/Extubation Screen   FIO2<=50 (chart decimal) (!) 0.6   Preset CPAP/BiPAP Settings   Mode Of Delivery AVAPS   $ CPAP/BiPAP Daily Charge 1   CPAP/BIPAP charged w/in last 24 h YES   $ Initial CPAP/BiPAP Setup? No   $ Is patient using? Yes   Size of Mask Small/Medium   Sized Appropriately? Yes   Equipment Type V60   Airway Device Type small full face mask   EPAP (cm H2O) 9   AVAPS Min P (cm H2O) 15   AVAPS Max P (cm H2O) 15   Set Tidal Volume (mL) 380 mL   Set Rate (Breaths/Min) 20   ITime (sec) 1   Rise Time (sec) 3   Patient CPAP/BiPAP Settings   Timed Inspiration (Sec) 1   IPAP Rise Time (sec) 3   RR Total  (Breaths/Min) 20   Tidal Volume (mL) 402   VE Minute Ventilation (L/min) 9.1 L/min   Peak Inspiratory Pressure (cm H2O) 15   TiTOT (%) 38   Total Leak (L/Min) 29   Patient Trigger - ST Mode Only (%) 34   CPAP/BiPAP Alarms   High Pressure (cm H2O) 40   Low Pressure (cm H2O) 5   Minute Ventilation (L/Min) 3   High RR (breaths/min) 40   Low RR (breaths/min) 5   Apnea (Sec) 20

## 2025-03-12 NOTE — CARE UPDATE
03/12/25 0653   Patient Assessment/Suction   Level of Consciousness (AVPU) responds to voice   Respiratory Effort Unlabored;Normal   All Lung Fields Breath Sounds diminished   Rhythm/Pattern, Respiratory assisted mechanically   PRE-TX-O2   Device (Oxygen Therapy) BIPAP   $ Is the patient on High Flow Oxygen? Yes   Oxygen Concentration (%) 50   SpO2 99 %   Pulse Oximetry Type Continuous   $ Pulse Oximetry - Multiple Charge Pulse Oximetry - Multiple   Pulse 79   Resp (!) 22   Positioning HOB elevated 30 degrees   Aerosol Therapy   $ Aerosol Therapy Charges Aerosol Treatment  (brovana)   Daily Review of Necessity (SVN) completed   Respiratory Treatment Status (SVN) given   Treatment Route (SVN) in-line;mask   Patient Position HOB elevated   Post Treatment Assessment (SVN) breath sounds unchanged;vital signs unchanged   Signs of Intolerance (SVN) none   Preset CPAP/BiPAP Settings   Mode Of Delivery BiPAP S/T   $ CPAP/BiPAP Daily Charge 1   CPAP/BIPAP charged w/in last 24 h YES   $ Initial CPAP/BiPAP Setup? No   $ Is patient using? Yes   Size of Mask Small/Medium   Sized Appropriately? Yes   Equipment Type V60   Airway Device Type small full face mask   Humidifier not applicable   Ipap 12   EPAP (cm H2O) 6   Pressure Support (cm H2O) 6   ITime (sec) 0.9   Rise Time (sec) 3   Patient CPAP/BiPAP Settings   Timed Inspiration (Sec) 0.9   IPAP Rise Time (sec) 3   RR Total (Breaths/Min) 20   Tidal Volume (mL) 441   VE Minute Ventilation (L/min) 8.8 L/min   Peak Inspiratory Pressure (cm H2O) 12   TiTOT (%) 30   Total Leak (L/Min) 16   Patient Trigger - ST Mode Only (%) 10   CPAP/BiPAP Backup Settings   IPAP Backup 12 cmH2O   EPAP Backup 6 cmH2O   Backup Rate 20 breaths per minute (bpm)   FIO2 Backup 50 %   ITIME Backup 0.9 seconds   Rise Time Backup 3 seconds   CPAP/BiPAP Alarms   High Pressure (cm H2O) 40   Low Pressure (cm H2O) 5   Minute Ventilation (L/Min) 3   High RR (breaths/min) 40   Low RR (breaths/min) 10   Apnea  (Sec) 20

## 2025-03-13 LAB
ALLENS TEST: ABNORMAL
ANION GAP SERPL CALC-SCNC: 4 MMOL/L (ref 8–16)
BACTERIA UR CULT: ABNORMAL
BACTERIA UR CULT: ABNORMAL
BASOPHILS # BLD AUTO: 0.02 K/UL (ref 0–0.2)
BASOPHILS NFR BLD: 0.3 % (ref 0–1.9)
BUN SERPL-MCNC: 6 MG/DL (ref 8–23)
CALCIUM SERPL-MCNC: 8 MG/DL (ref 8.7–10.5)
CHLORIDE SERPL-SCNC: 103 MMOL/L (ref 95–110)
CO2 SERPL-SCNC: 32 MMOL/L (ref 23–29)
CREAT SERPL-MCNC: 0.5 MG/DL (ref 0.5–1.4)
DELSYS: ABNORMAL
DIFFERENTIAL METHOD BLD: ABNORMAL
EOSINOPHIL # BLD AUTO: 0.2 K/UL (ref 0–0.5)
EOSINOPHIL NFR BLD: 2 % (ref 0–8)
EP: 6
ERYTHROCYTE [DISTWIDTH] IN BLOOD BY AUTOMATED COUNT: 13.4 % (ref 11.5–14.5)
ERYTHROCYTE [SEDIMENTATION RATE] IN BLOOD BY WESTERGREN METHOD: 24 MM/H
EST. GFR  (NO RACE VARIABLE): >60 ML/MIN/1.73 M^2
FIO2: 50
GLUCOSE SERPL-MCNC: 88 MG/DL (ref 70–110)
GLUCOSE SERPL-MCNC: 95 MG/DL (ref 70–110)
HCO3 UR-SCNC: 30.9 MMOL/L (ref 24–28)
HCT VFR BLD AUTO: 27 % (ref 37–48.5)
HCT VFR BLD CALC: 24 %PCV (ref 36–54)
HGB BLD-MCNC: 8.1 G/DL (ref 12–16)
IMM GRANULOCYTES # BLD AUTO: 0.03 K/UL (ref 0–0.04)
IMM GRANULOCYTES NFR BLD AUTO: 0.4 % (ref 0–0.5)
IP: 16
LYMPHOCYTES # BLD AUTO: 1 K/UL (ref 1–4.8)
LYMPHOCYTES NFR BLD: 13 % (ref 18–48)
MCH RBC QN AUTO: 30.7 PG (ref 27–31)
MCHC RBC AUTO-ENTMCNC: 30 G/DL (ref 32–36)
MCV RBC AUTO: 102 FL (ref 82–98)
MIN VOL: 6.2
MODE: ABNORMAL
MONOCYTES # BLD AUTO: 0.9 K/UL (ref 0.3–1)
MONOCYTES NFR BLD: 11.6 % (ref 4–15)
NEUTROPHILS # BLD AUTO: 5.6 K/UL (ref 1.8–7.7)
NEUTROPHILS NFR BLD: 72.7 % (ref 38–73)
NRBC BLD-RTO: 0 /100 WBC
PCO2 BLDA: 64.5 MMHG (ref 35–45)
PH SMN: 7.29 [PH] (ref 7.35–7.45)
PLATELET # BLD AUTO: 283 K/UL (ref 150–450)
PMV BLD AUTO: 10.9 FL (ref 9.2–12.9)
PO2 BLDA: 97 MMHG (ref 80–100)
POC BE: 4 MMOL/L
POC IONIZED CALCIUM: 1.21 MMOL/L (ref 1.06–1.42)
POC SATURATED O2: 96 % (ref 95–100)
POC TCO2: 33 MMOL/L (ref 23–27)
POTASSIUM BLD-SCNC: 3.9 MMOL/L (ref 3.5–5.1)
POTASSIUM SERPL-SCNC: 3.5 MMOL/L (ref 3.5–5.1)
RBC # BLD AUTO: 2.64 M/UL (ref 4–5.4)
SAMPLE: ABNORMAL
SITE: ABNORMAL
SODIUM BLD-SCNC: 139 MMOL/L (ref 136–145)
SODIUM SERPL-SCNC: 139 MMOL/L (ref 136–145)
SP02: 100
SPONT RATE: 26
WBC # BLD AUTO: 7.69 K/UL (ref 3.9–12.7)

## 2025-03-13 PROCEDURE — 36600 WITHDRAWAL OF ARTERIAL BLOOD: CPT

## 2025-03-13 PROCEDURE — 94640 AIRWAY INHALATION TREATMENT: CPT

## 2025-03-13 PROCEDURE — 99900035 HC TECH TIME PER 15 MIN (STAT)

## 2025-03-13 PROCEDURE — 99291 CRITICAL CARE FIRST HOUR: CPT | Mod: ,,, | Performed by: INTERNAL MEDICINE

## 2025-03-13 PROCEDURE — 85025 COMPLETE CBC W/AUTO DIFF WBC: CPT | Performed by: NURSE PRACTITIONER

## 2025-03-13 PROCEDURE — 25000003 PHARM REV CODE 250: Performed by: INTERNAL MEDICINE

## 2025-03-13 PROCEDURE — 94660 CPAP INITIATION&MGMT: CPT

## 2025-03-13 PROCEDURE — 25000003 PHARM REV CODE 250: Performed by: NURSE PRACTITIONER

## 2025-03-13 PROCEDURE — 97530 THERAPEUTIC ACTIVITIES: CPT

## 2025-03-13 PROCEDURE — 63600175 PHARM REV CODE 636 W HCPCS: Performed by: HOSPITALIST

## 2025-03-13 PROCEDURE — 36415 COLL VENOUS BLD VENIPUNCTURE: CPT | Performed by: NURSE PRACTITIONER

## 2025-03-13 PROCEDURE — S4991 NICOTINE PATCH NONLEGEND: HCPCS

## 2025-03-13 PROCEDURE — 99900031 HC PATIENT EDUCATION (STAT)

## 2025-03-13 PROCEDURE — 5A09357 ASSISTANCE WITH RESPIRATORY VENTILATION, LESS THAN 24 CONSECUTIVE HOURS, CONTINUOUS POSITIVE AIRWAY PRESSURE: ICD-10-PCS | Performed by: HOSPITALIST

## 2025-03-13 PROCEDURE — 97535 SELF CARE MNGMENT TRAINING: CPT | Mod: CO

## 2025-03-13 PROCEDURE — 27100171 HC OXYGEN HIGH FLOW UP TO 24 HOURS

## 2025-03-13 PROCEDURE — 94761 N-INVAS EAR/PLS OXIMETRY MLT: CPT | Mod: XB

## 2025-03-13 PROCEDURE — 20000000 HC ICU ROOM

## 2025-03-13 PROCEDURE — 25000242 PHARM REV CODE 250 ALT 637 W/ HCPCS: Performed by: HOSPITALIST

## 2025-03-13 PROCEDURE — 25000003 PHARM REV CODE 250

## 2025-03-13 PROCEDURE — 82803 BLOOD GASES ANY COMBINATION: CPT

## 2025-03-13 PROCEDURE — 25000003 PHARM REV CODE 250: Performed by: HOSPITALIST

## 2025-03-13 PROCEDURE — 80048 BASIC METABOLIC PNL TOTAL CA: CPT | Performed by: NURSE PRACTITIONER

## 2025-03-13 RX ORDER — DEXMEDETOMIDINE HYDROCHLORIDE 4 UG/ML
0-1.4 INJECTION, SOLUTION INTRAVENOUS CONTINUOUS
Status: DISCONTINUED | OUTPATIENT
Start: 2025-03-13 | End: 2025-03-15

## 2025-03-13 RX ORDER — FAMOTIDINE 20 MG/1
20 TABLET, FILM COATED ORAL 2 TIMES DAILY
Status: DISCONTINUED | OUTPATIENT
Start: 2025-03-13 | End: 2025-03-18 | Stop reason: HOSPADM

## 2025-03-13 RX ADMIN — ENOXAPARIN SODIUM 30 MG: 40 INJECTION SUBCUTANEOUS at 04:03

## 2025-03-13 RX ADMIN — LEVOTHYROXINE SODIUM 50 MCG: 0.03 TABLET ORAL at 05:03

## 2025-03-13 RX ADMIN — BUSPIRONE HYDROCHLORIDE 15 MG: 5 TABLET ORAL at 10:03

## 2025-03-13 RX ADMIN — NICOTINE 1 PATCH: 21 PATCH, EXTENDED RELEASE TRANSDERMAL at 04:03

## 2025-03-13 RX ADMIN — ARFORMOTEROL TARTRATE 15 MCG: 15 SOLUTION RESPIRATORY (INHALATION) at 07:03

## 2025-03-13 RX ADMIN — BUDESONIDE INHALATION 0.5 MG: 0.5 SUSPENSION RESPIRATORY (INHALATION) at 07:03

## 2025-03-13 RX ADMIN — IPRATROPIUM BROMIDE 0.5 MG: 0.5 SOLUTION RESPIRATORY (INHALATION) at 06:03

## 2025-03-13 RX ADMIN — THIAMINE HCL TAB 100 MG 100 MG: 100 TAB at 10:03

## 2025-03-13 RX ADMIN — MUPIROCIN 1 G: 20 OINTMENT TOPICAL at 10:03

## 2025-03-13 RX ADMIN — ARFORMOTEROL TARTRATE 15 MCG: 15 SOLUTION RESPIRATORY (INHALATION) at 08:03

## 2025-03-13 RX ADMIN — MUPIROCIN 1 G: 20 OINTMENT TOPICAL at 09:03

## 2025-03-13 RX ADMIN — POTASSIUM BICARBONATE 50 MEQ: 978 TABLET, EFFERVESCENT ORAL at 05:03

## 2025-03-13 RX ADMIN — PIPERACILLIN SODIUM AND TAZOBACTAM SODIUM 4.5 G: 4; .5 INJECTION, POWDER, LYOPHILIZED, FOR SOLUTION INTRAVENOUS at 05:03

## 2025-03-13 RX ADMIN — DEXMEDETOMIDINE HYDROCHLORIDE 0.3 MCG/KG/HR: 4 INJECTION, SOLUTION INTRAVENOUS at 04:03

## 2025-03-13 RX ADMIN — BUSPIRONE HYDROCHLORIDE 15 MG: 5 TABLET ORAL at 03:03

## 2025-03-13 RX ADMIN — SODIUM CHLORIDE: 9 INJECTION, SOLUTION INTRAVENOUS at 03:03

## 2025-03-13 RX ADMIN — GABAPENTIN 300 MG: 300 CAPSULE ORAL at 10:03

## 2025-03-13 RX ADMIN — THERA TABS 1 TABLET: TAB at 10:03

## 2025-03-13 RX ADMIN — FLUOXETINE HYDROCHLORIDE 40 MG: 20 CAPSULE ORAL at 10:03

## 2025-03-13 RX ADMIN — PIPERACILLIN SODIUM AND TAZOBACTAM SODIUM 4.5 G: 4; .5 INJECTION, POWDER, LYOPHILIZED, FOR SOLUTION INTRAVENOUS at 10:03

## 2025-03-13 RX ADMIN — FOLIC ACID 1 MG: 1 TABLET ORAL at 10:03

## 2025-03-13 RX ADMIN — FAMOTIDINE 20 MG: 10 INJECTION, SOLUTION INTRAVENOUS at 10:03

## 2025-03-13 RX ADMIN — PIPERACILLIN SODIUM AND TAZOBACTAM SODIUM 4.5 G: 4; .5 INJECTION, POWDER, LYOPHILIZED, FOR SOLUTION INTRAVENOUS at 02:03

## 2025-03-13 RX ADMIN — GABAPENTIN 300 MG: 300 CAPSULE ORAL at 03:03

## 2025-03-13 RX ADMIN — IPRATROPIUM BROMIDE 0.5 MG: 0.5 SOLUTION RESPIRATORY (INHALATION) at 01:03

## 2025-03-13 RX ADMIN — IPRATROPIUM BROMIDE 0.5 MG: 0.5 SOLUTION RESPIRATORY (INHALATION) at 07:03

## 2025-03-13 RX ADMIN — ATORVASTATIN CALCIUM 80 MG: 40 TABLET, FILM COATED ORAL at 10:03

## 2025-03-13 NOTE — ASSESSMENT & PLAN NOTE
Patient has a diagnosis of pneumonia. The cause of the pneumonia is bacterial in etiology due to Gram negative pathogens associated with aspiration. The pneumonia is acute. The patient has the following signs/symptoms of pneumonia: persistent hypoxia , cough, and shortness of breath. The patient does have a current oxygen requirement and the patient does have a home oxygen requirement. I have reviewed the pertinent imaging. The following cultures have been collected: Blood cultures and Sputum culture The culture results are listed below.     Current antimicrobial regimen consists of the antibiotics listed below. Will monitor patient closely and continue current treatment plan unchanged.    Antibiotics (From admission, onward)      Start     Stop Route Frequency Ordered    03/11/25 1215  mupirocin 2 % ointment         03/16/25 0859 Nasl 2 times daily 03/11/25 1114    03/11/25 1030  piperacillin-tazobactam (ZOSYN) 4.5 g in D5W 100 mL IVPB (MB+)         -- IV Every 8 hours (non-standard times) 03/11/25 0927            Microbiology Results (last 7 days)       Procedure Component Value Units Date/Time    Urine culture [[id number]]  (Abnormal)  (Susceptibility) Collected: 03/11/25 1406    Order Status: Completed Specimen: Urine Updated: 03/13/25 0634     Urine Culture, Routine PROTEUS MIRABILIS  >100,000 cfu/ml        STREPTOCOCCUS AGALACTIAE (GROUP B)  > 100,000 cfu/ml  In case of Penicillin allergy, call lab for further testing.  Beta-hemolytic streptococci are routinely susceptible to   penicillins,cephalosporins and carbapenems.      Narrative:      Specimen Source->Urine    Culture, Respiratory with Gram Stain [[id number]] Collected: 03/12/25 1047    Order Status: Completed Specimen: Respiratory from Sputum, Expectorated Updated: 03/12/25 1418     Gram Stain (Respiratory) Many WBC's     Gram Stain (Respiratory) >10 epithelial cells per low power field     Gram Stain (Respiratory) Few budding yeast     Gram Stain  (Respiratory) Few Gram positive cocci    Blood culture [[id number]] Collected: 03/11/25 0944    Order Status: Completed Specimen: Blood from Peripheral, Antecubital, Right Updated: 03/12/25 1232     Blood Culture, Routine No Growth to date      No Growth to date    Blood culture [[id number]] Collected: 03/11/25 0944    Order Status: Completed Specimen: Blood from Peripheral, Hand, Right Updated: 03/12/25 1232     Blood Culture, Routine No Growth to date      No Growth to date    Influenza A & B by Molecular [[id number]] Collected: 03/11/25 1110    Order Status: Sent Specimen: Nasopharyngeal Swab

## 2025-03-13 NOTE — SUBJECTIVE & OBJECTIVE
Interval History: Patient seen and examined during multidisciplinary rounds.    Patient requiring intermittent BiPAP therapy, currently on 5 liter/minute  oxygen via nasal cannula.  Patient is off intravenous Levophed infusion.  Currently afebrile.  Urine cultures grew Proteus mirabilis and group B streptococcal species. No family member present at bedside.  Pulmonary medicine closely following.     Review of Systems   Constitutional:  Positive for fever.   Respiratory:  Positive for cough and shortness of breath.    Cardiovascular:  Negative for chest pain.   Gastrointestinal:  Negative for nausea and vomiting.     Objective:     Vital Signs (Most Recent):  Temp: 98.2 °F (36.8 °C) (03/13/25 0330)  Pulse: 74 (03/13/25 0706)  Resp: (!) 28 (03/13/25 0706)  BP: 132/63 (03/13/25 0700)  SpO2: 96 % (03/13/25 0706) Vital Signs (24h Range):  Temp:  [97.4 °F (36.3 °C)-98.3 °F (36.8 °C)] 98.2 °F (36.8 °C)  Pulse:  [68-88] 74  Resp:  [10-37] 28  SpO2:  [91 %-100 %] 96 %  BP: ()/(46-92) 132/63     Weight: 47.1 kg (103 lb 13.4 oz)  Body mass index is 18.99 kg/m².    Intake/Output Summary (Last 24 hours) at 3/13/2025 0717  Last data filed at 3/13/2025 0700  Gross per 24 hour   Intake 4243.43 ml   Output 401 ml   Net 3842.43 ml         Physical Exam  Vitals and nursing note reviewed.   Constitutional:       Appearance: Normal appearance.   Cardiovascular:      Rate and Rhythm: Normal rate and regular rhythm.   Abdominal:      General: Abdomen is flat. Bowel sounds are normal.      Palpations: Abdomen is soft.   Musculoskeletal:         General: Normal range of motion.   Neurological:      General: No focal deficit present.      Mental Status: She is alert and oriented to person, place, and time.               Significant Labs: All pertinent labs within the past 24 hours have been reviewed.  CBC:   Recent Labs   Lab 03/11/25  1028 03/12/25  0234 03/13/25  0422   WBC  --  10.02  --    HGB  --  8.1*  --    HCT 30* 27.1* 24*    PLT  --  277  --      CMP:   Recent Labs   Lab 03/12/25  0234 03/13/25  0330    139   K 3.6 3.5   CL 98 103   CO2 34* 32*    88   BUN 10 6*   CREATININE 0.6 0.5   CALCIUM 8.0* 8.0*   ANIONGAP 4* 4*     Microbiology Results (last 7 days)       Procedure Component Value Units Date/Time    Urine culture [[id number]]  (Abnormal)  (Susceptibility) Collected: 03/11/25 1406    Order Status: Completed Specimen: Urine Updated: 03/13/25 0634     Urine Culture, Routine PROTEUS MIRABILIS  >100,000 cfu/ml        STREPTOCOCCUS AGALACTIAE (GROUP B)  > 100,000 cfu/ml  In case of Penicillin allergy, call lab for further testing.  Beta-hemolytic streptococci are routinely susceptible to   penicillins,cephalosporins and carbapenems.      Narrative:      Specimen Source->Urine    Culture, Respiratory with Gram Stain [[id number]] Collected: 03/12/25 1047    Order Status: Completed Specimen: Respiratory from Sputum, Expectorated Updated: 03/12/25 1418     Gram Stain (Respiratory) Many WBC's     Gram Stain (Respiratory) >10 epithelial cells per low power field     Gram Stain (Respiratory) Few budding yeast     Gram Stain (Respiratory) Few Gram positive cocci    Blood culture [[id number]] Collected: 03/11/25 0944    Order Status: Completed Specimen: Blood from Peripheral, Antecubital, Right Updated: 03/12/25 1232     Blood Culture, Routine No Growth to date      No Growth to date    Blood culture [[id number]] Collected: 03/11/25 0944    Order Status: Completed Specimen: Blood from Peripheral, Hand, Right Updated: 03/12/25 1232     Blood Culture, Routine No Growth to date      No Growth to date    Influenza A & B by Molecular [[id number]] Collected: 03/11/25 1110    Order Status: Sent Specimen: Nasopharyngeal Swab           Significant Imaging:   B/L hip x-ray:  No acute findings.     Lumbar x-ray:  Acute compression fracture of T12. Recommend CT.     CT T-spine:  Acute burst fracture of T12. Referral to neurosurgery advised.      MRI Lumbar spine:  Acute compression fracture of T12 with loss of height of approximately 25%.  There is minimal retropulsion of the posterior cortex measuring 3 mm without central canal stenosis  Old mild chronic compression fracture of T11  Multilevel degenerative disc disease and facet hypertrophy with mild central canal stenosis and foraminal narrowing at L2-3, L3-4 and L4-5    X-ray thoracolumar AP/lateral:  Acute fracture of the T12 vertebra is again observed with no detrimental change from prior CT exam.     CT Head:  No acute intracranial abnormalities. Chronic findings as discussed above.     X-ray thoracolumbar:  Stable mild compression fracture of T12  Chronic mild compression fracture of T11    CXR: Interval improvement, with near complete resolution of right basilar infiltrate.     CXR:  Persistent diffuse interstitial prominence with mild patchy airspace opacities having progressed slightly in the interval.  Mild blunting of the left costophrenic angle

## 2025-03-13 NOTE — PROGRESS NOTES
Pharmacist Intervention IV to PO Note    Bhavana Lambert is a 69 y.o. female being treated with IV medication famotidine    Patient Data:    Vital Signs (Most Recent):  Temp: 98 °F (36.7 °C) (03/13/25 1115)  Pulse: 95 (03/13/25 1307)  Resp: (!) 31 (03/13/25 1307)  BP: 115/77 (03/13/25 1245)  SpO2: 100 % (03/13/25 1307) Vital Signs (72h Range):  Temp:  [97.2 °F (36.2 °C)-100.5 °F (38.1 °C)]   Pulse:  []   Resp:  [10-44]   BP: ()/(46-92)   SpO2:  [88 %-100 %]      CBC:  Recent Labs   Lab 03/11/25  0632 03/11/25  0902 03/12/25  0234 03/13/25  0330 03/13/25  0422   WBC 10.25  --  10.02 7.69  --    RBC 3.43*  --  2.66* 2.64*  --    HGB 10.4*  --  8.1* 8.1*  --    HCT 35.0*   < > 27.1* 27.0* 24*     --  277 283  --    *  --  102* 102*  --    MCH 30.3  --  30.5 30.7  --    MCHC 29.7*  --  29.9* 30.0*  --     < > = values in this interval not displayed.     CMP:     Recent Labs   Lab 03/07/25  0603 03/09/25  0632 03/11/25  0632 03/12/25  0234 03/13/25  0330    93 119* 104 88   CALCIUM 8.5* 8.6* 9.2 8.0* 8.0*   ALBUMIN 3.0* 2.9*  --   --   --    PROT 6.2 6.3  --   --   --    * 134* 134* 136 139   K 3.6 4.3 4.2 3.6 3.5   CO2 35* 35* 36* 34* 32*   CL 92* 93* 92* 98 103   BUN 7* 9 10 10 6*   CREATININE 0.6 0.7 0.7 0.6 0.5   ALKPHOS 80 88  --   --   --    ALT 11 13  --   --   --    AST 21 24  --   --   --    BILITOT 0.3 0.2  --   --   --        Dietary Orders:  Diet Orders            Dietary nutrition supplements By Mouth; BID; Boost Plus Nutritional Drink - Any flavor starting at 03/12 1524    Diet Adult Regular: Regular starting at 03/06 0859            Based on the following criteria, this patient qualifies for intravenous to oral conversion:    [x] The patients gastrointestinal tract is functioning (tolerating medications via oral or enteral route for 24 hours and tolerating food or enteral feeds for 24 hours).    IV medication famotidine 20 mg IV BID will be changed to oral  medication famotidine 20 mg PO BID    Pharmacist's Name: Jose Bentley  Pharmacist's Extension: [phone]

## 2025-03-13 NOTE — CARE UPDATE
03/13/25 0743   PRE-TX-O2   SpO2 99 %   Pulse 74   Resp 19   Preset CPAP/BiPAP Settings   Mode Of Delivery BiPAP S/T   CPAP/BIPAP charged w/in last 24 h YES   $ Initial CPAP/BiPAP Setup? No   $ Is patient using? Yes   Size of Mask Small/Medium   Sized Appropriately? Yes   Equipment Type V60   Airway Device Type medium nasal mask   Ipap 20   EPAP (cm H2O) 8   Pressure Support (cm H2O) 12   Set Rate (Breaths/Min) 28   ITime (sec) 0.85   Rise Time (sec) 2   Patient CPAP/BiPAP Settings   CPAP/BIPAP ID 2   Timed Inspiration (Sec) 0.85   IPAP Rise Time (sec) 2   RR Total (Breaths/Min) 29   Tidal Volume (mL) 325   VE Minute Ventilation (L/min) 8.5 L/min   Peak Inspiratory Pressure (cm H2O) 20   TiTOT (%) 40   Total Leak (L/Min) 13   Patient Trigger - ST Mode Only (%) 16   CPAP/BiPAP Backup Settings   IPAP Backup 20 cmH2O   EPAP Backup 8 cmH2O   Backup Rate 28 breaths per minute (bpm)   FIO2 Backup 40 %   ITIME Backup 0.85 seconds   Rise Time Backup 2 seconds   CPAP/BiPAP Alarms   High Pressure (cm H2O) 40   Low Pressure (cm H2O) 5   Minute Ventilation (L/Min) 5   High RR (breaths/min) 40   Low RR (breaths/min) 6   Apnea (Sec) 20   Education   $ Education BiPAP;Oxygen;Bronchodilator;15 min     Changed out V60 (no leak detected obvious leak was present), Changed to heated wire circuit

## 2025-03-13 NOTE — PROGRESS NOTES
[facility]  Progress Note    Patient Name: Bhavana Lambert  MRN: [id number]  Patient Class: IP- Inpatient   Admission Date: 3/4/2025  Length of Stay: 9 days  Attending Physician: Shayy Flowers MD  Primary Care Provider: Pepito Jhaveri IV, MD        Subjective     Principal Problem:T12 burst fracture        HPI:  69-year-old female with PMHx of COPD, HTN, hypothyroidism, prior stroke who presented to the ED with a complaint of lumbar and bilateral hip pain status post fall several days ago. The patient reports that she was wearing socks and she slipped, falling backwards towards a wall and then slid down the wall to a sitting position. She denies loss of consciousness, chest pain, current shortness of breath, dizziness, nausea or vomiting, incontinence of bowel or bladder. She is on plavix at home.     In the ED, /79  RR 20 afebrile and 96% on 3L NC (baseline).     Imaging showed Acute burst fracture of T12. Case was [place] [place] will consult on the patient. She was given tylenol, morphine, and zofran and stable for transfer, no neuro deficits.     Per chart review from recent admission on February 9th to February 14, 2025, the patient was admitted for COPD exacerbation and did have a history of previous EtOH abuse with subsequent falls.     Overview/Hospital Course:  Bhavana Lambert is 69-year-old female with PMH COPD, HTN, hypothyroidism, prior stroke.  She was admitted to the hospital for management of an acute burst fracture of T12.  Neurosurgery following.  MRI obtained.  X-ray thoracolumbar spine ordered and showed stability.  PT/OT consulted.  TLSO brace applied for activity per neurosurgery recommendation.  Pt developed ETOH withdrawals with delirium on 03/06 and was placed on Librium protocol.  She had worsening hyponatremia prompting further workup though this notably improved with diet liberalization.  Her DTs were much improved  on 03/07 after Librium initiation.  Sodium levels continued to improve.  Pain was controlled with oral narcotics.  Librium was down titrated.  On 03/11 she developed increased O2 requirements with low-grade temp 100.5°.  CXR revealed acute on chronic changes of patchy infiltrates.  She reported reflux overnight-concerning clinically for aspiration.  She was placed on BiPAP after ABG revealed hypercapnia.  She was transferred to ICU for continued BiPAP therapy and pulmonology was consulted.  She was placed on IV Abx and sepsis workup was pursued.    Interval History:  patient seen and examined during multidisciplinary rounds.  Currently on BiPAP noninvasive ventilatory therapy with FiO2 50%.  Patient also receiving intravenous Levophed infusion.  T-max 100.5° F.  Urine cultures growing Proteus species.  Patient possibly hallucinating as well.  No family member present at bedside.  Pulmonary medicine closely following.     Review of Systems   Constitutional:  Positive for fever.   Respiratory:  Positive for cough and shortness of breath.    Cardiovascular:  Negative for chest pain.   Gastrointestinal:  Negative for nausea and vomiting.     Objective:     Vital Signs (Most Recent):  Temp: 98.2 °F (36.8 °C) (03/13/25 0330)  Pulse: 74 (03/13/25 0706)  Resp: (!) 28 (03/13/25 0706)  BP: 132/63 (03/13/25 0700)  SpO2: 96 % (03/13/25 0706) Vital Signs (24h Range):  Temp:  [97.4 °F (36.3 °C)-98.3 °F (36.8 °C)] 98.2 °F (36.8 °C)  Pulse:  [68-88] 74  Resp:  [10-37] 28  SpO2:  [91 %-100 %] 96 %  BP: ()/(46-92) 132/63     Weight: 47.1 kg (103 lb 13.4 oz)  Body mass index is 18.99 kg/m².    Intake/Output Summary (Last 24 hours) at 3/13/2025 0717  Last data filed at 3/13/2025 0700  Gross per 24 hour   Intake 4243.43 ml   Output 401 ml   Net 3842.43 ml         Physical Exam  Vitals and nursing note reviewed.   Constitutional:       Appearance: Normal appearance.   Cardiovascular:      Rate and Rhythm: Normal rate and regular  rhythm.   Pulmonary:      Comments:   Presently on BiPAP  Abdominal:      General: Abdomen is flat. Bowel sounds are normal.      Palpations: Abdomen is soft.   Musculoskeletal:         General: Normal range of motion.   Neurological:      Mental Status: She is alert.      Comments:   Patient is alert and oriented but exhibiting intermittent hallucinations as well.               Significant Labs: All pertinent labs within the past 24 hours have been reviewed.  CBC:   Recent Labs   Lab 03/11/25  1028 03/12/25  0234 03/13/25  0422   WBC  --  10.02  --    HGB  --  8.1*  --    HCT 30* 27.1* 24*   PLT  --  277  --      CMP:   Recent Labs   Lab 03/12/25  0234 03/13/25  0330    139   K 3.6 3.5   CL 98 103   CO2 34* 32*    88   BUN 10 6*   CREATININE 0.6 0.5   CALCIUM 8.0* 8.0*   ANIONGAP 4* 4*     Microbiology Results (last 7 days)       Procedure Component Value Units Date/Time    Urine culture [[id number]]  (Abnormal)  (Susceptibility) Collected: 03/11/25 1406    Order Status: Completed Specimen: Urine Updated: 03/13/25 0634     Urine Culture, Routine PROTEUS MIRABILIS  >100,000 cfu/ml        STREPTOCOCCUS AGALACTIAE (GROUP B)  > 100,000 cfu/ml  In case of Penicillin allergy, call lab for further testing.  Beta-hemolytic streptococci are routinely susceptible to   penicillins,cephalosporins and carbapenems.      Narrative:      Specimen Source->Urine    Culture, Respiratory with Gram Stain [[id number]] Collected: 03/12/25 1047    Order Status: Completed Specimen: Respiratory from Sputum, Expectorated Updated: 03/12/25 1418     Gram Stain (Respiratory) Many WBC's     Gram Stain (Respiratory) >10 epithelial cells per low power field     Gram Stain (Respiratory) Few budding yeast     Gram Stain (Respiratory) Few Gram positive cocci    Blood culture [[id number]] Collected: 03/11/25 0944    Order Status: Completed Specimen: Blood from Peripheral, Antecubital, Right Updated: 03/12/25 1232     Blood Culture,  Routine No Growth to date      No Growth to date    Blood culture [[id number]] Collected: 03/11/25 0944    Order Status: Completed Specimen: Blood from Peripheral, Hand, Right Updated: 03/12/25 1232     Blood Culture, Routine No Growth to date      No Growth to date    Influenza A & B by Molecular [[id number]] Collected: 03/11/25 1110    Order Status: Sent Specimen: Nasopharyngeal Swab           Significant Imaging:   B/L hip x-ray:  No acute findings.     Lumbar x-ray:  Acute compression fracture of T12. Recommend CT.     CT T-spine:  Acute burst fracture of T12. Referral to neurosurgery advised.     MRI Lumbar spine:  Acute compression fracture of T12 with loss of height of approximately 25%.  There is minimal retropulsion of the posterior cortex measuring 3 mm without central canal stenosis  Old mild chronic compression fracture of T11  Multilevel degenerative disc disease and facet hypertrophy with mild central canal stenosis and foraminal narrowing at L2-3, L3-4 and L4-5    X-ray thoracolumar AP/lateral:  Acute fracture of the T12 vertebra is again observed with no detrimental change from prior CT exam.     CT Head:  No acute intracranial abnormalities. Chronic findings as discussed above.     X-ray thoracolumbar:  Stable mild compression fracture of T12  Chronic mild compression fracture of T11    CXR: Interval improvement, with near complete resolution of right basilar infiltrate.     CXR:  Persistent diffuse interstitial prominence with mild patchy airspace opacities having progressed slightly in the interval.  Mild blunting of the left costophrenic angle          Assessment & Plan  T12 burst fracture  MRI shows acute compression fracture of T12 with loss of height of approximately 25%.  There is minimal retropulsion of the posterior cortex measuring 3 mm without central canal stenosis.  Old mild chronic compression fracture of T11.  Multilevel degenerative disc disease and facet hypertrophy with mild  central canal stenosis and foraminal narrowing at L2-3, L3-4, and L4-5  Pain control  Neurochecks   Neurosurgery consulted- T XR stable  TLSO brace with mobilization  Repeat XR 2/2 fall  PT/OT- SNF recommending.  CM pursuing    Hypothyroid  Patient has chronic hypothyroidism. TFTs reviewed-   Lab Results   Component Value Date    TSH 3.155 03/06/2025   Will continue chronic levothyroxine and adjust for and clinical changes.     Mixed hyperlipidemia  Continue home statin    Tobacco dependency  Smoking cessation counseling performed. Dangers of cigarette smoking were reviewed with patient in detail and patient was encouraged to quit. Nicotine replacement options were discussed for > 3 minutes.  Receiving Nicoderm.  Hyponatremia  Hyponatremia is a historical properly acutely worsening here in the hospital.  The patient's most recent sodium results are listed below.  Recent Labs     03/11/25  0632 03/12/25  0234 03/13/25  0330   * 136 139     Plan  - Correct the sodium by 4-6mEq in 24 hours.   - sodium is self corrected with holding of the SSRI and diet liberalization   - SSRI resumed 3/7, and evaluating response-stable thus far.  Alcohol dependence with withdrawal delirium  Pt with confirmed daily ETOH utilization appeared with DTs on 03/06   -initiated scheduled Librium 3/6 with much improvement.  Will continue to down taper the Librium.  -continue p.r.n. Ativan   -nursing to perform CIWA  -fall, seizure precautions   -daily MVI, folic acid, thiamine    Anemia  Chronic problem.  Stable.  Monitor H/H.    Transfuse for hemodynamic instability and/or H/H <7/21    Aspiration pneumonia of both lungs  UTI (urinary tract infection) due to Proteus mirabilis species  Patient has a diagnosis of pneumonia. The cause of the pneumonia is bacterial in etiology due to Gram negative pathogens associated with aspiration . The pneumonia is  acute . The patient has the following signs/symptoms of pneumonia: persistent hypoxia ,  cough, and shortness of breath. The patient does have a current oxygen requirement and the patient does have a home oxygen requirement. I have reviewed the pertinent imaging. The following cultures have been collected: Blood cultures and Sputum culture The culture results are listed below.     Current antimicrobial regimen consists of the antibiotics listed below. Will monitor patient closely and continue current treatment plan unchanged.    Antibiotics (From admission, onward)      Start     Stop Route Frequency Ordered    03/11/25 1215  mupirocin 2 % ointment         03/16/25 0859 Nasl 2 times daily 03/11/25 1114    03/11/25 1030  piperacillin-tazobactam (ZOSYN) 4.5 g in D5W 100 mL IVPB (MB+)         -- IV Every 8 hours (non-standard times) 03/11/25 0927            Microbiology Results (last 7 days)       Procedure Component Value Units Date/Time    Urine culture [[id number]]  (Abnormal)  (Susceptibility) Collected: 03/11/25 1406    Order Status: Completed Specimen: Urine Updated: 03/13/25 0634     Urine Culture, Routine PROTEUS MIRABILIS  >100,000 cfu/ml        STREPTOCOCCUS AGALACTIAE (GROUP B)  > 100,000 cfu/ml  In case of Penicillin allergy, call lab for further testing.  Beta-hemolytic streptococci are routinely susceptible to   penicillins,cephalosporins and carbapenems.      Narrative:      Specimen Source->Urine    Culture, Respiratory with Gram Stain [[id number]] Collected: 03/12/25 1047    Order Status: Completed Specimen: Respiratory from Sputum, Expectorated Updated: 03/12/25 1418     Gram Stain (Respiratory) Many WBC's     Gram Stain (Respiratory) >10 epithelial cells per low power field     Gram Stain (Respiratory) Few budding yeast     Gram Stain (Respiratory) Few Gram positive cocci    Blood culture [[id number]] Collected: 03/11/25 0944    Order Status: Completed Specimen: Blood from Peripheral, Antecubital, Right Updated: 03/12/25 1232     Blood Culture, Routine No Growth to date      No Growth to  date    Blood culture [[id number]] Collected: 03/11/25 0944    Order Status: Completed Specimen: Blood from Peripheral, Hand, Right Updated: 03/12/25 1232     Blood Culture, Routine No Growth to date      No Growth to date    Influenza A & B by Molecular [[id number]] Collected: 03/11/25 1110    Order Status: Sent Specimen: Nasopharyngeal Swab               Acute on chronic respiratory failure with hypoxia and hypercapnia  Patient with Hypercapnic and Hypoxic Respiratory failure which is Acute on chronic.  she is on home oxygen at 3.5 LPM. Supplemental oxygen was provided and noted- Oxygen Concentration (%):  [40-50] 40    .   Signs/symptoms of respiratory failure include- tachypnea and increased work of breathing. Contributing diagnoses includes - Aspiration, COPD, Interstitial lung disease, and Pneumonia Labs and images were reviewed. Patient Has recent ABG, which has been reviewed. Will treat underlying causes and adjust management of respiratory failure as follows-   -continue BiPAP   -continue IV Abx   - pulmonary Medicine following.      Increase IV fluids to 125 cc an hour.  Repeat ABG now.  Discussed with bedside nurse and .  VTE Risk Mitigation (From admission, onward)           Ordered     enoxaparin injection 30 mg  Daily         03/11/25 1130     IP VTE HIGH RISK PATIENT  Once         03/04/25 1604     Place sequential compression device  Until discontinued         03/04/25 1604     Reason for No Pharmacological VTE Prophylaxis  Once        Question:  Reasons:  Answer:  Physician Provided (leave comment)  Comment:  pending neurosurgery eval    03/04/25 1604                    Discharge Planning   CHLOE: 3/17/2025     Code Status: Full Code   Medical Readiness for Discharge Date:   Discharge Plan A: Skilled Nursing Facility   Discharge Delays: None known at this time        Critical care time spent on the evaluation and treatment of severe organ dysfunction, review of pertinent labs and imaging  studies, discussions with consulting providers and discussions with patient/family: *** minutes.    Please place Justification for DME        Shayy Flowers MD  Department of Hospital Medicine   [facility]

## 2025-03-13 NOTE — RESPIRATORY THERAPY
03/12/25 1937   Patient Assessment/Suction   Level of Consciousness (AVPU) alert   Respiratory Effort Normal;Unlabored   Expansion/Accessory Muscles/Retractions no retractions;no use of accessory muscles   All Lung Fields Breath Sounds diminished   Rhythm/Pattern, Respiratory pattern regular;unlabored   Cough Frequency no cough   Skin Integrity   Area Observed Left;Right;Behind ear;Nares   Skin Appearance without discoloration   PRE-TX-O2   Device (Oxygen Therapy) high flow nasal cannula   Flow (L/min) (Oxygen Therapy) 5   SpO2 100 %   Pulse Oximetry Type Continuous   $ Pulse Oximetry - Multiple Charge Pulse Oximetry - Multiple   Pulse 81   Resp 16   Aerosol Therapy   $ Aerosol Therapy Charges Aerosol Treatment   Daily Review of Necessity (SVN) completed   Respiratory Treatment Status (SVN) given   Treatment Route (SVN) mask;oxygen   Patient Position HOB elevated   Post Treatment Assessment (SVN) breath sounds unchanged   Signs of Intolerance (SVN) none   Preset CPAP/BiPAP Settings   Mode Of Delivery standby   CPAP/BIPAP charged w/in last 24 h YES   Education   $ Education BiPAP;Bronchodilator;Oxygen;15 min

## 2025-03-13 NOTE — ASSESSMENT & PLAN NOTE
Patient has a diagnosis of pneumonia. The cause of the pneumonia is bacterial in etiology due to Gram negative pathogens associated with aspiration. The pneumonia is acute. The patient has the following signs/symptoms of pneumonia: persistent hypoxia , cough, and shortness of breath. The patient does have a current oxygen requirement and the patient does have a home oxygen requirement. I have reviewed the pertinent imaging. The following cultures have been collected: Blood cultures and Sputum culture The culture results are listed below.     Current antimicrobial regimen consists of the antibiotics listed below. Will monitor patient closely and continue current treatment plan unchanged.    Antibiotics (From admission, onward)      Start     Stop Route Frequency Ordered    03/11/25 1215  mupirocin 2 % ointment         03/16/25 0859 Nasl 2 times daily 03/11/25 1114    03/11/25 1030  piperacillin-tazobactam (ZOSYN) 4.5 g in D5W 100 mL IVPB (MB+)         -- IV Every 8 hours (non-standard times) 03/11/25 0927            Microbiology Results (last 7 days)       Procedure Component Value Units Date/Time    Culture, Respiratory with Gram Stain [[id number]] Collected: 03/12/25 1047    Order Status: Completed Specimen: Respiratory from Sputum, Expectorated Updated: 03/13/25 0746     Respiratory Culture Normal respiratory kehinde     Gram Stain (Respiratory) Many WBC's     Gram Stain (Respiratory) >10 epithelial cells per low power field     Gram Stain (Respiratory) Few budding yeast     Gram Stain (Respiratory) Few Gram positive cocci    Urine culture [[id number]]  (Abnormal)  (Susceptibility) Collected: 03/11/25 1406    Order Status: Completed Specimen: Urine Updated: 03/13/25 0634     Urine Culture, Routine PROTEUS MIRABILIS  >100,000 cfu/ml        STREPTOCOCCUS AGALACTIAE (GROUP B)  > 100,000 cfu/ml  In case of Penicillin allergy, call lab for further testing.  Beta-hemolytic streptococci are routinely susceptible to    penicillins,cephalosporins and carbapenems.      Narrative:      Specimen Source->Urine    Blood culture [[id number]] Collected: 03/11/25 0944    Order Status: Completed Specimen: Blood from Peripheral, Antecubital, Right Updated: 03/12/25 1232     Blood Culture, Routine No Growth to date      No Growth to date    Blood culture [[id number]] Collected: 03/11/25 0944    Order Status: Completed Specimen: Blood from Peripheral, Hand, Right Updated: 03/12/25 1232     Blood Culture, Routine No Growth to date      No Growth to date    Influenza A & B by Molecular [[id number]] Collected: 03/11/25 1110    Order Status: Sent Specimen: Nasopharyngeal Swab

## 2025-03-13 NOTE — ASSESSMENT & PLAN NOTE
Patient has a diagnosis of pneumonia. The cause of the pneumonia is bacterial in etiology due to Gram negative pathogens associated with aspiration. The pneumonia is acute. The patient has the following signs/symptoms of pneumonia: persistent hypoxia , cough, and shortness of breath. The patient does have a current oxygen requirement and the patient does have a home oxygen requirement. I have reviewed the pertinent imaging. The following cultures have been collected: Blood cultures and Sputum culture The culture results are listed below.     Current antimicrobial regimen consists of the antibiotics listed below. Will monitor patient closely and continue current treatment plan unchanged.    Antibiotics (From admission, onward)      Start     Stop Route Frequency Ordered    03/11/25 1215  mupirocin 2 % ointment         03/16/25 0859 Nasl 2 times daily 03/11/25 1114    03/11/25 1030  piperacillin-tazobactam (ZOSYN) 4.5 g in D5W 100 mL IVPB (MB+)         -- IV Every 8 hours (non-standard times) 03/11/25 0927            Microbiology Results (last 7 days)       Procedure Component Value Units Date/Time    Urine culture [[id number]]  (Abnormal)  (Susceptibility) Collected: 03/11/25 1406    Order Status: Completed Specimen: Urine Updated: 03/13/25 0634     Urine Culture, Routine PROTEUS MIRABILIS  >100,000 cfu/ml        STREPTOCOCCUS AGALACTIAE (GROUP B)  > 100,000 cfu/ml  In case of Penicillin allergy, call lab for further testing.  Beta-hemolytic streptococci are routinely susceptible to   penicillins,cephalosporins and carbapenems.      Narrative:      Specimen Source->Urine    Culture, Respiratory with Gram Stain [[id number]] Collected: 03/12/25 1047    Order Status: Completed Specimen: Respiratory from Sputum, Expectorated Updated: 03/12/25 1418     Gram Stain (Respiratory) Many WBC's     Gram Stain (Respiratory) >10 epithelial cells per low power field     Gram Stain (Respiratory) Few budding yeast     Gram Stain  (Respiratory) Few Gram positive cocci    Blood culture [[id number]] Collected: 03/11/25 0944    Order Status: Completed Specimen: Blood from Peripheral, Antecubital, Right Updated: 03/12/25 1232     Blood Culture, Routine No Growth to date      No Growth to date    Blood culture [[id number]] Collected: 03/11/25 0944    Order Status: Completed Specimen: Blood from Peripheral, Hand, Right Updated: 03/12/25 1232     Blood Culture, Routine No Growth to date      No Growth to date    Influenza A & B by Molecular [[id number]] Collected: 03/11/25 1110    Order Status: Sent Specimen: Nasopharyngeal Swab

## 2025-03-13 NOTE — PROGRESS NOTES
[facility]  Progress Note    Patient Name: Bhavana Lambert  MRN: [id number]  Patient Class: IP- Inpatient   Admission Date: 3/4/2025  Length of Stay: 9 days  Attending Physician: Shayy Flowers MD  Primary Care Provider: Pepito Jhaveri IV, MD        Subjective     Principal Problem:T12 burst fracture        HPI:  69-year-old female with PMHx of COPD, HTN, hypothyroidism, prior stroke who presented to the ED with a complaint of lumbar and bilateral hip pain status post fall several days ago. The patient reports that she was wearing socks and she slipped, falling backwards towards a wall and then slid down the wall to a sitting position. She denies loss of consciousness, chest pain, current shortness of breath, dizziness, nausea or vomiting, incontinence of bowel or bladder. She is on plavix at home.     In the ED, /79  RR 20 afebrile and 96% on 3L NC (baseline).     Imaging showed Acute burst fracture of T12. Case was discussed with Dr. Boothe ([facility]) who will consult on the patient. She was given tylenol, morphine, and zofran and stable for transfer, no neuro deficits.     Per chart review from recent admission on February 9th to February 14, 2025, the patient was admitted for COPD exacerbation and did have a history of previous EtOH abuse with subsequent falls.     Overview/Hospital Course:  Bhavana Lambert is 69-year-old female with PMH COPD, HTN, hypothyroidism, prior stroke.  She was admitted to the hospital for management of an acute burst fracture of T12.  Neurosurgery following.  MRI obtained.  X-ray thoracolumbar spine ordered and showed stability.  PT/OT consulted.  TLSO brace applied for activity per neurosurgery recommendation.  Pt developed ETOH withdrawals with delirium on 03/06 and was placed on Librium protocol.  She had worsening hyponatremia prompting further workup though this notably improved with diet liberalization.  Her DTs were much improved  on 03/07 after Librium initiation.  Sodium levels continued to improve.  Pain was controlled with oral narcotics.  Librium was down titrated.  On 03/11 she developed increased O2 requirements with low-grade temp 100.5°.  CXR revealed acute on chronic changes of patchy infiltrates.  She reported reflux overnight-concerning clinically for aspiration.  She was placed on BiPAP after ABG revealed hypercapnia.  She was transferred to ICU for continued BiPAP therapy and pulmonology was consulted.  She was placed on IV Abx and sepsis workup was pursued.    Interval History: Patient seen and examined during multidisciplinary rounds.    Patient requiring intermittent BiPAP therapy, currently on 5 liter/minute  oxygen via nasal cannula.  Patient is off intravenous Levophed infusion.  Currently afebrile.  Urine cultures grew Proteus mirabilis and group B streptococcal species. No family member present at bedside.  Pulmonary medicine closely following.     Review of Systems   Constitutional:  Positive for fever.   Respiratory:  Positive for cough and shortness of breath.    Cardiovascular:  Negative for chest pain.   Gastrointestinal:  Negative for nausea and vomiting.     Objective:     Vital Signs (Most Recent):  Temp: 98.2 °F (36.8 °C) (03/13/25 0330)  Pulse: 74 (03/13/25 0706)  Resp: (!) 28 (03/13/25 0706)  BP: 132/63 (03/13/25 0700)  SpO2: 96 % (03/13/25 0706) Vital Signs (24h Range):  Temp:  [97.4 °F (36.3 °C)-98.3 °F (36.8 °C)] 98.2 °F (36.8 °C)  Pulse:  [68-88] 74  Resp:  [10-37] 28  SpO2:  [91 %-100 %] 96 %  BP: ()/(46-92) 132/63     Weight: 47.1 kg (103 lb 13.4 oz)  Body mass index is 18.99 kg/m².    Intake/Output Summary (Last 24 hours) at 3/13/2025 0717  Last data filed at 3/13/2025 0700  Gross per 24 hour   Intake 4243.43 ml   Output 401 ml   Net 3842.43 ml         Physical Exam  Vitals and nursing note reviewed.   Constitutional:       Appearance: Normal appearance.   Cardiovascular:      Rate and Rhythm:  Normal rate and regular rhythm.   Abdominal:      General: Abdomen is flat. Bowel sounds are normal.      Palpations: Abdomen is soft.   Musculoskeletal:         General: Normal range of motion.   Neurological:      General: No focal deficit present.      Mental Status: She is alert and oriented to person, place, and time.               Significant Labs: All pertinent labs within the past 24 hours have been reviewed.  CBC:   Recent Labs   Lab 03/11/25  1028 03/12/25  0234 03/13/25  0422   WBC  --  10.02  --    HGB  --  8.1*  --    HCT 30* 27.1* 24*   PLT  --  277  --      CMP:   Recent Labs   Lab 03/12/25  0234 03/13/25  0330    139   K 3.6 3.5   CL 98 103   CO2 34* 32*    88   BUN 10 6*   CREATININE 0.6 0.5   CALCIUM 8.0* 8.0*   ANIONGAP 4* 4*     Microbiology Results (last 7 days)       Procedure Component Value Units Date/Time    Urine culture [[id number]]  (Abnormal)  (Susceptibility) Collected: 03/11/25 1406    Order Status: Completed Specimen: Urine Updated: 03/13/25 0634     Urine Culture, Routine PROTEUS MIRABILIS  >100,000 cfu/ml        STREPTOCOCCUS AGALACTIAE (GROUP B)  > 100,000 cfu/ml  In case of Penicillin allergy, call lab for further testing.  Beta-hemolytic streptococci are routinely susceptible to   penicillins,cephalosporins and carbapenems.      Narrative:      Specimen Source->Urine    Culture, Respiratory with Gram Stain [[id number]] Collected: 03/12/25 1047    Order Status: Completed Specimen: Respiratory from Sputum, Expectorated Updated: 03/12/25 1418     Gram Stain (Respiratory) Many WBC's     Gram Stain (Respiratory) >10 epithelial cells per low power field     Gram Stain (Respiratory) Few budding yeast     Gram Stain (Respiratory) Few Gram positive cocci    Blood culture [[id number]] Collected: 03/11/25 0944    Order Status: Completed Specimen: Blood from Peripheral, Antecubital, Right Updated: 03/12/25 1232     Blood Culture, Routine No Growth to date      No Growth to date     Blood culture [[id number]] Collected: 03/11/25 0944    Order Status: Completed Specimen: Blood from Peripheral, Hand, Right Updated: 03/12/25 1232     Blood Culture, Routine No Growth to date      No Growth to date    Influenza A & B by Molecular [[id number]] Collected: 03/11/25 1110    Order Status: Sent Specimen: Nasopharyngeal Swab           Significant Imaging:   B/L hip x-ray:  No acute findings.     Lumbar x-ray:  Acute compression fracture of T12. Recommend CT.     CT T-spine:  Acute burst fracture of T12. Referral to neurosurgery advised.     MRI Lumbar spine:  Acute compression fracture of T12 with loss of height of approximately 25%.  There is minimal retropulsion of the posterior cortex measuring 3 mm without central canal stenosis  Old mild chronic compression fracture of T11  Multilevel degenerative disc disease and facet hypertrophy with mild central canal stenosis and foraminal narrowing at L2-3, L3-4 and L4-5    X-ray thoracolumar AP/lateral:  Acute fracture of the T12 vertebra is again observed with no detrimental change from prior CT exam.     CT Head:  No acute intracranial abnormalities. Chronic findings as discussed above.     X-ray thoracolumbar:  Stable mild compression fracture of T12  Chronic mild compression fracture of T11    CXR: Interval improvement, with near complete resolution of right basilar infiltrate.     CXR:  Persistent diffuse interstitial prominence with mild patchy airspace opacities having progressed slightly in the interval.  Mild blunting of the left costophrenic angle          Assessment & Plan  T12 burst fracture  MRI shows acute compression fracture of T12 with loss of height of approximately 25%.  There is minimal retropulsion of the posterior cortex measuring 3 mm without central canal stenosis.  Old mild chronic compression fracture of T11.  Multilevel degenerative disc disease and facet hypertrophy with mild central canal stenosis and foraminal narrowing at L2-3,  L3-4, and L4-5  Pain control  Neurochecks   Neurosurgery consulted- T XR stable  TLSO brace with mobilization  Repeat XR 2/2 fall  PT/OT- SNF recommending.  CM pursuing    Hypothyroid  Patient has chronic hypothyroidism. TFTs reviewed-   Lab Results   Component Value Date    TSH 3.155 03/06/2025   Will continue chronic levothyroxine and adjust for and clinical changes.     Mixed hyperlipidemia  Continue home statin    Tobacco dependency  Smoking cessation counseling performed. Dangers of cigarette smoking were reviewed with patient in detail and patient was encouraged to quit. Nicotine replacement options were discussed for > 3 minutes.  Receiving Nicoderm.  Hyponatremia  Hyponatremia is a historical properly acutely worsening here in the hospital.  The patient's most recent sodium results are listed below.  Recent Labs     03/11/25  0632 03/12/25  0234 03/13/25  0330   * 136 139     Plan  - Correct the sodium by 4-6mEq in 24 hours.   - sodium is self corrected with holding of the SSRI and diet liberalization   - SSRI resumed 3/7, and evaluating response-stable thus far.  Alcohol dependence with withdrawal delirium  Pt with confirmed daily ETOH utilization appeared with DTs on 03/06   -initiated scheduled Librium 3/6 with much improvement.  Will continue to down taper the Librium.  -continue p.r.n. Ativan   -nursing to perform CIWA  -fall, seizure precautions   -daily MVI, folic acid, thiamine    Anemia  Chronic problem.  Stable.  Monitor H/H.    Transfuse for hemodynamic instability and/or H/H <7/21    Aspiration pneumonia of both lungs  UTI (urinary tract infection) due to Proteus mirabilis species  Patient has a diagnosis of pneumonia. The cause of the pneumonia is bacterial in etiology due to Gram negative pathogens associated with aspiration . The pneumonia is  acute . The patient has the following signs/symptoms of pneumonia: persistent hypoxia , cough, and shortness of breath. The patient does have a  current oxygen requirement and the patient does have a home oxygen requirement. I have reviewed the pertinent imaging. The following cultures have been collected: Blood cultures and Sputum culture The culture results are listed below.     Current antimicrobial regimen consists of the antibiotics listed below. Will monitor patient closely and continue current treatment plan unchanged.    Antibiotics (From admission, onward)      Start     Stop Route Frequency Ordered    03/11/25 1215  mupirocin 2 % ointment         03/16/25 0859 Nasl 2 times daily 03/11/25 1114    03/11/25 1030  piperacillin-tazobactam (ZOSYN) 4.5 g in D5W 100 mL IVPB (MB+)         -- IV Every 8 hours (non-standard times) 03/11/25 0927            Microbiology Results (last 7 days)       Procedure Component Value Units Date/Time    Culture, Respiratory with Gram Stain [[id number]] Collected: 03/12/25 1047    Order Status: Completed Specimen: Respiratory from Sputum, Expectorated Updated: 03/13/25 0746     Respiratory Culture Normal respiratory kehinde     Gram Stain (Respiratory) Many WBC's     Gram Stain (Respiratory) >10 epithelial cells per low power field     Gram Stain (Respiratory) Few budding yeast     Gram Stain (Respiratory) Few Gram positive cocci    Urine culture [[id number]]  (Abnormal)  (Susceptibility) Collected: 03/11/25 1406    Order Status: Completed Specimen: Urine Updated: 03/13/25 0634     Urine Culture, Routine PROTEUS MIRABILIS  >100,000 cfu/ml        STREPTOCOCCUS AGALACTIAE (GROUP B)  > 100,000 cfu/ml  In case of Penicillin allergy, call lab for further testing.  Beta-hemolytic streptococci are routinely susceptible to   penicillins,cephalosporins and carbapenems.      Narrative:      Specimen Source->Urine    Blood culture [[id number]] Collected: 03/11/25 0944    Order Status: Completed Specimen: Blood from Peripheral, Antecubital, Right Updated: 03/12/25 1232     Blood Culture, Routine No Growth to date      No Growth to date     Blood culture [[id number]] Collected: 03/11/25 0944    Order Status: Completed Specimen: Blood from Peripheral, Hand, Right Updated: 03/12/25 1232     Blood Culture, Routine No Growth to date      No Growth to date    Influenza A & B by Molecular [[id number]] Collected: 03/11/25 1110    Order Status: Sent Specimen: Nasopharyngeal Swab               Acute on chronic respiratory failure with hypoxia and hypercapnia  Patient with Hypercapnic and Hypoxic Respiratory failure which is Acute on chronic.  she is on home oxygen at 3.5 LPM. Supplemental oxygen was provided and noted- Oxygen Concentration (%):  [40-50] 40    .   Signs/symptoms of respiratory failure include- tachypnea and increased work of breathing. Contributing diagnoses includes - Aspiration, COPD, Interstitial lung disease, and Pneumonia Labs and images were reviewed. Patient Has recent ABG, which has been reviewed. Will treat underlying causes and adjust management of respiratory failure as follows-   -continue BiPAP   -continue IV Abx   - pulmonary Medicine following.      Reduce IV fluids to 50 cc an hour. Discussed with bedside nurse and .  VTE Risk Mitigation (From admission, onward)           Ordered     enoxaparin injection 30 mg  Daily         03/11/25 1130     IP VTE HIGH RISK PATIENT  Once         03/04/25 1604     Place sequential compression device  Until discontinued         03/04/25 1604     Reason for No Pharmacological VTE Prophylaxis  Once        Question:  Reasons:  Answer:  Physician Provided (leave comment)  Comment:  pending neurosurgery eval    03/04/25 1604                    Discharge Planning   CHLOE: 3/17/2025     Code Status: Full Code   Medical Readiness for Discharge Date:   Discharge Plan A: Skilled Nursing Facility   Discharge Delays: None known at this time        Critical care time spent on the evaluation and treatment of severe organ dysfunction, review of pertinent labs and imaging studies, discussions with  consulting providers and discussions with patient/family: 35 minutes.    Please place Justification for DME        Shayy Flowers MD  Department of Hospital Medicine   [facility]

## 2025-03-13 NOTE — PROGRESS NOTES
Progress Note  PULMONARY    Admit Date: 3/4/2025   03/13/2025    History of Present Illness:  Pt is a 70 yo female with COPD, HTN, hypothyroidism who presented to the hospital due to a fall, T12 burst fracture. She has developed progressive respiratory failure and today requiring bipap and planned to transfer to the ICU. Pulmonary consulted regarding respiratory failure management. Pt is incoherent and unable to give solid history but apparently has a hx of EtOH abuse and smoking. She does endorse spitting up mucous recently. She had been getting librium, robaxin and PRN hydrocodone 5mg, last doses yesterday PM. These have been held. Antibiotics have been started for possible respiratory infection. She was hospitalized last month with COPD exacerbation, resp failure and aspiration pneumonia. Seen by Ila Perez for f/u visit in pulm clinic on 2/24/25. She uses home O2 at 3.5L.     SUBJECTIVE:     3/12- pt continues on bipap, awake and alert, somewhat confused/mumbling responses. ABG slightly worse and BIPAP has been adjusted    3/13/2025 - On BiPAP when I saw her and only tolerating brief periods of time off of BiPAP.  No distress.  She is conversant but is confused.  ABG about the same.  Reviewed old ABG looks like baseline paCO2 is 60-65 range       OBJECTIVE:     Vitals (Most recent):  Vitals:    03/13/25 1331   BP:    Pulse: 92   Resp: 16   Temp:        Vitals (24 hour range):  Temp:  [98 °F (36.7 °C)-98.4 °F (36.9 °C)]   Pulse:  [68-97]   Resp:  [10-44]   BP: ()/(46-92)   SpO2:  [88 %-100 %]       Intake/Output Summary (Last 24 hours) at 3/13/2025 1523  Last data filed at 3/13/2025 0700  Gross per 24 hour   Intake 2397.96 ml   Output 201 ml   Net 2196.96 ml          Physical Exam:  The patient's neuro status (alertness,orientation,cognitive function,motor skills,), pharyngeal exam (oral lesions, hygiene, abn dentition,), Neck (jvd,mass,thyroid,nodes in neck and above/below  clavicle),RESPIRATORY(symmetry,effort,fremitus,percussion,auscultation),  Cor(rhythm,heart tones including gallops,perfusion,edema)ABD(distention,hepatic&splenomegaly,tenderness,masses), Skin(rash,cyanosis),Psyc(affect,judgement,).  Exam negative except for these pertinent findings:    Awake, A&Ox2, BiPAP on  Speaking, difficult to understand, some inappropriate responses  PERRL  HR regular  Diminished breath sounds bilaterally  Abd soft nontender, BS+  No edema/clubbing  Follows commands in all extremities  Chronically ill appearing and with muscle wasting        Radiographs reviewed: view by direct vision    CXR 3/12/25- increasing RUL opacity  CXR 3/11/25- unchanged chronic interstitial changes bilaterally  CT chest 2/9/25- emphysema and R lateral and basilar consolidations      Labs     Recent Labs   Lab 03/13/25 0330 03/13/25 0422   WBC 7.69  --    HGB 8.1*  --    HCT 27.0* 24*     --      Recent Labs   Lab 03/13/25  0330      K 3.5      CO2 32*   BUN 6*   CREATININE 0.5   GLU 88   CALCIUM 8.0*     Recent Labs   Lab 03/13/25  0422   PH 7.288*   PCO2 64.5*   PO2 97   HCO3 30.9*     Microbiology Results (last 7 days)       Procedure Component Value Units Date/Time    Blood culture [[id number]] Collected: 03/11/25 0944    Order Status: Completed Specimen: Blood from Peripheral, Hand, Right Updated: 03/13/25 1232     Blood Culture, Routine No Growth to date      No Growth to date      No Growth to date    Blood culture [[id number]] Collected: 03/11/25 0944    Order Status: Completed Specimen: Blood from Peripheral, Antecubital, Right Updated: 03/13/25 1232     Blood Culture, Routine No Growth to date      No Growth to date      No Growth to date    Culture, Respiratory with Gram Stain [[id number]] Collected: 03/12/25 1047    Order Status: Completed Specimen: Respiratory from Sputum, Expectorated Updated: 03/13/25 1216     Respiratory Culture Normal respiratory kehinde     Gram Stain (Respiratory)  Many WBC's     Gram Stain (Respiratory) >10 epithelial cells per low power field     Gram Stain (Respiratory) Few budding yeast     Gram Stain (Respiratory) Few Gram positive cocci    Urine culture [[id number]]  (Abnormal)  (Susceptibility) Collected: 03/11/25 1406    Order Status: Completed Specimen: Urine Updated: 03/13/25 0634     Urine Culture, Routine PROTEUS MIRABILIS  >100,000 cfu/ml        STREPTOCOCCUS AGALACTIAE (GROUP B)  > 100,000 cfu/ml  In case of Penicillin allergy, call lab for further testing.  Beta-hemolytic streptococci are routinely susceptible to   penicillins,cephalosporins and carbapenems.      Narrative:      Specimen Source->Urine    Influenza A & B by Molecular [[id number]] Collected: 03/11/25 1110    Order Status: Sent Specimen: Nasopharyngeal Swab             Impression:  Active Hospital Problems    Diagnosis  POA    *T12 burst fracture [S22.081A]  Yes    UTI (urinary tract infection) due to Proteus mirabilis species [N39.0]  No    Anemia [D64.9]  Yes    Alcohol dependence with withdrawal delirium [F10.231]  No    Tobacco dependency [F17.200]  Yes    Acute on chronic respiratory failure with hypoxia and hypercapnia [J96.21, J96.22]  Yes    Aspiration pneumonia of both lungs [J69.0]  Yes    Mixed hyperlipidemia [E78.2]  Yes    Hyponatremia [E87.1]  Yes    Hypothyroid [E03.9]  Yes      Resolved Hospital Problems   No resolved problems to display.               Plan:       - continue BIPAP as needed O2 as able  - she continues to be awake and alert, hypercapnea is acute on chronic and not far from her baseline - no new  for intubation but could decompensate quickly  - hyponatremia, resolved  - suspect ongoing EtOH withdrawal, will avoid oversedation and use precedex if needed, on thiamine and MVI  - urine culture reviewed and continue zosyn- UTI  - continue inhaled bronchodilators  - CXR repeat shows pneumonia RUL - will recheck  - per nsg non operative management for T12 burst fracture  recommended with TLSO brace     Critical Care Time    I have spent 36  minutes providing critical care services for this pt for the above diagnoses.  These services have included pt evaluation, pt exam, BiPAP/oxygenation assessment, discussions with staff, chart review, data review, note preparation and .  Medications have been reviewed and adjusted as needed.  The patient has life threatening illness with a high risk of decompensation and/or death.      Khalif Valdez MD  [facility] Pulmonary/Critical Care                          .

## 2025-03-13 NOTE — PLAN OF CARE
Pt not very cooperative with bipap, pulls mask off, keeps tugging at hose and mask.  Problem: Adult Inpatient Plan of Care  Goal: Plan of Care Review  Outcome: Not Progressing  Goal: Patient-Specific Goal (Individualized)  Outcome: Not Progressing  Goal: Absence of Hospital-Acquired Illness or Injury  Outcome: Not Progressing  Goal: Optimal Comfort and Wellbeing  Outcome: Not Progressing  Goal: Readiness for Transition of Care  Outcome: Not Progressing

## 2025-03-13 NOTE — ASSESSMENT & PLAN NOTE
Patient with Hypercapnic and Hypoxic Respiratory failure which is Acute on chronic.  she is on home oxygen at 3.5 LPM. Supplemental oxygen was provided and noted- Oxygen Concentration (%):  [40-50] 40    .   Signs/symptoms of respiratory failure include- tachypnea and increased work of breathing. Contributing diagnoses includes - Aspiration, COPD, Interstitial lung disease, and Pneumonia Labs and images were reviewed. Patient Has recent ABG, which has been reviewed. Will treat underlying causes and adjust management of respiratory failure as follows-   -continue BiPAP   -continue IV Abx   - pulmonary Medicine following.      Reduce IV fluids to 50 cc an hour. Discussed with bedside nurse and .

## 2025-03-13 NOTE — ASSESSMENT & PLAN NOTE
Patient with Hypercapnic and Hypoxic Respiratory failure which is Acute on chronic.  she is on home oxygen at 3.5 LPM. Supplemental oxygen was provided and noted- Oxygen Concentration (%):  [40-50] 40    .   Signs/symptoms of respiratory failure include- tachypnea and increased work of breathing. Contributing diagnoses includes - Aspiration, COPD, Interstitial lung disease, and Pneumonia Labs and images were reviewed. Patient Has recent ABG, which has been reviewed. Will treat underlying causes and adjust management of respiratory failure as follows-   -continue BiPAP   -continue IV Abx   - pulmonary Medicine following.      Increase IV fluids to 125 cc an hour.  Repeat ABG now.  Discussed with bedside nurse and .

## 2025-03-13 NOTE — ASSESSMENT & PLAN NOTE
Hyponatremia is a historical properly acutely worsening here in the hospital.  The patient's most recent sodium results are listed below.  Recent Labs     03/11/25  0632 03/12/25  0234 03/13/25  0330   * 136 139     Plan  - Correct the sodium by 4-6mEq in 24 hours.   - sodium is self corrected with holding of the SSRI and diet liberalization   - SSRI resumed 3/7, and evaluating response-stable thus far.

## 2025-03-13 NOTE — PLAN OF CARE
Problem: Occupational Therapy  Goal: Occupational Therapy Goal  Description: Goals to be met by: 4/7/25     Patient will increase functional independence with ADLs by performing:    UE Dressing with Supervision.  LE Dressing with Contact Guard Assistance.  Grooming while seated at sink with Supervision.- while seated at EOB with intermittent Jimi posteriorly to counteract lean and fatigue.  Toileting from bedside commode with Supervision for hygiene and clothing management. - BM in diaper at bed level; pt requesting immodium and stated no awareness of voiding BM.  Toilet transfer to bedside commode with Contact Guard Assistance.    Outcome: Progressing; nurse informed; pt participated well, though requires increased time and spinal precaution adherence and only oriented to self.

## 2025-03-13 NOTE — PT/OT/SLP PROGRESS
Occupational Therapy   Treatment    Name: Bhavana Lambert  MRN: [id number]  Admitting Diagnosis:  T12 burst fracture       Recommendations:     Discharge Recommendations: Moderate Intensity Therapy  Discharge Equipment Recommendations:  to be determined by next level of care  Barriers to discharge:  Other (Comment) (poor mentation and increased assist for ADLs and mobility)    Assessment:     Bhavana Lambert is a 69 y.o. female with a medical diagnosis of T12 burst fracture.  She presents with oriented only to self, participatory with increased time to mobilize in adherence with spinal precautions, and with pt understanding sequencing of best techniques for this. Pt with poor short term memory and will require reinforcement of spinal precaution adherence with future mobilizations also. Performance deficits affecting function are impaired cognition, weakness, impaired endurance, impaired self care skills, impaired functional mobility, gait instability, impaired balance, decreased upper extremity function, decreased lower extremity function, decreased safety awareness, pain, decreased ROM, impaired skin, edema, impaired cardiopulmonary response to activity.     Rehab Prognosis:  Fair; patient would benefit from acute skilled OT services to address these deficits and reach maximum level of function.       Plan:     Patient to be seen 5 x/week to address the above listed problems via self-care/home management, therapeutic activities, therapeutic exercises  Plan of Care Expires: 04/07/25  Plan of Care Reviewed with: patient    Subjective     Chief Complaint: wants immodium  Patient/Family Comments/goals: to brush teeth   Pain/Comfort:  Pain Rating 1: other (see comments) (pain behaviors noted at EOB unsupported sitting.)  Location - Side 1: Bilateral  Location - Orientation 1: lower  Location 1: back  Pain Addressed 1: Pre-medicate for activity, Reposition, Distraction, Cessation of Activity  Pain Rating  "Post-Intervention 1: other (see comments) (pain is less visible when pt is at rest.)    Objective:     Communicated with: nurseSara prior to session.  Patient found HOB elevated with telemetry, blood pressure cuff, pulse ox (continuous), oxygen upon OT entry to room.    General Precautions: Standard, fall    Orthopedic Precautions:spinal precautions  Braces: TLSO (with activity)  Respiratory Status: Nasal cannula, flow 5 L/min     Occupational Performance:     Bed Mobility:    Patient completed Rolling/Turning to Left with  moderate assistance  Patient completed Rolling/Turning to Right with minimum assistance  Patient completed Scooting/Bridging with stand by assistance and verbal cues to "lift your hips up" and completed 4 bridges this session.  Patient completed Supine to Sit with maximal assistance, 2 persons, and log roll technique to bed exit Left  Patient completed Sit to Supine with maximal assistance, 2 persons, and poor recall of log roll getting back into bed.     Patient completed EOB with intermittent Jimi and finally SBA, after recruiting the assist of her LUE in a posterior lean position while completing grooming with the RUE.    Activities of Daily Living:  Grooming: stand by assistance for oral hygiene while seated EOB and all items within reach on tray table. Pt holding up midline with BUE and requested FERNANDEZ ready the toothbrush for her, then she maintained midline holding herself up with LUE while using RUE to brush teeth.  Toileting: dependence with BM in brief at bed level and pt unaware of the voiding. Stool was soft and unformed, but not liquid. Nurse informed. Increased time for hygiene in all crevices under diaper.    [facility] 6 Click ADL: 14    Treatment & Education:  -FERNANDEZ ed for integration of core muscles into EOB sitting, in order to have both Left and Right upper extremity available for use. She v/u and stated she is too weak for that right now.  -At FERNANDEZ entry pt had doffed her oxygen " (SpO2 89%) and stated there were plastic pieces falling off of it and she needed to fix it. FERNANDEZ sanitized the nasal cannula and assure pt that it was intact and needed to remain in her nares. She v/u and SpO2 increased to 95% < 30 seconds.     Patient left HOB elevated with all lines intact, call button in reach, bed alarm on, and nurse, Sara notified    GOALS:   Multidisciplinary Problems       Occupational Therapy Goals          Problem: Occupational Therapy    Goal Priority Disciplines Outcome Interventions   Occupational Therapy Goal     OT, PT/OT Progressing    Description: Goals to be met by: 4/7/25     Patient will increase functional independence with ADLs by performing:    UE Dressing with Supervision.  LE Dressing with Contact Guard Assistance.  Grooming while seated at sink with Supervision.  Toileting from bedside commode with Supervision for hygiene and clothing management.   Toilet transfer to bedside commode with Contact Guard Assistance.                       Time Tracking:     OT Date of Treatment: 03/13/25  OT Start Time: 1350  OT Stop Time: 1428  OT Total Time (min): 38 min    Billable Minutes:Self Care/Home Management 38 min    OT/ANDREW: ANDREW     Number of ANDREW visits since last OT visit: 1    3/13/2025

## 2025-03-13 NOTE — PT/OT/SLP PROGRESS
Physical Therapy Treatment    Patient Name:  Bhavana Lambert   MRN:  [id number]    Recommendations:     Discharge Recommendations: Moderate Intensity Therapy  Discharge Equipment Recommendations: to be determined by next level of care  Barriers to discharge:  increase assist with mobility, high fall risk, decrease safety awareness, impaired cognition,    Assessment:     Bhavana Lambert is a 69 y.o. female admitted with a medical diagnosis of T12 burst fracture.  She presents with the following impairments/functional limitations: weakness, impaired endurance, impaired self care skills, impaired functional mobility, impaired balance, decreased upper extremity function, decreased lower extremity function, decreased safety awareness, impaired cardiopulmonary response to activity, orthopedic precautions.    Pt found in bed with HOB elevated. Pt transferred to the ICU on  3/11/25 due to increase O2 needs, low BP and low grade fever. RN at bedside. Reports patient is solid. Requested assist with transfers but patient noted too solid to apply brace and needed to be cleaned up in bed. Pt requires min/mod A with rolling. TLSO donning in bed with max A. Dependent for hygiene. Supine to sit with mod A Fair static sitting balance at EOB. Pt pleasantly confused but able to follow all functional mobility commands. Pt sat up in chair x 5 mins while bed linens changed    Rehab Prognosis: Fair; patient would benefit from acute skilled PT services to address these deficits and reach maximum level of function.    Recent Surgery: * No surgery found *      Plan:     During this hospitalization, patient to be seen 5 x/week to address the identified rehab impairments via therapeutic activities, therapeutic exercises, neuromuscular re-education and progress toward the following goals:    Plan of Care Expires:  04/13/25    Subjective     Chief Complaint: soiled and generalized confusion  Patient/Family Comments/goals:  smoke  Pain/Comfort:  Pain Rating 1: 0/10      Objective:     Communicated with RN prior to session.  Patient found HOB elevated with peripheral IV, telemetry, blood pressure cuff, pulse ox (continuous), oxygen upon PT entry to room.     General Precautions: Standard, fall  Orthopedic Precautions: spinal precautions  Braces: TLSO (with activity)  Respiratory Status: Nasal cannula, flow 5 L/min     Functional Mobility:  Bed Mobility:     Rolling Left:  moderate assistance  Rolling Right: moderate assistance  Supine to Sit: moderate assistance  Sit to Supine: moderate assistance  Transfers:     Sit to Stand:  moderate assistance with no AD  Bed to Chair: moderate assistance with  no AD  using  Stand Pivot      AM-PAC 6 CLICK MOBILITY  Turning over in bed (including adjusting bedclothes, sheets and blankets)?: 3  Sitting down on and standing up from a chair with arms (e.g., wheelchair, bedside commode, etc.): 3  Moving from lying on back to sitting on the side of the bed?: 3  Moving to and from a bed to a chair (including a wheelchair)?: 2  Need to walk in hospital room?: 1  Climbing 3-5 steps with a railing?: 1  Basic Mobility Total Score: 13       Treatment & Education:  Pt educated on POC, discharge recommendation, importance of time OOB, TLSO wearing schedule and donning,  proper form with mobility, need for assist with mobility, use of call bell to seek assistance as needed and fall prevention      Patient left HOB elevated with all lines intact, call button in reach, bed alarm on, and RN present..    GOALS:   Multidisciplinary Problems       Physical Therapy Goals          Problem: Physical Therapy    Goal Priority Disciplines Outcome Interventions   Physical Therapy Goal     PT, PT/OT Progressing    Description: Goals to be met by: 25     Patient will increase functional independence with mobility by performin. Supine to sit with Standby Assistance  2. Sit to stand with Standby Assistance using  Rolling Walker   3. Bed to chair transfer with Standby Assistance using Rolling Walker  4. Sitting at edge of bed x10 minutes with Minimal Assistance                       DME Justifications:  No DME recommended requiring DME justifications    Time Tracking:     PT Received On: 03/13/25  PT Start Time: 1115     PT Stop Time: 1145  PT Total Time (min): 30 min     Billable Minutes: Therapeutic Activity 30    Treatment Type: Treatment  PT/PTA: PT     Number of PTA visits since last PT visit: 0     03/13/2025

## 2025-03-13 NOTE — ASSESSMENT & PLAN NOTE
Patient has a diagnosis of pneumonia. The cause of the pneumonia is bacterial in etiology due to Gram negative pathogens associated with aspiration. The pneumonia is acute. The patient has the following signs/symptoms of pneumonia: persistent hypoxia , cough, and shortness of breath. The patient does have a current oxygen requirement and the patient does have a home oxygen requirement. I have reviewed the pertinent imaging. The following cultures have been collected: Blood cultures and Sputum culture The culture results are listed below.     Current antimicrobial regimen consists of the antibiotics listed below. Will monitor patient closely and continue current treatment plan unchanged.    Antibiotics (From admission, onward)      Start     Stop Route Frequency Ordered    03/11/25 1215  mupirocin 2 % ointment         03/16/25 0859 Nasl 2 times daily 03/11/25 1114    03/11/25 1030  piperacillin-tazobactam (ZOSYN) 4.5 g in D5W 100 mL IVPB (MB+)         -- IV Every 8 hours (non-standard times) 03/11/25 0927            Microbiology Results (last 7 days)       Procedure Component Value Units Date/Time    Culture, Respiratory with Gram Stain [[id number]] Collected: 03/12/25 1047    Order Status: Completed Specimen: Respiratory from Sputum, Expectorated Updated: 03/13/25 0746     Respiratory Culture Normal respiratory kehinde     Gram Stain (Respiratory) Many WBC's     Gram Stain (Respiratory) >10 epithelial cells per low power field     Gram Stain (Respiratory) Few budding yeast     Gram Stain (Respiratory) Few Gram positive cocci    Urine culture [[id number]]  (Abnormal)  (Susceptibility) Collected: 03/11/25 1406    Order Status: Completed Specimen: Urine Updated: 03/13/25 0634     Urine Culture, Routine PROTEUS MIRABILIS  >100,000 cfu/ml        STREPTOCOCCUS AGALACTIAE (GROUP B)  > 100,000 cfu/ml  In case of Penicillin allergy, call lab for further testing.  Beta-hemolytic streptococci are routinely susceptible to    penicillins,cephalosporins and carbapenems.      Narrative:      Specimen Source->Urine    Blood culture [[id number]] Collected: 03/11/25 0944    Order Status: Completed Specimen: Blood from Peripheral, Antecubital, Right Updated: 03/12/25 1232     Blood Culture, Routine No Growth to date      No Growth to date    Blood culture [[id number]] Collected: 03/11/25 0944    Order Status: Completed Specimen: Blood from Peripheral, Hand, Right Updated: 03/12/25 1232     Blood Culture, Routine No Growth to date      No Growth to date    Influenza A & B by Molecular [[id number]] Collected: 03/11/25 1110    Order Status: Sent Specimen: Nasopharyngeal Swab

## 2025-03-14 LAB
ANION GAP SERPL CALC-SCNC: 5 MMOL/L (ref 8–16)
BACTERIA SPEC AEROBE CULT: NORMAL
BASOPHILS # BLD AUTO: 0.02 K/UL (ref 0–0.2)
BASOPHILS NFR BLD: 0.3 % (ref 0–1.9)
BUN SERPL-MCNC: 4 MG/DL (ref 8–23)
CALCIUM SERPL-MCNC: 8.4 MG/DL (ref 8.7–10.5)
CHLORIDE SERPL-SCNC: 102 MMOL/L (ref 95–110)
CO2 SERPL-SCNC: 31 MMOL/L (ref 23–29)
CREAT SERPL-MCNC: 0.5 MG/DL (ref 0.5–1.4)
DIFFERENTIAL METHOD BLD: ABNORMAL
EOSINOPHIL # BLD AUTO: 0.2 K/UL (ref 0–0.5)
EOSINOPHIL NFR BLD: 3.1 % (ref 0–8)
ERYTHROCYTE [DISTWIDTH] IN BLOOD BY AUTOMATED COUNT: 13.3 % (ref 11.5–14.5)
EST. GFR  (NO RACE VARIABLE): >60 ML/MIN/1.73 M^2
GLUCOSE SERPL-MCNC: 109 MG/DL (ref 70–110)
GRAM STN SPEC: NORMAL
HCT VFR BLD AUTO: 26.1 % (ref 37–48.5)
HGB BLD-MCNC: 7.9 G/DL (ref 12–16)
IMM GRANULOCYTES # BLD AUTO: 0.02 K/UL (ref 0–0.04)
IMM GRANULOCYTES NFR BLD AUTO: 0.3 % (ref 0–0.5)
LYMPHOCYTES # BLD AUTO: 1.1 K/UL (ref 1–4.8)
LYMPHOCYTES NFR BLD: 18.4 % (ref 18–48)
MCH RBC QN AUTO: 30.4 PG (ref 27–31)
MCHC RBC AUTO-ENTMCNC: 30.3 G/DL (ref 32–36)
MCV RBC AUTO: 100 FL (ref 82–98)
MONOCYTES # BLD AUTO: 0.6 K/UL (ref 0.3–1)
MONOCYTES NFR BLD: 10.8 % (ref 4–15)
NEUTROPHILS # BLD AUTO: 3.8 K/UL (ref 1.8–7.7)
NEUTROPHILS NFR BLD: 67.1 % (ref 38–73)
NRBC BLD-RTO: 0 /100 WBC
PLATELET # BLD AUTO: 284 K/UL (ref 150–450)
PMV BLD AUTO: 10.8 FL (ref 9.2–12.9)
POTASSIUM SERPL-SCNC: 3.8 MMOL/L (ref 3.5–5.1)
RBC # BLD AUTO: 2.6 M/UL (ref 4–5.4)
SODIUM SERPL-SCNC: 138 MMOL/L (ref 136–145)
WBC # BLD AUTO: 5.72 K/UL (ref 3.9–12.7)

## 2025-03-14 PROCEDURE — 25000003 PHARM REV CODE 250: Performed by: HOSPITALIST

## 2025-03-14 PROCEDURE — 97530 THERAPEUTIC ACTIVITIES: CPT

## 2025-03-14 PROCEDURE — 94640 AIRWAY INHALATION TREATMENT: CPT

## 2025-03-14 PROCEDURE — 25000242 PHARM REV CODE 250 ALT 637 W/ HCPCS: Performed by: HOSPITALIST

## 2025-03-14 PROCEDURE — 99900035 HC TECH TIME PER 15 MIN (STAT)

## 2025-03-14 PROCEDURE — 97535 SELF CARE MNGMENT TRAINING: CPT

## 2025-03-14 PROCEDURE — 25000003 PHARM REV CODE 250

## 2025-03-14 PROCEDURE — 85025 COMPLETE CBC W/AUTO DIFF WBC: CPT | Performed by: NURSE PRACTITIONER

## 2025-03-14 PROCEDURE — 99900031 HC PATIENT EDUCATION (STAT)

## 2025-03-14 PROCEDURE — S4991 NICOTINE PATCH NONLEGEND: HCPCS

## 2025-03-14 PROCEDURE — 99233 SBSQ HOSP IP/OBS HIGH 50: CPT | Mod: ,,, | Performed by: INTERNAL MEDICINE

## 2025-03-14 PROCEDURE — 94761 N-INVAS EAR/PLS OXIMETRY MLT: CPT

## 2025-03-14 PROCEDURE — 25000003 PHARM REV CODE 250: Performed by: NURSE PRACTITIONER

## 2025-03-14 PROCEDURE — 25000003 PHARM REV CODE 250: Performed by: INTERNAL MEDICINE

## 2025-03-14 PROCEDURE — 36415 COLL VENOUS BLD VENIPUNCTURE: CPT | Performed by: NURSE PRACTITIONER

## 2025-03-14 PROCEDURE — 12000002 HC ACUTE/MED SURGE SEMI-PRIVATE ROOM

## 2025-03-14 PROCEDURE — 27100171 HC OXYGEN HIGH FLOW UP TO 24 HOURS

## 2025-03-14 PROCEDURE — 63600175 PHARM REV CODE 636 W HCPCS: Performed by: HOSPITALIST

## 2025-03-14 PROCEDURE — 80048 BASIC METABOLIC PNL TOTAL CA: CPT | Performed by: NURSE PRACTITIONER

## 2025-03-14 RX ADMIN — FAMOTIDINE 20 MG: 20 TABLET, FILM COATED ORAL at 08:03

## 2025-03-14 RX ADMIN — ATORVASTATIN CALCIUM 80 MG: 40 TABLET, FILM COATED ORAL at 09:03

## 2025-03-14 RX ADMIN — GABAPENTIN 300 MG: 300 CAPSULE ORAL at 09:03

## 2025-03-14 RX ADMIN — MUPIROCIN 1 G: 20 OINTMENT TOPICAL at 08:03

## 2025-03-14 RX ADMIN — PIPERACILLIN SODIUM AND TAZOBACTAM SODIUM 4.5 G: 4; .5 INJECTION, POWDER, LYOPHILIZED, FOR SOLUTION INTRAVENOUS at 05:03

## 2025-03-14 RX ADMIN — BUSPIRONE HYDROCHLORIDE 15 MG: 5 TABLET ORAL at 08:03

## 2025-03-14 RX ADMIN — IPRATROPIUM BROMIDE 0.5 MG: 0.5 SOLUTION RESPIRATORY (INHALATION) at 07:03

## 2025-03-14 RX ADMIN — GABAPENTIN 300 MG: 300 CAPSULE ORAL at 02:03

## 2025-03-14 RX ADMIN — DEXMEDETOMIDINE HYDROCHLORIDE 0.8 MCG/KG/HR: 4 INJECTION, SOLUTION INTRAVENOUS at 01:03

## 2025-03-14 RX ADMIN — CARVEDILOL 12.5 MG: 12.5 TABLET, FILM COATED ORAL at 09:03

## 2025-03-14 RX ADMIN — IPRATROPIUM BROMIDE 0.5 MG: 0.5 SOLUTION RESPIRATORY (INHALATION) at 08:03

## 2025-03-14 RX ADMIN — GABAPENTIN 300 MG: 300 CAPSULE ORAL at 08:03

## 2025-03-14 RX ADMIN — IPRATROPIUM BROMIDE 0.5 MG: 0.5 SOLUTION RESPIRATORY (INHALATION) at 01:03

## 2025-03-14 RX ADMIN — SODIUM CHLORIDE: 9 INJECTION, SOLUTION INTRAVENOUS at 09:03

## 2025-03-14 RX ADMIN — ARFORMOTEROL TARTRATE 15 MCG: 15 SOLUTION RESPIRATORY (INHALATION) at 07:03

## 2025-03-14 RX ADMIN — CARVEDILOL 12.5 MG: 12.5 TABLET, FILM COATED ORAL at 08:03

## 2025-03-14 RX ADMIN — FAMOTIDINE 20 MG: 20 TABLET, FILM COATED ORAL at 09:03

## 2025-03-14 RX ADMIN — BUDESONIDE INHALATION 0.5 MG: 0.5 SUSPENSION RESPIRATORY (INHALATION) at 08:03

## 2025-03-14 RX ADMIN — MUPIROCIN 1 G: 20 OINTMENT TOPICAL at 09:03

## 2025-03-14 RX ADMIN — THIAMINE HCL TAB 100 MG 100 MG: 100 TAB at 09:03

## 2025-03-14 RX ADMIN — FLUOXETINE HYDROCHLORIDE 40 MG: 20 CAPSULE ORAL at 09:03

## 2025-03-14 RX ADMIN — CLONAZEPAM 0.5 MG: 0.5 TABLET ORAL at 08:03

## 2025-03-14 RX ADMIN — FOLIC ACID 1 MG: 1 TABLET ORAL at 09:03

## 2025-03-14 RX ADMIN — POTASSIUM BICARBONATE 50 MEQ: 978 TABLET, EFFERVESCENT ORAL at 09:03

## 2025-03-14 RX ADMIN — ARFORMOTEROL TARTRATE 15 MCG: 15 SOLUTION RESPIRATORY (INHALATION) at 08:03

## 2025-03-14 RX ADMIN — BUDESONIDE INHALATION 0.5 MG: 0.5 SUSPENSION RESPIRATORY (INHALATION) at 07:03

## 2025-03-14 RX ADMIN — BUSPIRONE HYDROCHLORIDE 15 MG: 5 TABLET ORAL at 09:03

## 2025-03-14 RX ADMIN — DEXMEDETOMIDINE HYDROCHLORIDE 0.7 MCG/KG/HR: 4 INJECTION, SOLUTION INTRAVENOUS at 09:03

## 2025-03-14 RX ADMIN — BUSPIRONE HYDROCHLORIDE 15 MG: 5 TABLET ORAL at 02:03

## 2025-03-14 RX ADMIN — PIPERACILLIN SODIUM AND TAZOBACTAM SODIUM 4.5 G: 4; .5 INJECTION, POWDER, LYOPHILIZED, FOR SOLUTION INTRAVENOUS at 09:03

## 2025-03-14 RX ADMIN — MONTELUKAST 10 MG: 10 TABLET, FILM COATED ORAL at 08:03

## 2025-03-14 RX ADMIN — ENOXAPARIN SODIUM 30 MG: 40 INJECTION SUBCUTANEOUS at 05:03

## 2025-03-14 RX ADMIN — PIPERACILLIN SODIUM AND TAZOBACTAM SODIUM 4.5 G: 4; .5 INJECTION, POWDER, LYOPHILIZED, FOR SOLUTION INTRAVENOUS at 01:03

## 2025-03-14 RX ADMIN — NICOTINE 1 PATCH: 21 PATCH, EXTENDED RELEASE TRANSDERMAL at 05:03

## 2025-03-14 RX ADMIN — THERA TABS 1 TABLET: TAB at 09:03

## 2025-03-14 NOTE — PROGRESS NOTES
[facility]  Progress Note    Patient Name: Bhavana Lambert  MRN: [id number]  Patient Class: IP- Inpatient   Admission Date: 3/4/2025  Length of Stay: 10 days  Attending Physician: Melvin Skinner DO  Primary Care Provider: Pepito Jhaveri IV, MD        Subjective     Principal Problem:T12 burst fracture        HPI:  69-year-old female with PMHx of COPD, HTN, hypothyroidism, prior stroke who presented to the ED with a complaint of lumbar and bilateral hip pain status post fall several days ago. The patient reports that she was wearing socks and she slipped, falling backwards towards a wall and then slid down the wall to a sitting position. She denies loss of consciousness, chest pain, current shortness of breath, dizziness, nausea or vomiting, incontinence of bowel or bladder. She is on plavix at home.     In the ED, /79  RR 20 afebrile and 96% on 3L NC (baseline).     Imaging showed Acute burst fracture of T12. Case was discussed with Dr. Boothe ([facility] [place]) who will consult on the patient. She was given tylenol, morphine, and zofran and stable for transfer, no neuro deficits.     Per chart review from recent admission on February 9th to February 14, 2025, the patient was admitted for COPD exacerbation and did have a history of previous EtOH abuse with subsequent falls.     Overview/Hospital Course:  Bhavana Lambert is 69-year-old female with PMH COPD, HTN, hypothyroidism, prior stroke.  She was admitted to the hospital for management of an acute burst fracture of T12.  Neurosurgery following.  MRI obtained.  X-ray thoracolumbar spine ordered and showed stability.  PT/OT consulted.  TLSO brace applied for activity per neurosurgery recommendation.  Pt developed ETOH withdrawals with delirium on 03/06 and was placed on Librium protocol.  She had worsening hyponatremia prompting further workup though this notably improved with diet liberalization.  Her DTs were much  improved on 03/07 after Librium initiation.  Sodium levels continued to improve.  Pain was controlled with oral narcotics.  Librium was down titrated.  On 03/11 she developed increased O2 requirements with low-grade temp 100.5°.  CXR revealed acute on chronic changes of patchy infiltrates.  She reported reflux overnight-concerning clinically for aspiration.  She was placed on BiPAP after ABG revealed hypercapnia.  She was transferred to ICU for continued BiPAP therapy and pulmonology was consulted.  She was placed on IV Abx and sepsis workup was pursued. Patient weaned off on intravenous Levophed.  Urine cultures grew Proteus mirabilis and group B strep.  Physical therapy and occupational therapy has been consulted.   arranging skilled nursing facility placement.  Nutrition consulted secondary to decreased oral intake.    Interval History:  Patient was seen and examined at bedside this morning.  Remains in TLSO brace.  Off of Precedex infusion.  Hemodynamically stable.  Okay for transfer out of ICU.  Working towards SNF placement.    Review of Systems   Constitutional:  Positive for fever.   Respiratory:  Positive for cough and shortness of breath.    Cardiovascular:  Negative for chest pain.   Gastrointestinal:  Negative for nausea and vomiting.     Objective:     Vital Signs (Most Recent):  Temp: 97.7 °F (36.5 °C) (03/14/25 1501)  Pulse: 79 (03/14/25 1501)  Resp: (!) 24 (03/14/25 1501)  BP: 104/61 (03/14/25 1501)  SpO2: (!) 94 % (03/14/25 1501) Vital Signs (24h Range):  Temp:  [97.6 °F (36.4 °C)-98.6 °F (37 °C)] 97.7 °F (36.5 °C)  Pulse:  [59-88] 79  Resp:  [11-50] 24  SpO2:  [90 %-100 %] 94 %  BP: ()/(51-88) 104/61     Weight: 47.1 kg (103 lb 13.4 oz)  Body mass index is 18.99 kg/m².    Intake/Output Summary (Last 24 hours) at 3/14/2025 1716  Last data filed at 3/14/2025 1501  Gross per 24 hour   Intake 3042.66 ml   Output --   Net 3042.66 ml         Physical Exam  Vitals and nursing note  reviewed.   Constitutional:       Appearance: Normal appearance.   Cardiovascular:      Rate and Rhythm: Normal rate and regular rhythm.   Abdominal:      General: Abdomen is flat. Bowel sounds are normal.      Palpations: Abdomen is soft.   Musculoskeletal:         General: Normal range of motion.   Neurological:      General: No focal deficit present.      Mental Status: She is alert and oriented to person, place, and time.               Significant Labs: All pertinent labs within the past 24 hours have been reviewed.  CBC:   Recent Labs   Lab 03/13/25  0330 03/13/25  0422 03/14/25  0313   WBC 7.69  --  5.72   HGB 8.1*  --  7.9*   HCT 27.0* 24* 26.1*     --  284     CMP:   Recent Labs   Lab 03/13/25  0330 03/14/25 0313    138   K 3.5 3.8    102   CO2 32* 31*   GLU 88 109   BUN 6* 4*   CREATININE 0.5 0.5   CALCIUM 8.0* 8.4*   ANIONGAP 4* 5*     Microbiology Results (last 7 days)       Procedure Component Value Units Date/Time    Blood culture [[id number]] Collected: 03/11/25 0944    Order Status: Completed Specimen: Blood from Peripheral, Hand, Right Updated: 03/14/25 1232     Blood Culture, Routine No Growth to date      No Growth to date      No Growth to date      No Growth to date    Blood culture [[id number]] Collected: 03/11/25 0944    Order Status: Completed Specimen: Blood from Peripheral, Antecubital, Right Updated: 03/14/25 1232     Blood Culture, Routine No Growth to date      No Growth to date      No Growth to date      No Growth to date    Culture, Respiratory with Gram Stain [[id number]] Collected: 03/12/25 1047    Order Status: Completed Specimen: Respiratory from Sputum, Expectorated Updated: 03/14/25 0857     Respiratory Culture Normal respiratory kehinde     Gram Stain (Respiratory) Many WBC's     Gram Stain (Respiratory) >10 epithelial cells per low power field     Gram Stain (Respiratory) Few budding yeast     Gram Stain (Respiratory) Few Gram positive cocci    Urine culture  [[id number]]  (Abnormal)  (Susceptibility) Collected: 03/11/25 1406    Order Status: Completed Specimen: Urine Updated: 03/14/25 0830     Urine Culture, Routine PROTEUS MIRABILIS  >100,000 cfu/ml        STREPTOCOCCUS AGALACTIAE (GROUP B)  > 100,000 cfu/ml  In case of Penicillin allergy, call lab for further testing.  Beta-hemolytic streptococci are routinely susceptible to   penicillins,cephalosporins and carbapenems.      Narrative:      Specimen Source->Urine    Influenza A & B by Molecular [[id number]] Collected: 03/11/25 1110    Order Status: Sent Specimen: Nasopharyngeal Swab           Significant Imaging:   B/L hip x-ray:  No acute findings.     Lumbar x-ray:  Acute compression fracture of T12. Recommend CT.     CT T-spine:  Acute burst fracture of T12. Referral to neurosurgery advised.     MRI Lumbar spine:  Acute compression fracture of T12 with loss of height of approximately 25%.  There is minimal retropulsion of the posterior cortex measuring 3 mm without central canal stenosis  Old mild chronic compression fracture of T11  Multilevel degenerative disc disease and facet hypertrophy with mild central canal stenosis and foraminal narrowing at L2-3, L3-4 and L4-5    X-ray thoracolumar AP/lateral:  Acute fracture of the T12 vertebra is again observed with no detrimental change from prior CT exam.     CT Head:  No acute intracranial abnormalities. Chronic findings as discussed above.     X-ray thoracolumbar:  Stable mild compression fracture of T12  Chronic mild compression fracture of T11    CXR: Interval improvement, with near complete resolution of right basilar infiltrate.     CXR:  Persistent diffuse interstitial prominence with mild patchy airspace opacities having progressed slightly in the interval.  Mild blunting of the left costophrenic angle          Assessment & Plan  T12 burst fracture  MRI shows acute compression fracture of T12 with loss of height of approximately 25%.  There is minimal  retropulsion of the posterior cortex measuring 3 mm without central canal stenosis.  Old mild chronic compression fracture of T11.  Multilevel degenerative disc disease and facet hypertrophy with mild central canal stenosis and foraminal narrowing at L2-3, L3-4, and L4-5  Pain control  Neurochecks   Neurosurgery consulted- T XR stable  TLSO brace with mobilization  Repeat XR 2/2 fall  PT/OT- SNF recommending.  CM pursuing    Hypothyroid  Patient has chronic hypothyroidism. TFTs reviewed-   Lab Results   Component Value Date    TSH 3.155 03/06/2025   Will continue chronic levothyroxine and adjust for and clinical changes.     Mixed hyperlipidemia  Continue home statin    Tobacco dependency  Smoking cessation counseling performed. Dangers of cigarette smoking were reviewed with patient in detail and patient was encouraged to quit. Nicotine replacement options were discussed for > 3 minutes.  Receiving Nicoderm.  Hyponatremia  Hyponatremia is a historical properly acutely worsening here in the hospital.  The patient's most recent sodium results are listed below.  Recent Labs     03/12/25  0234 03/13/25  0330 03/14/25  0313    139 138     Plan  - Correct the sodium by 4-6mEq in 24 hours.   - sodium is self corrected with holding of the SSRI and diet liberalization   - SSRI resumed 3/7, and evaluating response-stable thus far.  Alcohol dependence with withdrawal delirium  Pt with confirmed daily ETOH utilization appeared with DTs on 03/06   -initiated scheduled Librium 3/6 with much improvement.  Will continue to down taper the Librium.  -continue p.r.n. Ativan   -nursing to perform CIWA  -fall, seizure precautions   -daily MVI, folic acid, thiamine    Anemia  Chronic problem.  Stable.  Monitor H/H.    Transfuse for hemodynamic instability and/or H/H <7/21    Aspiration pneumonia of both lungs  UTI (urinary tract infection) due to Proteus mirabilis species  Patient has a diagnosis of pneumonia. The cause of the  pneumonia is bacterial in etiology due to Gram negative pathogens associated with aspiration . The pneumonia is  acute . The patient has the following signs/symptoms of pneumonia: persistent hypoxia , cough, and shortness of breath. The patient does have a current oxygen requirement and the patient does have a home oxygen requirement. I have reviewed the pertinent imaging. The following cultures have been collected: Blood cultures and Sputum culture The culture results are listed below.     Current antimicrobial regimen consists of the antibiotics listed below. Will monitor patient closely and continue current treatment plan unchanged.    Antibiotics (From admission, onward)      Start     Stop Route Frequency Ordered    03/11/25 1215  mupirocin 2 % ointment         03/16/25 0859 Nasl 2 times daily 03/11/25 1114    03/11/25 1030  piperacillin-tazobactam (ZOSYN) 4.5 g in D5W 100 mL IVPB (MB+)         -- IV Every 8 hours (non-standard times) 03/11/25 0927            Microbiology Results (last 7 days)       Procedure Component Value Units Date/Time    Blood culture [[id number]] Collected: 03/11/25 0944    Order Status: Completed Specimen: Blood from Peripheral, Hand, Right Updated: 03/14/25 1232     Blood Culture, Routine No Growth to date      No Growth to date      No Growth to date      No Growth to date    Blood culture [[id number]] Collected: 03/11/25 0944    Order Status: Completed Specimen: Blood from Peripheral, Antecubital, Right Updated: 03/14/25 1232     Blood Culture, Routine No Growth to date      No Growth to date      No Growth to date      No Growth to date    Culture, Respiratory with Gram Stain [[id number]] Collected: 03/12/25 1047    Order Status: Completed Specimen: Respiratory from Sputum, Expectorated Updated: 03/14/25 0857     Respiratory Culture Normal respiratory kehinde     Gram Stain (Respiratory) Many WBC's     Gram Stain (Respiratory) >10 epithelial cells per low power field     Gram Stain  (Respiratory) Few budding yeast     Gram Stain (Respiratory) Few Gram positive cocci    Urine culture [[id number]]  (Abnormal)  (Susceptibility) Collected: 03/11/25 1406    Order Status: Completed Specimen: Urine Updated: 03/14/25 0830     Urine Culture, Routine PROTEUS MIRABILIS  >100,000 cfu/ml        STREPTOCOCCUS AGALACTIAE (GROUP B)  > 100,000 cfu/ml  In case of Penicillin allergy, call lab for further testing.  Beta-hemolytic streptococci are routinely susceptible to   penicillins,cephalosporins and carbapenems.      Narrative:      Specimen Source->Urine    Influenza A & B by Molecular [[id number]] Collected: 03/11/25 1110    Order Status: Sent Specimen: Nasopharyngeal Swab               Acute on chronic respiratory failure with hypoxia and hypercapnia  Patient with Hypercapnic and Hypoxic Respiratory failure which is Acute on chronic.  she is on home oxygen at 3.5 LPM. Supplemental oxygen was provided and noted- Oxygen Concentration (%):  [40] 40    .   Signs/symptoms of respiratory failure include- tachypnea and increased work of breathing. Contributing diagnoses includes - Aspiration, COPD, Interstitial lung disease, and Pneumonia Labs and images were reviewed. Patient Has recent ABG, which has been reviewed. Will treat underlying causes and adjust management of respiratory failure as follows-   -continue BiPAP   -continue IV Abx   - pulmonary Medicine following.      Reduce IV fluids to 50 cc an hour. Discussed with bedside nurse and .  VTE Risk Mitigation (From admission, onward)           Ordered     enoxaparin injection 30 mg  Daily         03/11/25 1130     IP VTE HIGH RISK PATIENT  Once         03/04/25 1604     Place sequential compression device  Until discontinued         03/04/25 1604     Reason for No Pharmacological VTE Prophylaxis  Once        Question:  Reasons:  Answer:  Physician Provided (leave comment)  Comment:  pending neurosurgery eval    03/04/25 1604                     Discharge Planning   CHLOE: 3/17/2025     Code Status: Full Code   Medical Readiness for Discharge Date:   Discharge Plan A: Skilled Nursing Facility   Discharge Delays: None known at this time        Critical care time spent on the evaluation and treatment of severe organ dysfunction, review of pertinent labs and imaging studies, discussions with consulting providers and discussions with patient/family: 35 minutes.    Please place Justification for DME        Melvin Skinenr DO  Department of Hospital Medicine   [facility]

## 2025-03-14 NOTE — CARE UPDATE
03/14/25 0801   Patient Assessment/Suction   Level of Consciousness (AVPU) alert  (confused)   Respiratory Effort Mild;Labored;Mouth breathing   Expansion/Accessory Muscles/Retractions accessory muscle use   All Lung Fields Breath Sounds Anterior:;Posterior:;Lateral:;wheezes, expiratory   Rhythm/Pattern, Respiratory shortness of breath   Cough Frequency infrequent   Cough Type good;loose;nonproductive   PRE-TX-O2   Device (Oxygen Therapy) high flow nasal cannula   $ Is the patient on High Flow Oxygen? Yes   Flow (L/min) (Oxygen Therapy) 5   SpO2 96 %   Pulse Oximetry Type Continuous   $ Pulse Oximetry - Multiple Charge Pulse Oximetry - Multiple   Oximetry Probe Status Assessed   Pulse 72   Resp (!) 22   Positioning HOB elevated 90 degrees   Aerosol Therapy   $ Aerosol Therapy Charges Aerosol Treatment   Daily Review of Necessity (SVN) completed   Respiratory Treatment Status (SVN) given   Treatment Route (SVN) in-line   Patient Position HOB elevated   Post Treatment Assessment (SVN) breath sounds improved   Signs of Intolerance (SVN) none   Breath Sounds Post-Respiratory Treatment   Throughout All Fields Post-Treatment All Fields   Throughout All Fields Post-Treatment aeration increased   Post-treatment Heart Rate (beats/min) 72   Post-treatment Resp Rate (breaths/min) 16   Preset CPAP/BiPAP Settings   Mode Of Delivery BiPAP S/T;standby   CPAP/BIPAP charged w/in last 24 h NO

## 2025-03-14 NOTE — PT/OT/SLP PROGRESS
Occupational Therapy   Treatment    Name: Bhavana Lambert  MRN: [id number]  Admitting Diagnosis:  T12 burst fracture       Recommendations:     Discharge Recommendations: Moderate Intensity Therapy  Discharge Equipment Recommendations:  to be determined by next level of care  Barriers to discharge:   (increased physical assistance with ADLs and functional mobility.)    Assessment:     Bhavana Lambert is a 69 y.o. female with a medical diagnosis of T12 burst fracture.  She presents with general weakness. Patient participated in bed mobility, UB dressing (donning TLSO brace), unsupported sitting EOB and bed/chair transfer using rolling walker. Performance deficits affecting function are weakness, impaired endurance, impaired self care skills, impaired functional mobility, gait instability, impaired balance, decreased safety awareness, decreased lower extremity function, decreased upper extremity function, impaired cognition, impaired cardiopulmonary response to activity.     Rehab Prognosis:  Fair; patient would benefit from acute skilled OT services to address these deficits and reach maximum level of function.       Plan:     Patient to be seen 5 x/week to address the above listed problems via self-care/home management, therapeutic activities, therapeutic exercises  Plan of Care Expires: 04/07/25  Plan of Care Reviewed with: patient    Subjective     Chief Complaint: General Weakness  Patient/Family Comments/goals: Improved functional mobility and ADL independence.   Pain/Comfort:  Pain Rating 1: 8/10  Location 1: back  Pain Addressed 1: Reposition, Distraction  Pain Rating Post-Intervention 1: 8/10    Objective:     Communicated with: nurse prior to session.  Patient found HOB elevated with telemetry, peripheral IV, pulse ox (continuous), oxygen upon OT entry to room.    General Precautions: Standard, fall, respiratory, seizure    Orthopedic Precautions:spinal precautions  Braces: TLSO  Respiratory Status:  5L O2  via NC     Occupational Performance:     Bed Mobility:    Patient completed Scooting/Bridging with maximal assistance  Patient completed Supine to Sit with maximal assistance   Performed unsupported sitting EOB with contact guard assistance.    Functional Mobility/Transfers:  Patient completed Bed <> Chair Transfer using Step Transfer technique with minimum assistance with hand-held assist and rolling walker  Functional Mobility: ambulated 3 feet using rolling walker with minimal assistance.     Activities of Daily Living:  Upper Body Dressing: maximal assistance to don TLSO brace sitting EOB.       [facility] 6 Click ADL:      Treatment & Education:  Patient instructed to sit up in chair for at least 2 hours.     Patient left up in chair with all lines intact, call button in reach, and chair alarm on    GOALS:   Multidisciplinary Problems       Occupational Therapy Goals          Problem: Occupational Therapy    Goal Priority Disciplines Outcome Interventions   Occupational Therapy Goal     OT, PT/OT Progressing    Description: Goals to be met by: 4/7/25     Patient will increase functional independence with ADLs by performing:    UE Dressing with Supervision.  LE Dressing with Contact Guard Assistance.  Grooming while seated at sink with Supervision.  Toileting from bedside commode with Supervision for hygiene and clothing management.   Toilet transfer to bedside commode with Contact Guard Assistance.                         Time Tracking:     OT Date of Treatment: 03/14/25  OT Start Time: 0951  OT Stop Time: 1020  OT Total Time (min): 29 min    Billable Minutes:Self Care/Home Management 15  Therapeutic Activity 14    OT/ANDREW: OT     Number of ANDREW visits since last OT visit: 1    3/14/2025

## 2025-03-14 NOTE — ASSESSMENT & PLAN NOTE
Hyponatremia is a historical properly acutely worsening here in the hospital.  The patient's most recent sodium results are listed below.  Recent Labs     03/12/25  0234 03/13/25  0330 03/14/25  0313    139 138     Plan  - Correct the sodium by 4-6mEq in 24 hours.   - sodium is self corrected with holding of the SSRI and diet liberalization   - SSRI resumed 3/7, and evaluating response-stable thus far.

## 2025-03-14 NOTE — PROGRESS NOTES
Progress Note  Critical care medicine    Admit Date: 3/4/2025   03/14/2025    History of Present Illness:  Pt is a 70 yo female with COPD, HTN, hypothyroidism who presented to the hospital due to a fall, T12 burst fracture. She has developed progressive respiratory failure and today requiring bipap and planned to transfer to the ICU. Pulmonary consulted regarding respiratory failure management. Pt is incoherent and unable to give solid history but apparently has a hx of EtOH abuse and smoking. She does endorse spitting up mucous recently. She had been getting librium, robaxin and PRN hydrocodone 5mg, last doses yesterday PM. These have been held. Antibiotics have been started for possible respiratory infection. She was hospitalized last month with COPD exacerbation, resp failure and aspiration pneumonia. Seen by Ila Perez for f/u visit in pulm clinic on 2/24/25. She uses home O2 at 3.5L.     SUBJECTIVE:     3/12- pt continues on bipap, awake and alert, somewhat confused/mumbling responses. ABG slightly worse and BIPAP has been adjusted    3/13/2025 - On BiPAP when I saw her and only tolerating brief periods of time off of BiPAP.  No distress.  She is conversant but is confused.  ABG about the same.  Reviewed old ABG looks like baseline paCO2 is 60-65 range     3/14/2025 - patient on nasal cannula on today's evaluation.  She has no complaints and is in no apparent distress.       OBJECTIVE:     Vitals (Most recent):  Vitals:    03/14/25 1501   BP: 104/61   Pulse: 79   Resp: (!) 24   Temp: 97.7 °F (36.5 °C)       Vitals (24 hour range):  Temp:  [97.6 °F (36.4 °C)-98.6 °F (37 °C)]   Pulse:  [59-88]   Resp:  [11-50]   BP: ()/(51-88)   SpO2:  [90 %-100 %]       Intake/Output Summary (Last 24 hours) at 3/14/2025 1753  Last data filed at 3/14/2025 1501  Gross per 24 hour   Intake 2031.06 ml   Output --   Net 2031.06 ml          Physical Exam:  Negative except for these pertinent findings:    Awake, A&Ox2, BiPAP  "on  Speaking, , some inappropriate responses  HR regular  Diminished breath sounds bilaterally  Abd soft nontender, BS+  No edema/clubbing  Follows commands in all extremities  Chronically ill appearing and with muscle wasting        Radiographs   Per report:  CXR 3/12/25- increasing RUL opacity  CXR 3/11/25- unchanged chronic interstitial changes bilaterally  CT chest 2/9/25- emphysema and R lateral and basilar consolidations      Labs     Recent Labs   Lab 03/14/25  0313   WBC 5.72   HGB 7.9*   HCT 26.1*        Recent Labs   Lab 03/14/25  0313      K 3.8      CO2 31*   BUN 4*   CREATININE 0.5      CALCIUM 8.4*     No results for input(s): "PH", "PCO2", "PO2", "HCO3" in the last 24 hours.    Microbiology Results (last 7 days)       Procedure Component Value Units Date/Time    Blood culture [[id number]] Collected: 03/11/25 0944    Order Status: Completed Specimen: Blood from Peripheral, Hand, Right Updated: 03/14/25 1232     Blood Culture, Routine No Growth to date      No Growth to date      No Growth to date      No Growth to date    Blood culture [[id number]] Collected: 03/11/25 0944    Order Status: Completed Specimen: Blood from Peripheral, Antecubital, Right Updated: 03/14/25 1232     Blood Culture, Routine No Growth to date      No Growth to date      No Growth to date      No Growth to date    Culture, Respiratory with Gram Stain [[id number]] Collected: 03/12/25 1047    Order Status: Completed Specimen: Respiratory from Sputum, Expectorated Updated: 03/14/25 0857     Respiratory Culture Normal respiratory kehinde     Gram Stain (Respiratory) Many WBC's     Gram Stain (Respiratory) >10 epithelial cells per low power field     Gram Stain (Respiratory) Few budding yeast     Gram Stain (Respiratory) Few Gram positive cocci    Urine culture [[id number]]  (Abnormal)  (Susceptibility) Collected: 03/11/25 1406    Order Status: Completed Specimen: Urine Updated: 03/14/25 0830     Urine " Culture, Routine PROTEUS MIRABILIS  >100,000 cfu/ml        STREPTOCOCCUS AGALACTIAE (GROUP B)  > 100,000 cfu/ml  In case of Penicillin allergy, call lab for further testing.  Beta-hemolytic streptococci are routinely susceptible to   penicillins,cephalosporins and carbapenems.      Narrative:      Specimen Source->Urine    Influenza A & B by Molecular [[id number]] Collected: 03/11/25 1110    Order Status: Sent Specimen: Nasopharyngeal Swab             Impression:  Patient Active Problem List    Diagnosis Date Noted    UTI (urinary tract infection) due to Proteus mirabilis species 03/12/2025    Anemia 03/09/2025    Alcohol dependence with withdrawal delirium 03/06/2025    Tobacco dependency 03/05/2025    Tobacco abuse 03/04/2025    T12 burst fracture 03/04/2025    T12 compression fracture, initial encounter 03/03/2025    Fall 03/03/2025    Aspiration pneumonia of both lungs 02/10/2025    Acute on chronic respiratory failure with hypoxia and hypercapnia 02/10/2025    Wedge compression fracture of T5-T6 vertebra, initial encounter for closed fracture 02/10/2025    Thoracic aortic ectasia 09/24/2024    Debility 01/28/2024    Transaminitis 01/22/2024    E coli bacteremia 01/22/2024    Mixed hyperlipidemia 11/08/2023    Heavy cigarette smoker 11/08/2023    Chronic diastolic (congestive) heart failure 11/08/2023    Proteinuria 10/30/2023    Hyponatremia 10/27/2023    Hypothyroid 10/26/2023    Daily consumption of alcohol 10/26/2023    Anemia of chronic disease 06/27/2023    Recurrent falls 06/04/2023    Electrocardiogram showing T wave abnormalities 09/17/2020    Multiple rib fractures 09/16/2020    Discharge planning issues 09/14/2020    Increased anion gap metabolic acidosis 09/13/2020    Diarrhea 09/13/2020    Anxiety     Anxiety and depression     Primary hypertension         Plan:   - continue BIPAP as needed O2 as able  - she continues to be awake and alert, hypercapnea is acute on chronic and not far from her  baseline - no need for intubation but could decompensate quickly  - hyponatremia, resolved  - suspect ongoing EtOH withdrawal, will avoid oversedation and use precedex if needed, on thiamine and MVI  - urine culture reviewed and continue zosyn- UTI  - continue inhaled bronchodilators  - per nsg non operative management for T12 burst fracture recommended with TLSO brace     Aram Park MD  Critical Care Medicine  [facility]                              .

## 2025-03-14 NOTE — PLAN OF CARE
Clinical update sent to [facility] via [facility]       03/14/25 3553   Post-Acute Status   Post-Acute Authorization Placement   Post-Acute Placement Status Pending medical clearance/testing

## 2025-03-14 NOTE — PROGRESS NOTES
03/14/25 1642   WOCN Assessment   WOCN Total Time (mins) 20   Visit Date 03/14/25   Visit Time 1600   Consult Type Follow Up   WOCN Speciality Wound   Wound pressure;moisture   Intervention assessed;applied;chart review;coordination of care;orders   Teaching on-going        Wound 03/11/25 1100 Pressure Injury Left posterior Heel #1   Date First Assessed/Time First Assessed: 03/11/25 1100   Present on Original Admission: No  Primary Wound Type: Pressure Injury  Side: Left  Orientation: posterior  Location: Heel  Wound Number: #1   Wound Image    Pressure Injury Stage 1   Dressing Appearance Open to air   Drainage Amount None   Appearance Red;Pink   Off Loading Off loading shoe  (quilted heel protector)        Wound 03/11/25 1100 Pressure Injury Right Heel #2   Date First Assessed/Time First Assessed: 03/11/25 1100   Present on Original Admission: No  Primary Wound Type: Pressure Injury  Side: Right  Location: Heel  Wound Number: #2   Wound Image    Pressure Injury Stage 1   Dressing Appearance Open to air   Drainage Amount None   Appearance Red;Pink   Off Loading Off loading shoe  (quilted heel protectors)        Wound 03/14/25 Moisture associated dermatitis anterior;posterior;medial;lateral Perineum   Date First Assessed: 03/14/25   Present on Original Admission: No  Primary Wound Type: Moisture associated dermatitis  Orientation: anterior;posterior;medial;lateral  Location: Perineum   Wound Image    Dressing Appearance Open to air   Drainage Amount None   Appearance Red   Care Cleansed with:;Antimicrobial agent;Soap and water;Applied:;Skin Barrier   Dressing Applied  (Triad)        Wound 03/14/25 Moisture associated dermatitis medial Buttocks   Date First Assessed: 03/14/25   Present on Original Admission: No  Primary Wound Type: Moisture associated dermatitis  Orientation: medial  Location: Buttocks   Wound Image    Dressing Appearance Open to air   Drainage Amount None   Appearance Red   Care Cleansed  with:;Antimicrobial agent;Soap and water;Applied:;Skin Barrier   Dressing Applied  (Triad)     Wound care follow up. Patient noted with MASD from incontinence of urine and stool. Cleaned areas and applied Triad. Plan of care discussed with patients nurse. Patient is noted with poor appetite. Bilateral heels remain red and are offloaded from bed with bilateral quilted heel protectors. Bed setting for soft for maximum pressure relief. Patient did have a versette in place and was incontinent of stool. Removed versette external device from jaison area. Will avoid external catheter device on this patient. Wound care to continue to follow.

## 2025-03-14 NOTE — ASSESSMENT & PLAN NOTE
Patient has a diagnosis of pneumonia. The cause of the pneumonia is bacterial in etiology due to Gram negative pathogens associated with aspiration. The pneumonia is acute. The patient has the following signs/symptoms of pneumonia: persistent hypoxia , cough, and shortness of breath. The patient does have a current oxygen requirement and the patient does have a home oxygen requirement. I have reviewed the pertinent imaging. The following cultures have been collected: Blood cultures and Sputum culture The culture results are listed below.     Current antimicrobial regimen consists of the antibiotics listed below. Will monitor patient closely and continue current treatment plan unchanged.    Antibiotics (From admission, onward)      Start     Stop Route Frequency Ordered    03/11/25 1215  mupirocin 2 % ointment         03/16/25 0859 Nasl 2 times daily 03/11/25 1114    03/11/25 1030  piperacillin-tazobactam (ZOSYN) 4.5 g in D5W 100 mL IVPB (MB+)         -- IV Every 8 hours (non-standard times) 03/11/25 0927            Microbiology Results (last 7 days)       Procedure Component Value Units Date/Time    Blood culture [[id number]] Collected: 03/11/25 0944    Order Status: Completed Specimen: Blood from Peripheral, Hand, Right Updated: 03/14/25 1232     Blood Culture, Routine No Growth to date      No Growth to date      No Growth to date      No Growth to date    Blood culture [[id number]] Collected: 03/11/25 0944    Order Status: Completed Specimen: Blood from Peripheral, Antecubital, Right Updated: 03/14/25 1232     Blood Culture, Routine No Growth to date      No Growth to date      No Growth to date      No Growth to date    Culture, Respiratory with Gram Stain [[id number]] Collected: 03/12/25 1047    Order Status: Completed Specimen: Respiratory from Sputum, Expectorated Updated: 03/14/25 0857     Respiratory Culture Normal respiratory kehinde     Gram Stain (Respiratory) Many WBC's     Gram Stain (Respiratory) >10  epithelial cells per low power field     Gram Stain (Respiratory) Few budding yeast     Gram Stain (Respiratory) Few Gram positive cocci    Urine culture [[id number]]  (Abnormal)  (Susceptibility) Collected: 03/11/25 1406    Order Status: Completed Specimen: Urine Updated: 03/14/25 0830     Urine Culture, Routine PROTEUS MIRABILIS  >100,000 cfu/ml        STREPTOCOCCUS AGALACTIAE (GROUP B)  > 100,000 cfu/ml  In case of Penicillin allergy, call lab for further testing.  Beta-hemolytic streptococci are routinely susceptible to   penicillins,cephalosporins and carbapenems.      Narrative:      Specimen Source->Urine    Influenza A & B by Molecular [[id number]] Collected: 03/11/25 1110    Order Status: Sent Specimen: Nasopharyngeal Swab

## 2025-03-14 NOTE — SUBJECTIVE & OBJECTIVE
Interval History:  Patient was seen and examined at bedside this morning.  Remains in TLSO brace.  Off of Precedex infusion.  Hemodynamically stable.  Okay for transfer out of ICU.  Working towards SNF placement.    Review of Systems   Constitutional:  Positive for fever.   Respiratory:  Positive for cough and shortness of breath.    Cardiovascular:  Negative for chest pain.   Gastrointestinal:  Negative for nausea and vomiting.     Objective:     Vital Signs (Most Recent):  Temp: 97.7 °F (36.5 °C) (03/14/25 1501)  Pulse: 79 (03/14/25 1501)  Resp: (!) 24 (03/14/25 1501)  BP: 104/61 (03/14/25 1501)  SpO2: (!) 94 % (03/14/25 1501) Vital Signs (24h Range):  Temp:  [97.6 °F (36.4 °C)-98.6 °F (37 °C)] 97.7 °F (36.5 °C)  Pulse:  [59-88] 79  Resp:  [11-50] 24  SpO2:  [90 %-100 %] 94 %  BP: ()/(51-88) 104/61     Weight: 47.1 kg (103 lb 13.4 oz)  Body mass index is 18.99 kg/m².    Intake/Output Summary (Last 24 hours) at 3/14/2025 1716  Last data filed at 3/14/2025 1501  Gross per 24 hour   Intake 3042.66 ml   Output --   Net 3042.66 ml         Physical Exam  Vitals and nursing note reviewed.   Constitutional:       Appearance: Normal appearance.   Cardiovascular:      Rate and Rhythm: Normal rate and regular rhythm.   Abdominal:      General: Abdomen is flat. Bowel sounds are normal.      Palpations: Abdomen is soft.   Musculoskeletal:         General: Normal range of motion.   Neurological:      General: No focal deficit present.      Mental Status: She is alert and oriented to person, place, and time.               Significant Labs: All pertinent labs within the past 24 hours have been reviewed.  CBC:   Recent Labs   Lab 03/13/25  0330 03/13/25  0422 03/14/25  0313   WBC 7.69  --  5.72   HGB 8.1*  --  7.9*   HCT 27.0* 24* 26.1*     --  284     CMP:   Recent Labs   Lab 03/13/25  0330 03/14/25  0313    138   K 3.5 3.8    102   CO2 32* 31*   GLU 88 109   BUN 6* 4*   CREATININE 0.5 0.5   CALCIUM 8.0*  8.4*   ANIONGAP 4* 5*     Microbiology Results (last 7 days)       Procedure Component Value Units Date/Time    Blood culture [[id number]] Collected: 03/11/25 0944    Order Status: Completed Specimen: Blood from Peripheral, Hand, Right Updated: 03/14/25 1232     Blood Culture, Routine No Growth to date      No Growth to date      No Growth to date      No Growth to date    Blood culture [[id number]] Collected: 03/11/25 0944    Order Status: Completed Specimen: Blood from Peripheral, Antecubital, Right Updated: 03/14/25 1232     Blood Culture, Routine No Growth to date      No Growth to date      No Growth to date      No Growth to date    Culture, Respiratory with Gram Stain [[id number]] Collected: 03/12/25 1047    Order Status: Completed Specimen: Respiratory from Sputum, Expectorated Updated: 03/14/25 0857     Respiratory Culture Normal respiratory kehinde     Gram Stain (Respiratory) Many WBC's     Gram Stain (Respiratory) >10 epithelial cells per low power field     Gram Stain (Respiratory) Few budding yeast     Gram Stain (Respiratory) Few Gram positive cocci    Urine culture [[id number]]  (Abnormal)  (Susceptibility) Collected: 03/11/25 1406    Order Status: Completed Specimen: Urine Updated: 03/14/25 0830     Urine Culture, Routine PROTEUS MIRABILIS  >100,000 cfu/ml        STREPTOCOCCUS AGALACTIAE (GROUP B)  > 100,000 cfu/ml  In case of Penicillin allergy, call lab for further testing.  Beta-hemolytic streptococci are routinely susceptible to   penicillins,cephalosporins and carbapenems.      Narrative:      Specimen Source->Urine    Influenza A & B by Molecular [[id number]] Collected: 03/11/25 1110    Order Status: Sent Specimen: Nasopharyngeal Swab           Significant Imaging:   B/L hip x-ray:  No acute findings.     Lumbar x-ray:  Acute compression fracture of T12. Recommend CT.     CT T-spine:  Acute burst fracture of T12. Referral to neurosurgery advised.     MRI Lumbar spine:  Acute compression fracture  of T12 with loss of height of approximately 25%.  There is minimal retropulsion of the posterior cortex measuring 3 mm without central canal stenosis  Old mild chronic compression fracture of T11  Multilevel degenerative disc disease and facet hypertrophy with mild central canal stenosis and foraminal narrowing at L2-3, L3-4 and L4-5    X-ray thoracolumar AP/lateral:  Acute fracture of the T12 vertebra is again observed with no detrimental change from prior CT exam.     CT Head:  No acute intracranial abnormalities. Chronic findings as discussed above.     X-ray thoracolumbar:  Stable mild compression fracture of T12  Chronic mild compression fracture of T11    CXR: Interval improvement, with near complete resolution of right basilar infiltrate.     CXR:  Persistent diffuse interstitial prominence with mild patchy airspace opacities having progressed slightly in the interval.  Mild blunting of the left costophrenic angle

## 2025-03-14 NOTE — PLAN OF CARE
Problem: Adult Inpatient Plan of Care  Goal: Plan of Care Review  Outcome: Not Progressing  Goal: Patient-Specific Goal (Individualized)  Outcome: Not Progressing  Goal: Absence of Hospital-Acquired Illness or Injury  Outcome: Not Progressing  Goal: Optimal Comfort and Wellbeing  Outcome: Not Progressing  Goal: Readiness for Transition of Care  Outcome: Not Progressing     Problem: Skin Injury Risk Increased  Goal: Skin Health and Integrity  Outcome: Not Progressing     Problem: Fall Injury Risk  Goal: Absence of Fall and Fall-Related Injury  Outcome: Not Progressing     Problem: Wound  Goal: Optimal Coping  Outcome: Not Progressing  Goal: Optimal Functional Ability  Outcome: Not Progressing  Goal: Absence of Infection Signs and Symptoms  Outcome: Not Progressing  Goal: Improved Oral Intake  Outcome: Not Progressing  Goal: Optimal Pain Control and Function  Outcome: Not Progressing  Goal: Skin Health and Integrity  Outcome: Not Progressing  Goal: Optimal Wound Healing  Outcome: Not Progressing     Problem: Wound  Goal: Optimal Coping  Outcome: Not Progressing  Goal: Optimal Functional Ability  Outcome: Not Progressing  Goal: Absence of Infection Signs and Symptoms  Outcome: Not Progressing  Goal: Improved Oral Intake  Outcome: Not Progressing  Goal: Optimal Pain Control and Function  Outcome: Not Progressing  Goal: Skin Health and Integrity  Outcome: Not Progressing  Goal: Optimal Wound Healing  Outcome: Not Progressing     Problem: Wound  Goal: Optimal Coping  Outcome: Not Progressing  Goal: Optimal Functional Ability  Outcome: Not Progressing  Goal: Absence of Infection Signs and Symptoms  Outcome: Not Progressing  Goal: Improved Oral Intake  Outcome: Not Progressing  Goal: Optimal Pain Control and Function  Outcome: Not Progressing  Goal: Skin Health and Integrity  Outcome: Not Progressing  Goal: Optimal Wound Healing  Outcome: Not Progressing

## 2025-03-14 NOTE — PT/OT/SLP PROGRESS
Physical Therapy Treatment    Patient Name:  Bhavana Lambert   MRN:  [id number]    Recommendations:     Discharge Recommendations: Moderate Intensity Therapy  Discharge Equipment Recommendations: to be determined by next level of care  Barriers to discharge:   increase assist with mobility, high fall risk, decrease safety awareness, impaired cognition,    Assessment:     Bhavana Lambert is a 69 y.o. female admitted with a medical diagnosis of T12 burst fracture.  She presents with the following impairments/functional limitations: weakness, impaired endurance, impaired self care skills, impaired functional mobility, impaired balance, decreased upper extremity function, decreased lower extremity function, decreased safety awareness, impaired cardiopulmonary response to activity, orthopedic precautions.    Pt found up in chair reaching for the floor. Pt reports that she is ready to return to bed after OOB x 30 mins. After transfer to EOB noted to be soiled. Assisted nurse with rolling for bed level hygiene care.     Rehab Prognosis: Fair; patient would benefit from acute skilled PT services to address these deficits and reach maximum level of function.    Recent Surgery: * No surgery found *      Plan:     During this hospitalization, patient to be seen 5 x/week to address the identified rehab impairments via therapeutic activities, therapeutic exercises, neuromuscular re-education and progress toward the following goals:    Plan of Care Expires:  04/13/25    Subjective     Chief Complaint: loss stool  Patient/Family Comments/goals: return to bed  Pain/Comfort:  Pain Rating 1: other (see comments) (did not quantify)  Location 1: back  Pain Addressed 1: Reposition, Distraction, Cessation of Activity      Objective:     Communicated with RN prior to session.  Patient found up in chair with peripheral IV, telemetry, blood pressure cuff, pulse ox (continuous), oxygen upon PT entry to room.     General Precautions:  Standard, fall  Orthopedic Precautions: spinal precautions  Braces: TLSO  Respiratory Status: Nasal cannula, flow 5 L/min     Functional Mobility:  Bed Mobility:     Rolling Left:  maximal assistance  Rolling Right: maximal assistance  Sit to Supine: moderate assistance  Transfers:     Sit to Stand:  moderate assistance with no AD  Bed to Chair: moderate assistance with  no AD  using  Stand Pivot      AM-PAC 6 CLICK MOBILITY          Treatment & Education:  Pt educated on POC, discharge recommendation, importance of time OOB, proper form with mobility, need for assist with mobility, use of call bell to seek assistance as needed and fall prevention      Patient left HOB elevated with all lines intact, call button in reach, bed alarm on, and 2 RN present..    GOALS:   Multidisciplinary Problems       Physical Therapy Goals          Problem: Physical Therapy    Goal Priority Disciplines Outcome Interventions   Physical Therapy Goal     PT, PT/OT Progressing    Description: Goals to be met by: 25     Patient will increase functional independence with mobility by performin. Supine to sit with Standby Assistance  2. Sit to stand with Standby Assistance using Rolling Walker   3. Bed to chair transfer with Standby Assistance using Rolling Walker  4. Sitting at edge of bed x10 minutes with Minimal Assistance                       DME Justifications:  No DME recommended requiring DME justifications    Time Tracking:     PT Received On: 25  PT Start Time: 1057     PT Stop Time: 1115  PT Total Time (min): 18 min     Billable Minutes: Therapeutic Activity 18    Treatment Type: Treatment  PT/PTA: PT     Number of PTA visits since last PT visit: 0     2025

## 2025-03-14 NOTE — ASSESSMENT & PLAN NOTE
Patient has a diagnosis of pneumonia. The cause of the pneumonia is bacterial in etiology due to Gram negative pathogens associated with aspiration. The pneumonia is acute. The patient has the following signs/symptoms of pneumonia: persistent hypoxia , cough, and shortness of breath. The patient does have a current oxygen requirement and the patient does have a home oxygen requirement. I have reviewed the pertinent imaging. The following cultures have been collected: Blood cultures and Sputum culture The culture results are listed below.     Current antimicrobial regimen consists of the antibiotics listed below. Will monitor patient closely and continue current treatment plan unchanged.    Antibiotics (From admission, onward)      Start     Stop Route Frequency Ordered    03/11/25 1215  mupirocin 2 % ointment         03/16/25 0859 Nasl 2 times daily 03/11/25 1114    03/11/25 1030  piperacillin-tazobactam (ZOSYN) 4.5 g in D5W 100 mL IVPB (MB+)         -- IV Every 8 hours (non-standard times) 03/11/25 0927            Microbiology Results (last 7 days)       Procedure Component Value Units Date/Time    Blood culture [[id number]] Collected: 03/11/25 0944    Order Status: Completed Specimen: Blood from Peripheral, Hand, Right Updated: 03/14/25 1232     Blood Culture, Routine No Growth to date      No Growth to date      No Growth to date      No Growth to date    Blood culture [[id number]] Collected: 03/11/25 0944    Order Status: Completed Specimen: Blood from Peripheral, Antecubital, Right Updated: 03/14/25 1232     Blood Culture, Routine No Growth to date      No Growth to date      No Growth to date      No Growth to date    Culture, Respiratory with Gram Stain [[id number]] Collected: 03/12/25 1047    Order Status: Completed Specimen: Respiratory from Sputum, Expectorated Updated: 03/14/25 0857     Respiratory Culture Normal respiratory kehinde     Gram Stain (Respiratory) Many WBC's     Gram Stain (Respiratory) >10  epithelial cells per low power field     Gram Stain (Respiratory) Few budding yeast     Gram Stain (Respiratory) Few Gram positive cocci    Urine culture [[id number]]  (Abnormal)  (Susceptibility) Collected: 03/11/25 1406    Order Status: Completed Specimen: Urine Updated: 03/14/25 0830     Urine Culture, Routine PROTEUS MIRABILIS  >100,000 cfu/ml        STREPTOCOCCUS AGALACTIAE (GROUP B)  > 100,000 cfu/ml  In case of Penicillin allergy, call lab for further testing.  Beta-hemolytic streptococci are routinely susceptible to   penicillins,cephalosporins and carbapenems.      Narrative:      Specimen Source->Urine    Influenza A & B by Molecular [[id number]] Collected: 03/11/25 1110    Order Status: Sent Specimen: Nasopharyngeal Swab

## 2025-03-14 NOTE — ASSESSMENT & PLAN NOTE
Patient with Hypercapnic and Hypoxic Respiratory failure which is Acute on chronic.  she is on home oxygen at 3.5 LPM. Supplemental oxygen was provided and noted- Oxygen Concentration (%):  [40] 40    .   Signs/symptoms of respiratory failure include- tachypnea and increased work of breathing. Contributing diagnoses includes - Aspiration, COPD, Interstitial lung disease, and Pneumonia Labs and images were reviewed. Patient Has recent ABG, which has been reviewed. Will treat underlying causes and adjust management of respiratory failure as follows-   -continue BiPAP   -continue IV Abx   - pulmonary Medicine following.      Reduce IV fluids to 50 cc an hour. Discussed with bedside nurse and .

## 2025-03-14 NOTE — CARE UPDATE
03/13/25 1912   Patient Assessment/Suction   Level of Consciousness (AVPU) alert   Respiratory Effort Normal;Unlabored   Expansion/Accessory Muscles/Retractions no use of accessory muscles   All Lung Fields Breath Sounds diminished   Rhythm/Pattern, Respiratory assisted mechanically;tachypneic   Skin Integrity   $ Wound Care Tech Time 15 min   Area Observed Bridge of nose   Skin Appearance without discoloration   Barrier used? Transparent Film   PRE-TX-O2   Device (Oxygen Therapy) BIPAP   Oxygen Concentration (%) 40   SpO2 100 %   Pulse Oximetry Type Continuous   $ Pulse Oximetry - Multiple Charge Pulse Oximetry - Multiple   Pulse 80   Resp (!) 28   Aerosol Therapy   $ Aerosol Therapy Charges Aerosol Treatment   Daily Review of Necessity (SVN) completed   Respiratory Treatment Status (SVN) given   Treatment Route (SVN) in-line   Patient Position HOB elevated   Post Treatment Assessment (SVN) increased aeration   Signs of Intolerance (SVN) none   Breath Sounds Post-Respiratory Treatment   Throughout All Fields Post-Treatment All Fields   Throughout All Fields Post-Treatment aeration increased   Post-treatment Heart Rate (beats/min) 88   Post-treatment Resp Rate (breaths/min) 27   Ready to Wean/Extubation Screen   FIO2<=50 (chart decimal) 0.4   Preset CPAP/BiPAP Settings   Mode Of Delivery BiPAP S/T   CPAP/BIPAP charged w/in last 24 h YES   $ Is patient using? Yes   Size of Mask Small/Medium   Sized Appropriately? Yes   Equipment Type V60   Airway Device Type medium nasal mask   Ipap 20   EPAP (cm H2O) 8   Pressure Support (cm H2O) 12   Set Rate (Breaths/Min) 28   ITime (sec) 0.85   Rise Time (sec) 2   Patient CPAP/BiPAP Settings   RR Total (Breaths/Min) 28   Tidal Volume (mL) 383   VE Minute Ventilation (L/min) 10.7 L/min   Peak Inspiratory Pressure (cm H2O) 21   TiTOT (%) 39   Total Leak (L/Min) 40   Patient Trigger - ST Mode Only (%) 9   CPAP/BiPAP Alarms   High Pressure (cm H2O) 40   Low Pressure (cm H2O) 5    Minute Ventilation (L/Min) 5   High RR (breaths/min) 40   Low RR (breaths/min) 6   Apnea (Sec) 20   Education   $ Education BiPAP;Bronchodilator;15 min

## 2025-03-15 LAB
ANION GAP SERPL CALC-SCNC: 9 MMOL/L (ref 8–16)
BASOPHILS # BLD AUTO: 0.02 K/UL (ref 0–0.2)
BASOPHILS NFR BLD: 0.3 % (ref 0–1.9)
BUN SERPL-MCNC: 3 MG/DL (ref 8–23)
CALCIUM SERPL-MCNC: 8.4 MG/DL (ref 8.7–10.5)
CHLORIDE SERPL-SCNC: 98 MMOL/L (ref 95–110)
CO2 SERPL-SCNC: 37 MMOL/L (ref 23–29)
CREAT SERPL-MCNC: 0.6 MG/DL (ref 0.5–1.4)
DIFFERENTIAL METHOD BLD: ABNORMAL
EOSINOPHIL # BLD AUTO: 0.3 K/UL (ref 0–0.5)
EOSINOPHIL NFR BLD: 4.2 % (ref 0–8)
ERYTHROCYTE [DISTWIDTH] IN BLOOD BY AUTOMATED COUNT: 13.5 % (ref 11.5–14.5)
EST. GFR  (NO RACE VARIABLE): >60 ML/MIN/1.73 M^2
GLUCOSE SERPL-MCNC: 85 MG/DL (ref 70–110)
HCT VFR BLD AUTO: 26.1 % (ref 37–48.5)
HGB BLD-MCNC: 8 G/DL (ref 12–16)
IMM GRANULOCYTES # BLD AUTO: 0.03 K/UL (ref 0–0.04)
IMM GRANULOCYTES NFR BLD AUTO: 0.5 % (ref 0–0.5)
LYMPHOCYTES # BLD AUTO: 1.1 K/UL (ref 1–4.8)
LYMPHOCYTES NFR BLD: 18.1 % (ref 18–48)
MAGNESIUM SERPL-MCNC: 1.5 MG/DL (ref 1.6–2.6)
MCH RBC QN AUTO: 31 PG (ref 27–31)
MCHC RBC AUTO-ENTMCNC: 30.7 G/DL (ref 32–36)
MCV RBC AUTO: 101 FL (ref 82–98)
MONOCYTES # BLD AUTO: 0.8 K/UL (ref 0.3–1)
MONOCYTES NFR BLD: 13.1 % (ref 4–15)
NEUTROPHILS # BLD AUTO: 4 K/UL (ref 1.8–7.7)
NEUTROPHILS NFR BLD: 63.8 % (ref 38–73)
NRBC BLD-RTO: 0 /100 WBC
PLATELET # BLD AUTO: 316 K/UL (ref 150–450)
PMV BLD AUTO: 10.1 FL (ref 9.2–12.9)
POTASSIUM SERPL-SCNC: 3.7 MMOL/L (ref 3.5–5.1)
RBC # BLD AUTO: 2.58 M/UL (ref 4–5.4)
SODIUM SERPL-SCNC: 144 MMOL/L (ref 136–145)
WBC # BLD AUTO: 6.25 K/UL (ref 3.9–12.7)

## 2025-03-15 PROCEDURE — 25000003 PHARM REV CODE 250: Performed by: STUDENT IN AN ORGANIZED HEALTH CARE EDUCATION/TRAINING PROGRAM

## 2025-03-15 PROCEDURE — 25000003 PHARM REV CODE 250: Performed by: NURSE PRACTITIONER

## 2025-03-15 PROCEDURE — 63600175 PHARM REV CODE 636 W HCPCS: Performed by: HOSPITALIST

## 2025-03-15 PROCEDURE — 25000003 PHARM REV CODE 250

## 2025-03-15 PROCEDURE — 12000002 HC ACUTE/MED SURGE SEMI-PRIVATE ROOM

## 2025-03-15 PROCEDURE — 99900035 HC TECH TIME PER 15 MIN (STAT)

## 2025-03-15 PROCEDURE — 25000242 PHARM REV CODE 250 ALT 637 W/ HCPCS: Performed by: HOSPITALIST

## 2025-03-15 PROCEDURE — 80048 BASIC METABOLIC PNL TOTAL CA: CPT | Performed by: NURSE PRACTITIONER

## 2025-03-15 PROCEDURE — 27000221 HC OXYGEN, UP TO 24 HOURS

## 2025-03-15 PROCEDURE — 94640 AIRWAY INHALATION TREATMENT: CPT

## 2025-03-15 PROCEDURE — 25000003 PHARM REV CODE 250: Performed by: INTERNAL MEDICINE

## 2025-03-15 PROCEDURE — 25000003 PHARM REV CODE 250: Performed by: HOSPITALIST

## 2025-03-15 PROCEDURE — 99900031 HC PATIENT EDUCATION (STAT)

## 2025-03-15 PROCEDURE — 83735 ASSAY OF MAGNESIUM: CPT | Performed by: STUDENT IN AN ORGANIZED HEALTH CARE EDUCATION/TRAINING PROGRAM

## 2025-03-15 PROCEDURE — 63600175 PHARM REV CODE 636 W HCPCS: Performed by: STUDENT IN AN ORGANIZED HEALTH CARE EDUCATION/TRAINING PROGRAM

## 2025-03-15 PROCEDURE — 85025 COMPLETE CBC W/AUTO DIFF WBC: CPT | Performed by: NURSE PRACTITIONER

## 2025-03-15 PROCEDURE — S4991 NICOTINE PATCH NONLEGEND: HCPCS

## 2025-03-15 PROCEDURE — 94761 N-INVAS EAR/PLS OXIMETRY MLT: CPT

## 2025-03-15 RX ORDER — LOPERAMIDE HYDROCHLORIDE 2 MG/1
2 CAPSULE ORAL 4 TIMES DAILY PRN
Status: DISCONTINUED | OUTPATIENT
Start: 2025-03-15 | End: 2025-03-18 | Stop reason: HOSPADM

## 2025-03-15 RX ORDER — ENOXAPARIN SODIUM 100 MG/ML
40 INJECTION SUBCUTANEOUS EVERY 24 HOURS
Status: DISCONTINUED | OUTPATIENT
Start: 2025-03-15 | End: 2025-03-16

## 2025-03-15 RX ORDER — AMOXICILLIN AND CLAVULANATE POTASSIUM 875; 125 MG/1; MG/1
1 TABLET, FILM COATED ORAL EVERY 12 HOURS
Status: COMPLETED | OUTPATIENT
Start: 2025-03-15 | End: 2025-03-16

## 2025-03-15 RX ADMIN — IPRATROPIUM BROMIDE 0.5 MG: 0.5 SOLUTION RESPIRATORY (INHALATION) at 01:03

## 2025-03-15 RX ADMIN — BUDESONIDE INHALATION 0.5 MG: 0.5 SUSPENSION RESPIRATORY (INHALATION) at 07:03

## 2025-03-15 RX ADMIN — GABAPENTIN 300 MG: 300 CAPSULE ORAL at 09:03

## 2025-03-15 RX ADMIN — MUPIROCIN 1 G: 20 OINTMENT TOPICAL at 09:03

## 2025-03-15 RX ADMIN — IPRATROPIUM BROMIDE 0.5 MG: 0.5 SOLUTION RESPIRATORY (INHALATION) at 06:03

## 2025-03-15 RX ADMIN — BUSPIRONE HYDROCHLORIDE 15 MG: 5 TABLET ORAL at 03:03

## 2025-03-15 RX ADMIN — MONTELUKAST 10 MG: 10 TABLET, FILM COATED ORAL at 09:03

## 2025-03-15 RX ADMIN — NICOTINE 1 PATCH: 21 PATCH, EXTENDED RELEASE TRANSDERMAL at 03:03

## 2025-03-15 RX ADMIN — FLUOXETINE HYDROCHLORIDE 40 MG: 20 CAPSULE ORAL at 09:03

## 2025-03-15 RX ADMIN — Medication 800 MG: at 09:03

## 2025-03-15 RX ADMIN — SODIUM CHLORIDE: 9 INJECTION, SOLUTION INTRAVENOUS at 05:03

## 2025-03-15 RX ADMIN — POTASSIUM BICARBONATE 50 MEQ: 978 TABLET, EFFERVESCENT ORAL at 05:03

## 2025-03-15 RX ADMIN — BUSPIRONE HYDROCHLORIDE 15 MG: 5 TABLET ORAL at 09:03

## 2025-03-15 RX ADMIN — FAMOTIDINE 20 MG: 20 TABLET, FILM COATED ORAL at 09:03

## 2025-03-15 RX ADMIN — CARVEDILOL 12.5 MG: 12.5 TABLET, FILM COATED ORAL at 09:03

## 2025-03-15 RX ADMIN — BUDESONIDE INHALATION 0.5 MG: 0.5 SUSPENSION RESPIRATORY (INHALATION) at 06:03

## 2025-03-15 RX ADMIN — GABAPENTIN 300 MG: 300 CAPSULE ORAL at 03:03

## 2025-03-15 RX ADMIN — LEVOTHYROXINE SODIUM 50 MCG: 0.03 TABLET ORAL at 05:03

## 2025-03-15 RX ADMIN — ARFORMOTEROL TARTRATE 15 MCG: 15 SOLUTION RESPIRATORY (INHALATION) at 06:03

## 2025-03-15 RX ADMIN — ENOXAPARIN SODIUM 40 MG: 40 INJECTION SUBCUTANEOUS at 06:03

## 2025-03-15 RX ADMIN — LOPERAMIDE HYDROCHLORIDE 2 MG: 2 CAPSULE ORAL at 12:03

## 2025-03-15 RX ADMIN — ATORVASTATIN CALCIUM 80 MG: 40 TABLET, FILM COATED ORAL at 09:03

## 2025-03-15 RX ADMIN — PIPERACILLIN SODIUM AND TAZOBACTAM SODIUM 4.5 G: 4; .5 INJECTION, POWDER, LYOPHILIZED, FOR SOLUTION INTRAVENOUS at 02:03

## 2025-03-15 RX ADMIN — THIAMINE HCL TAB 100 MG 100 MG: 100 TAB at 09:03

## 2025-03-15 RX ADMIN — AMOXICILLIN AND CLAVULANATE POTASSIUM 1 TABLET: 875; 125 TABLET, FILM COATED ORAL at 09:03

## 2025-03-15 RX ADMIN — Medication 800 MG: at 05:03

## 2025-03-15 RX ADMIN — FOLIC ACID 1 MG: 1 TABLET ORAL at 09:03

## 2025-03-15 RX ADMIN — THERA TABS 1 TABLET: TAB at 09:03

## 2025-03-15 NOTE — PLAN OF CARE
Problem: Adult Inpatient Plan of Care  Goal: Plan of Care Review  Outcome: Progressing  Goal: Patient-Specific Goal (Individualized)  Outcome: Progressing  Goal: Absence of Hospital-Acquired Illness or Injury  Outcome: Progressing  Goal: Optimal Comfort and Wellbeing  Outcome: Progressing  Goal: Readiness for Transition of Care  Outcome: Progressing     Problem: Skin Injury Risk Increased  Goal: Skin Health and Integrity  Outcome: Progressing     Problem: Fall Injury Risk  Goal: Absence of Fall and Fall-Related Injury  Outcome: Progressing     Problem: Wound  Goal: Optimal Coping  Outcome: Progressing  Goal: Optimal Functional Ability  Outcome: Progressing  Goal: Absence of Infection Signs and Symptoms  Outcome: Progressing  Goal: Improved Oral Intake  Outcome: Progressing  Goal: Optimal Pain Control and Function  Outcome: Progressing  Goal: Skin Health and Integrity  Outcome: Progressing  Goal: Optimal Wound Healing  Outcome: Progressing     Problem: Infection  Goal: Absence of Infection Signs and Symptoms  Outcome: Progressing     Problem: Delirium  Goal: Optimal Coping  Outcome: Progressing  Goal: Improved Behavioral Control  Outcome: Progressing  Goal: Improved Attention and Thought Clarity  Outcome: Progressing  Goal: Improved Sleep  Outcome: Progressing

## 2025-03-15 NOTE — PROGRESS NOTES
Pharmacist Renal Dose Adjustment Note    Bhavana Lambert is a 69 y.o. female being treated with the medication Enoxaparin.    Patient Data:    Vital Signs (Most Recent):  Temp: 98.7 °F (37.1 °C) (03/15/25 1101)  Pulse: 85 (03/15/25 1505)  Resp: 12 (03/15/25 1505)  BP: 128/81 (03/15/25 1301)  SpO2: 99 % (03/15/25 1505) Vital Signs (72h Range):  Temp:  [97.6 °F (36.4 °C)-98.7 °F (37.1 °C)]   Pulse:  []   Resp:  [10-50]   BP: ()/(46-92)   SpO2:  [79 %-100 %]      Recent Labs   Lab 03/13/25  0330 03/14/25  0313 03/15/25  0419   CREATININE 0.5 0.5 0.6     Serum creatinine: 0.6 mg/dL 03/15/25 0419  Estimated creatinine clearance: 66.1 mL/min    Enoxaparin 30 mg subq every 24 hours will be changed to Enoxaparin 40 mg subq every 24 hours due to CrCl > 30 ml/min.    Pharmacist's Name: Margarita Bowers  Pharmacist's Extension: [phone]

## 2025-03-15 NOTE — PLAN OF CARE
Problem: Wound  Goal: Improved Oral Intake  Outcome: Progressing  Intervention: Promote and Optimize Oral Intake  Flowsheets (Taken 3/15/2025 7049)  Oral Nutrition Promotion:   calorie-dense liquids provided   calorie-dense foods provided   other (see comments)     Recommendations  1. Continue regular die tas tolerated.   2. Continue Boost Plus TID for increased protein/energy.   3. Will add Dalton BID to aid in wound healing.   4.  to obtain daily menu choices.  Nutrition Goal Status: progressing towards goal  Communication of RD Recs: reviewed with RN

## 2025-03-15 NOTE — NURSING
Received patient via bed AAOx4 resp even and unlabored, skin intact and warm, gluteal/perianal area redden, heels soft and red. No open areas noted. Yamilex care given and applied Triad, waffle mattress placed on bed. Position for comfort and oriented to room and unit. NAD

## 2025-03-15 NOTE — ASSESSMENT & PLAN NOTE
Patient has a diagnosis of pneumonia. The cause of the pneumonia is bacterial in etiology due to Gram negative pathogens associated with aspiration. The pneumonia is acute. The patient has the following signs/symptoms of pneumonia: persistent hypoxia , cough, and shortness of breath. The patient does have a current oxygen requirement and the patient does have a home oxygen requirement. I have reviewed the pertinent imaging. The following cultures have been collected: Blood cultures and Sputum culture The culture results are listed below.     Current antimicrobial regimen consists of the antibiotics listed below. Will monitor patient closely and continue current treatment plan unchanged.    Antibiotics (From admission, onward)      Start     Stop Route Frequency Ordered    03/15/25 0930  amoxicillin-clavulanate 875-125mg per tablet 1 tablet         03/17/25 0859 Oral Every 12 hours 03/15/25 0920    03/11/25 1215  mupirocin 2 % ointment         03/16/25 0859 Nasl 2 times daily 03/11/25 1114            Microbiology Results (last 7 days)       Procedure Component Value Units Date/Time    Blood culture [[id number]] Collected: 03/11/25 0944    Order Status: Completed Specimen: Blood from Peripheral, Antecubital, Right Updated: 03/15/25 1232     Blood Culture, Routine No Growth to date      No Growth to date      No Growth to date      No Growth to date      No Growth to date    Blood culture [[id number]] Collected: 03/11/25 0944    Order Status: Completed Specimen: Blood from Peripheral, Hand, Right Updated: 03/15/25 1232     Blood Culture, Routine No Growth to date      No Growth to date      No Growth to date      No Growth to date      No Growth to date    Culture, Respiratory with Gram Stain [[id number]] Collected: 03/12/25 1047    Order Status: Completed Specimen: Respiratory from Sputum, Expectorated Updated: 03/14/25 0857     Respiratory Culture Normal respiratory kehinde     Gram Stain (Respiratory) Many WBC's      Gram Stain (Respiratory) >10 epithelial cells per low power field     Gram Stain (Respiratory) Few budding yeast     Gram Stain (Respiratory) Few Gram positive cocci    Urine culture [[id number]]  (Abnormal)  (Susceptibility) Collected: 03/11/25 1406    Order Status: Completed Specimen: Urine Updated: 03/14/25 0830     Urine Culture, Routine PROTEUS MIRABILIS  >100,000 cfu/ml        STREPTOCOCCUS AGALACTIAE (GROUP B)  > 100,000 cfu/ml  In case of Penicillin allergy, call lab for further testing.  Beta-hemolytic streptococci are routinely susceptible to   penicillins,cephalosporins and carbapenems.      Narrative:      Specimen Source->Urine    Influenza A & B by Molecular [[id number]] Collected: 03/11/25 1110    Order Status: Sent Specimen: Nasopharyngeal Swab           Afebrile, WBC normal   Received 4.5 days of Zosyn   Sputum culture did not grow any predominant bacteria  De-escalated to Augmentin for UTI, and to continue treatment for suspected CPAP    Patient has a diagnosis of pneumonia. The cause of the pneumonia is bacterial in etiology due to Gram negative pathogens associated with aspiration. The pneumonia is acute. The patient has the following signs/symptoms of pneumonia: persistent hypoxia , cough, and shortness of breath. The patient does have a current oxygen requirement and the patient does have a home oxygen requirement. I have reviewed the pertinent imaging. The following cultures have been collected: Blood cultures and Sputum culture The culture results are listed below.     Current antimicrobial regimen consists of the antibiotics listed below. Will monitor patient closely and continue current treatment plan unchanged.    Antibiotics (From admission, onward)      Start     Stop Route Frequency Ordered    03/15/25 0930  amoxicillin-clavulanate 875-125mg per tablet 1 tablet         03/17/25 0859 Oral Every 12 hours 03/15/25 0920    03/11/25 1215  mupirocin 2 % ointment         03/16/25 0859 Nasl 2  times daily 03/11/25 1114            Microbiology Results (last 7 days)       Procedure Component Value Units Date/Time    Blood culture [[id number]] Collected: 03/11/25 0944    Order Status: Completed Specimen: Blood from Peripheral, Antecubital, Right Updated: 03/15/25 1232     Blood Culture, Routine No Growth to date      No Growth to date      No Growth to date      No Growth to date      No Growth to date    Blood culture [[id number]] Collected: 03/11/25 0944    Order Status: Completed Specimen: Blood from Peripheral, Hand, Right Updated: 03/15/25 1232     Blood Culture, Routine No Growth to date      No Growth to date      No Growth to date      No Growth to date      No Growth to date    Culture, Respiratory with Gram Stain [[id number]] Collected: 03/12/25 1047    Order Status: Completed Specimen: Respiratory from Sputum, Expectorated Updated: 03/14/25 0857     Respiratory Culture Normal respiratory kehinde     Gram Stain (Respiratory) Many WBC's     Gram Stain (Respiratory) >10 epithelial cells per low power field     Gram Stain (Respiratory) Few budding yeast     Gram Stain (Respiratory) Few Gram positive cocci    Urine culture [[id number]]  (Abnormal)  (Susceptibility) Collected: 03/11/25 1406    Order Status: Completed Specimen: Urine Updated: 03/14/25 0830     Urine Culture, Routine PROTEUS MIRABILIS  >100,000 cfu/ml        STREPTOCOCCUS AGALACTIAE (GROUP B)  > 100,000 cfu/ml  In case of Penicillin allergy, call lab for further testing.  Beta-hemolytic streptococci are routinely susceptible to   penicillins,cephalosporins and carbapenems.      Narrative:      Specimen Source->Urine    Influenza A & B by Molecular [[id number]] Collected: 03/11/25 1110    Order Status: Sent Specimen: Nasopharyngeal Swab           Urine culture growing Proteus mirabilis and strep agalactiae  Deescalating from Zosyn to Augmentin for 2 more days

## 2025-03-15 NOTE — CONSULTS
[facility]  Adult Nutrition   Consult Note (Initial Assessment)     SUMMARY     Recommendations  1. Continue regular die tas tolerated.   2. Continue Boost Plus TID for increased protein/energy.   3. Will add Dalton BID to aid in wound healing.   4.  to obtain daily menu choices.  Nutrition Goal Status: progressing towards goal  Communication of RD Recs: reviewed with RN    Nutrition Goals: Lab values to trend toward normal range by RD follow up., Meal consumption of >50% by RD follow up., and Oral supplement consumption of >50% by RD follow up.    Nutrition Interventions: General healthful diet, Medical Food Supplement Therapy, and Collaboration and Referral of Nutrition Care    Nutrition Diagnosis PES Statement:  Increased nutrition needs related to wound healing as evidenced by Bilateral heel wounds being followed by WC RN.      Nutrition Diagnosis Status:   Continues    Dietitian Rounds Brief  Consult received for decreased appetite/intake. Pt seen and assessed fully by RD on 3/12. ONS provided and pt consuming 1/2 carton. Pt reports pain inbiting intake. Encouraged small bites/sips of ONS and of meals. Offered to obtain food preferences, none provided. No GI distress. LBM 3/14. NFPE completed with age appropriate wasting. Pt at risk for malnutrition given poor PO intakes.    Nutrition Related Social Determinants of Health:   Food Insecurity: Patient Declined (3/4/2025)    Hunger Vital Sign     Worried About Running Out of Food in the Last Year: Patient declined     Ran Out of Food in the Last Year: Patient declined   Recent Concern: Food Insecurity - Food Insecurity Present (2/10/2025)    Hunger Vital Sign     Worried About Running Out of Food in the Last Year: Sometimes true     Ran Out of Food in the Last Year: Sometimes true       Malnutrition Assessment     Skin (Micronutrient): other (see comments) (surgical incision)       Energy Intake (Malnutrition): less than or equal to 50% for  "greater than or equal to 5 days  Subcutaneous Fat (Malnutrition): mild depletion  Muscle Mass (Malnutrition): mild depletion   Orbital Region (Subcutaneous Fat Loss): mild depletion  Upper Arm Region (Subcutaneous Fat Loss): moderate depletion   [place] Region (Muscle Loss): mild depletion  Clavicle Bone Region (Muscle Loss): moderate depletion  Clavicle and Acromion Bone Region (Muscle Loss): mild depletion  Scapular Bone Region (Muscle Loss): mild depletion  Dorsal Hand (Muscle Loss): mild depletion  Patellar Region (Muscle Loss): mild depletion  Anterior Thigh Region (Muscle Loss): mild depletion  Posterior Calf Region (Muscle Loss): mild depletion         Diet order:   Current Diet Order: Regular diet   Boost Plus TID    Evaluation of Received Nutrient/Fluid Intake        % Intake of Estimated Energy Needs: 0 - 25 %  % Meal Intake: 0 - 25 %      Intake/Output Summary (Last 24 hours) at 3/15/2025 0753  Last data filed at 3/15/2025 0600  Gross per 24 hour   Intake 2270.75 ml   Output --   Net 2270.75 ml        Anthropometrics  Height: 5' 2" (157.5 cm)  Height (inches): 62 in  Height Method: Stated  Weight: 47.3 kg (104 lb 4.4 oz)  Weight (lb): 104.28 lb  Weight Method: Bed Scale  Ideal Body Weight (IBW), Female: 110 lb  % Ideal Body Weight, Female (lb): 94.4 %  BMI (Calculated): 19.1  BMI Grade: 18.5-24.9 - normal       Estimated/Assessed Needs  Weight Used For Calorie Calculations: 47.1 kg (103 lb 13.4 oz)  Energy Calorie Requirements (kcal): 1003-9738 kcal/day (25-30 kcal/kg)  Energy Need Method: Kcal/kg  Protein Requirements: 56-71 gm/day (1.2-1.5 gm/kg)  Weight Used For Protein Calculations: 47.1 kg (103 lb 13.4 oz)     Estimated Fluid Requirement Method: RDA Method  RDA Method (mL): 1177       Reason for Assessment  Reason For Assessment: consult  Diagnosis: trauma    Final diagnoses:  [S22.001A] Burst fracture of thoracic vertebra     Past Medical History:   Diagnosis Date    Acquired hypothyroidism     " Anxiety and depression     Cervical radicular pain     Closed left ankle fracture     COPD (chronic obstructive pulmonary disease)     Heavy cigarette smoker     She has smoked 1/2 PPD since the age of 12, and still smokes    Hypertension     Hyponatremia     Multiple rib fractures     NSTEMI (non-ST elevated myocardial infarction)     Requires continuous at home supplemental oxygen     She is on 3.5L NC continuous    Stroke 11/08/2023    Tension type headache     Traumatic closed displaced fracture of distal end of radius, left, sequela         Nutrition/Diet History  Nutrition Intake History: Good intake and appetite.  Food Preferences: Obtained by   Spiritual, Cultural Beliefs, Buddhist Practices, Values that Affect Care: no  Food Allergies: NKFA  Factors Affecting Nutritional Intake: None identified at this time    Nutrition Risk Screen          Wound 03/11/25 1100 Pressure Injury Left posterior Heel #1-Wound Image: Images linked       Wound 03/11/25 1100 Pressure Injury Right Heel #2-Wound Image: Images linked       Wound 03/14/25 Moisture associated dermatitis anterior;posterior;medial;lateral Perineum-Wound Image: Images linked       Wound 03/14/25 Moisture associated dermatitis medial Buttocks-Wound Image: Images linked  MST Score: 0  Have you recently lost weight without trying?: No  Weight loss score: 0  Have you been eating poorly because of a decreased appetite?: No  Appetite score: 0       Weight History:  Wt Readings from Last 10 Encounters:   03/15/25 47.3 kg (104 lb 4.4 oz)   03/03/25 47.2 kg (104 lb)   02/24/25 47.4 kg (104 lb 9.7 oz)   02/14/25 49 kg (108 lb 0.4 oz)   07/13/24 49.9 kg (110 lb)   05/27/24 46.7 kg (103 lb)   04/21/24 46.3 kg (102 lb)   03/25/24 59 kg (130 lb 1.1 oz)   01/23/24 59 kg (130 lb)   01/21/24 59.5 kg (131 lb 2.8 oz)        Lab/Procedures/Meds: Pertinent Labs/Meds Reviewed    Medications:Pertinent Medications Reviewed  Scheduled Meds:   budesonide  0.5 mg  Nebulization Q12H    And    arformoteroL  15 mcg Nebulization BID    And    ipratropium  0.5 mg Nebulization Q6H WAKE    atorvastatin  80 mg Oral Daily    busPIRone  15 mg Oral TID    carvediloL  12.5 mg Oral BID    enoxaparin  30 mg Subcutaneous Daily    famotidine  20 mg Oral BID    FLUoxetine  40 mg Oral Daily    folic acid  1 mg Oral Daily    gabapentin  300 mg Oral TID    levothyroxine  50 mcg Oral Before breakfast    montelukast  10 mg Oral QHS    multivitamin  1 tablet Oral Daily    mupirocin   Nasal BID    nicotine  1 patch Transdermal Daily    piperacillin-tazobactam (Zosyn) IV (PEDS and ADULTS) (extended infusion is not appropriate)  4.5 g Intravenous Q8H    polyethylene glycol  17 g Oral Daily    thiamine  100 mg Oral Daily     Continuous Infusions:   0.9% NaCl   Intravenous Continuous 50 mL/hr at 03/15/25 0600 Rate Verify at 03/15/25 0600    dexmedeTOMIDine (Precedex) infusion (titrating)  0-1.4 mcg/kg/hr Intravenous Continuous   Stopped at 03/14/25 1259    NORepinephrine bitartrate-D5W  0-3 mcg/kg/min Intravenous Continuous   Stopped at 03/13/25 0827     PRN Meds:.  Current Facility-Administered Medications:     acetaminophen, 650 mg, Oral, Q4H PRN    albuterol-ipratropium, 3 mL, Nebulization, Q6H PRN    butalbital-acetaminophen-caffeine -40 mg, 1 tablet, Oral, Q4H PRN    clonazePAM, 0.5 mg, Oral, BID PRN    dexmedeTOMIDine (Precedex) infusion (titrating), 0-1.4 mcg/kg/hr, Intravenous, Once PRN    HYDROcodone-acetaminophen, 1 tablet, Oral, Q6H PRN    HYDROcodone-acetaminophen, 1 tablet, Oral, Q6H PRN    magnesium oxide, 800 mg, Oral, PRN    magnesium oxide, 800 mg, Oral, PRN    methocarbamoL, 500 mg, Oral, QID PRN    naloxone, 0.02 mg, Intravenous, PRN    ondansetron, 4 mg, Intravenous, Q4H PRN    potassium bicarbonate, 35 mEq, Oral, PRN    potassium bicarbonate, 50 mEq, Oral, PRN    potassium bicarbonate, 60 mEq, Oral, PRN    sodium chloride 0.9%, 10 mL, Intravenous, Q12H PRN    Labs: Pertinent  Labs Reviewed  Clinical Chemistry:  Recent Labs   Lab 03/09/25  0632 03/11/25  0632 03/15/25  0419   *   < > 144   K 4.3   < > 3.7   CL 93*   < > 98   CO2 35*   < > 37*   GLU 93   < > 85   BUN 9   < > 3*   CREATININE 0.7   < > 0.6   CALCIUM 8.6*   < > 8.4*   PROT 6.3  --   --    ALBUMIN 2.9*  --   --    BILITOT 0.2  --   --    ALKPHOS 88  --   --    AST 24  --   --    ALT 13  --   --    ANIONGAP 6*   < > 9   MG 1.7   < > 1.5*   PHOS 4.5  --   --     < > = values in this interval not displayed.     CBC:   Recent Labs   Lab 03/15/25  0419   WBC 6.25   RBC 2.58*   HGB 8.0*   HCT 26.1*      *   MCH 31.0   MCHC 30.7*     Cardiac Profile:  Recent Labs   Lab 03/11/25  0945   *       Monitor and Evaluation  Monitor and Evaluation: Diet order, Food and beverage intake     Discharge Planning  Nutrition Discharge Planning: General healthy diet, Oral supplement regimen (comments)    Nutrition Risk  Level of Risk/Frequency of Follow-up: comfort care (1 x / week)     Nutrition Follow-Up  RD Follow-up?: Yes    Tammy Mcclure, DASHA 03/15/2025 7:53 AM

## 2025-03-15 NOTE — PROGRESS NOTES
[facility]  Progress Note    Patient Name: Bhavana Lambert  MRN: [id number]  Patient Class: IP- Inpatient   Admission Date: 3/4/2025  Length of Stay: 11 days  Attending Physician: Melvin Skinner DO  Primary Care Provider: Pepito Jhaveri IV, MD        Subjective     Principal Problem:T12 burst fracture        HPI:  69-year-old female with PMHx of COPD, HTN, hypothyroidism, prior stroke who presented to the ED with a complaint of lumbar and bilateral hip pain status post fall several days ago. The patient reports that she was wearing socks and she slipped, falling backwards towards a wall and then slid down the wall to a sitting position. She denies loss of consciousness, chest pain, current shortness of breath, dizziness, nausea or vomiting, incontinence of bowel or bladder. She is on plavix at home.     In the ED, /79  RR 20 afebrile and 96% on 3L NC (baseline).     Imaging showed Acute burst fracture of T12. Case was discussed with Dr. Boothe ([facility] [place] who will consult on the patient. She was given tylenol, morphine, and zofran and stable for transfer, no neuro deficits.     Per chart review from recent admission on February 9th to February 14, 2025, the patient was admitted for COPD exacerbation and did have a history of previous EtOH abuse with subsequent falls.     Overview/Hospital Course:  Bhavana Lambert is 69-year-old female with PMH COPD, HTN, hypothyroidism, prior stroke.  She was admitted to the hospital for management of an acute burst fracture of T12.  Neurosurgery following.  MRI obtained.  X-ray thoracolumbar spine ordered and showed stability.  PT/OT consulted.  TLSO brace applied for activity per neurosurgery recommendation.  Pt developed ETOH withdrawals with delirium on 03/06 and was placed on Librium protocol.  She had worsening hyponatremia prompting further workup though this notably improved with diet liberalization.  Her DTs were much  improved on 03/07 after Librium initiation.  Sodium levels continued to improve.  Pain was controlled with oral narcotics.  Librium was down titrated.  On 03/11 she developed increased O2 requirements with low-grade temp 100.5°.  CXR revealed acute on chronic changes of patchy infiltrates.  She reported reflux overnight-concerning clinically for aspiration.  She was placed on BiPAP after ABG revealed hypercapnia.  She was transferred to ICU for continued BiPAP therapy and pulmonology was consulted.  She was placed on IV Abx and sepsis workup was pursued. Patient weaned off on intravenous Levophed.  Urine cultures grew Proteus mirabilis and group B strep.  Physical therapy and occupational therapy has been consulted.   arranging skilled nursing facility placement.  Nutrition consulted secondary to decreased oral intake.  Deescalate antibiotics to Augmentin.  Patient okay to step-down from ICU.    Interval History:  Patient was seen and examined at bedside this morning.  Remains in TLSO brace.  Hemodynamically stable.  Okay for transfer out of ICU.  Working towards SNF placement.    Review of Systems   Constitutional:  Positive for fever.   Respiratory:  Positive for cough and shortness of breath.    Cardiovascular:  Negative for chest pain.   Gastrointestinal:  Negative for nausea and vomiting.     Objective:     Vital Signs (Most Recent):  Temp: 98.7 °F (37.1 °C) (03/15/25 1101)  Pulse: 84 (03/15/25 1500)  Resp: 14 (03/15/25 1500)  BP: 128/81 (03/15/25 1301)  SpO2: 100 % (03/15/25 1500) Vital Signs (24h Range):  Temp:  [98.2 °F (36.8 °C)-98.7 °F (37.1 °C)] 98.7 °F (37.1 °C)  Pulse:  [75-91] 84  Resp:  [11-44] 14  SpO2:  [84 %-100 %] 100 %  BP: ()/(50-81) 128/81     Weight: 47.3 kg (104 lb 4.4 oz)  Body mass index is 19.07 kg/m².    Intake/Output Summary (Last 24 hours) at 3/15/2025 1505  Last data filed at 3/15/2025 0600  Gross per 24 hour   Intake 1262.4 ml   Output --   Net 1262.4 ml          Physical Exam  Vitals and nursing note reviewed.   Constitutional:       Appearance: Normal appearance.   Cardiovascular:      Rate and Rhythm: Normal rate and regular rhythm.   Abdominal:      General: Abdomen is flat. Bowel sounds are normal.      Palpations: Abdomen is soft.   Musculoskeletal:         General: Normal range of motion.   Neurological:      General: No focal deficit present.      Mental Status: She is alert and oriented to person, place, and time.               Significant Labs: All pertinent labs within the past 24 hours have been reviewed.  CBC:   Recent Labs   Lab 03/14/25  0313 03/15/25  0419   WBC 5.72 6.25   HGB 7.9* 8.0*   HCT 26.1* 26.1*    316     CMP:   Recent Labs   Lab 03/14/25  0313 03/15/25  0419    144   K 3.8 3.7    98   CO2 31* 37*    85   BUN 4* 3*   CREATININE 0.5 0.6   CALCIUM 8.4* 8.4*   ANIONGAP 5* 9     Microbiology Results (last 7 days)       Procedure Component Value Units Date/Time    Blood culture [[id number]] Collected: 03/11/25 0944    Order Status: Completed Specimen: Blood from Peripheral, Antecubital, Right Updated: 03/15/25 1232     Blood Culture, Routine No Growth to date      No Growth to date      No Growth to date      No Growth to date      No Growth to date    Blood culture [[id number]] Collected: 03/11/25 0944    Order Status: Completed Specimen: Blood from Peripheral, Hand, Right Updated: 03/15/25 1232     Blood Culture, Routine No Growth to date      No Growth to date      No Growth to date      No Growth to date      No Growth to date    Culture, Respiratory with Gram Stain [[id number]] Collected: 03/12/25 1047    Order Status: Completed Specimen: Respiratory from Sputum, Expectorated Updated: 03/14/25 0857     Respiratory Culture Normal respiratory kehinde     Gram Stain (Respiratory) Many WBC's     Gram Stain (Respiratory) >10 epithelial cells per low power field     Gram Stain (Respiratory) Few budding yeast     Gram Stain  (Respiratory) Few Gram positive cocci    Urine culture [[id number]]  (Abnormal)  (Susceptibility) Collected: 03/11/25 1406    Order Status: Completed Specimen: Urine Updated: 03/14/25 0830     Urine Culture, Routine PROTEUS MIRABILIS  >100,000 cfu/ml        STREPTOCOCCUS AGALACTIAE (GROUP B)  > 100,000 cfu/ml  In case of Penicillin allergy, call lab for further testing.  Beta-hemolytic streptococci are routinely susceptible to   penicillins,cephalosporins and carbapenems.      Narrative:      Specimen Source->Urine    Influenza A & B by Molecular [[id number]] Collected: 03/11/25 1110    Order Status: Sent Specimen: Nasopharyngeal Swab           Significant Imaging:   B/L hip x-ray:  No acute findings.     Lumbar x-ray:  Acute compression fracture of T12. Recommend CT.     CT T-spine:  Acute burst fracture of T12. Referral to neurosurgery advised.     MRI Lumbar spine:  Acute compression fracture of T12 with loss of height of approximately 25%.  There is minimal retropulsion of the posterior cortex measuring 3 mm without central canal stenosis  Old mild chronic compression fracture of T11  Multilevel degenerative disc disease and facet hypertrophy with mild central canal stenosis and foraminal narrowing at L2-3, L3-4 and L4-5    X-ray thoracolumar AP/lateral:  Acute fracture of the T12 vertebra is again observed with no detrimental change from prior CT exam.     CT Head:  No acute intracranial abnormalities. Chronic findings as discussed above.     X-ray thoracolumbar:  Stable mild compression fracture of T12  Chronic mild compression fracture of T11    CXR: Interval improvement, with near complete resolution of right basilar infiltrate.     CXR:  Persistent diffuse interstitial prominence with mild patchy airspace opacities having progressed slightly in the interval.  Mild blunting of the left costophrenic angle          Assessment & Plan  T12 burst fracture  MRI shows acute compression fracture of T12 with loss of  height of approximately 25%.  There is minimal retropulsion of the posterior cortex measuring 3 mm without central canal stenosis.  Old mild chronic compression fracture of T11.  Multilevel degenerative disc disease and facet hypertrophy with mild central canal stenosis and foraminal narrowing at L2-3, L3-4, and L4-5  Pain control  Neurochecks   Neurosurgery consulted- T XR stable  TLSO brace with mobilization  Repeat XR 2/2 fall  PT/OT- SNF recommending.  CM pursuing    Hypothyroid  Patient has chronic hypothyroidism. TFTs reviewed-   Lab Results   Component Value Date    TSH 3.155 03/06/2025   Will continue chronic levothyroxine and adjust for and clinical changes.     Mixed hyperlipidemia  Continue home statin    Tobacco dependency  Smoking cessation counseling performed. Dangers of cigarette smoking were reviewed with patient in detail and patient was encouraged to quit. Nicotine replacement options were discussed for > 3 minutes.  Receiving Nicoderm.  Hyponatremia  Hyponatremia is a historical properly acutely worsening here in the hospital.  The patient's most recent sodium results are listed below.  Recent Labs     03/13/25  0330 03/14/25  0313 03/15/25  0419    138 144     Plan  - Correct the sodium by 4-6mEq in 24 hours.   - sodium is self corrected with holding of the SSRI and diet liberalization   - SSRI resumed 3/7, and evaluating response-stable thus far.  Alcohol dependence with withdrawal delirium  Pt with confirmed daily ETOH utilization appeared with DTs on 03/06   -initiated scheduled Librium 3/6 with much improvement.  Will continue to down taper the Librium.  -continue p.r.n. Ativan   -nursing to perform CIWA  -fall, seizure precautions   -daily MVI, folic acid, thiamine    Anemia  Chronic problem.  Stable.  Monitor H/H.    Transfuse for hemodynamic instability and/or H/H <7/21    Aspiration pneumonia of both lungs  UTI (urinary tract infection) due to Proteus mirabilis species  Patient has  a diagnosis of pneumonia. The cause of the pneumonia is bacterial in etiology due to Gram negative pathogens associated with aspiration . The pneumonia is  acute . The patient has the following signs/symptoms of pneumonia: persistent hypoxia , cough, and shortness of breath. The patient does have a current oxygen requirement and the patient does have a home oxygen requirement. I have reviewed the pertinent imaging. The following cultures have been collected: Blood cultures and Sputum culture The culture results are listed below.     Current antimicrobial regimen consists of the antibiotics listed below. Will monitor patient closely and continue current treatment plan unchanged.    Antibiotics (From admission, onward)      Start     Stop Route Frequency Ordered    03/15/25 0930  amoxicillin-clavulanate 875-125mg per tablet 1 tablet         03/17/25 0859 Oral Every 12 hours 03/15/25 0920    03/11/25 1215  mupirocin 2 % ointment         03/16/25 0859 Nasl 2 times daily 03/11/25 1114            Microbiology Results (last 7 days)       Procedure Component Value Units Date/Time    Blood culture [[id number]] Collected: 03/11/25 0944    Order Status: Completed Specimen: Blood from Peripheral, Antecubital, Right Updated: 03/15/25 1232     Blood Culture, Routine No Growth to date      No Growth to date      No Growth to date      No Growth to date      No Growth to date    Blood culture [[id number]] Collected: 03/11/25 0944    Order Status: Completed Specimen: Blood from Peripheral, Hand, Right Updated: 03/15/25 1232     Blood Culture, Routine No Growth to date      No Growth to date      No Growth to date      No Growth to date      No Growth to date    Culture, Respiratory with Gram Stain [[id number]] Collected: 03/12/25 1047    Order Status: Completed Specimen: Respiratory from Sputum, Expectorated Updated: 03/14/25 0857     Respiratory Culture Normal respiratory kehinde     Gram Stain (Respiratory) Many WBC's     Gram Stain  (Respiratory) >10 epithelial cells per low power field     Gram Stain (Respiratory) Few budding yeast     Gram Stain (Respiratory) Few Gram positive cocci    Urine culture [[id number]]  (Abnormal)  (Susceptibility) Collected: 03/11/25 1406    Order Status: Completed Specimen: Urine Updated: 03/14/25 0830     Urine Culture, Routine PROTEUS MIRABILIS  >100,000 cfu/ml        STREPTOCOCCUS AGALACTIAE (GROUP B)  > 100,000 cfu/ml  In case of Penicillin allergy, call lab for further testing.  Beta-hemolytic streptococci are routinely susceptible to   penicillins,cephalosporins and carbapenems.      Narrative:      Specimen Source->Urine    Influenza A & B by Molecular [[id number]] Collected: 03/11/25 1110    Order Status: Sent Specimen: Nasopharyngeal Swab           Afebrile, WBC normal   Received 4.5 days of Zosyn   Sputum culture did not grow any predominant bacteria  De-escalated to Augmentin for UTI, and to continue treatment for suspected CPAP    Patient has a diagnosis of pneumonia. The cause of the pneumonia is bacterial in etiology due to Gram negative pathogens associated with aspiration . The pneumonia is  acute . The patient has the following signs/symptoms of pneumonia: persistent hypoxia , cough, and shortness of breath. The patient does have a current oxygen requirement and the patient does have a home oxygen requirement. I have reviewed the pertinent imaging. The following cultures have been collected: Blood cultures and Sputum culture The culture results are listed below.     Current antimicrobial regimen consists of the antibiotics listed below. Will monitor patient closely and continue current treatment plan unchanged.    Antibiotics (From admission, onward)      Start     Stop Route Frequency Ordered    03/15/25 0930  amoxicillin-clavulanate 875-125mg per tablet 1 tablet         03/17/25 0859 Oral Every 12 hours 03/15/25 0920    03/11/25 1215  mupirocin 2 % ointment         03/16/25 0859 Nasl 2 times  daily 03/11/25 1114            Microbiology Results (last 7 days)       Procedure Component Value Units Date/Time    Blood culture [[id number]] Collected: 03/11/25 0944    Order Status: Completed Specimen: Blood from Peripheral, Antecubital, Right Updated: 03/15/25 1232     Blood Culture, Routine No Growth to date      No Growth to date      No Growth to date      No Growth to date      No Growth to date    Blood culture [[id number]] Collected: 03/11/25 0944    Order Status: Completed Specimen: Blood from Peripheral, Hand, Right Updated: 03/15/25 1232     Blood Culture, Routine No Growth to date      No Growth to date      No Growth to date      No Growth to date      No Growth to date    Culture, Respiratory with Gram Stain [[id number]] Collected: 03/12/25 1047    Order Status: Completed Specimen: Respiratory from Sputum, Expectorated Updated: 03/14/25 0857     Respiratory Culture Normal respiratory kehinde     Gram Stain (Respiratory) Many WBC's     Gram Stain (Respiratory) >10 epithelial cells per low power field     Gram Stain (Respiratory) Few budding yeast     Gram Stain (Respiratory) Few Gram positive cocci    Urine culture [[id number]]  (Abnormal)  (Susceptibility) Collected: 03/11/25 1406    Order Status: Completed Specimen: Urine Updated: 03/14/25 0830     Urine Culture, Routine PROTEUS MIRABILIS  >100,000 cfu/ml        STREPTOCOCCUS AGALACTIAE (GROUP B)  > 100,000 cfu/ml  In case of Penicillin allergy, call lab for further testing.  Beta-hemolytic streptococci are routinely susceptible to   penicillins,cephalosporins and carbapenems.      Narrative:      Specimen Source->Urine    Influenza A & B by Molecular [[id number]] Collected: 03/11/25 1110    Order Status: Sent Specimen: Nasopharyngeal Swab           Urine culture growing Proteus mirabilis and strep agalactiae  Deescalating from Zosyn to Augmentin for 2 more days    Acute on chronic respiratory failure with hypoxia and hypercapnia  Patient with  Hypercapnic and Hypoxic Respiratory failure which is Acute on chronic.  she is on home oxygen at 3.5 LPM. Supplemental oxygen was provided and noted- Oxygen Concentration (%):  [32-50] 32    .   Signs/symptoms of respiratory failure include- tachypnea and increased work of breathing. Contributing diagnoses includes - Aspiration, COPD, Interstitial lung disease, and Pneumonia Labs and images were reviewed. Patient Has recent ABG, which has been reviewed. Will treat underlying causes and adjust management of respiratory failure as follows-   -continue BiPAP   -continue IV Abx   - pulmonary Medicine following.      VTE Risk Mitigation (From admission, onward)           Ordered     enoxaparin injection 30 mg  Daily         03/11/25 1130     IP VTE HIGH RISK PATIENT  Once         03/04/25 1604     Place sequential compression device  Until discontinued         03/04/25 1604     Reason for No Pharmacological VTE Prophylaxis  Once        Question:  Reasons:  Answer:  Physician Provided (leave comment)  Comment:  pending neurosurgery eval    03/04/25 1604                    Discharge Planning   CHLOE: 3/17/2025     Code Status: Full Code   Medical Readiness for Discharge Date:   Discharge Plan A: Skilled Nursing Facility   Discharge Delays: None known at this time            Please place Justification for DME        Melvin Skinner DO  Department of Hospital Medicine   [facility]

## 2025-03-15 NOTE — ASSESSMENT & PLAN NOTE
Hyponatremia is a historical properly acutely worsening here in the hospital.  The patient's most recent sodium results are listed below.  Recent Labs     03/13/25  0330 03/14/25  0313 03/15/25  0419    138 144     Plan  - Correct the sodium by 4-6mEq in 24 hours.   - sodium is self corrected with holding of the SSRI and diet liberalization   - SSRI resumed 3/7, and evaluating response-stable thus far.

## 2025-03-15 NOTE — NURSING
Pt lying in bed in no acute distress. See ongoing assessment and charted vital signs in epic. See telemetry strip in chart. Pt has transfer orders to the floor per md. See transfer orders in epic. Awaiting bed placement. IV drip infusing per md order. Call light within reach and bed alarm on. Will continue current plan of care.

## 2025-03-15 NOTE — CARE UPDATE
03/14/25 1918   Patient Assessment/Suction   Level of Consciousness (AVPU) alert   Respiratory Effort Unlabored   Expansion/Accessory Muscles/Retractions expansion symmetric;no retractions   All Lung Fields Breath Sounds Anterior:;Lateral:;diminished   Rhythm/Pattern, Respiratory no shortness of breath reported   Cough Frequency infrequent   PRE-TX-O2   Device (Oxygen Therapy) high flow nasal cannula   Flow (L/min) (Oxygen Therapy) 7   SpO2 100 %   Pulse Oximetry Type Continuous   $ Pulse Oximetry - Multiple Charge Pulse Oximetry - Multiple   Pulse 87   Resp 20   Aerosol Therapy   $ Aerosol Therapy Charges Aerosol Treatment   Daily Review of Necessity (SVN) completed   Respiratory Treatment Status (SVN) given   Treatment Route (SVN) oxygen;mask   Patient Position HOB elevated   Post Treatment Assessment (SVN) breath sounds unchanged   Signs of Intolerance (SVN) none   Breath Sounds Post-Respiratory Treatment   Throughout All Fields Post-Treatment All Fields   Throughout All Fields Post-Treatment no change   Post-treatment Heart Rate (beats/min) 88   Post-treatment Resp Rate (breaths/min) 20   Preset CPAP/BiPAP Settings   Mode Of Delivery BiPAP;standby   CPAP/BIPAP charged w/in last 24 h NO   Equipment Type V60   Education   $ Education Bronchodilator;Oxygen;15 min

## 2025-03-15 NOTE — NURSING
Pt lying in bed in no acute distress. No complaints. Call light within reach and bed alarm on. Will continue current treatment plan.

## 2025-03-15 NOTE — SUBJECTIVE & OBJECTIVE
Interval History:  Patient was seen and examined at bedside this morning.  Remains in TLSO brace.  Hemodynamically stable.  Okay for transfer out of ICU.  Working towards SNF placement.    Review of Systems   Constitutional:  Positive for fever.   Respiratory:  Positive for cough and shortness of breath.    Cardiovascular:  Negative for chest pain.   Gastrointestinal:  Negative for nausea and vomiting.     Objective:     Vital Signs (Most Recent):  Temp: 98.7 °F (37.1 °C) (03/15/25 1101)  Pulse: 84 (03/15/25 1500)  Resp: 14 (03/15/25 1500)  BP: 128/81 (03/15/25 1301)  SpO2: 100 % (03/15/25 1500) Vital Signs (24h Range):  Temp:  [98.2 °F (36.8 °C)-98.7 °F (37.1 °C)] 98.7 °F (37.1 °C)  Pulse:  [75-91] 84  Resp:  [11-44] 14  SpO2:  [84 %-100 %] 100 %  BP: ()/(50-81) 128/81     Weight: 47.3 kg (104 lb 4.4 oz)  Body mass index is 19.07 kg/m².    Intake/Output Summary (Last 24 hours) at 3/15/2025 1505  Last data filed at 3/15/2025 0600  Gross per 24 hour   Intake 1262.4 ml   Output --   Net 1262.4 ml         Physical Exam  Vitals and nursing note reviewed.   Constitutional:       Appearance: Normal appearance.   Cardiovascular:      Rate and Rhythm: Normal rate and regular rhythm.   Abdominal:      General: Abdomen is flat. Bowel sounds are normal.      Palpations: Abdomen is soft.   Musculoskeletal:         General: Normal range of motion.   Neurological:      General: No focal deficit present.      Mental Status: She is alert and oriented to person, place, and time.               Significant Labs: All pertinent labs within the past 24 hours have been reviewed.  CBC:   Recent Labs   Lab 03/14/25  0313 03/15/25  0419   WBC 5.72 6.25   HGB 7.9* 8.0*   HCT 26.1* 26.1*    316     CMP:   Recent Labs   Lab 03/14/25  0313 03/15/25  0419    144   K 3.8 3.7    98   CO2 31* 37*    85   BUN 4* 3*   CREATININE 0.5 0.6   CALCIUM 8.4* 8.4*   ANIONGAP 5* 9     Microbiology Results (last 7 days)        Procedure Component Value Units Date/Time    Blood culture [[id number]] Collected: 03/11/25 0944    Order Status: Completed Specimen: Blood from Peripheral, Antecubital, Right Updated: 03/15/25 1232     Blood Culture, Routine No Growth to date      No Growth to date      No Growth to date      No Growth to date      No Growth to date    Blood culture [[id number]] Collected: 03/11/25 0944    Order Status: Completed Specimen: Blood from Peripheral, Hand, Right Updated: 03/15/25 1232     Blood Culture, Routine No Growth to date      No Growth to date      No Growth to date      No Growth to date      No Growth to date    Culture, Respiratory with Gram Stain [[id number]] Collected: 03/12/25 1047    Order Status: Completed Specimen: Respiratory from Sputum, Expectorated Updated: 03/14/25 0857     Respiratory Culture Normal respiratory kehinde     Gram Stain (Respiratory) Many WBC's     Gram Stain (Respiratory) >10 epithelial cells per low power field     Gram Stain (Respiratory) Few budding yeast     Gram Stain (Respiratory) Few Gram positive cocci    Urine culture [[id number]]  (Abnormal)  (Susceptibility) Collected: 03/11/25 1406    Order Status: Completed Specimen: Urine Updated: 03/14/25 0830     Urine Culture, Routine PROTEUS MIRABILIS  >100,000 cfu/ml        STREPTOCOCCUS AGALACTIAE (GROUP B)  > 100,000 cfu/ml  In case of Penicillin allergy, call lab for further testing.  Beta-hemolytic streptococci are routinely susceptible to   penicillins,cephalosporins and carbapenems.      Narrative:      Specimen Source->Urine    Influenza A & B by Molecular [[id number]] Collected: 03/11/25 1110    Order Status: Sent Specimen: Nasopharyngeal Swab           Significant Imaging:   B/L hip x-ray:  No acute findings.     Lumbar x-ray:  Acute compression fracture of T12. Recommend CT.     CT T-spine:  Acute burst fracture of T12. Referral to neurosurgery advised.     MRI Lumbar spine:  Acute compression fracture of T12 with loss of  height of approximately 25%.  There is minimal retropulsion of the posterior cortex measuring 3 mm without central canal stenosis  Old mild chronic compression fracture of T11  Multilevel degenerative disc disease and facet hypertrophy with mild central canal stenosis and foraminal narrowing at L2-3, L3-4 and L4-5    X-ray thoracolumar AP/lateral:  Acute fracture of the T12 vertebra is again observed with no detrimental change from prior CT exam.     CT Head:  No acute intracranial abnormalities. Chronic findings as discussed above.     X-ray thoracolumbar:  Stable mild compression fracture of T12  Chronic mild compression fracture of T11    CXR: Interval improvement, with near complete resolution of right basilar infiltrate.     CXR:  Persistent diffuse interstitial prominence with mild patchy airspace opacities having progressed slightly in the interval.  Mild blunting of the left costophrenic angle

## 2025-03-15 NOTE — ASSESSMENT & PLAN NOTE
Patient has a diagnosis of pneumonia. The cause of the pneumonia is bacterial in etiology due to Gram negative pathogens associated with aspiration. The pneumonia is acute. The patient has the following signs/symptoms of pneumonia: persistent hypoxia , cough, and shortness of breath. The patient does have a current oxygen requirement and the patient does have a home oxygen requirement. I have reviewed the pertinent imaging. The following cultures have been collected: Blood cultures and Sputum culture The culture results are listed below.     Current antimicrobial regimen consists of the antibiotics listed below. Will monitor patient closely and continue current treatment plan unchanged.    Antibiotics (From admission, onward)      Start     Stop Route Frequency Ordered    03/15/25 0930  amoxicillin-clavulanate 875-125mg per tablet 1 tablet         03/17/25 0859 Oral Every 12 hours 03/15/25 0920    03/11/25 1215  mupirocin 2 % ointment         03/16/25 0859 Nasl 2 times daily 03/11/25 1114            Microbiology Results (last 7 days)       Procedure Component Value Units Date/Time    Blood culture [[id number]] Collected: 03/11/25 0944    Order Status: Completed Specimen: Blood from Peripheral, Antecubital, Right Updated: 03/15/25 1232     Blood Culture, Routine No Growth to date      No Growth to date      No Growth to date      No Growth to date      No Growth to date    Blood culture [[id number]] Collected: 03/11/25 0944    Order Status: Completed Specimen: Blood from Peripheral, Hand, Right Updated: 03/15/25 1232     Blood Culture, Routine No Growth to date      No Growth to date      No Growth to date      No Growth to date      No Growth to date    Culture, Respiratory with Gram Stain [[id number]] Collected: 03/12/25 1047    Order Status: Completed Specimen: Respiratory from Sputum, Expectorated Updated: 03/14/25 0857     Respiratory Culture Normal respiratory kehinde     Gram Stain (Respiratory) Many WBC's      [Time Spent: ___ minutes] : I have spent [unfilled] minutes of time on the encounter. Gram Stain (Respiratory) >10 epithelial cells per low power field     Gram Stain (Respiratory) Few budding yeast     Gram Stain (Respiratory) Few Gram positive cocci    Urine culture [[id number]]  (Abnormal)  (Susceptibility) Collected: 03/11/25 1406    Order Status: Completed Specimen: Urine Updated: 03/14/25 0830     Urine Culture, Routine PROTEUS MIRABILIS  >100,000 cfu/ml        STREPTOCOCCUS AGALACTIAE (GROUP B)  > 100,000 cfu/ml  In case of Penicillin allergy, call lab for further testing.  Beta-hemolytic streptococci are routinely susceptible to   penicillins,cephalosporins and carbapenems.      Narrative:      Specimen Source->Urine    Influenza A & B by Molecular [[id number]] Collected: 03/11/25 1110    Order Status: Sent Specimen: Nasopharyngeal Swab           Afebrile, WBC normal   Received 4.5 days of Zosyn   Sputum culture did not grow any predominant bacteria  De-escalated to Augmentin for UTI, and to continue treatment for suspected CPAP    Patient has a diagnosis of pneumonia. The cause of the pneumonia is bacterial in etiology due to Gram negative pathogens associated with aspiration. The pneumonia is acute. The patient has the following signs/symptoms of pneumonia: persistent hypoxia , cough, and shortness of breath. The patient does have a current oxygen requirement and the patient does have a home oxygen requirement. I have reviewed the pertinent imaging. The following cultures have been collected: Blood cultures and Sputum culture The culture results are listed below.     Current antimicrobial regimen consists of the antibiotics listed below. Will monitor patient closely and continue current treatment plan unchanged.    Antibiotics (From admission, onward)      Start     Stop Route Frequency Ordered    03/15/25 0930  amoxicillin-clavulanate 875-125mg per tablet 1 tablet         03/17/25 0859 Oral Every 12 hours 03/15/25 0920    03/11/25 1215  mupirocin 2 % ointment         03/16/25 0859 Nasl 2  times daily 03/11/25 1114            Microbiology Results (last 7 days)       Procedure Component Value Units Date/Time    Blood culture [[id number]] Collected: 03/11/25 0944    Order Status: Completed Specimen: Blood from Peripheral, Antecubital, Right Updated: 03/15/25 1232     Blood Culture, Routine No Growth to date      No Growth to date      No Growth to date      No Growth to date      No Growth to date    Blood culture [[id number]] Collected: 03/11/25 0944    Order Status: Completed Specimen: Blood from Peripheral, Hand, Right Updated: 03/15/25 1232     Blood Culture, Routine No Growth to date      No Growth to date      No Growth to date      No Growth to date      No Growth to date    Culture, Respiratory with Gram Stain [[id number]] Collected: 03/12/25 1047    Order Status: Completed Specimen: Respiratory from Sputum, Expectorated Updated: 03/14/25 0857     Respiratory Culture Normal respiratory kehinde     Gram Stain (Respiratory) Many WBC's     Gram Stain (Respiratory) >10 epithelial cells per low power field     Gram Stain (Respiratory) Few budding yeast     Gram Stain (Respiratory) Few Gram positive cocci    Urine culture [[id number]]  (Abnormal)  (Susceptibility) Collected: 03/11/25 1406    Order Status: Completed Specimen: Urine Updated: 03/14/25 0830     Urine Culture, Routine PROTEUS MIRABILIS  >100,000 cfu/ml        STREPTOCOCCUS AGALACTIAE (GROUP B)  > 100,000 cfu/ml  In case of Penicillin allergy, call lab for further testing.  Beta-hemolytic streptococci are routinely susceptible to   penicillins,cephalosporins and carbapenems.      Narrative:      Specimen Source->Urine    Influenza A & B by Molecular [[id number]] Collected: 03/11/25 1110    Order Status: Sent Specimen: Nasopharyngeal Swab           Urine culture growing Proteus mirabilis and strep agalactiae  Deescalating from Zosyn to Augmentin for 2 more days

## 2025-03-15 NOTE — ASSESSMENT & PLAN NOTE
Patient with Hypercapnic and Hypoxic Respiratory failure which is Acute on chronic.  she is on home oxygen at 3.5 LPM. Supplemental oxygen was provided and noted- Oxygen Concentration (%):  [32-50] 32    .   Signs/symptoms of respiratory failure include- tachypnea and increased work of breathing. Contributing diagnoses includes - Aspiration, COPD, Interstitial lung disease, and Pneumonia Labs and images were reviewed. Patient Has recent ABG, which has been reviewed. Will treat underlying causes and adjust management of respiratory failure as follows-   -continue BiPAP   -continue IV Abx   - pulmonary Medicine following.

## 2025-03-16 PROBLEM — E87.1 HYPONATREMIA: Status: RESOLVED | Noted: 2023-10-27 | Resolved: 2025-03-16

## 2025-03-16 LAB
ANION GAP SERPL CALC-SCNC: 10 MMOL/L (ref 8–16)
BACTERIA BLD CULT: NORMAL
BACTERIA BLD CULT: NORMAL
BASOPHILS # BLD AUTO: 0.03 K/UL (ref 0–0.2)
BASOPHILS NFR BLD: 0.4 % (ref 0–1.9)
BUN SERPL-MCNC: 3 MG/DL (ref 8–23)
CALCIUM SERPL-MCNC: 8.9 MG/DL (ref 8.7–10.5)
CHLORIDE SERPL-SCNC: 95 MMOL/L (ref 95–110)
CO2 SERPL-SCNC: 34 MMOL/L (ref 23–29)
CREAT SERPL-MCNC: 0.6 MG/DL (ref 0.5–1.4)
DIFFERENTIAL METHOD BLD: ABNORMAL
EOSINOPHIL # BLD AUTO: 0.2 K/UL (ref 0–0.5)
EOSINOPHIL NFR BLD: 2.5 % (ref 0–8)
ERYTHROCYTE [DISTWIDTH] IN BLOOD BY AUTOMATED COUNT: 13.3 % (ref 11.5–14.5)
EST. GFR  (NO RACE VARIABLE): >60 ML/MIN/1.73 M^2
GLUCOSE SERPL-MCNC: 89 MG/DL (ref 70–110)
HCT VFR BLD AUTO: 29.2 % (ref 37–48.5)
HGB BLD-MCNC: 8.8 G/DL (ref 12–16)
IMM GRANULOCYTES # BLD AUTO: 0.02 K/UL (ref 0–0.04)
IMM GRANULOCYTES NFR BLD AUTO: 0.3 % (ref 0–0.5)
LYMPHOCYTES # BLD AUTO: 1.2 K/UL (ref 1–4.8)
LYMPHOCYTES NFR BLD: 16.9 % (ref 18–48)
MCH RBC QN AUTO: 30.2 PG (ref 27–31)
MCHC RBC AUTO-ENTMCNC: 30.1 G/DL (ref 32–36)
MCV RBC AUTO: 100 FL (ref 82–98)
MONOCYTES # BLD AUTO: 0.7 K/UL (ref 0.3–1)
MONOCYTES NFR BLD: 10.1 % (ref 4–15)
NEUTROPHILS # BLD AUTO: 4.7 K/UL (ref 1.8–7.7)
NEUTROPHILS NFR BLD: 69.8 % (ref 38–73)
NRBC BLD-RTO: 0 /100 WBC
PLATELET # BLD AUTO: 350 K/UL (ref 150–450)
PMV BLD AUTO: 10.1 FL (ref 9.2–12.9)
POTASSIUM SERPL-SCNC: 3.5 MMOL/L (ref 3.5–5.1)
RBC # BLD AUTO: 2.91 M/UL (ref 4–5.4)
SODIUM SERPL-SCNC: 139 MMOL/L (ref 136–145)
WBC # BLD AUTO: 6.8 K/UL (ref 3.9–12.7)

## 2025-03-16 PROCEDURE — 94799 UNLISTED PULMONARY SVC/PX: CPT

## 2025-03-16 PROCEDURE — 25000003 PHARM REV CODE 250: Performed by: INTERNAL MEDICINE

## 2025-03-16 PROCEDURE — 94640 AIRWAY INHALATION TREATMENT: CPT

## 2025-03-16 PROCEDURE — 25000003 PHARM REV CODE 250: Performed by: NURSE PRACTITIONER

## 2025-03-16 PROCEDURE — 94761 N-INVAS EAR/PLS OXIMETRY MLT: CPT

## 2025-03-16 PROCEDURE — 27000221 HC OXYGEN, UP TO 24 HOURS

## 2025-03-16 PROCEDURE — 25000003 PHARM REV CODE 250

## 2025-03-16 PROCEDURE — 63600175 PHARM REV CODE 636 W HCPCS: Performed by: FAMILY MEDICINE

## 2025-03-16 PROCEDURE — S4991 NICOTINE PATCH NONLEGEND: HCPCS

## 2025-03-16 PROCEDURE — 25000242 PHARM REV CODE 250 ALT 637 W/ HCPCS: Performed by: HOSPITALIST

## 2025-03-16 PROCEDURE — 99900035 HC TECH TIME PER 15 MIN (STAT)

## 2025-03-16 PROCEDURE — 99900031 HC PATIENT EDUCATION (STAT)

## 2025-03-16 PROCEDURE — 85025 COMPLETE CBC W/AUTO DIFF WBC: CPT | Performed by: NURSE PRACTITIONER

## 2025-03-16 PROCEDURE — 25000003 PHARM REV CODE 250: Performed by: STUDENT IN AN ORGANIZED HEALTH CARE EDUCATION/TRAINING PROGRAM

## 2025-03-16 PROCEDURE — 94660 CPAP INITIATION&MGMT: CPT

## 2025-03-16 PROCEDURE — 12000002 HC ACUTE/MED SURGE SEMI-PRIVATE ROOM

## 2025-03-16 PROCEDURE — 80048 BASIC METABOLIC PNL TOTAL CA: CPT | Performed by: NURSE PRACTITIONER

## 2025-03-16 PROCEDURE — 36415 COLL VENOUS BLD VENIPUNCTURE: CPT | Performed by: NURSE PRACTITIONER

## 2025-03-16 RX ORDER — POTASSIUM CHLORIDE 20 MEQ/1
40 TABLET, EXTENDED RELEASE ORAL DAILY
Status: DISCONTINUED | OUTPATIENT
Start: 2025-03-17 | End: 2025-03-18 | Stop reason: HOSPADM

## 2025-03-16 RX ORDER — ENOXAPARIN SODIUM 100 MG/ML
30 INJECTION SUBCUTANEOUS EVERY 24 HOURS
Status: DISCONTINUED | OUTPATIENT
Start: 2025-03-16 | End: 2025-03-18 | Stop reason: HOSPADM

## 2025-03-16 RX ADMIN — FAMOTIDINE 20 MG: 20 TABLET, FILM COATED ORAL at 08:03

## 2025-03-16 RX ADMIN — ARFORMOTEROL TARTRATE 15 MCG: 15 SOLUTION RESPIRATORY (INHALATION) at 07:03

## 2025-03-16 RX ADMIN — BUDESONIDE INHALATION 0.5 MG: 0.5 SUSPENSION RESPIRATORY (INHALATION) at 07:03

## 2025-03-16 RX ADMIN — FLUOXETINE HYDROCHLORIDE 40 MG: 20 CAPSULE ORAL at 08:03

## 2025-03-16 RX ADMIN — BUSPIRONE HYDROCHLORIDE 15 MG: 5 TABLET ORAL at 09:03

## 2025-03-16 RX ADMIN — NICOTINE 1 PATCH: 21 PATCH, EXTENDED RELEASE TRANSDERMAL at 05:03

## 2025-03-16 RX ADMIN — FOLIC ACID 1 MG: 1 TABLET ORAL at 08:03

## 2025-03-16 RX ADMIN — CARVEDILOL 12.5 MG: 12.5 TABLET, FILM COATED ORAL at 09:03

## 2025-03-16 RX ADMIN — THERA TABS 1 TABLET: TAB at 08:03

## 2025-03-16 RX ADMIN — BUSPIRONE HYDROCHLORIDE 15 MG: 5 TABLET ORAL at 08:03

## 2025-03-16 RX ADMIN — MONTELUKAST 10 MG: 10 TABLET, FILM COATED ORAL at 09:03

## 2025-03-16 RX ADMIN — LOPERAMIDE HYDROCHLORIDE 2 MG: 2 CAPSULE ORAL at 11:03

## 2025-03-16 RX ADMIN — AMOXICILLIN AND CLAVULANATE POTASSIUM 1 TABLET: 875; 125 TABLET, FILM COATED ORAL at 08:03

## 2025-03-16 RX ADMIN — GABAPENTIN 300 MG: 300 CAPSULE ORAL at 09:03

## 2025-03-16 RX ADMIN — AMOXICILLIN AND CLAVULANATE POTASSIUM 1 TABLET: 875; 125 TABLET, FILM COATED ORAL at 09:03

## 2025-03-16 RX ADMIN — IPRATROPIUM BROMIDE 0.5 MG: 0.5 SOLUTION RESPIRATORY (INHALATION) at 07:03

## 2025-03-16 RX ADMIN — LEVOTHYROXINE SODIUM 50 MCG: 0.03 TABLET ORAL at 05:03

## 2025-03-16 RX ADMIN — ENOXAPARIN SODIUM 30 MG: 30 INJECTION SUBCUTANEOUS at 05:03

## 2025-03-16 RX ADMIN — GABAPENTIN 300 MG: 300 CAPSULE ORAL at 05:03

## 2025-03-16 RX ADMIN — ATORVASTATIN CALCIUM 80 MG: 40 TABLET, FILM COATED ORAL at 08:03

## 2025-03-16 RX ADMIN — BUDESONIDE INHALATION 0.5 MG: 0.5 SUSPENSION RESPIRATORY (INHALATION) at 06:03

## 2025-03-16 RX ADMIN — THIAMINE HCL TAB 100 MG 100 MG: 100 TAB at 08:03

## 2025-03-16 RX ADMIN — BUTALBITAL, ACETAMINOPHEN, AND CAFFEINE 1 TABLET: 50; 325; 40 TABLET, COATED ORAL at 12:03

## 2025-03-16 RX ADMIN — BUSPIRONE HYDROCHLORIDE 15 MG: 5 TABLET ORAL at 05:03

## 2025-03-16 RX ADMIN — IPRATROPIUM BROMIDE 0.5 MG: 0.5 SOLUTION RESPIRATORY (INHALATION) at 01:03

## 2025-03-16 RX ADMIN — FAMOTIDINE 20 MG: 20 TABLET, FILM COATED ORAL at 09:03

## 2025-03-16 RX ADMIN — IPRATROPIUM BROMIDE 0.5 MG: 0.5 SOLUTION RESPIRATORY (INHALATION) at 06:03

## 2025-03-16 RX ADMIN — GABAPENTIN 300 MG: 300 CAPSULE ORAL at 08:03

## 2025-03-16 NOTE — CARE UPDATE
03/16/25 0700   Patient Assessment/Suction   Level of Consciousness (AVPU) alert   Respiratory Effort Normal   PRE-TX-O2   Pulse 80   Resp 12   Aerosol Therapy   $ Aerosol Therapy Charges Aerosol Treatment   Daily Review of Necessity (SVN) completed   Respiratory Treatment Status (SVN) given   Treatment Route (SVN) mask;oxygen   Patient Position HOB elevated   Post Treatment Assessment (SVN) breath sounds unchanged   Signs of Intolerance (SVN) none   Breath Sounds Post-Respiratory Treatment   Post-treatment Heart Rate (beats/min) 88   Post-treatment Resp Rate (breaths/min) 16   Preset CPAP/BiPAP Settings   Mode Of Delivery standby  (REFUSED)   CPAP/BIPAP charged w/in last 24 h NO

## 2025-03-16 NOTE — NURSING
Patient O2 sat 84% on 3L NC, patient a bit drowsy, increased O2 to 5L NC, O2 sat increased to 89-91%, notified respiratory for Bipap use, patient was resting/sleeping. Bipap in place, O2 sat 97%. Patient more alert once Bipap in place, oriented x 3.

## 2025-03-16 NOTE — PROGRESS NOTES
[facility]  Progress Note    Patient Name: Bhavana Lambert  MRN: [id number]  Patient Class: IP- Inpatient   Admission Date: 3/4/2025  Length of Stay: 12 days  Attending Physician: Lucrecia Mims DO  Primary Care Provider: Pepito Jhaveri IV, MD        Subjective     Principal Problem:T12 burst fracture        HPI:  69-year-old female with PMHx of COPD, HTN, hypothyroidism, prior stroke who presented to the ED with a complaint of lumbar and bilateral hip pain status post fall several days ago. The patient reports that she was wearing socks and she slipped, falling backwards towards a wall and then slid down the wall to a sitting position. She denies loss of consciousness, chest pain, current shortness of breath, dizziness, nausea or vomiting, incontinence of bowel or bladder. She is on plavix at home.     In the ED, /79  RR 20 afebrile and 96% on 3L NC (baseline).     Imaging showed Acute burst fracture of T12. Case was discussed with Dr. Boothe (NS) who will consult on the patient. She was given tylenol, morphine, and zofran and stable for transfer, no neuro deficits.     Per chart review from recent admission on February 9th to February 14, 2025, the patient was admitted for COPD exacerbation and did have a history of previous EtOH abuse with subsequent falls.     Overview/Hospital Course:  Bhavana Lambert is 69-year-old female with PMH COPD, HTN, hypothyroidism, prior stroke.  She was admitted to the hospital for management of an acute burst fracture of T12.  Neurosurgery following.  MRI obtained.  X-ray thoracolumbar spine ordered and showed stability.  PT/OT consulted.  TLSO brace applied for activity per neurosurgery recommendation.  Pt developed ETOH withdrawals with delirium on 03/06 and was placed on Librium protocol.  She had worsening hyponatremia prompting further workup though this notably improved with diet liberalization.  Her DTs were much  improved on 03/07 after Librium initiation.  Sodium levels continued to improve.  Pain was controlled with oral narcotics.  Librium was down titrated.  On 03/11 she developed increased O2 requirements with low-grade temp 100.5°.  CXR revealed acute on chronic changes of patchy infiltrates.  She reported reflux overnight-concerning clinically for aspiration.  She was placed on BiPAP after ABG revealed hypercapnia.  She was transferred to ICU for continued BiPAP therapy and pulmonology was consulted.  She was placed on IV Abx and sepsis workup was pursued. Patient weaned off on intravenous Levophed.  Urine cultures grew Proteus mirabilis and group B strep.  Physical therapy and occupational therapy has been consulted.   arranging skilled nursing facility placement.  Nutrition consulted secondary to decreased oral intake.  Deescalate antibiotics to Augmentin.  Patient okay to step-down from ICU.    Interval History:   Patient seen and examined.  NAD.  No acute events overnight.  Patient is step-down from ICU.  Case management following for SNF placement.  Review of Systems   Constitutional:  Positive for activity change. Negative for chills and fever.   Respiratory:  Positive for cough and shortness of breath.      Objective:     Vital Signs (Most Recent):  Temp: 97.5 °F (36.4 °C) (03/16/25 0732)  Pulse: 80 (03/16/25 0732)  Resp: 18 (03/16/25 0732)  BP: 111/65 (03/16/25 0732)  SpO2: 96 % (03/16/25 0732) Vital Signs (24h Range):  Temp:  [97.5 °F (36.4 °C)-98.8 °F (37.1 °C)] 97.5 °F (36.4 °C)  Pulse:  [70-96] 80  Resp:  [12-44] 18  SpO2:  [87 %-100 %] 96 %  BP: ()/(64-81) 111/65     Weight: 47.3 kg (104 lb 4.4 oz)  Body mass index is 19.07 kg/m².  No intake or output data in the 24 hours ending 03/16/25 1138      Physical Exam  Vitals and nursing note reviewed.   Constitutional:       Appearance: She is well-developed.   HENT:      Head: Normocephalic and atraumatic.   Neck:      Thyroid: No  thyromegaly.      Vascular: No JVD.   Cardiovascular:      Rate and Rhythm: Normal rate and regular rhythm.      Heart sounds: No murmur heard.     No friction rub. No gallop.   Pulmonary:      Effort: Pulmonary effort is normal.      Comments: On 3.5L which home O2 dose  Abdominal:      General: Bowel sounds are normal. There is no distension.      Palpations: Abdomen is soft. There is no mass.      Tenderness: There is no abdominal tenderness.   Musculoskeletal:         General: Normal range of motion.      Cervical back: Neck supple.   Skin:     General: Skin is warm and dry.   Neurological:      Mental Status: She is oriented to person, place, and time.      Cranial Nerves: No cranial nerve deficit.   Psychiatric:         Behavior: Behavior normal.               Significant Labs: All pertinent labs within the past 24 hours have been reviewed.  Blood Culture:   BMP:   Recent Labs   Lab 03/15/25  0419 03/16/25  1005   GLU 85 89    139   K 3.7 3.5   CL 98 95   CO2 37* 34*   BUN 3* 3*   CREATININE 0.6 0.6   CALCIUM 8.4* 8.9   MG 1.5*  --      CBC:   Recent Labs   Lab 03/15/25  0419 03/16/25  1005   WBC 6.25 6.80   HGB 8.0* 8.8*   HCT 26.1* 29.2*    350     Microbiology Results (last 7 days)       Procedure Component Value Units Date/Time    Blood culture [[id number]] Collected: 03/11/25 0944    Order Status: Completed Specimen: Blood from Peripheral, Antecubital, Right Updated: 03/15/25 1232     Blood Culture, Routine No Growth to date      No Growth to date      No Growth to date      No Growth to date      No Growth to date    Blood culture [[id number]] Collected: 03/11/25 0944    Order Status: Completed Specimen: Blood from Peripheral, Hand, Right Updated: 03/15/25 1232     Blood Culture, Routine No Growth to date      No Growth to date      No Growth to date      No Growth to date      No Growth to date    Culture, Respiratory with Gram Stain [[id number]] Collected: 03/12/25 1047    Order Status:  Completed Specimen: Respiratory from Sputum, Expectorated Updated: 03/14/25 0857     Respiratory Culture Normal respiratory kehinde     Gram Stain (Respiratory) Many WBC's     Gram Stain (Respiratory) >10 epithelial cells per low power field     Gram Stain (Respiratory) Few budding yeast     Gram Stain (Respiratory) Few Gram positive cocci    Urine culture [[id number]]  (Abnormal)  (Susceptibility) Collected: 03/11/25 1406    Order Status: Completed Specimen: Urine Updated: 03/14/25 0830     Urine Culture, Routine PROTEUS MIRABILIS  >100,000 cfu/ml        STREPTOCOCCUS AGALACTIAE (GROUP B)  > 100,000 cfu/ml  In case of Penicillin allergy, call lab for further testing.  Beta-hemolytic streptococci are routinely susceptible to   penicillins,cephalosporins and carbapenems.      Narrative:      Specimen Source->Urine    Influenza A & B by Molecular [[id number]] Collected: 03/11/25 1110    Order Status: Sent Specimen: Nasopharyngeal Swab             Significant Imaging: I have reviewed all pertinent imaging results/findings within the past 24 hours.      Assessment & Plan  T12 burst fracture  MRI shows acute compression fracture of T12 with loss of height of approximately 25%.  There is minimal retropulsion of the posterior cortex measuring 3 mm without central canal stenosis.  Old mild chronic compression fracture of T11.  Multilevel degenerative disc disease and facet hypertrophy with mild central canal stenosis and foraminal narrowing at L2-3, L3-4, and L4-5  Pain control  Neurochecks   Neurosurgery consulted- T XR stable  TLSO brace with mobilization  Repeat XR 2/2 fall  PT/OT- SNF recommending.  CM pursuing    Hypothyroid  Patient has chronic hypothyroidism. TFTs reviewed-   Lab Results   Component Value Date    TSH 3.155 03/06/2025   Will continue chronic levothyroxine and adjust for and clinical changes.     Mixed hyperlipidemia  Continue home statin    Tobacco dependency  Smoking cessation counseling performed.  Dangers of cigarette smoking were reviewed with patient in detail and patient was encouraged to quit. Nicotine replacement options were discussed for > 3 minutes.  Receiving Nicoderm.  Hyponatremia (Resolved: 3/16/2025)  Hyponatremia is a historical properly acutely worsening here in the hospital.  The patient's most recent sodium results are listed below.  Recent Labs     03/14/25  0313 03/15/25  0419 03/16/25  1005    144 139     Plan  - Correct the sodium by 4-6mEq in 24 hours.   - sodium is self corrected with holding of the SSRI and diet liberalization   - SSRI resumed 3/7, and evaluating response-stable thus far.  Alcohol dependence with withdrawal delirium  Pt with confirmed daily ETOH utilization appeared with DTs on 03/06   -initiated scheduled Librium 3/6 with much improvement.    -Librium titrated off  -continue p.r.n. Ativan   -nursing to perform CIWA  -fall, seizure precautions   -daily MVI, folic acid, thiamine    Anemia  Anemia is likely due to Iron deficiency. Most recent hemoglobin and hematocrit are listed below.  Recent Labs     03/14/25 0313 03/15/25  0419 03/16/25  1005   HGB 7.9* 8.0* 8.8*   HCT 26.1* 26.1* 29.2*     Plan  - Monitor serial CBC: Daily  - Transfuse PRBC if patient becomes hemodynamically unstable, symptomatic or H/H drops below 7/21.  - Patient has not received any PRBC transfusions to date  - Patient's anemia is currently stable  - Chronic problem.        Aspiration pneumonia of both lungs  UTI (urinary tract infection) due to Proteus mirabilis species  Patient has a diagnosis of pneumonia. The cause of the pneumonia is bacterial in etiology due to Gram negative pathogens associated with aspiration . The pneumonia is  acute . The patient has the following signs/symptoms of pneumonia: persistent hypoxia , cough, and shortness of breath. The patient does have a current oxygen requirement and the patient does have a home oxygen requirement. I have reviewed the pertinent  imaging. The following cultures have been collected: Blood cultures and Sputum culture The culture results are listed below.     Current antimicrobial regimen consists of the antibiotics listed below. Will monitor patient closely and continue current treatment plan unchanged.    Antibiotics (From admission, onward)      Start     Stop Route Frequency Ordered    03/15/25 0930  amoxicillin-clavulanate 875-125mg per tablet 1 tablet         03/17/25 0859 Oral Every 12 hours 03/15/25 0920            Microbiology Results (last 7 days)       Procedure Component Value Units Date/Time    Blood culture [[id number]] Collected: 03/11/25 0944    Order Status: Completed Specimen: Blood from Peripheral, Antecubital, Right Updated: 03/15/25 1232     Blood Culture, Routine No Growth to date      No Growth to date      No Growth to date      No Growth to date      No Growth to date    Blood culture [[id number]] Collected: 03/11/25 0944    Order Status: Completed Specimen: Blood from Peripheral, Hand, Right Updated: 03/15/25 1232     Blood Culture, Routine No Growth to date      No Growth to date      No Growth to date      No Growth to date      No Growth to date    Culture, Respiratory with Gram Stain [[id number]] Collected: 03/12/25 1047    Order Status: Completed Specimen: Respiratory from Sputum, Expectorated Updated: 03/14/25 0857     Respiratory Culture Normal respiratory kehinde     Gram Stain (Respiratory) Many WBC's     Gram Stain (Respiratory) >10 epithelial cells per low power field     Gram Stain (Respiratory) Few budding yeast     Gram Stain (Respiratory) Few Gram positive cocci    Urine culture [[id number]]  (Abnormal)  (Susceptibility) Collected: 03/11/25 1406    Order Status: Completed Specimen: Urine Updated: 03/14/25 0830     Urine Culture, Routine PROTEUS MIRABILIS  >100,000 cfu/ml        STREPTOCOCCUS AGALACTIAE (GROUP B)  > 100,000 cfu/ml  In case of Penicillin allergy, call lab for further testing.  Beta-hemolytic  streptococci are routinely susceptible to   penicillins,cephalosporins and carbapenems.      Narrative:      Specimen Source->Urine    Influenza A & B by Molecular [[id number]] Collected: 03/11/25 1110    Order Status: Sent Specimen: Nasopharyngeal Swab           Afebrile, WBC normal   Received 4.5 days of Zosyn   Sputum culture did not grow any predominant bacteria  De-escalated to Augmentin for UTI, and to continue treatment for suspected CPAP    Patient has a diagnosis of pneumonia. The cause of the pneumonia is bacterial in etiology due to Gram negative pathogens associated with aspiration . The pneumonia is  acute . The patient has the following signs/symptoms of pneumonia: persistent hypoxia , cough, and shortness of breath. The patient does have a current oxygen requirement and the patient does have a home oxygen requirement. I have reviewed the pertinent imaging. The following cultures have been collected: Blood cultures and Sputum culture The culture results are listed below.     Current antimicrobial regimen consists of the antibiotics listed below. Will monitor patient closely and continue current treatment plan unchanged.    Antibiotics (From admission, onward)      Start     Stop Route Frequency Ordered    03/15/25 0930  amoxicillin-clavulanate 875-125mg per tablet 1 tablet         03/17/25 0859 Oral Every 12 hours 03/15/25 0920            Microbiology Results (last 7 days)       Procedure Component Value Units Date/Time    Blood culture [[id number]] Collected: 03/11/25 0944    Order Status: Completed Specimen: Blood from Peripheral, Antecubital, Right Updated: 03/15/25 1232     Blood Culture, Routine No Growth to date      No Growth to date      No Growth to date      No Growth to date      No Growth to date    Blood culture [[id number]] Collected: 03/11/25 0944    Order Status: Completed Specimen: Blood from Peripheral, Hand, Right Updated: 03/15/25 1232     Blood Culture, Routine No Growth to date       No Growth to date      No Growth to date      No Growth to date      No Growth to date    Culture, Respiratory with Gram Stain [[id number]] Collected: 03/12/25 1047    Order Status: Completed Specimen: Respiratory from Sputum, Expectorated Updated: 03/14/25 0857     Respiratory Culture Normal respiratory kehinde     Gram Stain (Respiratory) Many WBC's     Gram Stain (Respiratory) >10 epithelial cells per low power field     Gram Stain (Respiratory) Few budding yeast     Gram Stain (Respiratory) Few Gram positive cocci    Urine culture [[id number]]  (Abnormal)  (Susceptibility) Collected: 03/11/25 1406    Order Status: Completed Specimen: Urine Updated: 03/14/25 0830     Urine Culture, Routine PROTEUS MIRABILIS  >100,000 cfu/ml        STREPTOCOCCUS AGALACTIAE (GROUP B)  > 100,000 cfu/ml  In case of Penicillin allergy, call lab for further testing.  Beta-hemolytic streptococci are routinely susceptible to   penicillins,cephalosporins and carbapenems.      Narrative:      Specimen Source->Urine    Influenza A & B by Molecular [[id number]] Collected: 03/11/25 1110    Order Status: Sent Specimen: Nasopharyngeal Swab           Urine culture growing Proteus mirabilis and strep agalactiae  Deescalating from Zosyn to Augmentin for 2 more days    Patient has a diagnosis of pneumonia. The cause of the pneumonia is bacterial in etiology due to Gram negative pathogens associated with aspiration . The pneumonia is  acute . The patient has the following signs/symptoms of pneumonia: persistent hypoxia , cough, and shortness of breath. The patient does have a current oxygen requirement and the patient does have a home oxygen requirement. I have reviewed the pertinent imaging. The following cultures have been collected: Blood cultures and Sputum culture The culture results are listed below.     Current antimicrobial regimen consists of the antibiotics listed below. Will monitor patient closely and continue current treatment plan  unchanged.    Antibiotics (From admission, onward)      Start     Stop Route Frequency Ordered    03/15/25 0930  amoxicillin-clavulanate 875-125mg per tablet 1 tablet         03/17/25 0859 Oral Every 12 hours 03/15/25 0920            Microbiology Results (last 7 days)       Procedure Component Value Units Date/Time    Blood culture [[id number]] Collected: 03/11/25 0944    Order Status: Completed Specimen: Blood from Peripheral, Antecubital, Right Updated: 03/15/25 1232     Blood Culture, Routine No Growth to date      No Growth to date      No Growth to date      No Growth to date      No Growth to date    Blood culture [[id number]] Collected: 03/11/25 0944    Order Status: Completed Specimen: Blood from Peripheral, Hand, Right Updated: 03/15/25 1232     Blood Culture, Routine No Growth to date      No Growth to date      No Growth to date      No Growth to date      No Growth to date    Culture, Respiratory with Gram Stain [[id number]] Collected: 03/12/25 1047    Order Status: Completed Specimen: Respiratory from Sputum, Expectorated Updated: 03/14/25 0857     Respiratory Culture Normal respiratory kehinde     Gram Stain (Respiratory) Many WBC's     Gram Stain (Respiratory) >10 epithelial cells per low power field     Gram Stain (Respiratory) Few budding yeast     Gram Stain (Respiratory) Few Gram positive cocci    Urine culture [[id number]]  (Abnormal)  (Susceptibility) Collected: 03/11/25 1406    Order Status: Completed Specimen: Urine Updated: 03/14/25 0830     Urine Culture, Routine PROTEUS MIRABILIS  >100,000 cfu/ml        STREPTOCOCCUS AGALACTIAE (GROUP B)  > 100,000 cfu/ml  In case of Penicillin allergy, call lab for further testing.  Beta-hemolytic streptococci are routinely susceptible to   penicillins,cephalosporins and carbapenems.      Narrative:      Specimen Source->Urine    Influenza A & B by Molecular [[id number]] Collected: 03/11/25 1110    Order Status: Sent Specimen: Nasopharyngeal Swab            Afebrile, WBC normal   Received 4.5 days of Zosyn   Sputum culture did not grow any predominant bacteria  De-escalated to Augmentin for UTI, and to continue treatment for suspected CPAP    Patient has a diagnosis of pneumonia. The cause of the pneumonia is bacterial in etiology due to Gram negative pathogens associated with aspiration . The pneumonia is  acute . The patient has the following signs/symptoms of pneumonia: persistent hypoxia , cough, and shortness of breath. The patient does have a current oxygen requirement and the patient does have a home oxygen requirement. I have reviewed the pertinent imaging. The following cultures have been collected: Blood cultures and Sputum culture The culture results are listed below.     Current antimicrobial regimen consists of the antibiotics listed below. Will monitor patient closely and continue current treatment plan unchanged.    Antibiotics (From admission, onward)      Start     Stop Route Frequency Ordered    03/15/25 0930  amoxicillin-clavulanate 875-125mg per tablet 1 tablet         03/17/25 0859 Oral Every 12 hours 03/15/25 0920            Microbiology Results (last 7 days)       Procedure Component Value Units Date/Time    Blood culture [[id number]] Collected: 03/11/25 0944    Order Status: Completed Specimen: Blood from Peripheral, Antecubital, Right Updated: 03/15/25 1232     Blood Culture, Routine No Growth to date      No Growth to date      No Growth to date      No Growth to date      No Growth to date    Blood culture [[id number]] Collected: 03/11/25 0944    Order Status: Completed Specimen: Blood from Peripheral, Hand, Right Updated: 03/15/25 1232     Blood Culture, Routine No Growth to date      No Growth to date      No Growth to date      No Growth to date      No Growth to date    Culture, Respiratory with Gram Stain [[id number]] Collected: 03/12/25 1047    Order Status: Completed Specimen: Respiratory from Sputum, Expectorated Updated: 03/14/25  0857     Respiratory Culture Normal respiratory kehinde     Gram Stain (Respiratory) Many WBC's     Gram Stain (Respiratory) >10 epithelial cells per low power field     Gram Stain (Respiratory) Few budding yeast     Gram Stain (Respiratory) Few Gram positive cocci    Urine culture [[id number]]  (Abnormal)  (Susceptibility) Collected: 03/11/25 1406    Order Status: Completed Specimen: Urine Updated: 03/14/25 0830     Urine Culture, Routine PROTEUS MIRABILIS  >100,000 cfu/ml        STREPTOCOCCUS AGALACTIAE (GROUP B)  > 100,000 cfu/ml  In case of Penicillin allergy, call lab for further testing.  Beta-hemolytic streptococci are routinely susceptible to   penicillins,cephalosporins and carbapenems.      Narrative:      Specimen Source->Urine    Influenza A & B by Molecular [[id number]] Collected: 03/11/25 1110    Order Status: Sent Specimen: Nasopharyngeal Swab           Urine culture growing Proteus mirabilis and strep agalactiae  Deescalating from Zosyn to Augmentin for 2 more days    Acute on chronic respiratory failure with hypoxia and hypercapnia  Patient with Hypercapnic and Hypoxic Respiratory failure which is Acute on chronic.  she is on home oxygen at 3.5 LPM. Supplemental oxygen was provided and noted- Oxygen Concentration (%):  [32] 32    .   Signs/symptoms of respiratory failure include- tachypnea and increased work of breathing. Contributing diagnoses includes - Aspiration, COPD, Interstitial lung disease, and Pneumonia Labs and images were reviewed. Patient Has recent ABG, which has been reviewed. Will treat underlying causes and adjust management of respiratory failure as follows-   -NC at 3.5 L ( home dose)  -Transitioned to oral abx   - pulmonary Medicine following.      Diarrhea  Chronic. Noted.   PRN Imodium     VTE Risk Mitigation (From admission, onward)           Ordered     enoxaparin injection 40 mg  Every 24 hours         03/15/25 1649     IP VTE HIGH RISK PATIENT  Once         03/04/25 1604      Place sequential compression device  Until discontinued         03/04/25 1604     Reason for No Pharmacological VTE Prophylaxis  Once        Question:  Reasons:  Answer:  Physician Provided (leave comment)  Comment:  pending neurosurgery eval    03/04/25 1604                    Discharge Planning   CHLOE: 3/17/2025     Code Status: Full Code   Medical Readiness for Discharge Date:   Discharge Plan A: Skilled Nursing Facility   Discharge Delays: None known at this time            Please place Justification for DME        Sheri Nix NP  Department of Hospital Medicine   [facility]

## 2025-03-16 NOTE — SUBJECTIVE & OBJECTIVE
Interval History:   Patient seen and examined.  NAD.  No acute events overnight.  Patient is step-down from ICU.  Case management following for SNF placement.  Review of Systems   Constitutional:  Positive for activity change. Negative for chills and fever.   Respiratory:  Positive for cough and shortness of breath.      Objective:     Vital Signs (Most Recent):  Temp: 97.5 °F (36.4 °C) (03/16/25 0732)  Pulse: 80 (03/16/25 0732)  Resp: 18 (03/16/25 0732)  BP: 111/65 (03/16/25 0732)  SpO2: 96 % (03/16/25 0732) Vital Signs (24h Range):  Temp:  [97.5 °F (36.4 °C)-98.8 °F (37.1 °C)] 97.5 °F (36.4 °C)  Pulse:  [70-96] 80  Resp:  [12-44] 18  SpO2:  [87 %-100 %] 96 %  BP: ()/(64-81) 111/65     Weight: 47.3 kg (104 lb 4.4 oz)  Body mass index is 19.07 kg/m².  No intake or output data in the 24 hours ending 03/16/25 1138      Physical Exam  Vitals and nursing note reviewed.   Constitutional:       Appearance: She is well-developed.   HENT:      Head: Normocephalic and atraumatic.   Neck:      Thyroid: No thyromegaly.      Vascular: No JVD.   Cardiovascular:      Rate and Rhythm: Normal rate and regular rhythm.      Heart sounds: No murmur heard.     No friction rub. No gallop.   Pulmonary:      Effort: Pulmonary effort is normal.      Comments: On 3.5L which home O2 dose  Abdominal:      General: Bowel sounds are normal. There is no distension.      Palpations: Abdomen is soft. There is no mass.      Tenderness: There is no abdominal tenderness.   Musculoskeletal:         General: Normal range of motion.      Cervical back: Neck supple.   Skin:     General: Skin is warm and dry.   Neurological:      Mental Status: She is oriented to person, place, and time.      Cranial Nerves: No cranial nerve deficit.   Psychiatric:         Behavior: Behavior normal.               Significant Labs: All pertinent labs within the past 24 hours have been reviewed.  Blood Culture:   BMP:   Recent Labs   Lab 03/15/25  0419 03/16/25  1005    GLU 85 89    139   K 3.7 3.5   CL 98 95   CO2 37* 34*   BUN 3* 3*   CREATININE 0.6 0.6   CALCIUM 8.4* 8.9   MG 1.5*  --      CBC:   Recent Labs   Lab 03/15/25  0419 03/16/25  1005   WBC 6.25 6.80   HGB 8.0* 8.8*   HCT 26.1* 29.2*    350     Microbiology Results (last 7 days)       Procedure Component Value Units Date/Time    Blood culture [[id number]] Collected: 03/11/25 0944    Order Status: Completed Specimen: Blood from Peripheral, Antecubital, Right Updated: 03/15/25 1232     Blood Culture, Routine No Growth to date      No Growth to date      No Growth to date      No Growth to date      No Growth to date    Blood culture [[id number]] Collected: 03/11/25 0944    Order Status: Completed Specimen: Blood from Peripheral, Hand, Right Updated: 03/15/25 1232     Blood Culture, Routine No Growth to date      No Growth to date      No Growth to date      No Growth to date      No Growth to date    Culture, Respiratory with Gram Stain [[id number]] Collected: 03/12/25 1047    Order Status: Completed Specimen: Respiratory from Sputum, Expectorated Updated: 03/14/25 0857     Respiratory Culture Normal respiratory kehinde     Gram Stain (Respiratory) Many WBC's     Gram Stain (Respiratory) >10 epithelial cells per low power field     Gram Stain (Respiratory) Few budding yeast     Gram Stain (Respiratory) Few Gram positive cocci    Urine culture [[id number]]  (Abnormal)  (Susceptibility) Collected: 03/11/25 1406    Order Status: Completed Specimen: Urine Updated: 03/14/25 0830     Urine Culture, Routine PROTEUS MIRABILIS  >100,000 cfu/ml        STREPTOCOCCUS AGALACTIAE (GROUP B)  > 100,000 cfu/ml  In case of Penicillin allergy, call lab for further testing.  Beta-hemolytic streptococci are routinely susceptible to   penicillins,cephalosporins and carbapenems.      Narrative:      Specimen Source->Urine    Influenza A & B by Molecular [[id number]] Collected: 03/11/25 1110    Order Status: Sent Specimen:  Nasopharyngeal Swab             Significant Imaging: I have reviewed all pertinent imaging results/findings within the past 24 hours.

## 2025-03-16 NOTE — ASSESSMENT & PLAN NOTE
Anemia is likely due to Iron deficiency. Most recent hemoglobin and hematocrit are listed below.  Recent Labs     03/14/25  0313 03/15/25  0419 03/16/25  1005   HGB 7.9* 8.0* 8.8*   HCT 26.1* 26.1* 29.2*     Plan  - Monitor serial CBC: Daily  - Transfuse PRBC if patient becomes hemodynamically unstable, symptomatic or H/H drops below 7/21.  - Patient has not received any PRBC transfusions to date  - Patient's anemia is currently stable  - Chronic problem.

## 2025-03-16 NOTE — ASSESSMENT & PLAN NOTE
Patient has a diagnosis of pneumonia. The cause of the pneumonia is bacterial in etiology due to Gram negative pathogens associated with aspiration. The pneumonia is acute. The patient has the following signs/symptoms of pneumonia: persistent hypoxia , cough, and shortness of breath. The patient does have a current oxygen requirement and the patient does have a home oxygen requirement. I have reviewed the pertinent imaging. The following cultures have been collected: Blood cultures and Sputum culture The culture results are listed below.     Current antimicrobial regimen consists of the antibiotics listed below. Will monitor patient closely and continue current treatment plan unchanged.    Antibiotics (From admission, onward)      Start     Stop Route Frequency Ordered    03/15/25 0930  amoxicillin-clavulanate 875-125mg per tablet 1 tablet         03/17/25 0859 Oral Every 12 hours 03/15/25 0920            Microbiology Results (last 7 days)       Procedure Component Value Units Date/Time    Blood culture [[id number]] Collected: 03/11/25 0944    Order Status: Completed Specimen: Blood from Peripheral, Antecubital, Right Updated: 03/15/25 1232     Blood Culture, Routine No Growth to date      No Growth to date      No Growth to date      No Growth to date      No Growth to date    Blood culture [[id number]] Collected: 03/11/25 0944    Order Status: Completed Specimen: Blood from Peripheral, Hand, Right Updated: 03/15/25 1232     Blood Culture, Routine No Growth to date      No Growth to date      No Growth to date      No Growth to date      No Growth to date    Culture, Respiratory with Gram Stain [[id number]] Collected: 03/12/25 1047    Order Status: Completed Specimen: Respiratory from Sputum, Expectorated Updated: 03/14/25 0857     Respiratory Culture Normal respiratory kehinde     Gram Stain (Respiratory) Many WBC's     Gram Stain (Respiratory) >10 epithelial cells per low power field     Gram Stain (Respiratory)  Few budding yeast     Gram Stain (Respiratory) Few Gram positive cocci    Urine culture [[id number]]  (Abnormal)  (Susceptibility) Collected: 03/11/25 1406    Order Status: Completed Specimen: Urine Updated: 03/14/25 0830     Urine Culture, Routine PROTEUS MIRABILIS  >100,000 cfu/ml        STREPTOCOCCUS AGALACTIAE (GROUP B)  > 100,000 cfu/ml  In case of Penicillin allergy, call lab for further testing.  Beta-hemolytic streptococci are routinely susceptible to   penicillins,cephalosporins and carbapenems.      Narrative:      Specimen Source->Urine    Influenza A & B by Molecular [[id number]] Collected: 03/11/25 1110    Order Status: Sent Specimen: Nasopharyngeal Swab           Afebrile, WBC normal   Received 4.5 days of Zosyn   Sputum culture did not grow any predominant bacteria  De-escalated to Augmentin for UTI, and to continue treatment for suspected CPAP    Patient has a diagnosis of pneumonia. The cause of the pneumonia is bacterial in etiology due to Gram negative pathogens associated with aspiration. The pneumonia is acute. The patient has the following signs/symptoms of pneumonia: persistent hypoxia , cough, and shortness of breath. The patient does have a current oxygen requirement and the patient does have a home oxygen requirement. I have reviewed the pertinent imaging. The following cultures have been collected: Blood cultures and Sputum culture The culture results are listed below.     Current antimicrobial regimen consists of the antibiotics listed below. Will monitor patient closely and continue current treatment plan unchanged.    Antibiotics (From admission, onward)      Start     Stop Route Frequency Ordered    03/15/25 0930  amoxicillin-clavulanate 875-125mg per tablet 1 tablet         03/17/25 0859 Oral Every 12 hours 03/15/25 0920            Microbiology Results (last 7 days)       Procedure Component Value Units Date/Time    Blood culture [[id number]] Collected: 03/11/25 0944    Order Status:  Completed Specimen: Blood from Peripheral, Antecubital, Right Updated: 03/15/25 1232     Blood Culture, Routine No Growth to date      No Growth to date      No Growth to date      No Growth to date      No Growth to date    Blood culture [[id number]] Collected: 03/11/25 0944    Order Status: Completed Specimen: Blood from Peripheral, Hand, Right Updated: 03/15/25 1232     Blood Culture, Routine No Growth to date      No Growth to date      No Growth to date      No Growth to date      No Growth to date    Culture, Respiratory with Gram Stain [[id number]] Collected: 03/12/25 1047    Order Status: Completed Specimen: Respiratory from Sputum, Expectorated Updated: 03/14/25 0857     Respiratory Culture Normal respiratory kehinde     Gram Stain (Respiratory) Many WBC's     Gram Stain (Respiratory) >10 epithelial cells per low power field     Gram Stain (Respiratory) Few budding yeast     Gram Stain (Respiratory) Few Gram positive cocci    Urine culture [[id number]]  (Abnormal)  (Susceptibility) Collected: 03/11/25 1406    Order Status: Completed Specimen: Urine Updated: 03/14/25 0830     Urine Culture, Routine PROTEUS MIRABILIS  >100,000 cfu/ml        STREPTOCOCCUS AGALACTIAE (GROUP B)  > 100,000 cfu/ml  In case of Penicillin allergy, call lab for further testing.  Beta-hemolytic streptococci are routinely susceptible to   penicillins,cephalosporins and carbapenems.      Narrative:      Specimen Source->Urine    Influenza A & B by Molecular [[id number]] Collected: 03/11/25 1110    Order Status: Sent Specimen: Nasopharyngeal Swab           Urine culture growing Proteus mirabilis and strep agalactiae  Deescalating from Zosyn to Augmentin for 2 more days    Patient has a diagnosis of pneumonia. The cause of the pneumonia is bacterial in etiology due to Gram negative pathogens associated with aspiration. The pneumonia is acute. The patient has the following signs/symptoms of pneumonia: persistent hypoxia , cough, and  shortness of breath. The patient does have a current oxygen requirement and the patient does have a home oxygen requirement. I have reviewed the pertinent imaging. The following cultures have been collected: Blood cultures and Sputum culture The culture results are listed below.     Current antimicrobial regimen consists of the antibiotics listed below. Will monitor patient closely and continue current treatment plan unchanged.    Antibiotics (From admission, onward)      Start     Stop Route Frequency Ordered    03/15/25 0930  amoxicillin-clavulanate 875-125mg per tablet 1 tablet         03/17/25 0859 Oral Every 12 hours 03/15/25 0920            Microbiology Results (last 7 days)       Procedure Component Value Units Date/Time    Blood culture [[id number]] Collected: 03/11/25 0944    Order Status: Completed Specimen: Blood from Peripheral, Antecubital, Right Updated: 03/15/25 1232     Blood Culture, Routine No Growth to date      No Growth to date      No Growth to date      No Growth to date      No Growth to date    Blood culture [[id number]] Collected: 03/11/25 0944    Order Status: Completed Specimen: Blood from Peripheral, Hand, Right Updated: 03/15/25 1232     Blood Culture, Routine No Growth to date      No Growth to date      No Growth to date      No Growth to date      No Growth to date    Culture, Respiratory with Gram Stain [[id number]] Collected: 03/12/25 1047    Order Status: Completed Specimen: Respiratory from Sputum, Expectorated Updated: 03/14/25 0857     Respiratory Culture Normal respiratory kehinde     Gram Stain (Respiratory) Many WBC's     Gram Stain (Respiratory) >10 epithelial cells per low power field     Gram Stain (Respiratory) Few budding yeast     Gram Stain (Respiratory) Few Gram positive cocci    Urine culture [[id number]]  (Abnormal)  (Susceptibility) Collected: 03/11/25 1406    Order Status: Completed Specimen: Urine Updated: 03/14/25 0830     Urine Culture, Routine PROTEUS  MIRABILIS  >100,000 cfu/ml        STREPTOCOCCUS AGALACTIAE (GROUP B)  > 100,000 cfu/ml  In case of Penicillin allergy, call lab for further testing.  Beta-hemolytic streptococci are routinely susceptible to   penicillins,cephalosporins and carbapenems.      Narrative:      Specimen Source->Urine    Influenza A & B by Molecular [[id number]] Collected: 03/11/25 1110    Order Status: Sent Specimen: Nasopharyngeal Swab           Afebrile, WBC normal   Received 4.5 days of Zosyn   Sputum culture did not grow any predominant bacteria  De-escalated to Augmentin for UTI, and to continue treatment for suspected CPAP    Patient has a diagnosis of pneumonia. The cause of the pneumonia is bacterial in etiology due to Gram negative pathogens associated with aspiration. The pneumonia is acute. The patient has the following signs/symptoms of pneumonia: persistent hypoxia , cough, and shortness of breath. The patient does have a current oxygen requirement and the patient does have a home oxygen requirement. I have reviewed the pertinent imaging. The following cultures have been collected: Blood cultures and Sputum culture The culture results are listed below.     Current antimicrobial regimen consists of the antibiotics listed below. Will monitor patient closely and continue current treatment plan unchanged.    Antibiotics (From admission, onward)      Start     Stop Route Frequency Ordered    03/15/25 0930  amoxicillin-clavulanate 875-125mg per tablet 1 tablet         03/17/25 0859 Oral Every 12 hours 03/15/25 0920            Microbiology Results (last 7 days)       Procedure Component Value Units Date/Time    Blood culture [[id number]] Collected: 03/11/25 0944    Order Status: Completed Specimen: Blood from Peripheral, Antecubital, Right Updated: 03/15/25 1232     Blood Culture, Routine No Growth to date      No Growth to date      No Growth to date      No Growth to date      No Growth to date    Blood culture [[id number]]  Collected: 03/11/25 0944    Order Status: Completed Specimen: Blood from Peripheral, Hand, Right Updated: 03/15/25 1232     Blood Culture, Routine No Growth to date      No Growth to date      No Growth to date      No Growth to date      No Growth to date    Culture, Respiratory with Gram Stain [[id number]] Collected: 03/12/25 1047    Order Status: Completed Specimen: Respiratory from Sputum, Expectorated Updated: 03/14/25 0857     Respiratory Culture Normal respiratory kehinde     Gram Stain (Respiratory) Many WBC's     Gram Stain (Respiratory) >10 epithelial cells per low power field     Gram Stain (Respiratory) Few budding yeast     Gram Stain (Respiratory) Few Gram positive cocci    Urine culture [[id number]]  (Abnormal)  (Susceptibility) Collected: 03/11/25 1406    Order Status: Completed Specimen: Urine Updated: 03/14/25 0830     Urine Culture, Routine PROTEUS MIRABILIS  >100,000 cfu/ml        STREPTOCOCCUS AGALACTIAE (GROUP B)  > 100,000 cfu/ml  In case of Penicillin allergy, call lab for further testing.  Beta-hemolytic streptococci are routinely susceptible to   penicillins,cephalosporins and carbapenems.      Narrative:      Specimen Source->Urine    Influenza A & B by Molecular [[id number]] Collected: 03/11/25 1110    Order Status: Sent Specimen: Nasopharyngeal Swab           Urine culture growing Proteus mirabilis and strep agalactiae  Deescalating from Zosyn to Augmentin for 2 more days

## 2025-03-16 NOTE — ASSESSMENT & PLAN NOTE
Hyponatremia is a historical properly acutely worsening here in the hospital.  The patient's most recent sodium results are listed below.  Recent Labs     03/14/25  0313 03/15/25  0419 03/16/25  1005    144 139     Plan  - Correct the sodium by 4-6mEq in 24 hours.   - sodium is self corrected with holding of the SSRI and diet liberalization   - SSRI resumed 3/7, and evaluating response-stable thus far.

## 2025-03-16 NOTE — ASSESSMENT & PLAN NOTE
Patient has a diagnosis of pneumonia. The cause of the pneumonia is bacterial in etiology due to Gram negative pathogens associated with aspiration. The pneumonia is acute. The patient has the following signs/symptoms of pneumonia: persistent hypoxia , cough, and shortness of breath. The patient does have a current oxygen requirement and the patient does have a home oxygen requirement. I have reviewed the pertinent imaging. The following cultures have been collected: Blood cultures and Sputum culture The culture results are listed below.     Current antimicrobial regimen consists of the antibiotics listed below. Will monitor patient closely and continue current treatment plan unchanged.    Antibiotics (From admission, onward)      Start     Stop Route Frequency Ordered    03/15/25 0930  amoxicillin-clavulanate 875-125mg per tablet 1 tablet         03/17/25 0859 Oral Every 12 hours 03/15/25 0920            Microbiology Results (last 7 days)       Procedure Component Value Units Date/Time    Blood culture [[id number]] Collected: 03/11/25 0944    Order Status: Completed Specimen: Blood from Peripheral, Antecubital, Right Updated: 03/15/25 1232     Blood Culture, Routine No Growth to date      No Growth to date      No Growth to date      No Growth to date      No Growth to date    Blood culture [[id number]] Collected: 03/11/25 0944    Order Status: Completed Specimen: Blood from Peripheral, Hand, Right Updated: 03/15/25 1232     Blood Culture, Routine No Growth to date      No Growth to date      No Growth to date      No Growth to date      No Growth to date    Culture, Respiratory with Gram Stain [[id number]] Collected: 03/12/25 1047    Order Status: Completed Specimen: Respiratory from Sputum, Expectorated Updated: 03/14/25 0857     Respiratory Culture Normal respiratory kehinde     Gram Stain (Respiratory) Many WBC's     Gram Stain (Respiratory) >10 epithelial cells per low power field     Gram Stain (Respiratory)  Few budding yeast     Gram Stain (Respiratory) Few Gram positive cocci    Urine culture [[id number]]  (Abnormal)  (Susceptibility) Collected: 03/11/25 1406    Order Status: Completed Specimen: Urine Updated: 03/14/25 0830     Urine Culture, Routine PROTEUS MIRABILIS  >100,000 cfu/ml        STREPTOCOCCUS AGALACTIAE (GROUP B)  > 100,000 cfu/ml  In case of Penicillin allergy, call lab for further testing.  Beta-hemolytic streptococci are routinely susceptible to   penicillins,cephalosporins and carbapenems.      Narrative:      Specimen Source->Urine    Influenza A & B by Molecular [[id number]] Collected: 03/11/25 1110    Order Status: Sent Specimen: Nasopharyngeal Swab           Afebrile, WBC normal   Received 4.5 days of Zosyn   Sputum culture did not grow any predominant bacteria  De-escalated to Augmentin for UTI, and to continue treatment for suspected CPAP    Patient has a diagnosis of pneumonia. The cause of the pneumonia is bacterial in etiology due to Gram negative pathogens associated with aspiration. The pneumonia is acute. The patient has the following signs/symptoms of pneumonia: persistent hypoxia , cough, and shortness of breath. The patient does have a current oxygen requirement and the patient does have a home oxygen requirement. I have reviewed the pertinent imaging. The following cultures have been collected: Blood cultures and Sputum culture The culture results are listed below.     Current antimicrobial regimen consists of the antibiotics listed below. Will monitor patient closely and continue current treatment plan unchanged.    Antibiotics (From admission, onward)      Start     Stop Route Frequency Ordered    03/15/25 0930  amoxicillin-clavulanate 875-125mg per tablet 1 tablet         03/17/25 0859 Oral Every 12 hours 03/15/25 0920            Microbiology Results (last 7 days)       Procedure Component Value Units Date/Time    Blood culture [[id number]] Collected: 03/11/25 0944    Order Status:  Completed Specimen: Blood from Peripheral, Antecubital, Right Updated: 03/15/25 1232     Blood Culture, Routine No Growth to date      No Growth to date      No Growth to date      No Growth to date      No Growth to date    Blood culture [[id number]] Collected: 03/11/25 0944    Order Status: Completed Specimen: Blood from Peripheral, Hand, Right Updated: 03/15/25 1232     Blood Culture, Routine No Growth to date      No Growth to date      No Growth to date      No Growth to date      No Growth to date    Culture, Respiratory with Gram Stain [[id number]] Collected: 03/12/25 1047    Order Status: Completed Specimen: Respiratory from Sputum, Expectorated Updated: 03/14/25 0857     Respiratory Culture Normal respiratory kehinde     Gram Stain (Respiratory) Many WBC's     Gram Stain (Respiratory) >10 epithelial cells per low power field     Gram Stain (Respiratory) Few budding yeast     Gram Stain (Respiratory) Few Gram positive cocci    Urine culture [[id number]]  (Abnormal)  (Susceptibility) Collected: 03/11/25 1406    Order Status: Completed Specimen: Urine Updated: 03/14/25 0830     Urine Culture, Routine PROTEUS MIRABILIS  >100,000 cfu/ml        STREPTOCOCCUS AGALACTIAE (GROUP B)  > 100,000 cfu/ml  In case of Penicillin allergy, call lab for further testing.  Beta-hemolytic streptococci are routinely susceptible to   penicillins,cephalosporins and carbapenems.      Narrative:      Specimen Source->Urine    Influenza A & B by Molecular [[id number]] Collected: 03/11/25 1110    Order Status: Sent Specimen: Nasopharyngeal Swab           Urine culture growing Proteus mirabilis and strep agalactiae  Deescalating from Zosyn to Augmentin for 2 more days    Patient has a diagnosis of pneumonia. The cause of the pneumonia is bacterial in etiology due to Gram negative pathogens associated with aspiration. The pneumonia is acute. The patient has the following signs/symptoms of pneumonia: persistent hypoxia , cough, and  shortness of breath. The patient does have a current oxygen requirement and the patient does have a home oxygen requirement. I have reviewed the pertinent imaging. The following cultures have been collected: Blood cultures and Sputum culture The culture results are listed below.     Current antimicrobial regimen consists of the antibiotics listed below. Will monitor patient closely and continue current treatment plan unchanged.    Antibiotics (From admission, onward)      Start     Stop Route Frequency Ordered    03/15/25 0930  amoxicillin-clavulanate 875-125mg per tablet 1 tablet         03/17/25 0859 Oral Every 12 hours 03/15/25 0920            Microbiology Results (last 7 days)       Procedure Component Value Units Date/Time    Blood culture [[id number]] Collected: 03/11/25 0944    Order Status: Completed Specimen: Blood from Peripheral, Antecubital, Right Updated: 03/15/25 1232     Blood Culture, Routine No Growth to date      No Growth to date      No Growth to date      No Growth to date      No Growth to date    Blood culture [[id number]] Collected: 03/11/25 0944    Order Status: Completed Specimen: Blood from Peripheral, Hand, Right Updated: 03/15/25 1232     Blood Culture, Routine No Growth to date      No Growth to date      No Growth to date      No Growth to date      No Growth to date    Culture, Respiratory with Gram Stain [[id number]] Collected: 03/12/25 1047    Order Status: Completed Specimen: Respiratory from Sputum, Expectorated Updated: 03/14/25 0857     Respiratory Culture Normal respiratory kehinde     Gram Stain (Respiratory) Many WBC's     Gram Stain (Respiratory) >10 epithelial cells per low power field     Gram Stain (Respiratory) Few budding yeast     Gram Stain (Respiratory) Few Gram positive cocci    Urine culture [[id number]]  (Abnormal)  (Susceptibility) Collected: 03/11/25 1406    Order Status: Completed Specimen: Urine Updated: 03/14/25 0830     Urine Culture, Routine PROTEUS  MIRABILIS  >100,000 cfu/ml        STREPTOCOCCUS AGALACTIAE (GROUP B)  > 100,000 cfu/ml  In case of Penicillin allergy, call lab for further testing.  Beta-hemolytic streptococci are routinely susceptible to   penicillins,cephalosporins and carbapenems.      Narrative:      Specimen Source->Urine    Influenza A & B by Molecular [[id number]] Collected: 03/11/25 1110    Order Status: Sent Specimen: Nasopharyngeal Swab           Afebrile, WBC normal   Received 4.5 days of Zosyn   Sputum culture did not grow any predominant bacteria  De-escalated to Augmentin for UTI, and to continue treatment for suspected CPAP    Patient has a diagnosis of pneumonia. The cause of the pneumonia is bacterial in etiology due to Gram negative pathogens associated with aspiration. The pneumonia is acute. The patient has the following signs/symptoms of pneumonia: persistent hypoxia , cough, and shortness of breath. The patient does have a current oxygen requirement and the patient does have a home oxygen requirement. I have reviewed the pertinent imaging. The following cultures have been collected: Blood cultures and Sputum culture The culture results are listed below.     Current antimicrobial regimen consists of the antibiotics listed below. Will monitor patient closely and continue current treatment plan unchanged.    Antibiotics (From admission, onward)      Start     Stop Route Frequency Ordered    03/15/25 0930  amoxicillin-clavulanate 875-125mg per tablet 1 tablet         03/17/25 0859 Oral Every 12 hours 03/15/25 0920            Microbiology Results (last 7 days)       Procedure Component Value Units Date/Time    Blood culture [[id number]] Collected: 03/11/25 0944    Order Status: Completed Specimen: Blood from Peripheral, Antecubital, Right Updated: 03/15/25 1232     Blood Culture, Routine No Growth to date      No Growth to date      No Growth to date      No Growth to date      No Growth to date    Blood culture [[id number]]  Collected: 03/11/25 0944    Order Status: Completed Specimen: Blood from Peripheral, Hand, Right Updated: 03/15/25 1232     Blood Culture, Routine No Growth to date      No Growth to date      No Growth to date      No Growth to date      No Growth to date    Culture, Respiratory with Gram Stain [[id number]] Collected: 03/12/25 1047    Order Status: Completed Specimen: Respiratory from Sputum, Expectorated Updated: 03/14/25 0857     Respiratory Culture Normal respiratory kehinde     Gram Stain (Respiratory) Many WBC's     Gram Stain (Respiratory) >10 epithelial cells per low power field     Gram Stain (Respiratory) Few budding yeast     Gram Stain (Respiratory) Few Gram positive cocci    Urine culture [[id number]]  (Abnormal)  (Susceptibility) Collected: 03/11/25 1406    Order Status: Completed Specimen: Urine Updated: 03/14/25 0830     Urine Culture, Routine PROTEUS MIRABILIS  >100,000 cfu/ml        STREPTOCOCCUS AGALACTIAE (GROUP B)  > 100,000 cfu/ml  In case of Penicillin allergy, call lab for further testing.  Beta-hemolytic streptococci are routinely susceptible to   penicillins,cephalosporins and carbapenems.      Narrative:      Specimen Source->Urine    Influenza A & B by Molecular [[id number]] Collected: 03/11/25 1110    Order Status: Sent Specimen: Nasopharyngeal Swab           Urine culture growing Proteus mirabilis and strep agalactiae  Deescalating from Zosyn to Augmentin for 2 more days

## 2025-03-16 NOTE — CARE UPDATE
03/16/25 1600   Patient Assessment/Suction   Level of Consciousness (AVPU) alert   Respiratory Effort Normal   Skin Integrity   $ Wound Care Tech Time 15 min   Area Observed Bridge of nose;Left;Right;Cheek   Skin Appearance without discoloration   Barrier used? Liquid Filled Cushion   Barrier Changed? Yes   PRE-TX-O2   Device (Oxygen Therapy) BIPAP   $ Is the patient on Low Flow Oxygen? Yes   $ Noninvasive Daily Charge Noninvasive Daily   Oxygen Concentration (%) 50   Pulse Oximetry Type Intermittent   Ready to Wean/Extubation Screen   FIO2<=50 (chart decimal) 0.5   Preset CPAP/BiPAP Settings   Mode Of Delivery BiPAP S/T   $ CPAP/BiPAP Daily Charge 1   CPAP/BIPAP charged w/in last 24 h YES   $ Initial CPAP/BiPAP Setup? No   $ Is patient using? Yes   Size of Mask Small/Medium   Sized Appropriately? Yes   Equipment Type V60   Airway Device Type medium full face mask   Humidifier not applicable   Ipap 16   EPAP (cm H2O) 8   Pressure Support (cm H2O) 8   Set Rate (Breaths/Min) 28   ITime (sec) 0.85   Rise Time (sec) 2   Patient CPAP/BiPAP Settings   CPAP/BIPAP ID 2   Timed Inspiration (Sec) 0.85   IPAP Rise Time (sec) 2   RR Total (Breaths/Min) 30   Tidal Volume (mL) 330   VE Minute Ventilation (L/min) 9.8 L/min   Peak Inspiratory Pressure (cm H2O) 16   TiTOT (%) 44   Total Leak (L/Min) 35   Patient Trigger - ST Mode Only (%) 17   CPAP/BiPAP Alarms   High Pressure (cm H2O) 40   Low Pressure (cm H2O) 6   Minute Ventilation (L/Min) 5   High RR (breaths/min) 45   Low RR (breaths/min) 8   Apnea (Sec) 20     Placed on BIPAP, sleepy

## 2025-03-16 NOTE — ASSESSMENT & PLAN NOTE
Patient with Hypercapnic and Hypoxic Respiratory failure which is Acute on chronic.  she is on home oxygen at 3.5 LPM. Supplemental oxygen was provided and noted- Oxygen Concentration (%):  [32] 32    .   Signs/symptoms of respiratory failure include- tachypnea and increased work of breathing. Contributing diagnoses includes - Aspiration, COPD, Interstitial lung disease, and Pneumonia Labs and images were reviewed. Patient Has recent ABG, which has been reviewed. Will treat underlying causes and adjust management of respiratory failure as follows-   -NC at 3.5 L ( home dose)  -Transitioned to oral abx   - pulmonary Medicine following.

## 2025-03-16 NOTE — PROGRESS NOTES
Pharmacist Renal Dose Adjustment Note    Bhavana Lambert is a 69 y.o. female being treated with the medication Enoxaparin.    Patient Data:    Vital Signs (Most Recent):  Temp: 97.5 °F (36.4 °C) (03/16/25 1555)  Pulse: 96 (03/16/25 1555)  Resp: 20 (03/16/25 1555)  BP: (!) 174/99 (03/16/25 1555)  SpO2: 99 % (03/16/25 1555) Vital Signs (72h Range):  Temp:  [97.5 °F (36.4 °C)-98.8 °F (37.1 °C)]   Pulse:  [59-99]   Resp:  [11-50]   BP: ()/(50-99)   SpO2:  [84 %-100 %]      Recent Labs   Lab 03/14/25  0313 03/15/25  0419 03/16/25  1005   CREATININE 0.5 0.6 0.6     Serum creatinine: 0.6 mg/dL 03/16/25 1005  Estimated creatinine clearance: 66.1 mL/min    Enoxaparin 40 mg subq every 24 hours will be changed to Enoxaparin 30 mg subq every 24 hours due to weight < 50 kg.    Pharmacist's Name: Margarita Bowers  Pharmacist's Extension: [phone]

## 2025-03-16 NOTE — ASSESSMENT & PLAN NOTE
Pt with confirmed daily ETOH utilization appeared with DTs on 03/06   -initiated scheduled Librium 3/6 with much improvement.    -Librium titrated off  -continue p.r.n. Ativan   -nursing to perform CIWA  -fall, seizure precautions   -daily MVI, folic acid, thiamine

## 2025-03-17 LAB
ALLENS TEST: ABNORMAL
ANION GAP SERPL CALC-SCNC: 9 MMOL/L (ref 8–16)
BASOPHILS # BLD AUTO: 0.02 K/UL (ref 0–0.2)
BASOPHILS NFR BLD: 0.3 % (ref 0–1.9)
BUN SERPL-MCNC: 4 MG/DL (ref 8–23)
CALCIUM SERPL-MCNC: 8.6 MG/DL (ref 8.7–10.5)
CHLORIDE SERPL-SCNC: 99 MMOL/L (ref 95–110)
CO2 SERPL-SCNC: 32 MMOL/L (ref 23–29)
CREAT SERPL-MCNC: 0.5 MG/DL (ref 0.5–1.4)
DELSYS: ABNORMAL
DIFFERENTIAL METHOD BLD: ABNORMAL
EOSINOPHIL # BLD AUTO: 0.1 K/UL (ref 0–0.5)
EOSINOPHIL NFR BLD: 2.3 % (ref 0–8)
ERYTHROCYTE [DISTWIDTH] IN BLOOD BY AUTOMATED COUNT: 13.3 % (ref 11.5–14.5)
EST. GFR  (NO RACE VARIABLE): >60 ML/MIN/1.73 M^2
FLOW: 5
GLUCOSE SERPL-MCNC: 89 MG/DL (ref 70–110)
HCO3 UR-SCNC: 33.6 MMOL/L (ref 24–28)
HCT VFR BLD AUTO: 28.6 % (ref 37–48.5)
HGB BLD-MCNC: 8.7 G/DL (ref 12–16)
IMM GRANULOCYTES # BLD AUTO: 0.04 K/UL (ref 0–0.04)
IMM GRANULOCYTES NFR BLD AUTO: 0.7 % (ref 0–0.5)
LYMPHOCYTES # BLD AUTO: 1.6 K/UL (ref 1–4.8)
LYMPHOCYTES NFR BLD: 25.8 % (ref 18–48)
MAGNESIUM SERPL-MCNC: 1.7 MG/DL (ref 1.6–2.6)
MCH RBC QN AUTO: 30.3 PG (ref 27–31)
MCHC RBC AUTO-ENTMCNC: 30.4 G/DL (ref 32–36)
MCV RBC AUTO: 100 FL (ref 82–98)
MODE: ABNORMAL
MONOCYTES # BLD AUTO: 0.6 K/UL (ref 0.3–1)
MONOCYTES NFR BLD: 10.5 % (ref 4–15)
NEUTROPHILS # BLD AUTO: 3.7 K/UL (ref 1.8–7.7)
NEUTROPHILS NFR BLD: 60.4 % (ref 38–73)
NRBC BLD-RTO: 0 /100 WBC
PCO2 BLDA: 57.1 MMHG (ref 35–45)
PH SMN: 7.38 [PH] (ref 7.35–7.45)
PLATELET # BLD AUTO: 328 K/UL (ref 150–450)
PMV BLD AUTO: 10.6 FL (ref 9.2–12.9)
PO2 BLDA: 61 MMHG (ref 80–100)
POC BE: 8 MMOL/L
POC SATURATED O2: 90 % (ref 95–100)
POC TCO2: 35 MMOL/L (ref 23–27)
POTASSIUM SERPL-SCNC: 3.6 MMOL/L (ref 3.5–5.1)
RBC # BLD AUTO: 2.87 M/UL (ref 4–5.4)
SAMPLE: ABNORMAL
SITE: ABNORMAL
SODIUM SERPL-SCNC: 140 MMOL/L (ref 136–145)
WBC # BLD AUTO: 6.08 K/UL (ref 3.9–12.7)

## 2025-03-17 PROCEDURE — 25000003 PHARM REV CODE 250: Performed by: NURSE PRACTITIONER

## 2025-03-17 PROCEDURE — 25000003 PHARM REV CODE 250

## 2025-03-17 PROCEDURE — 36415 COLL VENOUS BLD VENIPUNCTURE: CPT | Performed by: NURSE PRACTITIONER

## 2025-03-17 PROCEDURE — 63600175 PHARM REV CODE 636 W HCPCS

## 2025-03-17 PROCEDURE — 25000242 PHARM REV CODE 250 ALT 637 W/ HCPCS: Performed by: HOSPITALIST

## 2025-03-17 PROCEDURE — 99900031 HC PATIENT EDUCATION (STAT)

## 2025-03-17 PROCEDURE — 85025 COMPLETE CBC W/AUTO DIFF WBC: CPT | Performed by: NURSE PRACTITIONER

## 2025-03-17 PROCEDURE — 83735 ASSAY OF MAGNESIUM: CPT

## 2025-03-17 PROCEDURE — 99900035 HC TECH TIME PER 15 MIN (STAT)

## 2025-03-17 PROCEDURE — 80048 BASIC METABOLIC PNL TOTAL CA: CPT | Performed by: NURSE PRACTITIONER

## 2025-03-17 PROCEDURE — 27100171 HC OXYGEN HIGH FLOW UP TO 24 HOURS

## 2025-03-17 PROCEDURE — S4991 NICOTINE PATCH NONLEGEND: HCPCS

## 2025-03-17 PROCEDURE — 25000003 PHARM REV CODE 250: Performed by: INTERNAL MEDICINE

## 2025-03-17 PROCEDURE — 94640 AIRWAY INHALATION TREATMENT: CPT

## 2025-03-17 PROCEDURE — 12000002 HC ACUTE/MED SURGE SEMI-PRIVATE ROOM

## 2025-03-17 PROCEDURE — 63600175 PHARM REV CODE 636 W HCPCS: Performed by: FAMILY MEDICINE

## 2025-03-17 PROCEDURE — 97116 GAIT TRAINING THERAPY: CPT | Mod: CQ

## 2025-03-17 PROCEDURE — 97535 SELF CARE MNGMENT TRAINING: CPT

## 2025-03-17 PROCEDURE — 94660 CPAP INITIATION&MGMT: CPT

## 2025-03-17 PROCEDURE — 94761 N-INVAS EAR/PLS OXIMETRY MLT: CPT

## 2025-03-17 PROCEDURE — 36415 COLL VENOUS BLD VENIPUNCTURE: CPT

## 2025-03-17 RX ORDER — MAGNESIUM SULFATE HEPTAHYDRATE 40 MG/ML
2 INJECTION, SOLUTION INTRAVENOUS ONCE
Status: COMPLETED | OUTPATIENT
Start: 2025-03-17 | End: 2025-03-17

## 2025-03-17 RX ADMIN — GABAPENTIN 300 MG: 300 CAPSULE ORAL at 08:03

## 2025-03-17 RX ADMIN — BUSPIRONE HYDROCHLORIDE 15 MG: 5 TABLET ORAL at 08:03

## 2025-03-17 RX ADMIN — IPRATROPIUM BROMIDE 0.5 MG: 0.5 SOLUTION RESPIRATORY (INHALATION) at 06:03

## 2025-03-17 RX ADMIN — HYDROCODONE BITARTRATE AND ACETAMINOPHEN 1 TABLET: 5; 325 TABLET ORAL at 10:03

## 2025-03-17 RX ADMIN — LEVOTHYROXINE SODIUM 50 MCG: 0.03 TABLET ORAL at 05:03

## 2025-03-17 RX ADMIN — ARFORMOTEROL TARTRATE 15 MCG: 15 SOLUTION RESPIRATORY (INHALATION) at 06:03

## 2025-03-17 RX ADMIN — NICOTINE 1 PATCH: 21 PATCH, EXTENDED RELEASE TRANSDERMAL at 05:03

## 2025-03-17 RX ADMIN — MAGNESIUM SULFATE HEPTAHYDRATE 2 G: 40 INJECTION, SOLUTION INTRAVENOUS at 01:03

## 2025-03-17 RX ADMIN — ATORVASTATIN CALCIUM 80 MG: 40 TABLET, FILM COATED ORAL at 11:03

## 2025-03-17 RX ADMIN — THIAMINE HCL TAB 100 MG 100 MG: 100 TAB at 11:03

## 2025-03-17 RX ADMIN — FAMOTIDINE 20 MG: 20 TABLET, FILM COATED ORAL at 11:03

## 2025-03-17 RX ADMIN — BUSPIRONE HYDROCHLORIDE 15 MG: 5 TABLET ORAL at 03:03

## 2025-03-17 RX ADMIN — GABAPENTIN 300 MG: 300 CAPSULE ORAL at 03:03

## 2025-03-17 RX ADMIN — FAMOTIDINE 20 MG: 20 TABLET, FILM COATED ORAL at 08:03

## 2025-03-17 RX ADMIN — MONTELUKAST 10 MG: 10 TABLET, FILM COATED ORAL at 08:03

## 2025-03-17 RX ADMIN — IPRATROPIUM BROMIDE 0.5 MG: 0.5 SOLUTION RESPIRATORY (INHALATION) at 01:03

## 2025-03-17 RX ADMIN — BUDESONIDE INHALATION 0.5 MG: 0.5 SUSPENSION RESPIRATORY (INHALATION) at 06:03

## 2025-03-17 RX ADMIN — FOLIC ACID 1 MG: 1 TABLET ORAL at 11:03

## 2025-03-17 RX ADMIN — ENOXAPARIN SODIUM 30 MG: 30 INJECTION SUBCUTANEOUS at 05:03

## 2025-03-17 RX ADMIN — FLUOXETINE HYDROCHLORIDE 40 MG: 20 CAPSULE ORAL at 11:03

## 2025-03-17 RX ADMIN — POTASSIUM CHLORIDE 40 MEQ: 1500 TABLET, EXTENDED RELEASE ORAL at 11:03

## 2025-03-17 RX ADMIN — CARVEDILOL 12.5 MG: 12.5 TABLET, FILM COATED ORAL at 08:03

## 2025-03-17 RX ADMIN — THERA TABS 1 TABLET: TAB at 11:03

## 2025-03-17 RX ADMIN — HYDROCODONE BITARTRATE AND ACETAMINOPHEN 1 TABLET: 5; 325 TABLET ORAL at 11:03

## 2025-03-17 NOTE — ASSESSMENT & PLAN NOTE
Patient has a diagnosis of pneumonia. The cause of the pneumonia is bacterial in etiology due to Gram negative pathogens associated with aspiration. The pneumonia is acute. The patient has the following signs/symptoms of pneumonia: persistent hypoxia , cough, and shortness of breath. The patient does have a current oxygen requirement and the patient does have a home oxygen requirement. I have reviewed the pertinent imaging. The following cultures have been collected: Blood cultures and Sputum culture The culture results are listed below.     Current antimicrobial regimen consists of the antibiotics listed below. Will monitor patient closely and continue current treatment plan unchanged.    Antibiotics (From admission, onward)      None            Microbiology Results (last 7 days)       Procedure Component Value Units Date/Time    Blood culture [[id number]] Collected: 03/11/25 0944    Order Status: Completed Specimen: Blood from Peripheral, Hand, Right Updated: 03/16/25 1232     Blood Culture, Routine No growth after 5 days.    Blood culture [[id number]] Collected: 03/11/25 0944    Order Status: Completed Specimen: Blood from Peripheral, Antecubital, Right Updated: 03/16/25 1232     Blood Culture, Routine No growth after 5 days.    Culture, Respiratory with Gram Stain [[id number]] Collected: 03/12/25 1047    Order Status: Completed Specimen: Respiratory from Sputum, Expectorated Updated: 03/14/25 0857     Respiratory Culture Normal respiratory kehinde     Gram Stain (Respiratory) Many WBC's     Gram Stain (Respiratory) >10 epithelial cells per low power field     Gram Stain (Respiratory) Few budding yeast     Gram Stain (Respiratory) Few Gram positive cocci    Urine culture [[id number]]  (Abnormal)  (Susceptibility) Collected: 03/11/25 1406    Order Status: Completed Specimen: Urine Updated: 03/14/25 0830     Urine Culture, Routine PROTEUS MIRABILIS  >100,000 cfu/ml        STREPTOCOCCUS AGALACTIAE (GROUP B)  > 100,000  cfu/ml  In case of Penicillin allergy, call lab for further testing.  Beta-hemolytic streptococci are routinely susceptible to   penicillins,cephalosporins and carbapenems.      Narrative:      Specimen Source->Urine    Influenza A & B by Molecular [[id number]] Collected: 03/11/25 1110    Order Status: Sent Specimen: Nasopharyngeal Swab           Afebrile, WBC normal   Received 4.5 days of Zosyn   Sputum culture did not grow any predominant bacteria  De-escalated to Augmentin for UTI, and to continue treatment for suspected CPAP    Patient has a diagnosis of pneumonia. The cause of the pneumonia is bacterial in etiology due to Gram negative pathogens associated with aspiration. The pneumonia is acute. The patient has the following signs/symptoms of pneumonia: persistent hypoxia , cough, and shortness of breath. The patient does have a current oxygen requirement and the patient does have a home oxygen requirement. I have reviewed the pertinent imaging. The following cultures have been collected: Blood cultures and Sputum culture The culture results are listed below.     Current antimicrobial regimen consists of the antibiotics listed below. Will monitor patient closely and continue current treatment plan unchanged.    Antibiotics (From admission, onward)      None            Microbiology Results (last 7 days)       Procedure Component Value Units Date/Time    Blood culture [[id number]] Collected: 03/11/25 0944    Order Status: Completed Specimen: Blood from Peripheral, Hand, Right Updated: 03/16/25 1232     Blood Culture, Routine No growth after 5 days.    Blood culture [[id number]] Collected: 03/11/25 0944    Order Status: Completed Specimen: Blood from Peripheral, Antecubital, Right Updated: 03/16/25 1232     Blood Culture, Routine No growth after 5 days.    Culture, Respiratory with Gram Stain [[id number]] Collected: 03/12/25 1047    Order Status: Completed Specimen: Respiratory from Sputum, Expectorated Updated:  03/14/25 0857     Respiratory Culture Normal respiratory kehinde     Gram Stain (Respiratory) Many WBC's     Gram Stain (Respiratory) >10 epithelial cells per low power field     Gram Stain (Respiratory) Few budding yeast     Gram Stain (Respiratory) Few Gram positive cocci    Urine culture [[id number]]  (Abnormal)  (Susceptibility) Collected: 03/11/25 1406    Order Status: Completed Specimen: Urine Updated: 03/14/25 0830     Urine Culture, Routine PROTEUS MIRABILIS  >100,000 cfu/ml        STREPTOCOCCUS AGALACTIAE (GROUP B)  > 100,000 cfu/ml  In case of Penicillin allergy, call lab for further testing.  Beta-hemolytic streptococci are routinely susceptible to   penicillins,cephalosporins and carbapenems.      Narrative:      Specimen Source->Urine    Influenza A & B by Molecular [[id number]] Collected: 03/11/25 1110    Order Status: Sent Specimen: Nasopharyngeal Swab           Urine culture growing Proteus mirabilis and strep agalactiae  Deescalating from Zosyn to Augmentin for 2 more days    Patient has a diagnosis of pneumonia. The cause of the pneumonia is bacterial in etiology due to Gram negative pathogens associated with aspiration. The pneumonia is acute. The patient has the following signs/symptoms of pneumonia: persistent hypoxia , cough, and shortness of breath. The patient does have a current oxygen requirement and the patient does have a home oxygen requirement. I have reviewed the pertinent imaging. The following cultures have been collected: Blood cultures and Sputum culture The culture results are listed below.     Current antimicrobial regimen consists of the antibiotics listed below. Will monitor patient closely and continue current treatment plan unchanged.    Antibiotics (From admission, onward)      None            Microbiology Results (last 7 days)       Procedure Component Value Units Date/Time    Blood culture [[id number]] Collected: 03/11/25 0944    Order Status: Completed Specimen: Blood from  Peripheral, Hand, Right Updated: 03/16/25 1232     Blood Culture, Routine No growth after 5 days.    Blood culture [[id number]] Collected: 03/11/25 0944    Order Status: Completed Specimen: Blood from Peripheral, Antecubital, Right Updated: 03/16/25 1232     Blood Culture, Routine No growth after 5 days.    Culture, Respiratory with Gram Stain [[id number]] Collected: 03/12/25 1047    Order Status: Completed Specimen: Respiratory from Sputum, Expectorated Updated: 03/14/25 0857     Respiratory Culture Normal respiratory kehinde     Gram Stain (Respiratory) Many WBC's     Gram Stain (Respiratory) >10 epithelial cells per low power field     Gram Stain (Respiratory) Few budding yeast     Gram Stain (Respiratory) Few Gram positive cocci    Urine culture [[id number]]  (Abnormal)  (Susceptibility) Collected: 03/11/25 1406    Order Status: Completed Specimen: Urine Updated: 03/14/25 0830     Urine Culture, Routine PROTEUS MIRABILIS  >100,000 cfu/ml        STREPTOCOCCUS AGALACTIAE (GROUP B)  > 100,000 cfu/ml  In case of Penicillin allergy, call lab for further testing.  Beta-hemolytic streptococci are routinely susceptible to   penicillins,cephalosporins and carbapenems.      Narrative:      Specimen Source->Urine    Influenza A & B by Molecular [[id number]] Collected: 03/11/25 1110    Order Status: Sent Specimen: Nasopharyngeal Swab           Afebrile, WBC normal   Received 4.5 days of Zosyn   Sputum culture did not grow any predominant bacteria  De-escalated to Augmentin for UTI, and to continue treatment for suspected CPAP    Patient has a diagnosis of pneumonia. The cause of the pneumonia is bacterial in etiology due to Gram negative pathogens associated with aspiration. The pneumonia is acute. The patient has the following signs/symptoms of pneumonia: persistent hypoxia , cough, and shortness of breath. The patient does have a current oxygen requirement and the patient does have a home oxygen requirement. I have reviewed  the pertinent imaging. The following cultures have been collected: Blood cultures and Sputum culture The culture results are listed below.     Current antimicrobial regimen consists of the antibiotics listed below. Will monitor patient closely and continue current treatment plan unchanged.    Antibiotics (From admission, onward)      None            Microbiology Results (last 7 days)       Procedure Component Value Units Date/Time    Blood culture [[id number]] Collected: 03/11/25 0944    Order Status: Completed Specimen: Blood from Peripheral, Hand, Right Updated: 03/16/25 1232     Blood Culture, Routine No growth after 5 days.    Blood culture [[id number]] Collected: 03/11/25 0944    Order Status: Completed Specimen: Blood from Peripheral, Antecubital, Right Updated: 03/16/25 1232     Blood Culture, Routine No growth after 5 days.    Culture, Respiratory with Gram Stain [[id number]] Collected: 03/12/25 1047    Order Status: Completed Specimen: Respiratory from Sputum, Expectorated Updated: 03/14/25 0857     Respiratory Culture Normal respiratory kehinde     Gram Stain (Respiratory) Many WBC's     Gram Stain (Respiratory) >10 epithelial cells per low power field     Gram Stain (Respiratory) Few budding yeast     Gram Stain (Respiratory) Few Gram positive cocci    Urine culture [[id number]]  (Abnormal)  (Susceptibility) Collected: 03/11/25 1406    Order Status: Completed Specimen: Urine Updated: 03/14/25 0830     Urine Culture, Routine PROTEUS MIRABILIS  >100,000 cfu/ml        STREPTOCOCCUS AGALACTIAE (GROUP B)  > 100,000 cfu/ml  In case of Penicillin allergy, call lab for further testing.  Beta-hemolytic streptococci are routinely susceptible to   penicillins,cephalosporins and carbapenems.      Narrative:      Specimen Source->Urine    Influenza A & B by Molecular [[id number]] Collected: 03/11/25 1110    Order Status: Sent Specimen: Nasopharyngeal Swab           Urine culture growing Proteus mirabilis and strep  agalactiae  Deescalating from Zosyn to Augmentin for 2 more days    Patient has a diagnosis of pneumonia. The cause of the pneumonia is bacterial in etiology due to Gram negative pathogens associated with aspiration. The pneumonia is acute. The patient has the following signs/symptoms of pneumonia: persistent hypoxia , cough, and shortness of breath. The patient does have a current oxygen requirement and the patient does have a home oxygen requirement. I have reviewed the pertinent imaging. The following cultures have been collected: Blood cultures and Sputum culture The culture results are listed below.     Current antimicrobial regimen consists of the antibiotics listed below. Will monitor patient closely and continue current treatment plan unchanged.    Antibiotics (From admission, onward)      None          Microbiology Results (last 7 days)       Procedure Component Value Units Date/Time    Blood culture [[id number]] Collected: 03/11/25 0944    Order Status: Completed Specimen: Blood from Peripheral, Hand, Right Updated: 03/16/25 1232     Blood Culture, Routine No growth after 5 days.    Blood culture [[id number]] Collected: 03/11/25 0944    Order Status: Completed Specimen: Blood from Peripheral, Antecubital, Right Updated: 03/16/25 1232     Blood Culture, Routine No growth after 5 days.    Culture, Respiratory with Gram Stain [[id number]] Collected: 03/12/25 1047    Order Status: Completed Specimen: Respiratory from Sputum, Expectorated Updated: 03/14/25 0857     Respiratory Culture Normal respiratory kehinde     Gram Stain (Respiratory) Many WBC's     Gram Stain (Respiratory) >10 epithelial cells per low power field     Gram Stain (Respiratory) Few budding yeast     Gram Stain (Respiratory) Few Gram positive cocci    Urine culture [[id number]]  (Abnormal)  (Susceptibility) Collected: 03/11/25 1406    Order Status: Completed Specimen: Urine Updated: 03/14/25 0830     Urine Culture, Routine PROTEUS  MIRABILIS  >100,000 cfu/ml        STREPTOCOCCUS AGALACTIAE (GROUP B)  > 100,000 cfu/ml  In case of Penicillin allergy, call lab for further testing.  Beta-hemolytic streptococci are routinely susceptible to   penicillins,cephalosporins and carbapenems.      Narrative:      Specimen Source->Urine    Influenza A & B by Molecular [[id number]] Collected: 03/11/25 1110    Order Status: Sent Specimen: Nasopharyngeal Swab            Culture, Respiratory with Gram Stain [[id number]] Collected: 03/12/25 1047    Order Status: Completed Specimen: Respiratory from Sputum, Expectorated Updated: 03/14/25 0857       Antibiotics (From admission, onward)      None                                           Microbiology Results (last 7 days)       Procedure Component Value Units Date/Time    Blood culture [[id number]] Collected: 03/11/25 0944    Order Status: Completed Specimen: Blood from Peripheral, Hand, Right Updated: 03/16/25 1232     Blood Culture, Routine No growth after 5 days.    Blood culture [[id number]] Collected: 03/11/25 0944    Order Status: Completed Specimen: Blood from Peripheral, Antecubital, Right Updated: 03/16/25 1232     Blood Culture, Routine No growth after 5 days.    Culture, Respiratory with Gram Stain [[id number]] Collected: 03/12/25 1047    Order Status: Completed Specimen: Respiratory from Sputum, Expectorated Updated: 03/14/25 0857     Respiratory Culture Normal respiratory kehinde     Gram Stain (Respiratory) Many WBC's     Gram Stain (Respiratory) >10 epithelial cells per low power field     Gram Stain (Respiratory) Few budding yeast     Gram Stain (Respiratory) Few Gram positive cocci    Urine culture [[id number]]  (Abnormal)  (Susceptibility) Collected: 03/11/25 1406    Order Status: Completed Specimen: Urine Updated: 03/14/25 0830     Urine Culture, Routine PROTEUS MIRABILIS  >100,000 cfu/ml        STREPTOCOCCUS AGALACTIAE (GROUP B)  > 100,000 cfu/ml  In case of Penicillin allergy, call lab for  further testing.  Beta-hemolytic streptococci are routinely susceptible to   penicillins,cephalosporins and carbapenems.      Narrative:      Specimen Source->Urine    Influenza A & B by Molecular [[id number]] Collected: 03/11/25 1110    Order Status: Sent Specimen: Nasopharyngeal Swab            Narrative:      Specimen Source->Urine    Influenza A & B by Molecular [[id number]] Collected: 03/11/25 1110     Antibiotics (From admission, onward)      None

## 2025-03-17 NOTE — ASSESSMENT & PLAN NOTE
Patient has a diagnosis of pneumonia. The cause of the pneumonia is bacterial in etiology due to Gram negative pathogens associated with aspiration. The pneumonia is acute. The patient has the following signs/symptoms of pneumonia: persistent hypoxia , cough, and shortness of breath. The patient does have a current oxygen requirement and the patient does have a home oxygen requirement. I have reviewed the pertinent imaging. The following cultures have been collected: Blood cultures and Sputum culture The culture results are listed below.     Current antimicrobial regimen consists of the antibiotics listed below. Will monitor patient closely and continue current treatment plan unchanged.    Antibiotics (From admission, onward)      None            Microbiology Results (last 7 days)       Procedure Component Value Units Date/Time    Blood culture [[id number]] Collected: 03/11/25 0944    Order Status: Completed Specimen: Blood from Peripheral, Hand, Right Updated: 03/16/25 1232     Blood Culture, Routine No growth after 5 days.    Blood culture [[id number]] Collected: 03/11/25 0944    Order Status: Completed Specimen: Blood from Peripheral, Antecubital, Right Updated: 03/16/25 1232     Blood Culture, Routine No growth after 5 days.    Culture, Respiratory with Gram Stain [[id number]] Collected: 03/12/25 1047    Order Status: Completed Specimen: Respiratory from Sputum, Expectorated Updated: 03/14/25 0857     Respiratory Culture Normal respiratory kehinde     Gram Stain (Respiratory) Many WBC's     Gram Stain (Respiratory) >10 epithelial cells per low power field     Gram Stain (Respiratory) Few budding yeast     Gram Stain (Respiratory) Few Gram positive cocci    Urine culture [[id number]]  (Abnormal)  (Susceptibility) Collected: 03/11/25 1406    Order Status: Completed Specimen: Urine Updated: 03/14/25 0830     Urine Culture, Routine PROTEUS MIRABILIS  >100,000 cfu/ml        STREPTOCOCCUS AGALACTIAE (GROUP B)  > 100,000  cfu/ml  In case of Penicillin allergy, call lab for further testing.  Beta-hemolytic streptococci are routinely susceptible to   penicillins,cephalosporins and carbapenems.      Narrative:      Specimen Source->Urine    Influenza A & B by Molecular [[id number]] Collected: 03/11/25 1110    Order Status: Sent Specimen: Nasopharyngeal Swab           Afebrile, WBC normal   Received 4.5 days of Zosyn   Sputum culture did not grow any predominant bacteria  De-escalated to Augmentin for UTI, and to continue treatment for suspected CPAP    Patient has a diagnosis of pneumonia. The cause of the pneumonia is bacterial in etiology due to Gram negative pathogens associated with aspiration. The pneumonia is acute. The patient has the following signs/symptoms of pneumonia: persistent hypoxia , cough, and shortness of breath. The patient does have a current oxygen requirement and the patient does have a home oxygen requirement. I have reviewed the pertinent imaging. The following cultures have been collected: Blood cultures and Sputum culture The culture results are listed below.     Current antimicrobial regimen consists of the antibiotics listed below. Will monitor patient closely and continue current treatment plan unchanged.    Antibiotics (From admission, onward)      None            Microbiology Results (last 7 days)       Procedure Component Value Units Date/Time    Blood culture [[id number]] Collected: 03/11/25 0944    Order Status: Completed Specimen: Blood from Peripheral, Hand, Right Updated: 03/16/25 1232     Blood Culture, Routine No growth after 5 days.    Blood culture [[id number]] Collected: 03/11/25 0944    Order Status: Completed Specimen: Blood from Peripheral, Antecubital, Right Updated: 03/16/25 1232     Blood Culture, Routine No growth after 5 days.    Culture, Respiratory with Gram Stain [[id number]] Collected: 03/12/25 1047    Order Status: Completed Specimen: Respiratory from Sputum, Expectorated Updated:  03/14/25 0857     Respiratory Culture Normal respiratory kehinde     Gram Stain (Respiratory) Many WBC's     Gram Stain (Respiratory) >10 epithelial cells per low power field     Gram Stain (Respiratory) Few budding yeast     Gram Stain (Respiratory) Few Gram positive cocci    Urine culture [[id number]]  (Abnormal)  (Susceptibility) Collected: 03/11/25 1406    Order Status: Completed Specimen: Urine Updated: 03/14/25 0830     Urine Culture, Routine PROTEUS MIRABILIS  >100,000 cfu/ml        STREPTOCOCCUS AGALACTIAE (GROUP B)  > 100,000 cfu/ml  In case of Penicillin allergy, call lab for further testing.  Beta-hemolytic streptococci are routinely susceptible to   penicillins,cephalosporins and carbapenems.      Narrative:      Specimen Source->Urine    Influenza A & B by Molecular [[id number]] Collected: 03/11/25 1110    Order Status: Sent Specimen: Nasopharyngeal Swab           Urine culture growing Proteus mirabilis and strep agalactiae  Deescalating from Zosyn to Augmentin for 2 more days    Patient has a diagnosis of pneumonia. The cause of the pneumonia is bacterial in etiology due to Gram negative pathogens associated with aspiration. The pneumonia is acute. The patient has the following signs/symptoms of pneumonia: persistent hypoxia , cough, and shortness of breath. The patient does have a current oxygen requirement and the patient does have a home oxygen requirement. I have reviewed the pertinent imaging. The following cultures have been collected: Blood cultures and Sputum culture The culture results are listed below.     Current antimicrobial regimen consists of the antibiotics listed below. Will monitor patient closely and continue current treatment plan unchanged.    Antibiotics (From admission, onward)      None            Microbiology Results (last 7 days)       Procedure Component Value Units Date/Time    Blood culture [[id number]] Collected: 03/11/25 0944    Order Status: Completed Specimen: Blood from  Peripheral, Hand, Right Updated: 03/16/25 1232     Blood Culture, Routine No growth after 5 days.    Blood culture [[id number]] Collected: 03/11/25 0944    Order Status: Completed Specimen: Blood from Peripheral, Antecubital, Right Updated: 03/16/25 1232     Blood Culture, Routine No growth after 5 days.    Culture, Respiratory with Gram Stain [[id number]] Collected: 03/12/25 1047    Order Status: Completed Specimen: Respiratory from Sputum, Expectorated Updated: 03/14/25 0857     Respiratory Culture Normal respiratory kehinde     Gram Stain (Respiratory) Many WBC's     Gram Stain (Respiratory) >10 epithelial cells per low power field     Gram Stain (Respiratory) Few budding yeast     Gram Stain (Respiratory) Few Gram positive cocci    Urine culture [[id number]]  (Abnormal)  (Susceptibility) Collected: 03/11/25 1406    Order Status: Completed Specimen: Urine Updated: 03/14/25 0830     Urine Culture, Routine PROTEUS MIRABILIS  >100,000 cfu/ml        STREPTOCOCCUS AGALACTIAE (GROUP B)  > 100,000 cfu/ml  In case of Penicillin allergy, call lab for further testing.  Beta-hemolytic streptococci are routinely susceptible to   penicillins,cephalosporins and carbapenems.      Narrative:      Specimen Source->Urine    Influenza A & B by Molecular [[id number]] Collected: 03/11/25 1110    Order Status: Sent Specimen: Nasopharyngeal Swab           Afebrile, WBC normal   Received 4.5 days of Zosyn   Sputum culture did not grow any predominant bacteria  De-escalated to Augmentin for UTI, and to continue treatment for suspected CPAP    Patient has a diagnosis of pneumonia. The cause of the pneumonia is bacterial in etiology due to Gram negative pathogens associated with aspiration. The pneumonia is acute. The patient has the following signs/symptoms of pneumonia: persistent hypoxia , cough, and shortness of breath. The patient does have a current oxygen requirement and the patient does have a home oxygen requirement. I have reviewed  the pertinent imaging. The following cultures have been collected: Blood cultures and Sputum culture The culture results are listed below.     Current antimicrobial regimen consists of the antibiotics listed below. Will monitor patient closely and continue current treatment plan unchanged.    Antibiotics (From admission, onward)      None            Microbiology Results (last 7 days)       Procedure Component Value Units Date/Time    Blood culture [[id number]] Collected: 03/11/25 0944    Order Status: Completed Specimen: Blood from Peripheral, Hand, Right Updated: 03/16/25 1232     Blood Culture, Routine No growth after 5 days.    Blood culture [[id number]] Collected: 03/11/25 0944    Order Status: Completed Specimen: Blood from Peripheral, Antecubital, Right Updated: 03/16/25 1232     Blood Culture, Routine No growth after 5 days.    Culture, Respiratory with Gram Stain [[id number]] Collected: 03/12/25 1047    Order Status: Completed Specimen: Respiratory from Sputum, Expectorated Updated: 03/14/25 0857     Respiratory Culture Normal respiratory kehinde     Gram Stain (Respiratory) Many WBC's     Gram Stain (Respiratory) >10 epithelial cells per low power field     Gram Stain (Respiratory) Few budding yeast     Gram Stain (Respiratory) Few Gram positive cocci    Urine culture [[id number]]  (Abnormal)  (Susceptibility) Collected: 03/11/25 1406    Order Status: Completed Specimen: Urine Updated: 03/14/25 0830     Urine Culture, Routine PROTEUS MIRABILIS  >100,000 cfu/ml        STREPTOCOCCUS AGALACTIAE (GROUP B)  > 100,000 cfu/ml  In case of Penicillin allergy, call lab for further testing.  Beta-hemolytic streptococci are routinely susceptible to   penicillins,cephalosporins and carbapenems.      Narrative:      Specimen Source->Urine    Influenza A & B by Molecular [[id number]] Collected: 03/11/25 1110    Order Status: Sent Specimen: Nasopharyngeal Swab           Urine culture growing Proteus mirabilis and strep  agalactiae  Deescalating from Zosyn to Augmentin for 2 more days    Patient has a diagnosis of pneumonia. The cause of the pneumonia is bacterial in etiology due to Gram negative pathogens associated with aspiration. The pneumonia is acute. The patient has the following signs/symptoms of pneumonia: persistent hypoxia , cough, and shortness of breath. The patient does have a current oxygen requirement and the patient does have a home oxygen requirement. I have reviewed the pertinent imaging. The following cultures have been collected: Blood cultures and Sputum culture The culture results are listed below.     Current antimicrobial regimen consists of the antibiotics listed below. Will monitor patient closely and continue current treatment plan unchanged.    Antibiotics (From admission, onward)      None          Microbiology Results (last 7 days)       Procedure Component Value Units Date/Time    Blood culture [[id number]] Collected: 03/11/25 0944    Order Status: Completed Specimen: Blood from Peripheral, Hand, Right Updated: 03/16/25 1232     Blood Culture, Routine No growth after 5 days.    Blood culture [[id number]] Collected: 03/11/25 0944    Order Status: Completed Specimen: Blood from Peripheral, Antecubital, Right Updated: 03/16/25 1232     Blood Culture, Routine No growth after 5 days.    Culture, Respiratory with Gram Stain [[id number]] Collected: 03/12/25 1047    Order Status: Completed Specimen: Respiratory from Sputum, Expectorated Updated: 03/14/25 0857     Respiratory Culture Normal respiratory kehinde     Gram Stain (Respiratory) Many WBC's     Gram Stain (Respiratory) >10 epithelial cells per low power field     Gram Stain (Respiratory) Few budding yeast     Gram Stain (Respiratory) Few Gram positive cocci    Urine culture [[id number]]  (Abnormal)  (Susceptibility) Collected: 03/11/25 1406    Order Status: Completed Specimen: Urine Updated: 03/14/25 0830     Urine Culture, Routine PROTEUS  MIRABILIS  >100,000 cfu/ml        STREPTOCOCCUS AGALACTIAE (GROUP B)  > 100,000 cfu/ml  In case of Penicillin allergy, call lab for further testing.  Beta-hemolytic streptococci are routinely susceptible to   penicillins,cephalosporins and carbapenems.      Narrative:      Specimen Source->Urine    Influenza A & B by Molecular [[id number]] Collected: 03/11/25 1110    Order Status: Sent Specimen: Nasopharyngeal Swab            Culture, Respiratory with Gram Stain [[id number]] Collected: 03/12/25 1047    Order Status: Completed Specimen: Respiratory from Sputum, Expectorated Updated: 03/14/25 0857       Antibiotics (From admission, onward)      None                                           Microbiology Results (last 7 days)       Procedure Component Value Units Date/Time    Blood culture [[id number]] Collected: 03/11/25 0944    Order Status: Completed Specimen: Blood from Peripheral, Hand, Right Updated: 03/16/25 1232     Blood Culture, Routine No growth after 5 days.    Blood culture [[id number]] Collected: 03/11/25 0944    Order Status: Completed Specimen: Blood from Peripheral, Antecubital, Right Updated: 03/16/25 1232     Blood Culture, Routine No growth after 5 days.    Culture, Respiratory with Gram Stain [[id number]] Collected: 03/12/25 1047    Order Status: Completed Specimen: Respiratory from Sputum, Expectorated Updated: 03/14/25 0857     Respiratory Culture Normal respiratory kehinde     Gram Stain (Respiratory) Many WBC's     Gram Stain (Respiratory) >10 epithelial cells per low power field     Gram Stain (Respiratory) Few budding yeast     Gram Stain (Respiratory) Few Gram positive cocci    Urine culture [[id number]]  (Abnormal)  (Susceptibility) Collected: 03/11/25 1406    Order Status: Completed Specimen: Urine Updated: 03/14/25 0830     Urine Culture, Routine PROTEUS MIRABILIS  >100,000 cfu/ml        STREPTOCOCCUS AGALACTIAE (GROUP B)  > 100,000 cfu/ml  In case of Penicillin allergy, call lab for  further testing.  Beta-hemolytic streptococci are routinely susceptible to   penicillins,cephalosporins and carbapenems.      Narrative:      Specimen Source->Urine    Influenza A & B by Molecular [[id number]] Collected: 03/11/25 1110    Order Status: Sent Specimen: Nasopharyngeal Swab            Narrative:      Specimen Source->Urine    Influenza A & B by Molecular [[id number]] Collected: 03/11/25 1110     Antibiotics (From admission, onward)      None

## 2025-03-17 NOTE — ASSESSMENT & PLAN NOTE
Anemia is likely due to Iron deficiency. Most recent hemoglobin and hematocrit are listed below.  Recent Labs     03/15/25  0419 03/16/25  1005 03/17/25  0600   HGB 8.0* 8.8* 8.7*   HCT 26.1* 29.2* 28.6*     Plan  - Monitor serial CBC: Daily  - Transfuse PRBC if patient becomes hemodynamically unstable, symptomatic or H/H drops below 7/21.  - Patient has not received any PRBC transfusions to date  - Patient's anemia is currently stable  - Chronic problem.

## 2025-03-17 NOTE — PT/OT/SLP PROGRESS
Occupational Therapy   Treatment    Name: Bhavana Lambert  MRN: [id number]  Admitting Diagnosis:  T12 burst fracture       Recommendations:     Discharge Recommendations: Moderate Intensity Therapy  Discharge Equipment Recommendations:  to be determined by next level of care  Barriers to discharge:  Other (Comment) (medical status)    Assessment:     Bhavana Lambert is a 69 y.o. female with a medical diagnosis of T12 burst fracture.  Performance deficits affecting function are weakness, impaired endurance, impaired self care skills, impaired functional mobility, gait instability, impaired balance, decreased safety awareness, decreased lower extremity function, decreased upper extremity function, impaired cognition, impaired cardiopulmonary response to activity.     Rehab Prognosis:  Good; patient would benefit from acute skilled OT services to address these deficits and reach maximum level of function.       Plan:     Patient to be seen 5 x/week to address the above listed problems via self-care/home management, therapeutic activities, therapeutic exercises  Plan of Care Expires: 04/07/25  Plan of Care Reviewed with: patient    Subjective     Chief Complaint: back pain  Patient/Family Comments/goals: none stated  Pain/Comfort:  Pain Rating 1: 8/10  Location 1: back  Pain Addressed 1: Reposition, Distraction    Objective:     Communicated with: nurse prior to session.  Patient found HOB elevated with oxygen, telemetry, bed alarm upon OT entry to room.    General Precautions: Standard, fall, respiratory, seizure    Orthopedic Precautions:spinal precautions  Braces: TLSO  Respiratory Status: Nasal cannula, flow 6 L/min     Occupational Performance:     Bed Mobility:    Patient completed Rolling/Turning to Left with  minimum assistance  Patient completed Rolling/Turning to Right with minimum assistance  Patient completed Supine to Sit with moderate assistance     Functional Mobility/Transfers:  Patient completed Sit <>  Stand Transfer with minimum assistance  with  rolling walker   Patient completed Bed <> Chair Transfer using Stand Pivot technique with minimum assistance with hand-held assist      Activities of Daily Living:  Feeding:  stand by assistance    Grooming: stand by assistance for oral care and washing face seated in bedside chair  Upper Body Dressing: maximal assistance for donning TLSO  Toileting: total assistance for brief change at bed level      [facility] 6 Click ADL:      Treatment & Education:  Therapist provided facilitation and instruction of proper body mechanics and fall prevention strategies during tasks listed above.   Instructed patient to sit in bedside chair as tolerated daily to increase OOB/activity tolerance.   Instructed patient to use call light to have nursing staff assist with needs/transfers.   Discussed OT POC and answered all questions within OT scope of practice.        Patient left up in chair with all lines intact, call button in reach, and chair alarm on    GOALS:   Multidisciplinary Problems       Occupational Therapy Goals          Problem: Occupational Therapy    Goal Priority Disciplines Outcome Interventions   Occupational Therapy Goal     OT, PT/OT Progressing    Description: Goals to be met by: 4/7/25     Patient will increase functional independence with ADLs by performing:    UE Dressing with Supervision.  LE Dressing with Contact Guard Assistance.  Grooming while seated at sink with Supervision.  Toileting from bedside commode with Supervision for hygiene and clothing management.   Toilet transfer to bedside commode with Contact Guard Assistance.                           Time Tracking:     OT Date of Treatment: 03/17/25  OT Start Time: 1015  OT Stop Time: 1059  OT Total Time (min): 44 min    Billable Minutes:Self Care/Home Management 44    OT/ANRDEW: OT     Number of ANDREW visits since last OT visit: 1    3/17/2025

## 2025-03-17 NOTE — PLAN OF CARE
Brought printed choice form for LTAC to patient bedside.   Called pt daughter Margarito and spoke about LTAC options. Emailed list to Margarito at [email or web address]       03/17/25 8509   Post-Acute Status   Post-Acute Authorization Placement   Post-Acute Placement Status Patient List Provided   Patient choice form signed by patient/caregiver List with quality metrics by geographic area provided   Discharge Delays None known at this time   Discharge Plan   Discharge Plan A Long-term acute care facility (LTAC)

## 2025-03-17 NOTE — ASSESSMENT & PLAN NOTE
Patient with Hypercapnic and Hypoxic Respiratory failure which is Acute on chronic.  she is on home oxygen at 3.5 LPM. Supplemental oxygen was provided and noted-      .   Signs/symptoms of respiratory failure include- tachypnea and increased work of breathing. Contributing diagnoses includes - Aspiration, COPD, Interstitial lung disease, and Pneumonia Labs and images were reviewed. Patient Has recent ABG, which has been reviewed. Will treat underlying causes and adjust management of respiratory failure as follows-   -increase in NC at 5L  -Transitioned to oral abx   -repeat CXR pending  -ABG reviewed  -Pulm following

## 2025-03-17 NOTE — SUBJECTIVE & OBJECTIVE
Interval History:   Seen and examined.  NAD.  Patient is slightly more drowsy today.  CXR pending.  ABG reviewed.  Oxygen need went up to 5 L. case management following for discharge disposition  Review of Systems   Constitutional:  Positive for activity change. Negative for chills and fever.   Respiratory:  Positive for cough and shortness of breath.      Objective:     Vital Signs (Most Recent):  Temp: 98 °F (36.7 °C) (03/17/25 1153)  Pulse: 99 (03/17/25 1153)  Resp: 20 (03/17/25 1157)  BP: 125/80 (03/17/25 1153)  SpO2: 100 % (03/17/25 1153) Vital Signs (24h Range):  Temp:  [97.1 °F (36.2 °C)-98.6 °F (37 °C)] 98 °F (36.7 °C)  Pulse:  [] 99  Resp:  [14-20] 20  SpO2:  [91 %-100 %] 100 %  BP: ()/() 125/80     Weight: 47.3 kg (104 lb 4.4 oz)  Body mass index is 19.07 kg/m².    Intake/Output Summary (Last 24 hours) at 3/17/2025 1237  Last data filed at 3/16/2025 1619  Gross per 24 hour   Intake 360 ml   Output --   Net 360 ml         Physical Exam  Vitals and nursing note reviewed.   Constitutional:       Appearance: She is well-developed.      Comments: Drowsy    HENT:      Head: Normocephalic and atraumatic.   Neck:      Thyroid: No thyromegaly.      Vascular: No JVD.   Cardiovascular:      Rate and Rhythm: Normal rate and regular rhythm.      Heart sounds: No murmur heard.     No friction rub. No gallop.   Pulmonary:      Effort: Pulmonary effort is normal.      Comments: On 3.5L which home O2 dose  Abdominal:      General: Bowel sounds are normal. There is no distension.      Palpations: Abdomen is soft. There is no mass.      Tenderness: There is no abdominal tenderness.   Musculoskeletal:         General: Normal range of motion.      Cervical back: Neck supple.   Skin:     General: Skin is warm and dry.   Neurological:      Mental Status: She is oriented to person, place, and time.      Cranial Nerves: No cranial nerve deficit.   Psychiatric:         Behavior: Behavior normal.                Significant Labs: All pertinent labs within the past 24 hours have been reviewed.  ABGs:   Recent Labs   Lab 03/17/25  1019   PH 7.377   PCO2 57.1*   HCO3 33.6*   POCSATURATED 90   BE 8*   PO2 61*     CBC:   Recent Labs   Lab 03/16/25  1005 03/17/25  0600   WBC 6.80 6.08   HGB 8.8* 8.7*   HCT 29.2* 28.6*    328     CMP:   Recent Labs   Lab 03/16/25  1005 03/17/25  0600    140   K 3.5 3.6   CL 95 99   CO2 34* 32*   GLU 89 89   BUN 3* 4*   CREATININE 0.6 0.5   CALCIUM 8.9 8.6*   ANIONGAP 10 9       Significant Imaging: I have reviewed all pertinent imaging results/findings within the past 24 hours.  CXR pending

## 2025-03-17 NOTE — PT/OT/SLP PROGRESS
Physical Therapy Treatment    Patient Name:  Bhavana Lambert   MRN:  [id number]    Recommendations:     Discharge Recommendations: Moderate Intensity Therapy  Discharge Equipment Recommendations: to be determined by next level of care  Barriers to discharge:  increased assist with mobility, decreased activity tolerance, balance deficits    Assessment:     Bhavana Lambert is a 69 y.o. female admitted with a medical diagnosis of T12 burst fracture.  She presents with the following impairments/functional limitations: weakness, impaired endurance, impaired self care skills, impaired functional mobility, gait instability, impaired balance, decreased safety awareness, decreased lower extremity function, decreased upper extremity function, impaired cognition, impaired cardiopulmonary response to activity.    Pt up in chair and agreeable to visit. Pt requesting pain medication and nurse informed.    Pt required min assist for sit to stand transfer with verbal cuing for sequencing and hand placement. Pt noted to be tremulous upon standing. Pt denies any dizziness or lightheadedness.     Pt ambulated 14' with RW and min assist. Pt with flexed posture and slow pacing.    Rehab Prognosis: Fair; patient would benefit from acute skilled PT services to address these deficits and reach maximum level of function.    Recent Surgery: * No surgery found *      Plan:     During this hospitalization, patient to be seen 5 x/week to address the identified rehab impairments via gait training, therapeutic activities, therapeutic exercises, neuromuscular re-education and progress toward the following goals:    Plan of Care Expires:  04/13/25    Subjective     Chief Complaint: back pain  Patient/Family Comments/goals: to get better  Pain/Comfort:  Pain Rating 1: other (see comments) (not rated)  Location - Side 1: Bilateral  Location - Orientation 1: lower  Location 1: back  Pain Addressed 1: Reposition, Distraction, Cessation of  Activity  Pain Rating Post-Intervention 1: other (see comments) (not rated)      Objective:     Communicated with nurse prior to session.  Patient found up in chair with telemetry, oxygen, TLSO upon PT entry to room.     General Precautions: Standard, fall  Orthopedic Precautions: spinal precautions  Braces: TLSO  Respiratory Status: Nasal cannula, flow 5 L/min     Functional Mobility:  Transfers:     Sit to Stand:  minimum assistance with rolling walker  Gait: x 14' with RW and Thomas, tremulous      AM-PAC 6 CLICK MOBILITY          Treatment & Education:  Pt educated on importance of time OOB, importance of intermittent mobility, safe techniques for transfers/ambulation, discharge recommendations/options, and use of call light for assistance and fall prevention.      Patient left up in chair with all lines intact, call button in reach, chair alarm on, and nurse notified..    GOALS:   Multidisciplinary Problems       Physical Therapy Goals          Problem: Physical Therapy    Goal Priority Disciplines Outcome Interventions   Physical Therapy Goal     PT, PT/OT Progressing    Description: Goals to be met by: 25     Patient will increase functional independence with mobility by performin. Supine to sit with Standby Assistance  2. Sit to stand with Standby Assistance using Rolling Walker   3. Bed to chair transfer with Standby Assistance using Rolling Walker  4. Sitting at edge of bed x10 minutes with Minimal Assistance  5. Ambulated 25 ft with RW and CGA                       DME Justifications:  TBD next level of care    Time Tracking:     PT Received On: 25  PT Start Time: 1115     PT Stop Time: 1125  PT Total Time (min): 10 min     Billable Minutes: Gait Training 10    Treatment Type: Treatment  PT/PTA: PTA     Number of PTA visits since last PT visit: 2025

## 2025-03-17 NOTE — PLAN OF CARE
Updated clinicals sent to [facility] with expected d/c as 3/19/2025.       03/17/25 1037   Post-Acute Status   Post-Acute Authorization Placement   Post-Acute Placement Status Pending medical clearance/testing   Discharge Delays None known at this time   Discharge Plan   Discharge Plan A Skilled Nursing Facility   Discharge Plan B Skilled Nursing Facility

## 2025-03-17 NOTE — PROGRESS NOTES
[facility]  Progress Note    Patient Name: Bhavana Lambert  MRN: [id number]  Patient Class: IP- Inpatient   Admission Date: 3/4/2025  Length of Stay: 13 days  Attending Physician: Frank Francois MD  Primary Care Provider: Pepito Jhaveri IV, MD        Subjective     Principal Problem:T12 burst fracture        HPI:  69-year-old female with PMHx of COPD, HTN, hypothyroidism, prior stroke who presented to the ED with a complaint of lumbar and bilateral hip pain status post fall several days ago. The patient reports that she was wearing socks and she slipped, falling backwards towards a wall and then slid down the wall to a sitting position. She denies loss of consciousness, chest pain, current shortness of breath, dizziness, nausea or vomiting, incontinence of bowel or bladder. She is on plavix at home.     In the ED, /79  RR 20 afebrile and 96% on 3L NC (baseline).     Imaging showed Acute burst fracture of T12. Case was discussed with Dr. Boothe ([facility] [place]) who will consult on the patient. She was given tylenol, morphine, and zofran and stable for transfer, no neuro deficits.     Per chart review from recent admission on February 9th to February 14, 2025, the patient was admitted for COPD exacerbation and did have a history of previous EtOH abuse with subsequent falls.     Overview/Hospital Course:  Bhavana Lambert is 69-year-old female with PMH COPD, HTN, hypothyroidism, prior stroke.  She was admitted to the hospital for management of an acute burst fracture of T12.  Neurosurgery following.  MRI obtained.  X-ray thoracolumbar spine ordered and showed stability.  PT/OT consulted.  TLSO brace applied for activity per neurosurgery recommendation.  Pt developed ETOH withdrawals with delirium on 03/06 and was placed on Librium protocol.  She had worsening hyponatremia prompting further workup though this notably improved with diet liberalization.  Her DTs were much  improved on 03/07 after Librium initiation.  Sodium levels continued to improve.  Pain was controlled with oral narcotics.  Librium was down titrated.  On 03/11 she developed increased O2 requirements with low-grade temp 100.5°.  CXR revealed acute on chronic changes of patchy infiltrates.  She reported reflux overnight-concerning clinically for aspiration.  She was placed on BiPAP after ABG revealed hypercapnia.  She was transferred to ICU for continued BiPAP therapy and pulmonology was consulted.  She was placed on IV Abx and sepsis workup was pursued. Patient weaned off on intravenous Levophed.  Urine cultures grew Proteus mirabilis and group B strep.  Physical therapy and occupational therapy has been consulted.   arranging skilled nursing facility placement.  Nutrition consulted secondary to decreased oral intake.  Deescalate antibiotics to Augmentin.  Patient okay to step-down from ICU.    Interval History:   Seen and examined.  NAD.  Patient is slightly more drowsy today.  CXR pending.  ABG reviewed.  Oxygen need went up to 5 L. case management following for discharge disposition  Review of Systems   Constitutional:  Positive for activity change. Negative for chills and fever.   Respiratory:  Positive for cough and shortness of breath.      Objective:     Vital Signs (Most Recent):  Temp: 98 °F (36.7 °C) (03/17/25 1153)  Pulse: 99 (03/17/25 1153)  Resp: 20 (03/17/25 1157)  BP: 125/80 (03/17/25 1153)  SpO2: 100 % (03/17/25 1153) Vital Signs (24h Range):  Temp:  [97.1 °F (36.2 °C)-98.6 °F (37 °C)] 98 °F (36.7 °C)  Pulse:  [] 99  Resp:  [14-20] 20  SpO2:  [91 %-100 %] 100 %  BP: ()/() 125/80     Weight: 47.3 kg (104 lb 4.4 oz)  Body mass index is 19.07 kg/m².    Intake/Output Summary (Last 24 hours) at 3/17/2025 1237  Last data filed at 3/16/2025 1619  Gross per 24 hour   Intake 360 ml   Output --   Net 360 ml         Physical Exam  Vitals and nursing note reviewed.    Constitutional:       Appearance: She is well-developed.      Comments: Drowsy    HENT:      Head: Normocephalic and atraumatic.   Neck:      Thyroid: No thyromegaly.      Vascular: No JVD.   Cardiovascular:      Rate and Rhythm: Normal rate and regular rhythm.      Heart sounds: No murmur heard.     No friction rub. No gallop.   Pulmonary:      Effort: Pulmonary effort is normal.      Comments: On 3.5L which home O2 dose  Abdominal:      General: Bowel sounds are normal. There is no distension.      Palpations: Abdomen is soft. There is no mass.      Tenderness: There is no abdominal tenderness.   Musculoskeletal:         General: Normal range of motion.      Cervical back: Neck supple.   Skin:     General: Skin is warm and dry.   Neurological:      Mental Status: She is oriented to person, place, and time.      Cranial Nerves: No cranial nerve deficit.   Psychiatric:         Behavior: Behavior normal.               Significant Labs: All pertinent labs within the past 24 hours have been reviewed.  ABGs:   Recent Labs   Lab 03/17/25  1019   PH 7.377   PCO2 57.1*   HCO3 33.6*   POCSATURATED 90   BE 8*   PO2 61*     CBC:   Recent Labs   Lab 03/16/25  1005 03/17/25  0600   WBC 6.80 6.08   HGB 8.8* 8.7*   HCT 29.2* 28.6*    328     CMP:   Recent Labs   Lab 03/16/25  1005 03/17/25  0600    140   K 3.5 3.6   CL 95 99   CO2 34* 32*   GLU 89 89   BUN 3* 4*   CREATININE 0.6 0.5   CALCIUM 8.9 8.6*   ANIONGAP 10 9       Significant Imaging: I have reviewed all pertinent imaging results/findings within the past 24 hours.  CXR pending       Assessment & Plan  T12 burst fracture  MRI shows acute compression fracture of T12 with loss of height of approximately 25%.  There is minimal retropulsion of the posterior cortex measuring 3 mm without central canal stenosis.  Old mild chronic compression fracture of T11.  Multilevel degenerative disc disease and facet hypertrophy with mild central canal stenosis and foraminal  narrowing at L2-3, L3-4, and L4-5  Pain control  Neurochecks   Neurosurgery consulted- T XR stable  TLSO brace with mobilization  Repeat XR 2/2 fall  PT/OT- SNF recommending.  CM pursuing    Hypothyroid  Patient has chronic hypothyroidism. TFTs reviewed-   Lab Results   Component Value Date    TSH 3.155 03/06/2025   Will continue chronic levothyroxine and adjust for and clinical changes.     Mixed hyperlipidemia  Continue home statin    Tobacco dependency  Smoking cessation counseling performed. Dangers of cigarette smoking were reviewed with patient in detail and patient was encouraged to quit. Nicotine replacement options were discussed for > 3 minutes.  Receiving Nicoderm.  Alcohol dependence with withdrawal delirium  Pt with confirmed daily ETOH utilization appeared with DTs on 03/06   -initiated scheduled Librium 3/6 with much improvement.    -Librium titrated off  -continue p.r.n. Ativan   -nursing to perform CIWA  -fall, seizure precautions   -daily MVI, folic acid, thiamine    Anemia  Anemia is likely due to Iron deficiency. Most recent hemoglobin and hematocrit are listed below.  Recent Labs     03/15/25  0419 03/16/25  1005 03/17/25  0600   HGB 8.0* 8.8* 8.7*   HCT 26.1* 29.2* 28.6*     Plan  - Monitor serial CBC: Daily  - Transfuse PRBC if patient becomes hemodynamically unstable, symptomatic or H/H drops below 7/21.  - Patient has not received any PRBC transfusions to date  - Patient's anemia is currently stable  - Chronic problem.        Aspiration pneumonia of both lungs  UTI (urinary tract infection) due to Proteus mirabilis species  Patient has a diagnosis of pneumonia. The cause of the pneumonia is bacterial in etiology due to Gram negative pathogens associated with aspiration . The pneumonia is  acute . The patient has the following signs/symptoms of pneumonia: persistent hypoxia , cough, and shortness of breath. The patient does have a current oxygen requirement and the patient does have a home  oxygen requirement. I have reviewed the pertinent imaging. The following cultures have been collected: Blood cultures and Sputum culture The culture results are listed below.     Current antimicrobial regimen consists of the antibiotics listed below. Will monitor patient closely and continue current treatment plan unchanged.    Antibiotics (From admission, onward)      None            Microbiology Results (last 7 days)       Procedure Component Value Units Date/Time    Blood culture [[id number]] Collected: 03/11/25 0944    Order Status: Completed Specimen: Blood from Peripheral, Hand, Right Updated: 03/16/25 1232     Blood Culture, Routine No growth after 5 days.    Blood culture [[id number]] Collected: 03/11/25 0944    Order Status: Completed Specimen: Blood from Peripheral, Antecubital, Right Updated: 03/16/25 1232     Blood Culture, Routine No growth after 5 days.    Culture, Respiratory with Gram Stain [[id number]] Collected: 03/12/25 1047    Order Status: Completed Specimen: Respiratory from Sputum, Expectorated Updated: 03/14/25 0857     Respiratory Culture Normal respiratory kehinde     Gram Stain (Respiratory) Many WBC's     Gram Stain (Respiratory) >10 epithelial cells per low power field     Gram Stain (Respiratory) Few budding yeast     Gram Stain (Respiratory) Few Gram positive cocci    Urine culture [[id number]]  (Abnormal)  (Susceptibility) Collected: 03/11/25 1406    Order Status: Completed Specimen: Urine Updated: 03/14/25 0830     Urine Culture, Routine PROTEUS MIRABILIS  >100,000 cfu/ml        STREPTOCOCCUS AGALACTIAE (GROUP B)  > 100,000 cfu/ml  In case of Penicillin allergy, call lab for further testing.  Beta-hemolytic streptococci are routinely susceptible to   penicillins,cephalosporins and carbapenems.      Narrative:      Specimen Source->Urine    Influenza A & B by Molecular [[id number]] Collected: 03/11/25 1110    Order Status: Sent Specimen: Nasopharyngeal Swab           Afebrile, WBC  normal   Received 4.5 days of Zosyn   Sputum culture did not grow any predominant bacteria  De-escalated to Augmentin for UTI, and to continue treatment for suspected CPAP    Patient has a diagnosis of pneumonia. The cause of the pneumonia is bacterial in etiology due to Gram negative pathogens associated with aspiration . The pneumonia is  acute . The patient has the following signs/symptoms of pneumonia: persistent hypoxia , cough, and shortness of breath. The patient does have a current oxygen requirement and the patient does have a home oxygen requirement. I have reviewed the pertinent imaging. The following cultures have been collected: Blood cultures and Sputum culture The culture results are listed below.     Current antimicrobial regimen consists of the antibiotics listed below. Will monitor patient closely and continue current treatment plan unchanged.    Antibiotics (From admission, onward)      None            Microbiology Results (last 7 days)       Procedure Component Value Units Date/Time    Blood culture [[id number]] Collected: 03/11/25 0944    Order Status: Completed Specimen: Blood from Peripheral, Hand, Right Updated: 03/16/25 1232     Blood Culture, Routine No growth after 5 days.    Blood culture [[id number]] Collected: 03/11/25 0944    Order Status: Completed Specimen: Blood from Peripheral, Antecubital, Right Updated: 03/16/25 1232     Blood Culture, Routine No growth after 5 days.    Culture, Respiratory with Gram Stain [[id number]] Collected: 03/12/25 1047    Order Status: Completed Specimen: Respiratory from Sputum, Expectorated Updated: 03/14/25 0857     Respiratory Culture Normal respiratory kehinde     Gram Stain (Respiratory) Many WBC's     Gram Stain (Respiratory) >10 epithelial cells per low power field     Gram Stain (Respiratory) Few budding yeast     Gram Stain (Respiratory) Few Gram positive cocci    Urine culture [[id number]]  (Abnormal)  (Susceptibility) Collected: 03/11/25 5404     Order Status: Completed Specimen: Urine Updated: 03/14/25 0830     Urine Culture, Routine PROTEUS MIRABILIS  >100,000 cfu/ml        STREPTOCOCCUS AGALACTIAE (GROUP B)  > 100,000 cfu/ml  In case of Penicillin allergy, call lab for further testing.  Beta-hemolytic streptococci are routinely susceptible to   penicillins,cephalosporins and carbapenems.      Narrative:      Specimen Source->Urine    Influenza A & B by Molecular [[id number]] Collected: 03/11/25 1110    Order Status: Sent Specimen: Nasopharyngeal Swab           Urine culture growing Proteus mirabilis and strep agalactiae  Deescalating from Zosyn to Augmentin for 2 more days    Patient has a diagnosis of pneumonia. The cause of the pneumonia is bacterial in etiology due to Gram negative pathogens associated with aspiration . The pneumonia is  acute . The patient has the following signs/symptoms of pneumonia: persistent hypoxia , cough, and shortness of breath. The patient does have a current oxygen requirement and the patient does have a home oxygen requirement. I have reviewed the pertinent imaging. The following cultures have been collected: Blood cultures and Sputum culture The culture results are listed below.     Current antimicrobial regimen consists of the antibiotics listed below. Will monitor patient closely and continue current treatment plan unchanged.    Antibiotics (From admission, onward)      None            Microbiology Results (last 7 days)       Procedure Component Value Units Date/Time    Blood culture [[id number]] Collected: 03/11/25 0944    Order Status: Completed Specimen: Blood from Peripheral, Hand, Right Updated: 03/16/25 1232     Blood Culture, Routine No growth after 5 days.    Blood culture [[id number]] Collected: 03/11/25 0944    Order Status: Completed Specimen: Blood from Peripheral, Antecubital, Right Updated: 03/16/25 1232     Blood Culture, Routine No growth after 5 days.    Culture, Respiratory with Gram Stain [[id number]]  Collected: 03/12/25 1047    Order Status: Completed Specimen: Respiratory from Sputum, Expectorated Updated: 03/14/25 0857     Respiratory Culture Normal respiratory kehinde     Gram Stain (Respiratory) Many WBC's     Gram Stain (Respiratory) >10 epithelial cells per low power field     Gram Stain (Respiratory) Few budding yeast     Gram Stain (Respiratory) Few Gram positive cocci    Urine culture [[id number]]  (Abnormal)  (Susceptibility) Collected: 03/11/25 1406    Order Status: Completed Specimen: Urine Updated: 03/14/25 0830     Urine Culture, Routine PROTEUS MIRABILIS  >100,000 cfu/ml        STREPTOCOCCUS AGALACTIAE (GROUP B)  > 100,000 cfu/ml  In case of Penicillin allergy, call lab for further testing.  Beta-hemolytic streptococci are routinely susceptible to   penicillins,cephalosporins and carbapenems.      Narrative:      Specimen Source->Urine    Influenza A & B by Molecular [[id number]] Collected: 03/11/25 1110    Order Status: Sent Specimen: Nasopharyngeal Swab           Afebrile, WBC normal   Received 4.5 days of Zosyn   Sputum culture did not grow any predominant bacteria  De-escalated to Augmentin for UTI, and to continue treatment for suspected CPAP    Patient has a diagnosis of pneumonia. The cause of the pneumonia is bacterial in etiology due to Gram negative pathogens associated with aspiration . The pneumonia is  acute . The patient has the following signs/symptoms of pneumonia: persistent hypoxia , cough, and shortness of breath. The patient does have a current oxygen requirement and the patient does have a home oxygen requirement. I have reviewed the pertinent imaging. The following cultures have been collected: Blood cultures and Sputum culture The culture results are listed below.     Current antimicrobial regimen consists of the antibiotics listed below. Will monitor patient closely and continue current treatment plan unchanged.    Antibiotics (From admission, onward)      None             Microbiology Results (last 7 days)       Procedure Component Value Units Date/Time    Blood culture [[id number]] Collected: 03/11/25 0944    Order Status: Completed Specimen: Blood from Peripheral, Hand, Right Updated: 03/16/25 1232     Blood Culture, Routine No growth after 5 days.    Blood culture [[id number]] Collected: 03/11/25 0944    Order Status: Completed Specimen: Blood from Peripheral, Antecubital, Right Updated: 03/16/25 1232     Blood Culture, Routine No growth after 5 days.    Culture, Respiratory with Gram Stain [[id number]] Collected: 03/12/25 1047    Order Status: Completed Specimen: Respiratory from Sputum, Expectorated Updated: 03/14/25 0857     Respiratory Culture Normal respiratory kehinde     Gram Stain (Respiratory) Many WBC's     Gram Stain (Respiratory) >10 epithelial cells per low power field     Gram Stain (Respiratory) Few budding yeast     Gram Stain (Respiratory) Few Gram positive cocci    Urine culture [[id number]]  (Abnormal)  (Susceptibility) Collected: 03/11/25 1406    Order Status: Completed Specimen: Urine Updated: 03/14/25 0830     Urine Culture, Routine PROTEUS MIRABILIS  >100,000 cfu/ml        STREPTOCOCCUS AGALACTIAE (GROUP B)  > 100,000 cfu/ml  In case of Penicillin allergy, call lab for further testing.  Beta-hemolytic streptococci are routinely susceptible to   penicillins,cephalosporins and carbapenems.      Narrative:      Specimen Source->Urine    Influenza A & B by Molecular [[id number]] Collected: 03/11/25 1110    Order Status: Sent Specimen: Nasopharyngeal Swab           Urine culture growing Proteus mirabilis and strep agalactiae  Deescalating from Zosyn to Augmentin for 2 more days    Patient has a diagnosis of pneumonia. The cause of the pneumonia is bacterial in etiology due to Gram negative pathogens associated with aspiration . The pneumonia is  acute . The patient has the following signs/symptoms of pneumonia: persistent hypoxia , cough, and shortness of  breath. The patient does have a current oxygen requirement and the patient does have a home oxygen requirement. I have reviewed the pertinent imaging. The following cultures have been collected: Blood cultures and Sputum culture The culture results are listed below.     Current antimicrobial regimen consists of the antibiotics listed below. Will monitor patient closely and continue current treatment plan unchanged.    Antibiotics (From admission, onward)      None          Microbiology Results (last 7 days)       Procedure Component Value Units Date/Time    Blood culture [[id number]] Collected: 03/11/25 0944    Order Status: Completed Specimen: Blood from Peripheral, Hand, Right Updated: 03/16/25 1232     Blood Culture, Routine No growth after 5 days.    Blood culture [[id number]] Collected: 03/11/25 0944    Order Status: Completed Specimen: Blood from Peripheral, Antecubital, Right Updated: 03/16/25 1232     Blood Culture, Routine No growth after 5 days.    Culture, Respiratory with Gram Stain [[id number]] Collected: 03/12/25 1047    Order Status: Completed Specimen: Respiratory from Sputum, Expectorated Updated: 03/14/25 0857     Respiratory Culture Normal respiratory kehinde     Gram Stain (Respiratory) Many WBC's     Gram Stain (Respiratory) >10 epithelial cells per low power field     Gram Stain (Respiratory) Few budding yeast     Gram Stain (Respiratory) Few Gram positive cocci    Urine culture [[id number]]  (Abnormal)  (Susceptibility) Collected: 03/11/25 1406    Order Status: Completed Specimen: Urine Updated: 03/14/25 0830     Urine Culture, Routine PROTEUS MIRABILIS  >100,000 cfu/ml        STREPTOCOCCUS AGALACTIAE (GROUP B)  > 100,000 cfu/ml  In case of Penicillin allergy, call lab for further testing.  Beta-hemolytic streptococci are routinely susceptible to   penicillins,cephalosporins and carbapenems.      Narrative:      Specimen Source->Urine    Influenza A & B by Molecular [[id number]] Collected:  03/11/25 1110    Order Status: Sent Specimen: Nasopharyngeal Swab            Culture, Respiratory with Gram Stain [[id number]] Collected: 03/12/25 1047    Order Status: Completed Specimen: Respiratory from Sputum, Expectorated Updated: 03/14/25 0857       Antibiotics (From admission, onward)      None                                           Microbiology Results (last 7 days)       Procedure Component Value Units Date/Time    Blood culture [[id number]] Collected: 03/11/25 0944    Order Status: Completed Specimen: Blood from Peripheral, Hand, Right Updated: 03/16/25 1232     Blood Culture, Routine No growth after 5 days.    Blood culture [[id number]] Collected: 03/11/25 0944    Order Status: Completed Specimen: Blood from Peripheral, Antecubital, Right Updated: 03/16/25 1232     Blood Culture, Routine No growth after 5 days.    Culture, Respiratory with Gram Stain [[id number]] Collected: 03/12/25 1047    Order Status: Completed Specimen: Respiratory from Sputum, Expectorated Updated: 03/14/25 0857     Respiratory Culture Normal respiratory kehinde     Gram Stain (Respiratory) Many WBC's     Gram Stain (Respiratory) >10 epithelial cells per low power field     Gram Stain (Respiratory) Few budding yeast     Gram Stain (Respiratory) Few Gram positive cocci    Urine culture [[id number]]  (Abnormal)  (Susceptibility) Collected: 03/11/25 1406    Order Status: Completed Specimen: Urine Updated: 03/14/25 0830     Urine Culture, Routine PROTEUS MIRABILIS  >100,000 cfu/ml        STREPTOCOCCUS AGALACTIAE (GROUP B)  > 100,000 cfu/ml  In case of Penicillin allergy, call lab for further testing.  Beta-hemolytic streptococci are routinely susceptible to   penicillins,cephalosporins and carbapenems.      Narrative:      Specimen Source->Urine    Influenza A & B by Molecular [[id number]] Collected: 03/11/25 1110    Order Status: Sent Specimen: Nasopharyngeal Swab            Narrative:      Specimen Source->Urine    Influenza A & B by  Molecular [[id number]] Collected: 03/11/25 1110     Antibiotics (From admission, onward)      None               Microbiology Results (last 7 days)       Procedure Component Value Units Date/Time    Blood culture [[id number]] Collected: 03/11/25 0944    Order Status: Completed Specimen: Blood from Peripheral, Hand, Right Updated: 03/16/25 1232     Blood Culture, Routine No growth after 5 days.    Blood culture [[id number]] Collected: 03/11/25 0944    Order Status: Completed Specimen: Blood from Peripheral, Antecubital, Right Updated: 03/16/25 1232     Blood Culture, Routine No growth after 5 days.    Culture, Respiratory with Gram Stain [[id number]] Collected: 03/12/25 1047    Order Status: Completed Specimen: Respiratory from Sputum, Expectorated Updated: 03/14/25 0857     Respiratory Culture Normal respiratory kehinde     Gram Stain (Respiratory) Many WBC's     Gram Stain (Respiratory) >10 epithelial cells per low power field     Gram Stain (Respiratory) Few budding yeast     Gram Stain (Respiratory) Few Gram positive cocci    Urine culture [[id number]]  (Abnormal)  (Susceptibility) Collected: 03/11/25 1406    Order Status: Completed Specimen: Urine Updated: 03/14/25 0830     Urine Culture, Routine PROTEUS MIRABILIS  >100,000 cfu/ml        STREPTOCOCCUS AGALACTIAE (GROUP B)  > 100,000 cfu/ml  In case of Penicillin allergy, call lab for further testing.  Beta-hemolytic streptococci are routinely susceptible to   penicillins,cephalosporins and carbapenems.      Narrative:      Specimen Source->Urine    Influenza A & B by Molecular [[id number]] Collected: 03/11/25 1110    Order Status: Sent Specimen: Nasopharyngeal Swab                   Sheri Nix NP  Department of Hospital Medicine   [facility]

## 2025-03-17 NOTE — PHYSICIAN QUERY
Please clarify if there is any clinical correlation between T12 Burst Fx2 and Osteoporosis:  Due to or associated with each other

## 2025-03-17 NOTE — PLAN OF CARE
Dc reassessment  Pt cont to need bipap over night  O2 demand increased- on 5L NC this shift  [facility] accepts- no auth needed  Poss medically ready 3/19     03/17/25 1118   Discharge Reassessment   Assessment Type Discharge Planning Reassessment   Did the patient's condition or plan change since previous assessment? No   Discharge Plan discussed with: Patient   Communicated CHLOE with patient/caregiver No   Discharge Plan A Skilled Nursing Facility   Discharge Plan B Skilled Nursing Facility   DME Needed Upon Discharge  none   Transition of Care Barriers None   Why the patient remains in the hospital Requires continued medical care   Post-Acute Status   Post-Acute Authorization Placement   Post-Acute Placement Status Pending medical clearance/testing   HME Status Referrals Sent   Discharge Delays None known at this time

## 2025-03-17 NOTE — PLAN OF CARE
Sent choice forms via text to phone number and email listed for LTAC  Will follow up on choices   03/17/25 7755   Post-Acute Status   Post-Acute Authorization Placement   Discharge Plan   Discharge Plan A Long-term acute care facility (LTAC)

## 2025-03-17 NOTE — CARE UPDATE
03/16/25 1946   Patient Assessment/Suction   Level of Consciousness (AVPU) alert   Respiratory Effort Normal;Unlabored   Expansion/Accessory Muscles/Retractions no use of accessory muscles;no retractions;expansion symmetric   PRE-TX-O2   Device (Oxygen Therapy) nasal cannula   $ Is the patient on Low Flow Oxygen? Yes   Flow (L/min) (Oxygen Therapy) 4   SpO2 97 %   Pulse Oximetry Type Intermittent   $ Pulse Oximetry - Multiple Charge Pulse Oximetry - Multiple   Pulse 103   Resp 16   Aerosol Therapy   $ Aerosol Therapy Charges Aerosol Treatment   Daily Review of Necessity (SVN) completed   Respiratory Treatment Status (SVN) given   Treatment Route (SVN) mask;oxygen   Patient Position HOB elevated   Post Treatment Assessment (SVN) increased aeration   Signs of Intolerance (SVN) none   Preset CPAP/BiPAP Settings   Mode Of Delivery BiPAP;standby   CPAP/BIPAP charged w/in last 24 h YES   $ Is patient using? No/refused  (Educated pt on bipap usage to prevent hypercapnia which may cause drowsiness. Pt still refusing bipap. Will reassess later to see if she will comply.)   Education   $ Education 30 min;Bronchodilator;BiPAP

## 2025-03-18 ENCOUNTER — CLINICAL SUPPORT (OUTPATIENT)
Dept: SMOKING CESSATION | Facility: CLINIC | Age: 70
End: 2025-03-18

## 2025-03-18 VITALS
HEIGHT: 62 IN | DIASTOLIC BLOOD PRESSURE: 88 MMHG | HEART RATE: 88 BPM | BODY MASS INDEX: 19.19 KG/M2 | TEMPERATURE: 98 F | WEIGHT: 104.25 LBS | OXYGEN SATURATION: 97 % | RESPIRATION RATE: 18 BRPM | SYSTOLIC BLOOD PRESSURE: 137 MMHG

## 2025-03-18 DIAGNOSIS — F17.200 NICOTINE DEPENDENCE: Primary | ICD-10-CM

## 2025-03-18 LAB
ANION GAP SERPL CALC-SCNC: 4 MMOL/L (ref 8–16)
BASOPHILS # BLD AUTO: 0.04 K/UL (ref 0–0.2)
BASOPHILS NFR BLD: 0.6 % (ref 0–1.9)
BUN SERPL-MCNC: 6 MG/DL (ref 8–23)
CALCIUM SERPL-MCNC: 8.5 MG/DL (ref 8.7–10.5)
CHLORIDE SERPL-SCNC: 102 MMOL/L (ref 95–110)
CO2 SERPL-SCNC: 34 MMOL/L (ref 23–29)
CREAT SERPL-MCNC: 0.7 MG/DL (ref 0.5–1.4)
DIFFERENTIAL METHOD BLD: ABNORMAL
EOSINOPHIL # BLD AUTO: 0.2 K/UL (ref 0–0.5)
EOSINOPHIL NFR BLD: 3 % (ref 0–8)
ERYTHROCYTE [DISTWIDTH] IN BLOOD BY AUTOMATED COUNT: 13.3 % (ref 11.5–14.5)
EST. GFR  (NO RACE VARIABLE): >60 ML/MIN/1.73 M^2
GLUCOSE SERPL-MCNC: 107 MG/DL (ref 70–110)
HCT VFR BLD AUTO: 27.8 % (ref 37–48.5)
HGB BLD-MCNC: 8.3 G/DL (ref 12–16)
IMM GRANULOCYTES # BLD AUTO: 0.05 K/UL (ref 0–0.04)
IMM GRANULOCYTES NFR BLD AUTO: 0.8 % (ref 0–0.5)
LYMPHOCYTES # BLD AUTO: 1.8 K/UL (ref 1–4.8)
LYMPHOCYTES NFR BLD: 26.4 % (ref 18–48)
MAGNESIUM SERPL-MCNC: 2.3 MG/DL (ref 1.6–2.6)
MCH RBC QN AUTO: 30.1 PG (ref 27–31)
MCHC RBC AUTO-ENTMCNC: 29.9 G/DL (ref 32–36)
MCV RBC AUTO: 101 FL (ref 82–98)
MONOCYTES # BLD AUTO: 0.7 K/UL (ref 0.3–1)
MONOCYTES NFR BLD: 10.1 % (ref 4–15)
NEUTROPHILS # BLD AUTO: 3.9 K/UL (ref 1.8–7.7)
NEUTROPHILS NFR BLD: 59.1 % (ref 38–73)
NRBC BLD-RTO: 0 /100 WBC
PLATELET # BLD AUTO: 316 K/UL (ref 150–450)
PMV BLD AUTO: 10.3 FL (ref 9.2–12.9)
POTASSIUM SERPL-SCNC: 4.3 MMOL/L (ref 3.5–5.1)
RBC # BLD AUTO: 2.76 M/UL (ref 4–5.4)
SODIUM SERPL-SCNC: 140 MMOL/L (ref 136–145)
WBC # BLD AUTO: 6.63 K/UL (ref 3.9–12.7)

## 2025-03-18 PROCEDURE — 25000242 PHARM REV CODE 250 ALT 637 W/ HCPCS: Performed by: HOSPITALIST

## 2025-03-18 PROCEDURE — 85025 COMPLETE CBC W/AUTO DIFF WBC: CPT | Performed by: NURSE PRACTITIONER

## 2025-03-18 PROCEDURE — 25000003 PHARM REV CODE 250: Performed by: NURSE PRACTITIONER

## 2025-03-18 PROCEDURE — 94761 N-INVAS EAR/PLS OXIMETRY MLT: CPT

## 2025-03-18 PROCEDURE — 94640 AIRWAY INHALATION TREATMENT: CPT

## 2025-03-18 PROCEDURE — S4991 NICOTINE PATCH NONLEGEND: HCPCS

## 2025-03-18 PROCEDURE — 99900031 HC PATIENT EDUCATION (STAT)

## 2025-03-18 PROCEDURE — 99406 BEHAV CHNG SMOKING 3-10 MIN: CPT | Performed by: CLINIC/CENTER

## 2025-03-18 PROCEDURE — 99233 SBSQ HOSP IP/OBS HIGH 50: CPT | Mod: ,,, | Performed by: STUDENT IN AN ORGANIZED HEALTH CARE EDUCATION/TRAINING PROGRAM

## 2025-03-18 PROCEDURE — 80048 BASIC METABOLIC PNL TOTAL CA: CPT | Performed by: NURSE PRACTITIONER

## 2025-03-18 PROCEDURE — 25000003 PHARM REV CODE 250

## 2025-03-18 PROCEDURE — 63600175 PHARM REV CODE 636 W HCPCS: Performed by: FAMILY MEDICINE

## 2025-03-18 PROCEDURE — 94660 CPAP INITIATION&MGMT: CPT

## 2025-03-18 PROCEDURE — 27100171 HC OXYGEN HIGH FLOW UP TO 24 HOURS

## 2025-03-18 PROCEDURE — 97530 THERAPEUTIC ACTIVITIES: CPT

## 2025-03-18 PROCEDURE — 97535 SELF CARE MNGMENT TRAINING: CPT

## 2025-03-18 PROCEDURE — 36415 COLL VENOUS BLD VENIPUNCTURE: CPT | Performed by: NURSE PRACTITIONER

## 2025-03-18 PROCEDURE — 25000003 PHARM REV CODE 250: Performed by: INTERNAL MEDICINE

## 2025-03-18 PROCEDURE — 99900035 HC TECH TIME PER 15 MIN (STAT)

## 2025-03-18 PROCEDURE — 83735 ASSAY OF MAGNESIUM: CPT

## 2025-03-18 PROCEDURE — 99221 1ST HOSP IP/OBS SF/LOW 40: CPT | Mod: ,,, | Performed by: FAMILY MEDICINE

## 2025-03-18 RX ORDER — POTASSIUM CHLORIDE 20 MEQ/1
40 TABLET, EXTENDED RELEASE ORAL DAILY
Qty: 60 TABLET | Refills: 0 | Status: SHIPPED | OUTPATIENT
Start: 2025-03-19

## 2025-03-18 RX ORDER — LANOLIN ALCOHOL/MO/W.PET/CERES
100 CREAM (GRAM) TOPICAL DAILY
Qty: 30 TABLET | Refills: 0 | Status: SHIPPED | OUTPATIENT
Start: 2025-03-19

## 2025-03-18 RX ORDER — LOPERAMIDE HYDROCHLORIDE 2 MG/1
2 CAPSULE ORAL 4 TIMES DAILY PRN
Qty: 120 CAPSULE | Refills: 0 | Status: SHIPPED | OUTPATIENT
Start: 2025-03-18 | End: 2025-04-17

## 2025-03-18 RX ORDER — CLONAZEPAM 0.5 MG/1
0.5 TABLET ORAL 2 TIMES DAILY PRN
Qty: 60 TABLET | Refills: 0 | Status: SHIPPED | OUTPATIENT
Start: 2025-03-18

## 2025-03-18 RX ORDER — GABAPENTIN 300 MG/1
300 CAPSULE ORAL 3 TIMES DAILY
Qty: 90 CAPSULE | Refills: 11 | Status: SHIPPED | OUTPATIENT
Start: 2025-03-18 | End: 2026-03-18

## 2025-03-18 RX ORDER — FAMOTIDINE 20 MG/1
20 TABLET, FILM COATED ORAL 2 TIMES DAILY
Qty: 60 TABLET | Refills: 11 | Status: SHIPPED | OUTPATIENT
Start: 2025-03-18 | End: 2026-03-18

## 2025-03-18 RX ORDER — HYDROCODONE BITARTRATE AND ACETAMINOPHEN 10; 325 MG/1; MG/1
1 TABLET ORAL EVERY 6 HOURS PRN
Qty: 21 TABLET | Refills: 0 | Status: SHIPPED | OUTPATIENT
Start: 2025-03-18

## 2025-03-18 RX ADMIN — POTASSIUM CHLORIDE 40 MEQ: 1500 TABLET, EXTENDED RELEASE ORAL at 09:03

## 2025-03-18 RX ADMIN — GABAPENTIN 300 MG: 300 CAPSULE ORAL at 09:03

## 2025-03-18 RX ADMIN — IPRATROPIUM BROMIDE 0.5 MG: 0.5 SOLUTION RESPIRATORY (INHALATION) at 07:03

## 2025-03-18 RX ADMIN — ARFORMOTEROL TARTRATE 15 MCG: 15 SOLUTION RESPIRATORY (INHALATION) at 07:03

## 2025-03-18 RX ADMIN — FOLIC ACID 1 MG: 1 TABLET ORAL at 09:03

## 2025-03-18 RX ADMIN — FLUOXETINE HYDROCHLORIDE 40 MG: 20 CAPSULE ORAL at 09:03

## 2025-03-18 RX ADMIN — BUDESONIDE INHALATION 0.5 MG: 0.5 SUSPENSION RESPIRATORY (INHALATION) at 07:03

## 2025-03-18 RX ADMIN — NICOTINE 1 PATCH: 21 PATCH, EXTENDED RELEASE TRANSDERMAL at 04:03

## 2025-03-18 RX ADMIN — THERA TABS 1 TABLET: TAB at 09:03

## 2025-03-18 RX ADMIN — BUTALBITAL, ACETAMINOPHEN, AND CAFFEINE 1 TABLET: 50; 325; 40 TABLET, COATED ORAL at 05:03

## 2025-03-18 RX ADMIN — BUSPIRONE HYDROCHLORIDE 15 MG: 5 TABLET ORAL at 09:03

## 2025-03-18 RX ADMIN — GABAPENTIN 300 MG: 300 CAPSULE ORAL at 04:03

## 2025-03-18 RX ADMIN — IPRATROPIUM BROMIDE 0.5 MG: 0.5 SOLUTION RESPIRATORY (INHALATION) at 01:03

## 2025-03-18 RX ADMIN — CARVEDILOL 12.5 MG: 12.5 TABLET, FILM COATED ORAL at 09:03

## 2025-03-18 RX ADMIN — ENOXAPARIN SODIUM 30 MG: 30 INJECTION SUBCUTANEOUS at 04:03

## 2025-03-18 RX ADMIN — HYDROCODONE BITARTRATE AND ACETAMINOPHEN 1 TABLET: 5; 325 TABLET ORAL at 04:03

## 2025-03-18 RX ADMIN — LEVOTHYROXINE SODIUM 50 MCG: 0.03 TABLET ORAL at 05:03

## 2025-03-18 RX ADMIN — ATORVASTATIN CALCIUM 80 MG: 40 TABLET, FILM COATED ORAL at 09:03

## 2025-03-18 RX ADMIN — BUSPIRONE HYDROCHLORIDE 15 MG: 5 TABLET ORAL at 04:03

## 2025-03-18 RX ADMIN — FAMOTIDINE 20 MG: 20 TABLET, FILM COATED ORAL at 09:03

## 2025-03-18 RX ADMIN — THIAMINE HCL TAB 100 MG 100 MG: 100 TAB at 09:03

## 2025-03-18 RX ADMIN — HYDROCODONE BITARTRATE AND ACETAMINOPHEN 1 TABLET: 5; 325 TABLET ORAL at 09:03

## 2025-03-18 NOTE — PLAN OF CARE
Pt cleared from cm- report to be called to Syl[facility]  from Sandra- secure chat used to notify Sandra and charge nurse Tammy of report info  Pt going to [facility]  Updated family- daughter Margarito  Transportation set up by [facility] to arrive around 1600   03/18/25 1506   Final Note   Assessment Type Final Discharge Note   Anticipated Discharge Disposition LTAC   Post-Acute Status   Post-Acute Authorization Placement   Post-Acute Placement Status Set-up Complete/Auth obtained   Patient choice form signed by patient/caregiver List with quality metrics by geographic area provided   Discharge Delays None known at this time

## 2025-03-18 NOTE — PLAN OF CARE
Problem: Occupational Therapy  Goal: Occupational Therapy Goal  Description: Goals to be met by: 4/7/25     Patient will increase functional independence with ADLs by performing:    UE Dressing with Supervision.  LE Dressing with Contact Guard Assistance.  Grooming while seated at sink with Supervision.  Toileting from bedside commode with Supervision for hygiene and clothing management.   Toilet transfer to bedside commode with Contact Guard Assistance.    Outcome: Progressing

## 2025-03-18 NOTE — PROGRESS NOTES
Progress Note  PULMONARY    Admit Date: 3/4/2025   03/18/2025    History of Present Illness:  Pt is a 68 yo female with COPD, HTN, hypothyroidism who presented to the hospital due to a fall, T12 burst fracture. She has developed progressive respiratory failure and today requiring bipap and planned to transfer to the ICU. Pulmonary consulted regarding respiratory failure management. Pt is incoherent and unable to give solid history but apparently has a hx of EtOH abuse and smoking. She does endorse spitting up mucous recently. She had been getting librium, robaxin and PRN hydrocodone 5mg, last doses yesterday PM. These have been held. Antibiotics have been started for possible respiratory infection. She was hospitalized last month with COPD exacerbation, resp failure and aspiration pneumonia. Seen by Ila Perez for f/u visit in pulm clinic on 2/24/25. She uses home O2 at 3.5L.     SUBJECTIVE:     3/12- pt continues on bipap, awake and alert, somewhat confused/mumbling responses. ABG slightly worse and BIPAP has been adjusted    3/13/2025 - On BiPAP when I saw her and only tolerating brief periods of time off of BiPAP.  No distress.  She is conversant but is confused.  ABG about the same.  Reviewed old ABG looks like baseline paCO2 is 60-65 range     3/18/25- awake and alert and stiting up in chair.     OBJECTIVE:     Vitals (Most recent):  Vitals:    03/18/25 1308   BP:    Pulse: 68   Resp: 18   Temp:        Vitals (24 hour range):  Temp:  [97.6 °F (36.4 °C)-98 °F (36.7 °C)]   Pulse:  [63-94]   Resp:  [16-24]   BP: ()/(62-80)   SpO2:  [94 %-99 %]       Intake/Output Summary (Last 24 hours) at 3/18/2025 1448  Last data filed at 3/17/2025 2038  Gross per 24 hour   Intake 906.85 ml   Output --   Net 906.85 ml          Physical Exam:  The patient's neuro status (alertness,orientation,cognitive function,motor skills,), pharyngeal exam (oral lesions, hygiene, abn dentition,), Neck (jvd,mass,thyroid,nodes in neck and  "above/below clavicle),RESPIRATORY(symmetry,effort,fremitus,percussion,auscultation),  Cor(rhythm,heart tones including gallops,perfusion,edema)ABD(distention,hepatic&splenomegaly,tenderness,masses), Skin(rash,cyanosis),Psyc(affect,judgement,).  Exam negative except for these pertinent findings:    Awake, A&Ox2, BiPAP on  Speaking, difficult to understand, some inappropriate responses  PERRL  HR regular  Diminished breath sounds bilaterally  Abd soft nontender, BS+  No edema/clubbing  Follows commands in all extremities  Chronically ill appearing and with muscle wasting        Radiographs reviewed: view by direct vision    CXR 3/12/25- increasing RUL opacity  CXR 3/11/25- unchanged chronic interstitial changes bilaterally  CT chest 2/9/25- emphysema and R lateral and basilar consolidations      Labs     Recent Labs   Lab 03/18/25  0418   WBC 6.63   HGB 8.3*   HCT 27.8*        Recent Labs   Lab 03/18/25 0418      K 4.3      CO2 34*   BUN 6*   CREATININE 0.7      CALCIUM 8.5*   MG 2.3     No results for input(s): "PH", "PCO2", "PO2", "HCO3" in the last 24 hours.    Microbiology Results (last 7 days)       Procedure Component Value Units Date/Time    Blood culture [[id number]] Collected: 03/11/25 0944    Order Status: Completed Specimen: Blood from Peripheral, Hand, Right Updated: 03/16/25 1232     Blood Culture, Routine No growth after 5 days.    Blood culture [[id number]] Collected: 03/11/25 0944    Order Status: Completed Specimen: Blood from Peripheral, Antecubital, Right Updated: 03/16/25 1232     Blood Culture, Routine No growth after 5 days.    Culture, Respiratory with Gram Stain [[id number]] Collected: 03/12/25 1047    Order Status: Completed Specimen: Respiratory from Sputum, Expectorated Updated: 03/14/25 0857     Respiratory Culture Normal respiratory kehinde     Gram Stain (Respiratory) Many WBC's     Gram Stain (Respiratory) >10 epithelial cells per low power field     Gram Stain " (Respiratory) Few budding yeast     Gram Stain (Respiratory) Few Gram positive cocci    Urine culture [[id number]]  (Abnormal)  (Susceptibility) Collected: 03/11/25 1406    Order Status: Completed Specimen: Urine Updated: 03/14/25 0830     Urine Culture, Routine PROTEUS MIRABILIS  >100,000 cfu/ml        STREPTOCOCCUS AGALACTIAE (GROUP B)  > 100,000 cfu/ml  In case of Penicillin allergy, call lab for further testing.  Beta-hemolytic streptococci are routinely susceptible to   penicillins,cephalosporins and carbapenems.      Narrative:      Specimen Source->Urine            Impression:  Active Hospital Problems    Diagnosis  POA    *T12 burst fracture [S22.081A]  Yes    UTI (urinary tract infection) due to Proteus mirabilis species [N39.0]  No    Anemia [D64.9]  Yes    Alcohol dependence with withdrawal delirium [F10.231]  No    Tobacco dependency [F17.200]  Yes    Acute on chronic respiratory failure with hypoxia and hypercapnia [J96.21, J96.22]  Yes    Aspiration pneumonia of both lungs [J69.0]  Yes    Mixed hyperlipidemia [E78.2]  Yes    Hypothyroid [E03.9]  Yes    Diarrhea [R19.7]  Yes      Resolved Hospital Problems    Diagnosis Date Resolved POA    Hyponatremia [E87.1] 03/16/2025 Yes         Plan:       - continue BIPAP at night  - she continues to be awake and alert, hypercapnea is acute on chronic and not far from her baseline  - treat UTI   - continue inhaled bronchodilators  - per nsg non operative management for T12 burst fracture recommended with TLSO brace  - agree with diischarge home    Critical Care Time    I have spent 38  minutes providing critical care services for this pt for the above diagnoses.  These services have included pt evaluation, pt exam, BiPAP/oxygenation assessment, discussions with staff, chart review, data review, note preparation and .  Medications have been reviewed and adjusted as needed.  The patient has life threatening illness with a high risk of decompensation  and/or death.    Don Norris MD  [facility] Pulmonary/Critical Care                          .

## 2025-03-18 NOTE — ASSESSMENT & PLAN NOTE
Anemia is likely due to Iron deficiency. Most recent hemoglobin and hematocrit are listed below.  Recent Labs     03/16/25  1005 03/17/25  0600 03/18/25  0418   HGB 8.8* 8.7* 8.3*   HCT 29.2* 28.6* 27.8*     Plan  - Monitor serial CBC: Daily  - Transfuse PRBC if patient becomes hemodynamically unstable, symptomatic or H/H drops below 7/21.  - Patient has not received any PRBC transfusions to date  - Patient's anemia is currently stable  - Chronic problem.

## 2025-03-18 NOTE — NURSING
03/18/25 1308   WOCN Assessment   WOCN Total Time (mins) 10   Visit Date 03/18/25   Visit Time 1230   Consult Type Follow Up  (Skin re-assessment by wound care)   Positioning   Body Position position changed independently   Head of Bed (HOB) Positioning HOB elevated   [REMOVED]      Wound 03/11/25 1100 Pressure Injury Left posterior Heel #1   Final Assessment Date/Final Assessment Time: 03/18/25 1230  Date First Assessed/Time First Assessed: 03/11/25 1100   Present on Original Admission: No  Primary Wound Type: Pressure Injury  Side: Left  Orientation: posterior  Location: Heel  Wound Numb...   Pressure Injury Stage   (Resolved. Skin pink with no redness)   Appearance Intact;Pink   [REMOVED]      Wound 03/11/25 1100 Pressure Injury Right Heel #2   Final Assessment Date/Final Assessment Time: 03/18/25 1230  Date First Assessed/Time First Assessed: 03/11/25 1100   Present on Original Admission: No  Primary Wound Type: Pressure Injury  Side: Right  Location: Heel  Wound Number: #2  Wound Outcome: ...   Pressure Injury Stage   (Resolved. Pink no redness noted)   Appearance Intact;Pink   [REMOVED]      Wound 03/14/25 Moisture associated dermatitis anterior;posterior;medial;lateral Perineum   Final Assessment Date: 03/18/25  Date First Assessed: 03/14/25   Present on Original Admission: No  Primary Wound Type: Moisture associated dermatitis  Orientation: anterior;posterior;medial;lateral  Location: Perineum  Wound Outcome: Healed   Appearance Intact;Pink  (Resolved no redness)   [REMOVED]      Wound 03/14/25 Moisture associated dermatitis medial Buttocks   Final Assessment Date/Final Assessment Time: 03/18/25 1230  Date First Assessed: 03/14/25   Present on Original Admission: No  Primary Wound Type: Moisture associated dermatitis  Orientation: medial  Location: Buttocks  Wound Outcome: Healed   Appearance Pink;Intact  (Resolved no redness noted.)     Follow up skin assessment. Patient with bilateral heels pink with no  redness. Inner buttocks is also noted to be pink at this time as well as perineum. All areas are healed at this assessment.

## 2025-03-18 NOTE — NURSING
Attempted calling report and went to [facility] [phone] and is full, could not leave a message.

## 2025-03-18 NOTE — PLAN OF CARE
[facility] accepted per [facility] can admit today.  Waiting on medical clearance from pulmonology.   03/18/25 1210   Post-Acute Status   Post-Acute Authorization Placement   Post-Acute Placement Status Pending medical clearance/testing   Patient choice form signed by patient/caregiver List with quality metrics by geographic area provided   Discharge Delays None known at this time   Discharge Plan   Discharge Plan A Long-term acute care facility (LTAC)

## 2025-03-18 NOTE — CONSULTS
Chief complaint:  No chief complaint on file.      HPI:  Bhavana Lambert is a 69 y.o. female presenting with MASD of the perineum  Bilateral heels stage 1 pressure ulcers. Pressure ulcers were POA. MASD of the perineum were from the versette that had been discontinued and the MASD has improved. No other complaints today    PMH:  As per HPI and below:  Past Medical History:   Diagnosis Date    Acquired hypothyroidism     Anxiety and depression     Cervical radicular pain     Closed left ankle fracture     COPD (chronic obstructive pulmonary disease)     Heavy cigarette smoker     She has smoked 1/2 PPD since the age of 12, and still smokes    Hypertension     Hyponatremia     Multiple rib fractures     NSTEMI (non-ST elevated myocardial infarction)     Requires continuous at home supplemental oxygen     She is on 3.5L NC continuous    Stroke 11/08/2023    Tension type headache     Traumatic closed displaced fracture of distal end of radius, left, sequela        Social History     Socioeconomic History    Marital status:    Tobacco Use    Smoking status: Every Day     Current packs/day: 1.00     Average packs/day: 1 pack/day for 49.2 years (49.2 ttl pk-yrs)     Types: Cigarettes     Start date: 1976    Smokeless tobacco: Never   Substance and Sexual Activity    Alcohol use: Yes     Comment: couple times a week-wine or bloody cari    Drug use: No    Sexual activity: Not Currently   Social History Narrative    ** Merged History Encounter **          Social Drivers of Health     Financial Resource Strain: Patient Declined (3/4/2025)    Overall Financial Resource Strain (CARDIA)     Difficulty of Paying Living Expenses: Patient declined   Food Insecurity: Patient Declined (3/4/2025)    Hunger Vital Sign     Worried About Running Out of Food in the Last Year: Patient declined     Ran Out of Food in the Last Year: Patient declined   Recent Concern: Food Insecurity - Food Insecurity Present (2/10/2025)    Hunger  Vital Sign     Worried About Running Out of Food in the Last Year: Sometimes true     Ran Out of Food in the Last Year: Sometimes true   Transportation Needs: No Transportation Needs (2/10/2025)    TRANSPORTATION NEEDS     Transportation : No   Physical Activity: Inactive (2/10/2025)    Exercise Vital Sign     Days of Exercise per Week: 0 days     Minutes of Exercise per Session: 0 min   Stress: Patient Declined (3/4/2025)    [employer] - Occupational Stress Questionnaire     Feeling of Stress : Patient declined   Housing Stability: Patient Declined (3/4/2025)    Housing Stability Vital Sign     Unable to Pay for Housing in the Last Year: Patient declined     Number of Times Moved in the Last Year: 1     Homeless in the Last Year: Patient declined       Past Surgical History:   Procedure Laterality Date    ABDOMINAL SURGERY      COLON SURGERY      COLOSTOMY      COLOSTOMY CLOSURE      HYSTERECTOMY      LEG SURGERY Right     OPEN REDUCTION AND INTERNAL FIXATION (ORIF) OF INJURY OF WRIST Left 1/29/2019    Procedure: ORIF, WRIST;  Surgeon: Homero Jeff DO;  Location: [facility];  Service: Orthopedics;  Laterality: Left;  Equipment: [employer] Geminus Wrist Plate Set  Vendor/Rep: [employer]  C-Arm: Entire  DME: None    REQUIRES ASSISTANT    WRIST SURGERY Right        Family History   Problem Relation Name Age of Onset    Cancer Mother      COPD Father      Cirrhosis Father      Arthritis Sister      No Known Problems Brother      Anxiety disorder Daughter      Depression Daughter      Anxiety disorder Daughter      Congenital heart disease Daughter      Valvular heart disease Daughter         Review of patient's allergies indicates:  No Known Allergies    Medications Ordered Prior to Encounter[1]    ROS: As per HPI and below:  Pertinent items are noted in HPI.      Physical Exam:     Vitals:    03/18/25 1453 03/18/25 1612 03/18/25 1626 03/18/25 1730   BP:  136/88  137/88  "  BP Location:    Left arm   Patient Position:    Sitting   Pulse: 76 85  88   Resp:  18 18    Temp:       TempSrc:       SpO2:       Weight:       Height:           BP  Min: 77/50  Max: 178/87  Temp  Av °F (36.7 °C)  Min: 97 °F (36.1 °C)  Max: 100.5 °F (38.1 °C)  Pulse  Av.4  Min: 59  Max: 110  Resp  Av.5  Min: 10  Max: 50  SpO2  Av %  Min: 79 %  Max: 100 %  Height  Av' 2" (157.5 cm)  Min: 5' 2" (157.5 cm)  Max: 5' 2" (157.5 cm)  Weight  Av.2 kg (104 lb 0.6 oz)  Min: 47.1 kg (103 lb 13.4 oz)  Max: 47.3 kg (104 lb 4.4 oz)    Body mass index is 19.07 kg/m².          General:             Well developed, well nourished, no apparent distress  HEENT:              NCAT, no JVD, mucous membranes moist, EOM intact  Cardiovascular:  Regular rate and rhythm, normal S1, normal S2, No murmurs, rubs, or gallops  Respiratory:        Normal breath sounds, no wheezes, no rales, no rhonchi  Abdomen:           Bowel sounds present, non tender, non distended, no masses, no hepatojugular reflux  Extremities:        No clubbing, no cyanosis, no edema  Vascular:            2+ b/l radial.  Peripheral pulses intact.  No carotid bruits.  Neurological:      No focal deficits  Skin:                   No obvious rashes or erythema, MASD of the perineum  Bilateral heels stage 1 pressure ulcers                        Lab Results   Component Value Date    WBC 6.63 2025    HGB 8.3 (L) 2025    HCT 27.8 (L) 2025     (H) 2025     2025     Lab Results   Component Value Date    CHOL 221 (H) 2023     Lab Results   Component Value Date    HDL 94 (H) 2023     Lab Results   Component Value Date    LDLCALC 93.0 2023     Lab Results   Component Value Date    TRIG 170 (H) 2023     Lab Results   Component Value Date    CHOLHDL 42.5 2023     CMP  Recent Labs   Lab 25  0418      CALCIUM 8.5*      K 4.3   CO2 34*      BUN 6*   CREATININE " 0.7      Lab Results   Component Value Date    TSH 3.155 03/06/2025         Assessment and Recommendations       Diagnoses:    MASD of the perineum  Bilateral heels stage 1 pressure ulcers    Plan:  Continue foam dressing to the bilateral heels  Triad to the perineum q shift    Complexity:    moderate         [1]   No current facility-administered medications on file prior to encounter.     Current Outpatient Medications on File Prior to Encounter   Medication Sig Dispense Refill    albuterol (PROVENTIL/VENTOLIN HFA) 90 mcg/actuation inhaler Inhale 2 puffs into the lungs every 6 (six) hours as needed for Wheezing or Shortness of Breath.      albuterol (PROVENTIL/VENTOLIN HFA) 90 mcg/actuation inhaler Inhale 2 puffs into the lungs every 6 (six) hours as needed for Wheezing or Shortness of Breath. Rescue 18 g 11    albuterol-ipratropium (DUO-NEB) 2.5 mg-0.5 mg/3 mL nebulizer solution Take 3 mLs by nebulization every 6 (six) hours as needed for Wheezing. Rescue 75 mL 0    aspirin (ECOTRIN) 81 MG EC tablet Take 1 tablet (81 mg total) by mouth once daily. 360 tablet 0    atorvastatin (LIPITOR) 80 MG tablet Take 1 tablet (80 mg total) by mouth once daily. 90 tablet 3    busPIRone (BUSPAR) 15 MG tablet Take 15 mg by mouth 2 (two) times a day.      carvediloL (COREG) 25 MG tablet Take 0.5 tablets (12.5 mg total) by mouth 2 (two) times daily. 90 tablet 3    clopidogreL (PLAVIX) 75 mg tablet Take 1 tablet (75 mg total) by mouth once daily. 90 tablet 3    cyanocobalamin 1,000 mcg/mL injection Inject 1,000 mcg into the muscle every 30 days.       cyclobenzaprine (FLEXERIL) 10 MG tablet Take 1 tablet by mouth 3 times daily as needed.      FLUoxetine 40 MG capsule Take 40 mg by mouth once daily.      fluticasone-umeclidin-vilanter (TRELEGY ELLIPTA) 100-62.5-25 mcg DsDv Inhale 1 puff into the lungs once daily. 60 each 11    folic acid (FOLVITE) 1 MG tablet Take 1 tablet (1 mg total) by mouth once daily.  0    levothyroxine  (SYNTHROID) 50 MCG tablet Take 50 mcg by mouth before breakfast.      LIDOcaine 4 % PtMd Apply 1 patch topically 2 (two) times daily as needed (pain). 10 patch 0    montelukast (SINGULAIR) 10 mg tablet Take 10 mg by mouth every morning.      nicotine (NICODERM CQ) 21 mg/24 hr Place 1 patch onto the skin once daily. 28 patch 0    ondansetron (ZOFRAN) 4 MG tablet Take 4 mg by mouth every 6 (six) hours as needed for Nausea.      spironolactone (ALDACTONE) 25 MG tablet Take 0.5 tablets (12.5 mg total) by mouth once daily. 15 tablet 11    VITAMIN D2 1,250 mcg (50,000 unit) capsule Take 50,000 Units by mouth every 7 days.      [DISCONTINUED] HYDROcodone-acetaminophen (NORCO)  mg per tablet Take 1 tablet by mouth every 4 (four) hours as needed for Pain. 20 tablet 0

## 2025-03-18 NOTE — CARE UPDATE
03/17/25 2200   Patient Assessment/Suction   Level of Consciousness (AVPU) alert   Respiratory Effort Normal;Unlabored   Preset CPAP/BiPAP Settings   Mode Of Delivery BiPAP S/T   CPAP/BIPAP charged w/in last 24 h YES   $ Initial CPAP/BiPAP Setup? No   $ Is patient using? Yes   Size of Mask Small   Sized Appropriately? Yes   Equipment Type V60   Airway Device Type small full face mask   Humidifier not applicable   Ipap 14   EPAP (cm H2O) 6   Pressure Support (cm H2O) 8   Set Rate (Breaths/Min) 16   ITime (sec) 0.85   Rise Time (sec) 2   Patient CPAP/BiPAP Settings   CPAP/BIPAP ID 2   Timed Inspiration (Sec) 0.85   IPAP Rise Time (sec) 2   RR Total (Breaths/Min) 17   Tidal Volume (mL) 595   VE Minute Ventilation (L/min) 10.4 L/min   Peak Inspiratory Pressure (cm H2O) 15   TiTOT (%) 28   Total Leak (L/Min) 22   Patient Trigger - ST Mode Only (%) 88   CPAP/BiPAP Backup Settings   IPAP Backup 14 cmH2O   EPAP Backup 6 cmH2O   Backup Rate 16 breaths per minute (bpm)   CPAP/BiPAP Alarms   High Pressure (cm H2O) 40   Low Pressure (cm H2O) 6   Minute Ventilation (L/Min) 5   High RR (breaths/min) 45   Low RR (breaths/min) 8   Apnea (Sec) 20   Education   $ Education Other (see comment);15 min  (placed on BIPAP)

## 2025-03-18 NOTE — PT/OT/SLP PROGRESS
Occupational Therapy   Treatment    Name: Bhavana Lambert  MRN: [id number]  Admitting Diagnosis:  T12 burst fracture       Recommendations:     Discharge Recommendations: Moderate Intensity Therapy  Discharge Equipment Recommendations:  to be determined by next level of care  Barriers to discharge:  None    Assessment:     Bhavana Lambert is a 69 y.o. female with a medical diagnosis of T12 burst fracture.  Performance deficits affecting function are weakness, impaired endurance, impaired self care skills, impaired functional mobility, gait instability, impaired balance, decreased safety awareness, decreased lower extremity function, decreased upper extremity function, impaired cognition, impaired cardiopulmonary response to activity.     Patient HOB and agreeable to OT treatment. Therapist placed TLSO brace on at bed level - total assist. Patient performed bed mobility and bed>chair t/f with Jimi using RW. Participated in g/h activity seated up in chair with SBA. Patient was pleasant and oriented x4.     Rehab Prognosis:  Good; patient would benefit from acute skilled OT services to address these deficits and reach maximum level of function.       Plan:     Patient to be seen 5 x/week to address the above listed problems via self-care/home management, therapeutic activities, therapeutic exercises  Plan of Care Expires: 04/07/25  Plan of Care Reviewed with: patient    Subjective     Chief Complaint: I would like some ice for my diet coke  Patient/Family Comments/goals: to heal  Pain/Comfort:  Pain Rating 1: 0/10    Objective:     Communicated with: nurse prior to session.  Patient found HOB elevated with pulse ox (continuous), telemetry, peripheral IV, bed alarm upon OT entry to room.    General Precautions: Standard, aspiration, fall    Orthopedic Precautions:spinal precautions  Braces: TLSO  Respiratory Status: Nasal cannula, flow 4 L/min     Occupational Performance:     Bed Mobility:    Patient completed  Rolling/Turning to Right with minimum assistance  Patient completed Supine to Sit with minimum assistance     Functional Mobility/Transfers:  Patient completed Sit <> Stand Transfer with contact guard assistance  with  rolling walker   Patient completed Bed <> Chair Transfer using Stand Pivot technique with minimum assistance with rolling walker      Activities of Daily Living:  Grooming: stand by assistance for oral care, washing face, combing hair  Upper Body Dressing: maximal assistance to roland TLSO      AMPAC 6 Click ADL: 17    Treatment & Education:  Therapist provided facilitation and instruction of proper body mechanics and fall prevention strategies during tasks listed above.   Instructed patient to sit in bedside chair as tolerated daily to increase OOB/activity tolerance.   Instructed patient to use call light to have nursing staff assist with needs/transfers.   Discussed OT POC and answered all questions within OT scope of practice.        Patient left up in chair with all lines intact, call button in reach, and chair alarm on    GOALS:   Multidisciplinary Problems       Occupational Therapy Goals          Problem: Occupational Therapy    Goal Priority Disciplines Outcome Interventions   Occupational Therapy Goal     OT, PT/OT Progressing    Description: Goals to be met by: 4/7/25     Patient will increase functional independence with ADLs by performing:    UE Dressing with Supervision.  LE Dressing with Contact Guard Assistance.  Grooming while seated at sink with Supervision.  Toileting from bedside commode with Supervision for hygiene and clothing management.   Toilet transfer to bedside commode with Contact Guard Assistance.                           Time Tracking:     OT Date of Treatment: 03/18/25  OT Start Time: 1126  OT Stop Time: 1158  OT Total Time (min): 32 min    Billable Minutes:Self Care/Home Management 17  Therapeutic Activity 15    OT/ANDREW: OT     Number of ANDREW visits since last OT visit:  1    3/18/2025

## 2025-03-18 NOTE — CARE UPDATE
03/17/25 1900   Patient Assessment/Suction   Level of Consciousness (AVPU) alert   Respiratory Effort Normal;Unlabored   Expansion/Accessory Muscles/Retractions diaphragmatic;no retractions;no use of accessory muscles   KOFFI Breath Sounds wheezes, expiratory   LLL Breath Sounds diminished;clear   RUL Breath Sounds clear   RML Breath Sounds diminished   RLL Breath Sounds diminished   Rhythm/Pattern, Respiratory head bobbing;shallow;unlabored;pattern regular;no shortness of breath reported   Cough Frequency infrequent   Cough Type good;dry;nonproductive   PRE-TX-O2   Device (Oxygen Therapy) high frequency oscillatory ventilation (HFOV);nasal cannula with humidification   $ Is the patient on Low Flow Oxygen? Yes   Flow (L/min) (Oxygen Therapy) 4   SpO2 99 %   Pulse Oximetry Type Continuous   $ Pulse Oximetry - Multiple Charge Pulse Oximetry - Multiple   Aerosol Therapy   $ Aerosol Therapy Charges Aerosol Treatment  (Atrovent given)   Daily Review of Necessity (SVN) completed   Respiratory Treatment Status (SVN) given   Treatment Route (SVN) mask;oxygen   Patient Position HOB elevated   Signs of Intolerance (SVN) none   Education   $ Education Bronchodilator;15 min

## 2025-03-18 NOTE — PROGRESS NOTES
03/18/25 0900   Tobacco Cessation Intervention   Do you use any type of tobacco product? Yes   Are you interested in quitting use of tobacco products? Thinking about quitting   Are you interested in Nicotine Replacement for symptom relief? Yes   $ Smoking Cessation Charges Smoking Cessation - Intermediate (CTTS)

## 2025-03-18 NOTE — ASSESSMENT & PLAN NOTE
Patient has a diagnosis of pneumonia. The cause of the pneumonia is bacterial in etiology due to Gram negative pathogens associated with aspiration. The pneumonia is acute. The patient has the following signs/symptoms of pneumonia: persistent hypoxia , cough, and shortness of breath. The patient does have a current oxygen requirement and the patient does have a home oxygen requirement. I have reviewed the pertinent imaging. The following cultures have been collected: Blood cultures and Sputum culture The culture results are listed below.     Current antimicrobial regimen consists of the antibiotics listed below. Will monitor patient closely and continue current treatment plan unchanged.    Antibiotics (From admission, onward)      None            Microbiology Results (last 7 days)       Procedure Component Value Units Date/Time    Blood culture [[id number]] Collected: 03/11/25 0944    Order Status: Completed Specimen: Blood from Peripheral, Hand, Right Updated: 03/16/25 1232     Blood Culture, Routine No growth after 5 days.    Blood culture [[id number]] Collected: 03/11/25 0944    Order Status: Completed Specimen: Blood from Peripheral, Antecubital, Right Updated: 03/16/25 1232     Blood Culture, Routine No growth after 5 days.    Culture, Respiratory with Gram Stain [[id number]] Collected: 03/12/25 1047    Order Status: Completed Specimen: Respiratory from Sputum, Expectorated Updated: 03/14/25 0857     Respiratory Culture Normal respiratory kehinde     Gram Stain (Respiratory) Many WBC's     Gram Stain (Respiratory) >10 epithelial cells per low power field     Gram Stain (Respiratory) Few budding yeast     Gram Stain (Respiratory) Few Gram positive cocci    Urine culture [[id number]]  (Abnormal)  (Susceptibility) Collected: 03/11/25 1406    Order Status: Completed Specimen: Urine Updated: 03/14/25 0830     Urine Culture, Routine PROTEUS MIRABILIS  >100,000 cfu/ml        STREPTOCOCCUS AGALACTIAE (GROUP B)  > 100,000  cfu/ml  In case of Penicillin allergy, call lab for further testing.  Beta-hemolytic streptococci are routinely susceptible to   penicillins,cephalosporins and carbapenems.      Narrative:      Specimen Source->Urine          Afebrile, WBC normal   Received 4.5 days of Zosyn   Sputum culture did not grow any predominant bacteria  De-escalated to Augmentin for UTI, and to continue treatment for suspected CPAP    Patient has a diagnosis of pneumonia. The cause of the pneumonia is bacterial in etiology due to Gram negative pathogens associated with aspiration. The pneumonia is acute. The patient has the following signs/symptoms of pneumonia: persistent hypoxia , cough, and shortness of breath. The patient does have a current oxygen requirement and the patient does have a home oxygen requirement. I have reviewed the pertinent imaging. The following cultures have been collected: Blood cultures and Sputum culture The culture results are listed below.     Current antimicrobial regimen consists of the antibiotics listed below. Will monitor patient closely and continue current treatment plan unchanged.    Antibiotics (From admission, onward)      None            Microbiology Results (last 7 days)       Procedure Component Value Units Date/Time    Blood culture [[id number]] Collected: 03/11/25 0944    Order Status: Completed Specimen: Blood from Peripheral, Hand, Right Updated: 03/16/25 1232     Blood Culture, Routine No growth after 5 days.    Blood culture [[id number]] Collected: 03/11/25 0944    Order Status: Completed Specimen: Blood from Peripheral, Antecubital, Right Updated: 03/16/25 1232     Blood Culture, Routine No growth after 5 days.    Culture, Respiratory with Gram Stain [[id number]] Collected: 03/12/25 1047    Order Status: Completed Specimen: Respiratory from Sputum, Expectorated Updated: 03/14/25 0857     Respiratory Culture Normal respiratory kehinde     Gram Stain (Respiratory) Many WBC's     Gram Stain  (Respiratory) >10 epithelial cells per low power field     Gram Stain (Respiratory) Few budding yeast     Gram Stain (Respiratory) Few Gram positive cocci    Urine culture [[id number]]  (Abnormal)  (Susceptibility) Collected: 03/11/25 1406    Order Status: Completed Specimen: Urine Updated: 03/14/25 0830     Urine Culture, Routine PROTEUS MIRABILIS  >100,000 cfu/ml        STREPTOCOCCUS AGALACTIAE (GROUP B)  > 100,000 cfu/ml  In case of Penicillin allergy, call lab for further testing.  Beta-hemolytic streptococci are routinely susceptible to   penicillins,cephalosporins and carbapenems.      Narrative:      Specimen Source->Urine          Urine culture growing Proteus mirabilis and strep agalactiae  Deescalating from Zosyn to Augmentin for 2 more days    Patient has a diagnosis of pneumonia. The cause of the pneumonia is bacterial in etiology due to Gram negative pathogens associated with aspiration. The pneumonia is acute. The patient has the following signs/symptoms of pneumonia: persistent hypoxia , cough, and shortness of breath. The patient does have a current oxygen requirement and the patient does have a home oxygen requirement. I have reviewed the pertinent imaging. The following cultures have been collected: Blood cultures and Sputum culture The culture results are listed below.     Current antimicrobial regimen consists of the antibiotics listed below. Will monitor patient closely and continue current treatment plan unchanged.    Antibiotics (From admission, onward)      None            Microbiology Results (last 7 days)       Procedure Component Value Units Date/Time    Blood culture [[id number]] Collected: 03/11/25 0944    Order Status: Completed Specimen: Blood from Peripheral, Hand, Right Updated: 03/16/25 1232     Blood Culture, Routine No growth after 5 days.    Blood culture [[id number]] Collected: 03/11/25 0944    Order Status: Completed Specimen: Blood from Peripheral, Antecubital, Right Updated:  03/16/25 1232     Blood Culture, Routine No growth after 5 days.    Culture, Respiratory with Gram Stain [[id number]] Collected: 03/12/25 1047    Order Status: Completed Specimen: Respiratory from Sputum, Expectorated Updated: 03/14/25 0857     Respiratory Culture Normal respiratory kehinde     Gram Stain (Respiratory) Many WBC's     Gram Stain (Respiratory) >10 epithelial cells per low power field     Gram Stain (Respiratory) Few budding yeast     Gram Stain (Respiratory) Few Gram positive cocci    Urine culture [[id number]]  (Abnormal)  (Susceptibility) Collected: 03/11/25 1406    Order Status: Completed Specimen: Urine Updated: 03/14/25 0830     Urine Culture, Routine PROTEUS MIRABILIS  >100,000 cfu/ml        STREPTOCOCCUS AGALACTIAE (GROUP B)  > 100,000 cfu/ml  In case of Penicillin allergy, call lab for further testing.  Beta-hemolytic streptococci are routinely susceptible to   penicillins,cephalosporins and carbapenems.      Narrative:      Specimen Source->Urine          Afebrile, WBC normal   Received 4.5 days of Zosyn   Sputum culture did not grow any predominant bacteria  De-escalated to Augmentin for UTI, and to continue treatment for suspected CPAP    Patient has a diagnosis of pneumonia. The cause of the pneumonia is bacterial in etiology due to Gram negative pathogens associated with aspiration. The pneumonia is acute. The patient has the following signs/symptoms of pneumonia: persistent hypoxia , cough, and shortness of breath. The patient does have a current oxygen requirement and the patient does have a home oxygen requirement. I have reviewed the pertinent imaging. The following cultures have been collected: Blood cultures and Sputum culture The culture results are listed below.     Current antimicrobial regimen consists of the antibiotics listed below. Will monitor patient closely and continue current treatment plan unchanged.    Antibiotics (From admission, onward)      None             Microbiology Results (last 7 days)       Procedure Component Value Units Date/Time    Blood culture [[id number]] Collected: 03/11/25 0944    Order Status: Completed Specimen: Blood from Peripheral, Hand, Right Updated: 03/16/25 1232     Blood Culture, Routine No growth after 5 days.    Blood culture [[id number]] Collected: 03/11/25 0944    Order Status: Completed Specimen: Blood from Peripheral, Antecubital, Right Updated: 03/16/25 1232     Blood Culture, Routine No growth after 5 days.    Culture, Respiratory with Gram Stain [[id number]] Collected: 03/12/25 1047    Order Status: Completed Specimen: Respiratory from Sputum, Expectorated Updated: 03/14/25 0857     Respiratory Culture Normal respiratory kehinde     Gram Stain (Respiratory) Many WBC's     Gram Stain (Respiratory) >10 epithelial cells per low power field     Gram Stain (Respiratory) Few budding yeast     Gram Stain (Respiratory) Few Gram positive cocci    Urine culture [[id number]]  (Abnormal)  (Susceptibility) Collected: 03/11/25 1406    Order Status: Completed Specimen: Urine Updated: 03/14/25 0830     Urine Culture, Routine PROTEUS MIRABILIS  >100,000 cfu/ml        STREPTOCOCCUS AGALACTIAE (GROUP B)  > 100,000 cfu/ml  In case of Penicillin allergy, call lab for further testing.  Beta-hemolytic streptococci are routinely susceptible to   penicillins,cephalosporins and carbapenems.      Narrative:      Specimen Source->Urine          Urine culture growing Proteus mirabilis and strep agalactiae  Deescalating from Zosyn to Augmentin for 2 more days    Patient has a diagnosis of pneumonia. The cause of the pneumonia is bacterial in etiology due to Gram negative pathogens associated with aspiration. The pneumonia is acute. The patient has the following signs/symptoms of pneumonia: persistent hypoxia , cough, and shortness of breath. The patient does have a current oxygen requirement and the patient does have a home oxygen requirement. I have reviewed  the pertinent imaging. The following cultures have been collected: Blood cultures and Sputum culture The culture results are listed below.     Current antimicrobial regimen consists of the antibiotics listed below. Will monitor patient closely and continue current treatment plan unchanged.    Antibiotics (From admission, onward)      None          Microbiology Results (last 7 days)       Procedure Component Value Units Date/Time    Blood culture [[id number]] Collected: 03/11/25 0944    Order Status: Completed Specimen: Blood from Peripheral, Hand, Right Updated: 03/16/25 1232     Blood Culture, Routine No growth after 5 days.    Blood culture [[id number]] Collected: 03/11/25 0944    Order Status: Completed Specimen: Blood from Peripheral, Antecubital, Right Updated: 03/16/25 1232     Blood Culture, Routine No growth after 5 days.    Culture, Respiratory with Gram Stain [[id number]] Collected: 03/12/25 1047    Order Status: Completed Specimen: Respiratory from Sputum, Expectorated Updated: 03/14/25 0857     Respiratory Culture Normal respiratory kehinde     Gram Stain (Respiratory) Many WBC's     Gram Stain (Respiratory) >10 epithelial cells per low power field     Gram Stain (Respiratory) Few budding yeast     Gram Stain (Respiratory) Few Gram positive cocci    Urine culture [[id number]]  (Abnormal)  (Susceptibility) Collected: 03/11/25 1406    Order Status: Completed Specimen: Urine Updated: 03/14/25 0830     Urine Culture, Routine PROTEUS MIRABILIS  >100,000 cfu/ml        STREPTOCOCCUS AGALACTIAE (GROUP B)  > 100,000 cfu/ml  In case of Penicillin allergy, call lab for further testing.  Beta-hemolytic streptococci are routinely susceptible to   penicillins,cephalosporins and carbapenems.      Narrative:      Specimen Source->Urine          Microbiology Results (last 7 days)       Procedure Component Value Units Date/Time    Blood culture [[id number]] Collected: 03/11/25 0944    Order Status: Completed Specimen: Blood  from Peripheral, Hand, Right Updated: 03/16/25 1232     Blood Culture, Routine No growth after 5 days.    Blood culture [[id number]] Collected: 03/11/25 0944    Order Status: Completed Specimen: Blood from Peripheral, Antecubital, Right Updated: 03/16/25 1232     Blood Culture, Routine No growth after 5 days.    Culture, Respiratory with Gram Stain [[id number]] Collected: 03/12/25 1047    Order Status: Completed Specimen: Respiratory from Sputum, Expectorated Updated: 03/14/25 0857     Respiratory Culture Normal respiratory kehinde     Gram Stain (Respiratory) Many WBC's     Gram Stain (Respiratory) >10 epithelial cells per low power field     Gram Stain (Respiratory) Few budding yeast     Gram Stain (Respiratory) Few Gram positive cocci    Urine culture [[id number]]  (Abnormal)  (Susceptibility) Collected: 03/11/25 1406    Order Status: Completed Specimen: Urine Updated: 03/14/25 0830     Urine Culture, Routine PROTEUS MIRABILIS  >100,000 cfu/ml        STREPTOCOCCUS AGALACTIAE (GROUP B)  > 100,000 cfu/ml  In case of Penicillin allergy, call lab for further testing.  Beta-hemolytic streptococci are routinely susceptible to   penicillins,cephalosporins and carbapenems.      Narrative:      Specimen Source->Urine          Collected: 03/12/25 1047     Specimen: Respiratory from Sputum, Expectorated Updated: 03/14/25 0857       Antibiotics (From admission, onward)      None            Microbiology Results (last 7 days)       Procedure Component Value Units Date/Time    Blood culture [[id number]] Collected: 03/11/25 0944    Order Status: Completed Specimen: Blood from Peripheral, Hand, Right Updated: 03/16/25 1232     Blood Culture, Routine No growth after 5 days.    Blood culture [[id number]] Collected: 03/11/25 0944    Order Status: Completed Specimen: Blood from Peripheral, Antecubital, Right Updated: 03/16/25 1232     Blood Culture, Routine No growth after 5 days.    Culture, Respiratory with Gram Stain [[id number]]  Collected: 03/12/25 1047    Order Status: Completed Specimen: Respiratory from Sputum, Expectorated Updated: 03/14/25 0857     Respiratory Culture Normal respiratory kehinde     Gram Stain (Respiratory) Many WBC's     Gram Stain (Respiratory) >10 epithelial cells per low power field     Gram Stain (Respiratory) Few budding yeast     Gram Stain (Respiratory) Few Gram positive cocci    Urine culture [[id number]]  (Abnormal)  (Susceptibility) Collected: 03/11/25 1406    Order Status: Completed Specimen: Urine Updated: 03/14/25 0830     Urine Culture, Routine PROTEUS MIRABILIS  >100,000 cfu/ml        STREPTOCOCCUS AGALACTIAE (GROUP B)  > 100,000 cfu/ml  In case of Penicillin allergy, call lab for further testing.  Beta-hemolytic streptococci are routinely susceptible to   penicillins,cephalosporins and carbapenems.      Narrative:      Specimen Source->Urine              Microbiology Results (last 7 days)       Procedure Component Value Units Date/Time    Blood culture [[id number]] Collected: 03/11/25 0944    Order Status: Completed Specimen: Blood from Peripheral, Hand, Right Updated: 03/16/25 1232     Blood Culture, Routine No growth after 5 days.    Blood culture [[id number]] Collected: 03/11/25 0944    Order Status: Completed Specimen: Blood from Peripheral, Antecubital, Right Updated: 03/16/25 1232     Blood Culture, Routine No growth after 5 days.    Culture, Respiratory with Gram Stain [[id number]] Collected: 03/12/25 1047    Order Status: Completed Specimen: Respiratory from Sputum, Expectorated Updated: 03/14/25 0857     Respiratory Culture Normal respiratory kehinde     Gram Stain (Respiratory) Many WBC's     Gram Stain (Respiratory) >10 epithelial cells per low power field     Gram Stain (Respiratory) Few budding yeast     Gram Stain (Respiratory) Few Gram positive cocci    Urine culture [[id number]]  (Abnormal)  (Susceptibility) Collected: 03/11/25 1406    Order Status: Completed Specimen: Urine Updated:  03/14/25 0830     Urine Culture, Routine PROTEUS MIRABILIS  >100,000 cfu/ml        STREPTOCOCCUS AGALACTIAE (GROUP B)  > 100,000 cfu/ml  In case of Penicillin allergy, call lab for further testing.  Beta-hemolytic streptococci are routinely susceptible to   penicillins,cephalosporins and carbapenems.      Narrative:      Specimen Source->Urine              Microbiology Results (last 7 days)       Procedure Component Value Units Date/Time    Blood culture [[id number]] Collected: 03/11/25 0944    Order Status: Completed Specimen: Blood from Peripheral, Hand, Right Updated: 03/16/25 1232     Blood Culture, Routine No growth after 5 days.    Blood culture [[id number]] Collected: 03/11/25 0944    Order Status: Completed Specimen: Blood from Peripheral, Antecubital, Right Updated: 03/16/25 1232     Blood Culture, Routine No growth after 5 days.    Culture, Respiratory with Gram Stain [[id number]] Collected: 03/12/25 1047    Order Status: Completed Specimen: Respiratory from Sputum, Expectorated Updated: 03/14/25 0857     Respiratory Culture Normal respiratory kehinde     Gram Stain (Respiratory) Many WBC's     Gram Stain (Respiratory) >10 epithelial cells per low power field     Gram Stain (Respiratory) Few budding yeast     Gram Stain (Respiratory) Few Gram positive cocci    Urine culture [[id number]]  (Abnormal)  (Susceptibility) Collected: 03/11/25 1406    Order Status: Completed Specimen: Urine Updated: 03/14/25 0830     Urine Culture, Routine PROTEUS MIRABILIS  >100,000 cfu/ml        STREPTOCOCCUS AGALACTIAE (GROUP B)  > 100,000 cfu/ml  In case of Penicillin allergy, call lab for further testing.  Beta-hemolytic streptococci are routinely susceptible to   penicillins,cephalosporins and carbapenems.      Narrative:      Specimen Source->Urine              Microbiology Results (last 7 days)       Procedure Component Value Units Date/Time    Blood culture [[id number]] Collected: 03/11/25 0944    Order Status: Completed  Specimen: Blood from Peripheral, Hand, Right Updated: 03/16/25 1232     Blood Culture, Routine No growth after 5 days.    Blood culture [[id number]] Collected: 03/11/25 0944    Order Status: Completed Specimen: Blood from Peripheral, Antecubital, Right Updated: 03/16/25 1232     Blood Culture, Routine No growth after 5 days.    Culture, Respiratory with Gram Stain [[id number]] Collected: 03/12/25 1047    Order Status: Completed Specimen: Respiratory from Sputum, Expectorated Updated: 03/14/25 0857     Respiratory Culture Normal respiratory kehinde     Gram Stain (Respiratory) Many WBC's     Gram Stain (Respiratory) >10 epithelial cells per low power field     Gram Stain (Respiratory) Few budding yeast     Gram Stain (Respiratory) Few Gram positive cocci    Urine culture [[id number]]  (Abnormal)  (Susceptibility) Collected: 03/11/25 1406    Order Status: Completed Specimen: Urine Updated: 03/14/25 0830     Urine Culture, Routine PROTEUS MIRABILIS  >100,000 cfu/ml        STREPTOCOCCUS AGALACTIAE (GROUP B)  > 100,000 cfu/ml  In case of Penicillin allergy, call lab for further testing.  Beta-hemolytic streptococci are routinely susceptible to   penicillins,cephalosporins and carbapenems.      Narrative:      Specimen Source->Urine              Microbiology Results (last 7 days)       Procedure Component Value Units Date/Time    Blood culture [[id number]] Collected: 03/11/25 0944    Order Status: Completed Specimen: Blood from Peripheral, Hand, Right Updated: 03/16/25 1232     Blood Culture, Routine No growth after 5 days.    Blood culture [[id number]] Collected: 03/11/25 0944    Order Status: Completed Specimen: Blood from Peripheral, Antecubital, Right Updated: 03/16/25 1232     Blood Culture, Routine No growth after 5 days.    Culture, Respiratory with Gram Stain [[id number]] Collected: 03/12/25 1047    Order Status: Completed Specimen: Respiratory from Sputum, Expectorated Updated: 03/14/25 0857     Respiratory Culture  Normal respiratory kehinde     Gram Stain (Respiratory) Many WBC's     Gram Stain (Respiratory) >10 epithelial cells per low power field     Gram Stain (Respiratory) Few budding yeast     Gram Stain (Respiratory) Few Gram positive cocci    Urine culture [[id number]]  (Abnormal)  (Susceptibility) Collected: 03/11/25 1406    Order Status: Completed Specimen: Urine Updated: 03/14/25 0830     Urine Culture, Routine PROTEUS MIRABILIS  >100,000 cfu/ml        STREPTOCOCCUS AGALACTIAE (GROUP B)  > 100,000 cfu/ml  In case of Penicillin allergy, call lab for further testing.  Beta-hemolytic streptococci are routinely susceptible to   penicillins,cephalosporins and carbapenems.      Narrative:      Specimen Source->Urine               Microbiology Results (last 7 days)       Procedure Component Value Units Date/Time    Blood culture [[id number]] Collected: 03/11/25 0944    Order Status: Completed Specimen: Blood from Peripheral, Hand, Right Updated: 03/16/25 1232     Blood Culture, Routine No growth after 5 days.    Blood culture [[id number]] Collected: 03/11/25 0944    Order Status: Completed Specimen: Blood from Peripheral, Antecubital, Right Updated: 03/16/25 1232     Blood Culture, Routine No growth after 5 days.    Culture, Respiratory with Gram Stain [[id number]] Collected: 03/12/25 1047    Order Status: Completed Specimen: Respiratory from Sputum, Expectorated Updated: 03/14/25 0857     Respiratory Culture Normal respiratory kehinde     Gram Stain (Respiratory) Many WBC's     Gram Stain (Respiratory) >10 epithelial cells per low power field     Gram Stain (Respiratory) Few budding yeast     Gram Stain (Respiratory) Few Gram positive cocci    Urine culture [[id number]]  (Abnormal)  (Susceptibility) Collected: 03/11/25 1406    Order Status: Completed Specimen: Urine Updated: 03/14/25 0830     Urine Culture, Routine PROTEUS MIRABILIS  >100,000 cfu/ml        STREPTOCOCCUS AGALACTIAE (GROUP B)  > 100,000 cfu/ml  In case of  Penicillin allergy, call lab for further testing.  Beta-hemolytic streptococci are routinely susceptible to   penicillins,cephalosporins and carbapenems.      Narrative:      Specimen Source->Urine          Microbiology Results (last 7 days)       Procedure Component Value Units Date/Time    Blood culture [[id number]] Collected: 03/11/25 0944    Order Status: Completed Specimen: Blood from Peripheral, Hand, Right Updated: 03/16/25 1232     Blood Culture, Routine No growth after 5 days.    Blood culture [[id number]] Collected: 03/11/25 0944    Order Status: Completed Specimen: Blood from Peripheral, Antecubital, Right Updated: 03/16/25 1232     Blood Culture, Routine No growth after 5 days.    Culture, Respiratory with Gram Stain [[id number]] Collected: 03/12/25 1047    Order Status: Completed Specimen: Respiratory from Sputum, Expectorated Updated: 03/14/25 0857     Respiratory Culture Normal respiratory kehinde     Gram Stain (Respiratory) Many WBC's     Gram Stain (Respiratory) >10 epithelial cells per low power field     Gram Stain (Respiratory) Few budding yeast     Gram Stain (Respiratory) Few Gram positive cocci    Urine culture [[id number]]  (Abnormal)  (Susceptibility) Collected: 03/11/25 1406    Order Status: Completed Specimen: Urine Updated: 03/14/25 0830     Urine Culture, Routine PROTEUS MIRABILIS  >100,000 cfu/ml        STREPTOCOCCUS AGALACTIAE (GROUP B)  > 100,000 cfu/ml  In case of Penicillin allergy, call lab for further testing.  Beta-hemolytic streptococci are routinely susceptible to   penicillins,cephalosporins and carbapenems.      Narrative:      Specimen Source->Urine          Antibiotics (From admission, onward)      None          Antibiotics (From admission, onward)      None

## 2025-03-18 NOTE — PT/OT/SLP PROGRESS
Physical Therapy      Patient Name:  Bhavana Lambert   MRN:  [id number]    Patient not seen today secondary to Other (Comment) (Pt declined first attempt due to back pain; RT present on second attempt). Will follow-up 3/19/25.

## 2025-03-18 NOTE — PLAN OF CARE
Problem: Adult Inpatient Plan of Care  Goal: Absence of Hospital-Acquired Illness or Injury  Outcome: Progressing     Problem: Fall Injury Risk  Goal: Absence of Fall and Fall-Related Injury  Outcome: Progressing     Problem: Wound  Goal: Absence of Infection Signs and Symptoms  Outcome: Progressing     Problem: Delirium  Goal: Improved Attention and Thought Clarity  Outcome: Progressing      Clear bilaterally, pupils equal, round and reactive to light.

## 2025-03-18 NOTE — ASSESSMENT & PLAN NOTE
Advance Care Planning   Advance Care Planning Inpatient Note  [address]  Spiritual Care Department    Today's Date: 9/2/2022  Unit: [facility]    Received request from IDT member. Upon review of chart and communication with care team, request Health Care Provider's clarification of patient's decision making capacity. Patient and Child/children was/were present in the room during visit. Goals of ACP Conversation:  Discuss Advance Care planning documents    Health Care Decision Makers:      Primary Decision Maker: Margret Handy - [phone]    Secondary Decision Maker: Theresa James Son-in-Law - [phone]    Secondary Decision Maker: Jh Urbina - [phone]    Summary:  Verified Documents  Updated Healthcare Decision Maker     Advance Care Planning Documents (Patient Wishes) on file:  Living Will/ Advance Directive     Assessment:     requested for an AMD consult, indicating family wanted to update AMD on file. Patient's son, Brenden Fish was present. He offered  an AMD document, completed in 2011 as the current document they wanted on file.  made a copy for patien's file and returned original document to son. Interventions: Made a copy of AMD document that patient's son provided for his file, to be scanned into his chart.      Care Preferences Communicated:  No    Outcomes/Plan:  ACP Discussion Completed    Rolene Friday,  on 9/2/2022 at 12:15 PM Continue home statin

## 2025-03-18 NOTE — DISCHARGE INSTRUCTIONS
[facility]  Facility Transfer Orders        Admit to: [facility]     Diagnoses:   Active Hospital Problems    Diagnosis  POA    *T12 burst fracture [S22.081A]  Yes     Priority: 1 - High    UTI (urinary tract infection) due to Proteus mirabilis species [N39.0]  No    Anemia [D64.9]  Yes    Alcohol dependence with withdrawal delirium [F10.231]  No    Tobacco dependency [F17.200]  Yes    Acute on chronic respiratory failure with hypoxia and hypercapnia [J96.21, J96.22]  Yes    Aspiration pneumonia of both lungs [J69.0]  Yes    Mixed hyperlipidemia [E78.2]  Yes    Hypothyroid [E03.9]  Yes    Diarrhea [R19.7]  Yes      Resolved Hospital Problems    Diagnosis Date Resolved POA    Hyponatremia [E87.1] 03/16/2025 Yes     Allergies: Review of patient's allergies indicates:  No Known Allergies    Code Status: Full    Vitals: Routine       Diet: {diet:853956255}    Activity: Up in a chair each morning as tolerated, Ambulate with assistance to bathroom, and Activity as tolerated Utilize TLSO brace when out of bed  Nursing Precautions: Aspiration , Fall, and Pressure ulcer prevention    Bed/Surface: Low Air Loss    Consults: PT to evaluate and treat- 5 times a week, OT to evaluate and treat- 5 times a week, and Nutrition to evaluate and recommend diet    Oxygen: 4 liters/min via nasal cannula Bipap QHS and with naps    Dialysis: Patient is not on dialysis.     Labs: N/A   Pending Diagnostic Studies:       None          Imaging: N/A    Miscellaneous Care:   Routine Skin for Bedridden Patients:  Apply moisture barrier cream to all    IV Access: N/A     Medications: Discontinue all previous medication orders, if any. See new list below.  Current Discharge Medication List        START taking these medications    Details   clonazePAM (KLONOPIN) 0.5 MG tablet Take 1 tablet (0.5 mg total) by mouth 2 (two) times daily as needed (anxiety).  Qty: 60 tablet, Refills: 0    Associated Diagnoses: Anxiety      famotidine (PEPCID) 20  MG tablet Take 1 tablet (20 mg total) by mouth 2 (two) times daily.  Qty: 60 tablet, Refills: 11      gabapentin (NEURONTIN) 300 MG capsule Take 1 capsule (300 mg total) by mouth 3 (three) times daily.  Qty: 90 capsule, Refills: 11      loperamide (IMODIUM) 2 mg capsule Take 1 capsule (2 mg total) by mouth 4 (four) times daily as needed for Diarrhea.  Qty: 120 capsule, Refills: 0      multivitamin Tab Take 1 tablet by mouth once daily.  Qty: 30 tablet, Refills: 0      potassium chloride SA (K-DUR,KLOR-CON) 20 MEQ tablet Take 2 tablets (40 mEq total) by mouth once daily.  Qty: 60 tablet, Refills: 0      thiamine 100 MG tablet Take 1 tablet (100 mg total) by mouth once daily.  Qty: 30 tablet, Refills: 0           CONTINUE these medications which have CHANGED    Details   HYDROcodone-acetaminophen (NORCO)  mg per tablet Take 1 tablet by mouth every 6 (six) hours as needed for Pain.  Qty: 21 tablet, Refills: 0    Comments: n/a   Associated Diagnoses: T12 burst fracture; T12 compression fracture, initial encounter           CONTINUE these medications which have NOT CHANGED    Details   !! albuterol (PROVENTIL/VENTOLIN HFA) 90 mcg/actuation inhaler Inhale 2 puffs into the lungs every 6 (six) hours as needed for Wheezing or Shortness of Breath.      !! albuterol (PROVENTIL/VENTOLIN HFA) 90 mcg/actuation inhaler Inhale 2 puffs into the lungs every 6 (six) hours as needed for Wheezing or Shortness of Breath. Rescue  Qty: 18 g, Refills: 11    Associated Diagnoses: COPD exacerbation      albuterol-ipratropium (DUO-NEB) 2.5 mg-0.5 mg/3 mL nebulizer solution Take 3 mLs by nebulization every 6 (six) hours as needed for Wheezing. Rescue  Qty: 75 mL, Refills: 0      aspirin (ECOTRIN) 81 MG EC tablet Take 1 tablet (81 mg total) by mouth once daily.  Qty: 360 tablet, Refills: 0      atorvastatin (LIPITOR) 80 MG tablet Take 1 tablet (80 mg total) by mouth once daily.  Qty: 90 tablet, Refills: 3    Associated Diagnoses: Mixed  hyperlipidemia      busPIRone (BUSPAR) 15 MG tablet Take 15 mg by mouth 2 (two) times a day.      carvediloL (COREG) 25 MG tablet Take 0.5 tablets (12.5 mg total) by mouth 2 (two) times daily.  Qty: 90 tablet, Refills: 3    Comments: .      clopidogreL (PLAVIX) 75 mg tablet Take 1 tablet (75 mg total) by mouth once daily.  Qty: 90 tablet, Refills: 3      cyanocobalamin 1,000 mcg/mL injection Inject 1,000 mcg into the muscle every 30 days.       cyclobenzaprine (FLEXERIL) 10 MG tablet Take 1 tablet by mouth 3 times daily as needed.      FLUoxetine 40 MG capsule Take 40 mg by mouth once daily.      fluticasone-umeclidin-vilanter (TRELEGY ELLIPTA) 100-62.5-25 mcg DsDv Inhale 1 puff into the lungs once daily.  Qty: 60 each, Refills: 11    Associated Diagnoses: COPD exacerbation      folic acid (FOLVITE) 1 MG tablet Take 1 tablet (1 mg total) by mouth once daily.  Refills: 0      levothyroxine (SYNTHROID) 50 MCG tablet Take 50 mcg by mouth before breakfast.      LIDOcaine 4 % PtMd Apply 1 patch topically 2 (two) times daily as needed (pain).  Qty: 10 patch, Refills: 0    Associated Diagnoses: Closed fracture of multiple ribs of left side, initial encounter      montelukast (SINGULAIR) 10 mg tablet Take 10 mg by mouth every morning.      nicotine (NICODERM CQ) 21 mg/24 hr Place 1 patch onto the skin once daily.  Qty: 28 patch, Refills: 0      ondansetron (ZOFRAN) 4 MG tablet Take 4 mg by mouth every 6 (six) hours as needed for Nausea.      spironolactone (ALDACTONE) 25 MG tablet Take 0.5 tablets (12.5 mg total) by mouth once daily.  Qty: 15 tablet, Refills: 11    Comments: .      VITAMIN D2 1,250 mcg (50,000 unit) capsule Take 50,000 Units by mouth every 7 days.       !! - Potential duplicate medications found. Please discuss with provider.        Follow up:    Contact information for follow-up providers       Pepito Jhaveri IV, MD Follow up in 3 day(s).    Specialty: Family Medicine  Why: As needed  Contact  information:  [address]   [place] [address]  [phone]               Don Norris MD Follow up in 1 month(s).    Specialty: Pulmonary Disease  Contact information:  [address]  [address]  [facility] [address]  [phone]                       Contact information for after-discharge care       Destination       [facility] .    Service: Long Term Acute Care  Contact information:  [address] [facility] [address]  [phone]                                     Immunizations Administered as of 3/18/2025       No immunizations on file.              Sheri Nix, NP

## 2025-03-18 NOTE — DISCHARGE SUMMARY
[facility] Medicine  Discharge Summary      Patient Name: Bhavana Lambert  MRN: [id number]  [facility]: [id number]  Patient Class: IP- Inpatient  Admission Date: 3/4/2025  Hospital Length of Stay: 14 days  Discharge Date and Time: 3/18/2025  6:01 PM  Attending Physician: Frank Francois MD   Discharging Provider: Sheri Nix NP  Primary Care Provider: Pepito Jhaveri IV, MD    Primary Care Team: Networked reference to record PCT     HPI:   69-year-old female with PMHx of COPD, HTN, hypothyroidism, prior stroke who presented to the ED with a complaint of lumbar and bilateral hip pain status post fall several days ago. The patient reports that she was wearing socks and she slipped, falling backwards towards a wall and then slid down the wall to a sitting position. She denies loss of consciousness, chest pain, current shortness of breath, dizziness, nausea or vomiting, incontinence of bowel or bladder. She is on plavix at home.     In the ED, /79  RR 20 afebrile and 96% on 3L NC (baseline).     Imaging showed Acute burst fracture of T12. Case was discussed with Dr. Boothe ([facility] – [place], [place]) who will consult on the patient. She was given tylenol, morphine, and zofran and stable for transfer, no neuro deficits.     Per chart review from recent admission on February 9th to February 14, 2025, the patient was admitted for COPD exacerbation and did have a history of previous EtOH abuse with subsequent falls.     * No surgery found *      Hospital Course:   Bhavana Lambert is 69-year-old female with PMH COPD, HTN, hypothyroidism, prior stroke.  She was admitted to the hospital for management of an acute burst fracture of T12.  Neurosurgery following.  MRI obtained.  X-ray thoracolumbar spine ordered and showed stability.  PT/OT consulted.  TLSO brace applied for activity per neurosurgery recommendation.  Pt developed ETOH withdrawals with delirium on 03/06 and was placed on Librium protocol.   She had worsening hyponatremia prompting further workup though this notably improved with diet liberalization.  Her DTs were much improved on 03/07 after Librium initiation.  Sodium levels continued to improve.  Pain was controlled with oral narcotics.  Librium was down titrated.  On 03/11 she developed increased O2 requirements with low-grade temp 100.5°.  CXR revealed acute on chronic changes of patchy infiltrates.  She reported reflux overnight-concerning clinically for aspiration.  She was placed on BiPAP after ABG revealed hypercapnia.  She was transferred to ICU for continued BiPAP therapy and pulmonology was consulted.  She was placed on IV Abx and sepsis workup was pursued. Patient weaned off on intravenous Levophed.  Urine cultures grew Proteus mirabilis and group B strep.  Physical therapy and occupational therapy has been consulted.   arranging skilled nursing facility placement.  Nutrition consulted secondary to decreased oral intake.  Deescalate antibiotics to Augmentin.  Patient okay to step-down from ICU.     Goals of Care Treatment Preferences:  Code Status: Full Code      SDOH Screening:  The patient declined to be screened for utility difficulties, food insecurity, transport difficulties, housing insecurity, and interpersonal safety, so no concerns could be identified this admission.     Consults:   Consults (From admission, onward)          Status Ordering Provider     Inpatient consult to   Once        Provider:  (Not yet assigned)    Acknowledged MICHAEL MORSE     Inpatient consult to Registered Dietitian/Nutritionist  Once        Provider:  (Not yet assigned)    Completed RADHA REAL     Inpatient consult to Midline team  Once        Provider:  (Not yet assigned)    Completed CAESAR AGUILAR     Inpatient consult to Midline team  Once        Provider:  (Not yet assigned)    Completed SAQIB RENTERIA     Inpatient consult to Critical Care Medicine  Once         Provider:  Tammy Anand MD    Completed NAHOMY CASTANO     Inpatient consult to Pulmonology  Once        Provider:  Khalif Valdez MD    Completed NAHOMY CASTANO     Inpatient consult to   Once        Provider:  (Not yet assigned)    Completed NAHOMY CASTANO     Inpatient consult to Neurosurgery  Once        Provider:  Raffy Boothe, DO    Acknowledged MIGUEL MOBLEY            Assessment & Plan  T12 burst fracture  MRI shows acute compression fracture of T12 with loss of height of approximately 25%.  There is minimal retropulsion of the posterior cortex measuring 3 mm without central canal stenosis.  Old mild chronic compression fracture of T11.  Multilevel degenerative disc disease and facet hypertrophy with mild central canal stenosis and foraminal narrowing at L2-3, L3-4, and L4-5  Pain control  Neurochecks   Neurosurgery consulted- T XR stable  TLSO brace with mobilization  Repeat XR 2/2 fall  PT/OT- SNF recommending.  CM pursuing    Hypothyroid  Patient has chronic hypothyroidism. TFTs reviewed-   Lab Results   Component Value Date    TSH 3.155 03/06/2025   Will continue chronic levothyroxine and adjust for and clinical changes.     Mixed hyperlipidemia  Continue home statin    Tobacco dependency  Smoking cessation counseling performed. Dangers of cigarette smoking were reviewed with patient in detail and patient was encouraged to quit. Nicotine replacement options were discussed for > 3 minutes.  Receiving Nicoderm.  Alcohol dependence with withdrawal delirium  Pt with confirmed daily ETOH utilization appeared with DTs on 03/06   -initiated scheduled Librium 3/6 with much improvement.    -Librium titrated off  -continue p.r.n. Ativan   -nursing to perform CIWA  -fall, seizure precautions   -daily MVI, folic acid, thiamine    Anemia  Anemia is likely due to Iron deficiency. Most recent hemoglobin and hematocrit are listed below.  Recent Labs     03/16/25  1005  03/17/25  0600 03/18/25  0418   HGB 8.8* 8.7* 8.3*   HCT 29.2* 28.6* 27.8*     Plan  - Monitor serial CBC: Daily  - Transfuse PRBC if patient becomes hemodynamically unstable, symptomatic or H/H drops below 7/21.  - Patient has not received any PRBC transfusions to date  - Patient's anemia is currently stable  - Chronic problem.        Aspiration pneumonia of both lungs  UTI (urinary tract infection) due to Proteus mirabilis species  Patient has a diagnosis of pneumonia. The cause of the pneumonia is bacterial in etiology due to Gram negative pathogens associated with aspiration . The pneumonia is  acute . The patient has the following signs/symptoms of pneumonia: persistent hypoxia , cough, and shortness of breath. The patient does have a current oxygen requirement and the patient does have a home oxygen requirement. I have reviewed the pertinent imaging. The following cultures have been collected: Blood cultures and Sputum culture The culture results are listed below.     Current antimicrobial regimen consists of the antibiotics listed below. Will monitor patient closely and continue current treatment plan unchanged.    Antibiotics (From admission, onward)      None            Microbiology Results (last 7 days)       Procedure Component Value Units Date/Time    Blood culture [[id number]] Collected: 03/11/25 0944    Order Status: Completed Specimen: Blood from Peripheral, Hand, Right Updated: 03/16/25 1232     Blood Culture, Routine No growth after 5 days.    Blood culture [[id number]] Collected: 03/11/25 0944    Order Status: Completed Specimen: Blood from Peripheral, Antecubital, Right Updated: 03/16/25 1232     Blood Culture, Routine No growth after 5 days.    Culture, Respiratory with Gram Stain [[id number]] Collected: 03/12/25 1047    Order Status: Completed Specimen: Respiratory from Sputum, Expectorated Updated: 03/14/25 0857     Respiratory Culture Normal respiratory kehinde     Gram Stain (Respiratory) Many  WBC's     Gram Stain (Respiratory) >10 epithelial cells per low power field     Gram Stain (Respiratory) Few budding yeast     Gram Stain (Respiratory) Few Gram positive cocci    Urine culture [[id number]]  (Abnormal)  (Susceptibility) Collected: 03/11/25 1406    Order Status: Completed Specimen: Urine Updated: 03/14/25 0830     Urine Culture, Routine PROTEUS MIRABILIS  >100,000 cfu/ml        STREPTOCOCCUS AGALACTIAE (GROUP B)  > 100,000 cfu/ml  In case of Penicillin allergy, call lab for further testing.  Beta-hemolytic streptococci are routinely susceptible to   penicillins,cephalosporins and carbapenems.      Narrative:      Specimen Source->Urine          Afebrile, WBC normal   Received 4.5 days of Zosyn   Sputum culture did not grow any predominant bacteria  De-escalated to Augmentin for UTI, and to continue treatment for suspected CPAP    Patient has a diagnosis of pneumonia. The cause of the pneumonia is bacterial in etiology due to Gram negative pathogens associated with aspiration . The pneumonia is  acute . The patient has the following signs/symptoms of pneumonia: persistent hypoxia , cough, and shortness of breath. The patient does have a current oxygen requirement and the patient does have a home oxygen requirement. I have reviewed the pertinent imaging. The following cultures have been collected: Blood cultures and Sputum culture The culture results are listed below.     Current antimicrobial regimen consists of the antibiotics listed below. Will monitor patient closely and continue current treatment plan unchanged.    Antibiotics (From admission, onward)      None            Microbiology Results (last 7 days)       Procedure Component Value Units Date/Time    Blood culture [[id number]] Collected: 03/11/25 0944    Order Status: Completed Specimen: Blood from Peripheral, Hand, Right Updated: 03/16/25 1232     Blood Culture, Routine No growth after 5 days.    Blood culture [[id number]] Collected:  03/11/25 0944    Order Status: Completed Specimen: Blood from Peripheral, Antecubital, Right Updated: 03/16/25 1232     Blood Culture, Routine No growth after 5 days.    Culture, Respiratory with Gram Stain [[id number]] Collected: 03/12/25 1047    Order Status: Completed Specimen: Respiratory from Sputum, Expectorated Updated: 03/14/25 0857     Respiratory Culture Normal respiratory kehinde     Gram Stain (Respiratory) Many WBC's     Gram Stain (Respiratory) >10 epithelial cells per low power field     Gram Stain (Respiratory) Few budding yeast     Gram Stain (Respiratory) Few Gram positive cocci    Urine culture [[id number]]  (Abnormal)  (Susceptibility) Collected: 03/11/25 1406    Order Status: Completed Specimen: Urine Updated: 03/14/25 0830     Urine Culture, Routine PROTEUS MIRABILIS  >100,000 cfu/ml        STREPTOCOCCUS AGALACTIAE (GROUP B)  > 100,000 cfu/ml  In case of Penicillin allergy, call lab for further testing.  Beta-hemolytic streptococci are routinely susceptible to   penicillins,cephalosporins and carbapenems.      Narrative:      Specimen Source->Urine          Urine culture growing Proteus mirabilis and strep agalactiae  Deescalating from Zosyn to Augmentin for 2 more days    Patient has a diagnosis of pneumonia. The cause of the pneumonia is bacterial in etiology due to Gram negative pathogens associated with aspiration . The pneumonia is  acute . The patient has the following signs/symptoms of pneumonia: persistent hypoxia , cough, and shortness of breath. The patient does have a current oxygen requirement and the patient does have a home oxygen requirement. I have reviewed the pertinent imaging. The following cultures have been collected: Blood cultures and Sputum culture The culture results are listed below.     Current antimicrobial regimen consists of the antibiotics listed below. Will monitor patient closely and continue current treatment plan unchanged.    Antibiotics (From admission,  onward)      None            Microbiology Results (last 7 days)       Procedure Component Value Units Date/Time    Blood culture [[id number]] Collected: 03/11/25 0944    Order Status: Completed Specimen: Blood from Peripheral, Hand, Right Updated: 03/16/25 1232     Blood Culture, Routine No growth after 5 days.    Blood culture [[id number]] Collected: 03/11/25 0944    Order Status: Completed Specimen: Blood from Peripheral, Antecubital, Right Updated: 03/16/25 1232     Blood Culture, Routine No growth after 5 days.    Culture, Respiratory with Gram Stain [[id number]] Collected: 03/12/25 1047    Order Status: Completed Specimen: Respiratory from Sputum, Expectorated Updated: 03/14/25 0857     Respiratory Culture Normal respiratory kehinde     Gram Stain (Respiratory) Many WBC's     Gram Stain (Respiratory) >10 epithelial cells per low power field     Gram Stain (Respiratory) Few budding yeast     Gram Stain (Respiratory) Few Gram positive cocci    Urine culture [[id number]]  (Abnormal)  (Susceptibility) Collected: 03/11/25 1406    Order Status: Completed Specimen: Urine Updated: 03/14/25 0830     Urine Culture, Routine PROTEUS MIRABILIS  >100,000 cfu/ml        STREPTOCOCCUS AGALACTIAE (GROUP B)  > 100,000 cfu/ml  In case of Penicillin allergy, call lab for further testing.  Beta-hemolytic streptococci are routinely susceptible to   penicillins,cephalosporins and carbapenems.      Narrative:      Specimen Source->Urine          Afebrile, WBC normal   Received 4.5 days of Zosyn   Sputum culture did not grow any predominant bacteria  De-escalated to Augmentin for UTI, and to continue treatment for suspected CPAP    Patient has a diagnosis of pneumonia. The cause of the pneumonia is bacterial in etiology due to Gram negative pathogens associated with aspiration . The pneumonia is  acute . The patient has the following signs/symptoms of pneumonia: persistent hypoxia , cough, and shortness of breath. The patient does have a  current oxygen requirement and the patient does have a home oxygen requirement. I have reviewed the pertinent imaging. The following cultures have been collected: Blood cultures and Sputum culture The culture results are listed below.     Current antimicrobial regimen consists of the antibiotics listed below. Will monitor patient closely and continue current treatment plan unchanged.    Antibiotics (From admission, onward)      None            Microbiology Results (last 7 days)       Procedure Component Value Units Date/Time    Blood culture [[id number]] Collected: 03/11/25 0944    Order Status: Completed Specimen: Blood from Peripheral, Hand, Right Updated: 03/16/25 1232     Blood Culture, Routine No growth after 5 days.    Blood culture [[id number]] Collected: 03/11/25 0944    Order Status: Completed Specimen: Blood from Peripheral, Antecubital, Right Updated: 03/16/25 1232     Blood Culture, Routine No growth after 5 days.    Culture, Respiratory with Gram Stain [[id number]] Collected: 03/12/25 1047    Order Status: Completed Specimen: Respiratory from Sputum, Expectorated Updated: 03/14/25 0857     Respiratory Culture Normal respiratory kehinde     Gram Stain (Respiratory) Many WBC's     Gram Stain (Respiratory) >10 epithelial cells per low power field     Gram Stain (Respiratory) Few budding yeast     Gram Stain (Respiratory) Few Gram positive cocci    Urine culture [[id number]]  (Abnormal)  (Susceptibility) Collected: 03/11/25 1406    Order Status: Completed Specimen: Urine Updated: 03/14/25 0830     Urine Culture, Routine PROTEUS MIRABILIS  >100,000 cfu/ml        STREPTOCOCCUS AGALACTIAE (GROUP B)  > 100,000 cfu/ml  In case of Penicillin allergy, call lab for further testing.  Beta-hemolytic streptococci are routinely susceptible to   penicillins,cephalosporins and carbapenems.      Narrative:      Specimen Source->Urine          Urine culture growing Proteus mirabilis and strep agalactiae  Deescalating from  Zosyn to Augmentin for 2 more days    Patient has a diagnosis of pneumonia. The cause of the pneumonia is bacterial in etiology due to Gram negative pathogens associated with aspiration . The pneumonia is  acute . The patient has the following signs/symptoms of pneumonia: persistent hypoxia , cough, and shortness of breath. The patient does have a current oxygen requirement and the patient does have a home oxygen requirement. I have reviewed the pertinent imaging. The following cultures have been collected: Blood cultures and Sputum culture The culture results are listed below.     Current antimicrobial regimen consists of the antibiotics listed below. Will monitor patient closely and continue current treatment plan unchanged.    Antibiotics (From admission, onward)      None          Microbiology Results (last 7 days)       Procedure Component Value Units Date/Time    Blood culture [[id number]] Collected: 03/11/25 0944    Order Status: Completed Specimen: Blood from Peripheral, Hand, Right Updated: 03/16/25 1232     Blood Culture, Routine No growth after 5 days.    Blood culture [[id number]] Collected: 03/11/25 0944    Order Status: Completed Specimen: Blood from Peripheral, Antecubital, Right Updated: 03/16/25 1232     Blood Culture, Routine No growth after 5 days.    Culture, Respiratory with Gram Stain [[id number]] Collected: 03/12/25 1047    Order Status: Completed Specimen: Respiratory from Sputum, Expectorated Updated: 03/14/25 0857     Respiratory Culture Normal respiratory kehinde     Gram Stain (Respiratory) Many WBC's     Gram Stain (Respiratory) >10 epithelial cells per low power field     Gram Stain (Respiratory) Few budding yeast     Gram Stain (Respiratory) Few Gram positive cocci    Urine culture [[id number]]  (Abnormal)  (Susceptibility) Collected: 03/11/25 1406    Order Status: Completed Specimen: Urine Updated: 03/14/25 0830     Urine Culture, Routine PROTEUS MIRABILIS  >100,000 cfu/ml         STREPTOCOCCUS AGALACTIAE (GROUP B)  > 100,000 cfu/ml  In case of Penicillin allergy, call lab for further testing.  Beta-hemolytic streptococci are routinely susceptible to   penicillins,cephalosporins and carbapenems.      Narrative:      Specimen Source->Urine          Microbiology Results (last 7 days)       Procedure Component Value Units Date/Time    Blood culture [[id number]] Collected: 03/11/25 0944    Order Status: Completed Specimen: Blood from Peripheral, Hand, Right Updated: 03/16/25 1232     Blood Culture, Routine No growth after 5 days.    Blood culture [[id number]] Collected: 03/11/25 0944    Order Status: Completed Specimen: Blood from Peripheral, Antecubital, Right Updated: 03/16/25 1232     Blood Culture, Routine No growth after 5 days.    Culture, Respiratory with Gram Stain [[id number]] Collected: 03/12/25 1047    Order Status: Completed Specimen: Respiratory from Sputum, Expectorated Updated: 03/14/25 0857     Respiratory Culture Normal respiratory kehinde     Gram Stain (Respiratory) Many WBC's     Gram Stain (Respiratory) >10 epithelial cells per low power field     Gram Stain (Respiratory) Few budding yeast     Gram Stain (Respiratory) Few Gram positive cocci    Urine culture [[id number]]  (Abnormal)  (Susceptibility) Collected: 03/11/25 1406    Order Status: Completed Specimen: Urine Updated: 03/14/25 0830     Urine Culture, Routine PROTEUS MIRABILIS  >100,000 cfu/ml        STREPTOCOCCUS AGALACTIAE (GROUP B)  > 100,000 cfu/ml  In case of Penicillin allergy, call lab for further testing.  Beta-hemolytic streptococci are routinely susceptible to   penicillins,cephalosporins and carbapenems.      Narrative:      Specimen Source->Urine          Collected: 03/12/25 1047     Specimen: Respiratory from Sputum, Expectorated Updated: 03/14/25 0857       Antibiotics (From admission, onward)      None            Microbiology Results (last 7 days)       Procedure Component Value Units Date/Time    Blood  culture [[id number]] Collected: 03/11/25 0944    Order Status: Completed Specimen: Blood from Peripheral, Hand, Right Updated: 03/16/25 1232     Blood Culture, Routine No growth after 5 days.    Blood culture [[id number]] Collected: 03/11/25 0944    Order Status: Completed Specimen: Blood from Peripheral, Antecubital, Right Updated: 03/16/25 1232     Blood Culture, Routine No growth after 5 days.    Culture, Respiratory with Gram Stain [[id number]] Collected: 03/12/25 1047    Order Status: Completed Specimen: Respiratory from Sputum, Expectorated Updated: 03/14/25 0857     Respiratory Culture Normal respiratory kehinde     Gram Stain (Respiratory) Many WBC's     Gram Stain (Respiratory) >10 epithelial cells per low power field     Gram Stain (Respiratory) Few budding yeast     Gram Stain (Respiratory) Few Gram positive cocci    Urine culture [[id number]]  (Abnormal)  (Susceptibility) Collected: 03/11/25 1406    Order Status: Completed Specimen: Urine Updated: 03/14/25 0830     Urine Culture, Routine PROTEUS MIRABILIS  >100,000 cfu/ml        STREPTOCOCCUS AGALACTIAE (GROUP B)  > 100,000 cfu/ml  In case of Penicillin allergy, call lab for further testing.  Beta-hemolytic streptococci are routinely susceptible to   penicillins,cephalosporins and carbapenems.      Narrative:      Specimen Source->Urine              Microbiology Results (last 7 days)       Procedure Component Value Units Date/Time    Blood culture [[id number]] Collected: 03/11/25 0944    Order Status: Completed Specimen: Blood from Peripheral, Hand, Right Updated: 03/16/25 1232     Blood Culture, Routine No growth after 5 days.    Blood culture [[id number]] Collected: 03/11/25 0944    Order Status: Completed Specimen: Blood from Peripheral, Antecubital, Right Updated: 03/16/25 1232     Blood Culture, Routine No growth after 5 days.    Culture, Respiratory with Gram Stain [[id number]] Collected: 03/12/25 1047    Order Status: Completed Specimen: Respiratory  from Sputum, Expectorated Updated: 03/14/25 0857     Respiratory Culture Normal respiratory kehinde     Gram Stain (Respiratory) Many WBC's     Gram Stain (Respiratory) >10 epithelial cells per low power field     Gram Stain (Respiratory) Few budding yeast     Gram Stain (Respiratory) Few Gram positive cocci    Urine culture [[id number]]  (Abnormal)  (Susceptibility) Collected: 03/11/25 1406    Order Status: Completed Specimen: Urine Updated: 03/14/25 0830     Urine Culture, Routine PROTEUS MIRABILIS  >100,000 cfu/ml        STREPTOCOCCUS AGALACTIAE (GROUP B)  > 100,000 cfu/ml  In case of Penicillin allergy, call lab for further testing.  Beta-hemolytic streptococci are routinely susceptible to   penicillins,cephalosporins and carbapenems.      Narrative:      Specimen Source->Urine              Microbiology Results (last 7 days)       Procedure Component Value Units Date/Time    Blood culture [[id number]] Collected: 03/11/25 0944    Order Status: Completed Specimen: Blood from Peripheral, Hand, Right Updated: 03/16/25 1232     Blood Culture, Routine No growth after 5 days.    Blood culture [[id number]] Collected: 03/11/25 0944    Order Status: Completed Specimen: Blood from Peripheral, Antecubital, Right Updated: 03/16/25 1232     Blood Culture, Routine No growth after 5 days.    Culture, Respiratory with Gram Stain [[id number]] Collected: 03/12/25 1047    Order Status: Completed Specimen: Respiratory from Sputum, Expectorated Updated: 03/14/25 0857     Respiratory Culture Normal respiratory kehinde     Gram Stain (Respiratory) Many WBC's     Gram Stain (Respiratory) >10 epithelial cells per low power field     Gram Stain (Respiratory) Few budding yeast     Gram Stain (Respiratory) Few Gram positive cocci    Urine culture [[id number]]  (Abnormal)  (Susceptibility) Collected: 03/11/25 1406    Order Status: Completed Specimen: Urine Updated: 03/14/25 0830     Urine Culture, Routine PROTEUS MIRABILIS  >100,000 cfu/ml         STREPTOCOCCUS AGALACTIAE (GROUP B)  > 100,000 cfu/ml  In case of Penicillin allergy, call lab for further testing.  Beta-hemolytic streptococci are routinely susceptible to   penicillins,cephalosporins and carbapenems.      Narrative:      Specimen Source->Urine              Microbiology Results (last 7 days)       Procedure Component Value Units Date/Time    Blood culture [[id number]] Collected: 03/11/25 0944    Order Status: Completed Specimen: Blood from Peripheral, Hand, Right Updated: 03/16/25 1232     Blood Culture, Routine No growth after 5 days.    Blood culture [[id number]] Collected: 03/11/25 0944    Order Status: Completed Specimen: Blood from Peripheral, Antecubital, Right Updated: 03/16/25 1232     Blood Culture, Routine No growth after 5 days.    Culture, Respiratory with Gram Stain [[id number]] Collected: 03/12/25 1047    Order Status: Completed Specimen: Respiratory from Sputum, Expectorated Updated: 03/14/25 0857     Respiratory Culture Normal respiratory kehinde     Gram Stain (Respiratory) Many WBC's     Gram Stain (Respiratory) >10 epithelial cells per low power field     Gram Stain (Respiratory) Few budding yeast     Gram Stain (Respiratory) Few Gram positive cocci    Urine culture [[id number]]  (Abnormal)  (Susceptibility) Collected: 03/11/25 1406    Order Status: Completed Specimen: Urine Updated: 03/14/25 0830     Urine Culture, Routine PROTEUS MIRABILIS  >100,000 cfu/ml        STREPTOCOCCUS AGALACTIAE (GROUP B)  > 100,000 cfu/ml  In case of Penicillin allergy, call lab for further testing.  Beta-hemolytic streptococci are routinely susceptible to   penicillins,cephalosporins and carbapenems.      Narrative:      Specimen Source->Urine              Microbiology Results (last 7 days)       Procedure Component Value Units Date/Time    Blood culture [[id number]] Collected: 03/11/25 0944    Order Status: Completed Specimen: Blood from Peripheral, Hand, Right Updated: 03/16/25 1232     Blood  Culture, Routine No growth after 5 days.    Blood culture [[id number]] Collected: 03/11/25 0944    Order Status: Completed Specimen: Blood from Peripheral, Antecubital, Right Updated: 03/16/25 1232     Blood Culture, Routine No growth after 5 days.    Culture, Respiratory with Gram Stain [[id number]] Collected: 03/12/25 1047    Order Status: Completed Specimen: Respiratory from Sputum, Expectorated Updated: 03/14/25 0857     Respiratory Culture Normal respiratory kehinde     Gram Stain (Respiratory) Many WBC's     Gram Stain (Respiratory) >10 epithelial cells per low power field     Gram Stain (Respiratory) Few budding yeast     Gram Stain (Respiratory) Few Gram positive cocci    Urine culture [[id number]]  (Abnormal)  (Susceptibility) Collected: 03/11/25 1406    Order Status: Completed Specimen: Urine Updated: 03/14/25 0830     Urine Culture, Routine PROTEUS MIRABILIS  >100,000 cfu/ml        STREPTOCOCCUS AGALACTIAE (GROUP B)  > 100,000 cfu/ml  In case of Penicillin allergy, call lab for further testing.  Beta-hemolytic streptococci are routinely susceptible to   penicillins,cephalosporins and carbapenems.      Narrative:      Specimen Source->Urine               Microbiology Results (last 7 days)       Procedure Component Value Units Date/Time    Blood culture [[id number]] Collected: 03/11/25 0944    Order Status: Completed Specimen: Blood from Peripheral, Hand, Right Updated: 03/16/25 1232     Blood Culture, Routine No growth after 5 days.    Blood culture [[id number]] Collected: 03/11/25 0944    Order Status: Completed Specimen: Blood from Peripheral, Antecubital, Right Updated: 03/16/25 1232     Blood Culture, Routine No growth after 5 days.    Culture, Respiratory with Gram Stain [[id number]] Collected: 03/12/25 1047    Order Status: Completed Specimen: Respiratory from Sputum, Expectorated Updated: 03/14/25 0857     Respiratory Culture Normal respiratory kehinde     Gram Stain (Respiratory) Many WBC's     Gram  Stain (Respiratory) >10 epithelial cells per low power field     Gram Stain (Respiratory) Few budding yeast     Gram Stain (Respiratory) Few Gram positive cocci    Urine culture [[id number]]  (Abnormal)  (Susceptibility) Collected: 03/11/25 1406    Order Status: Completed Specimen: Urine Updated: 03/14/25 0830     Urine Culture, Routine PROTEUS MIRABILIS  >100,000 cfu/ml        STREPTOCOCCUS AGALACTIAE (GROUP B)  > 100,000 cfu/ml  In case of Penicillin allergy, call lab for further testing.  Beta-hemolytic streptococci are routinely susceptible to   penicillins,cephalosporins and carbapenems.      Narrative:      Specimen Source->Urine          Microbiology Results (last 7 days)       Procedure Component Value Units Date/Time    Blood culture [[id number]] Collected: 03/11/25 0944    Order Status: Completed Specimen: Blood from Peripheral, Hand, Right Updated: 03/16/25 1232     Blood Culture, Routine No growth after 5 days.    Blood culture [[id number]] Collected: 03/11/25 0944    Order Status: Completed Specimen: Blood from Peripheral, Antecubital, Right Updated: 03/16/25 1232     Blood Culture, Routine No growth after 5 days.    Culture, Respiratory with Gram Stain [[id number]] Collected: 03/12/25 1047    Order Status: Completed Specimen: Respiratory from Sputum, Expectorated Updated: 03/14/25 0857     Respiratory Culture Normal respiratory kehinde     Gram Stain (Respiratory) Many WBC's     Gram Stain (Respiratory) >10 epithelial cells per low power field     Gram Stain (Respiratory) Few budding yeast     Gram Stain (Respiratory) Few Gram positive cocci    Urine culture [[id number]]  (Abnormal)  (Susceptibility) Collected: 03/11/25 1406    Order Status: Completed Specimen: Urine Updated: 03/14/25 0830     Urine Culture, Routine PROTEUS MIRABILIS  >100,000 cfu/ml        STREPTOCOCCUS AGALACTIAE (GROUP B)  > 100,000 cfu/ml  In case of Penicillin allergy, call lab for further testing.  Beta-hemolytic streptococci are  routinely susceptible to   penicillins,cephalosporins and carbapenems.      Narrative:      Specimen Source->Urine          Antibiotics (From admission, onward)      None          Antibiotics (From admission, onward)      None          Diarrhea  Chronic. Noted.   PRN Imodium     Acute on chronic respiratory failure with hypoxia and hypercapnia  Patient with Hypercapnic and Hypoxic Respiratory failure which is Acute on chronic.  she is on home oxygen at 3.5 LPM. Supplemental oxygen was provided and noted-      .   Signs/symptoms of respiratory failure include- tachypnea and increased work of breathing. Contributing diagnoses includes - Aspiration, COPD, Interstitial lung disease, and Pneumonia Labs and images were reviewed. Patient Has recent ABG, which has been reviewed. Will treat underlying causes and adjust management of respiratory failure as follows-   -increase in NC at 5L  -Transitioned to oral abx   -repeat CXR pending  -ABG reviewed  -Pulm following  Final Active Diagnoses:    Diagnosis Date Noted POA    PRINCIPAL PROBLEM:  T12 burst fracture [S22.081A] 03/04/2025 Yes    UTI (urinary tract infection) due to Proteus mirabilis species [N39.0] 03/12/2025 No    Anemia [D64.9] 03/09/2025 Yes    Alcohol dependence with withdrawal delirium [F10.231] 03/06/2025 No    Tobacco dependency [F17.200] 03/05/2025 Yes    Acute on chronic respiratory failure with hypoxia and hypercapnia [J96.21, J96.22] 02/10/2025 Yes    Aspiration pneumonia of both lungs [J69.0] 02/10/2025 Yes    Mixed hyperlipidemia [E78.2] 11/08/2023 Yes    Hypothyroid [E03.9] 10/26/2023 Yes    Diarrhea [R19.7] 09/13/2020 Yes      Problems Resolved During this Admission:    Diagnosis Date Noted Date Resolved POA    Hyponatremia [E87.1] 10/27/2023 03/16/2025 Yes       Discharged Condition: stable    Disposition: Long Term Acute Care    Follow Up:   Contact information for follow-up providers       Pepito Jhaveri IV, MD Follow up in 3 day(s).     Specialty: Family Medicine  Why: As needed  Contact information:  [address]   [place] [address]  [phone]               Don Norris MD Follow up in 1 month(s).    Specialty: Pulmonary Disease  Contact information:  [address]  [address]  [place] [address]  [phone]                       Contact information for after-discharge care       Destination       [facility] .    Service: Long Term Acute Care  Contact information:  [address] [facility] [address]  [phone]                                 Patient Instructions:      BIPAP FOR HOME USE     Order Specific Question Answer Comments   Length of need (1-99 months): 99    Type:  BiPap    BiPap setting (cmH20) Inspiratory: 12    BiPap setting (cmH20) Expiratory: 6    Humidification (): Heated    Choose ONE mask type and its corresponding cushions and/or pillows:  Full Face Mask, 1 per 90 days:  Full Face Cushion, (3 per 90 days)    Choose EITHER Heated or Non-Heated Tubjing  Non-Heated Tubing, 1 per 90 days    All other supplies as needed as listed below:  Headgear, 1 per 180 days    All other supplies as needed as listed below:  Chin Strap, 1 per 180 days    All other supplies as needed as listed below:  Non-Disposable Filter, 1 per 180 days    All other supplies as needed as listed below:  Disposable Filter, 6 per 90 days    All other supplies as needed as listed below:  Exhalation Port, contact payer for quantity/frequency    All other supplies as needed as listed below:  Humidifier Chamber, 1 per 180 days      Ambulatory referral/consult to Smoking Cessation Program   Standing Status: Future   Referral Priority: Routine Referral Type: Consultation   Referral Reason: Specialty Services Required   Requested Specialty: CTTS   Number of Visits Requested: 1       Significant Diagnostic Studies: Labs: CMP   Recent Labs   Lab 03/18/25  0418       K 4.3      CO2 34*      BUN 6*   CREATININE 0.7   CALCIUM 8.5*   ANIONGAP 4*   , CBC   Recent Labs   Lab 03/18/25  0418   WBC 6.63   HGB 8.3*   HCT 27.8*      , and All labs within the past 24 hours have been reviewed  Microbiology Results (last 7 days)       Procedure Component Value Units Date/Time    Blood culture [[id number]] Collected: 03/11/25 0944    Order Status: Completed Specimen: Blood from Peripheral, Hand, Right Updated: 03/16/25 1232     Blood Culture, Routine No growth after 5 days.    Blood culture [[id number]] Collected: 03/11/25 0944    Order Status: Completed Specimen: Blood from Peripheral, Antecubital, Right Updated: 03/16/25 1232     Blood Culture, Routine No growth after 5 days.    Culture, Respiratory with Gram Stain [[id number]] Collected: 03/12/25 1047    Order Status: Completed Specimen: Respiratory from Sputum, Expectorated Updated: 03/14/25 0857     Respiratory Culture Normal respiratory kehinde     Gram Stain (Respiratory) Many WBC's     Gram Stain (Respiratory) >10 epithelial cells per low power field     Gram Stain (Respiratory) Few budding yeast     Gram Stain (Respiratory) Few Gram positive cocci    Urine culture [[id number]]  (Abnormal)  (Susceptibility) Collected: 03/11/25 1406    Order Status: Completed Specimen: Urine Updated: 03/14/25 0830     Urine Culture, Routine PROTEUS MIRABILIS  >100,000 cfu/ml        STREPTOCOCCUS AGALACTIAE (GROUP B)  > 100,000 cfu/ml  In case of Penicillin allergy, call lab for further testing.  Beta-hemolytic streptococci are routinely susceptible to   penicillins,cephalosporins and carbapenems.      Narrative:      Specimen Source->Urine            Pending Diagnostic Studies:       None           Medications:  Reconciled Home Medications:      Medication List        START taking these medications      clonazePAM 0.5 MG tablet  Commonly known as: KlonoPIN  Take 1 tablet (0.5 mg total) by mouth 2 (two) times daily as needed  (anxiety).     famotidine 20 MG tablet  Commonly known as: PEPCID  Take 1 tablet (20 mg total) by mouth 2 (two) times daily.     gabapentin 300 MG capsule  Commonly known as: NEURONTIN  Take 1 capsule (300 mg total) by mouth 3 (three) times daily.     loperamide 2 mg capsule  Commonly known as: IMODIUM  Take 1 capsule (2 mg total) by mouth 4 (four) times daily as needed for Diarrhea.     multivitamin Tab  Take 1 tablet by mouth once daily.     potassium chloride SA 20 MEQ tablet  Commonly known as: K-DUR,KLOR-CON  Take 2 tablets (40 mEq total) by mouth once daily.     thiamine 100 MG tablet  Take 1 tablet (100 mg total) by mouth once daily.            CHANGE how you take these medications      HYDROcodone-acetaminophen  mg per tablet  Commonly known as: NORCO  Take 1 tablet by mouth every 6 (six) hours as needed for Pain.  What changed: when to take this            CONTINUE taking these medications      * albuterol 90 mcg/actuation inhaler  Commonly known as: PROVENTIL/VENTOLIN HFA  Inhale 2 puffs into the lungs every 6 (six) hours as needed for Wheezing or Shortness of Breath.     * albuterol 90 mcg/actuation inhaler  Commonly known as: PROVENTIL/VENTOLIN HFA  Inhale 2 puffs into the lungs every 6 (six) hours as needed for Wheezing or Shortness of Breath. Rescue     albuterol-ipratropium 2.5 mg-0.5 mg/3 mL nebulizer solution  Commonly known as: DUO-NEB  Take 3 mLs by nebulization every 6 (six) hours as needed for Wheezing. Rescue     aspirin 81 MG EC tablet  Commonly known as: ECOTRIN  Take 1 tablet (81 mg total) by mouth once daily.     atorvastatin 80 MG tablet  Commonly known as: LIPITOR  Take 1 tablet (80 mg total) by mouth once daily.     busPIRone 15 MG tablet  Commonly known as: BUSPAR  Take 15 mg by mouth 2 (two) times a day.     carvediloL 25 MG tablet  Commonly known as: COREG  Take 0.5 tablets (12.5 mg total) by mouth 2 (two) times daily.     clopidogreL 75 mg tablet  Commonly known as:  PLAVIX  Take 1 tablet (75 mg total) by mouth once daily.     cyanocobalamin 1,000 mcg/mL injection  Inject 1,000 mcg into the muscle every 30 days.     cyclobenzaprine 10 MG tablet  Commonly known as: FLEXERIL  Take 1 tablet by mouth 3 times daily as needed.     FLUoxetine 40 MG capsule  Take 40 mg by mouth once daily.     folic acid 1 MG tablet  Commonly known as: FOLVITE  Take 1 tablet (1 mg total) by mouth once daily.     levothyroxine 50 MCG tablet  Commonly known as: SYNTHROID  Take 50 mcg by mouth before breakfast.     LIDOcaine 4 % Ptmd  Apply 1 patch topically 2 (two) times daily as needed (pain).     montelukast 10 mg tablet  Commonly known as: SINGULAIR  Take 10 mg by mouth every morning.     nicotine 21 mg/24 hr  Commonly known as: NICODERM CQ  Place 1 patch onto the skin once daily.     ondansetron 4 MG tablet  Commonly known as: ZOFRAN  Take 4 mg by mouth every 6 (six) hours as needed for Nausea.     spironolactone 25 MG tablet  Commonly known as: ALDACTONE  Take 0.5 tablets (12.5 mg total) by mouth once daily.     TRELEGY ELLIPTA 100-62.5-25 mcg Dsdv  Generic drug: fluticasone-umeclidin-vilanter  Inhale 1 puff into the lungs once daily.     VITAMIN D2 1,250 mcg (50,000 unit) capsule  Generic drug: ergocalciferol  Take 50,000 Units by mouth every 7 days.           * This list has 2 medication(s) that are the same as other medications prescribed for you. Read the directions carefully, and ask your doctor or other care provider to review them with you.                  Indwelling Lines/Drains at time of discharge:   Lines/Drains/Airways       None                   Time spent on the discharge of patient: 35 minutes         Sheri Nix NP  Department of Hospital Medicine  [facility]

## 2025-04-16 ENCOUNTER — DOCUMENT SCAN (OUTPATIENT)
Dept: HOME HEALTH SERVICES | Facility: HOSPITAL | Age: 70
End: 2025-04-16
Payer: MEDICARE

## 2025-04-18 NOTE — ASSESSMENT & PLAN NOTE
She has had multiple fractures to wrists, arms, ribs, and now T-spine  I suspect she underplays her ETOH use  PT and OT     WOCN note:    46 yr old male admitted 4/17/25 with left humeral neck fracture. WOCN consult received for right lower leg wound. Patient has a hx of DM and presents with chronic skin changes consistent with venous stasis with hemosiderin staining to both LE gaiter regions as well as edema and stasis dermatitis to the RLE. The wound measures 2 x 2 cm with a thin film of yellow slough and moderate amount of serosanguinous exudate.   ABIs were completed during his last hospitalization for cellulitis which were non compressible but no indications of arterial insufficiency. Patient reports he has an appointment with [facility] outpatient wound center on Thurs 4/24 for care of this wound.     The leg was cleansed and the wound cleansed with Vashe. The intact skin was moisturized with silicone based cream. A silver foam dressing was applied to the wound and secured with kerlix and 2 ACE wraps from the base of the toes to the bend of the knee. Patient given wound care instructions for home. Recommend follow up with the wound center as scheduled.

## 2025-04-30 ENCOUNTER — LAB REQUISITION (OUTPATIENT)
Dept: LAB | Facility: HOSPITAL | Age: 70
End: 2025-04-30
Payer: MEDICARE

## 2025-04-30 DIAGNOSIS — J69.0 PNEUMONITIS DUE TO INHALATION OF FOOD AND VOMIT: ICD-10-CM

## 2025-04-30 LAB
ABSOLUTE EOSINOPHIL (OHS): 0.03 K/UL
ABSOLUTE MONOCYTE (OHS): 0.46 K/UL (ref 0.3–1)
ABSOLUTE NEUTROPHIL COUNT (OHS): 1.87 K/UL (ref 1.8–7.7)
BASOPHILS # BLD AUTO: 0.02 K/UL
BASOPHILS NFR BLD AUTO: 0.6 %
ERYTHROCYTE [DISTWIDTH] IN BLOOD BY AUTOMATED COUNT: 14.1 % (ref 11.5–14.5)
HCT VFR BLD AUTO: 34.7 % (ref 37–48.5)
HGB BLD-MCNC: 10.7 GM/DL (ref 12–16)
IMM GRANULOCYTES # BLD AUTO: 0.01 K/UL (ref 0–0.04)
IMM GRANULOCYTES NFR BLD AUTO: 0.3 % (ref 0–0.5)
LYMPHOCYTES # BLD AUTO: 1.16 K/UL (ref 1–4.8)
MCH RBC QN AUTO: 30.3 PG (ref 27–31)
MCHC RBC AUTO-ENTMCNC: 30.8 G/DL (ref 32–36)
MCV RBC AUTO: 98 FL (ref 82–98)
NUCLEATED RBC (/100WBC) (OHS): 0 /100 WBC
PLATELET # BLD AUTO: 299 K/UL (ref 150–450)
PMV BLD AUTO: 10.7 FL (ref 9.2–12.9)
RBC # BLD AUTO: 3.53 M/UL (ref 4–5.4)
RELATIVE EOSINOPHIL (OHS): 0.8 %
RELATIVE LYMPHOCYTE (OHS): 32.7 % (ref 18–48)
RELATIVE MONOCYTE (OHS): 13 % (ref 4–15)
RELATIVE NEUTROPHIL (OHS): 52.6 % (ref 38–73)
WBC # BLD AUTO: 3.55 K/UL (ref 3.9–12.7)

## 2025-04-30 PROCEDURE — 85025 COMPLETE CBC W/AUTO DIFF WBC: CPT | Performed by: FAMILY MEDICINE

## 2025-06-08 NOTE — ASSESSMENT & PLAN NOTE
[facility] outreach    Patient states they accidentally pressed wrong button while answering [facility] outreach. Patient denies further questions or concerns for writer currently.     Questions     Question 1   Medication Questions   Do you have questions about any of your medications? Please press 1 if you do not have any questions or press 2 if you have any questions.   Has RX Questions       Call Status:     Answered   Used          Used:     Yes     if yes, what language?:     Turkish          Wrong Button Pressed         Call 1 :     Medication Questions (edited by JAMAL on 06/08/2025 05:38 PM CDT   Patient has a diagnosis of pneumonia. The cause of the pneumonia is bacterial in etiology due to Gram negative pathogens associated with aspiration. The pneumonia is acute. The patient has the following signs/symptoms of pneumonia: persistent hypoxia , cough, and shortness of breath. The patient does have a current oxygen requirement and the patient does have a home oxygen requirement. I have reviewed the pertinent imaging. The following cultures have been collected: Blood cultures and Sputum culture The culture results are listed below.     Current antimicrobial regimen consists of the antibiotics listed below. Will monitor patient closely and continue current treatment plan unchanged.    Antibiotics (From admission, onward)      None            Microbiology Results (last 7 days)       Procedure Component Value Units Date/Time    Blood culture [[id number]] Collected: 03/11/25 0944    Order Status: Completed Specimen: Blood from Peripheral, Hand, Right Updated: 03/16/25 1232     Blood Culture, Routine No growth after 5 days.    Blood culture [[id number]] Collected: 03/11/25 0944    Order Status: Completed Specimen: Blood from Peripheral, Antecubital, Right Updated: 03/16/25 1232     Blood Culture, Routine No growth after 5 days.    Culture, Respiratory with Gram Stain [[id number]] Collected: 03/12/25 1047    Order Status: Completed Specimen: Respiratory from Sputum, Expectorated Updated: 03/14/25 0857     Respiratory Culture Normal respiratory kehinde     Gram Stain (Respiratory) Many WBC's     Gram Stain (Respiratory) >10 epithelial cells per low power field     Gram Stain (Respiratory) Few budding yeast     Gram Stain (Respiratory) Few Gram positive cocci    Urine culture [[id number]]  (Abnormal)  (Susceptibility) Collected: 03/11/25 1406    Order Status: Completed Specimen: Urine Updated: 03/14/25 0830     Urine Culture, Routine PROTEUS MIRABILIS  >100,000 cfu/ml        STREPTOCOCCUS AGALACTIAE (GROUP B)  > 100,000  cfu/ml  In case of Penicillin allergy, call lab for further testing.  Beta-hemolytic streptococci are routinely susceptible to   penicillins,cephalosporins and carbapenems.      Narrative:      Specimen Source->Urine          Afebrile, WBC normal   Received 4.5 days of Zosyn   Sputum culture did not grow any predominant bacteria  De-escalated to Augmentin for UTI, and to continue treatment for suspected CPAP    Patient has a diagnosis of pneumonia. The cause of the pneumonia is bacterial in etiology due to Gram negative pathogens associated with aspiration. The pneumonia is acute. The patient has the following signs/symptoms of pneumonia: persistent hypoxia , cough, and shortness of breath. The patient does have a current oxygen requirement and the patient does have a home oxygen requirement. I have reviewed the pertinent imaging. The following cultures have been collected: Blood cultures and Sputum culture The culture results are listed below.     Current antimicrobial regimen consists of the antibiotics listed below. Will monitor patient closely and continue current treatment plan unchanged.    Antibiotics (From admission, onward)      None            Microbiology Results (last 7 days)       Procedure Component Value Units Date/Time    Blood culture [[id number]] Collected: 03/11/25 0944    Order Status: Completed Specimen: Blood from Peripheral, Hand, Right Updated: 03/16/25 1232     Blood Culture, Routine No growth after 5 days.    Blood culture [[id number]] Collected: 03/11/25 0944    Order Status: Completed Specimen: Blood from Peripheral, Antecubital, Right Updated: 03/16/25 1232     Blood Culture, Routine No growth after 5 days.    Culture, Respiratory with Gram Stain [[id number]] Collected: 03/12/25 1047    Order Status: Completed Specimen: Respiratory from Sputum, Expectorated Updated: 03/14/25 0857     Respiratory Culture Normal respiratory kehinde     Gram Stain (Respiratory) Many WBC's     Gram Stain  (Respiratory) >10 epithelial cells per low power field     Gram Stain (Respiratory) Few budding yeast     Gram Stain (Respiratory) Few Gram positive cocci    Urine culture [[id number]]  (Abnormal)  (Susceptibility) Collected: 03/11/25 1406    Order Status: Completed Specimen: Urine Updated: 03/14/25 0830     Urine Culture, Routine PROTEUS MIRABILIS  >100,000 cfu/ml        STREPTOCOCCUS AGALACTIAE (GROUP B)  > 100,000 cfu/ml  In case of Penicillin allergy, call lab for further testing.  Beta-hemolytic streptococci are routinely susceptible to   penicillins,cephalosporins and carbapenems.      Narrative:      Specimen Source->Urine          Urine culture growing Proteus mirabilis and strep agalactiae  Deescalating from Zosyn to Augmentin for 2 more days    Patient has a diagnosis of pneumonia. The cause of the pneumonia is bacterial in etiology due to Gram negative pathogens associated with aspiration. The pneumonia is acute. The patient has the following signs/symptoms of pneumonia: persistent hypoxia , cough, and shortness of breath. The patient does have a current oxygen requirement and the patient does have a home oxygen requirement. I have reviewed the pertinent imaging. The following cultures have been collected: Blood cultures and Sputum culture The culture results are listed below.     Current antimicrobial regimen consists of the antibiotics listed below. Will monitor patient closely and continue current treatment plan unchanged.    Antibiotics (From admission, onward)      None            Microbiology Results (last 7 days)       Procedure Component Value Units Date/Time    Blood culture [[id number]] Collected: 03/11/25 0944    Order Status: Completed Specimen: Blood from Peripheral, Hand, Right Updated: 03/16/25 1232     Blood Culture, Routine No growth after 5 days.    Blood culture [[id number]] Collected: 03/11/25 0944    Order Status: Completed Specimen: Blood from Peripheral, Antecubital, Right Updated:  03/16/25 1232     Blood Culture, Routine No growth after 5 days.    Culture, Respiratory with Gram Stain [[id number]] Collected: 03/12/25 1047    Order Status: Completed Specimen: Respiratory from Sputum, Expectorated Updated: 03/14/25 0857     Respiratory Culture Normal respiratory kehinde     Gram Stain (Respiratory) Many WBC's     Gram Stain (Respiratory) >10 epithelial cells per low power field     Gram Stain (Respiratory) Few budding yeast     Gram Stain (Respiratory) Few Gram positive cocci    Urine culture [[id number]]  (Abnormal)  (Susceptibility) Collected: 03/11/25 1406    Order Status: Completed Specimen: Urine Updated: 03/14/25 0830     Urine Culture, Routine PROTEUS MIRABILIS  >100,000 cfu/ml        STREPTOCOCCUS AGALACTIAE (GROUP B)  > 100,000 cfu/ml  In case of Penicillin allergy, call lab for further testing.  Beta-hemolytic streptococci are routinely susceptible to   penicillins,cephalosporins and carbapenems.      Narrative:      Specimen Source->Urine          Afebrile, WBC normal   Received 4.5 days of Zosyn   Sputum culture did not grow any predominant bacteria  De-escalated to Augmentin for UTI, and to continue treatment for suspected CPAP    Patient has a diagnosis of pneumonia. The cause of the pneumonia is bacterial in etiology due to Gram negative pathogens associated with aspiration. The pneumonia is acute. The patient has the following signs/symptoms of pneumonia: persistent hypoxia , cough, and shortness of breath. The patient does have a current oxygen requirement and the patient does have a home oxygen requirement. I have reviewed the pertinent imaging. The following cultures have been collected: Blood cultures and Sputum culture The culture results are listed below.     Current antimicrobial regimen consists of the antibiotics listed below. Will monitor patient closely and continue current treatment plan unchanged.    Antibiotics (From admission, onward)      None             Microbiology Results (last 7 days)       Procedure Component Value Units Date/Time    Blood culture [[id number]] Collected: 03/11/25 0944    Order Status: Completed Specimen: Blood from Peripheral, Hand, Right Updated: 03/16/25 1232     Blood Culture, Routine No growth after 5 days.    Blood culture [[id number]] Collected: 03/11/25 0944    Order Status: Completed Specimen: Blood from Peripheral, Antecubital, Right Updated: 03/16/25 1232     Blood Culture, Routine No growth after 5 days.    Culture, Respiratory with Gram Stain [[id number]] Collected: 03/12/25 1047    Order Status: Completed Specimen: Respiratory from Sputum, Expectorated Updated: 03/14/25 0857     Respiratory Culture Normal respiratory kehinde     Gram Stain (Respiratory) Many WBC's     Gram Stain (Respiratory) >10 epithelial cells per low power field     Gram Stain (Respiratory) Few budding yeast     Gram Stain (Respiratory) Few Gram positive cocci    Urine culture [[id number]]  (Abnormal)  (Susceptibility) Collected: 03/11/25 1406    Order Status: Completed Specimen: Urine Updated: 03/14/25 0830     Urine Culture, Routine PROTEUS MIRABILIS  >100,000 cfu/ml        STREPTOCOCCUS AGALACTIAE (GROUP B)  > 100,000 cfu/ml  In case of Penicillin allergy, call lab for further testing.  Beta-hemolytic streptococci are routinely susceptible to   penicillins,cephalosporins and carbapenems.      Narrative:      Specimen Source->Urine          Urine culture growing Proteus mirabilis and strep agalactiae  Deescalating from Zosyn to Augmentin for 2 more days    Patient has a diagnosis of pneumonia. The cause of the pneumonia is bacterial in etiology due to Gram negative pathogens associated with aspiration. The pneumonia is acute. The patient has the following signs/symptoms of pneumonia: persistent hypoxia , cough, and shortness of breath. The patient does have a current oxygen requirement and the patient does have a home oxygen requirement. I have reviewed  the pertinent imaging. The following cultures have been collected: Blood cultures and Sputum culture The culture results are listed below.     Current antimicrobial regimen consists of the antibiotics listed below. Will monitor patient closely and continue current treatment plan unchanged.    Antibiotics (From admission, onward)      None          Microbiology Results (last 7 days)       Procedure Component Value Units Date/Time    Blood culture [[id number]] Collected: 03/11/25 0944    Order Status: Completed Specimen: Blood from Peripheral, Hand, Right Updated: 03/16/25 1232     Blood Culture, Routine No growth after 5 days.    Blood culture [[id number]] Collected: 03/11/25 0944    Order Status: Completed Specimen: Blood from Peripheral, Antecubital, Right Updated: 03/16/25 1232     Blood Culture, Routine No growth after 5 days.    Culture, Respiratory with Gram Stain [[id number]] Collected: 03/12/25 1047    Order Status: Completed Specimen: Respiratory from Sputum, Expectorated Updated: 03/14/25 0857     Respiratory Culture Normal respiratory kehinde     Gram Stain (Respiratory) Many WBC's     Gram Stain (Respiratory) >10 epithelial cells per low power field     Gram Stain (Respiratory) Few budding yeast     Gram Stain (Respiratory) Few Gram positive cocci    Urine culture [[id number]]  (Abnormal)  (Susceptibility) Collected: 03/11/25 1406    Order Status: Completed Specimen: Urine Updated: 03/14/25 0830     Urine Culture, Routine PROTEUS MIRABILIS  >100,000 cfu/ml        STREPTOCOCCUS AGALACTIAE (GROUP B)  > 100,000 cfu/ml  In case of Penicillin allergy, call lab for further testing.  Beta-hemolytic streptococci are routinely susceptible to   penicillins,cephalosporins and carbapenems.      Narrative:      Specimen Source->Urine          Microbiology Results (last 7 days)       Procedure Component Value Units Date/Time    Blood culture [[id number]] Collected: 03/11/25 0944    Order Status: Completed Specimen: Blood  from Peripheral, Hand, Right Updated: 03/16/25 1232     Blood Culture, Routine No growth after 5 days.    Blood culture [[id number]] Collected: 03/11/25 0944    Order Status: Completed Specimen: Blood from Peripheral, Antecubital, Right Updated: 03/16/25 1232     Blood Culture, Routine No growth after 5 days.    Culture, Respiratory with Gram Stain [[id number]] Collected: 03/12/25 1047    Order Status: Completed Specimen: Respiratory from Sputum, Expectorated Updated: 03/14/25 0857     Respiratory Culture Normal respiratory kehinde     Gram Stain (Respiratory) Many WBC's     Gram Stain (Respiratory) >10 epithelial cells per low power field     Gram Stain (Respiratory) Few budding yeast     Gram Stain (Respiratory) Few Gram positive cocci    Urine culture [[id number]]  (Abnormal)  (Susceptibility) Collected: 03/11/25 1406    Order Status: Completed Specimen: Urine Updated: 03/14/25 0830     Urine Culture, Routine PROTEUS MIRABILIS  >100,000 cfu/ml        STREPTOCOCCUS AGALACTIAE (GROUP B)  > 100,000 cfu/ml  In case of Penicillin allergy, call lab for further testing.  Beta-hemolytic streptococci are routinely susceptible to   penicillins,cephalosporins and carbapenems.      Narrative:      Specimen Source->Urine          Collected: 03/12/25 1047     Specimen: Respiratory from Sputum, Expectorated Updated: 03/14/25 0857       Antibiotics (From admission, onward)      None            Microbiology Results (last 7 days)       Procedure Component Value Units Date/Time    Blood culture [[id number]] Collected: 03/11/25 0944    Order Status: Completed Specimen: Blood from Peripheral, Hand, Right Updated: 03/16/25 1232     Blood Culture, Routine No growth after 5 days.    Blood culture [[id number]] Collected: 03/11/25 0944    Order Status: Completed Specimen: Blood from Peripheral, Antecubital, Right Updated: 03/16/25 1232     Blood Culture, Routine No growth after 5 days.    Culture, Respiratory with Gram Stain [[id number]]  Collected: 03/12/25 1047    Order Status: Completed Specimen: Respiratory from Sputum, Expectorated Updated: 03/14/25 0857     Respiratory Culture Normal respiratory kehinde     Gram Stain (Respiratory) Many WBC's     Gram Stain (Respiratory) >10 epithelial cells per low power field     Gram Stain (Respiratory) Few budding yeast     Gram Stain (Respiratory) Few Gram positive cocci    Urine culture [[id number]]  (Abnormal)  (Susceptibility) Collected: 03/11/25 1406    Order Status: Completed Specimen: Urine Updated: 03/14/25 0830     Urine Culture, Routine PROTEUS MIRABILIS  >100,000 cfu/ml        STREPTOCOCCUS AGALACTIAE (GROUP B)  > 100,000 cfu/ml  In case of Penicillin allergy, call lab for further testing.  Beta-hemolytic streptococci are routinely susceptible to   penicillins,cephalosporins and carbapenems.      Narrative:      Specimen Source->Urine              Microbiology Results (last 7 days)       Procedure Component Value Units Date/Time    Blood culture [[id number]] Collected: 03/11/25 0944    Order Status: Completed Specimen: Blood from Peripheral, Hand, Right Updated: 03/16/25 1232     Blood Culture, Routine No growth after 5 days.    Blood culture [[id number]] Collected: 03/11/25 0944    Order Status: Completed Specimen: Blood from Peripheral, Antecubital, Right Updated: 03/16/25 1232     Blood Culture, Routine No growth after 5 days.    Culture, Respiratory with Gram Stain [[id number]] Collected: 03/12/25 1047    Order Status: Completed Specimen: Respiratory from Sputum, Expectorated Updated: 03/14/25 0857     Respiratory Culture Normal respiratory kehinde     Gram Stain (Respiratory) Many WBC's     Gram Stain (Respiratory) >10 epithelial cells per low power field     Gram Stain (Respiratory) Few budding yeast     Gram Stain (Respiratory) Few Gram positive cocci    Urine culture [[id number]]  (Abnormal)  (Susceptibility) Collected: 03/11/25 1406    Order Status: Completed Specimen: Urine Updated:  03/14/25 0830     Urine Culture, Routine PROTEUS MIRABILIS  >100,000 cfu/ml        STREPTOCOCCUS AGALACTIAE (GROUP B)  > 100,000 cfu/ml  In case of Penicillin allergy, call lab for further testing.  Beta-hemolytic streptococci are routinely susceptible to   penicillins,cephalosporins and carbapenems.      Narrative:      Specimen Source->Urine              Microbiology Results (last 7 days)       Procedure Component Value Units Date/Time    Blood culture [[id number]] Collected: 03/11/25 0944    Order Status: Completed Specimen: Blood from Peripheral, Hand, Right Updated: 03/16/25 1232     Blood Culture, Routine No growth after 5 days.    Blood culture [[id number]] Collected: 03/11/25 0944    Order Status: Completed Specimen: Blood from Peripheral, Antecubital, Right Updated: 03/16/25 1232     Blood Culture, Routine No growth after 5 days.    Culture, Respiratory with Gram Stain [[id number]] Collected: 03/12/25 1047    Order Status: Completed Specimen: Respiratory from Sputum, Expectorated Updated: 03/14/25 0857     Respiratory Culture Normal respiratory kehinde     Gram Stain (Respiratory) Many WBC's     Gram Stain (Respiratory) >10 epithelial cells per low power field     Gram Stain (Respiratory) Few budding yeast     Gram Stain (Respiratory) Few Gram positive cocci    Urine culture [[id number]]  (Abnormal)  (Susceptibility) Collected: 03/11/25 1406    Order Status: Completed Specimen: Urine Updated: 03/14/25 0830     Urine Culture, Routine PROTEUS MIRABILIS  >100,000 cfu/ml        STREPTOCOCCUS AGALACTIAE (GROUP B)  > 100,000 cfu/ml  In case of Penicillin allergy, call lab for further testing.  Beta-hemolytic streptococci are routinely susceptible to   penicillins,cephalosporins and carbapenems.      Narrative:      Specimen Source->Urine              Microbiology Results (last 7 days)       Procedure Component Value Units Date/Time    Blood culture [[id number]] Collected: 03/11/25 0944    Order Status: Completed  Specimen: Blood from Peripheral, Hand, Right Updated: 03/16/25 1232     Blood Culture, Routine No growth after 5 days.    Blood culture [[id number]] Collected: 03/11/25 0944    Order Status: Completed Specimen: Blood from Peripheral, Antecubital, Right Updated: 03/16/25 1232     Blood Culture, Routine No growth after 5 days.    Culture, Respiratory with Gram Stain [[id number]] Collected: 03/12/25 1047    Order Status: Completed Specimen: Respiratory from Sputum, Expectorated Updated: 03/14/25 0857     Respiratory Culture Normal respiratory kehinde     Gram Stain (Respiratory) Many WBC's     Gram Stain (Respiratory) >10 epithelial cells per low power field     Gram Stain (Respiratory) Few budding yeast     Gram Stain (Respiratory) Few Gram positive cocci    Urine culture [[id number]]  (Abnormal)  (Susceptibility) Collected: 03/11/25 1406    Order Status: Completed Specimen: Urine Updated: 03/14/25 0830     Urine Culture, Routine PROTEUS MIRABILIS  >100,000 cfu/ml        STREPTOCOCCUS AGALACTIAE (GROUP B)  > 100,000 cfu/ml  In case of Penicillin allergy, call lab for further testing.  Beta-hemolytic streptococci are routinely susceptible to   penicillins,cephalosporins and carbapenems.      Narrative:      Specimen Source->Urine              Microbiology Results (last 7 days)       Procedure Component Value Units Date/Time    Blood culture [[id number]] Collected: 03/11/25 0944    Order Status: Completed Specimen: Blood from Peripheral, Hand, Right Updated: 03/16/25 1232     Blood Culture, Routine No growth after 5 days.    Blood culture [[id number]] Collected: 03/11/25 0944    Order Status: Completed Specimen: Blood from Peripheral, Antecubital, Right Updated: 03/16/25 1232     Blood Culture, Routine No growth after 5 days.    Culture, Respiratory with Gram Stain [[id number]] Collected: 03/12/25 1047    Order Status: Completed Specimen: Respiratory from Sputum, Expectorated Updated: 03/14/25 0857     Respiratory Culture  Normal respiratory kehinde     Gram Stain (Respiratory) Many WBC's     Gram Stain (Respiratory) >10 epithelial cells per low power field     Gram Stain (Respiratory) Few budding yeast     Gram Stain (Respiratory) Few Gram positive cocci    Urine culture [[id number]]  (Abnormal)  (Susceptibility) Collected: 03/11/25 1406    Order Status: Completed Specimen: Urine Updated: 03/14/25 0830     Urine Culture, Routine PROTEUS MIRABILIS  >100,000 cfu/ml        STREPTOCOCCUS AGALACTIAE (GROUP B)  > 100,000 cfu/ml  In case of Penicillin allergy, call lab for further testing.  Beta-hemolytic streptococci are routinely susceptible to   penicillins,cephalosporins and carbapenems.      Narrative:      Specimen Source->Urine               Microbiology Results (last 7 days)       Procedure Component Value Units Date/Time    Blood culture [[id number]] Collected: 03/11/25 0944    Order Status: Completed Specimen: Blood from Peripheral, Hand, Right Updated: 03/16/25 1232     Blood Culture, Routine No growth after 5 days.    Blood culture [[id number]] Collected: 03/11/25 0944    Order Status: Completed Specimen: Blood from Peripheral, Antecubital, Right Updated: 03/16/25 1232     Blood Culture, Routine No growth after 5 days.    Culture, Respiratory with Gram Stain [[id number]] Collected: 03/12/25 1047    Order Status: Completed Specimen: Respiratory from Sputum, Expectorated Updated: 03/14/25 0857     Respiratory Culture Normal respiratory kehinde     Gram Stain (Respiratory) Many WBC's     Gram Stain (Respiratory) >10 epithelial cells per low power field     Gram Stain (Respiratory) Few budding yeast     Gram Stain (Respiratory) Few Gram positive cocci    Urine culture [[id number]]  (Abnormal)  (Susceptibility) Collected: 03/11/25 1406    Order Status: Completed Specimen: Urine Updated: 03/14/25 0830     Urine Culture, Routine PROTEUS MIRABILIS  >100,000 cfu/ml        STREPTOCOCCUS AGALACTIAE (GROUP B)  > 100,000 cfu/ml  In case of  Penicillin allergy, call lab for further testing.  Beta-hemolytic streptococci are routinely susceptible to   penicillins,cephalosporins and carbapenems.      Narrative:      Specimen Source->Urine          Microbiology Results (last 7 days)       Procedure Component Value Units Date/Time    Blood culture [[id number]] Collected: 03/11/25 0944    Order Status: Completed Specimen: Blood from Peripheral, Hand, Right Updated: 03/16/25 1232     Blood Culture, Routine No growth after 5 days.    Blood culture [[id number]] Collected: 03/11/25 0944    Order Status: Completed Specimen: Blood from Peripheral, Antecubital, Right Updated: 03/16/25 1232     Blood Culture, Routine No growth after 5 days.    Culture, Respiratory with Gram Stain [[id number]] Collected: 03/12/25 1047    Order Status: Completed Specimen: Respiratory from Sputum, Expectorated Updated: 03/14/25 0857     Respiratory Culture Normal respiratory kehinde     Gram Stain (Respiratory) Many WBC's     Gram Stain (Respiratory) >10 epithelial cells per low power field     Gram Stain (Respiratory) Few budding yeast     Gram Stain (Respiratory) Few Gram positive cocci    Urine culture [[id number]]  (Abnormal)  (Susceptibility) Collected: 03/11/25 1406    Order Status: Completed Specimen: Urine Updated: 03/14/25 0830     Urine Culture, Routine PROTEUS MIRABILIS  >100,000 cfu/ml        STREPTOCOCCUS AGALACTIAE (GROUP B)  > 100,000 cfu/ml  In case of Penicillin allergy, call lab for further testing.  Beta-hemolytic streptococci are routinely susceptible to   penicillins,cephalosporins and carbapenems.      Narrative:      Specimen Source->Urine          Antibiotics (From admission, onward)      None          Antibiotics (From admission, onward)      None

## (undated) DEVICE — GLOVE SURG ULTRA TOUCH 8.5

## (undated) DEVICE — SLING ARM COMFT NAVY BLU MED

## (undated) DEVICE — ADHESIVE MASTISOL VIAL 48/BX

## (undated) DEVICE — Device

## (undated) DEVICE — NDL ELECTRODE E-Z CLEAN 2.75IN

## (undated) DEVICE — DRAPE STERI INSTRUMENT 1018

## (undated) DEVICE — ELECTRODE REM PLYHSV RETURN 9

## (undated) DEVICE — SOL NACL IRR 1000ML BTL

## (undated) DEVICE — SHEET DRAPE FAN-FOLDED 3/4

## (undated) DEVICE — SEE MEDLINE ITEM 156964

## (undated) DEVICE — SUT 3-0 VICRYL / SH (J416)

## (undated) DEVICE — GLOVE SURGEONS ULTRA TOUCH 6.5

## (undated) DEVICE — SLEEVE SCD EXPRESS CALF MEDIUM

## (undated) DEVICE — SUT 2-0 VICRYL / SH (J417)

## (undated) DEVICE — GAUZE SPONGE 4X4 12PLY

## (undated) DEVICE — CLOSURE SKIN STERI STRIP 1/4X4

## (undated) DEVICE — GLOVE SURG ULTRA TOUCH 9

## (undated) DEVICE — PADDING CAST 4IN SPECIALIST

## (undated) DEVICE — DRAPE C ARM 42 X 120 10/BX

## (undated) DEVICE — DRESSING N ADH OIL EMUL 3X3

## (undated) DEVICE — SEE MEDLINE ITEM 146298

## (undated) DEVICE — SUT 4-0 VICRYL / SH

## (undated) DEVICE — DRAPE STERI U-SHAPED 47X51IN

## (undated) DEVICE — CANISTER SUCTION 3000CC

## (undated) DEVICE — SPONGE/BRUSH SCRUB SURG PCMX

## (undated) DEVICE — BLADE #15 STERILE CARBON

## (undated) DEVICE — PAD CAST SPECIALIST STRL 4

## (undated) DEVICE — DRAPE PLASTIC U 60X72

## (undated) DEVICE — SEE MEDLINE ITEM 152522

## (undated) DEVICE — SPLINT PLASTER FAST SET 5X30IN